# Patient Record
Sex: FEMALE | Race: WHITE | NOT HISPANIC OR LATINO | URBAN - METROPOLITAN AREA
[De-identification: names, ages, dates, MRNs, and addresses within clinical notes are randomized per-mention and may not be internally consistent; named-entity substitution may affect disease eponyms.]

---

## 2018-04-12 ENCOUNTER — OFFICE (OUTPATIENT)
Dept: URBAN - METROPOLITAN AREA CLINIC 75 | Facility: CLINIC | Age: 38
End: 2018-04-12

## 2018-04-12 VITALS
DIASTOLIC BLOOD PRESSURE: 80 MMHG | HEIGHT: 64 IN | SYSTOLIC BLOOD PRESSURE: 150 MMHG | WEIGHT: 158 LBS | HEART RATE: 76 BPM

## 2018-04-12 DIAGNOSIS — R19.4 CHANGE IN BOWEL HABIT: ICD-10-CM

## 2018-04-12 DIAGNOSIS — R14.0 ABDOMINAL DISTENSION (GASEOUS): ICD-10-CM

## 2018-04-12 DIAGNOSIS — K59.00 CONSTIPATION, UNSPECIFIED: ICD-10-CM

## 2018-04-12 PROCEDURE — 99203 OFFICE O/P NEW LOW 30 MIN: CPT | Performed by: INTERNAL MEDICINE

## 2018-04-24 PROBLEM — Z86.010 PERSONAL HISTORY OF COLONIC POLYPS: Status: ACTIVE | Noted: 2018-04-25

## 2020-04-08 ENCOUNTER — LAB REQUISITION (OUTPATIENT)
Dept: LAB | Facility: HOSPITAL | Age: 40
End: 2020-04-08

## 2020-04-08 DIAGNOSIS — Z00.00 ROUTINE GENERAL MEDICAL EXAMINATION AT A HEALTH CARE FACILITY: ICD-10-CM

## 2020-07-22 ENCOUNTER — TRANSCRIBE ORDERS (OUTPATIENT)
Dept: LAB | Facility: HOSPITAL | Age: 40
End: 2020-07-22

## 2020-07-22 ENCOUNTER — LAB (OUTPATIENT)
Dept: LAB | Facility: HOSPITAL | Age: 40
End: 2020-07-22

## 2020-07-22 DIAGNOSIS — E78.5 HYPERLIPIDEMIA, UNSPECIFIED HYPERLIPIDEMIA TYPE: ICD-10-CM

## 2020-07-22 DIAGNOSIS — I10 HYPERTENSION, UNSPECIFIED TYPE: Primary | ICD-10-CM

## 2020-07-22 DIAGNOSIS — E11.00 TYPE II DIABETES MELLITUS WITH HYPEROSMOLARITY, UNCONTROLLED (HCC): ICD-10-CM

## 2020-07-22 DIAGNOSIS — I25.10 DISEASE OF CARDIOVASCULAR SYSTEM: ICD-10-CM

## 2020-07-22 DIAGNOSIS — I10 HYPERTENSION, UNSPECIFIED TYPE: ICD-10-CM

## 2020-07-22 DIAGNOSIS — E11.65 TYPE II DIABETES MELLITUS WITH HYPEROSMOLARITY, UNCONTROLLED (HCC): ICD-10-CM

## 2020-07-22 PROCEDURE — 36415 COLL VENOUS BLD VENIPUNCTURE: CPT

## 2020-07-22 PROCEDURE — 86769 SARS-COV-2 COVID-19 ANTIBODY: CPT

## 2020-07-24 LAB — SARS-COV-2 AB SERPL QL IA: NEGATIVE

## 2020-09-14 ENCOUNTER — HOSPITAL ENCOUNTER (EMERGENCY)
Facility: HOSPITAL | Age: 40
Discharge: HOME | End: 2020-09-14
Attending: EMERGENCY MEDICINE | Admitting: EMERGENCY MEDICINE

## 2020-09-14 VITALS
RESPIRATION RATE: 14 BRPM | WEIGHT: 173.72 LBS | SYSTOLIC BLOOD PRESSURE: 207 MMHG | OXYGEN SATURATION: 99 % | TEMPERATURE: 98.3 F | DIASTOLIC BLOOD PRESSURE: 97 MMHG | HEART RATE: 72 BPM | HEIGHT: 64 IN | BODY MASS INDEX: 29.66 KG/M2

## 2020-09-14 DIAGNOSIS — L03.314 CELLULITIS OF GROIN: Primary | ICD-10-CM

## 2020-09-14 PROCEDURE — 99282 EMERGENCY DEPT VISIT SF MDM: CPT

## 2020-09-14 RX ORDER — TRAMADOL HYDROCHLORIDE 50 MG/1
50 TABLET ORAL EVERY 6 HOURS PRN
Qty: 12 TABLET | Refills: 0 | Status: SHIPPED | OUTPATIENT
Start: 2020-09-14 | End: 2021-07-24

## 2020-09-14 RX ORDER — DOXYCYCLINE HYCLATE 100 MG/1
100 TABLET, DELAYED RELEASE ORAL 2 TIMES DAILY
Qty: 14 TABLET | Refills: 0 | Status: SHIPPED | OUTPATIENT
Start: 2020-09-14 | End: 2020-10-27

## 2020-09-14 NOTE — ED PROVIDER NOTES
Subjective   Patient is a 40-year-old female complaining of redness and pain to her pubic region for the past few days.  She denies abdominal pain balm diarrhea fever or other complaint          Review of Systems  Negative for fever abdominal pain vomit diarrhea dysuria or other complaint  No past medical history on file.    Allergies   Allergen Reactions   • Insulin Lispro Other (See Comments)     SWELLING AT INJECTION SITE   • Latex Itching       No past surgical history on file.    No family history on file.    Social History     Socioeconomic History   • Marital status:      Spouse name: Not on file   • Number of children: Not on file   • Years of education: Not on file   • Highest education level: Not on file           Objective   Physical Exam  Abdomen is soft nontender.  Patient has normal bowel sounds without rebound or guarding.   exam shows patient have erythema and induration over her mons pubis.  There is no fluctuance noted.  Procedures           ED Course                                           MDM  Number of Diagnoses or Management Options  Diagnosis management comments: Patient has findings consistent with cellulitis.  There is no evidence of abscess at this time.  Patient will be discharged and will be placed on doxycycline and Ultram for pain control.  She will follow with MD in several days if not improving.    Risk of Complications, Morbidity, and/or Mortality  Presenting problems: moderate  Diagnostic procedures: moderate  Management options: moderate    Patient Progress  Patient progress: stable      Final diagnoses:   Cellulitis of groin            Chadwick Saavedra MD  09/14/20 1344

## 2020-10-27 ENCOUNTER — E-VISIT (OUTPATIENT)
Dept: FAMILY MEDICINE CLINIC | Facility: TELEHEALTH | Age: 40
End: 2020-10-27

## 2020-10-27 PROCEDURE — 99421 OL DIG E/M SVC 5-10 MIN: CPT | Performed by: NURSE PRACTITIONER

## 2020-10-27 RX ORDER — LANOLIN ALCOHOL/MO/W.PET/CERES
1000 CREAM (GRAM) TOPICAL DAILY
COMMUNITY

## 2020-10-27 RX ORDER — FLUTICASONE PROPIONATE 50 MCG
2 SPRAY, SUSPENSION (ML) NASAL DAILY
COMMUNITY
End: 2021-07-24

## 2020-10-27 RX ORDER — CHLORTHALIDONE 25 MG/1
25 TABLET ORAL DAILY
COMMUNITY
End: 2021-07-24

## 2020-10-27 RX ORDER — MULTIVITAMIN WITH IRON
100 TABLET ORAL DAILY
COMMUNITY
End: 2021-07-24

## 2020-10-27 RX ORDER — SULFAMETHOXAZOLE AND TRIMETHOPRIM 800; 160 MG/1; MG/1
1 TABLET ORAL 2 TIMES DAILY
Qty: 6 TABLET | Refills: 0 | Status: SHIPPED | OUTPATIENT
Start: 2020-10-27 | End: 2020-10-30

## 2020-10-27 NOTE — PROGRESS NOTES
I reviewed the patient's evisit and prescribed bactrim-ds three day course to the patient for suspected UTI. If symptoms worsen or do not improve in 48 hours she should go to urgent care for evaluation.    I spent 11-20 minutes in the patient's chart.

## 2020-10-27 NOTE — PATIENT INSTRUCTIONS
Increase fluid intake  Avoid caffeine and carbonation  If you develop fever or mid-back pain, go to ER  If symptoms worsen or do not improve in 48 hours, go to urgent care       Urinary Tract Infection, Adult  A urinary tract infection (UTI) is an infection of any part of the urinary tract. The urinary tract includes:  · The kidneys.  · The ureters.  · The bladder.  · The urethra.  These organs make, store, and get rid of pee (urine) in the body.  What are the causes?  This is caused by germs (bacteria) in your genital area. These germs grow and cause swelling (inflammation) of your urinary tract.  What increases the risk?  You are more likely to develop this condition if:  · You have a small, thin tube (catheter) to drain pee.  · You cannot control when you pee or poop (incontinence).  · You are female, and:  ? You use these methods to prevent pregnancy:  ? A medicine that kills sperm (spermicide).  ? A device that blocks sperm (diaphragm).  ? You have low levels of a female hormone (estrogen).  ? You are pregnant.  · You have genes that add to your risk.  · You are sexually active.  · You take antibiotic medicines.  · You have trouble peeing because of:  ? A prostate that is bigger than normal, if you are male.  ? A blockage in the part of your body that drains pee from the bladder (urethra).  ? A kidney stone.  ? A nerve condition that affects your bladder (neurogenic bladder).  ? Not getting enough to drink.  ? Not peeing often enough.  · You have other conditions, such as:  ? Diabetes.  ? A weak disease-fighting system (immune system).  ? Sickle cell disease.  ? Gout.  ? Injury of the spine.  What are the signs or symptoms?  Symptoms of this condition include:  · Needing to pee right away (urgently).  · Peeing often.  · Peeing small amounts often.  · Pain or burning when peeing.  · Blood in the pee.  · Pee that smells bad or not like normal.  · Trouble peeing.  · Pee that is cloudy.  · Fluid coming from the  vagina, if you are female.  · Pain in the belly or lower back.  Other symptoms include:  · Throwing up (vomiting).  · No urge to eat.  · Feeling mixed up (confused).  · Being tired and grouchy (irritable).  · A fever.  · Watery poop (diarrhea).  How is this treated?  This condition may be treated with:  · Antibiotic medicine.  · Other medicines.  · Drinking enough water.  Follow these instructions at home:    Medicines  · Take over-the-counter and prescription medicines only as told by your doctor.  · If you were prescribed an antibiotic medicine, take it as told by your doctor. Do not stop taking it even if you start to feel better.  General instructions  · Make sure you:  ? Pee until your bladder is empty.  ? Do not hold pee for a long time.  ? Empty your bladder after sex.  ? Wipe from front to back after pooping if you are a female. Use each tissue one time when you wipe.  · Drink enough fluid to keep your pee pale yellow.  · Keep all follow-up visits as told by your doctor. This is important.  Contact a doctor if:  · You do not get better after 1-2 days.  · Your symptoms go away and then come back.  Get help right away if:  · You have very bad back pain.  · You have very bad pain in your lower belly.  · You have a fever.  · You are sick to your stomach (nauseous).  · You are throwing up.  Summary  · A urinary tract infection (UTI) is an infection of any part of the urinary tract.  · This condition is caused by germs in your genital area.  · There are many risk factors for a UTI. These include having a small, thin tube to drain pee and not being able to control when you pee or poop.  · Treatment includes antibiotic medicines for germs.  · Drink enough fluid to keep your pee pale yellow.  This information is not intended to replace advice given to you by your health care provider. Make sure you discuss any questions you have with your health care provider.  Document Released: 06/05/2009 Document Revised:  12/05/2019 Document Reviewed: 06/27/2019  [employer] Patient Education © 2020 [employer]  Sulfamethoxazole; Trimethoprim, SMX-TMP tablets  What is this medicine?  SULFAMETHOXAZOLE; TRIMETHOPRIM or SMX-TMP (suhl fuh meth OK toni zohl; trye METH oh prim) is a combination of a sulfonamide antibiotic and a second antibiotic, trimethoprim. It is used to treat or prevent certain kinds of bacterial infections. It will not work for colds, flu, or other viral infections.  This medicine may be used for other purposes; ask your health care provider or pharmacist if you have questions.  COMMON BRAND NAME(S): Bacter-Aid DS, Bactrim, Bactrim DS, Septra, Septra DS  What should I tell my health care provider before I take this medicine?  They need to know if you have any of these conditions:  · G6PD deficiency  · HIV or AIDS  · kidney disease  · liver disease  · low platelets  · low red blood cell counts  · poor nutrition  · stomach or intestine problems like colitis  · thyroid disease  · an unusual or allergic reaction to sulfamethoxazole, trimethoprim, sulfa drugs, other drugs, foods, dyes, or preservatives  · pregnant or trying to get pregnant  · breast-feeding  How should I use this medicine?  Take this medicine by mouth with a glass of water. Follow the directions on the prescription label. Take your medicine at regular intervals. Do not take it more often than directed. Take all of your medicine as directed even if you think you are better. Do not skip doses or stop your medicine early.  Talk to your pediatrician regarding the use of this medicine in children. While this drug may be prescribed for children as young as 2 months for selected conditions, precautions do apply.  Overdosage: If you think you have taken too much of this medicine contact a poison control center or emergency room at once.  NOTE: This medicine is only for you. Do not share this medicine with others.  What if I miss a dose?  If you miss a dose, take it  as soon as you can. If it is almost time for your next dose, take only that dose. Do not take double or extra doses.  What may interact with this medicine?  Do not take this medicine with any of the following medications:  · dofetilide  This medicine may also interact with the following medications:  · amantadine  · birth control pills  · certain medicines for blood pressure, heart disease  · certain medicines for depression, like amitriptyline  · certain medicines for diabetes, like glipizide or glyburide  · certain medicines that treat or prevent blood clots like warfarin  · cyclosporine  · digoxin  · diuretics  · indomethacin  · methotrexate  · phenytoin  · procainamide  · pyrimethamine  · zidovudine  This list may not describe all possible interactions. Give your health care provider a list of all the medicines, herbs, non-prescription drugs, or dietary supplements you use. Also tell them if you smoke, drink alcohol, or use illegal drugs. Some items may interact with your medicine.  What should I watch for while using this medicine?  Tell your health care provider if your symptoms do not start to get better or if they get worse.  Do not treat diarrhea with over the counter products. Contact your health care provider if you have diarrhea that lasts more than 2 days or if it is severe and watery.  This drug may cause serious skin reactions. They can happen weeks to months after starting the drug. Contact your health care provider right away if you notice fevers or flu-like symptoms with a rash. The rash may be red or purple and then turn into blisters or peeling of the skin. Or, you might notice a red rash with swelling of the face, lips or lymph nodes in your neck or under your arms.  This drug can make you more sensitive to the sun. Keep out of the sun. If you cannot avoid being in the sun, wear protective clothing and sunscreen. Do not use sun lamps or tanning beds/booths.  Be careful brushing or flossing your  teeth or using a toothpick because you may get an infection or bleed more easily. If you have any dental work done, tell your dentist you are receiving this drug.  What side effects may I notice from receiving this medicine?  Side effects that you should report to your doctor or health care professional as soon as possible:  · allergic reactions (skin rash, itching or hives; swelling of the face, lips, or tongue)  · bloody or watery diarrhea  · heartbeat rhythm changes (trouble breathing; chest pain; dizziness; fast, irregular heartbeat; feeling faint or lightheaded, falls,)  · fever  · high potassium levels (chest pain; fast, irregular heartbeat; muscle weakness)  · kidney injury (trouble passing urine or change in the amount of urine)  · low blood sugar (feeling anxious; confusion; dizziness; increased hunger; unusually weak or tired; increased sweating; shakiness; cold, clammy skin; irritable; headache; blurred vision; fast heartbeat; loss of consciousness)  · low red blood cell counts (trouble breathing; feeling faint; lightheaded, falls; unusually weak or tired)  · rash, fever, and swollen lymph nodes  · redness, blistering, peeling, or loosening of the skin, including inside the mouth  · unusual bruising or bleeding  Side effects that usually do not require medical attention (report to your doctor or health care professional if they continue or are bothersome):  · loss of appetite  · nausea  · vomiting  This list may not describe all possible side effects. Call your doctor for medical advice about side effects. You may report side effects to FDA at 2-227-YER-1170.  Where should I keep my medicine?  Keep out of the reach of children.  Store between 15 and 25 degrees C (59 to 77 degrees F). Protect from light. Keep the container tightly closed. Throw away any unused drug after the expiration date.  NOTE: This sheet is a summary. It may not cover all possible information. If you have questions about this  medicine, talk to your doctor, pharmacist, or health care provider.  © 2020 [employer] (2020-09-04 12:53:51)

## 2021-01-08 ENCOUNTER — TRANSCRIBE ORDERS (OUTPATIENT)
Dept: ADMINISTRATIVE | Facility: HOSPITAL | Age: 41
End: 2021-01-08

## 2021-01-08 DIAGNOSIS — Z12.31 SCREENING MAMMOGRAM, ENCOUNTER FOR: Primary | ICD-10-CM

## 2021-01-12 ENCOUNTER — E-VISIT (OUTPATIENT)
Dept: FAMILY MEDICINE CLINIC | Facility: TELEHEALTH | Age: 41
End: 2021-01-12

## 2021-01-12 DIAGNOSIS — R39.89 SUSPECTED UTI: Primary | ICD-10-CM

## 2021-01-12 PROCEDURE — 99422 OL DIG E/M SVC 11-20 MIN: CPT | Performed by: NURSE PRACTITIONER

## 2021-01-12 RX ORDER — PHENAZOPYRIDINE HYDROCHLORIDE 200 MG/1
200 TABLET, FILM COATED ORAL 3 TIMES DAILY PRN
Qty: 6 TABLET | Refills: 0 | Status: SHIPPED | OUTPATIENT
Start: 2021-01-12 | End: 2021-01-14

## 2021-01-12 RX ORDER — NITROFURANTOIN 25; 75 MG/1; MG/1
100 CAPSULE ORAL 2 TIMES DAILY
Qty: 14 CAPSULE | Refills: 0 | Status: SHIPPED | OUTPATIENT
Start: 2021-01-12 | End: 2021-01-19

## 2021-01-12 NOTE — PATIENT INSTRUCTIONS
Urinary Tract Infection, Adult    A urinary tract infection (UTI) is an infection of any part of the urinary tract. The urinary tract includes the kidneys, ureters, bladder, and urethra. These organs make, store, and get rid of urine in the body.  Your health care provider may use other names to describe the infection. An upper UTI affects the ureters and kidneys (pyelonephritis). A lower UTI affects the bladder (cystitis) and urethra (urethritis).  What are the causes?  Most urinary tract infections are caused by bacteria in your genital area, around the entrance to your urinary tract (urethra). These bacteria grow and cause inflammation of your urinary tract.  What increases the risk?  You are more likely to develop this condition if:  · You have a urinary catheter that stays in place (indwelling).  · You are not able to control when you urinate or have a bowel movement (you have incontinence).  · You are female and you:  ? Use a spermicide or diaphragm for birth control.  ? Have low estrogen levels.  ? Are pregnant.  · You have certain genes that increase your risk (genetics).  · You are sexually active.  · You take antibiotic medicines.  · You have a condition that causes your flow of urine to slow down, such as:  ? An enlarged prostate, if you are male.  ? Blockage in your urethra (stricture).  ? A kidney stone.  ? A nerve condition that affects your bladder control (neurogenic bladder).  ? Not getting enough to drink, or not urinating often.  · You have certain medical conditions, such as:  ? Diabetes.  ? A weak disease-fighting system (immunesystem).  ? Sickle cell disease.  ? Gout.  ? Spinal cord injury.  What are the signs or symptoms?  Symptoms of this condition include:  · Needing to urinate right away (urgently).  · Frequent urination or passing small amounts of urine frequently.  · Pain or burning with urination.  · Blood in the urine.  · Urine that smells bad or unusual.  · Trouble urinating.  · Cloudy  urine.  · Vaginal discharge, if you are female.  · Pain in the abdomen or the lower back.  You may also have:  · Vomiting or a decreased appetite.  · Confusion.  · Irritability or tiredness.  · A fever.  · Diarrhea.  The first symptom in older adults may be confusion. In some cases, they may not have any symptoms until the infection has worsened.  How is this diagnosed?  This condition is diagnosed based on your medical history and a physical exam. You may also have other tests, including:  · Urine tests.  · Blood tests.  · Tests for sexually transmitted infections (STIs).  If you have had more than one UTI, a cystoscopy or imaging studies may be done to determine the cause of the infections.  How is this treated?  Treatment for this condition includes:  · Antibiotic medicine.  · Over-the-counter medicines to treat discomfort.  · Drinking enough water to stay hydrated.  If you have frequent infections or have other conditions such as a kidney stone, you may need to see a health care provider who specializes in the urinary tract (urologist).  In rare cases, urinary tract infections can cause sepsis. Sepsis is a life-threatening condition that occurs when the body responds to an infection. Sepsis is treated in the hospital with IV antibiotics, fluids, and other medicines.  Follow these instructions at home:    Medicines  · Take over-the-counter and prescription medicines only as told by your health care provider.  · If you were prescribed an antibiotic medicine, take it as told by your health care provider. Do not stop using the antibiotic even if you start to feel better.  General instructions  · Make sure you:  ? Empty your bladder often and completely. Do not hold urine for long periods of time.  ? Empty your bladder after sex.  ? Wipe from front to back after a bowel movement if you are female. Use each tissue one time when you wipe.  · Drink enough fluid to keep your urine pale yellow.  · Keep all follow-up  visits as told by your health care provider. This is important.  Contact a health care provider if:  · Your symptoms do not get better after 1-2 days.  · Your symptoms go away and then return.  Get help right away if you have:  · Severe pain in your back or your lower abdomen.  · A fever.  · Nausea or vomiting.  Summary  · A urinary tract infection (UTI) is an infection of any part of the urinary tract, which includes the kidneys, ureters, bladder, and urethra.  · Most urinary tract infections are caused by bacteria in your genital area, around the entrance to your urinary tract (urethra).  · Treatment for this condition often includes antibiotic medicines.  · If you were prescribed an antibiotic medicine, take it as told by your health care provider. Do not stop using the antibiotic even if you start to feel better.  · Keep all follow-up visits as told by your health care provider. This is important.  This information is not intended to replace advice given to you by your health care provider. Make sure you discuss any questions you have with your health care provider.  Document Revised: 12/05/2019 Document Reviewed: 06/27/2019  [employer] Patient Education © 2020 [employer].

## 2021-01-12 NOTE — PROGRESS NOTES
Bibiana Inman    1980  [id number]    I have reviewed the e-Visit questionnaire and patient's answers, my assessment and plan are as follows:      HPI  Bibiana Inman is a 40 y.o. with 1-4 day history of dysuria described as burning, low-grade temp <100.4.  She has had similar symptoms in the past which were treated as UTI.  She is diabetic and prone to UTIs.    Review of Systems - Negative except symptoms listed in HPI      Diagnoses and all orders for this visit:    1. Suspected UTI (Primary)  -     nitrofurantoin, macrocrystal-monohydrate, (MACROBID) 100 MG capsule; Take 1 capsule by mouth 2 (Two) Times a Day for 7 days.  Dispense: 14 capsule; Refill: 0  -     phenazopyridine (PYRIDIUM) 200 MG tablet; Take 1 tablet by mouth 3 (Three) Times a Day As Needed for Bladder Spasms for up to 2 days.  Dispense: 6 tablet; Refill: 0  -Macrobid as prescribed - complete entire course of medication even if you begin to feel better.   --Phenazopyridine is for painful urination and bladder spasms--this medication with cause urine to become bright orange and can stain undergarments.    -Continue to increase your fluid intake.   -Abstain from intercourse during antibiotic treatment.   -Practice good perineal hygiene: wipe front to back  -Do not hold your urine- go to the bathroom every 2-3 hours.     -Warning signs: severe abdominal/pelvic/back pain, fever >101, blood in urine - seek medical attention as soon as possible for a hands on/objective exam and possible labs.     -Follow up with your PCP in 2 days if no improvement in symptoms or if symptoms begin to worsen.       Any medications prescribed have been sent electronically to   [facility] - [address] [place] [address]  Phone: [phone] Fax: [phone]      Time Documentation  Counseled patient  Counseling topics: diagnosis, treatment options and return instructions  Total encounter time: counseling time more than 50% of visit: 20  jose antonio Martinez, APRN  01/12/21  13:03 EST

## 2021-01-29 ENCOUNTER — HOSPITAL ENCOUNTER (EMERGENCY)
Facility: HOSPITAL | Age: 41
Discharge: HOME | End: 2021-01-29
Admitting: EMERGENCY MEDICINE

## 2021-01-29 VITALS
SYSTOLIC BLOOD PRESSURE: 158 MMHG | BODY MASS INDEX: 30.39 KG/M2 | OXYGEN SATURATION: 100 % | TEMPERATURE: 98.4 F | DIASTOLIC BLOOD PRESSURE: 57 MMHG | RESPIRATION RATE: 16 BRPM | HEART RATE: 59 BPM | HEIGHT: 64 IN | WEIGHT: 178 LBS

## 2021-01-29 DIAGNOSIS — Z20.822 COVID-19 RULED OUT BY LABORATORY TESTING: ICD-10-CM

## 2021-01-29 DIAGNOSIS — R19.7 DIARRHEA, UNSPECIFIED TYPE: Primary | ICD-10-CM

## 2021-01-29 DIAGNOSIS — N17.9 ACUTE KIDNEY INJURY (HCC): ICD-10-CM

## 2021-01-29 LAB
ALBUMIN SERPL-MCNC: 4 G/DL (ref 3.5–5.2)
ALBUMIN/GLOB SERPL: 1.1 G/DL
ALP SERPL-CCNC: 130 U/L (ref 39–117)
ALT SERPL W P-5'-P-CCNC: 13 U/L (ref 1–33)
ANION GAP SERPL CALCULATED.3IONS-SCNC: 10 MMOL/L (ref 5–15)
AST SERPL-CCNC: 14 U/L (ref 1–32)
B-HCG UR QL: NEGATIVE
BASOPHILS # BLD AUTO: 0 10*3/MM3 (ref 0–0.2)
BASOPHILS NFR BLD AUTO: 0.3 % (ref 0–1.5)
BILIRUB SERPL-MCNC: 0.2 MG/DL (ref 0–1.2)
BILIRUB UR QL STRIP: NEGATIVE
BUN SERPL-MCNC: 24 MG/DL (ref 6–20)
BUN/CREAT SERPL: 11.5 (ref 7–25)
CALCIUM SPEC-SCNC: 9.1 MG/DL (ref 8.6–10.5)
CHLORIDE SERPL-SCNC: 98 MMOL/L (ref 98–107)
CLARITY UR: CLEAR
CO2 SERPL-SCNC: 23 MMOL/L (ref 22–29)
COLOR UR: YELLOW
CREAT SERPL-MCNC: 2.08 MG/DL (ref 0.57–1)
DEPRECATED RDW RBC AUTO: 42.9 FL (ref 37–54)
EOSINOPHIL # BLD AUTO: 0.2 10*3/MM3 (ref 0–0.4)
EOSINOPHIL NFR BLD AUTO: 1.6 % (ref 0.3–6.2)
ERYTHROCYTE [DISTWIDTH] IN BLOOD BY AUTOMATED COUNT: 14.8 % (ref 12.3–15.4)
GFR SERPL CREATININE-BSD FRML MDRD: 26 ML/MIN/1.73
GLOBULIN UR ELPH-MCNC: 3.6 GM/DL
GLUCOSE SERPL-MCNC: 272 MG/DL (ref 65–99)
GLUCOSE UR STRIP-MCNC: ABNORMAL MG/DL
HCT VFR BLD AUTO: 30 % (ref 34–46.6)
HGB BLD-MCNC: 10 G/DL (ref 12–15.9)
HGB UR QL STRIP.AUTO: NEGATIVE
HOLD SPECIMEN: NORMAL
KETONES UR QL STRIP: NEGATIVE
LEUKOCYTE ESTERASE UR QL STRIP.AUTO: NEGATIVE
LYMPHOCYTES # BLD AUTO: 1.4 10*3/MM3 (ref 0.7–3.1)
LYMPHOCYTES NFR BLD AUTO: 13.2 % (ref 19.6–45.3)
MCH RBC QN AUTO: 26.8 PG (ref 26.6–33)
MCHC RBC AUTO-ENTMCNC: 33.3 G/DL (ref 31.5–35.7)
MCV RBC AUTO: 80.4 FL (ref 79–97)
MONOCYTES # BLD AUTO: 0.6 10*3/MM3 (ref 0.1–0.9)
MONOCYTES NFR BLD AUTO: 5.5 % (ref 5–12)
NEUTROPHILS NFR BLD AUTO: 79.4 % (ref 42.7–76)
NEUTROPHILS NFR BLD AUTO: 8.6 10*3/MM3 (ref 1.7–7)
NITRITE UR QL STRIP: NEGATIVE
NRBC BLD AUTO-RTO: 0 /100 WBC (ref 0–0.2)
PH UR STRIP.AUTO: 6 [PH] (ref 5–8)
PLATELET # BLD AUTO: 213 10*3/MM3 (ref 140–450)
PMV BLD AUTO: 9.4 FL (ref 6–12)
POTASSIUM SERPL-SCNC: 4.7 MMOL/L (ref 3.5–5.2)
PROT SERPL-MCNC: 7.6 G/DL (ref 6–8.5)
PROT UR QL STRIP: NEGATIVE
RBC # BLD AUTO: 3.73 10*6/MM3 (ref 3.77–5.28)
SARS-COV-2 RNA PNL SPEC NAA+PROBE: NORMAL
SODIUM SERPL-SCNC: 131 MMOL/L (ref 136–145)
SP GR UR STRIP: 1.01 (ref 1–1.03)
UROBILINOGEN UR QL STRIP: ABNORMAL
WBC # BLD AUTO: 10.8 10*3/MM3 (ref 3.4–10.8)
WHOLE BLOOD HOLD SPECIMEN: NORMAL

## 2021-01-29 PROCEDURE — 99283 EMERGENCY DEPT VISIT LOW MDM: CPT

## 2021-01-29 PROCEDURE — 85025 COMPLETE CBC W/AUTO DIFF WBC: CPT | Performed by: NURSE PRACTITIONER

## 2021-01-29 PROCEDURE — 87635 SARS-COV-2 COVID-19 AMP PRB: CPT | Performed by: NURSE PRACTITIONER

## 2021-01-29 PROCEDURE — 80053 COMPREHEN METABOLIC PANEL: CPT | Performed by: NURSE PRACTITIONER

## 2021-01-29 PROCEDURE — 81003 URINALYSIS AUTO W/O SCOPE: CPT | Performed by: NURSE PRACTITIONER

## 2021-01-29 PROCEDURE — 81025 URINE PREGNANCY TEST: CPT | Performed by: NURSE PRACTITIONER

## 2021-01-29 PROCEDURE — C9803 HOPD COVID-19 SPEC COLLECT: HCPCS

## 2021-01-29 RX ORDER — SODIUM CHLORIDE 0.9 % (FLUSH) 0.9 %
10 SYRINGE (ML) INJECTION AS NEEDED
Status: DISCONTINUED | OUTPATIENT
Start: 2021-01-29 | End: 2021-01-29 | Stop reason: HOSPADM

## 2021-01-29 RX ORDER — ONDANSETRON 4 MG/1
4 TABLET, ORALLY DISINTEGRATING ORAL EVERY 8 HOURS PRN
Qty: 8 TABLET | Refills: 0 | Status: SHIPPED | OUTPATIENT
Start: 2021-01-29 | End: 2021-07-24

## 2021-01-29 RX ADMIN — SODIUM CHLORIDE 1000 ML: 9 INJECTION, SOLUTION INTRAVENOUS at 09:11

## 2021-02-01 ENCOUNTER — EPISODE CHANGES (OUTPATIENT)
Dept: CASE MANAGEMENT | Facility: OTHER | Age: 41
End: 2021-02-01

## 2021-04-02 ENCOUNTER — BULK ORDERING (OUTPATIENT)
Dept: CASE MANAGEMENT | Facility: OTHER | Age: 41
End: 2021-04-02

## 2021-04-02 DIAGNOSIS — Z23 IMMUNIZATION DUE: ICD-10-CM

## 2021-04-05 ENCOUNTER — HOSPITAL ENCOUNTER (OUTPATIENT)
Dept: MAMMOGRAPHY | Facility: HOSPITAL | Age: 41
Discharge: HOME | End: 2021-04-05
Admitting: FAMILY MEDICINE

## 2021-04-05 DIAGNOSIS — Z12.31 SCREENING MAMMOGRAM, ENCOUNTER FOR: ICD-10-CM

## 2021-04-05 PROCEDURE — 77063 BREAST TOMOSYNTHESIS BI: CPT

## 2021-04-05 PROCEDURE — 77067 SCR MAMMO BI INCL CAD: CPT

## 2021-04-16 ENCOUNTER — APPOINTMENT (OUTPATIENT)
Dept: MAMMOGRAPHY | Facility: HOSPITAL | Age: 41
End: 2021-04-16

## 2021-05-21 ENCOUNTER — OFFICE (OUTPATIENT)
Dept: URBAN - METROPOLITAN AREA CLINIC 64 | Facility: CLINIC | Age: 41
End: 2021-05-21

## 2021-05-21 ENCOUNTER — TRANSCRIBE ORDERS (OUTPATIENT)
Dept: ADMINISTRATIVE | Facility: HOSPITAL | Age: 41
End: 2021-05-21

## 2021-05-21 VITALS
WEIGHT: 167 LBS | SYSTOLIC BLOOD PRESSURE: 130 MMHG | HEIGHT: 64 IN | DIASTOLIC BLOOD PRESSURE: 78 MMHG | HEART RATE: 78 BPM

## 2021-05-21 DIAGNOSIS — D50.8 OTHER IRON DEFICIENCY ANEMIAS: ICD-10-CM

## 2021-05-21 DIAGNOSIS — R11.2 NAUSEA WITH VOMITING, UNSPECIFIED: ICD-10-CM

## 2021-05-21 DIAGNOSIS — R14.2 ERUCTATION: ICD-10-CM

## 2021-05-21 DIAGNOSIS — R19.06 EPIGASTRIC SWELLING, MASS OR LUMP: ICD-10-CM

## 2021-05-21 DIAGNOSIS — R68.81 EARLY SATIETY: Primary | ICD-10-CM

## 2021-05-21 DIAGNOSIS — R68.81 EARLY SATIETY: ICD-10-CM

## 2021-05-21 DIAGNOSIS — R19.7 DIARRHEA, UNSPECIFIED: ICD-10-CM

## 2021-05-21 PROCEDURE — 99204 OFFICE O/P NEW MOD 45 MIN: CPT | Performed by: NURSE PRACTITIONER

## 2021-05-21 RX ORDER — ONDANSETRON HYDROCHLORIDE 4 MG/1
16 TABLET, FILM COATED ORAL
Qty: 40 | Refills: 6 | Status: COMPLETED
Start: 2021-05-21 | End: 2022-12-05

## 2021-05-21 RX ORDER — PANTOPRAZOLE SODIUM 40 MG/1
40 TABLET, DELAYED RELEASE ORAL
Qty: 30 | Refills: 11 | Status: COMPLETED
Start: 2021-05-21 | End: 2022-12-05

## 2021-06-03 ENCOUNTER — HOSPITAL ENCOUNTER (OUTPATIENT)
Dept: NUCLEAR MEDICINE | Facility: HOSPITAL | Age: 41
Discharge: HOME | End: 2021-06-03

## 2021-06-03 DIAGNOSIS — R68.81 EARLY SATIETY: ICD-10-CM

## 2021-06-03 PROCEDURE — A9541 TC99M SULFUR COLLOID: HCPCS | Performed by: NURSE PRACTITIONER

## 2021-06-03 PROCEDURE — 78264 GASTRIC EMPTYING IMG STUDY: CPT

## 2021-06-03 PROCEDURE — 0 TECHNETIUM SULFUR COLLOID: Performed by: NURSE PRACTITIONER

## 2021-06-03 RX ADMIN — TECHNETIUM TC 99M SULFUR COLLOID 1 DOSE: KIT at 07:30

## 2021-07-21 ENCOUNTER — TRANSCRIBE ORDERS (OUTPATIENT)
Dept: LAB | Facility: HOSPITAL | Age: 41
End: 2021-07-21

## 2021-07-21 ENCOUNTER — LAB (OUTPATIENT)
Dept: LAB | Facility: HOSPITAL | Age: 41
End: 2021-07-21

## 2021-07-21 DIAGNOSIS — M25.50 PAIN IN JOINT, MULTIPLE SITES: Primary | ICD-10-CM

## 2021-07-21 DIAGNOSIS — M25.50 PAIN IN JOINT, MULTIPLE SITES: ICD-10-CM

## 2021-07-21 DIAGNOSIS — R70.0 ELEVATED SEDIMENTATION RATE: ICD-10-CM

## 2021-07-21 LAB
ANA SER QL: NEGATIVE
CHROMATIN AB SERPL-ACNC: <10 IU/ML (ref 0–14)

## 2021-07-21 PROCEDURE — 86038 ANTINUCLEAR ANTIBODIES: CPT

## 2021-07-21 PROCEDURE — 36415 COLL VENOUS BLD VENIPUNCTURE: CPT

## 2021-07-21 PROCEDURE — 86431 RHEUMATOID FACTOR QUANT: CPT

## 2021-07-24 ENCOUNTER — E-VISIT (OUTPATIENT)
Dept: FAMILY MEDICINE CLINIC | Facility: TELEHEALTH | Age: 41
End: 2021-07-24

## 2021-07-24 DIAGNOSIS — R39.89 SUSPECTED UTI: Primary | ICD-10-CM

## 2021-07-24 PROBLEM — D50.9 IRON DEFICIENCY ANEMIA: Status: ACTIVE | Noted: 2020-03-02

## 2021-07-24 PROBLEM — R70.0 ELEVATED ERYTHROCYTE SEDIMENTATION RATE: Status: ACTIVE | Noted: 2021-07-20

## 2021-07-24 PROBLEM — IMO0002 UNCONTROLLED TYPE 2 DIABETES MELLITUS: Status: ACTIVE | Noted: 2017-05-16

## 2021-07-24 PROBLEM — I73.9 INTERMITTENT CLAUDICATION: Status: ACTIVE | Noted: 2017-04-17

## 2021-07-24 PROBLEM — Z23 NEED FOR INFLUENZA VACCINATION: Status: ACTIVE | Noted: 2017-11-01

## 2021-07-24 PROBLEM — I50.9 CONGESTIVE HEART FAILURE (HCC): Status: ACTIVE | Noted: 2021-07-20

## 2021-07-24 PROBLEM — K21.9 GASTROESOPHAGEAL REFLUX DISEASE: Status: ACTIVE | Noted: 2021-03-10

## 2021-07-24 PROBLEM — E11.43 DIABETIC GASTROPARESIS (HCC): Status: ACTIVE | Noted: 2021-07-20

## 2021-07-24 PROBLEM — M25.50 MULTIPLE JOINT PAIN: Status: ACTIVE | Noted: 2021-07-20

## 2021-07-24 PROBLEM — I21.9 MYOCARDIAL INFARCTION: Status: ACTIVE | Noted: 2021-07-20

## 2021-07-24 PROBLEM — R53.83 FATIGUE: Status: ACTIVE | Noted: 2021-07-20

## 2021-07-24 PROBLEM — K31.84 DIABETIC GASTROPARESIS: Status: ACTIVE | Noted: 2021-07-20

## 2021-07-24 PROCEDURE — BHEMPVIDEOVISIT: Performed by: NURSE PRACTITIONER

## 2021-07-24 PROCEDURE — 99422 OL DIG E/M SVC 11-20 MIN: CPT | Performed by: NURSE PRACTITIONER

## 2021-07-24 RX ORDER — DULAGLUTIDE 0.75 MG/.5ML
0.75 INJECTION, SOLUTION SUBCUTANEOUS
COMMUNITY
Start: 2021-07-20 | End: 2021-12-31 | Stop reason: SDUPTHER

## 2021-07-24 RX ORDER — ASPIRIN 81 MG/1
TABLET, CHEWABLE ORAL
COMMUNITY

## 2021-07-24 RX ORDER — NITROFURANTOIN 25; 75 MG/1; MG/1
100 CAPSULE ORAL 2 TIMES DAILY
Qty: 10 CAPSULE | Refills: 0 | Status: SHIPPED | OUTPATIENT
Start: 2021-07-24 | End: 2021-07-31

## 2021-07-24 RX ORDER — GABAPENTIN 100 MG/1
CAPSULE ORAL
COMMUNITY
Start: 2021-05-05

## 2021-07-24 NOTE — PATIENT INSTRUCTIONS
Drink plenty of fluids  Stay away from caffeine  If symptoms do not improve in 3-5 days or worsen follow up with your primary care provider or urgent care for further evaluation and treatment.     Urinary Tract Infection, Adult  A urinary tract infection (UTI) is an infection of any part of the urinary tract. The urinary tract includes:  · The kidneys.  · The ureters.  · The bladder.  · The urethra.  These organs make, store, and get rid of pee (urine) in the body.  What are the causes?  This is caused by germs (bacteria) in your genital area. These germs grow and cause swelling (inflammation) of your urinary tract.  What increases the risk?  You are more likely to develop this condition if:  · You have a small, thin tube (catheter) to drain pee.  · You cannot control when you pee or poop (incontinence).  · You are female, and:  ? You use these methods to prevent pregnancy:  § A medicine that kills sperm (spermicide).  § A device that blocks sperm (diaphragm).  ? You have low levels of a female hormone (estrogen).  ? You are pregnant.  · You have genes that add to your risk.  · You are sexually active.  · You take antibiotic medicines.  · You have trouble peeing because of:  ? A prostate that is bigger than normal, if you are male.  ? A blockage in the part of your body that drains pee from the bladder (urethra).  ? A kidney stone.  ? A nerve condition that affects your bladder (neurogenic bladder).  ? Not getting enough to drink.  ? Not peeing often enough.  · You have other conditions, such as:  ? Diabetes.  ? A weak disease-fighting system (immune system).  ? Sickle cell disease.  ? Gout.  ? Injury of the spine.  What are the signs or symptoms?  Symptoms of this condition include:  · Needing to pee right away (urgently).  · Peeing often.  · Peeing small amounts often.  · Pain or burning when peeing.  · Blood in the pee.  · Pee that smells bad or not like normal.  · Trouble peeing.  · Pee that is cloudy.  · Fluid  coming from the vagina, if you are female.  · Pain in the belly or lower back.  Other symptoms include:  · Throwing up (vomiting).  · No urge to eat.  · Feeling mixed up (confused).  · Being tired and grouchy (irritable).  · A fever.  · Watery poop (diarrhea).  How is this treated?  This condition may be treated with:  · Antibiotic medicine.  · Other medicines.  · Drinking enough water.  Follow these instructions at home:    Medicines  · Take over-the-counter and prescription medicines only as told by your doctor.  · If you were prescribed an antibiotic medicine, take it as told by your doctor. Do not stop taking it even if you start to feel better.  General instructions  · Make sure you:  ? Pee until your bladder is empty.  ? Do not hold pee for a long time.  ? Empty your bladder after sex.  ? Wipe from front to back after pooping if you are a female. Use each tissue one time when you wipe.  · Drink enough fluid to keep your pee pale yellow.  · Keep all follow-up visits as told by your doctor. This is important.  Contact a doctor if:  · You do not get better after 1-2 days.  · Your symptoms go away and then come back.  Get help right away if:  · You have very bad back pain.  · You have very bad pain in your lower belly.  · You have a fever.  · You are sick to your stomach (nauseous).  · You are throwing up.  Summary  · A urinary tract infection (UTI) is an infection of any part of the urinary tract.  · This condition is caused by germs in your genital area.  · There are many risk factors for a UTI. These include having a small, thin tube to drain pee and not being able to control when you pee or poop.  · Treatment includes antibiotic medicines for germs.  · Drink enough fluid to keep your pee pale yellow.  This information is not intended to replace advice given to you by your health care provider. Make sure you discuss any questions you have with your health care provider.  Document Revised: 12/05/2019 Document  Reviewed: 06/27/2019  [employer] Patient Education © 2021 [employer].

## 2021-07-24 NOTE — PROGRESS NOTES
Bibiana Inman    1980  [id number]    I have reviewed the e-Visit questionnaire and patient's answers, my assessment and plan are as follows:      HPI  Bibiana Inman is a 41 y.o. with symptoms of uti. She has had these symptoms for 1-4 days.     Review of Systems - Genito-Urinary ROS: positive for - dysuria and mild back pain.   negative for - genital discharge, genital ulcers, hematuria, nocturia, pelvic pain, urinary frequency/urgency, vulvar/vaginal symptoms or fever       Diagnoses and all orders for this visit:    1. Suspected UTI (Primary)    Other orders  -     nitrofurantoin, macrocrystal-monohydrate, (Macrobid) 100 MG capsule; Take 1 capsule by mouth 2 (Two) Times a Day for 5 days.  Dispense: 10 capsule; Refill: 0        Any medications prescribed have been sent electronically to   [facility] - [address] [place] [address]  Phone: [phone] Fax: [phone]      Time Documentation  Counseled patient  Counseling topics: diagnosis, treatment options, follow up plan and return instructions  Total encounter time: counseling time more than 50% of visit: 12        KAUSHAL Toro  07/24/21  16:49 EDT

## 2021-08-05 ENCOUNTER — OFFICE (OUTPATIENT)
Dept: URBAN - METROPOLITAN AREA CLINIC 64 | Facility: CLINIC | Age: 41
End: 2021-08-05
Payer: COMMERCIAL

## 2021-08-05 VITALS
HEIGHT: 64 IN | HEART RATE: 79 BPM | SYSTOLIC BLOOD PRESSURE: 131 MMHG | WEIGHT: 168 LBS | DIASTOLIC BLOOD PRESSURE: 81 MMHG

## 2021-08-05 DIAGNOSIS — K21.9 GASTRO-ESOPHAGEAL REFLUX DISEASE WITHOUT ESOPHAGITIS: ICD-10-CM

## 2021-08-05 DIAGNOSIS — K31.84 GASTROPARESIS: ICD-10-CM

## 2021-08-05 PROCEDURE — 99213 OFFICE O/P EST LOW 20 MIN: CPT | Performed by: NURSE PRACTITIONER

## 2021-11-08 ENCOUNTER — OFFICE VISIT (OUTPATIENT)
Dept: SLEEP MEDICINE | Facility: CLINIC | Age: 41
End: 2021-11-08

## 2021-11-08 VITALS
HEART RATE: 67 BPM | DIASTOLIC BLOOD PRESSURE: 83 MMHG | WEIGHT: 178 LBS | SYSTOLIC BLOOD PRESSURE: 139 MMHG | BODY MASS INDEX: 30.39 KG/M2 | HEIGHT: 64 IN | OXYGEN SATURATION: 99 %

## 2021-11-08 DIAGNOSIS — G47.8 NON-RESTORATIVE SLEEP: ICD-10-CM

## 2021-11-08 DIAGNOSIS — G47.10 HYPERSOMNIA: Primary | ICD-10-CM

## 2021-11-08 DIAGNOSIS — G47.63 SLEEP-RELATED BRUXISM: ICD-10-CM

## 2021-11-08 DIAGNOSIS — E66.9 OBESITY WITH SERIOUS COMORBIDITY, UNSPECIFIED CLASSIFICATION, UNSPECIFIED OBESITY TYPE: ICD-10-CM

## 2021-11-08 PROCEDURE — G0463 HOSPITAL OUTPT CLINIC VISIT: HCPCS

## 2021-11-08 PROCEDURE — 99203 OFFICE O/P NEW LOW 30 MIN: CPT | Performed by: FAMILY MEDICINE

## 2021-11-08 NOTE — PROGRESS NOTES
[facility] New Patient/Consultation       Reason for Consultation: HYPERSOMNIA NON RESTORATIVE SLEEP      Patient Care Team:  Ibrahima Correa MD as PCP - General (Family Medicine)  Carla Lyon MD as Consulting Physician (Sleep Medicine)      History of present illness:  Thank you for asking me to see your patient.  The patient is a 41 y.o. female With history of allergic rhinitis hypertension CHF intermittent claudication type 2 diabetes mellitus diabetic gastroparesis iron deficiency anemia presents with concern for sleep disorder.  Patient reports snoring hypersomnia nonrestorative sleep waking up gasping for breath.  Nocturia up to 2 times a night restless sleep is also reported.  Has had sleep-related bruxism and episodes of sleep paralysis.  Has also woken up due to acid reflux.  Has gained 30 pounds in the past 5 years.  No family history of sleep apnea she is aware of.    Sleep Schedule:  Bed time: 9:30 PM to 10 PM  Sleep latency: 20 to 30 minutes  Wake time: Weekdays 5 AM to 6 AM weekends 9 AM  Average hours slept: Weekday 6.5 weekends 11-12  Non-restorative sleep: Yes  Number of naps per day: 1 on weekends  Rotating shifts?:  No  Nocturia: Yes 1-2 times a night  Electronics in bedroom: No    Excessive daytime sleepiness or drowsiness:Y  Any accidents at work due to sleepiness in the last 5 years:N  Any difficulty driving due to sleepiness or being drowsy: N  Weight changed in the last 5 years:Y - GAINED 30 LBS    Snoring:Y  Witnessed apneas:N  Have you ever awakened gasping for breath, coughing, choking or respiratory discomfort: Y  Morning headaches: Y  Awaken with a sore throat or dry mouth: Y    Any reports of leg jerking at night: N  Urge sensations: Y  Does pain disrupt sleep: Y  Sweating during sleep: N  Teeth grinding: Y    Any sudden episodes of sleep during the day: N  Sleep paralysis/hallucinations: Y- SLEEP PARALYSIS  Muscle weakness with laughing/anger: N  Nightmares:  N  Sleep walking: N    Are you sleepy when you increase your sleep time: N  Do you sleep better away from your own bed: N    ESS: 15    Social History: Phlebotomist; quarter pack of cigarettes a day since age 14 no alcohol or drug use 2 sodas a day    Review of Systems:    A complete review of systems was done and all were negative with the exception of joint pain always to cold    Allergies:  Latex    Family History: LB no       Current Outpatient Medications:   •  aspirin 81 MG chewable tablet, Chew., Disp: , Rfl:   •  atorvastatin (LIPITOR) 80 MG tablet, Take 0.5 tablets by mouth 2 (two) times a day., Disp: 90 tablet, Rfl: 1  •  clopidogrel (PLAVIX) 75 MG tablet, Take 1 tablet by mouth Daily., Disp: 90 tablet, Rfl: 0  •  clopidogrel (PLAVIX) 75 MG tablet, Take 1 tablet by mouth Daily., Disp: 90 tablet, Rfl: 0  •  Dulaglutide (Trulicity) 0.75 MG/0.5ML solution pen-injector, Inject 0.75 mg under the skin into the appropriate area as directed., Disp: , Rfl:   •  Dulaglutide (Trulicity) 1.5 MG/0.5ML solution pen-injector, Inject 1.5 mg under the skin 1 (one) time per week., Disp: 12 mL, Rfl: 2  •  gabapentin (NEURONTIN) 100 MG capsule, , Disp: , Rfl:   •  hydroCHLOROthiazide (HYDRODIURIL) 25 MG tablet, Take 1 tablet by mouth Daily., Disp: 90 tablet, Rfl: 3  •  icosapent ethyl (Vascepa) 1 g capsule capsule, Take 2 capsules by mouth 2 (two) times a day., Disp: 360 capsule, Rfl: 3  •  insulin detemir (Levemir FlexTouch) 100 UNIT/ML injection, Inject 30 Units under the skin into the appropriate area as directed., Disp: 45 mL, Rfl: 1  •  insulin detemir (Levemir FlexTouch) 100 UNIT/ML injection, Inject 30 Units under the skin every night., Disp: 15 mL, Rfl: 0  •  insulin detemir (Levemir FlexTouch) 100 UNIT/ML injection, Inject 30 Units under the skin every night., Disp: 15 mL, Rfl: 0  •  Insulin Pen Needle (Unifine Pentips) 32G X 4 MM misc, Use with Insulin Daily, Disp: 100 each, Rfl: 3  •  losartan (COZAAR) 100 MG  "tablet, Take 1 tablet by mouth Daily., Disp: 90 tablet, Rfl: 2  •  metFORMIN (GLUCOPHAGE) 1000 MG tablet, Take 1 tablet by mouth Daily., Disp: 90 tablet, Rfl: 0  •  metFORMIN (GLUCOPHAGE) 1000 MG tablet, Take 1 tablet by mouth Daily., Disp: 90 tablet, Rfl: 0  •  metoclopramide (REGLAN) 5 MG tablet, Take 1 tablet by mouth before meals and at bedtime, Disp: 120 tablet, Rfl: 10  •  metoprolol tartrate (LOPRESSOR) 100 MG tablet, Take 1 tablet by mouth 2 (two) times a day., Disp: 180 tablet, Rfl: 1  •  ondansetron (ZOFRAN) 4 MG tablet, Take 1 tablet (4 mg total) by mouth every 8 (eight) hours if needed for nausea or vomiting for up to 7 days., Disp: 20 tablet, Rfl: 0  •  ondansetron (ZOFRAN) 4 MG tablet, Take 1 tablet by mouth four times a day as needed, Disp: 40 tablet, Rfl: 6  •  pantoprazole (PROTONIX) 40 MG EC tablet, Take 1 tablet by mouth every morning for 30 days, Disp: 30 tablet, Rfl: 11  •  predniSONE (DELTASONE) 20 MG tablet, Take 3 tabs (60mg) daily for 5 days, then take 2 tabs (40mg) daily for 2 days, then take 1 tab (20mg) daily for 2 days., Disp: 21 tablet, Rfl: 0  •  vitamin B-12 (CYANOCOBALAMIN) 1000 MCG tablet, Take 1,000 mcg by mouth Daily., Disp: , Rfl:   •  vitamin D (ERGOCALCIFEROL) 1.25 MG (72499 UT) capsule capsule, Take 1 capsule (50,000 Units total) by mouth 1 (one) time per week., Disp: 4 capsule, Rfl: 5    Vital Signs:    Vitals:    11/08/21 1105   BP: 139/83   BP Location: Right arm   Patient Position: Sitting   Cuff Size: Adult   Pulse: 67   SpO2: 99%   Weight: 80.7 kg (178 lb)   Height: 162.6 cm (64\")      Body mass index is 30.55 kg/m².         Physical Exam:   General Alert and oriented. No acute distress noted   Pharynx/Throat Class IV Mallampati airway, large tongue, no evidence of redundant lateral pharyngeal tissue. No oral lesions. No thrush. Moist mucous membranes.   Head Normocephalic. Symmetrical. Atraumatic.    Nose No septal deviation. No drainage   Chest Wall Normal shape. " Symmetric expansion with respiration. No tenderness.   Neck Trachea midline, no thyromegaly or adenopathy    Lungs Clear to auscultation bilaterally. No wheezes. No rhonchi. No rales. Respirations regular, even and unlabored.   Heart Regular rhythm and normal rate. Normal S1 and S2. No murmur   Abdomen Soft, non-tender and non-distended. Normal bowel sounds. No masses.   Extremities Moves all extremities well. No edema   Psychiatric Normal mood and affect.       Impression:  1. Hypersomnia    2. Non-restorative sleep    3. Obesity with serious comorbidity, unspecified classification, unspecified obesity type    4. Sleep-related bruxism        Plan:    Good sleep hygiene measures should be maintained.  Weight loss would be beneficial in this patient who is obese with BMI 30.5.    I discussed the pathophysiology of obstructive sleep apnea with the patient.  We discussed the adverse outcomes associated with untreated sleep-disordered breathing.  We discussed treatment modalities of obstructive sleep apnea including CPAP device as well as oral mandibular advancement device. Sleep study will be scheduled to establish definitive diagnosis of sleep disorder breathing.  Weight loss will be strongly beneficial in order to reduce the severity of sleep-disordered breathing.  Patient has narrow oropharyngeal structure.  Caution during activities that require prolonged concentration is strongly advised.  Patient will be notified of sleep study results after sleep study is completed.  If sleep apnea is only mild,  oral mandibular advancement device may be one of the treatment options.  However if sleep apnea is moderately severe, CPAP treatment will be strongly encouraged.  The patient is not opposed to treatment with CPAP device if we confirm significant obstructive sleep apnea on polysomnography.     CANNOT DO OMAD DUE TO TMJ.     Thank you for allowing me to participate in your patient's care.    Carla Lyon MD  Sleep  Medicine  11/08/21  11:25 EST

## 2021-11-22 ENCOUNTER — HOSPITAL ENCOUNTER (OUTPATIENT)
Dept: SLEEP MEDICINE | Facility: HOSPITAL | Age: 41
Discharge: HOME | End: 2021-11-22
Admitting: FAMILY MEDICINE

## 2021-11-22 DIAGNOSIS — G47.63 SLEEP-RELATED BRUXISM: ICD-10-CM

## 2021-11-22 DIAGNOSIS — G47.8 NON-RESTORATIVE SLEEP: ICD-10-CM

## 2021-11-22 DIAGNOSIS — E66.9 OBESITY WITH SERIOUS COMORBIDITY, UNSPECIFIED CLASSIFICATION, UNSPECIFIED OBESITY TYPE: ICD-10-CM

## 2021-11-22 DIAGNOSIS — G47.10 HYPERSOMNIA: ICD-10-CM

## 2021-11-22 PROCEDURE — 95806 SLEEP STUDY UNATT&RESP EFFT: CPT | Performed by: FAMILY MEDICINE

## 2021-11-22 PROCEDURE — 95806 SLEEP STUDY UNATT&RESP EFFT: CPT

## 2021-12-14 DIAGNOSIS — G47.33 OBSTRUCTIVE SLEEP APNEA: Primary | ICD-10-CM

## 2021-12-14 DIAGNOSIS — G47.8 NON-RESTORATIVE SLEEP: ICD-10-CM

## 2021-12-14 DIAGNOSIS — G47.10 HYPERSOMNIA: ICD-10-CM

## 2021-12-14 DIAGNOSIS — E66.9 OBESITY WITH SERIOUS COMORBIDITY, UNSPECIFIED CLASSIFICATION, UNSPECIFIED OBESITY TYPE: ICD-10-CM

## 2021-12-14 DIAGNOSIS — G47.63 SLEEP-RELATED BRUXISM: ICD-10-CM

## 2021-12-28 ENCOUNTER — TRANSCRIBE ORDERS (OUTPATIENT)
Dept: ADMINISTRATIVE | Facility: HOSPITAL | Age: 41
End: 2021-12-28

## 2021-12-28 ENCOUNTER — LAB (OUTPATIENT)
Dept: LAB | Facility: HOSPITAL | Age: 41
End: 2021-12-28

## 2021-12-28 DIAGNOSIS — Z11.52 ENCOUNTER FOR SCREENING FOR SEVERE ACUTE RESPIRATORY SYNDROME CORONAVIRUS 2 (SARS-COV-2) INFECTION: ICD-10-CM

## 2021-12-28 DIAGNOSIS — Z11.52 ENCOUNTER FOR SCREENING FOR SEVERE ACUTE RESPIRATORY SYNDROME CORONAVIRUS 2 (SARS-COV-2) INFECTION: Primary | ICD-10-CM

## 2021-12-28 PROCEDURE — C9803 HOPD COVID-19 SPEC COLLECT: HCPCS

## 2021-12-28 PROCEDURE — U0004 COV-19 TEST NON-CDC HGH THRU: HCPCS

## 2021-12-29 LAB — SARS-COV-2 ORF1AB RESP QL NAA+PROBE: DETECTED

## 2021-12-31 ENCOUNTER — TELEMEDICINE (OUTPATIENT)
Dept: FAMILY MEDICINE CLINIC | Facility: TELEHEALTH | Age: 41
End: 2021-12-31

## 2021-12-31 DIAGNOSIS — R05.9 COUGH: Primary | ICD-10-CM

## 2021-12-31 DIAGNOSIS — U07.1 COVID-19 VIRUS INFECTION: ICD-10-CM

## 2021-12-31 PROCEDURE — 99213 OFFICE O/P EST LOW 20 MIN: CPT | Performed by: NURSE PRACTITIONER

## 2021-12-31 RX ORDER — BROMPHENIRAMINE MALEATE, PSEUDOEPHEDRINE HYDROCHLORIDE, AND DEXTROMETHORPHAN HYDROBROMIDE 2; 30; 10 MG/5ML; MG/5ML; MG/5ML
10 SYRUP ORAL 4 TIMES DAILY PRN
Qty: 200 ML | Refills: 0 | Status: SHIPPED | OUTPATIENT
Start: 2021-12-31

## 2021-12-31 RX ORDER — ALBUTEROL SULFATE 90 UG/1
2 AEROSOL, METERED RESPIRATORY (INHALATION) EVERY 4 HOURS PRN
Qty: 18 G | Refills: 0 | Status: SHIPPED | OUTPATIENT
Start: 2021-12-31

## 2021-12-31 NOTE — PROGRESS NOTES
You have chosen to receive care through a telehealth visit.  Do you consent to use a video/audio connection for your medical care today? Yes     CHIEF COMPLAINT  No chief complaint on file.        HPI  Bibiana Inman is a 41 y.o. female  presents with complaint of diagnosed with COVID-19 on Tuesday, symptoms began on 12/24--fever, chills, headache.  Cough began on Tuesday.  Mostly dry cough, shortness of breath with exertion, wheezing, nasal congestion, runny nose.     Review of Systems   Constitutional: Negative for chills and fever.   HENT: Positive for congestion and rhinorrhea.    Respiratory: Positive for cough, chest tightness and shortness of breath. Negative for wheezing.    All other systems reviewed and are negative.      No past medical history on file.    No family history on file.    Social History     Socioeconomic History   • Marital status:          There were no vitals taken for this visit.    PHYSICAL EXAM  Physical Exam   Constitutional: She is oriented to person, place, and time. She appears well-developed and well-nourished. She does not have a sickly appearance. She does not appear ill.   HENT:   Head: Normocephalic and atraumatic.   Pulmonary/Chest: Effort normal.  No respiratory distress (persistent cough).  Neurological: She is alert and oriented to person, place, and time.       Results for orders placed or performed in visit on 12/28/21   COVID-19,APTIMA PANTHER(BALTAZAR),[facility]/ [facility], NP/OP SWAB IN UTM/VTM/SALINE TRANSPORT MEDIA,24 HR TAT - Swab, Nasopharynx    Specimen: Nasopharynx; Swab   Result Value Ref Range    COVID19 Detected (C) Not Detected - Ref. Range       Diagnoses and all orders for this visit:    1. Cough (Primary)  -     brompheniramine-pseudoephedrine-DM 30-2-10 MG/5ML syrup; Take 10 mL by mouth 4 (Four) Times a Day As Needed for Congestion, Cough or Allergies.  Dispense: 200 mL; Refill: 0  -     albuterol sulfate  (90 Base) MCG/ACT inhaler; Inhale 2 puffs Every 4  (Four) Hours As Needed for Wheezing or Shortness of Air.  Dispense: 18 g; Refill: 0    2. COVID-19 virus infection    --take medications as prescribed  --f/u in 3-5 days if no improvement      FOLLOW-UP  As discussed during visit with PCP/[facility] if no improvement or Urgent Care/Emergency Department if worsening of symptoms    Patient verbalizes understanding of medication dosage, comfort measures, instructions for treatment and follow-up.    Mimi Martinez, APRN  12/31/2021  13:10 EST    This visit was performed via Telehealth.  This patient has been instructed to follow-up with their primary care provider if their symptoms worsen or the treatment provided does not resolve their illness.

## 2021-12-31 NOTE — PATIENT INSTRUCTIONS
Acute Bronchitis, Adult    Acute bronchitis is sudden or acute swelling of the air tubes (bronchi) in the lungs. Acute bronchitis causes these tubes to fill with mucus, which can make it hard to breathe. It can also cause coughing or wheezing.  In adults, acute bronchitis usually goes away within 2 weeks. A cough caused by bronchitis may last up to 3 weeks. Smoking, allergies, and asthma can make the condition worse.  What are the causes?  This condition can be caused by germs and by substances that irritate the lungs, including:  · Cold and flu viruses. The most common cause of this condition is the virus that causes the common cold.  · Bacteria.  · Substances that irritate the lungs, including:  ? Smoke from cigarettes and other forms of tobacco.  ? Dust and pollen.  ? Fumes from chemical products, gases, or burned fuel.  ? Other materials that pollute indoor or outdoor air.  · Close contact with someone who has acute bronchitis.  What increases the risk?  The following factors may make you more likely to develop this condition:  · A weak body's defense system, also called the immune system.  · A condition that affects your lungs and breathing, such as asthma.  What are the signs or symptoms?  Common symptoms of this condition include:  · Lung and breathing problems, such as:  ? Coughing. This may bring up clear, yellow, or green mucus from your lungs (sputum).  ? Wheezing.  ? Having too much mucus in your lungs (chest congestion).  ? Having shortness of breath.  · A fever.  · Chills.  · Aches and pains, including:  ? Tightness in your chest and other body aches.  ? A sore throat.  How is this diagnosed?  This condition is usually diagnosed based on:  · Your symptoms and medical history.  · A physical exam.  You may also have other tests, including tests to rule out other conditions, such pneumonia. These tests include:  · A test of lung function.  · Test of a mucus sample to look for the presence of  bacteria.  · Tests to check the oxygen level in your blood.  · Blood tests.  · Chest X-ray.  How is this treated?  Most cases of acute bronchitis clear up over time without treatment. Your health care provider may recommend:  · Drinking more fluids. This can thin your mucus, which may improve your breathing.  · Taking a medicine for a fever or cough.  · Using a device that gets medicine into your lungs (inhaler) to help improve breathing and control coughing.  · Using a vaporizer or a humidifier. These are machines that add water to the air to help you breathe better.  Follow these instructions at home:  Activity  · Get plenty of rest.  · Return to your normal activities as told by your health care provider. Ask your health care provider what activities are safe for you.  Lifestyle  · Drink enough fluid to keep your urine pale yellow.  · Do not drink alcohol.  · Do not use any products that contain nicotine or tobacco, such as cigarettes, e-cigarettes, and chewing tobacco. If you need help quitting, ask your health care provider. Be aware that:  ? Your bronchitis will get worse if you smoke or breathe in other people's smoke (secondhand smoke).  ? Your lungs will heal faster if you quit smoking.  General instructions    · Take over-the-counter and prescription medicines only as told by your health care provider.  · Use an inhaler, vaporizer, or humidifier as told by your health care provider.  · If you have a sore throat, gargle with a salt-water mixture 3-4 times a day or as needed. To make a salt-water mixture, completely dissolve ½-1 tsp (3-6 g) of salt in 1 cup (237 mL) of warm water.  · Keep all follow-up visits as told by your health care provider. This is important.    How is this prevented?  To lower your risk of getting this condition again:  · Wash your hands often with soap and water. If soap and water are not available, use hand .  · Avoid contact with people who have cold symptoms.  · Try not  to touch your mouth, nose, or eyes with your hands.  · Avoid places where there are fumes from chemicals. Breathing these fumes will make your condition worse.  · Get the flu shot every year.  Contact a health care provider if:  · Your symptoms do not improve after 2 weeks of treatment.  · You vomit more than once or twice.  · You have symptoms of dehydration such as:  ? Dark urine.  ? Dry skin or eyes.  ? Increased thirst.  ? Headaches.  ? Confusion.  ? Muscle cramps.  Get help right away if you:  · Cough up blood.  · Feel pain in your chest.  · Have severe shortness of breath.  · Faint or keep feeling like you are going to faint.  · Have a severe headache.  · Have fever or chills that get worse.  These symptoms may represent a serious problem that is an emergency. Do not wait to see if the symptoms will go away. Get medical help right away. Call your local emergency services (911 in the [place] Do not drive yourself to the hospital.  Summary  · Acute bronchitis is sudden (acute) inflammation of the air tubes (bronchi) between the windpipe and the lungs. In adults, acute bronchitis usually goes away within 2 weeks, although coughing may last 3 weeks or longer  · Take over-the-counter and prescription medicines only as told by your health care provider.  · Drink enough fluid to keep your urine pale yellow.  · Contact a health care provider if your symptoms do not improve after 2 weeks of treatment.  · Get help right away if you cough up blood, faint, or have chest pain or shortness of breath.  This information is not intended to replace advice given to you by your health care provider. Make sure you discuss any questions you have with your health care provider.  Document Revised: 08/31/2020 Document Reviewed: 07/10/2020  [employer] Patient Education © 2021 [employer]      Cough, Adult  Coughing is a reflex that clears your throat and your airways (respiratory system). Coughing helps to heal and protect your lungs.  It is normal to cough occasionally, but a cough that happens with other symptoms or lasts a long time may be a sign of a condition that needs treatment. An acute cough may only last 2-3 weeks, while a chronic cough may last 8 or more weeks.  Coughing is commonly caused by:  · Infection of the respiratory systemby viruses or bacteria.  · Breathing in substances that irritate your lungs.  · Allergies.  · Asthma.  · Mucus that runs down the back of your throat (postnasal drip).  · Smoking.  · Acid backing up from the stomach into the esophagus (gastroesophageal reflux).  · Certain medicines.  · Chronic lung problems.  · Other medical conditions such as heart failure or a blood clot in the lung (pulmonary embolism).  Follow these instructions at home:  Medicines  · Take over-the-counter and prescription medicines only as told by your health care provider.  · Talk with your health care provider before you take a cough suppressant medicine.  Lifestyle    · Avoid cigarette smoke. Do not use any products that contain nicotine or tobacco, such as cigarettes, e-cigarettes, and chewing tobacco. If you need help quitting, ask your health care provider.  · Drink enough fluid to keep your urine pale yellow.  · Avoid caffeine.  · Do not drink alcohol if your health care provider tells you not to drink.    General instructions    · Pay close attention to changes in your cough. Tell your health care provider about them.  · Always cover your mouth when you cough.  · Avoid things that make you cough, such as perfume, candles, cleaning products, or campfire or tobacco smoke.  · If the air is dry, use a cool mist vaporizer or humidifier in your bedroom or your home to help loosen secretions.  · If your cough is worse at night, try to sleep in a semi-upright position.  · Rest as needed.  · Keep all follow-up visits as told by your health care provider. This is important.    Contact a health care provider if you:  · Have new  symptoms.  · Cough up pus.  · Have a cough that does not get better after 2-3 weeks or gets worse.  · Cannot control your cough with cough suppressant medicines and you are losing sleep.  · Have pain that gets worse or pain that is not helped with medicine.  · Have a fever.  · Have unexplained weight loss.  · Have night sweats.  Get help right away if:  · You cough up blood.  · You have difficulty breathing.  · Your heartbeat is very fast.  These symptoms may represent a serious problem that is an emergency. Do not wait to see if the symptoms will go away. Get medical help right away. Call your local emergency services (911 in the [place] Do not drive yourself to the hospital.  Summary  · Coughing is a reflex that clears your throat and your airways. It is normal to cough occasionally, but a cough that happens with other symptoms or lasts a long time may be a sign of a condition that needs treatment.  · Take over-the-counter and prescription medicines only as told by your health care provider.  · Always cover your mouth when you cough.  · Contact a health care provider if you have new symptoms or a cough that does not get better after 2-3 weeks or gets worse.  This information is not intended to replace advice given to you by your health care provider. Make sure you discuss any questions you have with your health care provider.  Document Revised: 01/06/2020 Document Reviewed: 01/06/2020  [employer] Patient Education © 2021 [employer]

## 2022-06-29 RX ORDER — ONDANSETRON 4 MG/1
4 TABLET, FILM COATED ORAL 4 TIMES DAILY PRN
Qty: 40 TABLET | Refills: 6 | Status: CANCELLED | OUTPATIENT
Start: 2022-06-29

## 2022-06-29 RX ORDER — PANTOPRAZOLE SODIUM 40 MG/1
40 TABLET, DELAYED RELEASE ORAL EVERY MORNING
Qty: 30 TABLET | Refills: 11 | Status: CANCELLED | OUTPATIENT
Start: 2022-06-29

## 2022-06-30 RX ORDER — ONDANSETRON 4 MG/1
4 TABLET, FILM COATED ORAL 4 TIMES DAILY PRN
Qty: 40 TABLET | Refills: 6 | Status: CANCELLED | OUTPATIENT
Start: 2022-06-29

## 2022-06-30 RX ORDER — PANTOPRAZOLE SODIUM 40 MG/1
40 TABLET, DELAYED RELEASE ORAL EVERY MORNING
Qty: 30 TABLET | Refills: 11 | Status: CANCELLED | OUTPATIENT
Start: 2022-06-29

## 2022-07-05 RX ORDER — PANTOPRAZOLE SODIUM 40 MG/1
40 TABLET, DELAYED RELEASE ORAL EVERY MORNING
Qty: 30 TABLET | Refills: 11 | Status: CANCELLED | OUTPATIENT
Start: 2022-06-29

## 2022-07-05 RX ORDER — ONDANSETRON 4 MG/1
4 TABLET, FILM COATED ORAL 4 TIMES DAILY PRN
Qty: 40 TABLET | Refills: 6 | Status: CANCELLED | OUTPATIENT
Start: 2022-06-29

## 2022-07-07 RX ORDER — ONDANSETRON 4 MG/1
4 TABLET, FILM COATED ORAL 4 TIMES DAILY PRN
Qty: 40 TABLET | Refills: 6 | Status: CANCELLED | OUTPATIENT
Start: 2022-06-29

## 2022-07-07 RX ORDER — PANTOPRAZOLE SODIUM 40 MG/1
40 TABLET, DELAYED RELEASE ORAL EVERY MORNING
Qty: 30 TABLET | Refills: 11 | Status: CANCELLED | OUTPATIENT
Start: 2022-06-29

## 2022-08-11 ENCOUNTER — E-VISIT (OUTPATIENT)
Dept: FAMILY MEDICINE CLINIC | Facility: TELEHEALTH | Age: 42
End: 2022-08-11

## 2022-08-11 PROCEDURE — BRIGHTMDVISIT: Performed by: NURSE PRACTITIONER

## 2022-08-11 NOTE — EXTERNAL PATIENT INSTRUCTIONS
Diagnosis   Urinary tract infection (UTI)   My name is Giselle Xiao. I'm a healthcare provider at [facility]. After reviewing your interview, I see you have a urinary tract infection (UTI).   Medications   Your pharmacy   [facility] - [address] [place] [address] [phone]     Prescription   Phenazopyridine (200mg): Take 1 tablet by mouth three times a day as needed for 2 days for pain or discomfort associated with your condition.   Nitrofurantoin monohydrate/macrocrystalline (100mg): Take 1 tablet by mouth every 12 hours for 5 days for infection. This medication is an antibiotic. Take it exactly as directed. You must finish the entire course of medication, even if you feel better after taking the first few doses.    I've given you a prescription dose of phenazopyridine. If it's more affordable or convenient, you may use the equivalent amount of non-prescription phenazopyridine. For example, instead of taking one 200 mg phenazopyridine tablet, you may take two 95 mg phenazopyridine tablets.   About your diagnosis   A UTI is an infection of one or more parts of the urinary tract, most commonly the bladder.   Most UTIs are caused by bacteria (usually E. coli) that travel up the urethra and into the bladder. I see that you have some common signs and symptoms of a UTI:    Pain or burning while urinating    Symptoms that feel a lot like past UTIs    Cloudy urine   Fortunately, most UTIs aren't serious, and they're easily treated with antibiotics. Make sure you take all of the antibiotic pills given to you, even if you start to feel better after the first few doses. Otherwise, the UTI might come back.   What to expect   If you follow this treatment plan, you should start to feel better within 1 to 2 days.   When to seek care   Call us at [phone]   with any sudden or unexpected symptoms.    Symptoms that don't improve or get worse in the next 48 hours    Fever that goes  above 101F or lasts longer than 24 hours    Shaking or chills    Nausea or vomiting    Severe flank pain (pain in your back or side)   Other treatment   Diabetes can put you at a higher risk for UTIs. High blood sugars allow bacteria to grow more easily. So make sure that you:    Keep your blood sugars under control    Closely follow your diabetes management plan    Call your healthcare provider if you have any trouble controlling your blood sugar   Early diagnosis and treatment are important to prevent more serious infections.    Rest and drink plenty of water    Urinate frequently and when you first feel the urge    Place a heating pad on your back or stomach to help relieve some of the discomfort   Prevention    Drink a lot of liquids to help flush bacteria from your system. Water is best. Try for six to eight, 8-ounce glasses a day on a regular basis.    Urinate often and when you first feel the urge. Bacteria can grow when urine stays in the bladder too long. Urinate after sex to flush away bacteria.    After using the toilet, always wipe from front to back. This step is most important after a bowel movement. Wiping from front to back prevents bacteria normally found in stool from entering the urinary tract.   Your provider   Your diagnosis was provided by Giselle Xiao, a member of your trusted care team at [facility].   If you have any questions, call us at [phone]  .

## 2022-08-11 NOTE — E-VISIT TREATED
Chief Complaint: Bladder infection (UTI)   Patient introduction   Patient is 42-year-old female. Patient provided the following organ inventory: Presence of a vagina, ovaries, a uterus, and breasts.   Patient has had dysuria for less than 24 hours.   Urine is cloudy with no unusual odor.   Patient-submitted comments Patient writes: Just burning when I pee and cloudy urine No kidney pain or fever Symptoms started 8/10/2022.   Patient did not request an excuse note.   General presentation   Patient has not had a fever. No nausea or vomiting.   No stomach/pelvic pain.   No back pain.   No flank pain. Difficulty starting, stopping, or delaying urination.   Not taking OTC acetaminophen, ibuprofen, or phenazopyridine for current symptoms.   Previous history of UTI. Current symptoms feel exactly the same as previous UTIs. Received treatment for UTI 1 to 3 times in last year. Patient's last use of antibiotics for UTI was over 1 month ago.   Uncertain whether treated past UTIs with any of the following: amoxicillin-clavulanate, cefdinir, cefuroxime, cephalexin, ciprofloxacin, fosfomycin, nitrofurantoin monohydrate, TMP/SMX, or trimethoprim.   No known history of yeast infections as a result of taking antibiotics for past UTIs.   No sexual intercourse in the past week. Does not use diaphragm. No unprotected sexual intercourse with a new partner in the last 2 weeks. Has not been exposed to sexually transmitted infections in the last month.   No recent history of cycling, motorcycling, or riding horseback. Does not wear tight-fitting pants/undergarments. No new soap, lotion, or cosmetics.   Patient is being treated for diabetes mellitus. Had an HbA1C test in the last 6 months. Result of last HbA1C test was lower than 7%. Not taking SGLT2 inhibitors as part of diabetes treatment plan.   No history of the following complications from diabetes:    Retinopathy    Neuropathy    Nephropathy    Cardiovascular disease   Review of  red flags/alarm symptoms:    No recent hospitalizations or nursing home care (last 3 months)    No history of renal failure    Recent history of pyelonephritis (within the last year)    No history of nephrolithiasis    No recent history of urologic instrumentation    No anatomic abnormalities of the urinary tract    No abnormal vaginal discharge    No visible vaginal sores    No pain with sexual intercourse    No abnormal vaginal bleeding or spotting   Pregnancy/menstrual status/breastfeeding:   Patient is not pregnant. Patient is not breastfeeding. Regarding last menstrual period, patient writes: July 30 2022.   Current medications   Not taking other medications or supplements.   Medication allergies   None.   Medication contraindication review   Patient is currently taking an ARB. Therefore, medications containing trimethoprim will not be prescribed.   Patient is being treated for high blood pressure. Therefore, the following medication(s) will not be prescribed:    Ciprofloxacin   No known history of amoxicillin-clavulanate-associated cholestatic jaundice or nitrofurantoin-associated cholestatic jaundice.   Past medical history   Immune conditions: No immunocompromising conditions. No history of cancer.   Assessment   Uncomplicated acute UTI.   This is the likely diagnosis based on patient's symptoms and history, including:    Previous history of UTI    Current symptoms are exactly the same as previous UTIs    Urine described as cloudy   Plan   Medications:    phenazopyridine 200 mg tablet RX 200mg 1 tab PO tid PRN 2d for pain or discomfort associated with your condition. Amount is 6 tab.    nitrofurantoin monohydrate/macrocrystals 100 mg capsule RX 100mg 1 cap PO q12h 5d for infection. This medication is an antibiotic. Take it exactly as directed. You must finish the entire course of medication, even if you feel better after taking the first few doses. Amount is 10 cap.   The patient's prescriptions will be sent  to:   [facility]   [address] [place] [address]   Phone: [phone]     Fax: [phone]   Education:    Condition and causes    Prevention    Treatment and self-care    When to call provider   Follow-up:   Patient to follow up as needed for progression or lack of improvement in symptoms within 3d.   ----------   Electronically signed by KAUSHAL Birch on 2022-08-11 at 13:10PM   ----------   Patient Interview Transcript:   Knowing about your anatomy is important for diagnosing and treating UTIs. The gender we have on file for you is female, but we realize that this might not tell the whole story. Would you like to tell us more about your anatomy?    Yes   Not selected:    No   OK, which of these do you have? Select all that apply.    Vagina    Ovaries    Uterus    Breasts   Not selected:    Penis    Testes    Prostate   Which of these symptoms do you have? Select all that apply.    Pain or burning while urinating   Not selected:    Frequent urination    Sudden urge to urinate and it's hard to hold the urine in   How long have you had these symptoms? Select one.    Less than 24 hours   Not selected:    1 to 3 days    4 to 6 days    7 to 10 days    More than 10 days   Since your current symptoms started, has it been difficult to start, stop, or delay urination? Select one.    Yes   Not selected:    No   What color is your urine? Select one.    Cloudy   Not selected:    Clear    Yellow    Pink or red   Does your urine smell strange (like ammonia) or stronger than usual? Select one.    No   Not selected:    Yes   Do you also have any of these symptoms? Select all that apply.    No   Not selected:    Fever    Nausea    Vomiting    Pain, pressure, or discomfort in the lower abdomen    Back pain   Do you have any flank pain? The flank is the side of the body between the ribs and the hips.    No   Not selected:    Yes, in my left flank    Yes, in my right flank    Yes, in both my  left and right flanks   Do you have any of these vaginal symptoms? Select all that apply.    No   Not selected:    Abnormal vaginal itching    Unscheduled or abnormal vaginal bleeding or spotting    Pain during sex    Visible sores on the vagina    Abnormal vaginal discharge   In the past 2 weeks, have you had a medical device or instrument placed in your urinary tract? Examples include catheters, stents, and nephrostomy tubes. Select one.    No   Not selected:    Yes   Have you recently been hospitalized or been a resident of a nursing home or other long-term care facility? This doesn't include emergency room (ER) visits. Select one.    No   Not selected:    Yes, within the last 2 weeks    Yes, within the last 3 months   Have you ever had severe problems with your kidneys, such as kidney failure? Select one.    No   Not selected:    Yes   Have you ever had a kidney infection (pyelonephritis)? Select one.    Yes, over a year ago   Not selected:    Yes, within the last year    No   Have you ever had kidney stones? Select one.    No   Not selected:    Yes, within the last year    Yes, over a year ago   Have you ever been diagnosed with any of these? Select all that apply.    No   Not selected:    Urinary reflux    Bladder diverticula    Single (or horseshoe) kidney    Duplicated urethra   Have you recently spent a lot of time cycling, riding a motorcycle, or horseback riding? Select one.    No   Not selected:    Yes   Have you recently held your urine for a long time after you felt the urge to go? Select one.    No   Not selected:    Yes   Have you recently avoided eating or drinking so you wouldn't have the urge to urinate as often? Select one.    No   Not selected:    Yes   Have you recently worn any tight-fitting underwear, tights, leggings, pants, or jeans? Select one.    No   Not selected:    Yes   Have you recently used any new soaps, lotions, or cosmetics on your genital area? Select one.    No   Not  selected:    Yes   Do you use a diaphragm? Select one.    No   Not selected:    Yes   Are you pregnant? Select one.    No   Not selected:    Yes   When was your last menstrual period? If you don't currently have periods or no longer have periods, please briefly explain.    July 30 2022   Are you breastfeeding? Select one.    No   Not selected:    Yes   Have you had sexual intercourse in the past week? Recent sexual intercourse is a risk factor for urinary tract infections. Select one.    No   Not selected:    Yes   Have you been exposed to a sexually transmitted infection (STI or STD) in the last month? Examples include chlamydia, gonorrhea, trichomoniasis, and herpes. Select one.    No, not that I know of   Not selected:    Yes   Have you had unprotected sexual intercourse with a new partner in the last 2 weeks? Select one.    No   Not selected:    Yes   Have you traveled to any of these countries within the last 3 months? Recent travel to these countries may affect which medication we recommend for your symptoms. Select all that apply.    None of these   Not selected:    [place]    [place]    [place]    [place]   Have you taken any of these medications for your current symptoms? Select any that may apply.    No   Not selected:    Acetaminophen (Tylenol)    Ibuprofen (Advil, Motrin)    Phenazopyridine (Azo, Baridium, Pyridium, Uricalm, Uristat)   Have you ever had a urinary tract infection (UTI)? A UTI is often called a bladder infection or acute cystitis. Select one.    Yes   Not selected:    No, not that I know of   How much do your current symptoms feel like past UTIs? Select one.    Exactly the same   Not selected:    Mostly the same    Somewhat the same    Totally different   In the past year, how many times have you taken antibiotics for a UTI? Select one.    1 to 3   Not selected:    0    4 or more   When did you last take antibiotics for a UTI? Select one.    More than 1 month ago   Not selected:    Less than 1  month ago   Which of these antibiotics have you taken for UTIs in the past? Select all that apply.    I'm not sure   Not selected:    Amoxicillin-clavulanate (Augmentin)    Cefdinir    Cefuroxime    Cephalexin (Keflex)    Ciprofloxacin (Cipro)    Fosfomycin (Monurol)    Nitrofurantoin (Macrobid, Macrodantin)    Sulfamethoxazole/trimethoprim, also known as TMP/SMX (Bactrim, Bactrim DS)    Trimethoprim (Primsol)    Other (specify)   Have you ever developed a yeast infection as a result of taking antibiotics? Select one.    No, not that I know of   Not selected:    Yes   UTIs may be more serious when other factors are present. Let's address those now. Are you being treated for type 1 or type 2 diabetes? Select one.    Yes   Not selected:    No   Have you had a hemoglobin A1C (HbA1c) test in the last 6 months? Select one.    Yes   Not selected:    No   What was the result of your last HbA1c test? Select one.    7% or lower   Not selected:    Above 7%    I'm not sure   Has your diabetes caused any of these complications? Select all that apply.    None of these   Not selected:    Eye disease (retinopathy)    Nerve damage (neuropathy)    Kidney disease/damage (nephropathy)    Heart disease   Are you taking any of these medications as part of your diabetes treatment plan? - Canagliflozin (Invokana, Invokamet) - Dapagliflozin (Farxiga, Xigduo XR, Qtern) - Empagliflozin (Jardiance, Glyxambi, Synjardy, Synjardy XR, Trijardy XR) - Ertugliflozin (Steglatro, Segluromet, Steglujan) Select one.    No   Not selected:    Yes   Do you have any of these conditions that can affect the immune system? Scroll to see all options. Select all that apply.    None of these   Not selected:    History of bone marrow transplant    Chronic kidney disease    Chronic liver disease (including cirrhosis)    HIV/AIDS    Inflammatory bowel disease (Crohn's disease or ulcerative colitis)    Lupus    Moderate to severe plaque psoriasis    Multiple  sclerosis    Rheumatoid arthritis    Sickle cell anemia    Alpha or beta thalassemia    History of solid organ transplant (kidney, liver, or heart)    History of spleen removal    An autoimmune disorder not listed here    A condition requiring treatment with long-term use of oral steroids (such as prednisone, prednisolone, or dexamethasone)   Have you ever been diagnosed with cancer? Select one.    No   Not selected:    Yes, I have cancer now    Yes, but I'm in remission   These last few questions will help us create the right treatment plan for you. Are you being treated for any of these conditions? Select all that apply.    High blood pressure   Not selected:    Damion-Danlos syndrome    Folate deficiency    G6PD deficiency    History of aortic aneurysm or dissection    Marfan syndrome    Megaloblastic anemia    Mono (mononucleosis)    Myasthenia gravis    Oliguria or anuria    Peripheral vascular disease    No   Have you ever had jaundice as a result of taking amoxicillin-clavulanate (Augmentin) or nitrofurantoin (Macrobid)? Select all that apply.    No   Not selected:    Yes, from amoxicillin-clavulanate (Augmentin)    Yes, from nitrofurantoin (Macrobid, Macrodantin)   Are you taking any of these medications? Select all that apply.    An angiotensin II receptor blocker (ARB) such as candesartan, irbesartan, losartan, or valsartan   Not selected:    An ACE inhibitor such as lisinopril, enalapril, captopril, or benazepril    No   Are you taking any other medications or supplements? On the next screen, you need to list all vitamins, supplements, non-prescription medications (such as aspirin or Aleve), and prescription medications that you're taking. Select one.    No   Not selected:    Yes    Yes, but I'm not sure what they are   Have you ever had an allergic or bad reaction to any medication? Select one.    No   Not selected:    Yes   Do you need a doctor's note? A doctor's note confirms that you received care  today and states when you can return to school or work. It does not contain information about your diagnosis or treatment plan. Your provider will make the final decision on whether to give you a doctor's note. Doctor's notes CANNOT be backdated. Select one.    No   Not selected:    Today only (1 day)   Is there anything else you'd like to tell us about your symptoms?    Just burning when I pee and cloudy urine No kidney pain or fever Symptoms started 8/10/2022   ----------   Medical history   Medical history data does not currently exist for this patient.

## 2022-12-05 ENCOUNTER — OFFICE (OUTPATIENT)
Dept: URBAN - METROPOLITAN AREA CLINIC 64 | Facility: CLINIC | Age: 42
End: 2022-12-05

## 2022-12-05 VITALS
HEIGHT: 64 IN | HEART RATE: 70 BPM | SYSTOLIC BLOOD PRESSURE: 166 MMHG | DIASTOLIC BLOOD PRESSURE: 99 MMHG | WEIGHT: 167 LBS

## 2022-12-05 DIAGNOSIS — K31.84 GASTROPARESIS: ICD-10-CM

## 2022-12-05 DIAGNOSIS — R11.0 NAUSEA: ICD-10-CM

## 2022-12-05 DIAGNOSIS — R68.81 EARLY SATIETY: ICD-10-CM

## 2022-12-05 PROCEDURE — 99214 OFFICE O/P EST MOD 30 MIN: CPT | Performed by: INTERNAL MEDICINE

## 2022-12-05 RX ORDER — ESOMEPRAZOLE MAGNESIUM 40 MG/1
40 CAPSULE, DELAYED RELEASE ORAL
Qty: 30 | Refills: 11 | Status: COMPLETED
Start: 2022-12-05 | End: 2023-06-28

## 2022-12-05 RX ORDER — METOCLOPRAMIDE HYDROCHLORIDE 5 MG/1
20 TABLET ORAL
Qty: 360 | Refills: 3 | Status: ACTIVE

## 2022-12-05 RX ORDER — FAMOTIDINE 40 MG/1
40 TABLET, FILM COATED ORAL
Qty: 90 | Refills: 3 | Status: ACTIVE
Start: 2022-12-05

## 2022-12-05 RX ORDER — ONDANSETRON HYDROCHLORIDE 4 MG/1
TABLET, FILM COATED ORAL
Qty: 30 | Refills: 5 | Status: ACTIVE

## 2023-02-28 ENCOUNTER — TRANSCRIBE ORDERS (OUTPATIENT)
Dept: ADMINISTRATIVE | Facility: HOSPITAL | Age: 43
End: 2023-02-28
Payer: COMMERCIAL

## 2023-02-28 DIAGNOSIS — Z12.31 SCREENING MAMMOGRAM FOR BREAST CANCER: Primary | ICD-10-CM

## 2023-03-08 ENCOUNTER — HOSPITAL ENCOUNTER (OUTPATIENT)
Dept: MAMMOGRAPHY | Facility: HOSPITAL | Age: 43
Discharge: HOME | End: 2023-03-08
Admitting: FAMILY MEDICINE
Payer: COMMERCIAL

## 2023-03-08 DIAGNOSIS — Z12.31 SCREENING MAMMOGRAM FOR BREAST CANCER: ICD-10-CM

## 2023-03-08 PROCEDURE — 77063 BREAST TOMOSYNTHESIS BI: CPT

## 2023-03-08 PROCEDURE — 77067 SCR MAMMO BI INCL CAD: CPT

## 2023-06-28 ENCOUNTER — OFFICE (OUTPATIENT)
Dept: URBAN - METROPOLITAN AREA CLINIC 64 | Facility: CLINIC | Age: 43
End: 2023-06-28

## 2023-06-28 VITALS
DIASTOLIC BLOOD PRESSURE: 75 MMHG | HEART RATE: 54 BPM | SYSTOLIC BLOOD PRESSURE: 138 MMHG | HEIGHT: 64 IN | WEIGHT: 163 LBS

## 2023-06-28 DIAGNOSIS — K31.84 GASTROPARESIS: ICD-10-CM

## 2023-06-28 DIAGNOSIS — K21.9 GASTRO-ESOPHAGEAL REFLUX DISEASE WITHOUT ESOPHAGITIS: ICD-10-CM

## 2023-06-28 PROCEDURE — 99213 OFFICE O/P EST LOW 20 MIN: CPT | Performed by: INTERNAL MEDICINE

## 2023-06-28 RX ORDER — PANTOPRAZOLE 40 MG/1
40 TABLET, DELAYED RELEASE ORAL
Qty: 90 | Refills: 3 | Status: ACTIVE
Start: 2022-12-06

## 2023-06-28 RX ORDER — METOCLOPRAMIDE HYDROCHLORIDE 5 MG/1
20 TABLET ORAL
Qty: 360 | Refills: 3 | Status: ACTIVE

## 2023-06-28 RX ORDER — FAMOTIDINE 40 MG/1
40 TABLET, FILM COATED ORAL
Qty: 90 | Refills: 3 | Status: ACTIVE
Start: 2022-12-05

## 2023-07-20 ENCOUNTER — LAB (OUTPATIENT)
Dept: LAB | Facility: HOSPITAL | Age: 43
End: 2023-07-20
Payer: COMMERCIAL

## 2023-07-20 ENCOUNTER — TRANSCRIBE ORDERS (OUTPATIENT)
Dept: ADMINISTRATIVE | Facility: HOSPITAL | Age: 43
End: 2023-07-20
Payer: COMMERCIAL

## 2023-07-20 DIAGNOSIS — E11.8 TYPE II DIABETES MELLITUS WITH COMPLICATION: ICD-10-CM

## 2023-07-20 DIAGNOSIS — N18.30 STAGE 3 CHRONIC KIDNEY DISEASE, UNSPECIFIED WHETHER STAGE 3A OR 3B CKD: ICD-10-CM

## 2023-07-20 DIAGNOSIS — R80.9 PROTEINURIA, UNSPECIFIED TYPE: ICD-10-CM

## 2023-07-20 DIAGNOSIS — E55.9 VITAMIN D DEFICIENCY, UNSPECIFIED: ICD-10-CM

## 2023-07-20 DIAGNOSIS — E78.5 HYPERLIPIDEMIA, UNSPECIFIED HYPERLIPIDEMIA TYPE: ICD-10-CM

## 2023-07-20 DIAGNOSIS — N18.30 STAGE 3 CHRONIC KIDNEY DISEASE, UNSPECIFIED WHETHER STAGE 3A OR 3B CKD: Primary | ICD-10-CM

## 2023-07-20 LAB
25(OH)D3 SERPL-MCNC: 16.6 NG/ML (ref 30–100)
ALBUMIN SERPL-MCNC: 4.4 G/DL (ref 3.5–5.2)
ALBUMIN/GLOB SERPL: 1.3 G/DL
ALP SERPL-CCNC: 147 U/L (ref 39–117)
ALT SERPL W P-5'-P-CCNC: 18 U/L (ref 1–33)
ANION GAP SERPL CALCULATED.3IONS-SCNC: 10.1 MMOL/L (ref 5–15)
AST SERPL-CCNC: 15 U/L (ref 1–32)
BACTERIA UR QL AUTO: NORMAL /HPF
BASOPHILS # BLD AUTO: 0.06 10*3/MM3 (ref 0–0.2)
BASOPHILS NFR BLD AUTO: 0.8 % (ref 0–1.5)
BILIRUB SERPL-MCNC: 0.3 MG/DL (ref 0–1.2)
BILIRUB UR QL STRIP: NEGATIVE
BUN SERPL-MCNC: 31 MG/DL (ref 6–20)
BUN/CREAT SERPL: 20 (ref 7–25)
CALCIUM SPEC-SCNC: 10 MG/DL (ref 8.6–10.5)
CHLORIDE SERPL-SCNC: 99 MMOL/L (ref 98–107)
CK SERPL-CCNC: 43 U/L (ref 20–180)
CLARITY UR: CLEAR
CO2 SERPL-SCNC: 23.9 MMOL/L (ref 22–29)
COLOR UR: YELLOW
CREAT SERPL-MCNC: 1.55 MG/DL (ref 0.57–1)
CREAT UR-MCNC: 95.2 MG/DL
DEPRECATED RDW RBC AUTO: 43.3 FL (ref 37–54)
EGFRCR SERPLBLD CKD-EPI 2021: 41.9 ML/MIN/1.73
EOSINOPHIL # BLD AUTO: 0.18 10*3/MM3 (ref 0–0.4)
EOSINOPHIL NFR BLD AUTO: 2.4 % (ref 0.3–6.2)
ERYTHROCYTE [DISTWIDTH] IN BLOOD BY AUTOMATED COUNT: 13.2 % (ref 12.3–15.4)
GLOBULIN UR ELPH-MCNC: 3.3 GM/DL
GLUCOSE SERPL-MCNC: 343 MG/DL (ref 65–99)
GLUCOSE UR STRIP-MCNC: ABNORMAL MG/DL
HBA1C MFR BLD: 9.7 % (ref 4.8–5.6)
HCT VFR BLD AUTO: 35.6 % (ref 34–46.6)
HGB BLD-MCNC: 11.6 G/DL (ref 12–15.9)
HGB UR QL STRIP.AUTO: NEGATIVE
HYALINE CASTS UR QL AUTO: NORMAL /LPF
IMM GRANULOCYTES # BLD AUTO: 0.03 10*3/MM3 (ref 0–0.05)
IMM GRANULOCYTES NFR BLD AUTO: 0.4 % (ref 0–0.5)
KETONES UR QL STRIP: NEGATIVE
LEUKOCYTE ESTERASE UR QL STRIP.AUTO: NEGATIVE
LYMPHOCYTES # BLD AUTO: 1.48 10*3/MM3 (ref 0.7–3.1)
LYMPHOCYTES NFR BLD AUTO: 19.9 % (ref 19.6–45.3)
MCH RBC QN AUTO: 29.5 PG (ref 26.6–33)
MCHC RBC AUTO-ENTMCNC: 32.6 G/DL (ref 31.5–35.7)
MCV RBC AUTO: 90.6 FL (ref 79–97)
MONOCYTES # BLD AUTO: 0.52 10*3/MM3 (ref 0.1–0.9)
MONOCYTES NFR BLD AUTO: 7 % (ref 5–12)
NEUTROPHILS NFR BLD AUTO: 5.16 10*3/MM3 (ref 1.7–7)
NEUTROPHILS NFR BLD AUTO: 69.5 % (ref 42.7–76)
NITRITE UR QL STRIP: NEGATIVE
NRBC BLD AUTO-RTO: 0 /100 WBC (ref 0–0.2)
PH UR STRIP.AUTO: 6.5 [PH] (ref 5–8)
PHOSPHATE SERPL-MCNC: 3.4 MG/DL (ref 2.5–4.5)
PLATELET # BLD AUTO: 251 10*3/MM3 (ref 140–450)
PMV BLD AUTO: 11.5 FL (ref 6–12)
POTASSIUM SERPL-SCNC: 5.4 MMOL/L (ref 3.5–5.2)
PROT ?TM UR-MCNC: 107.6 MG/DL
PROT SERPL-MCNC: 7.7 G/DL (ref 6–8.5)
PROT UR QL STRIP: ABNORMAL
PROT/CREAT UR: 1130.3 MG/G CREA (ref 0–200)
PTH-INTACT SERPL-MCNC: 69.9 PG/ML (ref 15–65)
RBC # BLD AUTO: 3.93 10*6/MM3 (ref 3.77–5.28)
RBC # UR STRIP: NORMAL /HPF
REF LAB TEST METHOD: NORMAL
SODIUM SERPL-SCNC: 133 MMOL/L (ref 136–145)
SP GR UR STRIP: 1.02 (ref 1–1.03)
SQUAMOUS #/AREA URNS HPF: NORMAL /HPF
TSH SERPL DL<=0.05 MIU/L-ACNC: 2.9 UIU/ML (ref 0.27–4.2)
URATE SERPL-MCNC: 7 MG/DL (ref 2.4–5.7)
UROBILINOGEN UR QL STRIP: ABNORMAL
WBC # UR STRIP: NORMAL /HPF
WBC NRBC COR # BLD: 7.43 10*3/MM3 (ref 3.4–10.8)

## 2023-07-20 PROCEDURE — 82306 VITAMIN D 25 HYDROXY: CPT

## 2023-07-20 PROCEDURE — 84156 ASSAY OF PROTEIN URINE: CPT

## 2023-07-20 PROCEDURE — 86038 ANTINUCLEAR ANTIBODIES: CPT

## 2023-07-20 PROCEDURE — 83036 HEMOGLOBIN GLYCOSYLATED A1C: CPT

## 2023-07-20 PROCEDURE — 82570 ASSAY OF URINE CREATININE: CPT

## 2023-07-20 PROCEDURE — 83970 ASSAY OF PARATHORMONE: CPT

## 2023-07-20 PROCEDURE — 36415 COLL VENOUS BLD VENIPUNCTURE: CPT

## 2023-07-20 PROCEDURE — 82784 ASSAY IGA/IGD/IGG/IGM EACH: CPT

## 2023-07-20 PROCEDURE — 86334 IMMUNOFIX E-PHORESIS SERUM: CPT

## 2023-07-20 PROCEDURE — 84550 ASSAY OF BLOOD/URIC ACID: CPT

## 2023-07-20 PROCEDURE — 82550 ASSAY OF CK (CPK): CPT

## 2023-07-20 PROCEDURE — 81001 URINALYSIS AUTO W/SCOPE: CPT

## 2023-07-20 PROCEDURE — 80050 GENERAL HEALTH PANEL: CPT

## 2023-07-20 PROCEDURE — 84100 ASSAY OF PHOSPHORUS: CPT

## 2023-07-21 LAB
ANA SER QL: NEGATIVE
IGA SERPL-MCNC: 366 MG/DL (ref 87–352)
IGG SERPL-MCNC: 1275 MG/DL (ref 586–1602)
IGM SERPL-MCNC: 47 MG/DL (ref 26–217)
PROT PATTERN SERPL IFE-IMP: ABNORMAL

## 2023-08-02 ENCOUNTER — TRANSCRIBE ORDERS (OUTPATIENT)
Dept: LAB | Facility: HOSPITAL | Age: 43
End: 2023-08-02
Payer: COMMERCIAL

## 2023-08-02 ENCOUNTER — LAB (OUTPATIENT)
Dept: LAB | Facility: HOSPITAL | Age: 43
End: 2023-08-02
Payer: COMMERCIAL

## 2023-08-02 DIAGNOSIS — E87.5 HYPERKALEMIA: ICD-10-CM

## 2023-08-02 DIAGNOSIS — E87.5 HYPERKALEMIA: Primary | ICD-10-CM

## 2023-08-02 LAB
ANION GAP SERPL CALCULATED.3IONS-SCNC: 10 MMOL/L (ref 5–15)
BUN SERPL-MCNC: 25 MG/DL (ref 6–20)
BUN/CREAT SERPL: 15.2 (ref 7–25)
CALCIUM SPEC-SCNC: 9.6 MG/DL (ref 8.6–10.5)
CHLORIDE SERPL-SCNC: 102 MMOL/L (ref 98–107)
CO2 SERPL-SCNC: 24 MMOL/L (ref 22–29)
CREAT SERPL-MCNC: 1.64 MG/DL (ref 0.57–1)
EGFRCR SERPLBLD CKD-EPI 2021: 39.2 ML/MIN/1.73
GLUCOSE SERPL-MCNC: 237 MG/DL (ref 65–99)
POTASSIUM SERPL-SCNC: 4.3 MMOL/L (ref 3.5–5.2)
SODIUM SERPL-SCNC: 136 MMOL/L (ref 136–145)

## 2023-08-02 PROCEDURE — 36415 COLL VENOUS BLD VENIPUNCTURE: CPT

## 2023-08-02 PROCEDURE — 80048 BASIC METABOLIC PNL TOTAL CA: CPT

## 2024-02-05 ENCOUNTER — HOSPITAL ENCOUNTER (OUTPATIENT)
Dept: GENERAL RADIOLOGY | Facility: HOSPITAL | Age: 44
Discharge: HOME | End: 2024-02-05
Payer: COMMERCIAL

## 2024-02-05 ENCOUNTER — TRANSCRIBE ORDERS (OUTPATIENT)
Dept: ADMINISTRATIVE | Facility: HOSPITAL | Age: 44
End: 2024-02-05
Payer: COMMERCIAL

## 2024-02-05 ENCOUNTER — LAB (OUTPATIENT)
Dept: LAB | Facility: HOSPITAL | Age: 44
End: 2024-02-05
Payer: COMMERCIAL

## 2024-02-05 DIAGNOSIS — F17.200 TOBACCO USE DISORDER: ICD-10-CM

## 2024-02-05 DIAGNOSIS — R05.9 COUGH, UNSPECIFIED TYPE: ICD-10-CM

## 2024-02-05 DIAGNOSIS — N92.6 IRREGULAR MENSTRUAL CYCLE: ICD-10-CM

## 2024-02-05 DIAGNOSIS — E55.9 AVITAMINOSIS D: ICD-10-CM

## 2024-02-05 DIAGNOSIS — R53.83 TIREDNESS: ICD-10-CM

## 2024-02-05 DIAGNOSIS — N92.6 IRREGULAR MENSTRUAL CYCLE: Primary | ICD-10-CM

## 2024-02-05 LAB
25(OH)D3 SERPL-MCNC: <6 NG/ML (ref 30–100)
FSH SERPL-ACNC: 8.84 MIU/ML
LH SERPL-ACNC: 8.26 MIU/ML
VIT B12 BLD-MCNC: >2000 PG/ML (ref 211–946)

## 2024-02-05 PROCEDURE — 82607 VITAMIN B-12: CPT

## 2024-02-05 PROCEDURE — 71046 X-RAY EXAM CHEST 2 VIEWS: CPT

## 2024-02-05 PROCEDURE — 82306 VITAMIN D 25 HYDROXY: CPT

## 2024-02-05 PROCEDURE — 36415 COLL VENOUS BLD VENIPUNCTURE: CPT

## 2024-02-05 PROCEDURE — 83002 ASSAY OF GONADOTROPIN (LH): CPT

## 2024-02-05 PROCEDURE — 83001 ASSAY OF GONADOTROPIN (FSH): CPT

## 2024-02-19 ENCOUNTER — APPOINTMENT (OUTPATIENT)
Dept: GENERAL RADIOLOGY | Facility: HOSPITAL | Age: 44
End: 2024-02-19
Payer: COMMERCIAL

## 2024-02-19 ENCOUNTER — HOSPITAL ENCOUNTER (INPATIENT)
Facility: HOSPITAL | Age: 44
LOS: 1 days | Discharge: HOME | End: 2024-02-22
Attending: EMERGENCY MEDICINE | Admitting: STUDENT IN AN ORGANIZED HEALTH CARE EDUCATION/TRAINING PROGRAM
Payer: COMMERCIAL

## 2024-02-19 DIAGNOSIS — R07.9 CHEST PAIN, UNSPECIFIED TYPE: ICD-10-CM

## 2024-02-19 DIAGNOSIS — N18.31 STAGE 3A CHRONIC KIDNEY DISEASE: ICD-10-CM

## 2024-02-19 DIAGNOSIS — I25.110 CORONARY ARTERY DISEASE INVOLVING NATIVE CORONARY ARTERY OF NATIVE HEART WITH UNSTABLE ANGINA PECTORIS: Primary | ICD-10-CM

## 2024-02-19 DIAGNOSIS — E11.65 HYPERGLYCEMIA DUE TO DIABETES MELLITUS: ICD-10-CM

## 2024-02-19 DIAGNOSIS — I16.1 HYPERTENSIVE EMERGENCY: ICD-10-CM

## 2024-02-19 DIAGNOSIS — R94.39 ABNORMAL NUCLEAR STRESS TEST: ICD-10-CM

## 2024-02-19 LAB
ALBUMIN SERPL-MCNC: 4.2 G/DL (ref 3.5–5.2)
ALBUMIN/GLOB SERPL: 1.2 G/DL
ALP SERPL-CCNC: 164 U/L (ref 39–117)
ALT SERPL W P-5'-P-CCNC: 14 U/L (ref 1–33)
ANION GAP SERPL CALCULATED.3IONS-SCNC: 13 MMOL/L (ref 5–15)
APTT PPP: 27.1 SECONDS (ref 61–76.5)
AST SERPL-CCNC: 11 U/L (ref 1–32)
BASOPHILS # BLD AUTO: 0.1 10*3/MM3 (ref 0–0.2)
BASOPHILS NFR BLD AUTO: 1.2 % (ref 0–1.5)
BILIRUB SERPL-MCNC: <0.2 MG/DL (ref 0–1.2)
BUN SERPL-MCNC: 21 MG/DL (ref 6–20)
BUN/CREAT SERPL: 13 (ref 7–25)
CALCIUM SPEC-SCNC: 9.5 MG/DL (ref 8.6–10.5)
CHLORIDE SERPL-SCNC: 92 MMOL/L (ref 98–107)
CO2 SERPL-SCNC: 25 MMOL/L (ref 22–29)
CREAT SERPL-MCNC: 1.61 MG/DL (ref 0.57–1)
DEPRECATED RDW RBC AUTO: 60.4 FL (ref 37–54)
EGFRCR SERPLBLD CKD-EPI 2021: 40.1 ML/MIN/1.73
EOSINOPHIL # BLD AUTO: 0.2 10*3/MM3 (ref 0–0.4)
EOSINOPHIL NFR BLD AUTO: 1.7 % (ref 0.3–6.2)
ERYTHROCYTE [DISTWIDTH] IN BLOOD BY AUTOMATED COUNT: 20.9 % (ref 12.3–15.4)
GLOBULIN UR ELPH-MCNC: 3.6 GM/DL
GLUCOSE SERPL-MCNC: 517 MG/DL (ref 65–99)
HBA1C MFR BLD: 13.4 % (ref 4.8–5.6)
HCT VFR BLD AUTO: 37.9 % (ref 34–46.6)
HGB BLD-MCNC: 11.9 G/DL (ref 12–15.9)
HOLD SPECIMEN: NORMAL
HOLD SPECIMEN: NORMAL
INR PPP: 0.95 (ref 0.93–1.1)
LIPASE SERPL-CCNC: 87 U/L (ref 13–60)
LYMPHOCYTES # BLD AUTO: 2.1 10*3/MM3 (ref 0.7–3.1)
LYMPHOCYTES NFR BLD AUTO: 23.9 % (ref 19.6–45.3)
MAGNESIUM SERPL-MCNC: 2.2 MG/DL (ref 1.6–2.6)
MCH RBC QN AUTO: 26.1 PG (ref 26.6–33)
MCHC RBC AUTO-ENTMCNC: 31.5 G/DL (ref 31.5–35.7)
MCV RBC AUTO: 82.9 FL (ref 79–97)
MONOCYTES # BLD AUTO: 0.6 10*3/MM3 (ref 0.1–0.9)
MONOCYTES NFR BLD AUTO: 7 % (ref 5–12)
NEUTROPHILS NFR BLD AUTO: 5.8 10*3/MM3 (ref 1.7–7)
NEUTROPHILS NFR BLD AUTO: 66.2 % (ref 42.7–76)
NRBC BLD AUTO-RTO: 0 /100 WBC (ref 0–0.2)
PLATELET # BLD AUTO: 233 10*3/MM3 (ref 140–450)
PMV BLD AUTO: 9.7 FL (ref 6–12)
POTASSIUM SERPL-SCNC: 3.7 MMOL/L (ref 3.5–5.2)
PROT SERPL-MCNC: 7.8 G/DL (ref 6–8.5)
PROTHROMBIN TIME: 10.4 SECONDS (ref 9.6–11.7)
RBC # BLD AUTO: 4.57 10*6/MM3 (ref 3.77–5.28)
SODIUM SERPL-SCNC: 130 MMOL/L (ref 136–145)
TROPONIN T SERPL HS-MCNC: 33 NG/L
TSH SERPL DL<=0.05 MIU/L-ACNC: 3.5 UIU/ML (ref 0.27–4.2)
WBC NRBC COR # BLD AUTO: 8.8 10*3/MM3 (ref 3.4–10.8)
WHOLE BLOOD HOLD COAG: NORMAL
WHOLE BLOOD HOLD SPECIMEN: NORMAL

## 2024-02-19 PROCEDURE — 83690 ASSAY OF LIPASE: CPT | Performed by: EMERGENCY MEDICINE

## 2024-02-19 PROCEDURE — 83036 HEMOGLOBIN GLYCOSYLATED A1C: CPT | Performed by: NURSE PRACTITIONER

## 2024-02-19 PROCEDURE — 80061 LIPID PANEL: CPT | Performed by: NURSE PRACTITIONER

## 2024-02-19 PROCEDURE — G0378 HOSPITAL OBSERVATION PER HR: HCPCS

## 2024-02-19 PROCEDURE — 80050 GENERAL HEALTH PANEL: CPT | Performed by: EMERGENCY MEDICINE

## 2024-02-19 PROCEDURE — 83735 ASSAY OF MAGNESIUM: CPT | Performed by: EMERGENCY MEDICINE

## 2024-02-19 PROCEDURE — 85610 PROTHROMBIN TIME: CPT | Performed by: EMERGENCY MEDICINE

## 2024-02-19 PROCEDURE — 84484 ASSAY OF TROPONIN QUANT: CPT | Performed by: EMERGENCY MEDICINE

## 2024-02-19 PROCEDURE — 93005 ELECTROCARDIOGRAM TRACING: CPT | Performed by: EMERGENCY MEDICINE

## 2024-02-19 PROCEDURE — 83721 ASSAY OF BLOOD LIPOPROTEIN: CPT | Performed by: NURSE PRACTITIONER

## 2024-02-19 PROCEDURE — 99285 EMERGENCY DEPT VISIT HI MDM: CPT

## 2024-02-19 PROCEDURE — 93005 ELECTROCARDIOGRAM TRACING: CPT

## 2024-02-19 PROCEDURE — 25010000002 NITROGLYCERIN 200 MCG/ML SOLUTION: Performed by: EMERGENCY MEDICINE

## 2024-02-19 PROCEDURE — 85730 THROMBOPLASTIN TIME PARTIAL: CPT | Performed by: EMERGENCY MEDICINE

## 2024-02-19 PROCEDURE — 71045 X-RAY EXAM CHEST 1 VIEW: CPT

## 2024-02-19 RX ORDER — SODIUM CHLORIDE 9 MG/ML
40 INJECTION, SOLUTION INTRAVENOUS AS NEEDED
Status: DISCONTINUED | OUTPATIENT
Start: 2024-02-19 | End: 2024-02-22 | Stop reason: HOSPADM

## 2024-02-19 RX ORDER — POLYETHYLENE GLYCOL 3350 17 G/17G
17 POWDER, FOR SOLUTION ORAL DAILY PRN
Status: DISCONTINUED | OUTPATIENT
Start: 2024-02-19 | End: 2024-02-22 | Stop reason: HOSPADM

## 2024-02-19 RX ORDER — AMOXICILLIN 250 MG
2 CAPSULE ORAL 2 TIMES DAILY
Status: DISCONTINUED | OUTPATIENT
Start: 2024-02-19 | End: 2024-02-22 | Stop reason: HOSPADM

## 2024-02-19 RX ORDER — NITROGLYCERIN 0.4 MG/1
0.4 TABLET SUBLINGUAL
Status: DISCONTINUED | OUTPATIENT
Start: 2024-02-19 | End: 2024-02-22 | Stop reason: HOSPADM

## 2024-02-19 RX ORDER — NICOTINE POLACRILEX 4 MG
15 LOZENGE BUCCAL
Status: DISCONTINUED | OUTPATIENT
Start: 2024-02-19 | End: 2024-02-20

## 2024-02-19 RX ORDER — ACETAMINOPHEN 325 MG/1
650 TABLET ORAL EVERY 6 HOURS PRN
Status: DISCONTINUED | OUTPATIENT
Start: 2024-02-19 | End: 2024-02-22 | Stop reason: HOSPADM

## 2024-02-19 RX ORDER — ASPIRIN 325 MG
325 TABLET ORAL ONCE
Status: COMPLETED | OUTPATIENT
Start: 2024-02-19 | End: 2024-02-20

## 2024-02-19 RX ORDER — NITROGLYCERIN 20 MG/100ML
10-50 INJECTION INTRAVENOUS
Status: DISCONTINUED | OUTPATIENT
Start: 2024-02-19 | End: 2024-02-20

## 2024-02-19 RX ORDER — DEXTROSE MONOHYDRATE 25 G/50ML
10-50 INJECTION, SOLUTION INTRAVENOUS
Status: DISCONTINUED | OUTPATIENT
Start: 2024-02-19 | End: 2024-02-20

## 2024-02-19 RX ORDER — ACETAMINOPHEN 500 MG
1000 TABLET ORAL ONCE
Status: COMPLETED | OUTPATIENT
Start: 2024-02-19 | End: 2024-02-20

## 2024-02-19 RX ORDER — INSULIN LISPRO 100 [IU]/ML
1-200 INJECTION, SOLUTION INTRAVENOUS; SUBCUTANEOUS AS NEEDED
Status: DISCONTINUED | OUTPATIENT
Start: 2024-02-19 | End: 2024-02-20

## 2024-02-19 RX ORDER — ONDANSETRON 4 MG/1
4 TABLET, ORALLY DISINTEGRATING ORAL EVERY 6 HOURS PRN
Status: DISCONTINUED | OUTPATIENT
Start: 2024-02-19 | End: 2024-02-22 | Stop reason: HOSPADM

## 2024-02-19 RX ORDER — ONDANSETRON 2 MG/ML
4 INJECTION INTRAMUSCULAR; INTRAVENOUS EVERY 6 HOURS PRN
Status: DISCONTINUED | OUTPATIENT
Start: 2024-02-19 | End: 2024-02-22 | Stop reason: HOSPADM

## 2024-02-19 RX ORDER — NICOTINE 21 MG/24HR
1 PATCH, TRANSDERMAL 24 HOURS TRANSDERMAL EVERY 24 HOURS
Status: DISCONTINUED | OUTPATIENT
Start: 2024-02-20 | End: 2024-02-22 | Stop reason: HOSPADM

## 2024-02-19 RX ORDER — IBUPROFEN 600 MG/1
1 TABLET ORAL
Status: DISCONTINUED | OUTPATIENT
Start: 2024-02-19 | End: 2024-02-20

## 2024-02-19 RX ORDER — SODIUM CHLORIDE 0.9 % (FLUSH) 0.9 %
10 SYRINGE (ML) INJECTION AS NEEDED
Status: DISCONTINUED | OUTPATIENT
Start: 2024-02-19 | End: 2024-02-22 | Stop reason: HOSPADM

## 2024-02-19 RX ORDER — INSULIN LISPRO 100 [IU]/ML
1-200 INJECTION, SOLUTION INTRAVENOUS; SUBCUTANEOUS
Status: DISCONTINUED | OUTPATIENT
Start: 2024-02-20 | End: 2024-02-20

## 2024-02-19 RX ORDER — BISACODYL 5 MG/1
5 TABLET, DELAYED RELEASE ORAL DAILY PRN
Status: DISCONTINUED | OUTPATIENT
Start: 2024-02-19 | End: 2024-02-22 | Stop reason: HOSPADM

## 2024-02-19 RX ORDER — SODIUM CHLORIDE 0.9 % (FLUSH) 0.9 %
10 SYRINGE (ML) INJECTION EVERY 12 HOURS SCHEDULED
Status: DISCONTINUED | OUTPATIENT
Start: 2024-02-19 | End: 2024-02-22 | Stop reason: HOSPADM

## 2024-02-19 RX ORDER — BISACODYL 10 MG
10 SUPPOSITORY, RECTAL RECTAL DAILY PRN
Status: DISCONTINUED | OUTPATIENT
Start: 2024-02-19 | End: 2024-02-22 | Stop reason: HOSPADM

## 2024-02-19 RX ADMIN — NITROGLYCERIN 20 MCG/MIN: 20 INJECTION INTRAVENOUS at 22:28

## 2024-02-19 RX ADMIN — NITROGLYCERIN 10 MCG/MIN: 20 INJECTION INTRAVENOUS at 22:11

## 2024-02-19 NOTE — Clinical Note
Level of Care: Progressive Care [20]   Admitting Physician: TIANNA ERIC [[id number]]   Attending Physician: TIANNA ERIC [[id number]]

## 2024-02-20 ENCOUNTER — APPOINTMENT (OUTPATIENT)
Dept: CARDIOLOGY | Facility: HOSPITAL | Age: 44
End: 2024-02-20
Payer: COMMERCIAL

## 2024-02-20 ENCOUNTER — APPOINTMENT (OUTPATIENT)
Dept: NUCLEAR MEDICINE | Facility: HOSPITAL | Age: 44
End: 2024-02-20
Payer: COMMERCIAL

## 2024-02-20 PROBLEM — E11.65 TYPE 2 DIABETES MELLITUS WITH HYPERGLYCEMIA, WITH LONG-TERM CURRENT USE OF INSULIN: Chronic | Status: ACTIVE | Noted: 2017-05-16

## 2024-02-20 PROBLEM — K31.84 DIABETIC GASTROPARESIS: Chronic | Status: ACTIVE | Noted: 2021-07-20

## 2024-02-20 PROBLEM — E55.9 VITAMIN D DEFICIENCY: Status: ACTIVE | Noted: 2024-02-02

## 2024-02-20 PROBLEM — Z79.4 TYPE 2 DIABETES MELLITUS WITH HYPERGLYCEMIA, WITH LONG-TERM CURRENT USE OF INSULIN: Chronic | Status: ACTIVE | Noted: 2017-05-16

## 2024-02-20 PROBLEM — E11.65 TYPE 2 DIABETES MELLITUS WITH HYPERGLYCEMIA: Status: ACTIVE | Noted: 2017-05-16

## 2024-02-20 PROBLEM — I21.9 MYOCARDIAL INFARCTION: Status: RESOLVED | Noted: 2021-07-20 | Resolved: 2024-02-20

## 2024-02-20 PROBLEM — M84.371D: Status: RESOLVED | Noted: 2024-02-20 | Resolved: 2024-02-20

## 2024-02-20 PROBLEM — G47.33 OBSTRUCTIVE SLEEP APNEA: Chronic | Status: ACTIVE | Noted: 2021-12-14

## 2024-02-20 PROBLEM — E11.43 DIABETIC GASTROPARESIS: Chronic | Status: ACTIVE | Noted: 2021-07-20

## 2024-02-20 PROBLEM — L60.0 INGROWING NAIL, LEFT GREAT TOE: Status: ACTIVE | Noted: 2024-02-20

## 2024-02-20 PROBLEM — F32.A DEPRESSIVE DISORDER: Status: ACTIVE | Noted: 2023-08-01

## 2024-02-20 PROBLEM — Z86.16 PERSONAL HISTORY OF COVID-19: Status: ACTIVE | Noted: 2022-05-31

## 2024-02-20 PROBLEM — E11.42 DIABETIC PERIPHERAL NEUROPATHY ASSOCIATED WITH TYPE 2 DIABETES MELLITUS: Status: ACTIVE | Noted: 2024-02-20

## 2024-02-20 PROBLEM — M54.9 BACK PAIN: Status: ACTIVE | Noted: 2023-06-06

## 2024-02-20 PROBLEM — I25.110 CORONARY ARTERY DISEASE INVOLVING NATIVE CORONARY ARTERY OF NATIVE HEART WITH UNSTABLE ANGINA PECTORIS: Status: ACTIVE | Noted: 2024-02-19

## 2024-02-20 PROBLEM — K63.5 POLYP OF COLON: Status: ACTIVE | Noted: 2023-06-06

## 2024-02-20 PROBLEM — Z72.0 TOBACCO ABUSE: Chronic | Status: ACTIVE | Noted: 2024-02-20

## 2024-02-20 PROBLEM — IMO0002 UNCONTROLLED TYPE 2 DIABETES MELLITUS: Chronic | Status: ACTIVE | Noted: 2017-05-16

## 2024-02-20 PROBLEM — M84.371D: Status: ACTIVE | Noted: 2024-02-20

## 2024-02-20 PROBLEM — R94.39 ABNORMAL NUCLEAR STRESS TEST: Status: ACTIVE | Noted: 2024-02-19

## 2024-02-20 LAB
ANION GAP SERPL CALCULATED.3IONS-SCNC: 11 MMOL/L (ref 5–15)
AORTIC DIMENSIONLESS INDEX: 0.63 (DI)
ARTICHOKE IGE QN: 62 MG/DL (ref 0–100)
BACTERIA UR QL AUTO: ABNORMAL /HPF
BH CV ECHO LEFT VENTRICLE GLOBAL LONGITUDINAL STRAIN: -16 %
BH CV ECHO MEAS - ACS: 1.8 CM
BH CV ECHO MEAS - AO MAX PG: 17.1 MMHG
BH CV ECHO MEAS - AO MEAN PG: 8 MMHG
BH CV ECHO MEAS - AO V2 MAX: 207 CM/SEC
BH CV ECHO MEAS - AO V2 VTI: 43 CM
BH CV ECHO MEAS - AVA(I,D): 2.07 CM2
BH CV ECHO MEAS - EDV(CUBED): 85.2 ML
BH CV ECHO MEAS - EDV(MOD-SP4): 84.3 ML
BH CV ECHO MEAS - EF(MOD-BP): 69 %
BH CV ECHO MEAS - EF(MOD-SP4): 69.5 %
BH CV ECHO MEAS - ESV(CUBED): 24.4 ML
BH CV ECHO MEAS - ESV(MOD-SP4): 25.7 ML
BH CV ECHO MEAS - FS: 34.1 %
BH CV ECHO MEAS - IVS/LVPW: 1.08 CM
BH CV ECHO MEAS - IVSD: 1.3 CM
BH CV ECHO MEAS - LA DIMENSION: 4.4 CM
BH CV ECHO MEAS - LAT PEAK E' VEL: 7.8 CM/SEC
BH CV ECHO MEAS - LV DIASTOLIC VOL/BSA (35-75): 47.9 CM2
BH CV ECHO MEAS - LV MASS(C)D: 203 GRAMS
BH CV ECHO MEAS - LV MAX PG: 7.8 MMHG
BH CV ECHO MEAS - LV MEAN PG: 4 MMHG
BH CV ECHO MEAS - LV SYSTOLIC VOL/BSA (12-30): 14.6 CM2
BH CV ECHO MEAS - LV V1 MAX: 140 CM/SEC
BH CV ECHO MEAS - LV V1 VTI: 35 CM
BH CV ECHO MEAS - LVIDD: 4.4 CM
BH CV ECHO MEAS - LVIDS: 2.9 CM
BH CV ECHO MEAS - LVOT AREA: 2.5 CM2
BH CV ECHO MEAS - LVOT DIAM: 1.8 CM
BH CV ECHO MEAS - LVPWD: 1.2 CM
BH CV ECHO MEAS - MED PEAK E' VEL: 7.2 CM/SEC
BH CV ECHO MEAS - MV A MAX VEL: 99.1 CM/SEC
BH CV ECHO MEAS - MV DEC SLOPE: 306 CM/SEC2
BH CV ECHO MEAS - MV DEC TIME: 0.21 SEC
BH CV ECHO MEAS - MV E MAX VEL: 104 CM/SEC
BH CV ECHO MEAS - MV E/A: 1.05
BH CV ECHO MEAS - MV MAX PG: 6.3 MMHG
BH CV ECHO MEAS - MV MEAN PG: 3 MMHG
BH CV ECHO MEAS - MV P1/2T: 121.6 MSEC
BH CV ECHO MEAS - MV V2 VTI: 47.3 CM
BH CV ECHO MEAS - MVA(P1/2T): 1.81 CM2
BH CV ECHO MEAS - MVA(VTI): 1.88 CM2
BH CV ECHO MEAS - PA ACC TIME: 0.12 SEC
BH CV ECHO MEAS - PA V2 MAX: 120 CM/SEC
BH CV ECHO MEAS - PI END-D VEL: 99.5 CM/SEC
BH CV ECHO MEAS - PULM A REVS DUR: 0.1 SEC
BH CV ECHO MEAS - PULM A REVS VEL: 30.8 CM/SEC
BH CV ECHO MEAS - PULM DIAS VEL: 55.4 CM/SEC
BH CV ECHO MEAS - PULM S/D: 1.25
BH CV ECHO MEAS - PULM SYS VEL: 69.5 CM/SEC
BH CV ECHO MEAS - RAP SYSTOLE: 3 MMHG
BH CV ECHO MEAS - RV MAX PG: 4.8 MMHG
BH CV ECHO MEAS - RV V1 MAX: 110 CM/SEC
BH CV ECHO MEAS - RV V1 VTI: 27.4 CM
BH CV ECHO MEAS - RVDD: 3.3 CM
BH CV ECHO MEAS - RVSP: 27.4 MMHG
BH CV ECHO MEAS - SI(MOD-SP4): 33.3 ML/M2
BH CV ECHO MEAS - SV(LVOT): 89.1 ML
BH CV ECHO MEAS - SV(MOD-SP4): 58.6 ML
BH CV ECHO MEAS - TAPSE (>1.6): 1.7 CM
BH CV ECHO MEAS - TR MAX PG: 24.4 MMHG
BH CV ECHO MEAS - TR MAX VEL: 247 CM/SEC
BH CV ECHO MEASUREMENTS AVERAGE E/E' RATIO: 13.87
BH CV REST NUCLEAR ISOTOPE DOSE: 11 MCI
BH CV STRESS BP STAGE 1: NORMAL
BH CV STRESS BP STAGE 2: NORMAL
BH CV STRESS BP STAGE 3: NORMAL
BH CV STRESS COMMENTS STAGE 1: NORMAL
BH CV STRESS COMMENTS STAGE 2: NORMAL
BH CV STRESS DOSE REGADENOSON STAGE 1: 0.4
BH CV STRESS DURATION MIN STAGE 1: 0
BH CV STRESS DURATION MIN STAGE 2: 4
BH CV STRESS DURATION SEC STAGE 1: 10
BH CV STRESS DURATION SEC STAGE 2: 0
BH CV STRESS HR STAGE 1: 63
BH CV STRESS HR STAGE 2: 70
BH CV STRESS HR STAGE 3: 70
BH CV STRESS NUCLEAR ISOTOPE DOSE: 33 MCI
BH CV STRESS PROTOCOL 1: NORMAL
BH CV STRESS RECOVERY BP: NORMAL MMHG
BH CV STRESS RECOVERY HR: 69 BPM
BH CV STRESS STAGE 1: 1
BH CV STRESS STAGE 2: 2
BH CV STRESS STAGE 3: 3
BH CV XLRA - TDI S': 11 CM/SEC
BILIRUB UR QL STRIP: NEGATIVE
BUN SERPL-MCNC: 18 MG/DL (ref 6–20)
BUN/CREAT SERPL: 15.7 (ref 7–25)
CALCIUM SPEC-SCNC: 8.8 MG/DL (ref 8.6–10.5)
CHLORIDE SERPL-SCNC: 101 MMOL/L (ref 98–107)
CHOLEST SERPL-MCNC: 259 MG/DL (ref 0–200)
CK SERPL-CCNC: 40 U/L (ref 20–180)
CLARITY UR: CLEAR
CO2 SERPL-SCNC: 23 MMOL/L (ref 22–29)
COLOR UR: YELLOW
CREAT SERPL-MCNC: 1.15 MG/DL (ref 0.57–1)
EGFRCR SERPLBLD CKD-EPI 2021: 60 ML/MIN/1.73
GLUCOSE BLDC GLUCOMTR-MCNC: 209 MG/DL (ref 70–105)
GLUCOSE BLDC GLUCOMTR-MCNC: 222 MG/DL (ref 70–105)
GLUCOSE BLDC GLUCOMTR-MCNC: 281 MG/DL (ref 70–105)
GLUCOSE BLDC GLUCOMTR-MCNC: 323 MG/DL (ref 70–105)
GLUCOSE BLDC GLUCOMTR-MCNC: 327 MG/DL (ref 70–105)
GLUCOSE SERPL-MCNC: 218 MG/DL (ref 65–99)
GLUCOSE UR STRIP-MCNC: ABNORMAL MG/DL
HDLC SERPL-MCNC: ABNORMAL MG/DL
HGB UR QL STRIP.AUTO: NEGATIVE
HYALINE CASTS UR QL AUTO: ABNORMAL /LPF
KETONES UR QL STRIP: NEGATIVE
LDLC SERPL CALC-MCNC: ABNORMAL MG/DL
LDLC/HDLC SERPL: ABNORMAL {RATIO}
LEFT ATRIUM VOLUME INDEX: 37.4 ML/M2
LEUKOCYTE ESTERASE UR QL STRIP.AUTO: NEGATIVE
LV EF NUC BP: 63 %
MAXIMAL PREDICTED HEART RATE: 175 BPM
NITRITE UR QL STRIP: NEGATIVE
PERCENT MAX PREDICTED HR: 55.43 %
PH UR STRIP.AUTO: 6 [PH] (ref 5–8)
POTASSIUM SERPL-SCNC: 3.6 MMOL/L (ref 3.5–5.2)
PROT UR QL STRIP: ABNORMAL
QT INTERVAL: 444 MS
QT INTERVAL: 446 MS
QT INTERVAL: 458 MS
QTC INTERVAL: 430 MS
QTC INTERVAL: 436 MS
QTC INTERVAL: 437 MS
RBC # UR STRIP: ABNORMAL /HPF
REF LAB TEST METHOD: ABNORMAL
SINUS: 2.9 CM
SODIUM SERPL-SCNC: 135 MMOL/L (ref 136–145)
SP GR UR STRIP: 1.02 (ref 1–1.03)
SQUAMOUS #/AREA URNS HPF: ABNORMAL /HPF
STRESS BASELINE BP: NORMAL MMHG
STRESS BASELINE HR: 54 BPM
STRESS PERCENT HR: 65 %
STRESS POST PEAK BP: NORMAL MMHG
STRESS POST PEAK HR: 97 BPM
STRESS TARGET HR: 149 BPM
TRIGL SERPL-MCNC: 1588 MG/DL (ref 0–150)
TROPONIN T SERPL HS-MCNC: 30 NG/L
URATE SERPL-MCNC: 6 MG/DL (ref 2.4–5.7)
UROBILINOGEN UR QL STRIP: ABNORMAL
VLDLC SERPL-MCNC: ABNORMAL MG/DL
WBC # UR STRIP: ABNORMAL /HPF
WHOLE BLOOD HOLD COAG: NORMAL
WHOLE BLOOD HOLD SPECIMEN: NORMAL

## 2024-02-20 PROCEDURE — G0378 HOSPITAL OBSERVATION PER HR: HCPCS

## 2024-02-20 PROCEDURE — 82948 REAGENT STRIP/BLOOD GLUCOSE: CPT

## 2024-02-20 PROCEDURE — 82550 ASSAY OF CK (CPK): CPT | Performed by: INTERNAL MEDICINE

## 2024-02-20 PROCEDURE — A9502 TC99M TETROFOSMIN: HCPCS | Performed by: INTERNAL MEDICINE

## 2024-02-20 PROCEDURE — 93306 TTE W/DOPPLER COMPLETE: CPT

## 2024-02-20 PROCEDURE — 84484 ASSAY OF TROPONIN QUANT: CPT | Performed by: NURSE PRACTITIONER

## 2024-02-20 PROCEDURE — 93005 ELECTROCARDIOGRAM TRACING: CPT | Performed by: NURSE PRACTITIONER

## 2024-02-20 PROCEDURE — 63710000001 INSULIN GLARGINE PER 5 UNITS: Performed by: INTERNAL MEDICINE

## 2024-02-20 PROCEDURE — 99205 OFFICE O/P NEW HI 60 MIN: CPT | Performed by: INTERNAL MEDICINE

## 2024-02-20 PROCEDURE — 80048 BASIC METABOLIC PNL TOTAL CA: CPT | Performed by: INTERNAL MEDICINE

## 2024-02-20 PROCEDURE — 84550 ASSAY OF BLOOD/URIC ACID: CPT | Performed by: INTERNAL MEDICINE

## 2024-02-20 PROCEDURE — 82948 REAGENT STRIP/BLOOD GLUCOSE: CPT | Performed by: NURSE PRACTITIONER

## 2024-02-20 PROCEDURE — 25010000002 REGADENOSON 0.4 MG/5ML SOLUTION: Performed by: INTERNAL MEDICINE

## 2024-02-20 PROCEDURE — 78452 HT MUSCLE IMAGE SPECT MULT: CPT

## 2024-02-20 PROCEDURE — 93018 CV STRESS TEST I&R ONLY: CPT | Performed by: INTERNAL MEDICINE

## 2024-02-20 PROCEDURE — 93356 MYOCRD STRAIN IMG SPCKL TRCK: CPT

## 2024-02-20 PROCEDURE — 81001 URINALYSIS AUTO W/SCOPE: CPT | Performed by: INTERNAL MEDICINE

## 2024-02-20 PROCEDURE — 25010000002 NITROGLYCERIN 200 MCG/ML SOLUTION: Performed by: EMERGENCY MEDICINE

## 2024-02-20 PROCEDURE — 99222 1ST HOSP IP/OBS MODERATE 55: CPT | Performed by: INTERNAL MEDICINE

## 2024-02-20 PROCEDURE — 93017 CV STRESS TEST TRACING ONLY: CPT

## 2024-02-20 PROCEDURE — 25810000003 SODIUM CHLORIDE 0.9 % SOLUTION: Performed by: INTERNAL MEDICINE

## 2024-02-20 PROCEDURE — 36415 COLL VENOUS BLD VENIPUNCTURE: CPT

## 2024-02-20 PROCEDURE — 93356 MYOCRD STRAIN IMG SPCKL TRCK: CPT | Performed by: INTERNAL MEDICINE

## 2024-02-20 PROCEDURE — 63710000001 INSULIN LISPRO (HUMAN) PER 5 UNITS: Performed by: INTERNAL MEDICINE

## 2024-02-20 PROCEDURE — 93306 TTE W/DOPPLER COMPLETE: CPT | Performed by: INTERNAL MEDICINE

## 2024-02-20 PROCEDURE — 63710000001 INSULIN LISPRO (HUMAN) PER 5 UNITS: Performed by: NURSE PRACTITIONER

## 2024-02-20 PROCEDURE — 0 TECHNETIUM TETROFOSMIN KIT: Performed by: INTERNAL MEDICINE

## 2024-02-20 PROCEDURE — 78452 HT MUSCLE IMAGE SPECT MULT: CPT | Performed by: INTERNAL MEDICINE

## 2024-02-20 RX ORDER — ASPIRIN 81 MG/1
81 TABLET ORAL DAILY
Status: DISCONTINUED | OUTPATIENT
Start: 2024-02-20 | End: 2024-02-22 | Stop reason: HOSPADM

## 2024-02-20 RX ORDER — ALBUTEROL SULFATE 2.5 MG/3ML
2.5 SOLUTION RESPIRATORY (INHALATION) EVERY 6 HOURS PRN
Status: DISCONTINUED | OUTPATIENT
Start: 2024-02-20 | End: 2024-02-22 | Stop reason: HOSPADM

## 2024-02-20 RX ORDER — HYDRALAZINE HYDROCHLORIDE 25 MG/1
75 TABLET, FILM COATED ORAL EVERY 8 HOURS
Status: DISCONTINUED | OUTPATIENT
Start: 2024-02-20 | End: 2024-02-20

## 2024-02-20 RX ORDER — ATORVASTATIN CALCIUM 80 MG/1
80 TABLET, FILM COATED ORAL DAILY
COMMUNITY

## 2024-02-20 RX ORDER — DEXTROSE MONOHYDRATE 25 G/50ML
10-50 INJECTION, SOLUTION INTRAVENOUS
Status: DISCONTINUED | OUTPATIENT
Start: 2024-02-20 | End: 2024-02-20

## 2024-02-20 RX ORDER — GABAPENTIN 100 MG/1
100 CAPSULE ORAL 3 TIMES DAILY
Status: DISCONTINUED | OUTPATIENT
Start: 2024-02-20 | End: 2024-02-22 | Stop reason: HOSPADM

## 2024-02-20 RX ORDER — INSULIN LISPRO 100 [IU]/ML
1-200 INJECTION, SOLUTION INTRAVENOUS; SUBCUTANEOUS
Status: DISCONTINUED | OUTPATIENT
Start: 2024-02-20 | End: 2024-02-20

## 2024-02-20 RX ORDER — FERROUS SULFATE 324(65)MG
324 TABLET, DELAYED RELEASE (ENTERIC COATED) ORAL
Status: DISCONTINUED | OUTPATIENT
Start: 2024-02-20 | End: 2024-02-22 | Stop reason: HOSPADM

## 2024-02-20 RX ORDER — ESCITALOPRAM OXALATE 10 MG/1
10 TABLET ORAL DAILY
Status: DISCONTINUED | OUTPATIENT
Start: 2024-02-20 | End: 2024-02-22 | Stop reason: HOSPADM

## 2024-02-20 RX ORDER — IBUPROFEN 600 MG/1
1 TABLET ORAL
Status: DISCONTINUED | OUTPATIENT
Start: 2024-02-20 | End: 2024-02-22 | Stop reason: HOSPADM

## 2024-02-20 RX ORDER — SODIUM CHLORIDE 9 MG/ML
60 INJECTION, SOLUTION INTRAVENOUS CONTINUOUS
Status: DISPENSED | OUTPATIENT
Start: 2024-02-20 | End: 2024-02-21

## 2024-02-20 RX ORDER — INSULIN LISPRO 100 [IU]/ML
1-200 INJECTION, SOLUTION INTRAVENOUS; SUBCUTANEOUS AS NEEDED
Status: DISCONTINUED | OUTPATIENT
Start: 2024-02-20 | End: 2024-02-20

## 2024-02-20 RX ORDER — METOCLOPRAMIDE 5 MG/1
5 TABLET ORAL
Status: DISCONTINUED | OUTPATIENT
Start: 2024-02-20 | End: 2024-02-22 | Stop reason: HOSPADM

## 2024-02-20 RX ORDER — NICOTINE POLACRILEX 4 MG
15 LOZENGE BUCCAL
Status: DISCONTINUED | OUTPATIENT
Start: 2024-02-20 | End: 2024-02-22 | Stop reason: HOSPADM

## 2024-02-20 RX ORDER — INSULIN LISPRO 100 [IU]/ML
2-9 INJECTION, SOLUTION INTRAVENOUS; SUBCUTANEOUS EVERY 6 HOURS SCHEDULED
Status: DISCONTINUED | OUTPATIENT
Start: 2024-02-20 | End: 2024-02-21

## 2024-02-20 RX ORDER — FERROUS SULFATE 325(65) MG
325 TABLET ORAL
COMMUNITY

## 2024-02-20 RX ORDER — HYDRALAZINE HYDROCHLORIDE 25 MG/1
75 TABLET, FILM COATED ORAL EVERY 8 HOURS SCHEDULED
Status: DISCONTINUED | OUTPATIENT
Start: 2024-02-20 | End: 2024-02-20

## 2024-02-20 RX ORDER — REGADENOSON 0.08 MG/ML
0.4 INJECTION, SOLUTION INTRAVENOUS
Status: COMPLETED | OUTPATIENT
Start: 2024-02-20 | End: 2024-02-20

## 2024-02-20 RX ORDER — HYDRALAZINE HYDROCHLORIDE 25 MG/1
100 TABLET, FILM COATED ORAL EVERY 8 HOURS
Status: DISCONTINUED | OUTPATIENT
Start: 2024-02-20 | End: 2024-02-22 | Stop reason: HOSPADM

## 2024-02-20 RX ORDER — DEXTROSE MONOHYDRATE 25 G/50ML
25 INJECTION, SOLUTION INTRAVENOUS
Status: DISCONTINUED | OUTPATIENT
Start: 2024-02-20 | End: 2024-02-22 | Stop reason: HOSPADM

## 2024-02-20 RX ORDER — NICOTINE POLACRILEX 4 MG
15 LOZENGE BUCCAL
Status: DISCONTINUED | OUTPATIENT
Start: 2024-02-20 | End: 2024-02-20

## 2024-02-20 RX ORDER — ATORVASTATIN CALCIUM 40 MG/1
80 TABLET, FILM COATED ORAL DAILY
Status: DISCONTINUED | OUTPATIENT
Start: 2024-02-20 | End: 2024-02-22 | Stop reason: HOSPADM

## 2024-02-20 RX ORDER — FAMOTIDINE 20 MG/1
20 TABLET, FILM COATED ORAL NIGHTLY
Status: DISCONTINUED | OUTPATIENT
Start: 2024-02-20 | End: 2024-02-22 | Stop reason: HOSPADM

## 2024-02-20 RX ORDER — PANTOPRAZOLE SODIUM 40 MG/1
40 TABLET, DELAYED RELEASE ORAL DAILY
Status: DISCONTINUED | OUTPATIENT
Start: 2024-02-20 | End: 2024-02-20

## 2024-02-20 RX ORDER — IBUPROFEN 600 MG/1
1 TABLET ORAL
Status: DISCONTINUED | OUTPATIENT
Start: 2024-02-20 | End: 2024-02-20

## 2024-02-20 RX ADMIN — REGADENOSON 0.4 MG: 0.08 INJECTION, SOLUTION INTRAVENOUS at 10:55

## 2024-02-20 RX ADMIN — GABAPENTIN 100 MG: 100 CAPSULE ORAL at 08:20

## 2024-02-20 RX ADMIN — NITROGLYCERIN 35 MCG/MIN: 20 INJECTION INTRAVENOUS at 01:09

## 2024-02-20 RX ADMIN — SODIUM CHLORIDE 60 ML/HR: 9 INJECTION, SOLUTION INTRAVENOUS at 12:27

## 2024-02-20 RX ADMIN — HYDRALAZINE HYDROCHLORIDE 75 MG: 25 TABLET ORAL at 08:39

## 2024-02-20 RX ADMIN — NICOTINE 1 PATCH: 14 PATCH, EXTENDED RELEASE TRANSDERMAL at 00:36

## 2024-02-20 RX ADMIN — ASPIRIN 81 MG: 81 TABLET, COATED ORAL at 14:17

## 2024-02-20 RX ADMIN — TETROFOSMIN 1 DOSE: 1.38 INJECTION, POWDER, LYOPHILIZED, FOR SOLUTION INTRAVENOUS at 10:55

## 2024-02-20 RX ADMIN — PANTOPRAZOLE SODIUM 40 MG: 40 TABLET, DELAYED RELEASE ORAL at 08:20

## 2024-02-20 RX ADMIN — HYDRALAZINE HYDROCHLORIDE 100 MG: 25 TABLET ORAL at 16:34

## 2024-02-20 RX ADMIN — ACETAMINOPHEN 1000 MG: 500 TABLET ORAL at 00:35

## 2024-02-20 RX ADMIN — INSULIN GLARGINE 25 UNITS: 100 INJECTION, SOLUTION SUBCUTANEOUS at 12:23

## 2024-02-20 RX ADMIN — ATORVASTATIN CALCIUM 80 MG: 40 TABLET, FILM COATED ORAL at 08:20

## 2024-02-20 RX ADMIN — Medication 1200 MG: at 21:45

## 2024-02-20 RX ADMIN — INSULIN LISPRO 8 UNITS: 100 INJECTION, SOLUTION INTRAVENOUS; SUBCUTANEOUS at 01:07

## 2024-02-20 RX ADMIN — GABAPENTIN 100 MG: 100 CAPSULE ORAL at 16:34

## 2024-02-20 RX ADMIN — METOCLOPRAMIDE 5 MG: 10 TABLET ORAL at 21:46

## 2024-02-20 RX ADMIN — METOCLOPRAMIDE 5 MG: 10 TABLET ORAL at 01:07

## 2024-02-20 RX ADMIN — ESCITALOPRAM OXALATE 10 MG: 10 TABLET ORAL at 08:20

## 2024-02-20 RX ADMIN — FERROUS SULFATE TAB EC 324 MG (65 MG FE EQUIVALENT) 324 MG: 324 (65 FE) TABLET DELAYED RESPONSE at 08:20

## 2024-02-20 RX ADMIN — TETROFOSMIN 1 DOSE: 1.38 INJECTION, POWDER, LYOPHILIZED, FOR SOLUTION INTRAVENOUS at 09:40

## 2024-02-20 RX ADMIN — INSULIN LISPRO 4 UNITS: 100 INJECTION, SOLUTION INTRAVENOUS; SUBCUTANEOUS at 18:09

## 2024-02-20 RX ADMIN — METOCLOPRAMIDE 5 MG: 10 TABLET ORAL at 12:22

## 2024-02-20 RX ADMIN — METOCLOPRAMIDE 5 MG: 10 TABLET ORAL at 16:34

## 2024-02-20 RX ADMIN — ASPIRIN 325 MG ORAL TABLET 325 MG: 325 PILL ORAL at 00:36

## 2024-02-20 RX ADMIN — Medication 10 ML: at 08:20

## 2024-02-20 RX ADMIN — FAMOTIDINE 20 MG: 20 TABLET, FILM COATED ORAL at 21:45

## 2024-02-20 RX ADMIN — GABAPENTIN 100 MG: 100 CAPSULE ORAL at 21:45

## 2024-02-20 RX ADMIN — METOCLOPRAMIDE 5 MG: 10 TABLET ORAL at 08:37

## 2024-02-20 RX ADMIN — Medication 10 ML: at 21:46

## 2024-02-20 RX ADMIN — INSULIN LISPRO 3 UNITS: 100 INJECTION, SOLUTION INTRAVENOUS; SUBCUTANEOUS at 07:30

## 2024-02-20 RX ADMIN — INSULIN GLARGINE 25 UNITS: 100 INJECTION, SOLUTION SUBCUTANEOUS at 21:45

## 2024-02-20 RX ADMIN — INSULIN LISPRO 7 UNITS: 100 INJECTION, SOLUTION INTRAVENOUS; SUBCUTANEOUS at 12:22

## 2024-02-20 NOTE — PLAN OF CARE
Goal Outcome Evaluation:  Plan of Care Reviewed With: patient           Outcome Evaluation: Patient's blood sugars remained in the 200's and 300's. Patient had echo and stress test today. Patient scheduled for cardiac cath tomorrow at 1105 and is to remain NPO past midnight tonight. Patient remains sinus bradycardia in the 50's. Endocrine has been consulted. Nitro drip has been discontinued. Fluids are running per nephrology.

## 2024-02-20 NOTE — CONSULTS
NEPHROLOGY CONSULTATION-----[facility]    [facility]  [phone]  Tamiko Jenkins MD    Patient Care Team:  Ibrahima Correa MD as PCP - General (Family Medicine)  Rachele Zafar, RN as Ambulatory  ([facility])  Xochilt Jenkins MD as Consulting Physician (Nephrology)    CC/REASON FOR CONSULTATION: RENAL FAILURE/HYPONATREMIA    PROVIDER REQUESTING CONSULTATION: KAUSHAL LOGAN    History of Present Illness    HPI    Patient is a 43 y.o. WF, very well known to me as I actively follow her as an outpatient, whom I was asked to see in consultation for evaluation and management of renal failure/elevated serum creatinine and hyponatremia. Patient was admitted after presenting to ER with c/o CP.    Patient denies prior knowledge of functional kidney disease, proteinuria, or hematuria. No NSAIDs or recent IV dye exposure. No known h/o hepatitis, TB, rheumatic fever, jaundice, SLE, bleeding/bruising disorders.  No urinary sx. No edema or fluid retention.  +Compliance with home meds. Was not on diuretics prior to admission. Was on ACE-I/ARB in the form of Losartan prior to admission. No herbal med use. Was on SGLT2 inhibitor in the form of Farxiga prior to admission.    Review of Systems   Constitutional:  Positive for activity change, appetite change and fatigue. Negative for chills, diaphoresis, fever and unexpected weight change.   HENT:  Negative for congestion, dental problem, drooling, ear discharge, ear pain, facial swelling, hearing loss, mouth sores, nosebleeds, postnasal drip, rhinorrhea, sinus pressure, sinus pain, sneezing, sore throat, tinnitus, trouble swallowing and voice change.    Eyes:  Negative for photophobia, pain, discharge, redness, itching and visual disturbance.   Respiratory:  Negative for apnea, cough, choking, chest tightness, shortness of breath, wheezing and stridor.    Cardiovascular:   Positive for chest pain. Negative for palpitations and leg swelling.   Gastrointestinal:  Negative for abdominal distention, abdominal pain, anal bleeding, blood in stool, constipation, diarrhea, nausea, rectal pain and vomiting.   Endocrine: Negative for cold intolerance, heat intolerance, polydipsia, polyphagia and polyuria.   Genitourinary:  Positive for frequency. Negative for decreased urine volume, difficulty urinating, dysuria, enuresis, flank pain, genital sores, hematuria and urgency.   Musculoskeletal:  Negative for arthralgias, back pain, gait problem, joint swelling, myalgias, neck pain and neck stiffness.   Skin:  Negative for color change, pallor, rash and wound.   Allergic/Immunologic: Negative for environmental allergies, food allergies and immunocompromised state.   Neurological:  Positive for weakness. Negative for dizziness, tremors, seizures, syncope, facial asymmetry, speech difficulty, light-headedness, numbness and headaches.   Hematological:  Negative for adenopathy. Does not bruise/bleed easily.   Psychiatric/Behavioral:  Negative for agitation, behavioral problems, confusion, decreased concentration, dysphoric mood, hallucinations, self-injury, sleep disturbance and suicidal ideas. The patient is not nervous/anxious and is not hyperactive.           Past Medical History:   Diagnosis Date    CAD (coronary artery disease)     CHF (congestive heart failure)     Diabetes mellitus     Gastroparesis     GERD (gastroesophageal reflux disease)     Hypertension     MI (myocardial infarction)     Sleep apnea        Past Surgical History:   Procedure Laterality Date     SECTION      CORONARY ARTERY BYPASS GRAFT      DILATATION AND CURETTAGE      TONSILLECTOMY         History reviewed. No pertinent family history.    Social History     Tobacco Use    Smoking status: Every Day     Packs/day: 0.25     Years: 15.00     Additional pack years: 0.00     Total pack years: 3.75     Types: Cigarettes     Smokeless tobacco: Never   Vaping Use    Vaping Use: Never used   Substance Use Topics    Alcohol use: Defer    Drug use: Never       Home Meds:   Medications Prior to Admission   Medication Sig Dispense Refill Last Dose    albuterol sulfate  (90 Base) MCG/ACT inhaler Inhale 2 puffs every 6 (six) hours if needed for wheezing. 18 g 0     aspirin 81 MG chewable tablet Chew 1 tablet Daily.       atorvastatin (LIPITOR) 80 MG tablet Take 1 tablet by mouth Daily.       clopidogrel (PLAVIX) 75 MG tablet Take 1 tablet by mouth Daily. 90 tablet 0     clotrimazole-betamethasone (LOTRISONE) 1-0.05 % cream apply 1 application on the skin daily 90 g 0     dapagliflozin (Farxiga) 5 MG tablet tablet take 1 tablet by oral route  every day in the morning 30 tablet 11     escitalopram (LEXAPRO) 10 MG tablet Take 1 tablet (10 mg total) by mouth 1 (one) time each day. 30 tablet 5     famotidine (PEPCID) 40 MG tablet Take 1 tablet by mouth every night at bedtime. 90 tablet 3     ferrous sulfate 325 (65 FE) MG tablet Take 1 tablet by mouth Daily With Breakfast.       gabapentin (NEURONTIN) 100 MG capsule Take 1 capsule by mouth 3 (Three) Times a Day.       hydrALAZINE (APRESOLINE) 25 MG tablet Take 1 tablet (25 mg total) by mouth 3 (three) times a day. 90 tablet 0     icosapent ethyl (Vascepa) 1 g capsule capsule Take 2 capsules by mouth 2 (two) times a day. 360 capsule 0     insulin aspart (novoLOG FLEXPEN) 100 UNIT/ML solution pen-injector sc pen Inject 15 Units under the skin into the appropriate area as directed 3 (Three) Times a Day With Meals.       insulin detemir (Levemir FlexPen) 100 UNIT/ML injection Inject 30 Units under the skin into the appropriate area as directed 2 (Two) Times a Day. 15 mL 0     losartan (COZAAR) 100 MG tablet Take 1 tablet by mouth Daily. 90 tablet 0     metoclopramide (REGLAN) 5 MG tablet Take 1 tablet by mouth before meals and at bedtime 120 tablet 9     metoprolol tartrate (LOPRESSOR) 100 MG  tablet Take 1 tablet by mouth 2 (two) times a day. 180 tablet 1     ondansetron (ZOFRAN) 4 MG tablet Take 1 tablet by mouth three times a day as needed 30 tablet 4     pantoprazole (PROTONIX) 40 MG EC tablet Take 1 tablet by mouth Every Morning. 90 tablet 3     terbinafine (lamiSIL) 250 MG tablet Take 1 tablet by mouth Daily. 90 tablet 0     vitamin B-12 (CYANOCOBALAMIN) 1000 MCG tablet Take 1 tablet by mouth Daily.       vitamin D (ERGOCALCIFEROL) 1.25 MG (42673 UT) capsule capsule Take 1 capsule (50,000 Units total) by mouth 1 (one) time per week. 4 capsule 5     Continuous Blood Gluc  (Dexcom G6 ) device Use as instructed 1 each 0     Continuous Blood Gluc Sensor (Dexcom G6 Sensor) Change sensor every 10 days 3 each 2     Continuous Blood Gluc Transmit (Dexcom G6 Transmitter) misc Use as instructed 1 each 0     Insulin Pen Needle (Unifine Pentips) 32G X 4 MM misc Use with Insulin Daily 100 each 3        Scheduled Meds:  atorvastatin, 80 mg, Oral, Daily  escitalopram, 10 mg, Oral, Daily  famotidine, 20 mg, Oral, Nightly  ferrous sulfate, 324 mg, Oral, Daily With Breakfast  gabapentin, 100 mg, Oral, TID  hydrALAZINE, 75 mg, Oral, Q8H  insulin glargine, 1-200 Units, Subcutaneous, Nightly - Glucommander  insulin lispro, 1-200 Units, Subcutaneous, 4x Daily With Meals & Nightly  metoclopramide, 5 mg, Oral, 4x Daily AC & at Bedtime  nicotine, 1 patch, Transdermal, Q24H  pantoprazole, 40 mg, Oral, Daily  senna-docusate sodium, 2 tablet, Oral, BID  sodium chloride, 10 mL, Intravenous, Q12H        Continuous Infusions:       PRN Meds:    acetaminophen    albuterol    senna-docusate sodium **AND** polyethylene glycol **AND** bisacodyl **AND** bisacodyl    dextrose    dextrose    glucagon (human recombinant)    insulin lispro    nitroglycerin    ondansetron ODT **OR** ondansetron    [COMPLETED] Insert Peripheral IV **AND** sodium chloride    sodium chloride    sodium chloride    Allergies:   "Latex    OBJECTIVE    Vital Signs  Temp:  [97.9 °F (36.6 °C)-98.9 °F (37.2 °C)] 98.1 °F (36.7 °C)  Heart Rate:  [48-65] 49  Resp:  [16-20] 16  BP: (127-255)/(55-96) 142/65    No intake/output data recorded.  No intake/output data recorded.    Physical Exam:  General Appearance: alert, appears stated age and cooperative  Head: normocephalic, without obvious abnormality and atraumatic +DRY OP  Eyes: conjunctivae and sclerae normal and no icterus  Neck: supple and no JVD  Lungs: clear to auscultation and respirations regular  Heart: regular rhythm & normal rate and normal S1, S2 +TITI  Chest Wall: no abnormalities observed  Abdomen: normal bowel sounds and soft, nontender  Extremities: moves extremities well, no edema, no cyanosis  Skin: no bleeding, bruising or rash  Neurologic: Alert, and oriented. No focal deficits    Results Review:    I reviewed the patient's new clinical results.    WBC WBC   Date Value Ref Range Status   02/19/2024 8.80 3.40 - 10.80 10*3/mm3 Final      HGB Hemoglobin   Date Value Ref Range Status   02/19/2024 11.9 (L) 12.0 - 15.9 g/dL Final      HCT Hematocrit   Date Value Ref Range Status   02/19/2024 37.9 34.0 - 46.6 % Final      Platelets No results found for: \"LABPLAT\"   MCV MCV   Date Value Ref Range Status   02/19/2024 82.9 79.0 - 97.0 fL Final          Sodium Sodium   Date Value Ref Range Status   02/19/2024 130 (L) 136 - 145 mmol/L Final      Potassium Potassium   Date Value Ref Range Status   02/19/2024 3.7 3.5 - 5.2 mmol/L Final      Chloride Chloride   Date Value Ref Range Status   02/19/2024 92 (L) 98 - 107 mmol/L Final      CO2 CO2   Date Value Ref Range Status   02/19/2024 25.0 22.0 - 29.0 mmol/L Final      BUN BUN   Date Value Ref Range Status   02/19/2024 21 (H) 6 - 20 mg/dL Final      Creatinine Creatinine   Date Value Ref Range Status   02/19/2024 1.61 (H) 0.57 - 1.00 mg/dL Final      Calcium Calcium   Date Value Ref Range Status   02/19/2024 9.5 8.6 - 10.5 mg/dL Final      PO4 " "No results found for: \"CAPO4\"   Albumin Albumin   Date Value Ref Range Status   02/19/2024 4.2 3.5 - 5.2 g/dL Final      Magnesium Magnesium   Date Value Ref Range Status   02/19/2024 2.2 1.6 - 2.6 mg/dL Final      Uric Acid No results found for: \"URICACID\"       Imaging Results (Last 72 Hours)       Procedure Component Value Units Date/Time    XR Chest 1 View [[id number]] Collected: 02/19/24 2222     Updated: 02/19/24 2226    Narrative:      XR CHEST 1 VW    Date of Exam: 2/19/2024 10:11 PM EST    Indication: pain    Comparison: 2/5/2024    Findings:  Previous median sternotomy is again noted with a combination of sternal plates and wires. Heart is enlarged. Vasculature appears normal. Lungs are well expanded and appear clear except for a couple granulomatous calcifications. No effusion or   pneumothorax is seen.      Impression:      Impression:  Cardiomegaly without evidence of congestive failure. No other evidence of active disease.      Electronically Signed: Jorge Cox MD    2/19/2024 10:24 PM EST    Workstation ID: [id number]              Results for orders placed during the hospital encounter of 02/19/24    XR Chest 1 View    Narrative  XR CHEST 1 VW    Date of Exam: 2/19/2024 10:11 PM EST    Indication: pain    Comparison: 2/5/2024    Findings:  Previous median sternotomy is again noted with a combination of sternal plates and wires. Heart is enlarged. Vasculature appears normal. Lungs are well expanded and appear clear except for a couple granulomatous calcifications. No effusion or  pneumothorax is seen.    Impression  Impression:  Cardiomegaly without evidence of congestive failure. No other evidence of active disease.      Electronically Signed: Jorge Cox MD  2/19/2024 10:24 PM EST  Workstation ID: [id number]      Results for orders placed during the hospital encounter of 02/05/24    XR Chest 2 View    Narrative  XR CHEST 2 VW    Date of Exam: 2/5/2024 5:14 PM EST    Indication: cough    Comparison: None " available.    Findings:  There is evidence of prior median sternotomy. There is no pneumothorax, pleural effusion or focal airspace consolidation. Heart size and pulmonary vasculature appear within normal limits. Regional bones appear intact.    Impression  Impression:  No acute cardiopulmonary abnormality.        Electronically Signed: Fritz Arroyo MD  2/5/2024 7:32 PM EST  Workstation ID: [id number]            ASSESSMENT / PLAN      Chest pain    CRF/CKD------Nonoliguric. Known CRF/CKD STG 3A secondary to DGS/HTN NS. Current serum   Creatinine is at baseline.  Will premedicate for cardiac cath tomorrow with IVFs, Mucomyst and holding Farxiga. Patient has been explained and understands the risks of IV dye exposure. Will need to continue IVFs for 8 hours post cath tomorrow. No NSAIDs. Dose meds for CrCl 45 cc/min    2. DMII WITH RENAL MANIFESTATIONS-----Hold Farxiga in prep for cath. Lantus, Glucometers, SSI    3. HTN WITH CKD-----BP up. Increase Hydralazine. No ACE-I/ARB/DRI/diuretic for now    4. HYPERLIPIDEMIA-----On Statin. Check CK    5. OA/DJD------No NSAIDs. Check uric acid    6. DM PERIPHERAL NEUROPATHY-----On Neurontin    7. GERD/DM GASTROPARESIS/PUD PROPHYLAXIS-------Reglan and Pepcid    8. DVT PROPHYLAXIS-----SCDs    9. PROTEINURIA-----Secondary to DGS    10. VITAMIN D DEFICIENCY-----On Supplementation      I discussed the patient's findings and my recommendations with patient and nursing staff    Will follow along closely. Thank you for allowing us to see this patient in renal consultation.    [facility]  [phone]  MD Tamiko Otoole MD  02/20/24  08:13 EST

## 2024-02-20 NOTE — CONSULTS
Referring Provider: Jesse Lance MD    Reason for Consultation: Chest pain, elevated troponin, abnormal ECG      Patient Care Team:  Ibrahima Correa MD as PCP - General (Family Medicine)      SUBJECTIVE     Chief Complaint: Chest pain    History of present illness:  Bibiana Inman is a 43 y.o. female with known coronary disease status post CABG, chronic kidney disease, hyperlipidemia, diabetes, anxiety/depression, GERD, neuropathy who presented to the hospital with complaints of chest pain.  She has had a chronic cough for over 4 weeks and has been taking inhalers at home.  She began having right-sided chest pain on Wednesday approximately 5 days ago.  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  She also reports pain in her upper back.  In the ER she was noted to have uncontrolled hypertension with systolic blood pressure of more than 200 and high-sensitivity troponin of 33 and 30.  Her glucose was found to be in 517 with A1c of 13.4.  She was started on nitroglycerin drip for uncontrolled hypertension cardiology has been consulted for further evaluation and management.  She is currently chest pain-free as her blood pressure is much better.  She remains bradycardic in the 50s.  She does not have a cardiologist but used to have one at [facility]. Dr Garcia.   CABG was performed in 2014 at [facility].      Review of systems:    Constitutional: No weakness, fatigue, fever, rigors, chills   Eyes: No vision changes, eye pain   ENT/oropharynx: No difficulty swallowing, sore throat, epistaxis, changes in hearing   Cardiovascular: + chest pain, chest tightness, palpitations, paroxysmal nocturnal dyspnea, orthopnea, diaphoresis, dizziness / syncopal episode   Respiratory: No shortness of breath, dyspnea on exertion, cough, wheezing, hemoptysis   Gastrointestinal: No abdominal pain, nausea, vomiting, diarrhea, bloody stools   Genitourinary: No hematuria, dysuria    Neurological: No headache, tremors, numbness, one-sided weakness    Musculoskeletal: No cramps, myalgias, joint pain, joint swelling   Integument: No rash, edema        Personal History:      Past Medical History:   Diagnosis Date    CAD (coronary artery disease)     CHF (congestive heart failure)     Diabetes mellitus     Gastroparesis     GERD (gastroesophageal reflux disease)     Hypertension     MI (myocardial infarction)     Sleep apnea        Past Surgical History:   Procedure Laterality Date     SECTION      CORONARY ARTERY BYPASS GRAFT      DILATATION AND CURETTAGE      TONSILLECTOMY         History reviewed. No pertinent family history.    Social History     Tobacco Use    Smoking status: Every Day     Packs/day: 0.25     Years: 15.00     Additional pack years: 0.00     Total pack years: 3.75     Types: Cigarettes    Smokeless tobacco: Never   Vaping Use    Vaping Use: Never used   Substance Use Topics    Alcohol use: Defer    Drug use: Never        Home meds:  Prior to Admission medications    Medication Sig Start Date End Date Taking? Authorizing Provider   albuterol sulfate  (90 Base) MCG/ACT inhaler Inhale 2 puffs every 6 (six) hours if needed for wheezing. 23  Yes    aspirin 81 MG chewable tablet Chew 1 tablet Daily.   Yes Provider, MD Joseline   atorvastatin (LIPITOR) 80 MG tablet Take 1 tablet by mouth Daily.   Yes Provider, MD Joseline   clopidogrel (PLAVIX) 75 MG tablet Take 1 tablet by mouth Daily. 23  Yes    clotrimazole-betamethasone (LOTRISONE) 1-0.05 % cream apply 1 application on the skin daily 23  Yes    dapagliflozin (Farxiga) 5 MG tablet tablet take 1 tablet by oral route  every day in the morning 23  Yes    escitalopram (LEXAPRO) 10 MG tablet Take 1 tablet (10 mg total) by mouth 1 (one) time each day. 23  Yes    famotidine (PEPCID) 40 MG tablet Take 1 tablet by mouth every night at bedtime. 23  Yes    ferrous sulfate 325 (65 FE) MG tablet  Take 1 tablet by mouth Daily With Breakfast.   Yes Joseline Quiroz MD   gabapentin (NEURONTIN) 100 MG capsule Take 1 capsule by mouth 3 (Three) Times a Day. 5/5/21  Yes Joseline Quiroz MD   hydrALAZINE (APRESOLINE) 25 MG tablet Take 1 tablet (25 mg total) by mouth 3 (three) times a day. 12/22/23  Yes    icosapent ethyl (Vascepa) 1 g capsule capsule Take 2 capsules by mouth 2 (two) times a day. 11/2/23  Yes    insulin aspart (novoLOG FLEXPEN) 100 UNIT/ML solution pen-injector sc pen Inject 15 Units under the skin into the appropriate area as directed 3 (Three) Times a Day With Meals.   Yes Joseline Quiroz MD   insulin detemir (Levemir FlexPen) 100 UNIT/ML injection Inject 30 Units under the skin into the appropriate area as directed 2 (Two) Times a Day. 2/9/24  Yes    losartan (COZAAR) 100 MG tablet Take 1 tablet by mouth Daily. 5/11/23  Yes    metoclopramide (REGLAN) 5 MG tablet Take 1 tablet by mouth before meals and at bedtime 6/30/22  Yes    metoprolol tartrate (LOPRESSOR) 100 MG tablet Take 1 tablet by mouth 2 (two) times a day. 8/25/23  Yes    ondansetron (ZOFRAN) 4 MG tablet Take 1 tablet by mouth three times a day as needed 1/9/24  Yes    pantoprazole (PROTONIX) 40 MG EC tablet Take 1 tablet by mouth Every Morning. 6/28/23  Yes    terbinafine (lamiSIL) 250 MG tablet Take 1 tablet by mouth Daily. 11/10/23  Yes    vitamin B-12 (CYANOCOBALAMIN) 1000 MCG tablet Take 1 tablet by mouth Daily.   Yes Joseline Quiroz MD   vitamin D (ERGOCALCIFEROL) 1.25 MG (12122 UT) capsule capsule Take 1 capsule (50,000 Units total) by mouth 1 (one) time per week. 7/21/21  Yes    Continuous Blood Gluc  (Dexcom G6 ) device Use as instructed 6/1/23      Continuous Blood Gluc Sensor (Dexcom G6 Sensor) Change sensor every 10 days 6/1/23      Continuous Blood Gluc Transmit (Dexcom G6 Transmitter) misc Use as instructed 6/1/23      Insulin Pen Needle (Unifine Pentips) 32G X 4 MM misc Use with  Insulin Daily 3/10/21      albuterol sulfate  (90 Base) MCG/ACT inhaler Inhale 2 puffs Every 4 (Four) Hours As Needed for Wheezing or Shortness of Air. 12/31/21 2/20/24  Mimi Martinez APRN   atorvastatin (LIPITOR) 80 MG tablet Take 0.5 tablets by mouth 2 (two) times a day. 11/14/22 2/20/24     atorvastatin (LIPITOR) 80 MG tablet Take 0.5 tablets by mouth 2 (two) times a day. 2/7/24 2/20/24     brompheniramine-pseudoephedrine-DM 30-2-10 MG/5ML syrup Take 10 mL by mouth 4 (Four) Times a Day As Needed for Congestion, Cough or Allergies. 12/31/21 2/20/24  Mimi Martinez APRN   Dulaglutide (Trulicity) 1.5 MG/0.5ML solution pen-injector Inject 1.5 mg under the skin 1 (one) time per week. 1/4/23 2/20/24     famotidine (PEPCID) 40 MG tablet Take 1 tablet by mouth at bedtime for 30 days 12/5/22 2/20/24     gabapentin (NEURONTIN) 100 MG capsule take 1 capsule by mouth three times a day 5/9/23 2/20/24     gabapentin (NEURONTIN) 100 MG capsule take 1 capsule by mouth three times a day 8/26/23 2/20/24     glyburide (DIAbeta) 5 MG tablet Take 1 tablet by mouth 2 (Two) Times a Day. 2/3/21 3/10/21     hydroCHLOROthiazide (HYDRODIURIL) 25 MG tablet Take 1 tablet by mouth Daily. 1/20/23 8/1/23     insulin aspart (NovoLOG FlexPen) 100 UNIT/ML solution pen-injector sc pen Inject 5 Units under the skin 3 (three) times a day before meals. 5/23/22 2/20/24     insulin aspart (NovoLOG FlexPen) 100 UNIT/ML solution pen-injector sc pen Inject 5 Units under the skin 3 (three) times a day before meals. 9/13/22 2/20/24     insulin aspart (NovoLOG FlexPen) 100 UNIT/ML solution pen-injector sc pen Inject 5 Units under the skin 3 (three) times a day before meals. 1/26/24 2/20/24     insulin detemir (Levemir FlexTouch) 100 UNIT/ML injection Inject 30 Units under the skin into the appropriate area as directed. 3/2/20 2/20/24  Ibrahima Correa MD   insulin detemir (Levemir FlexTouch) 100 UNIT/ML injection Inject 30 Units under the skin  every night. 10/27/21 2/20/24     insulin detemir (Levemir FlexTouch) 100 UNIT/ML injection Inject 30 Units under the skin every night. 10/27/21 2/20/24     Insulin Lispro, 1 Unit Dial, (HumaLOG KwikPen) 100 UNIT/ML solution pen-injector Inject as directed based on sliding scale.  2 units -249, 4 units 250-299, 6 units 300-349, 8 units 350-399, 10 units > bs 400 2/11/22 2/20/24  Ibrahima Correa MD   metFORMIN (GLUCOPHAGE) 1000 MG tablet Take 1 tablet by mouth 2 (Two) Times a Day With Meals. 5/9/23 7/7/23     metoclopramide (Reglan) 5 MG tablet Take 1 tablet by mouth four times a day for 30 days 12/5/22 2/20/24     metoclopramide (Reglan) 5 MG tablet Take 1 tablet by mouth 4 (Four) Times a Day. 6/28/23 2/20/24     omeprazole (priLOSEC) 20 MG capsule Take 1 capsule (20 mg total) by mouth 1 (one) time each day. Do not crush or chew. 3/10/21 7/20/21     ondansetron (ZOFRAN) 4 MG tablet Take 1 tablet (4 mg total) by mouth every 8 (eight) hours if needed for nausea or vomiting for up to 7 days. 5/5/21 2/20/24     ondansetron (ZOFRAN) 4 MG tablet Take 1 tablet by mouth four times a day as needed 5/21/21 2/20/24     pantoprazole (PROTONIX) 40 MG EC tablet Take 1 tablet by mouth every morning for 30 days 5/21/21 2/20/24     pantoprazole (PROTONIX) 40 MG EC tablet Take 1 tablet by mouth every morning 12/6/22 2/20/24     predniSONE (DELTASONE) 20 MG tablet Take 3 tabs (60mg) daily for 5 days, then take 2 tabs (40mg) daily for 2 days, then take 1 tab (20mg) daily for 2 days. 7/20/21 2/20/24     Tdap (ADACEL) 5-2-15.5 LF-MCG/0.5 injection Inject  into the appropriate muscle as directed by prescriber. 11/4/22 2/20/24  Avelino Marion MD       Allergies:     Latex    Scheduled Meds:atorvastatin, 80 mg, Oral, Daily  escitalopram, 10 mg, Oral, Daily  famotidine, 20 mg, Oral, Nightly  ferrous sulfate, 324 mg, Oral, Daily With Breakfast  gabapentin, 100 mg, Oral, TID  insulin glargine, 1-200 Units, Subcutaneous, Nightly  "- Glucommander  insulin lispro, 1-200 Units, Subcutaneous, 4x Daily With Meals & Nightly  metoclopramide, 5 mg, Oral, 4x Daily AC & at Bedtime  nicotine, 1 patch, Transdermal, Q24H  pantoprazole, 40 mg, Oral, Daily  senna-docusate sodium, 2 tablet, Oral, BID  sodium chloride, 10 mL, Intravenous, Q12H      Continuous Infusions:nitroglycerin, 10-50 mcg/min, Last Rate: 35 mcg/min (02/20/24 0334)      PRN Meds:  acetaminophen    albuterol    senna-docusate sodium **AND** polyethylene glycol **AND** bisacodyl **AND** bisacodyl    dextrose    dextrose    glucagon (human recombinant)    insulin lispro    nitroglycerin    ondansetron ODT **OR** ondansetron    [COMPLETED] Insert Peripheral IV **AND** sodium chloride    sodium chloride    sodium chloride      OBJECTIVE    Vital Signs  Vitals:    02/20/24 0454 02/20/24 0525 02/20/24 0555 02/20/24 0640   BP: 138/60 127/61 128/55 142/65   BP Location: Left arm   Right arm   Patient Position: Lying   Lying   Pulse: 60 60 58 (!) 49   Resp: 16      Temp: 98.1 °F (36.7 °C)      TempSrc: Oral      SpO2: 95%      Weight: 70.9 kg (156 lb 4.9 oz)      Height:           Flowsheet Rows      Flowsheet Row First Filed Value   Admission Height 162.6 cm (64\") Documented at 02/19/2024 2131   Admission Weight 71 kg (156 lb 8.4 oz) Documented at 02/19/2024 2131              Intake/Output Summary (Last 24 hours) at 2/20/2024 0705  Last data filed at 2/20/2024 0454  Gross per 24 hour   Intake 0 ml   Output --   Net 0 ml        Telemetry: Sinus rhythm with ST changes suggestive of left ventricular hypertrophy    Physical Exam:  The patient is alert, oriented and in no distress.  Vital signs as noted above.  Head and neck revealed no carotid bruits or jugular venous distention.  No thyromegaly or lymphadenopathy is present  Lungs clear.  No wheezing.  Breath sounds are normal bilaterally.  Heart: Normal first and second heart sounds. No murmur.  No precordial rub is present.  No gallop is " present.  Abdomen: Soft and nontender.  No organomegaly is present.  Extremities with good peripheral pulses without any pedal edema.  Skin: Warm and dry.  Musculoskeletal system is grossly normal.  CNS grossly normal.       Results Review:  I have personally reviewed the results from the time of this admission to 2/20/2024 07:05 EST and agree with these findings:  []  Laboratory  []  Microbiology  []  Radiology  []  EKG/Telemetry   []  Cardiology/Vascular   []  Pathology  []  Old records  []  Other:    Most notable findings include:     Lab Results (last 24 hours)       Procedure Component Value Units Date/Time    POC Glucose 4x Daily Before Meals & at Bedtime [[id number]]  (Abnormal) Collected: 02/20/24 0700    Specimen: Blood Updated: 02/20/24 0703     Glucose 281 mg/dL      Comment: Serial Number: [id number]:  [id number]       POC Glucose Once [[id number]]  (Abnormal) Collected: 02/20/24 0154    Specimen: Blood Updated: 02/20/24 0156     Glucose 323 mg/dL      Comment: Serial Number: [id number]:  [id number]       Extra Tubes [[id number]] Collected: 02/20/24 0001    Specimen: Blood, Venous Line Updated: 02/20/24 0115    Narrative:      The following orders were created for panel order Extra Tubes.  Procedure                               Abnormality         Status                     ---------                               -----------         ------                     Lavender Top[[id number]]                                     Final result               Light Blue Top[[id number]]                                   Final result                 Please view results for these tests on the individual orders.    Lavender Top [[id number]] Collected: 02/20/24 0001    Specimen: Blood Updated: 02/20/24 0115     Extra Tube hold for add-on     Comment: Auto resulted       Light Blue Top [[id number]] Collected: 02/20/24 0001    Specimen: Blood Updated: 02/20/24 0115     Extra Tube Hold for add-ons.     Comment: Auto  resulted       High Sensitivity Troponin T [[id number]]  (Abnormal) Collected: 02/20/24 0001    Specimen: Blood Updated: 02/20/24 0028     HS Troponin T 30 ng/L     Narrative:      High Sensitive Troponin T Reference Range:  <14.0 ng/L- Negative Female for AMI  <22.0 ng/L- Negative Male for AMI  >=14 - Abnormal Female indicating possible myocardial injury.  >=22 - Abnormal Male indicating possible myocardial injury.   Clinicians would have to utilize clinical acumen, EKG, Troponin, and serial changes to determine if it is an Acute Myocardial Infarction or myocardial injury due to an underlying chronic condition.         Lipid Panel [[id number]]  (Abnormal) Collected: 02/19/24 2148    Specimen: Blood Updated: 02/20/24 0005     Total Cholesterol 259 mg/dL      Triglycerides 1,588 mg/dL      HDL Cholesterol --     Comment: Triglyceride result >1200 mg/dL. HDL result may be affected.  Calculated results will not be reported.         LDL Cholesterol  --     Comment: Unable to calculate        VLDL Cholesterol --     Comment: Unable to calculate        LDL/HDL Ratio --     Comment: Unable to calculate       Narrative:      Cholesterol Reference Ranges  (U.S. Department of Health and Human Services ATP III Classifications)    Desirable          <200 mg/dL  Borderline High    200-239 mg/dL  High Risk          >240 mg/dL      Triglyceride Reference Ranges  (U.S. Department of Health and Human Services ATP III Classifications)    Normal           <150 mg/dL  Borderline High  150-199 mg/dL  High             200-499 mg/dL  Very High        >500 mg/dL    HDL Reference Ranges  (U.S. Department of Health and Human Services ATP III Classifications)    Low     <40 mg/dl (major risk factor for CHD)  High    >60 mg/dl ('negative' risk factor for CHD)        LDL Reference Ranges  (U.S. Department of Health and Human Services ATP III Classifications)    Optimal          <100 mg/dL  Near Optimal     100-129 mg/dL  Borderline High  130-159  mg/dL  High             160-189 mg/dL  Very High        >189 mg/dL    LDL Cholesterol, Direct [[id number]] Collected: 02/19/24 2148    Specimen: Blood Updated: 02/20/24 0005    Hemoglobin A1c [[id number]]  (Abnormal) Collected: 02/19/24 2148    Specimen: Blood Updated: 02/19/24 2354     Hemoglobin A1C 13.40 %     [place] Draw [[id number]] Collected: 02/19/24 2148    Specimen: Blood Updated: 02/19/24 2300    Narrative:      The following orders were created for panel order [place] Draw.  Procedure                               Abnormality         Status                     ---------                               -----------         ------                     Green Top (Gel)[[id number]]                                  Final result               Lavender Top[[id number]]                                     Final result               Gold Top - SST[[id number]]                                   Final result               Light Blue Top[[id number]]                                   Final result                 Please view results for these tests on the individual orders.    Green Top (Gel) [[id number]] Collected: 02/19/24 2148    Specimen: Blood Updated: 02/19/24 2300     Extra Tube Hold for add-ons.     Comment: Auto resulted.       Lavender Top [[id number]] Collected: 02/19/24 2148    Specimen: Blood Updated: 02/19/24 2300     Extra Tube hold for add-on     Comment: Auto resulted       Gold Top - SST [[id number]] Collected: 02/19/24 2148    Specimen: Blood Updated: 02/19/24 2300     Extra Tube Hold for add-ons.     Comment: Auto resulted.       Light Blue Top [[id number]] Collected: 02/19/24 2148    Specimen: Blood Updated: 02/19/24 2300     Extra Tube Hold for add-ons.     Comment: Auto resulted       Comprehensive Metabolic Panel [[id number]]  (Abnormal) Collected: 02/19/24 2148    Specimen: Blood Updated: 02/19/24 2224     Glucose 517 mg/dL      BUN 21 mg/dL      Creatinine 1.61 mg/dL      Sodium 130 mmol/L      Potassium 3.7  mmol/L      Chloride 92 mmol/L      CO2 25.0 mmol/L      Calcium 9.5 mg/dL      Total Protein 7.8 g/dL      Albumin 4.2 g/dL      ALT (SGPT) 14 U/L      AST (SGOT) 11 U/L      Alkaline Phosphatase 164 U/L      Total Bilirubin <0.2 mg/dL      Globulin 3.6 gm/dL      A/G Ratio 1.2 g/dL      BUN/Creatinine Ratio 13.0     Anion Gap 13.0 mmol/L      eGFR 40.1 mL/min/1.73     Narrative:      GFR Normal >60  Chronic Kidney Disease <60  Kidney Failure <15      Magnesium [[id number]]  (Normal) Collected: 02/19/24 2148    Specimen: Blood Updated: 02/19/24 2224     Magnesium 2.2 mg/dL     High Sensitivity Troponin T [[id number]]  (Abnormal) Collected: 02/19/24 2148    Specimen: Blood Updated: 02/19/24 2223     HS Troponin T 33 ng/L     Narrative:      High Sensitive Troponin T Reference Range:  <14.0 ng/L- Negative Female for AMI  <22.0 ng/L- Negative Male for AMI  >=14 - Abnormal Female indicating possible myocardial injury.  >=22 - Abnormal Male indicating possible myocardial injury.   Clinicians would have to utilize clinical acumen, EKG, Troponin, and serial changes to determine if it is an Acute Myocardial Infarction or myocardial injury due to an underlying chronic condition.         TSH [[id number]]  (Normal) Collected: 02/19/24 2148    Specimen: Blood Updated: 02/19/24 2223     TSH 3.500 uIU/mL     Lipase [[id number]]  (Abnormal) Collected: 02/19/24 2148    Specimen: Blood Updated: 02/19/24 2218     Lipase 87 U/L     Protime-INR [[id number]]  (Normal) Collected: 02/19/24 2148    Specimen: Blood Updated: 02/19/24 2208     Protime 10.4 Seconds      INR 0.95    aPTT [[id number]]  (Abnormal) Collected: 02/19/24 2148    Specimen: Blood Updated: 02/19/24 2208     PTT 27.1 seconds     CBC & Differential [[id number]]  (Abnormal) Collected: 02/19/24 2148    Specimen: Blood Updated: 02/19/24 2154    Narrative:      The following orders were created for panel order CBC & Differential.  Procedure                               Abnormality          Status                     ---------                               -----------         ------                     CBC Auto Differential[[id number]]        Abnormal            Final result                 Please view results for these tests on the individual orders.    CBC Auto Differential [[id number]]  (Abnormal) Collected: 02/19/24 2148    Specimen: Blood Updated: 02/19/24 2154     WBC 8.80 10*3/mm3      RBC 4.57 10*6/mm3      Hemoglobin 11.9 g/dL      Hematocrit 37.9 %      MCV 82.9 fL      MCH 26.1 pg      MCHC 31.5 g/dL      RDW 20.9 %      RDW-SD 60.4 fl      MPV 9.7 fL      Platelets 233 10*3/mm3      Neutrophil % 66.2 %      Lymphocyte % 23.9 %      Monocyte % 7.0 %      Eosinophil % 1.7 %      Basophil % 1.2 %      Neutrophils, Absolute 5.80 10*3/mm3      Lymphocytes, Absolute 2.10 10*3/mm3      Monocytes, Absolute 0.60 10*3/mm3      Eosinophils, Absolute 0.20 10*3/mm3      Basophils, Absolute 0.10 10*3/mm3      nRBC 0.0 /100 WBC             Imaging Results (Last 24 Hours)       Procedure Component Value Units Date/Time    XR Chest 1 View [[id number]] Collected: 02/19/24 2222     Updated: 02/19/24 2226    Narrative:      XR CHEST 1 VW    Date of Exam: 2/19/2024 10:11 PM EST    Indication: pain    Comparison: 2/5/2024    Findings:  Previous median sternotomy is again noted with a combination of sternal plates and wires. Heart is enlarged. Vasculature appears normal. Lungs are well expanded and appear clear except for a couple granulomatous calcifications. No effusion or   pneumothorax is seen.      Impression:      Impression:  Cardiomegaly without evidence of congestive failure. No other evidence of active disease.      Electronically Signed: Jorge Cox MD    2/19/2024 10:24 PM EST    Workstation ID: [id number]            LAB RESULTS (LAST 7 DAYS)    CBC  Results from last 7 days   Lab Units 02/19/24 2148   WBC 10*3/mm3 8.80   RBC 10*6/mm3 4.57   HEMOGLOBIN g/dL 11.9*   HEMATOCRIT % 37.9   MCV fL 82.9    PLATELETS 10*3/mm3 233       BMP  Results from last 7 days   Lab Units 02/19/24  2148   SODIUM mmol/L 130*   POTASSIUM mmol/L 3.7   CHLORIDE mmol/L 92*   CO2 mmol/L 25.0   BUN mg/dL 21*   CREATININE mg/dL 1.61*   GLUCOSE mg/dL 517*   MAGNESIUM mg/dL 2.2       CMP   Results from last 7 days   Lab Units 02/19/24  2148   SODIUM mmol/L 130*   POTASSIUM mmol/L 3.7   CHLORIDE mmol/L 92*   CO2 mmol/L 25.0   BUN mg/dL 21*   CREATININE mg/dL 1.61*   GLUCOSE mg/dL 517*   ALBUMIN g/dL 4.2   BILIRUBIN mg/dL <0.2   ALK PHOS U/L 164*   AST (SGOT) U/L 11   ALT (SGPT) U/L 14   LIPASE U/L 87*       BNP        TROPONIN  Results from last 7 days   Lab Units 02/20/24  0001   HSTROP T ng/L 30*       CoAg  Results from last 7 days   Lab Units 02/19/24  2148   INR  0.95   APTT seconds 27.1*       Creatinine Clearance  Estimated Creatinine Clearance: 42.6 mL/min (A) (by C-G formula based on SCr of 1.61 mg/dL (H)).    ABG          Radiology  XR Chest 1 View    Result Date: 2/19/2024  Impression: Cardiomegaly without evidence of congestive failure. No other evidence of active disease. Electronically Signed: Jorge Cox MD  2/19/2024 10:24 PM EST  Workstation ID: [id number]       EKG  I personally viewed and interpreted the patient's EKG/Telemetry data:  ECG 12 Lead Chest Pain   Preliminary Result   HEART RATE= 57  bpm   RR Interval= 1044  ms   MS Interval= 160  ms   P Horizontal Axis= -10  deg   P Front Axis= 56  deg   QRSD Interval= 100  ms   QT Interval= 446  ms   QTcB= 437  ms   QRS Axis= 57  deg   T Wave Axis= 91  deg   - ABNORMAL ECG -   Sinus bradycardia   Probable left atrial enlargement   LVH with secondary repolarization abnormality   Anterior infarct, acute (LAD)   Electronically Signed By:    Date and Time of Study: 2024-02-20 06:33:12      ECG 12 Lead Chest Pain   Preliminary Result   HEART RATE= 54  bpm   RR Interval= 1104  ms   MS Interval= 142  ms   P Horizontal Axis= 4  deg   P Front Axis= 32  deg   QRSD Interval= 98  ms   QT  Interval= 458  ms   QTcB= 436  ms   QRS Axis= 21  deg   T Wave Axis= 119  deg   - ABNORMAL ECG -   Sinus bradycardia   Probable left atrial enlargement   LVH with secondary repolarization abnormality   Anterior ST elevation, probably due to LVH   Electronically Signed By:    Date and Time of Study: 2024-02-19 22:56:04      ECG 12 Lead Chest Pain   Preliminary Result   HEART RATE= 56  bpm   RR Interval= 1064  ms   MT Interval= 148  ms   P Horizontal Axis= -15  deg   P Front Axis= 43  deg   QRSD Interval= 106  ms   QT Interval= 444  ms   QTcB= 430  ms   QRS Axis= 28  deg   T Wave Axis= 107  deg   - ABNORMAL ECG -   Sinus bradycardia   LVH with secondary repolarization abnormality   Anterior infarct, acute (LAD)   Electronically Signed By:    Date and Time of Study: 2024-02-19 21:38:37            Echocardiogram:          Stress Test:        Cardiac Catheterization:  No results found for this or any previous visit.        Other:      ASSESSMENT & PLAN:    Principal Problem:    Chest pain    Chest pain, elevated troponin  Known coronary disease with history of CABG  Minimally elevated high-sensitivity troponin which is trending down  Could be secondary to underlying stable coronary disease, chronic kidney disease, uncontrolled hypertension.  Obtain an echocardiogram.  She will need ischemic evaluation once blood pressure is better controlled.  Will most likely need a nuclear stress test tomorrow depending on findings of echocardiogram.  Start aspirin  I do not think this is acute coronary syndrome.  No need for heparin  Unable to tolerate beta-blocker due to bradycardia   Start high intensity statin      Hypertensive emergency  She has been started on high-dose nitroglycerin drip  Start hydralazine for better blood pressure control  Unable to give ACE or ARB because of chronic kidney disease  Unable to start beta-blocker due to bradycardia.  Will consider adding nifedipine if blood pressure is not adequately controlled  with hydralazine alone.    Diabetes  Uncontrolled diabetes with A1c of more than 13  Treatment with insulin per Glucomander    Chronic kidney disease  Creatinine 1.6, GFR is 40.1  Appears to be at baseline renal function    Hyperlipidemia  She has uncontrolled hypertriglyceridemia with triglycerides of 1588.  Start high intensity statin  High risk for pancreatitis: Lipase is 87  Resume Fani Su MD  02/20/24  07:05 EST

## 2024-02-20 NOTE — CASE MANAGEMENT/SOCIAL WORK
Discharge Planning Assessment   [facility]     Patient Name: Bibiana Inman  MRN: [id number]  Today's Date: 2/20/2024    Admit Date: 2/19/2024    Plan: Home with family   Discharge Needs Assessment       Row Name 02/20/24 1714       Living Environment    Current Living Arrangements home    Potentially Unsafe Housing Conditions none    In the past 12 months has the electric, gas, oil, or water company threatened to shut off services in your home? No    Primary Care Provided by self    Provides Primary Care For no one    Quality of Family Relationships helpful;involved;supportive    Able to Return to Prior Arrangements yes       Resource/Environmental Concerns    Resource/Environmental Concerns none    Transportation Concerns none       Transportation Needs    In the past 12 months, has lack of transportation kept you from medical appointments or from getting medications? no    In the past 12 months, has lack of transportation kept you from meetings, work, or from getting things needed for daily living? No       Food Insecurity    Within the past 12 months, you worried that your food would run out before you got the money to buy more. Never true    Within the past 12 months, the food you bought just didn't last and you didn't have money to get more. Never true       Transition Planning    Patient/Family Anticipates Transition to home with family    Patient/Family Anticipated Services at Transition none    Transportation Anticipated family or friend will provide       Discharge Needs Assessment    Readmission Within the Last 30 Days no previous admission in last 30 days    Equipment Currently Used at Home glucometer;bp cuff    Anticipated Changes Related to Illness none    Equipment Needed After Discharge none                   Discharge Plan       Row Name 02/20/24 5021       Plan    Plan Home with family    Patient/Family in Agreement with Plan yes    Plan Comments Met with patient at bedside.  Lives at home with family.   IADL.  Denies discharge needs.  Verified PCP and Pharmacy.  Barriers to discharge: Echo/ stress test today.  Heart cath 2/21/24  Bradycardia, watching blood glucose.                  Continued Care and Services - Admitted Since 2/19/2024    Coordination has not been started for this encounter.       Selected Continued Care - Episodes Includes continued care and service providers with selected services from the active episodes listed below      Rising Risk Care Management Episode start date: 2/20/2024   There are no active outsourced providers for this episode.                 Expected Discharge Date and Time       Expected Discharge Date Expected Discharge Time    Feb 21, 2024            Demographic Summary       Row Name 02/20/24 1713       General Information    Admission Type observation    Arrived From emergency department    Referral Source admission list    Reason for Consult discharge planning    Preferred Language English                   Functional Status       Row Name 02/20/24 1713       Functional Status    Usual Activity Tolerance excellent    Current Activity Tolerance excellent       Functional Status, IADL    Medications independent    Meal Preparation independent    Housekeeping independent    Laundry independent    Shopping independent       Mental Status    General Appearance WDL WDL       Mental Status Summary    Recent Changes in Mental Status/Cognitive Functioning no changes             Pamela Bronson RN     Office Phone [phone]  Office Cell [phone]

## 2024-02-20 NOTE — PROGRESS NOTES
[facility] Medicine Services     Hospitalist History and Physical      Bibiana Inman : 1980 MRN:[id number] LOS:0 ROOM: 10/10      Reason for admission: Chest pain       Patient complaining of headache  No chest pain    Seen per cardiology  Patient with no known history of coronary artery disease status post CABG and history of hypertension hyperlipidemia  Patient had chronic cough and is on inhalers at home  Patient had right-sided chest pain Wednesday week  Her troponin was 44 and 39 and 38  In the ER she was noted to have uncontrolled hypertension  Her A1c was 13  Patient was started on nitro drip for blood pressure control  She is currently pain-free with heart rate in the 50s  Patient had her CABG in  at [facility]  Endocrine will be evaluating the patient for uncontrolled diabetes  Echocardiogram is ordered  She will need ischemic cardiac workup as per cardiology once her blood pressure is stable and heart rate is controlled  She is going to need stent stress test nuclear tomorrow depending on the finding of the echo  Patient was started on aspirin  Unable to tolerate beta-blocker due to bradycardia  Start high intensity statin  Patient was started on hydralazine for blood pressure control  Unable to give ACE and ARB's due to chronic kidney disease  Creatinine was 1.6 yesterday we will get repeat creatinine today  Assessment / Plan      Chest pain, rule out ACS  Hx CAD s/p triple bypass   - hold plavix  - ASA given   - troponin 33  - pt reports troponin Thursday 44 and 39 and 38 on Friday  - EKG reviewed by cardiology and ED provider. Initially concern for ST elevation but later received previous E[facility] from [facility] and appears similar. Possible LVH.  - plan for stress test in AM   - chest X-ray:Cardiomegaly without evidence of congestive failure. No other evidence of active disease   - check echo   - check lipid panel   - on nitro drip for uncontrolled HTN and chest pain resolved  -  tylenol for headache  - cardiology consult      DM II with neuropathy and gastroparesis  - continue reglan  - glucomander  -check A1C        Hypertensive emergency   - currently on Nitro drip  -on metoprolol, losartan at home     CKD  - appears around baseline from previous  - will consult Dr. Mcduffie as pt may require contrast for cardiac testing     Anxiety and depression  - lexapro     NIGHAT  - ferrous sulfate     HLD  - on statin and vacepa     Onychomycosis  - taking laisil      Tobacco use  -<1/2ppd  - nicotine patch  - recommend cessation     Nutrition:   NPO Diet NPO Type: Sips with Meds, Ice Chips      DVT prophylaxis:  Mechanical DVT prophylaxis orders are present.           History of Present illness      Bibiana Inman is a 43 y.o. female with PMH of coronary disease status post triple bypass, chronic kidney disease, hyperlipidemia, diabetes, anxiety depression, GERD, neuropathy and gastroparesis, hypertension presented to the hospital on 2/19/24, and was admitted with a principal diagnosis of Chest pain.      Patient reports has had a chronic cough for over a month and has been given inhaler of Trelegy and albuterol with no resolve of cough.  She began having right-sided chest pain on Wednesday approximately 5 days ago.  She reports pain is under the right breast and radiates up to the chest.  Today had pain in her upper back which she attributed to possibly work-related as she is a  and had stooped over frequently for lab draws.  Reports pain is present at rest but worse with activity.  Denies any diaphoresis chills or increased shortness of breath.  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  She came to the ED for evaluation     In the ED troponin 33 sodium 130 chloride 92 BUN 21 creatinine 1.6.  Patient with chronic kidney disease followed by Dr. Jenkins.   Glucose 517.  A1c 13.4.  Negative for leukocytosis.  Hemoglobin 11.9 chest x-ray for cardiomegaly but no other active disease.  EKG reported by ED provider was reviewed with cardiology and appears similar to previous EKG from 2018.  Patient has been placed on nitroglycerin drip for uncontrolled hypertension.  Chest pain is now resolved.  She has been admitted for further observation     Patient was seen and examined on 02/19/24 at 23:58 EST .     Subjective / Review of systems      Review of Systems   Respiratory:  Positive for cough.    Cardiovascular:  Positive for chest pain.   Musculoskeletal:  Positive for back pain.   All other systems reviewed and are negative.           Past Medical/Surgical/Social/Family History & Allergies      Medical History   No past medical history on file.      Surgical History   No past surgical history on file.      Social History   Social History           Socioeconomic History    Marital status:          No family history on file.         Allergies   Allergen Reactions    Insulin Lispro Other (See Comments), Itching, Swelling and Unknown (See Comments)       SWELLING AT INJECTION SITE  SWELLING AT INJECTION SITE  SWELLING AT INJECTION SITE     Other reaction(s): knot/swelling where injected       Latex Itching      Social Determinants of Health           Tobacco Use: Not on file   Alcohol Use: Not on file   Financial Resource Strain: Not on file   Food Insecurity: Not on file   Transportation Needs: Not on file   Physical Activity: Not on file   Stress: Not on file   Social Connections: Unknown (10/12/2023)     Family and Community Support      Help with Day-to-Day Activities: Not on file      Lonely or Isolated: Not on file   Interpersonal Safety: Not At Risk (2/19/2024)     Abuse Screen      Unsafe at Home or Work/School: no      Feels Threatened by Someone?: no      Does Anyone Keep You from Contacting Others or Doint Things Outside the Home?: no      Physical Sign of  Abuse Present: no   Depression: Not on file   Housing Stability: Unknown (10/12/2023)     Housing Stability      Current Living Arrangements: Not on file      Potentially Unsafe Housing Conditions: Not on file   Utilities: Not on file   Health Literacy: Unknown (10/12/2023)     Education      Help with school or training?: Not on file      Preferred Language: Not on file   Employment: Unknown (10/12/2023)     Employment      Do you want help finding or keeping work or a job?: Not on file   Disabilities: Unknown (10/12/2023)     Disabilities      Concentrating, Remembering, or Making Decisions Difficulty: Not on file      Doing Errands Independently Difficulty: Not on file         Home Medications              Prior to Admission medications    Medication Sig Start Date End Date Taking? Authorizing Provider   albuterol sulfate  (90 Base) MCG/ACT inhaler Inhale 2 puffs Every 4 (Four) Hours As Needed for Wheezing or Shortness of Air. 12/31/21     Mimi Martinez APRN   albuterol sulfate  (90 Base) MCG/ACT inhaler Inhale 2 puffs every 6 (six) hours if needed for wheezing. 5/9/23         aspirin 81 MG chewable tablet Chew.       Provider, MD Joseline   atorvastatin (LIPITOR) 80 MG tablet Take 0.5 tablets by mouth 2 (two) times a day. 11/14/22         atorvastatin (LIPITOR) 80 MG tablet Take 0.5 tablets by mouth 2 (two) times a day. 2/7/24         brompheniramine-pseudoephedrine-DM 30-2-10 MG/5ML syrup Take 10 mL by mouth 4 (Four) Times a Day As Needed for Congestion, Cough or Allergies. 12/31/21     Mimi Martinez APRN   clopidogrel (PLAVIX) 75 MG tablet Take 1 tablet by mouth Daily. 11/22/23         clotrimazole-betamethasone (LOTRISONE) 1-0.05 % cream apply 1 application on the skin daily 5/9/23         Continuous Blood Gluc  (Dexcom G6 ) device Use as instructed 6/1/23         Continuous Blood Gluc Sensor (Dexcom G6 Sensor) Change sensor every 10 days 6/1/23         Continuous Blood Gluc  Transmit (Dexcom G6 Transmitter) misc Use as instructed 6/1/23         dapagliflozin (Farxiga) 5 MG tablet tablet take 1 tablet by oral route  every day in the morning 7/25/23         Dulaglutide (Trulicity) 1.5 MG/0.5ML solution pen-injector Inject 1.5 mg under the skin 1 (one) time per week. 1/4/23         escitalopram (LEXAPRO) 10 MG tablet Take 1 tablet (10 mg total) by mouth 1 (one) time each day. 8/1/23         famotidine (PEPCID) 40 MG tablet Take 1 tablet by mouth at bedtime for 30 days 12/5/22         famotidine (PEPCID) 40 MG tablet Take 1 tablet by mouth every night at bedtime. 6/28/23         gabapentin (NEURONTIN) 100 MG capsule   5/5/21     Joseline Quiroz MD   gabapentin (NEURONTIN) 100 MG capsule take 1 capsule by mouth three times a day 5/9/23         gabapentin (NEURONTIN) 100 MG capsule take 1 capsule by mouth three times a day 8/26/23         hydrALAZINE (APRESOLINE) 25 MG tablet Take 1 tablet (25 mg total) by mouth 3 (three) times a day. 12/22/23         icosapent ethyl (Vascepa) 1 g capsule capsule Take 2 capsules by mouth 2 (two) times a day. 11/2/23         insulin aspart (NovoLOG FlexPen) 100 UNIT/ML solution pen-injector sc pen Inject 5 Units under the skin 3 (three) times a day before meals. 5/23/22         insulin aspart (NovoLOG FlexPen) 100 UNIT/ML solution pen-injector sc pen Inject 5 Units under the skin 3 (three) times a day before meals. 9/13/22         insulin aspart (NovoLOG FlexPen) 100 UNIT/ML solution pen-injector sc pen Inject 5 Units under the skin 3 (three) times a day before meals. 1/26/24         insulin detemir (Levemir FlexPen) 100 UNIT/ML injection Inject 30 Units under the skin into the appropriate area as directed 2 (Two) Times a Day. 2/9/24         insulin detemir (Levemir FlexTouch) 100 UNIT/ML injection Inject 30 Units under the skin into the appropriate area as directed. 3/2/20     Ibrahima Correa MD   insulin detemir (Levemir FlexTouch) 100 UNIT/ML  injection Inject 30 Units under the skin every night. 10/27/21         insulin detemir (Levemir FlexTouch) 100 UNIT/ML injection Inject 30 Units under the skin every night. 10/27/21         Insulin Lispro, 1 Unit Dial, (HumaLOG KwikPen) 100 UNIT/ML solution pen-injector Inject as directed based on sliding scale.  2 units -249, 4 units 250-299, 6 units 300-349, 8 units 350-399, 10 units > bs 400 2/11/22     Ibrahima Correa MD   Insulin Pen Needle (Unifine Pentips) 32G X 4 MM misc Use with Insulin Daily 3/10/21         losartan (COZAAR) 100 MG tablet Take 1 tablet by mouth Daily. 5/11/23         metoclopramide (REGLAN) 5 MG tablet Take 1 tablet by mouth before meals and at bedtime 6/30/22         metoclopramide (Reglan) 5 MG tablet Take 1 tablet by mouth four times a day for 30 days 12/5/22         metoclopramide (Reglan) 5 MG tablet Take 1 tablet by mouth 4 (Four) Times a Day. 6/28/23         metoprolol tartrate (LOPRESSOR) 100 MG tablet Take 1 tablet by mouth 2 (two) times a day. 8/25/23         ondansetron (ZOFRAN) 4 MG tablet Take 1 tablet (4 mg total) by mouth every 8 (eight) hours if needed for nausea or vomiting for up to 7 days. 5/5/21         ondansetron (ZOFRAN) 4 MG tablet Take 1 tablet by mouth four times a day as needed 5/21/21         ondansetron (ZOFRAN) 4 MG tablet Take 1 tablet by mouth three times a day as needed 1/9/24         pantoprazole (PROTONIX) 40 MG EC tablet Take 1 tablet by mouth every morning for 30 days 5/21/21         pantoprazole (PROTONIX) 40 MG EC tablet Take 1 tablet by mouth every morning 12/6/22         pantoprazole (PROTONIX) 40 MG EC tablet Take 1 tablet by mouth Every Morning. 6/28/23         predniSONE (DELTASONE) 20 MG tablet Take 3 tabs (60mg) daily for 5 days, then take 2 tabs (40mg) daily for 2 days, then take 1 tab (20mg) daily for 2 days. 7/20/21         Tdap (ADACEL) 5-2-15.5 LF-MCG/0.5 injection Inject  into the appropriate muscle as directed by prescriber.  11/4/22     Avelino Marion MD   terbinafine (lamiSIL) 250 MG tablet Take 1 tablet by mouth Daily. 11/10/23         vitamin B-12 (CYANOCOBALAMIN) 1000 MCG tablet Take 1,000 mcg by mouth Daily.       Provider, MD Joseline   vitamin D (ERGOCALCIFEROL) 1.25 MG (83119 UT) capsule capsule Take 1 capsule (50,000 Units total) by mouth 1 (one) time per week. 7/21/21         glyburide (DIAbeta) 5 MG tablet Take 1 tablet by mouth 2 (Two) Times a Day. 2/3/21 3/10/21       hydroCHLOROthiazide (HYDRODIURIL) 25 MG tablet Take 1 tablet by mouth Daily. 1/20/23 8/1/23       metFORMIN (GLUCOPHAGE) 1000 MG tablet Take 1 tablet by mouth 2 (Two) Times a Day With Meals. 5/9/23 7/7/23       omeprazole (priLOSEC) 20 MG capsule Take 1 capsule (20 mg total) by mouth 1 (one) time each day. Do not crush or chew. 3/10/21 7/20/21             Objective / Physical Exam      Vital signs:  Temp: 98.9 °F (37.2 °C)  BP: 163/75  Heart Rate: 51  Resp: 16  SpO2: 96 %  Weight: 71 kg (156 lb 8.4 oz)     Admission Weight: Weight: 71 kg (156 lb 8.4 oz)     Physical Exam  Constitutional:       Appearance: Normal appearance.   Eyes:      Pupils: Pupils are equal, round, and reactive to light.   Cardiovascular:      Rate and Rhythm: Normal rate and regular rhythm.   Pulmonary:      Effort: Pulmonary effort is normal.      Breath sounds: Normal breath sounds.   Abdominal:      Palpations: Abdomen is soft.   Musculoskeletal:         General: Normal range of motion.   Skin:     General: Skin is dry.   Neurological:      Mental Status: She is alert and oriented to person, place, and time.   Psychiatric:         Mood and Affect: Mood normal.         Behavior: Behavior normal.               Labs           Results from last 7 days   Lab Units 02/19/24  2148   WBC 10*3/mm3 8.80   HEMOGLOBIN g/dL 11.9*   HEMATOCRIT % 37.9   PLATELETS 10*3/mm3 233           Results from last 7 days   Lab Units 02/19/24  2148   ALK PHOS U/L 164*   AST (SGOT) U/L 11   ALT (SGPT) U/L  14           Results from last 7 days   Lab Units 02/19/24  2148   PROTIME Seconds 10.4   INR   0.95   APTT seconds 27.1*           Results from last 7 days   Lab Units 02/19/24  2148   SODIUM mmol/L 130*   POTASSIUM mmol/L 3.7   CHLORIDE mmol/L 92*   CO2 mmol/L 25.0   BUN mg/dL 21*   CREATININE mg/dL 1.61*   GLUCOSE mg/dL 517*         Imaging      XR Chest 1 View     Result Date: 2/19/2024  XR CHEST 1 VW Date of Exam: 2/19/2024 10:11 PM EST Indication: pain Comparison: 2/5/2024 Findings: Previous median sternotomy is again noted with a combination of sternal plates and wires. Heart is enlarged. Vasculature appears normal. Lungs are well expanded and appear clear except for a couple granulomatous calcifications. No effusion or pneumothorax is seen.      Impression: Cardiomegaly without evidence of congestive failure. No other evidence of active disease. Electronically Signed: Jorge Cox MD  2/19/2024 10:24 PM EST  Workstation ID: [id number]             Current Medications      Scheduled Meds:  acetaminophen, 1,000 mg, Oral, Once  aspirin, 325 mg, Oral, Once  [START ON 2/20/2024] insulin lispro, 1-200 Units, Subcutaneous, 4x Daily With Meals & Nightly  insulin regular, 4 Units, Subcutaneous, Once  [START ON 2/20/2024] nicotine, 1 patch, Transdermal, Q24H  senna-docusate sodium, 2 tablet, Oral, BID  sodium chloride, 10 mL, Intravenous, Q12H           Continuous Infusions:  nitroglycerin, 10-50 mcg/min, Last Rate: 30 mcg/min (02/19/24 5860)

## 2024-02-20 NOTE — ED NOTES
Estuardo Laura at [facility] ([phone] option 1) she's faxing over an EKG from 2018 on pt.

## 2024-02-20 NOTE — PROGRESS NOTES
42 yo with multiple cardiac risk factors including uncontrolled HTN presented with chest pain and elevated troponin. ECG shows ischemic changes. Plan for cardiac catheterization and BP control.   Full note to follow.

## 2024-02-20 NOTE — ED PROVIDER NOTES
Subjective   History of Present Illness  43-year-old female describes some intermittent chest pain described as sharp in character and of variable duration and intensity over the last 5 days.  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  She reports no fevers or chills or radiating pain or diaphoresis or palpitation.  She reports no relieving or exacerbating factors.  Review of Systems    No past medical history on file.  Coronary artery disease, coronary bypass surgery  Allergies   Allergen Reactions    Insulin Lispro Other (See Comments), Itching, Swelling and Unknown (See Comments)     SWELLING AT INJECTION SITE  SWELLING AT INJECTION SITE  SWELLING AT INJECTION SITE    Other reaction(s): knot/swelling where injected      Latex Itching       No past surgical history on file.    No family history on file.  Family history significant for heart disease  Social History     Socioeconomic History    Marital status:    Chronic smoker          Objective   Physical Exam  General: Well-developed well-appearing, no acute distress, alert and appropriate  Eyes:  sclera nonicteric  HEENT: Mucous membranes moist, no mucosal swelling  Neck: Supple, no nuchal rigidity, no JVD  Respirations: Respirations nonlabored, equal breath sounds bilaterally, clear lungs  Heart regular rate and rhythm, no murmurs rubs or gallops,   Abdomen soft nontender nondistended, no hepatosplenomegaly, no hernia, no mass, normal bowel sounds, no CVA tenderness  Extremities no clubbing cyanosis or edema, calves are symmetric and nontender  Neuro cranial nerves grossly intact, no focal limb deficits  Psych oriented, pleasant affect  Skin no rash, brisk cap refill  Procedures           ED Course  ED Course as of 02/19/24 2316   Mon Feb 19, 2024 2151 Patient EKG was transmitted to interventional cardiology on-call   Georges for review.  Initial thought by cardiology is that this is exhibiting more changes of LVH as opposed to acute ischemia.  We have no previous available for comparison at this time but are seeking records from [facility] where she has been seen in the past. [SH]      ED Course User Index  [SH] Marcellus Reynolds MD      Results for orders placed or performed during the hospital encounter of 02/19/24   Comprehensive Metabolic Panel    Specimen: Blood   Result Value Ref Range    Glucose 517 (C) 65 - 99 mg/dL    BUN 21 (H) 6 - 20 mg/dL    Creatinine 1.61 (H) 0.57 - 1.00 mg/dL    Sodium 130 (L) 136 - 145 mmol/L    Potassium 3.7 3.5 - 5.2 mmol/L    Chloride 92 (L) 98 - 107 mmol/L    CO2 25.0 22.0 - 29.0 mmol/L    Calcium 9.5 8.6 - 10.5 mg/dL    Total Protein 7.8 6.0 - 8.5 g/dL    Albumin 4.2 3.5 - 5.2 g/dL    ALT (SGPT) 14 1 - 33 U/L    AST (SGOT) 11 1 - 32 U/L    Alkaline Phosphatase 164 (H) 39 - 117 U/L    Total Bilirubin <0.2 0.0 - 1.2 mg/dL    Globulin 3.6 gm/dL    A/G Ratio 1.2 g/dL    BUN/Creatinine Ratio 13.0 7.0 - 25.0    Anion Gap 13.0 5.0 - 15.0 mmol/L    eGFR 40.1 (L) >60.0 mL/min/1.73   Protime-INR    Specimen: Blood   Result Value Ref Range    Protime 10.4 9.6 - 11.7 Seconds    INR 0.95 0.93 - 1.10   aPTT    Specimen: Blood   Result Value Ref Range    PTT 27.1 (L) 61.0 - 76.5 seconds   Lipase    Specimen: Blood   Result Value Ref Range    Lipase 87 (H) 13 - 60 U/L   High Sensitivity Troponin T    Specimen: Blood   Result Value Ref Range    HS Troponin T 33 (H) <14 ng/L   TSH    Specimen: Blood   Result Value Ref Range    TSH 3.500 0.270 - 4.200 uIU/mL   Magnesium    Specimen: Blood   Result Value Ref Range    Magnesium 2.2 1.6 - 2.6 mg/dL   CBC Auto Differential    Specimen: Blood   Result Value Ref Range    WBC 8.80 3.40 - 10.80 10*3/mm3    RBC 4.57 3.77 - 5.28 10*6/mm3    Hemoglobin 11.9 (L) 12.0 - 15.9 g/dL    Hematocrit 37.9 34.0 - 46.6 %    MCV 82.9 79.0 - 97.0 fL    MCH 26.1 (L) 26.6 - 33.0 pg     MCHC 31.5 31.5 - 35.7 g/dL    RDW 20.9 (H) 12.3 - 15.4 %    RDW-SD 60.4 (H) 37.0 - 54.0 fl    MPV 9.7 6.0 - 12.0 fL    Platelets 233 140 - 450 10*3/mm3    Neutrophil % 66.2 42.7 - 76.0 %    Lymphocyte % 23.9 19.6 - 45.3 %    Monocyte % 7.0 5.0 - 12.0 %    Eosinophil % 1.7 0.3 - 6.2 %    Basophil % 1.2 0.0 - 1.5 %    Neutrophils, Absolute 5.80 1.70 - 7.00 10*3/mm3    Lymphocytes, Absolute 2.10 0.70 - 3.10 10*3/mm3    Monocytes, Absolute 0.60 0.10 - 0.90 10*3/mm3    Eosinophils, Absolute 0.20 0.00 - 0.40 10*3/mm3    Basophils, Absolute 0.10 0.00 - 0.20 10*3/mm3    nRBC 0.0 0.0 - 0.2 /100 WBC   ECG 12 Lead Chest Pain   Result Value Ref Range    QT Interval 444 ms    QTC Interval 430 ms   ECG 12 Lead Chest Pain   Result Value Ref Range    QT Interval 458 ms    QTC Interval 436 ms   Green Top (Gel)   Result Value Ref Range    Extra Tube Hold for add-ons.    Lavender Top   Result Value Ref Range    Extra Tube hold for add-on    Gold Top - SST   Result Value Ref Range    Extra Tube Hold for add-ons.    Light Blue Top   Result Value Ref Range    Extra Tube Hold for add-ons.      XR Chest 1 View    Result Date: 2/19/2024  Impression: Cardiomegaly without evidence of congestive failure. No other evidence of active disease. Electronically Signed: Jorge Cox MD  2/19/2024 10:24 PM EST  Workstation ID: [id number]             HEART Score: 6                              Medical Decision Making  Presents with chest pain differential diagnosis including acute coronary syndrome, pulmonary embolus, pneumonia, pneumothorax, dissection    Has no signs of DVT, pulm embolus felt to be unlikely.  She having no back pain or dissection.  She was ordered IV nitroglycerin for severe hypertension upon arrival.  Blood pressure was improved on reexamination and chest pain was resolving.  Repeat EKG is not significantly changed.  She was ordered subcu insulin hyperglycemia.  She was ordered aspirin.  Patient was advised of findings and agreeable  plan of admission with cardiology consultation.    Critical care time 40 minutes    Problems Addressed:  Chest pain, unspecified type: complicated acute illness or injury  Hyperglycemia due to diabetes mellitus: complicated acute illness or injury  Hypertensive emergency: complicated acute illness or injury    Amount and/or Complexity of Data Reviewed  Labs: ordered. Decision-making details documented in ED Course.     Details: High-sensitivity troponin in the indeterminate range, CBC shows no leukocytosis, magnesium normal, comprehensive metabolic panel hyperglycemia without significant acidosis, renal insufficiency.  Lipase marginally elevated of uncertain significance, no abdominal pain or tenderness  Radiology: ordered and independent interpretation performed.     Details: My independent interpretation of chest x-ray image cardiomegaly, no pneumonia or congestive failure  ECG/medicine tests: ordered and independent interpretation performed.     Details: My independent interpretation of EKGs performed in the emergency room there is left ventricular hypertrophy there is ST elevation in the anterior and inferior leads rate around 55; old EKGs were obtained from [facility] particularly from June 27, 2018 there was also seen ST elevation in the same leads with similar appearance of her overall EKG at that time with only some slight increased ST elevation on today's EKG from the previous  Discussion of management or test interpretation with external provider(s): Case and findings discussed with Dr. Su.  We agree that patient EKG findings and presentation at this time or not consistent with active infarction.  We did pursue blood pressure control in the emergency room setting with IV nitroglycerin and blood pressure was much improved.  Patient will be admitted with cardiology consultation.  The hospitalist service was paged for admission.    Patient findings discussed with Mojgan with the hospitalist service  for admission.    Risk  OTC drugs.  Prescription drug management.  Decision regarding hospitalization.        Final diagnoses:   Chest pain, unspecified type   Hypertensive emergency   Hyperglycemia due to diabetes mellitus       ED Disposition  ED Disposition       ED Disposition   Decision to Admit    Condition   --    Comment   Level of Care: Progressive Care [20]   Admitting Physician: TIANNA ERIC [[id number]]   Attending Physician: TIANNA ERIC [[id number]]                 No follow-up provider specified.       Medication List      No changes were made to your prescriptions during this visit.            Marcellus Reynolds MD  02/19/24 4161       Marcellus Reynolds MD  02/19/24 3817       Marcellus Reynolds MD  02/19/24 0947

## 2024-02-20 NOTE — H&P
[facility] Medicine Services    Hospitalist History and Physical     Bibiana Inman : 1980 MRN:[id number] LOS:0 ROOM: 10/10     Reason for admission: Chest pain     Assessment / Plan     Chest pain, rule out ACS  Hx CAD s/p triple bypass   - hold plavix  - ASA given   - troponin 33  - pt reports troponin Thursday 44 and 39 and 38 on Friday  - EKG reviewed by cardiology and ED provider. Initially concern for ST elevation but later received previous EKG from [facility] and appears similar. Possible LVH.  - plan for stress test in AM   - chest X-ray:Cardiomegaly without evidence of congestive failure. No other evidence of active disease   - check echo   - check lipid panel   - on nitro drip for uncontrolled HTN and chest pain resolved  - tylenol for headache  - cardiology consult     DM II with neuropathy and gastroparesis  - continue reglan  - glucomander  -check A1C      Hypertensive emergency   - currently on Nitro drip  -on metoprolol, losartan at home    CKD  - appears around baseline from previous  - will consult Dr. Mcduffie as pt may require contrast for cardiac testing    Anxiety and depression  - lexapro    NIGHAT  - ferrous sulfate    HLD  - on statin and vacepa    Onychomycosis  - taking laisil     Tobacco use  -<1/2ppd  - nicotine patch  - recommend cessation    Nutrition:   NPO Diet NPO Type: Sips with Meds, Ice Chips     DVT prophylaxis:  Mechanical DVT prophylaxis orders are present.         History of Present illness     Bibiana Inman is a 43 y.o. female with PMH of coronary disease status post triple bypass, chronic kidney disease, hyperlipidemia, diabetes, anxiety depression, GERD, neuropathy and gastroparesis, hypertension presented to the hospital on 24, and was admitted with a principal diagnosis of Chest pain.     Patient reports has had a chronic cough for over a month and has been given inhaler of Trelegy and albuterol with no resolve of cough.  She began having right-sided chest  pain on Wednesday approximately 5 days ago.  She reports pain is under the right breast and radiates up to the chest.  Today had pain in her upper back which she attributed to possibly work-related as she is a  and had stooped over frequently for lab draws.  Reports pain is present at rest but worse with activity.  Denies any diaphoresis chills or increased shortness of breath.  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  She came to the ED for evaluation    In the ED troponin 33 sodium 130 chloride 92 BUN 21 creatinine 1.6.  Patient with chronic kidney disease followed by Dr. Jenkins.  Glucose 517.  A1c 13.4.  Negative for leukocytosis.  Hemoglobin 11.9 chest x-ray for cardiomegaly but no other active disease.  EKG reported by ED provider was reviewed with cardiology and appears similar to previous EKG from 2018.  Patient has been placed on nitroglycerin drip for uncontrolled hypertension.  Chest pain is now resolved.  She has been admitted for further observation    Patient was seen and examined on 02/19/24 at 23:58 EST .    Subjective / Review of systems     Review of Systems   Respiratory:  Positive for cough.    Cardiovascular:  Positive for chest pain.   Musculoskeletal:  Positive for back pain.   All other systems reviewed and are negative.         Past Medical/Surgical/Social/Family History & Allergies     No past medical history on file.   No past surgical history on file.   Social History     Socioeconomic History    Marital status:       No family history on file.   Allergies   Allergen Reactions    Insulin Lispro Other (See Comments), Itching, Swelling and Unknown (See Comments)     SWELLING AT INJECTION SITE  SWELLING AT INJECTION SITE  SWELLING AT INJECTION SITE    Other reaction(s): knot/swelling where injected      Latex Itching      Social Determinants  of Health     Tobacco Use: Not on file   Alcohol Use: Not on file   Financial Resource Strain: Not on file   Food Insecurity: Not on file   Transportation Needs: Not on file   Physical Activity: Not on file   Stress: Not on file   Social Connections: Unknown (10/12/2023)    Family and Community Support     Help with Day-to-Day Activities: Not on file     Lonely or Isolated: Not on file   Interpersonal Safety: Not At Risk (2/19/2024)    Abuse Screen     Unsafe at Home or Work/School: no     Feels Threatened by Someone?: no     Does Anyone Keep You from Contacting Others or Doint Things Outside the Home?: no     Physical Sign of Abuse Present: no   Depression: Not on file   Housing Stability: Unknown (10/12/2023)    Housing Stability     Current Living Arrangements: Not on file     Potentially Unsafe Housing Conditions: Not on file   Utilities: Not on file   Health Literacy: Unknown (10/12/2023)    Education     Help with school or training?: Not on file     Preferred Language: Not on file   Employment: Unknown (10/12/2023)    Employment     Do you want help finding or keeping work or a job?: Not on file   Disabilities: Unknown (10/12/2023)    Disabilities     Concentrating, Remembering, or Making Decisions Difficulty: Not on file     Doing Errands Independently Difficulty: Not on file        Home Medications     Prior to Admission medications    Medication Sig Start Date End Date Taking? Authorizing Provider   albuterol sulfate  (90 Base) MCG/ACT inhaler Inhale 2 puffs Every 4 (Four) Hours As Needed for Wheezing or Shortness of Air. 12/31/21   Mimi Martinez APRN   albuterol sulfate  (90 Base) MCG/ACT inhaler Inhale 2 puffs every 6 (six) hours if needed for wheezing. 5/9/23      aspirin 81 MG chewable tablet Chew.    Provider, MD Joseline   atorvastatin (LIPITOR) 80 MG tablet Take 0.5 tablets by mouth 2 (two) times a day. 11/14/22      atorvastatin (LIPITOR) 80 MG tablet Take 0.5 tablets by mouth 2  (two) times a day. 2/7/24      brompheniramine-pseudoephedrine-DM 30-2-10 MG/5ML syrup Take 10 mL by mouth 4 (Four) Times a Day As Needed for Congestion, Cough or Allergies. 12/31/21   Mimi Martinez APRN   clopidogrel (PLAVIX) 75 MG tablet Take 1 tablet by mouth Daily. 11/22/23      clotrimazole-betamethasone (LOTRISONE) 1-0.05 % cream apply 1 application on the skin daily 5/9/23      Continuous Blood Gluc  (Dexcom G6 ) device Use as instructed 6/1/23      Continuous Blood Gluc Sensor (Dexcom G6 Sensor) Change sensor every 10 days 6/1/23      Continuous Blood Gluc Transmit (Dexcom G6 Transmitter) misc Use as instructed 6/1/23      dapagliflozin (Farxiga) 5 MG tablet tablet take 1 tablet by oral route  every day in the morning 7/25/23      Dulaglutide (Trulicity) 1.5 MG/0.5ML solution pen-injector Inject 1.5 mg under the skin 1 (one) time per week. 1/4/23      escitalopram (LEXAPRO) 10 MG tablet Take 1 tablet (10 mg total) by mouth 1 (one) time each day. 8/1/23      famotidine (PEPCID) 40 MG tablet Take 1 tablet by mouth at bedtime for 30 days 12/5/22      famotidine (PEPCID) 40 MG tablet Take 1 tablet by mouth every night at bedtime. 6/28/23      gabapentin (NEURONTIN) 100 MG capsule  5/5/21   Provider, MD Joseline   gabapentin (NEURONTIN) 100 MG capsule take 1 capsule by mouth three times a day 5/9/23      gabapentin (NEURONTIN) 100 MG capsule take 1 capsule by mouth three times a day 8/26/23      hydrALAZINE (APRESOLINE) 25 MG tablet Take 1 tablet (25 mg total) by mouth 3 (three) times a day. 12/22/23      icosapent ethyl (Vascepa) 1 g capsule capsule Take 2 capsules by mouth 2 (two) times a day. 11/2/23      insulin aspart (NovoLOG FlexPen) 100 UNIT/ML solution pen-injector sc pen Inject 5 Units under the skin 3 (three) times a day before meals. 5/23/22      insulin aspart (NovoLOG FlexPen) 100 UNIT/ML solution pen-injector sc pen Inject 5 Units under the skin 3 (three) times a day before  meals. 9/13/22      insulin aspart (NovoLOG FlexPen) 100 UNIT/ML solution pen-injector sc pen Inject 5 Units under the skin 3 (three) times a day before meals. 1/26/24      insulin detemir (Levemir FlexPen) 100 UNIT/ML injection Inject 30 Units under the skin into the appropriate area as directed 2 (Two) Times a Day. 2/9/24      insulin detemir (Levemir FlexTouch) 100 UNIT/ML injection Inject 30 Units under the skin into the appropriate area as directed. 3/2/20   Ibrahima Correa MD   insulin detemir (Levemir FlexTouch) 100 UNIT/ML injection Inject 30 Units under the skin every night. 10/27/21      insulin detemir (Levemir FlexTouch) 100 UNIT/ML injection Inject 30 Units under the skin every night. 10/27/21      Insulin Lispro, 1 Unit Dial, (HumaLOG KwikPen) 100 UNIT/ML solution pen-injector Inject as directed based on sliding scale.  2 units -249, 4 units 250-299, 6 units 300-349, 8 units 350-399, 10 units > bs 400 2/11/22   Ibrahima Correa MD   Insulin Pen Needle (Unifine Pentips) 32G X 4 MM misc Use with Insulin Daily 3/10/21      losartan (COZAAR) 100 MG tablet Take 1 tablet by mouth Daily. 5/11/23      metoclopramide (REGLAN) 5 MG tablet Take 1 tablet by mouth before meals and at bedtime 6/30/22      metoclopramide (Reglan) 5 MG tablet Take 1 tablet by mouth four times a day for 30 days 12/5/22      metoclopramide (Reglan) 5 MG tablet Take 1 tablet by mouth 4 (Four) Times a Day. 6/28/23      metoprolol tartrate (LOPRESSOR) 100 MG tablet Take 1 tablet by mouth 2 (two) times a day. 8/25/23      ondansetron (ZOFRAN) 4 MG tablet Take 1 tablet (4 mg total) by mouth every 8 (eight) hours if needed for nausea or vomiting for up to 7 days. 5/5/21      ondansetron (ZOFRAN) 4 MG tablet Take 1 tablet by mouth four times a day as needed 5/21/21      ondansetron (ZOFRAN) 4 MG tablet Take 1 tablet by mouth three times a day as needed 1/9/24      pantoprazole (PROTONIX) 40 MG EC tablet Take 1 tablet by  mouth every morning for 30 days 5/21/21      pantoprazole (PROTONIX) 40 MG EC tablet Take 1 tablet by mouth every morning 12/6/22      pantoprazole (PROTONIX) 40 MG EC tablet Take 1 tablet by mouth Every Morning. 6/28/23      predniSONE (DELTASONE) 20 MG tablet Take 3 tabs (60mg) daily for 5 days, then take 2 tabs (40mg) daily for 2 days, then take 1 tab (20mg) daily for 2 days. 7/20/21      Tdap (ADACEL) 5-2-15.5 LF-MCG/0.5 injection Inject  into the appropriate muscle as directed by prescriber. 11/4/22   Avelino Marion MD   terbinafine (lamiSIL) 250 MG tablet Take 1 tablet by mouth Daily. 11/10/23      vitamin B-12 (CYANOCOBALAMIN) 1000 MCG tablet Take 1,000 mcg by mouth Daily.    Provider, MD Joseline   vitamin D (ERGOCALCIFEROL) 1.25 MG (74414 UT) capsule capsule Take 1 capsule (50,000 Units total) by mouth 1 (one) time per week. 7/21/21      glyburide (DIAbeta) 5 MG tablet Take 1 tablet by mouth 2 (Two) Times a Day. 2/3/21 3/10/21     hydroCHLOROthiazide (HYDRODIURIL) 25 MG tablet Take 1 tablet by mouth Daily. 1/20/23 8/1/23     metFORMIN (GLUCOPHAGE) 1000 MG tablet Take 1 tablet by mouth 2 (Two) Times a Day With Meals. 5/9/23 7/7/23     omeprazole (priLOSEC) 20 MG capsule Take 1 capsule (20 mg total) by mouth 1 (one) time each day. Do not crush or chew. 3/10/21 7/20/21          Objective / Physical Exam     Vital signs:  Temp: 98.9 °F (37.2 °C)  BP: 163/75  Heart Rate: 51  Resp: 16  SpO2: 96 %  Weight: 71 kg (156 lb 8.4 oz)    Admission Weight: Weight: 71 kg (156 lb 8.4 oz)    Physical Exam  Constitutional:       Appearance: Normal appearance.   Eyes:      Pupils: Pupils are equal, round, and reactive to light.   Cardiovascular:      Rate and Rhythm: Normal rate and regular rhythm.   Pulmonary:      Effort: Pulmonary effort is normal.      Breath sounds: Normal breath sounds.   Abdominal:      Palpations: Abdomen is soft.   Musculoskeletal:         General: Normal range of motion.   Skin:     General:  Skin is dry.   Neurological:      Mental Status: She is alert and oriented to person, place, and time.   Psychiatric:         Mood and Affect: Mood normal.         Behavior: Behavior normal.            Labs     Results from last 7 days   Lab Units 02/19/24 2148   WBC 10*3/mm3 8.80   HEMOGLOBIN g/dL 11.9*   HEMATOCRIT % 37.9   PLATELETS 10*3/mm3 233      Results from last 7 days   Lab Units 02/19/24  2148   ALK PHOS U/L 164*   AST (SGOT) U/L 11   ALT (SGPT) U/L 14      Results from last 7 days   Lab Units 02/19/24  2148   PROTIME Seconds 10.4   INR  0.95   APTT seconds 27.1*      Results from last 7 days   Lab Units 02/19/24 2148   SODIUM mmol/L 130*   POTASSIUM mmol/L 3.7   CHLORIDE mmol/L 92*   CO2 mmol/L 25.0   BUN mg/dL 21*   CREATININE mg/dL 1.61*   GLUCOSE mg/dL 517*        Imaging     XR Chest 1 View    Result Date: 2/19/2024  XR CHEST 1 VW Date of Exam: 2/19/2024 10:11 PM EST Indication: pain Comparison: 2/5/2024 Findings: Previous median sternotomy is again noted with a combination of sternal plates and wires. Heart is enlarged. Vasculature appears normal. Lungs are well expanded and appear clear except for a couple granulomatous calcifications. No effusion or pneumothorax is seen.     Impression: Cardiomegaly without evidence of congestive failure. No other evidence of active disease. Electronically Signed: Jorge Cox MD  2/19/2024 10:24 PM EST  Workstation ID: [id number]          Current Medications     Scheduled Meds:  acetaminophen, 1,000 mg, Oral, Once  aspirin, 325 mg, Oral, Once  [START ON 2/20/2024] insulin lispro, 1-200 Units, Subcutaneous, 4x Daily With Meals & Nightly  insulin regular, 4 Units, Subcutaneous, Once  [START ON 2/20/2024] nicotine, 1 patch, Transdermal, Q24H  senna-docusate sodium, 2 tablet, Oral, BID  sodium chloride, 10 mL, Intravenous, Q12H         Continuous Infusions:  nitroglycerin, 10-50 mcg/min, Last Rate: 30 mcg/min (02/19/24 3802)           Mojgan [facility]  Medicine  02/19/24   23:58 EST

## 2024-02-21 PROBLEM — I20.0 UNSTABLE ANGINA: Status: ACTIVE | Noted: 2024-02-21

## 2024-02-21 LAB
ALBUMIN SERPL-MCNC: 3.3 G/DL (ref 3.5–5.2)
ALBUMIN/GLOB SERPL: 1.2 G/DL
ALP SERPL-CCNC: 107 U/L (ref 39–117)
ALT SERPL W P-5'-P-CCNC: 10 U/L (ref 1–33)
ANION GAP SERPL CALCULATED.3IONS-SCNC: 8 MMOL/L (ref 5–15)
AST SERPL-CCNC: 13 U/L (ref 1–32)
BILIRUB SERPL-MCNC: <0.2 MG/DL (ref 0–1.2)
BUN SERPL-MCNC: 16 MG/DL (ref 6–20)
BUN/CREAT SERPL: 12.6 (ref 7–25)
CA-I SERPL ISE-MCNC: 1.27 MMOL/L (ref 1.2–1.3)
CALCIUM SPEC-SCNC: 8.9 MG/DL (ref 8.6–10.5)
CHLORIDE SERPL-SCNC: 103 MMOL/L (ref 98–107)
CO2 SERPL-SCNC: 26 MMOL/L (ref 22–29)
CREAT SERPL-MCNC: 1.27 MG/DL (ref 0.57–1)
DEPRECATED RDW RBC AUTO: 56.4 FL (ref 37–54)
EGFRCR SERPLBLD CKD-EPI 2021: 53.3 ML/MIN/1.73
ERYTHROCYTE [DISTWIDTH] IN BLOOD BY AUTOMATED COUNT: 20.1 % (ref 12.3–15.4)
GLOBULIN UR ELPH-MCNC: 2.8 GM/DL
GLUCOSE BLDC GLUCOMTR-MCNC: 151 MG/DL (ref 70–105)
GLUCOSE BLDC GLUCOMTR-MCNC: 181 MG/DL (ref 70–105)
GLUCOSE BLDC GLUCOMTR-MCNC: 223 MG/DL (ref 70–105)
GLUCOSE BLDC GLUCOMTR-MCNC: 231 MG/DL (ref 70–105)
GLUCOSE BLDC GLUCOMTR-MCNC: 250 MG/DL (ref 70–105)
GLUCOSE BLDC GLUCOMTR-MCNC: 268 MG/DL (ref 70–105)
GLUCOSE SERPL-MCNC: 255 MG/DL (ref 65–99)
HCT VFR BLD AUTO: 32.5 % (ref 34–46.6)
HGB BLD-MCNC: 10.1 G/DL (ref 12–15.9)
MAGNESIUM SERPL-MCNC: 2 MG/DL (ref 1.6–2.6)
MCH RBC QN AUTO: 25.6 PG (ref 26.6–33)
MCHC RBC AUTO-ENTMCNC: 31.1 G/DL (ref 31.5–35.7)
MCV RBC AUTO: 82.2 FL (ref 79–97)
PHOSPHATE SERPL-MCNC: 3.2 MG/DL (ref 2.5–4.5)
PLATELET # BLD AUTO: 191 10*3/MM3 (ref 140–450)
PMV BLD AUTO: 9.5 FL (ref 6–12)
POTASSIUM SERPL-SCNC: 4.3 MMOL/L (ref 3.5–5.2)
PROT SERPL-MCNC: 6.1 G/DL (ref 6–8.5)
RBC # BLD AUTO: 3.95 10*6/MM3 (ref 3.77–5.28)
SODIUM SERPL-SCNC: 137 MMOL/L (ref 136–145)
WBC NRBC COR # BLD AUTO: 9.6 10*3/MM3 (ref 3.4–10.8)

## 2024-02-21 PROCEDURE — 80053 COMPREHEN METABOLIC PANEL: CPT | Performed by: INTERNAL MEDICINE

## 2024-02-21 PROCEDURE — 25010000002 MIDAZOLAM PER 1 MG: Performed by: INTERNAL MEDICINE

## 2024-02-21 PROCEDURE — 99153 MOD SED SAME PHYS/QHP EA: CPT | Performed by: INTERNAL MEDICINE

## 2024-02-21 PROCEDURE — C1760 CLOSURE DEV, VASC: HCPCS | Performed by: INTERNAL MEDICINE

## 2024-02-21 PROCEDURE — C1894 INTRO/SHEATH, NON-LASER: HCPCS | Performed by: INTERNAL MEDICINE

## 2024-02-21 PROCEDURE — C1769 GUIDE WIRE: HCPCS | Performed by: INTERNAL MEDICINE

## 2024-02-21 PROCEDURE — 63710000001 INSULIN LISPRO (HUMAN) PER 5 UNITS: Performed by: INTERNAL MEDICINE

## 2024-02-21 PROCEDURE — 83735 ASSAY OF MAGNESIUM: CPT | Performed by: INTERNAL MEDICINE

## 2024-02-21 PROCEDURE — 82948 REAGENT STRIP/BLOOD GLUCOSE: CPT

## 2024-02-21 PROCEDURE — 25810000003 SODIUM CHLORIDE 0.9 % SOLUTION: Performed by: INTERNAL MEDICINE

## 2024-02-21 PROCEDURE — 99152 MOD SED SAME PHYS/QHP 5/>YRS: CPT | Performed by: INTERNAL MEDICINE

## 2024-02-21 PROCEDURE — 93459 L HRT ART/GRFT ANGIO: CPT | Performed by: INTERNAL MEDICINE

## 2024-02-21 PROCEDURE — 84100 ASSAY OF PHOSPHORUS: CPT | Performed by: INTERNAL MEDICINE

## 2024-02-21 PROCEDURE — B2111ZZ FLUOROSCOPY OF MULTIPLE CORONARY ARTERIES USING LOW OSMOLAR CONTRAST: ICD-10-PCS | Performed by: INTERNAL MEDICINE

## 2024-02-21 PROCEDURE — 25010000002 FENTANYL CITRATE (PF) 100 MCG/2ML SOLUTION: Performed by: INTERNAL MEDICINE

## 2024-02-21 PROCEDURE — B2131ZZ FLUOROSCOPY OF MULTIPLE CORONARY ARTERY BYPASS GRAFTS USING LOW OSMOLAR CONTRAST: ICD-10-PCS | Performed by: INTERNAL MEDICINE

## 2024-02-21 PROCEDURE — 25510000001 IOPAMIDOL PER 1 ML: Performed by: INTERNAL MEDICINE

## 2024-02-21 PROCEDURE — 99222 1ST HOSP IP/OBS MODERATE 55: CPT | Performed by: INTERNAL MEDICINE

## 2024-02-21 PROCEDURE — 4A023N7 MEASUREMENT OF CARDIAC SAMPLING AND PRESSURE, LEFT HEART, PERCUTANEOUS APPROACH: ICD-10-PCS | Performed by: INTERNAL MEDICINE

## 2024-02-21 PROCEDURE — 99231 SBSQ HOSP IP/OBS SF/LOW 25: CPT | Performed by: INTERNAL MEDICINE

## 2024-02-21 PROCEDURE — 85027 COMPLETE CBC AUTOMATED: CPT | Performed by: INTERNAL MEDICINE

## 2024-02-21 PROCEDURE — 63710000001 INSULIN GLARGINE PER 5 UNITS: Performed by: INTERNAL MEDICINE

## 2024-02-21 PROCEDURE — 82330 ASSAY OF CALCIUM: CPT | Performed by: INTERNAL MEDICINE

## 2024-02-21 PROCEDURE — 25010000002 LIDOCAINE 1 % SOLUTION: Performed by: INTERNAL MEDICINE

## 2024-02-21 RX ORDER — ONDANSETRON 4 MG/1
4 TABLET, ORALLY DISINTEGRATING ORAL EVERY 6 HOURS PRN
Status: DISCONTINUED | OUTPATIENT
Start: 2024-02-21 | End: 2024-02-21 | Stop reason: SDUPTHER

## 2024-02-21 RX ORDER — INSULIN LISPRO 100 [IU]/ML
2-9 INJECTION, SOLUTION INTRAVENOUS; SUBCUTANEOUS
Status: DISCONTINUED | OUTPATIENT
Start: 2024-02-21 | End: 2024-02-22 | Stop reason: HOSPADM

## 2024-02-21 RX ORDER — CLOPIDOGREL BISULFATE 75 MG/1
75 TABLET ORAL DAILY
Status: DISCONTINUED | OUTPATIENT
Start: 2024-02-21 | End: 2024-02-22 | Stop reason: HOSPADM

## 2024-02-21 RX ORDER — LIDOCAINE HYDROCHLORIDE 10 MG/ML
INJECTION, SOLUTION INFILTRATION; PERINEURAL
Status: DISCONTINUED | OUTPATIENT
Start: 2024-02-21 | End: 2024-02-21 | Stop reason: HOSPADM

## 2024-02-21 RX ORDER — BUTALBITAL, ACETAMINOPHEN AND CAFFEINE 50; 325; 40 MG/1; MG/1; MG/1
1 TABLET ORAL EVERY 4 HOURS PRN
Status: DISCONTINUED | OUTPATIENT
Start: 2024-02-21 | End: 2024-02-22 | Stop reason: HOSPADM

## 2024-02-21 RX ORDER — INSULIN LISPRO 100 [IU]/ML
10 INJECTION, SOLUTION INTRAVENOUS; SUBCUTANEOUS
Status: DISCONTINUED | OUTPATIENT
Start: 2024-02-22 | End: 2024-02-22 | Stop reason: HOSPADM

## 2024-02-21 RX ORDER — SODIUM CHLORIDE 9 MG/ML
INJECTION, SOLUTION INTRAVENOUS
Status: COMPLETED | OUTPATIENT
Start: 2024-02-21 | End: 2024-02-21

## 2024-02-21 RX ORDER — MIDAZOLAM HYDROCHLORIDE 1 MG/ML
INJECTION INTRAMUSCULAR; INTRAVENOUS
Status: DISCONTINUED | OUTPATIENT
Start: 2024-02-21 | End: 2024-02-21 | Stop reason: HOSPADM

## 2024-02-21 RX ORDER — FENTANYL CITRATE 50 UG/ML
INJECTION, SOLUTION INTRAMUSCULAR; INTRAVENOUS
Status: DISCONTINUED | OUTPATIENT
Start: 2024-02-21 | End: 2024-02-21 | Stop reason: HOSPADM

## 2024-02-21 RX ORDER — NIFEDIPINE 30 MG/1
30 TABLET, EXTENDED RELEASE ORAL
Status: DISCONTINUED | OUTPATIENT
Start: 2024-02-21 | End: 2024-02-22

## 2024-02-21 RX ORDER — DIPHENHYDRAMINE HCL 25 MG
25 CAPSULE ORAL EVERY 6 HOURS PRN
Status: DISCONTINUED | OUTPATIENT
Start: 2024-02-21 | End: 2024-02-22 | Stop reason: HOSPADM

## 2024-02-21 RX ORDER — ONDANSETRON 2 MG/ML
4 INJECTION INTRAMUSCULAR; INTRAVENOUS EVERY 6 HOURS PRN
Status: DISCONTINUED | OUTPATIENT
Start: 2024-02-21 | End: 2024-02-21 | Stop reason: SDUPTHER

## 2024-02-21 RX ADMIN — METOCLOPRAMIDE 5 MG: 10 TABLET ORAL at 17:27

## 2024-02-21 RX ADMIN — GABAPENTIN 100 MG: 100 CAPSULE ORAL at 14:58

## 2024-02-21 RX ADMIN — HYDRALAZINE HYDROCHLORIDE 100 MG: 25 TABLET ORAL at 15:07

## 2024-02-21 RX ADMIN — SODIUM CHLORIDE 60 ML/HR: 9 INJECTION, SOLUTION INTRAVENOUS at 04:36

## 2024-02-21 RX ADMIN — METOCLOPRAMIDE 5 MG: 10 TABLET ORAL at 21:20

## 2024-02-21 RX ADMIN — SODIUM CHLORIDE 500 ML: 0.9 INJECTION, SOLUTION INTRAVENOUS at 13:10

## 2024-02-21 RX ADMIN — HYDRALAZINE HYDROCHLORIDE 100 MG: 25 TABLET ORAL at 00:07

## 2024-02-21 RX ADMIN — INSULIN LISPRO 2 UNITS: 100 INJECTION, SOLUTION INTRAVENOUS; SUBCUTANEOUS at 21:21

## 2024-02-21 RX ADMIN — ACETAMINOPHEN 650 MG: 325 TABLET, FILM COATED ORAL at 08:14

## 2024-02-21 RX ADMIN — Medication 10 ML: at 21:21

## 2024-02-21 RX ADMIN — INSULIN GLARGINE 30 UNITS: 100 INJECTION, SOLUTION SUBCUTANEOUS at 21:19

## 2024-02-21 RX ADMIN — FAMOTIDINE 20 MG: 20 TABLET, FILM COATED ORAL at 21:19

## 2024-02-21 RX ADMIN — BUTALBITAL, ACETAMINOPHEN, AND CAFFEINE 1 TABLET: 50; 325; 40 TABLET ORAL at 14:57

## 2024-02-21 RX ADMIN — Medication 1200 MG: at 08:14

## 2024-02-21 RX ADMIN — METOCLOPRAMIDE 5 MG: 10 TABLET ORAL at 08:15

## 2024-02-21 RX ADMIN — ASPIRIN 81 MG: 81 TABLET, COATED ORAL at 08:15

## 2024-02-21 RX ADMIN — HYDRALAZINE HYDROCHLORIDE 100 MG: 25 TABLET ORAL at 08:15

## 2024-02-21 RX ADMIN — FERROUS SULFATE TAB EC 324 MG (65 MG FE EQUIVALENT) 324 MG: 324 (65 FE) TABLET DELAYED RESPONSE at 08:15

## 2024-02-21 RX ADMIN — ATORVASTATIN CALCIUM 80 MG: 40 TABLET, FILM COATED ORAL at 08:15

## 2024-02-21 RX ADMIN — ACETAMINOPHEN 650 MG: 325 TABLET, FILM COATED ORAL at 00:07

## 2024-02-21 RX ADMIN — INSULIN GLARGINE 25 UNITS: 100 INJECTION, SOLUTION SUBCUTANEOUS at 08:14

## 2024-02-21 RX ADMIN — INSULIN LISPRO 4 UNITS: 100 INJECTION, SOLUTION INTRAVENOUS; SUBCUTANEOUS at 17:27

## 2024-02-21 RX ADMIN — CLOPIDOGREL BISULFATE 75 MG: 75 TABLET ORAL at 14:57

## 2024-02-21 RX ADMIN — ESCITALOPRAM OXALATE 10 MG: 10 TABLET ORAL at 08:15

## 2024-02-21 RX ADMIN — INSULIN LISPRO 2 UNITS: 100 INJECTION, SOLUTION INTRAVENOUS; SUBCUTANEOUS at 15:01

## 2024-02-21 RX ADMIN — METOCLOPRAMIDE 5 MG: 10 TABLET ORAL at 14:57

## 2024-02-21 RX ADMIN — BUTALBITAL, ACETAMINOPHEN, AND CAFFEINE 1 TABLET: 50; 325; 40 TABLET ORAL at 21:19

## 2024-02-21 RX ADMIN — Medication 1200 MG: at 21:20

## 2024-02-21 RX ADMIN — NIFEDIPINE 30 MG: 30 TABLET, EXTENDED RELEASE ORAL at 09:55

## 2024-02-21 RX ADMIN — GABAPENTIN 100 MG: 100 CAPSULE ORAL at 21:18

## 2024-02-21 RX ADMIN — GABAPENTIN 100 MG: 100 CAPSULE ORAL at 08:15

## 2024-02-21 NOTE — PROGRESS NOTES
"NEPHROLOGY PROGRESS NOTE------[facility]    [facility]  [phone]  Tamiko Jenkins MD      Patient Care Team:  Ibrahima Correa MD as PCP - General (Family Medicine)  Rachele Zafar RN as Ambulatory  ([facility])  Xochilt Jenkins MD as Consulting Physician (Nephrology)      Provider:  Tamiko Jenkins MD  Patient Name: Bibiana Inman  :  1980    SUBJECTIVE:    F/U CRF/CKD          Medication:  Acetylcysteine, 1,200 mg, Oral, BID  aspirin, 81 mg, Oral, Daily  atorvastatin, 80 mg, Oral, Daily  escitalopram, 10 mg, Oral, Daily  famotidine, 20 mg, Oral, Nightly  ferrous sulfate, 324 mg, Oral, Daily With Breakfast  gabapentin, 100 mg, Oral, TID  hydrALAZINE, 100 mg, Oral, Q8H  insulin glargine, 25 Units, Subcutaneous, Q12H  insulin lispro, 2-9 Units, Subcutaneous, Q6H  metoclopramide, 5 mg, Oral, 4x Daily AC & at Bedtime  nicotine, 1 patch, Transdermal, Q24H  senna-docusate sodium, 2 tablet, Oral, BID  sodium chloride, 10 mL, Intravenous, Q12H      sodium chloride, 60 mL/hr, Last Rate: 60 mL/hr (24 0436)        OBJECTIVE    Vital Sign Min/Max for last 24 hours  Temp  Min: 98 °F (36.7 °C)  Max: 98.6 °F (37 °C)   BP  Min: 123/54  Max: 178/83   Pulse  Min: 54  Max: 78   Resp  Min: 14  Max: 18   SpO2  Min: 95 %  Max: 98 %   No data recorded   Weight  Min: 70.2 kg (154 lb 12.2 oz)  Max: 70.8 kg (156 lb)     Flowsheet Rows      Flowsheet Row First Filed Value   Admission Height 162.6 cm (64\") Documented at 2024   Admission Weight 71 kg (156 lb 8.4 oz) Documented at 2024            No intake/output data recorded.  I/O last 3 completed shifts:  In: 480 [P.O.:480]  Out: -     Physical Exam:  General Appearance: alert, appears stated age and cooperative  Head: normocephalic, without obvious abnormality and atraumatic  Eyes: conjunctivae and sclerae normal and no icterus  Neck: supple and no " "JVD  Lungs: clear to auscultation and respirations regular  Heart: regular rhythm & normal rate and normal S1, S2 +TITI  Chest: Wall no abnormalities observed  Abdomen: normal bowel sounds and soft, nontender  Extremities: moves extremities well, no edema, no cyanosis and no redness  Skin: no bleeding, bruising or rash, turgor normal, color normal and no lesions noted  Neurologic: Alert, and oriented. No focal deficits    Labs:    WBC WBC   Date Value Ref Range Status   02/21/2024 9.60 3.40 - 10.80 10*3/mm3 Final   02/19/2024 8.80 3.40 - 10.80 10*3/mm3 Final      HGB Hemoglobin   Date Value Ref Range Status   02/21/2024 10.1 (L) 12.0 - 15.9 g/dL Final   02/19/2024 11.9 (L) 12.0 - 15.9 g/dL Final      HCT Hematocrit   Date Value Ref Range Status   02/21/2024 32.5 (L) 34.0 - 46.6 % Final   02/19/2024 37.9 34.0 - 46.6 % Final      Platelets No results found for: \"LABPLAT\"   MCV MCV   Date Value Ref Range Status   02/21/2024 82.2 79.0 - 97.0 fL Final   02/19/2024 82.9 79.0 - 97.0 fL Final          Sodium Sodium   Date Value Ref Range Status   02/21/2024 137 136 - 145 mmol/L Final   02/20/2024 135 (L) 136 - 145 mmol/L Final   02/19/2024 130 (L) 136 - 145 mmol/L Final      Potassium Potassium   Date Value Ref Range Status   02/21/2024 4.3 3.5 - 5.2 mmol/L Final     Comment:     Slight hemolysis detected by analyzer. Result may be falsely elevated.   02/20/2024 3.6 3.5 - 5.2 mmol/L Final   02/19/2024 3.7 3.5 - 5.2 mmol/L Final      Chloride Chloride   Date Value Ref Range Status   02/21/2024 103 98 - 107 mmol/L Final   02/20/2024 101 98 - 107 mmol/L Final   02/19/2024 92 (L) 98 - 107 mmol/L Final      CO2 CO2   Date Value Ref Range Status   02/21/2024 26.0 22.0 - 29.0 mmol/L Final   02/20/2024 23.0 22.0 - 29.0 mmol/L Final   02/19/2024 25.0 22.0 - 29.0 mmol/L Final      BUN BUN   Date Value Ref Range Status   02/21/2024 16 6 - 20 mg/dL Final   02/20/2024 18 6 - 20 mg/dL Final   02/19/2024 21 (H) 6 - 20 mg/dL Final    " "  Creatinine Creatinine   Date Value Ref Range Status   02/21/2024 1.27 (H) 0.57 - 1.00 mg/dL Final   02/20/2024 1.15 (H) 0.57 - 1.00 mg/dL Final   02/19/2024 1.61 (H) 0.57 - 1.00 mg/dL Final      Calcium Calcium   Date Value Ref Range Status   02/21/2024 8.9 8.6 - 10.5 mg/dL Final   02/20/2024 8.8 8.6 - 10.5 mg/dL Final   02/19/2024 9.5 8.6 - 10.5 mg/dL Final      PO4 No components found for: \"PO4\"   Albumin Albumin   Date Value Ref Range Status   02/21/2024 3.3 (L) 3.5 - 5.2 g/dL Final   02/19/2024 4.2 3.5 - 5.2 g/dL Final      Magnesium Magnesium   Date Value Ref Range Status   02/21/2024 2.0 1.6 - 2.6 mg/dL Final   02/19/2024 2.2 1.6 - 2.6 mg/dL Final      Uric Acid No components found for: \"URIC ACID\"     Imaging Results (Last 72 Hours)       Procedure Component Value Units Date/Time    XR Chest 1 View [[id number]] Collected: 02/19/24 2222     Updated: 02/19/24 2226    Narrative:      XR CHEST 1 VW    Date of Exam: 2/19/2024 10:11 PM EST    Indication: pain    Comparison: 2/5/2024    Findings:  Previous median sternotomy is again noted with a combination of sternal plates and wires. Heart is enlarged. Vasculature appears normal. Lungs are well expanded and appear clear except for a couple granulomatous calcifications. No effusion or   pneumothorax is seen.      Impression:      Impression:  Cardiomegaly without evidence of congestive failure. No other evidence of active disease.      Electronically Signed: Jorge Cox MD    2/19/2024 10:24 PM EST    Workstation ID: [id number]            Results for orders placed during the hospital encounter of 02/19/24    XR Chest 1 View    Narrative  XR CHEST 1 VW    Date of Exam: 2/19/2024 10:11 PM EST    Indication: pain    Comparison: 2/5/2024    Findings:  Previous median sternotomy is again noted with a combination of sternal plates and wires. Heart is enlarged. Vasculature appears normal. Lungs are well expanded and appear clear except for a couple granulomatous " calcifications. No effusion or  pneumothorax is seen.    Impression  Impression:  Cardiomegaly without evidence of congestive failure. No other evidence of active disease.      Electronically Signed: Jorge Cox MD  2/19/2024 10:24 PM EST  Workstation ID: [id number]      Results for orders placed during the hospital encounter of 02/05/24    XR Chest 2 View    Narrative  XR CHEST 2 VW    Date of Exam: 2/5/2024 5:14 PM EST    Indication: cough    Comparison: None available.    Findings:  There is evidence of prior median sternotomy. There is no pneumothorax, pleural effusion or focal airspace consolidation. Heart size and pulmonary vasculature appear within normal limits. Regional bones appear intact.    Impression  Impression:  No acute cardiopulmonary abnormality.        Electronically Signed: Fritz Arroyo MD  2/5/2024 7:32 PM EST  Workstation ID: [id number]            ASSESSMENT / PLAN      Coronary artery disease involving native coronary artery of native heart with unstable angina pectoris    Benign essential hypertension    Diabetic gastroparesis    Mixed hypercholesterolemia and hypertriglyceridemia    Type 2 diabetes mellitus with hyperglycemia, with long-term current use of insulin    Obstructive sleep apnea    Abnormal nuclear stress test    Tobacco abuse    CRF/CKD------Nonoliguric. Known CRF/CKD STG 3A secondary to DGS/HTN NS. Current serum   Creatinine is at baseline.  Premedicated for cardiac cath today with IVFs, Mucomyst and holding Farxiga. Patient has been explained and understands the risks of IV dye exposure. Will need to continue IVFs for 8 hours post cath tomorrow. No NSAIDs. Dose meds for CrCl 45 cc/min     2. DMII WITH RENAL MANIFESTATIONS-----Hold Farxiga in prep for cath. Lantus, Glucometers, SSI     3. HTN WITH CKD-----BP better with increased Hydralazine. No ACE-I/ARB/DRI/diuretic for now     4. HYPERLIPIDEMIA-----On Statin.       5. OA/DJD------No NSAIDs. Check uric acid     6. DM PERIPHERAL  NEUROPATHY-----On Neurontin     7. GERD/DM GASTROPARESIS/PUD PROPHYLAXIS-------Reglan and Pepcid     8. DVT PROPHYLAXIS-----SCDs     9. PROTEINURIA-----Secondary to DGS     10. VITAMIN D DEFICIENCY-----On Supplementation    11. ANGINA/CAD WITH H/O CABG-------Cardiac cath today        Tamiko Jenkins MD  [facility]  [phone]  02/21/24  07:56 EST

## 2024-02-21 NOTE — PLAN OF CARE
Goal Outcome Evaluation:         Pt had heart cath today with no intervention. Plan to medically manage. Procardia started today for elevated BP. Metoprolol was stopped due to bradycardia on admission.  Cardiology, nephrology, endocrinology following.

## 2024-02-21 NOTE — CASE MANAGEMENT/SOCIAL WORK
Continued Stay Note  COLT Reinoso     Patient Name: Bibiana Inman  MRN: [id number]  Today's Date: 2/21/2024    Admit Date: 2/19/2024    Plan: Home with family   Discharge Plan       Row Name 02/21/24 0907       Plan    Plan Comments DC Barriers: Scheduled to have cardiac catheterization performed today, 2/21. Nephrology monitoring kidney function with IV fluids ordered for 8 hours s/p cath.                  Alma Delia Randolph RN     Office Phone: [phone]  Office Cell: [phone]

## 2024-02-21 NOTE — PAYOR COMM NOTE
"  Observation order 2/19  Inpatient order 2/21/24    ER admit w/chest pain - abnormal myoview requiring cardiac cath   MD notes and clinical attached.   -----------------  AUTHORIZATION PENDING:   PLEASE FAX OR CALL DETERMINATION TO CONTACT BELOW:       THANK YOU,    PIERO SantosN, RN  Utilization Review  [facility]  Phone: [phone]  Fax: [phone]      NPI: [id number]  Tax ID: [id number]    Bibiana Belcher (43 y.o. Female)       Date of Birth   1980    Social Security Number       [place]   [facility] [place] [address]    Home Phone   [phone]    MRN   [id number]       Judaism   None    Marital Status                               Admission Date   2/19/24    Admission Type   Emergency    Admitting Provider   Samara Fine DO    Attending Provider   Jesse Lance MD    Department, Room/Bed   [facility] PROGRESS CARE, 2117/1       Discharge Date       Discharge Disposition       Discharge Destination                                 Attending Provider: Jesse Lance MD    Allergies: Latex    Isolation: None   Infection: None   Code Status: CPR    Ht: 162.6 cm (64\")   Wt: 70.2 kg (154 lb 12.2 oz)    Admission Cmt: None   Principal Problem: Coronary artery disease involving native coronary artery of native heart with unstable angina pectoris [I25.110]                   Active Insurance as of 2/19/2024       Primary Coverage       Payor Plan Insurance Group Employer/Plan Group    [employer] EMPLOYEE [id number]       Payor Plan Address Payor Plan Phone Number Payor Plan Fax Number Effective Dates    [address] [phone]  2/1/2020 - None Entered    [facility] [id number]         Subscriber Name Subscriber Birth Date Member ID       BIBINAA BELCHER 1980 [id number]                     Emergency Contacts        (Rel.) Home Phone Work Phone Mobile Phone    HOLLAND BELCHER (Spouse) [phone] -- [phone] "                 History & Physical        Mojgan Apple, APRN at 24 4366       Attestation signed by Samara Fine DO at 24 2040    I have reviewed this documentation and agree.      Electronically signed by Samara Fine DO, 24, 4:31 AM [facility]    Hospitalist History and Physical     Bibiana Inman : 1980 MRN:[id number] LOS:0 ROOM: 10/10     Reason for admission: Chest pain     Assessment / Plan     Chest pain, rule out ACS  Hx CAD s/p triple bypass   - hold plavix  - ASA given   - troponin 33  - pt reports troponin Thursday 44 and 39 and 38 on Friday  - EKG reviewed by cardiology and ED provider. Initially concern for ST elevation but later received previous EKG from [facility] and appears similar. Possible LVH.  - plan for stress test in AM   - chest X-ray:Cardiomegaly without evidence of congestive failure. No other evidence of active disease   - check echo   - check lipid panel   - on nitro drip for uncontrolled HTN and chest pain resolved  - tylenol for headache  - cardiology consult     DM II with neuropathy and gastroparesis  - continue reglan  - glucomander  -check A1C      Hypertensive emergency   - currently on Nitro drip  -on metoprolol, losartan at home    CKD  - appears around baseline from previous  - will consult Dr. Mcduffie as pt may require contrast for cardiac testing    Anxiety and depression  - lexapro    NIGHAT  - ferrous sulfate    HLD  - on statin and vacepa    Onychomycosis  - taking laisil     Tobacco use  -<1/2ppd  - nicotine patch  - recommend cessation    Nutrition:   NPO Diet NPO Type: Sips with Meds, Ice Chips     DVT prophylaxis:  Mechanical DVT prophylaxis orders are present.         History of Present illness     Bibiana Inman is a 43 y.o. female with PMH of coronary disease status post triple bypass, chronic kidney disease, hyperlipidemia, diabetes, anxiety depression, GERD, neuropathy and gastroparesis,  hypertension presented to the hospital on 2/19/24, and was admitted with a principal diagnosis of Chest pain.     Patient reports has had a chronic cough for over a month and has been given inhaler of Trelegy and albuterol with no resolve of cough.  She began having right-sided chest pain on Wednesday approximately 5 days ago.  She reports pain is under the right breast and radiates up to the chest.  Today had pain in her upper back which she attributed to possibly work-related as she is a  and had stooped over frequently for lab draws.  Reports pain is present at rest but worse with activity.  Denies any diaphoresis chills or increased shortness of breath.  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  She came to the ED for evaluation    In the ED troponin 33 sodium 130 chloride 92 BUN 21 creatinine 1.6.  Patient with chronic kidney disease followed by Dr. Jenkins.  Glucose 517.  A1c 13.4.  Negative for leukocytosis.  Hemoglobin 11.9 chest x-ray for cardiomegaly but no other active disease.  EKG reported by ED provider was reviewed with cardiology and appears similar to previous EKG from 2018.  Patient has been placed on nitroglycerin drip for uncontrolled hypertension.  Chest pain is now resolved.  She has been admitted for further observation    Patient was seen and examined on 02/19/24 at 23:58 EST .    Subjective / Review of systems     Review of Systems   Respiratory:  Positive for cough.    Cardiovascular:  Positive for chest pain.   Musculoskeletal:  Positive for back pain.   All other systems reviewed and are negative.         Past Medical/Surgical/Social/Family History & Allergies     No past medical history on file.   No past surgical history on file.   Social History     Socioeconomic History    Marital status:       No family history on file.   Allergies    Allergen Reactions    Insulin Lispro Other (See Comments), Itching, Swelling and Unknown (See Comments)     SWELLING AT INJECTION SITE  SWELLING AT INJECTION SITE  SWELLING AT INJECTION SITE    Other reaction(s): knot/swelling where injected      Latex Itching      Social Determinants of Health     Tobacco Use: Not on file   Alcohol Use: Not on file   Financial Resource Strain: Not on file   Food Insecurity: Not on file   Transportation Needs: Not on file   Physical Activity: Not on file   Stress: Not on file   Social Connections: Unknown (10/12/2023)    Family and Community Support     Help with Day-to-Day Activities: Not on file     Lonely or Isolated: Not on file   Interpersonal Safety: Not At Risk (2/19/2024)    Abuse Screen     Unsafe at Home or Work/School: no     Feels Threatened by Someone?: no     Does Anyone Keep You from Contacting Others or Doint Things Outside the Home?: no     Physical Sign of Abuse Present: no   Depression: Not on file   Housing Stability: Unknown (10/12/2023)    Housing Stability     Current Living Arrangements: Not on file     Potentially Unsafe Housing Conditions: Not on file   Utilities: Not on file   Health Literacy: Unknown (10/12/2023)    Education     Help with school or training?: Not on file     Preferred Language: Not on file   Employment: Unknown (10/12/2023)    Employment     Do you want help finding or keeping work or a job?: Not on file   Disabilities: Unknown (10/12/2023)    Disabilities     Concentrating, Remembering, or Making Decisions Difficulty: Not on file     Doing Errands Independently Difficulty: Not on file        Home Medications     Prior to Admission medications    Medication Sig Start Date End Date Taking? Authorizing Provider   albuterol sulfate  (90 Base) MCG/ACT inhaler Inhale 2 puffs Every 4 (Four) Hours As Needed for Wheezing or Shortness of Air. 12/31/21   Mimi Martinez APRN   albuterol sulfate  (90 Base) MCG/ACT inhaler Inhale 2  puffs every 6 (six) hours if needed for wheezing. 5/9/23      aspirin 81 MG chewable tablet Chew.    ProviderJoseline MD   atorvastatin (LIPITOR) 80 MG tablet Take 0.5 tablets by mouth 2 (two) times a day. 11/14/22      atorvastatin (LIPITOR) 80 MG tablet Take 0.5 tablets by mouth 2 (two) times a day. 2/7/24      brompheniramine-pseudoephedrine-DM 30-2-10 MG/5ML syrup Take 10 mL by mouth 4 (Four) Times a Day As Needed for Congestion, Cough or Allergies. 12/31/21   Mimi Martinez APRN   clopidogrel (PLAVIX) 75 MG tablet Take 1 tablet by mouth Daily. 11/22/23      clotrimazole-betamethasone (LOTRISONE) 1-0.05 % cream apply 1 application on the skin daily 5/9/23      Continuous Blood Gluc  (Dexcom G6 ) device Use as instructed 6/1/23      Continuous Blood Gluc Sensor (Dexcom G6 Sensor) Change sensor every 10 days 6/1/23      Continuous Blood Gluc Transmit (Dexcom G6 Transmitter) misc Use as instructed 6/1/23      dapagliflozin (Farxiga) 5 MG tablet tablet take 1 tablet by oral route  every day in the morning 7/25/23      Dulaglutide (Trulicity) 1.5 MG/0.5ML solution pen-injector Inject 1.5 mg under the skin 1 (one) time per week. 1/4/23      escitalopram (LEXAPRO) 10 MG tablet Take 1 tablet (10 mg total) by mouth 1 (one) time each day. 8/1/23      famotidine (PEPCID) 40 MG tablet Take 1 tablet by mouth at bedtime for 30 days 12/5/22      famotidine (PEPCID) 40 MG tablet Take 1 tablet by mouth every night at bedtime. 6/28/23      gabapentin (NEURONTIN) 100 MG capsule  5/5/21   ProviderJoseline MD   gabapentin (NEURONTIN) 100 MG capsule take 1 capsule by mouth three times a day 5/9/23      gabapentin (NEURONTIN) 100 MG capsule take 1 capsule by mouth three times a day 8/26/23      hydrALAZINE (APRESOLINE) 25 MG tablet Take 1 tablet (25 mg total) by mouth 3 (three) times a day. 12/22/23      icosapent ethyl (Vascepa) 1 g capsule capsule Take 2 capsules by mouth 2 (two) times a day. 11/2/23       insulin aspart (NovoLOG FlexPen) 100 UNIT/ML solution pen-injector sc pen Inject 5 Units under the skin 3 (three) times a day before meals. 5/23/22      insulin aspart (NovoLOG FlexPen) 100 UNIT/ML solution pen-injector sc pen Inject 5 Units under the skin 3 (three) times a day before meals. 9/13/22      insulin aspart (NovoLOG FlexPen) 100 UNIT/ML solution pen-injector sc pen Inject 5 Units under the skin 3 (three) times a day before meals. 1/26/24      insulin detemir (Levemir FlexPen) 100 UNIT/ML injection Inject 30 Units under the skin into the appropriate area as directed 2 (Two) Times a Day. 2/9/24      insulin detemir (Levemir FlexTouch) 100 UNIT/ML injection Inject 30 Units under the skin into the appropriate area as directed. 3/2/20   Ibrahima Correa MD   insulin detemir (Levemir FlexTouch) 100 UNIT/ML injection Inject 30 Units under the skin every night. 10/27/21      insulin detemir (Levemir FlexTouch) 100 UNIT/ML injection Inject 30 Units under the skin every night. 10/27/21      Insulin Lispro, 1 Unit Dial, (HumaLOG KwikPen) 100 UNIT/ML solution pen-injector Inject as directed based on sliding scale.  2 units -249, 4 units 250-299, 6 units 300-349, 8 units 350-399, 10 units > bs 400 2/11/22   Ibrahima Correa MD   Insulin Pen Needle (Unifine Pentips) 32G X 4 MM misc Use with Insulin Daily 3/10/21      losartan (COZAAR) 100 MG tablet Take 1 tablet by mouth Daily. 5/11/23      metoclopramide (REGLAN) 5 MG tablet Take 1 tablet by mouth before meals and at bedtime 6/30/22      metoclopramide (Reglan) 5 MG tablet Take 1 tablet by mouth four times a day for 30 days 12/5/22      metoclopramide (Reglan) 5 MG tablet Take 1 tablet by mouth 4 (Four) Times a Day. 6/28/23      metoprolol tartrate (LOPRESSOR) 100 MG tablet Take 1 tablet by mouth 2 (two) times a day. 8/25/23      ondansetron (ZOFRAN) 4 MG tablet Take 1 tablet (4 mg total) by mouth every 8 (eight) hours if needed for nausea or  vomiting for up to 7 days. 5/5/21      ondansetron (ZOFRAN) 4 MG tablet Take 1 tablet by mouth four times a day as needed 5/21/21      ondansetron (ZOFRAN) 4 MG tablet Take 1 tablet by mouth three times a day as needed 1/9/24      pantoprazole (PROTONIX) 40 MG EC tablet Take 1 tablet by mouth every morning for 30 days 5/21/21      pantoprazole (PROTONIX) 40 MG EC tablet Take 1 tablet by mouth every morning 12/6/22      pantoprazole (PROTONIX) 40 MG EC tablet Take 1 tablet by mouth Every Morning. 6/28/23      predniSONE (DELTASONE) 20 MG tablet Take 3 tabs (60mg) daily for 5 days, then take 2 tabs (40mg) daily for 2 days, then take 1 tab (20mg) daily for 2 days. 7/20/21      Tdap (ADACEL) 5-2-15.5 LF-MCG/0.5 injection Inject  into the appropriate muscle as directed by prescriber. 11/4/22   Avelino Marion MD   terbinafine (lamiSIL) 250 MG tablet Take 1 tablet by mouth Daily. 11/10/23      vitamin B-12 (CYANOCOBALAMIN) 1000 MCG tablet Take 1,000 mcg by mouth Daily.    Provider, MD Joseline   vitamin D (ERGOCALCIFEROL) 1.25 MG (77467 UT) capsule capsule Take 1 capsule (50,000 Units total) by mouth 1 (one) time per week. 7/21/21      glyburide (DIAbeta) 5 MG tablet Take 1 tablet by mouth 2 (Two) Times a Day. 2/3/21 3/10/21     hydroCHLOROthiazide (HYDRODIURIL) 25 MG tablet Take 1 tablet by mouth Daily. 1/20/23 8/1/23     metFORMIN (GLUCOPHAGE) 1000 MG tablet Take 1 tablet by mouth 2 (Two) Times a Day With Meals. 5/9/23 7/7/23     omeprazole (priLOSEC) 20 MG capsule Take 1 capsule (20 mg total) by mouth 1 (one) time each day. Do not crush or chew. 3/10/21 7/20/21          Objective / Physical Exam     Vital signs:  Temp: 98.9 °F (37.2 °C)  BP: 163/75  Heart Rate: 51  Resp: 16  SpO2: 96 %  Weight: 71 kg (156 lb 8.4 oz)    Admission Weight: Weight: 71 kg (156 lb 8.4 oz)    Physical Exam  Constitutional:       Appearance: Normal appearance.   Eyes:      Pupils: Pupils are equal, round, and reactive to light.    Cardiovascular:      Rate and Rhythm: Normal rate and regular rhythm.   Pulmonary:      Effort: Pulmonary effort is normal.      Breath sounds: Normal breath sounds.   Abdominal:      Palpations: Abdomen is soft.   Musculoskeletal:         General: Normal range of motion.   Skin:     General: Skin is dry.   Neurological:      Mental Status: She is alert and oriented to person, place, and time.   Psychiatric:         Mood and Affect: Mood normal.         Behavior: Behavior normal.            Labs     Results from last 7 days   Lab Units 02/19/24  2148   WBC 10*3/mm3 8.80   HEMOGLOBIN g/dL 11.9*   HEMATOCRIT % 37.9   PLATELETS 10*3/mm3 233      Results from last 7 days   Lab Units 02/19/24  2148   ALK PHOS U/L 164*   AST (SGOT) U/L 11   ALT (SGPT) U/L 14      Results from last 7 days   Lab Units 02/19/24  2148   PROTIME Seconds 10.4   INR  0.95   APTT seconds 27.1*      Results from last 7 days   Lab Units 02/19/24  2148   SODIUM mmol/L 130*   POTASSIUM mmol/L 3.7   CHLORIDE mmol/L 92*   CO2 mmol/L 25.0   BUN mg/dL 21*   CREATININE mg/dL 1.61*   GLUCOSE mg/dL 517*        Imaging     XR Chest 1 View    Result Date: 2/19/2024  XR CHEST 1 VW Date of Exam: 2/19/2024 10:11 PM EST Indication: pain Comparison: 2/5/2024 Findings: Previous median sternotomy is again noted with a combination of sternal plates and wires. Heart is enlarged. Vasculature appears normal. Lungs are well expanded and appear clear except for a couple granulomatous calcifications. No effusion or pneumothorax is seen.     Impression: Cardiomegaly without evidence of congestive failure. No other evidence of active disease. Electronically Signed: Jorge Cox MD  2/19/2024 10:24 PM EST  Workstation ID: [id number]          Current Medications     Scheduled Meds:  acetaminophen, 1,000 mg, Oral, Once  aspirin, 325 mg, Oral, Once  [START ON 2/20/2024] insulin lispro, 1-200 Units, Subcutaneous, 4x Daily With Meals & Nightly  insulin regular, 4 Units,  Subcutaneous, Once  [START ON 2/20/2024] nicotine, 1 patch, Transdermal, Q24H  senna-docusate sodium, 2 tablet, Oral, BID  sodium chloride, 10 mL, Intravenous, Q12H         Continuous Infusions:  nitroglycerin, 10-50 mcg/min, Last Rate: 30 mcg/min (02/19/24 7522)           [facility]  02/19/24   23:58 EST      Electronically signed by Samara Fine DO at 02/20/24 0431          Emergency Department Notes        Marcellus Reynolds MD at 02/19/24 1600          Subjective   History of Present Illness  43-year-old female describes some intermittent chest pain described as sharp in character and of variable duration and intensity over the last 5 days.  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  She reports no fevers or chills or radiating pain or diaphoresis or palpitation.  She reports no relieving or exacerbating factors.  Review of Systems    No past medical history on file.  Coronary artery disease, coronary bypass surgery  Allergies   Allergen Reactions    Insulin Lispro Other (See Comments), Itching, Swelling and Unknown (See Comments)     SWELLING AT INJECTION SITE  SWELLING AT INJECTION SITE  SWELLING AT INJECTION SITE    Other reaction(s): knot/swelling where injected      Latex Itching       No past surgical history on file.    No family history on file.  Family history significant for heart disease  Social History     Socioeconomic History    Marital status:    Chronic smoker          Objective   Physical Exam  General: Well-developed well-appearing, no acute distress, alert and appropriate  Eyes:  sclera nonicteric  HEENT: Mucous membranes moist, no mucosal swelling  Neck: Supple, no nuchal rigidity, no JVD  Respirations: Respirations nonlabored, equal breath sounds bilaterally, clear lungs  Heart regular rate and rhythm, no murmurs rubs  or gallops,   Abdomen soft nontender nondistended, no hepatosplenomegaly, no hernia, no mass, normal bowel sounds, no CVA tenderness  Extremities no clubbing cyanosis or edema, calves are symmetric and nontender  Neuro cranial nerves grossly intact, no focal limb deficits  Psych oriented, pleasant affect  Skin no rash, brisk cap refill  Procedures          ED Course  ED Course as of 02/19/24 2316 Mon Feb 19, 2024 2151 Patient EKG was transmitted to interventional cardiology on-call Dr. Su for review.  Initial thought by cardiology is that this is exhibiting more changes of LVH as opposed to acute ischemia.  We have no previous available for comparison at this time but are seeking records from [facility] where she has been seen in the past. [SH]      ED Course User Index  [SH] Marcellus Reynolds MD      Results for orders placed or performed during the hospital encounter of 02/19/24   Comprehensive Metabolic Panel    Specimen: Blood   Result Value Ref Range    Glucose 517 (C) 65 - 99 mg/dL    BUN 21 (H) 6 - 20 mg/dL    Creatinine 1.61 (H) 0.57 - 1.00 mg/dL    Sodium 130 (L) 136 - 145 mmol/L    Potassium 3.7 3.5 - 5.2 mmol/L    Chloride 92 (L) 98 - 107 mmol/L    CO2 25.0 22.0 - 29.0 mmol/L    Calcium 9.5 8.6 - 10.5 mg/dL    Total Protein 7.8 6.0 - 8.5 g/dL    Albumin 4.2 3.5 - 5.2 g/dL    ALT (SGPT) 14 1 - 33 U/L    AST (SGOT) 11 1 - 32 U/L    Alkaline Phosphatase 164 (H) 39 - 117 U/L    Total Bilirubin <0.2 0.0 - 1.2 mg/dL    Globulin 3.6 gm/dL    A/G Ratio 1.2 g/dL    BUN/Creatinine Ratio 13.0 7.0 - 25.0    Anion Gap 13.0 5.0 - 15.0 mmol/L    eGFR 40.1 (L) >60.0 mL/min/1.73   Protime-INR    Specimen: Blood   Result Value Ref Range    Protime 10.4 9.6 - 11.7 Seconds    INR 0.95 0.93 - 1.10   aPTT    Specimen: Blood   Result Value Ref Range    PTT 27.1 (L) 61.0 - 76.5 seconds   Lipase    Specimen: Blood   Result Value Ref Range    Lipase 87 (H) 13 - 60 U/L   High Sensitivity Troponin T    Specimen: Blood    Result Value Ref Range    HS Troponin T 33 (H) <14 ng/L   TSH    Specimen: Blood   Result Value Ref Range    TSH 3.500 0.270 - 4.200 uIU/mL   Magnesium    Specimen: Blood   Result Value Ref Range    Magnesium 2.2 1.6 - 2.6 mg/dL   CBC Auto Differential    Specimen: Blood   Result Value Ref Range    WBC 8.80 3.40 - 10.80 10*3/mm3    RBC 4.57 3.77 - 5.28 10*6/mm3    Hemoglobin 11.9 (L) 12.0 - 15.9 g/dL    Hematocrit 37.9 34.0 - 46.6 %    MCV 82.9 79.0 - 97.0 fL    MCH 26.1 (L) 26.6 - 33.0 pg    MCHC 31.5 31.5 - 35.7 g/dL    RDW 20.9 (H) 12.3 - 15.4 %    RDW-SD 60.4 (H) 37.0 - 54.0 fl    MPV 9.7 6.0 - 12.0 fL    Platelets 233 140 - 450 10*3/mm3    Neutrophil % 66.2 42.7 - 76.0 %    Lymphocyte % 23.9 19.6 - 45.3 %    Monocyte % 7.0 5.0 - 12.0 %    Eosinophil % 1.7 0.3 - 6.2 %    Basophil % 1.2 0.0 - 1.5 %    Neutrophils, Absolute 5.80 1.70 - 7.00 10*3/mm3    Lymphocytes, Absolute 2.10 0.70 - 3.10 10*3/mm3    Monocytes, Absolute 0.60 0.10 - 0.90 10*3/mm3    Eosinophils, Absolute 0.20 0.00 - 0.40 10*3/mm3    Basophils, Absolute 0.10 0.00 - 0.20 10*3/mm3    nRBC 0.0 0.0 - 0.2 /100 WBC   ECG 12 Lead Chest Pain   Result Value Ref Range    QT Interval 444 ms    QTC Interval 430 ms   ECG 12 Lead Chest Pain   Result Value Ref Range    QT Interval 458 ms    QTC Interval 436 ms   Green Top (Gel)   Result Value Ref Range    Extra Tube Hold for add-ons.    Lavender Top   Result Value Ref Range    Extra Tube hold for add-on    Gold Top - SST   Result Value Ref Range    Extra Tube Hold for add-ons.    Light Blue Top   Result Value Ref Range    Extra Tube Hold for add-ons.      XR Chest 1 View    Result Date: 2/19/2024  Impression: Cardiomegaly without evidence of congestive failure. No other evidence of active disease. Electronically Signed: Jorge Cox MD  2/19/2024 10:24 PM EST  Workstation ID: [id number]             HEART Score: 6                              Medical Decision Making  Presents with chest pain differential diagnosis  including acute coronary syndrome, pulmonary embolus, pneumonia, pneumothorax, dissection    Has no signs of DVT, pulm embolus felt to be unlikely.  She having no back pain or dissection.  She was ordered IV nitroglycerin for severe hypertension upon arrival.  Blood pressure was improved on reexamination and chest pain was resolving.  Repeat EKG is not significantly changed.  She was ordered subcu insulin hyperglycemia.  She was ordered aspirin.  Patient was advised of findings and agreeable plan of admission with cardiology consultation.    Critical care time 40 minutes    Problems Addressed:  Chest pain, unspecified type: complicated acute illness or injury  Hyperglycemia due to diabetes mellitus: complicated acute illness or injury  Hypertensive emergency: complicated acute illness or injury    Amount and/or Complexity of Data Reviewed  Labs: ordered. Decision-making details documented in ED Course.     Details: High-sensitivity troponin in the indeterminate range, CBC shows no leukocytosis, magnesium normal, comprehensive metabolic panel hyperglycemia without significant acidosis, renal insufficiency.  Lipase marginally elevated of uncertain significance, no abdominal pain or tenderness  Radiology: ordered and independent interpretation performed.     Details: My independent interpretation of chest x-ray image cardiomegaly, no pneumonia or congestive failure  ECG/medicine tests: ordered and independent interpretation performed.     Details: My independent interpretation of EKGs performed in the emergency room there is left ventricular hypertrophy there is ST elevation in the anterior and inferior leads rate around 55; old EKGs were obtained from [facility] particularly from June 27, 2018 there was also seen ST elevation in the same leads with similar appearance of her overall EKG at that time with only some slight increased ST elevation on today's EKG from the previous  Discussion of management or test  interpretation with external provider(s): Case and findings discussed with Dr. Su.  We agree that patient EKG findings and presentation at this time or not consistent with active infarction.  We did pursue blood pressure control in the emergency room setting with IV nitroglycerin and blood pressure was much improved.  Patient will be admitted with cardiology consultation.  The hospitalist service was paged for admission.    Patient findings discussed with Mojgan with the hospitalist service for admission.    Risk  OTC drugs.  Prescription drug management.  Decision regarding hospitalization.        Final diagnoses:   Chest pain, unspecified type   Hypertensive emergency   Hyperglycemia due to diabetes mellitus       ED Disposition  ED Disposition       ED Disposition   Decision to Admit    Condition   --    Comment   Level of Care: Progressive Care [20]   Admitting Physician: TIANNA ERIC [[id number]]   Attending Physician: TIANNA ERIC [[id number]]                 No follow-up provider specified.       Medication List      No changes were made to your prescriptions during this visit.            Marcellus Reynolds MD  02/19/24 2316       Marcellus Reynolds MD  02/19/24 2317       Marcellus Reynolds MD  02/19/24 2326      Electronically signed by Marcellus Reynolds MD at 02/19/24 2326       Hannah Herrera PCT at 02/19/24 2149          Per [facility] at [facility] ([phone] option 1) she's faxing over an EKG from 2018 on pt.    Electronically signed by Hannah Herrera PCT at 02/19/24 2150       Vital Signs (last day)       Date/Time Temp Temp src Pulse Resp BP Patient Position SpO2    02/21/24 1502 -- -- 70 -- 173/85 -- --    02/21/24 1432 -- -- 74 -- 157/78 -- --    02/21/24 1401 -- -- 82 -- 146/69 -- --    02/21/24 1331 -- -- 79 -- 133/66 -- --    02/21/24 1303 -- -- 78 -- 146/65 -- --    02/21/24 12:37:10 -- -- 80 16 135/48 -- 99    02/21/24 12:27:29 -- -- 85 14 158/75 -- 100    02/21/24 12:14:51  -- -- 79 12 148/74 -- 98    02/21/24 12:05:06 -- -- 76 16 183/85 -- 100    02/21/24 12:04:53 -- -- -- -- -- -- 100    02/21/24 12:00:01 -- -- -- -- -- -- 97    02/21/24 1056 -- -- 72 -- 133/56 -- --    02/21/24 0956 -- -- 82 -- 174/78 -- --    02/21/24 0926 -- -- 79 -- 176/80 -- --    02/21/24 0900 98.6 (37) Oral 82 15 176/80 -- --    02/21/24 0443 98.6 (37) Oral 78 14 163/81 Lying 96    02/21/24 0114 98.6 (37) Oral 71 14 178/83 Lying 96    02/21/24 0100 -- -- 70 -- -- -- 96    02/21/24 0007 -- -- 59 -- 161/66 -- 98    02/20/24 2300 -- -- 59 -- -- -- 95    02/20/24 2136 98.6 (37) Oral 77 18 145/67 Lying 95    02/20/24 2100 -- -- 73 -- -- -- 96    02/20/24 1725 98.1 (36.7) Oral 59 16 136/69 Lying --    02/20/24 1612 -- -- 54 -- 145/58 Lying --    02/20/24 1353 98 (36.7) Oral -- 16 123/54 Lying 98    02/20/24 1221 -- -- 54 -- 147/65 Lying 98    02/20/24 1118 -- -- -- -- 142/65 -- --    02/20/24 0640 -- -- 49 -- 142/65 Lying --    02/20/24 0555 -- -- 58 -- 128/55 -- --    02/20/24 0525 -- -- 60 -- 127/61 -- --    02/20/24 0454 98.1 (36.7) Oral 60 16 138/60 Lying 95    02/20/24 0440 -- -- 60 -- 139/62 -- --    02/20/24 0340 -- -- 48 -- 160/69 -- --    02/20/24 0253 97.9 (36.6) Oral 49 20 174/79 Lying 99    02/20/24 0245 -- -- 51 -- 158/68 -- 97    02/20/24 0230 -- -- 49 -- 170/79 -- 97    02/20/24 0219 -- -- 49 -- 160/74 -- 98    02/20/24 0204 -- -- 50 -- 176/79 -- 99    02/20/24 0149 -- -- 49 -- 173/75 -- 96    02/20/24 0132 -- -- 51 -- 164/65 -- 97    02/20/24 0116 -- -- 50 -- 180/77 -- 96    02/20/24 0047 -- -- 51 -- 172/74 -- 99    02/20/24 0017 -- -- 52 -- 172/74 -- 98    02/20/24 0002 -- -- 50 -- 164/69 -- 99          Facility-Administered Medications as of 2/21/2024   Medication Dose Route Frequency Provider Last Rate Last Admin    [COMPLETED] acetaminophen (TYLENOL) tablet 1,000 mg  1,000 mg Oral Once Marcellus Reynolds MD   1,000 mg at 02/20/24 0035    acetaminophen (TYLENOL) tablet 650 mg  650 mg Oral Q6H PRN  Jena Barron MD   650 mg at 02/21/24 0814    Acetylcysteine capsule 1,200 mg  1,200 mg Oral BID Jena Barron MD   1,200 mg at 02/21/24 0814    albuterol (PROVENTIL) nebulizer solution 0.083% 2.5 mg/3mL  2.5 mg Nebulization Q6H PRN Jena Barron MD        aspirin EC tablet 81 mg  81 mg Oral Daily Jena Barron MD   81 mg at 02/21/24 0815    [COMPLETED] aspirin tablet 325 mg  325 mg Oral Once Marcellus Reynolds MD   325 mg at 02/20/24 0036    atorvastatin (LIPITOR) tablet 80 mg  80 mg Oral Daily Jena Barron MD   80 mg at 02/21/24 0815    sennosides-docusate (PERICOLACE) 8.6-50 MG per tablet 2 tablet  2 tablet Oral BID Jena Barron MD        And    polyethylene glycol (MIRALAX) packet 17 g  17 g Oral Daily PRN Jena Barron MD        And    bisacodyl (DULCOLAX) EC tablet 5 mg  5 mg Oral Daily PRN Jena Barron MD        And    bisacodyl (DULCOLAX) suppository 10 mg  10 mg Rectal Daily PRN Jena Barron MD        butalbital-acetaminophen-caffeine (FIORICET, ESGIC) -40 MG per tablet 1 tablet  1 tablet Oral Q4H PRN Jena Barron MD   1 tablet at 02/21/24 1457    clopidogrel (PLAVIX) tablet 75 mg  75 mg Oral Daily Jena Barron MD   75 mg at 02/21/24 1457    dextrose (D50W) (25 g/50 mL) IV injection 25 g  25 g Intravenous Q15 Min PRN Jena Barron MD        dextrose (GLUTOSE) oral gel 15 g  15 g Oral Q15 Min PRN Jena Barron MD        diphenhydrAMINE (BENADRYL) capsule 25 mg  25 mg Oral Q6H PRN Jena Barron MD        escitalopram (LEXAPRO) tablet 10 mg  10 mg Oral Daily Jena Barron MD   10 mg at 02/21/24 0815    famotidine (PEPCID) tablet 20 mg  20 mg Oral Nightly Jena Barron MD   20 mg at 02/20/24 1885    ferrous sulfate EC tablet 324 mg  324 mg Oral Daily With Breakfast Jena Barron MD   324 mg at 02/21/24 0815    gabapentin (NEURONTIN) capsule 100 mg  100 mg Oral TID Jena Barron MD   100 mg at 02/21/24 1458    glucagon (GLUCAGEN) injection 1 mg  1  mg Intramuscular Q15 Min PRN Jena Barron MD        hydrALAZINE (APRESOLINE) tablet 100 mg  100 mg Oral Q8H Jena Barron MD   100 mg at 02/21/24 1507    insulin glargine (LANTUS, SEMGLEE) injection 25 Units  25 Units Subcutaneous Q12H Jena Barron MD   25 Units at 02/21/24 0814    insulin lispro (HUMALOG/ADMELOG) injection 2-9 Units  2-9 Units Subcutaneous 4x Daily With Meals & Nightly Jesse Lance MD        metoclopramide (REGLAN) tablet 5 mg  5 mg Oral 4x Daily AC & at Bedtime Jena Barron MD   5 mg at 02/21/24 1457    nicotine (NICODERM CQ) 14 MG/24HR patch 1 patch  1 patch Transdermal Q24H Jena Barron MD   1 patch at 02/20/24 0036    NIFEdipine XL (PROCARDIA XL) 24 hr tablet 30 mg  30 mg Oral Q24H Jena Barron MD   30 mg at 02/21/24 0955    nitroglycerin (NITROSTAT) SL tablet 0.4 mg  0.4 mg Sublingual Q5 Min PRN Jena Barron MD        ondansetron ODT (ZOFRAN-ODT) disintegrating tablet 4 mg  4 mg Oral Q6H PRN Jena Barron MD        Or    ondansetron (ZOFRAN) injection 4 mg  4 mg Intravenous Q6H PRN Jena Barron MD        [COMPLETED] regadenoson (LEXISCAN) injection 0.4 mg  0.4 mg Intravenous Once in imaging Jesse Lance MD   0.4 mg at 02/20/24 1055    [COMPLETED] sodium chloride 0.9 % bolus 500 mL  500 mL Intravenous Once Jena Barron MD   Stopped at 02/21/24 1612    sodium chloride 0.9 % flush 10 mL  10 mL Intravenous PRN Jena Barron MD        sodium chloride 0.9 % flush 10 mL  10 mL Intravenous Q12H Jena Barron MD   10 mL at 02/20/24 2146    sodium chloride 0.9 % flush 10 mL  10 mL Intravenous PRN Jena Barron, MD        sodium chloride 0.9 % infusion 40 mL  40 mL Intravenous PRJena Ballard MD        sodium chloride 0.9 % infusion  60 mL/hr Intravenous Continuous Xochilt Jenkins MD 60 mL/hr at 02/21/24 1612 60 mL/hr at 02/21/24 1612    [COMPLETED] sodium chloride 0.9 % infusion    Code / Trauma / Sedation Continuous Med Anderson,  MD Jena   Stopped at 02/21/24 1612    [COMPLETED] technetium tetrofosmin (Tc-MYOVIEW) injection 1 dose  1 dose Intravenous Once in imaging Jesse Lance MD   1 dose at 02/20/24 0940    [COMPLETED] technetium tetrofosmin (Tc-MYOVIEW) injection 1 dose  1 dose Intravenous Once in imaging Jesse Lance MD   1 dose at 02/20/24 1055     Lab Results (last 24 hours)       Procedure Component Value Units Date/Time    POC Glucose Once [[id number]]  (Abnormal) Collected: 02/21/24 1455    Specimen: Blood Updated: 02/21/24 1458     Glucose 181 mg/dL      Comment: Serial Number: [id number]:  [id number]       POC Glucose Once [[id number]]  (Abnormal) Collected: 02/21/24 1056    Specimen: Blood Updated: 02/21/24 1058     Glucose 250 mg/dL      Comment: Serial Number: [id number]:  [id number]       POC Glucose Once [[id number]]  (Abnormal) Collected: 02/21/24 0628    Specimen: Blood Updated: 02/21/24 0629     Glucose 231 mg/dL      Comment: Serial Number: [id number]:  [id number]       Comprehensive Metabolic Panel [[id number]]  (Abnormal) Collected: 02/21/24 0037    Specimen: Blood Updated: 02/21/24 0111     Glucose 255 mg/dL      BUN 16 mg/dL      Creatinine 1.27 mg/dL      Sodium 137 mmol/L      Potassium 4.3 mmol/L      Comment: Slight hemolysis detected by analyzer. Result may be falsely elevated.        Chloride 103 mmol/L      CO2 26.0 mmol/L      Calcium 8.9 mg/dL      Total Protein 6.1 g/dL      Albumin 3.3 g/dL      ALT (SGPT) 10 U/L      AST (SGOT) 13 U/L      Alkaline Phosphatase 107 U/L      Total Bilirubin <0.2 mg/dL      Globulin 2.8 gm/dL      A/G Ratio 1.2 g/dL      BUN/Creatinine Ratio 12.6     Anion Gap 8.0 mmol/L      eGFR 53.3 mL/min/1.73     Narrative:      GFR Normal >60  Chronic Kidney Disease <60  Kidney Failure <15      Magnesium [[id number]]  (Normal) Collected: 02/21/24 0037    Specimen: Blood Updated: 02/21/24 0111     Magnesium 2.0 mg/dL     Phosphorus [[id number]]  (Normal)  Collected: 02/21/24 0037    Specimen: Blood Updated: 02/21/24 0111     Phosphorus 3.2 mg/dL     Calcium, Ionized [[id number]]  (Normal) Collected: 02/21/24 0037    Specimen: Blood Updated: 02/21/24 0058     Ionized Calcium 1.27 mmol/L     CBC (No Diff) [[id number]]  (Abnormal) Collected: 02/21/24 0037    Specimen: Blood Updated: 02/21/24 0050     WBC 9.60 10*3/mm3      RBC 3.95 10*6/mm3      Hemoglobin 10.1 g/dL      Hematocrit 32.5 %      MCV 82.2 fL      MCH 25.6 pg      MCHC 31.1 g/dL      RDW 20.1 %      RDW-SD 56.4 fl      MPV 9.5 fL      Platelets 191 10*3/mm3     POC Glucose Once [[id number]]  (Abnormal) Collected: 02/21/24 0001    Specimen: Blood Updated: 02/21/24 0002     Glucose 268 mg/dL      Comment: Serial Number: [id number]:  [id number]       Urinalysis With Culture If Indicated - Urine, Clean Catch [[id number]]  (Abnormal) Collected: 02/20/24 1812    Specimen: Urine, Clean Catch Updated: 02/20/24 1825     Color, UA Yellow     Appearance, UA Clear     pH, UA 6.0     Specific Gravity, UA 1.021     Glucose, UA >=1000 mg/dL (3+)     Ketones, UA Negative     Bilirubin, UA Negative     Blood, UA Negative     Protein, UA >=300 mg/dL (3+)     Leuk Esterase, UA Negative     Nitrite, UA Negative     Urobilinogen, UA 0.2 E.U./dL    Narrative:      In absence of clinical symptoms, the presence of pyuria, bacteria, and/or nitrites on the urinalysis result does not correlate with infection.    Urinalysis, Microscopic Only - Urine, Clean Catch [[id number]]  (Abnormal) Collected: 02/20/24 1812    Specimen: Urine, Clean Catch Updated: 02/20/24 1825     RBC, UA 0-2 /HPF      WBC, UA 0-2 /HPF      Comment: Urine culture not indicated.        Bacteria, UA Trace /HPF      Squamous Epithelial Cells, UA 0-2 /HPF      Hyaline Casts, UA None Seen /LPF      Methodology Automated Microscopy    POC Glucose Once [[id number]]  (Abnormal) Collected: 02/20/24 1748    Specimen: Blood Updated: 02/20/24 1750     Glucose 222 mg/dL       Comment: Serial Number: [id number]:  [id number]                Physician Progress Notes (last 24 hours)        Jesse Lance MD at 24 0943             [facility] Medicine Services     Hospitalist History and Physical      Bibiana Inman : 1980 MRN:[id number] LOS:0 ROOM: 10/10      Reason for admission: Chest pain       Patient complaining of headache  No chest pain    Seen per cardiology  Patient with no known history of coronary artery disease status post CABG and history of hypertension hyperlipidemia  Patient had chronic cough and is on inhalers at home  Patient had right-sided chest pain Wednesday week  Her troponin was 44 and 39 and 38  In the ER she was noted to have uncontrolled hypertension  Her A1c was 13  Patient was started on nitro drip for blood pressure control  She is currently pain-free with heart rate in the 50s  Patient had her CABG in  at [facility]  Endocrine will be evaluating the patient for uncontrolled diabetes  Echocardiogram is ordered  She will need ischemic cardiac workup as per cardiology once her blood pressure is stable and heart rate is controlled  She is going to need stent stress test nuclear tomorrow depending on the finding of the echo  Patient was started on aspirin  Unable to tolerate beta-blocker due to bradycardia  Start high intensity statin  Patient was started on hydralazine for blood pressure control  Unable to give ACE and ARB's due to chronic kidney disease  Creatinine was 1.6 yesterday we will get repeat creatinine today      Cardiac stress test done yesterday with inferior and lateral wall ischemia  Awaiting cardiac catheter today  Feeling better no new complaint hemodynamically stable  Creatinine is 1.27  Blood sugar is better controlled in the 200 range.  Per endocrine  Assessment / Plan      Chest pain, rule out ACS  Hx CAD s/p triple bypass   - hold plavix  - ASA given   - troponin 33  - pt reports troponin Thursday 44 and  39 and 38 on Friday  - EKG reviewed by cardiology and ED provider. Initially concern for ST elevation but later received previous EKG from [facility] and appears similar. Possible LVH.  - plan for stress test in AM   - chest X-ray:Cardiomegaly without evidence of congestive failure. No other evidence of active disease   - check echo   - check lipid panel   - on nitro drip for uncontrolled HTN and chest pain resolved  - tylenol for headache  - cardiology consult      DM II with neuropathy and gastroparesis  - continue reglan  - glucomander  -check A1C        Hypertensive emergency   - currently on Nitro drip  -on metoprolol, losartan at home     CKD  - appears around baseline from previous  - will consult Dr. Mcduffie as pt may require contrast for cardiac testing     Anxiety and depression  - lexapro     NIGHAT  - ferrous sulfate     HLD  - on statin and vacepa     Onychomycosis  - taking laisil      Tobacco use  -<1/2ppd  - nicotine patch  - recommend cessation     Nutrition:   NPO Diet NPO Type: Sips with Meds, Ice Chips      DVT prophylaxis:  Mechanical DVT prophylaxis orders are present.           History of Present illness      Bibiana Inman is a 43 y.o. female with PMH of coronary disease status post triple bypass, chronic kidney disease, hyperlipidemia, diabetes, anxiety depression, GERD, neuropathy and gastroparesis, hypertension presented to the hospital on 2/19/24, and was admitted with a principal diagnosis of Chest pain.      Patient reports has had a chronic cough for over a month and has been given inhaler of Trelegy and albuterol with no resolve of cough.  She began having right-sided chest pain on Wednesday approximately 5 days ago.  She reports pain is under the right breast and radiates up to the chest.  Today had pain in her upper back which she attributed to possibly work-related as she is a  and had stooped over frequently for lab draws.  Reports pain is present at rest but worse with activity.   Denies any diaphoresis chills or increased shortness of breath.  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  She came to the ED for evaluation     In the ED troponin 33 sodium 130 chloride 92 BUN 21 creatinine 1.6.  Patient with chronic kidney disease followed by Dr. Jenkins.  Glucose 517.  A1c 13.4.  Negative for leukocytosis.  Hemoglobin 11.9 chest x-ray for cardiomegaly but no other active disease.  EKG reported by ED provider was reviewed with cardiology and appears similar to previous EKG from 2018.  Patient has been placed on nitroglycerin drip for uncontrolled hypertension.  Chest pain is now resolved.  She has been admitted for further observation     Patient was seen and examined on 02/19/24 at 23:58 EST .     Subjective / Review of systems      Review of Systems   Respiratory:  Positive for cough.    Cardiovascular:  Positive for chest pain.   Musculoskeletal:  Positive for back pain.   All other systems reviewed and are negative.           Past Medical/Surgical/Social/Family History & Allergies      Medical History   No past medical history on file.      Surgical History   No past surgical history on file.      Social History   Social History           Socioeconomic History    Marital status:          No family history on file.         Allergies   Allergen Reactions    Insulin Lispro Other (See Comments), Itching, Swelling and Unknown (See Comments)       SWELLING AT INJECTION SITE  SWELLING AT INJECTION SITE  SWELLING AT INJECTION SITE     Other reaction(s): knot/swelling where injected       Latex Itching      Social Determinants of Health           Tobacco Use: Not on file   Alcohol Use: Not on file   Financial Resource Strain: Not on file   Food Insecurity: Not on file   Transportation Needs: Not on file   Physical Activity: Not on file   Stress: Not on file    Social Connections: Unknown (10/12/2023)     Family and Community Support      Help with Day-to-Day Activities: Not on file      Lonely or Isolated: Not on file   Interpersonal Safety: Not At Risk (2/19/2024)     Abuse Screen      Unsafe at Home or Work/School: no      Feels Threatened by Someone?: no      Does Anyone Keep You from Contacting Others or Doint Things Outside the Home?: no      Physical Sign of Abuse Present: no   Depression: Not on file   Housing Stability: Unknown (10/12/2023)     Housing Stability      Current Living Arrangements: Not on file      Potentially Unsafe Housing Conditions: Not on file   Utilities: Not on file   Health Literacy: Unknown (10/12/2023)     Education      Help with school or training?: Not on file      Preferred Language: Not on file   Employment: Unknown (10/12/2023)     Employment      Do you want help finding or keeping work or a job?: Not on file   Disabilities: Unknown (10/12/2023)     Disabilities      Concentrating, Remembering, or Making Decisions Difficulty: Not on file      Doing Errands Independently Difficulty: Not on file         Home Medications              Prior to Admission medications    Medication Sig Start Date End Date Taking? Authorizing Provider   albuterol sulfate  (90 Base) MCG/ACT inhaler Inhale 2 puffs Every 4 (Four) Hours As Needed for Wheezing or Shortness of Air. 12/31/21     Mimi Martinez APRN   albuterol sulfate  (90 Base) MCG/ACT inhaler Inhale 2 puffs every 6 (six) hours if needed for wheezing. 5/9/23         aspirin 81 MG chewable tablet Chew.       Provider, MD Joseline   atorvastatin (LIPITOR) 80 MG tablet Take 0.5 tablets by mouth 2 (two) times a day. 11/14/22         atorvastatin (LIPITOR) 80 MG tablet Take 0.5 tablets by mouth 2 (two) times a day. 2/7/24         brompheniramine-pseudoephedrine-DM 30-2-10 MG/5ML syrup Take 10 mL by mouth 4 (Four) Times a Day As Needed for Congestion, Cough or Allergies. 12/31/21      Mimi Martinez APRN   clopidogrel (PLAVIX) 75 MG tablet Take 1 tablet by mouth Daily. 11/22/23         clotrimazole-betamethasone (LOTRISONE) 1-0.05 % cream apply 1 application on the skin daily 5/9/23         Continuous Blood Gluc  (Dexcom G6 ) device Use as instructed 6/1/23         Continuous Blood Gluc Sensor (Dexcom G6 Sensor) Change sensor every 10 days 6/1/23         Continuous Blood Gluc Transmit (Dexcom G6 Transmitter) misc Use as instructed 6/1/23         dapagliflozin (Farxiga) 5 MG tablet tablet take 1 tablet by oral route  every day in the morning 7/25/23         Dulaglutide (Trulicity) 1.5 MG/0.5ML solution pen-injector Inject 1.5 mg under the skin 1 (one) time per week. 1/4/23         escitalopram (LEXAPRO) 10 MG tablet Take 1 tablet (10 mg total) by mouth 1 (one) time each day. 8/1/23         famotidine (PEPCID) 40 MG tablet Take 1 tablet by mouth at bedtime for 30 days 12/5/22         famotidine (PEPCID) 40 MG tablet Take 1 tablet by mouth every night at bedtime. 6/28/23         gabapentin (NEURONTIN) 100 MG capsule   5/5/21     Joseline Quiroz MD   gabapentin (NEURONTIN) 100 MG capsule take 1 capsule by mouth three times a day 5/9/23         gabapentin (NEURONTIN) 100 MG capsule take 1 capsule by mouth three times a day 8/26/23         hydrALAZINE (APRESOLINE) 25 MG tablet Take 1 tablet (25 mg total) by mouth 3 (three) times a day. 12/22/23         icosapent ethyl (Vascepa) 1 g capsule capsule Take 2 capsules by mouth 2 (two) times a day. 11/2/23         insulin aspart (NovoLOG FlexPen) 100 UNIT/ML solution pen-injector sc pen Inject 5 Units under the skin 3 (three) times a day before meals. 5/23/22         insulin aspart (NovoLOG FlexPen) 100 UNIT/ML solution pen-injector sc pen Inject 5 Units under the skin 3 (three) times a day before meals. 9/13/22         insulin aspart (NovoLOG FlexPen) 100 UNIT/ML solution pen-injector sc pen Inject 5 Units under the skin 3 (three)  times a day before meals. 1/26/24         insulin detemir (Levemir FlexPen) 100 UNIT/ML injection Inject 30 Units under the skin into the appropriate area as directed 2 (Two) Times a Day. 2/9/24         insulin detemir (Levemir FlexTouch) 100 UNIT/ML injection Inject 30 Units under the skin into the appropriate area as directed. 3/2/20     Ibrahima Correa MD   insulin detemir (Levemir FlexTouch) 100 UNIT/ML injection Inject 30 Units under the skin every night. 10/27/21         insulin detemir (Levemir FlexTouch) 100 UNIT/ML injection Inject 30 Units under the skin every night. 10/27/21         Insulin Lispro, 1 Unit Dial, (HumaLOG KwikPen) 100 UNIT/ML solution pen-injector Inject as directed based on sliding scale.  2 units -249, 4 units 250-299, 6 units 300-349, 8 units 350-399, 10 units > bs 400 2/11/22     Ibrahima Correa MD   Insulin Pen Needle (Unifine Pentips) 32G X 4 MM misc Use with Insulin Daily 3/10/21         losartan (COZAAR) 100 MG tablet Take 1 tablet by mouth Daily. 5/11/23         metoclopramide (REGLAN) 5 MG tablet Take 1 tablet by mouth before meals and at bedtime 6/30/22         metoclopramide (Reglan) 5 MG tablet Take 1 tablet by mouth four times a day for 30 days 12/5/22         metoclopramide (Reglan) 5 MG tablet Take 1 tablet by mouth 4 (Four) Times a Day. 6/28/23         metoprolol tartrate (LOPRESSOR) 100 MG tablet Take 1 tablet by mouth 2 (two) times a day. 8/25/23         ondansetron (ZOFRAN) 4 MG tablet Take 1 tablet (4 mg total) by mouth every 8 (eight) hours if needed for nausea or vomiting for up to 7 days. 5/5/21         ondansetron (ZOFRAN) 4 MG tablet Take 1 tablet by mouth four times a day as needed 5/21/21         ondansetron (ZOFRAN) 4 MG tablet Take 1 tablet by mouth three times a day as needed 1/9/24         pantoprazole (PROTONIX) 40 MG EC tablet Take 1 tablet by mouth every morning for 30 days 5/21/21         pantoprazole (PROTONIX) 40 MG EC tablet Take 1  tablet by mouth every morning 12/6/22         pantoprazole (PROTONIX) 40 MG EC tablet Take 1 tablet by mouth Every Morning. 6/28/23         predniSONE (DELTASONE) 20 MG tablet Take 3 tabs (60mg) daily for 5 days, then take 2 tabs (40mg) daily for 2 days, then take 1 tab (20mg) daily for 2 days. 7/20/21         Tdap (ADACEL) 5-2-15.5 LF-MCG/0.5 injection Inject  into the appropriate muscle as directed by prescriber. 11/4/22     Avelino Marion MD   terbinafine (lamiSIL) 250 MG tablet Take 1 tablet by mouth Daily. 11/10/23         vitamin B-12 (CYANOCOBALAMIN) 1000 MCG tablet Take 1,000 mcg by mouth Daily.       Provider, MD Joseline   vitamin D (ERGOCALCIFEROL) 1.25 MG (73703 UT) capsule capsule Take 1 capsule (50,000 Units total) by mouth 1 (one) time per week. 7/21/21         glyburide (DIAbeta) 5 MG tablet Take 1 tablet by mouth 2 (Two) Times a Day. 2/3/21 3/10/21       hydroCHLOROthiazide (HYDRODIURIL) 25 MG tablet Take 1 tablet by mouth Daily. 1/20/23 8/1/23       metFORMIN (GLUCOPHAGE) 1000 MG tablet Take 1 tablet by mouth 2 (Two) Times a Day With Meals. 5/9/23 7/7/23       omeprazole (priLOSEC) 20 MG capsule Take 1 capsule (20 mg total) by mouth 1 (one) time each day. Do not crush or chew. 3/10/21 7/20/21             Objective / Physical Exam      Vital signs:  Temp: 98.9 °F (37.2 °C)  BP: 163/75  Heart Rate: 51  Resp: 16  SpO2: 96 %  Weight: 71 kg (156 lb 8.4 oz)     Admission Weight: Weight: 71 kg (156 lb 8.4 oz)     Physical Exam  Constitutional:       Appearance: Normal appearance.   Eyes:      Pupils: Pupils are equal, round, and reactive to light.   Cardiovascular:      Rate and Rhythm: Normal rate and regular rhythm.   Pulmonary:      Effort: Pulmonary effort is normal.      Breath sounds: Normal breath sounds.   Abdominal:      Palpations: Abdomen is soft.   Musculoskeletal:         General: Normal range of motion.   Skin:     General: Skin is dry.   Neurological:      Mental Status: She is alert  and oriented to person, place, and time.   Psychiatric:         Mood and Affect: Mood normal.         Behavior: Behavior normal.               Labs           Results from last 7 days   Lab Units 02/19/24  2148   WBC 10*3/mm3 8.80   HEMOGLOBIN g/dL 11.9*   HEMATOCRIT % 37.9   PLATELETS 10*3/mm3 233           Results from last 7 days   Lab Units 02/19/24  2148   ALK PHOS U/L 164*   AST (SGOT) U/L 11   ALT (SGPT) U/L 14           Results from last 7 days   Lab Units 02/19/24  2148   PROTIME Seconds 10.4   INR   0.95   APTT seconds 27.1*           Results from last 7 days   Lab Units 02/19/24  2148   SODIUM mmol/L 130*   POTASSIUM mmol/L 3.7   CHLORIDE mmol/L 92*   CO2 mmol/L 25.0   BUN mg/dL 21*   CREATININE mg/dL 1.61*   GLUCOSE mg/dL 517*         Imaging      XR Chest 1 View     Result Date: 2/19/2024  XR CHEST 1 VW Date of Exam: 2/19/2024 10:11 PM EST Indication: pain Comparison: 2/5/2024 Findings: Previous median sternotomy is again noted with a combination of sternal plates and wires. Heart is enlarged. Vasculature appears normal. Lungs are well expanded and appear clear except for a couple granulomatous calcifications. No effusion or pneumothorax is seen.      Impression: Cardiomegaly without evidence of congestive failure. No other evidence of active disease. Electronically Signed: Jorge Cox MD  2/19/2024 10:24 PM EST  Workstation ID: [id number]             Current Medications      Scheduled Meds:  acetaminophen, 1,000 mg, Oral, Once  aspirin, 325 mg, Oral, Once  [START ON 2/20/2024] insulin lispro, 1-200 Units, Subcutaneous, 4x Daily With Meals & Nightly  insulin regular, 4 Units, Subcutaneous, Once  [START ON 2/20/2024] nicotine, 1 patch, Transdermal, Q24H  senna-docusate sodium, 2 tablet, Oral, BID  sodium chloride, 10 mL, Intravenous, Q12H           Continuous Infusions:  nitroglycerin, 10-50 mcg/min, Last Rate: 30 mcg/min (02/19/24 6042)          Electronically signed by Jesse Lance MD at 02/21/24  0944       Cristian Su MD at 24 0811          Referring Provider: Jesse Lance MD    Reason for follow-up: Non-ST elevation MI, uncontrolled hypertension     Patient Care Team:  Ibrahima Correa MD as PCP - General (Family Medicine)  Rachele Zafar RN as Ambulatory  (Population Health)  Xochilt Jenkins MD as Consulting Physician (Nephrology)      SUBJECTIVE  Blood pressure has remained elevated.  Had cardiac catheterization today.     ROS  Review of all systems negative except as indicated.    Since I have last seen, the patient has been without any chest discomfort, shortness of breath, palpitations, dizziness or syncope.  Denies having any headache, abdominal pain, nausea, vomiting, diarrhea, constipation, loss of weight or loss of appetite.  Denies having any excessive bruising, hematuria or blood in the stool.  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 Debbie Fry.    Depressive disorder 2023    Diabetic gastroparesis 2021    Diabetic peripheral neuropathy associated with type 2 diabetes mellitus 2024    Gastroesophageal reflux disease 03/10/2021    GERD (gastroesophageal reflux disease)     Intermittent claudication 2017    Iron deficiency anemia 2020    Mixed hypercholesterolemia and hypertriglyceridemia 2014    Myocardial infarction     Obesity     Obstructive sleep apnea 2021    Personal history of COVID-19 2022    Polyp of colon 2023    Spontaneous      Stress fracture of right ankle with routine healing     Tobacco abuse 2024    Type 2 diabetes mellitus with hyperglycemia, with long-term current use of insulin  2017    Vitamin D deficiency 2024       Past Surgical History:   Procedure Laterality Date     SECTION      CORONARY ARTERY BYPASS GRAFT  2014    LIMA-LAD, SVG-OM2, SVG-PDA, Dr. Debbie Fry.    DILATATION AND CURETTAGE      TONSILLECTOMY         Family History   Family history unknown: Yes       Social History     Tobacco Use    Smoking status: Every Day     Packs/day: 0.25     Years: 15.00     Additional pack years: 0.00     Total pack years: 3.75     Types: Cigarettes    Smokeless tobacco: Never   Vaping Use    Vaping Use: Never used   Substance Use Topics    Alcohol use: Defer    Drug use: Never        Home meds:  Prior to Admission medications    Medication Sig Start Date End Date Taking? Authorizing Provider   albuterol sulfate  (90 Base) MCG/ACT inhaler Inhale 2 puffs every 6 (six) hours if needed for wheezing. 23  Yes    aspirin 81 MG chewable tablet Chew 1 tablet Daily.   Yes ProviderJoseline MD   atorvastatin (LIPITOR) 80 MG tablet Take 1 tablet by mouth Daily.   Yes ProviderJoseline MD   clopidogrel (PLAVIX) 75 MG tablet Take 1 tablet by mouth Daily. 23  Yes    clotrimazole-betamethasone (LOTRISONE) 1-0.05 % cream apply 1 application on the skin daily 23  Yes    dapagliflozin (Farxiga) 5 MG tablet tablet take 1 tablet by oral route  every day in the morning 23  Yes    escitalopram (LEXAPRO) 10 MG tablet Take 1 tablet (10 mg total) by mouth 1 (one) time each day. 23  Yes    famotidine (PEPCID) 40 MG tablet Take 1 tablet by mouth every night at bedtime. 23  Yes    ferrous sulfate 325 (65 FE) MG tablet Take 1 tablet by mouth Daily With Breakfast.   Yes ProviderJoseline MD   gabapentin (NEURONTIN) 100 MG capsule Take 1 capsule by mouth 3 (Three) Times a Day. 21  Yes ProviderJoseline MD   hydrALAZINE (APRESOLINE) 25 MG tablet Take 1 tablet (25 mg total) by mouth 3 (three) times a day. 23  Yes    icosapent ethyl (Vascepa)  1 g capsule capsule Take 2 capsules by mouth 2 (two) times a day. 11/2/23  Yes    insulin aspart (novoLOG FLEXPEN) 100 UNIT/ML solution pen-injector sc pen Inject 15 Units under the skin into the appropriate area as directed 3 (Three) Times a Day With Meals.   Yes ProviderJoseline MD   insulin detemir (Levemir FlexPen) 100 UNIT/ML injection Inject 30 Units under the skin into the appropriate area as directed 2 (Two) Times a Day. 2/9/24  Yes    losartan (COZAAR) 100 MG tablet Take 1 tablet by mouth Daily. 5/11/23  Yes    metoclopramide (REGLAN) 5 MG tablet Take 1 tablet by mouth before meals and at bedtime 6/30/22  Yes    metoprolol tartrate (LOPRESSOR) 100 MG tablet Take 1 tablet by mouth 2 (two) times a day. 8/25/23  Yes    ondansetron (ZOFRAN) 4 MG tablet Take 1 tablet by mouth three times a day as needed 1/9/24  Yes    pantoprazole (PROTONIX) 40 MG EC tablet Take 1 tablet by mouth Every Morning. 6/28/23  Yes    terbinafine (lamiSIL) 250 MG tablet Take 1 tablet by mouth Daily. 11/10/23  Yes    vitamin B-12 (CYANOCOBALAMIN) 1000 MCG tablet Take 1 tablet by mouth Daily.   Yes ProviderJoseline MD   vitamin D (ERGOCALCIFEROL) 1.25 MG (88039 UT) capsule capsule Take 1 capsule (50,000 Units total) by mouth 1 (one) time per week. 7/21/21  Yes    Continuous Blood Gluc  (Dexcom G6 ) device Use as instructed 6/1/23      Continuous Blood Gluc Sensor (Dexcom G6 Sensor) Change sensor every 10 days 6/1/23      Continuous Blood Gluc Transmit (Dexcom G6 Transmitter) misc Use as instructed 6/1/23      Insulin Pen Needle (Unifine Pentips) 32G X 4 MM misc Use with Insulin Daily 3/10/21      glyburide (DIAbeta) 5 MG tablet Take 1 tablet by mouth 2 (Two) Times a Day. 2/3/21 3/10/21     hydroCHLOROthiazide (HYDRODIURIL) 25 MG tablet Take 1 tablet by mouth Daily. 1/20/23 8/1/23     metFORMIN (GLUCOPHAGE) 1000 MG tablet Take 1 tablet by mouth 2 (Two) Times a Day With Meals. 5/9/23 7/7/23     omeprazole  (priLOSEC) 20 MG capsule Take 1 capsule (20 mg total) by mouth 1 (one) time each day. Do not crush or chew. 3/10/21 7/20/21         Allergies:  Latex    Scheduled Meds:[MAR Hold] Acetylcysteine, 1,200 mg, Oral, BID  [MAR Hold] aspirin, 81 mg, Oral, Daily  [MAR Hold] atorvastatin, 80 mg, Oral, Daily  [MAR Hold] escitalopram, 10 mg, Oral, Daily  famotidine, 20 mg, Oral, Nightly  [MAR Hold] ferrous sulfate, 324 mg, Oral, Daily With Breakfast  gabapentin, 100 mg, Oral, TID  hydrALAZINE, 100 mg, Oral, Q8H  [MAR Hold] insulin glargine, 25 Units, Subcutaneous, Q12H  [MAR Hold] insulin lispro, 2-9 Units, Subcutaneous, Q6H  [MAR Hold] metoclopramide, 5 mg, Oral, 4x Daily AC & at Bedtime  [MAR Hold] nicotine, 1 patch, Transdermal, Q24H  NIFEdipine XL, 30 mg, Oral, Q24H  [MAR Hold] senna-docusate sodium, 2 tablet, Oral, BID  [MAR Hold] sodium chloride, 10 mL, Intravenous, Q12H      Continuous Infusions:sodium chloride, 60 mL/hr, Last Rate: 60 mL/hr (02/21/24 1003)  sodium chloride, , Last Rate: 75 mL/hr (02/21/24 1207)      PRN Meds:.  [MAR Hold] acetaminophen    [MAR Hold] albuterol    [MAR Hold] senna-docusate sodium **AND** [MAR Hold] polyethylene glycol **AND** [MAR Hold] bisacodyl **AND** [MAR Hold] bisacodyl    butalbital-acetaminophen-caffeine    [MAR Hold] dextrose    [MAR Hold] dextrose    fentaNYL citrate (PF)    [MAR Hold] glucagon (human recombinant)    iopamidol    lidocaine    midazolam    [MAR Hold] nitroglycerin    [MAR Hold] ondansetron ODT **OR** [MAR Hold] ondansetron    [COMPLETED] Insert Peripheral IV **AND** [MAR Hold] sodium chloride    [MAR Hold] sodium chloride    [MAR Hold] sodium chloride    sodium chloride      OBJECTIVE    Vital Signs  Vitals:    02/21/24 1205 02/21/24 1214 02/21/24 1227 02/21/24 1237   BP: (!) 183/85 148/74 158/75 135/48   BP Location:       Patient Position:       Pulse: 76 79 85 80   Resp: 16 12 14 16   Temp:       TempSrc:       SpO2: 100% 98% 100% 99%   Weight:       Height:    "        Flowsheet Rows      Flowsheet Row First Filed Value   Admission Height 162.6 cm (64\") Documented at 02/19/2024 2131   Admission Weight 71 kg (156 lb 8.4 oz) Documented at 02/19/2024 2131              Intake/Output Summary (Last 24 hours) at 2/21/2024 1241  Last data filed at 2/21/2024 0443  Gross per 24 hour   Intake 480 ml   Output --   Net 480 ml          Telemetry: Normal sinus rhythm    Physical Exam:  The patient is alert, oriented and in no distress.  Vital signs as noted above.  Head and neck revealed no carotid bruits or jugular venous distention.  No thyromegaly or lymphadenopathy is present  Lungs clear.  No wheezing.  Breath sounds are normal bilaterally.  Heart normal first and second heart sounds.  No murmur. No precordial rub is present.  No gallop is present.  Abdomen soft and nontender.  No organomegaly is present.  Extremities with good peripheral pulses without any pedal edema.  Skin warm and dry.  Musculoskeletal system is grossly normal.  CNS grossly normal.       Results Review:  I have personally reviewed the results from the time of this admission to 2/21/2024 12:41 EST and agree with these findings:  []  Laboratory  []  Microbiology  []  Radiology  []  EKG/Telemetry   []  Cardiology/Vascular   []  Pathology  []  Old records  []  Other:    Most notable findings include:    Lab Results (last 24 hours)       Procedure Component Value Units Date/Time    POC Glucose Once [[id number]]  (Abnormal) Collected: 02/21/24 1056    Specimen: Blood Updated: 02/21/24 1058     Glucose 250 mg/dL      Comment: Serial Number: [id number]:  [id number]       POC Glucose Once [[id number]]  (Abnormal) Collected: 02/21/24 0628    Specimen: Blood Updated: 02/21/24 0629     Glucose 231 mg/dL      Comment: Serial Number: [id number]:  [id number]       Comprehensive Metabolic Panel [[id number]]  (Abnormal) Collected: 02/21/24 0037    Specimen: Blood Updated: 02/21/24 0111     Glucose 255 mg/dL      BUN 16 " mg/dL      Creatinine 1.27 mg/dL      Sodium 137 mmol/L      Potassium 4.3 mmol/L      Comment: Slight hemolysis detected by analyzer. Result may be falsely elevated.        Chloride 103 mmol/L      CO2 26.0 mmol/L      Calcium 8.9 mg/dL      Total Protein 6.1 g/dL      Albumin 3.3 g/dL      ALT (SGPT) 10 U/L      AST (SGOT) 13 U/L      Alkaline Phosphatase 107 U/L      Total Bilirubin <0.2 mg/dL      Globulin 2.8 gm/dL      A/G Ratio 1.2 g/dL      BUN/Creatinine Ratio 12.6     Anion Gap 8.0 mmol/L      eGFR 53.3 mL/min/1.73     Narrative:      GFR Normal >60  Chronic Kidney Disease <60  Kidney Failure <15      Magnesium [[id number]]  (Normal) Collected: 02/21/24 0037    Specimen: Blood Updated: 02/21/24 0111     Magnesium 2.0 mg/dL     Phosphorus [[id number]]  (Normal) Collected: 02/21/24 0037    Specimen: Blood Updated: 02/21/24 0111     Phosphorus 3.2 mg/dL     Calcium, Ionized [[id number]]  (Normal) Collected: 02/21/24 0037    Specimen: Blood Updated: 02/21/24 0058     Ionized Calcium 1.27 mmol/L     CBC (No Diff) [[id number]]  (Abnormal) Collected: 02/21/24 0037    Specimen: Blood Updated: 02/21/24 0050     WBC 9.60 10*3/mm3      RBC 3.95 10*6/mm3      Hemoglobin 10.1 g/dL      Hematocrit 32.5 %      MCV 82.2 fL      MCH 25.6 pg      MCHC 31.1 g/dL      RDW 20.1 %      RDW-SD 56.4 fl      MPV 9.5 fL      Platelets 191 10*3/mm3     POC Glucose Once [[id number]]  (Abnormal) Collected: 02/21/24 0001    Specimen: Blood Updated: 02/21/24 0002     Glucose 268 mg/dL      Comment: Serial Number: [id number]:  [id number]       Urinalysis With Culture If Indicated - Urine, Clean Catch [[id number]]  (Abnormal) Collected: 02/20/24 1812    Specimen: Urine, Clean Catch Updated: 02/20/24 1825     Color, UA Yellow     Appearance, UA Clear     pH, UA 6.0     Specific Gravity, UA 1.021     Glucose, UA >=1000 mg/dL (3+)     Ketones, UA Negative     Bilirubin, UA Negative     Blood, UA Negative     Protein, UA >=300 mg/dL (3+)      Leuk Esterase, UA Negative     Nitrite, UA Negative     Urobilinogen, UA 0.2 E.U./dL    Narrative:      In absence of clinical symptoms, the presence of pyuria, bacteria, and/or nitrites on the urinalysis result does not correlate with infection.    Urinalysis, Microscopic Only - Urine, Clean Catch [[id number]]  (Abnormal) Collected: 02/20/24 1812    Specimen: Urine, Clean Catch Updated: 02/20/24 1825     RBC, UA 0-2 /HPF      WBC, UA 0-2 /HPF      Comment: Urine culture not indicated.        Bacteria, UA Trace /HPF      Squamous Epithelial Cells, UA 0-2 /HPF      Hyaline Casts, UA None Seen /LPF      Methodology Automated Microscopy    POC Glucose Once [[id number]]  (Abnormal) Collected: 02/20/24 1748    Specimen: Blood Updated: 02/20/24 1750     Glucose 222 mg/dL      Comment: Serial Number: [id number]:  [id number]       POC Glucose Once [[id number]]  (Abnormal) Collected: 02/20/24 1611    Specimen: Blood Updated: 02/20/24 1612     Glucose 209 mg/dL      Comment: Serial Number: [id number]:  [id number]               Imaging Results (Last 24 Hours)       ** No results found for the last 24 hours. **            LAB RESULTS (LAST 7 DAYS)    CBC  Results from last 7 days   Lab Units 02/21/24  0037 02/19/24  2148   WBC 10*3/mm3 9.60 8.80   RBC 10*6/mm3 3.95 4.57   HEMOGLOBIN g/dL 10.1* 11.9*   HEMATOCRIT % 32.5* 37.9   MCV fL 82.2 82.9   PLATELETS 10*3/mm3 191 233       BMP  Results from last 7 days   Lab Units 02/21/24  0037 02/20/24  0949 02/19/24  2148   SODIUM mmol/L 137 135* 130*   POTASSIUM mmol/L 4.3 3.6 3.7   CHLORIDE mmol/L 103 101 92*   CO2 mmol/L 26.0 23.0 25.0   BUN mg/dL 16 18 21*   CREATININE mg/dL 1.27* 1.15* 1.61*   GLUCOSE mg/dL 255* 218* 517*   MAGNESIUM mg/dL 2.0  --  2.2   PHOSPHORUS mg/dL 3.2  --   --        CMP   Results from last 7 days   Lab Units 02/21/24  0037 02/20/24  0949 02/19/24  2148   SODIUM mmol/L 137 135* 130*   POTASSIUM mmol/L 4.3 3.6 3.7   CHLORIDE mmol/L 103 101 92*   CO2  mmol/L 26.0 23.0 25.0   BUN mg/dL 16 18 21*   CREATININE mg/dL 1.27* 1.15* 1.61*   GLUCOSE mg/dL 255* 218* 517*   ALBUMIN g/dL 3.3*  --  4.2   BILIRUBIN mg/dL <0.2  --  <0.2   ALK PHOS U/L 107  --  164*   AST (SGOT) U/L 13  --  11   ALT (SGPT) U/L 10  --  14   LIPASE U/L  --   --  87*       BNP        TROPONIN  Results from last 7 days   Lab Units 02/20/24  0949 02/20/24  0001   CK TOTAL U/L 40  --    HSTROP T ng/L  --  30*       CoAg  Results from last 7 days   Lab Units 02/19/24  2148   INR  0.95   APTT seconds 27.1*       Creatinine Clearance  Estimated Creatinine Clearance: 53.8 mL/min (A) (by C-G formula based on SCr of 1.27 mg/dL (H)).    ABG        Radiology  XR Chest 1 View    Result Date: 2/19/2024  Impression: Cardiomegaly without evidence of congestive failure. No other evidence of active disease. Electronically Signed: Jorge Cox MD  2/19/2024 10:24 PM EST  Workstation ID: [id number]       EKG  I personally viewed and interpreted the patient's EKG/Telemetry data:  ECG 12 Lead Chest Pain   Preliminary Result   HEART RATE= 57  bpm   RR Interval= 1044  ms   SD Interval= 160  ms   P Horizontal Axis= -10  deg   P Front Axis= 56  deg   QRSD Interval= 100  ms   QT Interval= 446  ms   QTcB= 437  ms   QRS Axis= 57  deg   T Wave Axis= 91  deg   - ABNORMAL ECG -   Sinus bradycardia   Probable left atrial enlargement   LVH with secondary repolarization abnormality   Anterior infarct, acute (LAD)   Electronically Signed By:    Date and Time of Study: 2024-02-20 06:33:12      ECG 12 Lead Chest Pain   Final Result   HEART RATE= 54  bpm   RR Interval= 1104  ms   SD Interval= 142  ms   P Horizontal Axis= 4  deg   P Front Axis= 32  deg   QRSD Interval= 98  ms   QT Interval= 458  ms   QTcB= 436  ms   QRS Axis= 21  deg   T Wave Axis= 119  deg   - ABNORMAL ECG -   Sinus bradycardia   Probable left atrial enlargement   LVH with secondary repolarization abnormality   Anterior ST elevation, probably due to LVH   When compared  with ECG of 19-Feb-2024 21:38:37,   No significant change   Electronically Signed By: Marcellus Reynolds (Tony) 20-Feb-2024 11:06:00   Date and Time of Study: 2024-02-19 22:56:04      ECG 12 Lead Chest Pain   Final Result   HEART RATE= 56  bpm   RR Interval= 1064  ms   OR Interval= 148  ms   P Horizontal Axis= -15  deg   P Front Axis= 43  deg   QRSD Interval= 106  ms   QT Interval= 444  ms   QTcB= 430  ms   QRS Axis= 28  deg   T Wave Axis= 107  deg   - ABNORMAL ECG -   Sinus bradycardia   LVH with secondary repolarization abnormality   Anterior infarct   No previous ECG available for comparison   Electronically Signed By: Marcellus Reynolds (Tony) 20-Feb-2024 11:06:38   Date and Time of Study: 2024-02-19 21:38:37      SCANNED - TELEMETRY     Final Result      SCANNED - TELEMETRY     Final Result      SCANNED - TELEMETRY     Final Result      SCANNED - TELEMETRY     Final Result      SCANNED - TELEMETRY     Final Result      SCANNED - TELEMETRY     Final Result      SCANNED - TELEMETRY     Final Result      SCANNED - TELEMETRY     Final Result      SCANNED - TELEMETRY     Final Result      SCANNED - TELEMETRY     Final Result      SCANNED - TELEMETRY     Final Result            Echocardiogram:    Results for orders placed during the hospital encounter of 02/19/24    Adult Transthoracic Echo Complete W/ Cont if Necessary Per Protocol    Interpretation Summary    Left ventricular systolic function is normal. Calculated left ventricular EF = 69% Left ventricular ejection fraction appears to be 61 - 65%.    Left ventricular diastolic function was normal.  GLS -16%.    Left atrial volume is moderately increased.    Estimated right ventricular systolic pressure from tricuspid regurgitation is normal (<35 mmHg).    No significant valvular abnormalities noted.        Stress Test:  Results for orders placed during the hospital encounter of 02/19/24    Stress Test With Myocardial Perfusion One Day    Interpretation Summary    Findings  consistent with a normal ECG stress test.    (Calculated EF = 63%).    Myocardial perfusion imaging indicates a moderate-sized, severe area of ischemia located in the inferior wall and lateral wall.    Impressions are consistent with an intermediate risk study.         Cardiac Catheterization:  No results found for this or any previous visit.         Other:         ASSESSMENT & PLAN:    Principal Problem:    Coronary artery disease involving native coronary artery of native heart with unstable angina pectoris  Active Problems:    Benign essential hypertension    Diabetic gastroparesis    Mixed hypercholesterolemia and hypertriglyceridemia    Type 2 diabetes mellitus with hyperglycemia, with long-term current use of insulin    Obstructive sleep apnea    Abnormal nuclear stress test    Tobacco abuse    Chest pain, elevated troponin  Known coronary disease with history of CABG  Minimally elevated high-sensitivity troponin which is trending down  Could be secondary to underlying stable coronary disease, chronic kidney disease, uncontrolled hypertension.  Echocardiogram shows preserved LV function.  Normal diastolic function and no significant valvular abnormalities.  Stress test abnormal in the inferior and lateral territory  Cardiac catheterization showed significant coronary artery disease.  RCA , left circumflex with 80 to 90% stenosis in the proximal segment and mid segment.  Ramus with proximal 70 to 80% stenosis..  LAD is totally occluded  LIMA to LAD is patent however distal/apical LAD has 80% stenosis  Vein graft to left circumflex is occluded, vein graft to RCA is patent  She has significant burden of coronary disease which is not acute  Start dual antiplatelet therapy.  Continue statin.  We will focus on risk factors modification  Elevation of troponin and chest pain was in the setting of uncontrolled hypertension  May have to be considered for redo CABG given young age.     Hypertensive  emergency  Nitroglycerin drip has been weaned off  Increased hydralazine to 100 mg p.o. 3 times daily  Adding nifedipine today.  Will uptitrate as tolerated  Unable to give ACE or ARB because of chronic kidney disease  Unable to start beta-blocker due to bradycardia.    Diabetes  Uncontrolled diabetes with A1c of more than 13  Treatment with insulin per Glucomander  Emphasized compliance with medications     Chronic kidney disease  Creatinine improved 1.27, GFR is 53.3  Appears to be at baseline renal function  Followed by nephrology     Hyperlipidemia  She has uncontrolled hypertriglyceridemia with triglycerides of 1588.  Start high intensity statin  High risk for pancreatitis: Lipase is 87  Resume Matthiascepa      Cristian Su MD  02/21/24  12:41 EST                  Electronically signed by Cristian Su MD at 02/21/24 1245          Consult Notes (last 24 hours)        Sparkle Auguste MD at 02/20/24 2347        Consult Orders    1. Inpatient Endocrinology Consult [[id number]] ordered by Jesse Lance MD at 02/20/24 0921                 [facility]    Referring Provider: Dr.Chaker Lance  Reason for Consultation: Diabetes evaluation & management.    Patient Care Team:  Ibrahima Correa MD as PCP - General (Family Medicine)  Rachele Zafar RN as Ambulatory  (Population Health)  Xochilt Jenkins MD as Consulting Physician (Nephrology)    Chief complaint Chest Pain      Subjective .     History of present illness:    This is a  43 y.o. female with type 2 Diabetes x 15y, on Levemir 30 units bid & Novolog 15 units ac tid @ home. Also on Farxiga.  Admitted with chest pain. Cardiac work in progress.    Review of Systems  Review of Systems   Eyes:  Negative for blurred vision.   Cardiovascular:  Positive for chest pain and palpitations.   Gastrointestinal:  Negative for nausea.   Endocrine: Negative for polyuria.   Neurological:  Positive for dizziness. Negative for  headache.       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 Debbie Fry.    Depressive disorder 2023    Diabetic gastroparesis 2021    Diabetic peripheral neuropathy associated with type 2 diabetes mellitus 2024    Gastroesophageal reflux disease 03/10/2021    GERD (gastroesophageal reflux disease)     Intermittent claudication 2017    Iron deficiency anemia 2020    Mixed hypercholesterolemia and hypertriglyceridemia 2014    Myocardial infarction     Obesity     Obstructive sleep apnea 2021    Personal history of COVID-19 2022    Polyp of colon 2023    Spontaneous      Stress fracture of right ankle with routine healing     Tobacco abuse 2024    Type 2 diabetes mellitus with hyperglycemia, with long-term current use of insulin 2017    Vitamin D deficiency 2024     Past Surgical History:   Procedure Laterality Date     SECTION      CORONARY ARTERY BYPASS GRAFT  2014    LIMA-LAD, SVG-OM2, SVG-PDA, Dr. Debbie Fry.    DILATATION AND CURETTAGE      TONSILLECTOMY       Family History   Family history unknown: Yes     Social History     Tobacco Use    Smoking status: Every Day     Packs/day: 0.25     Years: 15.00     Additional pack years: 0.00     Total pack years: 3.75     Types: Cigarettes    Smokeless tobacco: Never   Vaping Use    Vaping Use: Never used   Substance Use Topics    Alcohol use: Defer    Drug use: Never     Medications Prior to Admission   Medication Sig Dispense Refill Last Dose    albuterol sulfate  (90 Base) MCG/ACT inhaler Inhale 2 puffs every 6 (six) hours if needed for wheezing. 18 g 0     aspirin 81 MG chewable tablet Chew 1 tablet  Daily.       atorvastatin (LIPITOR) 80 MG tablet Take 1 tablet by mouth Daily.       clopidogrel (PLAVIX) 75 MG tablet Take 1 tablet by mouth Daily. 90 tablet 0     clotrimazole-betamethasone (LOTRISONE) 1-0.05 % cream apply 1 application on the skin daily 90 g 0     dapagliflozin (Farxiga) 5 MG tablet tablet take 1 tablet by oral route  every day in the morning 30 tablet 11     escitalopram (LEXAPRO) 10 MG tablet Take 1 tablet (10 mg total) by mouth 1 (one) time each day. 30 tablet 5     famotidine (PEPCID) 40 MG tablet Take 1 tablet by mouth every night at bedtime. 90 tablet 3     ferrous sulfate 325 (65 FE) MG tablet Take 1 tablet by mouth Daily With Breakfast.       gabapentin (NEURONTIN) 100 MG capsule Take 1 capsule by mouth 3 (Three) Times a Day.       hydrALAZINE (APRESOLINE) 25 MG tablet Take 1 tablet (25 mg total) by mouth 3 (three) times a day. 90 tablet 0     icosapent ethyl (Vascepa) 1 g capsule capsule Take 2 capsules by mouth 2 (two) times a day. 360 capsule 0     insulin aspart (novoLOG FLEXPEN) 100 UNIT/ML solution pen-injector sc pen Inject 15 Units under the skin into the appropriate area as directed 3 (Three) Times a Day With Meals.       insulin detemir (Levemir FlexPen) 100 UNIT/ML injection Inject 30 Units under the skin into the appropriate area as directed 2 (Two) Times a Day. 15 mL 0     losartan (COZAAR) 100 MG tablet Take 1 tablet by mouth Daily. 90 tablet 0     metoclopramide (REGLAN) 5 MG tablet Take 1 tablet by mouth before meals and at bedtime 120 tablet 9     metoprolol tartrate (LOPRESSOR) 100 MG tablet Take 1 tablet by mouth 2 (two) times a day. 180 tablet 1     ondansetron (ZOFRAN) 4 MG tablet Take 1 tablet by mouth three times a day as needed 30 tablet 4     pantoprazole (PROTONIX) 40 MG EC tablet Take 1 tablet by mouth Every Morning. 90 tablet 3     terbinafine (lamiSIL) 250 MG tablet Take 1 tablet by mouth Daily. 90 tablet 0     vitamin B-12 (CYANOCOBALAMIN) 1000 MCG tablet  Take 1 tablet by mouth Daily.       vitamin D (ERGOCALCIFEROL) 1.25 MG (19900 UT) capsule capsule Take 1 capsule (50,000 Units total) by mouth 1 (one) time per week. 4 capsule 5     Continuous Blood Gluc  (Dexcom G6 ) device Use as instructed 1 each 0     Continuous Blood Gluc Sensor (Dexcom G6 Sensor) Change sensor every 10 days 3 each 2     Continuous Blood Gluc Transmit (Dexcom G6 Transmitter) misc Use as instructed 1 each 0     Insulin Pen Needle (Unifine Pentips) 32G X 4 MM misc Use with Insulin Daily 100 each 3      Scheduled Meds:  Acetylcysteine, 1,200 mg, Oral, BID  aspirin, 81 mg, Oral, Daily  atorvastatin, 80 mg, Oral, Daily  escitalopram, 10 mg, Oral, Daily  famotidine, 20 mg, Oral, Nightly  ferrous sulfate, 324 mg, Oral, Daily With Breakfast  gabapentin, 100 mg, Oral, TID  hydrALAZINE, 100 mg, Oral, Q8H  insulin glargine, 25 Units, Subcutaneous, Q12H  insulin lispro, 2-9 Units, Subcutaneous, Q6H  metoclopramide, 5 mg, Oral, 4x Daily AC & at Bedtime  nicotine, 1 patch, Transdermal, Q24H  senna-docusate sodium, 2 tablet, Oral, BID  sodium chloride, 10 mL, Intravenous, Q12H      Continuous Infusions:  sodium chloride, 60 mL/hr, Last Rate: 60 mL/hr (02/20/24 1227)      PRN Meds:    acetaminophen    albuterol    senna-docusate sodium **AND** polyethylene glycol **AND** bisacodyl **AND** bisacodyl    dextrose    dextrose    glucagon (human recombinant)    nitroglycerin    ondansetron ODT **OR** ondansetron    [COMPLETED] Insert Peripheral IV **AND** sodium chloride    sodium chloride    sodium chloride  Allergies:  Latex    Objective     Vital Signs   Temp:  [97.9 °F (36.6 °C)-98.6 °F (37 °C)] 98.6 °F (37 °C)  Heart Rate:  [48-77] 77  Resp:  [16-20] 18  BP: (123-180)/(54-79) 145/67    Physical Exam:     General Appearance:    Alert, cooperative, in no acute distress   Head:    Normocephalic, without obvious abnormality, atraumatic   Eyes:            Lids and lashes normal, conjunctivae and  sclerae normal, no   icterus, no pallor, corneas clear, PERRLA   Throat:   No oral lesions,  oral mucosa moist   Neck:   No adenopathy, supple,  no thyromegaly, no   carotid bruit   Lungs:     Clear    Heart:    Regular rhythm and normal rate   Chest Wall:    Scar from CABG   Abdomen:     Normal bowel sounds, soft                 Extremities:   Moves all extremities well, no edema               Pulses:   Pulses palpable and equal bilaterally   Skin:   Dry   Neurologic:  DTR absent, able to feel the 10g monofilament       Results Review  I have reviewed the patient's new clinical results, labs & imaging.    Lab Results (last 24 hours)       Procedure Component Value Units Date/Time    Urinalysis With Culture If Indicated - Urine, Clean Catch [[id number]]  (Abnormal) Collected: 02/20/24 1812    Specimen: Urine, Clean Catch Updated: 02/20/24 1825     Color, UA Yellow     Appearance, UA Clear     pH, UA 6.0     Specific Gravity, UA 1.021     Glucose, UA >=1000 mg/dL (3+)     Ketones, UA Negative     Bilirubin, UA Negative     Blood, UA Negative     Protein, UA >=300 mg/dL (3+)     Leuk Esterase, UA Negative     Nitrite, UA Negative     Urobilinogen, UA 0.2 E.U./dL    Narrative:      In absence of clinical symptoms, the presence of pyuria, bacteria, and/or nitrites on the urinalysis result does not correlate with infection.    Urinalysis, Microscopic Only - Urine, Clean Catch [[id number]]  (Abnormal) Collected: 02/20/24 1812    Specimen: Urine, Clean Catch Updated: 02/20/24 1825     RBC, UA 0-2 /HPF      WBC, UA 0-2 /HPF      Comment: Urine culture not indicated.        Bacteria, UA Trace /HPF      Squamous Epithelial Cells, UA 0-2 /HPF      Hyaline Casts, UA None Seen /LPF      Methodology Automated Microscopy    POC Glucose Once [[id number]]  (Abnormal) Collected: 02/20/24 1748    Specimen: Blood Updated: 02/20/24 1750     Glucose 222 mg/dL      Comment: Serial Number: [id number]:  [id number]       POC Glucose Once  [[id number]]  (Abnormal) Collected: 02/20/24 1611    Specimen: Blood Updated: 02/20/24 1612     Glucose 209 mg/dL      Comment: Serial Number: [id number]:  [id number]       Uric Acid [[id number]]  (Abnormal) Collected: 02/20/24 0949    Specimen: Blood from Arm, Left Updated: 02/20/24 1236     Uric Acid 6.0 mg/dL     POC Glucose Once [[id number]]  (Abnormal) Collected: 02/20/24 1210    Specimen: Blood Updated: 02/20/24 1212     Glucose 327 mg/dL      Comment: Serial Number: [id number]:  [id number]       CK [[id number]]  (Normal) Collected: 02/20/24 0949    Specimen: Blood from Arm, Left Updated: 02/20/24 1154     Creatine Kinase 40 U/L     LDL Cholesterol, Direct [[id number]]  (Normal) Collected: 02/19/24 2148    Specimen: Blood Updated: 02/20/24 1056     LDL Cholesterol  62 mg/dL     Narrative:      LDL Reference Ranges    (U.S. Department of Health and Human Services ATP III Classifications)    Optimal          <100 mg/dl  Near Optimal     100-129 mg/dl  Borderline High  130-159 mg/dl  High             160-189 mg/dl  Very High        >189 mg/dl      Basic Metabolic Panel [[id number]]  (Abnormal) Collected: 02/20/24 0949    Specimen: Blood from Arm, Left Updated: 02/20/24 1036     Glucose 218 mg/dL      BUN 18 mg/dL      Creatinine 1.15 mg/dL      Sodium 135 mmol/L      Potassium 3.6 mmol/L      Chloride 101 mmol/L      CO2 23.0 mmol/L      Calcium 8.8 mg/dL      BUN/Creatinine Ratio 15.7     Anion Gap 11.0 mmol/L      eGFR 60.0 mL/min/1.73     Narrative:      GFR Normal >60  Chronic Kidney Disease <60  Kidney Failure <15      POC Glucose 4x Daily Before Meals & at Bedtime [[id number]]  (Abnormal) Collected: 02/20/24 0700    Specimen: Blood Updated: 02/20/24 0703     Glucose 281 mg/dL      Comment: Serial Number: [id number]:  [id number]       POC Glucose Once [[id number]]  (Abnormal) Collected: 02/20/24 0154    Specimen: Blood Updated: 02/20/24 0156     Glucose 323 mg/dL      Comment: Serial Number:  [id number]:  [id number]       Extra Tubes [[id number]] Collected: 02/20/24 0001    Specimen: Blood, Venous Line Updated: 02/20/24 0115    Narrative:      The following orders were created for panel order Extra Tubes.  Procedure                               Abnormality         Status                     ---------                               -----------         ------                     Lavender Top[[id number]]                                     Final result               Light Blue Top[[id number]]                                   Final result                 Please view results for these tests on the individual orders.    Lavender Top [[id number]] Collected: 02/20/24 0001    Specimen: Blood Updated: 02/20/24 0115     Extra Tube hold for add-on     Comment: Auto resulted       Light Blue Top [[id number]] Collected: 02/20/24 0001    Specimen: Blood Updated: 02/20/24 0115     Extra Tube Hold for add-ons.     Comment: Auto resulted       High Sensitivity Troponin T [[id number]]  (Abnormal) Collected: 02/20/24 0001    Specimen: Blood Updated: 02/20/24 0028     HS Troponin T 30 ng/L     Narrative:      High Sensitive Troponin T Reference Range:  <14.0 ng/L- Negative Female for AMI  <22.0 ng/L- Negative Male for AMI  >=14 - Abnormal Female indicating possible myocardial injury.  >=22 - Abnormal Male indicating possible myocardial injury.   Clinicians would have to utilize clinical acumen, EKG, Troponin, and serial changes to determine if it is an Acute Myocardial Infarction or myocardial injury due to an underlying chronic condition.         Lipid Panel [[id number]]  (Abnormal) Collected: 02/19/24 2148    Specimen: Blood Updated: 02/20/24 0005     Total Cholesterol 259 mg/dL      Triglycerides 1,588 mg/dL      HDL Cholesterol --     Comment: Triglyceride result >1200 mg/dL. HDL result may be affected.  Calculated results will not be reported.         LDL Cholesterol  --     Comment: Unable to calculate        VLDL  Cholesterol --     Comment: Unable to calculate        LDL/HDL Ratio --     Comment: Unable to calculate       Narrative:      Cholesterol Reference Ranges  (U.S. Department of Health and Human Services ATP III Classifications)    Desirable          <200 mg/dL  Borderline High    200-239 mg/dL  High Risk          >240 mg/dL      Triglyceride Reference Ranges  (U.S. Department of Health and Human Services ATP III Classifications)    Normal           <150 mg/dL  Borderline High  150-199 mg/dL  High             200-499 mg/dL  Very High        >500 mg/dL    HDL Reference Ranges  (U.S. Department of Health and Human Services ATP III Classifications)    Low     <40 mg/dl (major risk factor for CHD)  High    >60 mg/dl ('negative' risk factor for CHD)        LDL Reference Ranges  (U.S. Department of Health and Human Services ATP III Classifications)    Optimal          <100 mg/dL  Near Optimal     100-129 mg/dL  Borderline High  130-159 mg/dL  High             160-189 mg/dL  Very High        >189 mg/dL    Hemoglobin A1c [[id number]]  (Abnormal) Collected: 02/19/24 2148    Specimen: Blood Updated: 02/19/24 2354     Hemoglobin A1C 13.40 %           Lab Results   Component Value Date    HGBA1C 13.40 (H) 02/19/2024     Lab Results   Component Value Date    TSH 3.500 02/19/2024       Assessment & Plan     Diabetes type 2, with hyperglycemia  CAD with chest pain    Started on basal insulin & sliding scale lispro since NPO for procedures.  Will add scheduled meal time lispro once eating.  Will follow with you.  Thank you for the consult.    I discussed the patients findings and my recommendations with patient    Sparkle Auguste MD  02/20/24  23:47 EST                Electronically signed by Sparkle Auguste MD at 02/21/24 0014

## 2024-02-21 NOTE — PROGRESS NOTES
Referring Provider: Jesse Lance MD    Reason for follow-up: Non-ST elevation MI, uncontrolled hypertension     Patient Care Team:  Ibrahima Correa MD as PCP - General (Family Medicine)  Rachele Zafar RN as Ambulatory  (Population Health)  Xochilt Jenkins MD as Consulting Physician (Nephrology)      SUBJECTIVE  Blood pressure has remained elevated.  Had cardiac catheterization today.     ROS  Review of all systems negative except as indicated.    Since I have last seen, the patient has been without any chest discomfort, shortness of breath, palpitations, dizziness or syncope.  Denies having any headache, abdominal pain, nausea, vomiting, diarrhea, constipation, loss of weight or loss of appetite.  Denies having any excessive bruising, hematuria or blood in the stool.  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 Debbie Fry.    Depressive disorder 2023    Diabetic gastroparesis 2021    Diabetic peripheral neuropathy associated with type 2 diabetes mellitus 2024    Gastroesophageal reflux disease 03/10/2021    GERD (gastroesophageal reflux disease)     Intermittent claudication 2017    Iron deficiency anemia 2020    Mixed hypercholesterolemia and hypertriglyceridemia 2014    Myocardial infarction     Obesity     Obstructive sleep apnea 2021    Personal history of COVID-19 2022    Polyp of colon 2023    Spontaneous      Stress fracture of right ankle with routine healing     Tobacco abuse 2024    Type 2 diabetes mellitus with hyperglycemia, with long-term current use of insulin 2017    Vitamin D deficiency 2024       Past  Surgical History:   Procedure Laterality Date     SECTION      CORONARY ARTERY BYPASS GRAFT  2014    LIMA-LAD, SVG-OM2, SVG-PDA, Dr. Debbie Fry.    DILATATION AND CURETTAGE      TONSILLECTOMY         Family History   Family history unknown: Yes       Social History     Tobacco Use    Smoking status: Every Day     Packs/day: 0.25     Years: 15.00     Additional pack years: 0.00     Total pack years: 3.75     Types: Cigarettes    Smokeless tobacco: Never   Vaping Use    Vaping Use: Never used   Substance Use Topics    Alcohol use: Defer    Drug use: Never        Home meds:  Prior to Admission medications    Medication Sig Start Date End Date Taking? Authorizing Provider   albuterol sulfate  (90 Base) MCG/ACT inhaler Inhale 2 puffs every 6 (six) hours if needed for wheezing. 23  Yes    aspirin 81 MG chewable tablet Chew 1 tablet Daily.   Yes ProviderJoseline MD   atorvastatin (LIPITOR) 80 MG tablet Take 1 tablet by mouth Daily.   Yes ProviderJoseline MD   clopidogrel (PLAVIX) 75 MG tablet Take 1 tablet by mouth Daily. 23  Yes    clotrimazole-betamethasone (LOTRISONE) 1-0.05 % cream apply 1 application on the skin daily 23  Yes    dapagliflozin (Farxiga) 5 MG tablet tablet take 1 tablet by oral route  every day in the morning 23  Yes    escitalopram (LEXAPRO) 10 MG tablet Take 1 tablet (10 mg total) by mouth 1 (one) time each day. 23  Yes    famotidine (PEPCID) 40 MG tablet Take 1 tablet by mouth every night at bedtime. 23  Yes    ferrous sulfate 325 (65 FE) MG tablet Take 1 tablet by mouth Daily With Breakfast.   Yes ProviderJoseline MD   gabapentin (NEURONTIN) 100 MG capsule Take 1 capsule by mouth 3 (Three) Times a Day. 21  Yes Joseline Quiroz MD   hydrALAZINE (APRESOLINE) 25 MG tablet Take 1 tablet (25 mg total) by mouth 3 (three) times a day. 23  Yes    icosapent ethyl (Vascepa) 1 g capsule capsule Take 2 capsules by mouth 2 (two)  times a day. 11/2/23  Yes    insulin aspart (novoLOG FLEXPEN) 100 UNIT/ML solution pen-injector sc pen Inject 15 Units under the skin into the appropriate area as directed 3 (Three) Times a Day With Meals.   Yes ProviderJoseline MD   insulin detemir (Levemir FlexPen) 100 UNIT/ML injection Inject 30 Units under the skin into the appropriate area as directed 2 (Two) Times a Day. 2/9/24  Yes    losartan (COZAAR) 100 MG tablet Take 1 tablet by mouth Daily. 5/11/23  Yes    metoclopramide (REGLAN) 5 MG tablet Take 1 tablet by mouth before meals and at bedtime 6/30/22  Yes    metoprolol tartrate (LOPRESSOR) 100 MG tablet Take 1 tablet by mouth 2 (two) times a day. 8/25/23  Yes    ondansetron (ZOFRAN) 4 MG tablet Take 1 tablet by mouth three times a day as needed 1/9/24  Yes    pantoprazole (PROTONIX) 40 MG EC tablet Take 1 tablet by mouth Every Morning. 6/28/23  Yes    terbinafine (lamiSIL) 250 MG tablet Take 1 tablet by mouth Daily. 11/10/23  Yes    vitamin B-12 (CYANOCOBALAMIN) 1000 MCG tablet Take 1 tablet by mouth Daily.   Yes ProviderJoseline MD   vitamin D (ERGOCALCIFEROL) 1.25 MG (04900 UT) capsule capsule Take 1 capsule (50,000 Units total) by mouth 1 (one) time per week. 7/21/21  Yes    Continuous Blood Gluc  (Dexcom G6 ) device Use as instructed 6/1/23      Continuous Blood Gluc Sensor (Dexcom G6 Sensor) Change sensor every 10 days 6/1/23      Continuous Blood Gluc Transmit (Dexcom G6 Transmitter) misc Use as instructed 6/1/23      Insulin Pen Needle (Unifine Pentips) 32G X 4 MM misc Use with Insulin Daily 3/10/21      glyburide (DIAbeta) 5 MG tablet Take 1 tablet by mouth 2 (Two) Times a Day. 2/3/21 3/10/21     hydroCHLOROthiazide (HYDRODIURIL) 25 MG tablet Take 1 tablet by mouth Daily. 1/20/23 8/1/23     metFORMIN (GLUCOPHAGE) 1000 MG tablet Take 1 tablet by mouth 2 (Two) Times a Day With Meals. 5/9/23 7/7/23     omeprazole (priLOSEC) 20 MG capsule Take 1 capsule (20 mg total) by mouth  1 (one) time each day. Do not crush or chew. 3/10/21 7/20/21         Allergies:  Latex    Scheduled Meds:[MAR Hold] Acetylcysteine, 1,200 mg, Oral, BID  [MAR Hold] aspirin, 81 mg, Oral, Daily  [MAR Hold] atorvastatin, 80 mg, Oral, Daily  [MAR Hold] escitalopram, 10 mg, Oral, Daily  famotidine, 20 mg, Oral, Nightly  [MAR Hold] ferrous sulfate, 324 mg, Oral, Daily With Breakfast  gabapentin, 100 mg, Oral, TID  hydrALAZINE, 100 mg, Oral, Q8H  [MAR Hold] insulin glargine, 25 Units, Subcutaneous, Q12H  [MAR Hold] insulin lispro, 2-9 Units, Subcutaneous, Q6H  [MAR Hold] metoclopramide, 5 mg, Oral, 4x Daily AC & at Bedtime  [MAR Hold] nicotine, 1 patch, Transdermal, Q24H  NIFEdipine XL, 30 mg, Oral, Q24H  [MAR Hold] senna-docusate sodium, 2 tablet, Oral, BID  [MAR Hold] sodium chloride, 10 mL, Intravenous, Q12H      Continuous Infusions:sodium chloride, 60 mL/hr, Last Rate: 60 mL/hr (02/21/24 1003)  sodium chloride, , Last Rate: 75 mL/hr (02/21/24 1207)      PRN Meds:.  [MAR Hold] acetaminophen    [MAR Hold] albuterol    [MAR Hold] senna-docusate sodium **AND** [MAR Hold] polyethylene glycol **AND** [MAR Hold] bisacodyl **AND** [MAR Hold] bisacodyl    butalbital-acetaminophen-caffeine    [MAR Hold] dextrose    [MAR Hold] dextrose    fentaNYL citrate (PF)    [MAR Hold] glucagon (human recombinant)    iopamidol    lidocaine    midazolam    [MAR Hold] nitroglycerin    [MAR Hold] ondansetron ODT **OR** [MAR Hold] ondansetron    [COMPLETED] Insert Peripheral IV **AND** [MAR Hold] sodium chloride    [MAR Hold] sodium chloride    [MAR Hold] sodium chloride    sodium chloride      OBJECTIVE    Vital Signs  Vitals:    02/21/24 1205 02/21/24 1214 02/21/24 1227 02/21/24 1237   BP: (!) 183/85 148/74 158/75 135/48   BP Location:       Patient Position:       Pulse: 76 79 85 80   Resp: 16 12 14 16   Temp:       TempSrc:       SpO2: 100% 98% 100% 99%   Weight:       Height:           Flowsheet Rows      Flowsheet Row First Filed Value  "  Admission Height 162.6 cm (64\") Documented at 02/19/2024 2131   Admission Weight 71 kg (156 lb 8.4 oz) Documented at 02/19/2024 2131              Intake/Output Summary (Last 24 hours) at 2/21/2024 1241  Last data filed at 2/21/2024 0443  Gross per 24 hour   Intake 480 ml   Output --   Net 480 ml          Telemetry: Normal sinus rhythm    Physical Exam:  The patient is alert, oriented and in no distress.  Vital signs as noted above.  Head and neck revealed no carotid bruits or jugular venous distention.  No thyromegaly or lymphadenopathy is present  Lungs clear.  No wheezing.  Breath sounds are normal bilaterally.  Heart normal first and second heart sounds.  No murmur. No precordial rub is present.  No gallop is present.  Abdomen soft and nontender.  No organomegaly is present.  Extremities with good peripheral pulses without any pedal edema.  Skin warm and dry.  Musculoskeletal system is grossly normal.  CNS grossly normal.       Results Review:  I have personally reviewed the results from the time of this admission to 2/21/2024 12:41 EST and agree with these findings:  []  Laboratory  []  Microbiology  []  Radiology  []  EKG/Telemetry   []  Cardiology/Vascular   []  Pathology  []  Old records  []  Other:    Most notable findings include:    Lab Results (last 24 hours)       Procedure Component Value Units Date/Time    POC Glucose Once [[id number]]  (Abnormal) Collected: 02/21/24 1056    Specimen: Blood Updated: 02/21/24 1058     Glucose 250 mg/dL      Comment: Serial Number: [id number]:  [id number]       POC Glucose Once [[id number]]  (Abnormal) Collected: 02/21/24 0628    Specimen: Blood Updated: 02/21/24 0629     Glucose 231 mg/dL      Comment: Serial Number: [id number]:  [id number]       Comprehensive Metabolic Panel [[id number]]  (Abnormal) Collected: 02/21/24 0037    Specimen: Blood Updated: 02/21/24 0111     Glucose 255 mg/dL      BUN 16 mg/dL      Creatinine 1.27 mg/dL      Sodium 137 mmol/L      " Potassium 4.3 mmol/L      Comment: Slight hemolysis detected by analyzer. Result may be falsely elevated.        Chloride 103 mmol/L      CO2 26.0 mmol/L      Calcium 8.9 mg/dL      Total Protein 6.1 g/dL      Albumin 3.3 g/dL      ALT (SGPT) 10 U/L      AST (SGOT) 13 U/L      Alkaline Phosphatase 107 U/L      Total Bilirubin <0.2 mg/dL      Globulin 2.8 gm/dL      A/G Ratio 1.2 g/dL      BUN/Creatinine Ratio 12.6     Anion Gap 8.0 mmol/L      eGFR 53.3 mL/min/1.73     Narrative:      GFR Normal >60  Chronic Kidney Disease <60  Kidney Failure <15      Magnesium [[id number]]  (Normal) Collected: 02/21/24 0037    Specimen: Blood Updated: 02/21/24 0111     Magnesium 2.0 mg/dL     Phosphorus [[id number]]  (Normal) Collected: 02/21/24 0037    Specimen: Blood Updated: 02/21/24 0111     Phosphorus 3.2 mg/dL     Calcium, Ionized [[id number]]  (Normal) Collected: 02/21/24 0037    Specimen: Blood Updated: 02/21/24 0058     Ionized Calcium 1.27 mmol/L     CBC (No Diff) [[id number]]  (Abnormal) Collected: 02/21/24 0037    Specimen: Blood Updated: 02/21/24 0050     WBC 9.60 10*3/mm3      RBC 3.95 10*6/mm3      Hemoglobin 10.1 g/dL      Hematocrit 32.5 %      MCV 82.2 fL      MCH 25.6 pg      MCHC 31.1 g/dL      RDW 20.1 %      RDW-SD 56.4 fl      MPV 9.5 fL      Platelets 191 10*3/mm3     POC Glucose Once [[id number]]  (Abnormal) Collected: 02/21/24 0001    Specimen: Blood Updated: 02/21/24 0002     Glucose 268 mg/dL      Comment: Serial Number: [id number]:  [id number]       Urinalysis With Culture If Indicated - Urine, Clean Catch [[id number]]  (Abnormal) Collected: 02/20/24 1812    Specimen: Urine, Clean Catch Updated: 02/20/24 1825     Color, UA Yellow     Appearance, UA Clear     pH, UA 6.0     Specific Gravity, UA 1.021     Glucose, UA >=1000 mg/dL (3+)     Ketones, UA Negative     Bilirubin, UA Negative     Blood, UA Negative     Protein, UA >=300 mg/dL (3+)     Leuk Esterase, UA Negative     Nitrite, UA Negative      Urobilinogen, UA 0.2 E.U./dL    Narrative:      In absence of clinical symptoms, the presence of pyuria, bacteria, and/or nitrites on the urinalysis result does not correlate with infection.    Urinalysis, Microscopic Only - Urine, Clean Catch [[id number]]  (Abnormal) Collected: 02/20/24 1812    Specimen: Urine, Clean Catch Updated: 02/20/24 1825     RBC, UA 0-2 /HPF      WBC, UA 0-2 /HPF      Comment: Urine culture not indicated.        Bacteria, UA Trace /HPF      Squamous Epithelial Cells, UA 0-2 /HPF      Hyaline Casts, UA None Seen /LPF      Methodology Automated Microscopy    POC Glucose Once [[id number]]  (Abnormal) Collected: 02/20/24 1748    Specimen: Blood Updated: 02/20/24 1750     Glucose 222 mg/dL      Comment: Serial Number: [id number]:  [id number]       POC Glucose Once [[id number]]  (Abnormal) Collected: 02/20/24 1611    Specimen: Blood Updated: 02/20/24 1612     Glucose 209 mg/dL      Comment: Serial Number: [id number]:  [id number]               Imaging Results (Last 24 Hours)       ** No results found for the last 24 hours. **            LAB RESULTS (LAST 7 DAYS)    CBC  Results from last 7 days   Lab Units 02/21/24  0037 02/19/24  2148   WBC 10*3/mm3 9.60 8.80   RBC 10*6/mm3 3.95 4.57   HEMOGLOBIN g/dL 10.1* 11.9*   HEMATOCRIT % 32.5* 37.9   MCV fL 82.2 82.9   PLATELETS 10*3/mm3 191 233       BMP  Results from last 7 days   Lab Units 02/21/24  0037 02/20/24  0949 02/19/24  2148   SODIUM mmol/L 137 135* 130*   POTASSIUM mmol/L 4.3 3.6 3.7   CHLORIDE mmol/L 103 101 92*   CO2 mmol/L 26.0 23.0 25.0   BUN mg/dL 16 18 21*   CREATININE mg/dL 1.27* 1.15* 1.61*   GLUCOSE mg/dL 255* 218* 517*   MAGNESIUM mg/dL 2.0  --  2.2   PHOSPHORUS mg/dL 3.2  --   --        CMP   Results from last 7 days   Lab Units 02/21/24  0037 02/20/24  0949 02/19/24  2148   SODIUM mmol/L 137 135* 130*   POTASSIUM mmol/L 4.3 3.6 3.7   CHLORIDE mmol/L 103 101 92*   CO2 mmol/L 26.0 23.0 25.0   BUN mg/dL 16 18 21*   CREATININE  mg/dL 1.27* 1.15* 1.61*   GLUCOSE mg/dL 255* 218* 517*   ALBUMIN g/dL 3.3*  --  4.2   BILIRUBIN mg/dL <0.2  --  <0.2   ALK PHOS U/L 107  --  164*   AST (SGOT) U/L 13  --  11   ALT (SGPT) U/L 10  --  14   LIPASE U/L  --   --  87*       BNP        TROPONIN  Results from last 7 days   Lab Units 02/20/24  0949 02/20/24  0001   CK TOTAL U/L 40  --    HSTROP T ng/L  --  30*       CoAg  Results from last 7 days   Lab Units 02/19/24  2148   INR  0.95   APTT seconds 27.1*       Creatinine Clearance  Estimated Creatinine Clearance: 53.8 mL/min (A) (by C-G formula based on SCr of 1.27 mg/dL (H)).    ABG        Radiology  XR Chest 1 View    Result Date: 2/19/2024  Impression: Cardiomegaly without evidence of congestive failure. No other evidence of active disease. Electronically Signed: Jorge Cox MD  2/19/2024 10:24 PM EST  Workstation ID: [id number]       EKG  I personally viewed and interpreted the patient's EKG/Telemetry data:  ECG 12 Lead Chest Pain   Preliminary Result   HEART RATE= 57  bpm   RR Interval= 1044  ms   SC Interval= 160  ms   P Horizontal Axis= -10  deg   P Front Axis= 56  deg   QRSD Interval= 100  ms   QT Interval= 446  ms   QTcB= 437  ms   QRS Axis= 57  deg   T Wave Axis= 91  deg   - ABNORMAL ECG -   Sinus bradycardia   Probable left atrial enlargement   LVH with secondary repolarization abnormality   Anterior infarct, acute (LAD)   Electronically Signed By:    Date and Time of Study: 2024-02-20 06:33:12      ECG 12 Lead Chest Pain   Final Result   HEART RATE= 54  bpm   RR Interval= 1104  ms   SC Interval= 142  ms   P Horizontal Axis= 4  deg   P Front Axis= 32  deg   QRSD Interval= 98  ms   QT Interval= 458  ms   QTcB= 436  ms   QRS Axis= 21  deg   T Wave Axis= 119  deg   - ABNORMAL ECG -   Sinus bradycardia   Probable left atrial enlargement   LVH with secondary repolarization abnormality   Anterior ST elevation, probably due to LVH   When compared with ECG of 19-Feb-2024 21:38:37,   No significant change    Electronically Signed By: Marcellus Reynolds (Tony) 20-Feb-2024 11:06:00   Date and Time of Study: 2024-02-19 22:56:04      ECG 12 Lead Chest Pain   Final Result   HEART RATE= 56  bpm   RR Interval= 1064  ms   WI Interval= 148  ms   P Horizontal Axis= -15  deg   P Front Axis= 43  deg   QRSD Interval= 106  ms   QT Interval= 444  ms   QTcB= 430  ms   QRS Axis= 28  deg   T Wave Axis= 107  deg   - ABNORMAL ECG -   Sinus bradycardia   LVH with secondary repolarization abnormality   Anterior infarct   No previous ECG available for comparison   Electronically Signed By: Marcellus Reynolds (Tony) 20-Feb-2024 11:06:38   Date and Time of Study: 2024-02-19 21:38:37      SCANNED - TELEMETRY     Final Result      SCANNED - TELEMETRY     Final Result      SCANNED - TELEMETRY     Final Result      SCANNED - TELEMETRY     Final Result      SCANNED - TELEMETRY     Final Result      SCANNED - TELEMETRY     Final Result      SCANNED - TELEMETRY     Final Result      SCANNED - TELEMETRY     Final Result      SCANNED - TELEMETRY     Final Result      SCANNED - TELEMETRY     Final Result      SCANNED - TELEMETRY     Final Result            Echocardiogram:    Results for orders placed during the hospital encounter of 02/19/24    Adult Transthoracic Echo Complete W/ Cont if Necessary Per Protocol    Interpretation Summary    Left ventricular systolic function is normal. Calculated left ventricular EF = 69% Left ventricular ejection fraction appears to be 61 - 65%.    Left ventricular diastolic function was normal.  GLS -16%.    Left atrial volume is moderately increased.    Estimated right ventricular systolic pressure from tricuspid regurgitation is normal (<35 mmHg).    No significant valvular abnormalities noted.        Stress Test:  Results for orders placed during the hospital encounter of 02/19/24    Stress Test With Myocardial Perfusion One Day    Interpretation Summary    Findings consistent with a normal ECG stress test.    (Calculated  EF = 63%).    Myocardial perfusion imaging indicates a moderate-sized, severe area of ischemia located in the inferior wall and lateral wall.    Impressions are consistent with an intermediate risk study.         Cardiac Catheterization:  No results found for this or any previous visit.         Other:         ASSESSMENT & PLAN:    Principal Problem:    Coronary artery disease involving native coronary artery of native heart with unstable angina pectoris  Active Problems:    Benign essential hypertension    Diabetic gastroparesis    Mixed hypercholesterolemia and hypertriglyceridemia    Type 2 diabetes mellitus with hyperglycemia, with long-term current use of insulin    Obstructive sleep apnea    Abnormal nuclear stress test    Tobacco abuse    Chest pain, elevated troponin  Known coronary disease with history of CABG  Minimally elevated high-sensitivity troponin which is trending down  Could be secondary to underlying stable coronary disease, chronic kidney disease, uncontrolled hypertension.  Echocardiogram shows preserved LV function.  Normal diastolic function and no significant valvular abnormalities.  Stress test abnormal in the inferior and lateral territory  Cardiac catheterization showed significant coronary artery disease.  RCA , left circumflex with 80 to 90% stenosis in the proximal segment and mid segment.  Ramus with proximal 70 to 80% stenosis..  LAD is totally occluded  LIMA to LAD is patent however distal/apical LAD has 80% stenosis  Vein graft to left circumflex is occluded, vein graft to RCA is patent  She has significant burden of coronary disease which is not acute  Start dual antiplatelet therapy.  Continue statin.  We will focus on risk factors modification  Elevation of troponin and chest pain was in the setting of uncontrolled hypertension  May have to be considered for redo CABG given young age.     Hypertensive emergency  Nitroglycerin drip has been weaned off  Increased hydralazine to  100 mg p.o. 3 times daily  Adding nifedipine today.  Will uptitrate as tolerated  Unable to give ACE or ARB because of chronic kidney disease  Unable to start beta-blocker due to bradycardia.    Diabetes  Uncontrolled diabetes with A1c of more than 13  Treatment with insulin per Glucomander  Emphasized compliance with medications     Chronic kidney disease  Creatinine improved 1.27, GFR is 53.3  Appears to be at baseline renal function  Followed by nephrology     Hyperlipidemia  She has uncontrolled hypertriglyceridemia with triglycerides of 1588.  Start high intensity statin  High risk for pancreatitis: Lipase is 87  Resume Fani Su MD  02/21/24  12:41 EST

## 2024-02-21 NOTE — CONSULTS
[facility]    Referring Provider: Dr.Chaker Lance  Reason for Consultation: Diabetes evaluation & management.    Patient Care Team:  Ibrahima Correa MD as PCP - General (Family Medicine)  Rachele Zafar RN as Ambulatory  (Population Health)  Xochilt Jenkins MD as Consulting Physician (Nephrology)    Chief complaint Chest Pain      Subjective .     History of present illness:    This is a  43 y.o. female with type 2 Diabetes x 15y, on Levemir 30 units bid & Novolog 15 units ac tid @ home. Also on Farxiga.  Admitted with chest pain. Cardiac work in progress.    Review of Systems  Review of Systems   Eyes:  Negative for blurred vision.   Cardiovascular:  Positive for chest pain and palpitations.   Gastrointestinal:  Negative for nausea.   Endocrine: Negative for polyuria.   Neurological:  Positive for dizziness. Negative for headache.       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 Debbie Fry.    Depressive disorder 2023    Diabetic gastroparesis 2021    Diabetic peripheral neuropathy associated with type 2 diabetes mellitus 2024    Gastroesophageal reflux disease 03/10/2021    GERD (gastroesophageal reflux disease)     Intermittent claudication 2017    Iron deficiency anemia 2020    Mixed hypercholesterolemia and hypertriglyceridemia 2014    Myocardial infarction     Obesity     Obstructive sleep apnea 2021    Personal history of COVID-19 2022    Polyp of colon 2023    Spontaneous      Stress fracture of right ankle with routine healing     Tobacco abuse 2024    Type 2 diabetes mellitus with hyperglycemia, with long-term current use  of insulin 2017    Vitamin D deficiency 2024     Past Surgical History:   Procedure Laterality Date     SECTION      CORONARY ARTERY BYPASS GRAFT  2014    LIMA-LAD, SVG-OM2, SVG-PDA, Dr. Debbie Fry.    DILATATION AND CURETTAGE      TONSILLECTOMY       Family History   Family history unknown: Yes     Social History     Tobacco Use    Smoking status: Every Day     Packs/day: 0.25     Years: 15.00     Additional pack years: 0.00     Total pack years: 3.75     Types: Cigarettes    Smokeless tobacco: Never   Vaping Use    Vaping Use: Never used   Substance Use Topics    Alcohol use: Defer    Drug use: Never     Medications Prior to Admission   Medication Sig Dispense Refill Last Dose    albuterol sulfate  (90 Base) MCG/ACT inhaler Inhale 2 puffs every 6 (six) hours if needed for wheezing. 18 g 0     aspirin 81 MG chewable tablet Chew 1 tablet Daily.       atorvastatin (LIPITOR) 80 MG tablet Take 1 tablet by mouth Daily.       clopidogrel (PLAVIX) 75 MG tablet Take 1 tablet by mouth Daily. 90 tablet 0     clotrimazole-betamethasone (LOTRISONE) 1-0.05 % cream apply 1 application on the skin daily 90 g 0     dapagliflozin (Farxiga) 5 MG tablet tablet take 1 tablet by oral route  every day in the morning 30 tablet 11     escitalopram (LEXAPRO) 10 MG tablet Take 1 tablet (10 mg total) by mouth 1 (one) time each day. 30 tablet 5     famotidine (PEPCID) 40 MG tablet Take 1 tablet by mouth every night at bedtime. 90 tablet 3     ferrous sulfate 325 (65 FE) MG tablet Take 1 tablet by mouth Daily With Breakfast.       gabapentin (NEURONTIN) 100 MG capsule Take 1 capsule by mouth 3 (Three) Times a Day.       hydrALAZINE (APRESOLINE) 25 MG tablet Take 1 tablet (25 mg total) by mouth 3 (three) times a day. 90 tablet 0     icosapent ethyl (Vascepa) 1 g capsule capsule Take 2 capsules by mouth 2 (two) times a day. 360 capsule 0     insulin aspart (novoLOG FLEXPEN) 100 UNIT/ML solution pen-injector sc  pen Inject 15 Units under the skin into the appropriate area as directed 3 (Three) Times a Day With Meals.       insulin detemir (Levemir FlexPen) 100 UNIT/ML injection Inject 30 Units under the skin into the appropriate area as directed 2 (Two) Times a Day. 15 mL 0     losartan (COZAAR) 100 MG tablet Take 1 tablet by mouth Daily. 90 tablet 0     metoclopramide (REGLAN) 5 MG tablet Take 1 tablet by mouth before meals and at bedtime 120 tablet 9     metoprolol tartrate (LOPRESSOR) 100 MG tablet Take 1 tablet by mouth 2 (two) times a day. 180 tablet 1     ondansetron (ZOFRAN) 4 MG tablet Take 1 tablet by mouth three times a day as needed 30 tablet 4     pantoprazole (PROTONIX) 40 MG EC tablet Take 1 tablet by mouth Every Morning. 90 tablet 3     terbinafine (lamiSIL) 250 MG tablet Take 1 tablet by mouth Daily. 90 tablet 0     vitamin B-12 (CYANOCOBALAMIN) 1000 MCG tablet Take 1 tablet by mouth Daily.       vitamin D (ERGOCALCIFEROL) 1.25 MG (21961 UT) capsule capsule Take 1 capsule (50,000 Units total) by mouth 1 (one) time per week. 4 capsule 5     Continuous Blood Gluc  (Dexcom G6 ) device Use as instructed 1 each 0     Continuous Blood Gluc Sensor (Dexcom G6 Sensor) Change sensor every 10 days 3 each 2     Continuous Blood Gluc Transmit (Dexcom G6 Transmitter) misc Use as instructed 1 each 0     Insulin Pen Needle (Unifine Pentips) 32G X 4 MM misc Use with Insulin Daily 100 each 3      Scheduled Meds:  Acetylcysteine, 1,200 mg, Oral, BID  aspirin, 81 mg, Oral, Daily  atorvastatin, 80 mg, Oral, Daily  escitalopram, 10 mg, Oral, Daily  famotidine, 20 mg, Oral, Nightly  ferrous sulfate, 324 mg, Oral, Daily With Breakfast  gabapentin, 100 mg, Oral, TID  hydrALAZINE, 100 mg, Oral, Q8H  insulin glargine, 25 Units, Subcutaneous, Q12H  insulin lispro, 2-9 Units, Subcutaneous, Q6H  metoclopramide, 5 mg, Oral, 4x Daily AC & at Bedtime  nicotine, 1 patch, Transdermal, Q24H  senna-docusate sodium, 2 tablet,  Oral, BID  sodium chloride, 10 mL, Intravenous, Q12H      Continuous Infusions:  sodium chloride, 60 mL/hr, Last Rate: 60 mL/hr (02/20/24 1227)      PRN Meds:    acetaminophen    albuterol    senna-docusate sodium **AND** polyethylene glycol **AND** bisacodyl **AND** bisacodyl    dextrose    dextrose    glucagon (human recombinant)    nitroglycerin    ondansetron ODT **OR** ondansetron    [COMPLETED] Insert Peripheral IV **AND** sodium chloride    sodium chloride    sodium chloride  Allergies:  Latex    Objective     Vital Signs   Temp:  [97.9 °F (36.6 °C)-98.6 °F (37 °C)] 98.6 °F (37 °C)  Heart Rate:  [48-77] 77  Resp:  [16-20] 18  BP: (123-180)/(54-79) 145/67    Physical Exam:     General Appearance:    Alert, cooperative, in no acute distress   Head:    Normocephalic, without obvious abnormality, atraumatic   Eyes:            Lids and lashes normal, conjunctivae and sclerae normal, no   icterus, no pallor, corneas clear, PERRLA   Throat:   No oral lesions,  oral mucosa moist   Neck:   No adenopathy, supple,  no thyromegaly, no   carotid bruit   Lungs:     Clear    Heart:    Regular rhythm and normal rate   Chest Wall:    Scar from CABG   Abdomen:     Normal bowel sounds, soft                 Extremities:   Moves all extremities well, no edema               Pulses:   Pulses palpable and equal bilaterally   Skin:   Dry   Neurologic:  DTR absent, able to feel the 10g monofilament       Results Review  I have reviewed the patient's new clinical results, labs & imaging.    Lab Results (last 24 hours)       Procedure Component Value Units Date/Time    Urinalysis With Culture If Indicated - Urine, Clean Catch [[id number]]  (Abnormal) Collected: 02/20/24 1812    Specimen: Urine, Clean Catch Updated: 02/20/24 1825     Color, UA Yellow     Appearance, UA Clear     pH, UA 6.0     Specific Gravity, UA 1.021     Glucose, UA >=1000 mg/dL (3+)     Ketones, UA Negative     Bilirubin, UA Negative     Blood, UA Negative      Protein, UA >=300 mg/dL (3+)     Leuk Esterase, UA Negative     Nitrite, UA Negative     Urobilinogen, UA 0.2 E.U./dL    Narrative:      In absence of clinical symptoms, the presence of pyuria, bacteria, and/or nitrites on the urinalysis result does not correlate with infection.    Urinalysis, Microscopic Only - Urine, Clean Catch [[id number]]  (Abnormal) Collected: 02/20/24 1812    Specimen: Urine, Clean Catch Updated: 02/20/24 1825     RBC, UA 0-2 /HPF      WBC, UA 0-2 /HPF      Comment: Urine culture not indicated.        Bacteria, UA Trace /HPF      Squamous Epithelial Cells, UA 0-2 /HPF      Hyaline Casts, UA None Seen /LPF      Methodology Automated Microscopy    POC Glucose Once [[id number]]  (Abnormal) Collected: 02/20/24 1748    Specimen: Blood Updated: 02/20/24 1750     Glucose 222 mg/dL      Comment: Serial Number: [id number]:  [id number]       POC Glucose Once [[id number]]  (Abnormal) Collected: 02/20/24 1611    Specimen: Blood Updated: 02/20/24 1612     Glucose 209 mg/dL      Comment: Serial Number: [id number]:  [id number]       Uric Acid [[id number]]  (Abnormal) Collected: 02/20/24 0949    Specimen: Blood from Arm, Left Updated: 02/20/24 1236     Uric Acid 6.0 mg/dL     POC Glucose Once [[id number]]  (Abnormal) Collected: 02/20/24 1210    Specimen: Blood Updated: 02/20/24 1212     Glucose 327 mg/dL      Comment: Serial Number: [id number]:  [id number]       CK [[id number]]  (Normal) Collected: 02/20/24 0949    Specimen: Blood from Arm, Left Updated: 02/20/24 1154     Creatine Kinase 40 U/L     LDL Cholesterol, Direct [[id number]]  (Normal) Collected: 02/19/24 2148    Specimen: Blood Updated: 02/20/24 1056     LDL Cholesterol  62 mg/dL     Narrative:      LDL Reference Ranges    (U.S. Department of Health and Human Services ATP III Classifications)    Optimal          <100 mg/dl  Near Optimal     100-129 mg/dl  Borderline High  130-159 mg/dl  High             160-189 mg/dl  Very High         >189 mg/dl      Basic Metabolic Panel [[id number]]  (Abnormal) Collected: 02/20/24 0949    Specimen: Blood from Arm, Left Updated: 02/20/24 1036     Glucose 218 mg/dL      BUN 18 mg/dL      Creatinine 1.15 mg/dL      Sodium 135 mmol/L      Potassium 3.6 mmol/L      Chloride 101 mmol/L      CO2 23.0 mmol/L      Calcium 8.8 mg/dL      BUN/Creatinine Ratio 15.7     Anion Gap 11.0 mmol/L      eGFR 60.0 mL/min/1.73     Narrative:      GFR Normal >60  Chronic Kidney Disease <60  Kidney Failure <15      POC Glucose 4x Daily Before Meals & at Bedtime [[id number]]  (Abnormal) Collected: 02/20/24 0700    Specimen: Blood Updated: 02/20/24 0703     Glucose 281 mg/dL      Comment: Serial Number: [id number]:  [id number]       POC Glucose Once [[id number]]  (Abnormal) Collected: 02/20/24 0154    Specimen: Blood Updated: 02/20/24 0156     Glucose 323 mg/dL      Comment: Serial Number: [id number]:  [id number]       Extra Tubes [[id number]] Collected: 02/20/24 0001    Specimen: Blood, Venous Line Updated: 02/20/24 0115    Narrative:      The following orders were created for panel order Extra Tubes.  Procedure                               Abnormality         Status                     ---------                               -----------         ------                     Lavender Top[[id number]]                                     Final result               Light Blue Top[[id number]]                                   Final result                 Please view results for these tests on the individual orders.    Lavender Top [[id number]] Collected: 02/20/24 0001    Specimen: Blood Updated: 02/20/24 0115     Extra Tube hold for add-on     Comment: Auto resulted       Light Blue Top [[id number]] Collected: 02/20/24 0001    Specimen: Blood Updated: 02/20/24 0115     Extra Tube Hold for add-ons.     Comment: Auto resulted       High Sensitivity Troponin T [[id number]]  (Abnormal) Collected: 02/20/24 0001    Specimen: Blood Updated:  02/20/24 0028     HS Troponin T 30 ng/L     Narrative:      High Sensitive Troponin T Reference Range:  <14.0 ng/L- Negative Female for AMI  <22.0 ng/L- Negative Male for AMI  >=14 - Abnormal Female indicating possible myocardial injury.  >=22 - Abnormal Male indicating possible myocardial injury.   Clinicians would have to utilize clinical acumen, EKG, Troponin, and serial changes to determine if it is an Acute Myocardial Infarction or myocardial injury due to an underlying chronic condition.         Lipid Panel [[id number]]  (Abnormal) Collected: 02/19/24 2148    Specimen: Blood Updated: 02/20/24 0005     Total Cholesterol 259 mg/dL      Triglycerides 1,588 mg/dL      HDL Cholesterol --     Comment: Triglyceride result >1200 mg/dL. HDL result may be affected.  Calculated results will not be reported.         LDL Cholesterol  --     Comment: Unable to calculate        VLDL Cholesterol --     Comment: Unable to calculate        LDL/HDL Ratio --     Comment: Unable to calculate       Narrative:      Cholesterol Reference Ranges  (U.S. Department of Health and Human Services ATP III Classifications)    Desirable          <200 mg/dL  Borderline High    200-239 mg/dL  High Risk          >240 mg/dL      Triglyceride Reference Ranges  (U.S. Department of Health and Human Services ATP III Classifications)    Normal           <150 mg/dL  Borderline High  150-199 mg/dL  High             200-499 mg/dL  Very High        >500 mg/dL    HDL Reference Ranges  (U.S. Department of Health and Human Services ATP III Classifications)    Low     <40 mg/dl (major risk factor for CHD)  High    >60 mg/dl ('negative' risk factor for CHD)        LDL Reference Ranges  (U.S. Department of Health and Human Services ATP III Classifications)    Optimal          <100 mg/dL  Near Optimal     100-129 mg/dL  Borderline High  130-159 mg/dL  High             160-189 mg/dL  Very High        >189 mg/dL    Hemoglobin A1c [[id number]]  (Abnormal) Collected:  02/19/24 2148    Specimen: Blood Updated: 02/19/24 2354     Hemoglobin A1C 13.40 %           Lab Results   Component Value Date    HGBA1C 13.40 (H) 02/19/2024     Lab Results   Component Value Date    TSH 3.500 02/19/2024       Assessment & Plan     Diabetes type 2, with hyperglycemia  CAD with chest pain    Started on basal insulin & sliding scale lispro since NPO for procedures.  Will add scheduled meal time lispro once eating.  Will follow with you.  Thank you for the consult.    I discussed the patients findings and my recommendations with patient    Sparkle Auguste MD  02/20/24  23:47 EST

## 2024-02-21 NOTE — PROGRESS NOTES
[facility] Medicine Services     Hospitalist History and Physical      Bibiana Inman : 1980 MRN:[id number] LOS:0 ROOM: 10/10      Reason for admission: Chest pain       Patient complaining of headache  No chest pain    Seen per cardiology  Patient with no known history of coronary artery disease status post CABG and history of hypertension hyperlipidemia  Patient had chronic cough and is on inhalers at home  Patient had right-sided chest pain Wednesday week  Her troponin was 44 and 39 and 38  In the ER she was noted to have uncontrolled hypertension  Her A1c was 13  Patient was started on nitro drip for blood pressure control  She is currently pain-free with heart rate in the 50s  Patient had her CABG in  at [facility]  Endocrine will be evaluating the patient for uncontrolled diabetes  Echocardiogram is ordered  She will need ischemic cardiac workup as per cardiology once her blood pressure is stable and heart rate is controlled  She is going to need stent stress test nuclear tomorrow depending on the finding of the echo  Patient was started on aspirin  Unable to tolerate beta-blocker due to bradycardia  Start high intensity statin  Patient was started on hydralazine for blood pressure control  Unable to give ACE and ARB's due to chronic kidney disease  Creatinine was 1.6 yesterday we will get repeat creatinine today      Cardiac stress test done yesterday with inferior and lateral wall ischemia  Awaiting cardiac catheter today  Feeling better no new complaint hemodynamically stable  Creatinine is 1.27  Blood sugar is better controlled in the 200 range.  Per endocrine    Cath Multivessel obstructive CAD of the native coronary arteries  Occluded vein graft to what appears to be a diagonal  There is diffuse CAD involving the left circumflex and OM which is not bypassed and there is also obstructive CAD distal to the LIMA touchdown  This is stable coronary artery disease  Low left heart  filling pressures     RECOMMENDATIONS  Recommend medical management for her diffuse CAD in the setting of severely uncontrolled diabetes and smoking  Uptitration of OMT for stable CAD  Patient needs to be aggressively treated for her uncontrolled diabetes  Counseled extensively on smoking cessation  Start Plavix  Blood pressure control  Aggressive cholesterol control with statin with a goal LDL of less than 70  Assessment / Plan      Chest pain, rule out ACS  Hx CAD s/p triple bypass   - hold plavix  - ASA given   - troponin 33  - pt reports troponin Thursday 44 and 39 and 38 on Friday  - EKG reviewed by cardiology and ED provider. Initially concern for ST elevation but later received previous EKG from [facility] and appears similar. Possible LVH.  - plan for stress test in AM   - chest X-ray:Cardiomegaly without evidence of congestive failure. No other evidence of active disease   - check echo   - check lipid panel   - on nitro drip for uncontrolled HTN and chest pain resolved  - tylenol for headache  - cardiology consult      DM II with neuropathy and gastroparesis  - continue reglan  - glucomander  -check A1C        Hypertensive emergency   - currently on Nitro drip  -on metoprolol, losartan at home     CKD  - appears around baseline from previous  - will consult Dr. Mcduffie as pt may require contrast for cardiac testing     Anxiety and depression  - lexapro     NIGHAT  - ferrous sulfate     HLD  - on statin and vacepa     Onychomycosis  - taking laisil      Tobacco use  -<1/2ppd  - nicotine patch  - recommend cessation     Nutrition:   NPO Diet NPO Type: Sips with Meds, Ice Chips      DVT prophylaxis:  Mechanical DVT prophylaxis orders are present.           History of Present illness      Bibiana Inman is a 43 y.o. female with PMH of coronary disease status post triple bypass, chronic kidney disease, hyperlipidemia, diabetes, anxiety depression, GERD, neuropathy and gastroparesis, hypertension presented to the  [facility] on 2/19/24, and was admitted with a principal diagnosis of Chest pain.      Patient reports has had a chronic cough for over a month and has been given inhaler of Trelegy and albuterol with no resolve of cough.  She began having right-sided chest pain on Wednesday approximately 5 days ago.  She reports pain is under the right breast and radiates up to the chest.  Today had pain in her upper back which she attributed to possibly work-related as she is a  and had stooped over frequently for lab draws.  Reports pain is present at rest but worse with activity.  Denies any diaphoresis chills or increased shortness of breath.  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  She came to the ED for evaluation     In the ED troponin 33 sodium 130 chloride 92 BUN 21 creatinine 1.6.  Patient with chronic kidney disease followed by Dr. Jenkins.  Glucose 517.  A1c 13.4.  Negative for leukocytosis.  Hemoglobin 11.9 chest x-ray for cardiomegaly but no other active disease.  EKG reported by ED provider was reviewed with cardiology and appears similar to previous EKG from 2018.  Patient has been placed on nitroglycerin drip for uncontrolled hypertension.  Chest pain is now resolved.  She has been admitted for further observation     Patient was seen and examined on 02/19/24 at 23:58 EST .     Subjective / Review of systems      Review of Systems   Respiratory:  Positive for cough.    Cardiovascular:  Positive for chest pain.   Musculoskeletal:  Positive for back pain.   All other systems reviewed and are negative.           Past Medical/Surgical/Social/Family History & Allergies      Medical History   No past medical history on file.      Surgical History   No past surgical history on file.      Social History   Social History           Socioeconomic History    Marital status:           No family history on file.         Allergies   Allergen Reactions    Insulin Lispro Other (See Comments), Itching, Swelling and Unknown (See Comments)       SWELLING AT INJECTION SITE  SWELLING AT INJECTION SITE  SWELLING AT INJECTION SITE     Other reaction(s): knot/swelling where injected       Latex Itching      Social Determinants of Health           Tobacco Use: Not on file   Alcohol Use: Not on file   Financial Resource Strain: Not on file   Food Insecurity: Not on file   Transportation Needs: Not on file   Physical Activity: Not on file   Stress: Not on file   Social Connections: Unknown (10/12/2023)     Family and Community Support      Help with Day-to-Day Activities: Not on file      Lonely or Isolated: Not on file   Interpersonal Safety: Not At Risk (2/19/2024)     Abuse Screen      Unsafe at Home or Work/School: no      Feels Threatened by Someone?: no      Does Anyone Keep You from Contacting Others or Doint Things Outside the Home?: no      Physical Sign of Abuse Present: no   Depression: Not on file   Housing Stability: Unknown (10/12/2023)     Housing Stability      Current Living Arrangements: Not on file      Potentially Unsafe Housing Conditions: Not on file   Utilities: Not on file   Health Literacy: Unknown (10/12/2023)     Education      Help with school or training?: Not on file      Preferred Language: Not on file   Employment: Unknown (10/12/2023)     Employment      Do you want help finding or keeping work or a job?: Not on file   Disabilities: Unknown (10/12/2023)     Disabilities      Concentrating, Remembering, or Making Decisions Difficulty: Not on file      Doing Errands Independently Difficulty: Not on file         Home Medications              Prior to Admission medications    Medication Sig Start Date End Date Taking? Authorizing Provider   albuterol sulfate  (90 Base) MCG/ACT inhaler Inhale 2 puffs Every 4 (Four) Hours As Needed for Wheezing or Shortness of Air.  12/31/21     Mimi Martinez APRN   albuterol sulfate  (90 Base) MCG/ACT inhaler Inhale 2 puffs every 6 (six) hours if needed for wheezing. 5/9/23         aspirin 81 MG chewable tablet Chew.       ProviderJoseline MD   atorvastatin (LIPITOR) 80 MG tablet Take 0.5 tablets by mouth 2 (two) times a day. 11/14/22         atorvastatin (LIPITOR) 80 MG tablet Take 0.5 tablets by mouth 2 (two) times a day. 2/7/24         brompheniramine-pseudoephedrine-DM 30-2-10 MG/5ML syrup Take 10 mL by mouth 4 (Four) Times a Day As Needed for Congestion, Cough or Allergies. 12/31/21     Mimi Martinez APRN   clopidogrel (PLAVIX) 75 MG tablet Take 1 tablet by mouth Daily. 11/22/23         clotrimazole-betamethasone (LOTRISONE) 1-0.05 % cream apply 1 application on the skin daily 5/9/23         Continuous Blood Gluc  (Dexcom G6 ) device Use as instructed 6/1/23         Continuous Blood Gluc Sensor (Dexcom G6 Sensor) Change sensor every 10 days 6/1/23         Continuous Blood Gluc Transmit (Dexcom G6 Transmitter) misc Use as instructed 6/1/23         dapagliflozin (Farxiga) 5 MG tablet tablet take 1 tablet by oral route  every day in the morning 7/25/23         Dulaglutide (Trulicity) 1.5 MG/0.5ML solution pen-injector Inject 1.5 mg under the skin 1 (one) time per week. 1/4/23         escitalopram (LEXAPRO) 10 MG tablet Take 1 tablet (10 mg total) by mouth 1 (one) time each day. 8/1/23         famotidine (PEPCID) 40 MG tablet Take 1 tablet by mouth at bedtime for 30 days 12/5/22         famotidine (PEPCID) 40 MG tablet Take 1 tablet by mouth every night at bedtime. 6/28/23         gabapentin (NEURONTIN) 100 MG capsule   5/5/21     ProviderJoseline MD   gabapentin (NEURONTIN) 100 MG capsule take 1 capsule by mouth three times a day 5/9/23         gabapentin (NEURONTIN) 100 MG capsule take 1 capsule by mouth three times a day 8/26/23         hydrALAZINE (APRESOLINE) 25 MG tablet Take 1 tablet (25 mg total) by  mouth 3 (three) times a day. 12/22/23         icosapent ethyl (Vascepa) 1 g capsule capsule Take 2 capsules by mouth 2 (two) times a day. 11/2/23         insulin aspart (NovoLOG FlexPen) 100 UNIT/ML solution pen-injector sc pen Inject 5 Units under the skin 3 (three) times a day before meals. 5/23/22         insulin aspart (NovoLOG FlexPen) 100 UNIT/ML solution pen-injector sc pen Inject 5 Units under the skin 3 (three) times a day before meals. 9/13/22         insulin aspart (NovoLOG FlexPen) 100 UNIT/ML solution pen-injector sc pen Inject 5 Units under the skin 3 (three) times a day before meals. 1/26/24         insulin detemir (Levemir FlexPen) 100 UNIT/ML injection Inject 30 Units under the skin into the appropriate area as directed 2 (Two) Times a Day. 2/9/24         insulin detemir (Levemir FlexTouch) 100 UNIT/ML injection Inject 30 Units under the skin into the appropriate area as directed. 3/2/20     Ibrahima Correa MD   insulin detemir (Levemir FlexTouch) 100 UNIT/ML injection Inject 30 Units under the skin every night. 10/27/21         insulin detemir (Levemir FlexTouch) 100 UNIT/ML injection Inject 30 Units under the skin every night. 10/27/21         Insulin Lispro, 1 Unit Dial, (HumaLOG KwikPen) 100 UNIT/ML solution pen-injector Inject as directed based on sliding scale.  2 units -249, 4 units 250-299, 6 units 300-349, 8 units 350-399, 10 units > bs 400 2/11/22     Ibrahima Correa MD   Insulin Pen Needle (Unifine Pentips) 32G X 4 MM misc Use with Insulin Daily 3/10/21         losartan (COZAAR) 100 MG tablet Take 1 tablet by mouth Daily. 5/11/23         metoclopramide (REGLAN) 5 MG tablet Take 1 tablet by mouth before meals and at bedtime 6/30/22         metoclopramide (Reglan) 5 MG tablet Take 1 tablet by mouth four times a day for 30 days 12/5/22         metoclopramide (Reglan) 5 MG tablet Take 1 tablet by mouth 4 (Four) Times a Day. 6/28/23         metoprolol tartrate (LOPRESSOR)  100 MG tablet Take 1 tablet by mouth 2 (two) times a day. 8/25/23         ondansetron (ZOFRAN) 4 MG tablet Take 1 tablet (4 mg total) by mouth every 8 (eight) hours if needed for nausea or vomiting for up to 7 days. 5/5/21         ondansetron (ZOFRAN) 4 MG tablet Take 1 tablet by mouth four times a day as needed 5/21/21         ondansetron (ZOFRAN) 4 MG tablet Take 1 tablet by mouth three times a day as needed 1/9/24         pantoprazole (PROTONIX) 40 MG EC tablet Take 1 tablet by mouth every morning for 30 days 5/21/21         pantoprazole (PROTONIX) 40 MG EC tablet Take 1 tablet by mouth every morning 12/6/22         pantoprazole (PROTONIX) 40 MG EC tablet Take 1 tablet by mouth Every Morning. 6/28/23         predniSONE (DELTASONE) 20 MG tablet Take 3 tabs (60mg) daily for 5 days, then take 2 tabs (40mg) daily for 2 days, then take 1 tab (20mg) daily for 2 days. 7/20/21         Tdap (ADACEL) 5-2-15.5 LF-MCG/0.5 injection Inject  into the appropriate muscle as directed by prescriber. 11/4/22     Avelino Marion MD   terbinafine (lamiSIL) 250 MG tablet Take 1 tablet by mouth Daily. 11/10/23         vitamin B-12 (CYANOCOBALAMIN) 1000 MCG tablet Take 1,000 mcg by mouth Daily.       Provider, MD Joseline   vitamin D (ERGOCALCIFEROL) 1.25 MG (02323 UT) capsule capsule Take 1 capsule (50,000 Units total) by mouth 1 (one) time per week. 7/21/21         glyburide (DIAbeta) 5 MG tablet Take 1 tablet by mouth 2 (Two) Times a Day. 2/3/21 3/10/21       hydroCHLOROthiazide (HYDRODIURIL) 25 MG tablet Take 1 tablet by mouth Daily. 1/20/23 8/1/23       metFORMIN (GLUCOPHAGE) 1000 MG tablet Take 1 tablet by mouth 2 (Two) Times a Day With Meals. 5/9/23 7/7/23       omeprazole (priLOSEC) 20 MG capsule Take 1 capsule (20 mg total) by mouth 1 (one) time each day. Do not crush or chew. 3/10/21 7/20/21             Objective / Physical Exam      Vital signs:  Temp: 98.9 °F (37.2 °C)  BP: 163/75  Heart Rate: 51  Resp: 16  SpO2: 96  %  Weight: 71 kg (156 lb 8.4 oz)     Admission Weight: Weight: 71 kg (156 lb 8.4 oz)     Physical Exam  Constitutional:       Appearance: Normal appearance.   Eyes:      Pupils: Pupils are equal, round, and reactive to light.   Cardiovascular:      Rate and Rhythm: Normal rate and regular rhythm.   Pulmonary:      Effort: Pulmonary effort is normal.      Breath sounds: Normal breath sounds.   Abdominal:      Palpations: Abdomen is soft.   Musculoskeletal:         General: Normal range of motion.   Skin:     General: Skin is dry.   Neurological:      Mental Status: She is alert and oriented to person, place, and time.   Psychiatric:         Mood and Affect: Mood normal.         Behavior: Behavior normal.               Labs           Results from last 7 days   Lab Units 02/19/24  2148   WBC 10*3/mm3 8.80   HEMOGLOBIN g/dL 11.9*   HEMATOCRIT % 37.9   PLATELETS 10*3/mm3 233           Results from last 7 days   Lab Units 02/19/24  2148   ALK PHOS U/L 164*   AST (SGOT) U/L 11   ALT (SGPT) U/L 14           Results from last 7 days   Lab Units 02/19/24  2148   PROTIME Seconds 10.4   INR   0.95   APTT seconds 27.1*           Results from last 7 days   Lab Units 02/19/24  2148   SODIUM mmol/L 130*   POTASSIUM mmol/L 3.7   CHLORIDE mmol/L 92*   CO2 mmol/L 25.0   BUN mg/dL 21*   CREATININE mg/dL 1.61*   GLUCOSE mg/dL 517*         Imaging      XR Chest 1 View     Result Date: 2/19/2024  XR CHEST 1 VW Date of Exam: 2/19/2024 10:11 PM EST Indication: pain Comparison: 2/5/2024 Findings: Previous median sternotomy is again noted with a combination of sternal plates and wires. Heart is enlarged. Vasculature appears normal. Lungs are well expanded and appear clear except for a couple granulomatous calcifications. No effusion or pneumothorax is seen.      Impression: Cardiomegaly without evidence of congestive failure. No other evidence of active disease. Electronically Signed: Jorge Cox MD  2/19/2024 10:24 PM EST  Workstation ID:  [id number]             Current Medications      Scheduled Meds:  acetaminophen, 1,000 mg, Oral, Once  aspirin, 325 mg, Oral, Once  [START ON 2/20/2024] insulin lispro, 1-200 Units, Subcutaneous, 4x Daily With Meals & Nightly  insulin regular, 4 Units, Subcutaneous, Once  [START ON 2/20/2024] nicotine, 1 patch, Transdermal, Q24H  senna-docusate sodium, 2 tablet, Oral, BID  sodium chloride, 10 mL, Intravenous, Q12H           Continuous Infusions:  nitroglycerin, 10-50 mcg/min, Last Rate: 30 mcg/min (02/19/24 3317)

## 2024-02-22 ENCOUNTER — READMISSION MANAGEMENT (OUTPATIENT)
Dept: CALL CENTER | Facility: HOSPITAL | Age: 44
End: 2024-02-22
Payer: COMMERCIAL

## 2024-02-22 VITALS
RESPIRATION RATE: 18 BRPM | SYSTOLIC BLOOD PRESSURE: 145 MMHG | WEIGHT: 156.97 LBS | BODY MASS INDEX: 26.8 KG/M2 | DIASTOLIC BLOOD PRESSURE: 64 MMHG | HEART RATE: 74 BPM | OXYGEN SATURATION: 95 % | TEMPERATURE: 98.1 F | HEIGHT: 64 IN

## 2024-02-22 LAB
ANION GAP SERPL CALCULATED.3IONS-SCNC: 10 MMOL/L (ref 5–15)
BUN SERPL-MCNC: 12 MG/DL (ref 6–20)
BUN/CREAT SERPL: 11.2 (ref 7–25)
CALCIUM SPEC-SCNC: 8.8 MG/DL (ref 8.6–10.5)
CHLORIDE SERPL-SCNC: 103 MMOL/L (ref 98–107)
CO2 SERPL-SCNC: 24 MMOL/L (ref 22–29)
CREAT SERPL-MCNC: 1.07 MG/DL (ref 0.57–1)
DEPRECATED RDW RBC AUTO: 65.2 FL (ref 37–54)
EGFRCR SERPLBLD CKD-EPI 2021: 65.4 ML/MIN/1.73
ERYTHROCYTE [DISTWIDTH] IN BLOOD BY AUTOMATED COUNT: 22 % (ref 12.3–15.4)
GLUCOSE BLDC GLUCOMTR-MCNC: 106 MG/DL (ref 70–105)
GLUCOSE BLDC GLUCOMTR-MCNC: 207 MG/DL (ref 70–105)
GLUCOSE SERPL-MCNC: 111 MG/DL (ref 65–99)
HCT VFR BLD AUTO: 31.5 % (ref 34–46.6)
HGB BLD-MCNC: 9.9 G/DL (ref 12–15.9)
MAGNESIUM SERPL-MCNC: 1.8 MG/DL (ref 1.6–2.6)
MCH RBC QN AUTO: 25.5 PG (ref 26.6–33)
MCHC RBC AUTO-ENTMCNC: 31.3 G/DL (ref 31.5–35.7)
MCV RBC AUTO: 81.6 FL (ref 79–97)
PHOSPHATE SERPL-MCNC: 3.1 MG/DL (ref 2.5–4.5)
PLATELET # BLD AUTO: 166 10*3/MM3 (ref 140–450)
PMV BLD AUTO: 9.4 FL (ref 6–12)
POTASSIUM SERPL-SCNC: 3.7 MMOL/L (ref 3.5–5.2)
RBC # BLD AUTO: 3.87 10*6/MM3 (ref 3.77–5.28)
SODIUM SERPL-SCNC: 137 MMOL/L (ref 136–145)
WBC NRBC COR # BLD AUTO: 7.8 10*3/MM3 (ref 3.4–10.8)

## 2024-02-22 PROCEDURE — 84100 ASSAY OF PHOSPHORUS: CPT | Performed by: INTERNAL MEDICINE

## 2024-02-22 PROCEDURE — 85027 COMPLETE CBC AUTOMATED: CPT | Performed by: INTERNAL MEDICINE

## 2024-02-22 PROCEDURE — 63710000001 INSULIN GLARGINE PER 5 UNITS: Performed by: INTERNAL MEDICINE

## 2024-02-22 PROCEDURE — 99232 SBSQ HOSP IP/OBS MODERATE 35: CPT | Performed by: INTERNAL MEDICINE

## 2024-02-22 PROCEDURE — 83735 ASSAY OF MAGNESIUM: CPT | Performed by: INTERNAL MEDICINE

## 2024-02-22 PROCEDURE — 63710000001 INSULIN LISPRO (HUMAN) PER 5 UNITS: Performed by: INTERNAL MEDICINE

## 2024-02-22 PROCEDURE — 82948 REAGENT STRIP/BLOOD GLUCOSE: CPT

## 2024-02-22 PROCEDURE — 80048 BASIC METABOLIC PNL TOTAL CA: CPT | Performed by: INTERNAL MEDICINE

## 2024-02-22 PROCEDURE — 82948 REAGENT STRIP/BLOOD GLUCOSE: CPT | Performed by: INTERNAL MEDICINE

## 2024-02-22 PROCEDURE — 25010000002 MAGNESIUM SULFATE IN D5W 1G/100ML (PREMIX) 1-5 GM/100ML-% SOLUTION: Performed by: INTERNAL MEDICINE

## 2024-02-22 RX ORDER — NIFEDIPINE 30 MG/1
30 TABLET, EXTENDED RELEASE ORAL ONCE
Status: COMPLETED | OUTPATIENT
Start: 2024-02-22 | End: 2024-02-22

## 2024-02-22 RX ORDER — MAGNESIUM SULFATE 1 G/100ML
1 INJECTION INTRAVENOUS ONCE
Status: COMPLETED | OUTPATIENT
Start: 2024-02-22 | End: 2024-02-22

## 2024-02-22 RX ORDER — ESCITALOPRAM OXALATE 10 MG/1
TABLET ORAL
Qty: 30 TABLET | Refills: 5 | Status: SHIPPED | OUTPATIENT
Start: 2024-02-22

## 2024-02-22 RX ORDER — NIFEDIPINE 30 MG
30 TABLET, EXTENDED RELEASE ORAL
Qty: 30 TABLET | Refills: 0 | Status: SHIPPED | OUTPATIENT
Start: 2024-02-23 | End: 2024-02-22 | Stop reason: SDUPTHER

## 2024-02-22 RX ORDER — NIFEDIPINE 30 MG
30 TABLET, EXTENDED RELEASE ORAL
Qty: 30 TABLET | Refills: 0 | Status: SHIPPED | OUTPATIENT
Start: 2024-02-23 | End: 2024-02-22 | Stop reason: HOSPADM

## 2024-02-22 RX ORDER — ESCITALOPRAM OXALATE 10 MG/1
10 TABLET ORAL DAILY
COMMUNITY

## 2024-02-22 RX ORDER — NIFEDIPINE 60 MG/1
60 TABLET, EXTENDED RELEASE ORAL
Status: DISCONTINUED | OUTPATIENT
Start: 2024-02-23 | End: 2024-02-22 | Stop reason: HOSPADM

## 2024-02-22 RX ADMIN — METOCLOPRAMIDE 5 MG: 10 TABLET ORAL at 11:26

## 2024-02-22 RX ADMIN — METOCLOPRAMIDE 5 MG: 10 TABLET ORAL at 07:57

## 2024-02-22 RX ADMIN — NICOTINE 1 PATCH: 14 PATCH, EXTENDED RELEASE TRANSDERMAL at 01:14

## 2024-02-22 RX ADMIN — CLOPIDOGREL BISULFATE 75 MG: 75 TABLET ORAL at 08:04

## 2024-02-22 RX ADMIN — HYDRALAZINE HYDROCHLORIDE 100 MG: 25 TABLET ORAL at 08:04

## 2024-02-22 RX ADMIN — GABAPENTIN 100 MG: 100 CAPSULE ORAL at 08:04

## 2024-02-22 RX ADMIN — INSULIN GLARGINE 30 UNITS: 100 INJECTION, SOLUTION SUBCUTANEOUS at 08:03

## 2024-02-22 RX ADMIN — ESCITALOPRAM OXALATE 10 MG: 10 TABLET ORAL at 08:04

## 2024-02-22 RX ADMIN — HYDRALAZINE HYDROCHLORIDE 100 MG: 25 TABLET ORAL at 01:14

## 2024-02-22 RX ADMIN — NIFEDIPINE 30 MG: 30 TABLET, EXTENDED RELEASE ORAL at 11:26

## 2024-02-22 RX ADMIN — NIFEDIPINE 30 MG: 30 TABLET, EXTENDED RELEASE ORAL at 08:04

## 2024-02-22 RX ADMIN — INSULIN LISPRO 4 UNITS: 100 INJECTION, SOLUTION INTRAVENOUS; SUBCUTANEOUS at 12:04

## 2024-02-22 RX ADMIN — FERROUS SULFATE TAB EC 324 MG (65 MG FE EQUIVALENT) 324 MG: 324 (65 FE) TABLET DELAYED RESPONSE at 08:04

## 2024-02-22 RX ADMIN — Medication 1200 MG: at 08:04

## 2024-02-22 RX ADMIN — ACETAMINOPHEN 650 MG: 325 TABLET, FILM COATED ORAL at 08:18

## 2024-02-22 RX ADMIN — Medication 10 ML: at 08:05

## 2024-02-22 RX ADMIN — INSULIN LISPRO 10 UNITS: 100 INJECTION, SOLUTION INTRAVENOUS; SUBCUTANEOUS at 09:28

## 2024-02-22 RX ADMIN — MAGNESIUM SULFATE IN DEXTROSE 1 G: 10 INJECTION, SOLUTION INTRAVENOUS at 08:04

## 2024-02-22 RX ADMIN — ASPIRIN 81 MG: 81 TABLET, COATED ORAL at 08:04

## 2024-02-22 RX ADMIN — ATORVASTATIN CALCIUM 80 MG: 40 TABLET, FILM COATED ORAL at 08:04

## 2024-02-22 NOTE — PROGRESS NOTES
"Subjective   Bibiana Inman is a 43 y.o. female.   Seen for diabetes f/u.  Had cardiac cath this morning. Will proceed w medical mng.      Objective     /58   Pulse 68   Temp 98.4 °F (36.9 °C) (Oral)   Resp 22   Ht 162.6 cm (64\")   Wt 70.2 kg (154 lb 12.2 oz)   LMP 01/10/2023 Comment: patient states irregular periods  SpO2 99%   BMI 26.57 kg/m²   Blood sugar  231 this am,  250 @ lunch, 223 @ supper    ASSESSMENT  Diabetes type 2, with hyperglycemia  Patient is Improving    PLAN    Increased basal insulin to home doses & added scheduled pre meal lispro.         Sparkle Auguste MD  2/21/2024  21:12 EST    "

## 2024-02-22 NOTE — NURSING NOTE
Discharge instructions given to patient who verbalized understanding. All belongings with patient. Ivs removed with pressure (coban) applied. Waiting on transport

## 2024-02-22 NOTE — PROGRESS NOTES
"NEPHROLOGY PROGRESS NOTE------[facility]/OPT    [facility]  [phone]  Tamiko Jenkins MD      Patient Care Team:  Ibrahima Correa MD as PCP - General (Family Medicine)  Rachele Zafar RN as Ambulatory  ([facility])  Xochilt Jenkins MD as Consulting Physician (Nephrology)      Provider:  Tamiko Jenkins MD  Patient Name: Bibiana Inman  :  1980    SUBJECTIVE:    F/U CRF/CKD    S/P Cath. No SOB, CP, palpitations, cramping, dysuria.       Medication:  Acetylcysteine, 1,200 mg, Oral, BID  aspirin, 81 mg, Oral, Daily  atorvastatin, 80 mg, Oral, Daily  clopidogrel, 75 mg, Oral, Daily  escitalopram, 10 mg, Oral, Daily  famotidine, 20 mg, Oral, Nightly  ferrous sulfate, 324 mg, Oral, Daily With Breakfast  gabapentin, 100 mg, Oral, TID  hydrALAZINE, 100 mg, Oral, Q8H  insulin glargine, 30 Units, Subcutaneous, Q12H  insulin lispro, 10 Units, Subcutaneous, TID With Meals  insulin lispro, 2-9 Units, Subcutaneous, 4x Daily With Meals & Nightly  metoclopramide, 5 mg, Oral, 4x Daily AC & at Bedtime  nicotine, 1 patch, Transdermal, Q24H  NIFEdipine XL, 30 mg, Oral, Q24H  senna-docusate sodium, 2 tablet, Oral, BID  sodium chloride, 10 mL, Intravenous, Q12H             OBJECTIVE    Vital Sign Min/Max for last 24 hours  Temp  Min: 98.1 °F (36.7 °C)  Max: 98.6 °F (37 °C)   BP  Min: 117/52  Max: 183/85   Pulse  Min: 65  Max: 85   Resp  Min: 12  Max: 22   SpO2  Min: 95 %  Max: 100 %   No data recorded   Weight  Min: 71.2 kg (156 lb 15.5 oz)  Max: 71.2 kg (156 lb 15.5 oz)     Flowsheet Rows      Flowsheet Row First Filed Value   Admission Height 162.6 cm (64\") Documented at 2024   Admission Weight 71 kg (156 lb 8.4 oz) Documented at 20241            No intake/output data recorded.  I/O last 3 completed shifts:  In: 1020 [P.O.:1020]  Out: -     Physical Exam:  General Appearance: alert, appears stated age and " "cooperative  Head: normocephalic, without obvious abnormality and atraumatic  Eyes: conjunctivae and sclerae normal and no icterus  Neck: supple and no JVD  Lungs: clear to auscultation and respirations regular  Heart: regular rhythm & normal rate and normal S1, S2 +TITI  Chest: Wall no abnormalities observed  Abdomen: normal bowel sounds and soft, nontender  Extremities: moves extremities well, no edema, no cyanosis and no redness  Skin: no bleeding, bruising or rash, turgor normal, color normal and no lesions noted  Neurologic: Alert, and oriented. No focal deficits    Labs:    WBC WBC   Date Value Ref Range Status   02/22/2024 7.80 3.40 - 10.80 10*3/mm3 Final   02/21/2024 9.60 3.40 - 10.80 10*3/mm3 Final   02/19/2024 8.80 3.40 - 10.80 10*3/mm3 Final      HGB Hemoglobin   Date Value Ref Range Status   02/22/2024 9.9 (L) 12.0 - 15.9 g/dL Final   02/21/2024 10.1 (L) 12.0 - 15.9 g/dL Final   02/19/2024 11.9 (L) 12.0 - 15.9 g/dL Final      HCT Hematocrit   Date Value Ref Range Status   02/22/2024 31.5 (L) 34.0 - 46.6 % Final   02/21/2024 32.5 (L) 34.0 - 46.6 % Final   02/19/2024 37.9 34.0 - 46.6 % Final      Platelets No results found for: \"LABPLAT\"   MCV MCV   Date Value Ref Range Status   02/22/2024 81.6 79.0 - 97.0 fL Final   02/21/2024 82.2 79.0 - 97.0 fL Final   02/19/2024 82.9 79.0 - 97.0 fL Final          Sodium Sodium   Date Value Ref Range Status   02/22/2024 137 136 - 145 mmol/L Final   02/21/2024 137 136 - 145 mmol/L Final   02/20/2024 135 (L) 136 - 145 mmol/L Final   02/19/2024 130 (L) 136 - 145 mmol/L Final      Potassium Potassium   Date Value Ref Range Status   02/22/2024 3.7 3.5 - 5.2 mmol/L Final   02/21/2024 4.3 3.5 - 5.2 mmol/L Final     Comment:     Slight hemolysis detected by analyzer. Result may be falsely elevated.   02/20/2024 3.6 3.5 - 5.2 mmol/L Final   02/19/2024 3.7 3.5 - 5.2 mmol/L Final      Chloride Chloride   Date Value Ref Range Status   02/22/2024 103 98 - 107 mmol/L Final " "  02/21/2024 103 98 - 107 mmol/L Final   02/20/2024 101 98 - 107 mmol/L Final   02/19/2024 92 (L) 98 - 107 mmol/L Final      CO2 CO2   Date Value Ref Range Status   02/22/2024 24.0 22.0 - 29.0 mmol/L Final   02/21/2024 26.0 22.0 - 29.0 mmol/L Final   02/20/2024 23.0 22.0 - 29.0 mmol/L Final   02/19/2024 25.0 22.0 - 29.0 mmol/L Final      BUN BUN   Date Value Ref Range Status   02/22/2024 12 6 - 20 mg/dL Final   02/21/2024 16 6 - 20 mg/dL Final   02/20/2024 18 6 - 20 mg/dL Final   02/19/2024 21 (H) 6 - 20 mg/dL Final      Creatinine Creatinine   Date Value Ref Range Status   02/22/2024 1.07 (H) 0.57 - 1.00 mg/dL Final   02/21/2024 1.27 (H) 0.57 - 1.00 mg/dL Final   02/20/2024 1.15 (H) 0.57 - 1.00 mg/dL Final   02/19/2024 1.61 (H) 0.57 - 1.00 mg/dL Final      Calcium Calcium   Date Value Ref Range Status   02/22/2024 8.8 8.6 - 10.5 mg/dL Final   02/21/2024 8.9 8.6 - 10.5 mg/dL Final   02/20/2024 8.8 8.6 - 10.5 mg/dL Final   02/19/2024 9.5 8.6 - 10.5 mg/dL Final      PO4 No components found for: \"PO4\"   Albumin Albumin   Date Value Ref Range Status   02/21/2024 3.3 (L) 3.5 - 5.2 g/dL Final   02/19/2024 4.2 3.5 - 5.2 g/dL Final      Magnesium Magnesium   Date Value Ref Range Status   02/22/2024 1.8 1.6 - 2.6 mg/dL Final   02/21/2024 2.0 1.6 - 2.6 mg/dL Final   02/19/2024 2.2 1.6 - 2.6 mg/dL Final      Uric Acid No components found for: \"URIC ACID\"     Imaging Results (Last 72 Hours)       Procedure Component Value Units Date/Time    XR Chest 1 View [[id number]] Collected: 02/19/24 2222     Updated: 02/19/24 2226    Narrative:      XR CHEST 1 VW    Date of Exam: 2/19/2024 10:11 PM EST    Indication: pain    Comparison: 2/5/2024    Findings:  Previous median sternotomy is again noted with a combination of sternal plates and wires. Heart is enlarged. Vasculature appears normal. Lungs are well expanded and appear clear except for a couple granulomatous calcifications. No effusion or   pneumothorax is seen.      Impression:   "    Impression:  Cardiomegaly without evidence of congestive failure. No other evidence of active disease.      Electronically Signed: Jorge Cox MD    2/19/2024 10:24 PM EST    Workstation ID: [id number]            Results for orders placed during the hospital encounter of 02/19/24    XR Chest 1 View    Narrative  XR CHEST 1 VW    Date of Exam: 2/19/2024 10:11 PM EST    Indication: pain    Comparison: 2/5/2024    Findings:  Previous median sternotomy is again noted with a combination of sternal plates and wires. Heart is enlarged. Vasculature appears normal. Lungs are well expanded and appear clear except for a couple granulomatous calcifications. No effusion or  pneumothorax is seen.    Impression  Impression:  Cardiomegaly without evidence of congestive failure. No other evidence of active disease.      Electronically Signed: Jorge Cox MD  2/19/2024 10:24 PM EST  Workstation ID: [id number]      Results for orders placed during the hospital encounter of 02/05/24    XR Chest 2 View    Narrative  XR CHEST 2 VW    Date of Exam: 2/5/2024 5:14 PM EST    Indication: cough    Comparison: None available.    Findings:  There is evidence of prior median sternotomy. There is no pneumothorax, pleural effusion or focal airspace consolidation. Heart size and pulmonary vasculature appear within normal limits. Regional bones appear intact.    Impression  Impression:  No acute cardiopulmonary abnormality.        Electronically Signed: Fritz Arroyo MD  2/5/2024 7:32 PM EST  Workstation ID: [id number]            ASSESSMENT / PLAN      Coronary artery disease involving native coronary artery of native heart with unstable angina pectoris    Benign essential hypertension    Diabetic gastroparesis    Mixed hypercholesterolemia and hypertriglyceridemia    Type 2 diabetes mellitus with hyperglycemia, with long-term current use of insulin    Obstructive sleep apnea    Abnormal nuclear stress test    Tobacco abuse    Unstable  angina    CRF/CKD------Nonoliguric. Known CRF/CKD STG 3A secondary to DGS/HTN NS. Current serum   Creatinine is at baseline. Stable post cath. No NSAIDs. Dose meds for CrCl 45 cc/min     2. DMII WITH RENAL MANIFESTATIONS---- Lantus, Glucometers, SSI     3. HTN WITH CKD-----BP better with increased Hydralazine. No ACE-I/ARB/DRI/diuretic for now     4. HYPERLIPIDEMIA-----On Statin.       5. OA/DJD------No NSAIDs. Check uric acid     6. DM PERIPHERAL NEUROPATHY-----On Neurontin     7. GERD/DM GASTROPARESIS/PUD PROPHYLAXIS-------Reglan and Pepcid     8. DVT PROPHYLAXIS-----SCDs     9. PROTEINURIA-----Secondary to DGS     10. VITAMIN D DEFICIENCY-----On Supplementation    11. ANGINA/CAD WITH H/O CABG-------Cardiac cath done. Med Rx    Okay from RENAL standpoint to d/c today and f/u as ordered        Tamiko Jenkins MD  [facility]  [phone]  02/22/24  07:35 EST

## 2024-02-22 NOTE — DISCHARGE SUMMARY
Discharge Summary    Date of Service:   Patient Name: Bibiana Inman  : 1980  MRN: [id number]    Date of Admission: 2024  Discharge Diagnosis: Patient with a history of coronary artery disease status post CABG  History of hypertension hyperlipidemia  Presented with shortness of breath  Patient had elevated troponin  Seen per cardiology had abnormal stress test underwent cardiac cath on  with finding of multivessel obstructive coronary disease of the native coronaries and occluded vein graft to the diagonal and diffuse coronary artery disease of the left circumflex and obtuse marginal 1 which is not bypassable there is also obstructive coronary artery disease in the distal to the LIMA touchdown  This is a stable coronary artery disease with low-level heart filling pressures with recommendation of cardiology due to the diffuse coronary disease nonoperable to continue medical management with optimization of oral MT for stable coronary artery disease  And treatment for diabetes  Smoking cessation  Patient will be started on Plavix  Aggressive cholesterol management keep LDL less than 70  Blood pressure control  Sugar control  Patient seen per endocrine and nephrology  Patient primary care physician is Ibrahima fernandez as family practice  Patient with chronic kidney disease known history with creatinine at baseline with last creatinine of 1.07  With anemia of chronic disease with stable hemoglobin of 10  Date of Discharge:    Primary Care Physician: Ibrahima Correa MD      Presenting Problem:   Chest pain [R07.9]  Hypertensive emergency [I16.1]  Chest pain, unspecified type [R07.9]  Hyperglycemia due to diabetes mellitus [E11.65]  Unstable angina [I20.0]    Active and Resolved Hospital Problems:  Active Hospital Problems    Diagnosis POA    **Coronary artery disease involving native coronary artery of native heart with unstable angina pectoris [I25.110] Yes    Unstable angina  [I20.0] Yes    Tobacco abuse [Z72.0] Yes    Abnormal nuclear stress test [R94.39] Yes    Obstructive sleep apnea [G47.33] Yes    Diabetic gastroparesis [E11.43, K31.84] Yes    Type 2 diabetes mellitus with hyperglycemia, with long-term current use of insulin [E11.65, Z79.4] Not Applicable    Benign essential hypertension [I10] Yes    Mixed hypercholesterolemia and hypertriglyceridemia [E78.2] Yes      Resolved Hospital Problems   No resolved problems to display.         Hospital Course     Hospital Course:  Bibiana Inman is a 43 y.o. female   Admitted for chest pain  Patient underwent cardiac stress test was abnormal results  Cardiac cath showed multivessel disease medical management recommended  Patient with diabetes and hypertension hyperlipidemia    Was cleared for discharge per cardiology to be follow-up as an outpatient with her primary care physician in 3 to 5 days and with cardiology  Plavix added  Patient be continued on statin high-dose  Started on basal insulin and sliding scale during hospital stay    DISCHARGE Follow Up Recommendations for labs and diagnostics: pcp in 3 d      Reasons For Change In Medications and Indications for New Medications:      Day of Discharge     Vital Signs:  Temp:  [98.1 °F (36.7 °C)-98.5 °F (36.9 °C)] 98.1 °F (36.7 °C)  Heart Rate:  [65-85] 70  Resp:  [12-22] 18  BP: (117-183)/(48-85) 172/72  Flow (L/min):  [2] 2    Physical Exam:  Physical Exam   Alert oriented x 3  HEENT exam is normal  Neck supple  Chest is adulteration  Heart S1 is 2 no murmur  Abdomen soft benign nontender bowel sounds.  Positive  Neuroexam is nonfocal      Pertinent  and/or Most Recent Results     LAB RESULTS:      Lab 02/22/24  0408 02/21/24  0037 02/19/24  2148   WBC 7.80 9.60 8.80   HEMOGLOBIN 9.9* 10.1* 11.9*   HEMATOCRIT 31.5* 32.5* 37.9   PLATELETS 166 191 233   NEUTROS ABS  --   --  5.80   LYMPHS ABS  --   --  2.10   MONOS ABS  --   --  0.60   EOS ABS  --   --  0.20   MCV 81.6 82.2 82.9   PROTIME   --   --  10.4   APTT  --   --  27.1*         Lab 02/22/24  0408 02/21/24  0037 02/20/24  0949 02/19/24 2148   SODIUM 137 137 135* 130*   POTASSIUM 3.7 4.3 3.6 3.7   CHLORIDE 103 103 101 92*   CO2 24.0 26.0 23.0 25.0   ANION GAP 10.0 8.0 11.0 13.0   BUN 12 16 18 21*   CREATININE 1.07* 1.27* 1.15* 1.61*   EGFR 65.4 53.3* 60.0* 40.1*   GLUCOSE 111* 255* 218* 517*   CALCIUM 8.8 8.9 8.8 9.5   IONIZED CALCIUM  --  1.27  --   --    MAGNESIUM 1.8 2.0  --  2.2   PHOSPHORUS 3.1 3.2  --   --    HEMOGLOBIN A1C  --   --   --  13.40*   TSH  --   --   --  3.500         Lab 02/21/24  0037 02/19/24 2148   TOTAL PROTEIN 6.1 7.8   ALBUMIN 3.3* 4.2   GLOBULIN 2.8 3.6   ALT (SGPT) 10 14   AST (SGOT) 13 11   BILIRUBIN <0.2 <0.2   ALK PHOS 107 164*   LIPASE  --  87*         Lab 02/20/24  0001 02/19/24 2148   HSTROP T 30* 33*   PROTIME  --  10.4   INR  --  0.95         Lab 02/19/24 2148   CHOLESTEROL 259*   LDL CHOL 62   TRIGLYCERIDES 1,588*             Brief Urine Lab Results  (Last result in the past 365 days)        Color   Clarity   Blood   Leuk Est   Nitrite   Protein   CREAT   Urine HCG        02/20/24 1812 Yellow   Clear   Negative   Negative   Negative   >=300 mg/dL (3+)                 Microbiology Results (last 10 days)       ** No results found for the last 240 hours. **            XR Chest 1 View    Result Date: 2/19/2024  Impression: Impression: Cardiomegaly without evidence of congestive failure. No other evidence of active disease. Electronically Signed: Jorge Cox MD  2/19/2024 10:24 PM EST  Workstation ID: [id number]    XR Chest 2 View    Result Date: 2/5/2024  Impression: Impression: No acute cardiopulmonary abnormality. Electronically Signed: Fritz Arroyo MD  2/5/2024 7:32 PM EST  Workstation ID: [id number]             Results for orders placed during the hospital encounter of 02/19/24    Adult Transthoracic Echo Complete W/ Cont if Necessary Per Protocol    Interpretation Summary    Left ventricular systolic function  is normal. Calculated left ventricular EF = 69% Left ventricular ejection fraction appears to be 61 - 65%.    Left ventricular diastolic function was normal.  GLS -16%.    Left atrial volume is moderately increased.    Estimated right ventricular systolic pressure from tricuspid regurgitation is normal (<35 mmHg).    No significant valvular abnormalities noted.      Labs Pending at Discharge:      Procedures Performed  Procedure(s):  Left Heart Cath and coronary angiogram         Consults:   Consults       Date and Time Order Name Status Description    2/20/2024  9:22 AM Inpatient Endocrinology Consult Completed     2/19/2024 11:51 PM Inpatient Nephrology Consult Completed     2/19/2024 11:38 PM Inpatient Cardiology Consult Completed     2/19/2024 11:08 PM Hospitalist (on-call MD unless specified)                Discharge Details        Discharge Medications        Continue These Medications        Instructions Start Date   Dexcom G6  device   Use as instructed      Unifine Pentips 32G X 4 MM misc  Generic drug: Insulin Pen Needle   Use with Insulin Daily             ASK your doctor about these medications        Instructions Start Date   aspirin 81 MG chewable tablet   81 mg, Oral, Daily      atorvastatin 80 MG tablet  Commonly known as: LIPITOR  Ask about: Which instructions should I use?   80 mg, Oral, Daily      clopidogrel 75 MG tablet  Commonly known as: PLAVIX   75 mg, Oral, Daily      clotrimazole-betamethasone 1-0.05 % cream  Commonly known as: LOTRISONE   apply 1 application on the skin daily      dapagliflozin 5 MG tablet tablet  Commonly known as: FARXIGA  Ask about: Which instructions should I use?   5 mg, Oral, Daily      escitalopram 10 MG tablet  Commonly known as: LEXAPRO  Ask about: Which instructions should I use?   10 mg, Oral, Daily      famotidine 40 MG tablet  Commonly known as: PEPCID  Ask about: Which instructions should I use?   40 mg, Oral, Every Night at Bedtime      ferrous  sulfate 325 (65 FE) MG tablet   325 mg, Oral, Daily With Breakfast      gabapentin 100 MG capsule  Commonly known as: NEURONTIN  Ask about: Which instructions should I use?   100 mg, Oral, 3 Times Daily      hydrALAZINE 25 MG tablet  Commonly known as: APRESOLINE   Take 1 tablet (25 mg total) by mouth 3 (three) times a day.      icosapent ethyl 1 g capsule capsule  Commonly known as: Vascepa   Take 2 capsules by mouth 2 (two) times a day.      insulin aspart 100 UNIT/ML solution pen-injector sc pen  Commonly known as: novoLOG FLEXPEN  Ask about: Which instructions should I use?   15 Units, Subcutaneous, 3 Times Daily With Meals      Levemir FlexPen 100 UNIT/ML injection  Generic drug: insulin detemir  Ask about: Which instructions should I use?   Inject 30 Units under the skin into the appropriate area as directed 2 (Two) Times a Day.      losartan 100 MG tablet  Commonly known as: COZAAR   100 mg, Oral, Daily      metoclopramide 5 MG tablet  Commonly known as: REGLAN  Ask about: Which instructions should I use?   Take 1 tablet by mouth before meals and at bedtime      metoprolol tartrate 100 MG tablet  Commonly known as: LOPRESSOR   Take 1 tablet by mouth 2 (two) times a day.      ondansetron 4 MG tablet  Commonly known as: ZOFRAN  Ask about: Which instructions should I use?   Take 1 tablet by mouth three times a day as needed      pantoprazole 40 MG EC tablet  Commonly known as: PROTONIX  Ask about: Which instructions should I use?   40 mg, Oral, Every Morning      terbinafine 250 MG tablet  Commonly known as: lamiSIL   250 mg, Oral, Daily      Ventolin  (90 Base) MCG/ACT inhaler  Generic drug: albuterol sulfate HFA  Ask about: Which instructions should I use?   Inhale 2 puffs every 6 (six) hours if needed for wheezing.      vitamin B-12 1000 MCG tablet  Commonly known as: CYANOCOBALAMIN   1,000 mcg, Oral, Daily      vitamin D 1.25 MG (17470 UT) capsule capsule  Commonly known as: ERGOCALCIFEROL   Take 1  capsule (50,000 Units total) by mouth 1 (one) time per week.               Allergies   Allergen Reactions    Latex Itching         Discharge Disposition: home      Diet:  Hospital:  Diet Order   Procedures    Diet: Cardiac Diets, Diabetic Diets; Healthy Heart (2-3 Na+); Consistent Carbohydrate; Texture: Regular Texture (IDDSI 7); Fluid Consistency: Thin (IDDSI 0)         Discharge Activity:         CODE STATUS:  Code Status and Medical Interventions:   Ordered at: 02/19/24 6933     Code Status (Patient has no pulse and is not breathing):    CPR (Attempt to Resuscitate)     Medical Interventions (Patient has pulse or is breathing):    Full Support         No future appointments.        Time spent on Discharge including face to face service:  35 minutes          Signature: Electronically signed by Jesse Lance MD, 02/22/24, 09:26 [facility].  [facility] Hospitalist Team

## 2024-02-22 NOTE — PROGRESS NOTES
Referring Provider: Jesse Lance MD    Reason for follow-up: Non-ST elevation MI, uncontrolled hypertension     Patient Care Team:  Ibrahima Correa MD as PCP - General (Family Medicine)  Rachele Zafar RN as Ambulatory  (Population Health)  Xohcilt Jenkins MD as Consulting Physician (Nephrology)      SUBJECTIVE  The blood pressure is better.  She is off nitroglycerin drip.  Eager to go home.     ROS  Review of all systems negative except as indicated.    Since I have last seen, the patient has been without any chest discomfort, shortness of breath, palpitations, dizziness or syncope.  Denies having any headache, abdominal pain, nausea, vomiting, diarrhea, constipation, loss of weight or loss of appetite.  Denies having any excessive bruising, hematuria or blood in the stool.  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 Debbie Fry.    Depressive disorder 2023    Diabetic gastroparesis 2021    Diabetic peripheral neuropathy associated with type 2 diabetes mellitus 2024    Gastroesophageal reflux disease 03/10/2021    GERD (gastroesophageal reflux disease)     Intermittent claudication 2017    Iron deficiency anemia 2020    Mixed hypercholesterolemia and hypertriglyceridemia 2014    Myocardial infarction     Obesity     Obstructive sleep apnea 2021    Personal history of COVID-19 2022    Polyp of colon 2023    Spontaneous      Stress fracture of right ankle with routine healing     Tobacco abuse 2024    Type 2 diabetes mellitus with hyperglycemia, with long-term current use of insulin 2017    Vitamin D deficiency 2024        Past Surgical History:   Procedure Laterality Date     SECTION      CORONARY ARTERY BYPASS GRAFT  2014    LIMA-LAD, SVG-OM2, SVG-PDA, Dr. Debbie Fry.    DILATATION AND CURETTAGE      TONSILLECTOMY         Family History   Family history unknown: Yes       Social History     Tobacco Use    Smoking status: Every Day     Packs/day: 0.25     Years: 15.00     Additional pack years: 0.00     Total pack years: 3.75     Types: Cigarettes    Smokeless tobacco: Never   Vaping Use    Vaping Use: Never used   Substance Use Topics    Alcohol use: Defer    Drug use: Never        Home meds:  Prior to Admission medications    Medication Sig Start Date End Date Taking? Authorizing Provider   albuterol sulfate  (90 Base) MCG/ACT inhaler Inhale 2 puffs every 6 (six) hours if needed for wheezing. 23  Yes    aspirin 81 MG chewable tablet Chew 1 tablet Daily.   Yes ProviderJoseline MD   atorvastatin (LIPITOR) 80 MG tablet Take 1 tablet by mouth Daily.   Yes ProviderJoseline MD   clopidogrel (PLAVIX) 75 MG tablet Take 1 tablet by mouth Daily. 23  Yes    clotrimazole-betamethasone (LOTRISONE) 1-0.05 % cream apply 1 application on the skin daily 23  Yes    dapagliflozin (Farxiga) 5 MG tablet tablet take 1 tablet by oral route  every day in the morning 23  Yes    escitalopram (LEXAPRO) 10 MG tablet Take 1 tablet (10 mg total) by mouth 1 (one) time each day. 23  Yes    famotidine (PEPCID) 40 MG tablet Take 1 tablet by mouth every night at bedtime. 23  Yes    ferrous sulfate 325 (65 FE) MG tablet Take 1 tablet by mouth Daily With Breakfast.   Yes ProviderJoseline MD   gabapentin (NEURONTIN) 100 MG capsule Take 1 capsule by mouth 3 (Three) Times a Day. 21  Yes Joseline Quiroz MD   hydrALAZINE (APRESOLINE) 25 MG tablet Take 1 tablet (25 mg total) by mouth 3 (three) times a day. 23  Yes    icosapent ethyl (Vascepa) 1 g capsule capsule Take 2 capsules by mouth  2 (two) times a day. 11/2/23  Yes    insulin aspart (novoLOG FLEXPEN) 100 UNIT/ML solution pen-injector sc pen Inject 15 Units under the skin into the appropriate area as directed 3 (Three) Times a Day With Meals.   Yes ProviderJoseline MD   insulin detemir (Levemir FlexPen) 100 UNIT/ML injection Inject 30 Units under the skin into the appropriate area as directed 2 (Two) Times a Day. 2/9/24  Yes    losartan (COZAAR) 100 MG tablet Take 1 tablet by mouth Daily. 5/11/23  Yes    metoclopramide (REGLAN) 5 MG tablet Take 1 tablet by mouth before meals and at bedtime 6/30/22  Yes    metoprolol tartrate (LOPRESSOR) 100 MG tablet Take 1 tablet by mouth 2 (two) times a day. 8/25/23  Yes    ondansetron (ZOFRAN) 4 MG tablet Take 1 tablet by mouth three times a day as needed 1/9/24  Yes    pantoprazole (PROTONIX) 40 MG EC tablet Take 1 tablet by mouth Every Morning. 6/28/23  Yes    terbinafine (lamiSIL) 250 MG tablet Take 1 tablet by mouth Daily. 11/10/23  Yes    vitamin B-12 (CYANOCOBALAMIN) 1000 MCG tablet Take 1 tablet by mouth Daily.   Yes ProviderJoseline MD   vitamin D (ERGOCALCIFEROL) 1.25 MG (42239 UT) capsule capsule Take 1 capsule (50,000 Units total) by mouth 1 (one) time per week. 7/21/21  Yes    Continuous Blood Gluc  (Dexcom G6 ) device Use as instructed 6/1/23      Continuous Blood Gluc Sensor (Dexcom G6 Sensor) Change sensor every 10 days 6/1/23      Continuous Blood Gluc Transmit (Dexcom G6 Transmitter) misc Use as instructed 6/1/23      Insulin Pen Needle (Unifine Pentips) 32G X 4 MM misc Use with Insulin Daily 3/10/21      glyburide (DIAbeta) 5 MG tablet Take 1 tablet by mouth 2 (Two) Times a Day. 2/3/21 3/10/21     hydroCHLOROthiazide (HYDRODIURIL) 25 MG tablet Take 1 tablet by mouth Daily. 1/20/23 8/1/23     metFORMIN (GLUCOPHAGE) 1000 MG tablet Take 1 tablet by mouth 2 (Two) Times a Day With Meals. 5/9/23 7/7/23     omeprazole (priLOSEC) 20 MG capsule Take 1 capsule (20 mg total)  "by mouth 1 (one) time each day. Do not crush or chew. 3/10/21 7/20/21         Allergies:  Latex    Scheduled Meds:Acetylcysteine, 1,200 mg, Oral, BID  aspirin, 81 mg, Oral, Daily  atorvastatin, 80 mg, Oral, Daily  clopidogrel, 75 mg, Oral, Daily  escitalopram, 10 mg, Oral, Daily  famotidine, 20 mg, Oral, Nightly  ferrous sulfate, 324 mg, Oral, Daily With Breakfast  gabapentin, 100 mg, Oral, TID  hydrALAZINE, 100 mg, Oral, Q8H  insulin glargine, 30 Units, Subcutaneous, Q12H  insulin lispro, 10 Units, Subcutaneous, TID With Meals  insulin lispro, 2-9 Units, Subcutaneous, 4x Daily With Meals & Nightly  metoclopramide, 5 mg, Oral, 4x Daily AC & at Bedtime  nicotine, 1 patch, Transdermal, Q24H  NIFEdipine XL, 30 mg, Oral, Q24H  senna-docusate sodium, 2 tablet, Oral, BID  sodium chloride, 10 mL, Intravenous, Q12H      Continuous Infusions:     PRN Meds:.  acetaminophen    albuterol    senna-docusate sodium **AND** polyethylene glycol **AND** bisacodyl **AND** bisacodyl    butalbital-acetaminophen-caffeine    dextrose    dextrose    diphenhydrAMINE    glucagon (human recombinant)    nitroglycerin    ondansetron ODT **OR** ondansetron    [COMPLETED] Insert Peripheral IV **AND** sodium chloride    sodium chloride    sodium chloride      OBJECTIVE    Vital Signs  Vitals:    02/22/24 0000 02/22/24 0100 02/22/24 0119 02/22/24 0505   BP:   130/52 146/63   BP Location:   Right arm Right arm   Patient Position:   Lying Lying   Pulse: 81 75 72 65   Resp:   14 14   Temp:   98.5 °F (36.9 °C) 98.1 °F (36.7 °C)   TempSrc:   Oral Oral   SpO2:   97% 95%   Weight:    71.2 kg (156 lb 15.5 oz)   Height:           Flowsheet Rows      Flowsheet Row First Filed Value   Admission Height 162.6 cm (64\") Documented at 02/19/2024 2131   Admission Weight 71 kg (156 lb 8.4 oz) Documented at 02/19/2024 2131              Intake/Output Summary (Last 24 hours) at 2/22/2024 0721  Last data filed at 2/22/2024 0505  Gross per 24 hour   Intake 780 ml "   Output --   Net 780 ml          Telemetry: Normal sinus rhythm    Physical Exam:  The patient is alert, oriented and in no distress.  Vital signs as noted above.  Head and neck revealed no carotid bruits or jugular venous distention.  No thyromegaly or lymphadenopathy is present  Lungs clear.  No wheezing.  Breath sounds are normal bilaterally.  Heart normal first and second heart sounds.  No murmur. No precordial rub is present.  No gallop is present.  Abdomen soft and nontender.  No organomegaly is present.  Extremities with good peripheral pulses without any pedal edema.  Skin warm and dry.  Musculoskeletal system is grossly normal.  CNS grossly normal.       Results Review:  I have personally reviewed the results from the time of this admission to 2/22/2024 07:21 EST and agree with these findings:  []  Laboratory  []  Microbiology  []  Radiology  []  EKG/Telemetry   []  Cardiology/Vascular   []  Pathology  []  Old records  []  Other:    Most notable findings include:    Lab Results (last 24 hours)       Procedure Component Value Units Date/Time    POC Glucose Once [[id number]]  (Abnormal) Collected: 02/22/24 0715    Specimen: Blood Updated: 02/22/24 0718     Glucose 106 mg/dL      Comment: Serial Number: [id number]:  [id number]       Basic Metabolic Panel [[id number]]  (Abnormal) Collected: 02/22/24 0408    Specimen: Blood from Arm, Left Updated: 02/22/24 0442     Glucose 111 mg/dL      BUN 12 mg/dL      Creatinine 1.07 mg/dL      Sodium 137 mmol/L      Potassium 3.7 mmol/L      Chloride 103 mmol/L      CO2 24.0 mmol/L      Calcium 8.8 mg/dL      BUN/Creatinine Ratio 11.2     Anion Gap 10.0 mmol/L      eGFR 65.4 mL/min/1.73     Narrative:      GFR Normal >60  Chronic Kidney Disease <60  Kidney Failure <15      Magnesium [[id number]]  (Normal) Collected: 02/22/24 0408    Specimen: Blood from Arm, Left Updated: 02/22/24 0442     Magnesium 1.8 mg/dL     Phosphorus [[id number]]  (Normal) Collected: 02/22/24 0408     Specimen: Blood from Arm, Left Updated: 02/22/24 0442     Phosphorus 3.1 mg/dL     CBC (No Diff) [[id number]]  (Abnormal) Collected: 02/22/24 0408    Specimen: Blood from Arm, Left Updated: 02/22/24 0420     WBC 7.80 10*3/mm3      RBC 3.87 10*6/mm3      Hemoglobin 9.9 g/dL      Hematocrit 31.5 %      MCV 81.6 fL      MCH 25.5 pg      MCHC 31.3 g/dL      RDW 22.0 %      RDW-SD 65.2 fl      MPV 9.4 fL      Platelets 166 10*3/mm3     POC Glucose Once [[id number]]  (Abnormal) Collected: 02/21/24 2042    Specimen: Blood Updated: 02/21/24 2043     Glucose 151 mg/dL      Comment: Serial Number: [id number]:  [id number]       POC Glucose Once [[id number]]  (Abnormal) Collected: 02/21/24 1724    Specimen: Blood Updated: 02/21/24 1726     Glucose 223 mg/dL      Comment: Serial Number: [id number]:  [id number]       POC Glucose Once [[id number]]  (Abnormal) Collected: 02/21/24 1455    Specimen: Blood Updated: 02/21/24 1458     Glucose 181 mg/dL      Comment: Serial Number: [id number]:  [id number]       POC Glucose Once [[id number]]  (Abnormal) Collected: 02/21/24 1056    Specimen: Blood Updated: 02/21/24 1058     Glucose 250 mg/dL      Comment: Serial Number: [id number]:  [id number]               Imaging Results (Last 24 Hours)       ** No results found for the last 24 hours. **            LAB RESULTS (LAST 7 DAYS)    CBC  Results from last 7 days   Lab Units 02/22/24  0408 02/21/24  0037 02/19/24  2148   WBC 10*3/mm3 7.80 9.60 8.80   RBC 10*6/mm3 3.87 3.95 4.57   HEMOGLOBIN g/dL 9.9* 10.1* 11.9*   HEMATOCRIT % 31.5* 32.5* 37.9   MCV fL 81.6 82.2 82.9   PLATELETS 10*3/mm3 166 191 233       BMP  Results from last 7 days   Lab Units 02/22/24  0408 02/21/24  0037 02/20/24  0949 02/19/24  2148   SODIUM mmol/L 137 137 135* 130*   POTASSIUM mmol/L 3.7 4.3 3.6 3.7   CHLORIDE mmol/L 103 103 101 92*   CO2 mmol/L 24.0 26.0 23.0 25.0   BUN mg/dL 12 16 18 21*   CREATININE mg/dL 1.07* 1.27* 1.15* 1.61*   GLUCOSE mg/dL  111* 255* 218* 517*   MAGNESIUM mg/dL 1.8 2.0  --  2.2   PHOSPHORUS mg/dL 3.1 3.2  --   --        CMP   Results from last 7 days   Lab Units 02/22/24  0408 02/21/24  0037 02/20/24  0949 02/19/24  2148   SODIUM mmol/L 137 137 135* 130*   POTASSIUM mmol/L 3.7 4.3 3.6 3.7   CHLORIDE mmol/L 103 103 101 92*   CO2 mmol/L 24.0 26.0 23.0 25.0   BUN mg/dL 12 16 18 21*   CREATININE mg/dL 1.07* 1.27* 1.15* 1.61*   GLUCOSE mg/dL 111* 255* 218* 517*   ALBUMIN g/dL  --  3.3*  --  4.2   BILIRUBIN mg/dL  --  <0.2  --  <0.2   ALK PHOS U/L  --  107  --  164*   AST (SGOT) U/L  --  13  --  11   ALT (SGPT) U/L  --  10  --  14   LIPASE U/L  --   --   --  87*       BNP        TROPONIN  Results from last 7 days   Lab Units 02/20/24  0949 02/20/24  0001   CK TOTAL U/L 40  --    HSTROP T ng/L  --  30*       CoAg  Results from last 7 days   Lab Units 02/19/24  2148   INR  0.95   APTT seconds 27.1*       Creatinine Clearance  Estimated Creatinine Clearance: 64.3 mL/min (A) (by C-G formula based on SCr of 1.07 mg/dL (H)).    ABG        Radiology  No radiology results for the last day      EKG  I personally viewed and interpreted the patient's EKG/Telemetry data:  ECG 12 Lead Chest Pain   Preliminary Result   HEART RATE= 57  bpm   RR Interval= 1044  ms   AZ Interval= 160  ms   P Horizontal Axis= -10  deg   P Front Axis= 56  deg   QRSD Interval= 100  ms   QT Interval= 446  ms   QTcB= 437  ms   QRS Axis= 57  deg   T Wave Axis= 91  deg   - ABNORMAL ECG -   Sinus bradycardia   Probable left atrial enlargement   LVH with secondary repolarization abnormality   Anterior infarct, acute (LAD)   Electronically Signed By:    Date and Time of Study: 2024-02-20 06:33:12      ECG 12 Lead Chest Pain   Final Result   HEART RATE= 54  bpm   RR Interval= 1104  ms   AZ Interval= 142  ms   P Horizontal Axis= 4  deg   P Front Axis= 32  deg   QRSD Interval= 98  ms   QT Interval= 458  ms   QTcB= 436  ms   QRS Axis= 21  deg   T Wave Axis= 119  deg   - ABNORMAL ECG -    Sinus bradycardia   Probable left atrial enlargement   LVH with secondary repolarization abnormality   Anterior ST elevation, probably due to LVH   When compared with ECG of 19-Feb-2024 21:38:37,   No significant change   Electronically Signed By: Marcellus Reynolds (Tony) 20-Feb-2024 11:06:00   Date and Time of Study: 2024-02-19 22:56:04      ECG 12 Lead Chest Pain   Final Result   HEART RATE= 56  bpm   RR Interval= 1064  ms   ND Interval= 148  ms   P Horizontal Axis= -15  deg   P Front Axis= 43  deg   QRSD Interval= 106  ms   QT Interval= 444  ms   QTcB= 430  ms   QRS Axis= 28  deg   T Wave Axis= 107  deg   - ABNORMAL ECG -   Sinus bradycardia   LVH with secondary repolarization abnormality   Anterior infarct   No previous ECG available for comparison   Electronically Signed By: Marcellus Reynolds (Tony) 20-Feb-2024 11:06:38   Date and Time of Study: 2024-02-19 21:38:37      SCANNED - TELEMETRY     Final Result      SCANNED - TELEMETRY     Final Result      SCANNED - TELEMETRY     Final Result      SCANNED - TELEMETRY     Final Result      SCANNED - TELEMETRY     Final Result      SCANNED - TELEMETRY     Final Result      SCANNED - TELEMETRY     Final Result      SCANNED - TELEMETRY     Final Result      SCANNED - TELEMETRY     Final Result      SCANNED - TELEMETRY     Final Result      SCANNED - TELEMETRY     Final Result      SCANNED EKG   Final Result      SCANNED - TELEMETRY     Final Result      SCANNED - TELEMETRY     Final Result            Echocardiogram:    Results for orders placed during the hospital encounter of 02/19/24    Adult Transthoracic Echo Complete W/ Cont if Necessary Per Protocol    Interpretation Summary    Left ventricular systolic function is normal. Calculated left ventricular EF = 69% Left ventricular ejection fraction appears to be 61 - 65%.    Left ventricular diastolic function was normal.  GLS -16%.    Left atrial volume is moderately increased.    Estimated right ventricular systolic  pressure from tricuspid regurgitation is normal (<35 mmHg).    No significant valvular abnormalities noted.        Stress Test:  Results for orders placed during the hospital encounter of 02/19/24    Stress Test With Myocardial Perfusion One Day    Interpretation Summary    Findings consistent with a normal ECG stress test.    (Calculated EF = 63%).    Myocardial perfusion imaging indicates a moderate-sized, severe area of ischemia located in the inferior wall and lateral wall.    Impressions are consistent with an intermediate risk study.         Cardiac Catheterization:  No results found for this or any previous visit.         Other:         ASSESSMENT & PLAN:    Principal Problem:    Coronary artery disease involving native coronary artery of native heart with unstable angina pectoris  Active Problems:    Benign essential hypertension    Diabetic gastroparesis    Mixed hypercholesterolemia and hypertriglyceridemia    Type 2 diabetes mellitus with hyperglycemia, with long-term current use of insulin    Obstructive sleep apnea    Abnormal nuclear stress test    Tobacco abuse    Unstable angina    Chest pain, elevated troponin  Known coronary disease with history of CABG  Minimally elevated high-sensitivity troponin which is trending down  Troponin elevation secondary to underlying stable coronary disease, chronic kidney disease, uncontrolled hypertension.  Echocardiogram shows preserved LV function.  Normal diastolic function and no significant valvular abnormalities.  Stress test abnormal in the inferior and lateral territory.  Cardiac catheterization showed significant coronary artery disease.  RCA , left circumflex with 80 to 90% stenosis in the proximal segment and mid segment.  Ramus with proximal 70 to 80% stenosis..  LAD is totally occluded  LIMA to LAD is patent however distal/apical LAD has 80% stenosis  Vein graft to left circumflex is occluded, vein graft to RCA is patent  She has significant burden  of coronary disease which is not acute  Continue dual antiplatelet therapy, high intensity statin.  We will focus on risk factors modification.  Elevation of troponin and chest pain was in the setting of uncontrolled hypertension  May have to be considered for redo CABG given young age.  Will discuss with the surgeons     Hypertensive emergency  Blood pressure has improved.  She is now off nitroglycerin drip.  Uptitrating nifedipine today  Continue hydralazine  Unable to give ACE or ARB because of chronic kidney disease  Unable to start beta-blocker due to bradycardia.    Diabetes  Uncontrolled diabetes with A1c of more than 13  Treatment with insulin per Glucomander  Emphasized compliance with medications     Chronic kidney disease  Creatinine improved 1.07, GFR is 65.4  Appears to be at baseline renal function  Followed by nephrology     Hyperlipidemia  She has uncontrolled hypertriglyceridemia with triglycerides of 1588.  Start high intensity statin  High risk for pancreatitis  Start Fani Su MD  02/22/24  07:21 EST

## 2024-02-22 NOTE — CASE MANAGEMENT/SOCIAL WORK
Case Management Discharge Note      Final Note: Home with family         Selected Continued Care - Admitted Since 2/19/2024          Transportation Services  Private: Car    Final Discharge Disposition Code: 01 - home or self-care

## 2024-02-22 NOTE — PROGRESS NOTES
"Enter Query Response Below      Query Response: Type II MI due to HFrEF exacerbation.  Electronically signed by Cristian Su MD, 24, 1:56 PM EST.               If applicable, please update the problem list.     Patient: Bibiana Inman        : 1980  Account: [id number]           Admit Date: 2024        How to Respond to this query:       a. Click New Note     b. Answer query within the yellow box.                c. Update the Problem List, if applicable.      If you have any questions about this query contact me at: [email or web address]     Dr. Su:    43-year-old with history of CAD, CABG, hypertension, DM, CKD presented  with chest pain and blood pressure 255/96.  HS troponin T in ED 33, 30.  Admitted with chest pain, r/o ACS. H&P notes \"Initially concern for ST elevation but later received previous EKG from [facility] and appears similar. Possible LVH.\"  Cardiology consulted and heart cath performed  with findings of -\"Multivessel obstructive CAD of the native coronary arteries, This is stable coronary artery disease.\"  Cardiology noted indicate follow up for \"Non-ST elevation MI, uncontrolled hypertension.\"     Please clarify the following:    NSTEMI ruled in- Please specify type:    * Type 1 MI due to ______ (please specify- plaque erosion, rupture, fissure or thrombus)    * Type 2 MI due to demand ischemia from chronic CAD     * Type 2 MI due to ___________  NSTEMI ruled out  Other- specify______  Unable to determine      By submitting this query, we are merely seeking further clarification of documentation to accurately reflect all conditions that you are monitoring, evaluating, treating or that extend the hospitalization or utilize additional resources of care. Please utilize your independent clinical judgment when addressing the question(s) above.     This query and your response, once completed, will be entered into the legal medical record.    Sincerely,  Angie Valadez RN, " CCDS  Clinical Documentation Integrity Program

## 2024-02-22 NOTE — PLAN OF CARE
Goal Outcome Evaluation:                      Patient is alert and able to make needs known. Patient continues to complain of headache relieved by prn fioricet. Patient remains hypertensive throughout the evening, once 0100 hydralazine was administered blood pressure improved. Patient drowsy through out the night, would wake up and respond appropriately to staff, ate 50% of a sandwich at dinner, however would quickly fall back asleep. Patient is eager to go home this am. Call light remains in reach.

## 2024-02-23 NOTE — OUTREACH NOTE
Prep Survey      Flowsheet Row Responses   [facility] facility patient discharged from? [facility]   Is LACE score < 7 ? No   Eligibility Readm Mgmt   Discharge diagnosis Coronary artery disease involving native   Does the patient have one of the following disease processes/diagnoses(primary or secondary)? Other   Does the patient have Home health ordered? No   Is there a DME ordered? No   Prep survey completed? Yes            AMAYA KNOX - Registered Nurse

## 2024-02-26 ENCOUNTER — READMISSION MANAGEMENT (OUTPATIENT)
Dept: CALL CENTER | Facility: HOSPITAL | Age: 44
End: 2024-02-26
Payer: COMMERCIAL

## 2024-02-26 NOTE — OUTREACH NOTE
Medical Week 1 Survey      Flowsheet Row Responses   [facility] patient discharged from? [facility]   Does the patient have one of the following disease processes/diagnoses(primary or secondary)? Other   Week 1 attempt successful? Yes   Call start time 0926   Call end time 0930   List who call center can speak with pt   Medication alerts for this patient bp medication.   Meds reviewed with patient/caregiver? Yes   Does the patient have all medications ordered at discharge? Yes   Is the patient taking all medications as directed (includes completed medication regime)? Yes   Comments regarding appointments Pt to see cardiology in March. Pt to call pcp for f/u.   Does the patient have a primary care provider?  Yes   Has the patient kept scheduled appointments due by today? N/A   Has home health visited the patient within 72 hours of discharge? N/A   Psychosocial issues? No   Did the patient receive a copy of their discharge instructions? Yes   Nursing interventions Reviewed instructions with patient   What is the patient's perception of their health status since discharge? Improving   Is the patient/caregiver able to teach back signs and symptoms related to disease process for when to call PCP? Yes   Is the patient/caregiver able to teach back signs and symptoms related to disease process for when to call 911? Yes   Is the patient/caregiver able to teach back the hierarchy of who to call/visit for symptoms/problems? PCP, Specialist, Home health nurse, Urgent Care, ED, 911 Yes   Week 1 call completed? Yes   Graduated Yes   Wrap up additional comments Pt reports feeling well. Pt has all f/u appointments scheduled. Pt has retruned to work.   Call end time 0930            Lauren BERNSTEIN - Registered Nurse

## 2024-03-02 LAB
QT INTERVAL: 446 MS
QTC INTERVAL: 437 MS

## 2024-04-08 ENCOUNTER — PATIENT OUTREACH (OUTPATIENT)
Dept: CASE MANAGEMENT | Facility: OTHER | Age: 44
End: 2024-04-08
Payer: COMMERCIAL

## 2024-04-08 NOTE — OUTREACH NOTE
Adult Wellness Assessment  Questions/Answers      Flowsheet Row Most Recent Value   How often do you have someone help you read facts about your health? never   How often do you have trouble learning about your health because you don't know what the written words mean? never   How confident are you filling out forms by yourself? always        Adult Patient Profile  Questions/Answers      Flowsheet Row Most Recent Value   Symptoms/Conditions Managed at Home diabetes, type 2   Diabetes Management Strategies blood glucose testing, medication therapy   Last A1C Result 13.4 on 2/9/24   Diabetes Comment Patient needs a new prescripton for Dexcom. She plans to discuss with provider.   She reports she is enrolled with [employer] and checks blood glucose levels periodically. Discussed with patient the importance of checking blood glucose to help make better choices with diet. Discusssed that dietary choices and exercise are important with diabetes management.   How to be Addressed Bibinaa   How Well Do You Speak English? very well   Source of Information patient            AMBULATORY CASE MANAGEMENT NOTE    Name and Relationship of Patient/Support Person: Bibiana Inman - Self  Self    Patient Outreach    Telephone call with patient.  Discussed diabetes management.  Engaged patient in Employee Case Management ECM. Emailed information on diabetes , ECM and 24 hour nurse call line.   No issue with social determinants,denies food, medication, transportation, or financial insecurities. No questions or concerns per pt at this time. Advised pt to call or email RN-ECM with any new needs. Encouraged use of 24hour nurseline if needed.  Agrees to follow up outreach.      Education Documentation  No documentation found.        RMOAN DUTTA  Ambulatory Case Management    4/8/2024, 11:39 EDT

## 2024-06-17 ENCOUNTER — APPOINTMENT (OUTPATIENT)
Dept: CT IMAGING | Facility: HOSPITAL | Age: 44
DRG: 064 | End: 2024-06-17
Payer: COMMERCIAL

## 2024-06-17 ENCOUNTER — APPOINTMENT (OUTPATIENT)
Dept: GENERAL RADIOLOGY | Facility: HOSPITAL | Age: 44
DRG: 064 | End: 2024-06-17
Payer: COMMERCIAL

## 2024-06-17 ENCOUNTER — APPOINTMENT (OUTPATIENT)
Dept: NEUROLOGY | Facility: HOSPITAL | Age: 44
DRG: 064 | End: 2024-06-17
Payer: COMMERCIAL

## 2024-06-17 ENCOUNTER — HOSPITAL ENCOUNTER (INPATIENT)
Facility: HOSPITAL | Age: 44
LOS: 3 days | Discharge: HOME | DRG: 064 | End: 2024-06-20
Attending: EMERGENCY MEDICINE
Payer: COMMERCIAL

## 2024-06-17 ENCOUNTER — APPOINTMENT (OUTPATIENT)
Dept: MRI IMAGING | Facility: HOSPITAL | Age: 44
DRG: 064 | End: 2024-06-17
Payer: COMMERCIAL

## 2024-06-17 DIAGNOSIS — R73.9 HYPERGLYCEMIA: ICD-10-CM

## 2024-06-17 DIAGNOSIS — E87.6 HYPOKALEMIA: ICD-10-CM

## 2024-06-17 DIAGNOSIS — R79.89 ELEVATED TROPONIN: ICD-10-CM

## 2024-06-17 DIAGNOSIS — R41.82 ALTERED MENTAL STATUS, UNSPECIFIED ALTERED MENTAL STATUS TYPE: Primary | ICD-10-CM

## 2024-06-17 LAB
ALBUMIN SERPL-MCNC: 3.4 G/DL (ref 3.5–5.2)
ALBUMIN/GLOB SERPL: 0.9 G/DL
ALP SERPL-CCNC: 134 U/L (ref 39–117)
ALT SERPL W P-5'-P-CCNC: 5 U/L (ref 1–33)
ANION GAP SERPL CALCULATED.3IONS-SCNC: 12.4 MMOL/L (ref 5–15)
APTT PPP: 26.2 SECONDS (ref 61–76.5)
ARTICHOKE IGE QN: 116 MG/DL (ref 0–100)
AST SERPL-CCNC: 11 U/L (ref 1–32)
BACTERIA UR QL AUTO: ABNORMAL /HPF
BASOPHILS # BLD AUTO: 0.05 10*3/MM3 (ref 0–0.2)
BASOPHILS NFR BLD AUTO: 0.3 % (ref 0–1.5)
BILIRUB SERPL-MCNC: 0.4 MG/DL (ref 0–1.2)
BILIRUB UR QL STRIP: NEGATIVE
BUN SERPL-MCNC: 9 MG/DL (ref 6–20)
BUN/CREAT SERPL: 7 (ref 7–25)
CALCIUM SPEC-SCNC: 9.1 MG/DL (ref 8.6–10.5)
CHLORIDE SERPL-SCNC: 92 MMOL/L (ref 98–107)
CHOLEST SERPL-MCNC: 381 MG/DL (ref 0–200)
CLARITY UR: ABNORMAL
CO2 SERPL-SCNC: 23.6 MMOL/L (ref 22–29)
COLOR UR: YELLOW
CREAT SERPL-MCNC: 1.29 MG/DL (ref 0.57–1)
DEPRECATED RDW RBC AUTO: 43.6 FL (ref 37–54)
EGFRCR SERPLBLD CKD-EPI 2021: 51.9 ML/MIN/1.73
EOSINOPHIL # BLD AUTO: 0.09 10*3/MM3 (ref 0–0.4)
EOSINOPHIL NFR BLD AUTO: 0.6 % (ref 0.3–6.2)
ERYTHROCYTE [DISTWIDTH] IN BLOOD BY AUTOMATED COUNT: 13.4 % (ref 12.3–15.4)
GEN 5 2HR TROPONIN T REFLEX: 48 NG/L
GLOBULIN UR ELPH-MCNC: 3.6 GM/DL
GLUCOSE BLDC GLUCOMTR-MCNC: 165 MG/DL (ref 70–105)
GLUCOSE BLDC GLUCOMTR-MCNC: 173 MG/DL (ref 70–105)
GLUCOSE BLDC GLUCOMTR-MCNC: 286 MG/DL (ref 70–105)
GLUCOSE BLDC GLUCOMTR-MCNC: 310 MG/DL (ref 70–105)
GLUCOSE BLDC GLUCOMTR-MCNC: 482 MG/DL (ref 70–105)
GLUCOSE BLDC GLUCOMTR-MCNC: 525 MG/DL (ref 70–105)
GLUCOSE SERPL-MCNC: 589 MG/DL (ref 65–99)
GLUCOSE UR STRIP-MCNC: ABNORMAL MG/DL
HBA1C MFR BLD: 16.9 % (ref 4.8–5.6)
HCT VFR BLD AUTO: 41.1 % (ref 34–46.6)
HDLC SERPL-MCNC: 30 MG/DL (ref 40–60)
HGB BLD-MCNC: 13.7 G/DL (ref 12–15.9)
HGB UR QL STRIP.AUTO: NEGATIVE
HOLD SPECIMEN: NORMAL
HOLD SPECIMEN: NORMAL
HYALINE CASTS UR QL AUTO: ABNORMAL /LPF
IMM GRANULOCYTES # BLD AUTO: 0.07 10*3/MM3 (ref 0–0.05)
IMM GRANULOCYTES NFR BLD AUTO: 0.5 % (ref 0–0.5)
INR PPP: 0.93 (ref 0.93–1.1)
KETONES UR QL STRIP: NEGATIVE
LDLC SERPL CALC-MCNC: ABNORMAL MG/DL
LDLC/HDLC SERPL: ABNORMAL {RATIO}
LEUKOCYTE ESTERASE UR QL STRIP.AUTO: NEGATIVE
LYMPHOCYTES # BLD AUTO: 1.29 10*3/MM3 (ref 0.7–3.1)
LYMPHOCYTES NFR BLD AUTO: 8.8 % (ref 19.6–45.3)
MCH RBC QN AUTO: 29.8 PG (ref 26.6–33)
MCHC RBC AUTO-ENTMCNC: 33.3 G/DL (ref 31.5–35.7)
MCV RBC AUTO: 89.3 FL (ref 79–97)
MONOCYTES # BLD AUTO: 0.59 10*3/MM3 (ref 0.1–0.9)
MONOCYTES NFR BLD AUTO: 4 % (ref 5–12)
NEUTROPHILS NFR BLD AUTO: 12.53 10*3/MM3 (ref 1.7–7)
NEUTROPHILS NFR BLD AUTO: 85.8 % (ref 42.7–76)
NITRITE UR QL STRIP: NEGATIVE
NRBC BLD AUTO-RTO: 0 /100 WBC (ref 0–0.2)
PA ADP PRP-ACNC: 168 PRU (ref 194–418)
PH UR STRIP.AUTO: 6 [PH] (ref 5–8)
PLATELET # BLD AUTO: 281 10*3/MM3 (ref 140–450)
PMV BLD AUTO: 11.6 FL (ref 6–12)
POTASSIUM SERPL-SCNC: 3.1 MMOL/L (ref 3.5–5.2)
POTASSIUM SERPL-SCNC: 3.2 MMOL/L (ref 3.5–5.2)
PROT SERPL-MCNC: 7 G/DL (ref 6–8.5)
PROT UR QL STRIP: ABNORMAL
PROTHROMBIN TIME: 10.2 SECONDS (ref 9.6–11.7)
RBC # BLD AUTO: 4.6 10*6/MM3 (ref 3.77–5.28)
RBC # UR STRIP: ABNORMAL /HPF
REF LAB TEST METHOD: ABNORMAL
SODIUM SERPL-SCNC: 128 MMOL/L (ref 136–145)
SP GR UR STRIP: 1.04 (ref 1–1.03)
SQUAMOUS #/AREA URNS HPF: ABNORMAL /HPF
T4 FREE SERPL-MCNC: 1.37 NG/DL (ref 0.93–1.7)
TRIGL SERPL-MCNC: >4425 MG/DL (ref 0–150)
TROPONIN T DELTA: -15 NG/L
TROPONIN T SERPL HS-MCNC: 61 NG/L
TROPONIN T SERPL HS-MCNC: 63 NG/L
TSH SERPL DL<=0.05 MIU/L-ACNC: 2.23 UIU/ML (ref 0.27–4.2)
UROBILINOGEN UR QL STRIP: ABNORMAL
VLDLC SERPL-MCNC: ABNORMAL MG/DL
WBC # UR STRIP: ABNORMAL /HPF
WBC NRBC COR # BLD AUTO: 14.62 10*3/MM3 (ref 3.4–10.8)

## 2024-06-17 PROCEDURE — 81001 URINALYSIS AUTO W/SCOPE: CPT | Performed by: EMERGENCY MEDICINE

## 2024-06-17 PROCEDURE — 63710000001 INSULIN GLARGINE PER 5 UNITS: Performed by: INTERNAL MEDICINE

## 2024-06-17 PROCEDURE — 84484 ASSAY OF TROPONIN QUANT: CPT | Performed by: EMERGENCY MEDICINE

## 2024-06-17 PROCEDURE — 25010000002 ENOXAPARIN PER 10 MG: Performed by: INTERNAL MEDICINE

## 2024-06-17 PROCEDURE — 71045 X-RAY EXAM CHEST 1 VIEW: CPT

## 2024-06-17 PROCEDURE — 99221 1ST HOSP IP/OBS SF/LOW 40: CPT | Performed by: PSYCHIATRY & NEUROLOGY

## 2024-06-17 PROCEDURE — 82948 REAGENT STRIP/BLOOD GLUCOSE: CPT | Performed by: INTERNAL MEDICINE

## 2024-06-17 PROCEDURE — 25510000001 IOPAMIDOL PER 1 ML: Performed by: INTERNAL MEDICINE

## 2024-06-17 PROCEDURE — 25810000003 SODIUM CHLORIDE 0.9 % SOLUTION: Performed by: INTERNAL MEDICINE

## 2024-06-17 PROCEDURE — 25010000002 CEFTRIAXONE PER 250 MG: Performed by: INTERNAL MEDICINE

## 2024-06-17 PROCEDURE — 83721 ASSAY OF BLOOD LIPOPROTEIN: CPT | Performed by: INTERNAL MEDICINE

## 2024-06-17 PROCEDURE — 99223 1ST HOSP IP/OBS HIGH 75: CPT | Performed by: INTERNAL MEDICINE

## 2024-06-17 PROCEDURE — 70450 CT HEAD/BRAIN W/O DYE: CPT

## 2024-06-17 PROCEDURE — 82746 ASSAY OF FOLIC ACID SERUM: CPT | Performed by: PSYCHIATRY & NEUROLOGY

## 2024-06-17 PROCEDURE — 84484 ASSAY OF TROPONIN QUANT: CPT | Performed by: INTERNAL MEDICINE

## 2024-06-17 PROCEDURE — 99285 EMERGENCY DEPT VISIT HI MDM: CPT

## 2024-06-17 PROCEDURE — 25010000002 HYDRALAZINE PER 20 MG

## 2024-06-17 PROCEDURE — 85730 THROMBOPLASTIN TIME PARTIAL: CPT | Performed by: EMERGENCY MEDICINE

## 2024-06-17 PROCEDURE — 82607 VITAMIN B-12: CPT | Performed by: PSYCHIATRY & NEUROLOGY

## 2024-06-17 PROCEDURE — 25010000002 HYDRALAZINE PER 20 MG: Performed by: INTERNAL MEDICINE

## 2024-06-17 PROCEDURE — 80061 LIPID PANEL: CPT | Performed by: INTERNAL MEDICINE

## 2024-06-17 PROCEDURE — 83036 HEMOGLOBIN GLYCOSYLATED A1C: CPT | Performed by: INTERNAL MEDICINE

## 2024-06-17 PROCEDURE — 25010000002 PROCHLORPERAZINE 10 MG/2ML SOLUTION

## 2024-06-17 PROCEDURE — 84132 ASSAY OF SERUM POTASSIUM: CPT | Performed by: EMERGENCY MEDICINE

## 2024-06-17 PROCEDURE — 25010000002 KETOROLAC TROMETHAMINE PER 15 MG

## 2024-06-17 PROCEDURE — 82948 REAGENT STRIP/BLOOD GLUCOSE: CPT

## 2024-06-17 PROCEDURE — 85610 PROTHROMBIN TIME: CPT | Performed by: EMERGENCY MEDICINE

## 2024-06-17 PROCEDURE — 84439 ASSAY OF FREE THYROXINE: CPT | Performed by: PSYCHIATRY & NEUROLOGY

## 2024-06-17 PROCEDURE — 25010000002 ONDANSETRON PER 1 MG: Performed by: EMERGENCY MEDICINE

## 2024-06-17 PROCEDURE — 95816 EEG AWAKE AND DROWSY: CPT

## 2024-06-17 PROCEDURE — 0042T HC CT CEREBRAL PERFUSION W/WO CONTRAST: CPT

## 2024-06-17 PROCEDURE — 93005 ELECTROCARDIOGRAM TRACING: CPT | Performed by: EMERGENCY MEDICINE

## 2024-06-17 PROCEDURE — 63710000001 INSULIN LISPRO (HUMAN) PER 5 UNITS: Performed by: INTERNAL MEDICINE

## 2024-06-17 PROCEDURE — 70496 CT ANGIOGRAPHY HEAD: CPT

## 2024-06-17 PROCEDURE — 36415 COLL VENOUS BLD VENIPUNCTURE: CPT

## 2024-06-17 PROCEDURE — 70498 CT ANGIOGRAPHY NECK: CPT

## 2024-06-17 PROCEDURE — 80050 GENERAL HEALTH PANEL: CPT | Performed by: EMERGENCY MEDICINE

## 2024-06-17 PROCEDURE — 70551 MRI BRAIN STEM W/O DYE: CPT

## 2024-06-17 PROCEDURE — 85576 BLOOD PLATELET AGGREGATION: CPT | Performed by: PSYCHIATRY & NEUROLOGY

## 2024-06-17 PROCEDURE — 95816 EEG AWAKE AND DROWSY: CPT | Performed by: PSYCHIATRY & NEUROLOGY

## 2024-06-17 RX ORDER — NIFEDIPINE 60 MG/1
60 TABLET, EXTENDED RELEASE ORAL
Status: DISCONTINUED | OUTPATIENT
Start: 2024-06-17 | End: 2024-06-17

## 2024-06-17 RX ORDER — NICOTINE POLACRILEX 4 MG
15 LOZENGE BUCCAL
Status: DISCONTINUED | OUTPATIENT
Start: 2024-06-17 | End: 2024-06-20 | Stop reason: HOSPADM

## 2024-06-17 RX ORDER — ACETAMINOPHEN 325 MG/1
650 TABLET ORAL EVERY 4 HOURS PRN
Status: DISCONTINUED | OUTPATIENT
Start: 2024-06-17 | End: 2024-06-20 | Stop reason: HOSPADM

## 2024-06-17 RX ORDER — HYDRALAZINE HYDROCHLORIDE 20 MG/ML
10 INJECTION INTRAMUSCULAR; INTRAVENOUS EVERY 4 HOURS PRN
Status: DISCONTINUED | OUTPATIENT
Start: 2024-06-17 | End: 2024-06-17

## 2024-06-17 RX ORDER — HYDRALAZINE HYDROCHLORIDE 25 MG/1
25 TABLET, FILM COATED ORAL EVERY 8 HOURS SCHEDULED
Status: DISCONTINUED | OUTPATIENT
Start: 2024-06-17 | End: 2024-06-18

## 2024-06-17 RX ORDER — ENOXAPARIN SODIUM 100 MG/ML
40 INJECTION SUBCUTANEOUS
Status: DISCONTINUED | OUTPATIENT
Start: 2024-06-17 | End: 2024-06-20 | Stop reason: HOSPADM

## 2024-06-17 RX ORDER — PANTOPRAZOLE SODIUM 40 MG/1
40 TABLET, DELAYED RELEASE ORAL EVERY MORNING
Status: DISCONTINUED | OUTPATIENT
Start: 2024-06-18 | End: 2024-06-20 | Stop reason: HOSPADM

## 2024-06-17 RX ORDER — PROCHLORPERAZINE EDISYLATE 5 MG/ML
10 INJECTION INTRAMUSCULAR; INTRAVENOUS ONCE
Status: COMPLETED | OUTPATIENT
Start: 2024-06-17 | End: 2024-06-17

## 2024-06-17 RX ORDER — PANTOPRAZOLE SODIUM 40 MG/10ML
40 INJECTION, POWDER, LYOPHILIZED, FOR SOLUTION INTRAVENOUS
Status: DISCONTINUED | OUTPATIENT
Start: 2024-06-18 | End: 2024-06-17

## 2024-06-17 RX ORDER — INSULIN LISPRO 100 [IU]/ML
15 INJECTION, SOLUTION INTRAVENOUS; SUBCUTANEOUS
Status: DISCONTINUED | OUTPATIENT
Start: 2024-06-17 | End: 2024-06-20 | Stop reason: HOSPADM

## 2024-06-17 RX ORDER — SODIUM CHLORIDE 9 MG/ML
40 INJECTION, SOLUTION INTRAVENOUS AS NEEDED
Status: DISCONTINUED | OUTPATIENT
Start: 2024-06-17 | End: 2024-06-20 | Stop reason: HOSPADM

## 2024-06-17 RX ORDER — DEXTROSE MONOHYDRATE 25 G/50ML
25 INJECTION, SOLUTION INTRAVENOUS
Status: DISCONTINUED | OUTPATIENT
Start: 2024-06-17 | End: 2024-06-20 | Stop reason: HOSPADM

## 2024-06-17 RX ORDER — ONDANSETRON 4 MG/1
4 TABLET, ORALLY DISINTEGRATING ORAL EVERY 8 HOURS PRN
Status: DISCONTINUED | OUTPATIENT
Start: 2024-06-17 | End: 2024-06-20 | Stop reason: HOSPADM

## 2024-06-17 RX ORDER — SODIUM CHLORIDE 9 MG/ML
125 INJECTION, SOLUTION INTRAVENOUS CONTINUOUS
Status: DISCONTINUED | OUTPATIENT
Start: 2024-06-18 | End: 2024-06-20 | Stop reason: HOSPADM

## 2024-06-17 RX ORDER — SODIUM CHLORIDE 9 MG/ML
75 INJECTION, SOLUTION INTRAVENOUS CONTINUOUS
Status: DISCONTINUED | OUTPATIENT
Start: 2024-06-17 | End: 2024-06-17

## 2024-06-17 RX ORDER — METOCLOPRAMIDE 5 MG/1
5 TABLET ORAL 3 TIMES DAILY
COMMUNITY
End: 2024-07-07 | Stop reason: HOSPADM

## 2024-06-17 RX ORDER — FAMOTIDINE 20 MG/1
20 TABLET, FILM COATED ORAL
Status: DISCONTINUED | OUTPATIENT
Start: 2024-06-17 | End: 2024-06-20 | Stop reason: HOSPADM

## 2024-06-17 RX ORDER — ALBUTEROL SULFATE 90 UG/1
2 AEROSOL, METERED RESPIRATORY (INHALATION) EVERY 4 HOURS PRN
Status: DISCONTINUED | OUTPATIENT
Start: 2024-06-17 | End: 2024-06-20 | Stop reason: HOSPADM

## 2024-06-17 RX ORDER — BISACODYL 10 MG
10 SUPPOSITORY, RECTAL RECTAL DAILY PRN
Status: DISCONTINUED | OUTPATIENT
Start: 2024-06-17 | End: 2024-06-20 | Stop reason: HOSPADM

## 2024-06-17 RX ORDER — SODIUM CHLORIDE 0.9 % (FLUSH) 0.9 %
10 SYRINGE (ML) INJECTION EVERY 12 HOURS SCHEDULED
Status: DISCONTINUED | OUTPATIENT
Start: 2024-06-17 | End: 2024-06-20 | Stop reason: HOSPADM

## 2024-06-17 RX ORDER — HYDRALAZINE HYDROCHLORIDE 20 MG/ML
10 INJECTION INTRAMUSCULAR; INTRAVENOUS EVERY 6 HOURS PRN
Status: DISCONTINUED | OUTPATIENT
Start: 2024-06-17 | End: 2024-06-17

## 2024-06-17 RX ORDER — METOPROLOL TARTRATE 1 MG/ML
5 INJECTION, SOLUTION INTRAVENOUS EVERY 4 HOURS PRN
Status: DISCONTINUED | OUTPATIENT
Start: 2024-06-17 | End: 2024-06-17

## 2024-06-17 RX ORDER — POLYETHYLENE GLYCOL 3350 17 G/17G
17 POWDER, FOR SOLUTION ORAL DAILY PRN
Status: DISCONTINUED | OUTPATIENT
Start: 2024-06-17 | End: 2024-06-20 | Stop reason: HOSPADM

## 2024-06-17 RX ORDER — BISACODYL 5 MG/1
5 TABLET, DELAYED RELEASE ORAL DAILY PRN
Status: DISCONTINUED | OUTPATIENT
Start: 2024-06-17 | End: 2024-06-20 | Stop reason: HOSPADM

## 2024-06-17 RX ORDER — INSULIN LISPRO 100 [IU]/ML
2-9 INJECTION, SOLUTION INTRAVENOUS; SUBCUTANEOUS
Status: DISCONTINUED | OUTPATIENT
Start: 2024-06-17 | End: 2024-06-20 | Stop reason: HOSPADM

## 2024-06-17 RX ORDER — FOLIC ACID 1 MG/1
1 TABLET ORAL DAILY
Status: DISCONTINUED | OUTPATIENT
Start: 2024-06-17 | End: 2024-06-20 | Stop reason: HOSPADM

## 2024-06-17 RX ORDER — POTASSIUM CHLORIDE 20 MEQ/1
40 TABLET, EXTENDED RELEASE ORAL EVERY 4 HOURS
Qty: 4 TABLET | Refills: 0 | Status: DISPENSED | OUTPATIENT
Start: 2024-06-17 | End: 2024-06-17

## 2024-06-17 RX ORDER — ATORVASTATIN CALCIUM 40 MG/1
80 TABLET, FILM COATED ORAL NIGHTLY
Status: DISCONTINUED | OUTPATIENT
Start: 2024-06-17 | End: 2024-06-20 | Stop reason: HOSPADM

## 2024-06-17 RX ORDER — ASPIRIN 81 MG/1
81 TABLET, CHEWABLE ORAL DAILY
Status: DISCONTINUED | OUTPATIENT
Start: 2024-06-18 | End: 2024-06-20 | Stop reason: HOSPADM

## 2024-06-17 RX ORDER — SODIUM CHLORIDE 0.9 % (FLUSH) 0.9 %
10 SYRINGE (ML) INJECTION AS NEEDED
Status: DISCONTINUED | OUTPATIENT
Start: 2024-06-17 | End: 2024-06-20 | Stop reason: HOSPADM

## 2024-06-17 RX ORDER — IBUPROFEN 600 MG/1
1 TABLET ORAL
Status: DISCONTINUED | OUTPATIENT
Start: 2024-06-17 | End: 2024-06-20 | Stop reason: HOSPADM

## 2024-06-17 RX ORDER — KETOROLAC TROMETHAMINE 30 MG/ML
15 INJECTION, SOLUTION INTRAMUSCULAR; INTRAVENOUS ONCE
Status: COMPLETED | OUTPATIENT
Start: 2024-06-17 | End: 2024-06-17

## 2024-06-17 RX ORDER — CLOPIDOGREL BISULFATE 75 MG/1
75 TABLET ORAL DAILY
Status: DISCONTINUED | OUTPATIENT
Start: 2024-06-18 | End: 2024-06-20 | Stop reason: HOSPADM

## 2024-06-17 RX ORDER — ESCITALOPRAM OXALATE 10 MG/1
10 TABLET ORAL DAILY
Status: DISCONTINUED | OUTPATIENT
Start: 2024-06-18 | End: 2024-06-20 | Stop reason: HOSPADM

## 2024-06-17 RX ORDER — METOPROLOL TARTRATE 50 MG/1
100 TABLET, FILM COATED ORAL 2 TIMES DAILY
Status: DISCONTINUED | OUTPATIENT
Start: 2024-06-17 | End: 2024-06-18

## 2024-06-17 RX ORDER — ONDANSETRON 2 MG/ML
4 INJECTION INTRAMUSCULAR; INTRAVENOUS ONCE
Status: COMPLETED | OUTPATIENT
Start: 2024-06-17 | End: 2024-06-17

## 2024-06-17 RX ORDER — AMOXICILLIN 250 MG
2 CAPSULE ORAL 2 TIMES DAILY PRN
Status: DISCONTINUED | OUTPATIENT
Start: 2024-06-17 | End: 2024-06-20 | Stop reason: HOSPADM

## 2024-06-17 RX ADMIN — SODIUM CHLORIDE 75 ML/HR: 900 INJECTION, SOLUTION INTRAVENOUS at 11:25

## 2024-06-17 RX ADMIN — Medication 10 ML: at 09:40

## 2024-06-17 RX ADMIN — HYDRALAZINE HYDROCHLORIDE 10 MG: 20 INJECTION, SOLUTION INTRAMUSCULAR; INTRAVENOUS at 11:38

## 2024-06-17 RX ADMIN — HYDRALAZINE HYDROCHLORIDE 10 MG: 20 INJECTION, SOLUTION INTRAMUSCULAR; INTRAVENOUS at 20:19

## 2024-06-17 RX ADMIN — PROCHLORPERAZINE EDISYLATE 10 MG: 5 INJECTION INTRAMUSCULAR; INTRAVENOUS at 20:18

## 2024-06-17 RX ADMIN — ONDANSETRON 4 MG: 2 INJECTION INTRAMUSCULAR; INTRAVENOUS at 10:30

## 2024-06-17 RX ADMIN — INSULIN GLARGINE 30 UNITS: 100 INJECTION, SOLUTION SUBCUTANEOUS at 20:21

## 2024-06-17 RX ADMIN — INSULIN LISPRO 7 UNITS: 100 INJECTION, SOLUTION INTRAVENOUS; SUBCUTANEOUS at 18:10

## 2024-06-17 RX ADMIN — KETOROLAC TROMETHAMINE 15 MG: 30 INJECTION, SOLUTION INTRAMUSCULAR at 20:16

## 2024-06-17 RX ADMIN — POTASSIUM CHLORIDE 40 MEQ: 1500 TABLET, EXTENDED RELEASE ORAL at 21:52

## 2024-06-17 RX ADMIN — CEFTRIAXONE 1000 MG: 1 INJECTION, POWDER, FOR SOLUTION INTRAMUSCULAR; INTRAVENOUS at 20:21

## 2024-06-17 RX ADMIN — Medication 10 ML: at 20:22

## 2024-06-17 RX ADMIN — ENOXAPARIN SODIUM 40 MG: 100 INJECTION SUBCUTANEOUS at 20:20

## 2024-06-17 RX ADMIN — IOPAMIDOL 100 ML: 755 INJECTION, SOLUTION INTRAVENOUS at 22:49

## 2024-06-17 RX ADMIN — IOPAMIDOL 50 ML: 755 INJECTION, SOLUTION INTRAVENOUS at 23:17

## 2024-06-17 RX ADMIN — INSULIN LISPRO 9 UNITS: 100 INJECTION, SOLUTION INTRAVENOUS; SUBCUTANEOUS at 11:31

## 2024-06-17 RX ADMIN — INSULIN LISPRO 15 UNITS: 100 INJECTION, SOLUTION INTRAVENOUS; SUBCUTANEOUS at 20:20

## 2024-06-17 NOTE — CONSULTS
Referring Provider: Yunior Reynolds MD    Reason for Consultation: Elevated troponin      Patient Care Team:  Ibrahima Correa MD as PCP - General (Family Medicine)  Rachele Zafar RN as Ambulatory  (Population Health)  Xochilt Jenkins MD as Consulting Physician (Nephrology)      SUBJECTIVE     Chief Complaint: Altered mental state    History of present illness:  Bibiana Inman is a 44 y.o. female with a history hypertension, hyperlipidemia, uncontrolled diabetes, coronary artery disease status post CABG with multivessel coronary artery disease who presented to the hospital with altered mental state.  Patient was noted to be staring and talking in nonsensical ways.  Rapid response was called and she was brought to the ER.  In the ER she was awake and alert and had no recollection of these events.  Vital signs were stable and troponin was found to be 63.  Of note her blood sugar was 600 and urinalysis showed pyuria.  Cardiology has been consulted due to elevated troponin.      Review of systems:    Constitutional: No weakness, fatigue, fever, rigors, chills   Eyes: No vision changes, eye pain   ENT/oropharynx: No difficulty swallowing, sore throat, epistaxis, changes in hearing   Cardiovascular: No chest pain, chest tightness, palpitations, paroxysmal nocturnal dyspnea, orthopnea, diaphoresis, dizziness / syncopal episode   Respiratory: No shortness of breath, dyspnea on exertion, cough, wheezing, hemoptysis   Gastrointestinal: No abdominal pain, nausea, vomiting, diarrhea, bloody stools   Genitourinary: No hematuria, dysuria   Neurological: + AMS.    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 Debbie Fry.    Depressive disorder 2023    Diabetic gastroparesis 2021    Diabetic peripheral neuropathy associated with type 2 diabetes mellitus 2024    Gastroesophageal reflux disease 03/10/2021    GERD (gastroesophageal reflux disease)     Intermittent claudication 2017    Iron deficiency anemia 2020    Mixed hypercholesterolemia and hypertriglyceridemia 2014    Myocardial infarction     Obesity     Obstructive sleep apnea 2021    Personal history of COVID-19 2022    Polyp of colon 2023    Spontaneous      Stress fracture of right ankle with routine healing     Tobacco abuse 2024    Type 2 diabetes mellitus with hyperglycemia, with long-term current use of insulin 2017    Vitamin D deficiency 2024       Past Surgical History:   Procedure Laterality Date    CARDIAC CATHETERIZATION N/A 2024    Procedure: Left Heart Cath and coronary angiogram;  Surgeon: Jena Barron MD;  Location: [facility] CATH INVASIVE LOCATION;  Service: Cardiology;  Laterality: N/A;     SECTION      CORONARY ARTERY BYPASS GRAFT  2014    LIMA-LAD, SVG-OM2, SVG-PDA, Dr. Debbie Fry.    DILATATION AND CURETTAGE      TONSILLECTOMY         Family History   Family history unknown: Yes       Social History     Tobacco Use    Smoking status: Every Day     Current packs/day: 0.25     Average packs/day: 0.3 packs/day for 15.0 years (3.8 ttl pk-yrs)     Types: Cigarettes    Smokeless tobacco: Never   Vaping Use    Vaping status: Never Used   Substance Use Topics    Alcohol use: Defer    Drug use: Never      Home meds:  Prior to Admission medications    Medication Sig Start Date End Date Taking? Authorizing Provider   albuterol sulfate  (90 Base) MCG/ACT inhaler Inhale 2 puffs every 6 (six) hours if needed for wheezing. 23      aspirin 81 MG chewable tablet Chew 1  tablet Daily.    Joseline Quiroz MD   atorvastatin (LIPITOR) 80 MG tablet Take 1 tablet by mouth Daily.    Joseline Quiroz MD   clopidogrel (PLAVIX) 75 MG tablet Take 1 tablet by mouth Daily. 5/29/24      clotrimazole-betamethasone (LOTRISONE) 1-0.05 % cream apply 1 application on the skin daily 5/9/23      Continuous Blood Gluc  (Dexcom G6 ) device Use as instructed 6/1/23      dapagliflozin (FARXIGA) 5 MG tablet tablet Take 1 tablet by mouth Daily.    Joseline Quiroz MD   escitalopram (LEXAPRO) 10 MG tablet Take 1 tablet by mouth Daily.    Joseline Quiroz MD   escitalopram (LEXAPRO) 10 MG tablet Take 1 tablet (10 mg total) by mouth 1 (one) time each day. 2/22/24   Jesse Lance MD   famotidine (PEPCID) 40 MG tablet Take 1 tablet by mouth every night at bedtime. 6/28/23      ferrous sulfate 325 (65 FE) MG tablet Take 1 tablet by mouth Daily With Breakfast.    Joseline Quiroz MD   gabapentin (NEURONTIN) 100 MG capsule Take 1 capsule by mouth 3 (Three) Times a Day. 5/5/21   Joseline Quiroz MD   hydrALAZINE (APRESOLINE) 25 MG tablet Take 1 tablet (25 mg total) by mouth 3 (three) times a day. 12/22/23      hydrALAZINE (APRESOLINE) 25 MG tablet Take 1 tablet (25 mg total) by mouth 3 (three) times a day. 4/24/24      icosapent ethyl (Vascepa) 1 g capsule capsule Take 2 capsules by mouth 2 (two) times a day. 11/2/23      insulin aspart (novoLOG FLEXPEN) 100 UNIT/ML solution pen-injector sc pen Inject 15 Units under the skin into the appropriate area as directed 3 (Three) Times a Day With Meals.    Joseline Quiroz MD   insulin detemir (Levemir FlexPen) 100 UNIT/ML injection Inject 30 Units under the skin into the appropriate area as directed 2 (Two) Times a Day. 4/19/24      Insulin Pen Needle (Unifine Pentips) 32G X 4 MM misc Use with Insulin Daily 3/10/21      losartan (COZAAR) 100 MG tablet Take 1 tablet by mouth Daily. 5/11/23      metoclopramide (REGLAN) 5  "MG tablet Take 1 tablet by mouth before meals and at bedtime 6/30/22      metoprolol tartrate (LOPRESSOR) 100 MG tablet Take 1 tablet by mouth 2 (two) times a day. 6/5/24      NIFEdipine XL (ADALAT CC) 60 MG 24 hr tablet Take 1 tablet by mouth Daily. 2/23/24   Jesse Lance MD   ondansetron (ZOFRAN) 4 MG tablet Take 1 tablet by mouth three times a day as needed 1/9/24      pantoprazole (PROTONIX) 40 MG EC tablet Take 1 tablet by mouth Every Morning. 6/28/23      terbinafine (lamiSIL) 250 MG tablet Take 1 tablet by mouth Daily. 11/10/23      vitamin B-12 (CYANOCOBALAMIN) 1000 MCG tablet Take 1 tablet by mouth Daily.    Provider, MD Joseline   vitamin D (ERGOCALCIFEROL) 1.25 MG (85901 UT) capsule capsule Take 1 capsule (50,000 Units total) by mouth 1 (one) time per week. 7/21/21      glyburide (DIAbeta) 5 MG tablet Take 1 tablet by mouth 2 (Two) Times a Day. 2/3/21 3/10/21     hydroCHLOROthiazide (HYDRODIURIL) 25 MG tablet Take 1 tablet by mouth Daily. 1/20/23 8/1/23     metFORMIN (GLUCOPHAGE) 1000 MG tablet Take 1 tablet by mouth 2 (Two) Times a Day With Meals. 5/9/23 7/7/23     omeprazole (priLOSEC) 20 MG capsule Take 1 capsule (20 mg total) by mouth 1 (one) time each day. Do not crush or chew. 3/10/21 7/20/21         Allergies:     Latex    Scheduled Meds:insulin lispro, 2-9 Units, Subcutaneous, 4x Daily AC & at Bedtime      Continuous Infusions:sodium chloride, 75 mL/hr      PRN Meds:  dextrose    dextrose    glucagon (human recombinant)    hydrALAZINE    [COMPLETED] Insert Peripheral IV **AND** sodium chloride      OBJECTIVE    Vital Signs  Vitals:    06/17/24 0922 06/17/24 0925 06/17/24 0931 06/17/24 1033   BP: 115/64 115/64 120/61 158/60   BP Location:  Right arm Right arm Right arm   Patient Position:  Lying Lying Sitting   Pulse: 55 53 53 55   Resp:  17 11 12   Temp:   97.4 °F (36.3 °C)    TempSrc:   Oral    SpO2: 98% 96% 97% 100%   Weight:   63.6 kg (140 lb 4.8 oz)    Height:   162.6 cm (64\")  " "      Flowsheet Rows      Flowsheet Row First Filed Value   Admission Height 162.6 cm (64\") Documented at 06/17/2024 0931   Admission Weight 63.6 kg (140 lb 4.8 oz) Documented at 06/17/2024 0931            No intake or output data in the 24 hours ending 06/17/24 1119     Telemetry: Sinus bradycardia.    Physical Exam:  The patient is alert, oriented and in no distress.  Vital signs as noted above.  Head and neck revealed no carotid bruits or jugular venous distention.  No thyromegaly or lymphadenopathy is present  Lungs clear.  No wheezing.  Breath sounds are normal bilaterally.  Heart: Normal first and second heart sounds. No murmur.  No precordial rub is present.  No gallop is present.  Abdomen: Soft and nontender.  No organomegaly is present.  Extremities with good peripheral pulses without any pedal edema.  Skin: Warm and dry.  Musculoskeletal system is grossly normal.  CNS grossly normal.       Results Review:  I have personally reviewed the results from the time of this admission to 6/17/2024 11:19 EDT and agree with these findings:  []  Laboratory  []  Microbiology  []  Radiology  []  EKG/Telemetry   []  Cardiology/Vascular   []  Pathology  []  Old records  []  Other:    Most notable findings include:     Lab Results (last 24 hours)       Procedure Component Value Units Date/Time    Hemoglobin A1c [[id number]] Collected: 06/17/24 0928    Specimen: Blood Updated: 06/17/24 1102    Urinalysis, Microscopic Only - Urine, Clean Catch [[id number]]  (Abnormal) Collected: 06/17/24 0940    Specimen: Urine, Clean Catch Updated: 06/17/24 1023     RBC, UA 11-20 /HPF      WBC, UA 6-10 /HPF      Bacteria, UA 2+ /HPF      Squamous Epithelial Cells, UA 13-20 /HPF      Hyaline Casts, UA None Seen /LPF      Methodology Manual Light Microscopy    Comprehensive Metabolic Panel [[id number]]  (Abnormal) Collected: 06/17/24 0928    Specimen: Blood Updated: 06/17/24 1010     Glucose 589 mg/dL      BUN 9 mg/dL      Creatinine 1.29 mg/dL  "     Sodium 128 mmol/L      Potassium 3.2 mmol/L      Chloride 92 mmol/L      CO2 23.6 mmol/L      Calcium 9.1 mg/dL      Total Protein 7.0 g/dL      Albumin 3.4 g/dL      ALT (SGPT) 5 U/L      AST (SGOT) 11 U/L      Alkaline Phosphatase 134 U/L      Total Bilirubin 0.4 mg/dL      Globulin 3.6 gm/dL      A/G Ratio 0.9 g/dL      BUN/Creatinine Ratio 7.0     Anion Gap 12.4 mmol/L      eGFR 51.9 mL/min/1.73     Narrative:      GFR Normal >60  Chronic Kidney Disease <60  Kidney Failure <15      Single High Sensitivity Troponin T [[id number]]  (Abnormal) Collected: 06/17/24 0928    Specimen: Blood Updated: 06/17/24 1010     HS Troponin T 63 ng/L     Narrative:      High Sensitive Troponin T Reference Range:  <14.0 ng/L- Negative Female for AMI  <22.0 ng/L- Negative Male for AMI  >=14 - Abnormal Female indicating possible myocardial injury.  >=22 - Abnormal Male indicating possible myocardial injury.   Clinicians would have to utilize clinical acumen, EKG, Troponin, and serial changes to determine if it is an Acute Myocardial Infarction or myocardial injury due to an underlying chronic condition.         Urinalysis With Microscopic If Indicated (No Culture) - Urine, Clean Catch [[id number]]  (Abnormal) Collected: 06/17/24 0940    Specimen: Urine, Clean Catch Updated: 06/17/24 0954     Color, UA Yellow     Appearance, UA Cloudy     pH, UA 6.0     Specific Gravity, UA 1.038     Glucose, UA >=1000 mg/dL (3+)     Ketones, UA Negative     Bilirubin, UA Negative     Blood, UA Negative     Protein, UA >=300 mg/dL (3+)     Leuk Esterase, UA Negative     Nitrite, UA Negative     Urobilinogen, UA 0.2 E.U./dL    aPTT [[id number]]  (Abnormal) Collected: 06/17/24 0928    Specimen: Blood Updated: 06/17/24 0946     PTT 26.2 seconds     Protime-INR [[id number]]  (Normal) Collected: 06/17/24 0928    Specimen: Blood Updated: 06/17/24 0946     Protime 10.2 Seconds      INR 0.93    Extra Tubes [[id number]] Collected: 06/17/24 0928    Specimen:  Blood, Venous Line Updated: 06/17/24 0945    Narrative:      The following orders were created for panel order Extra Tubes.  Procedure                               Abnormality         Status                     ---------                               -----------         ------                     Gold Top - SST[[id number]]                                   Final result               Green Top (Gel)[[id number]]                                  Final result                 Please view results for these tests on the individual orders.    Green Top (Gel) [[id number]] Collected: 06/17/24 0928    Specimen: Blood Updated: 06/17/24 0945     Extra Tube Hold for add-ons.     Comment: Auto resulted.       Gold Top - SST [[id number]] Collected: 06/17/24 0928    Specimen: Blood Updated: 06/17/24 0945     Extra Tube Hold for add-ons.     Comment: Auto resulted.       CBC & Differential [[id number]]  (Abnormal) Collected: 06/17/24 0928    Specimen: Blood Updated: 06/17/24 0935    Narrative:      The following orders were created for panel order CBC & Differential.  Procedure                               Abnormality         Status                     ---------                               -----------         ------                     CBC Auto Differential[[id number]]        Abnormal            Final result                 Please view results for these tests on the individual orders.    CBC Auto Differential [[id number]]  (Abnormal) Collected: 06/17/24 0928    Specimen: Blood Updated: 06/17/24 0935     WBC 14.62 10*3/mm3      RBC 4.60 10*6/mm3      Hemoglobin 13.7 g/dL      Hematocrit 41.1 %      MCV 89.3 fL      MCH 29.8 pg      MCHC 33.3 g/dL      RDW 13.4 %      RDW-SD 43.6 fl      MPV 11.6 fL      Platelets 281 10*3/mm3      Neutrophil % 85.8 %      Lymphocyte % 8.8 %      Monocyte % 4.0 %      Eosinophil % 0.6 %      Basophil % 0.3 %      Immature Grans % 0.5 %      Neutrophils, Absolute 12.53 10*3/mm3      Lymphocytes, Absolute  1.29 10*3/mm3      Monocytes, Absolute 0.59 10*3/mm3      Eosinophils, Absolute 0.09 10*3/mm3      Basophils, Absolute 0.05 10*3/mm3      Immature Grans, Absolute 0.07 10*3/mm3      nRBC 0.0 /100 WBC     POC Glucose Once [[id number]]  (Abnormal) Collected: 06/17/24 0926    Specimen: Blood Updated: 06/17/24 0928     Glucose 525 mg/dL      Comment: Serial Number: [id number]:  [id number]               Imaging Results (Last 24 Hours)       Procedure Component Value Units Date/Time    CT Head Without Contrast [[id number]] Collected: 06/17/24 1027     Updated: 06/17/24 1031    Narrative:      CT HEAD WO CONTRAST    Date of Exam: 6/17/2024 10:25 AM EDT    Indication: altered mental status.    Comparison: None available.    Technique: Axial CT images were obtained of the head without contrast administration.  Coronal reconstructions were performed.  Automated exposure control and iterative reconstruction methods were used.      Findings: No evidence of intracranial hemorrhage, mass, or midline shift.  Sulci and ventricles are symmetric.  Brain volume is appropriate for patient's age.  The gray white matter differentiation is intact. No focal hypodensities to suggest acute or   subacute infarct. The mastoid air cells and paranasal sinuses are well aerated.  Globes and extraocular muscles are normal.  No displaced or depressed skull fractures.  No suspicious lytic or sclerotic osseous lesions. Distal carotid and vertebral artery   atherosclerotic disease.      Impression:      1. No acute intracranial abnormality by head CT.  2. Distal carotid and vertebral artery atherosclerotic disease, much more than expected for patient age.    Electronically Signed: Eric Fenton MD    6/17/2024 10:29 AM EDT    Workstation ID: [id number]    XR Chest 1 View [[id number]] Collected: 06/17/24 1000     Updated: 06/17/24 1003    Narrative:      XR CHEST 1 VW    Date of Exam: 6/17/2024 9:48 AM EDT    Indication: altered mental  status    Comparison: Chest radiograph 2/19/2024    Findings:  Cardiomegaly stable from prior. Negative for lobar consolidation, edema, effusion or pneumothorax. Prior sternotomy. Sternal fixation hardware noted. Degenerative related osseous changes. Radiographic appearance of the chest stable from prior.      Impression:      Impression:  No active pulmonary process. Stable appearance of the chest since prior comparison.      Electronically Signed: Eric Fenton MD    6/17/2024 10:01 AM EDT    Workstation ID: [id number]            LAB RESULTS (LAST 7 DAYS)    CBC  Results from last 7 days   Lab Units 06/17/24  0928   WBC 10*3/mm3 14.62*   RBC 10*6/mm3 4.60   HEMOGLOBIN g/dL 13.7   HEMATOCRIT % 41.1   MCV fL 89.3   PLATELETS 10*3/mm3 281       BMP  Results from last 7 days   Lab Units 06/17/24  0928   SODIUM mmol/L 128*   POTASSIUM mmol/L 3.2*   CHLORIDE mmol/L 92*   CO2 mmol/L 23.6   BUN mg/dL 9   CREATININE mg/dL 1.29*   GLUCOSE mg/dL 589*       CMP   Results from last 7 days   Lab Units 06/17/24  0928   SODIUM mmol/L 128*   POTASSIUM mmol/L 3.2*   CHLORIDE mmol/L 92*   CO2 mmol/L 23.6   BUN mg/dL 9   CREATININE mg/dL 1.29*   GLUCOSE mg/dL 589*   ALBUMIN g/dL 3.4*   BILIRUBIN mg/dL 0.4   ALK PHOS U/L 134*   AST (SGOT) U/L 11   ALT (SGPT) U/L 5       BNP        TROPONIN  Results from last 7 days   Lab Units 06/17/24  0928   HSTROP T ng/L 63*       CoAg  Results from last 7 days   Lab Units 06/17/24  0928   INR  0.93   APTT seconds 26.2*       Creatinine Clearance  Estimated Creatinine Clearance: 54.7 mL/min (A) (by C-G formula based on SCr of 1.29 mg/dL (H)).    ABG          Radiology  CT Head Without Contrast    Result Date: 6/17/2024  1. No acute intracranial abnormality by head CT. 2. Distal carotid and vertebral artery atherosclerotic disease, much more than expected for patient age. Electronically Signed: Eric Fenton MD  6/17/2024 10:29 AM EDT  Workstation ID: [id number]    XR Chest 1 View    Result Date:  6/17/2024  Impression: No active pulmonary process. Stable appearance of the chest since prior comparison. Electronically Signed: Eric Fenton MD  6/17/2024 10:01 AM EDT  Workstation ID: [id number]       EKG  I personally viewed and interpreted the patient's EKG/Telemetry data:  ECG 12 Lead Altered Mental Status   Preliminary Result   HEART RATE= 54  bpm   RR Interval= 1100  ms   NJ Interval= 139  ms   P Horizontal Axis= -7  deg   P Front Axis= 50  deg   QRSD Interval= 98  ms   QT Interval= 489  ms   QTcB= 466  ms   QRS Axis= 46  deg   T Wave Axis= 169  deg   - ABNORMAL ECG -   Sinus bradycardia   Probable left atrial enlargement   LVH with secondary repolarization abnormality   Probable anterior infarct, age indeterminate   Electronically Signed By:    Date and Time of Study: 2024-06-17 09:23:02      Telemetry Scan   Final Result            Echocardiogram:    Results for orders placed during the hospital encounter of 02/19/24    Adult Transthoracic Echo Complete W/ Cont if Necessary Per Protocol    Interpretation Summary    Left ventricular systolic function is normal. Calculated left ventricular EF = 69% Left ventricular ejection fraction appears to be 61 - 65%.    Left ventricular diastolic function was normal.  GLS -16%.    Left atrial volume is moderately increased.    Estimated right ventricular systolic pressure from tricuspid regurgitation is normal (<35 mmHg).    No significant valvular abnormalities noted.        Stress Test:  Results for orders placed during the hospital encounter of 02/19/24    Stress Test With Myocardial Perfusion One Day    Interpretation Summary    Findings consistent with a normal ECG stress test.    (Calculated EF = 63%).    Myocardial perfusion imaging indicates a moderate-sized, severe area of ischemia located in the inferior wall and lateral wall.    Impressions are consistent with an intermediate risk study.        Cardiac Catheterization:  Results for orders placed during the  hospital encounter of 02/19/24    Cardiac Catheterization/Vascular Study    Conclusion  OPERATORS  Jena Barron M.D. (Attending Cardiologist)      PROCEDURES PERFORMED  Ultrasound guided Vascular access  Left Heart Catheterization  Coronary Angiogram  Bypass angiography  Moderate sedation    INDICATIONS FOR PROCEDURE  Positive stress test    PROCEDURE IN DETAIL  Informed consent was obtained from the patient after explaining the risks, benefits, and alternative options of the procedure. After obtaining informed consent, the patient was brought to the cath lab and was prepped in a sterile fashion. Lidocaine 2% was used for local anesthesia into the right femoral access site. The right femoral artery was accessed with a micropuncture needle via modified Seldinger technique under ultrasound guidance. A 6F sheath was inserted successfully. Afterwards, 6F JR4 and JL4 diagnostic catheters were advanced over a wire into the ascending aorta and were used to engage the ostia of the left main and RCA respectively. JR4 used to cross the AV and obtain LV pressures and gradient across the AV measured via pullback technique. Images of the right and left coronary systems were obtained. Images of the vein grafts and selective LIMA angiography was performed. All the catheters were exchanged over a wire and subsequently removed. Angiogram of the femoral access site was obtained and did not show complications. The patient tolerated the procedure well without any complications. The pictures were reviewed at the end of the procedure. An angioseal closure device was applied.    HEMODYNAMICS    LV: 157/5/11  AO: 157/64/99  Gradient: None    FINDINGS  Coronary Angiogram    Right dominant circulation    Left main: Left main is a large caliber vessel which gives rise to the Left Anterior Descending and the Left circumflex. No angiographically significant stenosis    Left Anterior Descending Artery: The LAD is  at the ostium    Left  Circumflex: Lcx is a small caliber vessel which courses in the AV groove and gives rise to OM branches.  There is a high OM1 which further bifurcates.  There is a long 60 to 70% lesion in the proximal segment of this OM.  The circumflex is diffusely diseased with 60 to 70% stenosis in the proximal and mid segment all the way into OM 3.  There is an 80% lesion in the midsegment.    Right Coronary Artery: The RCA is  at the ostium    Bypass angiography  SVG to PDA is patent at the origin, body, and insertion  SVG to diagonal is occluded at the origin  LIMA to LAD is widely patent at the origin, body, and insertion.  The LAD distal to the touchdown of the LIMA has 70 to 80% stenosis and is very small caliber.  LIMA does retrogradely fill a diagonal branch also.      Femoral angiography  The right SFA has a stent in its body with severe 80 to 90% ISR.    ESTIMATED BLOOD LOSS:  10 ml    COMPLICATIONS:  None    PROCEDURE DATA:  Contrast Used: 100 cc  Sedation Time: 30 minutes    IMPRESSIONS  Multivessel obstructive CAD of the native coronary arteries  Occluded vein graft to what appears to be a diagonal  There is diffuse CAD involving the left circumflex and OM which is not bypassed and there is also obstructive CAD distal to the LIMA touchdown  This is stable coronary artery disease  Low left heart filling pressures    RECOMMENDATIONS  Recommend medical management for her diffuse CAD in the setting of severely uncontrolled diabetes and smoking  Uptitration of OMT for stable CAD  Patient needs to be aggressively treated for her uncontrolled diabetes  Counseled extensively on smoking cessation  Start Plavix  Blood pressure control  Aggressive cholesterol control with statin with a goal LDL of less than 70        Other:      ASSESSMENT & PLAN:    Principal Problem:    Altered mental status    Altered mental state  CT head with no acute intracranial abnormality.  Atherosclerosis in distal carotid and vertebral artery  noted.  MRI is pending.  May be secondary to uncontrolled hyperglycemia and UTI.  Primary is managing.    Elevated troponin  Known coronary disease with history of CABG  Minimally elevated high-sensitivity troponin which is trending down  Troponin elevation secondary to underlying stable coronary disease, chronic kidney disease, uncontrolled hypertension and UTI  Echocardiogram shows preserved LV function.  Normal diastolic function and no significant valvular abnormalities.  Recent Cardiac catheterization showed significant coronary artery disease.  RCA , left circumflex with 80 to 90% stenosis in the proximal segment and mid segment.  Ramus with proximal 70 to 80% stenosis.  LAD is totally occluded  LIMA to LAD is patent however distal/apical LAD has 80% stenosis  Vein graft to left circumflex is occluded, vein graft to RCA is patent  She has significant burden of coronary disease which is not acute  Continue dual antiplatelet therapy, high intensity statin.  Recommended to focus on risk factors modification.  May have to be considered for redo CABG in future.     Hypertensive emergency  Resume hydralazine and nifedipine  Unable to give ACE or ARB because of chronic kidney disease  Unable to start beta-blocker due to bradycardia.     Diabetes  Uncontrolled diabetes with A1c of more than 16.9  Treatment with insulin per Glucomander  Emphasized compliance with medications.  Endocrine consultation.  Start insulin drip     Chronic kidney disease  Creatinine is 1.3, GFR is 52  Appears to be at baseline renal function  Followed by nephrology     Hyperlipidemia  She has uncontrolled hypertriglyceridemia with triglycerides of >4000.  Start high intensity statin  High risk for pancreatitis  She will also need Vascepa.    Cristian Su MD  06/17/24  11:19 EDT

## 2024-06-17 NOTE — PROCEDURES
This is an inpatient,   Digitally recorded multi-montage EEG with leads placed according to the international 10/20 system  Photic stimulation was done  Hyperventilation was done    With the patient fully aroused the background was 8.5 to 9 Hz posterior dominant alpha activity    The patient did become drowsy but I do not see any deeper stages of sleep  There was some phase reversing but not mostly in-sync  No asymmetry    Nothing suggesting clear-cut epileptiform activity or asymmetry  No clinical events  Hyperventilation was attempted but I did not see any significant changes    Photic stimulation was attempted in intermittent stepwise fashion up to the flash frequency of 30 Hz but I did not see any driving, asymmetry or paroxysmal activity    Impression:    This is likely normal adult awake and drowsy EEG  Some beta artifact was seen  Nonspecific  No seizures but EEG like this does not rule out epilepsy

## 2024-06-17 NOTE — ED PROVIDER NOTES
Subjective   History of Present Illness  Chief complaint: Altered mental status    44-year-old female presents with altered mental status.  Patient was at work here at the hospital.  She was apparently talking and all of a sudden started staring off and started did not make sense when she tried to talk.  A fast team was called.  By the time she was brought to the emergency room she is feeling better.  She states she does not really remember the event.  She is now alert and oriented.  She denies any focal numbness, weakness, tingling.  She denies headache.  She has had no chest pain or shortness of breath.  Blood sugar was found to be over 500.  She never had any loss of consciousness.    History provided by:  Patient      Review of Systems   Constitutional:  Negative for fever.   HENT:  Negative for congestion.    Respiratory:  Negative for cough and shortness of breath.    Cardiovascular:  Negative for chest pain.   Gastrointestinal:  Negative for abdominal pain and vomiting.   Musculoskeletal:  Negative for back pain.   Neurological:  Negative for syncope, weakness, numbness and headaches.   Psychiatric/Behavioral:  Positive for confusion.        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 Debbie Fry.    Depressive disorder 08/01/2023    Diabetic gastroparesis 07/20/2021    Diabetic peripheral neuropathy associated with type 2 diabetes mellitus 02/20/2024    Gastroesophageal reflux disease 03/10/2021    GERD (gastroesophageal reflux disease)     Intermittent claudication 04/17/2017    Iron deficiency anemia 03/02/2020    Mixed hypercholesterolemia and hypertriglyceridemia 03/07/2014    Myocardial infarction     Obesity     Obstructive sleep apnea 12/14/2021  "   Personal history of COVID-19 2022    Polyp of colon 2023    Spontaneous      Stress fracture of right ankle with routine healing     Tobacco abuse 2024    Type 2 diabetes mellitus with hyperglycemia, with long-term current use of insulin 2017    Vitamin D deficiency 2024       Allergies   Allergen Reactions    Latex Itching       Past Surgical History:   Procedure Laterality Date    CARDIAC CATHETERIZATION N/A 2024    Procedure: Left Heart Cath and coronary angiogram;  Surgeon: Jena Barron MD;  Location: [facility] CATH INVASIVE LOC[facility];  Service: Cardiology;  Laterality: N/A;     SECTION      CORONARY ARTERY BYPASS GRAFT  2014    LIMA-LAD, SVG-OM2, SVG-PDA, Dr. Debbie Fry.    DILATATION AND CURETTAGE      TONSILLECTOMY         Family History   Family history unknown: Yes       Social History     Socioeconomic History    Marital status:    Tobacco Use    Smoking status: Every Day     Current packs/day: 0.25     Average packs/day: 0.3 packs/day for 15.0 years (3.8 ttl pk-yrs)     Types: Cigarettes    Smokeless tobacco: Never   Vaping Use    Vaping status: Never Used   Substance and Sexual Activity    Alcohol use: Defer    Drug use: Never    Sexual activity: Defer       /60 (BP Location: Right arm, Patient Position: Sitting)   Pulse 55   Temp 97.4 °F (36.3 °C) (Oral)   Resp 12   Ht 162.6 cm (64\")   Wt 63.6 kg (140 lb 4.8 oz)   LMP 01/10/2023 Comment: patient states irregular periods  SpO2 100%   BMI 24.08 kg/m²       Objective   Physical Exam  Vitals and nursing note reviewed.   Constitutional:       Appearance: She is well-developed.   HENT:      Head: Normocephalic and atraumatic.      Mouth/Throat:      Mouth: Mucous membranes are moist.   Cardiovascular:      Rate and Rhythm: Normal rate and regular rhythm.      Heart sounds: Normal heart sounds.   Pulmonary:      Effort: Pulmonary effort is normal. No respiratory distress.      " Breath sounds: Normal breath sounds.   Abdominal:      General: Bowel sounds are normal.      Palpations: Abdomen is soft.      Tenderness: There is no abdominal tenderness.   Skin:     General: Skin is warm and dry.   Neurological:      Mental Status: She is alert and oriented to person, place, and time.      Comments: No focal motor or sensory deficit appreciated.  There is no facial droop.  There is no dysarthria or aphasia.         Procedures           ED Course      My interpretation of EKG shows sinus bradycardia, rate of 54, LVH with secondary repolarization abnormality, no STEMI                           Results for orders placed or performed during the hospital encounter of 06/17/24   Comprehensive Metabolic Panel    Specimen: Blood   Result Value Ref Range    Glucose 589 (C) 65 - 99 mg/dL    BUN 9 6 - 20 mg/dL    Creatinine 1.29 (H) 0.57 - 1.00 mg/dL    Sodium 128 (L) 136 - 145 mmol/L    Potassium 3.2 (L) 3.5 - 5.2 mmol/L    Chloride 92 (L) 98 - 107 mmol/L    CO2 23.6 22.0 - 29.0 mmol/L    Calcium 9.1 8.6 - 10.5 mg/dL    Total Protein 7.0 6.0 - 8.5 g/dL    Albumin 3.4 (L) 3.5 - 5.2 g/dL    ALT (SGPT) 5 1 - 33 U/L    AST (SGOT) 11 1 - 32 U/L    Alkaline Phosphatase 134 (H) 39 - 117 U/L    Total Bilirubin 0.4 0.0 - 1.2 mg/dL    Globulin 3.6 gm/dL    A/G Ratio 0.9 g/dL    BUN/Creatinine Ratio 7.0 7.0 - 25.0    Anion Gap 12.4 5.0 - 15.0 mmol/L    eGFR 51.9 (L) >60.0 mL/min/1.73   Protime-INR    Specimen: Blood   Result Value Ref Range    Protime 10.2 9.6 - 11.7 Seconds    INR 0.93 0.93 - 1.10   aPTT    Specimen: Blood   Result Value Ref Range    PTT 26.2 (L) 61.0 - 76.5 seconds   Single High Sensitivity Troponin T    Specimen: Blood   Result Value Ref Range    HS Troponin T 63 (C) <14 ng/L   Urinalysis With Microscopic If Indicated (No Culture) - Urine, Clean Catch    Specimen: Urine, Clean Catch   Result Value Ref Range    Color, UA Yellow Yellow, Straw    Appearance, UA Cloudy (A) Clear    pH, UA 6.0 5.0 -  8.0    Specific Gravity, UA 1.038 (H) 1.005 - 1.030    Glucose, UA >=1000 mg/dL (3+) (A) Negative    Ketones, UA Negative Negative    Bilirubin, UA Negative Negative    Blood, UA Negative Negative    Protein, UA >=300 mg/dL (3+) (A) Negative    Leuk Esterase, UA Negative Negative    Nitrite, UA Negative Negative    Urobilinogen, UA 0.2 E.U./dL 0.2 - 1.0 E.U./dL   CBC Auto Differential    Specimen: Blood   Result Value Ref Range    WBC 14.62 (H) 3.40 - 10.80 10*3/mm3    RBC 4.60 3.77 - 5.28 10*6/mm3    Hemoglobin 13.7 12.0 - 15.9 g/dL    Hematocrit 41.1 34.0 - 46.6 %    MCV 89.3 79.0 - 97.0 fL    MCH 29.8 26.6 - 33.0 pg    MCHC 33.3 31.5 - 35.7 g/dL    RDW 13.4 12.3 - 15.4 %    RDW-SD 43.6 37.0 - 54.0 fl    MPV 11.6 6.0 - 12.0 fL    Platelets 281 140 - 450 10*3/mm3    Neutrophil % 85.8 (H) 42.7 - 76.0 %    Lymphocyte % 8.8 (L) 19.6 - 45.3 %    Monocyte % 4.0 (L) 5.0 - 12.0 %    Eosinophil % 0.6 0.3 - 6.2 %    Basophil % 0.3 0.0 - 1.5 %    Immature Grans % 0.5 0.0 - 0.5 %    Neutrophils, Absolute 12.53 (H) 1.70 - 7.00 10*3/mm3    Lymphocytes, Absolute 1.29 0.70 - 3.10 10*3/mm3    Monocytes, Absolute 0.59 0.10 - 0.90 10*3/mm3    Eosinophils, Absolute 0.09 0.00 - 0.40 10*3/mm3    Basophils, Absolute 0.05 0.00 - 0.20 10*3/mm3    Immature Grans, Absolute 0.07 (H) 0.00 - 0.05 10*3/mm3    nRBC 0.0 0.0 - 0.2 /100 WBC   Urinalysis, Microscopic Only - Urine, Clean Catch    Specimen: Urine, Clean Catch   Result Value Ref Range    RBC, UA 11-20 (A) None Seen, 0-2 /HPF    WBC, UA 6-10 (A) None Seen, 0-2 /HPF    Bacteria, UA 2+ (A) None Seen /HPF    Squamous Epithelial Cells, UA 13-20 (A) None Seen, 0-2 /HPF    Hyaline Casts, UA None Seen None Seen /LPF    Methodology Manual Light Microscopy    POC Glucose Once    Specimen: Blood   Result Value Ref Range    Glucose 525 (C) 70 - 105 mg/dL   ECG 12 Lead Altered Mental Status   Result Value Ref Range    QT Interval 489 ms    QTC Interval 466 ms   Gold Top - [facility]   Result Value Ref  Range    Extra Tube Hold for add-ons.    Green Top (Gel)   Result Value Ref Range    Extra Tube Hold for add-ons.      CT Head Without Contrast    Result Date: 6/17/2024  1. No acute intracranial abnormality by head CT. 2. Distal carotid and vertebral artery atherosclerotic disease, much more than expected for patient age. Electronically Signed: Eric Fenton MD  6/17/2024 10:29 AM EDT  Workstation ID: [id number]    XR Chest 1 View    Result Date: 6/17/2024  Impression: No active pulmonary process. Stable appearance of the chest since prior comparison. Electronically Signed: Eric Fenton MD  6/17/2024 10:01 AM EDT  Workstation ID: [id number]               Medical Decision Making  Amount and/or Complexity of Data Reviewed  Labs: ordered.  Radiology: ordered.  ECG/medicine tests: ordered.    Risk  Prescription drug management.      Patient had the above evaluation.  Results were discussed with the patient.  My interpretation of chest x-ray shows no infiltrate or effusion.  CT head shows no acute intracranial abnormality.  White blood cell count is 14.62.  CMP significant for blood sugar of 589.  No evidence of DKA.  Potassium is also mildly low at 3.2.  Troponin is elevated at 63.  EKG shows no STEMI.  Patient is not having any chest pain at this time.  I discussed with the hospitalist and the patient will be admitted for further evaluation and management.      Final diagnoses:   Altered mental status, unspecified altered mental status type   Hyperglycemia   Hypokalemia   Elevated troponin       ED Disposition  ED Disposition       ED Disposition   Decision to Admit    Condition   --    Comment   Level of Care: Telemetry [5]   Diagnosis: Altered mental status [780.97.ICD-9-CM]   Certification: I Certify That Inpatient Hospital Services Are Medically Necessary For Greater Than 2 Midnights                 No follow-up provider specified.       Medication List      No changes were made to your prescriptions during this  visit.            Yunior Reynolds MD  06/17/24 1100

## 2024-06-17 NOTE — CODE DOCUMENTATION
Rapid response called for stroke like symptoms. Right sided weakness, right facial droop, confusion, and generalized weakness. Patient was at work in the [employer] when symptoms occurred. Coworkers stated that her blood glucose was elevated around 550 this AM. Patient was assisted to a wheelchair and immediately transported to ER room 2 for evaluation.

## 2024-06-17 NOTE — H&P
[facility] Medicine Services  History & Physical    Patient Name: Bibiana Inman  : 1980  MRN: [id number]  Primary Care Physician:  Ibrahima Correa MD  Date of admission: 2024  Date and Time of Service: 2024    Subjective      Chief Complaint:  altered mental status    History of Present Illness:     This is a 44-year-old female who is employed [employer].  She was at work while talking she suddenly started staring off and started talking which did not make sense.  Rapid response was called in and patient was brought into the ER.  While in ER patient was awake and alert and did not remember what exactly happened.  Vitals were stable and lab work showed troponin of 63 finger sugar around 600 with creatinine of 1.2 and potassium of 3.2.  WBC count is 14.6 and UA showed pyuria and CT scan of head did not show any acute abnormality and there was concern about carotid artery disease      Review of Systems   Constitutional:  Negative for chills, diaphoresis and fever.   HENT:  Negative for dental problem, ear discharge, hearing loss, nosebleeds, rhinorrhea and sneezing.    Eyes:  Negative for photophobia, redness and itching.   Respiratory:  Negative for cough, chest tightness and stridor.    Cardiovascular:  Negative for leg swelling.   Gastrointestinal:  Negative for abdominal pain, blood in stool and diarrhea.   Endocrine: Negative for heat intolerance.   Genitourinary: Negative for flank pain, frequency and hematuria.   Musculoskeletal:  Negative for back pain, joint swelling, and gait problem.   Skin:  Negative for color change.   Neurological:  Negative for dizziness, speech difficulty, numbness and headaches.   Psychiatric/Behavioral:  Negative for behavioral problems.      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 Debbie Fry.    Depressive disorder 2023    Diabetic gastroparesis 2021    Diabetic peripheral neuropathy associated with type 2 diabetes mellitus 2024    Gastroesophageal reflux disease 03/10/2021    GERD (gastroesophageal reflux disease)     Intermittent claudication 2017    Iron deficiency anemia 2020    Mixed hypercholesterolemia and hypertriglyceridemia 2014    Myocardial infarction     Obesity     Obstructive sleep apnea 2021    Personal history of COVID-19 2022    Polyp of colon 2023    Spontaneous      Stress fracture of right ankle with routine healing     Tobacco abuse 2024    Type 2 diabetes mellitus with hyperglycemia, with long-term current use of insulin 2017    Vitamin D deficiency 2024       Past Surgical History:   Procedure Laterality Date    CARDIAC CATHETERIZATION N/A 2024    Procedure: Left Heart Cath and coronary angiogram;  Surgeon: Jena Barron MD;  Location: [facility] CATH INVASIVE LOCATION;  Service: Cardiology;  Laterality: N/A;     SECTION      CORONARY ARTERY BYPASS GRAFT  2014    LIMA-LAD, SVG-OM2, SVG-PDA, Dr. Debbie Fry.    DILATATION AND CURETTAGE      TONSILLECTOMY         Family History: Family history is unknown by patient. Otherwise pertinent FHx was reviewed and not pertinent to current issue.    Social History:  reports that she has been smoking cigarettes. She has a 3.8 pack-year smoking history. She has never used smokeless tobacco. Alcohol use questions deferred to the physician. She reports that she does not use drugs.    Home Medications:  Prior to Admission Medications       Prescriptions Last Dose Informant Patient Reported? Taking?    albuterol sulfate  (90 Base) MCG/ACT inhaler   No No    Inhale 2 puffs every 6 (six) hours if needed for  wheezing.    aspirin 81 MG chewable tablet   Yes No    Chew 1 tablet Daily.    atorvastatin (LIPITOR) 80 MG tablet   Yes No    Take 1 tablet by mouth Daily.    clopidogrel (PLAVIX) 75 MG tablet   No No    Take 1 tablet by mouth Daily.    clotrimazole-betamethasone (LOTRISONE) 1-0.05 % cream   No No    apply 1 application on the skin daily    Continuous Blood Gluc  (Dexcom G6 ) device   No No    Use as instructed    dapagliflozin (FARXIGA) 5 MG tablet tablet   Yes No    Take 1 tablet by mouth Daily.    escitalopram (LEXAPRO) 10 MG tablet   Yes No    Take 1 tablet by mouth Daily.    escitalopram (LEXAPRO) 10 MG tablet   No No    Take 1 tablet (10 mg total) by mouth 1 (one) time each day.    famotidine (PEPCID) 40 MG tablet   No No    Take 1 tablet by mouth every night at bedtime.    ferrous sulfate 325 (65 FE) MG tablet   Yes No    Take 1 tablet by mouth Daily With Breakfast.    gabapentin (NEURONTIN) 100 MG capsule   Yes No    Take 1 capsule by mouth 3 (Three) Times a Day.    hydrALAZINE (APRESOLINE) 25 MG tablet   No No    Take 1 tablet (25 mg total) by mouth 3 (three) times a day.    hydrALAZINE (APRESOLINE) 25 MG tablet   No No    Take 1 tablet (25 mg total) by mouth 3 (three) times a day.    icosapent ethyl (Vascepa) 1 g capsule capsule   No No    Take 2 capsules by mouth 2 (two) times a day.    insulin aspart (novoLOG FLEXPEN) 100 UNIT/ML solution pen-injector sc pen   Yes No    Inject 15 Units under the skin into the appropriate area as directed 3 (Three) Times a Day With Meals.    insulin detemir (Levemir FlexPen) 100 UNIT/ML injection   No No    Inject 30 Units under the skin into the appropriate area as directed 2 (Two) Times a Day.    Insulin Pen Needle (Unifine Pentips) 32G X 4 MM misc   No No    Use with Insulin Daily    losartan (COZAAR) 100 MG tablet   No No    Take 1 tablet by mouth Daily.    metoclopramide (REGLAN) 5 MG tablet   No No    Take 1 tablet by mouth before meals and at  bedtime    metoprolol tartrate (LOPRESSOR) 100 MG tablet   No No    Take 1 tablet by mouth 2 (two) times a day.    NIFEdipine XL (ADALAT CC) 60 MG 24 hr tablet   No No    Take 1 tablet by mouth Daily.    ondansetron (ZOFRAN) 4 MG tablet   No No    Take 1 tablet by mouth three times a day as needed    pantoprazole (PROTONIX) 40 MG EC tablet   No No    Take 1 tablet by mouth Every Morning.    terbinafine (lamiSIL) 250 MG tablet   No No    Take 1 tablet by mouth Daily.    vitamin B-12 (CYANOCOBALAMIN) 1000 MCG tablet   Yes No    Take 1 tablet by mouth Daily.    vitamin D (ERGOCALCIFEROL) 1.25 MG (27799 UT) capsule capsule   No No    Take 1 capsule (50,000 Units total) by mouth 1 (one) time per week.              Allergies:  Allergies   Allergen Reactions    Latex Itching       Objective      Vitals:   Temp:  [97.4 °F (36.3 °C)] 97.4 °F (36.3 °C)  Heart Rate:  [53-55] 55  Resp:  [11-17] 12  BP: (115-158)/(60-64) 158/60  Body mass index is 24.08 kg/m².  Physical Exam:    Constitutional: Patient appears well-developed and well-nourished and in no acute distress   HEENT:   Head: Normocephalic and atraumatic.   Eyes:  Pupils are equal, round, and reactive to light. EOM are intact. Sclera are anicteric and non-injected.  Mouth and Throat: Patient has moist mucous membranes.      Neck: Neck supple.  No thyromegaly present. No lymphadenopathy present. No  masses.     Cardiovascular: Inspection: No JVD present. Palpation:bilaterally. No leg edema. Auscultation: Regular rate, regular rhythm, S1 normal and S2 normal. reveals no gallop and no friction rub. No Carotid bruit bilaterally.    Pulmonary/Chest: Inspection: No distress, no use of accessory muscles. Lungs are clear to auscultation bilaterally. No respiratory distress. No wheezes. No rhonchi. No rales.     Abdomen /Gastrointestinal: Inspection: no distension. Palpation: no masses, no organomegaly. Soft. There is no tenderness. Bowel sounds are normal.   Extremities no  cyanosis clubbing or edema    Neurological: Patient is alert and oriented to person, place, and time. Cranial nerves II-XII are grossly intact with no focal deficits. Sensori-motor exam is normal. No cerebellar signs.    Skin: Skin is warm. No rash noted. Nails show no clubbing.  No cyanosis or erythema. No bruising.      Diagnostic Data:  Results from last 7 days   Lab Units 06/17/24  0928   WBC 10*3/mm3 14.62*   HEMOGLOBIN g/dL 13.7   HEMATOCRIT % 41.1   PLATELETS 10*3/mm3 281   GLUCOSE mg/dL 589*   CREATININE mg/dL 1.29*   BUN mg/dL 9   SODIUM mmol/L 128*   POTASSIUM mmol/L 3.2*   AST (SGOT) U/L 11   ALT (SGPT) U/L 5   ALK PHOS U/L 134*   BILIRUBIN mg/dL 0.4   ANION GAP mmol/L 12.4       CT Head Without Contrast    Result Date: 6/17/2024  1. No acute intracranial abnormality by head CT. 2. Distal carotid and vertebral artery atherosclerotic disease, much more than expected for patient age. Electronically Signed: Eric Fenton MD  6/17/2024 10:29 AM EDT  Workstation ID: [id number]    XR Chest 1 View    Result Date: 6/17/2024  Impression: No active pulmonary process. Stable appearance of the chest since prior comparison. Electronically Signed: Eric Fenton MD  6/17/2024 10:01 AM EDT  Workstation ID: [id number]     No results found for this or any previous visit.    I have personally reviewed the patient's new results.       Assessment & Plan        This is a 44 y.o. 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  Uncontrolled diabetes mellitus with  finger sugars around 600  CAD s/p CABG  Cardiac catheterization in February showed multivessel disease and medical management was recommended  Hypertension  Diabetic gastroparesis/polyneuropathy  Acid reflux  Peripheral vascular disease  Mixed hyperlipidemia  Polypharmacy  History of anxiety  depression    Suggestion    At this time will admit this patient in hospital  Gentle hydration  Will do serial troponin  Cardiology and Neurology consult   I will check an MRI of brain and check an EEG  May need further MRA of head and neck  Endocrinology consult for uncontrolled hypertriglyceridemia and diabetes  Insulin sliding scale and continue home doses of insulin      VTE Prophylaxis:  Pharmacologic VTE prophylaxis orders are signed & held.          The patient desires to be as follows:    CODE STATUS:   full code         Admission Status:      Expected Length of Stay:  2-3 days    PDMP and Medication Dispenses via Sidebar reviewed and consistent with patient reported medications.        Signature:     This document has been electronically signed by Bhargav Esteves MD on June 17, 2024 11:08 EDT   [facility], [place], operated by [facility] Hospitalist Team

## 2024-06-17 NOTE — ED NOTES
Nursing report ED to floor  Bibiana Inman  44 y.o.  female    HPI:   Chief Complaint   Patient presents with    Altered Mental Status       Admitting doctor:   No admitting provider for patient encounter.    Admitting diagnosis:   The primary encounter diagnosis was Altered mental status, unspecified altered mental status type. Diagnoses of Hyperglycemia, Hypokalemia, and Elevated troponin were also pertinent to this visit.    Code status:   Current Code Status       Date Active Code Status Order ID Comments User Context       6/17/2024 1117 CPR (Attempt to Resuscitate) [id number]  Bhargav Esteves MD ED        Question Answer    Code Status (Patient has no pulse and is not breathing) CPR (Attempt to Resuscitate)    Medical Interventions (Patient has pulse or is breathing) Full Support    Level Of Support Discussed With Patient                    Allergies:   Latex    Isolation:  No active isolations     Fall Risk:  Fall Risk Assessment was completed, and patient is at moderate risk for falls.   Predictive Model Details         15 (Low) Factor Value    Calculated 6/17/2024 18:28 Age 44    Risk of Fall Model Ezio Coma Scale 14     Active Peripheral IV Present     Imaging order in this encounter Present     Magnesium not on file     Number of Distinct Medication Classes administered 4     Albumin 3.4 g/dL     Chloride 92 mmol/L     Tobacco Use Current     Gaston Scale not on file     Creatinine 1.29 mg/dL     Diastolic BP 81     Calcium 9.1 mg/dL     Days after Admission 0.38     Respiratory Rate 14     Number of administrations of Anti-Hypertensives 1     ALT 5 U/L     Total Bilirubin 0.4 mg/dL     Potassium 3.2 mmol/L         Weight:       06/17/24  0931   Weight: 63.6 kg (140 lb 4.8 oz)       Intake and Output    Intake/Output Summary (Last 24 hours) at 6/17/2024 1828  Last data filed at 6/17/2024 1208  Gross per 24 hour   Intake 53.75 ml   Output --   Net 53.75 ml       Diet:   Dietary Orders (From admission,  onward)       Start     Ordered    06/17/24 1820  NPO Diet NPO Type: Strict NPO  Diet Effective Now        Question:  NPO Type  Answer:  Strict NPO    06/17/24 1819                     Most recent vitals:   Vitals:    06/17/24 1516 06/17/24 1601 06/17/24 1617 06/17/24 1701   BP: (!) 181/75 (!) 191/93 (!) 185/84 (!) 203/81   BP Location:       Patient Position:       Pulse: 60 66 62 61   Resp:       Temp:       TempSrc:       SpO2: 100% 98% 100% 99%   Weight:       Height:           Active LDAs/IV Access:   Lines, Drains & Airways       Active LDAs       Name Placement date Placement time Site Days    Peripheral IV 06/17/24 0924 Left Antecubital 06/17/24 0924  Antecubital  less than 1                    Skin Condition:   Skin Assessments (last day)       Date/Time Skin WDL Interval    06/17/24 0924 -- baseline    06/17/24 09:41:37 WDL --             Labs (abnormal labs have a star):   Labs Reviewed   COMPREHENSIVE METABOLIC PANEL - Abnormal; Notable for the following components:       Result Value    Glucose 589 (*)     Creatinine 1.29 (*)     Sodium 128 (*)     Potassium 3.2 (*)     Chloride 92 (*)     Albumin 3.4 (*)     Alkaline Phosphatase 134 (*)     eGFR 51.9 (*)     All other components within normal limits    Narrative:     GFR Normal >60  Chronic Kidney Disease <60  Kidney Failure <15     APTT - Abnormal; Notable for the following components:    PTT 26.2 (*)     All other components within normal limits   SINGLE HS TROPONIN T - Abnormal; Notable for the following components:    HS Troponin T 63 (*)     All other components within normal limits    Narrative:     High Sensitive Troponin T Reference Range:  <14.0 ng/L- Negative Female for AMI  <22.0 ng/L- Negative Male for AMI  >=14 - Abnormal Female indicating possible myocardial injury.  >=22 - Abnormal Male indicating possible myocardial injury.   Clinicians would have to utilize clinical acumen, EKG, Troponin, and serial changes to determine if it is an  Acute Myocardial Infarction or myocardial injury due to an underlying chronic condition.        URINALYSIS W/ MICROSCOPIC IF INDICATED (NO CULTURE) - Abnormal; Notable for the following components:    Appearance, UA Cloudy (*)     Specific Gravity, UA 1.038 (*)     Glucose, UA >=1000 mg/dL (3+) (*)     Protein, UA >=300 mg/dL (3+) (*)     All other components within normal limits   CBC WITH AUTO DIFFERENTIAL - Abnormal; Notable for the following components:    WBC 14.62 (*)     Neutrophil % 85.8 (*)     Lymphocyte % 8.8 (*)     Monocyte % 4.0 (*)     Neutrophils, Absolute 12.53 (*)     Immature Grans, Absolute 0.07 (*)     All other components within normal limits   URINALYSIS, MICROSCOPIC ONLY - Abnormal; Notable for the following components:    RBC, UA 11-20 (*)     WBC, UA 6-10 (*)     Bacteria, UA 2+ (*)     Squamous Epithelial Cells, UA 13-20 (*)     All other components within normal limits   HIGH SENSITIVITIY TROPONIN T 2HR - Abnormal; Notable for the following components:    HS Troponin T 48 (*)     Troponin T Delta -15 (*)     All other components within normal limits    Narrative:     High Sensitive Troponin T Reference Range:  <14.0 ng/L- Negative Female for AMI  <22.0 ng/L- Negative Male for AMI  >=14 - Abnormal Female indicating possible myocardial injury.  >=22 - Abnormal Male indicating possible myocardial injury.   Clinicians would have to utilize clinical acumen, EKG, Troponin, and serial changes to determine if it is an Acute Myocardial Infarction or myocardial injury due to an underlying chronic condition.        HEMOGLOBIN A1C - Abnormal; Notable for the following components:    Hemoglobin A1C 16.90 (*)     All other components within normal limits   LIPID PANEL - Abnormal; Notable for the following components:    Total Cholesterol 381 (*)     Triglycerides >4,425 (*)     HDL Cholesterol 30 (*)     All other components within normal limits    Narrative:     Cholesterol Reference Ranges  ([place]  Department of Health and Human Services ATP III Classifications)    Desirable          <200 mg/dL  Borderline High    200-239 mg/dL  High Risk          >240 mg/dL      Triglyceride Reference Ranges  (U.S. Department of Health and Human Services ATP III Classifications)    Normal           <150 mg/dL  Borderline High  150-199 mg/dL  High             200-499 mg/dL  Very High        >500 mg/dL    HDL Reference Ranges  ([place].S. Department of Health and Human Services ATP III Classifications)    Low     <40 mg/dl (major risk factor for CHD)  High    >60 mg/dl ('negative' risk factor for CHD)        LDL Reference Ranges  (U.S. Department of Health and Human Services ATP III Classifications)    Optimal          <100 mg/dL  Near Optimal     100-129 mg/dL  Borderline High  130-159 mg/dL  High             160-189 mg/dL  Very High        >189 mg/dL   LDL CHOLESTEROL, DIRECT - Abnormal; Notable for the following components:    LDL Cholesterol  116 (*)     All other components within normal limits    Narrative:     LDL Reference Ranges    (U.S. Department of Health and Human Services ATP III Classifications)    Optimal          <100 mg/dl  Near Optimal     100-129 mg/dl  Borderline High  130-159 mg/dl  High             160-189 mg/dl  Very High        >189 mg/dl     POCT GLUCOSE FINGERSTICK - Abnormal; Notable for the following components:    Glucose 525 (*)     All other components within normal limits   POCT GLUCOSE FINGERSTICK - Abnormal; Notable for the following components:    Glucose 482 (*)     All other components within normal limits   POCT GLUCOSE FINGERSTICK - Abnormal; Notable for the following components:    Glucose 310 (*)     All other components within normal limits   PROTIME-INR - Normal   POTASSIUM   VITAMIN B12   FOLATE   TSH   T4, FREE   P2Y12 PLATELET INHIBITION   TROPONIN   POCT GLUCOSE FINGERSTICK   POCT GLUCOSE FINGERSTICK   POCT GLUCOSE FINGERSTICK   POCT GLUCOSE FINGERSTICK   POCT GLUCOSE FINGERSTICK   CBC  AND DIFFERENTIAL    Narrative:     The following orders were created for panel order CBC & Differential.  Procedure                               Abnormality         Status                     ---------                               -----------         ------                     CBC Auto Differential[[id number]]        Abnormal            Final result                 Please view results for these tests on the individual orders.   EXTRA TUBES    Narrative:     The following orders were created for panel order Extra Tubes.  Procedure                               Abnormality         Status                     ---------                               -----------         ------                     Gold Top - SST[[id number]]                                   Final result               Green Top (Gel)[[id number]]                                  Final result                 Please view results for these tests on the individual orders.   GOLD TOP - SST   GREEN TOP       LOC: Person, Place, Time, and Situation    Telemetry:  Telemetry    Cardiac Monitoring Ordered: yes    EKG:   ECG 12 Lead Altered Mental Status   Preliminary Result   HEART RATE= 54  bpm   RR Interval= 1100  ms   KS Interval= 139  ms   P Horizontal Axis= -7  deg   P Front Axis= 50  deg   QRSD Interval= 98  ms   QT Interval= 489  ms   QTcB= 466  ms   QRS Axis= 46  deg   T Wave Axis= 169  deg   - ABNORMAL ECG -   Sinus bradycardia   Probable left atrial enlargement   LVH with secondary repolarization abnormality   Probable anterior infarct, age indeterminate   Electronically Signed By:    Date and Time of Study: 2024-06-17 09:23:02      Telemetry Scan   Final Result      Telemetry Scan   Final Result          Medications Given in the ED:   Medications   sodium chloride 0.9 % flush 10 mL (10 mL Intravenous Given 6/17/24 0940)   albuterol sulfate HFA (PROVENTIL HFA;VENTOLIN HFA;PROAIR HFA) inhaler 2 puff (has no administration in time range)   aspirin chewable  tablet 81 mg (has no administration in time range)   atorvastatin (LIPITOR) tablet 80 mg (has no administration in time range)   clopidogrel (PLAVIX) tablet 75 mg (has no administration in time range)   escitalopram (LEXAPRO) tablet 10 mg (has no administration in time range)   famotidine (PEPCID) tablet 20 mg (has no administration in time range)   hydrALAZINE (APRESOLINE) tablet 25 mg (has no administration in time range)   insulin glargine (LANTUS, SEMGLEE) injection 30 Units (has no administration in time range)   insulin lispro (HUMALOG/ADMELOG) injection 15 Units (has no administration in time range)   metoprolol tartrate (LOPRESSOR) tablet 100 mg (has no administration in time range)   NIFEdipine XL (PROCARDIA XL) 24 hr tablet 60 mg (has no administration in time range)   ondansetron ODT (ZOFRAN-ODT) disintegrating tablet 4 mg (has no administration in time range)   pantoprazole (PROTONIX) EC tablet 40 mg (has no administration in time range)   sodium chloride 0.9 % flush 10 mL (has no administration in time range)   sodium chloride 0.9 % flush 10 mL (has no administration in time range)   sodium chloride 0.9 % infusion 40 mL (has no administration in time range)   Pharmacy to Dose enoxaparin (LOVENOX) (has no administration in time range)   Potassium Replacement - Follow Nurse / BPA Driven Protocol (has no administration in time range)   Magnesium Standard Dose Replacement - Follow Nurse / BPA Driven Protocol (has no administration in time range)   Phosphorus Replacement - Follow Nurse / BPA Driven Protocol (has no administration in time range)   Calcium Replacement - Follow Nurse / BPA Driven Protocol (has no administration in time range)   sennosides-docusate (PERICOLACE) 8.6-50 MG per tablet 2 tablet (has no administration in time range)     And   polyethylene glycol (MIRALAX) packet 17 g (has no administration in time range)     And   bisacodyl (DULCOLAX) EC tablet 5 mg (has no administration in time  range)     And   bisacodyl (DULCOLAX) suppository 10 mg (has no administration in time range)   acetaminophen (TYLENOL) tablet 650 mg (has no administration in time range)   cefTRIAXone (ROCEPHIN) 1,000 mg in sodium chloride 0.9 % 100 mL MBP (has no administration in time range)   dextrose (GLUTOSE) oral gel 15 g (has no administration in time range)   dextrose (D50W) (25 g/50 mL) IV injection 25 g (has no administration in time range)   glucagon (GLUCAGEN) injection 1 mg (has no administration in time range)   insulin lispro (HUMALOG/ADMELOG) injection 2-9 Units (7 Units Subcutaneous Given 6/17/24 1810)   hydrALAZINE (APRESOLINE) injection 10 mg (10 mg Intravenous Given 6/17/24 1138)   potassium chloride (KLOR-CON M20) CR tablet 40 mEq (40 mEq Oral Not Given 6/17/24 1230)   PATIENT SUPPLIED MEDICATION (has no administration in time range)   folic acid (FOLVITE) tablet 1 mg (1 mg Oral Not Given 6/17/24 1433)   ondansetron (ZOFRAN) injection 4 mg (4 mg Intravenous Given 6/17/24 1030)       Imaging results:  MRI Brain Without Contrast    Result Date: 6/17/2024  1.1 cm linear acute infarct within the left parieto-occipital region (series 5, image 54). 2.Additional findings compatible with chronic microvascular ischemic change. Electronically Signed: Clarence Talley MD  6/17/2024 3:12 PM EDT  Workstation ID: [id number]    CT Head Without Contrast    Result Date: 6/17/2024  1. No acute intracranial abnormality by head CT. 2. Distal carotid and vertebral artery atherosclerotic disease, much more than expected for patient age. Electronically Signed: Eric Fenton MD  6/17/2024 10:29 AM EDT  Workstation ID: [id number]    XR Chest 1 View    Result Date: 6/17/2024  Impression: No active pulmonary process. Stable appearance of the chest since prior comparison. Electronically Signed: Eric Fenton MD  6/17/2024 10:01 AM EDT  Workstation ID: [id number]     Social issues:   Social History     Socioeconomic History    Marital  status:    Tobacco Use    Smoking status: Every Day     Current packs/day: 0.25     Average packs/day: 0.3 packs/day for 15.0 years (3.8 ttl pk-yrs)     Types: Cigarettes    Smokeless tobacco: Never   Vaping Use    Vaping status: Never Used   Substance and Sexual Activity    Alcohol use: Defer    Drug use: Never    Sexual activity: Defer       NIH Stroke Scale:  Interval: baseline (06/17/24 0924)  1a. Level of Consciousness: 0-->Alert, keenly responsive (06/17/24 0924)  1b. LOC Questions: 1-->Answers one question correctly (06/17/24 0924)  1c. LOC Commands: 0-->Performs both tasks correctly (06/17/24 0924)  2. Best Gaze: 0-->Normal (06/17/24 0924)  3. Visual: 0-->No visual loss (06/17/24 0924)  4. Facial Palsy: 0-->Normal symmetrical movements (06/17/24 0924)  5a. Motor Arm, Left: 0-->No drift, limb holds 90 (or 45) degrees for full 10 secs (06/17/24 0924)  5b. Motor Arm, Right: 0-->No drift, limb holds 90 (or 45) degrees for full 10 secs (06/17/24 0924)  6a. Motor Leg, Left: 0-->No drift, leg holds 30 degree position for full 5 secs (06/17/24 0924)  6b. Motor Leg, Right: 0-->No drift, leg holds 30 degree position for full 5 secs (06/17/24 0924)  7. Limb Ataxia: 0-->Absent (06/17/24 0924)  8. Sensory: 0-->Normal, no sensory loss (06/17/24 0924)  9. Best Language: 0-->No aphasia, normal (06/17/24 0924)  10. Dysarthria: 1-->Mild-to-moderate dysarthria, patient slurs at least some words and, at worst, can be understood with some difficulty (06/17/24 0924)  11. Extinction and Inattention (formerly Neglect): 0-->No abnormality (06/17/24 0924)    Total (NIH Stroke Scale): 2 (06/17/24 0945)     Additional notable assessment information:     Nursing report ED to floor:  REPORT GIVEN TO ALEENA DUBOIS TAKEN ROOM [id number]     Rylan Ramírez LPN   06/17/24 18:28 EDT

## 2024-06-17 NOTE — CONSULTS
Primary Care Provider: Provider, No Known     Consult requested by: Admitting team    Reason for Consultation: Neurological evaluation /mental status changes    History taken from: patient chart family RN    Chief complaint: Mental status changes       SUBJECTIVE:    History of present illness: Background per H&P: Savita Belcher is a 44 y.o. year old female who was evaluated in room 2 in the ER at [facility]    Source of information is the patient and the medical records and my discussion with the patient's nurse who has been with her since this morning    She is a staff here and she was observed as being confused    There was concern about some focal changes but the staff did not report anything focal otherwise    Code stroke was not called but rapid response was called and when she came in she was showing signs of confusion but no aphasia and nonfocal otherwise    She ended up with full evaluation and was found to have significant hypertension later on    Her blood pressure was as high as 198 systolic    He also has UTI    Her blood glucose was 589 and hemoglobin A1c is 16.90  Sodium 128  Thousand 3.2  Triglycerides are greater than 4,425      She did have CT head which was unremarkable    She ended up with MRI brain which demonstrated,  IMPRESSION:1.1 cm linear acute infarct within the left parieto-occipital region (series 5, image 54).2.Additional findings compatible with chronic microvascular ischemic change.Electronically Signed: Clarence Talley MD 6/17/2024 3:12 PM EDT Workstation ID: [id number] by: Clarence Talley MD on 6/17/2024  3:12 PMPATIENT: SAVITA BELCHER, [id number], Female, 44 yReport version 1,[id number]    She has nothing else suggesting TIA    This is incidental finding on MRI, which is nothing to do with her symptoms    She states that she takes her medication regularly but this whole thing does not make sense with her blood pressure and hemoglobin A1c      No prior history of seizures  or syncope or staring episodes or myoclonic jerks    She has had a EEG done which was unremarkable    Apparently she is on Plavix and also 80 mg Lipitor at home    Update response team note,    Rapid response called for stroke like symptoms. Right sided weakness, right facial droop, confusion, and generalized weakness. Patient was at work in the [employer] when symptoms occurred. Coworkers stated that her blood glucose was elevated around 550 this AM. Patient was assisted to a wheelchair and immediately transported to ER room 2 for evaluation.           As per ER team,  44-year-old female presents with altered mental status. Patient was at work here at the hospital. She was apparently talking and all of a sudden started staring off and started did not make sense when she tried to talk. A fast team was called. By the time she was brought to the emergency room she is feeling better. She states she does not really remember the event. She is now alert and oriented. She denies any focal numbness, weakness, tingling. She denies headache. She has had no chest pain or shortness of breath. Blood sugar was found to be over 500. She never had any loss of consciousness.   Medical Decision Making  Amount and/or Complexity of Data Reviewed  Labs: ordered.  Radiology: ordered.  ECG/medicine tests: ordered.     Risk  Prescription drug management.        Patient had the above evaluation.  Results were discussed with the patient.  My interpretation of chest x-ray shows no infiltrate or effusion.  CT head shows no acute intracranial abnormality.  White blood cell count is 14.62.  CMP significant for blood sugar of 589.  No evidence of DKA.  Potassium is also mildly low at 3.2.  Troponin is elevated at 63.  EKG shows no STEMI.  Patient is not having any chest pain at this time.  I discussed with the hospitalist and the patient will be admitted for further evaluation and management.        Final diagnoses:   Altered mental status,  unspecified altered mental status type   Hyperglycemia   Hypokalemia   Elevated troponin     - Portions of the above HPI were copied from previous encounters and edited as appropriate. PMH as detailed below.     Review of Systems   No fever chills rigors or sweats  No weight issues  No sleep problems  HEENT:  No speech problem, vision changes, facial asymmetry or pain, or neck problem  Chest: No chest pain, clubbing, cyanosis, orthopnea palpitations  Pulmonary:  No shortness of air, cough or expectoration  Abdomen:  No swelling/tension, constipation,diarrhea or pain  No genitourinary symptoms  Extremity problems as discussed  No back problem  No psychotic issues  Neurologic issues as discussed  No hematologic, dermatologic or endocrine problems, she is diabetic        PATIENT HISTORY:  Past Medical History:   Diagnosis Date    5,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 Debbie Fry.    Depressive disorder 2023    Diabetic gastroparesis 2021    Diabetic peripheral neuropathy associated with type 2 diabetes mellitus 2024    Gastroesophageal reflux disease 03/10/2021    GERD (gastroesophageal reflux disease)     Intermittent claudication 2017    Iron deficiency anemia 2020    Mixed hypercholesterolemia and hypertriglyceridemia 2014    Myocardial infarction     Obesity     Obstructive sleep apnea 2021    Personal history of COVID-19 2022    Polyp of colon 2023    Spontaneous      Stress fracture of right ankle with routine healing     Tobacco abuse 2024    Type 2 diabetes mellitus with hyperglycemia, with long-term current use of insulin 2017    Vitamin D deficiency 2024   ,   Past Surgical History:   Procedure Laterality Date     CARDIAC CATHETERIZATION N/A 2024    Procedure: Left Heart Cath and coronary angiogram;  Surgeon: Jena Barron MD;  Location: [facility] CATH INVASIVE LOCATION;  Service: Cardiology;  Laterality: N/A;     SECTION      CORONARY ARTERY BYPASS GRAFT  2014    LIMA-LAD, SVG-OM2, SVG-PDA, Dr. Debbie Fry.    DILATATION AND CURETTAGE      TONSILLECTOMY     ,   Family History   Family history unknown: Yes   ,   Social History     Tobacco Use    Smoking status: Every Day     Current packs/day: 0.25     Average packs/day: 0.3 packs/day for 15.0 years (3.8 ttl pk-yrs)     Types: Cigarettes    Smokeless tobacco: Never   Vaping Use    Vaping status: Never Used   Substance Use Topics    Alcohol use: Defer    Drug use: Never   ,   Prior to Admission medications    Medication Sig Start Date End Date Taking? Authorizing Provider   aspirin 81 MG chewable tablet Chew 1 tablet Daily.   Yes Joseline Quiroz MD   atorvastatin (LIPITOR) 80 MG tablet Take 1 tablet by mouth Daily.   Yes Joseline Quiroz MD   clopidogrel (PLAVIX) 75 MG tablet Take 1 tablet by mouth Daily. 24  Yes    escitalopram (LEXAPRO) 10 MG tablet Take 1 tablet by mouth Daily.   Yes Joseline Quiroz MD   famotidine (PEPCID) 40 MG tablet Take 1 tablet by mouth every night at bedtime. 23  Yes    hydrALAZINE (APRESOLINE) 25 MG tablet Take 1 tablet (25 mg total) by mouth 3 (three) times a day. 24  Yes    icosapent ethyl (Vascepa) 1 g capsule capsule Take 2 capsules by mouth 2 (two) times a day. 23  Yes    insulin aspart (novoLOG FLEXPEN) 100 UNIT/ML solution pen-injector sc pen Inject  under the skin into the appropriate area as directed 3 times a day. Sliding scale, between 5-20 units TID   Yes Joseline Quiroz MD   insulin detemir (LEVEMIR) 100 UNIT/ML injection Inject 60 Units under the skin into the appropriate area as directed Every Night.   Yes Joseline Quiroz MD   metoclopramide (REGLAN) 5 MG tablet  Take 1 tablet by mouth 3 times a day.   Yes Joseline Quiroz MD   metoprolol tartrate (LOPRESSOR) 100 MG tablet Take 1 tablet by mouth 2 (two) times a day. 6/5/24  Yes    ondansetron (ZOFRAN) 4 MG tablet Take 1 tablet by mouth three times a day as needed 1/9/24  Yes    pantoprazole (PROTONIX) 40 MG EC tablet Take 1 tablet by mouth Every Morning. 6/28/23  Yes    albuterol sulfate  (90 Base) MCG/ACT inhaler Inhale 2 puffs every 6 (six) hours if needed for wheezing. 5/9/23      clotrimazole-betamethasone (LOTRISONE) 1-0.05 % cream apply 1 application on the skin daily 5/9/23 6/17/24     Continuous Blood Gluc  (Dexcom G6 ) device Use as instructed 6/1/23 6/17/24     dapagliflozin (FARXIGA) 5 MG tablet tablet Take 1 tablet by mouth Daily.  6/17/24  Joseline Quiroz MD   escitalopram (LEXAPRO) 10 MG tablet Take 1 tablet (10 mg total) by mouth 1 (one) time each day. 2/22/24 6/17/24  Jesse Lance MD   ferrous sulfate 325 (65 FE) MG tablet Take 1 tablet by mouth Daily With Breakfast.  6/17/24  Joseline Quiroz MD   gabapentin (NEURONTIN) 100 MG capsule Take 1 capsule by mouth 3 (Three) Times a Day. 5/5/21 6/17/24  Joseline Quiroz MD   glyburide (DIAbeta) 5 MG tablet Take 1 tablet by mouth 2 (Two) Times a Day. 2/3/21 3/10/21     hydrALAZINE (APRESOLINE) 25 MG tablet Take 1 tablet (25 mg total) by mouth 3 (three) times a day. 12/22/23 6/17/24     hydroCHLOROthiazide (HYDRODIURIL) 25 MG tablet Take 1 tablet by mouth Daily. 1/20/23 8/1/23     insulin detemir (Levemir FlexPen) 100 UNIT/ML injection Inject 30 Units under the skin into the appropriate area as directed 2 (Two) Times a Day. 4/19/24 6/17/24     Insulin Pen Needle (Unifine Pentips) 32G X 4 MM misc Use with Insulin Daily 3/10/21 6/17/24     losartan (COZAAR) 100 MG tablet Take 1 tablet by mouth Daily. 5/11/23 6/17/24     metFORMIN (GLUCOPHAGE) 1000 MG tablet Take 1 tablet by mouth 2 (Two) Times a Day With Meals. 5/9/23  7/7/23     metoclopramide (REGLAN) 5 MG tablet Take 1 tablet by mouth before meals and at bedtime 6/30/22 6/17/24     NIFEdipine XL (ADALAT CC) 60 MG 24 hr tablet Take 1 tablet by mouth Daily. 2/23/24 6/17/24  Jesse Lance MD   omeprazole (priLOSEC) 20 MG capsule Take 1 capsule (20 mg total) by mouth 1 (one) time each day. Do not crush or chew. 3/10/21 7/20/21     terbinafine (lamiSIL) 250 MG tablet Take 1 tablet by mouth Daily. 11/10/23 6/17/24     vitamin B-12 (CYANOCOBALAMIN) 1000 MCG tablet Take 1 tablet by mouth Daily.  6/17/24  Provider, MD Joseline   vitamin D (ERGOCALCIFEROL) 1.25 MG (95779 UT) capsule capsule Take 1 capsule (50,000 Units total) by mouth 1 (one) time per week. 7/21/21 6/17/24      Allergies:  Latex    Current Facility-Administered Medications   Medication Dose Route Frequency Provider Last Rate Last Admin    dextrose (D50W) (25 g/50 mL) IV injection 25 g  25 g Intravenous Q15 Min PRN Bhargav Esteves MD        dextrose (GLUTOSE) oral gel 15 g  15 g Oral Q15 Min PRN Bhargav Esteves MD        folic acid (FOLVITE) tablet 1 mg  1 mg Oral Daily Bhargav Esteves MD        glucagon (GLUCAGEN) injection 1 mg  1 mg Intramuscular Q15 Min PRN Bhargav Esteves MD        hydrALAZINE (APRESOLINE) injection 10 mg  10 mg Intravenous Q6H PRN Bhargav Esteves MD   10 mg at 06/17/24 1138    insulin lispro (HUMALOG/ADMELOG) injection 2-9 Units  2-9 Units Subcutaneous 4x Daily AC & at Bedtime Bhargav Esteves MD   9 Units at 06/17/24 1131    potassium chloride (KLOR-CON M20) CR tablet 40 mEq  40 mEq Oral Q4H Yunior Reynolds MD        sodium chloride 0.9 % flush 10 mL  10 mL Intravenous PRN Yunior Reynolds MD   10 mL at 06/17/24 0940     Current Outpatient Medications   Medication Sig Dispense Refill    aspirin 81 MG chewable tablet Chew 1 tablet Daily.      atorvastatin (LIPITOR) 80 MG tablet Take 1 tablet by mouth Daily.      clopidogrel (PLAVIX) 75 MG tablet Take 1 tablet by mouth  Daily. 90 tablet 0    escitalopram (LEXAPRO) 10 MG tablet Take 1 tablet by mouth Daily.      famotidine (PEPCID) 40 MG tablet Take 1 tablet by mouth every night at bedtime. 90 tablet 3    hydrALAZINE (APRESOLINE) 25 MG tablet Take 1 tablet (25 mg total) by mouth 3 (three) times a day. 90 tablet 0    icosapent ethyl (Vascepa) 1 g capsule capsule Take 2 capsules by mouth 2 (two) times a day. 360 capsule 0    insulin aspart (novoLOG FLEXPEN) 100 UNIT/ML solution pen-injector sc pen Inject  under the skin into the appropriate area as directed 3 times a day. Sliding scale, between 5-20 units TID      insulin detemir (LEVEMIR) 100 UNIT/ML injection Inject 60 Units under the skin into the appropriate area as directed Every Night.      metoclopramide (REGLAN) 5 MG tablet Take 1 tablet by mouth 3 times a day.      metoprolol tartrate (LOPRESSOR) 100 MG tablet Take 1 tablet by mouth 2 (two) times a day. 180 tablet 0    ondansetron (ZOFRAN) 4 MG tablet Take 1 tablet by mouth three times a day as needed 30 tablet 4    pantoprazole (PROTONIX) 40 MG EC tablet Take 1 tablet by mouth Every Morning. 90 tablet 3    albuterol sulfate  (90 Base) MCG/ACT inhaler Inhale 2 puffs every 6 (six) hours if needed for wheezing. 18 g 0        ________________________________________________________        OBJECTIVE:  Upon today's exam, patient is resting comfortably in bed in no acute distress     Neurologic Exam    The patient is awake and alert and oriented x3  Normal speech  Cranial nerve examination demonstrate:  Full fields of vision to confrontation  Pupils are round, reactive to light and accommodation and size of about 3 mm  No ptosis or nystagmus  Funduscopic examination was not successful  Eye movements are conjugate     Sensation on the face and scalp are normal  Muscles of mastication are normal and symmetric  Muscles of  facial expression are normal and symmetric  Hearing is intact bilaterally  Head turning and shoulder  shrugs were unremarkable  Tongue was midline  I could not visualize  oropharynx or uvula     Motor examination:  Normal bulk, tone and strength was 5/5  No fasciculations     Sensory examination:  Intact for soft touch, pain   Romberg was not evaluated     Reflexes:  0/4     Coordination:  Normal finger-to-nose to finger, rapid alternating movements and toe to finger     Gait:  Deferred     Toe signs:  Mute      NIH stroke scale  NIHSS:    Level Of Consciousness 0  0=Alert; keenly responsive 1=Not alert, but arousable by minor stimulation 2=Not alert, requires repeated stimulation 3=Responds only with reflex movements    LOC Questions to Month and age 0  0=Answers both questions correctly 1=Answers one question correctly 2=Answers neither question correctly    LOC Commands      -Open/Close eyes 0    -Open/close  0   0=Performs both tasks correctly 1=Performs one task correctly 2=Performs neighter task correctly     Best Gaze 0  0=Normal 1=Partial gaze palsy 2=Forced deviation, or total gaze paresis    Visual 0  0=No visual loss 1=Partial hemianopia 2=Complete hemianopia 3=Bilateral hemianopia (blind including cortical blindness)    Facial Palsy 0  0=Normal symmetrical movement 1=Minor paralysis (asymmetry) 2=Partial paralysis (lower facde) 3=Complete paralysis (upper and lower face)    Motor: Left Arm-0  Left leg-0; Right Arm-0 Right Leg-0  0=No drift, limb holds posture for full 10 seconds 1=Drift, limb holds posture, no drift to bed 2=Some antigravity effort, cannot maintain posture, drifts to bed 3=No effort against gravity, limb falls 4=No movement    Limb Ataxia 0  0=Absent 1=Present in one limb 2=Present in two limbs    Sensory 0   0=Normal 1=Mild to moderate sensory loss 2=Severe to total sensory loss    Best Language 0   0=No aphasia, normal 1=Mild to moderate aphasia 2=Severe Aphasia (very few words correct or understood)3=Multe, global aphasia    Dysarthria 0  0=Normal 1=Mild to moderate 2=Severe,  unintelligible or mute/anarthric    Extinction/Neglect 0   0=No abnormality 1=Extinction to bilateral simultaneous stimulation 2=Profound neglect    Total  __0        ________________________________________________________   RESULTS REVIEW:    VITAL SIGNS:   Temp:  [97.4 °F (36.3 °C)] 97.4 °F (36.3 °C)  Heart Rate:  [53-61] 61  Resp:  [11-17] 14  BP: (115-198)/(60-88) 168/78     LABS:      Lab 06/17/24  0928   WBC 14.62*   HEMOGLOBIN 13.7   HEMATOCRIT 41.1   PLATELETS 281   NEUTROS ABS 12.53*   IMMATURE GRANS (ABS) 0.07*   LYMPHS ABS 1.29   MONOS ABS 0.59   EOS ABS 0.09   MCV 89.3   PROTIME 10.2   APTT 26.2*         Lab 06/17/24  0928   SODIUM 128*   POTASSIUM 3.2*   CHLORIDE 92*   CO2 23.6   ANION GAP 12.4   BUN 9   CREATININE 1.29*   EGFR 51.9*   GLUCOSE 589*   CALCIUM 9.1   HEMOGLOBIN A1C 16.90*         Lab 06/17/24  0928   TOTAL PROTEIN 7.0   ALBUMIN 3.4*   GLOBULIN 3.6   ALT (SGPT) 5   AST (SGOT) 11   BILIRUBIN 0.4   ALK PHOS 134*         Lab 06/17/24  1143 06/17/24  0928   HSTROP T 48* 63*   PROTIME  --  10.2   INR  --  0.93         Lab 06/17/24  0928   CHOLESTEROL 381*   HDL CHOL 30*   TRIGLYCERIDES >4,425*             UA          7/20/2023    11:15 2/20/2024    18:12 6/17/2024    09:40   Urinalysis   Squamous Epithelial Cells, UA 0-2  0-2  13-20    Specific Gravity, UA 1.023  1.021  1.038    Ketones, UA Negative  Negative  Negative    Blood, UA Negative  Negative  Negative    Leukocytes, UA Negative  Negative  Negative    Nitrite, UA Negative  Negative  Negative    RBC, UA 0-2  0-2  11-20    WBC, UA 0-2  0-2  6-10    Bacteria, UA None Seen  Trace  2+        Lab Results   Component Value Date    TSH 3.500 02/19/2024    LDL  06/17/2024      Comment:      Unable to calculate    HGBA1C 16.90 (H) 06/17/2024    [id number] >2,000 (H) 02/05/2024       IMAGING STUDIES:  MRI Brain Without Contrast    Result Date: 6/17/2024  1.1 cm linear acute infarct within the left parieto-occipital region (series 5, image 54).  2.Additional findings compatible with chronic microvascular ischemic change. Electronically Signed: Clarence Talley MD  6/17/2024 3:12 PM EDT  Workstation ID: [id number]    CT Head Without Contrast    Result Date: 6/17/2024  1. No acute intracranial abnormality by head CT. 2. Distal carotid and vertebral artery atherosclerotic disease, much more than expected for patient age. Electronically Signed: Eric Fenton MD  6/17/2024 10:29 AM EDT  Workstation ID: [id number]    XR Chest 1 View    Result Date: 6/17/2024  Impression: No active pulmonary process. Stable appearance of the chest since prior comparison. Electronically Signed: Eric Fenton MD  6/17/2024 10:01 AM EDT  Workstation ID: [id number]     I reviewed the patient's new clinical results.    ________________________________________________________     PROBLEM LIST:    Altered mental status            ASSESSMENT/PLAN:  Mental status changes likely hypertensive encephalopathy and UTI and hyperglycemia so classic multifactorial toxic and metabolic encephalopathy    Incidental infarct, high parietal occipital region on the left side which is nothing to do with her symptoms and even the timings are questionable    She apparently is on DAPT and 80 mg Lipitor if she is taking it which I am not sure considering her hemoglobin A1c and her blood pressure    I will recommend CTA head and neck and echo is to be done anyway and we will see if anything needs to be changed    Modification of stroke risk factors:   - Blood pressure should be less than 130/80 outpatient, HbA1c less than 6.5, LDL less than 70; b12>500 and smoking cessation if applicable. We would be grateful if the primary team / primary care physician would keep a close watch on the above targets.  - Stroke education  - Follow up with neurologist of choice      I discussed the patient's findings and my recommendations with patient, family, and nursing staff    Krish Reddy MD  06/17/24  16:04 EDT

## 2024-06-17 NOTE — CASE MANAGEMENT/SOCIAL WORK
Discharge Planning Assessment   [facility]     Patient Name: Bibiana Inman  MRN: [id number]  Today's Date: 6/17/2024    Admit Date: 6/17/2024    Plan: Routine home   Discharge Needs Assessment       Row Name 06/17/24 1200       Living Environment    People in Home spouse    Name(s) of People in Home  Neal and 11 year old son    Current Living Arrangements home    Potentially Unsafe Housing Conditions none    In the past 12 months has the electric, gas, oil, or water company threatened to shut off services in your home? No    Primary Care Provided by self    Provides Primary Care For no one    Family Caregiver if Needed spouse    Family Caregiver Names Neal    Quality of Family Relationships helpful;involved;supportive    Able to Return to Prior Arrangements yes       Resource/Environmental Concerns    Resource/Environmental Concerns none    Transportation Concerns none       Transportation Needs    In the past 12 months, has lack of transportation kept you from medical appointments or from getting medications? no    In the past 12 months, has lack of transportation kept you from meetings, work, or from getting things needed for daily living? No       Food Insecurity    Within the past 12 months, you worried that your food would run out before you got the money to buy more. Never true    Within the past 12 months, the food you bought just didn't last and you didn't have money to get more. Never true       Transition Planning    Patient/Family Anticipates Transition to home with family    Patient/Family Anticipated Services at Transition none    Transportation Anticipated car, drives self;family or friend will provide       Discharge Needs Assessment    Readmission Within the Last 30 Days no previous admission in last 30 days    Equipment Currently Used at Home bp cuff;glucometer    Concerns to be Addressed denies needs/concerns at this time    Anticipated Changes Related to Illness none    Equipment Needed  After Discharge none                   Discharge Plan       Row Name 06/17/24 1202       Plan    Plan Routine home    Plan Comments CM met with patient at the bedside. Confirmed PCP, insurance, and pharmacy. Patient denies any difficulty affording medications. Patient is not current with any HHC/OPPT/OT services. Patient lives at home with spouse and 11 year old son, is IADLS, and drives.  Neal will provide DC transport. DC Barriers: Endocrinology, neurology, and cardiology following, MRI of the brain ordered, blood glucose 400-500's.              Demographic Summary       Row Name 06/17/24 1200       General Information    Admission Type inpatient    Arrived From emergency department    Referral Source admission list    Reason for Consult discharge planning    Preferred Language English       Contact Information    Permission Granted to Share Info With     Contact Information Obtained for                    Functional Status       Row Name 06/17/24 1200       Functional Status    Usual Activity Tolerance good    Current Activity Tolerance good       Functional Status, IADL    Medications independent    Meal Preparation independent    Housekeeping independent    Laundry independent    Shopping independent       Mental Status    General Appearance WDL WDL       Mental Status Summary    Recent Changes in Mental Status/Cognitive Functioning no changes           Fabián Price RN     Cell number [phone]  Office number [phone]

## 2024-06-18 ENCOUNTER — APPOINTMENT (OUTPATIENT)
Dept: MRI IMAGING | Facility: HOSPITAL | Age: 44
DRG: 064 | End: 2024-06-18
Payer: COMMERCIAL

## 2024-06-18 DIAGNOSIS — I65.21 CAROTID STENOSIS, ASYMPTOMATIC, RIGHT: Primary | ICD-10-CM

## 2024-06-18 LAB
ALBUMIN SERPL-MCNC: 2.9 G/DL (ref 3.5–5.2)
ALBUMIN/GLOB SERPL: 1 G/DL
ALP SERPL-CCNC: 96 U/L (ref 39–117)
ALT SERPL W P-5'-P-CCNC: <5 U/L (ref 1–33)
ANION GAP SERPL CALCULATED.3IONS-SCNC: 7.9 MMOL/L (ref 5–15)
ARTICHOKE IGE QN: 119 MG/DL (ref 0–100)
AST SERPL-CCNC: 10 U/L (ref 1–32)
BASOPHILS # BLD AUTO: 0.05 10*3/MM3 (ref 0–0.2)
BASOPHILS NFR BLD AUTO: 0.5 % (ref 0–1.5)
BILIRUB SERPL-MCNC: 0.2 MG/DL (ref 0–1.2)
BUN SERPL-MCNC: 10 MG/DL (ref 6–20)
BUN/CREAT SERPL: 8 (ref 7–25)
CALCIUM SPEC-SCNC: 8.8 MG/DL (ref 8.6–10.5)
CHLORIDE SERPL-SCNC: 103 MMOL/L (ref 98–107)
CHOLEST SERPL-MCNC: 320 MG/DL (ref 0–200)
CO2 SERPL-SCNC: 23.1 MMOL/L (ref 22–29)
CREAT SERPL-MCNC: 1.25 MG/DL (ref 0.57–1)
DEPRECATED RDW RBC AUTO: 45.1 FL (ref 37–54)
EGFRCR SERPLBLD CKD-EPI 2021: 53.9 ML/MIN/1.73
EOSINOPHIL # BLD AUTO: 0.15 10*3/MM3 (ref 0–0.4)
EOSINOPHIL NFR BLD AUTO: 1.5 % (ref 0.3–6.2)
ERYTHROCYTE [DISTWIDTH] IN BLOOD BY AUTOMATED COUNT: 13.8 % (ref 12.3–15.4)
FOLATE SERPL-MCNC: 14.7 NG/ML (ref 4.78–24.2)
GLOBULIN UR ELPH-MCNC: 2.9 GM/DL
GLUCOSE BLDC GLUCOMTR-MCNC: 144 MG/DL (ref 70–105)
GLUCOSE BLDC GLUCOMTR-MCNC: 165 MG/DL (ref 70–105)
GLUCOSE BLDC GLUCOMTR-MCNC: 166 MG/DL (ref 70–105)
GLUCOSE BLDC GLUCOMTR-MCNC: 203 MG/DL (ref 70–105)
GLUCOSE BLDC GLUCOMTR-MCNC: 306 MG/DL (ref 70–105)
GLUCOSE SERPL-MCNC: 143 MG/DL (ref 65–99)
HCT VFR BLD AUTO: 36.9 % (ref 34–46.6)
HDLC SERPL-MCNC: 28 MG/DL (ref 40–60)
HGB BLD-MCNC: 12.2 G/DL (ref 12–15.9)
IMM GRANULOCYTES # BLD AUTO: 0.03 10*3/MM3 (ref 0–0.05)
IMM GRANULOCYTES NFR BLD AUTO: 0.3 % (ref 0–0.5)
LDLC SERPL CALC-MCNC: ABNORMAL MG/DL
LDLC/HDLC SERPL: ABNORMAL {RATIO}
LYMPHOCYTES # BLD AUTO: 2.17 10*3/MM3 (ref 0.7–3.1)
LYMPHOCYTES NFR BLD AUTO: 22.4 % (ref 19.6–45.3)
MCH RBC QN AUTO: 29.8 PG (ref 26.6–33)
MCHC RBC AUTO-ENTMCNC: 33.1 G/DL (ref 31.5–35.7)
MCV RBC AUTO: 90 FL (ref 79–97)
MONOCYTES # BLD AUTO: 0.65 10*3/MM3 (ref 0.1–0.9)
MONOCYTES NFR BLD AUTO: 6.7 % (ref 5–12)
NEUTROPHILS NFR BLD AUTO: 6.64 10*3/MM3 (ref 1.7–7)
NEUTROPHILS NFR BLD AUTO: 68.6 % (ref 42.7–76)
NRBC BLD AUTO-RTO: 0 /100 WBC (ref 0–0.2)
PLATELET # BLD AUTO: 220 10*3/MM3 (ref 140–450)
PMV BLD AUTO: 11.2 FL (ref 6–12)
POTASSIUM SERPL-SCNC: 3.4 MMOL/L (ref 3.5–5.2)
PROT SERPL-MCNC: 5.8 G/DL (ref 6–8.5)
QT INTERVAL: 489 MS
QTC INTERVAL: 466 MS
RBC # BLD AUTO: 4.1 10*6/MM3 (ref 3.77–5.28)
SODIUM SERPL-SCNC: 134 MMOL/L (ref 136–145)
TRIGL SERPL-MCNC: 1117 MG/DL (ref 0–150)
TSH SERPL DL<=0.05 MIU/L-ACNC: 1.47 UIU/ML (ref 0.27–4.2)
VIT B12 BLD-MCNC: 1489 PG/ML (ref 211–946)
VLDLC SERPL-MCNC: ABNORMAL MG/DL
WBC NRBC COR # BLD AUTO: 9.69 10*3/MM3 (ref 3.4–10.8)

## 2024-06-18 PROCEDURE — 99222 1ST HOSP IP/OBS MODERATE 55: CPT | Performed by: INTERNAL MEDICINE

## 2024-06-18 PROCEDURE — 82948 REAGENT STRIP/BLOOD GLUCOSE: CPT | Performed by: INTERNAL MEDICINE

## 2024-06-18 PROCEDURE — 25010000002 ENOXAPARIN PER 10 MG: Performed by: INTERNAL MEDICINE

## 2024-06-18 PROCEDURE — 82948 REAGENT STRIP/BLOOD GLUCOSE: CPT

## 2024-06-18 PROCEDURE — 83721 ASSAY OF BLOOD LIPOPROTEIN: CPT | Performed by: INTERNAL MEDICINE

## 2024-06-18 PROCEDURE — 99233 SBSQ HOSP IP/OBS HIGH 50: CPT | Performed by: PSYCHIATRY & NEUROLOGY

## 2024-06-18 PROCEDURE — 63710000001 INSULIN LISPRO (HUMAN) PER 5 UNITS: Performed by: INTERNAL MEDICINE

## 2024-06-18 PROCEDURE — 25010000002 POTASSIUM CHLORIDE 10 MEQ/100ML SOLUTION: Performed by: HOSPITALIST

## 2024-06-18 PROCEDURE — 25010000002 CEFTRIAXONE PER 250 MG: Performed by: INTERNAL MEDICINE

## 2024-06-18 PROCEDURE — 99233 SBSQ HOSP IP/OBS HIGH 50: CPT | Performed by: INTERNAL MEDICINE

## 2024-06-18 PROCEDURE — 92610 EVALUATE SWALLOWING FUNCTION: CPT

## 2024-06-18 PROCEDURE — 25810000003 SODIUM CHLORIDE 0.9 % SOLUTION: Performed by: STUDENT IN AN ORGANIZED HEALTH CARE EDUCATION/TRAINING PROGRAM

## 2024-06-18 PROCEDURE — A9579 GAD-BASE MR CONTRAST NOS,1ML: HCPCS | Performed by: HOSPITALIST

## 2024-06-18 PROCEDURE — 70553 MRI BRAIN STEM W/O & W/DYE: CPT

## 2024-06-18 PROCEDURE — 92523 SPEECH SOUND LANG COMPREHEN: CPT

## 2024-06-18 PROCEDURE — 80050 GENERAL HEALTH PANEL: CPT | Performed by: INTERNAL MEDICINE

## 2024-06-18 PROCEDURE — 25010000002 KETOROLAC TROMETHAMINE PER 15 MG: Performed by: HOSPITALIST

## 2024-06-18 PROCEDURE — 80061 LIPID PANEL: CPT | Performed by: INTERNAL MEDICINE

## 2024-06-18 PROCEDURE — 99222 1ST HOSP IP/OBS MODERATE 55: CPT | Performed by: NURSE PRACTITIONER

## 2024-06-18 PROCEDURE — 25010000002 GADOTERIDOL PER 1 ML: Performed by: HOSPITALIST

## 2024-06-18 PROCEDURE — 63710000001 INSULIN GLARGINE PER 5 UNITS: Performed by: INTERNAL MEDICINE

## 2024-06-18 RX ORDER — HYDRALAZINE HYDROCHLORIDE 25 MG/1
50 TABLET, FILM COATED ORAL EVERY 8 HOURS SCHEDULED
Status: DISCONTINUED | OUTPATIENT
Start: 2024-06-18 | End: 2024-06-20 | Stop reason: HOSPADM

## 2024-06-18 RX ORDER — POTASSIUM CHLORIDE 7.45 MG/ML
10 INJECTION INTRAVENOUS
Status: DISCONTINUED | OUTPATIENT
Start: 2024-06-18 | End: 2024-06-18

## 2024-06-18 RX ORDER — KETOROLAC TROMETHAMINE 30 MG/ML
15 INJECTION, SOLUTION INTRAMUSCULAR; INTRAVENOUS ONCE AS NEEDED
Status: DISPENSED | OUTPATIENT
Start: 2024-06-18 | End: 2024-06-18

## 2024-06-18 RX ORDER — POTASSIUM CHLORIDE 20 MEQ/1
40 TABLET, EXTENDED RELEASE ORAL EVERY 4 HOURS
Status: COMPLETED | OUTPATIENT
Start: 2024-06-18 | End: 2024-06-18

## 2024-06-18 RX ORDER — ICOSAPENT ETHYL 1 G/1
2 CAPSULE ORAL 2 TIMES DAILY WITH MEALS
Qty: 120 CAPSULE | Refills: 11 | Status: SHIPPED | OUTPATIENT
Start: 2024-06-18

## 2024-06-18 RX ORDER — NIFEDIPINE 30 MG/1
60 TABLET, EXTENDED RELEASE ORAL
Status: DISCONTINUED | OUTPATIENT
Start: 2024-06-18 | End: 2024-06-20 | Stop reason: HOSPADM

## 2024-06-18 RX ADMIN — INSULIN LISPRO 15 UNITS: 100 INJECTION, SOLUTION INTRAVENOUS; SUBCUTANEOUS at 12:26

## 2024-06-18 RX ADMIN — INSULIN LISPRO 2 UNITS: 100 INJECTION, SOLUTION INTRAVENOUS; SUBCUTANEOUS at 20:46

## 2024-06-18 RX ADMIN — INSULIN LISPRO 4 UNITS: 100 INJECTION, SOLUTION INTRAVENOUS; SUBCUTANEOUS at 18:09

## 2024-06-18 RX ADMIN — GADOTERIDOL 15 ML: 279.3 INJECTION, SOLUTION INTRAVENOUS at 23:42

## 2024-06-18 RX ADMIN — ATORVASTATIN CALCIUM 80 MG: 40 TABLET, FILM COATED ORAL at 20:46

## 2024-06-18 RX ADMIN — ENOXAPARIN SODIUM 40 MG: 100 INJECTION SUBCUTANEOUS at 17:24

## 2024-06-18 RX ADMIN — KETOROLAC TROMETHAMINE 15 MG: 30 INJECTION, SOLUTION INTRAMUSCULAR at 08:36

## 2024-06-18 RX ADMIN — POTASSIUM CHLORIDE 10 MEQ: 7.46 INJECTION, SOLUTION INTRAVENOUS at 08:11

## 2024-06-18 RX ADMIN — FOLIC ACID 1 MG: 1 TABLET ORAL at 12:27

## 2024-06-18 RX ADMIN — Medication 10 ML: at 20:52

## 2024-06-18 RX ADMIN — INSULIN GLARGINE 30 UNITS: 100 INJECTION, SOLUTION SUBCUTANEOUS at 08:11

## 2024-06-18 RX ADMIN — CEFTRIAXONE 1000 MG: 1 INJECTION, POWDER, FOR SOLUTION INTRAMUSCULAR; INTRAVENOUS at 20:46

## 2024-06-18 RX ADMIN — SODIUM CHLORIDE 500 ML: 0.9 INJECTION, SOLUTION INTRAVENOUS at 00:00

## 2024-06-18 RX ADMIN — SODIUM CHLORIDE 125 ML/HR: 9 INJECTION, SOLUTION INTRAVENOUS at 04:56

## 2024-06-18 RX ADMIN — SODIUM CHLORIDE 125 ML/HR: 9 INJECTION, SOLUTION INTRAVENOUS at 00:00

## 2024-06-18 RX ADMIN — INSULIN LISPRO 7 UNITS: 100 INJECTION, SOLUTION INTRAVENOUS; SUBCUTANEOUS at 12:26

## 2024-06-18 RX ADMIN — FAMOTIDINE 20 MG: 20 TABLET, FILM COATED ORAL at 17:24

## 2024-06-18 RX ADMIN — POTASSIUM CHLORIDE 40 MEQ: 1500 TABLET, EXTENDED RELEASE ORAL at 17:24

## 2024-06-18 RX ADMIN — Medication 10 ML: at 08:12

## 2024-06-18 RX ADMIN — POTASSIUM CHLORIDE 40 MEQ: 1500 TABLET, EXTENDED RELEASE ORAL at 12:26

## 2024-06-18 RX ADMIN — HYDRALAZINE HYDROCHLORIDE 50 MG: 25 TABLET ORAL at 21:54

## 2024-06-18 RX ADMIN — CLOPIDOGREL BISULFATE 75 MG: 75 TABLET ORAL at 12:28

## 2024-06-18 RX ADMIN — ASPIRIN 81 MG CHEWABLE TABLET 81 MG: 81 TABLET CHEWABLE at 12:28

## 2024-06-18 RX ADMIN — ESCITALOPRAM OXALATE 10 MG: 10 TABLET ORAL at 12:28

## 2024-06-18 RX ADMIN — INSULIN LISPRO 15 UNITS: 100 INJECTION, SOLUTION INTRAVENOUS; SUBCUTANEOUS at 18:09

## 2024-06-18 NOTE — SIGNIFICANT NOTE
#Aphasia  #Right sided weakness    - Patient went aphasic around 2130 with right sided weakness and decreased mental status    - stroke team was called     - as patient previously seen by neuro today, case personally discussed with neurology    - CT head without acute mass, shift, hemorrhage    - CT perfusion without acute abnormality    - CTA head/neck shows 70% stenosis of right cervical ICA and mod-severe stenosis at the origin of the superior segment of the right M2 segment MCA    - Uncontrolled HTN was previously treated which most likely lead to poor perfusion in the setting of ICA stenosis per discussion with neuro    - IVF given and permissive HTN recommended    - Symptoms improved     - Vascular consulted    Electronically signed by Samara Fine DO, 06/18/24, 5:32 AM EDT.

## 2024-06-18 NOTE — CONSULTS
"Diabetes Education  Assessment/Teaching    Patient Name:  Bibiana Inman  YOB: 1980  MRN: [id number]  Admit Date:  6/17/2024      Assessment Date:  6/18/2024  Flowsheet Row Most Recent Value   General Information     Referral From: MD order  [Consult received due to bs >200. Adm bs 525. A1c this adm 16.9%.]   Height 162.6 cm (64\")   Height Method Stated   Weight 63.6 kg (140 lb 4.8 oz)   Weight Method Stated   Pregnancy Assessment    Diabetes History    What type of diabetes do you have? Type 2   Length of Diabetes Diagnosis --  [Dx 15 years ago.]   Have you had diabetes education/teaching in the past? yes   Do you test your blood sugar at home? yes   Frequency of checks does not routinely check bs   Meter type [place] (meter 2 years old), gets supplies for free through insurance. Pt has used continuous glucose sensor in the past.   Who performs the test? self   Have you had low blood sugar? (<70mg/dl) no   Education Preferences    Nutrition Information    Assessment Topics    DM Goals             Flowsheet Row Most Recent Value   DM Education Needs    Meter Has own  [Pt states she would like to start back on the Dexcom CGMS. She states hasn't been back to MD to get refills.]   Frequency of Testing AC/HS  [Discussed importance of checking bs ac and hs daily if not wearing continuous glucose sensor.]   Blood Glucose Target Range Discussed A1c result of 16.9%. Reviewed previous A1c result from 2/19/2024 of 13.4%. Discussed healthy bs range and healthy A1c target. Discussed importance of bs control.   Medication Insulin, Oral, Pen  [Per home med list pt takes Levemir 30 units bid, Novolog 15 units ac and Farxiga 5 mg daily. Pt states she takes Levemir 60 units at hs most days of the week. Pt forgets to take on the weekends. Pt states Novolog is by sliding scale and taking 5-20 units]   Problem Solving Hypoglycemia, Hyperglycemia, Signs, Symptoms, Treatment  [Discussed importance of keeping low bs tx " available.]   Reducing Risks A1C testing  [Gave A1c info sheet.]   Healthy Eating --  [Pt states may only eat 1-2 meals/day. Pt drinking regular soda  16 oz about 2 times/day. Pt states she is not able to drink diet soda. Discussed importance of reducing consumption of regular soft drinks.]   Motivation Engaged   Teaching Method Explanation, Discussion, Handouts   Patient Response Verbalized understanding              Other Comments:  Met with pt at bedside. Pt has family present during education session. Pt gave ok to discuss health care when family present. Discussed Levemir will be discontinued at the end of this year and PCP will need to order different long-acting insulin. Pt does take the Levemir 5 out of 7 days/week. Discussed setting an alarm on phone as a reminder to take daily. Also discussed keeping insulin pen close to other items used for bedtime routine as a reminder to take. Pt states may take Novolog 1 time/day. Pt eats lunch out and does not take insulin pen with her. Discussed always carrying insulin pen and pen needles and importance of taking insulin premeals. Discussed possibility of insulin being exposed to extremes in temperature. Pt states insulin has not been in heat. Reviewed storage guidelines. Pt states she does keep unopened pens in refrigerator. Encouraged pt to check expiration date of insulin pens and to discard if past expiration date.     Reviewed hypertrophy and how this interferes with insulin absorption. Discussed importance of rotating injection sites. Reviewed sites for injections. Discussed importance of avoiding areas if she feels knots or lumps under the skin.     Pt does have PCP and states she usually sees every 6 months. Encouraged pt to discuss diabetes control with PCP. Discussed pt may need to see endocrinologist for diabetes management. Pt states saw endocrinologist in [place], [place], one time.     Pt states she does not need reading material. Pt states has had  diabetes quite some time and knows what she needs to do. Pt with no additional questions. No further follow up needed.     Rxs started for Dexcom G7 sensors and reader.     Electronically signed by:  Marylu Jackson RN  06/18/24 17:01 EDT

## 2024-06-18 NOTE — CONSULTS
Name: Bibiana Inman ADMIT: 2024   : 1980  PCP: Ibrahima Correa MD    MRN: [id number] LOS: 1 days   AGE/SEX: 44 y.o. female  ROOM: [address] [place] [address]      Patient Care Team:  Ibrahima Correa MD as PCP - General (Family Medicine)  Rachele Zafar, RN as Ambulatory  ([facility])  Xochilt Jenkins MD as Consulting Physician (Nephrology)  Chief Complaint   Patient presents with    Altered Mental Status       CC: Carotid stenosis    Subjective     Inpatient Vascular Surgery Consult  Consult performed by: Basia Dejesus APRN  Consult ordered by: Samara Fine DO          Altered Mental Status  Pertinent negatives include no chest pain, numbness or weakness.     Bibiana Inman is a 44 y.o. female with a past medical history of GERD, CAD status post CABG (), hypertension, hypercholesterolemia, sleep apnea, type 2 diabetes, diabetic gastroparesis, peripheral neuropathy, depression, COVID-19, and tobacco abuse who presented to [facility] on 2024 for altered mental status.  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  She was quickly brought to the emergency room for further evaluation.  CT of the head was negative for acute infarct.  MRI of the brain shows a 1 cm linear acute infarct within the left parietal occipital region.  She was admitted for further workup and care.  CTA of the head and neck shows extensive atherosclerotic disease with approximately 70% stenosis in the right ICA.  Vascular surgery was consulted to evaluate.  The patient denies a history of carotid artery disease.  She has never been evaluated by vascular surgeon.  She denies a history of previous TIA or CVA.  She does have a history of MI and CABG x 3 in  at the age of 34.  She is a poorly controlled diabetic, type II with most  recent A1c of 16.9.  She is a current every day smoker.  She smokes about 6 to 7 cigarettes daily.  She currently denies any symptoms of TIA/CVA.    Review of Systems   Cardiovascular:  Negative for chest pain.   Neurological:  Negative for facial asymmetry, speech difficulty, weakness and numbness.       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 Debbie Fry.    Depressive disorder 2023    Diabetic gastroparesis 2021    Diabetic peripheral neuropathy associated with type 2 diabetes mellitus 2024    Gastroesophageal reflux disease 03/10/2021    GERD (gastroesophageal reflux disease)     Intermittent claudication 2017    Iron deficiency anemia 2020    Mixed hypercholesterolemia and hypertriglyceridemia 2014    Myocardial infarction     Obesity     Obstructive sleep apnea 2021    Personal history of COVID-19 2022    Polyp of colon 2023    Spontaneous      Stress fracture of right ankle with routine healing     Tobacco abuse 2024    Type 2 diabetes mellitus with hyperglycemia, with long-term current use of insulin 2017    Vitamin D deficiency 2024     Past Surgical History:   Procedure Laterality Date    CARDIAC CATHETERIZATION N/A 2024    Procedure: Left Heart Cath and coronary angiogram;  Surgeon: Jena Barron MD;  Location: [facility] CATH INVASIVE LOCATION;  Service: Cardiology;  Laterality: N/A;     SECTION      CORONARY ARTERY BYPASS GRAFT  2014    LIMA-LAD, SVG-OM2, SVG-PDA, Dr. Debbie Fry.    DILATATION AND CURETTAGE      TONSILLECTOMY       Family History   Family history unknown: Yes       Social History     Tobacco Use    Smoking status: Every Day      Current packs/day: 0.25     Average packs/day: 0.3 packs/day for 15.0 years (3.8 ttl pk-yrs)     Types: Cigarettes    Smokeless tobacco: Never   Vaping Use    Vaping status: Never Used   Substance Use Topics    Alcohol use: Defer    Drug use: Never     Medications Prior to Admission   Medication Sig Dispense Refill Last Dose    aspirin 81 MG chewable tablet Chew 1 tablet Daily.   6/17/2024    atorvastatin (LIPITOR) 80 MG tablet Take 1 tablet by mouth Daily.   6/16/2024    clopidogrel (PLAVIX) 75 MG tablet Take 1 tablet by mouth Daily. 90 tablet 0 6/17/2024    escitalopram (LEXAPRO) 10 MG tablet Take 1 tablet by mouth Daily.   6/17/2024    famotidine (PEPCID) 40 MG tablet Take 1 tablet by mouth every night at bedtime. 90 tablet 3 6/16/2024    hydrALAZINE (APRESOLINE) 25 MG tablet Take 1 tablet (25 mg total) by mouth 3 (three) times a day. 90 tablet 0 6/17/2024    icosapent ethyl (Vascepa) 1 g capsule capsule Take 2 capsules by mouth 2 (two) times a day. 360 capsule 0 6/17/2024    insulin aspart (novoLOG FLEXPEN) 100 UNIT/ML solution pen-injector sc pen Inject  under the skin into the appropriate area as directed 3 times a day. Sliding scale, between 5-20 units TID   6/17/2024    insulin detemir (LEVEMIR) 100 UNIT/ML injection Inject 60 Units under the skin into the appropriate area as directed Every Night.   6/16/2024    metoclopramide (REGLAN) 5 MG tablet Take 1 tablet by mouth 3 times a day.   6/17/2024    metoprolol tartrate (LOPRESSOR) 100 MG tablet Take 1 tablet by mouth 2 (two) times a day. 180 tablet 0 6/17/2024    ondansetron (ZOFRAN) 4 MG tablet Take 1 tablet by mouth three times a day as needed 30 tablet 4 6/17/2024    pantoprazole (PROTONIX) 40 MG EC tablet Take 1 tablet by mouth Every Morning. 90 tablet 3 6/17/2024    albuterol sulfate  (90 Base) MCG/ACT inhaler Inhale 2 puffs every 6 (six) hours if needed for wheezing. 18 g 0      aspirin, 81 mg, Oral, Daily  atorvastatin, 80 mg, Oral,  Nightly  cefTRIAXone, 1,000 mg, Intravenous, Q24H  clopidogrel, 75 mg, Oral, Daily  enoxaparin, 40 mg, Subcutaneous, Q24H  escitalopram, 10 mg, Oral, Daily  famotidine, 20 mg, Oral, BID AC  folic acid, 1 mg, Oral, Daily  hydrALAZINE, 50 mg, Oral, Q8H  insulin glargine, 30 Units, Subcutaneous, Daily  insulin lispro, 15 Units, Subcutaneous, TID With Meals  insulin lispro, 2-9 Units, Subcutaneous, 4x Daily AC & at Bedtime  NIFEdipine XL, 60 mg, Oral, Q24H  pantoprazole, 40 mg, Oral, QAM  sodium chloride, 10 mL, Intravenous, Q12H      Pharmacy to Dose enoxaparin (LOVENOX),   sodium chloride, 125 mL/hr, Last Rate: 125 mL/hr (06/18/24 0456)        acetaminophen    albuterol sulfate HFA    senna-docusate sodium **AND** polyethylene glycol **AND** bisacodyl **AND** bisacodyl    Calcium Replacement - Follow Nurse / BPA Driven Protocol    dextrose    dextrose    glucagon (human recombinant)    ketorolac    Magnesium Standard Dose Replacement - Follow Nurse / BPA Driven Protocol    ondansetron ODT    Pharmacy to Dose enoxaparin (LOVENOX)    Phosphorus Replacement - Follow Nurse / BPA Driven Protocol    Potassium Replacement - Follow Nurse / BPA Driven Protocol    [COMPLETED] Insert Peripheral IV **AND** sodium chloride    sodium chloride    sodium chloride  Latex    Objective     Physical Exam:   NAD, alert and oriented, tongue midline, MARCELINO equally  RRR  Lungs clear  Abd soft, benign  Vascular: Palpable bilateral radial pulses  Skin: Warm to touch, dry    Vital Signs and Labs:  Vital Signs Patient Vitals for the past 24 hrs:   BP Temp Temp src Pulse Resp SpO2 Height Weight   06/18/24 0600 (!) 185/93 -- -- 80 -- 99 % -- --   06/18/24 0400 147/64 98.4 °F (36.9 °C) Oral 59 12 97 % -- --   06/18/24 0016 110/59 97.4 °F (36.3 °C) Axillary 76 27 94 % -- --   06/18/24 0000 110/59 -- -- 83 -- 96 % -- --   06/17/24 2330 124/57 -- -- 81 -- 95 % -- --   06/17/24 2300 123/62 -- -- 73 -- 97 % -- --   06/17/24 2230 115/57 -- -- 66 -- 96 % --  "--   06/17/24 2216 104/53 -- -- -- -- -- -- --   06/17/24 2215 -- -- -- 67 -- 97 % -- --   06/17/24 2210 -- -- -- 66 -- 97 % -- --   06/17/24 2205 -- -- -- 67 -- 97 % -- --   06/17/24 2201 103/52 -- -- -- -- -- -- --   06/17/24 2200 -- -- -- 64 -- 97 % -- --   06/17/24 2123 109/57 -- -- 66 -- 98 % -- --   06/17/24 2002 168/73 -- -- 60 -- 98 % -- --   06/17/24 1945 (!) 188/89 -- -- 74 -- 98 % -- --   06/17/24 1930 (!) 206/99 -- -- 75 -- 98 % -- --   06/17/24 1856 180/85 98.6 °F (37 °C) Oral 64 15 99 % -- --   06/17/24 1701 (!) 203/81 -- -- 61 -- 99 % -- --   06/17/24 1617 (!) 185/84 -- -- 62 -- 100 % -- --   06/17/24 1601 (!) 191/93 -- -- 66 -- 98 % -- --   06/17/24 1516 (!) 181/75 -- -- 60 -- 100 % -- --   06/17/24 1507 164/73 -- -- 59 -- 100 % -- --   06/17/24 1354 168/76 -- -- 60 -- 100 % -- --   06/17/24 1352 168/78 -- -- 61 14 98 % -- --   06/17/24 1146 (!) 198/78 -- -- 57 -- 99 % -- --   06/17/24 1132 (!) 195/81 -- -- 56 14 100 % -- --   06/17/24 1131 (!) 195/81 -- -- 57 -- 100 % -- --   06/17/24 1116 179/88 -- -- 57 -- 100 % -- --   06/17/24 1035 (!) 185/76 -- -- 55 -- 100 % -- --   06/17/24 1033 158/60 -- -- 55 12 100 % -- --   06/17/24 1032 (!) 183/83 -- -- 56 -- 100 % -- --   06/17/24 1016 166/75 -- -- 55 -- 100 % -- --   06/17/24 1001 166/70 -- -- 53 -- 100 % -- --   06/17/24 0931 120/61 97.4 °F (36.3 °C) Oral 53 11 97 % 162.6 cm (64\") 63.6 kg (140 lb 4.8 oz)   06/17/24 0925 115/64 -- -- 53 17 96 % -- --   06/17/24 0922 115/64 -- -- 55 -- 98 % -- --     BMI:  Body mass index is 24.08 kg/m².    CBC    Results from last 7 days   Lab Units 06/18/24  0605 06/17/24  0928   WBC 10*3/mm3 9.69 14.62*   HEMOGLOBIN g/dL 12.2 13.7   PLATELETS 10*3/mm3 220 281     BMP   Results from last 7 days   Lab Units 06/18/24  0605 06/17/24  1820 06/17/24  0928   SODIUM mmol/L 134*  --  128*   POTASSIUM mmol/L 3.4* 3.1* 3.2*   CHLORIDE mmol/L 103  --  92*   CO2 mmol/L 23.1  --  23.6   BUN mg/dL 10  --  9   CREATININE mg/dL 1.25* "  --  1.29*   GLUCOSE mg/dL 143*  --  589*     HbA1C   Lab Results   Component Value Date    HGBA1C 16.90 (H) 06/17/2024    HGBA1C 13.40 (H) 02/19/2024    HGBA1C 9.70 (H) 07/20/2023     Infection     Radiology(recent) CT CEREBRAL PERFUSION WITH & WITHOUT CONTRAST    Result Date: 6/17/2024  Impression: No significant perfusion abnormality in the brain. Electronically Signed: Fritz Arroyo MD  6/17/2024 11:22 PM EDT  Workstation ID: [id number]    CT Angiogram Carotids    Result Date: 6/17/2024  Impression: No evidence of acute vessel occlusion, aneurysm or dissection. Extensive atherosclerotic disease with multifocal areas of stenoses as characterized above, most significantly including moderate, approximately 70%, stenosis at the origin of the right cervical internal carotid artery and moderate to severe stenosis at the origin of the superior segment of the right M2 segment MCA. Findings suggestive of pulmonary arterial hypertension. Scattered small pulmonary nodules most likely represents calcified granulomas. The largest of these measures 0.6 cm Electronically Signed: Zacarias Canales DO  6/17/2024 11:13 PM EDT  Workstation ID: [id number]    CT Angiogram Head w AI Analysis of LVO    Result Date: 6/17/2024  Impression: No evidence of acute vessel occlusion, aneurysm or dissection. Extensive atherosclerotic disease with multifocal areas of stenoses as characterized above, most significantly including moderate, approximately 70%, stenosis at the origin of the right cervical internal carotid artery and moderate to severe stenosis at the origin of the superior segment of the right M2 segment MCA. Findings suggestive of pulmonary arterial hypertension. Scattered small pulmonary nodules most likely represents calcified granulomas. The largest of these measures 0.6 cm Electronically Signed: Zacarias Canales DO  6/17/2024 11:13 PM EDT  Workstation ID: [id number]    CT Head Without Contrast    Result Date:  6/17/2024  Impression: No acute intracranial abnormality. Previously noted thin linear infarct in the left parieto-occipital region is not clearly evident on CT. Electronically Signed: Zacarias Canales DO  6/17/2024 10:48 PM EDT  Workstation ID: [id number]    MRI Brain Without Contrast    Result Date: 6/17/2024  1.1 cm linear acute infarct within the left parieto-occipital region (series 5, image 54). 2.Additional findings compatible with chronic microvascular ischemic change. Electronically Signed: Clarence Talley MD  6/17/2024 3:12 PM EDT  Workstation ID: [id number]    CT Head Without Contrast    Result Date: 6/17/2024  1. No acute intracranial abnormality by head CT. 2. Distal carotid and vertebral artery atherosclerotic disease, much more than expected for patient age. Electronically Signed: Eric Fenton MD  6/17/2024 10:29 AM EDT  Workstation ID: [id number]    XR Chest 1 View    Result Date: 6/17/2024  Impression: No active pulmonary process. Stable appearance of the chest since prior comparison. Electronically Signed: Eric Fenton MD  6/17/2024 10:01 AM EDT  Workstation ID: [id number]     VTE Prophylaxis:  Pharmacologic VTE prophylaxis orders are present.        Active Hospital Problems    Diagnosis  POA    **Altered mental status [R41.82]  Yes    Carotid stenosis, asymptomatic, right [I65.21]  Yes    Tobacco abuse [Z72.0]  Yes    Type 2 diabetes mellitus with hyperglycemia, with long-term current use of insulin [E11.65, Z79.4]  Not Applicable    Benign essential hypertension [I10]  Yes      Resolved Hospital Problems   No resolved problems to display.       Assessment & Plan   Assessment / Plan     Altered mental status    Benign essential hypertension    Type 2 diabetes mellitus with hyperglycemia, with long-term current use of insulin    Tobacco abuse    Carotid stenosis, asymptomatic, right      1.  CVA: Patient presents with dysarthria, expressive aphasia, and right arm weakness.  CT head negative, MRI  shows an acute left parietal occipital infarct.  Neurology is following.  She is on aspirin, Plavix, and a statin.    2.  Carotid stenosis: CTA of the head and neck shows approximately 70% stenosis in the right ICA, she remains asymptomatic to this lesion or stroke is on the contralateral side.  Her exam shows no focal deficits at this time.  She has no indication for carotid revascularization at this time.  Recommend following up in the office in 1 month with a carotid duplex.  She should continue antiplatelet and statin medication.  Discussed the importance of complete smoking cessation.  Discharge instructions have been placed.    3.  Uncontrolled diabetes mellitus: Hemoglobin A1c is 16.9.  Management per primary team.    4.  Tobacco abuse: Patient with significant smoking history.  Discussed the importance of complete smoking cessation, she verbalized understanding of this.    5.  CAD: Patient with history of MI, 10 years status post CABG.  Cardiology is following, management per their service.    Thank you for this consultation.         KAUSHAL Navarrete  [facility] [place] [facility]  06/18/24   O: [phone]  F: [phone]

## 2024-06-18 NOTE — PLAN OF CARE
Problem: Adult Inpatient Plan of Care  Goal: Plan of Care Review  Outcome: Ongoing, Progressing  Goal: Patient-Specific Goal (Individualized)  Outcome: Ongoing, Progressing  Goal: Absence of Hospital-Acquired Illness or Injury  Outcome: Ongoing, Progressing  Intervention: Identify and Manage Fall Risk  Recent Flowsheet Documentation  Taken 6/18/2024 1800 by Allison Castle RN  Safety Promotion/Fall Prevention: safety round/check completed  Taken 6/18/2024 1600 by Allison Castle RN  Safety Promotion/Fall Prevention:   assistive device/personal items within reach   clutter free environment maintained   nonskid shoes/slippers when out of bed   room organization consistent   safety round/check completed  Taken 6/18/2024 1400 by Allison Castle RN  Safety Promotion/Fall Prevention: safety round/check completed  Taken 6/18/2024 1200 by Allison Castle RN  Safety Promotion/Fall Prevention:   assistive device/personal items within reach   clutter free environment maintained   nonskid shoes/slippers when out of bed   room organization consistent   safety round/check completed  Taken 6/18/2024 1000 by Allison Castle RN  Safety Promotion/Fall Prevention: safety round/check completed  Taken 6/18/2024 0800 by Allison Castle RN  Safety Promotion/Fall Prevention: safety round/check completed  Intervention: Prevent Skin Injury  Recent Flowsheet Documentation  Taken 6/18/2024 1600 by Allison Castle RN  Body Position: position changed independently  Skin Protection: adhesive use limited  Taken 6/18/2024 1200 by Allison Castle RN  Body Position: position changed independently  Skin Protection: adhesive use limited  Intervention: Prevent and Manage VTE (Venous Thromboembolism) Risk  Recent Flowsheet Documentation  Taken 6/18/2024 1600 by Allison Castle RN  Activity Management: up ad makenzie  Taken 6/18/2024 1200 by Allison Castle RN  Activity Management: up ad makenzie  Intervention: Prevent  Infection  Recent Flowsheet Documentation  Taken 6/18/2024 1200 by Allison Castle RN  Infection Prevention: hand hygiene promoted  Goal: Optimal Comfort and Wellbeing  Outcome: Ongoing, Progressing  Intervention: Monitor Pain and Promote Comfort  Recent Flowsheet Documentation  Taken 6/18/2024 1200 by Allison Castle RN  Pain Management Interventions:   care clustered   quiet environment facilitated  Goal: Readiness for Transition of Care  Outcome: Ongoing, Progressing   Goal Outcome Evaluation:         Patient is waiting to have a MRI of the head with and without contrast. The patient remains on room air. The patient complained of a headache and was given PRN pain medications. See MAR. Will continue to monitor.

## 2024-06-18 NOTE — PROGRESS NOTES
Referring Provider: Brammell, Timothy Duane,*    Reason for follow-up: Elevated troponin     Patient Care Team:  Ibrahima Correa MD as PCP - General (Family Medicine)  Rachele Zafar RN as Ambulatory  (Population Health)  Xochilt Jenkins MD as Consulting Physician (Nephrology)      SUBJECTIVE  Laying comfortably in bed.  Denies any chest pain or shortness of breath.     ROS  Review of all systems negative except as indicated.    Since I have last seen, the patient has been without any chest discomfort, shortness of breath, palpitations, dizziness or syncope.  Denies having any headache, abdominal pain, nausea, vomiting, diarrhea, constipation, loss of weight or loss of appetite.  Denies having any excessive bruising, hematuria or blood in the stool.        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 Debbie Fry.    Depressive disorder 2023    Diabetic gastroparesis 2021    Diabetic peripheral neuropathy associated with type 2 diabetes mellitus 2024    Gastroesophageal reflux disease 03/10/2021    GERD (gastroesophageal reflux disease)     Intermittent claudication 2017    Iron deficiency anemia 2020    Mixed hypercholesterolemia and hypertriglyceridemia 2014    Myocardial infarction     Obesity     Obstructive sleep apnea 2021    Personal history of COVID-19 2022    Polyp of colon 2023    Spontaneous      Stress fracture of right ankle with routine healing     Tobacco abuse 2024    Type 2 diabetes mellitus with hyperglycemia, with long-term current use of insulin 2017    Vitamin D deficiency 2024       Past Surgical History:    Procedure Laterality Date    CARDIAC CATHETERIZATION N/A 2024    Procedure: Left Heart Cath and coronary angiogram;  Surgeon: Jena Barron MD;  Location: [facility] CATH INVASIVE LOCATION;  Service: Cardiology;  Laterality: N/A;     SECTION      CORONARY ARTERY BYPASS GRAFT  2014    LIMA-LAD, SVG-OM2, SVG-PDA, Dr. Debbie Fry.    DILATATION AND CURETTAGE      TONSILLECTOMY         Family History   Family history unknown: Yes       Social History     Tobacco Use    Smoking status: Every Day     Current packs/day: 0.25     Average packs/day: 0.3 packs/day for 15.0 years (3.8 ttl pk-yrs)     Types: Cigarettes    Smokeless tobacco: Never   Vaping Use    Vaping status: Never Used   Substance Use Topics    Alcohol use: Defer    Drug use: Never        Home meds:  Prior to Admission medications    Medication Sig Start Date End Date Taking? Authorizing Provider   aspirin 81 MG chewable tablet Chew 1 tablet Daily.   Yes Joseline Quiroz MD   atorvastatin (LIPITOR) 80 MG tablet Take 1 tablet by mouth Daily.   Yes Joseline Quiroz MD   clopidogrel (PLAVIX) 75 MG tablet Take 1 tablet by mouth Daily. 24  Yes    escitalopram (LEXAPRO) 10 MG tablet Take 1 tablet by mouth Daily.   Yes Joseline Quiroz MD   famotidine (PEPCID) 40 MG tablet Take 1 tablet by mouth every night at bedtime. 23  Yes    hydrALAZINE (APRESOLINE) 25 MG tablet Take 1 tablet (25 mg total) by mouth 3 (three) times a day. 24  Yes    icosapent ethyl (Vascepa) 1 g capsule capsule Take 2 capsules by mouth 2 (two) times a day. 23  Yes    insulin aspart (novoLOG FLEXPEN) 100 UNIT/ML solution pen-injector sc pen Inject  under the skin into the appropriate area as directed 3 times a day. Sliding scale, between 5-20 units TID   Yes Joseline Quiroz MD   insulin detemir (LEVEMIR) 100 UNIT/ML injection Inject 60 Units under the skin into the appropriate area as directed Every Night.   Yes Joseline Quiroz  MD   metoclopramide (REGLAN) 5 MG tablet Take 1 tablet by mouth 3 times a day.   Yes Provider, MD Joseline   metoprolol tartrate (LOPRESSOR) 100 MG tablet Take 1 tablet by mouth 2 (two) times a day. 6/5/24  Yes    ondansetron (ZOFRAN) 4 MG tablet Take 1 tablet by mouth three times a day as needed 1/9/24  Yes    pantoprazole (PROTONIX) 40 MG EC tablet Take 1 tablet by mouth Every Morning. 6/28/23  Yes    albuterol sulfate  (90 Base) MCG/ACT inhaler Inhale 2 puffs every 6 (six) hours if needed for wheezing. 5/9/23      glyburide (DIAbeta) 5 MG tablet Take 1 tablet by mouth 2 (Two) Times a Day. 2/3/21 3/10/21     hydroCHLOROthiazide (HYDRODIURIL) 25 MG tablet Take 1 tablet by mouth Daily. 1/20/23 8/1/23     metFORMIN (GLUCOPHAGE) 1000 MG tablet Take 1 tablet by mouth 2 (Two) Times a Day With Meals. 5/9/23 7/7/23     omeprazole (priLOSEC) 20 MG capsule Take 1 capsule (20 mg total) by mouth 1 (one) time each day. Do not crush or chew. 3/10/21 7/20/21         Allergies:  Latex    Scheduled Meds:aspirin, 81 mg, Oral, Daily  atorvastatin, 80 mg, Oral, Nightly  cefTRIAXone, 1,000 mg, Intravenous, Q24H  clopidogrel, 75 mg, Oral, Daily  enoxaparin, 40 mg, Subcutaneous, Q24H  escitalopram, 10 mg, Oral, Daily  famotidine, 20 mg, Oral, BID AC  folic acid, 1 mg, Oral, Daily  hydrALAZINE, 25 mg, Oral, Q8H  insulin glargine, 30 Units, Subcutaneous, Daily  insulin lispro, 15 Units, Subcutaneous, TID With Meals  insulin lispro, 2-9 Units, Subcutaneous, 4x Daily AC & at Bedtime  metoprolol tartrate, 100 mg, Oral, BID  pantoprazole, 40 mg, Oral, QAM  sodium chloride, 10 mL, Intravenous, Q12H      Continuous Infusions:Pharmacy to Dose enoxaparin (LOVENOX),   sodium chloride, 125 mL/hr, Last Rate: 125 mL/hr (06/18/24 3475)      PRN Meds:.  acetaminophen    albuterol sulfate HFA    senna-docusate sodium **AND** polyethylene glycol **AND** bisacodyl **AND** bisacodyl    Calcium Replacement - Follow Nurse / BPA Driven Protocol     "dextrose    dextrose    glucagon (human recombinant)    Magnesium Standard Dose Replacement - Follow Nurse / BPA Driven Protocol    ondansetron ODT    Pharmacy to Dose enoxaparin (LOVENOX)    Phosphorus Replacement - Follow Nurse / BPA Driven Protocol    Potassium Replacement - Follow Nurse / BPA Driven Protocol    [COMPLETED] Insert Peripheral IV **AND** sodium chloride    sodium chloride    sodium chloride      OBJECTIVE    Vital Signs  Vitals:    06/18/24 0000 06/18/24 0016 06/18/24 0400 06/18/24 0600   BP: 110/59 110/59 147/64 (!) 185/93   BP Location:  Right arm Right arm    Patient Position:  Lying Lying    Pulse: 83 76 59 80   Resp:  27 12    Temp:  97.4 °F (36.3 °C) 98.4 °F (36.9 °C)    TempSrc:  Axillary Oral    SpO2: 96% 94% 97% 99%   Weight:       Height:           Flowsheet Rows      Flowsheet Row First Filed Value   Admission Height 162.6 cm (64\") Documented at 06/17/2024 0931   Admission Weight 63.6 kg (140 lb 4.8 oz) Documented at 06/17/2024 0931              Intake/Output Summary (Last 24 hours) at 6/18/2024 0707  Last data filed at 6/17/2024 2038  Gross per 24 hour   Intake 173.75 ml   Output --   Net 173.75 ml          Telemetry: Sinus rhythm/sinus bradycardia    Physical Exam:  The patient is alert, oriented and in no distress.  Vital signs as noted above.  Head and neck revealed no carotid bruits or jugular venous distention.  No thyromegaly or lymphadenopathy is present  Lungs clear.  No wheezing.  Breath sounds are normal bilaterally.  Heart normal first and second heart sounds.  No murmur. No precordial rub is present.  No gallop is present.  Abdomen soft and nontender.  No organomegaly is present.  Extremities with good peripheral pulses without any pedal edema.  Skin warm and dry.  Musculoskeletal system is grossly normal.  CNS grossly normal.       Results Review:  I have personally reviewed the results from the time of this admission to 6/18/2024 07:07 EDT and agree with these " findings:  []  Laboratory  []  Microbiology  []  Radiology  []  EKG/Telemetry   []  Cardiology/Vascular   []  Pathology  []  Old records  []  Other:    Most notable findings include:    Lab Results (last 24 hours)       Procedure Component Value Units Date/Time    Lipid Panel [[id number]]  (Abnormal) Collected: 06/18/24 0605    Specimen: Blood from Arm, Left Updated: 06/18/24 0653     Total Cholesterol 320 mg/dL      Triglycerides 1,117 mg/dL      HDL Cholesterol 28 mg/dL      LDL Cholesterol  --     Comment: Unable to calculate        VLDL Cholesterol --     Comment: Unable to calculate        LDL/HDL Ratio --     Comment: Unable to calculate       Narrative:      Cholesterol Reference Ranges  (U.S. Department of Health and Human Services ATP III Classifications)    Desirable          <200 mg/dL  Borderline High    200-239 mg/dL  High Risk          >240 mg/dL      Triglyceride Reference Ranges  (U.S. Department of Health and Human Services ATP III Classifications)    Normal           <150 mg/dL  Borderline High  150-199 mg/dL  High             200-499 mg/dL  Very High        >500 mg/dL    HDL Reference Ranges  (U.S. Department of Health and Human Services ATP III Classifications)    Low     <40 mg/dl (major risk factor for CHD)  High    >60 mg/dl ('negative' risk factor for CHD)        LDL Reference Ranges  (U.S. Department of Health and Human Services ATP III Classifications)    Optimal          <100 mg/dL  Near Optimal     100-129 mg/dL  Borderline High  130-159 mg/dL  High             160-189 mg/dL  Very High        >189 mg/dL    LDL Cholesterol, Direct [[id number]] Collected: 06/18/24 0605    Specimen: Blood from Arm, Left Updated: 06/18/24 0653    Comprehensive Metabolic Panel [[id number]]  (Abnormal) Collected: 06/18/24 0605    Specimen: Blood from Arm, Left Updated: 06/18/24 0644     Glucose 143 mg/dL      BUN 10 mg/dL      Creatinine 1.25 mg/dL      Sodium 134 mmol/L      Potassium 3.4 mmol/L      Chloride 103  mmol/L      Comment: Result checked          CO2 23.1 mmol/L      Calcium 8.8 mg/dL      Total Protein 5.8 g/dL      Albumin 2.9 g/dL      ALT (SGPT) <5 U/L      AST (SGOT) 10 U/L      Alkaline Phosphatase 96 U/L      Total Bilirubin 0.2 mg/dL      Globulin 2.9 gm/dL      A/G Ratio 1.0 g/dL      BUN/Creatinine Ratio 8.0     Anion Gap 7.9 mmol/L      eGFR 53.9 mL/min/1.73     Narrative:      GFR Normal >60  Chronic Kidney Disease <60  Kidney Failure <15      TSH [[id number]]  (Normal) Collected: 06/18/24 0605    Specimen: Blood from Arm, Left Updated: 06/18/24 0641     TSH 1.470 uIU/mL     CBC Auto Differential [[id number]]  (Normal) Collected: 06/18/24 0605    Specimen: Blood from Arm, Left Updated: 06/18/24 0612     WBC 9.69 10*3/mm3      RBC 4.10 10*6/mm3      Hemoglobin 12.2 g/dL      Hematocrit 36.9 %      MCV 90.0 fL      MCH 29.8 pg      MCHC 33.1 g/dL      RDW 13.8 %      RDW-SD 45.1 fl      MPV 11.2 fL      Platelets 220 10*3/mm3      Neutrophil % 68.6 %      Lymphocyte % 22.4 %      Monocyte % 6.7 %      Eosinophil % 1.5 %      Basophil % 0.5 %      Immature Grans % 0.3 %      Neutrophils, Absolute 6.64 10*3/mm3      Lymphocytes, Absolute 2.17 10*3/mm3      Monocytes, Absolute 0.65 10*3/mm3      Eosinophils, Absolute 0.15 10*3/mm3      Basophils, Absolute 0.05 10*3/mm3      Immature Grans, Absolute 0.03 10*3/mm3      nRBC 0.0 /100 WBC     POC Glucose Once [[id number]]  (Abnormal) Collected: 06/18/24 0600    Specimen: Blood Updated: 06/18/24 0601     Glucose 144 mg/dL      Comment: Serial Number: [id number]:  [id number]       Vitamin B12 [[id number]]  (Abnormal) Collected: 06/17/24 1820    Specimen: Blood Updated: 06/18/24 0032     Vitamin B-12 1,489 pg/mL     Narrative:      Results may be falsely increased if patient taking Biotin.      Folate [[id number]]  (Normal) Collected: 06/17/24 1820    Specimen: Blood Updated: 06/18/24 0032     Folate 14.70 ng/mL     Narrative:      Results may be falsely  increased if patient taking Biotin.      POC Glucose 4x Daily Before Meals & at Bedtime [[id number]]  (Abnormal) Collected: 06/17/24 2329    Specimen: Blood Updated: 06/17/24 2331     Glucose 173 mg/dL      Comment: Serial Number: [id number]:  [id number]       POC Glucose Once [[id number]]  (Abnormal) Collected: 06/17/24 2136    Specimen: Blood Updated: 06/17/24 2138     Glucose 165 mg/dL      Comment: Serial Number: [id number]:  [id number]       P2Y12 Platelet Inhibition [[id number]]  (Abnormal) Collected: 06/17/24 1820    Specimen: Blood Updated: 06/17/24 2039     P2Y12 Platelet Inhibition 168 PRU     Narrative:      Test results are in P2Y12 reaction units (PRU). This measures the extent of platelet aggregation in the presence of P2Y12 inhibitor drugs such as clopidrogrel (Plavix), prasugrel (Effient), ticagrelor (Brilinta), and ticlopidine (Ticlid).   Pre-Drug normal reference range: 194 - 418 PRU   P2Y12 values <194 (low end of reference range) are specific evidence of a P2Y12 inhibitor effect   Patients who have been treated with Glycoprotein IIb/IIIa inhibitors should not be tested until platelet function has recovered. This time period is approximately 14 days after discontinuation of abciximab (ReoPro) and up to 48 hours after discontinuation of eptifibatide (Integrillin) and tirofiban (Aggratstat).   Results should be interpreted in conjuction with other laboratory and clinical data available to the clinician.    Potassium [[id number]]  (Abnormal) Collected: 06/17/24 1820    Specimen: Blood Updated: 06/17/24 1903     Potassium 3.1 mmol/L     TSH [[id number]]  (Normal) Collected: 06/17/24 1820    Specimen: Blood Updated: 06/17/24 1903     TSH 2.230 uIU/mL     T4, Free [[id number]]  (Normal) Collected: 06/17/24 1820    Specimen: Blood Updated: 06/17/24 1903     Free T4 1.37 ng/dL     POC Glucose Once [[id number]]  (Abnormal) Collected: 06/17/24 1856    Specimen: Blood Updated: 06/17/24 1858     Glucose  286 mg/dL      Comment: Serial Number: [id number]:  [id number]       High Sensitivity Troponin T [[id number]]  (Abnormal) Collected: 06/17/24 0928    Specimen: Blood Updated: 06/17/24 1841     HS Troponin T 61 ng/L     Narrative:      High Sensitive Troponin T Reference Range:  <14.0 ng/L- Negative Female for AMI  <22.0 ng/L- Negative Male for AMI  >=14 - Abnormal Female indicating possible myocardial injury.  >=22 - Abnormal Male indicating possible myocardial injury.   Clinicians would have to utilize clinical acumen, EKG, Troponin, and serial changes to determine if it is an Acute Myocardial Infarction or myocardial injury due to an underlying chronic condition.         LDL Cholesterol, Direct [[id number]]  (Abnormal) Collected: 06/17/24 0928    Specimen: Blood Updated: 06/17/24 1658     LDL Cholesterol  116 mg/dL     Narrative:      LDL Reference Ranges    (U.S. Department of Health and Human Services ATP III Classifications)    Optimal          <100 mg/dl  Near Optimal     100-129 mg/dl  Borderline High  130-159 mg/dl  High             160-189 mg/dl  Very High        >189 mg/dl      POC Glucose Once [[id number]]  (Abnormal) Collected: 06/17/24 1644    Specimen: Blood Updated: 06/17/24 1646     Glucose 310 mg/dL      Comment: Serial Number: [id number]:  [id number]       Lipid Panel [[id number]]  (Abnormal) Collected: 06/17/24 0928    Specimen: Blood Updated: 06/17/24 1207     Total Cholesterol 381 mg/dL      Triglycerides >4,425 mg/dL      HDL Cholesterol 30 mg/dL      Comment: Triglyceride result >1200 mg/dL. HDL result may be affected.  Calculated results will not be reported.         LDL Cholesterol  --     Comment: Unable to calculate        VLDL Cholesterol --     Comment: Unable to calculate        LDL/HDL Ratio --     Comment: Unable to calculate       Narrative:      Cholesterol Reference Ranges  (U.S. Department of Health and Human Services ATP III Classifications)    Desirable          <200  mg/dL  Borderline High    200-239 mg/dL  High Risk          >240 mg/dL      Triglyceride Reference Ranges  (U.S. Department of Health and Human Services ATP III Classifications)    Normal           <150 mg/dL  Borderline High  150-199 mg/dL  High             200-499 mg/dL  Very High        >500 mg/dL    HDL Reference Ranges  (U.S. Department of Health and Human Services ATP III Classifications)    Low     <40 mg/dl (major risk factor for CHD)  High    >60 mg/dl ('negative' risk factor for CHD)        LDL Reference Ranges  (U.S. Department of Health and Human Services ATP III Classifications)    Optimal          <100 mg/dL  Near Optimal     100-129 mg/dL  Borderline High  130-159 mg/dL  High             160-189 mg/dL  Very High        >189 mg/dL    High Sensitivity Troponin T 2Hr [[id number]]  (Abnormal) Collected: 06/17/24 1143    Specimen: Blood Updated: 06/17/24 1206     HS Troponin T 48 ng/L      Troponin T Delta -15 ng/L     Narrative:      High Sensitive Troponin T Reference Range:  <14.0 ng/L- Negative Female for AMI  <22.0 ng/L- Negative Male for AMI  >=14 - Abnormal Female indicating possible myocardial injury.  >=22 - Abnormal Male indicating possible myocardial injury.   Clinicians would have to utilize clinical acumen, EKG, Troponin, and serial changes to determine if it is an Acute Myocardial Infarction or myocardial injury due to an underlying chronic condition.         Hemoglobin A1c [[id number]]  (Abnormal) Collected: 06/17/24 0928    Specimen: Blood Updated: 06/17/24 1136     Hemoglobin A1C 16.90 %     POC Glucose 4x Daily Before Meals & at Bedtime [[id number]]  (Abnormal) Collected: 06/17/24 1124    Specimen: Blood Updated: 06/17/24 1130     Glucose 482 mg/dL      Comment: Serial Number: [id number]:  [id number]       Urinalysis, Microscopic Only - Urine, Clean Catch [[id number]]  (Abnormal) Collected: 06/17/24 0940    Specimen: Urine, Clean Catch Updated: 06/17/24 1023     RBC, UA 11-20 /HPF       WBC, UA 6-10 /HPF      Bacteria, UA 2+ /HPF      Squamous Epithelial Cells, UA 13-20 /HPF      Hyaline Casts, UA None Seen /LPF      Methodology Manual Light Microscopy    Comprehensive Metabolic Panel [[id number]]  (Abnormal) Collected: 06/17/24 0928    Specimen: Blood Updated: 06/17/24 1010     Glucose 589 mg/dL      BUN 9 mg/dL      Creatinine 1.29 mg/dL      Sodium 128 mmol/L      Potassium 3.2 mmol/L      Chloride 92 mmol/L      CO2 23.6 mmol/L      Calcium 9.1 mg/dL      Total Protein 7.0 g/dL      Albumin 3.4 g/dL      ALT (SGPT) 5 U/L      AST (SGOT) 11 U/L      Alkaline Phosphatase 134 U/L      Total Bilirubin 0.4 mg/dL      Globulin 3.6 gm/dL      A/G Ratio 0.9 g/dL      BUN/Creatinine Ratio 7.0     Anion Gap 12.4 mmol/L      eGFR 51.9 mL/min/1.73     Narrative:      GFR Normal >60  Chronic Kidney Disease <60  Kidney Failure <15      Single High Sensitivity Troponin T [[id number]]  (Abnormal) Collected: 06/17/24 0928    Specimen: Blood Updated: 06/17/24 1010     HS Troponin T 63 ng/L     Narrative:      High Sensitive Troponin T Reference Range:  <14.0 ng/L- Negative Female for AMI  <22.0 ng/L- Negative Male for AMI  >=14 - Abnormal Female indicating possible myocardial injury.  >=22 - Abnormal Male indicating possible myocardial injury.   Clinicians would have to utilize clinical acumen, EKG, Troponin, and serial changes to determine if it is an Acute Myocardial Infarction or myocardial injury due to an underlying chronic condition.         Urinalysis With Microscopic If Indicated (No Culture) - Urine, Clean Catch [[id number]]  (Abnormal) Collected: 06/17/24 0940    Specimen: Urine, Clean Catch Updated: 06/17/24 0954     Color, UA Yellow     Appearance, UA Cloudy     pH, UA 6.0     Specific Gravity, UA 1.038     Glucose, UA >=1000 mg/dL (3+)     Ketones, UA Negative     Bilirubin, UA Negative     Blood, UA Negative     Protein, UA >=300 mg/dL (3+)     Leuk Esterase, UA Negative     Nitrite, UA Negative      Urobilinogen, UA 0.2 E.U./dL    aPTT [[id number]]  (Abnormal) Collected: 06/17/24 0928    Specimen: Blood Updated: 06/17/24 0946     PTT 26.2 seconds     Protime-INR [[id number]]  (Normal) Collected: 06/17/24 0928    Specimen: Blood Updated: 06/17/24 0946     Protime 10.2 Seconds      INR 0.93    Extra Tubes [[id number]] Collected: 06/17/24 0928    Specimen: Blood Updated: 06/17/24 0945    Narrative:      The following orders were created for panel order Extra Tubes.  Procedure                               Abnormality         Status                     ---------                               -----------         ------                     Gold Top - SST[[id number]]                                   Final result               Green Top (Gel)[[id number]]                                  Final result                 Please view results for these tests on the individual orders.    Green Top (Gel) [[id number]] Collected: 06/17/24 0928    Specimen: Blood Updated: 06/17/24 0945     Extra Tube Hold for add-ons.     Comment: Auto resulted.       Gold Top - SST [[id number]] Collected: 06/17/24 0928    Specimen: Blood Updated: 06/17/24 0945     Extra Tube Hold for add-ons.     Comment: Auto resulted.       CBC & Differential [[id number]]  (Abnormal) Collected: 06/17/24 0928    Specimen: Blood Updated: 06/17/24 0935    Narrative:      The following orders were created for panel order CBC & Differential.  Procedure                               Abnormality         Status                     ---------                               -----------         ------                     CBC Auto Differential[[id number]]        Abnormal            Final result                 Please view results for these tests on the individual orders.    CBC Auto Differential [[id number]]  (Abnormal) Collected: 06/17/24 0928    Specimen: Blood Updated: 06/17/24 0935     WBC 14.62 10*3/mm3      RBC 4.60 10*6/mm3      Hemoglobin 13.7 g/dL      Hematocrit 41.1 %       MCV 89.3 fL      MCH 29.8 pg      MCHC 33.3 g/dL      RDW 13.4 %      RDW-SD 43.6 fl      MPV 11.6 fL      Platelets 281 10*3/mm3      Neutrophil % 85.8 %      Lymphocyte % 8.8 %      Monocyte % 4.0 %      Eosinophil % 0.6 %      Basophil % 0.3 %      Immature Grans % 0.5 %      Neutrophils, Absolute 12.53 10*3/mm3      Lymphocytes, Absolute 1.29 10*3/mm3      Monocytes, Absolute 0.59 10*3/mm3      Eosinophils, Absolute 0.09 10*3/mm3      Basophils, Absolute 0.05 10*3/mm3      Immature Grans, Absolute 0.07 10*3/mm3      nRBC 0.0 /100 WBC     POC Glucose Once [[id number]]  (Abnormal) Collected: 06/17/24 0926    Specimen: Blood Updated: 06/17/24 0928     Glucose 525 mg/dL      Comment: Serial Number: [id number]:  [id number]               Imaging Results (Last 24 Hours)       Procedure Component Value Units Date/Time    CT CEREBRAL PERFUSION WITH & WITHOUT CONTRAST [[id number]] Collected: 06/17/24 2320     Updated: 06/17/24 2324    Narrative:      CT CEREBRAL PERFUSION W WO CONTRAST    Date of Exam: 6/17/2024 11:00 PM EDT    Indication: stroke work-up. Altered mental status and weakness with aphasia.     Comparison: 6/17/2024 head CT and angiography.    Technique: Axial CT images of the brain were obtained prior to and after the administration of iodinated contrast. CT Perfusion protocol was utilized. Automated post processing was performed by RAPID software and submitted to PACS for interpretation.   Automated exposure control and iterative reconstruction was utilized.      Findings:  Cerebral blood flow and blood volume maps are symmetric without large perfusion defect. There are minor areas of prolonged Tmax in the bilateral parieto-occipital region. These are of uncertain clinical significance.    CBF<30% volume: 0 mL  Tmax>6sec volume: 0 mL  Mismatch volume: 0 mL  Mismatch ratio: None.      Impression:      Impression:  No significant perfusion abnormality in the brain.        Electronically Signed: Fritz  MD Dina    6/17/2024 11:22 PM EDT    Workstation ID: [id number]    CT Angiogram Carotids [[id number]] Collected: 06/17/24 2255     Updated: 06/17/24 2316    Narrative:      CT ANGIOGRAM HEAD W AI ANALYSIS OF LVO, CT ANGIOGRAM CAROTIDS    Date of Exam: 6/17/2024 10:35 PM EDT    Indication: CVA.    Comparison: Same day CT head and MRI of the brain    Technique: CTA of the head was performed after the uneventful intravenous administration of iodinated contrast. Reconstructed coronal and sagittal images were also obtained. In addition, a 3-D volume rendered image was created for interpretation.   Automated exposure control and iterative reconstruction methods were used.      Findings:  Contrast opacification: Excellent    Aortic Arch: Unremarkable    Right:    Innominate: Patent  Subclavian: Patent  Common Carotid: Patent  External Carotid: Mild stenosis at the origin secondary to predominately noncalcified plaques.  Internal Carotid: Moderate, approximately 70%, stenosis at the origin of the cervical internal carotid artery secondary to calcified and noncalcified plaques.    Intracranial ICA: Mild to moderate stenosis within the supraclinoid and cavernous segment ICAs secondary to calcified plaques.  MCA: Moderate to severe stenosis at the origin of the superior segment of the right M2 (image 650 of series 5). Otherwise the vessels are unremarkable.  JED: Patent  PCA: Patent  Vertebral: Moderate to severe stenosis at the origin secondary to predominately calcified plaques. Additionally there is multifocal mild stenosis noted within the V4 segment secondary to calcified plaques.    Left:    Subclavian: Mild stenosis at the origin secondary to calcified and noncalcified plaques. Otherwise unremarkable  Common Carotid: Mild stenosis distally secondary to predominately noncalcified plaques.  External Carotid: Mild stenosis at the origin secondary to noncalcified plaques.  Internal Carotid: Mild, less than 50%, stenosis  within the proximal cervical internal carotid artery secondary to calcified and noncalcified plaques.    Intracranial ICA: Mild to moderate stenosis within the cavernous and supraclinoid segment secondary to calcified and noncalcified plaques.  MCA:Patent  JED: At least mild stenosis at the origin of the left A1 segment. Otherwise unremarkable  PCA: Patent  Vertebral: Moderate to severe stenosis at the origin secondary to predominately calcified plaques. Additionally multifocal mild stenoses noted within the V4 segment secondary to calcified plaques      Basilar: Patent      Soft Tissue: No abnormal arterial intracranial enhancement.  Please see dedicated head CT for any additional findings regarding intracranial structures.  Enlargement of the central pulmonary artery.  Multiple prominent cervical lymph nodes but no pathologically enlarged lymph nodes.  Small soft tissue nodules within the bilateral parotid glands most likely represents small lymph nodes.  Otherwise the visualized soft tissues are unremarkable  Lungs: Scattered small nodular opacities with a few showing central calcifications, measuring up to 0.6 cm. These most likely represents granulomas.  Otherwise the lungs are clear.  Bones: No acute osseous abnormality. Multiple periapical lucencies are noted within the left maxillary teeth.      Impression:      Impression:  No evidence of acute vessel occlusion, aneurysm or dissection.    Extensive atherosclerotic disease with multifocal areas of stenoses as characterized above, most significantly including moderate, approximately 70%, stenosis at the origin of the right cervical internal carotid artery and moderate to severe stenosis at   the origin of the superior segment of the right M2 segment MCA.    Findings suggestive of pulmonary arterial hypertension.    Scattered small pulmonary nodules most likely represents calcified granulomas. The largest of these measures 0.6 cm        Electronically Signed:  Zacarias Canales DO    6/17/2024 11:13 PM EDT    Workstation ID: [id number]    CT Angiogram Head w AI Analysis of LVO [[id number]] Collected: 06/17/24 2255     Updated: 06/17/24 2316    Narrative:      CT ANGIOGRAM HEAD W AI ANALYSIS OF LVO, CT ANGIOGRAM CAROTIDS    Date of Exam: 6/17/2024 10:35 PM EDT    Indication: CVA.    Comparison: Same day CT head and MRI of the brain    Technique: CTA of the head was performed after the uneventful intravenous administration of iodinated contrast. Reconstructed coronal and sagittal images were also obtained. In addition, a 3-D volume rendered image was created for interpretation.   Automated exposure control and iterative reconstruction methods were used.      Findings:  Contrast opacification: Excellent    Aortic Arch: Unremarkable    Right:    Innominate: Patent  Subclavian: Patent  Common Carotid: Patent  External Carotid: Mild stenosis at the origin secondary to predominately noncalcified plaques.  Internal Carotid: Moderate, approximately 70%, stenosis at the origin of the cervical internal carotid artery secondary to calcified and noncalcified plaques.    Intracranial ICA: Mild to moderate stenosis within the supraclinoid and cavernous segment ICAs secondary to calcified plaques.  MCA: Moderate to severe stenosis at the origin of the superior segment of the right M2 (image 650 of series 5). Otherwise the vessels are unremarkable.  JED: Patent  PCA: Patent  Vertebral: Moderate to severe stenosis at the origin secondary to predominately calcified plaques. Additionally there is multifocal mild stenosis noted within the V4 segment secondary to calcified plaques.    Left:    Subclavian: Mild stenosis at the origin secondary to calcified and noncalcified plaques. Otherwise unremarkable  Common Carotid: Mild stenosis distally secondary to predominately noncalcified plaques.  External Carotid: Mild stenosis at the origin secondary to noncalcified plaques.  Internal Carotid: Mild,  less than 50%, stenosis within the proximal cervical internal carotid artery secondary to calcified and noncalcified plaques.    Intracranial ICA: Mild to moderate stenosis within the cavernous and supraclinoid segment secondary to calcified and noncalcified plaques.  MCA:Patent  JED: At least mild stenosis at the origin of the left A1 segment. Otherwise unremarkable  PCA: Patent  Vertebral: Moderate to severe stenosis at the origin secondary to predominately calcified plaques. Additionally multifocal mild stenoses noted within the V4 segment secondary to calcified plaques      Basilar: Patent      Soft Tissue: No abnormal arterial intracranial enhancement.  Please see dedicated head CT for any additional findings regarding intracranial structures.  Enlargement of the central pulmonary artery.  Multiple prominent cervical lymph nodes but no pathologically enlarged lymph nodes.  Small soft tissue nodules within the bilateral parotid glands most likely represents small lymph nodes.  Otherwise the visualized soft tissues are unremarkable  Lungs: Scattered small nodular opacities with a few showing central calcifications, measuring up to 0.6 cm. These most likely represents granulomas.  Otherwise the lungs are clear.  Bones: No acute osseous abnormality. Multiple periapical lucencies are noted within the left maxillary teeth.      Impression:      Impression:  No evidence of acute vessel occlusion, aneurysm or dissection.    Extensive atherosclerotic disease with multifocal areas of stenoses as characterized above, most significantly including moderate, approximately 70%, stenosis at the origin of the right cervical internal carotid artery and moderate to severe stenosis at   the origin of the superior segment of the right M2 segment MCA.    Findings suggestive of pulmonary arterial hypertension.    Scattered small pulmonary nodules most likely represents calcified granulomas. The largest of these measures 0.6  cm        Electronically Signed: Zacarias Canales DO    6/17/2024 11:13 PM EDT    Workstation ID: [id number]    CT Head Without Contrast [[id number]] Collected: 06/17/24 2246     Updated: 06/17/24 2250    Narrative:      CT HEAD WO CONTRAST    Date of Exam: 6/17/2024 10:36 PM EDT    Indication: code stroke.    Comparison: 6/17/2024    Technique: Axial CT images were obtained of the head without contrast administration.  Coronal reconstructions were performed.  Automated exposure control and iterative reconstruction methods were used.      Findings:  No large territory infarct.    There is no evidence of hemorrhage.  No mass effect, edema or midline shift    Unremarkable white matter    No extra-axial fluid collection.      The ventricles are normal in size and configuration.      The visualized orbits are unremarkable.  The visualized paranasal sinuses and mastoid air cells are clear.    The visualized soft tissues are unremarkable.  No acute osseous abnormality.      Impression:      Impression:  No acute intracranial abnormality.    Previously noted thin linear infarct in the left parieto-occipital region is not clearly evident on CT.      Electronically Signed: Zacarias Canales DO    6/17/2024 10:48 PM EDT    Workstation ID: [id number]    MRI Brain Without Contrast [[id number]] Collected: 06/17/24 1506     Updated: 06/17/24 1514    Narrative:      MRI BRAIN WO CONTRAST    Date of Exam: 6/17/2024 2:17 PM EDT    Indication: altered mental status.     Comparison: Noncontrast head CT from the same date    Technique:  Routine multiplanar/multisequence sequence images of the brain were obtained without contrast administration.    FINDINGS:  There is a 1 cm linear band of diffusion restriction within the left parieto-occipital region (series 5, image 54), compatible with tiny acute infarct. Foci of T2/FLAIR signal hyperintensity are seen within the bilateral hemispheric white matter. No   significant mass effect,  intracranial hemorrhage, or hydrocephalus is identified. The visualized intracranial flow-voids appear unremarkable. The paranasal sinuses and mastoid air cells show no fluid signal. The orbits, globes, retrobulbar soft tissues   appear unremarkable. The visualized superficial soft tissues and cervical spine demonstrate no significant abnormality.      Impression:      1.1 cm linear acute infarct within the left parieto-occipital region (series 5, image 54).  2.Additional findings compatible with chronic microvascular ischemic change.      Electronically Signed: Clarence Talley MD    6/17/2024 3:12 PM EDT    Workstation ID: [id number]    CT Head Without Contrast [[id number]] Collected: 06/17/24 1027     Updated: 06/17/24 1031    Narrative:      CT HEAD WO CONTRAST    Date of Exam: 6/17/2024 10:25 AM EDT    Indication: altered mental status.    Comparison: None available.    Technique: Axial CT images were obtained of the head without contrast administration.  Coronal reconstructions were performed.  Automated exposure control and iterative reconstruction methods were used.      Findings: No evidence of intracranial hemorrhage, mass, or midline shift.  Sulci and ventricles are symmetric.  Brain volume is appropriate for patient's age.  The gray white matter differentiation is intact. No focal hypodensities to suggest acute or   subacute infarct. The mastoid air cells and paranasal sinuses are well aerated.  Globes and extraocular muscles are normal.  No displaced or depressed skull fractures.  No suspicious lytic or sclerotic osseous lesions. Distal carotid and vertebral artery   atherosclerotic disease.      Impression:      1. No acute intracranial abnormality by head CT.  2. Distal carotid and vertebral artery atherosclerotic disease, much more than expected for patient age.    Electronically Signed: Eric Fenton MD    6/17/2024 10:29 AM EDT    Workstation ID: [id number]    XR Chest 1 View [[id number]] Collected:  06/17/24 1000     Updated: 06/17/24 1003    Narrative:      XR CHEST 1 VW    Date of Exam: 6/17/2024 9:48 AM EDT    Indication: altered mental status    Comparison: Chest radiograph 2/19/2024    Findings:  Cardiomegaly stable from prior. Negative for lobar consolidation, edema, effusion or pneumothorax. Prior sternotomy. Sternal fixation hardware noted. Degenerative related osseous changes. Radiographic appearance of the chest stable from prior.      Impression:      Impression:  No active pulmonary process. Stable appearance of the chest since prior comparison.      Electronically Signed: Eric Fenton MD    6/17/2024 10:01 AM EDT    Workstation ID: [id number]            LAB RESULTS (LAST 7 DAYS)    CBC  Results from last 7 days   Lab Units 06/18/24  0605 06/17/24  0928   WBC 10*3/mm3 9.69 14.62*   RBC 10*6/mm3 4.10 4.60   HEMOGLOBIN g/dL 12.2 13.7   HEMATOCRIT % 36.9 41.1   MCV fL 90.0 89.3   PLATELETS 10*3/mm3 220 281       BMP  Results from last 7 days   Lab Units 06/18/24  0605 06/17/24  1820 06/17/24  0928   SODIUM mmol/L 134*  --  128*   POTASSIUM mmol/L 3.4* 3.1* 3.2*   CHLORIDE mmol/L 103  --  92*   CO2 mmol/L 23.1  --  23.6   BUN mg/dL 10  --  9   CREATININE mg/dL 1.25*  --  1.29*   GLUCOSE mg/dL 143*  --  589*       CMP   Results from last 7 days   Lab Units 06/18/24  0605 06/17/24  1820 06/17/24  0928   SODIUM mmol/L 134*  --  128*   POTASSIUM mmol/L 3.4* 3.1* 3.2*   CHLORIDE mmol/L 103  --  92*   CO2 mmol/L 23.1  --  23.6   BUN mg/dL 10  --  9   CREATININE mg/dL 1.25*  --  1.29*   GLUCOSE mg/dL 143*  --  589*   ALBUMIN g/dL 2.9*  --  3.4*   BILIRUBIN mg/dL 0.2  --  0.4   ALK PHOS U/L 96  --  134*   AST (SGOT) U/L 10  --  11   ALT (SGPT) U/L <5  --  5       BNP        TROPONIN  Results from last 7 days   Lab Units 06/17/24  1143   HSTROP T ng/L 48*       CoAg  Results from last 7 days   Lab Units 06/17/24  0928   INR  0.93   APTT seconds 26.2*       Creatinine Clearance  Estimated Creatinine Clearance:  56.5 mL/min (A) (by C-G formula based on SCr of 1.25 mg/dL (H)).    ABG        Radiology  CT CEREBRAL PERFUSION WITH & WITHOUT CONTRAST    Result Date: 6/17/2024  Impression: No significant perfusion abnormality in the brain. Electronically Signed: Fritz Arroyo MD  6/17/2024 11:22 PM EDT  Workstation ID: [id number]    CT Angiogram Carotids    Result Date: 6/17/2024  Impression: No evidence of acute vessel occlusion, aneurysm or dissection. Extensive atherosclerotic disease with multifocal areas of stenoses as characterized above, most significantly including moderate, approximately 70%, stenosis at the origin of the right cervical internal carotid artery and moderate to severe stenosis at the origin of the superior segment of the right M2 segment MCA. Findings suggestive of pulmonary arterial hypertension. Scattered small pulmonary nodules most likely represents calcified granulomas. The largest of these measures 0.6 cm Electronically Signed: Zacarias Canales DO  6/17/2024 11:13 PM EDT  Workstation ID: [id number]    CT Angiogram Head w AI Analysis of LVO    Result Date: 6/17/2024  Impression: No evidence of acute vessel occlusion, aneurysm or dissection. Extensive atherosclerotic disease with multifocal areas of stenoses as characterized above, most significantly including moderate, approximately 70%, stenosis at the origin of the right cervical internal carotid artery and moderate to severe stenosis at the origin of the superior segment of the right M2 segment MCA. Findings suggestive of pulmonary arterial hypertension. Scattered small pulmonary nodules most likely represents calcified granulomas. The largest of these measures 0.6 cm Electronically Signed: Zacarias Canales DO  6/17/2024 11:13 PM EDT  Workstation ID: [id number]    CT Head Without Contrast    Result Date: 6/17/2024  Impression: No acute intracranial abnormality. Previously noted thin linear infarct in the left parieto-occipital region is not clearly  evident on CT. Electronically Signed: Zacarias Canales, DO  6/17/2024 10:48 PM EDT  Workstation ID: [id number]    MRI Brain Without Contrast    Result Date: 6/17/2024  1.1 cm linear acute infarct within the left parieto-occipital region (series 5, image 54). 2.Additional findings compatible with chronic microvascular ischemic change. Electronically Signed: Clarence Talley MD  6/17/2024 3:12 PM EDT  Workstation ID: [id number]    CT Head Without Contrast    Result Date: 6/17/2024  1. No acute intracranial abnormality by head CT. 2. Distal carotid and vertebral artery atherosclerotic disease, much more than expected for patient age. Electronically Signed: Eric Fenton MD  6/17/2024 10:29 AM EDT  Workstation ID: [id number]    XR Chest 1 View    Result Date: 6/17/2024  Impression: No active pulmonary process. Stable appearance of the chest since prior comparison. Electronically Signed: Eric Fenton MD  6/17/2024 10:01 AM EDT  Workstation ID: [id number]       EKG  I personally viewed and interpreted the patient's EKG/Telemetry data:  ECG 12 Lead Altered Mental Status   Final Result   HEART RATE= 54  bpm   RR Interval= 1100  ms   NH Interval= 139  ms   P Horizontal Axis= -7  deg   P Front Axis= 50  deg   QRSD Interval= 98  ms   QT Interval= 489  ms   QTcB= 466  ms   QRS Axis= 46  deg   T Wave Axis= 169  deg   - ABNORMAL ECG -   Sinus bradycardia   Probable left atrial enlargement   LVH with secondary repolarization abnormality   Probable anterior infarct, age indeterminate   When compared with ECG of 20-Feb-2024 6:33:12,   No significant change   Electronically Signed By: Yunior Reynolds ([facility]) 18-Jun-2024 06:17:07   Date and Time of Study: 2024-06-17 09:23:02      Telemetry Scan   Final Result      Telemetry Scan   Final Result      Telemetry Scan   Final Result      Telemetry Scan   Final Result      Telemetry Scan   Final Result      Telemetry Scan   Final Result            Echocardiogram:    Results for orders placed during  the hospital encounter of 02/19/24    Adult Transthoracic Echo Complete W/ Cont if Necessary Per Protocol    Interpretation Summary    Left ventricular systolic function is normal. Calculated left ventricular EF = 69% Left ventricular ejection fraction appears to be 61 - 65%.    Left ventricular diastolic function was normal.  GLS -16%.    Left atrial volume is moderately increased.    Estimated right ventricular systolic pressure from tricuspid regurgitation is normal (<35 mmHg).    No significant valvular abnormalities noted.        Stress Test:  Results for orders placed during the hospital encounter of 02/19/24    Stress Test With Myocardial Perfusion One Day    Interpretation Summary    Findings consistent with a normal ECG stress test.    (Calculated EF = 63%).    Myocardial perfusion imaging indicates a moderate-sized, severe area of ischemia located in the inferior wall and lateral wall.    Impressions are consistent with an intermediate risk study.         Cardiac Catheterization:  Results for orders placed during the hospital encounter of 02/19/24    Cardiac Catheterization/Vascular Study    Conclusion  OPERATORS  Jena Barron M.D. (Attending Cardiologist)      PROCEDURES PERFORMED  Ultrasound guided Vascular access  Left Heart Catheterization  Coronary Angiogram  Bypass angiography  Moderate sedation    INDICATIONS FOR PROCEDURE  Positive stress test    PROCEDURE IN DETAIL  Informed consent was obtained from the patient after explaining the risks, benefits, and alternative options of the procedure. After obtaining informed consent, the patient was brought to the cath lab and was prepped in a sterile fashion. Lidocaine 2% was used for local anesthesia into the right femoral access site. The right femoral artery was accessed with a micropuncture needle via modified Seldinger technique under ultrasound guidance. A 6F sheath was inserted successfully. Afterwards, 6F JR4 and JL4 diagnostic catheters were  advanced over a wire into the ascending aorta and were used to engage the ostia of the left main and RCA respectively. JR4 used to cross the AV and obtain LV pressures and gradient across the AV measured via pullback technique. Images of the right and left coronary systems were obtained. Images of the vein grafts and selective LIMA angiography was performed. All the catheters were exchanged over a wire and subsequently removed. Angiogram of the femoral access site was obtained and did not show complications. The patient tolerated the procedure well without any complications. The pictures were reviewed at the end of the procedure. An angioseal closure device was applied.    HEMODYNAMICS    LV: 157/5/11  AO: 157/64/99  Gradient: None    FINDINGS  Coronary Angiogram    Right dominant circulation    Left main: Left main is a large caliber vessel which gives rise to the Left Anterior Descending and the Left circumflex. No angiographically significant stenosis    Left Anterior Descending Artery: The LAD is  at the ostium    Left Circumflex: Lcx is a small caliber vessel which courses in the AV groove and gives rise to OM branches.  There is a high OM1 which further bifurcates.  There is a long 60 to 70% lesion in the proximal segment of this OM.  The circumflex is diffusely diseased with 60 to 70% stenosis in the proximal and mid segment all the way into OM 3.  There is an 80% lesion in the midsegment.    Right Coronary Artery: The RCA is  at the ostium    Bypass angiography  SVG to PDA is patent at the origin, body, and insertion  SVG to diagonal is occluded at the origin  LIMA to LAD is widely patent at the origin, body, and insertion.  The LAD distal to the touchdown of the LIMA has 70 to 80% stenosis and is very small caliber.  LIMA does retrogradely fill a diagonal branch also.      Femoral angiography  The right SFA has a stent in its body with severe 80 to 90% ISR.    ESTIMATED BLOOD LOSS:  10  ml    COMPLICATIONS:  None    PROCEDURE DATA:  Contrast Used: 100 cc  Sedation Time: 30 minutes    IMPRESSIONS  Multivessel obstructive CAD of the native coronary arteries  Occluded vein graft to what appears to be a diagonal  There is diffuse CAD involving the left circumflex and OM which is not bypassed and there is also obstructive CAD distal to the LIMA touchdown  This is stable coronary artery disease  Low left heart filling pressures    RECOMMENDATIONS  Recommend medical management for her diffuse CAD in the setting of severely uncontrolled diabetes and smoking  Uptitration of OMT for stable CAD  Patient needs to be aggressively treated for her uncontrolled diabetes  Counseled extensively on smoking cessation  Start Plavix  Blood pressure control  Aggressive cholesterol control with statin with a goal LDL of less than 70         Other:         ASSESSMENT & PLAN:    Principal Problem:    Altered mental status    Altered mental state  CT head with no acute intracranial abnormality.  Atherosclerosis in distal carotid and vertebral artery noted.  MRI shows 1.1 cm linear acute infarct within the left parieto-occipital region.  CTA shows extensive atherosclerotic disease with multifocal areas of stenoses as characterized above, most significantly including moderate, approximately 70%, stenosis at the origin of the right cervical internal carotid artery and moderate to severe stenosis at   the origin of the superior segment of the right M2 segment MCA.   Neurology consultation  Started on dual antiplatelet therapy, high intensity statin     Elevated troponin  Known coronary disease with history of CABG  Minimally elevated high-sensitivity troponin which is trending down  Troponin elevation secondary to underlying stable coronary disease, uncontrolled hypertension, chronic kidney disease and UTI  Echocardiogram shows preserved LV function.  Normal diastolic function and no significant valvular abnormalities.  Recent  Cardiac catheterization showed significant coronary artery disease.  RCA , left circumflex with 80 to 90% stenosis in the proximal segment and mid segment.  Ramus with proximal 70 to 80% stenosis.  LAD is totally occluded  LIMA to LAD is patent however distal/apical LAD has 80% stenosis  Vein graft to left circumflex is occluded, vein graft to RCA is patent  She has significant burden of coronary disease which is not acute  Continue dual antiplatelet therapy, high intensity statin.  Recommended to focus on risk factors modification.  May have to be considered for redo CABG in future.     Hypertensive emergency  Increase hydralazine  And nifedipine  Discontinue metoprolol due to bradycardia  Unable to give ACE or ARB because of chronic kidney disease     Diabetes  Uncontrolled diabetes with A1c of more than 16.9  Treatment with insulin per Glucomander  Emphasized compliance with medications.  She will need to see endocrine during inpatient stay.     Chronic kidney disease  Creatinine is 1.25, GFR is 53.9  Appears to be at baseline renal function  Followed by nephrology     Hyperlipidemia  She has uncontrolled hypertriglyceridemia with triglycerides of >4000.  Repeat triglycerides 1117.  Start high intensity statin  High risk for pancreatitis  She will also need Vascepa.      Cristian Su MD  06/18/24  07:07 EDT

## 2024-06-18 NOTE — PROGRESS NOTES
LOS: 1 day     Chief Complaint: Transient confusion episode       SUBJECTIVE:    History taken from: patient chart RN my detailed discussions with Dr. Fine yesterday    Interval History: Bibiana Inman was admitted on 6/17/2024   I saw her yesterday and she had full workup done for different conditions    We did find an incidental, asymptomatic stroke  Her EEG was unremarkable    My working diagnosis of her was hypertensive encephalopathy, UTI and significant hyperglycemia    She states that she takes her medication but we confirmed that she may not be taking them as prescribed, especially they were only a few doses of insulin that she acquired since March    Her P2Y12 is okay    Events noted from last night    She had significant change and then give her NIH of 15+  She had aphasia and weakness on one side    I suspect she had hypotension causing hypoperfusion    CT was okay  CTP was okay    We did CTA and it showed right-sided disease which would not correspond to her symptoms and we already have vascular seeing her and I have requested an MRI because of if the MRI is positive, this may be symptomatic disease that may need to be addressed    TIA is less likely but always a possibility considering all her risk factors    When I saw her she was doing great, her nurse gave her an eyedrop 1 earlier but when I saw her it was essentially 0    She is on DAPT    - Portions of the above HPI were copied from previous encounters and edited as appropriate.    Patient Complaints: Essentially none      Review of Systems   No fever chills rigors or sweats  No weight issues  No sleep problems  HEENT:  No speech problem, vision changes, facial asymmetry or pain, or neck problem  Chest: No chest pain, clubbing, cyanosis, orthopnea palpitations  Pulmonary:  No shortness of air, cough or expectoration  Abdomen:  No swelling/tension, constipation,diarrhea or pain  No genitourinary symptoms  Extremity problems as discussed  No  back problem  No psychotic issues  Neurologic issues as discussed  No hematologic, dermatologic or endocrine problems, she is diabetic        Pertinent PMH:  has a past medical history of 5,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), Allergic rhinitis (2012), Benign essential hypertension (2016), Coronary arteriosclerosis (2014), Depressive disorder (2023), Diabetic gastroparesis (2021), Diabetic peripheral neuropathy associated with type 2 diabetes mellitus (2024), Gastroesophageal reflux disease (03/10/2021), GERD (gastroesophageal reflux disease), Intermittent claudication (2017), Iron deficiency anemia (2020), Mixed hypercholesterolemia and hypertriglyceridemia (2014), Myocardial infarction, Obesity, Obstructive sleep apnea (2021), Personal history of COVID-19 (2022), Polyp of colon (2023), Spontaneous , Stress fracture of right ankle with routine healing, Tobacco abuse (2024), Type 2 diabetes mellitus with hyperglycemia, with long-term current use of insulin (2017), and Vitamin D deficiency (2024).   ________________________________________________     OBJECTIVE:  Patient is resting comfortably in bed in no acute distress    Neurologic Exam      The patient is awake and alert and oriented x3  Normal speech  Cranial nerve examination demonstrate:  Full fields of vision to confrontation  Pupils are round, reactive to light and accommodation and size of about 3 mm  No ptosis or nystagmus  Funduscopic examination was not successful  Eye movements are conjugate     Sensation on the face and scalp are normal  Muscles of mastication are normal and symmetric  Muscles of  facial expression are normal and symmetric  Hearing is intact bilaterally  Head turning and shoulder shrugs were unremarkable  Tongue was midline  I could not visualize  oropharynx or uvula     Motor examination:  Normal bulk, tone and  strength was 5/5  No fasciculations     Sensory examination:  Intact for soft touch, pain   Romberg was not evaluated     Reflexes:  0/4     Coordination:  Normal finger-to-nose to finger, rapid alternating movements and toe to finger     Gait:  Deferred     Toe signs:  Mute      NIH stroke scale  NIHSS:    Level Of Consciousness 0  0=Alert; keenly responsive 1=Not alert, but arousable by minor stimulation 2=Not alert, requires repeated stimulation 3=Responds only with reflex movements    LOC Questions to Month and age 0  0=Answers both questions correctly 1=Answers one question correctly 2=Answers neither question correctly    LOC Commands      -Open/Close eyes 0    -Open/close  0   0=Performs both tasks correctly 1=Performs one task correctly 2=Performs neighter task correctly     Best Gaze 0  0=Normal 1=Partial gaze palsy 2=Forced deviation, or total gaze paresis    Visual 0  0=No visual loss 1=Partial hemianopia 2=Complete hemianopia 3=Bilateral hemianopia (blind including cortical blindness)    Facial Palsy 0  0=Normal symmetrical movement 1=Minor paralysis (asymmetry) 2=Partial paralysis (lower facde) 3=Complete paralysis (upper and lower face)    Motor: Left Arm-0  Left leg-0; Right Arm-0 Right Leg-0  0=No drift, limb holds posture for full 10 seconds 1=Drift, limb holds posture, no drift to bed 2=Some antigravity effort, cannot maintain posture, drifts to bed 3=No effort against gravity, limb falls 4=No movement    Limb Ataxia 0  0=Absent 1=Present in one limb 2=Present in two limbs    Sensory 0   0=Normal 1=Mild to moderate sensory loss 2=Severe to total sensory loss    Best Language 0   0=No aphasia, normal 1=Mild to moderate aphasia 2=Severe Aphasia (very few words correct or understood)3=Multe, global aphasia    Dysarthria 0  0=Normal 1=Mild to moderate 2=Severe, unintelligible or mute/anarthric    Extinction/Neglect 0   0=No abnormality 1=Extinction to bilateral simultaneous stimulation 2=Profound  neglect    Total  __0      ________________________________________________   RESULTS REVIEW    VITAL SIGNS:  Temp:  [97.4 °F (36.3 °C)-98.6 °F (37 °C)] 98.6 °F (37 °C)  Heart Rate:  [59-87] 87  Resp:  [12-27] 12  BP: (103-206)/(52-99) 155/79    LABS:       Lab 06/18/24  0605 06/17/24 0928   WBC 9.69 14.62*   HEMOGLOBIN 12.2 13.7   HEMATOCRIT 36.9 41.1   PLATELETS 220 281   NEUTROS ABS 6.64 12.53*   IMMATURE GRANS (ABS) 0.03 0.07*   LYMPHS ABS 2.17 1.29   MONOS ABS 0.65 0.59   EOS ABS 0.15 0.09   MCV 90.0 89.3   PROTIME  --  10.2   APTT  --  26.2*         Lab 06/18/24  0605 06/17/24  1820 06/17/24  0928   SODIUM 134*  --  128*   POTASSIUM 3.4* 3.1* 3.2*   CHLORIDE 103  --  92*   CO2 23.1  --  23.6   ANION GAP 7.9  --  12.4   BUN 10  --  9   CREATININE 1.25*  --  1.29*   EGFR 53.9*  --  51.9*   GLUCOSE 143*  --  589*   CALCIUM 8.8  --  9.1   HEMOGLOBIN A1C  --   --  16.90*   TSH 1.470 2.230  --          Lab 06/18/24  0605 06/17/24  0928   TOTAL PROTEIN 5.8* 7.0   ALBUMIN 2.9* 3.4*   GLOBULIN 2.9 3.6   ALT (SGPT) <5 5   AST (SGOT) 10 11   BILIRUBIN 0.2 0.4   ALK PHOS 96 134*         Lab 06/17/24  1143 06/17/24  0928   HSTROP T 48* 61*  63*   PROTIME  --  10.2   INR  --  0.93         Lab 06/18/24  0605 06/17/24  0928   CHOLESTEROL 320* 381*   LDL CHOL  --  116*   HDL CHOL 28* 30*   TRIGLYCERIDES 1,117* >4,425*         Lab 06/17/24  1820   FOLATE 14.70   VITAMIN B 12 1,489*         UA          7/20/2023    11:15 2/20/2024    18:12 6/17/2024    09:40   Urinalysis   Squamous Epithelial Cells, UA 0-2  0-2  13-20    Specific Gravity, UA 1.023  1.021  1.038    Ketones, UA Negative  Negative  Negative    Blood, UA Negative  Negative  Negative    Leukocytes, UA Negative  Negative  Negative    Nitrite, UA Negative  Negative  Negative    RBC, UA 0-2  0-2  11-20    WBC, UA 0-2  0-2  6-10    Bacteria, UA None Seen  Trace  2+        Lab Results   Component Value Date    TSH 1.470 06/18/2024    LDL  06/18/2024      Comment:       Unable to calculate    HGBA1C 16.90 (H) 06/17/2024    [id number] 1,489 (H) 06/17/2024         IMAGING STUDIES:  CT CEREBRAL PERFUSION WITH & WITHOUT CONTRAST    Result Date: 6/17/2024  Impression: No significant perfusion abnormality in the brain. Electronically Signed: Fritz Arroyo MD  6/17/2024 11:22 PM EDT  Workstation ID: [id number]    CT Angiogram Carotids    Result Date: 6/17/2024  Impression: No evidence of acute vessel occlusion, aneurysm or dissection. Extensive atherosclerotic disease with multifocal areas of stenoses as characterized above, most significantly including moderate, approximately 70%, stenosis at the origin of the right cervical internal carotid artery and moderate to severe stenosis at the origin of the superior segment of the right M2 segment MCA. Findings suggestive of pulmonary arterial hypertension. Scattered small pulmonary nodules most likely represents calcified granulomas. The largest of these measures 0.6 cm Electronically Signed: Zacarias Canales DO  6/17/2024 11:13 PM EDT  Workstation ID: [id number]    CT Angiogram Head w AI Analysis of LVO    Result Date: 6/17/2024  Impression: No evidence of acute vessel occlusion, aneurysm or dissection. Extensive atherosclerotic disease with multifocal areas of stenoses as characterized above, most significantly including moderate, approximately 70%, stenosis at the origin of the right cervical internal carotid artery and moderate to severe stenosis at the origin of the superior segment of the right M2 segment MCA. Findings suggestive of pulmonary arterial hypertension. Scattered small pulmonary nodules most likely represents calcified granulomas. The largest of these measures 0.6 cm Electronically Signed: Zacarias Canales DO  6/17/2024 11:13 PM EDT  Workstation ID: [id number]    CT Head Without Contrast    Result Date: 6/17/2024  Impression: No acute intracranial abnormality. Previously noted thin linear infarct in the left parieto-occipital  region is not clearly evident on CT. Electronically Signed: Zacarias Canales, DO  6/17/2024 10:48 PM EDT  Workstation ID: [id number]    MRI Brain Without Contrast    Result Date: 6/17/2024  1.1 cm linear acute infarct within the left parieto-occipital region (series 5, image 54). 2.Additional findings compatible with chronic microvascular ischemic change. Electronically Signed: Clarence Talley MD  6/17/2024 3:12 PM EDT  Workstation ID: [id number]    CT Head Without Contrast    Result Date: 6/17/2024  1. No acute intracranial abnormality by head CT. 2. Distal carotid and vertebral artery atherosclerotic disease, much more than expected for patient age. Electronically Signed: Eric Fenton MD  6/17/2024 10:29 AM EDT  Workstation ID: [id number]    XR Chest 1 View    Result Date: 6/17/2024  Impression: No active pulmonary process. Stable appearance of the chest since prior comparison. Electronically Signed: Eric Fenton MD  6/17/2024 10:01 AM EDT  Workstation ID: [id number]     I reviewed the patient's new clinical results.    ________________________________________________      PROBLEM LIST:    Altered mental status    Benign essential hypertension    Type 2 diabetes mellitus with hyperglycemia, with long-term current use of insulin    Tobacco abuse    Carotid stenosis, asymptomatic, right        ASSESSMENT/PLAN:  Transient confusion  Could be hypoperfusion related    Multifactorial toxic and metabolic encephalopathy    Carotid disease we will check MRI brain and vascular is already seeing the patient and we will wait for the final recommendations        I am still concerned about noncompliance and that is being looked into    Obviously she was not a TNK candidate because she already had a stroke established and she did not look like LVO and CTP was okay and once she was given fluids she started improving so I suspect that this was hypoperfusion and thus we have to make sure that we have permissive hypertension for  her    Modification of stroke risk factors:   - Blood pressure should be less than 130/80 outpatient, HbA1c less than 6.5, LDL less than 70; b12>500 and smoking cessation if applicable. We would be grateful if the primary team / primary care physician keep a close watch on this above targets.  - Stroke education  - Follow up with neurologist of choice      **Please refer to previous notes for further details and recommendations.     I discussed the patient's findings and my recommendations with patient, family, nursing staff, and primary care team    Krish Reddy MD  06/18/24  13:13 EDT

## 2024-06-18 NOTE — PROGRESS NOTES
[facility] MEDICINE SERVICE  DAILY PROGRESS NOTE    NAME: Bibiana Inman  : 1980  MRN: [id number]      LOS: 1 day     PROVIDER OF SERVICE: Timothy Duane Brammell, MD    Chief Complaint: Altered mental status    Subjective:     Interval History:  History taken from: patient  Patient Complaints: Patient without further episodes or symptomatology.  She denies any headache or any chest pain or shortness of breath.  Denies any nausea or bowel issues.  Denies any paresthesias.  Denies any other additional acute issues.      Review of Systems:   Review of Systems   All other systems reviewed and are negative.      Objective:     Vital Signs  Temp:  [97.4 °F (36.3 °C)-99.3 °F (37.4 °C)] 99.3 °F (37.4 °C)  Heart Rate:  [59-87] 87  Resp:  [12-27] 12  BP: (103-206)/(52-99) 191/89   Body mass index is 24.08 kg/m².    Physical Exam  Physical Exam  Vitals reviewed.   Constitutional:       Appearance: Normal appearance. She is obese.   HENT:      Head: Normocephalic.   Cardiovascular:      Rate and Rhythm: Normal rate and regular rhythm.   Pulmonary:      Effort: Pulmonary effort is normal.      Breath sounds: Normal breath sounds.   Abdominal:      Palpations: Abdomen is soft.      Tenderness: There is no abdominal tenderness.   Musculoskeletal:         General: No swelling.   Neurological:      Mental Status: She is alert. Mental status is at baseline.         Scheduled Meds   aspirin, 81 mg, Oral, Daily  atorvastatin, 80 mg, Oral, Nightly  cefTRIAXone, 1,000 mg, Intravenous, Q24H  clopidogrel, 75 mg, Oral, Daily  enoxaparin, 40 mg, Subcutaneous, Q24H  escitalopram, 10 mg, Oral, Daily  famotidine, 20 mg, Oral, BID AC  folic acid, 1 mg, Oral, Daily  hydrALAZINE, 50 mg, Oral, Q8H  insulin glargine, 30 Units, Subcutaneous, Daily  insulin lispro, 15 Units, Subcutaneous, TID With Meals  insulin lispro, 2-9 Units, Subcutaneous, 4x Daily AC & at Bedtime  NIFEdipine XL, 60 mg, Oral, Q24H  pantoprazole, 40 mg, Oral,  QAM  potassium chloride ER, 40 mEq, Oral, Q4H  sodium chloride, 10 mL, Intravenous, Q12H       PRN Meds     acetaminophen    albuterol sulfate HFA    senna-docusate sodium **AND** polyethylene glycol **AND** bisacodyl **AND** bisacodyl    Calcium Replacement - Follow Nurse / BPA Driven Protocol    dextrose    dextrose    glucagon (human recombinant)    Magnesium Standard Dose Replacement - Follow Nurse / BPA Driven Protocol    ondansetron ODT    Pharmacy to Dose enoxaparin (LOVENOX)    Phosphorus Replacement - Follow Nurse / BPA Driven Protocol    Potassium Replacement - Follow Nurse / BPA Driven Protocol    [COMPLETED] Insert Peripheral IV **AND** sodium chloride    sodium chloride    sodium chloride   Infusions  Pharmacy to Dose enoxaparin (LOVENOX),   sodium chloride, 125 mL/hr, Last Rate: 125 mL/hr (06/18/24 0456)          Diagnostic Data    Results from last 7 days   Lab Units 06/18/24  0605   WBC 10*3/mm3 9.69   HEMOGLOBIN g/dL 12.2   HEMATOCRIT % 36.9   PLATELETS 10*3/mm3 220   GLUCOSE mg/dL 143*   CREATININE mg/dL 1.25*   BUN mg/dL 10   SODIUM mmol/L 134*   POTASSIUM mmol/L 3.4*   AST (SGOT) U/L 10   ALT (SGPT) U/L <5   ALK PHOS U/L 96   BILIRUBIN mg/dL 0.2   ANION GAP mmol/L 7.9       CT CEREBRAL PERFUSION WITH & WITHOUT CONTRAST    Result Date: 6/17/2024  Impression: No significant perfusion abnormality in the brain. Electronically Signed: Fritz Arroyo MD  6/17/2024 11:22 PM EDT  Workstation ID: [id number]    CT Angiogram Carotids    Result Date: 6/17/2024  Impression: No evidence of acute vessel occlusion, aneurysm or dissection. Extensive atherosclerotic disease with multifocal areas of stenoses as characterized above, most significantly including moderate, approximately 70%, stenosis at the origin of the right cervical internal carotid artery and moderate to severe stenosis at the origin of the superior segment of the right M2 segment MCA. Findings suggestive of pulmonary arterial hypertension.  Scattered small pulmonary nodules most likely represents calcified granulomas. The largest of these measures 0.6 cm Electronically Signed: Zacarias DO Parker  6/17/2024 11:13 PM EDT  Workstation ID: [id number]    CT Angiogram Head w AI Analysis of LVO    Result Date: 6/17/2024  Impression: No evidence of acute vessel occlusion, aneurysm or dissection. Extensive atherosclerotic disease with multifocal areas of stenoses as characterized above, most significantly including moderate, approximately 70%, stenosis at the origin of the right cervical internal carotid artery and moderate to severe stenosis at the origin of the superior segment of the right M2 segment MCA. Findings suggestive of pulmonary arterial hypertension. Scattered small pulmonary nodules most likely represents calcified granulomas. The largest of these measures 0.6 cm Electronically Signed: Zacarias Canales DO  6/17/2024 11:13 PM EDT  Workstation ID: [id number]    CT Head Without Contrast    Result Date: 6/17/2024  Impression: No acute intracranial abnormality. Previously noted thin linear infarct in the left parieto-occipital region is not clearly evident on CT. Electronically Signed: Zacarias Canales DO  6/17/2024 10:48 PM EDT  Workstation ID: [id number]    MRI Brain Without Contrast    Result Date: 6/17/2024  1.1 cm linear acute infarct within the left parieto-occipital region (series 5, image 54). 2.Additional findings compatible with chronic microvascular ischemic change. Electronically Signed: Clarence Talley MD  6/17/2024 3:12 PM EDT  Workstation ID: [id number]    CT Head Without Contrast    Result Date: 6/17/2024  1. No acute intracranial abnormality by head CT. 2. Distal carotid and vertebral artery atherosclerotic disease, much more than expected for patient age. Electronically Signed: Eric Fenton MD  6/17/2024 10:29 AM EDT  Workstation ID: [id number]    XR Chest 1 View    Result Date: 6/17/2024  Impression: No active pulmonary process.  Stable appearance of the chest since prior comparison. Electronically Signed: Eric Fenton MD  6/17/2024 10:01 AM EDT  Workstation ID: [id number]           Assessment:    Transient confusion/aphasia/right-sided weakness  Suspected medical noncompliance  Hypertension  Type 2 diabetes insulin requiring  Tobacco abuse  Carotid atherosclerosis and stenosis  Coronary artery disease status post coronary artery bypass grafting in the past  CKD 3       Plan: Noted difficulty with her abnormal imaging and her physical exam that does not match in a neurologic way.  Noted concern over her compliance in the past based on her prescribing history by pharmacy evaluation.  Neurology wishes for patient to remain hospitalized overnight with repeat imaging and consideration as to her vascular stenosis.      Active and Resolved Problems  Active Hospital Problems    Diagnosis  POA    **Altered mental status [R41.82]  Yes    Carotid stenosis, asymptomatic, right [I65.21]  Yes    Tobacco abuse [Z72.0]  Yes    Type 2 diabetes mellitus with hyperglycemia, with long-term current use of insulin [E11.65, Z79.4]  Not Applicable    Benign essential hypertension [I10]  Yes      Resolved Hospital Problems   No resolved problems to display.           VTE Prophylaxis:  Pharmacologic VTE prophylaxis orders are present.         Code status is   Code Status and Medical Interventions:   Ordered at: 06/17/24 1117     Level Of Support Discussed With:    Patient     Code Status (Patient has no pulse and is not breathing):    CPR (Attempt to Resuscitate)     Medical Interventions (Patient has pulse or is breathing):    Full Support       Plan for disposition:home in 2 days    Time: 30 minutes    Signature: Electronically signed by Timothy Duane Brammell, MD, 06/18/24, 16:59 EDT.  [facility] Hospitalist Team

## 2024-06-18 NOTE — PLAN OF CARE
Fair shift spent, medicated as ordered, CODE stroke was called on patient due to onset of stroke symptoms from NIH 0-15, confusion, AMS. Patient was assessed by team, Neuro Doc consulted who stated to continue present management as patient was seen before by neuro and if CTA shows something new to re contact. Same test done and normal. Dr. Fine stated to keep SBP <= to 220 to ensure profusion as patients baseline BP is always in that range. Patient was observed throughout the rest of the night, and symptoms gradually improved, fluid regime maintained, BS monitored, blood taken for investigation, left stable, care and observation continues    Goal Outcome Evaluation:    Problem: Adult Inpatient Plan of Care  Goal: Readiness for Transition of Care  Outcome: Ongoing, Progressing  Intervention: Mutually Develop Transition Plan  Recent Flowsheet Documentation  Taken 6/17/2024 1948 by Nishant Lund RN  Equipment Currently Used at Home:   bp cuff   glucometer  Taken 6/17/2024 1947 by Nishant Lund, RN  Transportation Concerns: none     Problem: Cognitive Impairment (Stroke, Ischemic/Transient Ischemic Attack)  Goal: Optimal Cognitive Function  Outcome: Ongoing, Progressing     Problem: Communication Impairment (Stroke, Ischemic/Transient Ischemic Attack)  Goal: Improved Communication Skills  Outcome: Ongoing, Progressing  Intervention: Optimize Communication Skills  Recent Flowsheet Documentation  Taken 6/18/2024 0200 by Nishant Lund, RN  Communication Enhancement Strategies: repetition utilized  Taken 6/18/2024 0000 by Nishant Lund, RN  Communication Enhancement Strategies: verbal communication attempts encouraged  Taken 6/17/2024 1946 by Nishant Lund, RN  Communication Enhancement Strategies:   call light answered in person   communication board used     Problem: Functional Ability Impaired (Stroke, Ischemic/Transient Ischemic Attack)  Goal: Optimal Functional Ability  Outcome: Ongoing,  Progressing  Intervention: Optimize Functional Ability  Recent Flowsheet Documentation  Taken 6/17/2024 1946 by Nishant Lund, RN  Activity Management: up ad makenzie     Problem: Hyperglycemia  Goal: Blood Glucose Level Within Targeted Range  Outcome: Ongoing, Progressing  Intervention: Optimize Glycemic Control  Recent Flowsheet Documentation  Taken 6/17/2024 2038 by Nishant Lund, RN  Glycemic Management: blood glucose monitored  Taken 6/17/2024 1946 by Nishant Lund, RN  Glycemic Management: blood glucose monitored     Problem: Hypertension Comorbidity  Goal: Blood Pressure in Desired Range  Outcome: Ongoing, Progressing  Intervention: Maintain Blood Pressure Management  Recent Flowsheet Documentation  Taken 6/18/2024 0400 by Nishant Lund, RN  Medication Review/Management: medications reviewed  Taken 6/18/2024 0200 by Nishant Lund, RN  Medication Review/Management: medications reviewed  Taken 6/18/2024 0000 by Nishant Lund, RN  Medication Review/Management: medications reviewed  Taken 6/17/2024 1946 by Nishant Lund, RN  Medication Review/Management: medications reviewed

## 2024-06-18 NOTE — THERAPY EVALUATION
Acute Care - Speech Language Pathology   Swallow Initial Evaluation  [facility]     Patient Name: Bibiana Inman  : 1980  MRN: [id number]  Today's Date: 2024               Admit Date: 2024    Visit Dx:     ICD-10-CM ICD-9-CM   1. Altered mental status, unspecified altered mental status type  R41.82 780.97   2. Hyperglycemia  R73.9 790.29   3. Hypokalemia  E87.6 276.8   4. 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    Abnormal bone density screening    Benign essential hypertension    Chronic cough    Diabetic gastroparesis    Elevated erythrocyte sedimentation rate    Fatigue    Gastroesophageal reflux disease    Mixed hypercholesterolemia and hypertriglyceridemia    Intermittent claudication    Iron deficiency anemia    Multiple joint pain    Need for influenza vaccination    Type 2 diabetes mellitus with hyperglycemia, with long-term current use of insulin    Obstructive sleep apnea    Coronary artery disease involving native coronary artery of native heart with unstable angina pectoris    Abnormal nuclear stress test    Depressive disorder    Back pain    Diabetic peripheral neuropathy associated with type 2 diabetes mellitus    Ingrowing nail, left great toe    Personal history of COVID-19    Polyp of colon    Vitamin D deficiency    Tobacco abuse    Unstable angina    Altered mental status    Carotid stenosis, asymptomatic, right     Past Medical History:   Diagnosis Date    5,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 Debbie Fry.    Depressive disorder 2023    Diabetic gastroparesis 2021    Diabetic peripheral neuropathy associated with type 2  diabetes mellitus 2024    Gastroesophageal reflux disease 03/10/2021    GERD (gastroesophageal reflux disease)     Intermittent claudication 2017    Iron deficiency anemia 2020    Mixed hypercholesterolemia and hypertriglyceridemia 2014    Myocardial infarction     Obesity     Obstructive sleep apnea 2021    Personal history of COVID-19 2022    Polyp of colon 2023    Spontaneous      Stress fracture of right ankle with routine healing     Tobacco abuse 2024    Type 2 diabetes mellitus with hyperglycemia, with long-term current use of insulin 2017    Vitamin D deficiency 2024     Past Surgical History:   Procedure Laterality Date    CARDIAC CATHETERIZATION N/A 2024    Procedure: Left Heart Cath and coronary angiogram;  Surgeon: Jena Barron MD;  Location: [facility] CATH INVASIVE LOCATION;  Service: Cardiology;  Laterality: N/A;     SECTION      CORONARY ARTERY BYPASS GRAFT  2014    LIMA-LAD, SVG-OM2, SVG-PDA, Dr. Debbie Fry.    DILATATION AND CURETTAGE      TONSILLECTOMY         SLP Recommendation and Plan  SLP Swallowing Diagnosis: functional oral phase, functional pharyngeal phase (24 1100)  SLP Diet Recommendation: regular textures, thin liquids (24 1100)  Recommended Precautions and Strategies: upright posture during/after eating, small bites of food and sips of liquid, alternate between small bites of food and sips of liquid, general aspiration precautions, reflux precautions (24 1100)  SLP Rec. for Method of Medication Administration: meds whole, meds crushed, as tolerated (24 1100)     Monitor for Signs of Aspiration: yes, notify SLP if any concerns, cough, gurgly voice, throat clearing (24 1100)     Swallow Criteria for Skilled Therapeutic Interventions Met: no problems identified which require skilled intervention (24 1100)        Therapy Frequency (Swallow): evaluation only (24  1100)  Predicted Duration Therapy Intervention (Days): until discharge (06/18/24 1100)  Oral Care Recommendations: Oral Care BID/PRN (06/18/24 1100)                     Communication Strategy Suggestions: eliminate distractions when speaking with patient (06/18/24 1100)                         SWALLOW EVALUATION (Last 72 Hours)       SLP Adult Swallow Evaluation       Row Name 06/18/24 1100       Rehab Evaluation    Document Type evaluation  -CP    Subjective Information complains of  hunger  -CP    Patient Observations alert;cooperative  -CP    Patient/Family/Caregiver Comments/Observations Pt's mom in the room  -CP    Patient Effort good  -CP       General Information    Patient Profile Reviewed yes  -CP    Pertinent History Of Current Problem Bibiana Inman is a 44 y.o. female with a past medical history of GERD, CAD status post CABG (2014), hypertension, hypercholesterolemia, sleep apnea, type 2 diabetes, diabetic gastroparesis, peripheral neuropathy, depression, COVID-19, and tobacco abuse who presented to [facility] on 6/17/2024 for altered mental status.  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  She was quickly brought to the emergency room for further evaluation.  CT of the head was negative for acute infarct.  MRI of the brain shows a 1 cm linear acute infarct within the left parietal occipital region.  She was admitted for further workup and care.  CTA of the head and neck shows extensive atherosclerotic disease with approximately 70% stenosis in the right ICA.  Vascular surgery was consulted to evaluate.  The patient denies a history of carotid artery disease.  She has never been evaluated by vascular surgeon.  She denies a history of previous TIA or CVA.  She does have a history of MI and CABG x 3 in 2014 at the age of 34.  She is a poorly controlled  diabetic, type II with most recent A1c of 16.9.  She is a current every day smoker.  She smokes about 6 to 7 cigarettes daily.  She currently denies any symptoms of TIA/CVA.  Pt failed the swallow screen.  -CP    Current Method of Nutrition NPO  -CP    Prior Level of Function-Swallowing no diet consistency restrictions;regular textures;thin liquids  -CP    Plans/Goals Discussed with patient;family  -CP    Barriers to Rehab none identified  -CP       Pain    Additional Documentation Pain Scale: FACES Pre/Post-Treatment (Group)  -CP       Pain Scale: FACES Pre/Post-Treatment    Pain: FACES Scale, Pretreatment 0-->no hurt  -CP    Posttreatment Pain Rating 0-->no hurt  -CP       Oral Motor Structure and Function    Dentition Assessment natural, present and adequate  -CP    Secretion Management WNL/WFL  -CP    Mucosal Quality dry  -CP       Oral Musculature and Cranial Nerve Assessment    Oral Motor General Assessment WFL  -CP    Oral Motor, Comment Mild R facial asymmetry  -CP       General Eating/Swallowing Observations    Respiratory Support Currently in Use room air  -CP    Eating/Swallowing Skills self-fed;appropriate self-feeding skills observed  -CP    Positioning During Eating upright in bed  -CP    Utensils Used spoon;straw  -CP    Consistencies Trialed thin liquids;pureed;regular textures  -CP       Clinical Swallow Eval    Clinical Swallow Evaluation Summary Pt seen for clinical swallow eval. Pt failed the stroke swallow screen in the ER due to mild R facial asymmetry. Pt is awake and alert and able to answer all questions. Pt was seen consuming trials of water by spoon and straw, applesauce, and a pb cracker. Pt has natural dentition in good condition, but reports a few missing teeth. Mastication was  functional and efficient. Oral transit timely on puree and liquids. There was no pocketing or oral residual of any consistency. Digital palpation of swallow suggests timely initiation/functional pharyngeal phase  of swallow. Pt had no cough or overt s/s of aspiration after the swallow to indicate a pharyngeal deficit. It is rec that pt initiate a regular diet with thin liquids. Pt should take small bites and sips and eat at a slow rate. Pt should be up at 90 degrees for all PO. Education provided to pt on safe swallow strategies. Pt  verbalized understanding and agreement.Pt will be DCed from  caseload for swallow due to functional oral and pharyngeal swallow.  -CP       SLP Evaluation Clinical Impression    SLP Swallowing Diagnosis functional oral phase;functional pharyngeal phase  -CP    Functional Impact no impact on function  -CP    Swallow Criteria for Skilled Therapeutic Interventions Met no problems identified which require skilled intervention  -CP       SLP Treatment Clinical Impressions    Care Plan Review evaluation/treatment results reviewed;care plan/treatment goals reviewed;risks/benefits reviewed;patient/other agree to care plan  -CP    Care Plan Review, Other Participant(s) mother  -CP       Recommendations    Therapy Frequency (Swallow) evaluation only  -CP    SLP Diet Recommendation regular textures;thin liquids  -CP    Recommended Precautions and Strategies upright posture during/after eating;small bites of food and sips of liquid;alternate between small bites of food and sips of liquid;general aspiration precautions;reflux precautions  -CP    Oral Care Recommendations Oral Care BID/PRN  -CP    SLP Rec. for Method of Medication Administration meds whole;meds crushed;as tolerated  -CP    Monitor for Signs of Aspiration yes;notify SLP if any concerns;cough;gurgly voice;throat clearing  -CP              User Key  (r) = Recorded By, (t) = Taken By, (c) = Cosigned By      Initials Name Effective Dates    CP Katty Arevalo SLP 06/16/21 -                     EDUCATION  The patient has been educated in the following areas:   Dysphagia (Swallowing Impairment).        SLP GOALS       Row Name 06/18/24 1100              Memory Skills Goal 1 (SLP)    Improve Memory Skills Through Goal 1 (SLP) recalling unrelated word lists with an imposed delay;repeat list in sequential order;listen to a paragraph and answer questions;read a paragraph and answer questions;listen to multiple paragraphs and answer questions;read multiple paragraphs and answer questions;90%;with minimal cues (75-90%)  -CP      Time Frame (Memory Skills Goal 1, SLP) 2 weeks  -CP         Executive Functional Skills Goal 1 (SLP)    Improve Executive Function Skills Goal 1 (SLP) demonstrate awareness of deficit;identify strategies, strengths, limitations;identify anticipated needs;complex organization/planning activity;90%;with minimal cues (75-90%)  -CP      Time Frame (Executive Function Skills Goal 1, SLP) 2 weeks  -CP                User Key  (r) = Recorded By, (t) = Taken By, (c) = Cosigned By      Initials Name Provider Type    CP Katty Arevalo, SLP Speech and Language Pathologist                         Time Calculation:                ELISHA Stock  2024   and [facility] Care - Speech Language Pathology Initial Evaluation  [facility]     Patient Name: Bibiana Inman  : 1980  MRN: [id number]  Today's Date: 2024               Admit Date: 2024     Visit Dx:    ICD-10-CM ICD-9-CM   1. Altered mental status, unspecified altered mental status type  R41.82 780.97   2. Hyperglycemia  R73.9 790.29   3. Hypokalemia  E87.6 276.8   4. 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    Abnormal bone density screening    Benign essential hypertension    Chronic cough    Diabetic gastroparesis    Elevated erythrocyte sedimentation rate    Fatigue    Gastroesophageal reflux disease    Mixed hypercholesterolemia and hypertriglyceridemia    Intermittent claudication    Iron deficiency anemia    Multiple joint pain    Need for influenza  vaccination    Type 2 diabetes mellitus with hyperglycemia, with long-term current use of insulin    Obstructive sleep apnea    Coronary artery disease involving native coronary artery of native heart with unstable angina pectoris    Abnormal nuclear stress test    Depressive disorder    Back pain    Diabetic peripheral neuropathy associated with type 2 diabetes mellitus    Ingrowing nail, left great toe    Personal history of COVID-19    Polyp of colon    Vitamin D deficiency    Tobacco abuse    Unstable angina    Altered mental status    Carotid stenosis, asymptomatic, right     Past Medical History:   Diagnosis Date    5,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 Debbie Fry.    Depressive disorder 2023    Diabetic gastroparesis 2021    Diabetic peripheral neuropathy associated with type 2 diabetes mellitus 2024    Gastroesophageal reflux disease 03/10/2021    GERD (gastroesophageal reflux disease)     Intermittent claudication 2017    Iron deficiency anemia 2020    Mixed hypercholesterolemia and hypertriglyceridemia 2014    Myocardial infarction     Obesity     Obstructive sleep apnea 2021    Personal history of COVID-19 2022    Polyp of colon 2023    Spontaneous      Stress fracture of right ankle with routine healing     Tobacco abuse 2024    Type 2 diabetes mellitus with hyperglycemia, with long-term current use of insulin 2017    Vitamin D deficiency 2024     Past Surgical History:   Procedure Laterality Date    CARDIAC CATHETERIZATION N/A 2024    Procedure: Left Heart Cath and coronary angiogram;  Surgeon: Jena Barron MD;  Location: [facility] CATH INVASIVE LOCATION;  Service: Cardiology;  Laterality: N/A;      SECTION      CORONARY ARTERY BYPASS GRAFT  2014    LIMA-LAD, SVG-OM2, SVG-PDA, Dr. Debbie Fry.    DILATATION AND CURETTAGE      TONSILLECTOMY         SLP Recommendation and Plan  SLP Diagnosis: mild, cognitive-linguistic disorder (24)  SLP Diagnosis Comments: Pt presents with mild neurocognitive disorder characterized by modtly ST and immediate memory deficits. Pt stated that she felt these errors may be due to feeling distracted and hungry. Pt was advided to seek outpatient ST if deficits persist. ST will follow while hospitalized to address defiicts. (24)     Monitor for Signs of Aspiration: yes, notify SLP if any concerns, cough, gurgly voice, throat clearing (24)  Swallow Criteria for Skilled Therapeutic Interventions Met: no problems identified which require skilled intervention (24)  SLC Criteria for Skilled Therapy Interventions Met: yes (24)        Therapy Frequency (Swallow): evaluation only (24)  Therapy Frequency (SLP SLC): PRN (24)  Predicted Duration Therapy Intervention (Days): until discharge (24)  Oral Care Recommendations: Oral Care BID/PRN (24)              Communication Strategy Suggestions: eliminate distractions when speaking with patient (24)                  SLP EVALUATION (Last 72 Hours)       SLP SLC Evaluation       Row Name 24       General Information    Prior Level of Function-Communication WFL  -CP    Patient's Goals for Discharge return to work;return to home  -CP    Family Goals for Discharge patient able to return to work;patient able to return to previous activities/roles  -CP    Standardized Assessment Used SLUMS  -CP       Comprehension Assessment/Intervention    Comprehension Assessment/Intervention Auditory Comprehension;Reading Comprehension  -CP       Auditory Comprehension Assessment/Intervention    Auditory Comprehension (Communication)  WNL  -CP       Reading Comprehension Assessment/Intervention    Reading Comprehension (Communication) WNL  -CP       Expression Assessment/Intervention    Expression Assessment/Intervention verbal expression  -CP       Verbal Expression Assessment/Intervention    Verbal Expression WNL  -CP       Motor Speech Assessment/Intervention    Motor Speech Function WNL  -CP       Cognitive Assessment Intervention- SLP    Cognitive Function (Cognition) mild impairment  -CP    Orientation Status (Cognition) awareness of basic personal information;person;place;time;situation;WFL  -CP    Memory (Cognitive) simple;short-term;mild impairment  -CP    Attention (Cognitive) selective;attention to detail;mild impairment  -CP    Thought Organization (Cognitive) mild impairment  -CP    Reasoning (Cognitive) WFL  -CP    Functional Math (Cognitive) WFL  -CP    Executive Function (Cognition) deficit awareness;mild impairment  -CP    Pragmatics (Communication) affect;initiation;eye contact;mild impairment  -CP       Standardized Tests    Cognitive/Memory Tests SLUMS: [facility] Mental Status Examination  -CP       SLUMS: [facility] Mental Status Examination    SLUMS Score 20  -CP    SLUMS Range 20-24: Mild Neurocognitive Disorder (Less than High school education)  -CP    SLUMS Comments Pt had mostly ST memory errors  -CP       SLP Evaluation Clinical Impressions    SLP Diagnosis mild;cognitive-linguistic disorder  -CP    SLP Diagnosis Comments Pt presents with mild neurocognitive disorder characterized by modtly ST and immediate memory deficits. Pt stated that she felt these errors may be due to feeling distracted and hungry. Pt was advided to seek outpatient ST if deficits persist. ST will follow while hospitalized to address defiicts.  -CP    Rehab Potential/Prognosis good  -CP    SLC Criteria for Skilled Therapy Interventions Met yes  -CP    Functional Impact difficulty completing vocational tasks  -CP        Recommendations    Therapy Frequency (SLP SLC) PRN  -CP    Predicted Duration Therapy Intervention (Days) until discharge  -CP    Communication Strategy Suggestions eliminate distractions when speaking with patient  -CP       Communication Treatment Objective and Progress Goals (SLP)    SLC LTGs Patient will demonstrate functional cognitive-linguistic skills for return to discharge environment  -CP    Cognitive Linguistic Treatment Objectives Cognitive Linguistic Treatment Objectives (Group)  -CP       Cognitive Linguistic Treatment Objectives    Memory Skills Selection memory skills, SLP goal 1  -CP    Executive Function Skills Selection executive function skills, SLP goal 1  -CP       Memory Skills Goal 1 (SLP)    Improve Memory Skills Through Goal 1 (SLP) recalling unrelated word lists with an imposed delay;repeat list in sequential order;listen to a paragraph and answer questions;read a paragraph and answer questions;listen to multiple paragraphs and answer questions;read multiple paragraphs and answer questions;90%;with minimal cues (75-90%)  -CP    Time Frame (Memory Skills Goal 1, SLP) 2 weeks  -CP       Executive Functional Skills Goal 1 (SLP)    Improve Executive Function Skills Goal 1 (SLP) demonstrate awareness of deficit;identify strategies, strengths, limitations;identify anticipated needs;complex organization/planning activity;90%;with minimal cues (75-90%)  -CP    Time Frame (Executive Function Skills Goal 1, SLP) 2 weeks  -CP              User Key  (r) = Recorded By, (t) = Taken By, (c) = Cosigned By      Initials Name Effective Dates    CP Katty Arevalo SLP 06/16/21 -                        EDUCATION  The patient has been educated in the following areas:     Cognitive Impairment.           SLP GOALS       Row Name 06/18/24 1100             Memory Skills Goal 1 (SLP)    Improve Memory Skills Through Goal 1 (SLP) recalling unrelated word lists with an imposed delay;repeat list in sequential  order;listen to a paragraph and answer questions;read a paragraph and answer questions;listen to multiple paragraphs and answer questions;read multiple paragraphs and answer questions;90%;with minimal cues (75-90%)  -CP      Time Frame (Memory Skills Goal 1, SLP) 2 weeks  -CP         Executive Functional Skills Goal 1 (SLP)    Improve Executive Function Skills Goal 1 (SLP) demonstrate awareness of deficit;identify strategies, strengths, limitations;identify anticipated needs;complex organization/planning activity;90%;with minimal cues (75-90%)  -CP      Time Frame (Executive Function Skills Goal 1, SLP) 2 weeks  -CP                User Key  (r) = Recorded By, (t) = Taken By, (c) = Cosigned By      Initials Name Provider Type    CP Katty Arevalo, SLP Speech and Language Pathologist                              Time Calculation:                        ELISHA Stock  6/18/2024

## 2024-06-19 LAB
ANION GAP SERPL CALCULATED.3IONS-SCNC: 8.2 MMOL/L (ref 5–15)
BUN SERPL-MCNC: 8 MG/DL (ref 6–20)
BUN/CREAT SERPL: 7.1 (ref 7–25)
CALCIUM SPEC-SCNC: 9 MG/DL (ref 8.6–10.5)
CHLORIDE SERPL-SCNC: 105 MMOL/L (ref 98–107)
CO2 SERPL-SCNC: 21.8 MMOL/L (ref 22–29)
CREAT SERPL-MCNC: 1.12 MG/DL (ref 0.57–1)
EGFRCR SERPLBLD CKD-EPI 2021: 61.5 ML/MIN/1.73
GLUCOSE BLDC GLUCOMTR-MCNC: 162 MG/DL (ref 70–105)
GLUCOSE BLDC GLUCOMTR-MCNC: 165 MG/DL (ref 70–105)
GLUCOSE BLDC GLUCOMTR-MCNC: 213 MG/DL (ref 70–105)
GLUCOSE BLDC GLUCOMTR-MCNC: 240 MG/DL (ref 70–105)
GLUCOSE SERPL-MCNC: 248 MG/DL (ref 65–99)
POTASSIUM SERPL-SCNC: 3.6 MMOL/L (ref 3.5–5.2)
SODIUM SERPL-SCNC: 135 MMOL/L (ref 136–145)

## 2024-06-19 PROCEDURE — 82948 REAGENT STRIP/BLOOD GLUCOSE: CPT | Performed by: INTERNAL MEDICINE

## 2024-06-19 PROCEDURE — 80048 BASIC METABOLIC PNL TOTAL CA: CPT | Performed by: HOSPITALIST

## 2024-06-19 PROCEDURE — 99232 SBSQ HOSP IP/OBS MODERATE 35: CPT | Performed by: INTERNAL MEDICINE

## 2024-06-19 PROCEDURE — 25010000002 ENOXAPARIN PER 10 MG: Performed by: INTERNAL MEDICINE

## 2024-06-19 PROCEDURE — 82948 REAGENT STRIP/BLOOD GLUCOSE: CPT

## 2024-06-19 PROCEDURE — 99231 SBSQ HOSP IP/OBS SF/LOW 25: CPT | Performed by: INTERNAL MEDICINE

## 2024-06-19 PROCEDURE — 25010000002 CEFTRIAXONE PER 250 MG: Performed by: INTERNAL MEDICINE

## 2024-06-19 PROCEDURE — 63710000001 INSULIN GLARGINE PER 5 UNITS: Performed by: HOSPITALIST

## 2024-06-19 PROCEDURE — 25010000002 LABETALOL 5 MG/ML SOLUTION: Performed by: STUDENT IN AN ORGANIZED HEALTH CARE EDUCATION/TRAINING PROGRAM

## 2024-06-19 PROCEDURE — 92507 TX SP LANG VOICE COMM INDIV: CPT

## 2024-06-19 PROCEDURE — 63710000001 INSULIN LISPRO (HUMAN) PER 5 UNITS: Performed by: INTERNAL MEDICINE

## 2024-06-19 RX ORDER — POTASSIUM CHLORIDE 20 MEQ/1
40 TABLET, EXTENDED RELEASE ORAL EVERY 4 HOURS
Status: COMPLETED | OUTPATIENT
Start: 2024-06-19 | End: 2024-06-19

## 2024-06-19 RX ORDER — HYDRALAZINE HYDROCHLORIDE 50 MG/1
50 TABLET, FILM COATED ORAL EVERY 8 HOURS SCHEDULED
Qty: 90 TABLET | Refills: 0 | Status: SHIPPED | OUTPATIENT
Start: 2024-06-19 | End: 2024-07-07 | Stop reason: HOSPADM

## 2024-06-19 RX ORDER — LABETALOL HYDROCHLORIDE 5 MG/ML
20 INJECTION, SOLUTION INTRAVENOUS
Status: DISCONTINUED | OUTPATIENT
Start: 2024-06-19 | End: 2024-06-20 | Stop reason: HOSPADM

## 2024-06-19 RX ORDER — ACYCLOVIR 400 MG/1
1 TABLET ORAL
Qty: 3 EACH | Refills: 2 | Status: SHIPPED | OUTPATIENT
Start: 2024-06-19

## 2024-06-19 RX ORDER — CLONIDINE 0.2 MG/24H
1 PATCH, EXTENDED RELEASE TRANSDERMAL WEEKLY
Status: DISCONTINUED | OUTPATIENT
Start: 2024-06-19 | End: 2024-06-20 | Stop reason: HOSPADM

## 2024-06-19 RX ORDER — CLONIDINE 0.2 MG/24H
1 PATCH, EXTENDED RELEASE TRANSDERMAL WEEKLY
Qty: 4 PATCH | Refills: 0 | Status: SHIPPED | OUTPATIENT
Start: 2024-06-26 | End: 2024-07-07 | Stop reason: HOSPADM

## 2024-06-19 RX ORDER — ACYCLOVIR 400 MG/1
1 TABLET ORAL ONCE
Qty: 1 EACH | Refills: 0 | Status: SHIPPED | OUTPATIENT
Start: 2024-06-19 | End: 2024-06-19

## 2024-06-19 RX ORDER — LABETALOL HYDROCHLORIDE 5 MG/ML
10 INJECTION, SOLUTION INTRAVENOUS
Status: DISCONTINUED | OUTPATIENT
Start: 2024-06-19 | End: 2024-06-19

## 2024-06-19 RX ADMIN — INSULIN LISPRO 15 UNITS: 100 INJECTION, SOLUTION INTRAVENOUS; SUBCUTANEOUS at 08:44

## 2024-06-19 RX ADMIN — INSULIN LISPRO 4 UNITS: 100 INJECTION, SOLUTION INTRAVENOUS; SUBCUTANEOUS at 08:45

## 2024-06-19 RX ADMIN — HYDRALAZINE HYDROCHLORIDE 50 MG: 25 TABLET ORAL at 08:44

## 2024-06-19 RX ADMIN — NIFEDIPINE 60 MG: 30 TABLET, FILM COATED, EXTENDED RELEASE ORAL at 08:45

## 2024-06-19 RX ADMIN — CLONIDINE 1 PATCH: 0.2 PATCH, EXTENDED RELEASE TRANSDERMAL at 10:54

## 2024-06-19 RX ADMIN — FAMOTIDINE 20 MG: 20 TABLET, FILM COATED ORAL at 08:45

## 2024-06-19 RX ADMIN — Medication 10 ML: at 20:30

## 2024-06-19 RX ADMIN — ATORVASTATIN CALCIUM 80 MG: 40 TABLET, FILM COATED ORAL at 20:29

## 2024-06-19 RX ADMIN — EMPAGLIFLOZIN 10 MG: 10 TABLET, FILM COATED ORAL at 08:45

## 2024-06-19 RX ADMIN — HYDRALAZINE HYDROCHLORIDE 50 MG: 25 TABLET ORAL at 20:30

## 2024-06-19 RX ADMIN — POTASSIUM CHLORIDE 40 MEQ: 1500 TABLET, EXTENDED RELEASE ORAL at 13:04

## 2024-06-19 RX ADMIN — INSULIN LISPRO 2 UNITS: 100 INJECTION, SOLUTION INTRAVENOUS; SUBCUTANEOUS at 20:29

## 2024-06-19 RX ADMIN — ASPIRIN 81 MG CHEWABLE TABLET 81 MG: 81 TABLET CHEWABLE at 08:45

## 2024-06-19 RX ADMIN — HYDRALAZINE HYDROCHLORIDE 50 MG: 25 TABLET ORAL at 13:04

## 2024-06-19 RX ADMIN — INSULIN LISPRO 15 UNITS: 100 INJECTION, SOLUTION INTRAVENOUS; SUBCUTANEOUS at 17:06

## 2024-06-19 RX ADMIN — ACETAMINOPHEN 650 MG: 325 TABLET, FILM COATED ORAL at 17:06

## 2024-06-19 RX ADMIN — FOLIC ACID 1 MG: 1 TABLET ORAL at 08:45

## 2024-06-19 RX ADMIN — INSULIN GLARGINE 35 UNITS: 100 INJECTION, SOLUTION SUBCUTANEOUS at 08:43

## 2024-06-19 RX ADMIN — FAMOTIDINE 20 MG: 20 TABLET, FILM COATED ORAL at 17:06

## 2024-06-19 RX ADMIN — LABETALOL HYDROCHLORIDE 10 MG: 5 INJECTION, SOLUTION INTRAVENOUS at 01:47

## 2024-06-19 RX ADMIN — INSULIN LISPRO 15 UNITS: 100 INJECTION, SOLUTION INTRAVENOUS; SUBCUTANEOUS at 12:26

## 2024-06-19 RX ADMIN — CEFTRIAXONE 1000 MG: 1 INJECTION, POWDER, FOR SOLUTION INTRAMUSCULAR; INTRAVENOUS at 20:28

## 2024-06-19 RX ADMIN — ESCITALOPRAM OXALATE 10 MG: 10 TABLET ORAL at 08:45

## 2024-06-19 RX ADMIN — POTASSIUM CHLORIDE 40 MEQ: 1500 TABLET, EXTENDED RELEASE ORAL at 08:45

## 2024-06-19 RX ADMIN — INSULIN LISPRO 2 UNITS: 100 INJECTION, SOLUTION INTRAVENOUS; SUBCUTANEOUS at 17:06

## 2024-06-19 RX ADMIN — Medication 10 ML: at 08:45

## 2024-06-19 RX ADMIN — CLOPIDOGREL BISULFATE 75 MG: 75 TABLET ORAL at 08:45

## 2024-06-19 RX ADMIN — ENOXAPARIN SODIUM 40 MG: 100 INJECTION SUBCUTANEOUS at 17:05

## 2024-06-19 RX ADMIN — INSULIN LISPRO 4 UNITS: 100 INJECTION, SOLUTION INTRAVENOUS; SUBCUTANEOUS at 12:26

## 2024-06-19 RX ADMIN — PANTOPRAZOLE SODIUM 40 MG: 40 TABLET, DELAYED RELEASE ORAL at 08:45

## 2024-06-19 NOTE — PROGRESS NOTES
Referring Provider: Brammell, Timothy Duane,*    Reason for follow-up: Elevated troponin     Patient Care Team:  Ibrahima Correa MD as PCP - General (Family Medicine)  Rachele Zafar RN as Ambulatory  (Population Health)  Xochilt Jenkins MD as Consulting Physician (Nephrology)      SUBJECTIVE  Laying comfortably in bed.  She wants to go home today.     ROS  Review of all systems negative except as indicated.    Since I have last seen, the patient has been without any chest discomfort, shortness of breath, palpitations, dizziness or syncope.  Denies having any headache, abdominal pain, nausea, vomiting, diarrhea, constipation, loss of weight or loss of appetite.  Denies having any excessive bruising, hematuria or blood in the stool.        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 Debbie Fry.    Depressive disorder 2023    Diabetic gastroparesis 2021    Diabetic peripheral neuropathy associated with type 2 diabetes mellitus 2024    Gastroesophageal reflux disease 03/10/2021    GERD (gastroesophageal reflux disease)     Intermittent claudication 2017    Iron deficiency anemia 2020    Mixed hypercholesterolemia and hypertriglyceridemia 2014    Myocardial infarction     Obesity     Obstructive sleep apnea 2021    Personal history of COVID-19 2022    Polyp of colon 2023    Spontaneous      Stress fracture of right ankle with routine healing     Tobacco abuse 2024    Type 2 diabetes mellitus with hyperglycemia, with long-term current use of insulin 2017    Vitamin D deficiency 2024       Past Surgical History:   Procedure Laterality  Date    CARDIAC CATHETERIZATION N/A 2024    Procedure: Left Heart Cath and coronary angiogram;  Surgeon: Jena Barron MD;  Location: [facility] CATH INVASIVE LOCATION;  Service: Cardiology;  Laterality: N/A;     SECTION      CORONARY ARTERY BYPASS GRAFT  2014    LIMA-LAD, SVG-OM2, SVG-PDA, Dr. Debbie Fry.    DILATATION AND CURETTAGE      TONSILLECTOMY         Family History   Family history unknown: Yes       Social History     Tobacco Use    Smoking status: Every Day     Current packs/day: 0.25     Average packs/day: 0.3 packs/day for 15.0 years (3.8 ttl pk-yrs)     Types: Cigarettes    Smokeless tobacco: Never   Vaping Use    Vaping status: Never Used   Substance Use Topics    Alcohol use: Defer    Drug use: Never        Home meds:  Prior to Admission medications    Medication Sig Start Date End Date Taking? Authorizing Provider   aspirin 81 MG chewable tablet Chew 1 tablet Daily.   Yes Joseline Quiroz MD   atorvastatin (LIPITOR) 80 MG tablet Take 1 tablet by mouth Daily.   Yes Joseline Quiroz MD   clopidogrel (PLAVIX) 75 MG tablet Take 1 tablet by mouth Daily. 24  Yes    escitalopram (LEXAPRO) 10 MG tablet Take 1 tablet by mouth Daily.   Yes Joseline Quiroz MD   famotidine (PEPCID) 40 MG tablet Take 1 tablet by mouth every night at bedtime. 23  Yes    hydrALAZINE (APRESOLINE) 25 MG tablet Take 1 tablet (25 mg total) by mouth 3 (three) times a day. 24  Yes    icosapent ethyl (Vascepa) 1 g capsule capsule Take 2 capsules by mouth 2 (two) times a day. 23  Yes    insulin aspart (novoLOG FLEXPEN) 100 UNIT/ML solution pen-injector sc pen Inject  under the skin into the appropriate area as directed 3 times a day. Sliding scale, between 5-20 units TID   Yes Joseline Quiroz MD   insulin detemir (LEVEMIR) 100 UNIT/ML injection Inject 60 Units under the skin into the appropriate area as directed Every Night.   Yes Joseline Quiroz MD   metoclopramide  (REGLAN) 5 MG tablet Take 1 tablet by mouth 3 times a day.   Yes Provider, MD Joseline   metoprolol tartrate (LOPRESSOR) 100 MG tablet Take 1 tablet by mouth 2 (two) times a day. 6/5/24  Yes    ondansetron (ZOFRAN) 4 MG tablet Take 1 tablet by mouth three times a day as needed 1/9/24  Yes    pantoprazole (PROTONIX) 40 MG EC tablet Take 1 tablet by mouth Every Morning. 6/28/23  Yes    albuterol sulfate  (90 Base) MCG/ACT inhaler Inhale 2 puffs every 6 (six) hours if needed for wheezing. 5/9/23      glyburide (DIAbeta) 5 MG tablet Take 1 tablet by mouth 2 (Two) Times a Day. 2/3/21 3/10/21     hydroCHLOROthiazide (HYDRODIURIL) 25 MG tablet Take 1 tablet by mouth Daily. 1/20/23 8/1/23     metFORMIN (GLUCOPHAGE) 1000 MG tablet Take 1 tablet by mouth 2 (Two) Times a Day With Meals. 5/9/23 7/7/23     omeprazole (priLOSEC) 20 MG capsule Take 1 capsule (20 mg total) by mouth 1 (one) time each day. Do not crush or chew. 3/10/21 7/20/21         Allergies:  Latex    Scheduled Meds:aspirin, 81 mg, Oral, Daily  atorvastatin, 80 mg, Oral, Nightly  cefTRIAXone, 1,000 mg, Intravenous, Q24H  clopidogrel, 75 mg, Oral, Daily  enoxaparin, 40 mg, Subcutaneous, Q24H  escitalopram, 10 mg, Oral, Daily  famotidine, 20 mg, Oral, BID AC  folic acid, 1 mg, Oral, Daily  hydrALAZINE, 50 mg, Oral, Q8H  insulin glargine, 30 Units, Subcutaneous, Daily  insulin lispro, 15 Units, Subcutaneous, TID With Meals  insulin lispro, 2-9 Units, Subcutaneous, 4x Daily AC & at Bedtime  NIFEdipine XL, 60 mg, Oral, Q24H  pantoprazole, 40 mg, Oral, QAM  sodium chloride, 10 mL, Intravenous, Q12H      Continuous Infusions:Pharmacy to Dose enoxaparin (LOVENOX),   sodium chloride, 125 mL/hr, Last Rate: 125 mL/hr (06/18/24 0456)      PRN Meds:.  acetaminophen    albuterol sulfate HFA    senna-docusate sodium **AND** polyethylene glycol **AND** bisacodyl **AND** bisacodyl    Calcium Replacement - Follow Nurse / BPA Driven Protocol    dextrose    dextrose     "glucagon (human recombinant)    labetalol    Magnesium Standard Dose Replacement - Follow Nurse / BPA Driven Protocol    ondansetron ODT    Pharmacy to Dose enoxaparin (LOVENOX)    Phosphorus Replacement - Follow Nurse / BPA Driven Protocol    Potassium Replacement - Follow Nurse / BPA Driven Protocol    [COMPLETED] Insert Peripheral IV **AND** sodium chloride    sodium chloride    sodium chloride      OBJECTIVE    Vital Signs  Vitals:    06/19/24 0146 06/19/24 0200 06/19/24 0456 06/19/24 0500   BP: (!) 233/104 (!) 207/103 (!) 186/78 178/72   BP Location:   Right arm    Patient Position:   Lying    Pulse: 81 90 76 74   Resp:   22    Temp:   98.9 °F (37.2 °C)    TempSrc:   Oral    SpO2: 98% 96% 98% 96%   Weight:       Height:           Flowsheet Rows      Flowsheet Row First Filed Value   Admission Height 162.6 cm (64\") Documented at 06/17/2024 0931   Admission Weight 63.6 kg (140 lb 4.8 oz) Documented at 06/17/2024 0931              Intake/Output Summary (Last 24 hours) at 6/19/2024 0705  Last data filed at 6/19/2024 0002  Gross per 24 hour   Intake 720 ml   Output --   Net 720 ml          Telemetry: Sinus rhythm/sinus bradycardia    Physical Exam:  The patient is alert, oriented and in no distress.  Vital signs as noted above.  Head and neck revealed no carotid bruits or jugular venous distention.  No thyromegaly or lymphadenopathy is present  Lungs clear.  No wheezing.  Breath sounds are normal bilaterally.  Heart normal first and second heart sounds.  No murmur. No precordial rub is present.  No gallop is present.  Abdomen soft and nontender.  No organomegaly is present.  Extremities with good peripheral pulses without any pedal edema.  Skin warm and dry.  Musculoskeletal system is grossly normal.  CNS grossly normal.       Results Review:  I have personally reviewed the results from the time of this admission to 6/19/2024 07:05 EDT and agree with these findings:  []  Laboratory  []  Microbiology  []  " Radiology  []  EKG/Telemetry   []  Cardiology/Vascular   []  Pathology  []  Old records  []  Other:    Most notable findings include:    Lab Results (last 24 hours)       Procedure Component Value Units Date/Time    Basic Metabolic Panel [[id number]]  (Abnormal) Collected: 06/19/24 0506    Specimen: Blood Updated: 06/19/24 0612     Glucose 248 mg/dL      BUN 8 mg/dL      Creatinine 1.12 mg/dL      Sodium 135 mmol/L      Potassium 3.6 mmol/L      Comment: Specimen hemolyzed.  Result may be falsely elevated.        Chloride 105 mmol/L      CO2 21.8 mmol/L      Calcium 9.0 mg/dL      BUN/Creatinine Ratio 7.1     Anion Gap 8.2 mmol/L      eGFR 61.5 mL/min/1.73     Narrative:      GFR Normal >60  Chronic Kidney Disease <60  Kidney Failure <15      POC Glucose Once [[id number]]  (Abnormal) Collected: 06/18/24 2040    Specimen: Blood Updated: 06/18/24 2042     Glucose 165 mg/dL      Comment: Serial Number: [id number]:  [id number]       POC Glucose 4x Daily Before Meals & at Bedtime [[id number]]  (Abnormal) Collected: 06/18/24 1723    Specimen: Blood Updated: 06/18/24 1724     Glucose 203 mg/dL      Comment: Serial Number: [id number]:  [id number]       LDL Cholesterol, Direct [[id number]]  (Abnormal) Collected: 06/18/24 0605    Specimen: Blood from Arm, Left Updated: 06/18/24 1435     LDL Cholesterol  119 mg/dL     Narrative:      LDL Reference Ranges    (U.S. Department of Health and Human Services ATP III Classifications)    Optimal          <100 mg/dl  Near Optimal     100-129 mg/dl  Borderline High  130-159 mg/dl  High             160-189 mg/dl  Very High        >189 mg/dl      POC Glucose Once [[id number]]  (Abnormal) Collected: 06/18/24 1130    Specimen: Blood Updated: 06/18/24 1136     Glucose 306 mg/dL      Comment: Serial Number: [id number]:  [id number]       POC Glucose Once [[id number]]  (Abnormal) Collected: 06/18/24 0723    Specimen: Blood Updated: 06/18/24 0726     Glucose 166 mg/dL      Comment:  Serial Number: [id number]:  [id number]               Imaging Results (Last 24 Hours)       Procedure Component Value Units Date/Time    MRI Brain With & Without Contrast [[id number]] Collected: 06/18/24 2355     Updated: 06/18/24 2359    Narrative:      MRI BRAIN W WO CONTRAST    Date of Exam: 6/18/2024 11:20 PM EDT    Indication: Stroke workup.     Comparison: 6/17/2024.    Technique:  Routine multiplanar/multisequence sequence images of the brain were obtained before and after the uneventful administration of Prohance.      Findings:  There is a stable small linear focus of diffusion restriction within the left parietal subcortical white matter suspicious for an acute lacunar type infarct. There is associated FLAIR hyperintense signal. There is otherwise mild patchy white matter FLAIR   hyperintense signal abnormality in the brain. No acute or chronic intracranial hemorrhage. No mass effect, midline shift or abnormal extra-axial collection. Orbits are unremarkable. The paranasal sinuses and the mastoid air cells appear well aerated.   Major arterial flow voids appear intact. Calvarial and superficial soft tissue signal is within normal limits. Temporomandibular joints and parotid glands appear symmetric. Midline structures appear intact. There is no abnormal enhancement. There is   normal enhancement within the intracranial vasculature including the dural venous sinuses.      Impression:      Impression:  1.Stable small acute lacunar type infarct in the left parietal subcortical white matter.  2.Mild chronic small vessel ischemic change.        Electronically Signed: Fritz Arroyo MD    6/18/2024 11:57 PM EDT    Workstation ID: [id number]            LAB RESULTS (LAST 7 DAYS)    CBC  Results from last 7 days   Lab Units 06/18/24  0605 06/17/24  0928   WBC 10*3/mm3 9.69 14.62*   RBC 10*6/mm3 4.10 4.60   HEMOGLOBIN g/dL 12.2 13.7   HEMATOCRIT % 36.9 41.1   MCV fL 90.0 89.3   PLATELETS 10*3/mm3 220 281        BMP  Results from last 7 days   Lab Units 06/19/24  0506 06/18/24  0605 06/17/24 1820 06/17/24  0928   SODIUM mmol/L 135* 134*  --  128*   POTASSIUM mmol/L 3.6 3.4* 3.1* 3.2*   CHLORIDE mmol/L 105 103  --  92*   CO2 mmol/L 21.8* 23.1  --  23.6   BUN mg/dL 8 10  --  9   CREATININE mg/dL 1.12* 1.25*  --  1.29*   GLUCOSE mg/dL 248* 143*  --  589*       CMP   Results from last 7 days   Lab Units 06/19/24  0506 06/18/24  0605 06/17/24 1820 06/17/24  0928   SODIUM mmol/L 135* 134*  --  128*   POTASSIUM mmol/L 3.6 3.4* 3.1* 3.2*   CHLORIDE mmol/L 105 103  --  92*   CO2 mmol/L 21.8* 23.1  --  23.6   BUN mg/dL 8 10  --  9   CREATININE mg/dL 1.12* 1.25*  --  1.29*   GLUCOSE mg/dL 248* 143*  --  589*   ALBUMIN g/dL  --  2.9*  --  3.4*   BILIRUBIN mg/dL  --  0.2  --  0.4   ALK PHOS U/L  --  96  --  134*   AST (SGOT) U/L  --  10  --  11   ALT (SGPT) U/L  --  <5  --  5       BNP        TROPONIN  Results from last 7 days   Lab Units 06/17/24  1143   HSTROP T ng/L 48*       CoAg  Results from last 7 days   Lab Units 06/17/24  0928   INR  0.93   APTT seconds 26.2*       Creatinine Clearance  Estimated Creatinine Clearance: 63 mL/min (A) (by C-G formula based on SCr of 1.12 mg/dL (H)).    ABG        Radiology  MRI Brain With & Without Contrast    Result Date: 6/18/2024  Impression: 1.Stable small acute lacunar type infarct in the left parietal subcortical white matter. 2.Mild chronic small vessel ischemic change. Electronically Signed: Fritz Arroyo MD  6/18/2024 11:57 PM EDT  Workstation ID: [id number]    CT CEREBRAL PERFUSION WITH & WITHOUT CONTRAST    Result Date: 6/17/2024  Impression: No significant perfusion abnormality in the brain. Electronically Signed: Fritz Arroyo MD  6/17/2024 11:22 PM EDT  Workstation ID: [id number]    CT Angiogram Carotids    Result Date: 6/17/2024  Impression: No evidence of acute vessel occlusion, aneurysm or dissection. Extensive atherosclerotic disease with multifocal areas of stenoses as  characterized above, most significantly including moderate, approximately 70%, stenosis at the origin of the right cervical internal carotid artery and moderate to severe stenosis at the origin of the superior segment of the right M2 segment MCA. Findings suggestive of pulmonary arterial hypertension. Scattered small pulmonary nodules most likely represents calcified granulomas. The largest of these measures 0.6 cm Electronically Signed: Zacarias Canales DO  6/17/2024 11:13 PM EDT  Workstation ID: [id number]    CT Angiogram Head w AI Analysis of LVO    Result Date: 6/17/2024  Impression: No evidence of acute vessel occlusion, aneurysm or dissection. Extensive atherosclerotic disease with multifocal areas of stenoses as characterized above, most significantly including moderate, approximately 70%, stenosis at the origin of the right cervical internal carotid artery and moderate to severe stenosis at the origin of the superior segment of the right M2 segment MCA. Findings suggestive of pulmonary arterial hypertension. Scattered small pulmonary nodules most likely represents calcified granulomas. The largest of these measures 0.6 cm Electronically Signed: Zacarias Canlaes DO  6/17/2024 11:13 PM EDT  Workstation ID: [id number]    CT Head Without Contrast    Result Date: 6/17/2024  Impression: No acute intracranial abnormality. Previously noted thin linear infarct in the left parieto-occipital region is not clearly evident on CT. Electronically Signed: Zacarias Canales DO  6/17/2024 10:48 PM EDT  Workstation ID: [id number]    MRI Brain Without Contrast    Result Date: 6/17/2024  1.1 cm linear acute infarct within the left parieto-occipital region (series 5, image 54). 2.Additional findings compatible with chronic microvascular ischemic change. Electronically Signed: Clarence Talley MD  6/17/2024 3:12 PM EDT  Workstation ID: [id number]    CT Head Without Contrast    Result Date: 6/17/2024  1. No acute intracranial  abnormality by head CT. 2. Distal carotid and vertebral artery atherosclerotic disease, much more than expected for patient age. Electronically Signed: Eric Fenton MD  6/17/2024 10:29 AM EDT  Workstation ID: [id number]    XR Chest 1 View    Result Date: 6/17/2024  Impression: No active pulmonary process. Stable appearance of the chest since prior comparison. Electronically Signed: Eric Fenton MD  6/17/2024 10:01 AM EDT  Workstation ID: [id number]       EKG  I personally viewed and interpreted the patient's EKG/Telemetry data:  ECG 12 Lead Altered Mental Status   Final Result   HEART RATE= 54  bpm   RR Interval= 1100  ms   NM Interval= 139  ms   P Horizontal Axis= -7  deg   P Front Axis= 50  deg   QRSD Interval= 98  ms   QT Interval= 489  ms   QTcB= 466  ms   QRS Axis= 46  deg   T Wave Axis= 169  deg   - ABNORMAL ECG -   Sinus bradycardia   Probable left atrial enlargement   LVH with secondary repolarization abnormality   Probable anterior infarct, age indeterminate   When compared with ECG of 20-Feb-2024 6:33:12,   No significant change   Electronically Signed By: Yunior Reynolds (Tony) 18-Jun-2024 06:17:07   Date and Time of Study: 2024-06-17 09:23:02      Telemetry Scan   Final Result      Telemetry Scan   Final Result      Telemetry Scan   Final Result      Telemetry Scan   Final Result      Telemetry Scan   Final Result      Telemetry Scan   Final Result      Telemetry Scan   Final Result      Telemetry Scan   Final Result      Telemetry Scan   Final Result      Telemetry Scan   Final Result            Echocardiogram:    Results for orders placed during the hospital encounter of 02/19/24    Adult Transthoracic Echo Complete W/ Cont if Necessary Per Protocol    Interpretation Summary    Left ventricular systolic function is normal. Calculated left ventricular EF = 69% Left ventricular ejection fraction appears to be 61 - 65%.    Left ventricular diastolic function was normal.  GLS -16%.    Left atrial volume is  moderately increased.    Estimated right ventricular systolic pressure from tricuspid regurgitation is normal (<35 mmHg).    No significant valvular abnormalities noted.        Stress Test:  Results for orders placed during the hospital encounter of 02/19/24    Stress Test With Myocardial Perfusion One Day    Interpretation Summary    Findings consistent with a normal ECG stress test.    (Calculated EF = 63%).    Myocardial perfusion imaging indicates a moderate-sized, severe area of ischemia located in the inferior wall and lateral wall.    Impressions are consistent with an intermediate risk study.         Cardiac Catheterization:  Results for orders placed during the hospital encounter of 02/19/24    Cardiac Catheterization/Vascular Study    Conclusion  OPERATORS  Jena aBrron M.D. (Attending Cardiologist)      PROCEDURES PERFORMED  Ultrasound guided Vascular access  Left Heart Catheterization  Coronary Angiogram  Bypass angiography  Moderate sedation    INDICATIONS FOR PROCEDURE  Positive stress test    PROCEDURE IN DETAIL  Informed consent was obtained from the patient after explaining the risks, benefits, and alternative options of the procedure. After obtaining informed consent, the patient was brought to the cath lab and was prepped in a sterile fashion. Lidocaine 2% was used for local anesthesia into the right femoral access site. The right femoral artery was accessed with a micropuncture needle via modified Seldinger technique under ultrasound guidance. A 6F sheath was inserted successfully. Afterwards, 6F JR4 and JL4 diagnostic catheters were advanced over a wire into the ascending aorta and were used to engage the ostia of the left main and RCA respectively. JR4 used to cross the AV and obtain LV pressures and gradient across the AV measured via pullback technique. Images of the right and left coronary systems were obtained. Images of the vein grafts and selective LIMA angiography was performed. All  the catheters were exchanged over a wire and subsequently removed. Angiogram of the femoral access site was obtained and did not show complications. The patient tolerated the procedure well without any complications. The pictures were reviewed at the end of the procedure. An angioseal closure device was applied.    HEMODYNAMICS    LV: 157/5/11  AO: 157/64/99  Gradient: None    FINDINGS  Coronary Angiogram    Right dominant circulation    Left main: Left main is a large caliber vessel which gives rise to the Left Anterior Descending and the Left circumflex. No angiographically significant stenosis    Left Anterior Descending Artery: The LAD is  at the ostium    Left Circumflex: Lcx is a small caliber vessel which courses in the AV groove and gives rise to OM branches.  There is a high OM1 which further bifurcates.  There is a long 60 to 70% lesion in the proximal segment of this OM.  The circumflex is diffusely diseased with 60 to 70% stenosis in the proximal and mid segment all the way into OM 3.  There is an 80% lesion in the midsegment.    Right Coronary Artery: The RCA is  at the ostium    Bypass angiography  SVG to PDA is patent at the origin, body, and insertion  SVG to diagonal is occluded at the origin  LIMA to LAD is widely patent at the origin, body, and insertion.  The LAD distal to the touchdown of the LIMA has 70 to 80% stenosis and is very small caliber.  LIMA does retrogradely fill a diagonal branch also.      Femoral angiography  The right SFA has a stent in its body with severe 80 to 90% ISR.    ESTIMATED BLOOD LOSS:  10 ml    COMPLICATIONS:  None    PROCEDURE DATA:  Contrast Used: 100 cc  Sedation Time: 30 minutes    IMPRESSIONS  Multivessel obstructive CAD of the native coronary arteries  Occluded vein graft to what appears to be a diagonal  There is diffuse CAD involving the left circumflex and OM which is not bypassed and there is also obstructive CAD distal to the LIMA touchdown  This is  stable coronary artery disease  Low left heart filling pressures    RECOMMENDATIONS  Recommend medical management for her diffuse CAD in the setting of severely uncontrolled diabetes and smoking  Uptitration of OMT for stable CAD  Patient needs to be aggressively treated for her uncontrolled diabetes  Counseled extensively on smoking cessation  Start Plavix  Blood pressure control  Aggressive cholesterol control with statin with a goal LDL of less than 70         Other:         ASSESSMENT & PLAN:    Principal Problem:    Altered mental status  Active Problems:    Benign essential hypertension    Type 2 diabetes mellitus with hyperglycemia, with long-term current use of insulin    Tobacco abuse    Carotid stenosis, asymptomatic, right    Altered mental state  CT head with no acute intracranial abnormality.  Atherosclerosis in distal carotid and vertebral artery noted.  MRI shows 1.1 cm linear acute infarct within the left parieto-occipital region.  CTA shows extensive atherosclerotic disease with multifocal areas of stenoses as characterized above, most significantly including moderate, approximately 70%, stenosis at the origin of the right cervical internal carotid artery and moderate to severe stenosis at   the origin of the superior segment of the right M2 segment MCA.   Neurology consultation  Started on dual antiplatelet therapy, high intensity statin.  Mental status is back to baseline now.     Elevated troponin  Known coronary disease with history of CABG  Minimally elevated high-sensitivity troponin which is trending down  Troponin elevation secondary to underlying stable coronary disease, uncontrolled hypertension, chronic kidney disease and UTI  Echocardiogram shows preserved LV function.  Normal diastolic function and no significant valvular abnormalities.  Recent Cardiac catheterization showed significant coronary artery disease.  RCA , left circumflex with 80 to 90% stenosis in the proximal segment  and mid segment.  Ramus with proximal 70 to 80% stenosis.  LAD is totally occluded  LIMA to LAD is patent however distal/apical LAD has 80% stenosis  Vein graft to left circumflex is occluded, vein graft to RCA is patent  She has significant burden of coronary disease which is not acute  Continue dual antiplatelet therapy, high intensity statin.  Recommended to focus on risk factors modification.  May have to be considered for redo CABG in future.     Hypertensive emergency  Increase hydralazine  And nifedipine  Clonidine patch has been put on by primary  Metoprolol discontinued due to bradycardia.  Unable to give ACE or ARB because of chronic kidney disease     Diabetes  Uncontrolled diabetes with A1c of more than 16.9  Treatment with insulin per Glucomander  Emphasized compliance with medications.  Endocrinology consultation has been completed.  Jardiance has been added.  On IV antibiotics for UTI     Chronic kidney disease  Creatinine is 1.12, GFR is 61.5  Appears to be at baseline renal function  Followed by nephrology     Hyperlipidemia  She has uncontrolled hypertriglyceridemia with triglycerides of >4000.  Repeat triglycerides 1117.  Started high intensity statin  High risk for pancreatitis  She will also need Vascepa.      Cristian Su MD  06/19/24  07:05 EDT

## 2024-06-19 NOTE — CASE MANAGEMENT/SOCIAL WORK
Social Work Assessment  [facility]     Patient Name: Bibiana Inman  MRN: [id number]  Today's Date: 6/19/2024    Admit Date: 6/17/2024     Discharge Plan       Row Name 06/19/24 1118       Plan    Plan Comments SW met with patient at bedside to discuss d/c planning needs.  She plans to return home with spouse and son, is IADLS and drives self.  She is employed F/T at [employer].  Patient reported she smokes 6 cigarettes daily.  No alcohol or illicit substance abuse.  Patient reported she does have anxiety and is prescribed medication.  She will consider future counseling.  Encouraged pt to call [employer] to determine counseling services in network.  Patient does not have issues obtaining medications and uses a pill planner.  She did miss her last appointment with with Podiatrist d/t car issues.  The only medication she has missed or ran out of was gabapentin which is prescribed by Podiatrist.  PCP and Pharmacy verified.  Agreeable to M2B.  DME at home includes glucometer and bp cuff.  Her spouse or mother in law will provide transportation at time of discharge.             WELLINGTON Vargas MSW    Phone: [phone]  Cell: [phone]  Fax: [phone]  [email or web address]

## 2024-06-19 NOTE — PROGRESS NOTES
[facility] Medicine Services  Discharge Summary    Date of Service: 2024.  Patient Name: Bibiana Inman  : 1980  MRN: [id number]    Date of Admission: 2024  Discharge Diagnosis:       Acute cerebrovascular accident  Insulin-dependent diabetes  Hypertension emergency with poorly controlled values  Proteinuria  Carotid atherosclerosis with narrowing on the right.  Internal carotid artery  History of coronary artery disease and prior coronary artery bypass grafting  Hyperlipidemia  Asymptomatic bacteriuria  Abuse      Date of Discharge: 2024.  Primary Care Physician: Ibrahima Correa MD      Presenting Problem:   Hypokalemia [E87.6]  Altered mental status [R41.82]  Hyperglycemia [R73.9]  Elevated troponin [R79.89]  Altered mental status, unspecified altered mental status type [R41.82]    Active and Resolved Hospital Problems:  Active Hospital Problems    Diagnosis POA   • **Altered mental status [R41.82] Yes   • Carotid stenosis, asymptomatic, right [I65.21] Yes   • Tobacco abuse [Z72.0] Yes   • Type 2 diabetes mellitus with hyperglycemia, with long-term current use of insulin [E11.65, Z79.4] Not Applicable   • Benign essential hypertension [I10] Yes      Resolved Hospital Problems   No resolved problems to display.         Hospital Course     HPI:  Per the H&P dated : 2024.    Hospital Course:   This is a 44-year-old insulin-dependent white female who presented with issues as discussed in history and physical.  She was afebrile during her hospitalization.  Oxygenation was maintained on room air.  She offered as needed nasal cannula.  Blood pressure showed some significant elevation on presentation as well as some varied persistent elevation.  It was noted that during her hospitalization 2 doses of hydralazine were held due to concern over permissive hypertension and that she subsequently had significant blood pressure response requiring reinstitution of  medication.  She states that she is followed as an outpatient by nephrology for hypertension.  She had no microbiology studies.  She was evaluated with initial studies that included chest x-ray showing no acute process.  Cerebral perfusion scan showed no perfusion abnormalities.  Initial CT scan head showing atherosclerotic changes otherwise with an unremarkable brain parenchyma.  Subsequent MRI showed a linear acute infarct within the left parietal occipital region she was seen in evaluation by neurology.  She subsequently had CT angiogram of neck that demonstrated her atherosclerotic processes.  She has noted stenosis of the right internal carotid and was seen in evaluation by vascular surgery.  Repeat MRI showed stable appearing acute lunar infarct of the left parietal area.  She was shown to have significant hypertriglyceridemia hyperlipidemia.  Her glycohemoglobin returned at 16.9.  He was seen in evaluation by endocrinology.  CBC showed some initial leukocytosis that resolved.  Troponin levels were mildly elevated and felt to be due to supply demand mismatch and did not represent acute myocardial injury.  Thyroid functions appear normal.  B12 and folic acid levels were normal.  Liver function showed no abnormalities.  With review with her prescribing history there was some question as to compliance with her prescribed regimens.        Reasons For Change In Medications and Indications for New Medications:      Day of Discharge     Vital Signs:  Temp:  [98.4 °F (36.9 °C)-99.8 °F (37.7 °C)] 98.7 °F (37.1 °C)  Heart Rate:  [68-90] 69  Resp:  [15-22] 19  BP: (178-233)/() 196/94    Physical Exam:  Physical Exam  Vitals reviewed.   Constitutional:       Appearance: Normal appearance.   HENT:      Head: Normocephalic.   Cardiovascular:      Rate and Rhythm: Normal rate and regular rhythm.   Pulmonary:      Effort: Pulmonary effort is normal.      Breath sounds: Normal breath sounds.   Abdominal:      General:  Bowel sounds are normal.      Palpations: Abdomen is soft.   Musculoskeletal:         General: No swelling.   Neurological:      Mental Status: She is alert. Mental status is at baseline.   Psychiatric:         Behavior: Behavior normal.         Thought Content: Thought content normal.            Pertinent  and/or Most Recent Results     LAB RESULTS:      Lab 06/18/24  0605 06/17/24  0928   WBC 9.69 14.62*   HEMOGLOBIN 12.2 13.7   HEMATOCRIT 36.9 41.1   PLATELETS 220 281   NEUTROS ABS 6.64 12.53*   IMMATURE GRANS (ABS) 0.03 0.07*   LYMPHS ABS 2.17 1.29   MONOS ABS 0.65 0.59   EOS ABS 0.15 0.09   MCV 90.0 89.3   PROTIME  --  10.2   APTT  --  26.2*         Lab 06/19/24  0506 06/18/24  0605 06/17/24  1820 06/17/24  0928   SODIUM 135* 134*  --  128*   POTASSIUM 3.6 3.4* 3.1* 3.2*   CHLORIDE 105 103  --  92*   CO2 21.8* 23.1  --  23.6   ANION GAP 8.2 7.9  --  12.4   BUN 8 10  --  9   CREATININE 1.12* 1.25*  --  1.29*   EGFR 61.5 53.9*  --  51.9*   GLUCOSE 248* 143*  --  589*   CALCIUM 9.0 8.8  --  9.1   HEMOGLOBIN A1C  --   --   --  16.90*   TSH  --  1.470 2.230  --          Lab 06/18/24  0605 06/17/24  0928   TOTAL PROTEIN 5.8* 7.0   ALBUMIN 2.9* 3.4*   GLOBULIN 2.9 3.6   ALT (SGPT) <5 5   AST (SGOT) 10 11   BILIRUBIN 0.2 0.4   ALK PHOS 96 134*         Lab 06/17/24  1143 06/17/24  0928   HSTROP T 48* 61*  63*   PROTIME  --  10.2   INR  --  0.93         Lab 06/18/24  0605 06/17/24  0928   CHOLESTEROL 320* 381*   LDL CHOL 119* 116*   HDL CHOL 28* 30*   TRIGLYCERIDES 1,117* >4,425*         Lab 06/17/24  1820   FOLATE 14.70   VITAMIN B 12 1,489*         Brief Urine Lab Results  (Last result in the past 365 days)        Color   Clarity   Blood   Leuk Est   Nitrite   Protein   CREAT   Urine HCG        06/17/24 0940 Yellow   Cloudy   Negative   Negative   Negative   >=300 mg/dL (3+)                 Microbiology Results (last 10 days)       ** No results found for the last 240 hours. **            MRI Brain With & Without  Contrast    Result Date: 6/18/2024  Impression: Impression: 1.Stable small acute lacunar type infarct in the left parietal subcortical white matter. 2.Mild chronic small vessel ischemic change. Electronically Signed: Fritz Arroyo MD  6/18/2024 11:57 PM EDT  Workstation ID: [id number]    CT CEREBRAL PERFUSION WITH & WITHOUT CONTRAST    Result Date: 6/17/2024  Impression: Impression: No significant perfusion abnormality in the brain. Electronically Signed: Fritz Arroyo MD  6/17/2024 11:22 PM EDT  Workstation ID: [id number]    CT Angiogram Carotids    Result Date: 6/17/2024  Impression: Impression: No evidence of acute vessel occlusion, aneurysm or dissection. Extensive atherosclerotic disease with multifocal areas of stenoses as characterized above, most significantly including moderate, approximately 70%, stenosis at the origin of the right cervical internal carotid artery and moderate to severe stenosis at the origin of the superior segment of the right M2 segment MCA. Findings suggestive of pulmonary arterial hypertension. Scattered small pulmonary nodules most likely represents calcified granulomas. The largest of these measures 0.6 cm Electronically Signed: Zacarias Canales DO  6/17/2024 11:13 PM EDT  Workstation ID: [id number]    CT Angiogram Head w AI Analysis of LVO    Result Date: 6/17/2024  Impression: Impression: No evidence of acute vessel occlusion, aneurysm or dissection. Extensive atherosclerotic disease with multifocal areas of stenoses as characterized above, most significantly including moderate, approximately 70%, stenosis at the origin of the right cervical internal carotid artery and moderate to severe stenosis at the origin of the superior segment of the right M2 segment MCA. Findings suggestive of pulmonary arterial hypertension. Scattered small pulmonary nodules most likely represents calcified granulomas. The largest of these measures 0.6 cm Electronically Signed: Zacarias Canales DO   6/17/2024 11:13 PM EDT  Workstation ID: [id number]    CT Head Without Contrast    Result Date: 6/17/2024  Impression: Impression: No acute intracranial abnormality. Previously noted thin linear infarct in the left parieto-occipital region is not clearly evident on CT. Electronically Signed: Zacarias DO Parker  6/17/2024 10:48 PM EDT  Workstation ID: [id number]    MRI Brain Without Contrast    Result Date: 6/17/2024  Impression: 1.1 cm linear acute infarct within the left parieto-occipital region (series 5, image 54). 2.Additional findings compatible with chronic microvascular ischemic change. Electronically Signed: Clarence Talley MD  6/17/2024 3:12 PM EDT  Workstation ID: [id number]    CT Head Without Contrast    Result Date: 6/17/2024  Impression: 1. No acute intracranial abnormality by head CT. 2. Distal carotid and vertebral artery atherosclerotic disease, much more than expected for patient age. Electronically Signed: Eric Fenton MD  6/17/2024 10:29 AM EDT  Workstation ID: [id number]    XR Chest 1 View    Result Date: 6/17/2024  Impression: Impression: No active pulmonary process. Stable appearance of the chest since prior comparison. Electronically Signed: Eric Fenton MD  6/17/2024 10:01 AM EDT  Workstation ID: [id number]    XR Knee 3 View Left    Result Date: 6/13/2024  Impression: Impression: 1. No acute fracture or dislocation. 2. Minor patellofemoral joint space degenerative arthritis. Electronically Signed: Ron Chen MD  6/13/2024 4:16 PM EDT  Workstation ID: [id number]             Results for orders placed during the hospital encounter of 02/19/24    Adult Transthoracic Echo Complete W/ Cont if Necessary Per Protocol    Interpretation Summary  •  Left ventricular systolic function is normal. Calculated left ventricular EF = 69% Left ventricular ejection fraction appears to be 61 - 65%.  •  Left ventricular diastolic function was normal.  GLS -16%.  •  Left atrial volume is moderately increased.  •   Estimated right ventricular systolic pressure from tricuspid regurgitation is normal (<35 mmHg).  •  No significant valvular abnormalities noted.      Labs Pending at Discharge:      Procedures Performed    06/17 1544 EEG      Consults:   Consults       Date and Time Order Name Status Description    6/18/2024  2:54 AM Inpatient Vascular Surgery Consult Completed     6/17/2024 11:13 AM Inpatient Endocrinology Consult Completed     6/17/2024 10:52 AM Inpatient Neurology Consult General Completed     6/17/2024 10:52 AM Cardiology (on-call MD unless specified) Completed     6/17/2024 10:40 AM Hospitalist (on-call MD unless specified)                Discharge Details        Discharge Medications        New Medications        Instructions Start Date   cloNIDine 0.2 MG/24HR patch  Commonly known as: CATAPRES-TTS   1 patch, Transdermal, Weekly   Start Date: June 26, 2024     Dexcom G7  device   1 each, Does not apply, Once, Dx: E11.65      Dexcom G7 Sensor misc   1 each, Does not apply, Every 10 Days, Dx: E11.65      empagliflozin 10 MG tablet tablet  Commonly known as: JARDIANCE   10 mg, Oral, Daily   Start Date: June 20, 2024     NIFEdipine CC 60 MG 24 hr tablet  Commonly known as: ADALAT CC   60 mg, Oral, Every 24 Hours Scheduled   Start Date: June 20, 2024            Changes to Medications        Instructions Start Date   hydrALAZINE 50 MG tablet  Commonly known as: APRESOLINE  What changed:   medication strength  how much to take  how to take this  when to take this  Another medication with the same name was removed. Continue taking this medication, and follow the directions you see here.   50 mg, Oral, Every 8 Hours Scheduled      icosapent ethyl 1 g capsule capsule  Commonly known as: Vascepa  What changed: Another medication with the same name was added. Make sure you understand how and when to take each.   Take 2 capsules by mouth 2 (two) times a day.      icosapent ethyl 1 g capsule capsule  Commonly known  as: Vascepa  What changed: You were already taking a medication with the same name, and this prescription was added. Make sure you understand how and when to take each.   Take 2 g (2 capsules) by mouth 2 (Two) Times a Day With Meals. Indications: Cerebrovascular Accident or Stroke, High Amount of Triglycerides in the Blood, Procedure to Reestablish Blood Supply to the Heart             Continue These Medications        Instructions Start Date   aspirin 81 MG chewable tablet   81 mg, Oral, Daily      atorvastatin 80 MG tablet  Commonly known as: LIPITOR   80 mg, Oral, Daily      clopidogrel 75 MG tablet  Commonly known as: PLAVIX   75 mg, Oral, Daily      escitalopram 10 MG tablet  Commonly known as: LEXAPRO   10 mg, Oral, Daily      famotidine 40 MG tablet  Commonly known as: PEPCID   40 mg, Oral, Every Night at Bedtime      insulin aspart 100 UNIT/ML solution pen-injector sc pen  Commonly known as: novoLOG FLEXPEN   Subcutaneous, 3 times daily, Sliding scale, between 5-20 units TID      insulin detemir 100 UNIT/ML injection  Commonly known as: LEVEMIR   60 Units, Subcutaneous, Nightly      metoclopramide 5 MG tablet  Commonly known as: REGLAN   5 mg, Oral, 3 times daily      metoprolol tartrate 100 MG tablet  Commonly known as: LOPRESSOR   Take 1 tablet by mouth 2 (two) times a day.      pantoprazole 40 MG EC tablet  Commonly known as: PROTONIX   40 mg, Oral, Every Morning      Ventolin  (90 Base) MCG/ACT inhaler  Generic drug: albuterol sulfate HFA   Inhale 2 puffs every 6 (six) hours if needed for wheezing.             Stop These Medications      ondansetron 4 MG tablet  Commonly known as: ZOFRAN              Allergies   Allergen Reactions   • Latex Itching         Discharge Disposition: home  Home or Self Care    Diet:  Hospital:  Diet Order   Procedures   • Diet: Diabetic; Consistent Carbohydrate; Fluid Consistency: Thin (IDDSI 0)         Discharge Activity:         CODE STATUS:  Code Status and Medical  Interventions:   Ordered at: 06/17/24 1117     Level Of Support Discussed With:    Patient     Code Status (Patient has no pulse and is not breathing):    CPR (Attempt to Resuscitate)     Medical Interventions (Patient has pulse or is breathing):    Full Support         Future Appointments   Date Time Provider Department Center   7/1/2024  4:30 PM Cristian Su MD [facility]       [unfilled]    Time spent on Discharge including face to face service:  >30 minutes    Signature: Electronically signed by Timothy Duane Brammell, MD, 06/19/24, 12:51 EDT.  [facility] Hospitalist Team

## 2024-06-19 NOTE — CONSULTS
[facility]    Referring Provider: Dr. Bhargav Cole  Reason for Consultation: Diabetes evaluation & management.    Patient Care Team:  Ibrahima Correa MD as PCP - General (Family Medicine)  Rachele Zafar, ALEENA as Ambulatory  (Population Health)  Xochilt Jenkins MD as Consulting Physician (Nephrology)    Chief complaint Altered Mental Status      Subjective .     History of present illness:    This is a  44 y.o. female with type 2 Diabetes x 15y on Levemir 60 units QHS & Novolog 5-20 units ac tid.  Also on metformin & glyburide.  Brought to ER with mental status changes. Found to have severe hypertension & hyperglycemia.  Seen by neurology & cardiology. Improving.   Seen by diabetes educator.    Review of Systems  Review of Systems   Eyes:  Negative for blurred vision.   Gastrointestinal:  Negative for nausea.   Endocrine: Negative for polyuria.   Neurological:  Negative for headache.       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 Debbie Fry.    Depressive disorder 2023    Diabetic gastroparesis 2021    Diabetic peripheral neuropathy associated with type 2 diabetes mellitus 2024    Gastroesophageal reflux disease 03/10/2021    GERD (gastroesophageal reflux disease)     Intermittent claudication 2017    Iron deficiency anemia 2020    Mixed hypercholesterolemia and hypertriglyceridemia 2014    Myocardial infarction     Obesity     Obstructive sleep apnea 2021    Personal history of COVID-19 2022    Polyp of colon 2023    Spontaneous      Stress fracture of right ankle with routine healing     Tobacco abuse 2024    Type 2 diabetes  mellitus with hyperglycemia, with long-term current use of insulin 2017    Vitamin D deficiency 2024     Past Surgical History:   Procedure Laterality Date    CARDIAC CATHETERIZATION N/A 2024    Procedure: Left Heart Cath and coronary angiogram;  Surgeon: Jnea Barron MD;  Location: [facility] CATH INVASIVE LOCATION;  Service: Cardiology;  Laterality: N/A;     SECTION      CORONARY ARTERY BYPASS GRAFT  2014    LIMA-LAD, SVG-OM2, SVG-PDA, Dr. Debbie Fry.    DILATATION AND CURETTAGE      TONSILLECTOMY       Family History   Family history unknown: Yes     Social History     Tobacco Use    Smoking status: Every Day     Current packs/day: 0.25     Average packs/day: 0.3 packs/day for 15.0 years (3.8 ttl pk-yrs)     Types: Cigarettes    Smokeless tobacco: Never   Vaping Use    Vaping status: Never Used   Substance Use Topics    Alcohol use: Defer    Drug use: Never     Medications Prior to Admission   Medication Sig Dispense Refill Last Dose    aspirin 81 MG chewable tablet Chew 1 tablet Daily.   2024    atorvastatin (LIPITOR) 80 MG tablet Take 1 tablet by mouth Daily.   2024    clopidogrel (PLAVIX) 75 MG tablet Take 1 tablet by mouth Daily. 90 tablet 0 2024    escitalopram (LEXAPRO) 10 MG tablet Take 1 tablet by mouth Daily.   2024    famotidine (PEPCID) 40 MG tablet Take 1 tablet by mouth every night at bedtime. 90 tablet 3 2024    hydrALAZINE (APRESOLINE) 25 MG tablet Take 1 tablet (25 mg total) by mouth 3 (three) times a day. 90 tablet 0 2024    icosapent ethyl (Vascepa) 1 g capsule capsule Take 2 capsules by mouth 2 (two) times a day. 360 capsule 0 2024    insulin aspart (novoLOG FLEXPEN) 100 UNIT/ML solution pen-injector sc pen Inject  under the skin into the appropriate area as directed 3 times a day. Sliding scale, between 5-20 units TID   2024    insulin detemir (LEVEMIR) 100 UNIT/ML injection Inject 60 Units under the skin into the  appropriate area as directed Every Night.   6/16/2024    metoclopramide (REGLAN) 5 MG tablet Take 1 tablet by mouth 3 times a day.   6/17/2024    metoprolol tartrate (LOPRESSOR) 100 MG tablet Take 1 tablet by mouth 2 (two) times a day. 180 tablet 0 6/17/2024    ondansetron (ZOFRAN) 4 MG tablet Take 1 tablet by mouth three times a day as needed 30 tablet 4 6/17/2024    pantoprazole (PROTONIX) 40 MG EC tablet Take 1 tablet by mouth Every Morning. 90 tablet 3 6/17/2024    albuterol sulfate  (90 Base) MCG/ACT inhaler Inhale 2 puffs every 6 (six) hours if needed for wheezing. 18 g 0      Scheduled Meds:  aspirin, 81 mg, Oral, Daily  atorvastatin, 80 mg, Oral, Nightly  cefTRIAXone, 1,000 mg, Intravenous, Q24H  clopidogrel, 75 mg, Oral, Daily  enoxaparin, 40 mg, Subcutaneous, Q24H  escitalopram, 10 mg, Oral, Daily  famotidine, 20 mg, Oral, BID AC  folic acid, 1 mg, Oral, Daily  hydrALAZINE, 50 mg, Oral, Q8H  insulin glargine, 30 Units, Subcutaneous, Daily  insulin lispro, 15 Units, Subcutaneous, TID With Meals  insulin lispro, 2-9 Units, Subcutaneous, 4x Daily AC & at Bedtime  NIFEdipine XL, 60 mg, Oral, Q24H  pantoprazole, 40 mg, Oral, QAM  sodium chloride, 10 mL, Intravenous, Q12H      Continuous Infusions:  Pharmacy to Dose enoxaparin (LOVENOX),   sodium chloride, 125 mL/hr, Last Rate: 125 mL/hr (06/18/24 8796)      PRN Meds:    acetaminophen    albuterol sulfate HFA    senna-docusate sodium **AND** polyethylene glycol **AND** bisacodyl **AND** bisacodyl    Calcium Replacement - Follow Nurse / BPA Driven Protocol    dextrose    dextrose    glucagon (human recombinant)    Magnesium Standard Dose Replacement - Follow Nurse / BPA Driven Protocol    ondansetron ODT    Pharmacy to Dose enoxaparin (LOVENOX)    Phosphorus Replacement - Follow Nurse / BPA Driven Protocol    Potassium Replacement - Follow Nurse / BPA Driven Protocol    [COMPLETED] Insert Peripheral IV **AND** sodium chloride    sodium chloride    sodium  chloride  Allergies:  Latex    Objective     Vital Signs   Temp:  [97.4 °F (36.3 °C)-99.8 °F (37.7 °C)] 99.8 °F (37.7 °C)  Heart Rate:  [59-87] 74  Resp:  [12-27] 15  BP: (110-224)/(59-97) 224/97    Physical Exam:     General Appearance:    Alert, cooperative, in no acute distress   Head:    Normocephalic, without obvious abnormality, atraumatic   Eyes:            Lids and lashes normal, conjunctivae and sclerae normal, no   icterus, no pallor, corneas clear, PERRLA   Throat:   No oral lesions,  oral mucosa moist   Neck:   No adenopathy, supple,  no thyromegaly, no carotid bruit   Lungs:     Clear     Heart:    Regular rhythm and normal rate   Chest Wall:    No abnormalities observed   Abdomen:     Normal bowel sounds, soft                 Extremities:   Moves all extremities well, no edema               Pulses:   Pulses palpable and equal bilaterally   Skin:   Dry   Neurologic:  DTR absent, able to feel the 10g monofilament       Results Review  I have reviewed the patient's new clinical results, labs & imaging.    Lab Results (last 24 hours)       Procedure Component Value Units Date/Time    POC Glucose Once [[id number]]  (Abnormal) Collected: 06/18/24 2040    Specimen: Blood Updated: 06/18/24 2042     Glucose 165 mg/dL      Comment: Serial Number: [id number]:  [id number]       POC Glucose 4x Daily Before Meals & at Bedtime [[id number]]  (Abnormal) Collected: 06/18/24 1723    Specimen: Blood Updated: 06/18/24 1724     Glucose 203 mg/dL      Comment: Serial Number: [id number]:  [id number]       LDL Cholesterol, Direct [[id number]]  (Abnormal) Collected: 06/18/24 0605    Specimen: Blood from Arm, Left Updated: 06/18/24 1435     LDL Cholesterol  119 mg/dL     Narrative:      LDL Reference Ranges    (U.S. Department of Health and Human Services ATP III Classifications)    Optimal          <100 mg/dl  Near Optimal     100-129 mg/dl  Borderline High  130-159 mg/dl  High             160-189 mg/dl  Very High         >189 mg/dl      POC Glucose Once [[id number]]  (Abnormal) Collected: 06/18/24 1130    Specimen: Blood Updated: 06/18/24 1136     Glucose 306 mg/dL      Comment: Serial Number: [id number]:  [id number]       POC Glucose Once [[id number]]  (Abnormal) Collected: 06/18/24 0723    Specimen: Blood Updated: 06/18/24 0726     Glucose 166 mg/dL      Comment: Serial Number: [id number]:  [id number]       Lipid Panel [[id number]]  (Abnormal) Collected: 06/18/24 0605    Specimen: Blood from Arm, Left Updated: 06/18/24 0653     Total Cholesterol 320 mg/dL      Triglycerides 1,117 mg/dL      HDL Cholesterol 28 mg/dL      LDL Cholesterol  --     Comment: Unable to calculate        VLDL Cholesterol --     Comment: Unable to calculate        LDL/HDL Ratio --     Comment: Unable to calculate       Narrative:      Cholesterol Reference Ranges  (U.S. Department of Health and Human Services ATP III Classifications)    Desirable          <200 mg/dL  Borderline High    200-239 mg/dL  High Risk          >240 mg/dL      Triglyceride Reference Ranges  (U.S. Department of Health and Human Services ATP III Classifications)    Normal           <150 mg/dL  Borderline High  150-199 mg/dL  High             200-499 mg/dL  Very High        >500 mg/dL    HDL Reference Ranges  (U.S. Department of Health and Human Services ATP III Classifications)    Low     <40 mg/dl (major risk factor for CHD)  High    >60 mg/dl ('negative' risk factor for CHD)        LDL Reference Ranges  (U.S. Department of Health and Human Services ATP III Classifications)    Optimal          <100 mg/dL  Near Optimal     100-129 mg/dL  Borderline High  130-159 mg/dL  High             160-189 mg/dL  Very High        >189 mg/dL    Comprehensive Metabolic Panel [[id number]]  (Abnormal) Collected: 06/18/24 0605    Specimen: Blood from Arm, Left Updated: 06/18/24 0644     Glucose 143 mg/dL      BUN 10 mg/dL      Creatinine 1.25 mg/dL      Sodium 134 mmol/L      Potassium 3.4  mmol/L      Chloride 103 mmol/L      Comment: Result checked          CO2 23.1 mmol/L      Calcium 8.8 mg/dL      Total Protein 5.8 g/dL      Albumin 2.9 g/dL      ALT (SGPT) <5 U/L      AST (SGOT) 10 U/L      Alkaline Phosphatase 96 U/L      Total Bilirubin 0.2 mg/dL      Globulin 2.9 gm/dL      A/G Ratio 1.0 g/dL      BUN/Creatinine Ratio 8.0     Anion Gap 7.9 mmol/L      eGFR 53.9 mL/min/1.73     Narrative:      GFR Normal >60  Chronic Kidney Disease <60  Kidney Failure <15      TSH [[id number]]  (Normal) Collected: 06/18/24 0605    Specimen: Blood from Arm, Left Updated: 06/18/24 0641     TSH 1.470 uIU/mL     CBC Auto Differential [[id number]]  (Normal) Collected: 06/18/24 0605    Specimen: Blood from Arm, Left Updated: 06/18/24 0612     WBC 9.69 10*3/mm3      RBC 4.10 10*6/mm3      Hemoglobin 12.2 g/dL      Hematocrit 36.9 %      MCV 90.0 fL      MCH 29.8 pg      MCHC 33.1 g/dL      RDW 13.8 %      RDW-SD 45.1 fl      MPV 11.2 fL      Platelets 220 10*3/mm3      Neutrophil % 68.6 %      Lymphocyte % 22.4 %      Monocyte % 6.7 %      Eosinophil % 1.5 %      Basophil % 0.5 %      Immature Grans % 0.3 %      Neutrophils, Absolute 6.64 10*3/mm3      Lymphocytes, Absolute 2.17 10*3/mm3      Monocytes, Absolute 0.65 10*3/mm3      Eosinophils, Absolute 0.15 10*3/mm3      Basophils, Absolute 0.05 10*3/mm3      Immature Grans, Absolute 0.03 10*3/mm3      nRBC 0.0 /100 WBC     POC Glucose Once [[id number]]  (Abnormal) Collected: 06/18/24 0600    Specimen: Blood Updated: 06/18/24 0601     Glucose 144 mg/dL      Comment: Serial Number: [id number]:  [id number]       Vitamin B12 [[id number]]  (Abnormal) Collected: 06/17/24 1820    Specimen: Blood Updated: 06/18/24 0032     Vitamin B-12 1,489 pg/mL     Narrative:      Results may be falsely increased if patient taking Biotin.      Folate [[id number]]  (Normal) Collected: 06/17/24 1820    Specimen: Blood Updated: 06/18/24 0032     Folate 14.70 ng/mL     Narrative:       Results may be falsely increased if patient taking Biotin.            Lab Results   Component Value Date    HGBA1C 16.90 (H) 06/17/2024     Lab Results   Component Value Date    TSH 1.470 06/18/2024         Assessment & Plan     Diabetes type 2, with hyperglycemia  Hypertensive encephalopathy    Continue basal / bolus insulin.  Add Jardiance. Should benefit glucose & BP.  Will follow with you.  Thank you for the consult.    I discussed the patients findings and my recommendations with patient    Sparkle Auguste MD  06/18/24  23:37 EDT

## 2024-06-19 NOTE — THERAPY TREATMENT NOTE
Acute Care - Speech Language Pathology Treatment Note   [facility]     Patient Name: Bibiana Inman  : 1980  MRN: [id number]  Today's Date: 2024               Admit Date: 2024     Visit Dx:    ICD-10-CM ICD-9-CM   1. Altered mental status, unspecified altered mental status type  R41.82 780.97   2. Hyperglycemia  R73.9 790.29   3. Hypokalemia  E87.6 276.8   4. 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    Abnormal bone density screening    Benign essential hypertension    Chronic cough    Diabetic gastroparesis    Elevated erythrocyte sedimentation rate    Fatigue    Gastroesophageal reflux disease    Mixed hypercholesterolemia and hypertriglyceridemia    Intermittent claudication    Iron deficiency anemia    Multiple joint pain    Need for influenza vaccination    Type 2 diabetes mellitus with hyperglycemia, with long-term current use of insulin    Obstructive sleep apnea    Coronary artery disease involving native coronary artery of native heart with unstable angina pectoris    Abnormal nuclear stress test    Depressive disorder    Back pain    Diabetic peripheral neuropathy associated with type 2 diabetes mellitus    Ingrowing nail, left great toe    Personal history of COVID-19    Polyp of colon    Vitamin D deficiency    Tobacco abuse    Unstable angina    Altered mental status    Carotid stenosis, asymptomatic, right     Past Medical History:   Diagnosis Date    5,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 Debbie Fry.    Depressive disorder 2023    Diabetic gastroparesis 2021    Diabetic peripheral neuropathy associated with type 2 diabetes  mellitus 2024    Gastroesophageal reflux disease 03/10/2021    GERD (gastroesophageal reflux disease)     Intermittent claudication 2017    Iron deficiency anemia 2020    Mixed hypercholesterolemia and hypertriglyceridemia 2014    Myocardial infarction     Obesity     Obstructive sleep apnea 2021    Personal history of COVID-19 2022    Polyp of colon 2023    Spontaneous      Stress fracture of right ankle with routine healing     Tobacco abuse 2024    Type 2 diabetes mellitus with hyperglycemia, with long-term current use of insulin 2017    Vitamin D deficiency 2024     Past Surgical History:   Procedure Laterality Date    CARDIAC CATHETERIZATION N/A 2024    Procedure: Left Heart Cath and coronary angiogram;  Surgeon: Jena Barron MD;  Location: [facility] CATH INVASIVE LOCATION;  Service: Cardiology;  Laterality: N/A;     SECTION      CORONARY ARTERY BYPASS GRAFT  2014    LIMA-LAD, SVG-OM2, SVG-PDA, Dr. Debbie Fry.    DILATATION AND CURETTAGE      TONSILLECTOMY         SLP Recommendation and Plan        SLP EVALUATION (Last 72 Hours)               EDUCATION  The patient has been educated in the following areas:     Cognitive Impairment.           SLP GOALS       Row Name 24 1400       Memory Skills Goal 1 (SLP)    Improve Memory Skills Through Goal 1 (SLP) recalling unrelated word lists with an imposed delay;repeat list in sequential order;listen to a paragraph and answer questions;read a paragraph and answer questions;listen to multiple paragraphs and answer questions;read multiple paragraphs and answer questions;90%;with minimal cues (75-90%)  -CB    Time Frame (Memory Skills Goal 1, SLP) 2 weeks  -CB    Progress/Outcomes (Memory Skills Goal 1, SLP) goal ongoing  -CB    Comment (Memory Skills Goal 1, SLP) Patient completed functional memroy task involving appointments with 40% accuracy. Recalling a series of words for  immediate recall with 5/5 x, 7/10 x, 15/20 x correct, respectively. Patient recalled word list retention in RF5: word placement with 60% accuracy after reviewing word list 2-3 x each. Patient recalled what she had for breakfast and lunch appropriately. Patient recalled 3 objects within the room given an imposed delay with 3/3 x  correctly. Patient completed divided attention activities with 45% and 50% respectively. Reviewed some simple memory strategies to implement to assist with functional recall of ADL which include rehearsal strategies, establishing routine for ADL to minimize failing to recall ADL task such as medication management and using calendar to place important appointments on to minimize forgetting important dates. Encouraged utilizing visualizing information for better recall as well. ST will continue to follow to address deficits noted on cognitive assessment with primary emphasis on immediate and recent memory as well as attention/con concentration.  -CB       Executive Functional Skills Goal 1 (SLP)    Improve Executive Function Skills Goal 1 (SLP) demonstrate awareness of deficit;identify strategies, strengths, limitations;identify anticipated needs;complex organization/planning activity;90%;with minimal cues (75-90%)  -CB    Time Frame (Executive Function Skills Goal 1, SLP) 2 weeks  -CB              User Key  (r) = Recorded By, (t) = Taken By, (c) = Cosigned By      Initials Name Provider Type          Ledy Jones, SLP Speech and Language Pathologist                              Time Calculation:                        ELISHA Hughes  6/19/2024

## 2024-06-19 NOTE — PLAN OF CARE
Fair shift spent, medicated as ordered, ambulated to bathroom, BS and BP monitored, nil complaints voiced, blood taken for investigation, left stable, care and observation continues    Goal Outcome Evaluation:    Problem: Adult Inpatient Plan of Care  Goal: Absence of Hospital-Acquired Illness or Injury  Outcome: Ongoing, Progressing  Intervention: Identify and Manage Fall Risk  Recent Flowsheet Documentation  Taken 6/19/2024 0400 by Nishant Lund RN  Safety Promotion/Fall Prevention:   assistive device/personal items within reach   clutter free environment maintained   fall prevention program maintained   nonskid shoes/slippers when out of bed   room organization consistent   safety round/check completed  Taken 6/19/2024 0200 by Nishant Lund RN  Safety Promotion/Fall Prevention:   assistive device/personal items within reach   clutter free environment maintained   fall prevention program maintained   nonskid shoes/slippers when out of bed   room organization consistent   safety round/check completed  Taken 6/18/2024 2200 by Nishant Lund RN  Safety Promotion/Fall Prevention:   assistive device/personal items within reach   clutter free environment maintained   fall prevention program maintained   nonskid shoes/slippers when out of bed   room organization consistent   safety round/check completed  Taken 6/18/2024 1921 by Nishant Lund RN  Safety Promotion/Fall Prevention:   assistive device/personal items within reach   clutter free environment maintained   fall prevention program maintained   nonskid shoes/slippers when out of bed   room organization consistent   safety round/check completed  Intervention: Prevent Skin Injury  Recent Flowsheet Documentation  Taken 6/19/2024 0400 by Nishant Lund RN  Body Position:   position changed independently   weight shifting   side-lying  Taken 6/18/2024 1921 by Nishant Lund RN  Body Position:   position changed independently   weight shifting    supine  Skin Protection: adhesive use limited  Intervention: Prevent and Manage VTE (Venous Thromboembolism) Risk  Recent Flowsheet Documentation  Taken 6/18/2024 1921 by Nishant Lund RN  VTE Prevention/Management:   bilateral   sequential compression devices on  Intervention: Prevent Infection  Recent Flowsheet Documentation  Taken 6/19/2024 0400 by Nishant Lund RN  Infection Prevention:   hand hygiene promoted   rest/sleep promoted   single patient room provided  Taken 6/19/2024 0200 by Nishant Lund RN  Infection Prevention:   hand hygiene promoted   rest/sleep promoted   single patient room provided  Taken 6/18/2024 2200 by Nishant Lund RN  Infection Prevention:   hand hygiene promoted   rest/sleep promoted   single patient room provided  Taken 6/18/2024 1921 by Nishant Lund RN  Infection Prevention:   hand hygiene promoted   rest/sleep promoted   single patient room provided  Goal: Optimal Comfort and Wellbeing  Outcome: Ongoing, Progressing  Intervention: Provide Person-Centered Care  Recent Flowsheet Documentation  Taken 6/18/2024 1921 by Nishant Lund RN  Trust Relationship/Rapport:   care explained   choices provided   thoughts/feelings acknowledged  Goal: Readiness for Transition of Care  Outcome: Ongoing, Progressing     Problem: Cerebral Tissue Perfusion (Stroke, Ischemic/Transient Ischemic Attack)  Goal: Optimal Cerebral Tissue Perfusion  Outcome: Ongoing, Progressing  Intervention: Protect and Optimize Cerebral Perfusion  Recent Flowsheet Documentation  Taken 6/18/2024 2200 by Nishant Lund RN  Sensory Stimulation Regulation:   care clustered   television on  Taken 6/18/2024 1921 by Nishant Lund RN  Sensory Stimulation Regulation:   care clustered   television on  Cerebral Perfusion Promotion: blood pressure monitored  Goal: Optimal Cerebral Tissue Perfusion  Outcome: Ongoing, Progressing  Intervention: Protect and Optimize Cerebral Perfusion  Recent Flowsheet  Documentation  Taken 6/18/2024 2200 by Nishant Lund, RN  Sensory Stimulation Regulation:   care clustered   television on  Taken 6/18/2024 1921 by Nishant Lund, RN  Sensory Stimulation Regulation:   care clustered   television on  Cerebral Perfusion Promotion: blood pressure monitored     Problem: Hyperglycemia  Goal: Blood Glucose Level Within Targeted Range  Outcome: Ongoing, Progressing     Problem: Hypertension Comorbidity  Goal: Blood Pressure in Desired Range  Outcome: Ongoing, Progressing  Intervention: Maintain Blood Pressure Management  Recent Flowsheet Documentation  Taken 6/19/2024 0400 by Nishant Lund, RN  Medication Review/Management: medications reviewed  Taken 6/19/2024 0200 by Nishant Lund, RN  Medication Review/Management: medications reviewed  Taken 6/18/2024 2200 by Nishant Lund, RN  Medication Review/Management: medications reviewed  Taken 6/18/2024 1921 by Nishant Lund, RN  Medication Review/Management: medications reviewed

## 2024-06-19 NOTE — PLAN OF CARE
Problem: Adult Inpatient Plan of Care  Goal: Plan of Care Review  Outcome: Ongoing, Progressing  Goal: Patient-Specific Goal (Individualized)  Outcome: Ongoing, Progressing  Goal: Absence of Hospital-Acquired Illness or Injury  Outcome: Ongoing, Progressing  Intervention: Identify and Manage Fall Risk  Recent Flowsheet Documentation  Taken 6/19/2024 1800 by Allison Castle RN  Safety Promotion/Fall Prevention: safety round/check completed  Taken 6/19/2024 1600 by Allison Castle RN  Safety Promotion/Fall Prevention:   assistive device/personal items within reach   clutter free environment maintained   nonskid shoes/slippers when out of bed   room organization consistent   safety round/check completed  Taken 6/19/2024 1400 by Allison Castle RN  Safety Promotion/Fall Prevention: safety round/check completed  Taken 6/19/2024 1200 by Allison Castle RN  Safety Promotion/Fall Prevention:   assistive device/personal items within reach   clutter free environment maintained   nonskid shoes/slippers when out of bed   room organization consistent   safety round/check completed  Taken 6/19/2024 1000 by Allison Castle RN  Safety Promotion/Fall Prevention: safety round/check completed  Taken 6/19/2024 0800 by Allison Castle RN  Safety Promotion/Fall Prevention:   assistive device/personal items within reach   clutter free environment maintained   nonskid shoes/slippers when out of bed   room organization consistent   safety round/check completed  Intervention: Prevent Skin Injury  Recent Flowsheet Documentation  Taken 6/19/2024 1600 by Allison Castle RN  Body Position: position changed independently  Skin Protection:   adhesive use limited   tubing/devices free from skin contact  Taken 6/19/2024 1200 by Allison Castle RN  Body Position: position changed independently  Skin Protection: adhesive use limited  Taken 6/19/2024 0800 by Allison Castle RN  Body Position: position changed  independently  Skin Protection:   adhesive use limited   tubing/devices free from skin contact  Intervention: Prevent and Manage VTE (Venous Thromboembolism) Risk  Recent Flowsheet Documentation  Taken 6/19/2024 1600 by Allison Castle RN  Activity Management: up ad makenzie  Taken 6/19/2024 1200 by Allison Castle RN  Activity Management: up ad makenzie  Taken 6/19/2024 0800 by Allison Castle RN  Activity Management: up ad makenzie  VTE Prevention/Management:   bilateral   sequential compression devices off  Intervention: Prevent Infection  Recent Flowsheet Documentation  Taken 6/19/2024 1600 by Allison Castle RN  Infection Prevention: hand hygiene promoted  Taken 6/19/2024 1200 by Allison Castle RN  Infection Prevention: hand hygiene promoted  Taken 6/19/2024 0800 by Allison Castle RN  Infection Prevention: hand hygiene promoted  Goal: Optimal Comfort and Wellbeing  Outcome: Ongoing, Progressing  Intervention: Monitor Pain and Promote Comfort  Recent Flowsheet Documentation  Taken 6/19/2024 1706 by Allison Castle RN  Pain Management Interventions: see MAR  Goal: Readiness for Transition of Care  Outcome: Ongoing, Progressing   Goal Outcome Evaluation:      Patient did not have any new tests or procedures. The patient remains on room air. The patient complained of a headache and was given PRN pain medications. See MAR. The patient is going to have a renal duplex in the morning. Will continue to monitor.

## 2024-06-20 ENCOUNTER — APPOINTMENT (OUTPATIENT)
Dept: CARDIOLOGY | Facility: HOSPITAL | Age: 44
DRG: 064 | End: 2024-06-20
Payer: COMMERCIAL

## 2024-06-20 ENCOUNTER — TELEPHONE (OUTPATIENT)
Dept: DIABETES SERVICES | Facility: HOSPITAL | Age: 44
End: 2024-06-20
Payer: COMMERCIAL

## 2024-06-20 ENCOUNTER — READMISSION MANAGEMENT (OUTPATIENT)
Dept: CALL CENTER | Facility: HOSPITAL | Age: 44
End: 2024-06-20
Payer: COMMERCIAL

## 2024-06-20 VITALS
DIASTOLIC BLOOD PRESSURE: 72 MMHG | HEIGHT: 64 IN | TEMPERATURE: 98.5 F | BODY MASS INDEX: 23.95 KG/M2 | WEIGHT: 140.3 LBS | SYSTOLIC BLOOD PRESSURE: 147 MMHG | RESPIRATION RATE: 17 BRPM | HEART RATE: 84 BPM | OXYGEN SATURATION: 98 %

## 2024-06-20 LAB
BH CV ECHO MEAS - DIST REN A EDV LEFT: 21.4 CM/S
BH CV ECHO MEAS - DIST REN A PSV LEFT: 102 CM/S
BH CV ECHO MEAS - MID REN A EDV LEFT: 28.6 CM/S
BH CV ECHO MEAS - MID REN A PSV LEFT: 193 CM/S
BH CV ECHO MEAS - PROX REN A EDV LEFT: 30.2 CM/S
BH CV ECHO MEAS - PROX REN A PSV LEFT: 165 CM/S
BH CV VAS RENAL AORTIC MID PSV: 82 CM/S
BH CV VAS RENAL HILUM LEFT EDV: 13 CM/S
BH CV VAS RENAL HILUM LEFT PSV: 76 CM/S
BH CV VAS RENAL HILUM RIGHT EDV: 14 CM/S
BH CV VAS RENAL HILUM RIGHT PSV: 65 CM/S
BH CV XLRA MEAS - KID L LEFT: 11.8 CM
BH CV XLRA MEAS DIST REN A EDV RIGHT: 18.7 CM/S
BH CV XLRA MEAS DIST REN A PSV RIGHT: 96.4 CM/S
BH CV XLRA MEAS KID L RIGHT: 11.7 CM
BH CV XLRA MEAS KID W RIGHT: 5.4 CM
BH CV XLRA MEAS MID REN A EDV RIGHT: 23.1 CM/S
BH CV XLRA MEAS MID REN A PSV RIGHT: 127 CM/S
BH CV XLRA MEAS PROX REN A EDV RIGHT: 19.2 CM/S
BH CV XLRA MEAS PROX REN A PSV RIGHT: 123 CM/S
BH CV XLRA MEAS RAR LEFT: 2.89
BH CV XLRA MEAS RAR RIGHT: 1.55
BH CV XLRA MEAS RENAL A ORG EDV LEFT: 38 CM/S
BH CV XLRA MEAS RENAL A ORG EDV RIGHT: 17 CM/S
BH CV XLRA MEAS RENAL A ORG PSV LEFT: 237 CM/S
BH CV XLRA MEAS RENAL A ORG PSV RIGHT: 102 CM/S
GLUCOSE BLDC GLUCOMTR-MCNC: 150 MG/DL (ref 70–105)
LEFT KIDNEY WIDTH: 5.6 CM
LEFT RENAL UPPER PARENCHYMA MAX: 30 CM/S
LEFT RENAL UPPER PARENCHYMA MIN: 8 CM/S
LEFT RENAL UPPER PARENCHYMA RI: 0.73
RIGHT RENAL UPPER PARENCHYMA MAX: 25 CM/S
RIGHT RENAL UPPER PARENCHYMA MIN: 5 CM/S
RIGHT RENAL UPPER PARENCHYMA RI: 0.8

## 2024-06-20 PROCEDURE — 93975 VASCULAR STUDY: CPT | Performed by: SURGERY

## 2024-06-20 PROCEDURE — 82948 REAGENT STRIP/BLOOD GLUCOSE: CPT

## 2024-06-20 PROCEDURE — 99232 SBSQ HOSP IP/OBS MODERATE 35: CPT | Performed by: INTERNAL MEDICINE

## 2024-06-20 PROCEDURE — 93975 VASCULAR STUDY: CPT

## 2024-06-20 PROCEDURE — 63710000001 INSULIN GLARGINE PER 5 UNITS: Performed by: HOSPITALIST

## 2024-06-20 RX ADMIN — FOLIC ACID 1 MG: 1 TABLET ORAL at 08:57

## 2024-06-20 RX ADMIN — ESCITALOPRAM OXALATE 10 MG: 10 TABLET ORAL at 08:56

## 2024-06-20 RX ADMIN — EMPAGLIFLOZIN 10 MG: 10 TABLET, FILM COATED ORAL at 08:58

## 2024-06-20 RX ADMIN — HYDRALAZINE HYDROCHLORIDE 50 MG: 25 TABLET ORAL at 06:25

## 2024-06-20 RX ADMIN — ASPIRIN 81 MG CHEWABLE TABLET 81 MG: 81 TABLET CHEWABLE at 08:57

## 2024-06-20 RX ADMIN — NIFEDIPINE 60 MG: 30 TABLET, FILM COATED, EXTENDED RELEASE ORAL at 08:57

## 2024-06-20 RX ADMIN — PANTOPRAZOLE SODIUM 40 MG: 40 TABLET, DELAYED RELEASE ORAL at 08:56

## 2024-06-20 RX ADMIN — CLOPIDOGREL BISULFATE 75 MG: 75 TABLET ORAL at 08:56

## 2024-06-20 RX ADMIN — FAMOTIDINE 20 MG: 20 TABLET, FILM COATED ORAL at 08:56

## 2024-06-20 RX ADMIN — INSULIN GLARGINE 35 UNITS: 100 INJECTION, SOLUTION SUBCUTANEOUS at 08:58

## 2024-06-20 RX ADMIN — Medication 10 ML: at 08:58

## 2024-06-20 NOTE — PLAN OF CARE
Goal Outcome Evaluation:        Problem: Adult Inpatient Plan of Care  Goal: Plan of Care Review  Outcome: Met  Goal: Patient-Specific Goal (Individualized)  Outcome: Met  Goal: Absence of Hospital-Acquired Illness or Injury  Outcome: Met  Goal: Optimal Comfort and Wellbeing  Outcome: Met  Goal: Readiness for Transition of Care  Outcome: Met     Problem: Cerebral Tissue Perfusion (Stroke, Ischemic/Transient Ischemic Attack)  Goal: Optimal Cerebral Tissue Perfusion  Outcome: Met  Goal: Optimal Cerebral Tissue Perfusion  Outcome: Met     Problem: Cognitive Impairment (Stroke, Ischemic/Transient Ischemic Attack)  Goal: Optimal Cognitive Function  Outcome: Met  Goal: Optimal Cognitive Function  Outcome: Met     Problem: Communication Impairment (Stroke, Ischemic/Transient Ischemic Attack)  Goal: Improved Communication Skills  Outcome: Met  Goal: Improved Communication Skills  Outcome: Met     Problem: Functional Ability Impaired (Stroke, Ischemic/Transient Ischemic Attack)  Goal: Optimal Functional Ability  Outcome: Met  Goal: Optimal Functional Ability  Outcome: Met     Problem: Swallowing Impairment (Stroke, Ischemic/Transient Ischemic Attack)  Goal: Optimal Eating and Swallowing without Aspiration  Outcome: Met  Goal: Optimal Eating and Swallowing without Aspiration  Outcome: Met     Problem: Adjustment to Illness (Stroke, Ischemic/Transient Ischemic Attack)  Goal: Optimal Coping  Outcome: Met     Problem: Bowel Elimination Impaired (Stroke, Ischemic/Transient Ischemic Attack)  Goal: Effective Bowel Elimination  Outcome: Met     Problem: Respiratory Compromise (Stroke, Ischemic/Transient Ischemic Attack)  Goal: Effective Oxygenation and Ventilation  Outcome: Met     Problem: Sensorimotor Impairment (Stroke, Ischemic/Transient Ischemic Attack)  Goal: Improved Sensorimotor Function  Outcome: Met     Problem: Urinary Elimination Impaired (Stroke, Ischemic/Transient Ischemic Attack)  Goal: Effective Urinary  Elimination  Outcome: Met     Problem: Hyperglycemia  Goal: Blood Glucose Level Within Targeted Range  Outcome: Met     Problem: Hypertension Comorbidity  Goal: Blood Pressure in Desired Range  Outcome: Met

## 2024-06-20 NOTE — PROGRESS NOTES
Referring Provider: Brammell, Timothy Duane,*    Reason for follow-up: Elevated troponin     Patient Care Team:  Ibrahima Correa MD as PCP - General (Family Medicine)  Rachele Zafar RN as Ambulatory  (Population Health)  Xochilt Jenkins MD as Consulting Physician (Nephrology)      SUBJECTIVE  Patient wants to go home today.     ROS  Review of all systems negative except as indicated.    Since I have last seen, the patient has been without any chest discomfort, shortness of breath, palpitations, dizziness or syncope.  Denies having any headache, abdominal pain, nausea, vomiting, diarrhea, constipation, loss of weight or loss of appetite.  Denies having any excessive bruising, hematuria or blood in the stool.        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 Debbie Fry.    Depressive disorder 2023    Diabetic gastroparesis 2021    Diabetic peripheral neuropathy associated with type 2 diabetes mellitus 2024    Gastroesophageal reflux disease 03/10/2021    GERD (gastroesophageal reflux disease)     Intermittent claudication 2017    Iron deficiency anemia 2020    Mixed hypercholesterolemia and hypertriglyceridemia 2014    Myocardial infarction     Obesity     Obstructive sleep apnea 2021    Personal history of COVID-19 2022    Polyp of colon 2023    Spontaneous      Stress fracture of right ankle with routine healing     Tobacco abuse 2024    Type 2 diabetes mellitus with hyperglycemia, with long-term current use of insulin 2017    Vitamin D deficiency 2024       Past Surgical History:   Procedure Laterality Date    CARDIAC  CATHETERIZATION N/A 2024    Procedure: Left Heart Cath and coronary angiogram;  Surgeon: Jena Barron MD;  Location: [facility] CATH INVASIVE LOCATION;  Service: Cardiology;  Laterality: N/A;     SECTION      CORONARY ARTERY BYPASS GRAFT  2014    LIMA-LAD, SVG-OM2, SVG-PDA, Dr. Debbie Fry.    DILATATION AND CURETTAGE      TONSILLECTOMY         Family History   Family history unknown: Yes       Social History     Tobacco Use    Smoking status: Every Day     Current packs/day: 0.25     Average packs/day: 0.3 packs/day for 15.0 years (3.8 ttl pk-yrs)     Types: Cigarettes    Smokeless tobacco: Never   Vaping Use    Vaping status: Never Used   Substance Use Topics    Alcohol use: Defer    Drug use: Never        Home meds:  Prior to Admission medications    Medication Sig Start Date End Date Taking? Authorizing Provider   aspirin 81 MG chewable tablet Chew 1 tablet Daily.   Yes Joseline Quiroz MD   atorvastatin (LIPITOR) 80 MG tablet Take 1 tablet by mouth Daily.   Yes Joseline Quiroz MD   clopidogrel (PLAVIX) 75 MG tablet Take 1 tablet by mouth Daily. 24  Yes    escitalopram (LEXAPRO) 10 MG tablet Take 1 tablet by mouth Daily.   Yes Joseline Quiroz MD   famotidine (PEPCID) 40 MG tablet Take 1 tablet by mouth every night at bedtime. 23  Yes    hydrALAZINE (APRESOLINE) 25 MG tablet Take 1 tablet (25 mg total) by mouth 3 (three) times a day. 24  Yes    icosapent ethyl (Vascepa) 1 g capsule capsule Take 2 capsules by mouth 2 (two) times a day. 23  Yes    insulin aspart (novoLOG FLEXPEN) 100 UNIT/ML solution pen-injector sc pen Inject  under the skin into the appropriate area as directed 3 times a day. Sliding scale, between 5-20 units TID   Yes Joseline Quiroz MD   insulin detemir (LEVEMIR) 100 UNIT/ML injection Inject 60 Units under the skin into the appropriate area as directed Every Night.   Yes Joseline Quiroz MD   metoclopramide (REGLAN) 5 MG tablet  Take 1 tablet by mouth 3 times a day.   Yes Provider, MD Joseline   metoprolol tartrate (LOPRESSOR) 100 MG tablet Take 1 tablet by mouth 2 (two) times a day. 6/5/24  Yes    ondansetron (ZOFRAN) 4 MG tablet Take 1 tablet by mouth three times a day as needed 1/9/24  Yes    pantoprazole (PROTONIX) 40 MG EC tablet Take 1 tablet by mouth Every Morning. 6/28/23  Yes    albuterol sulfate  (90 Base) MCG/ACT inhaler Inhale 2 puffs every 6 (six) hours if needed for wheezing. 5/9/23      glyburide (DIAbeta) 5 MG tablet Take 1 tablet by mouth 2 (Two) Times a Day. 2/3/21 3/10/21     hydroCHLOROthiazide (HYDRODIURIL) 25 MG tablet Take 1 tablet by mouth Daily. 1/20/23 8/1/23     metFORMIN (GLUCOPHAGE) 1000 MG tablet Take 1 tablet by mouth 2 (Two) Times a Day With Meals. 5/9/23 7/7/23     omeprazole (priLOSEC) 20 MG capsule Take 1 capsule (20 mg total) by mouth 1 (one) time each day. Do not crush or chew. 3/10/21 7/20/21         Allergies:  Latex    Scheduled Meds:aspirin, 81 mg, Oral, Daily  atorvastatin, 80 mg, Oral, Nightly  cefTRIAXone, 1,000 mg, Intravenous, Q24H  cloNIDine, 1 patch, Transdermal, Weekly  clopidogrel, 75 mg, Oral, Daily  empagliflozin, 10 mg, Oral, Daily  enoxaparin, 40 mg, Subcutaneous, Q24H  escitalopram, 10 mg, Oral, Daily  famotidine, 20 mg, Oral, BID AC  folic acid, 1 mg, Oral, Daily  hydrALAZINE, 50 mg, Oral, Q8H  insulin glargine, 35 Units, Subcutaneous, Daily  insulin lispro, 15 Units, Subcutaneous, TID With Meals  insulin lispro, 2-9 Units, Subcutaneous, 4x Daily AC & at Bedtime  NIFEdipine XL, 60 mg, Oral, Q24H  pantoprazole, 40 mg, Oral, QAM  sodium chloride, 10 mL, Intravenous, Q12H      Continuous Infusions:Pharmacy to Dose enoxaparin (LOVENOX),   sodium chloride, 125 mL/hr, Last Rate: 125 mL/hr (06/18/24 3693)      PRN Meds:.  acetaminophen    albuterol sulfate HFA    senna-docusate sodium **AND** polyethylene glycol **AND** bisacodyl **AND** bisacodyl    Calcium Replacement - Follow  "Nurse / BPA Driven Protocol    dextrose    dextrose    glucagon (human recombinant)    labetalol    Magnesium Standard Dose Replacement - Follow Nurse / BPA Driven Protocol    ondansetron ODT    Pharmacy to Dose enoxaparin (LOVENOX)    Phosphorus Replacement - Follow Nurse / BPA Driven Protocol    Potassium Replacement - Follow Nurse / BPA Driven Protocol    [COMPLETED] Insert Peripheral IV **AND** sodium chloride    sodium chloride    sodium chloride      OBJECTIVE    Vital Signs  Vitals:    06/20/24 0435 06/20/24 0500 06/20/24 0600 06/20/24 0720   BP: 171/80 165/80 (!) 187/99 147/72   BP Location: Right arm   Right arm   Patient Position: Lying   Lying   Pulse: 76 70 88 84   Resp: 16   17   Temp: 98.3 °F (36.8 °C)   98.5 °F (36.9 °C)   TempSrc: Oral   Oral   SpO2: 98% 96% 96% 98%   Weight:       Height:           Flowsheet Rows      Flowsheet Row First Filed Value   Admission Height 162.6 cm (64\") Documented at 06/17/2024 0931   Admission Weight 63.6 kg (140 lb 4.8 oz) Documented at 06/17/2024 0931              Intake/Output Summary (Last 24 hours) at 6/20/2024 0750  Last data filed at 6/19/2024 1904  Gross per 24 hour   Intake 720 ml   Output --   Net 720 ml          Telemetry: Sinus rhythm/sinus bradycardia    Physical Exam:  The patient is alert, oriented and in no distress.  Vital signs as noted above.  Head and neck revealed no carotid bruits or jugular venous distention.  No thyromegaly or lymphadenopathy is present  Lungs clear.  No wheezing.  Breath sounds are normal bilaterally.  Heart normal first and second heart sounds.  No murmur. No precordial rub is present.  No gallop is present.  Abdomen soft and nontender.  No organomegaly is present.  Extremities with good peripheral pulses without any pedal edema.  Skin warm and dry.  Musculoskeletal system is grossly normal.  CNS grossly normal.       Results Review:  I have personally reviewed the results from the time of this admission to 6/20/2024 07:50 EDT " and agree with these findings:  []  Laboratory  []  Microbiology  []  Radiology  []  EKG/Telemetry   []  Cardiology/Vascular   []  Pathology  []  Old records  []  Other:    Most notable findings include:    Lab Results (last 24 hours)       Procedure Component Value Units Date/Time    POC Glucose Once [[id number]]  (Abnormal) Collected: 06/20/24 0718    Specimen: Blood Updated: 06/20/24 0723     Glucose 150 mg/dL      Comment: Serial Number: [id number]:  [id number]       POC Glucose Once [[id number]]  (Abnormal) Collected: 06/19/24 2017    Specimen: Blood Updated: 06/19/24 2018     Glucose 165 mg/dL      Comment: Serial Number: [id number]:  [id number]       POC Glucose 4x Daily Before Meals & at Bedtime [[id number]]  (Abnormal) Collected: 06/19/24 1700    Specimen: Blood Updated: 06/19/24 1701     Glucose 162 mg/dL      Comment: Serial Number: [id number]:  [id number]       POC Glucose Once [[id number]]  (Abnormal) Collected: 06/19/24 1046    Specimen: Blood Updated: 06/19/24 1048     Glucose 213 mg/dL      Comment: Serial Number: [id number]:  [id number]               Imaging Results (Last 24 Hours)       ** No results found for the last 24 hours. **            LAB RESULTS (LAST 7 DAYS)    CBC  Results from last 7 days   Lab Units 06/18/24  0605 06/17/24  0928   WBC 10*3/mm3 9.69 14.62*   RBC 10*6/mm3 4.10 4.60   HEMOGLOBIN g/dL 12.2 13.7   HEMATOCRIT % 36.9 41.1   MCV fL 90.0 89.3   PLATELETS 10*3/mm3 220 281       BMP  Results from last 7 days   Lab Units 06/19/24  0506 06/18/24  0605 06/17/24  1820 06/17/24  0928   SODIUM mmol/L 135* 134*  --  128*   POTASSIUM mmol/L 3.6 3.4* 3.1* 3.2*   CHLORIDE mmol/L 105 103  --  92*   CO2 mmol/L 21.8* 23.1  --  23.6   BUN mg/dL 8 10  --  9   CREATININE mg/dL 1.12* 1.25*  --  1.29*   GLUCOSE mg/dL 248* 143*  --  589*       CMP   Results from last 7 days   Lab Units 06/19/24  0506 06/18/24  0605 06/17/24  1820 06/17/24  0928   SODIUM mmol/L 135* 134*  --   128*   POTASSIUM mmol/L 3.6 3.4* 3.1* 3.2*   CHLORIDE mmol/L 105 103  --  92*   CO2 mmol/L 21.8* 23.1  --  23.6   BUN mg/dL 8 10  --  9   CREATININE mg/dL 1.12* 1.25*  --  1.29*   GLUCOSE mg/dL 248* 143*  --  589*   ALBUMIN g/dL  --  2.9*  --  3.4*   BILIRUBIN mg/dL  --  0.2  --  0.4   ALK PHOS U/L  --  96  --  134*   AST (SGOT) U/L  --  10  --  11   ALT (SGPT) U/L  --  <5  --  5       BNP        TROPONIN  Results from last 7 days   Lab Units 06/17/24  1143   HSTROP T ng/L 48*       CoAg  Results from last 7 days   Lab Units 06/17/24  0928   INR  0.93   APTT seconds 26.2*       Creatinine Clearance  Estimated Creatinine Clearance: 63 mL/min (A) (by C-G formula based on SCr of 1.12 mg/dL (H)).    ABG        Radiology  MRI Brain With & Without Contrast    Result Date: 6/18/2024  Impression: 1.Stable small acute lacunar type infarct in the left parietal subcortical white matter. 2.Mild chronic small vessel ischemic change. Electronically Signed: Fritz Arroyo MD  6/18/2024 11:57 PM EDT  Workstation ID: [id number]       EKG  I personally viewed and interpreted the patient's EKG/Telemetry data:  ECG 12 Lead Altered Mental Status   Final Result   HEART RATE= 54  bpm   RR Interval= 1100  ms   ID Interval= 139  ms   P Horizontal Axis= -7  deg   P Front Axis= 50  deg   QRSD Interval= 98  ms   QT Interval= 489  ms   QTcB= 466  ms   QRS Axis= 46  deg   T Wave Axis= 169  deg   - ABNORMAL ECG -   Sinus bradycardia   Probable left atrial enlargement   LVH with secondary repolarization abnormality   Probable anterior infarct, age indeterminate   When compared with ECG of 20-Feb-2024 6:33:12,   No significant change   Electronically Signed By: Yunior Reynolds (Tony) 18-Jun-2024 06:17:07   Date and Time of Study: 2024-06-17 09:23:02      Telemetry Scan   Final Result      Telemetry Scan   Final Result      Telemetry Scan   Final Result      Telemetry Scan   Final Result      Telemetry Scan   Final Result      Telemetry Scan   Final  Result      Telemetry Scan   Final Result      Telemetry Scan   Final Result      Telemetry Scan   Final Result      Telemetry Scan   Final Result      Telemetry Scan   Final Result      Telemetry Scan   Final Result      Telemetry Scan   Final Result      Telemetry Scan   Final Result      Telemetry Scan   Final Result      Telemetry Scan   Final Result            Echocardiogram:    Results for orders placed during the hospital encounter of 02/19/24    Adult Transthoracic Echo Complete W/ Cont if Necessary Per Protocol    Interpretation Summary    Left ventricular systolic function is normal. Calculated left ventricular EF = 69% Left ventricular ejection fraction appears to be 61 - 65%.    Left ventricular diastolic function was normal.  GLS -16%.    Left atrial volume is moderately increased.    Estimated right ventricular systolic pressure from tricuspid regurgitation is normal (<35 mmHg).    No significant valvular abnormalities noted.        Stress Test:  Results for orders placed during the hospital encounter of 02/19/24    Stress Test With Myocardial Perfusion One Day    Interpretation Summary    Findings consistent with a normal ECG stress test.    (Calculated EF = 63%).    Myocardial perfusion imaging indicates a moderate-sized, severe area of ischemia located in the inferior wall and lateral wall.    Impressions are consistent with an intermediate risk study.         Cardiac Catheterization:  Results for orders placed during the hospital encounter of 02/19/24    Cardiac Catheterization/Vascular Study    Conclusion  OPERATORS  Jena Barron M.D. (Attending Cardiologist)      PROCEDURES PERFORMED  Ultrasound guided Vascular access  Left Heart Catheterization  Coronary Angiogram  Bypass angiography  Moderate sedation    INDICATIONS FOR PROCEDURE  Positive stress test    PROCEDURE IN DETAIL  Informed consent was obtained from the patient after explaining the risks, benefits, and alternative options of the  procedure. After obtaining informed consent, the patient was brought to the cath lab and was prepped in a sterile fashion. Lidocaine 2% was used for local anesthesia into the right femoral access site. The right femoral artery was accessed with a micropuncture needle via modified Seldinger technique under ultrasound guidance. A 6F sheath was inserted successfully. Afterwards, 6F JR4 and JL4 diagnostic catheters were advanced over a wire into the ascending aorta and were used to engage the ostia of the left main and RCA respectively. JR4 used to cross the AV and obtain LV pressures and gradient across the AV measured via pullback technique. Images of the right and left coronary systems were obtained. Images of the vein grafts and selective LIMA angiography was performed. All the catheters were exchanged over a wire and subsequently removed. Angiogram of the femoral access site was obtained and did not show complications. The patient tolerated the procedure well without any complications. The pictures were reviewed at the end of the procedure. An angioseal closure device was applied.    HEMODYNAMICS    LV: 157/5/11  AO: 157/64/99  Gradient: None    FINDINGS  Coronary Angiogram    Right dominant circulation    Left main: Left main is a large caliber vessel which gives rise to the Left Anterior Descending and the Left circumflex. No angiographically significant stenosis    Left Anterior Descending Artery: The LAD is  at the ostium    Left Circumflex: Lcx is a small caliber vessel which courses in the AV groove and gives rise to OM branches.  There is a high OM1 which further bifurcates.  There is a long 60 to 70% lesion in the proximal segment of this OM.  The circumflex is diffusely diseased with 60 to 70% stenosis in the proximal and mid segment all the way into OM 3.  There is an 80% lesion in the midsegment.    Right Coronary Artery: The RCA is  at the ostium    Bypass angiography  SVG to PDA is patent at the  origin, body, and insertion  SVG to diagonal is occluded at the origin  LIMA to LAD is widely patent at the origin, body, and insertion.  The LAD distal to the touchdown of the LIMA has 70 to 80% stenosis and is very small caliber.  LIMA does retrogradely fill a diagonal branch also.      Femoral angiography  The right SFA has a stent in its body with severe 80 to 90% ISR.    ESTIMATED BLOOD LOSS:  10 ml    COMPLICATIONS:  None    PROCEDURE DATA:  Contrast Used: 100 cc  Sedation Time: 30 minutes    IMPRESSIONS  Multivessel obstructive CAD of the native coronary arteries  Occluded vein graft to what appears to be a diagonal  There is diffuse CAD involving the left circumflex and OM which is not bypassed and there is also obstructive CAD distal to the LIMA touchdown  This is stable coronary artery disease  Low left heart filling pressures    RECOMMENDATIONS  Recommend medical management for her diffuse CAD in the setting of severely uncontrolled diabetes and smoking  Uptitration of OMT for stable CAD  Patient needs to be aggressively treated for her uncontrolled diabetes  Counseled extensively on smoking cessation  Start Plavix  Blood pressure control  Aggressive cholesterol control with statin with a goal LDL of less than 70         Other:         ASSESSMENT & PLAN:    Principal Problem:    Altered mental status  Active Problems:    Benign essential hypertension    Type 2 diabetes mellitus with hyperglycemia, with long-term current use of insulin    Tobacco abuse    Carotid stenosis, asymptomatic, right    Altered mental state  CT head with no acute intracranial abnormality.  Atherosclerosis in distal carotid and vertebral artery noted.  MRI shows 1.1 cm linear acute infarct within the left parieto-occipital region.  CTA shows extensive atherosclerotic disease with multifocal areas of stenoses as characterized above, most significantly including moderate, approximately 70%, stenosis at the origin of the right  cervical internal carotid artery and moderate to severe stenosis at   the origin of the superior segment of the right M2 segment MCA.   Neurology consultation  Continue dual antiplatelet therapy, high intensity statin  Mental status is back to baseline now.     Elevated troponin  Known coronary disease with history of CABG  Minimally elevated high-sensitivity troponin which is trending down  Troponin elevation secondary to underlying stable coronary disease, uncontrolled hypertension, chronic kidney disease and UTI  Echocardiogram shows preserved LV function.  Normal diastolic function and no significant valvular abnormalities.  Recent Cardiac catheterization showed significant coronary artery disease.  RCA , left circumflex with 80 to 90% stenosis in the proximal segment and mid segment.  Ramus with proximal 70 to 80% stenosis.  LAD is totally occluded  LIMA to LAD is patent however distal/apical LAD has 80% stenosis  Vein graft to left circumflex is occluded, vein graft to RCA is patent  She has significant burden of coronary disease which is not acute  Continue dual antiplatelet therapy, high intensity statin.  Recommended to focus on risk factors modification.  May have to be considered for redo CABG in future.     Hypertensive emergency  Continue hydralazine and nifedipine  Clonidine patch has been put on by primary  Metoprolol discontinued due to bradycardia.  Unable to give ACE or ARB because of chronic kidney disease     Diabetes  Uncontrolled diabetes with A1c of more than 16.9  Treatment with insulin per Glucomander  Emphasized compliance with medications.  Endocrinology consultation has been completed.  Jardiance has been added.  Completed IV antibiotics.  Switching to oral antibiotics.     Chronic kidney disease  Creatinine is 1.12, GFR is 61.5  Appears to be at baseline renal function  Followed by nephrology     Hyperlipidemia  She has uncontrolled hypertriglyceridemia with triglycerides of  >4000.  Repeat triglycerides 1117.  Started high intensity statin  High risk for pancreatitis  She will also need Vascepa which will be started if she follows up as outpatient.    Okay to discharge from cardiac standpoint.  Emphasized the importance of close follow-up with cardiology, endocrinology, primary care and nephrology.      Cristian Su MD  06/20/24  07:50 EDT
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
## 2024-06-20 NOTE — PROGRESS NOTES
"Subjective   Bibiana Inman is a 44 y.o. female.   Seen for diabetes f/u.  Feeling better        Objective     /81   Pulse 74   Temp 98.4 °F (36.9 °C) (Oral)   Resp 18   Ht 162.6 cm (64\")   Wt 63.6 kg (140 lb 4.8 oz)   LMP 01/10/2023 Comment: patient states irregular periods  SpO2 97%   BMI 24.08 kg/m²   Blood sugar  240 this am,  213 @ lunch, 162 @ supper    ASSESSMENT  Diabetes type 2, with hyperglycemia  Patient is Improving    PLAN    Continue same regimen.  Ok to discharge home from endocrinology standpoint.           Sparkle Auguste MD  6/19/2024  23:03 EDT    "
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
## 2024-06-20 NOTE — PLAN OF CARE
Fair shift spent, medicated as ordered, BS and BP monitored and treated accordingly, nil complaints voiced, NPO status maintained for procedure, left stable, care and observation continues    Goal Outcome Evaluation:    Problem: Adult Inpatient Plan of Care  Goal: Optimal Comfort and Wellbeing  Outcome: Ongoing, Progressing  Intervention: Provide Person-Centered Care  Recent Flowsheet Documentation  Taken 6/19/2024 1904 by Nishant Lund RN  Trust Relationship/Rapport:   care explained   choices provided   thoughts/feelings acknowledged     Problem: Cerebral Tissue Perfusion (Stroke, Ischemic/Transient Ischemic Attack)  Goal: Optimal Cerebral Tissue Perfusion  Outcome: Ongoing, Progressing  Intervention: Protect and Optimize Cerebral Perfusion  Recent Flowsheet Documentation  Taken 6/19/2024 1904 by Nishant Lund RN  Sensory Stimulation Regulation:   care clustered   television on  Cerebral Perfusion Promotion: blood pressure monitored     Problem: Functional Ability Impaired (Stroke, Ischemic/Transient Ischemic Attack)  Goal: Optimal Functional Ability  Outcome: Ongoing, Progressing  Intervention: Optimize Functional Ability  Recent Flowsheet Documentation  Taken 6/19/2024 1904 by Nishant Lund RN  Activity Management: up ad makenzie  Self-Care Promotion: BADL personal objects within reach     Problem: Hyperglycemia  Goal: Blood Glucose Level Within Targeted Range  Outcome: Ongoing, Progressing  Intervention: Optimize Glycemic Control  Recent Flowsheet Documentation  Taken 6/19/2024 1904 by Nishant Lund RN  Glycemic Management: blood glucose monitored     Problem: Hypertension Comorbidity  Goal: Blood Pressure in Desired Range  Outcome: Ongoing, Progressing  Intervention: Maintain Blood Pressure Management  Recent Flowsheet Documentation  Taken 6/20/2024 0600 by Nishant Lund RN  Medication Review/Management: medications reviewed  Taken 6/20/2024 0400 by Nishant Lund, ALEENA  Medication  Review/Management: medications reviewed  Taken 6/20/2024 0200 by Nishant Lund, RN  Medication Review/Management: medications reviewed  Taken 6/20/2024 0000 by Nishant Lund RN  Medication Review/Management: medications reviewed  Taken 6/19/2024 2200 by Nishant Lund, RN  Medication Review/Management: medications reviewed  Taken 6/19/2024 1904 by Nishant Lund, RN  Medication Review/Management: medications reviewed

## 2024-06-21 ENCOUNTER — APPOINTMENT (OUTPATIENT)
Dept: CT IMAGING | Facility: HOSPITAL | Age: 44
DRG: 981 | End: 2024-06-21
Payer: COMMERCIAL

## 2024-06-21 ENCOUNTER — APPOINTMENT (OUTPATIENT)
Dept: GENERAL RADIOLOGY | Facility: HOSPITAL | Age: 44
DRG: 981 | End: 2024-06-21
Payer: COMMERCIAL

## 2024-06-21 ENCOUNTER — HOSPITAL ENCOUNTER (INPATIENT)
Facility: HOSPITAL | Age: 44
LOS: 15 days | Discharge: HOME HEALTH | DRG: 981 | End: 2024-07-07
Attending: EMERGENCY MEDICINE | Admitting: INTERNAL MEDICINE
Payer: COMMERCIAL

## 2024-06-21 ENCOUNTER — READMISSION MANAGEMENT (OUTPATIENT)
Dept: CALL CENTER | Facility: HOSPITAL | Age: 44
End: 2024-06-21
Payer: COMMERCIAL

## 2024-06-21 ENCOUNTER — TELEPHONE (OUTPATIENT)
Dept: DIABETES SERVICES | Facility: HOSPITAL | Age: 44
End: 2024-06-21
Payer: COMMERCIAL

## 2024-06-21 DIAGNOSIS — N18.31 CKD STAGE 3A, GFR 45-59 ML/MIN: ICD-10-CM

## 2024-06-21 DIAGNOSIS — I25.110 CORONARY ARTERY DISEASE INVOLVING NATIVE CORONARY ARTERY OF NATIVE HEART WITH UNSTABLE ANGINA PECTORIS: ICD-10-CM

## 2024-06-21 DIAGNOSIS — I35.9 AORTIC VALVE DISEASE: ICD-10-CM

## 2024-06-21 DIAGNOSIS — R53.1 ACUTE RIGHT-SIDED WEAKNESS: Primary | ICD-10-CM

## 2024-06-21 DIAGNOSIS — E11.65 TYPE 2 DIABETES MELLITUS WITH HYPERGLYCEMIA, WITH LONG-TERM CURRENT USE OF INSULIN: Chronic | ICD-10-CM

## 2024-06-21 DIAGNOSIS — Z79.4 TYPE 2 DIABETES MELLITUS WITH HYPERGLYCEMIA, WITH LONG-TERM CURRENT USE OF INSULIN: Chronic | ICD-10-CM

## 2024-06-21 DIAGNOSIS — Z95.2 S/P AVR: ICD-10-CM

## 2024-06-21 DIAGNOSIS — I65.21 CAROTID STENOSIS, ASYMPTOMATIC, RIGHT: ICD-10-CM

## 2024-06-21 DIAGNOSIS — Z72.0 TOBACCO ABUSE: Chronic | ICD-10-CM

## 2024-06-21 LAB
ABO GROUP BLD: NORMAL
ALBUMIN SERPL-MCNC: 2.9 G/DL (ref 3.5–5.2)
ALBUMIN/GLOB SERPL: 1.2 G/DL
ALP SERPL-CCNC: 90 U/L (ref 39–117)
ALT SERPL W P-5'-P-CCNC: 9 U/L (ref 1–33)
ANION GAP SERPL CALCULATED.3IONS-SCNC: 11 MMOL/L (ref 5–15)
APTT PPP: 27.5 SECONDS (ref 22.7–35.4)
AST SERPL-CCNC: 12 U/L (ref 1–32)
BASOPHILS # BLD AUTO: 0.06 10*3/MM3 (ref 0–0.2)
BASOPHILS NFR BLD AUTO: 0.6 % (ref 0–1.5)
BILIRUB SERPL-MCNC: <0.2 MG/DL (ref 0–1.2)
BLD GP AB SCN SERPL QL: NEGATIVE
BUN SERPL-MCNC: 11 MG/DL (ref 6–20)
BUN/CREAT SERPL: 7.9 (ref 7–25)
CALCIUM SPEC-SCNC: 8.5 MG/DL (ref 8.6–10.5)
CHLORIDE SERPL-SCNC: 104 MMOL/L (ref 98–107)
CO2 SERPL-SCNC: 22 MMOL/L (ref 22–29)
CREAT SERPL-MCNC: 1.4 MG/DL (ref 0.57–1)
DEPRECATED RDW RBC AUTO: 48.5 FL (ref 37–54)
EGFRCR SERPLBLD CKD-EPI 2021: 47.1 ML/MIN/1.73
EOSINOPHIL # BLD AUTO: 0.14 10*3/MM3 (ref 0–0.4)
EOSINOPHIL NFR BLD AUTO: 1.3 % (ref 0.3–6.2)
ERYTHROCYTE [DISTWIDTH] IN BLOOD BY AUTOMATED COUNT: 14.2 % (ref 12.3–15.4)
GEN 5 2HR TROPONIN T REFLEX: 53 NG/L
GLOBULIN UR ELPH-MCNC: 2.5 GM/DL
GLUCOSE SERPL-MCNC: 197 MG/DL (ref 65–99)
HCT VFR BLD AUTO: 37.1 % (ref 34–46.6)
HGB BLD-MCNC: 12 G/DL (ref 12–15.9)
HOLD SPECIMEN: NORMAL
HOLD SPECIMEN: NORMAL
IMM GRANULOCYTES # BLD AUTO: 0.04 10*3/MM3 (ref 0–0.05)
IMM GRANULOCYTES NFR BLD AUTO: 0.4 % (ref 0–0.5)
INR PPP: 0.98 (ref 0.9–1.1)
LYMPHOCYTES # BLD AUTO: 2.29 10*3/MM3 (ref 0.7–3.1)
LYMPHOCYTES NFR BLD AUTO: 21.4 % (ref 19.6–45.3)
MCH RBC QN AUTO: 30.1 PG (ref 26.6–33)
MCHC RBC AUTO-ENTMCNC: 32.3 G/DL (ref 31.5–35.7)
MCV RBC AUTO: 93 FL (ref 79–97)
MONOCYTES # BLD AUTO: 0.59 10*3/MM3 (ref 0.1–0.9)
MONOCYTES NFR BLD AUTO: 5.5 % (ref 5–12)
NEUTROPHILS NFR BLD AUTO: 7.6 10*3/MM3 (ref 1.7–7)
NEUTROPHILS NFR BLD AUTO: 70.8 % (ref 42.7–76)
NRBC BLD AUTO-RTO: 0 /100 WBC (ref 0–0.2)
PLATELET # BLD AUTO: 245 10*3/MM3 (ref 140–450)
PMV BLD AUTO: 11 FL (ref 6–12)
POTASSIUM SERPL-SCNC: 3.8 MMOL/L (ref 3.5–5.2)
PROT SERPL-MCNC: 5.4 G/DL (ref 6–8.5)
PROTHROMBIN TIME: 13.2 SECONDS (ref 11.7–14.2)
RBC # BLD AUTO: 3.99 10*6/MM3 (ref 3.77–5.28)
RH BLD: POSITIVE
SODIUM SERPL-SCNC: 137 MMOL/L (ref 136–145)
T&S EXPIRATION DATE: NORMAL
TROPONIN T DELTA: -3 NG/L
TROPONIN T SERPL HS-MCNC: 56 NG/L
WBC NRBC COR # BLD AUTO: 10.72 10*3/MM3 (ref 3.4–10.8)
WHOLE BLOOD HOLD COAG: NORMAL
WHOLE BLOOD HOLD SPECIMEN: NORMAL

## 2024-06-21 PROCEDURE — 82565 ASSAY OF CREATININE: CPT

## 2024-06-21 PROCEDURE — 86901 BLOOD TYPING SEROLOGIC RH(D): CPT | Performed by: EMERGENCY MEDICINE

## 2024-06-21 PROCEDURE — 25810000003 SODIUM CHLORIDE 0.9 % SOLUTION: Performed by: EMERGENCY MEDICINE

## 2024-06-21 PROCEDURE — 25510000001 IOPAMIDOL PER 1 ML: Performed by: EMERGENCY MEDICINE

## 2024-06-21 PROCEDURE — 85025 COMPLETE CBC W/AUTO DIFF WBC: CPT | Performed by: EMERGENCY MEDICINE

## 2024-06-21 PROCEDURE — 85610 PROTHROMBIN TIME: CPT | Performed by: EMERGENCY MEDICINE

## 2024-06-21 PROCEDURE — 70498 CT ANGIOGRAPHY NECK: CPT

## 2024-06-21 PROCEDURE — 85730 THROMBOPLASTIN TIME PARTIAL: CPT | Performed by: EMERGENCY MEDICINE

## 2024-06-21 PROCEDURE — 93005 ELECTROCARDIOGRAM TRACING: CPT | Performed by: EMERGENCY MEDICINE

## 2024-06-21 PROCEDURE — 71045 X-RAY EXAM CHEST 1 VIEW: CPT

## 2024-06-21 PROCEDURE — 93010 ELECTROCARDIOGRAM REPORT: CPT | Performed by: INTERNAL MEDICINE

## 2024-06-21 PROCEDURE — 86850 RBC ANTIBODY SCREEN: CPT | Performed by: EMERGENCY MEDICINE

## 2024-06-21 PROCEDURE — 86900 BLOOD TYPING SEROLOGIC ABO: CPT | Performed by: EMERGENCY MEDICINE

## 2024-06-21 PROCEDURE — 36415 COLL VENOUS BLD VENIPUNCTURE: CPT

## 2024-06-21 PROCEDURE — 84484 ASSAY OF TROPONIN QUANT: CPT | Performed by: EMERGENCY MEDICINE

## 2024-06-21 PROCEDURE — 80053 COMPREHEN METABOLIC PANEL: CPT | Performed by: EMERGENCY MEDICINE

## 2024-06-21 PROCEDURE — 0042T HC CT CEREBRAL PERFUSION W/WO CONTRAST: CPT

## 2024-06-21 PROCEDURE — 99291 CRITICAL CARE FIRST HOUR: CPT

## 2024-06-21 PROCEDURE — 70496 CT ANGIOGRAPHY HEAD: CPT

## 2024-06-21 RX ORDER — SODIUM CHLORIDE 0.9 % (FLUSH) 0.9 %
10 SYRINGE (ML) INJECTION AS NEEDED
Status: DISCONTINUED | OUTPATIENT
Start: 2024-06-21 | End: 2024-07-01

## 2024-06-21 RX ADMIN — SODIUM CHLORIDE 1000 ML: 9 INJECTION, SOLUTION INTRAVENOUS at 21:44

## 2024-06-21 RX ADMIN — IOPAMIDOL 150 ML: 755 INJECTION, SOLUTION INTRAVENOUS at 21:36

## 2024-06-21 NOTE — OUTREACH NOTE
Prep Survey      Flowsheet Row Responses   Religion facility patient discharged from? [facility]   Is LACE score < 7 ? No   Eligibility Readm Mgmt   Discharge diagnosis Acute cerebrovascular accident   Does the patient have one of the following disease processes/diagnoses(primary or secondary)? Stroke   Does the patient have Home health ordered? No   Is there a DME ordered? No   Prep survey completed? Yes            AMAYA KNOX - Registered Nurse

## 2024-06-22 ENCOUNTER — APPOINTMENT (OUTPATIENT)
Dept: MRI IMAGING | Facility: HOSPITAL | Age: 44
DRG: 981 | End: 2024-06-22
Payer: COMMERCIAL

## 2024-06-22 PROBLEM — I65.29 CAROTID STENOSIS: Status: ACTIVE | Noted: 2024-06-22

## 2024-06-22 PROBLEM — R79.89 ELEVATED TROPONIN: Status: ACTIVE | Noted: 2024-06-22

## 2024-06-22 PROBLEM — I63.81: Status: ACTIVE | Noted: 2024-06-22

## 2024-06-22 PROBLEM — N18.31 CKD STAGE 3A, GFR 45-59 ML/MIN: Status: ACTIVE | Noted: 2024-06-22

## 2024-06-22 LAB
ANION GAP SERPL CALCULATED.3IONS-SCNC: 8.7 MMOL/L (ref 5–15)
BASOPHILS # BLD AUTO: 0.08 10*3/MM3 (ref 0–0.2)
BASOPHILS NFR BLD AUTO: 0.7 % (ref 0–1.5)
BUN SERPL-MCNC: 10 MG/DL (ref 6–20)
BUN/CREAT SERPL: 8.1 (ref 7–25)
CALCIUM SPEC-SCNC: 8.4 MG/DL (ref 8.6–10.5)
CHLORIDE SERPL-SCNC: 106 MMOL/L (ref 98–107)
CHOLEST SERPL-MCNC: 245 MG/DL (ref 0–200)
CO2 SERPL-SCNC: 22.3 MMOL/L (ref 22–29)
CREAT SERPL-MCNC: 1.23 MG/DL (ref 0.57–1)
DEPRECATED RDW RBC AUTO: 47.1 FL (ref 37–54)
EGFRCR SERPLBLD CKD-EPI 2021: 55 ML/MIN/1.73
EOSINOPHIL # BLD AUTO: 0.18 10*3/MM3 (ref 0–0.4)
EOSINOPHIL NFR BLD AUTO: 1.6 % (ref 0.3–6.2)
ERYTHROCYTE [DISTWIDTH] IN BLOOD BY AUTOMATED COUNT: 14.1 % (ref 12.3–15.4)
GLUCOSE BLDC GLUCOMTR-MCNC: 112 MG/DL (ref 70–130)
GLUCOSE BLDC GLUCOMTR-MCNC: 135 MG/DL (ref 70–130)
GLUCOSE BLDC GLUCOMTR-MCNC: 176 MG/DL (ref 70–130)
GLUCOSE SERPL-MCNC: 56 MG/DL (ref 65–99)
HCT VFR BLD AUTO: 35.7 % (ref 34–46.6)
HDLC SERPL-MCNC: 31 MG/DL (ref 40–60)
HGB BLD-MCNC: 11.5 G/DL (ref 12–15.9)
IMM GRANULOCYTES # BLD AUTO: 0.05 10*3/MM3 (ref 0–0.05)
IMM GRANULOCYTES NFR BLD AUTO: 0.4 % (ref 0–0.5)
LDLC SERPL CALC-MCNC: 108 MG/DL (ref 0–100)
LDLC/HDLC SERPL: 2.97 {RATIO}
LYMPHOCYTES # BLD AUTO: 3.45 10*3/MM3 (ref 0.7–3.1)
LYMPHOCYTES NFR BLD AUTO: 29.8 % (ref 19.6–45.3)
MCH RBC QN AUTO: 29.6 PG (ref 26.6–33)
MCHC RBC AUTO-ENTMCNC: 32.2 G/DL (ref 31.5–35.7)
MCV RBC AUTO: 92 FL (ref 79–97)
MONOCYTES # BLD AUTO: 0.88 10*3/MM3 (ref 0.1–0.9)
MONOCYTES NFR BLD AUTO: 7.6 % (ref 5–12)
NEUTROPHILS NFR BLD AUTO: 59.9 % (ref 42.7–76)
NEUTROPHILS NFR BLD AUTO: 6.94 10*3/MM3 (ref 1.7–7)
NRBC BLD AUTO-RTO: 0 /100 WBC (ref 0–0.2)
PLATELET # BLD AUTO: 265 10*3/MM3 (ref 140–450)
PMV BLD AUTO: 11.2 FL (ref 6–12)
POTASSIUM SERPL-SCNC: 3.5 MMOL/L (ref 3.5–5.2)
RBC # BLD AUTO: 3.88 10*6/MM3 (ref 3.77–5.28)
SODIUM SERPL-SCNC: 137 MMOL/L (ref 136–145)
TRIGL SERPL-MCNC: 610 MG/DL (ref 0–150)
VIT B12 BLD-MCNC: 1216 PG/ML (ref 211–946)
VLDLC SERPL-MCNC: 106 MG/DL (ref 5–40)
WBC NRBC COR # BLD AUTO: 11.58 10*3/MM3 (ref 3.4–10.8)

## 2024-06-22 PROCEDURE — 82948 REAGENT STRIP/BLOOD GLUCOSE: CPT

## 2024-06-22 PROCEDURE — 25810000003 SODIUM CHLORIDE 0.9 % SOLUTION: Performed by: NURSE PRACTITIONER

## 2024-06-22 PROCEDURE — 80048 BASIC METABOLIC PNL TOTAL CA: CPT | Performed by: STUDENT IN AN ORGANIZED HEALTH CARE EDUCATION/TRAINING PROGRAM

## 2024-06-22 PROCEDURE — 82607 VITAMIN B-12: CPT | Performed by: STUDENT IN AN ORGANIZED HEALTH CARE EDUCATION/TRAINING PROGRAM

## 2024-06-22 PROCEDURE — 63710000001 INSULIN GLARGINE PER 5 UNITS: Performed by: NURSE PRACTITIONER

## 2024-06-22 PROCEDURE — 99222 1ST HOSP IP/OBS MODERATE 55: CPT | Performed by: STUDENT IN AN ORGANIZED HEALTH CARE EDUCATION/TRAINING PROGRAM

## 2024-06-22 PROCEDURE — 63710000001 INSULIN LISPRO (HUMAN) PER 5 UNITS: Performed by: NURSE PRACTITIONER

## 2024-06-22 PROCEDURE — 70551 MRI BRAIN STEM W/O DYE: CPT

## 2024-06-22 PROCEDURE — 80061 LIPID PANEL: CPT | Performed by: NURSE PRACTITIONER

## 2024-06-22 PROCEDURE — 85025 COMPLETE CBC W/AUTO DIFF WBC: CPT | Performed by: STUDENT IN AN ORGANIZED HEALTH CARE EDUCATION/TRAINING PROGRAM

## 2024-06-22 PROCEDURE — 36415 COLL VENOUS BLD VENIPUNCTURE: CPT | Performed by: STUDENT IN AN ORGANIZED HEALTH CARE EDUCATION/TRAINING PROGRAM

## 2024-06-22 RX ORDER — ICOSAPENT ETHYL 1 G/1
2 CAPSULE ORAL 2 TIMES DAILY WITH MEALS
Status: DISCONTINUED | OUTPATIENT
Start: 2024-06-22 | End: 2024-06-22

## 2024-06-22 RX ORDER — CLOPIDOGREL BISULFATE 75 MG/1
75 TABLET ORAL DAILY
Status: DISCONTINUED | OUTPATIENT
Start: 2024-06-22 | End: 2024-06-25

## 2024-06-22 RX ORDER — ASPIRIN 325 MG
325 TABLET ORAL DAILY
Status: DISCONTINUED | OUTPATIENT
Start: 2024-06-22 | End: 2024-07-01

## 2024-06-22 RX ORDER — ONDANSETRON 2 MG/ML
4 INJECTION INTRAMUSCULAR; INTRAVENOUS EVERY 6 HOURS PRN
Status: DISCONTINUED | OUTPATIENT
Start: 2024-06-22 | End: 2024-07-01

## 2024-06-22 RX ORDER — POLYETHYLENE GLYCOL 3350 17 G/17G
17 POWDER, FOR SOLUTION ORAL DAILY PRN
Status: DISCONTINUED | OUTPATIENT
Start: 2024-06-22 | End: 2024-07-01

## 2024-06-22 RX ORDER — ALBUTEROL SULFATE 2.5 MG/3ML
2.5 SOLUTION RESPIRATORY (INHALATION) EVERY 6 HOURS PRN
Status: DISCONTINUED | OUTPATIENT
Start: 2024-06-22 | End: 2024-07-01

## 2024-06-22 RX ORDER — IBUPROFEN 600 MG/1
1 TABLET ORAL
Status: DISCONTINUED | OUTPATIENT
Start: 2024-06-22 | End: 2024-07-01 | Stop reason: SDUPTHER

## 2024-06-22 RX ORDER — ATORVASTATIN CALCIUM 80 MG/1
80 TABLET, FILM COATED ORAL NIGHTLY
Status: DISCONTINUED | OUTPATIENT
Start: 2024-06-22 | End: 2024-07-01

## 2024-06-22 RX ORDER — ICOSAPENT ETHYL 1 G/1
2 CAPSULE ORAL 2 TIMES DAILY WITH MEALS
Status: DISCONTINUED | OUTPATIENT
Start: 2024-06-22 | End: 2024-06-25

## 2024-06-22 RX ORDER — SODIUM CHLORIDE 9 MG/ML
100 INJECTION, SOLUTION INTRAVENOUS CONTINUOUS
Status: DISCONTINUED | OUTPATIENT
Start: 2024-06-22 | End: 2024-06-25

## 2024-06-22 RX ORDER — DEXTROSE MONOHYDRATE 25 G/50ML
25 INJECTION, SOLUTION INTRAVENOUS
Status: DISCONTINUED | OUTPATIENT
Start: 2024-06-22 | End: 2024-07-01

## 2024-06-22 RX ORDER — AMOXICILLIN 250 MG
2 CAPSULE ORAL 2 TIMES DAILY PRN
Status: DISCONTINUED | OUTPATIENT
Start: 2024-06-22 | End: 2024-07-01

## 2024-06-22 RX ORDER — BISACODYL 10 MG
10 SUPPOSITORY, RECTAL RECTAL DAILY PRN
Status: DISCONTINUED | OUTPATIENT
Start: 2024-06-22 | End: 2024-07-01

## 2024-06-22 RX ORDER — PANTOPRAZOLE SODIUM 40 MG/1
40 TABLET, DELAYED RELEASE ORAL EVERY MORNING
Status: DISCONTINUED | OUTPATIENT
Start: 2024-06-22 | End: 2024-07-01

## 2024-06-22 RX ORDER — ONDANSETRON 4 MG/1
4 TABLET, ORALLY DISINTEGRATING ORAL EVERY 6 HOURS PRN
Status: DISCONTINUED | OUTPATIENT
Start: 2024-06-22 | End: 2024-07-01

## 2024-06-22 RX ORDER — ASPIRIN 300 MG/1
300 SUPPOSITORY RECTAL DAILY
Status: DISCONTINUED | OUTPATIENT
Start: 2024-06-22 | End: 2024-07-01

## 2024-06-22 RX ORDER — INSULIN LISPRO 100 [IU]/ML
2-7 INJECTION, SOLUTION INTRAVENOUS; SUBCUTANEOUS
Status: DISCONTINUED | OUTPATIENT
Start: 2024-06-22 | End: 2024-06-27

## 2024-06-22 RX ORDER — ESCITALOPRAM OXALATE 10 MG/1
10 TABLET ORAL DAILY
Status: DISCONTINUED | OUTPATIENT
Start: 2024-06-22 | End: 2024-07-01

## 2024-06-22 RX ORDER — SODIUM CHLORIDE 0.9 % (FLUSH) 0.9 %
10 SYRINGE (ML) INJECTION AS NEEDED
Status: DISCONTINUED | OUTPATIENT
Start: 2024-06-22 | End: 2024-07-01

## 2024-06-22 RX ORDER — BISACODYL 5 MG/1
5 TABLET, DELAYED RELEASE ORAL DAILY PRN
Status: DISCONTINUED | OUTPATIENT
Start: 2024-06-22 | End: 2024-07-01

## 2024-06-22 RX ORDER — SODIUM CHLORIDE 0.9 % (FLUSH) 0.9 %
10 SYRINGE (ML) INJECTION EVERY 12 HOURS SCHEDULED
Status: DISCONTINUED | OUTPATIENT
Start: 2024-06-22 | End: 2024-07-01

## 2024-06-22 RX ORDER — NITROGLYCERIN 0.4 MG/1
0.4 TABLET SUBLINGUAL
Status: DISCONTINUED | OUTPATIENT
Start: 2024-06-22 | End: 2024-07-01

## 2024-06-22 RX ORDER — NICOTINE 21 MG/24HR
1 PATCH, TRANSDERMAL 24 HOURS TRANSDERMAL EVERY 24 HOURS
Status: DISCONTINUED | OUTPATIENT
Start: 2024-06-22 | End: 2024-07-01

## 2024-06-22 RX ORDER — NICOTINE POLACRILEX 4 MG
15 LOZENGE BUCCAL
Status: DISCONTINUED | OUTPATIENT
Start: 2024-06-22 | End: 2024-07-01 | Stop reason: SDUPTHER

## 2024-06-22 RX ORDER — SODIUM CHLORIDE 9 MG/ML
40 INJECTION, SOLUTION INTRAVENOUS AS NEEDED
Status: DISCONTINUED | OUTPATIENT
Start: 2024-06-22 | End: 2024-07-01

## 2024-06-22 RX ADMIN — INSULIN GLARGINE 20 UNITS: 100 INJECTION, SOLUTION SUBCUTANEOUS at 10:06

## 2024-06-22 RX ADMIN — Medication 10 ML: at 04:36

## 2024-06-22 RX ADMIN — ESCITALOPRAM OXALATE 10 MG: 10 TABLET, FILM COATED ORAL at 10:06

## 2024-06-22 RX ADMIN — ASPIRIN 325 MG: 325 TABLET ORAL at 10:05

## 2024-06-22 RX ADMIN — ATORVASTATIN CALCIUM 80 MG: 80 TABLET, FILM COATED ORAL at 04:26

## 2024-06-22 RX ADMIN — Medication 10 ML: at 20:38

## 2024-06-22 RX ADMIN — SODIUM CHLORIDE 100 ML/HR: 9 INJECTION, SOLUTION INTRAVENOUS at 14:46

## 2024-06-22 RX ADMIN — PANTOPRAZOLE SODIUM 40 MG: 40 TABLET, DELAYED RELEASE ORAL at 10:06

## 2024-06-22 RX ADMIN — ATORVASTATIN CALCIUM 80 MG: 80 TABLET, FILM COATED ORAL at 20:38

## 2024-06-22 RX ADMIN — SODIUM CHLORIDE 100 ML/HR: 9 INJECTION, SOLUTION INTRAVENOUS at 04:25

## 2024-06-22 RX ADMIN — CLOPIDOGREL BISULFATE 75 MG: 75 TABLET, FILM COATED ORAL at 10:06

## 2024-06-22 RX ADMIN — INSULIN LISPRO 2 UNITS: 100 INJECTION, SOLUTION INTRAVENOUS; SUBCUTANEOUS at 18:30

## 2024-06-22 RX ADMIN — Medication 10 ML: at 10:07

## 2024-06-22 NOTE — ED PROVIDER NOTES
EMERGENCY DEPARTMENT ENCOUNTER    Room Number:  32/32  Date seen:  6/21/2024  PCP: Ibrahima Correa MD  Historian: Patient and EMS      HPI:  Chief Complaint: Strokelike symptoms  Context: Bibiana Inman is a 44 y.o. female who presents to the ED via EMS from home for strokelike symptoms.  The patient was admitted to [facility] earlier this week for a stroke.  The patient is on Plavix.  Per EMS and the patient she was doing well today when she developed acute onset of right-sided weakness approxi-7:30 PM tonight.  EMS states the patient was flaccid on the right side on arrival and she now has some movement back.  Patient denies headache, chest pain or shortness of breath.      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    Abnormal bone density screening 11/11/2012    Benign essential hypertension 08/24/2016    Chronic cough 03/17/2015    Diabetic gastroparesis 07/20/2021    Elevated erythrocyte sedimentation rate 07/20/2021    Fatigue 07/20/2021    Gastroesophageal reflux disease 03/10/2021    Mixed hypercholesterolemia and hypertriglyceridemia 03/07/2014    Intermittent claudication 04/17/2017    Iron deficiency anemia 03/02/2020    Multiple joint pain 07/20/2021    Need for influenza vaccination 11/01/2017    Type 2 diabetes mellitus with hyperglycemia, with long-term current use of insulin 05/16/2017    Obstructive sleep apnea 12/14/2021    Coronary artery disease involving native coronary artery of native heart with unstable angina pectoris 02/19/2024    Abnormal nuclear stress test 02/19/2024    Depressive disorder 08/01/2023    Back pain 06/06/2023    Diabetic peripheral neuropathy associated with type 2 diabetes mellitus 02/20/2024    Ingrowing nail, left great toe 02/20/2024    Personal history of COVID-19 05/31/2022    Polyp of colon  2023    Vitamin D deficiency 2024    Tobacco abuse 2024    Unstable angina 2024    Altered mental status 2024    Carotid stenosis, asymptomatic, right 2024     Resolved Ambulatory Problems     Diagnosis Date Noted    Hypothyroidism 2014    Spontaneous  2012    Myocardial infarction 2021    Obesity 2014    Stress fracture of right ankle with routine healing 2024     Past Medical History:   Diagnosis Date    Coronary arteriosclerosis 2014    GERD (gastroesophageal reflux disease)          REVIEW OF SYSTEMS  All systems reviewed and negative except for those discussed in HPI.       PAST SURGICAL HISTORY  Past Surgical History:   Procedure Laterality Date    CARDIAC CATHETERIZATION N/A 2024    Procedure: Left Heart Cath and coronary angiogram;  Surgeon: Jena Barron MD;  Location: [facility] CATH INVASIVE LOCATION;  Service: Cardiology;  Laterality: N/A;     SECTION      CORONARY ARTERY BYPASS GRAFT  2014    LIMA-LAD, SVG-OM2, SVG-PDA, Dr. Debbie Fry.    DILATATION AND CURETTAGE      TONSILLECTOMY           FAMILY HISTORY  Family History   Family history unknown: Yes         SOCIAL HISTORY  Social History     Socioeconomic History    Marital status:    Tobacco Use    Smoking status: Every Day     Current packs/day: 0.25     Average packs/day: 0.3 packs/day for 15.0 years (3.8 ttl pk-yrs)     Types: Cigarettes    Smokeless tobacco: Never   Vaping Use    Vaping status: Never Used   Substance and Sexual Activity    Alcohol use: Defer    Drug use: Never    Sexual activity: Defer         ALLERGIES  Latex      PHYSICAL EXAM  ED Triage Vitals   Temp Pulse Resp BP SpO2   -- -- -- -- --      Temp src Heart Rate Source Patient Position BP Location FiO2 (%)   -- -- -- -- --       Physical Exam      GENERAL: 44-year-old female in mild distress  HENT: NCAT: nares patent: Neck supple  EYES: no scleral icterus  CV: regular  rhythm, normal rate  RESPIRATORY: normal effort  ABDOMEN: soft, NTND: Bowel sounds positive  MUSCULOSKELETAL: no deformity  NEURO: alert: See NIHSS  PSYCH:  calm, cooperative  SKIN: warm, dry    Vital signs and nursing notes reviewed.      LAB RESULTS  Recent Results (from the past 24 hour(s))   Comprehensive Metabolic Panel    Collection Time: 06/21/24  9:06 PM    Specimen: Blood   Result Value Ref Range    Glucose 197 (H) 65 - 99 mg/dL    BUN 11 6 - 20 mg/dL    Creatinine 1.40 (H) 0.57 - 1.00 mg/dL    Sodium 137 136 - 145 mmol/L    Potassium 3.8 3.5 - 5.2 mmol/L    Chloride 104 98 - 107 mmol/L    CO2 22.0 22.0 - 29.0 mmol/L    Calcium 8.5 (L) 8.6 - 10.5 mg/dL    Total Protein 5.4 (L) 6.0 - 8.5 g/dL    Albumin 2.9 (L) 3.5 - 5.2 g/dL    ALT (SGPT) 9 1 - 33 U/L    AST (SGOT) 12 1 - 32 U/L    Alkaline Phosphatase 90 39 - 117 U/L    Total Bilirubin <0.2 0.0 - 1.2 mg/dL    Globulin 2.5 gm/dL    A/G Ratio 1.2 g/dL    BUN/Creatinine Ratio 7.9 7.0 - 25.0    Anion Gap 11.0 5.0 - 15.0 mmol/L    eGFR 47.1 (L) >60.0 mL/min/1.73   Protime-INR    Collection Time: 06/21/24  9:06 PM    Specimen: Blood   Result Value Ref Range    Protime 13.2 11.7 - 14.2 Seconds    INR 0.98 0.90 - 1.10   aPTT    Collection Time: 06/21/24  9:06 PM    Specimen: Blood   Result Value Ref Range    PTT 27.5 22.7 - 35.4 seconds   Single High Sensitivity Troponin T    Collection Time: 06/21/24  9:06 PM    Specimen: Blood   Result Value Ref Range    HS Troponin T 56 (C) <14 ng/L   Type & Screen    Collection Time: 06/21/24  9:06 PM    Specimen: Blood   Result Value Ref Range    ABO Type O     RH type Positive     Antibody Screen Negative     T&S Expiration Date 6/24/2024 11:59:59 PM    Green Top (Gel)    Collection Time: 06/21/24  9:06 PM   Result Value Ref Range    Extra Tube Hold for add-ons.    Lavender Top    Collection Time: 06/21/24  9:06 PM   Result Value Ref Range    Extra Tube hold for add-on    Gold Top - SST    Collection Time: 06/21/24  9:06 PM    Result Value Ref Range    Extra Tube Hold for add-ons.    Light Blue Top    Collection Time: 06/21/24  9:06 PM   Result Value Ref Range    Extra Tube Hold for add-ons.    CBC Auto Differential    Collection Time: 06/21/24  9:06 PM    Specimen: Blood   Result Value Ref Range    WBC 10.72 3.40 - 10.80 10*3/mm3    RBC 3.99 3.77 - 5.28 10*6/mm3    Hemoglobin 12.0 12.0 - 15.9 g/dL    Hematocrit 37.1 34.0 - 46.6 %    MCV 93.0 79.0 - 97.0 fL    MCH 30.1 26.6 - 33.0 pg    MCHC 32.3 31.5 - 35.7 g/dL    RDW 14.2 12.3 - 15.4 %    RDW-SD 48.5 37.0 - 54.0 fl    MPV 11.0 6.0 - 12.0 fL    Platelets 245 140 - 450 10*3/mm3    Neutrophil % 70.8 42.7 - 76.0 %    Lymphocyte % 21.4 19.6 - 45.3 %    Monocyte % 5.5 5.0 - 12.0 %    Eosinophil % 1.3 0.3 - 6.2 %    Basophil % 0.6 0.0 - 1.5 %    Immature Grans % 0.4 0.0 - 0.5 %    Neutrophils, Absolute 7.60 (H) 1.70 - 7.00 10*3/mm3    Lymphocytes, Absolute 2.29 0.70 - 3.10 10*3/mm3    Monocytes, Absolute 0.59 0.10 - 0.90 10*3/mm3    Eosinophils, Absolute 0.14 0.00 - 0.40 10*3/mm3    Basophils, Absolute 0.06 0.00 - 0.20 10*3/mm3    Immature Grans, Absolute 0.04 0.00 - 0.05 10*3/mm3    nRBC 0.0 0.0 - 0.2 /100 WBC   ECG 12 Lead Stroke Evaluation    Collection Time: 06/21/24  9:39 PM   Result Value Ref Range    QT Interval 466 ms    QTC Interval 470 ms       Ordered the above labs and reviewed the results.        RADIOLOGY  XR Chest 1 View    Result Date: 6/21/2024  SINGLE VIEW OF THE CHEST  HISTORY: Acute stroke protocol  COMPARISON: June 17, 2024  FINDINGS: There is cardiomegaly. There is no vascular congestion. No pneumothorax, pleural effusion, or acute infiltrate is seen. Patient is status post median sternotomy with coronary artery bypass grafting.      No acute findings.  This report was finalized on 6/21/2024 10:25 PM by Dr. Marian Thompson M.D on Workstation: [id number]       Ordered the above noted radiological studies. Reviewed by me in PACS.            PROCEDURES  Critical  Care    Performed by: Huan Lamb MD  Authorized by: Huan Lamb MD    Critical care provider statement:     Critical care time (minutes):  49    Critical care time was exclusive of:  Separately billable procedures and treating other patients    Critical care was necessary to treat or prevent imminent or life-threatening deterioration of the following conditions:  CNS failure or compromise    Critical care was time spent personally by me on the following activities:  Discussions with consultants, discussions with primary provider, evaluation of patient's response to treatment, examination of patient, obtaining history from patient or surrogate, ordering and performing treatments and interventions, ordering and review of laboratory studies, ordering and review of radiographic studies, pulse oximetry, re-evaluation of patient's condition and review of old charts    I assumed direction of critical care for this patient from another provider in my specialty: no      Care discussed with: admitting provider              MEDICATIONS GIVEN IN ER  Medications   sodium chloride 0.9 % flush 10 mL (has no administration in time range)   sodium chloride 0.9 % bolus 1,000 mL (0 mL Intravenous Stopped 6/21/24 2235)   iopamidol (ISOVUE-370) 76 % injection 150 mL (150 mL Intravenous Given 6/21/24 2136)             MEDICAL DECISION MAKING, PROGRESS, and CONSULTS    All labs have been independently reviewed by me.  All radiology studies have been reviewed by me and I have also reviewed the radiology report.   EKG's independently viewed and interpreted by me.  Discussion below represents my analysis of pertinent findings related to patient's condition, differential diagnosis, treatment plan and final disposition.      Additional sources:  - Discussed/ obtained information from independent historians: EMS states that bystanders state the patient had acute onset of symptoms at 7:30 PM tonight    - External (non-ED) record  review: I reviewed the patient admission to [facility] from 6/17 through 6/20 where she was admitted for acute CVA.  She has a history of hypertensive emergency, insulin-dependent diabetes, carotid atherosclerosis, coronary artery disease, CABG and smoking.    - Chronic or social conditions impacting care: Patient was at home with family    - Shared decision making: After shared decision-making discussion she myself, the patient and the stroke team we will admit her to the hospital for further evaluation and care      Orders placed during this visit:  Orders Placed This Encounter   Procedures    Critical Care    CT Angiogram Head w AI Analysis of LVO    CT Angiogram Neck    CT CEREBRAL PERFUSION WITH & WITHOUT CONTRAST    XR Chest 1 View    [place] Draw    Comprehensive Metabolic Panel    Protime-INR    aPTT    Single High Sensitivity Troponin T    CBC Auto Differential    High Sensitivity Troponin T 2Hr    NPO Diet NPO Type: Strict NPO    Initiate Department's Acute Stroke Process (Team D, Code 19, etc.)    Perform NIH Stroke Scale    Measure Actual Weight    Notify Provider    Notify Provider for SBP Greater Than 140 for Hemorrhagic Stroke Patient    Head of Bed 30 Degrees or Less    Undress and Gown    Continuous Pulse Oximetry    Vital Signs    Neuro Checks    No Hypotonic Fluids    Nursing Dysphagia Screening (Complete Prior to Giving anything PO)    RN to Place Order SLP Consult (IF swallow screen failed) - Eval & Treat Choosing Reason of RN Dysphagia Screen Failed    Inpatient Neurology Consult Stroke    Inpatient Neurology Consult Stroke    LHA (on-call MD unless specified) Details    Oxygen Therapy- Nasal Cannula; Titrate 1-6 LPM Per SpO2; 90 - 95%    SLP Consult: Eval & Treat RN Dysphagia Screen Failed    POC Glucose Once    ECG 12 Lead Stroke Evaluation    Type & Screen    Insert Large-Bore Peripheral IV - RIGHT AC Preferred    Inpatient Admission    CBC & Differential    Green Top (Gel)     Lavender Top    Gold Top - SST    Light Blue Top         Differential diagnosis:  My differential diagnosis includes but is not limited to stroke, encephalopathy, toxic / metabolic, hypoglycemia, toxin-induced, psychogenic, medication-induced, or infectious.      Independent interpretation of labs, radiology studies, and discussions with consultants:  ED Course as of 06/21/24 2258 Fri Jun 21, 2024 2107 NIHSS:  0-->Alert: keenly responsive  0-->Answers both questions correctly  0-->Performs both tasks correctly  0=normal  0=No visual loss  1=Minor paralysis (flattened nasolabial fold, asymmetric on smiling)  0-->No drift: limb holds 90 (or 45) degrees for full 10 secs  1-->Drift: limb holds 90 (or 45) degrees, but drifts down before full 10 seconds: does not hit bed or other support  0-->No drift: limb holds 90 (or 45) degrees for full 10 secs  1-->Drift: limb holds 90 (or 45) degrees, but drifts down before full 10 seconds: does not hit bed or other support  1=Present in one limb  1=Mild to moderate sensory loss; patient feels pinprick is less sharp or is dull on the affected side; there is a loss of superficial pain with pinprick but patient is aware She is being touched  1-->Mild-to-moderate aphasia: some obvious loss of fluency or facility of comprehension, without significant limitation on ideas expressed or form of expression. Reduction of speech and/or comprehension, however, makes conversation.  1=Mild to moderate, patient slurs at least some words and at worst, can be understood with some difficulty  0=No abnormality  Total score: 7 [GP]   2110 I discussed the case with Dr. Rojas from the stroke team.  He is aware the patient was just discharged from [facility] after a stroke this week.  Thus the patient is not a thrombolytic candidate with her recent stroke.  He states she could be a surgical candidate depending on the results of her CTA and CTP.  With the patient systolic blood pressure of  105 he is recommending a 1 L normal saline bolus.  He is aware that we are performing a team MD on this patient. [GP]   2139 Troponin is 56 and it was in the 60s 4 days ago [GP]   2139 I have just returned from a floor code and not heard anything from radiology or the stroke team at this time [GP]   2149 Currently the patient is back in the room with a systolic blood pressure of 124 and her normal saline has been started.  Her exam is unchanged.  Denies chest pain. [GP]   2149 EKG    EKG time: 2139  Rhythm/Rate: Normal sinus rate 61  LVH with repolarization normality  No Acute Ischemia  Non-Specific ST-T changes    Similar compared to prior on 6/21/2024    Interpreted Contemporaneously by me.  Independently viewed by me     [GP]   2211 I still have not heard from the stroke team or the radiologist about the patient's stroke imaging. [GP]   2243 Currently on repeat exam the patient's blood pressure is 135/70.  Her arm and leg weakness have resolved.  She states she feels much better.  I still have not heard from the radiologist or the stroke team and will call them back. [GP]   2244 I spoke with Dr. Rojas who states the imaging all looked okay and recommended admission to the hospitalist for further evaluation and care. [GP]   2256 I discussed the case with Rocio Ro from [facility].  She is aware of the patient's presentation and stroke workup.  She is aware of the patient's imaging and my consultation with neurology.  She will meet the patient to a neurotelemetry bed under Dr. Hobson's service for further evaluation and care. [GP]      ED Course User Index  [GP] Huan Lamb MD               DIAGNOSIS  Final diagnoses:   Acute right-sided weakness   Type 2 diabetes mellitus with hyperglycemia, with long-term current use of insulin   Tobacco abuse   Carotid stenosis, asymptomatic, right   Coronary artery disease involving native coronary artery of native heart with unstable angina pectoris          DISPOSITION  ADMISSION    Discussed treatment plan and reason for admission with pt/family and admitting physician.  Pt/family voiced understanding of the plan for admission for further testing/treatment as needed.            Latest Documented Vital Signs:  As of 22:58 EDT  BP- 126/65 HR- 55 Temp- 98.1 °F (36.7 °C) O2 sat- 98%--      --------------------  Please note that portions of this were completed with a voice recognition program.       Note Disclaimer: At [facility], we believe that sharing information builds trust and better relationships. You are receiving this note because you are receiving care at [facility] or recently visited. It is possible you will see health information before a provider has talked with you about it. This kind of information can be easy to misunderstand. To help you fully understand what it means for your health, we urge you to discuss this note with your provider.             Huan Lamb MD  06/21/24 7029

## 2024-06-22 NOTE — PLAN OF CARE
Problem: Adult Inpatient Plan of Care  Goal: Plan of Care Review  Outcome: Ongoing, Progressing  Flowsheets (Taken 6/22/2024 0517)  Plan of Care Reviewed With: patient  Outcome Evaluation: Pt arrived from home afer being d/c from [facility] c/o of rt sided weakness and not feeling well. A CT was completed and a MRI will be done today. She is A/Ox4. cont. of b/b and a clean catch urine will be collected. She denied pain and refused the nicotine patch. WCTM.     Problem: Adult Inpatient Plan of Care  Goal: Absence of Hospital-Acquired Illness or Injury  Intervention: Identify and Manage Fall Risk  Recent Flowsheet Documentation  Taken 6/22/2024 0200 by Gilda Puckett, RN  Safety Promotion/Fall Prevention:   room organization consistent   safety round/check completed  Taken 6/22/2024 0028 by Gilda Puckett RN  Safety Promotion/Fall Prevention:   activity supervised   assistive device/personal items within reach   clutter free environment maintained   fall prevention program maintained   lighting adjusted   nonskid shoes/slippers when out of bed   safety round/check completed     Problem: Adult Inpatient Plan of Care  Goal: Absence of Hospital-Acquired Illness or Injury  Intervention: Prevent Skin Injury  Recent Flowsheet Documentation  Taken 6/22/2024 0200 by Gilda Puckett, RN  Body Position:   position changed independently   weight shifting  Taken 6/22/2024 0028 by Gilda Puckett, RN  Body Position:   supine   position changed independently     Problem: Adult Inpatient Plan of Care  Goal: Optimal Comfort and Wellbeing  Intervention: Provide Person-Centered Care  Recent Flowsheet Documentation  Taken 6/22/2024 0028 by Gilda Puckett RN  Trust Relationship/Rapport:   care explained   choices provided   reassurance provided   Goal Outcome Evaluation:  Plan of Care Reviewed With: patient           Outcome Evaluation: Pt arrived from home afer being d/c from [facility] c/o of rt sided weakness and not feeling well. A CT was  completed and a MRI will be done today. She is A/Ox4. cont. of b/b and a clean catch urine will be collected. She denied pain and refused the nicotine patch. BENNIE.

## 2024-06-22 NOTE — CONSULTS
Neurology Consult Note    Consult Date: 6/22/2024    Referring MD: Huan Lamb MD    Reason for Consult I have been asked to see the patient in neurological consultation to render advice and opinion regarding worsening right-sided weakness.    Bibiana Inman is a 44 y.o. white female with known diagnosis of poorly controlled insulin-dependent type 2 diabetes, diabetic gastroparesis, obstructive sleep apnea, obesity, essential hypertension, tobacco abuse, CKD, coronary artery disease, mixed hyperlipidemia she is on aspirin and Plavix at baseline for several years secondary to her cardiac history, recent punctate left MCA strokes on 6/17 with no residual deficit, carotid atherosclerosis with no significant stenosis she presented to the hospital for evaluation of worsening right-sided weakness and right facial droop blood pressure on presentation was 105/54, symptoms improved after normal saline bolus, this morning she is back to baseline with NIH score of 0.  Patient already on dual antiplatelet therapy, her P2 Y12 was checked last admission and was therapeutic, she is on Lipitor 80 mg daily.  She cut down on her smoking but still smokes 6 cigarettes a day, she denied drinking or drug illicit.    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 Debbie Fry.    Depressive disorder 08/01/2023    Diabetic gastroparesis 07/20/2021    Diabetic peripheral neuropathy associated with type 2 diabetes mellitus 02/20/2024    Gastroesophageal reflux disease 03/10/2021    GERD (gastroesophageal reflux disease)     Intermittent claudication 04/17/2017    Iron deficiency anemia 03/02/2020    Mixed hypercholesterolemia and hypertriglyceridemia 03/07/2014    Myocardial infarction   "   Obesity     Obstructive sleep apnea 2021    Personal history of COVID-19 2022    Polyp of colon 2023    Spontaneous      Stress fracture of right ankle with routine healing     Tobacco abuse 2024    Type 2 diabetes mellitus with hyperglycemia, with long-term current use of insulin 2017    Vitamin D deficiency 2024       Exam  /60 (BP Location: Right arm, Patient Position: Lying)   Pulse 56   Temp 97.7 °F (36.5 °C) (Oral)   Resp 16   Ht 162.6 cm (64\")   Wt 65.5 kg (144 lb 6.4 oz)   LMP 01/10/2023 Comment: patient states irregular periods  SpO2 95%   BMI 24.79 kg/m²   Gen: NAD, vitals reviewed  MS: oriented x3, recent/remote memory intact, normal attention/concentration, language intact, no neglect.  CN: visual acuity grossly normal, PERRL, EOMI, no facial droop, no dysarthria  Motor: 5/5 throughout upper and lower extremities, normal tone  Sensory exam: Normal to light touch throughout  Coordination: Normal finger-nose-finger bilaterally  Gait: Not assessed    NIH Stroke Scale :    (0_) 1a. Level of consciousness (LOC): 0=alert;1=arousable by minor stimulation;2=obtunded or needs strong stimulation to attend;3=unresponsive or reflex responses only  (0_) 1b. LOC Questions: 0=answers both;1=answers one;2=answers neither  (0_) 1c. LOC Commands: 0=performs both tasks;1=performs one task;2=performs neither task  (0_) 2. Best Gaze: 0=normal;1=partial gaze palsy;2=forced deviation or total gaze paresis  (0_) 3. Visual: 0=normal;1=partial hemianopia;2=complete hemianopia;3=blind  (0_) 4. Facial palsy: 0=normal;1=minor paresis;2=partial paralysis;3=complete paralysis  FOR 5 AND 6 BELOW: 0=normal;1=drifts but maintains in air;2=unable to maintain in air;3=moves but unable to lift against gravity;4=no movement  (0_)0 (_)1 (_)2 (_)3 (_)4 (_)NA 5a. Motor arm-left  (0_)0 (_)1 (_)2 (_)3 (_)4 (_)NA 5b. Motor arm-right  (0_)0 (_)1 (_)2 (_)3 (_)4 (_)NA 6a. Motor " leg-left  (0_)0 (_)1 (_)2 (_)3 (_)4 (_)NA 6ba. Motor leg-right  (0_) 7. Limb ataxia:0=absent;1=unilateral;2=bilateral; NA=unable to test  (0_) 8. Sensory: 0=normal;1=mild-moderate loss;2-severe or total loss  (0_) 9. Best language: 0=normal;1=mild-moderate aphasia, some deficits apparent but able to communicate;2=severe aphasia, fragmentary expression only, unable to communicate well;3=global aphasia, mute and no comprehension  (0_)10. Dysarthria: 0=normal;1=mild-moderate, slurs some words;2=severe, speech mostly unintelligible; NA=unable to test (e.g.,intubation)  (0_)11. Extinction/Inattention: 0=normal;1=visual,tactile,auditory or other extinction to bilateral simultaneous stimulation, but no severe neglect;2=answers neither      NIH Score: Complete  0    DATA:    Lab Results   Component Value Date    GLUCOSE 56 (L) 06/22/2024    CALCIUM 8.4 (L) 06/22/2024     06/22/2024    K 3.5 06/22/2024    CO2 22.3 06/22/2024     06/22/2024    BUN 10 06/22/2024    CREATININE 1.23 (H) 06/22/2024    EGFRIFNONA 26 (L) 01/29/2021    BCR 8.1 06/22/2024    ANIONGAP 8.7 06/22/2024     Lab Results   Component Value Date    WBC 11.58 (H) 06/22/2024    HGB 11.5 (L) 06/22/2024    HCT 35.7 06/22/2024    MCV 92.0 06/22/2024     06/22/2024       Lab review: Above labs reviewed    Imaging review: I personally reviewed her CT angio head and neck which showed atherosclerosis with mixed plaque at the carotid arteries bilaterally more on the left than the right, distal left ICA calcification with no significant stenosis or occlusion.    Stroke labs: Labs from prior admission 6/17/2024 A1c 16.9, , TSH normal, P2 Y 12/1/1970    Diagnoses:  -Worsening right-sided weakness etiology old stroke exacerbation versus new acute ischemic stroke  -Poorly controlled type 2 diabetes  -Mixed hyperlipidemia  -Essential hypertension  -Stage III CKD  -Obstructive sleep apnea  -Tobacco abuse    Pre-stroke MRS: 0  NIHSS: 0    PLAN:    -Repeat MRI brain without contrast  -2D echo with bubble study, she had 1 done in February but was done without bubble study  -Continue dual antiplatelet therapy  -Continue Lipitor 80 mg daily  -Counseled the patient regarding controlling risk factors goal A1c less than 6.5, goal LDL less than 70, blood pressure goal less than 130/80 due to diabetes with hypertension and strokes  -Inpatient diabetic educator consult due to poorly controlled diabetes with markedly elevated A1c  -Commend compliance with CPAP machine  -Counseled the patient regarding tobacco use, she stated understanding  -Counseled the patient to monitor blood pressure at home for the next 10 to 14 days twice daily and bring the numbers to PCP to adjust her blood pressure medications if needed  -Will follow-up on the repeated MRI brain, 2D echo for further recommendations, if unremarkable then ok to DC from stroke standpoint.  -Discussed with ER physician, patient, family, nursing staff.    Medical Decision Making for this neurology consultation consists of the following:  Review of previous chart, including H/P, provider and nursing notes as applicable.  Review of medications and vital signs.  Review of previous labs, neuroimaging, and additional relevant diagnostics.   Interpretation of laboratory, imaging, and other diagnostic results  Discussion with other providers and family   Total face-to-face/floor time: 30-75 minutes.     Addendum 6/23  I personally reviewed the repeated MRI brain and discussed with neuroradiologist  Which showed enlargement/worsening acute/subacute ischemic stroke on the left jenu of IC/thalamus, new punctate ischemic stroke on the left insular cortex, etiology of stroke in my opinion mixed from small vessel disease and larve vessel atheroembolic.  No change to the above plan.  If repeated 2D echo unremarkable for PFO, okay to discharge from stroke standpoint.  I discussed with Dr. Perez.

## 2024-06-22 NOTE — PROGRESS NOTES
[facility] Acute Rehab Note:     Referral received via stroke order set.  Please note this is a screening only, rehab admissions will not actively be evaluating this patient.  If felt patient is appropriate for our services once therapies begin, please call our office at [phone], to initiate a full referral.     Thank you,  Laurie Ricardo, RN  Rehab Admission Nurse

## 2024-06-22 NOTE — SIGNIFICANT NOTE
06/22/24 1440   OTHER   Discipline occupational therapist   Therapy Assessment/Plan (PT)   Criteria for Skilled Interventions Met (PT) no problems identified which require skilled intervention  (RN reports pt is up ad makenzie; Pt reports no difficulty w/ mobility or BADLs and all symptoms resolved. OT will sign off.)

## 2024-06-22 NOTE — PROGRESS NOTES
Name: Bibiana Inman ADMIT: 2024   : 1980  PCP: Ibrahima Correa MD    MRN: [id number] LOS: 1 days   AGE/SEX: 44 y.o. female  ROOM: [facility]     Subjective     Right arm weakness has completely resolved  Objective   Objective   Vital Signs  Temp:  [97.7 °F (36.5 °C)-98.2 °F (36.8 °C)] 98.2 °F (36.8 °C)  Heart Rate:  [54-70] 63  Resp:  [14-16] 16  BP: (105-169)/(54-73) 169/73  SpO2:  [95 %-100 %] 97 %  on   ;   Device (Oxygen Therapy): room air  Body mass index is 24.79 kg/m².  Physical Exam  Constitutional:       General: She is not in acute distress.  HENT:      Head: Normocephalic and atraumatic.   Cardiovascular:      Rate and Rhythm: Normal rate and regular rhythm.   Abdominal:      General: Abdomen is flat.      Palpations: Abdomen is soft.   Skin:     General: Skin is warm.   Neurological:      General: No focal deficit present.      Mental Status: She is alert and oriented to person, place, and time.         Results Review     I reviewed the patient's new clinical results.  Results from last 7 days   Lab Units 24  0605 24  0928   WBC 10*3/mm3 11.58* 10.72 9.69 14.62*   HEMOGLOBIN g/dL 11.5* 12.0 12.2 13.7   PLATELETS 10*3/mm3 265 245 220 281     Results from last 7 days   Lab Units 24  0323 06/21/24  2106 06/19/24  0506 24  0605   SODIUM mmol/L 137 137 135* 134*   POTASSIUM mmol/L 3.5 3.8 3.6 3.4*   CHLORIDE mmol/L 106 104 105 103   CO2 mmol/L 22.3 22.0 21.8* 23.1   BUN mg/dL 10 11 8 10   CREATININE mg/dL 1.23* 1.40* 1.12* 1.25*   GLUCOSE mg/dL 56* 197* 248* 143*   Estimated Creatinine Clearance: 59.1 mL/min (A) (by C-G formula based on SCr of 1.23 mg/dL (H)).  Results from last 7 days   Lab Units 24  2106 24  0605 24  0928   ALBUMIN g/dL 2.9* 2.9* 3.4*   BILIRUBIN mg/dL <0.2 0.2 0.4   ALK PHOS U/L 90 96 134*   AST (SGOT) U/L 12 10 11   ALT (SGPT) U/L 9 <5 5     Results from last 7 days   Lab Units 24  0323  06/21/24  2106 06/19/24  0506 06/18/24  0605 06/17/24  0928   CALCIUM mg/dL 8.4* 8.5* 9.0 8.8 9.1   ALBUMIN g/dL  --  2.9*  --  2.9* 3.4*       COVID19   Date Value Ref Range Status   12/28/2021 Detected (C) Not Detected - Ref. Range Final   01/29/2021 Presumptive Negative Presumptive Negative - Ref. Range Final     Glucose   Date/Time Value Ref Range Status   06/22/2024 1636 176 (H) 70 - 130 mg/dL Final   06/22/2024 1134 112 70 - 130 mg/dL Final   06/22/2024 1011 135 (H) 70 - 130 mg/dL Final   06/20/2024 0718 150 (H) 70 - 105 mg/dL Final     Comment:     Serial Number: [id number]:  [id number]   06/19/2024 2017 165 (H) 70 - 105 mg/dL Final     Comment:     Serial Number: [id number]:  [id number]     No results found for this or any previous visit.      MRI Brain Without Contrast  Narrative: MRI OF THE BRAIN WITHOUT CONTRAST     CLINICAL HISTORY: right side weakness; R53.1-Weakness; E11.65-Type 2  diabetes mellitus with hyperglycemia; Z79.4-Long term (current) use of  insulin; Z72.0-Tobacco use; I65.21-Occlusion and stenosis of right  carotid artery; I25.110-Atherosclerotic heart disease of native coronary  artery with unstable angina pectoris     TECHNIQUE: MRI of the brain was obtained with sagittal T1, axial T1,  axial FLAIR, axial T2, axial diffusion, and susceptibility weighted  images.     COMPARISON: Brain MRI dated 06/18/2024 and 06/17/2024.  FINDINGS:     On the previous MRI of the brain dated 06/17/2024, there is an unusual  linear appearing focus of restricted diffusion within the left parietal  lobe consistent with an acute infarct which measured up to approximately  8 x 3 mm in greatest axial dimensions. On the subsequent MRI of the  brain dated 06/18/2024, there was a new focus of restricted diffusion  compatible with an area of acute infarction within the genu of the left  internal capsule. This abnormality measured up to approximately 8 mm in  diameter. On the current exam, this abnormality  is larger in size  measuring up to approximately 15 mm in greatest dimensions.  Additionally, there is a new focus of restricted diffusion within the  left insular cortex compatible with an acute infarct which measures up  to approximately 3 mm in maximal diameter.     Otherwise, the ventricles, sulci, and cisterns are age-appropriate.  There are moderates change of chronic small vessel ischemic phenomenon.  There are no abnormal foci of susceptibility artifact. The major  intracranial flow related signal voids are within normal limits.     Mild changes of inflammatory paranasal sinus disease are incidentally  noted with scattered areas of mucosal thickening appreciated most  prominently within the sphenoid sinus and the ethmoid air cells.     Impression:    When compared to the most recent examination dated 06/18/2024, there is  interval enlargement of the previously identified acute infarct centered  within the genu of the left internal capsule. Also, there is a new  punctate focus of acute infarction within the left insular cortex. The  previously identified acute infarction within the white matter of the  left parietal lobe is again appreciated. The location of the infarct  within the genu of the left internal capsule is most suggestive of an in  situ occlusive process (rather than an embolic event) which could be due  to lacunar disease or possibly a vasculitis. Please correlate with  clinical data.     Moderate changes of chronic small vessel ischemic phenomenon are  incidentally appreciated.     These findings and recommendations were discussed with Dr. Castillo on  06/22/2024 at approximately 3:25 p.m.              This report was finalized on 6/22/2024 4:12 PM by Dr. Terrence Thorne M.D  on Workstation: [id number]     CT Angiogram Head w AI Analysis of LVO, CT Angiogram Neck, CT CEREBRAL PERFUSION WITH & WITHOUT CONTRAST  Narrative: NONCONTRAST CRANIAL CT SCAN, CT ANGIOGRAM NECK, CT ANGIOGRAM  HEAD,  CRANIAL CT PERFUSION.     HISTORY: Acute CVA,      COMPARISON: None..     TECHNIQUE:  Radiation dose reduction techniques were utilized, including  automated exposure control and exposure modulation based on body size.   Initially, a routine noncontrast cranial CT performed from the skull  base through the vertex region. After review, thin-section contrast  enhanced CT angiogram images obtained from the aortic arch through the  calvarial vertex utilizing angiographic technique. Multi projection 3-D  MIP reformatted images were supplemented and reviewed. Subsequently, CT  perfusion imaging performed with ischemia view software.     FINDINGS CRANIAL CT: No acute hemorrhage or midline shift is  demonstrated..  Ventricular size and configuration is within normal  limits for age.. There is periventricular and deep white matter  microangiopathic change. Recent infarcts noted within the left parietal  subcortical white matter is not well seen on this study. The patient  does have some decreased attenuation involving the posterior limb of the  left internal capsule which is more apparent than on prior imaging.  Postcontrast imaging does not any evidence of venous occlusion or  abnormal enhancement. Bone window images demonstrate some mucosal  thickening within the ethmoid and right maxillary sinuses.     Study was placed on line at 9:16 p.m. Preliminary interpretation  telephoned to extension [phone] during interpretation at 9:25 p.m.        CERVICAL CAROTID CT ANGIOGRAM:     FINDINGS: There is enlargement of the main pulmonary artery, which can  be seen in setting of pulmonary arterial hypertension. There are  coronary artery calcifications. There is normal arch anatomy. There is  mild narrowing of the right common carotid artery, measuring up to 20 to  30%. Plaque continues into the origin of the proximal right internal  carotid artery, with narrowing in the range of 40-50 %. There is some  further plaque noted within  the proximal right internal carotid artery,  resulting in narrowing, in the range of about 30%. Distal cervical right  internal carotid artery is widely patent. There is atherosclerotic  plaque involving the distal left common carotid artery, resulting in  narrowing in the range of 20%. Ulcerated plaque is noted at the left  carotid bifurcation, resulting in mild narrowing, in the range of 20 to  30%. Distal cervical left internal carotid artery is widely patent.  Vertebral arteries appear to be patent throughout their courses. There  is some calcified plaque noted involving the proximal vertebral arteries  bilaterally. It may result in some minimal narrowing bilaterally. Images  through the lung apices do not demonstrate any acute abnormalities.  Thyroid gland and trachea are normal.        NASCET criteria utilized in stenosis measurements. stenosis mild, 0-49%.        CRANIAL CTA ANGIOGRAM:     FINDINGS: The intracranial vertebral arteries are patent. Basilar artery  is patent. The posterior cerebral arteries are patent bilaterally. There  is calcification of the intracranial internal carotid arteries. They  remain patent. There is a small anterior communicating artery. Anterior  cerebral and middle cerebral arteries are patent bilaterally..             CT PERFUSION:     FINDINGS: No areas of cerebral blood flow less than 30% or Tmax greater  than 6 seconds are identified.     Study was placed on line at 9:34 p.m. Preliminary interpretation  telephoned to extension [phone] during interpretation at 9:50 p.m.     AI analysis of LVO was utilized.     Radiation dose reduction techniques were utilized, including automated  exposure control and exposure modulation based on body size.        Impression: 1. No acute intracranial hemorrhage.  2. Indeterminate area of decreased attenuation involving the posterior  limb of the left internal capsule. Consider further assessment with MRI.  3. No areas of cerebral blood flow less  than 30% or Tmax greater than 6  seconds.  4. Approximately 40 to 50% stenosis of the origin of the right internal  carotid artery.  5. Intracranial vessels are patent.        This report was finalized on 6/22/2024 12:03 AM by Dr. Marian Thompson M.D on Workstation: [id number]       Scheduled Medications  aspirin, 325 mg, Oral, Daily   Or  aspirin, 300 mg, Rectal, Daily  atorvastatin, 80 mg, Oral, Nightly  clopidogrel, 75 mg, Oral, Daily  escitalopram, 10 mg, Oral, Daily  icosapent ethyl, 2 g, Oral, BID With Meals  insulin glargine, 20 Units, Subcutaneous, Daily  insulin lispro, 2-7 Units, Subcutaneous, 4x Daily AC & at Bedtime  nicotine, 1 patch, Transdermal, Q24H  pantoprazole, 40 mg, Oral, QAM  sodium chloride, 10 mL, Intravenous, Q12H    Infusions  sodium chloride, 100 mL/hr, Last Rate: 100 mL/hr (06/22/24 1446)    Diet  Diet: Diabetic; Consistent Carbohydrate; Fluid Consistency: Thin (IDDSI 0)       Assessment/Plan     Active Hospital Problems    Diagnosis  POA    **Acute right-sided weakness [R53.1]  Yes    CKD stage 3a, GFR 45-59 ml/min [N18.31]  Yes    Carotid stenosis [I65.29]  Yes    Lacunar cerebrovascular accident (CVA) [I63.81]  Yes    Elevated troponin [R79.89]  Yes    Tobacco abuse [Z72.0]  Yes    Coronary artery disease involving native coronary artery of native heart with unstable angina pectoris [I25.110]  Yes    Diabetic gastroparesis [E11.43, 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       44 y.o. female admitted with Acute right-sided weakness.    Acute right-sided weakness  Acute CVA  Carotid stenosis  MRI findings noted, concerning for  interval enlargement of the previously identified acute infarct centered within the genu of the left internal capsule.  A new focus of acute infarction was noted within the left insular cortex previously identified acute infarction within the white matter of the left parietal lobe was also noted.  TTE pending  Neurology following  ASA, plavix, statin    T2DM c/b gastroparesis   On jardiance and glargine as outpatient. Resume glargine at attenduated dose    CAD s/p CABG  Troponin elevation- chronic  Hypertension    CKD 3  SCR imrpoving with IVF    GERD  -PPI      SCDs for DVT prophylaxis.  Full code.  Discussed with patient and family.  Anticipate discharge home in 1-2 days.      Qamar Perez MD  [facility]  06/22/24  19:19 EDT

## 2024-06-22 NOTE — PLAN OF CARE
Goal Outcome Evaluation:         Orders received via Stroke Protocol set; patient passed Dysphagia Screen and is on regular diet/thin liquids.  Pt was recently at [facility] and had communication evaluation with recommendation for memory tx (RAYMON 20/30).  Pt felt that it was not necessary and she is having no issues this date; requested to not have Speech/Language Evaluation.  No issues noted during conversation with communication or cognition.  ST to sign off, please reconsult, if needed.

## 2024-06-22 NOTE — OUTREACH NOTE
Stroke Week 1 Survey      Flowsheet Row Responses   [facility]? [facility]   Does the patient have one of the following disease processes/diagnoses(primary or secondary)? Stroke   Week 1 attempt successful? No   Unsuccessful attempts Attempt 1   Revoke Readmitted            LUIS HARRIS - Registered Nurse

## 2024-06-22 NOTE — PLAN OF CARE
Problem: Adult Inpatient Plan of Care  Goal: Plan of Care Review  Outcome: Ongoing, Progressing  Goal: Patient-Specific Goal (Individualized)  Outcome: Ongoing, Progressing  Goal: Absence of Hospital-Acquired Illness or Injury  Outcome: Ongoing, Progressing  Intervention: Identify and Manage Fall Risk  Description: Perform standard risk assessment on admission using a validated tool or comprehensive approach appropriate to the patient; reassess fall risk frequently, with change in status or transfer to another level of care.  Communicate fall injury risk to interprofessional healthcare team.  Determine need for increased observation, equipment and environmental modification, such as low bed, signage and supportive, nonskid footwear.  Adjust safety measures to individual developmental age, stage and identified risk factors.  Reinforce the importance of safety and physical activity with patient and family.  Perform regular intentional rounding to assess need for position change, pain assessment and personal needs, including assistance with toileting.  Intervention: Prevent Skin Injury  Description: Perform a screening for skin injury risk, such as pressure or moisture associated skin damage on admission and at regular intervals throughout hospital stay.  Keep all areas of skin (especially folds) clean and dry.  Maintain adequate skin hydration.  Relieve and redistribute pressure and protect bony prominences; implement measures based on patient-specific risk factors.  Match turning and repositioning schedule to clinical condition.  Encourage weight shift frequently; assist with reposition if unable to complete independently.  Float heels off bed; avoid pressure on the Achilles tendon.  Keep skin free from extended contact with medical devices.  Encourage functional activity and mobility, as early as tolerated.  Use aids (e.g., slide boards, mechanical lift) during transfer.  Intervention: Prevent and Manage VTE  (Venous Thromboembolism) Risk  Description: Assess for VTE (venous thromboembolism) risk.  Encourage and assist with early ambulation.  Initiate and maintain compression or other therapy, as indicated, based on identified risk in accordance with organizational protocol and provider order.  Encourage both active and passive leg exercises while in bed, if unable to ambulate.  Goal: Optimal Comfort and Wellbeing  Outcome: Ongoing, Progressing  Intervention: Provide Person-Centered Care  Description: Use a family-focused approach to care.  Develop trust and rapport by proactively providing information, encouraging questions, addressing concerns and offering reassurance.  Acknowledge emotional response to hospitalization.  Recognize and utilize personal coping strategies.  Honor spiritual and cultural preferences.  Goal: Readiness for Transition of Care  Outcome: Ongoing, Progressing   Goal Outcome Evaluation:  Plan of Care Reviewed With: patient        Progress: no change

## 2024-06-22 NOTE — SIGNIFICANT NOTE
06/22/24 1425   OTHER   Discipline occupational therapist   Rehab Time/Intention   Session Not Performed patient unavailable for evaluation  ([facility] for MRI)   Recommendation   OT - Next Appointment 06/23/24

## 2024-06-22 NOTE — H&P
Patient Name:  Bibiana Inman  YOB: 1980  MRN:  [id number]  Admit Date:  6/21/2024  Patient Care Team:  Ibrahima Correa MD as PCP - General (Family Medicine)  Rachele Zafar RN as Ambulatory  ([facility])  Xochilt Jenkins MD as Consulting Physician (Nephrology)      Subjective   History Present Illness     Chief Complaint   Patient presents with    Extremity Weakness     History of Present Illness  Ms. Inman is a 44-year-old female with history of CAD, CABG, type 2 diabetes, GERD, iron deficiency anemia, recent CVA who presents to the emergency room with complaints of right-sided weakness.  Patient was actually hospitalized earlier this week at [facility] diagnosed with acute CVA, at that time she had some aphasia and right-sided weakness, she states that it had improved but prior to arrival here to [facility] she had sudden onset of right-sided weakness, EMS reported her right side is flaccid, but upon arrival here she did have some right-sided weakness, it seems like her weakness has resolved at this point.  Her speech is clear but she seems to have little trouble responding/word finding.  She is on aspirin and Plavix, she states she has been on that for many years since she had her CABG 10 years ago.  Patient was admitted at [facility] from 6/17/2024 to 6/19/2024, during her stay there she did have some blood pressure elevation that had improved, they did allow for some permissive hypertension initially, but she was given 2 doses of hydralazine and started on p.o. hydralazine.  Patient did not take her blood pressure today.  Cerebral perfusion scan showed no perfusion abnormalities, subsequent MRI did show a linear acute infarct within the left parietal occipital region, she was seen by neurology.  Was noted to have stenosis of the right internal carotid and was seen in evaluation with vascular surgery, MRI was repeated and  showed stable appearance of the acute lacunar infarct of the left parietal area.  She was also found to have significant hypertriglyceridemia anemia and hyperlipidemia.  There was some questionable compliance issues with her prescribed regimen medications at home, a renal artery ultrasound did show unilateral 60% stenosis that could be followed as outpatient.  Her hemoglobin A1c was 16.9.  Patient has had echo and February 2024 that showed normal left ventricular systolic function and EF of 69%, no significant valvular abnormalities were seen.  In the emergency room patient's blood pressure was a little on the low side, stroke neuro recommended a fluid bolus, monitor blood pressure, hold blood pressure medications for now.  In the emergency room troponin was 56 with repeat 53, sodium 137, potassium 3.8, creatinine 1.4, BUN 11, albumin 2.9, white blood cell count 10.7, hemoglobin 12.0, hematocrit 37.1, platelets 245.  CTA of the head and neck shows no acute intracranial hemorrhage, indeterminate area of decreased attenuation valving the posterior limb of the left internal capsule consider further assessment with MRI, no areas of cerebral blood flow less than 30%, approximately 40 to 50% stenosis of the right internal carotid artery, intracranial vessels are patent.  Did speak with stroke neurology on-call who recommend patient be admitted, repeat MRI they will see in a.m.  Patient is currently on aspirin and Plavix.    Review of Systems   Constitutional:  Negative for appetite change and fever.   HENT:  Negative for nosebleeds and trouble swallowing.    Eyes:  Negative for photophobia, redness and visual disturbance.   Respiratory:  Negative for cough, chest tightness, shortness of breath and wheezing.    Cardiovascular:  Negative for chest pain, palpitations and leg swelling.   Gastrointestinal:  Negative for abdominal distention, abdominal pain, nausea and vomiting.   Endocrine: Negative.    Genitourinary:  Negative.    Musculoskeletal:  Negative for gait problem and joint swelling.        Right side weakness     Skin: Negative.    Neurological:  Negative for dizziness, seizures, speech difficulty, light-headedness and headaches.   Hematological: Negative.    Psychiatric/Behavioral:  Negative for behavioral problems and confusion.         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 Debbie Fry.    Depressive disorder 2023    Diabetic gastroparesis 2021    Diabetic peripheral neuropathy associated with type 2 diabetes mellitus 2024    Gastroesophageal reflux disease 03/10/2021    GERD (gastroesophageal reflux disease)     Intermittent claudication 2017    Iron deficiency anemia 2020    Mixed hypercholesterolemia and hypertriglyceridemia 2014    Myocardial infarction     Obesity     Obstructive sleep apnea 2021    Personal history of COVID-19 2022    Polyp of colon 2023    Spontaneous      Stress fracture of right ankle with routine healing     Tobacco abuse 2024    Type 2 diabetes mellitus with hyperglycemia, with long-term current use of insulin 2017    Vitamin D deficiency 2024     Past Surgical History:   Procedure Laterality Date    CARDIAC CATHETERIZATION N/A 2024    Procedure: Left Heart Cath and coronary angiogram;  Surgeon: Jena Barron MD;  Location: [facility] CATH [facility]ASIVE [facility];  Service: Cardiology;  Laterality: N/A;     SECTION      CORONARY ARTERY BYPASS GRAFT  2014    LIMA-LAD, SVG-OM2, SVG-PDA, Dr. Debbie Fry.    DILATATION AND CURETTAGE      TONSILLECTOMY       Family History   Family history unknown: Yes     Social History      Tobacco Use    Smoking status: Every Day     Current packs/day: 0.25     Average packs/day: 0.3 packs/day for 15.0 years (3.8 ttl pk-yrs)     Types: Cigarettes    Smokeless tobacco: Never   Vaping Use    Vaping status: Never Used   Substance Use Topics    Alcohol use: Defer    Drug use: Never     No current facility-administered medications on file prior to encounter.     Current Outpatient Medications on File Prior to Encounter   Medication Sig Dispense Refill    albuterol sulfate  (90 Base) MCG/ACT inhaler Inhale 2 puffs every 6 (six) hours if needed for wheezing. 18 g 0    aspirin 81 MG chewable tablet Chew 1 tablet Daily.      atorvastatin (LIPITOR) 80 MG tablet Take 1 tablet by mouth Daily.      clopidogrel (PLAVIX) 75 MG tablet Take 1 tablet by mouth Daily. 90 tablet 0    empagliflozin (JARDIANCE) 10 MG tablet tablet Take 1 tablet by mouth Daily. 30 tablet 0    escitalopram (LEXAPRO) 10 MG tablet Take 1 tablet by mouth Daily.      famotidine (PEPCID) 40 MG tablet Take 1 tablet by mouth every night at bedtime. 90 tablet 3    hydrALAZINE (APRESOLINE) 50 MG tablet Take 1 tablet by mouth Every 8 (Eight) Hours. 90 tablet 0    icosapent ethyl (Vascepa) 1 g capsule capsule Take 2 capsules by mouth 2 (two) times a day. 360 capsule 0    icosapent ethyl (Vascepa) 1 g capsule capsule Take 2 g (2 capsules) by mouth 2 (Two) Times a Day With Meals. Indications: Cerebrovascular Accident or Stroke, High Amount of Triglycerides in the Blood, Procedure to Reestablish Blood Supply to the Heart 120 capsule 11    insulin aspart (novoLOG FLEXPEN) 100 UNIT/ML solution pen-injector sc pen Inject  under the skin into the appropriate area as directed 3 times a day. Sliding scale, between 5-20 units TID      insulin detemir (LEVEMIR) 100 UNIT/ML injection Inject 60 Units under the skin into the appropriate area as directed Every Night.      metoclopramide (REGLAN) 5 MG tablet Take 1 tablet by mouth 3 times a day.       metoprolol tartrate (LOPRESSOR) 100 MG tablet Take 1 tablet by mouth 2 (two) times a day. 180 tablet 0    NIFEdipine CC (ADALAT CC) 60 MG 24 hr tablet Take 1 tablet by mouth Daily. 30 tablet 0    pantoprazole (PROTONIX) 40 MG EC tablet Take 1 tablet by mouth Every Morning. 90 tablet 3    [START ON 2024] cloNIDine (CATAPRES-TTS) 0.2 MG/24HR patch Place 1 patch on the skin as directed by provider 1 (One) Time Per Week. 4 patch 0    [] Continuous Glucose  (Dexcom G7 ) device Use 1 each 1 (One) Time for 1 dose. Dx: E11.65 1 each 0    Continuous Glucose Sensor (Dexcom G7 Sensor) misc Use 1 each Every 10 (Ten) Days. Dx: E11.65 3 each 2    [DISCONTINUED] glyburide (DIAbeta) 5 MG tablet Take 1 tablet by mouth 2 (Two) Times a Day. 180 tablet 0    [DISCONTINUED] hydroCHLOROthiazide (HYDRODIURIL) 25 MG tablet Take 1 tablet by mouth Daily. 90 tablet 3    [DISCONTINUED] metFORMIN (GLUCOPHAGE) 1000 MG tablet Take 1 tablet by mouth 2 (Two) Times a Day With Meals. 180 tablet 0    [DISCONTINUED] omeprazole (priLOSEC) 20 MG capsule Take 1 capsule (20 mg total) by mouth 1 (one) time each day. Do not crush or chew. 30 capsule 5     Allergies   Allergen Reactions    Latex Itching       Objective    Objective     Vital Signs  Temp:  [98.1 °F (36.7 °C)] 98.1 °F (36.7 °C)  Heart Rate:  [54-70] 54  Resp:  [14-16] 16  BP: (105-135)/(54-71) 129/71  SpO2:  [97 %-100 %] 100 %  on   ;   Device (Oxygen Therapy): room air  Body mass index is 24.79 kg/m².    Physical Exam  Vitals and nursing note reviewed.   Constitutional:       General: She is not in acute distress.     Appearance: She is well-developed.   HENT:      Head: Normocephalic.   Neck:      Vascular: No JVD.   Cardiovascular:      Rate and Rhythm: Normal rate and regular rhythm.      Heart sounds: Normal heart sounds.      Comments: Normal sinus rhythm on the monitor with heart rate 80 during my exam, no chest pain or shortness of breath  Pulmonary:       Effort: Pulmonary effort is normal.      Breath sounds: Normal breath sounds.      Comments: Diminished lung sounds, otherwise clear, sats 95% on room air  Abdominal:      General: There is no distension.      Palpations: Abdomen is soft.      Tenderness: There is no abdominal tenderness.   Musculoskeletal:         General: Normal range of motion.      Cervical back: Normal range of motion.      Right lower leg: No edema.      Left lower leg: No edema.   Skin:     General: Skin is warm and dry.      Capillary Refill: Capillary refill takes less than 2 seconds.   Neurological:      Mental Status: She is alert and oriented to person, place, and time.      Comments: No focal neurodeficits, although patient did seem to have some word finding difficulties, there is no slurred speech, no facial droop, no right-sided weakness appreciated at this time.   Psychiatric:         Mood and Affect: Mood normal.         Behavior: Behavior normal.         Results Review:  I reviewed the patient's new clinical results.  I reviewed the patient's new imaging results and agree with the interpretation.  I reviewed the patient's other test results and agree with the interpretation  I personally viewed and interpreted the patient's EKG/Telemetry data  Discussed with ED provider.    Lab Results (last 24 hours)       Procedure Component Value Units Date/Time    CBC & Differential [[id number]]  (Abnormal) Collected: 06/21/24 2106    Specimen: Blood Updated: 06/21/24 2116    Narrative:      The following orders were created for panel order CBC & Differential.  Procedure                               Abnormality         Status                     ---------                               -----------         ------                     CBC Auto Differential[[id number]]        Abnormal            Final result                 Please view results for these tests on the individual orders.    Comprehensive Metabolic Panel [[id number]]  (Abnormal)  Collected: 06/21/24 2106    Specimen: Blood Updated: 06/21/24 2136     Glucose 197 mg/dL      BUN 11 mg/dL      Creatinine 1.40 mg/dL      Sodium 137 mmol/L      Potassium 3.8 mmol/L      Chloride 104 mmol/L      CO2 22.0 mmol/L      Calcium 8.5 mg/dL      Total Protein 5.4 g/dL      Albumin 2.9 g/dL      ALT (SGPT) 9 U/L      AST (SGOT) 12 U/L      Alkaline Phosphatase 90 U/L      Total Bilirubin <0.2 mg/dL      Globulin 2.5 gm/dL      A/G Ratio 1.2 g/dL      BUN/Creatinine Ratio 7.9     Anion Gap 11.0 mmol/L      eGFR 47.1 mL/min/1.73     Narrative:      GFR Normal >60  Chronic Kidney Disease <60  Kidney Failure <15      Protime-INR [[id number]]  (Normal) Collected: 06/21/24 2106    Specimen: Blood Updated: 06/21/24 2136     Protime 13.2 Seconds      INR 0.98    aPTT [[id number]]  (Normal) Collected: 06/21/24 2106    Specimen: Blood Updated: 06/21/24 2136     PTT 27.5 seconds     Single High Sensitivity Troponin T [[id number]]  (Abnormal) Collected: 06/21/24 2106    Specimen: Blood Updated: 06/21/24 2137     HS Troponin T 56 ng/L     Narrative:      High Sensitive Troponin T Reference Range:  <14.0 ng/L- Negative Female for AMI  <22.0 ng/L- Negative Male for AMI  >=14 - Abnormal Female indicating possible myocardial injury.  >=22 - Abnormal Male indicating possible myocardial injury.   Clinicians would have to utilize clinical acumen, EKG, Troponin, and serial changes to determine if it is an Acute Myocardial Infarction or myocardial injury due to an underlying chronic condition.         CBC Auto Differential [[id number]]  (Abnormal) Collected: 06/21/24 2106    Specimen: Blood Updated: 06/21/24 2116     WBC 10.72 10*3/mm3      RBC 3.99 10*6/mm3      Hemoglobin 12.0 g/dL      Hematocrit 37.1 %      MCV 93.0 fL      MCH 30.1 pg      MCHC 32.3 g/dL      RDW 14.2 %      RDW-SD 48.5 fl      MPV 11.0 fL      Platelets 245 10*3/mm3      Neutrophil % 70.8 %      Lymphocyte % 21.4 %      Monocyte % 5.5 %      Eosinophil % 1.3  %      Basophil % 0.6 %      Immature Grans % 0.4 %      Neutrophils, Absolute 7.60 10*3/mm3      Lymphocytes, Absolute 2.29 10*3/mm3      Monocytes, Absolute 0.59 10*3/mm3      Eosinophils, Absolute 0.14 10*3/mm3      Basophils, Absolute 0.06 10*3/mm3      Immature Grans, Absolute 0.04 10*3/mm3      nRBC 0.0 /100 WBC     High Sensitivity Troponin T 2Hr [[id number]]  (Abnormal) Collected: 06/21/24 2304    Specimen: Blood from Arm, Right Updated: 06/21/24 2329     HS Troponin T 53 ng/L      Troponin T Delta -3 ng/L     Narrative:      High Sensitive Troponin T Reference Range:  <14.0 ng/L- Negative Female for AMI  <22.0 ng/L- Negative Male for AMI  >=14 - Abnormal Female indicating possible myocardial injury.  >=22 - Abnormal Male indicating possible myocardial injury.   Clinicians would have to utilize clinical acumen, EKG, Troponin, and serial changes to determine if it is an Acute Myocardial Infarction or myocardial injury due to an underlying chronic condition.                 Imaging Results (Last 24 Hours)       Procedure Component Value Units Date/Time    CT Angiogram Head w AI Analysis of LVO [[id number]] Collected: 06/21/24 2151     Updated: 06/22/24 0006    Narrative:      NONCONTRAST CRANIAL CT SCAN, CT ANGIOGRAM NECK, CT ANGIOGRAM HEAD,  CRANIAL CT PERFUSION.     HISTORY: Acute CVA,      COMPARISON: None..     TECHNIQUE:  Radiation dose reduction techniques were utilized, including  automated exposure control and exposure modulation based on body size.   Initially, a routine noncontrast cranial CT performed from the skull  base through the vertex region. After review, thin-section contrast  enhanced CT angiogram images obtained from the aortic arch through the  calvarial vertex utilizing angiographic technique. Multi projection 3-D  MIP reformatted images were supplemented and reviewed. Subsequently, CT  perfusion imaging performed with ischemia view software.     FINDINGS CRANIAL CT: No acute hemorrhage or  midline shift is  demonstrated..  Ventricular size and configuration is within normal  limits for age.. There is periventricular and deep white matter  microangiopathic change. Recent infarcts noted within the left parietal  subcortical white matter is not well seen on this study. The patient  does have some decreased attenuation involving the posterior limb of the  left internal capsule which is more apparent than on prior imaging.  Postcontrast imaging does not any evidence of venous occlusion or  abnormal enhancement. Bone window images demonstrate some mucosal  thickening within the ethmoid and right maxillary sinuses.     Study was placed on line at 9:16 p.m. Preliminary interpretation  telephoned to extension [phone] during interpretation at 9:25 p.m.        CERVICAL CAROTID CT ANGIOGRAM:     FINDINGS: There is enlargement of the main pulmonary artery, which can  be seen in setting of pulmonary arterial hypertension. There are  coronary artery calcifications. There is normal arch anatomy. There is  mild narrowing of the right common carotid artery, measuring up to 20 to  30%. Plaque continues into the origin of the proximal right internal  carotid artery, with narrowing in the range of 40-50 %. There is some  further plaque noted within the proximal right internal carotid artery,  resulting in narrowing, in the range of about 30%. Distal cervical right  internal carotid artery is widely patent. There is atherosclerotic  plaque involving the distal left common carotid artery, resulting in  narrowing in the range of 20%. Ulcerated plaque is noted at the left  carotid bifurcation, resulting in mild narrowing, in the range of 20 to  30%. Distal cervical left internal carotid artery is widely patent.  Vertebral arteries appear to be patent throughout their courses. There  is some calcified plaque noted involving the proximal vertebral arteries  bilaterally. It may result in some minimal narrowing bilaterally.  Images  through the lung apices do not demonstrate any acute abnormalities.  Thyroid gland and trachea are normal.        NASCET criteria utilized in stenosis measurements. stenosis mild, 0-49%.        CRANIAL CTA ANGIOGRAM:     FINDINGS: The intracranial vertebral arteries are patent. Basilar artery  is patent. The posterior cerebral arteries are patent bilaterally. There  is calcification of the intracranial internal carotid arteries. They  remain patent. There is a small anterior communicating artery. Anterior  cerebral and middle cerebral arteries are patent bilaterally..             CT PERFUSION:     FINDINGS: No areas of cerebral blood flow less than 30% or Tmax greater  than 6 seconds are identified.     Study was placed on line at 9:34 p.m. Preliminary interpretation  telephoned to extension [phone] during interpretation at 9:50 p.m.     AI analysis of LVO was utilized.     Radiation dose reduction techniques were utilized, including automated  exposure control and exposure modulation based on body size.          Impression:      1. No acute intracranial hemorrhage.  2. Indeterminate area of decreased attenuation involving the posterior  limb of the left internal capsule. Consider further assessment with MRI.  3. No areas of cerebral blood flow less than 30% or Tmax greater than 6  seconds.  4. Approximately 40 to 50% stenosis of the origin of the right internal  carotid artery.  5. Intracranial vessels are patent.        This report was finalized on 6/22/2024 12:03 AM by Dr. Marian Thompson M.D on Workstation: [id number]       CT Angiogram Neck [[id number]] Collected: 06/21/24 2151     Updated: 06/22/24 0006    Narrative:      NONCONTRAST CRANIAL CT SCAN, CT ANGIOGRAM NECK, CT ANGIOGRAM HEAD,  CRANIAL CT PERFUSION.     HISTORY: Acute CVA,      COMPARISON: None..     TECHNIQUE:  Radiation dose reduction techniques were utilized, including  automated exposure control and exposure modulation based on body  size.   Initially, a routine noncontrast cranial CT performed from the skull  base through the vertex region. After review, thin-section contrast  enhanced CT angiogram images obtained from the aortic arch through the  calvarial vertex utilizing angiographic technique. Multi projection 3-D  MIP reformatted images were supplemented and reviewed. Subsequently, CT  perfusion imaging performed with ischemia view software.     FINDINGS CRANIAL CT: No acute hemorrhage or midline shift is  demonstrated..  Ventricular size and configuration is within normal  limits for age.. There is periventricular and deep white matter  microangiopathic change. Recent infarcts noted within the left parietal  subcortical white matter is not well seen on this study. The patient  does have some decreased attenuation involving the posterior limb of the  left internal capsule which is more apparent than on prior imaging.  Postcontrast imaging does not any evidence of venous occlusion or  abnormal enhancement. Bone window images demonstrate some mucosal  thickening within the ethmoid and right maxillary sinuses.     Study was placed on line at 9:16 p.m. Preliminary interpretation  telephoned to extension [phone] during interpretation at 9:25 p.m.        CERVICAL CAROTID CT ANGIOGRAM:     FINDINGS: There is enlargement of the main pulmonary artery, which can  be seen in setting of pulmonary arterial hypertension. There are  coronary artery calcifications. There is normal arch anatomy. There is  mild narrowing of the right common carotid artery, measuring up to 20 to  30%. Plaque continues into the origin of the proximal right internal  carotid artery, with narrowing in the range of 40-50 %. There is some  further plaque noted within the proximal right internal carotid artery,  resulting in narrowing, in the range of about 30%. Distal cervical right  internal carotid artery is widely patent. There is atherosclerotic  plaque involving the distal left  common carotid artery, resulting in  narrowing in the range of 20%. Ulcerated plaque is noted at the left  carotid bifurcation, resulting in mild narrowing, in the range of 20 to  30%. Distal cervical left internal carotid artery is widely patent.  Vertebral arteries appear to be patent throughout their courses. There  is some calcified plaque noted involving the proximal vertebral arteries  bilaterally. It may result in some minimal narrowing bilaterally. Images  through the lung apices do not demonstrate any acute abnormalities.  Thyroid gland and trachea are normal.        NASCET criteria utilized in stenosis measurements. stenosis mild, 0-49%.        CRANIAL CTA ANGIOGRAM:     FINDINGS: The intracranial vertebral arteries are patent. Basilar artery  is patent. The posterior cerebral arteries are patent bilaterally. There  is calcification of the intracranial internal carotid arteries. They  remain patent. There is a small anterior communicating artery. Anterior  cerebral and middle cerebral arteries are patent bilaterally..             CT PERFUSION:     FINDINGS: No areas of cerebral blood flow less than 30% or Tmax greater  than 6 seconds are identified.     Study was placed on line at 9:34 p.m. Preliminary interpretation  telephoned to extension [phone] during interpretation at 9:50 p.m.     AI analysis of LVO was utilized.     Radiation dose reduction techniques were utilized, including automated  exposure control and exposure modulation based on body size.          Impression:      1. No acute intracranial hemorrhage.  2. Indeterminate area of decreased attenuation involving the posterior  limb of the left internal capsule. Consider further assessment with MRI.  3. No areas of cerebral blood flow less than 30% or Tmax greater than 6  seconds.  4. Approximately 40 to 50% stenosis of the origin of the right internal  carotid artery.  5. Intracranial vessels are patent.        This report was finalized on  6/22/2024 12:03 AM by Dr. Marian Thompson M.D on Workstation: [id number]       CT CEREBRAL PERFUSION WITH & WITHOUT CONTRAST [[id number]] Collected: 06/21/24 2151     Updated: 06/22/24 0006    Narrative:      NONCONTRAST CRANIAL CT SCAN, CT ANGIOGRAM NECK, CT ANGIOGRAM HEAD,  CRANIAL CT PERFUSION.     HISTORY: Acute CVA,      COMPARISON: None..     TECHNIQUE:  Radiation dose reduction techniques were utilized, including  automated exposure control and exposure modulation based on body size.   Initially, a routine noncontrast cranial CT performed from the skull  base through the vertex region. After review, thin-section contrast  enhanced CT angiogram images obtained from the aortic arch through the  calvarial vertex utilizing angiographic technique. Multi projection 3-D  MIP reformatted images were supplemented and reviewed. Subsequently, CT  perfusion imaging performed with ischemia view software.     FINDINGS CRANIAL CT: No acute hemorrhage or midline shift is  demonstrated..  Ventricular size and configuration is within normal  limits for age.. There is periventricular and deep white matter  microangiopathic change. Recent infarcts noted within the left parietal  subcortical white matter is not well seen on this study. The patient  does have some decreased attenuation involving the posterior limb of the  left internal capsule which is more apparent than on prior imaging.  Postcontrast imaging does not any evidence of venous occlusion or  abnormal enhancement. Bone window images demonstrate some mucosal  thickening within the ethmoid and right maxillary sinuses.     Study was placed on line at 9:16 p.m. Preliminary interpretation  telephoned to extension [id number] during interpretation at 9:25 p.m.        CERVICAL CAROTID CT ANGIOGRAM:     FINDINGS: There is enlargement of the main pulmonary artery, which can  be seen in setting of pulmonary arterial hypertension. There are  coronary artery calcifications. There is  normal arch anatomy. There is  mild narrowing of the right common carotid artery, measuring up to 20 to  30%. Plaque continues into the origin of the proximal right internal  carotid artery, with narrowing in the range of 40-50 %. There is some  further plaque noted within the proximal right internal carotid artery,  resulting in narrowing, in the range of about 30%. Distal cervical right  internal carotid artery is widely patent. There is atherosclerotic  plaque involving the distal left common carotid artery, resulting in  narrowing in the range of 20%. Ulcerated plaque is noted at the left  carotid bifurcation, resulting in mild narrowing, in the range of 20 to  30%. Distal cervical left internal carotid artery is widely patent.  Vertebral arteries appear to be patent throughout their courses. There  is some calcified plaque noted involving the proximal vertebral arteries  bilaterally. It may result in some minimal narrowing bilaterally. Images  through the lung apices do not demonstrate any acute abnormalities.  Thyroid gland and trachea are normal.        NASCET criteria utilized in stenosis measurements. stenosis mild, 0-49%.        CRANIAL CTA ANGIOGRAM:     FINDINGS: The intracranial vertebral arteries are patent. Basilar artery  is patent. The posterior cerebral arteries are patent bilaterally. There  is calcification of the intracranial internal carotid arteries. They  remain patent. There is a small anterior communicating artery. Anterior  cerebral and middle cerebral arteries are patent bilaterally..             CT PERFUSION:     FINDINGS: No areas of cerebral blood flow less than 30% or Tmax greater  than 6 seconds are identified.     Study was placed on line at 9:34 p.m. Preliminary interpretation  telephoned to extension [id number] during interpretation at 9:50 p.m.     AI analysis of LVO was utilized.     Radiation dose reduction techniques were utilized, including automated  exposure control and exposure  modulation based on body size.          Impression:      1. No acute intracranial hemorrhage.  2. Indeterminate area of decreased attenuation involving the posterior  limb of the left internal capsule. Consider further assessment with MRI.  3. No areas of cerebral blood flow less than 30% or Tmax greater than 6  seconds.  4. Approximately 40 to 50% stenosis of the origin of the right internal  carotid artery.  5. Intracranial vessels are patent.        This report was finalized on 6/22/2024 12:03 AM by Dr. Marian Thompson M.D on Workstation: [id number]       XR Chest 1 View [[id number]] Collected: 06/21/24 2225     Updated: 06/21/24 2228    Narrative:      SINGLE VIEW OF THE CHEST     HISTORY: Acute stroke protocol     COMPARISON: June 17, 2024     FINDINGS:  There is cardiomegaly. There is no vascular congestion. No pneumothorax,  pleural effusion, or acute infiltrate is seen. Patient is status post  median sternotomy with coronary artery bypass grafting.       Impression:      No acute findings.     This report was finalized on 6/21/2024 10:25 PM by Dr. Marian Thompson M.D on Workstation: [id number]               Results for orders placed during the hospital encounter of 02/19/24    Adult Transthoracic Echo Complete W/ Cont if Necessary Per Protocol    Interpretation Summary    Left ventricular systolic function is normal. Calculated left ventricular EF = 69% Left ventricular ejection fraction appears to be 61 - 65%.    Left ventricular diastolic function was normal.  GLS -16%.    Left atrial volume is moderately increased.    Estimated right ventricular systolic pressure from tricuspid regurgitation is normal (<35 mmHg).    No significant valvular abnormalities noted.      ECG 12 Lead Stroke Evaluation   Preliminary Result   HEART RATE= 61  bpm   RR Interval= 984  ms   WY Interval= 145  ms   P Horizontal Axis= -21  deg   P Front Axis= 54  deg   QRSD Interval= 100  ms   QT Interval= 466  ms   QTcB= 470   ms   QRS Axis= 34  deg   T Wave Axis= 185  deg   - ABNORMAL ECG -   Sinus rhythm   LVH with secondary repolarization abnormality   Abnormal inferior Q waves   Anterior ST elevation, probably due to LVH   Electronically Signed By:    Date and Time of Study: 2024-06-21 21:39:50      Telemetry Scan   Final Result           Assessment/Plan     Active Hospital Problems    Diagnosis  POA    **Acute right-sided weakness [R53.1]  Yes    Tobacco abuse [Z72.0]  Yes    Coronary artery disease involving native coronary artery of native heart with unstable angina pectoris [I25.110]  Yes    Diabetic gastroparesis [E11.43, K31.84]  Yes    Gastroesophageal reflux disease [K21.9]  Yes    Iron deficiency anemia [D50.9]  Yes    Type 2 diabetes mellitus with hyperglycemia, with long-term current use of insulin [E11.65, Z79.4]  Not Applicable    Benign essential hypertension [I10]  Yes    Mixed hypercholesterolemia and hypertriglyceridemia [E78.2]  Yes   Ms. Inman is a 44-year-old female with history of CAD, CABG, type 2 diabetes, GERD, iron deficiency anemia, recent CVA who presents to the emergency room with complaints of right-sided weakness.     Acute right-sided weakness/recent CVA  -MRI brain in a.m.  -Consult neurology  -Stroke order set initiated  -Hold antihypertensive, patient was having blood pressures on the lower side, will allow for some permissive hypertension  -Continue aspirin and Plavix  -Recent 2D echo in February  -PT/OT/ST to eval and treat   -neurochecks every 4 hours  -Check urine drug screen    Type 2 diabetes  -Accu-Cheks before meals and at bedtime with correctional dose insulin  -Hemoglobin A1c 16.9  -Consult diabetes educator  -Hold oral diabetic medications at this time    CAD/hypertension/hyperlipidemia  -Continue home medications  -Hold antihypertensives for now telemetry unit for monitoring  -      I discussed the patient's findings and my recommendations with patient.    VTE Prophylaxis - SCDs.  Code  Status - Full code.       KAUSHAL Vinson  [facility]  06/21/24  11:54 EDT

## 2024-06-22 NOTE — ED NOTES
"Nursing report ED to floor  Bibiana Inman  44 y.o.  female    HPI :  HPI (Adult)  Stated Reason for Visit: Pt to ED via EMS. Pt reports her LKN was at 1930 tonight. Family noticed right sided weakness in right leg and right arm. Pt reports she had a \"mini stroke last week\". Ems reports upon arrival pt was slow to respond. Ems reports some improvement noted en route to hospital.    Chief Complaint  Chief Complaint   Patient presents with    Extremity Weakness       Admitting doctor:   Wyatt Hobson DO    Admitting diagnosis:   The primary encounter diagnosis was Acute right-sided weakness. Diagnoses of Type 2 diabetes mellitus with hyperglycemia, with long-term current use of insulin, Tobacco abuse, Carotid stenosis, asymptomatic, right, and Coronary artery disease involving native coronary artery of native heart with unstable angina pectoris were also pertinent to this visit.    Code status:   Current Code Status       Date Active Code Status Order ID Comments User Context       Prior            Allergies:   Latex    Isolation:   No active isolations    Intake and Output  No intake or output data in the 24 hours ending 06/21/24 2305    Weight:       06/21/24 2105   Weight: 65.5 kg (144 lb 6.4 oz)       Most recent vitals:   Vitals:    06/21/24 2217 06/21/24 2226 06/21/24 2231 06/21/24 2246   BP: 135/70  135/66 126/65   BP Location:   Right arm Right arm   Patient Position:   Lying Lying   Pulse:  55 56 55   Resp: 16  14 15   Temp:       SpO2:  100% 100% 98%   Weight:       Height:           Active LDAs/IV Access:   Lines, Drains & Airways       Active LDAs       Name Placement date Placement time Site Days    Peripheral IV 06/21/24 2102 Right Antecubital 06/21/24 2102  Antecubital  less than 1    Peripheral IV 06/21/24 2102 Left Antecubital 06/21/24 2102  Antecubital  less than 1                    Labs (abnormal labs have a star):   Labs Reviewed   COMPREHENSIVE METABOLIC PANEL - Abnormal; Notable for the " following components:       Result Value    Glucose 197 (*)     Creatinine 1.40 (*)     Calcium 8.5 (*)     Total Protein 5.4 (*)     Albumin 2.9 (*)     eGFR 47.1 (*)     All other components within normal limits    Narrative:     GFR Normal >60  Chronic Kidney Disease <60  Kidney Failure <15     SINGLE HS TROPONIN T - Abnormal; Notable for the following components:    HS Troponin T 56 (*)     All other components within normal limits    Narrative:     High Sensitive Troponin T Reference Range:  <14.0 ng/L- Negative Female for AMI  <22.0 ng/L- Negative Male for AMI  >=14 - Abnormal Female indicating possible myocardial injury.  >=22 - Abnormal Male indicating possible myocardial injury.   Clinicians would have to utilize clinical acumen, EKG, Troponin, and serial changes to determine if it is an Acute Myocardial Infarction or myocardial injury due to an underlying chronic condition.        CBC WITH AUTO DIFFERENTIAL - Abnormal; Notable for the following components:    Neutrophils, Absolute 7.60 (*)     All other components within normal limits   PROTIME-INR - Normal   APTT - Normal   RAINBOW DRAW    Narrative:     The following orders were created for panel order Concrete Draw.  Procedure                               Abnormality         Status                     ---------                               -----------         ------                     Green Top (Gel)[[id number]]                                  Final result               Lavender Top[[id number]]                                     Final result               Gold Top - SST[[id number]]                                   Final result               Light Blue Top[[id number]]                                   Final result                 Please view results for these tests on the individual orders.   HIGH SENSITIVITIY TROPONIN T 2HR   POCT GLUCOSE FINGERSTICK   TYPE AND SCREEN   CBC AND DIFFERENTIAL    Narrative:     The following orders were created for panel  order CBC & Differential.  Procedure                               Abnormality         Status                     ---------                               -----------         ------                     CBC Auto Differential[[id number]]        Abnormal            Final result                 Please view results for these tests on the individual orders.   GREEN TOP   LAVENDER TOP   GOLD TOP - SST   LIGHT BLUE TOP       EKG:   ECG 12 Lead Stroke Evaluation   Preliminary Result   HEART RATE= 61  bpm   RR Interval= 984  ms   OR Interval= 145  ms   P Horizontal Axis= -21  deg   P Front Axis= 54  deg   QRSD Interval= 100  ms   QT Interval= 466  ms   QTcB= 470  ms   QRS Axis= 34  deg   T Wave Axis= 185  deg   - ABNORMAL ECG -   Sinus rhythm   LVH with secondary repolarization abnormality   Abnormal inferior Q waves   Anterior ST elevation, probably due to LVH   Electronically Signed By:    Date and Time of Study: 2024-06-21 21:39:50          Meds given in ED:   Medications   sodium chloride 0.9 % flush 10 mL (has no administration in time range)   sodium chloride 0.9 % bolus 1,000 mL (0 mL Intravenous Stopped 6/21/24 2235)   iopamidol (ISOVUE-370) 76 % injection 150 mL (150 mL Intravenous Given 6/21/24 2136)       Imaging results:  XR Chest 1 View    Result Date: 6/21/2024  No acute findings.  This report was finalized on 6/21/2024 10:25 PM by Dr. Marian Thompson M.D on Workstation: [id number]       Ambulatory status:   - assist x 1    Social issues:   Social History     Socioeconomic History    Marital status:    Tobacco Use    Smoking status: Every Day     Current packs/day: 0.25     Average packs/day: 0.3 packs/day for 15.0 years (3.8 ttl pk-yrs)     Types: Cigarettes    Smokeless tobacco: Never   Vaping Use    Vaping status: Never Used   Substance and Sexual Activity    Alcohol use: Defer    Drug use: Never    Sexual activity: Defer       Peripheral Neurovascular  Peripheral Neurovascular (Adult)  Peripheral  Neurovascular WDL: WDL    Neuro Cognitive  Neuro Cognitive (Adult)  Cognitive/Neuro/Behavioral WDL: WDL  Last Known Well Date: 06/21/24  Last Known Well Time: 1940  Additional Documentation: Hand /Ankle Strength (Group), Pupils (Group), Sensory Impairment (Row), NIH Stroke Scale (group)  Pupils  Pupil PERRLA: yes  Hand /Ankle Strength  Hand , Left: strong  Hand , Right: moderate  Dorsiflexion, Left: strong  Dorsiflexion, Right: strong  Plantarflexion, Left: strong  Plantarflexion, Right: strong  Ezio Coma Scale  Best Eye Response: 4-->(E4) spontaneous  Best Motor Response: 6-->(M6) obeys commands  Best Verbal Response: 5-->(V5) oriented  [place] Coma Scale Score: 15  NIH Stroke Scale  Interval: baseline  1a. Level of Consciousness: 0-->Alert, keenly responsive  1b. LOC Questions: 0-->Answers both questions correctly  1c. LOC Commands: 0-->Performs both tasks correctly  2. Best Gaze: 0-->Normal  3. Visual: 0-->No visual loss  4. Facial Palsy: 1-->Minor paralysis (flattened nasolabial fold, asymmetry on smiling)  5a. Motor Arm, Left: 0-->No drift, limb holds 90 (or 45) degrees for full 10 secs  5b. Motor Arm, Right: 1-->Drift, limb holds 90 (or 45) degrees, but drifts down before full 10 secs, does not hit bed or other support  6a. Motor Leg, Left: 0-->No drift, leg holds 30 degree position for full 5 secs  6b. Motor Leg, Right: 0-->No drift, leg holds 30 degree position for full 5 secs  7. Limb Ataxia: 1-->Present in one limb  8. Sensory: 0-->Normal, no sensory loss  9. Best Language: 0-->No aphasia, normal  10. Dysarthria: 0-->Normal  11. Extinction and Inattention (formerly Neglect): 0-->No abnormality  Total (NIH Stroke Scale): 3    Learning  Learning Assessment (Adult)  Learning Readiness and Ability: no barriers identified  Education Provided  Person Taught: patient  Teaching Focus: symptom/problem overview  Education Outcome Evaluation: eager to learn    Respiratory  Respiratory  WDL  Respiratory WDL: WDL    Abdominal Pain       Pain Assessments  Pain (Adult)  (0-10) Pain Rating: Rest: 0  (0-10) Pain Rating: Activity: 0    NIH Stroke Scale  NIH Stroke Scale  Interval: baseline  1a. Level of Consciousness: 0-->Alert, keenly responsive  1b. LOC Questions: 0-->Answers both questions correctly  1c. LOC Commands: 0-->Performs both tasks correctly  2. Best Gaze: 0-->Normal  3. Visual: 0-->No visual loss  4. Facial Palsy: 1-->Minor paralysis (flattened nasolabial fold, asymmetry on smiling)  5a. Motor Arm, Left: 0-->No drift, limb holds 90 (or 45) degrees for full 10 secs  5b. Motor Arm, Right: 1-->Drift, limb holds 90 (or 45) degrees, but drifts down before full 10 secs, does not hit bed or other support  6a. Motor Leg, Left: 0-->No drift, leg holds 30 degree position for full 5 secs  6b. Motor Leg, Right: 0-->No drift, leg holds 30 degree position for full 5 secs  7. Limb Ataxia: 1-->Present in one limb  8. Sensory: 0-->Normal, no sensory loss  9. Best Language: 0-->No aphasia, normal  10. Dysarthria: 0-->Normal  11. Extinction and Inattention (formerly Neglect): 0-->No abnormality  Total (NIH Stroke Scale): 3    Sarah Cyr RN  06/21/24 23:05 EDT

## 2024-06-22 NOTE — SIGNIFICANT NOTE
PT note: Pt reported that she is indep all aspects. She declined PT eval . She denied difficulty with amb or balance. PT will sign off due to no apparent skilled need at this time.

## 2024-06-23 ENCOUNTER — APPOINTMENT (OUTPATIENT)
Dept: CARDIOLOGY | Facility: HOSPITAL | Age: 44
DRG: 981 | End: 2024-06-23
Payer: COMMERCIAL

## 2024-06-23 LAB
AMPHET+METHAMPHET UR QL: NEGATIVE
ANION GAP SERPL CALCULATED.3IONS-SCNC: 7 MMOL/L (ref 5–15)
AORTIC ARCH: 2.5 CM
AORTIC DIMENSIONLESS INDEX: 0.6 (DI)
ASCENDING AORTA: 3.3 CM
BARBITURATES UR QL SCN: NEGATIVE
BASOPHILS # BLD AUTO: 0.04 10*3/MM3 (ref 0–0.2)
BASOPHILS NFR BLD AUTO: 0.6 % (ref 0–1.5)
BENZODIAZ UR QL SCN: NEGATIVE
BH CV ECHO MEAS - ACS: 1.75 CM
BH CV ECHO MEAS - AO MAX PG: 10.1 MMHG
BH CV ECHO MEAS - AO MEAN PG: 5 MMHG
BH CV ECHO MEAS - AO ROOT DIAM: 3.5 CM
BH CV ECHO MEAS - AO V2 MAX: 159 CM/SEC
BH CV ECHO MEAS - AO V2 VTI: 35.5 CM
BH CV ECHO MEAS - AVA(I,D): 1.9 CM2
BH CV ECHO MEAS - EDV(CUBED): 80.8 ML
BH CV ECHO MEAS - EDV(MOD-SP2): 82 ML
BH CV ECHO MEAS - EDV(MOD-SP4): 90 ML
BH CV ECHO MEAS - EF(MOD-BP): 62.5 %
BH CV ECHO MEAS - EF(MOD-SP2): 62.2 %
BH CV ECHO MEAS - EF(MOD-SP4): 62.2 %
BH CV ECHO MEAS - ESV(CUBED): 16.7 ML
BH CV ECHO MEAS - ESV(MOD-SP2): 31 ML
BH CV ECHO MEAS - ESV(MOD-SP4): 34 ML
BH CV ECHO MEAS - FS: 40.8 %
BH CV ECHO MEAS - IVS/LVPW: 1.02 CM
BH CV ECHO MEAS - IVSD: 1.42 CM
BH CV ECHO MEAS - LAT PEAK E' VEL: 5.1 CM/SEC
BH CV ECHO MEAS - LV DIASTOLIC VOL/BSA (35-75): 52.9 CM2
BH CV ECHO MEAS - LV MASS(C)D: 236.9 GRAMS
BH CV ECHO MEAS - LV MAX PG: 3 MMHG
BH CV ECHO MEAS - LV MEAN PG: 1.82 MMHG
BH CV ECHO MEAS - LV SYSTOLIC VOL/BSA (12-30): 20 CM2
BH CV ECHO MEAS - LV V1 MAX: 86.6 CM/SEC
BH CV ECHO MEAS - LV V1 VTI: 21.5 CM
BH CV ECHO MEAS - LVIDD: 4.3 CM
BH CV ECHO MEAS - LVIDS: 2.6 CM
BH CV ECHO MEAS - LVOT AREA: 3.1 CM2
BH CV ECHO MEAS - LVOT DIAM: 2 CM
BH CV ECHO MEAS - LVPWD: 1.4 CM
BH CV ECHO MEAS - MED PEAK E' VEL: 4.6 CM/SEC
BH CV ECHO MEAS - MV A DUR: 0.12 SEC
BH CV ECHO MEAS - MV A MAX VEL: 83.9 CM/SEC
BH CV ECHO MEAS - MV DEC SLOPE: 505.1 CM/SEC2
BH CV ECHO MEAS - MV DEC TIME: 0.23 SEC
BH CV ECHO MEAS - MV E MAX VEL: 99.4 CM/SEC
BH CV ECHO MEAS - MV E/A: 1.18
BH CV ECHO MEAS - MV MAX PG: 4.6 MMHG
BH CV ECHO MEAS - MV MEAN PG: 2.07 MMHG
BH CV ECHO MEAS - MV P1/2T: 63.4 MSEC
BH CV ECHO MEAS - MV V2 VTI: 37.6 CM
BH CV ECHO MEAS - MVA(P1/2T): 3.5 CM2
BH CV ECHO MEAS - MVA(VTI): 1.79 CM2
BH CV ECHO MEAS - PA ACC TIME: 0.14 SEC
BH CV ECHO MEAS - PA V2 MAX: 107 CM/SEC
BH CV ECHO MEAS - PULM A REVS DUR: 0.12 SEC
BH CV ECHO MEAS - PULM A REVS VEL: 33.6 CM/SEC
BH CV ECHO MEAS - PULM DIAS VEL: 54.4 CM/SEC
BH CV ECHO MEAS - PULM S/D: 0.98
BH CV ECHO MEAS - PULM SYS VEL: 53.4 CM/SEC
BH CV ECHO MEAS - RAP SYSTOLE: 3 MMHG
BH CV ECHO MEAS - RV MAX PG: 3 MMHG
BH CV ECHO MEAS - RV V1 MAX: 85.9 CM/SEC
BH CV ECHO MEAS - RV V1 VTI: 22.2 CM
BH CV ECHO MEAS - RVSP: 20 MMHG
BH CV ECHO MEAS - SV(LVOT): 67.5 ML
BH CV ECHO MEAS - SV(MOD-SP2): 51 ML
BH CV ECHO MEAS - SV(MOD-SP4): 56 ML
BH CV ECHO MEAS - SVI(LVOT): 39.7 ML/M2
BH CV ECHO MEAS - SVI(MOD-SP2): 30 ML/M2
BH CV ECHO MEAS - SVI(MOD-SP4): 32.9 ML/M2
BH CV ECHO MEAS - TAPSE (>1.6): 1.34 CM
BH CV ECHO MEAS - TR MAX PG: 17.2 MMHG
BH CV ECHO MEAS - TR MAX VEL: 207.5 CM/SEC
BH CV ECHO MEASUREMENTS AVERAGE E/E' RATIO: 20.49
BH CV ECHO SHUNT ASSESSMENT PERFORMED (HIDDEN SCRIPTING): 1
BH CV XLRA - RV BASE: 3.4 CM
BH CV XLRA - RV LENGTH: 7 CM
BH CV XLRA - RV MID: 2.39 CM
BH CV XLRA - TDI S': 9.7 CM/SEC
BUN SERPL-MCNC: 10 MG/DL (ref 6–20)
BUN/CREAT SERPL: 10.2 (ref 7–25)
CALCIUM SPEC-SCNC: 8.2 MG/DL (ref 8.6–10.5)
CANNABINOIDS SERPL QL: NEGATIVE
CHLORIDE SERPL-SCNC: 108 MMOL/L (ref 98–107)
CO2 SERPL-SCNC: 23 MMOL/L (ref 22–29)
COCAINE UR QL: NEGATIVE
CREAT SERPL-MCNC: 0.98 MG/DL (ref 0.57–1)
DEPRECATED RDW RBC AUTO: 45.5 FL (ref 37–54)
EGFRCR SERPLBLD CKD-EPI 2021: 72.2 ML/MIN/1.73
EOSINOPHIL # BLD AUTO: 0.16 10*3/MM3 (ref 0–0.4)
EOSINOPHIL NFR BLD AUTO: 2.3 % (ref 0.3–6.2)
ERYTHROCYTE [DISTWIDTH] IN BLOOD BY AUTOMATED COUNT: 13.6 % (ref 12.3–15.4)
FENTANYL UR-MCNC: NEGATIVE NG/ML
GLUCOSE BLDC GLUCOMTR-MCNC: 159 MG/DL (ref 70–130)
GLUCOSE BLDC GLUCOMTR-MCNC: 176 MG/DL (ref 70–130)
GLUCOSE BLDC GLUCOMTR-MCNC: 178 MG/DL (ref 70–130)
GLUCOSE BLDC GLUCOMTR-MCNC: 195 MG/DL (ref 70–130)
GLUCOSE BLDC GLUCOMTR-MCNC: 271 MG/DL (ref 70–130)
GLUCOSE SERPL-MCNC: 168 MG/DL (ref 65–99)
HCT VFR BLD AUTO: 35.3 % (ref 34–46.6)
HGB BLD-MCNC: 11.4 G/DL (ref 12–15.9)
IMM GRANULOCYTES # BLD AUTO: 0.01 10*3/MM3 (ref 0–0.05)
IMM GRANULOCYTES NFR BLD AUTO: 0.1 % (ref 0–0.5)
LEFT ATRIUM VOLUME INDEX: 34.9 ML/M2
LYMPHOCYTES # BLD AUTO: 1.95 10*3/MM3 (ref 0.7–3.1)
LYMPHOCYTES NFR BLD AUTO: 27.8 % (ref 19.6–45.3)
MCH RBC QN AUTO: 29.7 PG (ref 26.6–33)
MCHC RBC AUTO-ENTMCNC: 32.3 G/DL (ref 31.5–35.7)
MCV RBC AUTO: 91.9 FL (ref 79–97)
METHADONE UR QL SCN: NEGATIVE
MONOCYTES # BLD AUTO: 0.46 10*3/MM3 (ref 0.1–0.9)
MONOCYTES NFR BLD AUTO: 6.6 % (ref 5–12)
NEUTROPHILS NFR BLD AUTO: 4.4 10*3/MM3 (ref 1.7–7)
NEUTROPHILS NFR BLD AUTO: 62.6 % (ref 42.7–76)
NRBC BLD AUTO-RTO: 0 /100 WBC (ref 0–0.2)
OPIATES UR QL: NEGATIVE
OXYCODONE UR QL SCN: NEGATIVE
PLATELET # BLD AUTO: 202 10*3/MM3 (ref 140–450)
PMV BLD AUTO: 11.4 FL (ref 6–12)
POTASSIUM SERPL-SCNC: 3.8 MMOL/L (ref 3.5–5.2)
RBC # BLD AUTO: 3.84 10*6/MM3 (ref 3.77–5.28)
SINUS: 3 CM
SODIUM SERPL-SCNC: 138 MMOL/L (ref 136–145)
STJ: 2.8 CM
WBC NRBC COR # BLD AUTO: 7.02 10*3/MM3 (ref 3.4–10.8)

## 2024-06-23 PROCEDURE — 82948 REAGENT STRIP/BLOOD GLUCOSE: CPT

## 2024-06-23 PROCEDURE — 80307 DRUG TEST PRSMV CHEM ANLYZR: CPT | Performed by: NURSE PRACTITIONER

## 2024-06-23 PROCEDURE — 63710000001 INSULIN LISPRO (HUMAN) PER 5 UNITS: Performed by: NURSE PRACTITIONER

## 2024-06-23 PROCEDURE — 25810000003 SODIUM CHLORIDE 0.9 % SOLUTION: Performed by: NURSE PRACTITIONER

## 2024-06-23 PROCEDURE — 80048 BASIC METABOLIC PNL TOTAL CA: CPT | Performed by: STUDENT IN AN ORGANIZED HEALTH CARE EDUCATION/TRAINING PROGRAM

## 2024-06-23 PROCEDURE — 63710000001 INSULIN GLARGINE PER 5 UNITS: Performed by: NURSE PRACTITIONER

## 2024-06-23 PROCEDURE — 85025 COMPLETE CBC W/AUTO DIFF WBC: CPT | Performed by: STUDENT IN AN ORGANIZED HEALTH CARE EDUCATION/TRAINING PROGRAM

## 2024-06-23 PROCEDURE — 93306 TTE W/DOPPLER COMPLETE: CPT

## 2024-06-23 PROCEDURE — 93306 TTE W/DOPPLER COMPLETE: CPT | Performed by: INTERNAL MEDICINE

## 2024-06-23 RX ORDER — ENALAPRILAT 1.25 MG/ML
0.62 INJECTION INTRAVENOUS EVERY 6 HOURS PRN
Status: DISCONTINUED | OUTPATIENT
Start: 2024-06-23 | End: 2024-06-24

## 2024-06-23 RX ADMIN — SODIUM CHLORIDE 100 ML/HR: 9 INJECTION, SOLUTION INTRAVENOUS at 08:55

## 2024-06-23 RX ADMIN — ESCITALOPRAM OXALATE 10 MG: 10 TABLET, FILM COATED ORAL at 08:48

## 2024-06-23 RX ADMIN — SODIUM CHLORIDE 100 ML/HR: 9 INJECTION, SOLUTION INTRAVENOUS at 01:02

## 2024-06-23 RX ADMIN — CLOPIDOGREL BISULFATE 75 MG: 75 TABLET, FILM COATED ORAL at 08:48

## 2024-06-23 RX ADMIN — ATORVASTATIN CALCIUM 80 MG: 80 TABLET, FILM COATED ORAL at 20:55

## 2024-06-23 RX ADMIN — INSULIN LISPRO 4 UNITS: 100 INJECTION, SOLUTION INTRAVENOUS; SUBCUTANEOUS at 20:56

## 2024-06-23 RX ADMIN — Medication 10 ML: at 20:56

## 2024-06-23 RX ADMIN — INSULIN LISPRO 2 UNITS: 100 INJECTION, SOLUTION INTRAVENOUS; SUBCUTANEOUS at 07:03

## 2024-06-23 RX ADMIN — INSULIN GLARGINE 20 UNITS: 100 INJECTION, SOLUTION SUBCUTANEOUS at 08:48

## 2024-06-23 RX ADMIN — Medication 10 ML: at 08:49

## 2024-06-23 RX ADMIN — ASPIRIN 325 MG: 325 TABLET ORAL at 08:48

## 2024-06-23 RX ADMIN — ENALAPRILAT 0.62 MG: 1.25 INJECTION INTRAVENOUS at 21:55

## 2024-06-23 RX ADMIN — PANTOPRAZOLE SODIUM 40 MG: 40 TABLET, DELAYED RELEASE ORAL at 07:03

## 2024-06-23 RX ADMIN — INSULIN LISPRO 2 UNITS: 100 INJECTION, SOLUTION INTRAVENOUS; SUBCUTANEOUS at 01:00

## 2024-06-23 RX ADMIN — NICOTINE 1 PATCH: 21 PATCH, EXTENDED RELEASE TRANSDERMAL at 01:03

## 2024-06-23 RX ADMIN — INSULIN LISPRO 2 UNITS: 100 INJECTION, SOLUTION INTRAVENOUS; SUBCUTANEOUS at 12:29

## 2024-06-23 NOTE — PROGRESS NOTES
Name: Bibiana Inman ADMIT: 2024   : 1980  PCP: Ibrahima Correa MD    MRN: [id number] LOS: 2 days   AGE/SEX: 44 y.o. female  ROOM: [facility]     Subjective     Examined at bedside.  No new complaints today.  MRI findings noted.  Objective   Objective   Vital Signs  Temp:  [98 °F (36.7 °C)-98.6 °F (37 °C)] 98 °F (36.7 °C)  Heart Rate:  [62-69] 62  Resp:  [16-18] 18  BP: (177-185)/(63-79) 177/79  SpO2:  [96 %-98 %] 96 %  on   ;   Device (Oxygen Therapy): room air  Body mass index is 24.79 kg/m².  Physical Exam  Constitutional:       General: She is not in acute distress.  HENT:      Head: Normocephalic and atraumatic.   Cardiovascular:      Rate and Rhythm: Normal rate and regular rhythm.   Abdominal:      General: Abdomen is flat.      Palpations: Abdomen is soft.   Musculoskeletal:      Right lower leg: No edema.      Left lower leg: No edema.   Skin:     General: Skin is warm.   Neurological:      General: No focal deficit present.      Mental Status: She is alert and oriented to person, place, and time.         Results Review     I reviewed the patient's new clinical results.  Results from last 7 days   Lab Units 24  0500 24  0323 24  21024  0605   WBC 10*3/mm3 7.02 11.58* 10.72 9.69   HEMOGLOBIN g/dL 11.4* 11.5* 12.0 12.2   PLATELETS 10*3/mm3 202 265 245 220     Results from last 7 days   Lab Units 24  0500 24  0323 24  21024  0506   SODIUM mmol/L 138 137 137 135*   POTASSIUM mmol/L 3.8 3.5 3.8 3.6   CHLORIDE mmol/L 108* 106 104 105   CO2 mmol/L 23.0 22.3 22.0 21.8*   BUN mg/dL 10 10 11 8   CREATININE mg/dL 0.98 1.23* 1.40* 1.12*   GLUCOSE mg/dL 168* 56* 197* 248*   Estimated Creatinine Clearance: 74.2 mL/min (by C-G formula based on SCr of 0.98 mg/dL).  Results from last 7 days   Lab Units 24  0928   ALBUMIN g/dL 2.9* 2.9* 3.4*   BILIRUBIN mg/dL <0.2 0.2 0.4   ALK PHOS U/L 90 96 134*   AST (SGOT) U/L 12 10  11   ALT (SGPT) U/L 9 <5 5     Results from last 7 days   Lab Units 06/23/24  0500 06/22/24  0323 06/21/24  2106 06/19/24  0506 06/18/24  0605 06/17/24  0928   CALCIUM mg/dL 8.2* 8.4* 8.5* 9.0 8.8 9.1   ALBUMIN g/dL  --   --  2.9*  --  2.9* 3.4*       COVID19   Date Value Ref Range Status   12/28/2021 Detected (C) Not Detected - Ref. Range Final   01/29/2021 Presumptive Negative Presumptive Negative - Ref. Range Final     Glucose   Date/Time Value Ref Range Status   06/23/2024 1123 159 (H) 70 - 130 mg/dL Final   06/23/2024 0638 176 (H) 70 - 130 mg/dL Final   06/23/2024 0031 195 (H) 70 - 130 mg/dL Final   06/22/2024 1636 176 (H) 70 - 130 mg/dL Final   06/22/2024 1134 112 70 - 130 mg/dL Final   06/22/2024 1011 135 (H) 70 - 130 mg/dL Final     No results found for this or any previous visit.      MRI Brain Without Contrast  Narrative: MRI OF THE BRAIN WITHOUT CONTRAST     CLINICAL HISTORY: right side weakness; R53.1-Weakness; E11.65-Type 2  diabetes mellitus with hyperglycemia; Z79.4-Long term (current) use of  insulin; Z72.0-Tobacco use; I65.21-Occlusion and stenosis of right  carotid artery; I25.110-Atherosclerotic heart disease of native coronary  artery with unstable angina pectoris     TECHNIQUE: MRI of the brain was obtained with sagittal T1, axial T1,  axial FLAIR, axial T2, axial diffusion, and susceptibility weighted  images.     COMPARISON: Brain MRI dated 06/18/2024 and 06/17/2024.  FINDINGS:     On the previous MRI of the brain dated 06/17/2024, there is an unusual  linear appearing focus of restricted diffusion within the left parietal  lobe consistent with an acute infarct which measured up to approximately  8 x 3 mm in greatest axial dimensions. On the subsequent MRI of the  brain dated 06/18/2024, there was a new focus of restricted diffusion  compatible with an area of acute infarction within the genu of the left  internal capsule. This abnormality measured up to approximately 8 mm in  diameter. On the  current exam, this abnormality is larger in size  measuring up to approximately 15 mm in greatest dimensions.  Additionally, there is a new focus of restricted diffusion within the  left insular cortex compatible with an acute infarct which measures up  to approximately 3 mm in maximal diameter.     Otherwise, the ventricles, sulci, and cisterns are age-appropriate.  There are moderates change of chronic small vessel ischemic phenomenon.  There are no abnormal foci of susceptibility artifact. The major  intracranial flow related signal voids are within normal limits.     Mild changes of inflammatory paranasal sinus disease are incidentally  noted with scattered areas of mucosal thickening appreciated most  prominently within the sphenoid sinus and the ethmoid air cells.     Impression:    When compared to the most recent examination dated 06/18/2024, there is  interval enlargement of the previously identified acute infarct centered  within the genu of the left internal capsule. Also, there is a new  punctate focus of acute infarction within the left insular cortex. The  previously identified acute infarction within the white matter of the  left parietal lobe is again appreciated. The location of the infarct  within the genu of the left internal capsule is most suggestive of an in  situ occlusive process (rather than an embolic event) which could be due  to lacunar disease or possibly a vasculitis. Please correlate with  clinical data.     Moderate changes of chronic small vessel ischemic phenomenon are  incidentally appreciated.     These findings and recommendations were discussed with Dr. Castillo on  06/22/2024 at approximately 3:25 p.m.              This report was finalized on 6/22/2024 4:12 PM by Dr. Terrence Thorne M.D  on Workstation: [id number]     CT Angiogram Head w AI Analysis of LVO, CT Angiogram Neck, CT CEREBRAL PERFUSION WITH & WITHOUT CONTRAST  Narrative: NONCONTRAST CRANIAL CT SCAN, CT ANGIOGRAM  NECK, CT ANGIOGRAM HEAD,  CRANIAL CT PERFUSION.     HISTORY: Acute CVA,      COMPARISON: None..     TECHNIQUE:  Radiation dose reduction techniques were utilized, including  automated exposure control and exposure modulation based on body size.   Initially, a routine noncontrast cranial CT performed from the skull  base through the vertex region. After review, thin-section contrast  enhanced CT angiogram images obtained from the aortic arch through the  calvarial vertex utilizing angiographic technique. Multi projection 3-D  MIP reformatted images were supplemented and reviewed. Subsequently, CT  perfusion imaging performed with ischemia view software.     FINDINGS CRANIAL CT: No acute hemorrhage or midline shift is  demonstrated..  Ventricular size and configuration is within normal  limits for age.. There is periventricular and deep white matter  microangiopathic change. Recent infarcts noted within the left parietal  subcortical white matter is not well seen on this study. The patient  does have some decreased attenuation involving the posterior limb of the  left internal capsule which is more apparent than on prior imaging.  Postcontrast imaging does not any evidence of venous occlusion or  abnormal enhancement. Bone window images demonstrate some mucosal  thickening within the ethmoid and right maxillary sinuses.     Study was placed on line at 9:16 p.m. Preliminary interpretation  telephoned to extension [id number] during interpretation at 9:25 p.m.        CERVICAL CAROTID CT ANGIOGRAM:     FINDINGS: There is enlargement of the main pulmonary artery, which can  be seen in setting of pulmonary arterial hypertension. There are  coronary artery calcifications. There is normal arch anatomy. There is  mild narrowing of the right common carotid artery, measuring up to 20 to  30%. Plaque continues into the origin of the proximal right internal  carotid artery, with narrowing in the range of 40-50 %. There is some  further  plaque noted within the proximal right internal carotid artery,  resulting in narrowing, in the range of about 30%. Distal cervical right  internal carotid artery is widely patent. There is atherosclerotic  plaque involving the distal left common carotid artery, resulting in  narrowing in the range of 20%. Ulcerated plaque is noted at the left  carotid bifurcation, resulting in mild narrowing, in the range of 20 to  30%. Distal cervical left internal carotid artery is widely patent.  Vertebral arteries appear to be patent throughout their courses. There  is some calcified plaque noted involving the proximal vertebral arteries  bilaterally. It may result in some minimal narrowing bilaterally. Images  through the lung apices do not demonstrate any acute abnormalities.  Thyroid gland and trachea are normal.        NASCET criteria utilized in stenosis measurements. stenosis mild, 0-49%.        CRANIAL CTA ANGIOGRAM:     FINDINGS: The intracranial vertebral arteries are patent. Basilar artery  is patent. The posterior cerebral arteries are patent bilaterally. There  is calcification of the intracranial internal carotid arteries. They  remain patent. There is a small anterior communicating artery. Anterior  cerebral and middle cerebral arteries are patent bilaterally..             CT PERFUSION:     FINDINGS: No areas of cerebral blood flow less than 30% or Tmax greater  than 6 seconds are identified.     Study was placed on line at 9:34 p.m. Preliminary interpretation  telephoned to extension [phone] during interpretation at 9:50 p.m.     AI analysis of LVO was utilized.     Radiation dose reduction techniques were utilized, including automated  exposure control and exposure modulation based on body size.        Impression: 1. No acute intracranial hemorrhage.  2. Indeterminate area of decreased attenuation involving the posterior  limb of the left internal capsule. Consider further assessment with MRI.  3. No areas of  cerebral blood flow less than 30% or Tmax greater than 6  seconds.  4. Approximately 40 to 50% stenosis of the origin of the right internal  carotid artery.  5. Intracranial vessels are patent.        This report was finalized on 6/22/2024 12:03 AM by Dr. Marian Thompson M.D on Workstation: [id number]       Scheduled Medications  aspirin, 325 mg, Oral, Daily   Or  aspirin, 300 mg, Rectal, Daily  atorvastatin, 80 mg, Oral, Nightly  clopidogrel, 75 mg, Oral, Daily  escitalopram, 10 mg, Oral, Daily  icosapent ethyl, 2 g, Oral, BID With Meals  insulin glargine, 20 Units, Subcutaneous, Daily  insulin lispro, 2-7 Units, Subcutaneous, 4x Daily AC & at Bedtime  nicotine, 1 patch, Transdermal, Q24H  pantoprazole, 40 mg, Oral, QAM  sodium chloride, 10 mL, Intravenous, Q12H    Infusions  sodium chloride, 100 mL/hr, Last Rate: 100 mL/hr (06/23/24 0855)    Diet  Diet: Diabetic; Consistent Carbohydrate; Fluid Consistency: Thin (IDDSI 0)       Assessment/Plan     Active Hospital Problems    Diagnosis  POA    **Acute right-sided weakness [R53.1]  Yes    CKD stage 3a, GFR 45-59 ml/min [N18.31]  Yes    Carotid stenosis [I65.29]  Yes    Lacunar cerebrovascular accident (CVA) [I63.81]  Yes    Elevated troponin [R79.89]  Yes    Tobacco abuse [Z72.0]  Yes    Coronary artery disease involving native coronary artery of native heart with unstable angina pectoris [I25.110]  Yes    Diabetic gastroparesis [E11.43, 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       44 y.o. female admitted with Acute right-sided weakness.    Acute right-sided weakness  Acute CVA  Carotid stenosis  MRI findings  noted, concerning for interval enlargement of the previously identified acute infarct centered within the genu of the left internal capsule.  A new focus of acute infarction was noted within the left insular cortex previously identified acute infarction within the white matter of the left parietal lobe was also noted.  TTE pending  Neurology following  ASA, plavix, statin    T2DM c/b gastroparesis   On jardiance and glargine as outpatient. Resume glargine at attenduated dose    CAD s/p CABG  Troponin elevation- chronic  Hypertension    CKD 3  SCR imrpoving with IVF    GERD  -PPI      SCDs for DVT prophylaxis.  Full code.  Discussed with patient and family.  Anticipate discharge after echocardiogram is completed.       Qamar Perez MD  [facility]  06/23/24  13:17 EDT

## 2024-06-23 NOTE — PLAN OF CARE
Problem: Adult Inpatient Plan of Care  Goal: Plan of Care Review  Outcome: Ongoing, Progressing  Flowsheets (Taken 6/23/2024 0633)  Plan of Care Reviewed With: patient  Outcome Evaluation: A/Ox4, vss, NIH2, denies any pain has on nicotine patch, urine clean catch sent to lab. Slept well, no falls. WCTM.     Problem: Adult Inpatient Plan of Care  Goal: Plan of Care Review  Outcome: Ongoing, Progressing  Flowsheets (Taken 6/23/2024 0633)  Plan of Care Reviewed With: patient  Outcome Evaluation: A/Ox4, vss, NIH2, denies any pain has on nicotine patch, urine clean catch sent to lab. Slept well, no falls. WCTM.     Problem: Adult Inpatient Plan of Care  Goal: Absence of Hospital-Acquired Illness or Injury  Outcome: Ongoing, Progressing     Problem: Fall Injury Risk  Goal: Absence of Fall and Fall-Related Injury  Intervention: Promote Injury-Free Environment  Recent Flowsheet Documentation  Taken 6/23/2024 0600 by Gilda Puckett RN  Safety Promotion/Fall Prevention: safety round/check completed  Taken 6/23/2024 0415 by Gilda Puckett RN  Safety Promotion/Fall Prevention: safety round/check completed  Taken 6/23/2024 0210 by Gilda Puckett RN  Safety Promotion/Fall Prevention: safety round/check completed  Taken 6/23/2024 0010 by Gilda Puckett RN  Safety Promotion/Fall Prevention: activity supervised  Taken 6/22/2024 2003 by Gilda Puckett, ALEENA  Safety Promotion/Fall Prevention: activity supervised   Goal Outcome Evaluation:  Plan of Care Reviewed With: patient           Outcome Evaluation: A/Ox4, vss, NIH2, denies any pain has on nicotine patch, urine clean catch sent to lab. Slept well, no falls. WCTM.

## 2024-06-24 LAB
ANION GAP SERPL CALCULATED.3IONS-SCNC: 9.1 MMOL/L (ref 5–15)
BASOPHILS # BLD AUTO: 0.04 10*3/MM3 (ref 0–0.2)
BASOPHILS NFR BLD AUTO: 0.5 % (ref 0–1.5)
BUN SERPL-MCNC: 8 MG/DL (ref 6–20)
BUN/CREAT SERPL: 8.6 (ref 7–25)
CALCIUM SPEC-SCNC: 8.6 MG/DL (ref 8.6–10.5)
CHLORIDE SERPL-SCNC: 108 MMOL/L (ref 98–107)
CO2 SERPL-SCNC: 21.9 MMOL/L (ref 22–29)
CREAT SERPL-MCNC: 0.93 MG/DL (ref 0.57–1)
DEPRECATED RDW RBC AUTO: 47.7 FL (ref 37–54)
EGFRCR SERPLBLD CKD-EPI 2021: 76.9 ML/MIN/1.73
EOSINOPHIL # BLD AUTO: 0.17 10*3/MM3 (ref 0–0.4)
EOSINOPHIL NFR BLD AUTO: 2.2 % (ref 0.3–6.2)
ERYTHROCYTE [DISTWIDTH] IN BLOOD BY AUTOMATED COUNT: 14.1 % (ref 12.3–15.4)
GLUCOSE BLDC GLUCOMTR-MCNC: 105 MG/DL (ref 70–130)
GLUCOSE BLDC GLUCOMTR-MCNC: 168 MG/DL (ref 70–130)
GLUCOSE BLDC GLUCOMTR-MCNC: 182 MG/DL (ref 70–130)
GLUCOSE BLDC GLUCOMTR-MCNC: 227 MG/DL (ref 70–130)
GLUCOSE SERPL-MCNC: 134 MG/DL (ref 65–99)
HCT VFR BLD AUTO: 35.8 % (ref 34–46.6)
HGB BLD-MCNC: 11.2 G/DL (ref 12–15.9)
IMM GRANULOCYTES # BLD AUTO: 0.03 10*3/MM3 (ref 0–0.05)
IMM GRANULOCYTES NFR BLD AUTO: 0.4 % (ref 0–0.5)
LYMPHOCYTES # BLD AUTO: 2.15 10*3/MM3 (ref 0.7–3.1)
LYMPHOCYTES NFR BLD AUTO: 27.7 % (ref 19.6–45.3)
MCH RBC QN AUTO: 28.9 PG (ref 26.6–33)
MCHC RBC AUTO-ENTMCNC: 31.3 G/DL (ref 31.5–35.7)
MCV RBC AUTO: 92.5 FL (ref 79–97)
MONOCYTES # BLD AUTO: 0.52 10*3/MM3 (ref 0.1–0.9)
MONOCYTES NFR BLD AUTO: 6.7 % (ref 5–12)
NEUTROPHILS NFR BLD AUTO: 4.85 10*3/MM3 (ref 1.7–7)
NEUTROPHILS NFR BLD AUTO: 62.5 % (ref 42.7–76)
NRBC BLD AUTO-RTO: 0 /100 WBC (ref 0–0.2)
PLATELET # BLD AUTO: 203 10*3/MM3 (ref 140–450)
PMV BLD AUTO: 11 FL (ref 6–12)
POTASSIUM SERPL-SCNC: 3.7 MMOL/L (ref 3.5–5.2)
QT INTERVAL: 466 MS
QTC INTERVAL: 470 MS
RBC # BLD AUTO: 3.87 10*6/MM3 (ref 3.77–5.28)
SODIUM SERPL-SCNC: 139 MMOL/L (ref 136–145)
WBC NRBC COR # BLD AUTO: 7.76 10*3/MM3 (ref 3.4–10.8)

## 2024-06-24 PROCEDURE — 99232 SBSQ HOSP IP/OBS MODERATE 35: CPT | Performed by: STUDENT IN AN ORGANIZED HEALTH CARE EDUCATION/TRAINING PROGRAM

## 2024-06-24 PROCEDURE — 85025 COMPLETE CBC W/AUTO DIFF WBC: CPT | Performed by: STUDENT IN AN ORGANIZED HEALTH CARE EDUCATION/TRAINING PROGRAM

## 2024-06-24 PROCEDURE — 80048 BASIC METABOLIC PNL TOTAL CA: CPT | Performed by: STUDENT IN AN ORGANIZED HEALTH CARE EDUCATION/TRAINING PROGRAM

## 2024-06-24 PROCEDURE — 82948 REAGENT STRIP/BLOOD GLUCOSE: CPT

## 2024-06-24 PROCEDURE — 63710000001 INSULIN GLARGINE PER 5 UNITS: Performed by: NURSE PRACTITIONER

## 2024-06-24 PROCEDURE — 99223 1ST HOSP IP/OBS HIGH 75: CPT | Performed by: INTERNAL MEDICINE

## 2024-06-24 PROCEDURE — 63710000001 INSULIN LISPRO (HUMAN) PER 5 UNITS: Performed by: NURSE PRACTITIONER

## 2024-06-24 PROCEDURE — 25810000003 SODIUM CHLORIDE 0.9 % SOLUTION: Performed by: NURSE PRACTITIONER

## 2024-06-24 RX ORDER — VALSARTAN 80 MG/1
80 TABLET ORAL
Status: DISCONTINUED | OUTPATIENT
Start: 2024-06-24 | End: 2024-06-24

## 2024-06-24 RX ORDER — NIFEDIPINE 60 MG/1
60 TABLET, EXTENDED RELEASE ORAL
Status: DISCONTINUED | OUTPATIENT
Start: 2024-06-24 | End: 2024-06-25

## 2024-06-24 RX ORDER — VALSARTAN 80 MG/1
80 TABLET ORAL ONCE
Status: COMPLETED | OUTPATIENT
Start: 2024-06-24 | End: 2024-06-24

## 2024-06-24 RX ORDER — VALSARTAN 160 MG/1
160 TABLET ORAL
Status: DISCONTINUED | OUTPATIENT
Start: 2024-06-25 | End: 2024-06-25

## 2024-06-24 RX ORDER — HYDRALAZINE HYDROCHLORIDE 25 MG/1
25 TABLET, FILM COATED ORAL EVERY 6 HOURS PRN
Status: DISCONTINUED | OUTPATIENT
Start: 2024-06-24 | End: 2024-07-01

## 2024-06-24 RX ADMIN — INSULIN GLARGINE 20 UNITS: 100 INJECTION, SOLUTION SUBCUTANEOUS at 10:08

## 2024-06-24 RX ADMIN — ASPIRIN 325 MG: 325 TABLET ORAL at 10:06

## 2024-06-24 RX ADMIN — Medication 10 ML: at 10:08

## 2024-06-24 RX ADMIN — INSULIN LISPRO 3 UNITS: 100 INJECTION, SOLUTION INTRAVENOUS; SUBCUTANEOUS at 21:52

## 2024-06-24 RX ADMIN — NICOTINE 1 PATCH: 21 PATCH, EXTENDED RELEASE TRANSDERMAL at 00:08

## 2024-06-24 RX ADMIN — SODIUM CHLORIDE 100 ML/HR: 9 INJECTION, SOLUTION INTRAVENOUS at 10:13

## 2024-06-24 RX ADMIN — VALSARTAN 80 MG: 80 TABLET, FILM COATED ORAL at 17:56

## 2024-06-24 RX ADMIN — NIFEDIPINE 60 MG: 60 TABLET, FILM COATED, EXTENDED RELEASE ORAL at 10:13

## 2024-06-24 RX ADMIN — Medication 10 ML: at 21:52

## 2024-06-24 RX ADMIN — VALSARTAN 80 MG: 80 TABLET, FILM COATED ORAL at 10:14

## 2024-06-24 RX ADMIN — ATORVASTATIN CALCIUM 80 MG: 80 TABLET, FILM COATED ORAL at 21:51

## 2024-06-24 RX ADMIN — CLOPIDOGREL BISULFATE 75 MG: 75 TABLET, FILM COATED ORAL at 10:06

## 2024-06-24 RX ADMIN — PANTOPRAZOLE SODIUM 40 MG: 40 TABLET, DELAYED RELEASE ORAL at 06:12

## 2024-06-24 RX ADMIN — SODIUM CHLORIDE 100 ML/HR: 9 INJECTION, SOLUTION INTRAVENOUS at 20:10

## 2024-06-24 RX ADMIN — INSULIN LISPRO 2 UNITS: 100 INJECTION, SOLUTION INTRAVENOUS; SUBCUTANEOUS at 17:56

## 2024-06-24 RX ADMIN — HYDRALAZINE HYDROCHLORIDE 25 MG: 25 TABLET ORAL at 21:51

## 2024-06-24 RX ADMIN — ESCITALOPRAM OXALATE 10 MG: 10 TABLET, FILM COATED ORAL at 10:06

## 2024-06-24 NOTE — PROGRESS NOTES
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    Objective:  Vital signs: /84 (BP Location: Left arm, Patient Position: Lying)   Pulse 54   Temp 97.9 °F (36.6 °C) (Oral)   Resp 18   Ht 162.6 cm (64\")   Wt 65.3 kg (144 lb)   LMP 01/10/2023 Comment: patient states irregular periods  SpO2 99%   BMI 24.72 kg/m²    Gen: NAD, vitals reviewed  MS: oriented x3, recent/remote memory intact, normal attention/concentration, language intact, no neglect.  CN: visual acuity grossly normal, PERRL, EOMI, no facial droop, no dysarthria  Motor: 5/5 throughout upper and lower extremities, normal tone  Sensory: intact to light touch all 4 ext.    NIH Stroke Scale :    (0_) 1a. Level of consciousness (LOC): 0=alert;1=arousable by minor stimulation;2=obtunded or needs strong stimulation to attend;3=unresponsive or reflex responses only  (0_) 1b. LOC Questions: 0=answers both;1=answers one;2=answers neither  (0_) 1c. LOC Commands: 0=performs both tasks;1=performs one task;2=performs neither task  (0_) 2. Best Gaze: 0=normal;1=partial gaze palsy;2=forced deviation or total gaze paresis  (0_) 3. Visual: 0=normal;1=partial hemianopia;2=complete hemianopia;3=blind  (0_) 4. Facial palsy: 0=normal;1=minor paresis;2=partial paralysis;3=complete paralysis  FOR 5 AND 6 BELOW: 0=normal;1=drifts but maintains in air;2=unable to maintain in air;3=moves but unable to lift against gravity;4=no movement  (0_)0 (_)1 (_)2 (_)3 (_)4 (_)NA 5a. Motor arm-left  (0_)0 (_)1 (_)2 (_)3 (_)4 (_)NA 5b. Motor arm-right  (0_)0 (_)1 (_)2 (_)3 (_)4 (_)NA 6a. Motor leg-left  (0_)0 " (_)1 (_)2 (_)3 (_)4 (_)NA 6ba. Motor leg-right  (0_) 7. Limb ataxia:0=absent;1=unilateral;2=bilateral; NA=unable to test  (0_) 8. Sensory: 0=normal;1=mild-moderate loss;2-severe or total loss  (0_) 9. Best language: 0=normal;1=mild-moderate aphasia, some deficits apparent but able to communicate;2=severe aphasia, fragmentary expression only, unable to communicate well;3=global aphasia, mute and no comprehension  (0_)10. Dysarthria: 0=normal;1=mild-moderate, slurs some words;2=severe, speech mostly unintelligible; NA=unable to test (e.g.,intubation)  (0_)11. Extinction/Inattention: 0=normal;1=visual,tactile,auditory or other extinction to bilateral simultaneous stimulation, but no severe neglect;2=answers neither      NIH Score: Complete  0    Laboratory results:  Lab Results   Component Value Date    GLUCOSE 134 (H) 06/24/2024    CALCIUM 8.6 06/24/2024     06/24/2024    K 3.7 06/24/2024    CO2 21.9 (L) 06/24/2024     (H) 06/24/2024    BUN 8 06/24/2024    CREATININE 0.93 06/24/2024    EGFRIFNONA 26 (L) 01/29/2021    BCR 8.6 06/24/2024    ANIONGAP 9.1 06/24/2024     Lab Results   Component Value Date    WBC 7.76 06/24/2024    HGB 11.2 (L) 06/24/2024    HCT 35.8 06/24/2024    MCV 92.5 06/24/2024     06/24/2024     Lab Results   Component Value Date     (H) 06/22/2024    LDL  06/18/2024      Comment:      Unable to calculate     (H) 06/18/2024            Review of labs: Above labs reviewed    Review and interpretation of imaging: I personally reviewed the repeated MRI brain and discussed with neuroradiologist  showed enlargement/worsening acute/subacute ischemic stroke on the left jenu of IC/thalamus, new punctate ischemic stroke on the left insular cortex    ECHO 6/23/2024    Left ventricular systolic function is normal. Calculated left ventricular EF = 62.5%    Left ventricular diastolic function is consistent with (grade II w/high LAP) pseudonormalization.    There is a small (6 mm)  mobile mass likely with a stalk and appears to be attached  the non-coronary cusp(s) of the aortic valve.  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  Looks like it was also present but not well-visualized on prior echo done on  2/20/2024.    Saline test results are negative for right to left atrial level shunt.    Recommend YUE considering patient's history of CVA.    Diagnoses:  -Recurrent acute to subacute left MCA ischemic strokes etiology multifactorial from poorly controlled risk factors and possible fibroblastoma.  -Abnormal 2D echo concerning for mobile aortic mass/ papillary fibroblastoma  -Essential hypertension  -Mixed hyperlipidemia  -Obstructive sleep apnea not compliant with CPAP machine  -Poorly controlled type 2 diabetes A1c 16.8  -Tobacco abuse        Plan:  PLAN:   -YUE prior to discharge  -Continue dual antiplatelet therapy  -Continue Lipitor 80 mg daily  -Counseled the patient regarding controlling risk factors goal A1c less than 6.5, goal LDL less than 70, blood pressure goal less than 130/80 due to diabetes with hypertension and strokes  -Inpatient diabetic educator consult due to poorly controlled diabetes with markedly elevated A1c  -Recommend compliance with CPAP machine  -Counseled the patient regarding tobacco use, she stated understanding  -Counseled the patient to monitor blood pressure at home for the next 10 to 14 days twice daily and bring the numbers to PCP to adjust her blood pressure medications if needed  -Will follow-up on YUE results for further recs  -Follow up with stroke clinic in 6-8 weeks, MA Mrs Casiano notified to arrange for that.

## 2024-06-24 NOTE — PLAN OF CARE
Problem: Adult Inpatient Plan of Care  Goal: Plan of Care Review  Outcome: Ongoing, Progressing  Goal: Patient-Specific Goal (Individualized)  Outcome: Ongoing, Progressing  Goal: Absence of Hospital-Acquired Illness or Injury  Outcome: Ongoing, Progressing  Intervention: Identify and Manage Fall Risk  Description: Perform standard risk assessment on admission using a validated tool or comprehensive approach appropriate to the patient; reassess fall risk frequently, with change in status or transfer to another level of care.  Communicate fall injury risk to interprofessional healthcare team.  Determine need for increased observation, equipment and environmental modification, such as low bed, signage and supportive, nonskid footwear.  Adjust safety measures to individual developmental age, stage and identified risk factors.  Reinforce the importance of safety and physical activity with patient and family.  Perform regular intentional rounding to assess need for position change, pain assessment and personal needs, including assistance with toileting.  Intervention: Prevent Skin Injury  Description: Perform a screening for skin injury risk, such as pressure or moisture associated skin damage on admission and at regular intervals throughout hospital stay.  Keep all areas of skin (especially folds) clean and dry.  Maintain adequate skin hydration.  Relieve and redistribute pressure and protect bony prominences; implement measures based on patient-specific risk factors.  Match turning and repositioning schedule to clinical condition.  Encourage weight shift frequently; assist with reposition if unable to complete independently.  Float heels off bed; avoid pressure on the Achilles tendon.  Keep skin free from extended contact with medical devices.  Encourage functional activity and mobility, as early as tolerated.  Use aids (e.g., slide boards, mechanical lift) during transfer.  Intervention: Prevent and Manage VTE  (Venous Thromboembolism) Risk  Description: Assess for VTE (venous thromboembolism) risk.  Encourage and assist with early ambulation.  Initiate and maintain compression or other therapy, as indicated, based on identified risk in accordance with organizational protocol and provider order.  Encourage both active and passive leg exercises while in bed, if unable to ambulate.  Goal: Optimal Comfort and Wellbeing  Outcome: Ongoing, Progressing  Intervention: Provide Person-Centered Care  Description: Use a family-focused approach to care.  Develop trust and rapport by proactively providing information, encouraging questions, addressing concerns and offering reassurance.  Acknowledge emotional response to hospitalization.  Recognize and utilize personal coping strategies.  Honor spiritual and cultural preferences.  Goal: Readiness for Transition of Care  Outcome: Ongoing, Progressing     Problem: Fall Injury Risk  Goal: Absence of Fall and Fall-Related Injury  Outcome: Ongoing, Progressing  Intervention: Identify and Manage Contributors  Description: Develop a fall prevention plan with the patient and caregiver/family.  Provide reorientation, appropriate sensory stimulation and routines with changes in mental status to decrease risk of fall.  Promote use of personal vision and auditory aids.  Assess assistance level required for safe and effective self-care; provide support as needed, such as toileting, mobilization. For age 65 and older, implement timed toileting with assistance.  Encourage physical activity, such as performance of mobility and self-care at highest level of patient ability, multicomponent exercise program and provision of appropriate assistive devices.  If fall occurs, assess the severity of injury; implement fall injury protocol. Determine the cause and revise fall injury prevention plan.  Regularly review medication contribution to fall risk; adjust medication administration times to minimize risk of  falling.  Consider risk related to polypharmacy and age.  Balance adequate pain management with potential for oversedation.  Intervention: Promote Injury-Free Environment  Description: Provide a safe, barrier-free environment that encourages independent activity.  Keep care area uncluttered and well-lighted.  Determine need for increased observation or monitoring.  Avoid use of devices that minimize mobility, such as restraints or indwelling urinary catheter.   Goal Outcome Evaluation:  Plan of Care Reviewed With: patient        Progress: no change

## 2024-06-24 NOTE — PROGRESS NOTES
Name: Bibiana Inman ADMIT: 2024   : 1980  PCP: Ibrahima Correa MD    MRN: [id number] LOS: 3 days   AGE/SEX: 44 y.o. female  ROOM: [facility]     Subjective   Subjective   No acute events. Patient denies new complaints. Her mother is at bedside.    Objective   Objective   Vital Signs  Temp:  [97.9 °F (36.6 °C)-98.4 °F (36.9 °C)] 97.9 °F (36.6 °C)  Heart Rate:  [54-63] 54  Resp:  [18] 18  BP: (169-199)/(63-86) 169/84  SpO2:  [98 %-99 %] 99 %  on   ;   Device (Oxygen Therapy): (P) room air  Body mass index is 24.72 kg/m².  Physical Exam  Vitals and nursing note reviewed.   Constitutional:       General: She is not in acute distress.     Appearance: She is not toxic-appearing or diaphoretic.   HENT:      Head: Normocephalic and atraumatic.      Nose: Nose normal.      Mouth/Throat:      Mouth: Mucous membranes are moist.      Pharynx: Oropharynx is clear.   Eyes:      Conjunctiva/sclera: Conjunctivae normal.      Pupils: Pupils are equal, round, and reactive to light.   Cardiovascular:      Rate and Rhythm: Normal rate and regular rhythm.      Pulses: Normal pulses.   Pulmonary:      Effort: Pulmonary effort is normal.   Abdominal:      General: Bowel sounds are normal.      Palpations: Abdomen is soft.      Tenderness: There is no abdominal tenderness.   Musculoskeletal:         General: No swelling or tenderness.      Cervical back: Neck supple.   Skin:     General: Skin is warm and dry.      Capillary Refill: Capillary refill takes less than 2 seconds.   Neurological:      Mental Status: She is alert and oriented to person, place, and time.      Comments: +mild right lower facial weakness   Psychiatric:         Mood and Affect: Mood normal.         Behavior: Behavior normal.       Results Review     I reviewed the patient's new clinical results.  Results from last 7 days   Lab Units 24  0525 24  0500 24  0323 24  2106   WBC 10*3/mm3 7.76 7.02 11.58* 10.72   HEMOGLOBIN g/dL  11.2* 11.4* 11.5* 12.0   PLATELETS 10*3/mm3 203 202 265 245     Results from last 7 days   Lab Units 06/24/24  0525 06/23/24  0500 06/22/24 0323 06/21/24 2106   SODIUM mmol/L 139 138 137 137   POTASSIUM mmol/L 3.7 3.8 3.5 3.8   CHLORIDE mmol/L 108* 108* 106 104   CO2 mmol/L 21.9* 23.0 22.3 22.0   BUN mg/dL 8 10 10 11   CREATININE mg/dL 0.93 0.98 1.23* 1.40*   GLUCOSE mg/dL 134* 168* 56* 197*   EGFR mL/min/1.73 76.9 72.2 55.0* 47.1*     Results from last 7 days   Lab Units 06/21/24 2106 06/18/24  0605   ALBUMIN g/dL 2.9* 2.9*   BILIRUBIN mg/dL <0.2 0.2   ALK PHOS U/L 90 96   AST (SGOT) U/L 12 10   ALT (SGPT) U/L 9 <5     Results from last 7 days   Lab Units 06/24/24  0525 06/23/24  0500 06/22/24 0323 06/21/24 2106 06/19/24  0506 06/18/24  0605   CALCIUM mg/dL 8.6 8.2* 8.4* 8.5*   < > 8.8   ALBUMIN g/dL  --   --   --  2.9*  --  2.9*    < > = values in this interval not displayed.       Glucose   Date/Time Value Ref Range Status   06/24/2024 1116 105 70 - 130 mg/dL Final   06/24/2024 0637 168 (H) 70 - 130 mg/dL Final   06/23/2024 2040 271 (H) 70 - 130 mg/dL Final   06/23/2024 1651 178 (H) 70 - 130 mg/dL Final   06/23/2024 1123 159 (H) 70 - 130 mg/dL Final   06/23/2024 0638 176 (H) 70 - 130 mg/dL Final   06/23/2024 0031 195 (H) 70 - 130 mg/dL Final       MRI Brain Without Contrast    Result Date: 6/22/2024   When compared to the most recent examination dated 06/18/2024, there is interval enlargement of the previously identified acute infarct centered within the genu of the left internal capsule. Also, there is a new punctate focus of acute infarction within the left insular cortex. The previously identified acute infarction within the white matter of the left parietal lobe is again appreciated. The location of the infarct within the genu of the left internal capsule is most suggestive of an in situ occlusive process (rather than an embolic event) which could be due to lacunar disease or possibly a vasculitis.  Please correlate with clinical data.  Moderate changes of chronic small vessel ischemic phenomenon are incidentally appreciated.  These findings and recommendations were discussed with Dr. Castillo on 06/22/2024 at approximately 3:25 p.m.     This report was finalized on 6/22/2024 4:12 PM by Dr. Terrence Thorne M.D on Workstation: [id number]       I have personally reviewed all medications:  Scheduled Medications  aspirin, 325 mg, Oral, Daily   Or  aspirin, 300 mg, Rectal, Daily  atorvastatin, 80 mg, Oral, Nightly  clopidogrel, 75 mg, Oral, Daily  escitalopram, 10 mg, Oral, Daily  icosapent ethyl, 2 g, Oral, BID With Meals  insulin glargine, 20 Units, Subcutaneous, Daily  insulin lispro, 2-7 Units, Subcutaneous, 4x Daily AC & at Bedtime  nicotine, 1 patch, Transdermal, Q24H  NIFEdipine XL, 60 mg, Oral, Q24H  pantoprazole, 40 mg, Oral, QAM  sodium chloride, 10 mL, Intravenous, Q12H  valsartan, 80 mg, Oral, Q24H    Infusions  sodium chloride, 100 mL/hr, Last Rate: 100 mL/hr (06/24/24 1013)    Diet  NPO Diet NPO Type: Strict NPO  Diet: Diabetic; Consistent Carbohydrate; Fluid Consistency: Thin (IDDSI 0)    I have personally reviewed:  [x]  Laboratory   []  Microbiology   [x]  Radiology   [x]  EKG/Telemetry  [x]  Cardiology/Vascular   []  Pathology    [x]  Records    Assessment/Plan     Active Hospital Problems    Diagnosis  POA    **Acute right-sided weakness [R53.1]  Yes    CKD stage 3a, GFR 45-59 ml/min [N18.31]  Yes    Carotid stenosis [I65.29]  Yes    Lacunar cerebrovascular accident (CVA) [I63.81]  Yes    Elevated troponin [R79.89]  Yes    Tobacco abuse [Z72.0]  Yes    Coronary artery disease involving native coronary artery of native heart with unstable angina pectoris [I25.110]  Yes    Gastroparesis diabeticorum [E11.43, K31.84]  Yes    Gastroesophageal reflux disease [K21.9]  Yes    Iron deficiency anemia [D50.9]  Yes    Type 2 diabetes mellitus with hyperglycemia, with long-term current use of insulin  [E11.65, Z79.4]  Not Applicable    Benign essential hypertension [I10]  Yes    Mixed hypercholesterolemia and hypertriglyceridemia [E78.2]  Yes      Resolved Hospital Problems   No resolved problems to display.   Acute CVA  - presented with right-sided weakness which has improved, has mild right facial droop still  - MRI showed enlargement of acute infarct in the genu of the left internal capsule as well as a new infarct in the left insular cortex, and previously identified acute infarction within the white matter of the left parietal lobe  - continue ASA, statin, plavix  - needs aggressive risk factor control, particularly with HTN and DM  - YUE planned for tomorrow  - appreciate cardiology and neurology recs, d/w Dr. Severino     Type 2 DM  - complications include gastroparesis  - on lantus and jardiance as outpatient  - BG currently acceptable but needs better outpatient control, A1C 16.90%  - continue lantus 20 units daily   - cover with ssi/hypoglycemia protocol     HTN/CAD s/p CABG  - BP elevated but improving  - continue nifedipine and valsartan      GERD  - symptoms controlled, continue ppi    SCDs for DVT prophylaxis.  Full code.  Discussed with patient, nursing staff, consulting provider, and care team on multidisciplinary rounds.  Anticipate discharge home later this week.  Expected Discharge Date: 6/27/2024; Expected Discharge Time:       Lenin Huang MD  [facility]  06/24/24  13:02 EDT

## 2024-06-24 NOTE — CONSULTS
[facility]    Patient Name: Bibiana Inman  :1980  44 y.o.  LOS: 3  Encounter Provider: Ronaldo Severino MD      Patient Care Team:  Ibrahima Correa MD as PCP - General (Family Medicine)  Rachele Zafar RN as Ambulatory  (Population Health)  Xochilt Jenkins MD as Consulting Physician (Nephrology)    Chief Complaint/Consult question:  Mobile Mass on echocardiogram, possible YUE    PMH/HPI:    Bibiana Inman is a 44 year-old female with a PMH for multivessel CAD s/p CABG in  by Dr. Garcia at [facility], hypertension, hyperlipidemia, uncontrolled diabetes, tobacco abuse who presented with recurrent stroke and found to have aortic valve mass on echo.    In 2024, she presented to the emergency department with chest pain. She had an echocardiogram done that showed an LVEF of 69% and no significant valvular abnormalities. She also had a perfusion stress test done that indicated a moderate-sized, severe area of ischemia located in the inferior and lateral wall. She subsequently had a heart catheterization done that showed significant coronary artery disease. It showed a Chronic total occlusion of the RCA, left circumflex with 80-90% stenosis in the proximal segment and mid segment, ramus with proximal 70-80% stenosis, and a totally occluded LAD. LIMA to LAD is patent, however distal/apical LAD has 80% stenosis. SVG to left circumflex is occluded. SVG to RCA is patent. This is stable coronary artery disease. It was recommended to focus on risk factor modification and continue on DAPT and high intensity statin. She may require a redo CABG in the future.     She was admitted at [facility] from -2024 for acute stroke. An MRI showed a linear acute infarct within the left parietal occipital region.  A subsequent CT angiogram of the neck demonstrated 70% stenosis in the right ICA. She was evaluated by vascular surgery and there was no indication for  carotid revascularization at that time. She was seen by cardiology at [facility] on 6/17/2024 for elevated troponin. Her initial HS troponin was noted to be 63, however, her blood sugar was 600 and UA showed pyuria. She was started on an insulin drip. She was found to have an A1c of 16.9. She was also found to have uncontrolled hypertriglyceridemia with triglycerides of >4000. She was started on Vascepa 2g twice daily. She was ultimately discharged on 6/20/2024 with clonidine 0.2mg/24 hour patch, Jardiance 10 mg daily, and Nifedipine 60 mg daily.     Interval History:    She presetnted to the ER on 6/21/2024 with acute right-sided weakness. Initial CT angiogram of the head and neck showed no acute intracranial hemorrhage, and an indeterminate area of decreased attenuation valving the posterior limb of the left internal capsule. A subsequent MRI was done and showed enlargement of the previously identified acute infarct centered within the genu of the left internal capsule. There is also a new punctate focus of acute infarction within the left insular cortex. 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     She has been hypertensive with blood pressures as high as 199/86. Cardiology has been asked to see this patient for the mobile mass found on the echocardiogram.     At the time of interview, patient is in no acute distress and sleeping comfortably in bed.  She works as a phlebotomist at [employer] and walks at home but does not exercise otherwise regularly.  She states that her BP has been quite elevated at home over the past month or 2 in the range of 160-170s.  Denies chest pain, dyspnea on exertion, palpitation, leg edema, orthopnea, PND, dizziness, syncope, bleeding, or unexpected falls. Smokes 6 cigarettes/day for at least 20 years, denies alcohol or  substance abuse.          ECHO 6/23/2024    Left ventricular systolic function is normal. Calculated left ventricular EF = 62.5%    Left ventricular diastolic function is consistent with (grade II w/high LAP) pseudonormalization.    There is a small (6 mm) mobile mass likely with a stalk and appears to be attached  the non-coronary cusp(s) of the aortic valve.  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  Looks like it was also present but not well-visualized on prior echo done on  2/20/2024.    Saline test results are negative for right to left atrial level shunt.    Recommend YUE considering patient's history of CVA.    Cath 2/21/2024  IMPRESSIONS  Multivessel obstructive CAD of the native coronary arteries  Occluded vein graft to what appears to be a diagonal  There is diffuse CAD involving the left circumflex and OM which is not bypassed and there is also obstructive CAD distal to the LIMA touchdown  This is stable coronary artery disease  Low left heart filling pressures     RECOMMENDATIONS  Recommend medical management for her diffuse CAD in the setting of severely uncontrolled diabetes and smoking  Uptitration of OMT for stable CAD  Patient needs to be aggressively treated for her uncontrolled diabetes  Counseled extensively on smoking cessation  Start Plavix  Blood pressure control  Aggressive cholesterol control with statin with a goal LDL of less than 70    Stress Test 2/20/2024    Findings consistent with a normal ECG stress test.    (Calculated EF = 63%).    Myocardial perfusion imaging indicates a moderate-sized, severe area of ischemia located in the inferior wall and lateral wall.    Impressions are consistent with an intermediate risk study.    Objective   Vital Signs  Temp:  [97.9 °F (36.6 °C)-98.4 °F (36.9 °C)] 97.9 °F (36.6 °C)  Heart Rate:  [54-66] 54  Resp:  [18] 18  BP: (168-199)/(63-86) 169/84    Intake/Output Summary (Last 24 hours) at 6/24/2024 0820  Last data filed at 6/23/2024 1945  Gross per  "24 hour   Intake 240 ml   Output --   Net 240 ml     Flowsheet Rows      Flowsheet Row First Filed Value   Admission Height 162.6 cm (64\") Documented at 06/21/2024 2105   Admission Weight 65.5 kg (144 lb 6.4 oz) Documented at 06/21/2024 2105              Vitals and nursing note reviewed.   Constitutional:       Appearance: Healthy appearance. Not in distress.   Neck:      Vascular: No JVR.   Pulmonary:      Effort: Pulmonary effort is normal.      Breath sounds: Normal breath sounds. No wheezing. No rhonchi. No rales.   Cardiovascular:      Bradycardia present. Regular rhythm.      Murmurs: There is no murmur.   Edema:     Peripheral edema absent.   Abdominal:      General: There is no distension.      Palpations: Abdomen is soft.      Tenderness: There is no abdominal tenderness.   Musculoskeletal:      Cervical back: Neck supple. Skin:     General: Skin is cool.   Neurological:      Mental Status: Alert and oriented to person, place and time.           Pertinent Test Results:  Results from last 7 days   Lab Units 06/24/24  0525 06/23/24  0500 06/22/24  0323 06/21/24  2106 06/19/24  0506 06/18/24  0605 06/17/24  1820 06/17/24  0928   SODIUM mmol/L 139 138 137 137 135* 134*  --  128*   POTASSIUM mmol/L 3.7 3.8 3.5 3.8 3.6 3.4* 3.1* 3.2*   CHLORIDE mmol/L 108* 108* 106 104 105 103  --  92*   CO2 mmol/L 21.9* 23.0 22.3 22.0 21.8* 23.1  --  23.6   BUN mg/dL 8 10 10 11 8 10  --  9   CREATININE mg/dL 0.93 0.98 1.23* 1.40* 1.12* 1.25*  --  1.29*   GLUCOSE mg/dL 134* 168* 56* 197* 248* 143*  --  589*   CALCIUM mg/dL 8.6 8.2* 8.4* 8.5* 9.0 8.8  --  9.1   AST (SGOT) U/L  --   --   --  12  --  10  --  11   ALT (SGPT) U/L  --   --   --  9  --  <5  --  5     Results from last 7 days   Lab Units 06/21/24  2304 06/21/24  2106 06/17/24  1143 06/17/24  0928   HSTROP T ng/L 53* 56* 48* 61*  63*     Results from last 7 days   Lab Units 06/24/24  0525 06/23/24  0500 06/22/24  0323 06/21/24  2106 06/18/24  0605 06/17/24  0928   WBC " 10*3/mm3 7.76 7.02 11.58* 10.72 9.69 14.62*   HEMOGLOBIN g/dL 11.2* 11.4* 11.5* 12.0 12.2 13.7   HEMATOCRIT % 35.8 35.3 35.7 37.1 36.9 41.1   PLATELETS 10*3/mm3 203 202 265 245 220 281     Results from last 7 days   Lab Units 06/21/24  2106 06/17/24  0928   INR  0.98 0.93   APTT seconds 27.5 26.2*         Results from last 7 days   Lab Units 06/22/24  0323 06/18/24  0605 06/17/24  0928   CHOLESTEROL mg/dL 245* 320* 381*   TRIGLYCERIDES mg/dL 610* 1,117* >4,425*   HDL CHOL mg/dL 31* 28* 30*         Results from last 7 days   Lab Units 06/18/24  0605 06/17/24  1820   TSH uIU/mL 1.470 2.230           Medication Review:   aspirin, 325 mg, Oral, Daily   Or  aspirin, 300 mg, Rectal, Daily  atorvastatin, 80 mg, Oral, Nightly  clopidogrel, 75 mg, Oral, Daily  escitalopram, 10 mg, Oral, Daily  icosapent ethyl, 2 g, Oral, BID With Meals  insulin glargine, 20 Units, Subcutaneous, Daily  insulin lispro, 2-7 Units, Subcutaneous, 4x Daily AC & at Bedtime  nicotine, 1 patch, Transdermal, Q24H  NIFEdipine XL, 60 mg, Oral, Q24H  pantoprazole, 40 mg, Oral, QAM  sodium chloride, 10 mL, Intravenous, Q12H  valsartan, 80 mg, Oral, Q24H         sodium chloride, 100 mL/hr, Last Rate: 100 mL/hr (06/23/24 0855)                        Assessment & Plan     Active Hospital Problems    Diagnosis  POA    **Acute right-sided weakness [R53.1]  Yes    CKD stage 3a, GFR 45-59 ml/min [N18.31]  Yes    Carotid stenosis [I65.29]  Yes    Lacunar cerebrovascular accident (CVA) [I63.81]  Yes    Elevated troponin [R79.89]  Yes    Tobacco abuse [Z72.0]  Yes    Coronary artery disease involving native coronary artery of native heart with unstable angina pectoris [I25.110]  Yes    Diabetic gastroparesis [E11.43, K31.84]  Yes    Gastroesophageal reflux disease [K21.9]  Yes    Iron deficiency anemia [D50.9]  Yes    Type 2 diabetes mellitus with hyperglycemia, with long-term current use of insulin [E11.65, Z79.4]  Not Applicable    Benign essential hypertension  [I10]  Yes    Mixed hypercholesterolemia and hypertriglyceridemia [E78.2]  Yes      Resolved Hospital Problems   No resolved problems to display.        Recurrent stroke/aortic valve mass: Stroke x 2 over the past month, this time presented with dysarthria and MRI showed a new punctate focus of acute infarction within the left insular cortex. 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  This mass may have been present back on echo from 2/2024 but not nearly as well-visualized due to acoustic windows.  This certainly could account for his recurrent stroke although she also has uncontrolled hypertension, diabetes, and hyperlipidemia as well as his active smoker all of which may very well account for her neurological events.  No fever, chills or other infectious signs to suggest endocarditis.  Patient will need YUE for further evaluation of the aortic valve mass, this is not urgent but we will try to perform prior to hospital discharge.  Patient is extremely hypertensive today and we will focus on optimization first.  I will reach out to hospitalist primary service to discuss optimal timing of YUE.  I will also officially request prior imaging studies from [facility] for comparison.  Hypertension: BP readings very elevated overnight ~200/80s, home meds include Lopressor, clonidine, hydralazine, and nifedipine (recently started last week).  Hold off on beta-blocker given resting bradycardia, patient reports prior cough with losartan but is willing to be rechallenged, will initiate valsartan 80 daily and monitor for tolerance.  Will reinitiate nifedipine 60 daily and uptitrate depending on BP trends.  May need to reintroduce hydralazine but would ideally maxed out the other agents first given ease of use, avoid clonidine if possible to minimize the risk  of rebound hypertension.  Hyperlipidemia: Lipids this month showed HDL 31, , uncontrolled.  LDL goal < 70 given history of CABG. continue atorvastatin 80 at home, patient will need addition of Zetia +/- PCSK9 inhibitor upon discharge.  Diabetes mellitus: Hemoglobin A1c 17 his admission, uncontrolled.  Insulin-dependent, management per primary team and other specialists.  Already on Jardiance 10 daily at home for added cardiovascular benefit which I recommend restarting.  CAD s/p CABG: Multivessel CAD s/p CABG in 2014 by Dr. Garcia at [facility]. Most recent cath 2/2024 showed LAD and RCA , proximal to mid left circumflex with 80-90% stenosis, ramus with proximal 70-80% stenosis. LIMA to LAD is patent, however distal/apical LAD has 80% stenosis. SVG to left circumflex is occluded. SVG to RCA is patent. Denies angina, no immediate need for revascularization and focus on medical therapy and optimization.  Continue home aspirin and Plavix, BP and lipid control as above, smoking cessation is key, see below.  Tobacco abuse: Smokes 6 cigarettes/day for at least 20 years, strongly encourage complete cessation.    High risk due to age, frailty, recurrent stroke in the setting of prior CABG and uncontrolled hypertension, diabetes, hyperlipidemia as well as multiple comorbodities predisposing to major adverse events including myocardial infarction, stroke, hospitalization, and multi-agent drug therapy requiring intensive monitoring for toxicity.    Ronaldo Severino MD  [facility]  06/24/24  06:04 EDT

## 2024-06-24 NOTE — PAYOR COMM NOTE
"Bibiana Belcher (44 y.o. Female)      REQUEST FOR INPATIENT AUTH    REF# [id number]    PLEASE CALL/FAX TO:  JESIKA CLAY RN/   P: [phone]  F: [phone]    THANK YOU   JESIKA CLAY RN  [facility]    Date of Birth   1980    Social Security Number       Address   [address] [place] [id number]    Home Phone   [phone]    MRN   [id number]       Church   None    Marital Status                               Admission Date   6/21/24    Admission Type   Emergency    Admitting Provider   Wyatt Hobson DO    Attending Provider   Lenin Huang MD    Department, Room/Bed   [address], [address]/1       Discharge Date       Discharge Disposition       Discharge Destination                                 Attending Provider: Lenin Huang MD    Allergies: Latex    Isolation: None   Infection: None   Code Status: CPR    Ht: 162.6 cm (64\")   Wt: 65.3 kg (144 lb)    Admission Cmt: None   Principal Problem: Acute right-sided weakness [R53.1]                   Active Insurance as of 6/21/2024       Primary Coverage       Payor Plan Insurance Group Employer/Plan Group    [facility] [employer] CR[employer] EM[employer] [id number]       Payor Plan Address Payor Plan Phone Number Payor Plan Fax Number Effective Dates    [address] [phone]  2/1/2020 - None Entered    [place] [address]         Subscriber Name Subscriber Birth Date Member ID       BIBIANA BELCHER 1980 [id number]                     Emergency Contacts        (Rel.) Home Phone Work Phone Mobile Phone    HOLLAND BELCHER (Spouse) [phone] -- [phone]              [place]: NPI [id number]  Tax ID [id number]     History & Physical        Rocio Ro APRN at 06/21/24 4489       Attestation signed by Wyatt Hobson DO at 06/22/24 5209 (Updated)      Brief Attending Admission Attestation   I have seen and examined the patient, performing an independent " face-to-face diagnostic evaluation with plan of care reviewed and developed with KAUSHAL Osborne.  The majority of medical decision making (>50%) for this encounter was completed by myself and discussed with this APRN. Patient seen and examined before midnight, note delayed due to patient care.    Brief Summary Statement/HPI  44 y.o. female with history of recently diagnosed left sided lacunar CVA, T2DM, HTN, CAD, HLD, anemia who presents to [facility] with reported worsening right sided weakness. She was recently admitted to [facility] earlier this week, at which time lacunar CVA was noted. She stated that right sided weakness had improved, but came back abruptly. She endorses compliance with medications. Denies additional acute complaints at present. Neurology consulted in ED, she had fluid bolus ordered due to soft BP values. CTA H/N obtained. She is being admitted for further evaluation and management.  Remainder of detailed HPI is as noted above and has been reviewed by me for completeness.    Attending Physical Exam  Temp:  [98.1 °F (36.7 °C)] 98.1 °F (36.7 °C)  Heart Rate:  [54-70] 54  Resp:  [14-16] 16  BP: (105-135)/(54-71) 129/71  Constitutional: chronically ill appearing, no acute distress  HEENT: Normal conjunctiva, no scleral icterus  CV: Regular rate, regular rhythm  RESP: FIO2 21, clear to auscultation B/L, normal effort  GI: soft, nontender, nondistended  MSK: No gross deformities appreciated, no tenderness, no edema  Skin: Warm, dry. No rashes  Neuro: Awake, alert, oriented, no facial droop, no tremor, spontaneously moves all extremities  Psych: Appropriate mood and affect.    Results Review:  I reviewed the patient's new clinical results.  I reviewed the patient's new imaging results and agree with the interpretation.  I reviewed the patient's other test results and agree with the interpretation  I personally viewed and interpreted the patient's EKG/Telemetry  data    Assessment/Plan  44 y.o. female with history of recently diagnosed left sided lacunar CVA, T2DM, HTN, CAD, HLD, anemia who presents to [facility] with reported worsening right sided weakness.     - Most recent MRI brain (06/18) showing stable small acute lacunar type infarct in the left parietal subcortical white matter. CTA H/N obtained here showing Indeterminate area of decreased attenuation involving the posterior   limb of the left internal capsule, 40-50% stenosis of right ICA. Last A1c 16.90% (06/19), TSH 1.47 (06/18),  (06/18). Neurology consulted in ED and following. Continue ASA, statin, Plavix. PT/OT, SLP, telemetry. Order AM B12, MRI. Follow up Neurology.  - A1c 16.90% (06/19), on Jardiance and Glargine as outpatient. Restart Glargine but at lower dose when compared to home, given decreased oral intake, hold Jardiance, order SSI. Monitor POC glucose checks. Consult diabetes educator.  - BP soft on arrival, s/p fluid bolus, remains on IVF at present. Hold home BP medications for now given that BP on lower end of normal range, will await Neurology impression in AM. Trend BP to guide management.  - CAD with history of CABG, troponin elevated but chart reviewed, elevations are chronic. No evidence of ACS at present, monitor on telemetry.  - Creatinine elevated to 1.40, baseline ~1.1 to 1.2, history of CKD 3A, continue IVF for now and repeat labs in AM for reassessment.  - Continue PPI for GERD.  - Nicotine replacement therapy for tobacco use.    Active Hospital Problems    Diagnosis  POA    **Acute right-sided weakness [R53.1]  Yes    CKD stage 3a, GFR 45-59 ml/min [N18.31]  Yes    Carotid stenosis [I65.29]  Yes    Lacunar cerebrovascular accident (CVA) [I63.81]  Yes    Elevated troponin [R79.89]  Yes    Tobacco abuse [Z72.0]  Yes    Coronary artery disease involving native coronary artery of native heart with unstable angina pectoris [I25.110]  Yes    Diabetic gastroparesis  [E11.43, 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     See above section for further detailed assessment and plan developed with APRN which I have reviewed.      Electronically signed by Wyatt Hobson DO                        Patient Name:  Bibiana Inman  YOB: 1980  MRN:  [id number]  Admit Date:  6/21/2024  Patient Care Team:  Ibrahima Correa MD as PCP - General (Family Medicine)  Rachele Zafar RN as Ambulatory  ([facility])  Xochilt Jenkins MD as Consulting Physician (Nephrology)      Subjective  History Present Illness     Chief Complaint   Patient presents with    Extremity Weakness     History of Present Illness  Ms. Inman is a 44-year-old female with history of CAD, CABG, type 2 diabetes, GERD, iron deficiency anemia, recent CVA who presents to the emergency room with complaints of right-sided weakness.  Patient was actually hospitalized earlier this week at [facility] diagnosed with acute CVA, at that time she had some aphasia and right-sided weakness, she states that it had improved but prior to arrival here to [facility] she had sudden onset of right-sided weakness, EMS reported her right side is flaccid, but upon arrival here she did have some right-sided weakness, it seems like her weakness has resolved at this point.  Her speech is clear but she seems to have little trouble responding/word finding.  She is on aspirin and Plavix, she states she has been on that for many years since she had her CABG 10 years ago.  Patient was admitted at [facility] from 6/17/2024 to  6/19/2024, during her stay there she did have some blood pressure elevation that had improved, they did allow for some permissive hypertension initially, but she was given 2 doses of hydralazine and started on p.o. hydralazine.  Patient did not take her blood pressure today.  Cerebral perfusion scan showed no perfusion abnormalities, subsequent MRI did show a linear acute infarct within the left parietal occipital region, she was seen by neurology.  Was noted to have stenosis of the right internal carotid and was seen in evaluation with vascular surgery, MRI was repeated and showed stable appearance of the acute lacunar infarct of the left parietal area.  She was also found to have significant hypertriglyceridemia anemia and hyperlipidemia.  There was some questionable compliance issues with her prescribed regimen medications at home, a renal artery ultrasound did show unilateral 60% stenosis that could be followed as outpatient.  Her hemoglobin A1c was 16.9.  Patient has had echo and February 2024 that showed normal left ventricular systolic function and EF of 69%, no significant valvular abnormalities were seen.  In the emergency room patient's blood pressure was a little on the low side, stroke neuro recommended a fluid bolus, monitor blood pressure, hold blood pressure medications for now.  In the emergency room troponin was 56 with repeat 53, sodium 137, potassium 3.8, creatinine 1.4, BUN 11, albumin 2.9, white blood cell count 10.7, hemoglobin 12.0, hematocrit 37.1, platelets 245.  CTA of the head and neck shows no acute intracranial hemorrhage, indeterminate area of decreased attenuation valving the posterior limb of the left internal capsule consider further assessment with MRI, no areas of cerebral blood flow less than 30%, approximately 40 to 50% stenosis of the right internal carotid artery, intracranial vessels are patent.  Did speak with stroke neurology on-call who recommend patient be admitted, repeat  MRI they will see in a.m.  Patient is currently on aspirin and Plavix.    Review of Systems   Constitutional:  Negative for appetite change and fever.   HENT:  Negative for nosebleeds and trouble swallowing.    Eyes:  Negative for photophobia, redness and visual disturbance.   Respiratory:  Negative for cough, chest tightness, shortness of breath and wheezing.    Cardiovascular:  Negative for chest pain, palpitations and leg swelling.   Gastrointestinal:  Negative for abdominal distention, abdominal pain, nausea and vomiting.   Endocrine: Negative.    Genitourinary: Negative.    Musculoskeletal:  Negative for gait problem and joint swelling.        Right side weakness     Skin: Negative.    Neurological:  Negative for dizziness, seizures, speech difficulty, light-headedness and headaches.   Hematological: Negative.    Psychiatric/Behavioral:  Negative for behavioral problems and confusion.         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 Debbie Fry.    Depressive disorder 2023    Diabetic gastroparesis 2021    Diabetic peripheral neuropathy associated with type 2 diabetes mellitus 2024    Gastroesophageal reflux disease 03/10/2021    GERD (gastroesophageal reflux disease)     Intermittent claudication 2017    Iron deficiency anemia 2020    Mixed hypercholesterolemia and hypertriglyceridemia 2014    Myocardial infarction     Obesity     Obstructive sleep apnea 2021    Personal history of COVID-19 2022    Polyp of colon 2023    Spontaneous      Stress fracture of right ankle with routine healing     Tobacco abuse 2024    Type 2 diabetes mellitus with hyperglycemia, with  long-term current use of insulin 2017    Vitamin D deficiency 2024     Past Surgical History:   Procedure Laterality Date    CARDIAC CATHETERIZATION N/A 2024    Procedure: Left Heart Cath and coronary angiogram;  Surgeon: Jena Barron MD;  Location: [facility] CATH INVASIVE LOCATION;  Service: Cardiology;  Laterality: N/A;     SECTION      CORONARY ARTERY BYPASS GRAFT  2014    LIMA-LAD, SVG-OM2, SVG-PDA, Dr. Debbie Fry.    DILATATION AND CURETTAGE      TONSILLECTOMY       Family History   Family history unknown: Yes     Social History     Tobacco Use    Smoking status: Every Day     Current packs/day: 0.25     Average packs/day: 0.3 packs/day for 15.0 years (3.8 ttl pk-yrs)     Types: Cigarettes    Smokeless tobacco: Never   Vaping Use    Vaping status: Never Used   Substance Use Topics    Alcohol use: Defer    Drug use: Never     No current facility-administered medications on file prior to encounter.     Current Outpatient Medications on File Prior to Encounter   Medication Sig Dispense Refill    albuterol sulfate  (90 Base) MCG/ACT inhaler Inhale 2 puffs every 6 (six) hours if needed for wheezing. 18 g 0    aspirin 81 MG chewable tablet Chew 1 tablet Daily.      atorvastatin (LIPITOR) 80 MG tablet Take 1 tablet by mouth Daily.      clopidogrel (PLAVIX) 75 MG tablet Take 1 tablet by mouth Daily. 90 tablet 0    empagliflozin (JARDIANCE) 10 MG tablet tablet Take 1 tablet by mouth Daily. 30 tablet 0    escitalopram (LEXAPRO) 10 MG tablet Take 1 tablet by mouth Daily.      famotidine (PEPCID) 40 MG tablet Take 1 tablet by mouth every night at bedtime. 90 tablet 3    hydrALAZINE (APRESOLINE) 50 MG tablet Take 1 tablet by mouth Every 8 (Eight) Hours. 90 tablet 0    icosapent ethyl (Vascepa) 1 g capsule capsule Take 2 capsules by mouth 2 (two) times a day. 360 capsule 0    icosapent ethyl (Vascepa) 1 g capsule capsule Take 2 g (2 capsules) by mouth 2 (Two) Times a Day With Meals.  Indications: Cerebrovascular Accident or Stroke, High Amount of Triglycerides in the Blood, Procedure to Reestablish Blood Supply to the Heart 120 capsule 11    insulin aspart (novoLOG FLEXPEN) 100 UNIT/ML solution pen-injector sc pen Inject  under the skin into the appropriate area as directed 3 times a day. Sliding scale, between 5-20 units TID      insulin detemir (LEVEMIR) 100 UNIT/ML injection Inject 60 Units under the skin into the appropriate area as directed Every Night.      metoclopramide (REGLAN) 5 MG tablet Take 1 tablet by mouth 3 times a day.      metoprolol tartrate (LOPRESSOR) 100 MG tablet Take 1 tablet by mouth 2 (two) times a day. 180 tablet 0    NIFEdipine CC (ADALAT CC) 60 MG 24 hr tablet Take 1 tablet by mouth Daily. 30 tablet 0    pantoprazole (PROTONIX) 40 MG EC tablet Take 1 tablet by mouth Every Morning. 90 tablet 3    [START ON 2024] cloNIDine (CATAPRES-TTS) 0.2 MG/24HR patch Place 1 patch on the skin as directed by provider 1 (One) Time Per Week. 4 patch 0    [] Continuous Glucose  (Dexcom G7 ) device Use 1 each 1 (One) Time for 1 dose. Dx: E11.65 1 each 0    Continuous Glucose Sensor (Dexcom G7 Sensor) misc Use 1 each Every 10 (Ten) Days. Dx: E11.65 3 each 2    [DISCONTINUED] glyburide (DIAbeta) 5 MG tablet Take 1 tablet by mouth 2 (Two) Times a Day. 180 tablet 0    [DISCONTINUED] hydroCHLOROthiazide (HYDRODIURIL) 25 MG tablet Take 1 tablet by mouth Daily. 90 tablet 3    [DISCONTINUED] metFORMIN (GLUCOPHAGE) 1000 MG tablet Take 1 tablet by mouth 2 (Two) Times a Day With Meals. 180 tablet 0    [DISCONTINUED] omeprazole (priLOSEC) 20 MG capsule Take 1 capsule (20 mg total) by mouth 1 (one) time each day. Do not crush or chew. 30 capsule 5     Allergies   Allergen Reactions    Latex Itching       Objective   Objective     Vital Signs  Temp:  [98.1 °F (36.7 °C)] 98.1 °F (36.7 °C)  Heart Rate:  [54-70] 54  Resp:  [14-16] 16  BP: (105-135)/(54-71) 129/71  SpO2:  [97  %-100 %] 100 %  on   ;   Device (Oxygen Therapy): room air  Body mass index is 24.79 kg/m².    Physical Exam  Vitals and nursing note reviewed.   Constitutional:       General: She is not in acute distress.     Appearance: She is well-developed.   HENT:      Head: Normocephalic.   Neck:      Vascular: No JVD.   Cardiovascular:      Rate and Rhythm: Normal rate and regular rhythm.      Heart sounds: Normal heart sounds.      Comments: Normal sinus rhythm on the monitor with heart rate 80 during my exam, no chest pain or shortness of breath  Pulmonary:      Effort: Pulmonary effort is normal.      Breath sounds: Normal breath sounds.      Comments: Diminished lung sounds, otherwise clear, sats 95% on room air  Abdominal:      General: There is no distension.      Palpations: Abdomen is soft.      Tenderness: There is no abdominal tenderness.   Musculoskeletal:         General: Normal range of motion.      Cervical back: Normal range of motion.      Right lower leg: No edema.      Left lower leg: No edema.   Skin:     General: Skin is warm and dry.      Capillary Refill: Capillary refill takes less than 2 seconds.   Neurological:      Mental Status: She is alert and oriented to person, place, and time.      Comments: No focal neurodeficits, although patient did seem to have some word finding difficulties, there is no slurred speech, no facial droop, no right-sided weakness appreciated at this time.   Psychiatric:         Mood and Affect: Mood normal.         Behavior: Behavior normal.         Results Review:  I reviewed the patient's new clinical results.  I reviewed the patient's new imaging results and agree with the interpretation.  I reviewed the patient's other test results and agree with the interpretation  I personally viewed and interpreted the patient's EKG/Telemetry data  Discussed with ED provider.    Lab Results (last 24 hours)       Procedure Component Value Units Date/Time    CBC & Differential  [[id number]]  (Abnormal) Collected: 06/21/24 2106    Specimen: Blood Updated: 06/21/24 2116    Narrative:      The following orders were created for panel order CBC & Differential.  Procedure                               Abnormality         Status                     ---------                               -----------         ------                     CBC Auto Differential[[id number]]        Abnormal            Final result                 Please view results for these tests on the individual orders.    Comprehensive Metabolic Panel [[id number]]  (Abnormal) Collected: 06/21/24 2106    Specimen: Blood Updated: 06/21/24 2136     Glucose 197 mg/dL      BUN 11 mg/dL      Creatinine 1.40 mg/dL      Sodium 137 mmol/L      Potassium 3.8 mmol/L      Chloride 104 mmol/L      CO2 22.0 mmol/L      Calcium 8.5 mg/dL      Total Protein 5.4 g/dL      Albumin 2.9 g/dL      ALT (SGPT) 9 U/L      AST (SGOT) 12 U/L      Alkaline Phosphatase 90 U/L      Total Bilirubin <0.2 mg/dL      Globulin 2.5 gm/dL      A/G Ratio 1.2 g/dL      BUN/Creatinine Ratio 7.9     Anion Gap 11.0 mmol/L      eGFR 47.1 mL/min/1.73     Narrative:      GFR Normal >60  Chronic Kidney Disease <60  Kidney Failure <15      Protime-INR [[id number]]  (Normal) Collected: 06/21/24 2106    Specimen: Blood Updated: 06/21/24 2136     Protime 13.2 Seconds      INR 0.98    aPTT [[id number]]  (Normal) Collected: 06/21/24 2106    Specimen: Blood Updated: 06/21/24 2136     PTT 27.5 seconds     Single High Sensitivity Troponin T [[id number]]  (Abnormal) Collected: 06/21/24 2106    Specimen: Blood Updated: 06/21/24 2137     HS Troponin T 56 ng/L     Narrative:      High Sensitive Troponin T Reference Range:  <14.0 ng/L- Negative Female for AMI  <22.0 ng/L- Negative Male for AMI  >=14 - Abnormal Female indicating possible myocardial injury.  >=22 - Abnormal Male indicating possible myocardial injury.   Clinicians would have to utilize clinical acumen, EKG, Troponin, and serial  changes to determine if it is an Acute Myocardial Infarction or myocardial injury due to an underlying chronic condition.         CBC Auto Differential [[id number]]  (Abnormal) Collected: 06/21/24 2106    Specimen: Blood Updated: 06/21/24 2116     WBC 10.72 10*3/mm3      RBC 3.99 10*6/mm3      Hemoglobin 12.0 g/dL      Hematocrit 37.1 %      MCV 93.0 fL      MCH 30.1 pg      MCHC 32.3 g/dL      RDW 14.2 %      RDW-SD 48.5 fl      MPV 11.0 fL      Platelets 245 10*3/mm3      Neutrophil % 70.8 %      Lymphocyte % 21.4 %      Monocyte % 5.5 %      Eosinophil % 1.3 %      Basophil % 0.6 %      Immature Grans % 0.4 %      Neutrophils, Absolute 7.60 10*3/mm3      Lymphocytes, Absolute 2.29 10*3/mm3      Monocytes, Absolute 0.59 10*3/mm3      Eosinophils, Absolute 0.14 10*3/mm3      Basophils, Absolute 0.06 10*3/mm3      Immature Grans, Absolute 0.04 10*3/mm3      nRBC 0.0 /100 WBC     High Sensitivity Troponin T 2Hr [[id number]]  (Abnormal) Collected: 06/21/24 2304    Specimen: Blood from Arm, Right Updated: 06/21/24 2329     HS Troponin T 53 ng/L      Troponin T Delta -3 ng/L     Narrative:      High Sensitive Troponin T Reference Range:  <14.0 ng/L- Negative Female for AMI  <22.0 ng/L- Negative Male for AMI  >=14 - Abnormal Female indicating possible myocardial injury.  >=22 - Abnormal Male indicating possible myocardial injury.   Clinicians would have to utilize clinical acumen, EKG, Troponin, and serial changes to determine if it is an Acute Myocardial Infarction or myocardial injury due to an underlying chronic condition.                 Imaging Results (Last 24 Hours)       Procedure Component Value Units Date/Time    CT Angiogram Head w AI Analysis of LVO [[id number]] Collected: 06/21/24 2151     Updated: 06/22/24 0006    Narrative:      NONCONTRAST CRANIAL CT SCAN, CT ANGIOGRAM NECK, CT ANGIOGRAM HEAD,  CRANIAL CT PERFUSION.     HISTORY: Acute CVA,      COMPARISON: None..     TECHNIQUE:  Radiation dose reduction  techniques were utilized, including  automated exposure control and exposure modulation based on body size.   Initially, a routine noncontrast cranial CT performed from the skull  base through the vertex region. After review, thin-section contrast  enhanced CT angiogram images obtained from the aortic arch through the  calvarial vertex utilizing angiographic technique. Multi projection 3-D  MIP reformatted images were supplemented and reviewed. Subsequently, CT  perfusion imaging performed with ischemia view software.     FINDINGS CRANIAL CT: No acute hemorrhage or midline shift is  demonstrated..  Ventricular size and configuration is within normal  limits for age.. There is periventricular and deep white matter  microangiopathic change. Recent infarcts noted within the left parietal  subcortical white matter is not well seen on this study. The patient  does have some decreased attenuation involving the posterior limb of the  left internal capsule which is more apparent than on prior imaging.  Postcontrast imaging does not any evidence of venous occlusion or  abnormal enhancement. Bone window images demonstrate some mucosal  thickening within the ethmoid and right maxillary sinuses.     Study was placed on line at 9:16 p.m. Preliminary interpretation  telephoned to extension 0030 during interpretation at 9:25 p.m.        CERVICAL CAROTID CT ANGIOGRAM:     FINDINGS: There is enlargement of the main pulmonary artery, which can  be seen in setting of pulmonary arterial hypertension. There are  coronary artery calcifications. There is normal arch anatomy. There is  mild narrowing of the right common carotid artery, measuring up to 20 to  30%. Plaque continues into the origin of the proximal right internal  carotid artery, with narrowing in the range of 40-50 %. There is some  further plaque noted within the proximal right internal carotid artery,  resulting in narrowing, in the range of about 30%. Distal cervical  right  internal carotid artery is widely patent. There is atherosclerotic  plaque involving the distal left common carotid artery, resulting in  narrowing in the range of 20%. Ulcerated plaque is noted at the left  carotid bifurcation, resulting in mild narrowing, in the range of 20 to  30%. Distal cervical left internal carotid artery is widely patent.  Vertebral arteries appear to be patent throughout their courses. There  is some calcified plaque noted involving the proximal vertebral arteries  bilaterally. It may result in some minimal narrowing bilaterally. Images  through the lung apices do not demonstrate any acute abnormalities.  Thyroid gland and trachea are normal.        NASCET criteria utilized in stenosis measurements. stenosis mild, 0-49%.        CRANIAL CTA ANGIOGRAM:     FINDINGS: The intracranial vertebral arteries are patent. Basilar artery  is patent. The posterior cerebral arteries are patent bilaterally. There  is calcification of the intracranial internal carotid arteries. They  remain patent. There is a small anterior communicating artery. Anterior  cerebral and middle cerebral arteries are patent bilaterally..             CT PERFUSION:     FINDINGS: No areas of cerebral blood flow less than 30% or Tmax greater  than 6 seconds are identified.     Study was placed on line at 9:34 p.m. Preliminary interpretation  telephoned to extension [phone] during interpretation at 9:50 p.m.     AI analysis of LVO was utilized.     Radiation dose reduction techniques were utilized, including automated  exposure control and exposure modulation based on body size.          Impression:      1. No acute intracranial hemorrhage.  2. Indeterminate area of decreased attenuation involving the posterior  limb of the left internal capsule. Consider further assessment with MRI.  3. No areas of cerebral blood flow less than 30% or Tmax greater than 6  seconds.  4. Approximately 40 to 50% stenosis of the origin of the right  internal  carotid artery.  5. Intracranial vessels are patent.        This report was finalized on 6/22/2024 12:03 AM by Dr. Marian Thompson M.D on Workstation: [id number]       CT Angiogram Neck [[id number]] Collected: 06/21/24 2151     Updated: 06/22/24 0006    Narrative:      NONCONTRAST CRANIAL CT SCAN, CT ANGIOGRAM NECK, CT ANGIOGRAM HEAD,  CRANIAL CT PERFUSION.     HISTORY: Acute CVA,      COMPARISON: None..     TECHNIQUE:  Radiation dose reduction techniques were utilized, including  automated exposure control and exposure modulation based on body size.   Initially, a routine noncontrast cranial CT performed from the skull  base through the vertex region. After review, thin-section contrast  enhanced CT angiogram images obtained from the aortic arch through the  calvarial vertex utilizing angiographic technique. Multi projection 3-D  MIP reformatted images were supplemented and reviewed. Subsequently, CT  perfusion imaging performed with ischemia view software.     FINDINGS CRANIAL CT: No acute hemorrhage or midline shift is  demonstrated..  Ventricular size and configuration is within normal  limits for age.. There is periventricular and deep white matter  microangiopathic change. Recent infarcts noted within the left parietal  subcortical white matter is not well seen on this study. The patient  does have some decreased attenuation involving the posterior limb of the  left internal capsule which is more apparent than on prior imaging.  Postcontrast imaging does not any evidence of venous occlusion or  abnormal enhancement. Bone window images demonstrate some mucosal  thickening within the ethmoid and right maxillary sinuses.     Study was placed on line at 9:16 p.m. Preliminary interpretation  telephoned to extension [phone] during interpretation at 9:25 p.m.        CERVICAL CAROTID CT ANGIOGRAM:     FINDINGS: There is enlargement of the main pulmonary artery, which can  be seen in setting of pulmonary  arterial hypertension. There are  coronary artery calcifications. There is normal arch anatomy. There is  mild narrowing of the right common carotid artery, measuring up to 20 to  30%. Plaque continues into the origin of the proximal right internal  carotid artery, with narrowing in the range of 40-50 %. There is some  further plaque noted within the proximal right internal carotid artery,  resulting in narrowing, in the range of about 30%. Distal cervical right  internal carotid artery is widely patent. There is atherosclerotic  plaque involving the distal left common carotid artery, resulting in  narrowing in the range of 20%. Ulcerated plaque is noted at the left  carotid bifurcation, resulting in mild narrowing, in the range of 20 to  30%. Distal cervical left internal carotid artery is widely patent.  Vertebral arteries appear to be patent throughout their courses. There  is some calcified plaque noted involving the proximal vertebral arteries  bilaterally. It may result in some minimal narrowing bilaterally. Images  through the lung apices do not demonstrate any acute abnormalities.  Thyroid gland and trachea are normal.        NASCET criteria utilized in stenosis measurements. stenosis mild, 0-49%.        CRANIAL CTA ANGIOGRAM:     FINDINGS: The intracranial vertebral arteries are patent. Basilar artery  is patent. The posterior cerebral arteries are patent bilaterally. There  is calcification of the intracranial internal carotid arteries. They  remain patent. There is a small anterior communicating artery. Anterior  cerebral and middle cerebral arteries are patent bilaterally..             CT PERFUSION:     FINDINGS: No areas of cerebral blood flow less than 30% or Tmax greater  than 6 seconds are identified.     Study was placed on line at 9:34 p.m. Preliminary interpretation  telephoned to extension [phone] during interpretation at 9:50 p.m.     AI analysis of LVO was utilized.     Radiation dose reduction  techniques were utilized, including automated  exposure control and exposure modulation based on body size.          Impression:      1. No acute intracranial hemorrhage.  2. Indeterminate area of decreased attenuation involving the posterior  limb of the left internal capsule. Consider further assessment with MRI.  3. No areas of cerebral blood flow less than 30% or Tmax greater than 6  seconds.  4. Approximately 40 to 50% stenosis of the origin of the right internal  carotid artery.  5. Intracranial vessels are patent.        This report was finalized on 6/22/2024 12:03 AM by Dr. Marian Thompson M.D on Workstation: [id number]       CT CEREBRAL PERFUSION WITH & WITHOUT CONTRAST [[id number]] Collected: 06/21/24 2151     Updated: 06/22/24 0006    Narrative:      NONCONTRAST CRANIAL CT SCAN, CT ANGIOGRAM NECK, CT ANGIOGRAM HEAD,  CRANIAL CT PERFUSION.     HISTORY: Acute CVA,      COMPARISON: None..     TECHNIQUE:  Radiation dose reduction techniques were utilized, including  automated exposure control and exposure modulation based on body size.   Initially, a routine noncontrast cranial CT performed from the skull  base through the vertex region. After review, thin-section contrast  enhanced CT angiogram images obtained from the aortic arch through the  calvarial vertex utilizing angiographic technique. Multi projection 3-D  MIP reformatted images were supplemented and reviewed. Subsequently, CT  perfusion imaging performed with ischemia view software.     FINDINGS CRANIAL CT: No acute hemorrhage or midline shift is  demonstrated..  Ventricular size and configuration is within normal  limits for age.. There is periventricular and deep white matter  microangiopathic change. Recent infarcts noted within the left parietal  subcortical white matter is not well seen on this study. The patient  does have some decreased attenuation involving the posterior limb of the  left internal capsule which is more apparent than on  prior imaging.  Postcontrast imaging does not any evidence of venous occlusion or  abnormal enhancement. Bone window images demonstrate some mucosal  thickening within the ethmoid and right maxillary sinuses.     Study was placed on line at 9:16 p.m. Preliminary interpretation  telephoned to extension [phone] during interpretation at 9:25 p.m.        CERVICAL CAROTID CT ANGIOGRAM:     FINDINGS: There is enlargement of the main pulmonary artery, which can  be seen in setting of pulmonary arterial hypertension. There are  coronary artery calcifications. There is normal arch anatomy. There is  mild narrowing of the right common carotid artery, measuring up to 20 to  30%. Plaque continues into the origin of the proximal right internal  carotid artery, with narrowing in the range of 40-50 %. There is some  further plaque noted within the proximal right internal carotid artery,  resulting in narrowing, in the range of about 30%. Distal cervical right  internal carotid artery is widely patent. There is atherosclerotic  plaque involving the distal left common carotid artery, resulting in  narrowing in the range of 20%. Ulcerated plaque is noted at the left  carotid bifurcation, resulting in mild narrowing, in the range of 20 to  30%. Distal cervical left internal carotid artery is widely patent.  Vertebral arteries appear to be patent throughout their courses. There  is some calcified plaque noted involving the proximal vertebral arteries  bilaterally. It may result in some minimal narrowing bilaterally. Images  through the lung apices do not demonstrate any acute abnormalities.  Thyroid gland and trachea are normal.        NASCET criteria utilized in stenosis measurements. stenosis mild, 0-49%.        CRANIAL CTA ANGIOGRAM:     FINDINGS: The intracranial vertebral arteries are patent. Basilar artery  is patent. The posterior cerebral arteries are patent bilaterally. There  is calcification of the intracranial internal carotid  arteries. They  remain patent. There is a small anterior communicating artery. Anterior  cerebral and middle cerebral arteries are patent bilaterally..             CT PERFUSION:     FINDINGS: No areas of cerebral blood flow less than 30% or Tmax greater  than 6 seconds are identified.     Study was placed on line at 9:34 p.m. Preliminary interpretation  telephoned to extension [phone] during interpretation at 9:50 p.m.     AI analysis of LVO was utilized.     Radiation dose reduction techniques were utilized, including automated  exposure control and exposure modulation based on body size.          Impression:      1. No acute intracranial hemorrhage.  2. Indeterminate area of decreased attenuation involving the posterior  limb of the left internal capsule. Consider further assessment with MRI.  3. No areas of cerebral blood flow less than 30% or Tmax greater than 6  seconds.  4. Approximately 40 to 50% stenosis of the origin of the right internal  carotid artery.  5. Intracranial vessels are patent.        This report was finalized on 6/22/2024 12:03 AM by Dr. Marian Thompson M.D on Workstation: [id number]       XR Chest 1 View [[id number]] Collected: 06/21/24 2225     Updated: 06/21/24 2228    Narrative:      SINGLE VIEW OF THE CHEST     HISTORY: Acute stroke protocol     COMPARISON: June 17, 2024     FINDINGS:  There is cardiomegaly. There is no vascular congestion. No pneumothorax,  pleural effusion, or acute infiltrate is seen. Patient is status post  median sternotomy with coronary artery bypass grafting.       Impression:      No acute findings.     This report was finalized on 6/21/2024 10:25 PM by Dr. Marian Thompson M.D on Workstation: [id number]               Results for orders placed during the hospital encounter of 02/19/24    Adult Transthoracic Echo Complete W/ Cont if Necessary Per Protocol    Interpretation Summary    Left ventricular systolic function is normal. Calculated left ventricular EF  = 69% Left ventricular ejection fraction appears to be 61 - 65%.    Left ventricular diastolic function was normal.  GLS -16%.    Left atrial volume is moderately increased.    Estimated right ventricular systolic pressure from tricuspid regurgitation is normal (<35 mmHg).    No significant valvular abnormalities noted.      ECG 12 Lead Stroke Evaluation   Preliminary Result   HEART RATE= 61  bpm   RR Interval= 984  ms   OH Interval= 145  ms   P Horizontal Axis= -21  deg   P Front Axis= 54  deg   QRSD Interval= 100  ms   QT Interval= 466  ms   QTcB= 470  ms   QRS Axis= 34  deg   T Wave Axis= 185  deg   - ABNORMAL ECG -   Sinus rhythm   LVH with secondary repolarization abnormality   Abnormal inferior Q waves   Anterior ST elevation, probably due to LVH   Electronically Signed By:    Date and Time of Study: 2024-06-21 21:39:50      Telemetry Scan   Final Result           Assessment/Plan     Active Hospital Problems    Diagnosis  POA    **Acute right-sided weakness [R53.1]  Yes    Tobacco abuse [Z72.0]  Yes    Coronary artery disease involving native coronary artery of native heart with unstable angina pectoris [I25.110]  Yes    Diabetic gastroparesis [E11.43, K31.84]  Yes    Gastroesophageal reflux disease [K21.9]  Yes    Iron deficiency anemia [D50.9]  Yes    Type 2 diabetes mellitus with hyperglycemia, with long-term current use of insulin [E11.65, Z79.4]  Not Applicable    Benign essential hypertension [I10]  Yes    Mixed hypercholesterolemia and hypertriglyceridemia [E78.2]  Yes   Ms. Inman is a 44-year-old female with history of CAD, CABG, type 2 diabetes, GERD, iron deficiency anemia, recent CVA who presents to the emergency room with complaints of right-sided weakness.     Acute right-sided weakness/recent CVA  -MRI brain in a.m.  -Consult neurology  -Stroke order set initiated  -Hold antihypertensive, patient was having blood pressures on the lower side, will allow for some permissive hypertension  -Continue  "aspirin and Plavix  -Recent 2D echo in February  -PT/OT/ST to eval and treat   -neurochecks every 4 hours  -Check urine drug screen    Type 2 diabetes  -Accu-Cheks before meals and at bedtime with correctional dose insulin  -Hemoglobin A1c 16.9  -Consult diabetes educator  -Hold oral diabetic medications at this time    CAD/hypertension/hyperlipidemia  -Continue home medications  -Hold antihypertensives for now telemetry unit for monitoring  -      I discussed the patient's findings and my recommendations with patient.    VTE Prophylaxis - SCDs.  Code Status - Full code.       KAUSHAL Vinson  [facility]  06/21/24  11:54 EDT      Electronically signed by Wyatt Hobson DO at 06/22/24 0322          Emergency Department Notes        Sarah Cyr, RN at 06/21/24 2309          Nursing report ED to floor  Bibiana Inman  44 y.o.  female    HPI :  HPI (Adult)  Stated Reason for Visit: Pt to ED via EMS. Pt reports her LKN was at 1930 tonight. Family noticed right sided weakness in right leg and right arm. Pt reports she had a \"mini stroke last week\". Ems reports upon arrival pt was slow to respond. Ems reports some improvement noted en route to hospital.    Chief Complaint  Chief Complaint   Patient presents with    Extremity Weakness       Admitting doctor:   Wyatt Hobson DO    Admitting diagnosis:   The primary encounter diagnosis was Acute right-sided weakness. Diagnoses of Type 2 diabetes mellitus with hyperglycemia, with long-term current use of insulin, Tobacco abuse, Carotid stenosis, asymptomatic, right, and Coronary artery disease involving native coronary artery of native heart with unstable angina pectoris were also pertinent to this visit.    Code status:   Current Code Status       Date Active Code Status Order ID Comments User Context       Prior            Allergies:   Latex    Isolation:   No active isolations    Intake and Output  No intake or output data in the 24 hours " ending 06/21/24 2305    Weight:       06/21/24 2105   Weight: 65.5 kg (144 lb 6.4 oz)       Most recent vitals:   Vitals:    06/21/24 2217 06/21/24 2226 06/21/24 2231 06/21/24 2246   BP: 135/70  135/66 126/65   BP Location:   Right arm Right arm   Patient Position:   Lying Lying   Pulse:  55 56 55   Resp: 16  14 15   Temp:       SpO2:  100% 100% 98%   Weight:       Height:           Active LDAs/IV Access:   Lines, Drains & Airways       Active LDAs       Name Placement date Placement time Site Days    Peripheral IV 06/21/24 2102 Right Antecubital 06/21/24 2102  Antecubital  less than 1    Peripheral IV 06/21/24 2102 Left Antecubital 06/21/24 2102  Antecubital  less than 1                    Labs (abnormal labs have a star):   Labs Reviewed   COMPREHENSIVE METABOLIC PANEL - Abnormal; Notable for the following components:       Result Value    Glucose 197 (*)     Creatinine 1.40 (*)     Calcium 8.5 (*)     Total Protein 5.4 (*)     Albumin 2.9 (*)     eGFR 47.1 (*)     All other components within normal limits    Narrative:     GFR Normal >60  Chronic Kidney Disease <60  Kidney Failure <15     SINGLE HS TROPONIN T - Abnormal; Notable for the following components:    HS Troponin T 56 (*)     All other components within normal limits    Narrative:     High Sensitive Troponin T Reference Range:  <14.0 ng/L- Negative Female for AMI  <22.0 ng/L- Negative Male for AMI  >=14 - Abnormal Female indicating possible myocardial injury.  >=22 - Abnormal Male indicating possible myocardial injury.   Clinicians would have to utilize clinical acumen, EKG, Troponin, and serial changes to determine if it is an Acute Myocardial Infarction or myocardial injury due to an underlying chronic condition.        CBC WITH AUTO DIFFERENTIAL - Abnormal; Notable for the following components:    Neutrophils, Absolute 7.60 (*)     All other components within normal limits   PROTIME-INR - Normal   APTT - Normal   RAINBOW DRAW    Narrative:     The  following orders were created for panel order [place] Draw.  Procedure                               Abnormality         Status                     ---------                               -----------         ------                     Green Top (Gel)[[id number]]                                  Final result               Lavender Top[[id number]]                                     Final result               Gold Top - SST[[id number]]                                   Final result               Light Blue Top[[id number]]                                   Final result                 Please view results for these tests on the individual orders.   HIGH SENSITIVITIY TROPONIN T 2HR   POCT GLUCOSE FINGERSTICK   TYPE AND SCREEN   CBC AND DIFFERENTIAL    Narrative:     The following orders were created for panel order CBC & Differential.  Procedure                               Abnormality         Status                     ---------                               -----------         ------                     CBC Auto Differential[[id number]]        Abnormal            Final result                 Please view results for these tests on the individual orders.   GREEN TOP   LAVENDER TOP   GOLD TOP - SST   LIGHT BLUE TOP       EKG:   ECG 12 Lead Stroke Evaluation   Preliminary Result   HEART RATE= 61  bpm   RR Interval= 984  ms   AZ Interval= 145  ms   P Horizontal Axis= -21  deg   P Front Axis= 54  deg   QRSD Interval= 100  ms   QT Interval= 466  ms   QTcB= 470  ms   QRS Axis= 34  deg   T Wave Axis= 185  deg   - ABNORMAL ECG -   Sinus rhythm   LVH with secondary repolarization abnormality   Abnormal inferior Q waves   Anterior ST elevation, probably due to LVH   Electronically Signed By:    Date and Time of Study: 2024-06-21 21:39:50          Meds given in ED:   Medications   sodium chloride 0.9 % flush 10 mL (has no administration in time range)   sodium chloride 0.9 % bolus 1,000 mL (0 mL Intravenous Stopped 6/21/24 0342)    iopamidol (ISOVUE-370) 76 % injection 150 mL (150 mL Intravenous Given 6/21/24 2136)       Imaging results:  XR Chest 1 View    Result Date: 6/21/2024  No acute findings.  This report was finalized on 6/21/2024 10:25 PM by Dr. Marian Thompson M.D on Workstation: [id number]       Ambulatory status:   - assist x 1    Social issues:   Social History     Socioeconomic History    Marital status:    Tobacco Use    Smoking status: Every Day     Current packs/day: 0.25     Average packs/day: 0.3 packs/day for 15.0 years (3.8 ttl pk-yrs)     Types: Cigarettes    Smokeless tobacco: Never   Vaping Use    Vaping status: Never Used   Substance and Sexual Activity    Alcohol use: Defer    Drug use: Never    Sexual activity: Defer       Peripheral Neurovascular  Peripheral Neurovascular (Adult)  Peripheral Neurovascular WDL: WDL    Neuro Cognitive  Neuro Cognitive (Adult)  Cognitive/Neuro/Behavioral WDL: WDL  Last Known Well Date: 06/21/24  Last Known Well Time: 1940  Additional Documentation: Hand /Ankle Strength (Group), Pupils (Group), Sensory Impairment (Row), NIH Stroke Scale (group)  Pupils  Pupil PERRLA: yes  Hand /Ankle Strength  Hand , Left: strong  Hand , Right: moderate  Dorsiflexion, Left: strong  Dorsiflexion, Right: strong  Plantarflexion, Left: strong  Plantarflexion, Right: strong  [place] Coma Scale  Best Eye Response: 4-->(E4) spontaneous  Best Motor Response: 6-->(M6) obeys commands  Best Verbal Response: 5-->(V5) oriented  [place] Coma Scale Score: 15  NIH Stroke Scale  Interval: baseline  1a. Level of Consciousness: 0-->Alert, keenly responsive  1b. LOC Questions: 0-->Answers both questions correctly  1c. LOC Commands: 0-->Performs both tasks correctly  2. Best Gaze: 0-->Normal  3. Visual: 0-->No visual loss  4. Facial Palsy: 1-->Minor paralysis (flattened nasolabial fold, asymmetry on smiling)  5a. Motor Arm, Left: 0-->No drift, limb holds 90 (or 45) degrees for full 10 secs  5b.  Motor Arm, Right: 1-->Drift, limb holds 90 (or 45) degrees, but drifts down before full 10 secs, does not hit bed or other support  6a. Motor Leg, Left: 0-->No drift, leg holds 30 degree position for full 5 secs  6b. Motor Leg, Right: 0-->No drift, leg holds 30 degree position for full 5 secs  7. Limb Ataxia: 1-->Present in one limb  8. Sensory: 0-->Normal, no sensory loss  9. Best Language: 0-->No aphasia, normal  10. Dysarthria: 0-->Normal  11. Extinction and Inattention (formerly Neglect): 0-->No abnormality  Total (NIH Stroke Scale): 3    Learning  Learning Assessment (Adult)  Learning Readiness and Ability: no barriers identified  Education Provided  Person Taught: patient  Teaching Focus: symptom/problem overview  Education Outcome Evaluation: eager to learn    Respiratory  Respiratory WDL  Respiratory WDL: WDL    Abdominal Pain       Pain Assessments  Pain (Adult)  (0-10) Pain Rating: Rest: 0  (0-10) Pain Rating: Activity: 0    NIH Stroke Scale  NIH Stroke Scale  Interval: baseline  1a. Level of Consciousness: 0-->Alert, keenly responsive  1b. LOC Questions: 0-->Answers both questions correctly  1c. LOC Commands: 0-->Performs both tasks correctly  2. Best Gaze: 0-->Normal  3. Visual: 0-->No visual loss  4. Facial Palsy: 1-->Minor paralysis (flattened nasolabial fold, asymmetry on smiling)  5a. Motor Arm, Left: 0-->No drift, limb holds 90 (or 45) degrees for full 10 secs  5b. Motor Arm, Right: 1-->Drift, limb holds 90 (or 45) degrees, but drifts down before full 10 secs, does not hit bed or other support  6a. Motor Leg, Left: 0-->No drift, leg holds 30 degree position for full 5 secs  6b. Motor Leg, Right: 0-->No drift, leg holds 30 degree position for full 5 secs  7. Limb Ataxia: 1-->Present in one limb  8. Sensory: 0-->Normal, no sensory loss  9. Best Language: 0-->No aphasia, normal  10. Dysarthria: 0-->Normal  11. Extinction and Inattention (formerly Neglect): 0-->No abnormality  Total (NIH Stroke Scale):  3    Sarah Cyr RN  06/21/24 23:05 EDT     Electronically signed by Sarah Cyr RN at 06/21/24 0338       Huan Lamb MD at 06/21/24 9163        Procedure Orders    1. Critical Care [[id number]] ordered by Huan Lamb MD                  EMERGENCY DEPARTMENT ENCOUNTER    Room Number:  32/32  Date seen:  6/21/2024  PCP: Ibrahima Correa MD  Historian: Patient and EMS      HPI:  Chief Complaint: Strokelike symptoms  Context: Bibiana Inman is a 44 y.o. female who presents to the ED via EMS from home for strokelike symptoms.  The patient was [facility] to [facility] earlier this week for a stroke.  The patient is on Plavix.  Per EMS and the patient she was doing well today when she developed acute onset of right-sided weakness approxi-7:30 PM tonight.  EMS states the patient was flaccid on the right side on arrival and she now has some movement back.  Patient denies headache, chest pain or shortness of breath.      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    Abnormal bone density screening 11/11/2012    Benign essential hypertension 08/24/2016    Chronic cough 03/17/2015    Diabetic gastroparesis 07/20/2021    Elevated erythrocyte sedimentation rate 07/20/2021    Fatigue 07/20/2021    Gastroesophageal reflux disease 03/10/2021    Mixed hypercholesterolemia and hypertriglyceridemia 03/07/2014    Intermittent claudication 04/17/2017    Iron deficiency anemia 03/02/2020    Multiple joint pain 07/20/2021    Need for influenza vaccination 11/01/2017    Type 2 diabetes mellitus with hyperglycemia, with long-term current use of insulin 05/16/2017    Obstructive sleep apnea 12/14/2021    Coronary artery disease involving native coronary artery of native heart with unstable angina pectoris 02/19/2024    Abnormal nuclear stress test 02/19/2024     Depressive disorder 2023    Back pain 2023    Diabetic peripheral neuropathy associated with type 2 diabetes mellitus 2024    Ingrowing nail, left great toe 2024    Personal history of COVID-19 2022    Polyp of colon 2023    Vitamin D deficiency 2024    Tobacco abuse 2024    Unstable angina 2024    Altered mental status 2024    Carotid stenosis, asymptomatic, right 2024     Resolved Ambulatory Problems     Diagnosis Date Noted    Hypothyroidism 2014    Spontaneous  2012    Myocardial infarction 2021    Obesity 2014    Stress fracture of right ankle with routine healing 2024     Past Medical History:   Diagnosis Date    Coronary arteriosclerosis 2014    GERD (gastroesophageal reflux disease)          REVIEW OF SYSTEMS  All systems reviewed and negative except for those discussed in HPI.       PAST SURGICAL HISTORY  Past Surgical History:   Procedure Laterality Date    CARDIAC CATHETERIZATION N/A 2024    Procedure: Left Heart Cath and coronary angiogram;  Surgeon: Jena Barron MD;  Location: [facility] CATH INVASIVE LOCATION;  Service: Cardiology;  Laterality: N/A;     SECTION      CORONARY ARTERY BYPASS GRAFT  2014    LIMA-LAD, SVG-OM2, SVG-PDA, Dr. Debbie Fry.    DILATATION AND CURETTAGE      TONSILLECTOMY           FAMILY HISTORY  Family History   Family history unknown: Yes         SOCIAL HISTORY  Social History     Socioeconomic History    Marital status:    Tobacco Use    Smoking status: Every Day     Current packs/day: 0.25     Average packs/day: 0.3 packs/day for 15.0 years (3.8 ttl pk-yrs)     Types: Cigarettes    Smokeless tobacco: Never   Vaping Use    Vaping status: Never Used   Substance and Sexual Activity    Alcohol use: Defer    Drug use: Never    Sexual activity: Defer         ALLERGIES  Latex      PHYSICAL EXAM  ED Triage Vitals   Temp Pulse Resp BP SpO2   -- --  -- -- --      Temp src Heart Rate Source Patient Position BP Location FiO2 (%)   -- -- -- -- --       Physical Exam      GENERAL: 44-year-old female in mild distress  HENT: NCAT: nares patent: Neck supple  EYES: no scleral icterus  CV: regular rhythm, normal rate  RESPIRATORY: normal effort  ABDOMEN: soft, NTND: Bowel sounds positive  MUSCULOSKELETAL: no deformity  NEURO: alert: See NIHSS  PSYCH:  calm, cooperative  SKIN: warm, dry    Vital signs and nursing notes reviewed.      LAB RESULTS  Recent Results (from the past 24 hour(s))   Comprehensive Metabolic Panel    Collection Time: 06/21/24  9:06 PM    Specimen: Blood   Result Value Ref Range    Glucose 197 (H) 65 - 99 mg/dL    BUN 11 6 - 20 mg/dL    Creatinine 1.40 (H) 0.57 - 1.00 mg/dL    Sodium 137 136 - 145 mmol/L    Potassium 3.8 3.5 - 5.2 mmol/L    Chloride 104 98 - 107 mmol/L    CO2 22.0 22.0 - 29.0 mmol/L    Calcium 8.5 (L) 8.6 - 10.5 mg/dL    Total Protein 5.4 (L) 6.0 - 8.5 g/dL    Albumin 2.9 (L) 3.5 - 5.2 g/dL    ALT (SGPT) 9 1 - 33 U/L    AST (SGOT) 12 1 - 32 U/L    Alkaline Phosphatase 90 39 - 117 U/L    Total Bilirubin <0.2 0.0 - 1.2 mg/dL    Globulin 2.5 gm/dL    A/G Ratio 1.2 g/dL    BUN/Creatinine Ratio 7.9 7.0 - 25.0    Anion Gap 11.0 5.0 - 15.0 mmol/L    eGFR 47.1 (L) >60.0 mL/min/1.73   Protime-INR    Collection Time: 06/21/24  9:06 PM    Specimen: Blood   Result Value Ref Range    Protime 13.2 11.7 - 14.2 Seconds    INR 0.98 0.90 - 1.10   aPTT    Collection Time: 06/21/24  9:06 PM    Specimen: Blood   Result Value Ref Range    PTT 27.5 22.7 - 35.4 seconds   Single High Sensitivity Troponin T    Collection Time: 06/21/24  9:06 PM    Specimen: Blood   Result Value Ref Range    HS Troponin T 56 (C) <14 ng/L   Type & Screen    Collection Time: 06/21/24  9:06 PM    Specimen: Blood   Result Value Ref Range    ABO Type O     RH type Positive     Antibody Screen Negative     T&S Expiration Date 6/24/2024 11:59:59 PM    Green Top (Gel)    Collection  Time: 06/21/24  9:06 PM   Result Value Ref Range    Extra Tube Hold for add-ons.    Lavender Top    Collection Time: 06/21/24  9:06 PM   Result Value Ref Range    Extra Tube hold for add-on    Gold Top - SST    Collection Time: 06/21/24  9:06 PM   Result Value Ref Range    Extra Tube Hold for add-ons.    Light Blue Top    Collection Time: 06/21/24  9:06 PM   Result Value Ref Range    Extra Tube Hold for add-ons.    CBC Auto Differential    Collection Time: 06/21/24  9:06 PM    Specimen: Blood   Result Value Ref Range    WBC 10.72 3.40 - 10.80 10*3/mm3    RBC 3.99 3.77 - 5.28 10*6/mm3    Hemoglobin 12.0 12.0 - 15.9 g/dL    Hematocrit 37.1 34.0 - 46.6 %    MCV 93.0 79.0 - 97.0 fL    MCH 30.1 26.6 - 33.0 pg    MCHC 32.3 31.5 - 35.7 g/dL    RDW 14.2 12.3 - 15.4 %    RDW-SD 48.5 37.0 - 54.0 fl    MPV 11.0 6.0 - 12.0 fL    Platelets 245 140 - 450 10*3/mm3    Neutrophil % 70.8 42.7 - 76.0 %    Lymphocyte % 21.4 19.6 - 45.3 %    Monocyte % 5.5 5.0 - 12.0 %    Eosinophil % 1.3 0.3 - 6.2 %    Basophil % 0.6 0.0 - 1.5 %    Immature Grans % 0.4 0.0 - 0.5 %    Neutrophils, Absolute 7.60 (H) 1.70 - 7.00 10*3/mm3    Lymphocytes, Absolute 2.29 0.70 - 3.10 10*3/mm3    Monocytes, Absolute 0.59 0.10 - 0.90 10*3/mm3    Eosinophils, Absolute 0.14 0.00 - 0.40 10*3/mm3    Basophils, Absolute 0.06 0.00 - 0.20 10*3/mm3    Immature Grans, Absolute 0.04 0.00 - 0.05 10*3/mm3    nRBC 0.0 0.0 - 0.2 /100 WBC   ECG 12 Lead Stroke Evaluation    Collection Time: 06/21/24  9:39 PM   Result Value Ref Range    QT Interval 466 ms    QTC Interval 470 ms       Ordered the above labs and reviewed the results.        RADIOLOGY  XR Chest 1 View    Result Date: 6/21/2024  SINGLE VIEW OF THE CHEST  HISTORY: Acute stroke protocol  COMPARISON: June 17, 2024  FINDINGS: There is cardiomegaly. There is no vascular congestion. No pneumothorax, pleural effusion, or acute infiltrate is seen. Patient is status post median sternotomy with coronary artery bypass  grafting.      No acute findings.  This report was finalized on 6/21/2024 10:25 PM by Dr. Marian Thompson M.D on Workstation: [id number]       Ordered the above noted radiological studies. Reviewed by me in PACS.            PROCEDURES  Critical Care    Performed by: Huan Lamb MD  Authorized by: Huan Lamb MD    Critical care provider statement:     Critical care time (minutes):  49    Critical care time was exclusive of:  Separately billable procedures and treating other patients    Critical care was necessary to treat or prevent imminent or life-threatening deterioration of the following conditions:  CNS failure or compromise    Critical care was time spent personally by me on the following activities:  Discussions with consultants, discussions with primary provider, evaluation of patient's response to treatment, examination of patient, obtaining history from patient or surrogate, ordering and performing treatments and interventions, ordering and review of laboratory studies, ordering and review of radiographic studies, pulse oximetry, re-evaluation of patient's condition and review of old charts    I assumed direction of critical care for this patient from another provider in my specialty: no      Care discussed with: admitting provider              MEDICATIONS GIVEN IN ER  Medications   sodium chloride 0.9 % flush 10 mL (has no administration in time range)   sodium chloride 0.9 % bolus 1,000 mL (0 mL Intravenous Stopped 6/21/24 2235)   iopamidol (ISOVUE-370) 76 % injection 150 mL (150 mL Intravenous Given 6/21/24 2136)             MEDICAL DECISION MAKING, PROGRESS, and CONSULTS    All labs have been independently reviewed by me.  All radiology studies have been reviewed by me and I have also reviewed the radiology report.   EKG's independently viewed and interpreted by me.  Discussion below represents my analysis of pertinent findings related to patient's condition, differential diagnosis,  treatment plan and final disposition.      Additional sources:  - Discussed/ obtained information from independent historians: EMS states that bystanders state the patient had acute onset of symptoms at 7:30 PM tonight    - External (non-ED) record review: I reviewed the patient admission to [facility] from 6/17 through 6/20 where she was admitted for acute CVA.  She has a history of hypertensive emergency, insulin-dependent diabetes, carotid atherosclerosis, coronary artery disease, CABG and smoking.    - Chronic or social conditions impacting care: Patient was at home with family    - Shared decision making: After shared decision-making discussion she myself, the patient and the stroke team we will admit her to the hospital for further evaluation and care      Orders placed during this visit:  Orders Placed This Encounter   Procedures    Critical Care    CT Angiogram Head w AI Analysis of LVO    CT Angiogram Neck    CT CEREBRAL PERFUSION WITH & WITHOUT CONTRAST    XR Chest 1 View    [place] Draw    Comprehensive Metabolic Panel    Protime-INR    aPTT    Single High Sensitivity Troponin T    CBC Auto Differential    High Sensitivity Troponin T 2Hr    NPO Diet NPO Type: Strict NPO    Initiate Department's Acute Stroke Process (Team D, Code 19, etc.)    Perform NIH Stroke Scale    Measure Actual Weight    Notify Provider    Notify Provider for SBP Greater Than 140 for Hemorrhagic Stroke Patient    Head of Bed 30 Degrees or Less    Undress and Gown    Continuous Pulse Oximetry    Vital Signs    Neuro Checks    No Hypotonic Fluids    Nursing Dysphagia Screening (Complete Prior to Giving anything PO)    RN to Place Order SLP Consult (IF swallow screen failed) - Eval & Treat Choosing Reason of RN Dysphagia Screen Failed    Inpatient Neurology Consult Stroke    Inpatient Neurology Consult Stroke    LHA (on-call MD unless specified) Details    Oxygen Therapy- Nasal Cannula; Titrate 1-6 LPM Per SpO2; 90 - 95%     SLP Consult: Eval & Treat RN Dysphagia Screen Failed    POC Glucose Once    ECG 12 Lead Stroke Evaluation    Type & Screen    Insert Large-Bore Peripheral IV - RIGHT AC Preferred    Inpatient Admission    CBC & Differential    Green Top (Gel)    Lavender Top    Gold Top - SST    Light Blue Top         Differential diagnosis:  My differential diagnosis includes but is not limited to stroke, encephalopathy, toxic / metabolic, hypoglycemia, toxin-induced, psychogenic, medication-induced, or infectious.      Independent interpretation of labs, radiology studies, and discussions with consultants:  ED Course as of 06/21/24 2258 Fri Jun 21, 2024 2107 NIHSS:  0-->Alert: keenly responsive  0-->Answers both questions correctly  0-->Performs both tasks correctly  0=normal  0=No visual loss  1=Minor paralysis (flattened nasolabial fold, asymmetric on smiling)  0-->No drift: limb holds 90 (or 45) degrees for full 10 secs  1-->Drift: limb holds 90 (or 45) degrees, but drifts down before full 10 seconds: does not hit bed or other support  0-->No drift: limb holds 90 (or 45) degrees for full 10 secs  1-->Drift: limb holds 90 (or 45) degrees, but drifts down before full 10 seconds: does not hit bed or other support  1=Present in one limb  1=Mild to moderate sensory loss; patient feels pinprick is less sharp or is dull on the affected side; there is a loss of superficial pain with pinprick but patient is aware She is being touched  1-->Mild-to-moderate aphasia: some obvious loss of fluency or facility of comprehension, without significant limitation on ideas expressed or form of expression. Reduction of speech and/or comprehension, however, makes conversation.  1=Mild to moderate, patient slurs at least some words and at worst, can be understood with some difficulty  0=No abnormality  Total score: 7 [GP]   2110 I discussed the case with Dr. Rojas from the stroke team.  He is aware the patient was just discharged from [facility] after a stroke this week.  Thus the patient is not a thrombolytic candidate with her recent stroke.  He states she could be a surgical candidate depending on the results of her CTA and CTP.  With the patient systolic blood pressure of 105 he is recommending a 1 L normal saline bolus.  He is aware that we are performing a team MD on this patient. [GP]   2139 Troponin is 56 and it was in the 60s 4 days ago [GP]   2139 I have just returned from a floor code and not heard anything from radiology or the stroke team at this time [GP]   2149 Currently the patient is back in the room with a systolic blood pressure of 124 and her normal saline has been started.  Her exam is unchanged.  Denies chest pain. [GP]   2149 EKG    EKG time: 2139  Rhythm/Rate: Normal sinus rate 61  LVH with repolarization normality  No Acute Ischemia  Non-Specific ST-T changes    Similar compared to prior on 6/21/2024    Interpreted Contemporaneously by me.  Independently viewed by me     [GP]   2211 I still have not heard from the stroke team or the radiologist about the patient's stroke imaging. [GP]   2243 Currently on repeat exam the patient's blood pressure is 135/70.  Her arm and leg weakness have resolved.  She states she feels much better.  I still have not heard from the radiologist or the stroke team and will call them back. [GP]   2244 I spoke with Dr. Rojas who states the imaging all looked okay and recommended admission to the hospitalist for further evaluation and care. [GP]   2250 I discussed the case with Rocio Ro from [facility].  She is aware of the patient's presentation and stroke workup.  She is aware of the patient's imaging and my consultation with neurology.  She will meet the patient to a neurotelemetry bed under Dr. Hobson's service for further evaluation and care. [GP]      ED Course User Index  [GP] Huan Lamb MD               DIAGNOSIS  Final diagnoses:   Acute right-sided weakness   Type 2 diabetes  mellitus with hyperglycemia, with long-term current use of insulin   Tobacco abuse   Carotid stenosis, asymptomatic, right   Coronary artery disease involving native coronary artery of native heart with unstable angina pectoris         DISPOSITION  ADMISSION    Discussed treatment plan and reason for admission with pt/family and admitting physician.  Pt/family voiced understanding of the plan for admission for further testing/treatment as needed.            Latest Documented Vital Signs:  As of 22:58 EDT  BP- 126/65 HR- 55 Temp- 98.1 °F (36.7 °C) O2 sat- 98%--      --------------------  Please note that portions of this were completed with a voice recognition program.       Note Disclaimer: At [facility], we believe that sharing information builds trust and better relationships. You are receiving this note because you are receiving care at [facility] or recently visited. It is possible you will see health information before a provider has talked with you about it. This kind of information can be easy to misunderstand. To help you fully understand what it means for your health, we urge you to discuss this note with your provider.             Huan Lamb MD  06/21/24 2259      Electronically signed by Huan Lamb MD at 06/21/24 2764       Medication Administration Report for Bibiana Inman as of 6/22/24 through 6/24/24     Legend:    Given Hold Not Given Due Canceled Entry Other Actions    Time Time (Time) Time Time-Action         Discontinued     Completed     Future     MAR Hold     Linked             Medications 06/22/24 06/23/24 06/24/24     albuterol (PROVENTIL) nebulizer solution 0.083% 2.5 mg/3mL  Dose: 2.5 mg  Freq: Every 6 Hours PRN Route: NEBULIZATION  PRN Reasons: Shortness of Air,Wheezing  Start: 06/22/24 0257            aspirin tablet 325 mg  Dose: 325 mg  Freq: Daily Route: PO  Start: 06/22/24 0900     Admin Instructions:   If patient fails dysphagia, ID option MUST be given.  Do not  exceed 4 grams of aspirin in a 24 hr period.    If given for pain, use the following pain scale:   Mild Pain = Pain Score of 1-3, CPOT 1-2  Moderate Pain = Pain Score of 4-6, CPOT 3-4  Severe Pain = Pain Score of 7-10, CPOT 5-8      1005-Given          0848-Given          1006-Given            Or   aspirin suppository 300 mg  Dose: 300 mg  Freq: Daily Route: RE  Start: 06/22/24 0900     Admin Instructions:   If patient fails dysphagia, MS option MUST be given.  Do not exceed 4 grams of aspirin in a 24 hr period.    If given for pain, use the following pain scale:   Mild Pain = Pain Score of 1-3, CPOT 1-2  Moderate Pain = Pain Score of 4-6, CPOT 3-4  Severe Pain = Pain Score of 7-10, CPOT 5-8      1005-Not Given:  See Alt          0848-Not Given:  See Alt          1006-Not Given:  See Alt            atorvastatin (LIPITOR) tablet 80 mg  Dose: 80 mg  Freq: Nightly Route: PO  Start: 06/22/24 0019     Admin Instructions:   Avoid grapefruit juice.      0426-Given     2038-Given         2055-Given          2100             sennosides-docusate (PERICOLACE) 8.6-50 MG per tablet 2 tablet  Dose: 2 tablet  Freq: 2 Times Daily PRN Route: PO  PRN Reason: Constipation  Start: 06/22/24 0003     Admin Instructions:   Start bowel management regimen if patient has not had a bowel movement after 12 hours.           And   polyethylene glycol (MIRALAX) packet 17 g  Dose: 17 g  Freq: Daily PRN Route: PO  PRN Reason: Constipation  PRN Comment: Use if senna-docusate is ineffective  Start: 06/22/24 0003     Admin Instructions:   Use if no bowel movement after 12 hours. Mix in 6-8 ounces of water.  Use 4-8 ounces of water, tea, or juice for each 17 gram dose.           And   bisacodyl (DULCOLAX) EC tablet 5 mg  Dose: 5 mg  Freq: Daily PRN Route: PO  PRN Reason: Constipation  PRN Comment: Use if polyethylene glycol is ineffective  Start: 06/22/24 0003     Admin Instructions:   Use if no bowel movement after 12 hours.  Swallow whole. Do not  crush, split, or chew tablet.           And   bisacodyl (DULCOLAX) suppository 10 mg  Dose: 10 mg  Freq: Daily PRN Route: RE  PRN Reason: Constipation  PRN Comment: Use if bisacodyl oral is ineffective  Start: 06/22/24 0003     Admin Instructions:   Use if no bowel movement after 12 hours.  Hold for diarrhea           clopidogrel (PLAVIX) tablet 75 mg  Dose: 75 mg  Freq: Daily Route: PO  Start: 06/22/24 0900      1006-Given          0848-Given          1006-Given            dextrose (D50W) (25 g/50 mL) IV injection 25 g  Dose: 25 g  Freq: Every 15 Minutes PRN Route: IV  PRN Reason: Low Blood Sugar  PRN Comment: Blood Sugar Less Than 70  Start: 06/22/24 0001     Admin Instructions:   Blood sugar less than 70; patient has IV access - Unresponsive, NPO or Unable To Safely Swallow           dextrose (GLUTOSE) oral gel 15 g  Dose: 15 g  Freq: Every 15 Minutes PRN Route: PO  PRN Reason: Low Blood Sugar  PRN Comment: Blood sugar less than 70  Start: 06/22/24 0001     Admin Instructions:   BS<70, Patient Alert, Is not NPO, Can safely swallow.           escitalopram (LEXAPRO) tablet 10 mg  Dose: 10 mg  Freq: Daily Route: PO  Start: 06/22/24 0900     Admin Instructions:   Caution: Look alike/sound alike drug alert.      1006-Given          0848-Given          1006-Given            glucagon (GLUCAGEN) injection 1 mg  Dose: 1 mg  Freq: Every 15 Minutes PRN Route: IM  PRN Reason: Low Blood Sugar  PRN Comment: Blood Glucose Less Than 70  Start: 06/22/24 0001     Admin Instructions:   Blood Glucose Less Than 70 - Patient Without IV Access - Unresponsive, NPO or Unable To Safely Swallow  Reconstitute powder for injection by adding 1 mL of -supplied sterile diluent or sterile water for injection to a vial containing 1 mg of the drug, to provide solutions containing 1 mg/mL. Shake vial gently to dissolve.           icosapent ethyl (VASCEPA) capsule 2 g  Dose: 2 g  Freq: 2 Times Daily With Meals Route: PO  Indications of  Use: CEREBROVASCULAR ACCIDENT,HYPERTRIGLYCERIDEMIA,MYOCARDIAL REVASCULARIZATION PROCEDURE  Start: 06/22/24 1800     Admin Instructions:   Do not crush or chew the capsules or tablets. The drug may not work as designed if the capsule or tablet is crushed or chewed. Swallow whole.  Swallow capsule whole.  Do not break open, crush, dissolve, or chew capsule.      (2012)-Not Given          (0855)-Not Given [C]     (2033)-Not Given [C]         (1007)-Not Given     1800           insulin glargine (LANTUS, SEMGLEE) injection 20 Units  Dose: 20 Units  Freq: Daily Route: SC  Start: 06/22/24 0900     Admin Instructions:         1006-Given          0848-Given          1008-Given            insulin lispro (HUMALOG/ADMELOG) injection 2-7 Units  Dose: 2-7 Units  Freq: 4 Times Daily Before Meals & Nightly Route: SC  Start: 06/22/24 0730     Admin Instructions:   Correction Insulin - Low Dose - Total Insulin Dose Less Than 40 units/day (Lean, Elderly or Renal Patients)    Blood Glucose 150-199 mg/dL - 2 units  Blood Glucose 200-249 mg/dL - 3 units  Blood Glucose 250-299 mg/dL - 4 units  Blood Glucose 300-349 mg/dL - 5 units  Blood Glucose 350-400 mg/dL - 6 units  Blood Glucose Greater Than 400 mg/dL - 7 units & Call Provider   Caution: Look alike/sound alike drug alert      (1006)-Not Given     (1447)-Not Given     1830-Given        0100-Given     0703-Given     1229-Given       (1730)-Not Given     2056-Given         (0730)-Not Given     1130     1730       2100            nicotine (NICODERM CQ) 21 MG/24HR patch 1 patch  Dose: 1 patch  Freq: Every 24 Hours Route: TD  Start: 06/22/24 0019     Admin Instructions:   Apply Patch to Clean, Dry, Hairless Area Daily - Rotating Sites.  REMOVE Old Patch Prior to Applying New Patch.  May Remove Patch at Bedtime If Needed to Prevent Insomnia.  Do Not Use Other Nicotine Products.  At Discharge, Follow Package Instructions.  Dispose of nicotine replacement therapies and their wrappers in  "non-hazardous pharmaceutical waste or in regular trash.      (0436)-Not Given          0103-Medication Applied          0006-Medication Removed     0008-Medication Applied           nicotine polacrilex (NICORETTE) gum 2 mg  Dose: 2 mg  Freq: Every 1 Hour PRN Route: MT  PRN Reason: Smoking Cessation  Start: 06/22/24 0003     Admin Instructions:   Maximum 24 pieces in 24 hours.    Gum should be chewed until it tingles, then \"park\" between the gum and cheek.   When the tingling is gone, chew again until the tingle returns and once again \"park\" between gum and cheek.   Repeat until tingling is gone and discard.  At discharge, follow instructions on package  Dispose of nicotine replacement therapies and their wrappers in non-hazardous pharmaceutical waste or in regular trash.           NIFEdipine XL (PROCARDIA XL) 24 hr tablet 60 mg  Dose: 60 mg  Freq: Every 24 Hours Scheduled Route: PO  Start: 06/24/24 0900     Admin Instructions:   Hold for SBP less than 100, DBP less than 60.  Caution: Look alike/sound alike drug alert. Avoid grapefruit juice. Swallow whole. Do not crush, split or chew.        1013-Given            nitroglycerin (NITROSTAT) SL tablet 0.4 mg  Dose: 0.4 mg  Freq: Every 5 Minutes PRN Route: SL  PRN Reason: Chest Pain  PRN Comment: Only if SBP Greater Than 100  Start: 06/22/24 0002     Admin Instructions:   If Pain Unrelieved After 3 Doses Notify MD  May administer up to 3 doses per episode.            ondansetron ODT (ZOFRAN-ODT) disintegrating tablet 4 mg  Dose: 4 mg  Freq: Every 6 Hours PRN Route: PO  PRN Reasons: Nausea,Vomiting  Start: 06/22/24 0003     Admin Instructions:   If BOTH ondansetron (ZOFRAN) and promethazine (PHENERGAN) are ordered use ondansetron first and THEN promethazine IF ondansetron is ineffective.  Place on tongue and allow to dissolve.           Or   ondansetron (ZOFRAN) injection 4 mg  Dose: 4 mg  Freq: Every 6 Hours PRN Route: IV  PRN Reasons: Nausea,Vomiting  Start: 06/22/24 " "0003     Admin Instructions:   If BOTH ondansetron (ZOFRAN) and promethazine (PHENERGAN) are ordered use ondansetron first and THEN promethazine IF ondansetron is ineffective.           pantoprazole (PROTONIX) EC tablet 40 mg  Dose: 40 mg  Freq: Every Morning Route: PO  Start: 06/22/24 0700     Admin Instructions:   Do not crush or chew the capsules or tablets. The drug may not work as designed if the capsule or tablet is crushed or chewed. Swallow whole.  Swallow whole; do not crush, split, or chew.      1006-Given          0703-Given          0612-Given            Potassium Replacement - Follow Nurse / BPA Driven Protocol  Freq: As Needed Route: XX  PRN Reason: Other  Start: 06/22/24 1732     Admin Instructions:   Open Order & Select \"[facility] Electrolyte Replacement Protocol Algorithm\" to View Details           sodium chloride 0.9 % flush 10 mL  Dose: 10 mL  Freq: As Needed Route: IV  PRN Reason: Line Care  Start: 06/22/24 0001           sodium chloride 0.9 % flush 10 mL  Dose: 10 mL  Freq: Every 12 Hours Scheduled Route: IV  Start: 06/22/24 0019      0436-Self Administered Via Pump [C]     1007-Given     2038-Given        0849-Given     2056-Given         1008-Given     2100           sodium chloride 0.9 % flush 10 mL  Dose: 10 mL  Freq: As Needed Route: IV  PRN Reason: Line Care  Start: 06/21/24 2104           sodium chloride 0.9 % infusion  Rate: 100 mL/hr Dose: 100 mL/hr  Freq: Continuous Route: IV  Start: 06/22/24 0019      0425-New Bag     1446-New Bag         0102-New Bag     0855-New Bag         1013-New Bag            sodium chloride 0.9 % infusion 40 mL  Dose: 40 mL  Freq: As Needed Route: IV  PRN Reason: Line Care  Start: 06/22/24 0001     Admin Instructions:   Following administration of an IV intermittent medication, flush line with 40mL NS at 100mL/hr.           valsartan (DIOVAN) tablet 80 mg  Dose: 80 mg  Freq: Every 24 Hours Scheduled Route: PO  Start: 06/24/24 0900     Admin Instructions:   Hold for " SBP less than 100, DBP less than 60, or heart rate less than 50. If a dose is held, please contact the provider.        1014-Given            Completed Medications  Medications 24      iopamidol (ISOVUE-370) 76 % injection 150 mL  Dose: 150 mL  Freq: Once in Imaging Route: IV  Start: 24 End: 24           sodium chloride 0.9 % bolus 1,000 mL  Dose: 1,000 mL  Freq: Once Route: IV  Start: 24 End: 24           Discontinued Medications  Medications 24      empagliflozin (JARDIANCE) tablet 10 mg  Dose: 10 mg  Freq: Daily Route: PO  Start: 24 0900 End: 24 0303           enalaprilat (VASOTEC) injection 0.625 mg  Dose: 0.625 mg  Freq: Every 6 Hours PRN Route: IV  PRN Reason: High Blood Pressure  PRN Comment: For systolic blood pressure greater than 220 or diastolic blood pressure greater than 120  Start: 24 End: 24 0811     Admin Instructions:   Administer over 5 minutes.   Give slow IV push over 5 minutes.       2155-Given             icosapent ethyl (VASCEPA) capsule 2 g  Dose: 2 g  Freq: 2 Times Daily With Meals Route: PO  Indications of Use: CEREBROVASCULAR ACCIDENT,HYPERTRIGLYCERIDEMIA,MYOCARDIAL REVASCULARIZATION PROCEDURE  Start: 24 0800 End: 24 1100     Admin Instructions:   Do not crush or chew the capsules or tablets. The drug may not work as designed if the capsule or tablet is crushed or chewed. Swallow whole.  Swallow capsule whole.  Do not break open, crush, dissolve, or chew capsule.      (2935)-Not Given                             Physician Progress Notes (last 72 hours)        Qamar Perez MD at 24 1316              Name: Bibiana Inman ADMIT: 2024   : 1980  PCP: Ibrahima Correa MD    MRN: [id number] LOS: 2 days   AGE/SEX: 44 y.o. female  ROOM: [facility]     Subjective     Examined at bedside.  No new complaints today.  MRI findings noted.  Objective    Objective   Vital Signs  Temp:  [98 °F (36.7 °C)-98.6 °F (37 °C)] 98 °F (36.7 °C)  Heart Rate:  [62-69] 62  Resp:  [16-18] 18  BP: (177-185)/(63-79) 177/79  SpO2:  [96 %-98 %] 96 %  on   ;   Device (Oxygen Therapy): room air  Body mass index is 24.79 kg/m².  Physical Exam  Constitutional:       General: She is not in acute distress.  HENT:      Head: Normocephalic and atraumatic.   Cardiovascular:      Rate and Rhythm: Normal rate and regular rhythm.   Abdominal:      General: Abdomen is flat.      Palpations: Abdomen is soft.   Musculoskeletal:      Right lower leg: No edema.      Left lower leg: No edema.   Skin:     General: Skin is warm.   Neurological:      General: No focal deficit present.      Mental Status: She is alert and oriented to person, place, and time.         Results Review     I reviewed the patient's new clinical results.  Results from last 7 days   Lab Units 06/23/24  0500 06/22/24  0323 06/21/24 2106 06/18/24  0605   WBC 10*3/mm3 7.02 11.58* 10.72 9.69   HEMOGLOBIN g/dL 11.4* 11.5* 12.0 12.2   PLATELETS 10*3/mm3 202 265 245 220     Results from last 7 days   Lab Units 06/23/24  0500 06/22/24  0323 06/21/24 2106 06/19/24  0506   SODIUM mmol/L 138 137 137 135*   POTASSIUM mmol/L 3.8 3.5 3.8 3.6   CHLORIDE mmol/L 108* 106 104 105   CO2 mmol/L 23.0 22.3 22.0 21.8*   BUN mg/dL 10 10 11 8   CREATININE mg/dL 0.98 1.23* 1.40* 1.12*   GLUCOSE mg/dL 168* 56* 197* 248*   Estimated Creatinine Clearance: 74.2 mL/min (by C-G formula based on SCr of 0.98 mg/dL).  Results from last 7 days   Lab Units 06/21/24  2106 06/18/24  0605 06/17/24  0928   ALBUMIN g/dL 2.9* 2.9* 3.4*   BILIRUBIN mg/dL <0.2 0.2 0.4   ALK PHOS U/L 90 96 134*   AST (SGOT) U/L 12 10 11   ALT (SGPT) U/L 9 <5 5     Results from last 7 days   Lab Units 06/23/24  0500 06/22/24  0323 06/21/24  2106 06/19/24  0506 06/18/24  0605 06/17/24  0928   CALCIUM mg/dL 8.2* 8.4* 8.5* 9.0 8.8 9.1   ALBUMIN g/dL  --   --  2.9*  --  2.9* 3.4*        COVID19   Date Value Ref Range Status   12/28/2021 Detected (C) Not Detected - Ref. Range Final   01/29/2021 Presumptive Negative Presumptive Negative - Ref. Range Final     Glucose   Date/Time Value Ref Range Status   06/23/2024 1123 159 (H) 70 - 130 mg/dL Final   06/23/2024 0638 176 (H) 70 - 130 mg/dL Final   06/23/2024 0031 195 (H) 70 - 130 mg/dL Final   06/22/2024 1636 176 (H) 70 - 130 mg/dL Final   06/22/2024 1134 112 70 - 130 mg/dL Final   06/22/2024 1011 135 (H) 70 - 130 mg/dL Final     No results found for this or any previous visit.      MRI Brain Without Contrast  Narrative: MRI OF THE BRAIN WITHOUT CONTRAST     CLINICAL HISTORY: right side weakness; R53.1-Weakness; E11.65-Type 2  diabetes mellitus with hyperglycemia; Z79.4-Long term (current) use of  insulin; Z72.0-Tobacco use; I65.21-Occlusion and stenosis of right  carotid artery; I25.110-Atherosclerotic heart disease of native coronary  artery with unstable angina pectoris     TECHNIQUE: MRI of the brain was obtained with sagittal T1, axial T1,  axial FLAIR, axial T2, axial diffusion, and susceptibility weighted  images.     COMPARISON: Brain MRI dated 06/18/2024 and 06/17/2024.  FINDINGS:     On the previous MRI of the brain dated 06/17/2024, there is an unusual  linear appearing focus of restricted diffusion within the left parietal  lobe consistent with an acute infarct which measured up to approximately  8 x 3 mm in greatest axial dimensions. On the subsequent MRI of the  brain dated 06/18/2024, there was a new focus of restricted diffusion  compatible with an area of acute infarction within the genu of the left  internal capsule. This abnormality measured up to approximately 8 mm in  diameter. On the current exam, this abnormality is larger in size  measuring up to approximately 15 mm in greatest dimensions.  Additionally, there is a new focus of restricted diffusion within the  left insular cortex compatible with an acute infarct which measures  up  to approximately 3 mm in maximal diameter.     Otherwise, the ventricles, sulci, and cisterns are age-appropriate.  There are moderates change of chronic small vessel ischemic phenomenon.  There are no abnormal foci of susceptibility artifact. The major  intracranial flow related signal voids are within normal limits.     Mild changes of inflammatory paranasal sinus disease are incidentally  noted with scattered areas of mucosal thickening appreciated most  prominently within the sphenoid sinus and the ethmoid air cells.     Impression:    When compared to the most recent examination dated 06/18/2024, there is  interval enlargement of the previously identified acute infarct centered  within the genu of the left internal capsule. Also, there is a new  punctate focus of acute infarction within the left insular cortex. The  previously identified acute infarction within the white matter of the  left parietal lobe is again appreciated. The location of the infarct  within the genu of the left internal capsule is most suggestive of an in  situ occlusive process (rather than an embolic event) which could be due  to lacunar disease or possibly a vasculitis. Please correlate with  clinical data.     Moderate changes of chronic small vessel ischemic phenomenon are  incidentally appreciated.     These findings and recommendations were discussed with Dr. Castillo on  06/22/2024 at approximately 3:25 p.m.              This report was finalized on 6/22/2024 4:12 PM by Dr. Terrence Thorne M.D  on Workstation: [id number]     CT Angiogram Head w AI Analysis of LVO, CT Angiogram Neck, CT CEREBRAL PERFUSION WITH & WITHOUT CONTRAST  Narrative: NONCONTRAST CRANIAL CT SCAN, CT ANGIOGRAM NECK, CT ANGIOGRAM HEAD,  CRANIAL CT PERFUSION.     HISTORY: Acute CVA,      COMPARISON: None..     TECHNIQUE:  Radiation dose reduction techniques were utilized, including  automated exposure control and exposure modulation based on body size.    Initially, a routine noncontrast cranial CT performed from the skull  base through the vertex region. After review, thin-section contrast  enhanced CT angiogram images obtained from the aortic arch through the  calvarial vertex utilizing angiographic technique. Multi projection 3-D  MIP reformatted images were supplemented and reviewed. Subsequently, CT  perfusion imaging performed with ischemia view software.     FINDINGS CRANIAL CT: No acute hemorrhage or midline shift is  demonstrated..  Ventricular size and configuration is within normal  limits for age.. There is periventricular and deep white matter  microangiopathic change. Recent infarcts noted within the left parietal  subcortical white matter is not well seen on this study. The patient  does have some decreased attenuation involving the posterior limb of the  left internal capsule which is more apparent than on prior imaging.  Postcontrast imaging does not any evidence of venous occlusion or  abnormal enhancement. Bone window images demonstrate some mucosal  thickening within the ethmoid and right maxillary sinuses.     Study was placed on line at 9:16 p.m. Preliminary interpretation  telephoned to extension [phone] during interpretation at 9:25 p.m.        CERVICAL CAROTID CT ANGIOGRAM:     FINDINGS: There is enlargement of the main pulmonary artery, which can  be seen in setting of pulmonary arterial hypertension. There are  coronary artery calcifications. There is normal arch anatomy. There is  mild narrowing of the right common carotid artery, measuring up to 20 to  30%. Plaque continues into the origin of the proximal right internal  carotid artery, with narrowing in the range of 40-50 %. There is some  further plaque noted within the proximal right internal carotid artery,  resulting in narrowing, in the range of about 30%. Distal cervical right  internal carotid artery is widely patent. There is atherosclerotic  plaque involving the distal left  common carotid artery, resulting in  narrowing in the range of 20%. Ulcerated plaque is noted at the left  carotid bifurcation, resulting in mild narrowing, in the range of 20 to  30%. Distal cervical left internal carotid artery is widely patent.  Vertebral arteries appear to be patent throughout their courses. There  is some calcified plaque noted involving the proximal vertebral arteries  bilaterally. It may result in some minimal narrowing bilaterally. Images  through the lung apices do not demonstrate any acute abnormalities.  Thyroid gland and trachea are normal.        NASCET criteria utilized in stenosis measurements. stenosis mild, 0-49%.        CRANIAL CTA ANGIOGRAM:     FINDINGS: The intracranial vertebral arteries are patent. Basilar artery  is patent. The posterior cerebral arteries are patent bilaterally. There  is calcification of the intracranial internal carotid arteries. They  remain patent. There is a small anterior communicating artery. Anterior  cerebral and middle cerebral arteries are patent bilaterally..             CT PERFUSION:     FINDINGS: No areas of cerebral blood flow less than 30% or Tmax greater  than 6 seconds are identified.     Study was placed on line at 9:34 p.m. Preliminary interpretation  telephoned to extension [phone] during interpretation at 9:50 p.m.     AI analysis of LVO was utilized.     Radiation dose reduction techniques were utilized, including automated  exposure control and exposure modulation based on body size.        Impression: 1. No acute intracranial hemorrhage.  2. Indeterminate area of decreased attenuation involving the posterior  limb of the left internal capsule. Consider further assessment with MRI.  3. No areas of cerebral blood flow less than 30% or Tmax greater than 6  seconds.  4. Approximately 40 to 50% stenosis of the origin of the right internal  carotid artery.  5. Intracranial vessels are patent.        This report was finalized on 6/22/2024 12:03  AM by Dr. Marian Thompson M.D on Workstation: [id number]       Scheduled Medications  aspirin, 325 mg, Oral, Daily   Or  aspirin, 300 mg, Rectal, Daily  atorvastatin, 80 mg, Oral, Nightly  clopidogrel, 75 mg, Oral, Daily  escitalopram, 10 mg, Oral, Daily  icosapent ethyl, 2 g, Oral, BID With Meals  insulin glargine, 20 Units, Subcutaneous, Daily  insulin lispro, 2-7 Units, Subcutaneous, 4x Daily AC & at Bedtime  nicotine, 1 patch, Transdermal, Q24H  pantoprazole, 40 mg, Oral, QAM  sodium chloride, 10 mL, Intravenous, Q12H    Infusions  sodium chloride, 100 mL/hr, Last Rate: 100 mL/hr (06/23/24 0855)    Diet  Diet: Diabetic; Consistent Carbohydrate; Fluid Consistency: Thin (IDDSI 0)      Assessment/Plan     Active Hospital Problems    Diagnosis  POA    **Acute right-sided weakness [R53.1]  Yes    CKD stage 3a, GFR 45-59 ml/min [N18.31]  Yes    Carotid stenosis [I65.29]  Yes    Lacunar cerebrovascular accident (CVA) [I63.81]  Yes    Elevated troponin [R79.89]  Yes    Tobacco abuse [Z72.0]  Yes    Coronary artery disease involving native coronary artery of native heart with unstable angina pectoris [I25.110]  Yes    Diabetic gastroparesis [E11.43, 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       44 y.o. female admitted with Acute right-sided weakness.    Acute right-sided weakness  Acute CVA  Carotid stenosis  MRI findings noted, concerning for interval enlargement of the previously identified acute infarct centered within the genu of the left internal capsule.  A new focus of acute infarction was noted within the left insular cortex previously identified acute  infarction within the white matter of the left parietal lobe was also noted.  TTE pending  Neurology following  ASA, plavix, statin    T2DM c/b gastroparesis   On jardiance and glargine as outpatient. Resume glargine at attenduated dose    CAD s/p CABG  Troponin elevation- chronic  Hypertension    CKD 3  SCR imrpoving with IVF    GERD  -PPI      SCDs for DVT prophylaxis.  Full code.  Discussed with patient and family.  Anticipate discharge after echocardiogram is completed.       Qamar Perez MD  [facility]  24  13:17 EDT          Electronically signed by Qamar Perez MD at 24 1317       Qamar Perez MD at 24 1919              Name: Bibiana Inman ADMIT: 2024   : 1980  PCP: Ibrahima Correa MD    MRN: [id number] LOS: 1 days   AGE/SEX: 44 y.o. female  ROOM: [facility]     Subjective     Right arm weakness has completely resolved  Objective   Objective   Vital Signs  Temp:  [97.7 °F (36.5 °C)-98.2 °F (36.8 °C)] 98.2 °F (36.8 °C)  Heart Rate:  [54-70] 63  Resp:  [14-16] 16  BP: (105-169)/(54-73) 169/73  SpO2:  [95 %-100 %] 97 %  on   ;   Device (Oxygen Therapy): room air  Body mass index is 24.79 kg/m².  Physical Exam  Constitutional:       General: She is not in acute distress.  HENT:      Head: Normocephalic and atraumatic.   Cardiovascular:      Rate and Rhythm: Normal rate and regular rhythm.   Abdominal:      General: Abdomen is flat.      Palpations: Abdomen is soft.   Skin:     General: Skin is warm.   Neurological:      General: No focal deficit present.      Mental Status: She is alert and oriented to person, place, and time.         Results Review     I reviewed the patient's new clinical results.  Results from last 7 days   Lab Units 24  0323 24  2106 24  0605 24  0928   WBC 10*3/mm3 11.58* 10.72 9.69 14.62*   HEMOGLOBIN g/dL 11.5* 12.0 12.2 13.7   PLATELETS 10*3/mm3 265 245 220 281     Results from last 7 days   Lab Units  06/22/24  0323 06/21/24 2106 06/19/24  0506 06/18/24  0605   SODIUM mmol/L 137 137 135* 134*   POTASSIUM mmol/L 3.5 3.8 3.6 3.4*   CHLORIDE mmol/L 106 104 105 103   CO2 mmol/L 22.3 22.0 21.8* 23.1   BUN mg/dL 10 11 8 10   CREATININE mg/dL 1.23* 1.40* 1.12* 1.25*   GLUCOSE mg/dL 56* 197* 248* 143*   Estimated Creatinine Clearance: 59.1 mL/min (A) (by C-G formula based on SCr of 1.23 mg/dL (H)).  Results from last 7 days   Lab Units 06/21/24 2106 06/18/24  0605 06/17/24  0928   ALBUMIN g/dL 2.9* 2.9* 3.4*   BILIRUBIN mg/dL <0.2 0.2 0.4   ALK PHOS U/L 90 96 134*   AST (SGOT) U/L 12 10 11   ALT (SGPT) U/L 9 <5 5     Results from last 7 days   Lab Units 06/22/24  0323 06/21/24  2106 06/19/24  0506 06/18/24  0605 06/17/24  0928   CALCIUM mg/dL 8.4* 8.5* 9.0 8.8 9.1   ALBUMIN g/dL  --  2.9*  --  2.9* 3.4*       COVID19   Date Value Ref Range Status   12/28/2021 Detected (C) Not Detected - Ref. Range Final   01/29/2021 Presumptive Negative Presumptive Negative - Ref. Range Final     Glucose   Date/Time Value Ref Range Status   06/22/2024 1636 176 (H) 70 - 130 mg/dL Final   06/22/2024 1134 112 70 - 130 mg/dL Final   06/22/2024 1011 135 (H) 70 - 130 mg/dL Final   06/20/2024 0718 150 (H) 70 - 105 mg/dL Final     Comment:     Serial Number: [id number]:  [id number]   06/19/2024 2017 165 (H) 70 - 105 mg/dL Final     Comment:     Serial Number: [id number]:  [id number]     No results found for this or any previous visit.      MRI Brain Without Contrast  Narrative: MRI OF THE BRAIN WITHOUT CONTRAST     CLINICAL HISTORY: right side weakness; R53.1-Weakness; E11.65-Type 2  diabetes mellitus with hyperglycemia; Z79.4-Long term (current) use of  insulin; Z72.0-Tobacco use; I65.21-Occlusion and stenosis of right  carotid artery; I25.110-Atherosclerotic heart disease of native coronary  artery with unstable angina pectoris     TECHNIQUE: MRI of the brain was obtained with sagittal T1, axial T1,  axial FLAIR, axial T2, axial  diffusion, and susceptibility weighted  images.     COMPARISON: Brain MRI dated 06/18/2024 and 06/17/2024.  FINDINGS:     On the previous MRI of the brain dated 06/17/2024, there is an unusual  linear appearing focus of restricted diffusion within the left parietal  lobe consistent with an acute infarct which measured up to approximately  8 x 3 mm in greatest axial dimensions. On the subsequent MRI of the  brain dated 06/18/2024, there was a new focus of restricted diffusion  compatible with an area of acute infarction within the genu of the left  internal capsule. This abnormality measured up to approximately 8 mm in  diameter. On the current exam, this abnormality is larger in size  measuring up to approximately 15 mm in greatest dimensions.  Additionally, there is a new focus of restricted diffusion within the  left insular cortex compatible with an acute infarct which measures up  to approximately 3 mm in maximal diameter.     Otherwise, the ventricles, sulci, and cisterns are age-appropriate.  There are moderates change of chronic small vessel ischemic phenomenon.  There are no abnormal foci of susceptibility artifact. The major  intracranial flow related signal voids are within normal limits.     Mild changes of inflammatory paranasal sinus disease are incidentally  noted with scattered areas of mucosal thickening appreciated most  prominently within the sphenoid sinus and the ethmoid air cells.     Impression:    When compared to the most recent examination dated 06/18/2024, there is  interval enlargement of the previously identified acute infarct centered  within the genu of the left internal capsule. Also, there is a new  punctate focus of acute infarction within the left insular cortex. The  previously identified acute infarction within the white matter of the  left parietal lobe is again appreciated. The location of the infarct  within the genu of the left internal capsule is most suggestive of an  in  situ occlusive process (rather than an embolic event) which could be due  to lacunar disease or possibly a vasculitis. Please correlate with  clinical data.     Moderate changes of chronic small vessel ischemic phenomenon are  incidentally appreciated.     These findings and recommendations were discussed with Dr. Castillo on  06/22/2024 at approximately 3:25 p.m.              This report was finalized on 6/22/2024 4:12 PM by Dr. Terrence Thorne M.D  on Workstation: [id number]     CT Angiogram Head w AI Analysis of LVO, CT Angiogram Neck, CT CEREBRAL PERFUSION WITH & WITHOUT CONTRAST  Narrative: NONCONTRAST CRANIAL CT SCAN, CT ANGIOGRAM NECK, CT ANGIOGRAM HEAD,  CRANIAL CT PERFUSION.     HISTORY: Acute CVA,      COMPARISON: None..     TECHNIQUE:  Radiation dose reduction techniques were utilized, including  automated exposure control and exposure modulation based on body size.   Initially, a routine noncontrast cranial CT performed from the skull  base through the vertex region. After review, thin-section contrast  enhanced CT angiogram images obtained from the aortic arch through the  calvarial vertex utilizing angiographic technique. Multi projection 3-D  MIP reformatted images were supplemented and reviewed. Subsequently, CT  perfusion imaging performed with ischemia view software.     FINDINGS CRANIAL CT: No acute hemorrhage or midline shift is  demonstrated..  Ventricular size and configuration is within normal  limits for age.. There is periventricular and deep white matter  microangiopathic change. Recent infarcts noted within the left parietal  subcortical white matter is not well seen on this study. The patient  does have some decreased attenuation involving the posterior limb of the  left internal capsule which is more apparent than on prior imaging.  Postcontrast imaging does not any evidence of venous occlusion or  abnormal enhancement. Bone window images demonstrate some mucosal  thickening within  the ethmoid and right maxillary sinuses.     Study was placed on line at 9:16 p.m. Preliminary interpretation  telephoned to extension [phone] during interpretation at 9:25 p.m.        CERVICAL CAROTID CT ANGIOGRAM:     FINDINGS: There is enlargement of the main pulmonary artery, which can  be seen in setting of pulmonary arterial hypertension. There are  coronary artery calcifications. There is normal arch anatomy. There is  mild narrowing of the right common carotid artery, measuring up to 20 to  30%. Plaque continues into the origin of the proximal right internal  carotid artery, with narrowing in the range of 40-50 %. There is some  further plaque noted within the proximal right internal carotid artery,  resulting in narrowing, in the range of about 30%. Distal cervical right  internal carotid artery is widely patent. There is atherosclerotic  plaque involving the distal left common carotid artery, resulting in  narrowing in the range of 20%. Ulcerated plaque is noted at the left  carotid bifurcation, resulting in mild narrowing, in the range of 20 to  30%. Distal cervical left internal carotid artery is widely patent.  Vertebral arteries appear to be patent throughout their courses. There  is some calcified plaque noted involving the proximal vertebral arteries  bilaterally. It may result in some minimal narrowing bilaterally. Images  through the lung apices do not demonstrate any acute abnormalities.  Thyroid gland and trachea are normal.        NASCET criteria utilized in stenosis measurements. stenosis mild, 0-49%.        CRANIAL CTA ANGIOGRAM:     FINDINGS: The intracranial vertebral arteries are patent. Basilar artery  is patent. The posterior cerebral arteries are patent bilaterally. There  is calcification of the intracranial internal carotid arteries. They  remain patent. There is a small anterior communicating artery. Anterior  cerebral and middle cerebral arteries are patent bilaterally..             CT  PERFUSION:     FINDINGS: No areas of cerebral blood flow less than 30% or Tmax greater  than 6 seconds are identified.     Study was placed on line at 9:34 p.m. Preliminary interpretation  telephoned to extension [id number] during interpretation at 9:50 p.m.     AI analysis of LVO was utilized.     Radiation dose reduction techniques were utilized, including automated  exposure control and exposure modulation based on body size.        Impression: 1. No acute intracranial hemorrhage.  2. Indeterminate area of decreased attenuation involving the posterior  limb of the left internal capsule. Consider further assessment with MRI.  3. No areas of cerebral blood flow less than 30% or Tmax greater than 6  seconds.  4. Approximately 40 to 50% stenosis of the origin of the right internal  carotid artery.  5. Intracranial vessels are patent.        This report was finalized on 6/22/2024 12:03 AM by Dr. Marian Thompson M.D on Workstation: [id number]       Scheduled Medications  aspirin, 325 mg, Oral, Daily   Or  aspirin, 300 mg, Rectal, Daily  atorvastatin, 80 mg, Oral, Nightly  clopidogrel, 75 mg, Oral, Daily  escitalopram, 10 mg, Oral, Daily  icosapent ethyl, 2 g, Oral, BID With Meals  insulin glargine, 20 Units, Subcutaneous, Daily  insulin lispro, 2-7 Units, Subcutaneous, 4x Daily AC & at Bedtime  nicotine, 1 patch, Transdermal, Q24H  pantoprazole, 40 mg, Oral, QAM  sodium chloride, 10 mL, Intravenous, Q12H    Infusions  sodium chloride, 100 mL/hr, Last Rate: 100 mL/hr (06/22/24 1446)    Diet  Diet: Diabetic; Consistent Carbohydrate; Fluid Consistency: Thin (IDDSI 0)      Assessment/Plan     Active Hospital Problems    Diagnosis  POA    **Acute right-sided weakness [R53.1]  Yes    CKD stage 3a, GFR 45-59 ml/min [N18.31]  Yes    Carotid stenosis [I65.29]  Yes    Lacunar cerebrovascular accident (CVA) [I63.81]  Yes    Elevated troponin [R79.89]  Yes    Tobacco abuse [Z72.0]  Yes    Coronary artery disease involving native  coronary artery of native heart with unstable angina pectoris [I25.110]  Yes    Diabetic gastroparesis [E11.43, 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       44 y.o. female admitted with Acute right-sided weakness.    Acute right-sided weakness  Acute CVA  Carotid stenosis  MRI findings noted, concerning for interval enlargement of the previously identified acute infarct centered within the genu of the left internal capsule.  A new focus of acute infarction was noted within the left insular cortex previously identified acute infarction within the white matter of the left parietal lobe was also noted.  TTE pending  Neurology following  ASA, plavix, statin    T2DM c/b gastroparesis   On jardiance and glargine as outpatient. Resume glargine at attenduated dose    CAD s/p CABG  Troponin elevation- chronic  Hypertension    CKD 3  SCR imrpoving with IVF    GERD  -PPI      SCDs for DVT prophylaxis.  Full code.  Discussed with patient and family.  Anticipate discharge home in 1-2 days.      Qamar Perez MD  [facility]  06/22/24  19:19 EDT          Electronically signed by Qamar Perez MD at 06/22/24 7478

## 2024-06-24 NOTE — CONSULTS
Diabetes Education    Patient Name:  Bibiana Inman  YOB: 1980  MRN: [id number]  Admit Date:  6/21/2024    Consult received.      Patient seen by Diabetes educator at [facility] prior to transfer.  Please see notes 6-18-24.      Please re-consult for any new educational needs.      Electronically signed by:  Anyi Cook RN, [facility]  06/24/24 07:56 EDT

## 2024-06-24 NOTE — PLAN OF CARE
Problem: Adult Inpatient Plan of Care  Goal: Plan of Care Review  Outcome: Ongoing, Progressing  Flowsheets (Taken 6/24/2024 0428)  Progress: no change  Plan of Care Reviewed With: patient  Goal: Absence of Hospital-Acquired Illness or Injury  Outcome: Ongoing, Progressing  Intervention: Identify and Manage Fall Risk  Recent Flowsheet Documentation  Taken 6/24/2024 0427 by Magalie Denny, RN  Safety Promotion/Fall Prevention:   assistive device/personal items within reach   clutter free environment maintained   fall prevention program maintained   lighting adjusted   nonskid shoes/slippers when out of bed   room organization consistent   safety round/check completed  Taken 6/24/2024 0212 by Magalie Denny, RN  Safety Promotion/Fall Prevention:   assistive device/personal items within reach   clutter free environment maintained   fall prevention program maintained   lighting adjusted   nonskid shoes/slippers when out of bed   room organization consistent   safety round/check completed  Taken 6/24/2024 0002 by Magalie Denny, RN  Safety Promotion/Fall Prevention:   assistive device/personal items within reach   clutter free environment maintained   fall prevention program maintained   lighting adjusted   nonskid shoes/slippers when out of bed   safety round/check completed   room organization consistent  Taken 6/23/2024 2204 by Magalie Denny, RN  Safety Promotion/Fall Prevention:   assistive device/personal items within reach   clutter free environment maintained   fall prevention program maintained   lighting adjusted   nonskid shoes/slippers when out of bed   room organization consistent   safety round/check completed  Taken 6/23/2024 2000 by Magalie Denny, RN  Safety Promotion/Fall Prevention:   activity supervised   assistive device/personal items within reach   clutter free environment maintained   fall prevention program maintained   lighting adjusted   nonskid shoes/slippers when out of bed   room organization  consistent   safety round/check completed  Intervention: Prevent Skin Injury  Recent Flowsheet Documentation  Taken 6/24/2024 0427 by Magalie Denny RN  Body Position: position changed independently  Taken 6/24/2024 0212 by Magalie Denny RN  Body Position: position changed independently  Taken 6/24/2024 0002 by Magalie Denny RN  Body Position: position changed independently  Taken 6/23/2024 2204 by Magalie Denny RN  Body Position: position changed independently  Taken 6/23/2024 2000 by Magalie Denny RN  Body Position: position changed independently  Intervention: Prevent and Manage VTE (Venous Thromboembolism) Risk  Recent Flowsheet Documentation  Taken 6/24/2024 0427 by Magalie Denny RN  Activity Management: (asleep) other (see comments)  Taken 6/24/2024 0212 by Magalie Denny RN  Activity Management: (asleep) other (see comments)  Taken 6/24/2024 0002 by Magalie Denny RN  Activity Management: (asleep) other (see comments)  Taken 6/23/2024 2204 by Magalie Denny RN  Activity Management: activity minimized  Taken 6/23/2024 2000 by Magalie Denny RN  Activity Management: activity encouraged  Taken 6/23/2024 1945 by Magalie Denny RN  VTE Prevention/Management: patient refused intervention  Intervention: Prevent Infection  Recent Flowsheet Documentation  Taken 6/23/2024 2000 by Magalie Denny RN  Infection Prevention:   environmental surveillance performed   hand hygiene promoted   personal protective equipment utilized   rest/sleep promoted   single patient room provided  Goal: Optimal Comfort and Wellbeing  Outcome: Ongoing, Progressing  Intervention: Provide Person-Centered Care  Recent Flowsheet Documentation  Taken 6/23/2024 1945 by Magalie Denny RN  Trust Relationship/Rapport:   care explained   questions answered   questions encouraged  Goal: Readiness for Transition of Care  Outcome: Ongoing, Progressing     Problem: Fall Injury Risk  Goal: Absence of Fall and Fall-Related Injury  Outcome: Ongoing,  Progressing  Intervention: Identify and Manage Contributors  Recent Flowsheet Documentation  Taken 6/23/2024 2000 by Magalie Denny, RN  Medication Review/Management: medications reviewed  Intervention: Promote Injury-Free Environment  Recent Flowsheet Documentation  Taken 6/24/2024 0427 by Magalie Denny, RN  Safety Promotion/Fall Prevention:   assistive device/personal items within reach   clutter free environment maintained   fall prevention program maintained   lighting adjusted   nonskid shoes/slippers when out of bed   room organization consistent   safety round/check completed  Taken 6/24/2024 0212 by Magalie Denny, RN  Safety Promotion/Fall Prevention:   assistive device/personal items within reach   clutter free environment maintained   fall prevention program maintained   lighting adjusted   nonskid shoes/slippers when out of bed   room organization consistent   safety round/check completed  Taken 6/24/2024 0002 by Magalie Denny, ALEENA  Safety Promotion/Fall Prevention:   assistive device/personal items within reach   clutter free environment maintained   fall prevention program maintained   lighting adjusted   nonskid shoes/slippers when out of bed   safety round/check completed   room organization consistent  Taken 6/23/2024 2204 by Magalie Denny, RN  Safety Promotion/Fall Prevention:   assistive device/personal items within reach   clutter free environment maintained   fall prevention program maintained   lighting adjusted   nonskid shoes/slippers when out of bed   room organization consistent   safety round/check completed  Taken 6/23/2024 2000 by Magalie Denny, RN  Safety Promotion/Fall Prevention:   activity supervised   assistive device/personal items within reach   clutter free environment maintained   fall prevention program maintained   lighting adjusted   nonskid shoes/slippers when out of bed   room organization consistent   safety round/check completed   Goal Outcome Evaluation:  Plan of Care  Reviewed With: patient        Progress: no change

## 2024-06-25 ENCOUNTER — ANESTHESIA (OUTPATIENT)
Dept: POSTOP/PACU | Facility: HOSPITAL | Age: 44
End: 2024-06-25
Payer: COMMERCIAL

## 2024-06-25 ENCOUNTER — APPOINTMENT (OUTPATIENT)
Dept: CARDIOLOGY | Facility: HOSPITAL | Age: 44
DRG: 981 | End: 2024-06-25
Payer: COMMERCIAL

## 2024-06-25 ENCOUNTER — ANESTHESIA EVENT (OUTPATIENT)
Dept: POSTOP/PACU | Facility: HOSPITAL | Age: 44
End: 2024-06-25
Payer: COMMERCIAL

## 2024-06-25 ENCOUNTER — APPOINTMENT (OUTPATIENT)
Dept: POSTOP/PACU | Facility: HOSPITAL | Age: 44
DRG: 981 | End: 2024-06-25
Payer: COMMERCIAL

## 2024-06-25 VITALS — DIASTOLIC BLOOD PRESSURE: 82 MMHG | OXYGEN SATURATION: 100 % | HEART RATE: 54 BPM | SYSTOLIC BLOOD PRESSURE: 116 MMHG

## 2024-06-25 PROBLEM — I35.9 AORTIC VALVE DISEASE: Status: ACTIVE | Noted: 2024-06-21

## 2024-06-25 LAB
ANION GAP SERPL CALCULATED.3IONS-SCNC: 7.3 MMOL/L (ref 5–15)
ARTERIAL PATENCY WRIST A: POSITIVE
ATMOSPHERIC PRESS: 747.9 MMHG
BASE EXCESS BLDA CALC-SCNC: 1.7 MMOL/L (ref 0–2)
BASOPHILS # BLD AUTO: 0.05 10*3/MM3 (ref 0–0.2)
BASOPHILS NFR BLD AUTO: 0.6 % (ref 0–1.5)
BDY SITE: ABNORMAL
BH CV ECHO MEAS - AO MAX PG: 11.3 MMHG
BH CV ECHO MEAS - AO MEAN PG: 5.7 MMHG
BH CV ECHO MEAS - AO V2 MAX: 167.8 CM/SEC
BH CV ECHO MEAS - AO V2 VTI: 37.6 CM
BH CV ECHO MEAS - AVA(I,D): 2.13 CM2
BH CV ECHO MEAS - LV MAX PG: 4.8 MMHG
BH CV ECHO MEAS - LV MEAN PG: 2.6 MMHG
BH CV ECHO MEAS - LV V1 MAX: 110.1 CM/SEC
BH CV ECHO MEAS - LV V1 VTI: 24.8 CM
BH CV ECHO MEAS - LVOT AREA: 3.2 CM2
BH CV ECHO MEAS - LVOT DIAM: 2.03 CM
BH CV ECHO MEAS - SV(LVOT): 80.3 ML
BH CV ECHO MEAS - SVI(LVOT): 47.3 ML/M2
BH CV ECHO SHUNT ASSESSMENT PERFORMED (HIDDEN SCRIPTING): 1
BH CV XLRA MEAS - DIST GSV CALF DIST LEFT: 0.2 CM
BH CV XLRA MEAS - DIST GSV CALF DIST RIGHT: 0.25 CM
BH CV XLRA MEAS - DIST GSV THIGH DIST RIGHT: 0.25 CM
BH CV XLRA MEAS - DIST LSV CALF DIST LEFT: 0.27 CM
BH CV XLRA MEAS - DIST LSV CALF DIST RIGHT: 0.29 CM
BH CV XLRA MEAS - GSV ANKLE DIST LEFT: 0.23 CM
BH CV XLRA MEAS - GSV ANKLE DIST RIGHT: 0.22 CM
BH CV XLRA MEAS - GSV KNEE DIST RIGHT: 0.2 CM
BH CV XLRA MEAS - GSV ORIGIN DIST LEFT: 0.62 CM
BH CV XLRA MEAS - GSV ORIGIN DIST RIGHT: 0.81 CM
BH CV XLRA MEAS - MID GSV CALF LEFT: 0.19 CM
BH CV XLRA MEAS - MID GSV CALF RIGHT: 0.17 CM
BH CV XLRA MEAS - MID GSV THIGH  RIGHT: 0.19 CM
BH CV XLRA MEAS - MID LSV CALF DIST LEFT: 0.39 CM
BH CV XLRA MEAS - MID LSV CALF DIST RIGHT: 0.18 CM
BH CV XLRA MEAS - PROX GSV CALF DIST LEFT: 0.14 CM
BH CV XLRA MEAS - PROX GSV CALF DIST RIGHT: 0.17 CM
BH CV XLRA MEAS - PROX GSV THIGH  RIGHT: 0.14 CM
BH CV XLRA MEAS - PROX LSV CALF DIST LEFT: 0.44 CM
BH CV XLRA MEAS - PROX LSV CALF DIST RIGHT: 0.2 CM
BUN SERPL-MCNC: 9 MG/DL (ref 6–20)
BUN/CREAT SERPL: 11 (ref 7–25)
CALCIUM SPEC-SCNC: 8.5 MG/DL (ref 8.6–10.5)
CHLORIDE SERPL-SCNC: 109 MMOL/L (ref 98–107)
CO2 BLDA-SCNC: 27.7 MMOL/L (ref 23–27)
CO2 SERPL-SCNC: 21.7 MMOL/L (ref 22–29)
CREAT BLDA-MCNC: 1.6 MG/DL (ref 0.6–1.3)
CREAT SERPL-MCNC: 0.82 MG/DL (ref 0.57–1)
DEPRECATED RDW RBC AUTO: 44.4 FL (ref 37–54)
DEVICE COMMENT: ABNORMAL
EGFRCR SERPLBLD CKD-EPI 2021: 89.5 ML/MIN/1.73
EOSINOPHIL # BLD AUTO: 0.18 10*3/MM3 (ref 0–0.4)
EOSINOPHIL NFR BLD AUTO: 2.3 % (ref 0.3–6.2)
ERYTHROCYTE [DISTWIDTH] IN BLOOD BY AUTOMATED COUNT: 13.6 % (ref 12.3–15.4)
GLUCOSE BLDC GLUCOMTR-MCNC: 113 MG/DL (ref 70–130)
GLUCOSE BLDC GLUCOMTR-MCNC: 125 MG/DL (ref 70–130)
GLUCOSE BLDC GLUCOMTR-MCNC: 129 MG/DL (ref 70–130)
GLUCOSE BLDC GLUCOMTR-MCNC: 169 MG/DL (ref 70–130)
GLUCOSE BLDC GLUCOMTR-MCNC: 244 MG/DL (ref 70–130)
GLUCOSE SERPL-MCNC: 126 MG/DL (ref 65–99)
HCO3 BLDA-SCNC: 26.4 MMOL/L (ref 22–28)
HCT VFR BLD AUTO: 33.9 % (ref 34–46.6)
HEMODILUTION: NO
HGB BLD-MCNC: 11.1 G/DL (ref 12–15.9)
IMM GRANULOCYTES # BLD AUTO: 0.02 10*3/MM3 (ref 0–0.05)
IMM GRANULOCYTES NFR BLD AUTO: 0.3 % (ref 0–0.5)
LYMPHOCYTES # BLD AUTO: 2.46 10*3/MM3 (ref 0.7–3.1)
LYMPHOCYTES NFR BLD AUTO: 31.2 % (ref 19.6–45.3)
MCH RBC QN AUTO: 29.7 PG (ref 26.6–33)
MCHC RBC AUTO-ENTMCNC: 32.7 G/DL (ref 31.5–35.7)
MCV RBC AUTO: 90.6 FL (ref 79–97)
MODALITY: ABNORMAL
MONOCYTES # BLD AUTO: 0.47 10*3/MM3 (ref 0.1–0.9)
MONOCYTES NFR BLD AUTO: 6 % (ref 5–12)
NEUTROPHILS NFR BLD AUTO: 4.7 10*3/MM3 (ref 1.7–7)
NEUTROPHILS NFR BLD AUTO: 59.6 % (ref 42.7–76)
NRBC BLD AUTO-RTO: 0 /100 WBC (ref 0–0.2)
PCO2 BLDA: 40.8 MM HG (ref 35–45)
PH BLDA: 7.42 PH UNITS (ref 7.35–7.45)
PLATELET # BLD AUTO: 197 10*3/MM3 (ref 140–450)
PMV BLD AUTO: 11.1 FL (ref 6–12)
PO2 BLDA: 82.8 MM HG (ref 80–100)
POTASSIUM SERPL-SCNC: 3.6 MMOL/L (ref 3.5–5.2)
POTASSIUM SERPL-SCNC: 4.9 MMOL/L (ref 3.5–5.2)
RBC # BLD AUTO: 3.74 10*6/MM3 (ref 3.77–5.28)
SAO2 % BLDCOA: 96.3 % (ref 92–98.5)
SODIUM SERPL-SCNC: 138 MMOL/L (ref 136–145)
TOTAL RATE: 18 BREATHS/MINUTE
WBC NRBC COR # BLD AUTO: 7.88 10*3/MM3 (ref 3.4–10.8)

## 2024-06-25 PROCEDURE — 82803 BLOOD GASES ANY COMBINATION: CPT

## 2024-06-25 PROCEDURE — 84132 ASSAY OF SERUM POTASSIUM: CPT | Performed by: INTERNAL MEDICINE

## 2024-06-25 PROCEDURE — 85025 COMPLETE CBC W/AUTO DIFF WBC: CPT | Performed by: STUDENT IN AN ORGANIZED HEALTH CARE EDUCATION/TRAINING PROGRAM

## 2024-06-25 PROCEDURE — 63710000001 INSULIN GLARGINE PER 5 UNITS: Performed by: NURSE PRACTITIONER

## 2024-06-25 PROCEDURE — 25010000002 PROPOFOL 10 MG/ML EMULSION: Performed by: NURSE ANESTHETIST, CERTIFIED REGISTERED

## 2024-06-25 PROCEDURE — 93312 ECHO TRANSESOPHAGEAL: CPT | Performed by: INTERNAL MEDICINE

## 2024-06-25 PROCEDURE — 93970 EXTREMITY STUDY: CPT | Performed by: SURGERY

## 2024-06-25 PROCEDURE — 63710000001 INSULIN LISPRO (HUMAN) PER 5 UNITS: Performed by: NURSE PRACTITIONER

## 2024-06-25 PROCEDURE — 93325 DOPPLER ECHO COLOR FLOW MAPG: CPT

## 2024-06-25 PROCEDURE — 80048 BASIC METABOLIC PNL TOTAL CA: CPT | Performed by: STUDENT IN AN ORGANIZED HEALTH CARE EDUCATION/TRAINING PROGRAM

## 2024-06-25 PROCEDURE — 93970 EXTREMITY STUDY: CPT

## 2024-06-25 PROCEDURE — 93321 DOPPLER ECHO F-UP/LMTD STD: CPT | Performed by: INTERNAL MEDICINE

## 2024-06-25 PROCEDURE — 82948 REAGENT STRIP/BLOOD GLUCOSE: CPT

## 2024-06-25 PROCEDURE — 93321 DOPPLER ECHO F-UP/LMTD STD: CPT

## 2024-06-25 PROCEDURE — 93312 ECHO TRANSESOPHAGEAL: CPT

## 2024-06-25 PROCEDURE — 36600 WITHDRAWAL OF ARTERIAL BLOOD: CPT

## 2024-06-25 PROCEDURE — 93325 DOPPLER ECHO COLOR FLOW MAPG: CPT | Performed by: INTERNAL MEDICINE

## 2024-06-25 PROCEDURE — 99232 SBSQ HOSP IP/OBS MODERATE 35: CPT | Performed by: INTERNAL MEDICINE

## 2024-06-25 RX ORDER — LIDOCAINE HYDROCHLORIDE 20 MG/ML
INJECTION, SOLUTION INFILTRATION; PERINEURAL AS NEEDED
Status: DISCONTINUED | OUTPATIENT
Start: 2024-06-25 | End: 2024-06-25 | Stop reason: SURG

## 2024-06-25 RX ORDER — NIFEDIPINE 60 MG/1
120 TABLET, EXTENDED RELEASE ORAL
Status: DISCONTINUED | OUTPATIENT
Start: 2024-06-26 | End: 2024-06-26

## 2024-06-25 RX ORDER — ALPRAZOLAM 0.25 MG/1
0.25 TABLET ORAL EVERY 8 HOURS PRN
Status: DISCONTINUED | OUTPATIENT
Start: 2024-06-25 | End: 2024-07-01

## 2024-06-25 RX ORDER — FAMOTIDINE 10 MG/ML
20 INJECTION, SOLUTION INTRAVENOUS ONCE
Status: DISCONTINUED | OUTPATIENT
Start: 2024-06-25 | End: 2024-06-25 | Stop reason: HOSPADM

## 2024-06-25 RX ORDER — VALSARTAN 160 MG/1
160 TABLET ORAL ONCE
Qty: 1 TABLET | Refills: 0 | Status: COMPLETED | OUTPATIENT
Start: 2024-06-25 | End: 2024-06-25

## 2024-06-25 RX ORDER — SODIUM CHLORIDE 0.9 % (FLUSH) 0.9 %
3-10 SYRINGE (ML) INJECTION AS NEEDED
Status: DISCONTINUED | OUTPATIENT
Start: 2024-06-25 | End: 2024-06-25 | Stop reason: HOSPADM

## 2024-06-25 RX ORDER — CHLORHEXIDINE GLUCONATE ORAL RINSE 1.2 MG/ML
15 SOLUTION DENTAL EVERY 12 HOURS SCHEDULED
Status: COMPLETED | OUTPATIENT
Start: 2024-06-30 | End: 2024-07-01

## 2024-06-25 RX ORDER — NALOXONE HCL 0.4 MG/ML
0.2 VIAL (ML) INJECTION AS NEEDED
Status: DISCONTINUED | OUTPATIENT
Start: 2024-06-25 | End: 2024-06-25 | Stop reason: HOSPADM

## 2024-06-25 RX ORDER — DROPERIDOL 2.5 MG/ML
0.62 INJECTION, SOLUTION INTRAMUSCULAR; INTRAVENOUS
Status: DISCONTINUED | OUTPATIENT
Start: 2024-06-25 | End: 2024-06-25 | Stop reason: HOSPADM

## 2024-06-25 RX ORDER — SODIUM CHLORIDE 0.9 % (FLUSH) 0.9 %
3 SYRINGE (ML) INJECTION EVERY 12 HOURS SCHEDULED
Status: DISCONTINUED | OUTPATIENT
Start: 2024-06-25 | End: 2024-06-25 | Stop reason: HOSPADM

## 2024-06-25 RX ORDER — LIDOCAINE HYDROCHLORIDE 10 MG/ML
0.5 INJECTION, SOLUTION INFILTRATION; PERINEURAL ONCE AS NEEDED
Status: DISCONTINUED | OUTPATIENT
Start: 2024-06-25 | End: 2024-06-25 | Stop reason: HOSPADM

## 2024-06-25 RX ORDER — MIDAZOLAM HYDROCHLORIDE 1 MG/ML
1 INJECTION INTRAMUSCULAR; INTRAVENOUS
Status: DISCONTINUED | OUTPATIENT
Start: 2024-06-25 | End: 2024-06-25 | Stop reason: HOSPADM

## 2024-06-25 RX ORDER — VALSARTAN 320 MG/1
320 TABLET ORAL
Status: DISCONTINUED | OUTPATIENT
Start: 2024-06-26 | End: 2024-06-30

## 2024-06-25 RX ORDER — CHLORHEXIDINE GLUCONATE 500 MG/1
1 CLOTH TOPICAL EVERY 12 HOURS
Status: COMPLETED | OUTPATIENT
Start: 2024-06-30 | End: 2024-07-01

## 2024-06-25 RX ORDER — NIFEDIPINE 30 MG/1
30 TABLET, EXTENDED RELEASE ORAL ONCE
Qty: 1 TABLET | Refills: 0 | Status: COMPLETED | OUTPATIENT
Start: 2024-06-25 | End: 2024-06-25

## 2024-06-25 RX ORDER — FLUMAZENIL 0.1 MG/ML
0.2 INJECTION INTRAVENOUS AS NEEDED
Status: DISCONTINUED | OUTPATIENT
Start: 2024-06-25 | End: 2024-06-25 | Stop reason: HOSPADM

## 2024-06-25 RX ORDER — PROPOFOL 10 MG/ML
VIAL (ML) INTRAVENOUS AS NEEDED
Status: DISCONTINUED | OUTPATIENT
Start: 2024-06-25 | End: 2024-06-25 | Stop reason: SURG

## 2024-06-25 RX ORDER — POTASSIUM CHLORIDE 750 MG/1
40 TABLET, FILM COATED, EXTENDED RELEASE ORAL EVERY 4 HOURS
Status: COMPLETED | OUTPATIENT
Start: 2024-06-25 | End: 2024-06-25

## 2024-06-25 RX ORDER — FENTANYL CITRATE 50 UG/ML
50 INJECTION, SOLUTION INTRAMUSCULAR; INTRAVENOUS ONCE AS NEEDED
Status: DISCONTINUED | OUTPATIENT
Start: 2024-06-25 | End: 2024-06-25 | Stop reason: HOSPADM

## 2024-06-25 RX ORDER — PROMETHAZINE HYDROCHLORIDE 25 MG/1
25 SUPPOSITORY RECTAL ONCE AS NEEDED
Status: DISCONTINUED | OUTPATIENT
Start: 2024-06-25 | End: 2024-06-25 | Stop reason: HOSPADM

## 2024-06-25 RX ORDER — ONDANSETRON 2 MG/ML
4 INJECTION INTRAMUSCULAR; INTRAVENOUS ONCE AS NEEDED
Status: DISCONTINUED | OUTPATIENT
Start: 2024-06-25 | End: 2024-06-25 | Stop reason: HOSPADM

## 2024-06-25 RX ORDER — TEMAZEPAM 15 MG/1
15 CAPSULE ORAL NIGHTLY PRN
Status: DISPENSED | OUTPATIENT
Start: 2024-06-25 | End: 2024-06-30

## 2024-06-25 RX ORDER — SODIUM CHLORIDE, SODIUM LACTATE, POTASSIUM CHLORIDE, CALCIUM CHLORIDE 600; 310; 30; 20 MG/100ML; MG/100ML; MG/100ML; MG/100ML
9 INJECTION, SOLUTION INTRAVENOUS CONTINUOUS
Status: DISCONTINUED | OUTPATIENT
Start: 2024-06-25 | End: 2024-06-28

## 2024-06-25 RX ORDER — PROMETHAZINE HYDROCHLORIDE 25 MG/1
25 TABLET ORAL ONCE AS NEEDED
Status: DISCONTINUED | OUTPATIENT
Start: 2024-06-25 | End: 2024-06-25 | Stop reason: HOSPADM

## 2024-06-25 RX ORDER — DIPHENHYDRAMINE HYDROCHLORIDE 50 MG/ML
12.5 INJECTION INTRAMUSCULAR; INTRAVENOUS
Status: DISCONTINUED | OUTPATIENT
Start: 2024-06-25 | End: 2024-06-25 | Stop reason: HOSPADM

## 2024-06-25 RX ADMIN — POTASSIUM CHLORIDE 40 MEQ: 750 TABLET, EXTENDED RELEASE ORAL at 13:41

## 2024-06-25 RX ADMIN — ASPIRIN 325 MG: 325 TABLET ORAL at 10:04

## 2024-06-25 RX ADMIN — VALSARTAN 160 MG: 160 TABLET, FILM COATED ORAL at 13:41

## 2024-06-25 RX ADMIN — NIFEDIPINE 60 MG: 60 TABLET, FILM COATED, EXTENDED RELEASE ORAL at 10:04

## 2024-06-25 RX ADMIN — NICOTINE 1 PATCH: 21 PATCH, EXTENDED RELEASE TRANSDERMAL at 00:37

## 2024-06-25 RX ADMIN — ATORVASTATIN CALCIUM 80 MG: 80 TABLET, FILM COATED ORAL at 21:04

## 2024-06-25 RX ADMIN — VALSARTAN 160 MG: 160 TABLET, FILM COATED ORAL at 10:04

## 2024-06-25 RX ADMIN — Medication 10 ML: at 21:04

## 2024-06-25 RX ADMIN — HYDRALAZINE HYDROCHLORIDE 25 MG: 25 TABLET ORAL at 17:22

## 2024-06-25 RX ADMIN — ESCITALOPRAM OXALATE 10 MG: 10 TABLET, FILM COATED ORAL at 10:04

## 2024-06-25 RX ADMIN — NIFEDIPINE 30 MG: 30 TABLET, FILM COATED, EXTENDED RELEASE ORAL at 13:41

## 2024-06-25 RX ADMIN — PROPOFOL 200 MCG/KG/MIN: 10 INJECTION, EMULSION INTRAVENOUS at 07:33

## 2024-06-25 RX ADMIN — POTASSIUM CHLORIDE 40 MEQ: 750 TABLET, EXTENDED RELEASE ORAL at 10:04

## 2024-06-25 RX ADMIN — LIDOCAINE HYDROCHLORIDE 100 MG: 20 INJECTION, SOLUTION INFILTRATION; PERINEURAL at 07:32

## 2024-06-25 RX ADMIN — INSULIN GLARGINE 20 UNITS: 100 INJECTION, SOLUTION SUBCUTANEOUS at 10:03

## 2024-06-25 RX ADMIN — INSULIN LISPRO 2 UNITS: 100 INJECTION, SOLUTION INTRAVENOUS; SUBCUTANEOUS at 17:28

## 2024-06-25 RX ADMIN — PROPOFOL 80 MG: 10 INJECTION, EMULSION INTRAVENOUS at 07:32

## 2024-06-25 RX ADMIN — INSULIN LISPRO 3 UNITS: 100 INJECTION, SOLUTION INTRAVENOUS; SUBCUTANEOUS at 21:04

## 2024-06-25 NOTE — CONSULTS
Patient Care Team:  Ibrahima Correa MD as PCP - General (Family Medicine)  Rachele Zafar, ALEENA as Ambulatory  (Population Health)  Xochilt Jenkins MD as Consulting Physician (Nephrology)    Chief complaint  Aortic valve fibroelastoma  CVA    Subjective     History of Present Illness  Patient is a 44 y.o. female with a past medical history including CAD status post CABG in 2014 by Dr. Fry([facility]), hypertension, hyperlipidemia, uncontrolled diabetes, peripheral neuropathy, obstructive sleep apnea, tobacco abuse, CKD, and recurrent CVA.  She was admitted to [facility] early last week with a CVA.  Further evaluation included CTA of head and neck that showed 70% stenosis of the right ICA.  She was evaluated by vascular surgery and no mention needed at this time.  Her troponin was slightly elevated and her blood sugar was 600.  She had a cardiac cath in February 2024 that showed chronic total occlusion of the RCA, left circumflex 80 to 90% stenosis, ramus 70 to 80% stenosis, totally occluded LAD, patent LIMA to LAD, SVG to circumflex is occluded, and SVG to RCA is patent.  She was continued on aspirin and Plavix.  Once her blood sugar was controlled she was discharged home.  Following day EMS was called for strokelike symptoms.  Per EMS she developed acute right-sided weakness and was flaccid on the right side at presentation but symptoms improved by the time she arrived to the ED.  MRI showed left MCA strokes.  She also underwent a transesophageal echocardiogram that showed aortic valve mass/fibroelastoma.  She is referred for surgical evaluation.    Review of Systems   Constitutional:  Positive for activity change.   Neurological:  Positive for speech difficulty and weakness.        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 Debbie Fry.    Depressive disorder 2023    Diabetic gastroparesis 2021    Diabetic peripheral neuropathy associated with type 2 diabetes mellitus 2024    Gastroesophageal reflux disease 03/10/2021    GERD (gastroesophageal reflux disease)     Intermittent claudication 2017    Iron deficiency anemia 2020    Mixed hypercholesterolemia and hypertriglyceridemia 2014    Myocardial infarction     Obesity     Obstructive sleep apnea 2021    Personal history of COVID-19 2022    Polyp of colon 2023    Spontaneous      Stress fracture of right ankle with routine healing     Tobacco abuse 2024    Type 2 diabetes mellitus with hyperglycemia, with long-term current use of insulin 2017    Vitamin D deficiency 2024     Past Surgical History:   Procedure Laterality Date    CARDIAC CATHETERIZATION N/A 2024    Procedure: Left Heart Cath and coronary angiogram;  Surgeon: Jena Barron MD;  Location: [facility] CATH INVASIVE LOCATION;  Service: Cardiology;  Laterality: N/A;     SECTION      CORONARY ARTERY BYPASS GRAFT  2014    LIMA-LAD, SVG-OM2, SVG-PDA, Dr. Debbie Fry.    DILATATION AND CURETTAGE      TONSILLECTOMY       Family History   Family history unknown: Yes     Social History     Tobacco Use    Smoking status: Every Day     Current packs/day: 0.25     Average packs/day: 0.3 packs/day for 15.0 years (3.8 ttl pk-yrs)     Types: Cigarettes    Smokeless tobacco: Never   Vaping Use    Vaping status: Never Used   Substance Use Topics    Alcohol use: Defer    Drug use: Never     Medications Prior to Admission   Medication Sig Dispense Refill Last Dose    albuterol sulfate  (90 Base) MCG/ACT inhaler Inhale 2 puffs every 6 (six) hours if needed for wheezing. 18 g 0 Past Week    aspirin 81 MG chewable tablet Chew 1  tablet Daily.   2024    atorvastatin (LIPITOR) 80 MG tablet Take 1 tablet by mouth Daily.   2024    clopidogrel (PLAVIX) 75 MG tablet Take 1 tablet by mouth Daily. 90 tablet 0 2024    empagliflozin (JARDIANCE) 10 MG tablet tablet Take 1 tablet by mouth Daily. 30 tablet 0 2024    escitalopram (LEXAPRO) 10 MG tablet Take 1 tablet by mouth Daily.   2024    famotidine (PEPCID) 40 MG tablet Take 1 tablet by mouth every night at bedtime. 90 tablet 3 2024    hydrALAZINE (APRESOLINE) 50 MG tablet Take 1 tablet by mouth Every 8 (Eight) Hours. 90 tablet 0 2024    icosapent ethyl (Vascepa) 1 g capsule capsule Take 2 capsules by mouth 2 (two) times a day. 360 capsule 0 2024    icosapent ethyl (Vascepa) 1 g capsule capsule Take 2 g (2 capsules) by mouth 2 (Two) Times a Day With Meals. Indications: Cerebrovascular Accident or Stroke, High Amount of Triglycerides in the Blood, Procedure to Reestablish Blood Supply to the Heart 120 capsule 11 2024    insulin aspart (novoLOG FLEXPEN) 100 UNIT/ML solution pen-injector sc pen Inject  under the skin into the appropriate area as directed 3 times a day. Sliding scale, between 5-20 units TID   2024    insulin detemir (LEVEMIR) 100 UNIT/ML injection Inject 60 Units under the skin into the appropriate area as directed Every Night.   2024    metoclopramide (REGLAN) 5 MG tablet Take 1 tablet by mouth 3 times a day.   2024    metoprolol tartrate (LOPRESSOR) 100 MG tablet Take 1 tablet by mouth 2 (two) times a day. 180 tablet 0 2024    NIFEdipine CC (ADALAT CC) 60 MG 24 hr tablet Take 1 tablet by mouth Daily. 30 tablet 0 2024    pantoprazole (PROTONIX) 40 MG EC tablet Take 1 tablet by mouth Every Morning. 90 tablet 3 2024    [START ON 2024] cloNIDine (CATAPRES-TTS) 0.2 MG/24HR patch Place 1 patch on the skin as directed by provider 1 (One) Time Per Week. 4 patch 0     [] Continuous Glucose  (Dexcom G7  ") device Use 1 each 1 (One) Time for 1 dose. Dx: E11.65 1 each 0     Continuous Glucose Sensor (Dexcom G7 Sensor) misc Use 1 each Every 10 (Ten) Days. Dx: E11.65 3 each 2      aspirin, 325 mg, Oral, Daily   Or  aspirin, 300 mg, Rectal, Daily  atorvastatin, 80 mg, Oral, Nightly  [Held by provider] clopidogrel, 75 mg, Oral, Daily  escitalopram, 10 mg, Oral, Daily  icosapent ethyl, 2 g, Oral, BID With Meals  insulin glargine, 20 Units, Subcutaneous, Daily  insulin lispro, 2-7 Units, Subcutaneous, 4x Daily AC & at Bedtime  nicotine, 1 patch, Transdermal, Q24H  NIFEdipine XL, 60 mg, Oral, Q24H  pantoprazole, 40 mg, Oral, QAM  potassium chloride ER, 40 mEq, Oral, Q4H  sodium chloride, 10 mL, Intravenous, Q12H  valsartan, 160 mg, Oral, Q24H      Allergies:  Latex    Objective      Vital Signs  Temp:  [98 °F (36.7 °C)-98.8 °F (37.1 °C)] 98.4 °F (36.9 °C)  Heart Rate:  [47-62] 56  Resp:  [16-20] 16  BP: (113-193)/(56-82) 174/71    Flowsheet Rows      Flowsheet Row First Filed Value   Admission Height 162.6 cm (64\") Documented at 06/21/2024 2105   Admission Weight 65.5 kg (144 lb 6.4 oz) Documented at 06/21/2024 2105          162.6 cm (64\")    Physical Exam  Constitutional:       General: She is not in acute distress.  HENT:      Head: Normocephalic and atraumatic.      Mouth/Throat:      Mouth: Mucous membranes are moist.      Pharynx: Oropharynx is clear.   Cardiovascular:      Rate and Rhythm: Normal rate and regular rhythm.      Heart sounds: No murmur heard.  Pulmonary:      Effort: Pulmonary effort is normal. No respiratory distress.   Abdominal:      General: Bowel sounds are normal. There is no distension.   Skin:     General: Skin is warm and dry.      Comments: Previous sternal incision well healed   Neurological:      General: No focal deficit present.      Mental Status: She is alert and oriented to person, place, and time.   Psychiatric:         Mood and Affect: Mood normal.         Behavior: Behavior " normal.         Thought Content: Thought content normal.         Judgment: Judgment normal.         Results Review:   Lab Results (last 24 hours)       Procedure Component Value Units Date/Time    POC Glucose Once [[id number]]  (Normal) Collected: 06/25/24 0837    Specimen: Blood Updated: 06/25/24 0838     Glucose 129 mg/dL     POC Creatinine [[id number]]  (Abnormal) Collected: 06/21/24 2114    Specimen: Blood Updated: 06/25/24 0728     Creatinine 1.60 mg/dL      Comment: Serial Number: [id number]:  [id number]       POC Glucose Once [[id number]]  (Normal) Collected: 06/25/24 0603    Specimen: Blood Updated: 06/25/24 0606     Glucose 125 mg/dL     Basic Metabolic Panel [[id number]]  (Abnormal) Collected: 06/25/24 0434    Specimen: Blood Updated: 06/25/24 0524     Glucose 126 mg/dL      BUN 9 mg/dL      Creatinine 0.82 mg/dL      Sodium 138 mmol/L      Potassium 3.6 mmol/L      Chloride 109 mmol/L      CO2 21.7 mmol/L      Calcium 8.5 mg/dL      BUN/Creatinine Ratio 11.0     Anion Gap 7.3 mmol/L      eGFR 89.5 mL/min/1.73     Narrative:      GFR Normal >60  Chronic Kidney Disease <60  Kidney Failure <15      CBC Auto Differential [[id number]]  (Abnormal) Collected: 06/25/24 0434    Specimen: Blood Updated: 06/25/24 0503     WBC 7.88 10*3/mm3      RBC 3.74 10*6/mm3      Hemoglobin 11.1 g/dL      Hematocrit 33.9 %      MCV 90.6 fL      MCH 29.7 pg      MCHC 32.7 g/dL      RDW 13.6 %      RDW-SD 44.4 fl      MPV 11.1 fL      Platelets 197 10*3/mm3      Neutrophil % 59.6 %      Lymphocyte % 31.2 %      Monocyte % 6.0 %      Eosinophil % 2.3 %      Basophil % 0.6 %      Immature Grans % 0.3 %      Neutrophils, Absolute 4.70 10*3/mm3      Lymphocytes, Absolute 2.46 10*3/mm3      Monocytes, Absolute 0.47 10*3/mm3      Eosinophils, Absolute 0.18 10*3/mm3      Basophils, Absolute 0.05 10*3/mm3      Immature Grans, Absolute 0.02 10*3/mm3      nRBC 0.0 /100 WBC     POC Glucose Once [[id number]]  (Abnormal) Collected: 06/24/24 2800     Specimen: Blood Updated: 06/24/24 2141     Glucose 227 mg/dL     POC Glucose Once [[id number]]  (Abnormal) Collected: 06/24/24 1614    Specimen: Blood Updated: 06/24/24 1615     Glucose 182 mg/dL     POC Glucose Once [[id number]]  (Normal) Collected: 06/24/24 1116    Specimen: Blood Updated: 06/24/24 1118     Glucose 105 mg/dL             YUE 6/25/24    Left ventricular systolic function is normal.  Visually estimated LVEF 60-65%.    Left ventricular wall thickness is consistent with mild to moderate concentric hypertrophy.    There are up to 3 small pedunculated masses attached to the aortic valve leaflets (2 with the noncoronary cusp and a smaller one with left coronary cusp) protruding into the aortic root with the largest one measuring 0.8 x 0.5cm.  The morphology appears consistent with fibroelastomas.    Estimated right ventricular systolic pressure from tricuspid regurgitation is normal (<35 mmHg).    No evidence of intracardiac thrombus including with close evaluation of both atria, BRADEN, and RAA.    Saline test results are negative.    Mild aortic arch plaques.         Coronary Angiogram 2/21/24     Right dominant circulation     Left main: Left main is a large caliber vessel which gives rise to the Left Anterior Descending and the Left circumflex. No angiographically significant stenosis     Left Anterior Descending Artery: The LAD is  at the ostium     Left Circumflex: Lcx is a small caliber vessel which courses in the AV groove and gives rise to OM branches.  There is a high OM1 which further bifurcates.  There is a long 60 to 70% lesion in the proximal segment of this OM.  The circumflex is diffusely diseased with 60 to 70% stenosis in the proximal and mid segment all the way into OM 3.  There is an 80% lesion in the midsegment.     Right Coronary Artery: The RCA is  at the ostium     Bypass angiography  SVG to PDA is patent at the origin, body, and insertion  SVG to diagonal is occluded at the  origin  LIMA to LAD is widely patent at the origin, body, and insertion.  The LAD distal to the touchdown of the LIMA has 70 to 80% stenosis and is very small caliber.  LIMA does retrogradely fill a diagonal branch also.           Assessment & Plan       Acute right-sided weakness    Benign essential hypertension    Gastroparesis diabeticorum    Gastroesophageal reflux disease    Mixed hypercholesterolemia and hypertriglyceridemia    Iron deficiency anemia    Type 2 diabetes mellitus with hyperglycemia, with long-term current use of insulin    Coronary artery disease involving native coronary artery of native heart with unstable angina pectoris    Tobacco abuse    CKD stage 3a, GFR 45-59 ml/min    Carotid stenosis    Lacunar cerebrovascular accident (CVA)    Elevated troponin      Assessment & Plan    -Aortic valve mass/fibroelastoma  -Recurrent MCA stroke  -Coronary artery disease status post CABG in 2014  -Uncontrolled diabetes mellitus, HgbA1c 16.9, with peripheral neuropathy  -CKD, stage 3  -Hypertension  -Hyperlipidemia  -Obesity  -Tobacco abuse-- encourage cessation  -PAD, Carotid stenosis, 70% right ICA  -GERD  -LB  -chronic anemia    Patient see an discussed with Dr. De Luna. Admitted with recurrent CVA. YUE showed mobile aortic valve mass/fibroelastoma. Stop plavix. Order vein mapping for possible redo CABG. Appears vein was used from both thighs CT Chest to evaluate previous grafts/proximity to sternum. Will also try to get previous op-note from [facility].        Thank you for allowing us to participate in the care of this patient.      Harvey Duarte PA-C  06/25/24  10:11 EDT

## 2024-06-25 NOTE — PROGRESS NOTES
[facility]    Patient Name: Bibiana Inman  :1980  44 y.o.  LOS: 4  Encounter Provider: Ronaldo Severino MD      Patient Care Team:  Ibrahima Correa MD as PCP - General (Family Medicine)  Rachele Zafar RN as Ambulatory  (Population Health)  Xochilt Jenkins MD as Consulting Physician (Nephrology)    Chief Complaint/Consult question:  recurrent stroke, hx of CABG, aortic valve fibroelastomas    PMH/HPI: Bibiana Inman is a 44 year-old female with a PMH for multivessel CAD s/p CABG in  by Dr. 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     Summary of cardiovascular history:  - 2024, ED for chest pain. NM perfusion stress showed a moderate-sized, severe area of ischemia located in the inferior and lateral wall. Cath showed significant CAD including  of LAD and RCA, left circumflex with 80-90% stenosis in the proximal segment and mid segment, ramus with proximal 70-80% stenosis. LIMA to LAD is patent, however distal/apical LAD has 80% stenosis. SVG to left circumflex is occluded. SVG to RCA is patent.  Medical therapy was pursued at that time.  - -2024: Admitted to [facility] for acute stroke. MRI showed a linear acute infarct within the left parietal occipital region.  CTA neck showed 70% stenosis in the right ICA. She was evaluated by vascular surgery and there was no indication for carotid revascularization at that time. - 2024: Presented with acute right-sided weakness.  CTA head/neck showed no acute intracranial hemorrhage. MRI showed enlargement of the previously identified acute infarct centered within the genu of the left internal capsule. There is also a new punctate focus of acute infarction within the left insular cortex. 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 She has been hypertensive with blood pressures as high as 199/86.     Cardiology has been asked to see this patient for the mobile mass found on the echocardiogram. She states that her BP has been quite elevated at home over the past month or 2 in the range of 160-170s.  Smokes 6 cigarettes/day for at least 20 years, denies alcohol or substance abuse.    Interval History: No acute events overnight.  YUE done this morning showed 3 likely fibroelastomas on the aortic valve.  Cardiovascular surgery consulted.  Valsartan and nifedipine uptitrated given persistently elevated BP readings.     Results for orders placed during the hospital encounter of 06/21/24    Adult Transesophageal Echo (YUE) W/ Cont if Necessary Per Protocol    Interpretation Summary    Left ventricular systolic function is normal.  Visually estimated LVEF 60-65%.    Left ventricular wall thickness is consistent with mild to moderate concentric hypertrophy.    There are up to 3 small pedunculated masses attached to the aortic valve leaflets (2 with the noncoronary cusp and a smaller one with left coronary cusp) protruding into the aortic root with the largest one measuring 0.8 x 0.5cm.  The morphology appears consistent with fibroelastomas.    Estimated right ventricular systolic pressure from tricuspid regurgitation is normal (<35 mmHg).    No evidence of intracardiac thrombus including with close evaluation of both atria, RBADEN, and RAA.    Saline test results are negative.    Mild aortic arch plaques.        Objective   Vital Signs  Temp:  [98 °F (36.7 °C)-98.8 °F (37.1 °C)] 98.4 °F (36.9 °C)  Heart Rate:  [47-66] 66  Resp:  [16-20] 16  BP: (113-193)/(56-82) 185/74    Intake/Output Summary (Last 24 hours) at 6/25/2024 1217  Last data filed at 6/25/2024 0805  Gross per 24 hour   Intake 5393.33 ml   Output --   Net 5393.33 ml     Flowsheet Rows      Flowsheet Row  "First Filed Value   Admission Height 162.6 cm (64\") Documented at 06/21/2024 2105   Admission Weight 65.5 kg (144 lb 6.4 oz) Documented at 06/21/2024 2105              Vitals and nursing note reviewed.   Constitutional:       Appearance: Healthy appearance. Not in distress.   Neck:      Vascular: No JVR.   Pulmonary:      Effort: Pulmonary effort is normal.      Breath sounds: Diffuse and bilateral Rhonchi present. No rales.   Cardiovascular:      Bradycardia present. Regular rhythm.      Murmurs: There is no murmur.   Edema:     Peripheral edema absent.   Abdominal:      General: There is no distension.      Palpations: Abdomen is soft.      Tenderness: There is no abdominal tenderness.   Musculoskeletal:      Cervical back: Neck supple. Skin:     General: Skin is cool.   Neurological:      Mental Status: Alert and oriented to person, place and time.           Pertinent Test Results:  Results from last 7 days   Lab Units 06/25/24  0434 06/24/24  0525 06/23/24  0500 06/22/24  0323 06/21/24 2114 06/21/24 2106 06/19/24  0506   SODIUM mmol/L 138 139 138 137  --  137 135*   POTASSIUM mmol/L 3.6 3.7 3.8 3.5  --  3.8 3.6   CHLORIDE mmol/L 109* 108* 108* 106  --  104 105   CO2 mmol/L 21.7* 21.9* 23.0 22.3  --  22.0 21.8*   BUN mg/dL 9 8 10 10  --  11 8   CREATININE mg/dL 0.82 0.93 0.98 1.23* 1.60* 1.40* 1.12*   GLUCOSE mg/dL 126* 134* 168* 56*  --  197* 248*   CALCIUM mg/dL 8.5* 8.6 8.2* 8.4*  --  8.5* 9.0   AST (SGOT) U/L  --   --   --   --   --  12  --    ALT (SGPT) U/L  --   --   --   --   --  9  --      Results from last 7 days   Lab Units 06/21/24  2304 06/21/24 2106   HSTROP T ng/L 53* 56*     Results from last 7 days   Lab Units 06/25/24  0434 06/24/24  0525 06/23/24  0500 06/22/24  0323 06/21/24  2106   WBC 10*3/mm3 7.88 7.76 7.02 11.58* 10.72   HEMOGLOBIN g/dL 11.1* 11.2* 11.4* 11.5* 12.0   HEMATOCRIT % 33.9* 35.8 35.3 35.7 37.1   PLATELETS 10*3/mm3 197 203 202 265 666     Results from last 7 days   Lab Units " 06/21/24  2106   INR  0.98   APTT seconds 27.5         Results from last 7 days   Lab Units 06/22/24  0323   CHOLESTEROL mg/dL 245*   TRIGLYCERIDES mg/dL 610*   HDL CHOL mg/dL 31*                   Medication Review:   aspirin, 325 mg, Oral, Daily   Or  aspirin, 300 mg, Rectal, Daily  atorvastatin, 80 mg, Oral, Nightly  [START ON 7/1/2024] ceFAZolin, 2,000 mg, Intravenous, On Call to OR  [START ON 6/30/2024] chlorhexidine, 15 mL, Mouth/Throat, Q12H  [START ON 6/30/2024] Chlorhexidine Gluconate Cloth, 1 Application, Topical, Q12H  escitalopram, 10 mg, Oral, Daily  icosapent ethyl, 2 g, Oral, BID With Meals  insulin glargine, 20 Units, Subcutaneous, Daily  insulin lispro, 2-7 Units, Subcutaneous, 4x Daily AC & at Bedtime  [START ON 7/1/2024] metoprolol tartrate, 12.5 mg, Oral, On Call to OR  [START ON 6/30/2024] mupirocin, 1 Application, Each Nare, Q12H  nicotine, 1 patch, Transdermal, Q24H  [START ON 6/26/2024] NIFEdipine XL, 120 mg, Oral, Q24H  NIFEdipine XL, 30 mg, Oral, Once  pantoprazole, 40 mg, Oral, QAM  potassium chloride ER, 40 mEq, Oral, Q4H  sodium chloride, 10 mL, Intravenous, Q12H  valsartan, 160 mg, Oral, Once  [START ON 6/26/2024] valsartan, 320 mg, Oral, Q24H         lactated ringers, 9 mL/hr  sodium chloride, 100 mL/hr, Last Rate: 100 mL/hr (06/24/24 2010)        Assessment & Plan     Active Hospital Problems    Diagnosis  POA    **Acute right-sided weakness [R53.1]  Yes    CKD stage 3a, GFR 45-59 ml/min [N18.31]  Yes    Carotid stenosis [I65.29]  Yes    Lacunar cerebrovascular accident (CVA) [I63.81]  Yes    Elevated troponin [R79.89]  Yes    Aortic valve disease [I35.9]  Unknown    Tobacco abuse [Z72.0]  Yes    Coronary artery disease involving native coronary artery of native heart with unstable angina pectoris [I25.110]  Yes    Gastroparesis diabeticorum [E11.43, K31.84]  Yes    Gastroesophageal reflux disease [K21.9]  Yes    Iron deficiency anemia [D50.9]  Yes    Type 2 diabetes mellitus with  hyperglycemia, with long-term current use of insulin [E11.65, Z79.4]  Not Applicable    Benign essential hypertension [I10]  Yes    Mixed hypercholesterolemia and hypertriglyceridemia [E78.2]  Yes      Resolved Hospital Problems   No resolved problems to display.        Recurrent stroke/aortic valve mass: Stroke x 2 over the past month, this time presented with dysarthria and MRI showed a new punctate focus of acute infarction within the left insular cortex. 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  YUE today confirmed the presence of up to 3 likely fibroelastoma on the aortic valve.  Cardiothoracic surgery consulted for possible surgical resection.  BP control, see below.  Hypertension: BP readings very elevated this admission up to ~200/80s, home meds included Lopressor, clonidine, hydralazine, and nifedipine (recently started last week).  Prior cough with losartan, started on valsartan 80 daily yesterday and increased to 160 daily, tolerating well, will further increase to 320 daily. Also restarted home nifedipine 60 daily yesterday, will increase to 120 daily.  Prn oral hydralazine for intermittent BP spikes.  Hyperlipidemia: Lipids this month showed HDL 31, , uncontrolled.  LDL goal < 70 given history of CABG. continue home atorvastatin 80, patient will need addition of Zetia +/- PCSK9 inhibitor upon discharge.  Diabetes mellitus: Hemoglobin A1c 17 his admission, uncontrolled.  Insulin-dependent, management per primary team and other specialists.  Already on Jardiance 10 daily at home for added cardiovascular benefit which I recommend restarting.  CAD s/p CABG: Multivessel CAD s/p CABG in 2014 by Dr. Garcia at [facility]. Most recent cath 2/2024 showed LAD and RCA , proximal to mid left circumflex with 80-90% stenosis, ramus with proximal 70-80% stenosis.  JOYCE to LAD is patent, however distal/apical LAD has 80% stenosis. SVG to left circumflex is occluded. SVG to RCA is patent. Denies angina, defer to CV surgery regarding need for further testing.  Continue home aspirin and Plavix (holding for now per CV surgery request), BP and lipid control as above, smoking cessation is key, see below.  Tobacco abuse: Smokes 6 cigarettes/day for at least 20 years, strongly encourage complete cessation.    Plan of care discussed with patient as well as multiple family members including  and mother at bedside.    Ronaldo Severino MD  [facility]  06/25/24  12:17 EDT

## 2024-06-25 NOTE — ANESTHESIA PREPROCEDURE EVALUATION
Anesthesia Evaluation     NPO Solid Status: > 8 hours             Airway   Mallampati: II  TM distance: >3 FB  Neck ROM: full  no difficulty expected  Dental - normal exam     Pulmonary - normal exam   (+) ,sleep apnea  Cardiovascular - normal exam    (+) hypertension, past MI , CAD, CABG, angina,  carotid artery disease      Neuro/Psych  (+) CVA, psychiatric history  GI/Hepatic/Renal/Endo    (+) renal disease-, diabetes mellitus    Musculoskeletal     Abdominal    Substance History      OB/GYN          Other                      Anesthesia Plan    ASA 4     MAC     (---------------------------               06/25/24                      0015         ---------------------------   BP:          168/79         Pulse:                      Resp:          18           Temp:   37.1 °C (98.8 °F)   SpO2:                      ---------------------------    Acute CVA  On jardiance with gastroparesis)  intravenous induction     Anesthetic plan, risks, benefits, and alternatives have been provided, discussed and informed consent has been obtained with: patient.    CODE STATUS:    Level Of Support Discussed With: Patient  Code Status (Patient has no pulse and is not breathing): CPR (Attempt to Resuscitate)  Medical Interventions (Patient has pulse or is breathing): Full Support

## 2024-06-25 NOTE — PROGRESS NOTES
Name: Bibiana Inman ADMIT: 2024   : 1980  PCP: Ibrahima Correa MD    MRN: [id number] LOS: 4 days   AGE/SEX: 44 y.o. female  ROOM: [facility]     Subjective   Subjective   No acute events. Patient denies new complaints. Had YUE this morning and tolerated well. Multiple family members at bedside.    Objective   Objective   Vital Signs  Temp:  [98 °F (36.7 °C)-98.8 °F (37.1 °C)] 98.4 °F (36.9 °C)  Heart Rate:  [47-66] 66  Resp:  [16-20] 16  BP: (113-193)/(56-82) 185/74  SpO2:  [94 %-100 %] 99 %  on  Flow (L/min):  [15] 15;   Device (Oxygen Therapy): (P) room air  Body mass index is 24.72 kg/m².  Physical Exam  Vitals and nursing note reviewed.   Constitutional:       General: She is not in acute distress.     Appearance: She is not toxic-appearing or diaphoretic.   HENT:      Head: Normocephalic and atraumatic.      Nose: Nose normal.      Mouth/Throat:      Mouth: Mucous membranes are moist.      Pharynx: Oropharynx is clear.   Eyes:      Conjunctiva/sclera: Conjunctivae normal.      Pupils: Pupils are equal, round, and reactive to light.   Cardiovascular:      Rate and Rhythm: Normal rate and regular rhythm.      Pulses: Normal pulses.   Pulmonary:      Effort: Pulmonary effort is normal.   Abdominal:      General: Bowel sounds are normal.      Palpations: Abdomen is soft.      Tenderness: There is no abdominal tenderness.   Musculoskeletal:         General: No swelling or tenderness.      Cervical back: Neck supple.   Skin:     General: Skin is warm and dry.      Capillary Refill: Capillary refill takes less than 2 seconds.   Neurological:      Mental Status: She is alert and oriented to person, place, and time.      Comments: +mild right lower facial weakness   Psychiatric:         Mood and Affect: Mood normal.         Behavior: Behavior normal.       Results Review     I reviewed the patient's new clinical results.  Results from last 7 days   Lab Units 24  0435 24  7042  06/23/24  0500 06/22/24  0323   WBC 10*3/mm3 7.88 7.76 7.02 11.58*   HEMOGLOBIN g/dL 11.1* 11.2* 11.4* 11.5*   PLATELETS 10*3/mm3 197 203 202 265     Results from last 7 days   Lab Units 06/25/24  0434 06/24/24  0525 06/23/24  0500 06/22/24  0323   SODIUM mmol/L 138 139 138 137   POTASSIUM mmol/L 3.6 3.7 3.8 3.5   CHLORIDE mmol/L 109* 108* 108* 106   CO2 mmol/L 21.7* 21.9* 23.0 22.3   BUN mg/dL 9 8 10 10   CREATININE mg/dL 0.82 0.93 0.98 1.23*   GLUCOSE mg/dL 126* 134* 168* 56*   EGFR mL/min/1.73 89.5 76.9 72.2 55.0*     Results from last 7 days   Lab Units 06/21/24  2106   ALBUMIN g/dL 2.9*   BILIRUBIN mg/dL <0.2   ALK PHOS U/L 90   AST (SGOT) U/L 12   ALT (SGPT) U/L 9     Results from last 7 days   Lab Units 06/25/24  0434 06/24/24  0525 06/23/24  0500 06/22/24  0323 06/21/24  2106   CALCIUM mg/dL 8.5* 8.6 8.2* 8.4* 8.5*   ALBUMIN g/dL  --   --   --   --  2.9*       Glucose   Date/Time Value Ref Range Status   06/25/2024 1126 113 70 - 130 mg/dL Final   06/25/2024 0837 129 70 - 130 mg/dL Final   06/25/2024 0603 125 70 - 130 mg/dL Final   06/24/2024 2140 227 (H) 70 - 130 mg/dL Final   06/24/2024 1614 182 (H) 70 - 130 mg/dL Final   06/24/2024 1116 105 70 - 130 mg/dL Final   06/24/2024 0637 168 (H) 70 - 130 mg/dL Final       No radiology results for the last day    I have personally reviewed all medications:  Scheduled Medications  aspirin, 325 mg, Oral, Daily   Or  aspirin, 300 mg, Rectal, Daily  atorvastatin, 80 mg, Oral, Nightly  [START ON 7/1/2024] ceFAZolin, 2,000 mg, Intravenous, On Call to OR  [START ON 6/30/2024] chlorhexidine, 15 mL, Mouth/Throat, Q12H  [START ON 6/30/2024] Chlorhexidine Gluconate Cloth, 1 Application, Topical, Q12H  escitalopram, 10 mg, Oral, Daily  icosapent ethyl, 2 g, Oral, BID With Meals  insulin glargine, 20 Units, Subcutaneous, Daily  insulin lispro, 2-7 Units, Subcutaneous, 4x Daily AC & at Bedtime  [START ON 7/1/2024] metoprolol tartrate, 12.5 mg, Oral, On Call to OR  [START ON  6/30/2024] mupirocin, 1 Application, Each Nare, Q12H  nicotine, 1 patch, Transdermal, Q24H  [START ON 6/26/2024] NIFEdipine XL, 120 mg, Oral, Q24H  NIFEdipine XL, 30 mg, Oral, Once  pantoprazole, 40 mg, Oral, QAM  potassium chloride ER, 40 mEq, Oral, Q4H  sodium chloride, 10 mL, Intravenous, Q12H  valsartan, 160 mg, Oral, Once  [START ON 6/26/2024] valsartan, 320 mg, Oral, Q24H    Infusions  lactated ringers, 9 mL/hr  sodium chloride, 100 mL/hr, Last Rate: 100 mL/hr (06/24/24 2010)    Diet  Diet: Diabetic; Consistent Carbohydrate; Fluid Consistency: Thin (IDDSI 0)  NPO Diet NPO Type: Sips with Meds    I have personally reviewed:  [x]  Laboratory   []  Microbiology   []  Radiology   [x]  EKG/Telemetry  [x]  Cardiology/Vascular   []  Pathology    []  Records    Assessment/Plan     Active Hospital Problems    Diagnosis  POA    **Acute right-sided weakness [R53.1]  Yes    CKD stage 3a, GFR 45-59 ml/min [N18.31]  Yes    Carotid stenosis [I65.29]  Yes    Lacunar cerebrovascular accident (CVA) [I63.81]  Yes    Elevated troponin [R79.89]  Yes    Aortic valve disease [I35.9]  Unknown    Tobacco abuse [Z72.0]  Yes    Coronary artery disease involving native coronary artery of native heart with unstable angina pectoris [I25.110]  Yes    Gastroparesis diabeticorum [E11.43, 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   Acute CVA  - presented with right-sided weakness which has improved, has mild right facial droop still  - MRI showed enlargement of acute infarct in the genu of the left internal capsule as well as a new infarct in the left insular cortex, and previously identified  acute infarction within the white matter of the left parietal lobe  - continue ASA, statin-hold plavix for planned procedure  - needs aggressive risk factor control, particularly with HTN and DM  - YUE planned for tomorrow  - appreciate cardiology and neurology recs     Type 2 DM  - complications include gastroparesis  - on lantus and jardiance as outpatient  - BG currently acceptable but needs better outpatient control, A1C 16.90%  - continue lantus 20 units daily   - cover with ssi/hypoglycemia protocol     HTN/CAD s/p CABG  - BP elevated but improving  - continue nifedipine and valsartan      GERD  - symptoms controlled, continue ppi    SCDs for DVT prophylaxis.  Full code.  Discussed with patient, spouse, family, nursing staff, and care team on multidisciplinary rounds.  Anticipate discharge home later this week.  Expected Discharge Date: 6/27/2024; Expected Discharge Time:       Lenin Huang MD  [facility]  06/25/24  12:41 EDT    Portions of this text have been copied and I have reviewed them. They are accurate as of 6/25/2024

## 2024-06-25 NOTE — ANESTHESIA POSTPROCEDURE EVALUATION
Patient: Bibiana Inman    Procedure Summary       Date: 06/25/24 Room / Location: [facility] PACU    Anesthesia Start: 0728 Anesthesia Stop: 0805    Procedure: ADULT TRANSESOPHAGEAL ECHO (YUE) W/ CONT IF NECESSARY PER PROTOCOL Diagnosis: (Valvular Disease)    Scheduled Providers: Ronaldo Severino MD Provider: Lawrence Baker MD    Anesthesia Type: MAC ASA Status: 4            Anesthesia Type: MAC    Vitals  Vitals Value Taken Time   /56 06/25/24 0809   Temp     Pulse 56 06/25/24 0808   Resp 20 06/25/24 0805   SpO2 100 % 06/25/24 0808   Vitals shown include unfiled device data.        Post Anesthesia Care and Evaluation    Patient location during evaluation: bedside  Patient participation: complete - patient participated  Level of consciousness: awake  Pain management: adequate    Airway patency: patent  Anesthetic complications: No anesthetic complications  PONV Status: controlled  Cardiovascular status: acceptable  Respiratory status: acceptable  Hydration status: acceptable    Comments: --------------------            06/25/24               0805     --------------------   BP:       119/79     Pulse:      56       Resp:       20       Temp:                SpO2:      100%     --------------------

## 2024-06-26 ENCOUNTER — APPOINTMENT (OUTPATIENT)
Dept: CT IMAGING | Facility: HOSPITAL | Age: 44
DRG: 981 | End: 2024-06-26
Payer: COMMERCIAL

## 2024-06-26 LAB
ALBUMIN SERPL-MCNC: 2.9 G/DL (ref 3.5–5.2)
ALBUMIN/GLOB SERPL: 1.1 G/DL
ALP SERPL-CCNC: 88 U/L (ref 39–117)
ALT SERPL W P-5'-P-CCNC: 16 U/L (ref 1–33)
ANION GAP SERPL CALCULATED.3IONS-SCNC: 5.8 MMOL/L (ref 5–15)
APTT PPP: 30.4 SECONDS (ref 22.7–35.4)
AST SERPL-CCNC: 17 U/L (ref 1–32)
BACTERIA UR QL AUTO: NORMAL /HPF
BASOPHILS # BLD AUTO: 0.05 10*3/MM3 (ref 0–0.2)
BASOPHILS NFR BLD AUTO: 0.7 % (ref 0–1.5)
BILIRUB SERPL-MCNC: <0.2 MG/DL (ref 0–1.2)
BILIRUB UR QL STRIP: NEGATIVE
BUN SERPL-MCNC: 13 MG/DL (ref 6–20)
BUN/CREAT SERPL: 12.7 (ref 7–25)
CALCIUM SPEC-SCNC: 9.1 MG/DL (ref 8.6–10.5)
CHLORIDE SERPL-SCNC: 108 MMOL/L (ref 98–107)
CLARITY UR: CLEAR
CLOSE TME COLL+ADP + EPINEP PNL BLD: 82 % (ref 86–100)
CO2 SERPL-SCNC: 23.2 MMOL/L (ref 22–29)
COLOR UR: YELLOW
CREAT SERPL-MCNC: 1.02 MG/DL (ref 0.57–1)
DEPRECATED RDW RBC AUTO: 46.2 FL (ref 37–54)
EGFRCR SERPLBLD CKD-EPI 2021: 68.9 ML/MIN/1.73
EOSINOPHIL # BLD AUTO: 0.19 10*3/MM3 (ref 0–0.4)
EOSINOPHIL NFR BLD AUTO: 2.5 % (ref 0.3–6.2)
ERYTHROCYTE [DISTWIDTH] IN BLOOD BY AUTOMATED COUNT: 13.8 % (ref 12.3–15.4)
GLOBULIN UR ELPH-MCNC: 2.7 GM/DL
GLUCOSE BLDC GLUCOMTR-MCNC: 170 MG/DL (ref 70–130)
GLUCOSE BLDC GLUCOMTR-MCNC: 212 MG/DL (ref 70–130)
GLUCOSE BLDC GLUCOMTR-MCNC: 214 MG/DL (ref 70–130)
GLUCOSE BLDC GLUCOMTR-MCNC: 218 MG/DL (ref 70–130)
GLUCOSE SERPL-MCNC: 169 MG/DL (ref 65–99)
GLUCOSE UR STRIP-MCNC: ABNORMAL MG/DL
HBA1C MFR BLD: 13.1 % (ref 4.8–5.6)
HCT VFR BLD AUTO: 34.5 % (ref 34–46.6)
HGB BLD-MCNC: 11.2 G/DL (ref 12–15.9)
HGB UR QL STRIP.AUTO: NEGATIVE
HYALINE CASTS UR QL AUTO: NORMAL /LPF
IMM GRANULOCYTES # BLD AUTO: 0.03 10*3/MM3 (ref 0–0.05)
IMM GRANULOCYTES NFR BLD AUTO: 0.4 % (ref 0–0.5)
INR PPP: 1 (ref 0.9–1.1)
KETONES UR QL STRIP: NEGATIVE
LEUKOCYTE ESTERASE UR QL STRIP.AUTO: NEGATIVE
LYMPHOCYTES # BLD AUTO: 2.36 10*3/MM3 (ref 0.7–3.1)
LYMPHOCYTES NFR BLD AUTO: 30.9 % (ref 19.6–45.3)
MAGNESIUM SERPL-MCNC: 1.8 MG/DL (ref 1.6–2.6)
MCH RBC QN AUTO: 29.7 PG (ref 26.6–33)
MCHC RBC AUTO-ENTMCNC: 32.5 G/DL (ref 31.5–35.7)
MCV RBC AUTO: 91.5 FL (ref 79–97)
MONOCYTES # BLD AUTO: 0.45 10*3/MM3 (ref 0.1–0.9)
MONOCYTES NFR BLD AUTO: 5.9 % (ref 5–12)
NEUTROPHILS NFR BLD AUTO: 4.56 10*3/MM3 (ref 1.7–7)
NEUTROPHILS NFR BLD AUTO: 59.6 % (ref 42.7–76)
NITRITE UR QL STRIP: NEGATIVE
NRBC BLD AUTO-RTO: 0 /100 WBC (ref 0–0.2)
NT-PROBNP SERPL-MCNC: 721 PG/ML (ref 0–450)
PH UR STRIP.AUTO: 7 [PH] (ref 5–8)
PLATELET # BLD AUTO: 218 10*3/MM3 (ref 140–450)
PMV BLD AUTO: 10.9 FL (ref 6–12)
POTASSIUM SERPL-SCNC: 4.3 MMOL/L (ref 3.5–5.2)
PROT SERPL-MCNC: 5.6 G/DL (ref 6–8.5)
PROT UR QL STRIP: ABNORMAL
PROTHROMBIN TIME: 13.4 SECONDS (ref 11.7–14.2)
RBC # BLD AUTO: 3.77 10*6/MM3 (ref 3.77–5.28)
RBC # UR STRIP: NORMAL /HPF
REF LAB TEST METHOD: NORMAL
SODIUM SERPL-SCNC: 137 MMOL/L (ref 136–145)
SP GR UR STRIP: 1.02 (ref 1–1.03)
SQUAMOUS #/AREA URNS HPF: NORMAL /HPF
UROBILINOGEN UR QL STRIP: ABNORMAL
WBC # UR STRIP: NORMAL /HPF
WBC NRBC COR # BLD AUTO: 7.64 10*3/MM3 (ref 3.4–10.8)

## 2024-06-26 PROCEDURE — 80053 COMPREHEN METABOLIC PANEL: CPT | Performed by: NURSE PRACTITIONER

## 2024-06-26 PROCEDURE — 82948 REAGENT STRIP/BLOOD GLUCOSE: CPT

## 2024-06-26 PROCEDURE — 83735 ASSAY OF MAGNESIUM: CPT | Performed by: NURSE PRACTITIONER

## 2024-06-26 PROCEDURE — 85610 PROTHROMBIN TIME: CPT | Performed by: NURSE PRACTITIONER

## 2024-06-26 PROCEDURE — 85730 THROMBOPLASTIN TIME PARTIAL: CPT | Performed by: NURSE PRACTITIONER

## 2024-06-26 PROCEDURE — 85025 COMPLETE CBC W/AUTO DIFF WBC: CPT | Performed by: NURSE PRACTITIONER

## 2024-06-26 PROCEDURE — 63710000001 INSULIN LISPRO (HUMAN) PER 5 UNITS: Performed by: NURSE PRACTITIONER

## 2024-06-26 PROCEDURE — 25510000001 IOPAMIDOL 61 % SOLUTION: Performed by: INTERNAL MEDICINE

## 2024-06-26 PROCEDURE — 83036 HEMOGLOBIN GLYCOSYLATED A1C: CPT | Performed by: NURSE PRACTITIONER

## 2024-06-26 PROCEDURE — 63710000001 INSULIN GLARGINE PER 5 UNITS: Performed by: NURSE PRACTITIONER

## 2024-06-26 PROCEDURE — 81001 URINALYSIS AUTO W/SCOPE: CPT | Performed by: NURSE PRACTITIONER

## 2024-06-26 PROCEDURE — 83880 ASSAY OF NATRIURETIC PEPTIDE: CPT | Performed by: NURSE PRACTITIONER

## 2024-06-26 PROCEDURE — 71260 CT THORAX DX C+: CPT

## 2024-06-26 PROCEDURE — 85576 BLOOD PLATELET AGGREGATION: CPT | Performed by: NURSE PRACTITIONER

## 2024-06-26 PROCEDURE — 99232 SBSQ HOSP IP/OBS MODERATE 35: CPT | Performed by: INTERNAL MEDICINE

## 2024-06-26 PROCEDURE — 99232 SBSQ HOSP IP/OBS MODERATE 35: CPT | Performed by: STUDENT IN AN ORGANIZED HEALTH CARE EDUCATION/TRAINING PROGRAM

## 2024-06-26 RX ADMIN — INSULIN GLARGINE 20 UNITS: 100 INJECTION, SOLUTION SUBCUTANEOUS at 08:57

## 2024-06-26 RX ADMIN — INSULIN LISPRO 2 UNITS: 100 INJECTION, SOLUTION INTRAVENOUS; SUBCUTANEOUS at 08:57

## 2024-06-26 RX ADMIN — Medication 10 ML: at 21:28

## 2024-06-26 RX ADMIN — INSULIN LISPRO 3 UNITS: 100 INJECTION, SOLUTION INTRAVENOUS; SUBCUTANEOUS at 21:28

## 2024-06-26 RX ADMIN — EMPAGLIFLOZIN 10 MG: 10 TABLET, FILM COATED ORAL at 17:58

## 2024-06-26 RX ADMIN — IOPAMIDOL 75 ML: 612 INJECTION, SOLUTION INTRAVENOUS at 07:32

## 2024-06-26 RX ADMIN — VALSARTAN 320 MG: 320 TABLET, FILM COATED ORAL at 08:58

## 2024-06-26 RX ADMIN — ESCITALOPRAM OXALATE 10 MG: 10 TABLET, FILM COATED ORAL at 14:06

## 2024-06-26 RX ADMIN — INSULIN LISPRO 3 UNITS: 100 INJECTION, SOLUTION INTRAVENOUS; SUBCUTANEOUS at 17:53

## 2024-06-26 RX ADMIN — ASPIRIN 325 MG: 325 TABLET ORAL at 08:57

## 2024-06-26 RX ADMIN — ALPRAZOLAM 0.25 MG: 0.25 TABLET ORAL at 21:28

## 2024-06-26 RX ADMIN — ATORVASTATIN CALCIUM 80 MG: 80 TABLET, FILM COATED ORAL at 21:28

## 2024-06-26 RX ADMIN — NIFEDIPINE 90 MG: 60 TABLET, FILM COATED, EXTENDED RELEASE ORAL at 08:56

## 2024-06-26 RX ADMIN — INSULIN LISPRO 3 UNITS: 100 INJECTION, SOLUTION INTRAVENOUS; SUBCUTANEOUS at 14:06

## 2024-06-26 RX ADMIN — NICOTINE 1 PATCH: 21 PATCH, EXTENDED RELEASE TRANSDERMAL at 00:53

## 2024-06-26 RX ADMIN — Medication 10 ML: at 08:58

## 2024-06-26 RX ADMIN — PANTOPRAZOLE SODIUM 40 MG: 40 TABLET, DELAYED RELEASE ORAL at 06:38

## 2024-06-26 NOTE — PLAN OF CARE
Goal Outcome Evaluation:  Plan of Care Reviewed With: patient        Progress: no change      NIH (0)

## 2024-06-26 NOTE — PROGRESS NOTES
[facility]    Patient Name: Bibiana Inman  :1980  44 y.o.  LOS: 5  Encounter Provider: oRnaldo Severino MD      Patient Care Team:  Ibrahima Correa MD as PCP - General (Family Medicine)  Rachele Zafar RN as Ambulatory  (Population Health)  Xochilt Jenkins MD as Consulting Physician (Nephrology)    Chief Complaint/Consult question:  recurrent stroke, hx of CABG, aortic valve fibroelastomas    PMH/HPI: Bibiana Inman is a 44 year-old female with a PMH for multivessel CAD s/p CABG in  by Dr. 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     Summary of cardiovascular history:  - 2024, ED for chest pain. NM perfusion stress showed a moderate-sized, severe area of ischemia located in the inferior and lateral wall. Cath showed significant CAD including  of LAD and RCA, left circumflex with 80-90% stenosis in the proximal segment and mid segment, ramus with proximal 70-80% stenosis. LIMA to LAD is patent, however distal/apical LAD has 80% stenosis. SVG to left circumflex is occluded. SVG to RCA is patent.  Medical therapy was pursued at that time.  - -2024: Admitted to [facility] for acute stroke. MRI showed a linear acute infarct within the left parietal occipital region.  CTA neck showed 70% stenosis in the right ICA. She was evaluated by vascular surgery and there was no indication for carotid revascularization at that time. - 2024: Presented with acute right-sided weakness.  CTA head/neck showed no acute intracranial hemorrhage. MRI showed enlargement of the previously identified acute infarct centered within the genu of the left internal capsule. There is also a new punctate focus of acute infarction within the left insular cortex. 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 She has been hypertensive with blood pressures as high as 199/86.     Cardiology has been asked to see this patient for the mobile mass found on the echocardiogram. She states that her BP has been quite elevated at home over the past month or 2 in the range of 160-170s.  Smokes 6 cigarettes/day for at least 20 years, denies alcohol or substance abuse.    Interval History: No acute events overnight.  Valsartan and nifedipine further uptitrated, BP improved. Planned for surgery next Monday (AV fibroelastoma resection and possible re-do CABG)     Results for orders placed during the hospital encounter of 06/21/24    Adult Transesophageal Echo (YUE) W/ Cont if Necessary Per Protocol    Interpretation Summary    Left ventricular systolic function is normal.  Visually estimated LVEF 60-65%.    Left ventricular wall thickness is consistent with mild to moderate concentric hypertrophy.    There are up to 3 small pedunculated masses attached to the aortic valve leaflets (2 with the noncoronary cusp and a smaller one with left coronary cusp) protruding into the aortic root with the largest one measuring 0.8 x 0.5cm.  The morphology appears consistent with fibroelastomas.    Estimated right ventricular systolic pressure from tricuspid regurgitation is normal (<35 mmHg).    No evidence of intracardiac thrombus including with close evaluation of both atria, BRADEN, and RAA.    Saline test results are negative.    Mild aortic arch plaques.        Objective   Vital Signs  Temp:  [97.7 °F (36.5 °C)-98.6 °F (37 °C)] 97.7 °F (36.5 °C)  Heart Rate:  [57-76] 67  Resp:  [18] 18  BP: (116-185)/(60-74) 152/69    Intake/Output Summary (Last 24 hours) at 6/26/2024 1005  Last data filed at 6/26/2024 0949  Gross per 24 hour   Intake 360 ml   Output --   Net 360 ml     Flowsheet Rows      Flowsheet Row First Filed Value   Admission Height 162.6 cm (64\") " Documented at 06/21/2024 2105   Admission Weight 65.5 kg (144 lb 6.4 oz) Documented at 06/21/2024 2105              Vitals and nursing note reviewed.   Constitutional:       Appearance: Healthy appearance. Not in distress.   Neck:      Vascular: No JVR.   Pulmonary:      Effort: Pulmonary effort is normal.      Breath sounds: Diffuse and bilateral Rhonchi present. No rales.   Cardiovascular:      Bradycardia present. Regular rhythm.      Murmurs: There is no murmur.   Edema:     Peripheral edema absent.   Abdominal:      General: There is no distension.      Palpations: Abdomen is soft.      Tenderness: There is no abdominal tenderness.   Musculoskeletal:      Cervical back: Neck supple. Skin:     General: Skin is cool.   Neurological:      Mental Status: Alert and oriented to person, place and time.           Pertinent Test Results:  Results from last 7 days   Lab Units 06/26/24  0526 06/25/24  1804 06/25/24  0434 06/24/24  0525 06/23/24  0500 06/22/24  0323 06/21/24 2114 06/21/24 2106   SODIUM mmol/L 137  --  138 139 138 137  --  137   POTASSIUM mmol/L 4.3 4.9 3.6 3.7 3.8 3.5  --  3.8   CHLORIDE mmol/L 108*  --  109* 108* 108* 106  --  104   CO2 mmol/L 23.2  --  21.7* 21.9* 23.0 22.3  --  22.0   BUN mg/dL 13  --  9 8 10 10  --  11   CREATININE mg/dL 1.02*  --  0.82 0.93 0.98 1.23* 1.60* 1.40*   GLUCOSE mg/dL 169*  --  126* 134* 168* 56*  --  197*   CALCIUM mg/dL 9.1  --  8.5* 8.6 8.2* 8.4*  --  8.5*   AST (SGOT) U/L 17  --   --   --   --   --   --  12   ALT (SGPT) U/L 16  --   --   --   --   --   --  9     Results from last 7 days   Lab Units 06/21/24  2304 06/21/24 2106   HSTROP T ng/L 53* 56*     Results from last 7 days   Lab Units 06/26/24  0526 06/25/24  0434 06/24/24  0525 06/23/24  0500 06/22/24  0323 06/21/24  2106   WBC 10*3/mm3 7.64 7.88 7.76 7.02 11.58* 10.72   HEMOGLOBIN g/dL 11.2* 11.1* 11.2* 11.4* 11.5* 12.0   HEMATOCRIT % 34.5 33.9* 35.8 35.3 35.7 37.1   PLATELETS 10*3/mm3 218 197 203 202 265 245      Results from last 7 days   Lab Units 06/26/24  0526 06/21/24  2106   INR  1.00 0.98   APTT seconds 30.4 27.5     Results from last 7 days   Lab Units 06/26/24  0526   MAGNESIUM mg/dL 1.8     Results from last 7 days   Lab Units 06/22/24  0323   CHOLESTEROL mg/dL 245*   TRIGLYCERIDES mg/dL 610*   HDL CHOL mg/dL 31*     Results from last 7 days   Lab Units 06/26/24  0526   PROBNP pg/mL 721.0*               Medication Review:   aspirin, 325 mg, Oral, Daily   Or  aspirin, 300 mg, Rectal, Daily  atorvastatin, 80 mg, Oral, Nightly  [START ON 7/1/2024] ceFAZolin, 2,000 mg, Intravenous, On Call to OR  [START ON 6/30/2024] chlorhexidine, 15 mL, Mouth/Throat, Q12H  [START ON 6/30/2024] Chlorhexidine Gluconate Cloth, 1 Application, Topical, Q12H  escitalopram, 10 mg, Oral, Daily  insulin glargine, 20 Units, Subcutaneous, Daily  insulin lispro, 2-7 Units, Subcutaneous, 4x Daily AC & at Bedtime  [START ON 7/1/2024] metoprolol tartrate, 12.5 mg, Oral, On Call to OR  [START ON 6/30/2024] mupirocin, 1 Application, Each Nare, Q12H  nicotine, 1 patch, Transdermal, Q24H  NIFEdipine XL, 90 mg, Oral, Q24H  pantoprazole, 40 mg, Oral, QAM  sodium chloride, 10 mL, Intravenous, Q12H  valsartan, 320 mg, Oral, Q24H         lactated ringers, 9 mL/hr        Assessment & Plan     Active Hospital Problems    Diagnosis  POA    **Acute right-sided weakness [R53.1]  Yes    CKD stage 3a, GFR 45-59 ml/min [N18.31]  Yes    Carotid stenosis [I65.29]  Yes    Lacunar cerebrovascular accident (CVA) [I63.81]  Yes    Elevated troponin [R79.89]  Yes    Aortic valve disease [I35.9]  Unknown    Tobacco abuse [Z72.0]  Yes    Coronary artery disease involving native coronary artery of native heart with unstable angina pectoris [I25.110]  Yes    Gastroparesis diabeticorum [E11.43, K31.84]  Yes    Gastroesophageal reflux disease [K21.9]  Yes    Iron deficiency anemia [D50.9]  Yes    Type 2 diabetes mellitus with hyperglycemia, with long-term current use of  insulin [E11.65, Z79.4]  Not Applicable    Benign essential hypertension [I10]  Yes    Mixed hypercholesterolemia and hypertriglyceridemia [E78.2]  Yes      Resolved Hospital Problems   No resolved problems to display.        Recurrent stroke/aortic valve mass: Stroke x 2 over the past month, this time presented with dysarthria and MRI showed a new punctate focus of acute infarction within the left insular cortex. 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  YUE confirmed the presence of up to 3 likely fibroelastoma on the aortic valve.  Cardiothoracic surgery consulted for possible surgical resection, planned for Monday +/- re-do CABG.  BP control, see below.  Hypertension: BP readings very elevated this admission up to ~200/80s, home meds included Lopressor, clonidine, hydralazine, and nifedipine (recently started last week).  Prior cough with losartan, started on valsartan and now maximized at 320 daily. Also restarted home nifedipine 60 daily yesterday and increased to 90 daily.  PRN oral hydralazine for intermittent BP spikes.  Hyperlipidemia: Lipids this month showed HDL 31, , uncontrolled.  LDL goal < 70 given history of CABG. continue home atorvastatin 80, patient will need addition of Zetia +/- PCSK9 inhibitor upon discharge.  Diabetes mellitus: Hemoglobin A1c 17 his admission, uncontrolled.  Insulin-dependent, management per primary team and other specialists.  Already on Jardiance 10 daily at home for added cardiovascular benefit which I recommend restarting.  CAD s/p CABG: Multivessel CAD s/p CABG in 2014 by Dr. Garcia at [facility]. Most recent cath 2/2024 showed LAD and RCA , proximal to mid left circumflex with 80-90% stenosis, ramus with proximal 70-80% stenosis. LIMA to LAD is patent, however distal/apical LAD has 80% stenosis. SVG to left  circumflex is occluded. SVG to RCA is patent. Denies angina, possible re-do CABG as above.  Continue home aspirin and Plavix (holding for now per CV surgery request), BP and lipid control as above, smoking cessation is key, see below.  Tobacco abuse: Smokes 6 cigarettes/day for at least 20 years, strongly encourage complete cessation.    Ronaldo Severino MD  [facility]  06/26/24  12:17 EDT

## 2024-06-26 NOTE — PROGRESS NOTES
Name: Bibiana Inman ADMIT: 2024   : 1980  PCP: Ibrahima Correa MD    MRN: [id number] LOS: 5 days   AGE/SEX: 44 y.o. female  ROOM: [facility]     Subjective   Subjective   No acute events. Patient denies new complaints. No family at bedside.    Objective   Objective   Vital Signs  Temp:  [97.7 °F (36.5 °C)-98.6 °F (37 °C)] 97.7 °F (36.5 °C)  Heart Rate:  [57-76] 67  Resp:  [18] 18  BP: (116-155)/(60-73) 152/69  SpO2:  [88 %-100 %] 98 %  on   ;   Device (Oxygen Therapy): room air  Body mass index is 24.56 kg/m².  Physical Exam  Vitals and nursing note reviewed.   Constitutional:       General: She is not in acute distress.     Appearance: She is not toxic-appearing or diaphoretic.   HENT:      Head: Normocephalic and atraumatic.      Nose: Nose normal.      Mouth/Throat:      Mouth: Mucous membranes are moist.      Pharynx: Oropharynx is clear.   Eyes:      Conjunctiva/sclera: Conjunctivae normal.      Pupils: Pupils are equal, round, and reactive to light.   Cardiovascular:      Rate and Rhythm: Normal rate and regular rhythm.      Pulses: Normal pulses.   Pulmonary:      Effort: Pulmonary effort is normal.   Abdominal:      General: Bowel sounds are normal.      Palpations: Abdomen is soft.      Tenderness: There is no abdominal tenderness.   Musculoskeletal:         General: No swelling or tenderness.      Cervical back: Neck supple.   Skin:     General: Skin is warm and dry.      Capillary Refill: Capillary refill takes less than 2 seconds.   Neurological:      Mental Status: She is alert and oriented to person, place, and time.      Comments: +mild right lower facial weakness   Psychiatric:         Mood and Affect: Mood normal.         Behavior: Behavior normal.       Results Review     I reviewed the patient's new clinical results.  Results from last 7 days   Lab Units 24  0526 24  0434 24  0525 24  0500   WBC 10*3/mm3 7.64 7.88 7.76 7.02   HEMOGLOBIN g/dL 11.2* 11.1*  11.2* 11.4*   PLATELETS 10*3/mm3 218 197 203 202     Results from last 7 days   Lab Units 06/26/24  0526 06/25/24  1804 06/25/24  0434 06/24/24  0525 06/23/24  0500   SODIUM mmol/L 137  --  138 139 138   POTASSIUM mmol/L 4.3 4.9 3.6 3.7 3.8   CHLORIDE mmol/L 108*  --  109* 108* 108*   CO2 mmol/L 23.2  --  21.7* 21.9* 23.0   BUN mg/dL 13  --  9 8 10   CREATININE mg/dL 1.02*  --  0.82 0.93 0.98   GLUCOSE mg/dL 169*  --  126* 134* 168*   EGFR mL/min/1.73 68.9  --  89.5 76.9 72.2     Results from last 7 days   Lab Units 06/26/24  0526 06/21/24  2106   ALBUMIN g/dL 2.9* 2.9*   BILIRUBIN mg/dL <0.2 <0.2   ALK PHOS U/L 88 90   AST (SGOT) U/L 17 12   ALT (SGPT) U/L 16 9     Results from last 7 days   Lab Units 06/26/24  0526 06/25/24  0434 06/24/24  0525 06/23/24  0500 06/22/24  0323 06/21/24  2106   CALCIUM mg/dL 9.1 8.5* 8.6 8.2*   < > 8.5*   ALBUMIN g/dL 2.9*  --   --   --   --  2.9*   MAGNESIUM mg/dL 1.8  --   --   --   --   --     < > = values in this interval not displayed.       Hemoglobin A1C   Date/Time Value Ref Range Status   06/26/2024 0527 13.10 (H) 4.80 - 5.60 % Final     Glucose   Date/Time Value Ref Range Status   06/26/2024 1151 214 (H) 70 - 130 mg/dL Final   06/26/2024 0605 170 (H) 70 - 130 mg/dL Final   06/25/2024 2031 244 (H) 70 - 130 mg/dL Final   06/25/2024 1722 169 (H) 70 - 130 mg/dL Final   06/25/2024 1126 113 70 - 130 mg/dL Final   06/25/2024 0837 129 70 - 130 mg/dL Final   06/25/2024 0603 125 70 - 130 mg/dL Final       No radiology results for the last day    I have personally reviewed all medications:  Scheduled Medications  aspirin, 325 mg, Oral, Daily   Or  aspirin, 300 mg, Rectal, Daily  atorvastatin, 80 mg, Oral, Nightly  [START ON 7/1/2024] ceFAZolin, 2,000 mg, Intravenous, On Call to OR  [START ON 6/30/2024] chlorhexidine, 15 mL, Mouth/Throat, Q12H  [START ON 6/30/2024] Chlorhexidine Gluconate Cloth, 1 Application, Topical, Q12H  empagliflozin, 10 mg, Oral, Daily  escitalopram, 10 mg, Oral,  Daily  insulin glargine, 20 Units, Subcutaneous, Daily  insulin lispro, 2-7 Units, Subcutaneous, 4x Daily AC & at Bedtime  [START ON 7/1/2024] metoprolol tartrate, 12.5 mg, Oral, On Call to OR  [START ON 6/30/2024] mupirocin, 1 Application, Each Nare, Q12H  nicotine, 1 patch, Transdermal, Q24H  NIFEdipine XL, 90 mg, Oral, Q24H  pantoprazole, 40 mg, Oral, QAM  sodium chloride, 10 mL, Intravenous, Q12H  valsartan, 320 mg, Oral, Q24H    Infusions  lactated ringers, 9 mL/hr    Diet  Diet: Diabetic; Consistent Carbohydrate; Fluid Consistency: Thin (IDDSI 0)  NPO Diet NPO Type: Sips with Meds    I have personally reviewed:  [x]  Laboratory   []  Microbiology   []  Radiology   [x]  EKG/Telemetry  []  Cardiology/Vascular   []  Pathology    []  Records    Assessment/Plan     Active Hospital Problems    Diagnosis  POA    **Acute right-sided weakness [R53.1]  Yes    CKD stage 3a, GFR 45-59 ml/min [N18.31]  Yes    Carotid stenosis [I65.29]  Yes    Lacunar cerebrovascular accident (CVA) [I63.81]  Yes    Elevated troponin [R79.89]  Yes    Aortic valve disease [I35.9]  Unknown    Tobacco abuse [Z72.0]  Yes    Coronary artery disease involving native coronary artery of native heart with unstable angina pectoris [I25.110]  Yes    Gastroparesis diabeticorum [E11.43, 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   Acute CVA  - presented with right-sided weakness which has improved, has mild right facial droop still  - MRI showed enlargement of acute infarct in the genu of the left internal capsule as well as a new infarct in the left insular cortex, and previously identified acute  infarction within the white matter of the left parietal lobe  - continue ASA, statin-hold plavix for planned procedure  - needs aggressive risk factor control, particularly with HTN and DM  - YUE showed 3 fibroelastomas-surgery planned for Monday 7/1 following plavix washout  - appreciate cardiology, CT surgery, and neurology recs     Type 2 DM  - complications include gastroparesis  - on lantus and jardiance as outpatient  - BG is elevated, also needs better outpatient control, A1C 16.90%  - continue lantus 20 units daily   - cover with ssi/hypoglycemia protocol  - restart jardiance     HTN/CAD s/p CABG  - BP elevated but improving  - continue nifedipine and valsartan      GERD  - symptoms controlled, continue ppi    SCDs for DVT prophylaxis.  Full code.  Discussed with patient, nursing staff, and care team on multidisciplinary rounds.  Anticipate discharge home with HH vs SNU facility timing yet to be determined.  Expected Discharge Date: 7/9/2024; Expected Discharge Time:       Lenin Huang MD  [facility]  06/26/24  13:41 EDT    Portions of this text have been copied and I have reviewed them. They are accurate as of 6/26/2024

## 2024-06-26 NOTE — PROGRESS NOTES
"DOS: 2024  NAME: Bibiana Inman   : 1980  PCP: Ibrahima Correa MD  Chief Complaint   Patient presents with    Extremity Weakness       Chief complaint: Recurrent strokes  Subjective: Patient seen and examined at the bedside this morning, no acute overnight event, stable neurological exam with NIH score of 0.    Objective:  Vital signs: /69 (Patient Position: Lying)   Pulse 67   Temp 97.7 °F (36.5 °C) (Oral)   Resp 18   Ht 162.6 cm (64\")   Wt 64.9 kg (143 lb 1.3 oz)   LMP 01/10/2023 Comment: patient states irregular periods  SpO2 98%   BMI 24.56 kg/m²    Gen: NAD, vitals reviewed  MS: oriented x3, recent/remote memory intact, normal attention/concentration, language intact, no neglect.  CN: visual acuity grossly normal, PERRL, EOMI, no facial droop, no dysarthria  Motor: 5/5 throughout upper and lower extremities, normal tone  Sensory: intact to light touch all 4 ext.    Laboratory results:  Lab Results   Component Value Date    GLUCOSE 169 (H) 2024    CALCIUM 9.1 2024     2024    K 4.3 2024    CO2 23.2 2024     (H) 2024    BUN 13 2024    CREATININE 1.02 (H) 2024    EGFRIFNONA 26 (L) 2021    BCR 12.7 2024    ANIONGAP 5.8 2024     Lab Results   Component Value Date    WBC 7.64 2024    HGB 11.2 (L) 2024    HCT 34.5 2024    MCV 91.5 2024     2024     Lab Results   Component Value Date     (H) 2024    LDL  2024      Comment:      Unable to calculate     (H) 2024         Lab 24  0527   HEMOGLOBIN A1C 13.10*        Review of labs: Above labs reviewed     Review and interpretation of imaging: I personally reviewed the repeated MRI brain and discussed with neuroradiologist  showed enlargement/worsening acute/subacute ischemic stroke on the left jenu of IC/thalamus, new punctate ischemic stroke on the left insular cortex     ECHO 2024    " Left ventricular systolic function is normal. Calculated left ventricular EF = 62.5%    Left ventricular diastolic function is consistent with (grade II w/high LAP) pseudonormalization.    There is a small (6 mm) mobile mass likely with a stalk and appears to be attached  the non-coronary cusp(s) of the aortic valve.  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  Looks like it was also present but not well-visualized on prior echo done on  2/20/2024.    Saline test results are negative for right to left atrial level shunt.    Recommend YUE considering patient's history of CVA.    YUE;    Left ventricular systolic function is normal.  Visually estimated LVEF 60-65%.    Left ventricular wall thickness is consistent with mild to moderate concentric hypertrophy.    There are up to 3 small pedunculated masses attached to the aortic valve leaflets (2 with the noncoronary cusp and a smaller one with left coronary cusp) protruding into the aortic root with the largest one measuring 0.8 x 0.5cm.  The morphology appears consistent with fibroelastomas.    Estimated right ventricular systolic pressure from tricuspid regurgitation is normal (<35 mmHg).    No evidence of intracardiac thrombus including with close evaluation of both atria, BRADEN, and RAA.    Saline test results are negative.    Mild aortic arch plaques.     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  -Abnormal YUE  concerning for mobile aortic masses concerning for fibroelastomas  -Essential hypertension  -Mixed hyperlipidemia  -Obstructive sleep apnea not compliant with CPAP machine  -Poorly controlled type 2 diabetes A1c 16.8  -Tobacco abuse    Plan:  -Cardiothoracic surgery consulted for the fibroelastoma's and following, surgery next Monday 7/1  -Continue dual antiplatelet therapy  -Continue Lipitor 80 mg daily  -Counseled the patient regarding controlling risk  factors goal A1c less than 6.5, goal LDL less than 70, blood pressure goal less than 130/80 due to diabetes with hypertension and strokes  -Inpatient diabetic educator consult due to poorly controlled diabetes with markedly elevated A1c  -Recommend compliance with CPAP machine  -Counseled the patient regarding tobacco use, she stated understanding  -Counseled the patient to monitor blood pressure at home for the next 10 to 14 days twice daily and bring the numbers to PCP to adjust her blood pressure medications if needed  -Follow up with stroke clinic in 6-8 weeks, MA Mrs Casiano notified to arrange for that.    Will continue to follow peripherally and see again after the surgery next week.

## 2024-06-27 ENCOUNTER — APPOINTMENT (OUTPATIENT)
Dept: CARDIOLOGY | Facility: HOSPITAL | Age: 44
DRG: 981 | End: 2024-06-27
Payer: COMMERCIAL

## 2024-06-27 LAB
ANION GAP SERPL CALCULATED.3IONS-SCNC: 6.7 MMOL/L (ref 5–15)
BACTERIA UR QL AUTO: NORMAL /HPF
BASOPHILS # BLD AUTO: 0.05 10*3/MM3 (ref 0–0.2)
BASOPHILS NFR BLD AUTO: 0.6 % (ref 0–1.5)
BH CV VAS MEAS RADIAL UPPER ARM LEFT - DIST: 0.24 CM
BH CV VAS MEAS RADIAL UPPER ARM LEFT - MID: 0.22 CM
BH CV VAS MEAS RADIAL UPPER ARM LEFT - PROX: 0.2 CM
BILIRUB UR QL STRIP: NEGATIVE
BUN SERPL-MCNC: 16 MG/DL (ref 6–20)
BUN/CREAT SERPL: 14.4 (ref 7–25)
CALCIUM SPEC-SCNC: 8.8 MG/DL (ref 8.6–10.5)
CHLORIDE SERPL-SCNC: 106 MMOL/L (ref 98–107)
CLARITY UR: CLEAR
CO2 SERPL-SCNC: 24.3 MMOL/L (ref 22–29)
COLOR UR: YELLOW
CREAT SERPL-MCNC: 1.11 MG/DL (ref 0.57–1)
DEPRECATED RDW RBC AUTO: 46.6 FL (ref 37–54)
EGFRCR SERPLBLD CKD-EPI 2021: 62.2 ML/MIN/1.73
EOSINOPHIL # BLD AUTO: 0.23 10*3/MM3 (ref 0–0.4)
EOSINOPHIL NFR BLD AUTO: 2.8 % (ref 0.3–6.2)
ERYTHROCYTE [DISTWIDTH] IN BLOOD BY AUTOMATED COUNT: 13.7 % (ref 12.3–15.4)
GLUCOSE BLDC GLUCOMTR-MCNC: 155 MG/DL (ref 70–130)
GLUCOSE BLDC GLUCOMTR-MCNC: 159 MG/DL (ref 70–130)
GLUCOSE BLDC GLUCOMTR-MCNC: 164 MG/DL (ref 70–130)
GLUCOSE BLDC GLUCOMTR-MCNC: 265 MG/DL (ref 70–130)
GLUCOSE SERPL-MCNC: 155 MG/DL (ref 65–99)
GLUCOSE UR STRIP-MCNC: ABNORMAL MG/DL
HCT VFR BLD AUTO: 34.1 % (ref 34–46.6)
HGB BLD-MCNC: 10.9 G/DL (ref 12–15.9)
HGB UR QL STRIP.AUTO: NEGATIVE
HYALINE CASTS UR QL AUTO: NORMAL /LPF
IMM GRANULOCYTES # BLD AUTO: 0.04 10*3/MM3 (ref 0–0.05)
IMM GRANULOCYTES NFR BLD AUTO: 0.5 % (ref 0–0.5)
KETONES UR QL STRIP: NEGATIVE
LEUKOCYTE ESTERASE UR QL STRIP.AUTO: NEGATIVE
LYMPHOCYTES # BLD AUTO: 2.97 10*3/MM3 (ref 0.7–3.1)
LYMPHOCYTES NFR BLD AUTO: 36 % (ref 19.6–45.3)
MCH RBC QN AUTO: 29.5 PG (ref 26.6–33)
MCHC RBC AUTO-ENTMCNC: 32 G/DL (ref 31.5–35.7)
MCV RBC AUTO: 92.4 FL (ref 79–97)
MONOCYTES # BLD AUTO: 0.55 10*3/MM3 (ref 0.1–0.9)
MONOCYTES NFR BLD AUTO: 6.7 % (ref 5–12)
NEUTROPHILS NFR BLD AUTO: 4.41 10*3/MM3 (ref 1.7–7)
NEUTROPHILS NFR BLD AUTO: 53.4 % (ref 42.7–76)
NITRITE UR QL STRIP: NEGATIVE
NRBC BLD AUTO-RTO: 0 /100 WBC (ref 0–0.2)
PH UR STRIP.AUTO: 7 [PH] (ref 5–8)
PLATELET # BLD AUTO: 235 10*3/MM3 (ref 140–450)
PMV BLD AUTO: 10.8 FL (ref 6–12)
POTASSIUM SERPL-SCNC: 3.9 MMOL/L (ref 3.5–5.2)
PROT UR QL STRIP: ABNORMAL
RBC # BLD AUTO: 3.69 10*6/MM3 (ref 3.77–5.28)
RBC # UR STRIP: NORMAL /HPF
REF LAB TEST METHOD: NORMAL
SARS-COV-2 RNA RESP QL NAA+PROBE: NOT DETECTED
SODIUM SERPL-SCNC: 137 MMOL/L (ref 136–145)
SP GR UR STRIP: 1.02 (ref 1–1.03)
SQUAMOUS #/AREA URNS HPF: NORMAL /HPF
UROBILINOGEN UR QL STRIP: ABNORMAL
WBC # UR STRIP: NORMAL /HPF
WBC NRBC COR # BLD AUTO: 8.25 10*3/MM3 (ref 3.4–10.8)

## 2024-06-27 PROCEDURE — 63710000001 INSULIN GLARGINE PER 5 UNITS: Performed by: NURSE PRACTITIONER

## 2024-06-27 PROCEDURE — 93971 EXTREMITY STUDY: CPT

## 2024-06-27 PROCEDURE — 85025 COMPLETE CBC W/AUTO DIFF WBC: CPT | Performed by: STUDENT IN AN ORGANIZED HEALTH CARE EDUCATION/TRAINING PROGRAM

## 2024-06-27 PROCEDURE — 87635 SARS-COV-2 COVID-19 AMP PRB: CPT | Performed by: NURSE PRACTITIONER

## 2024-06-27 PROCEDURE — 82948 REAGENT STRIP/BLOOD GLUCOSE: CPT

## 2024-06-27 PROCEDURE — 80048 BASIC METABOLIC PNL TOTAL CA: CPT | Performed by: STUDENT IN AN ORGANIZED HEALTH CARE EDUCATION/TRAINING PROGRAM

## 2024-06-27 PROCEDURE — 63710000001 INSULIN LISPRO (HUMAN) PER 5 UNITS: Performed by: NURSE PRACTITIONER

## 2024-06-27 PROCEDURE — 81001 URINALYSIS AUTO W/SCOPE: CPT

## 2024-06-27 PROCEDURE — 63710000001 INSULIN GLARGINE PER 5 UNITS

## 2024-06-27 PROCEDURE — 63710000001 INSULIN LISPRO (HUMAN) PER 5 UNITS: Performed by: INTERNAL MEDICINE

## 2024-06-27 PROCEDURE — 99232 SBSQ HOSP IP/OBS MODERATE 35: CPT | Performed by: INTERNAL MEDICINE

## 2024-06-27 PROCEDURE — 93971 EXTREMITY STUDY: CPT | Performed by: STUDENT IN AN ORGANIZED HEALTH CARE EDUCATION/TRAINING PROGRAM

## 2024-06-27 RX ORDER — INSULIN LISPRO 100 [IU]/ML
2-7 INJECTION, SOLUTION INTRAVENOUS; SUBCUTANEOUS
Status: DISCONTINUED | OUTPATIENT
Start: 2024-06-27 | End: 2024-06-27

## 2024-06-27 RX ORDER — INSULIN LISPRO 100 [IU]/ML
2-7 INJECTION, SOLUTION INTRAVENOUS; SUBCUTANEOUS
Status: DISCONTINUED | OUTPATIENT
Start: 2024-06-27 | End: 2024-07-01

## 2024-06-27 RX ADMIN — INSULIN LISPRO 2 UNITS: 100 INJECTION, SOLUTION INTRAVENOUS; SUBCUTANEOUS at 17:40

## 2024-06-27 RX ADMIN — INSULIN LISPRO 2 UNITS: 100 INJECTION, SOLUTION INTRAVENOUS; SUBCUTANEOUS at 06:51

## 2024-06-27 RX ADMIN — Medication 10 ML: at 21:14

## 2024-06-27 RX ADMIN — Medication 10 ML: at 08:27

## 2024-06-27 RX ADMIN — ASPIRIN 325 MG: 325 TABLET ORAL at 08:27

## 2024-06-27 RX ADMIN — EMPAGLIFLOZIN 10 MG: 10 TABLET, FILM COATED ORAL at 08:27

## 2024-06-27 RX ADMIN — ATORVASTATIN CALCIUM 80 MG: 80 TABLET, FILM COATED ORAL at 21:14

## 2024-06-27 RX ADMIN — INSULIN GLARGINE 20 UNITS: 100 INJECTION, SOLUTION SUBCUTANEOUS at 08:26

## 2024-06-27 RX ADMIN — INSULIN GLARGINE 10 UNITS: 100 INJECTION, SOLUTION SUBCUTANEOUS at 21:14

## 2024-06-27 RX ADMIN — NIFEDIPINE 90 MG: 60 TABLET, FILM COATED, EXTENDED RELEASE ORAL at 08:27

## 2024-06-27 RX ADMIN — ESCITALOPRAM OXALATE 10 MG: 10 TABLET, FILM COATED ORAL at 08:27

## 2024-06-27 RX ADMIN — VALSARTAN 320 MG: 320 TABLET, FILM COATED ORAL at 08:27

## 2024-06-27 RX ADMIN — INSULIN LISPRO 7 UNITS: 100 INJECTION, SOLUTION INTRAVENOUS; SUBCUTANEOUS at 13:21

## 2024-06-27 RX ADMIN — PANTOPRAZOLE SODIUM 40 MG: 40 TABLET, DELAYED RELEASE ORAL at 06:11

## 2024-06-27 NOTE — PROGRESS NOTES
" LOS: 6 days   Patient Care Team:  Ibrahima Correa MD as PCP - General (Family Medicine)  Rachele Zafar, ALEENA as Ambulatory  (Population Health)  Xochilt Jenkins MD as Consulting Physician (Nephrology)    Chief Complaint: aortic fibroelastoma     Subjective  No new complaints     Vital Signs  Temp:  [98 °F (36.7 °C)-98.9 °F (37.2 °C)] 98 °F (36.7 °C)  Heart Rate:  [59-74] 62  Resp:  [18] 18  BP: (110-150)/(59-73) 110/69  Body mass index is 24.56 kg/m².    Intake/Output Summary (Last 24 hours) at 6/27/2024 0952  Last data filed at 6/27/2024 0850  Gross per 24 hour   Intake 360 ml   Output --   Net 360 ml     I/O this shift:  In: 360 [P.O.:360]  Out: -              06/23/24  1750 06/25/24  0805 06/26/24  0532   Weight: 65.3 kg (144 lb) 65.3 kg (144 lb) 64.9 kg (143 lb 1.3 oz)         Objective:  Vital signs: (most recent): Blood pressure 110/69, pulse 62, temperature 98 °F (36.7 °C), temperature source Oral, resp. rate 18, height 162.6 cm (64\"), weight 64.9 kg (143 lb 1.3 oz), last menstrual period 01/10/2023, SpO2 97%, not currently breastfeeding.                Results Review:        WBC WBC   Date Value Ref Range Status   06/27/2024 8.25 3.40 - 10.80 10*3/mm3 Final   06/26/2024 7.64 3.40 - 10.80 10*3/mm3 Final   06/25/2024 7.88 3.40 - 10.80 10*3/mm3 Final      HGB Hemoglobin   Date Value Ref Range Status   06/27/2024 10.9 (L) 12.0 - 15.9 g/dL Final   06/26/2024 11.2 (L) 12.0 - 15.9 g/dL Final   06/25/2024 11.1 (L) 12.0 - 15.9 g/dL Final      HCT Hematocrit   Date Value Ref Range Status   06/27/2024 34.1 34.0 - 46.6 % Final   06/26/2024 34.5 34.0 - 46.6 % Final   06/25/2024 33.9 (L) 34.0 - 46.6 % Final      Platelets Platelets   Date Value Ref Range Status   06/27/2024 235 140 - 450 10*3/mm3 Final   06/26/2024 218 140 - 450 10*3/mm3 Final   06/25/2024 197 140 - 450 10*3/mm3 Final        PT/INR:    Protime   Date Value Ref Range Status   06/26/2024 13.4 11.7 - 14.2 Seconds Final   /  INR "   Date Value Ref Range Status   06/26/2024 1.00 0.90 - 1.10 Final       Sodium Sodium   Date Value Ref Range Status   06/27/2024 137 136 - 145 mmol/L Final   06/26/2024 137 136 - 145 mmol/L Final   06/25/2024 138 136 - 145 mmol/L Final      Potassium Potassium   Date Value Ref Range Status   06/27/2024 3.9 3.5 - 5.2 mmol/L Final   06/26/2024 4.3 3.5 - 5.2 mmol/L Final   06/25/2024 4.9 3.5 - 5.2 mmol/L Final   06/25/2024 3.6 3.5 - 5.2 mmol/L Final      Chloride Chloride   Date Value Ref Range Status   06/27/2024 106 98 - 107 mmol/L Final   06/26/2024 108 (H) 98 - 107 mmol/L Final   06/25/2024 109 (H) 98 - 107 mmol/L Final      Bicarbonate CO2   Date Value Ref Range Status   06/27/2024 24.3 22.0 - 29.0 mmol/L Final   06/26/2024 23.2 22.0 - 29.0 mmol/L Final   06/25/2024 21.7 (L) 22.0 - 29.0 mmol/L Final      BUN BUN   Date Value Ref Range Status   06/27/2024 16 6 - 20 mg/dL Final   06/26/2024 13 6 - 20 mg/dL Final   06/25/2024 9 6 - 20 mg/dL Final      Creatinine Creatinine   Date Value Ref Range Status   06/27/2024 1.11 (H) 0.57 - 1.00 mg/dL Final   06/26/2024 1.02 (H) 0.57 - 1.00 mg/dL Final   06/25/2024 0.82 0.57 - 1.00 mg/dL Final      Calcium Calcium   Date Value Ref Range Status   06/27/2024 8.8 8.6 - 10.5 mg/dL Final   06/26/2024 9.1 8.6 - 10.5 mg/dL Final   06/25/2024 8.5 (L) 8.6 - 10.5 mg/dL Final      Magnesium Magnesium   Date Value Ref Range Status   06/26/2024 1.8 1.6 - 2.6 mg/dL Final          aspirin, 325 mg, Oral, Daily   Or  aspirin, 300 mg, Rectal, Daily  atorvastatin, 80 mg, Oral, Nightly  [START ON 7/1/2024] ceFAZolin, 2,000 mg, Intravenous, On Call to OR  [START ON 6/30/2024] chlorhexidine, 15 mL, Mouth/Throat, Q12H  [START ON 6/30/2024] Chlorhexidine Gluconate Cloth, 1 Application, Topical, Q12H  empagliflozin, 10 mg, Oral, Daily  escitalopram, 10 mg, Oral, Daily  insulin glargine, 20 Units, Subcutaneous, Daily  insulin lispro, 2-7 Units, Subcutaneous, 4x Daily AC & at Bedtime  [START ON  7/1/2024] metoprolol tartrate, 12.5 mg, Oral, On Call to OR  [START ON 6/30/2024] mupirocin, 1 Application, Each Nare, Q12H  nicotine, 1 patch, Transdermal, Q24H  NIFEdipine XL, 90 mg, Oral, Q24H  pantoprazole, 40 mg, Oral, QAM  sodium chloride, 10 mL, Intravenous, Q12H  valsartan, 320 mg, Oral, Q24H      lactated ringers, 9 mL/hr              Acute right-sided weakness    Benign essential hypertension    Gastroparesis diabeticorum    Gastroesophageal reflux disease    Mixed hypercholesterolemia and hypertriglyceridemia    Iron deficiency anemia    Type 2 diabetes mellitus with hyperglycemia, with long-term current use of insulin    Coronary artery disease involving native coronary artery of native heart with unstable angina pectoris    Tobacco abuse    CKD stage 3a, GFR 45-59 ml/min    Carotid stenosis    Lacunar cerebrovascular accident (CVA)    Elevated troponin    Aortic valve disease      Assessment & Plan    -Aortic valve mass/fibroelastoma  -Recurrent MCA stroke  -Coronary artery disease status post CABG in 2014  -Uncontrolled diabetes mellitus, HgbA1c 16.9, with peripheral neuropathy  -CKD, stage 3  -Hypertension  -Hyperlipidemia  -Obesity  -Tobacco abuse-- encourage cessation  -PAD, Carotid stenosis, 70% right ICA  -GERD  -LB  -chronic anemia      Continue to hold plavix.  Plt agg 82 yesterday  Plan for surgery on Monday.      Lora Avila, KAUSHAL  06/27/24  09:52 EDT

## 2024-06-27 NOTE — PROGRESS NOTES
[facility]    Patient Name: Bibiana Inman  :1980  44 y.o.  LOS: 6  Encounter Provider: Ronaldo Severino MD      Patient Care Team:  Ibrahima Correa MD as PCP - General (Family Medicine)  Rachele Zafar RN as Ambulatory  (Population Health)  Xochilt Jenkins MD as Consulting Physician (Nephrology)    Chief Complaint/Consult question:  recurrent stroke, hx of CABG, aortic valve fibroelastomas    PMH/HPI: Bibiana Inman is a 44 year-old female with a PMH for multivessel CAD s/p CABG in  by Dr. 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     Summary of cardiovascular history:  - 2024, ED for chest pain. NM perfusion stress showed a moderate-sized, severe area of ischemia located in the inferior and lateral wall. Cath showed significant CAD including  of LAD and RCA, left circumflex with 80-90% stenosis in the proximal segment and mid segment, ramus with proximal 70-80% stenosis. LIMA to LAD is patent, however distal/apical LAD has 80% stenosis. SVG to left circumflex is occluded. SVG to RCA is patent.  Medical therapy was pursued at that time.  - -2024: Admitted to [facility] for acute stroke. MRI showed a linear acute infarct within the left parietal occipital region.  CTA neck showed 70% stenosis in the right ICA. She was evaluated by vascular surgery and there was no indication for carotid revascularization at that time.   - 2024: Presented with acute right-sided weakness.  CTA head/neck showed no acute intracranial hemorrhage. MRI showed enlargement of the previously identified acute infarct centered within the genu of the left internal capsule. There is also a new punctate focus of acute infarction within the left insular cortex. 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 She has been hypertensive with blood pressures as high as 199/86 requiring multiple BP agent initiation and up titration.  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  Cardiovascular surgery consulted, plan for surgery next Monday.    Interval History: Again no acute events overnight, BP is now controlled on higher doses of valsartan and nifedipine. Planned for surgery next Monday (AV fibroelastoma resection and possible re-do CABG)     Results for orders placed during the hospital encounter of 06/21/24    Adult Transesophageal Echo (YUE) W/ Cont if Necessary Per Protocol    Interpretation Summary    Left ventricular systolic function is normal.  Visually estimated LVEF 60-65%.    Left ventricular wall thickness is consistent with mild to moderate concentric hypertrophy.    There are up to 3 small pedunculated masses attached to the aortic valve leaflets (2 with the noncoronary cusp and a smaller one with left coronary cusp) protruding into the aortic root with the largest one measuring 0.8 x 0.5cm.  The morphology appears consistent with fibroelastomas.    Estimated right ventricular systolic pressure from tricuspid regurgitation is normal (<35 mmHg).    No evidence of intracardiac thrombus including with close evaluation of both atria, BRADEN, and RAA.    Saline test results are negative.    Mild aortic arch plaques.        Objective   Vital Signs  Temp:  [98 °F (36.7 °C)-98.9 °F (37.2 °C)] 98 °F (36.7 °C)  Heart Rate:  [59-74] 62  Resp:  [18] 18  BP: (110-150)/(59-72) 110/69    Intake/Output Summary (Last 24 hours) at 6/27/2024 1353  Last data filed at 6/27/2024 0850  Gross per 24 hour   Intake 360 ml   Output --   Net 360 ml     Flowsheet Rows      Flowsheet Row First Filed Value   Admission Height 162.6 cm (64\") Documented at 06/21/2024 2105   Admission Weight 65.5 kg (144 lb 6.4 oz) Documented at " 06/21/2024 2105              Vitals and nursing note reviewed.   Constitutional:       Appearance: Healthy appearance. Not in distress.   Neck:      Vascular: No JVR.   Pulmonary:      Effort: Pulmonary effort is normal.      Breath sounds: Diffuse and bilateral Rhonchi present. No rales.   Cardiovascular:      Bradycardia present. Regular rhythm.      Murmurs: There is no murmur.   Edema:     Peripheral edema absent.   Abdominal:      General: There is no distension.      Palpations: Abdomen is soft.      Tenderness: There is no abdominal tenderness.   Musculoskeletal:      Cervical back: Neck supple. Skin:     General: Skin is cool.   Neurological:      Mental Status: Alert and oriented to person, place and time.           Pertinent Test Results:  Results from last 7 days   Lab Units 06/27/24  0321 06/26/24  0526 06/25/24  1804 06/25/24  0434 06/24/24  0525 06/23/24  0500 06/22/24  0323 06/21/24 2114 06/21/24 2106   SODIUM mmol/L 137 137  --  138 139 138 137  --  137   POTASSIUM mmol/L 3.9 4.3 4.9 3.6 3.7 3.8 3.5  --  3.8   CHLORIDE mmol/L 106 108*  --  109* 108* 108* 106  --  104   CO2 mmol/L 24.3 23.2  --  21.7* 21.9* 23.0 22.3  --  22.0   BUN mg/dL 16 13  --  9 8 10 10  --  11   CREATININE mg/dL 1.11* 1.02*  --  0.82 0.93 0.98 1.23* 1.60* 1.40*   GLUCOSE mg/dL 155* 169*  --  126* 134* 168* 56*  --  197*   CALCIUM mg/dL 8.8 9.1  --  8.5* 8.6 8.2* 8.4*  --  8.5*   AST (SGOT) U/L  --  17  --   --   --   --   --   --  12   ALT (SGPT) U/L  --  16  --   --   --   --   --   --  9     Results from last 7 days   Lab Units 06/21/24  2304 06/21/24 2106   HSTROP T ng/L 53* 56*     Results from last 7 days   Lab Units 06/27/24  0321 06/26/24  0526 06/25/24  0434 06/24/24  0525 06/23/24  0500 06/22/24  0323 06/21/24  2106   WBC 10*3/mm3 8.25 7.64 7.88 7.76 7.02 11.58* 10.72   HEMOGLOBIN g/dL 10.9* 11.2* 11.1* 11.2* 11.4* 11.5* 12.0   HEMATOCRIT % 34.1 34.5 33.9* 35.8 35.3 35.7 37.1   PLATELETS 10*3/mm3 235 218 197 203 202  265 245     Results from last 7 days   Lab Units 06/26/24  0526 06/21/24  2106   INR  1.00 0.98   APTT seconds 30.4 27.5     Results from last 7 days   Lab Units 06/26/24  0526   MAGNESIUM mg/dL 1.8     Results from last 7 days   Lab Units 06/22/24  0323   CHOLESTEROL mg/dL 245*   TRIGLYCERIDES mg/dL 610*   HDL CHOL mg/dL 31*     Results from last 7 days   Lab Units 06/26/24  0526   PROBNP pg/mL 721.0*               Medication Review:   aspirin, 325 mg, Oral, Daily   Or  aspirin, 300 mg, Rectal, Daily  atorvastatin, 80 mg, Oral, Nightly  [START ON 7/1/2024] ceFAZolin, 2,000 mg, Intravenous, On Call to OR  [START ON 6/30/2024] chlorhexidine, 15 mL, Mouth/Throat, Q12H  [START ON 6/30/2024] Chlorhexidine Gluconate Cloth, 1 Application, Topical, Q12H  escitalopram, 10 mg, Oral, Daily  insulin glargine, 10 Units, Subcutaneous, Once  [START ON 6/28/2024] insulin glargine, 30 Units, Subcutaneous, Daily  insulin lispro, 2-7 Units, Subcutaneous, TID With Meals  [START ON 7/1/2024] metoprolol tartrate, 12.5 mg, Oral, On Call to OR  [START ON 6/30/2024] mupirocin, 1 Application, Each Nare, Q12H  nicotine, 1 patch, Transdermal, Q24H  NIFEdipine XL, 90 mg, Oral, Q24H  pantoprazole, 40 mg, Oral, QAM  sodium chloride, 10 mL, Intravenous, Q12H  valsartan, 320 mg, Oral, Q24H         lactated ringers, 9 mL/hr        Assessment & Plan     Active Hospital Problems    Diagnosis  POA    **Acute right-sided weakness [R53.1]  Yes    CKD stage 3a, GFR 45-59 ml/min [N18.31]  Yes    Carotid stenosis [I65.29]  Yes    Lacunar cerebrovascular accident (CVA) [I63.81]  Yes    Elevated troponin [R79.89]  Yes    Aortic valve disease [I35.9]  Unknown    Tobacco abuse [Z72.0]  Yes    Coronary artery disease involving native coronary artery of native heart with unstable angina pectoris [I25.110]  Yes    Gastroparesis diabeticorum [E11.43, K31.84]  Yes    Gastroesophageal reflux disease [K21.9]  Yes    Iron deficiency anemia [D50.9]  Yes    Type 2  diabetes mellitus with hyperglycemia, with long-term current use of insulin [E11.65, Z79.4]  Not Applicable    Benign essential hypertension [I10]  Yes    Mixed hypercholesterolemia and hypertriglyceridemia [E78.2]  Yes      Resolved Hospital Problems   No resolved problems to display.        Recurrent stroke/aortic valve mass: Stroke x 2 over the past month, this time presented with dysarthria and MRI showed a new punctate focus of acute infarction within the left insular cortex. 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  Cardiothoracic surgery consulted for possible surgical resection, planned for Monday +/- re-do CABG.  BP control, see below.  Hypertension: BP readings very elevated this admission up to ~200/80s, home meds included Lopressor, clonidine, hydralazine, and nifedipine (recently started last week).  Resumed on nifedipine and started on valsartan with aggressive up titration and now BP is controlled.  Hyperlipidemia: Lipids this month showed HDL 31, , uncontrolled.  LDL goal < 70 given history of CABG. continue home atorvastatin 80, patient will need addition of Zetia +/- PCSK9 inhibitor upon discharge.  Diabetes mellitus: Hemoglobin A1c 17 his admission, uncontrolled.  Insulin-dependent, management per primary team and other specialists.  On Jardiance 10 daily at home.  CAD s/p CABG: Multivessel CAD s/p CABG in 2014 by Dr. Garcia at [facility]. Most recent cath 2/2024 showed LAD and RCA , proximal to mid left circumflex with 80-90% stenosis, ramus with proximal 70-80% stenosis. LIMA to LAD is patent, however distal/apical LAD has 80% stenosis. SVG to left circumflex is occluded. SVG to RCA is patent. Denies angina, possible re-do CABG as above.  Continue home  aspirin and Plavix (holding for now per CV surgery request), BP and lipid control as above, smoking cessation is key, see below.  Tobacco abuse: Smokes 6 cigarettes/day for at least 20 years, strongly encourage complete cessation.    Thank you for involving us in the care of Ms. Inman.  Cardiology will sign off at this time but please do not hesitate to reach back out to us if additional questions arise.  Please also reach back to us after surgery as we will be happy to re-participate her care.    Ronaldo Severino MD  [facility]  06/27/24  12:17 EDT

## 2024-06-27 NOTE — PLAN OF CARE
Goal Outcome Evaluation:  Plan of Care Reviewed With: patient        Progress: no change     Pt being worked up for AVR. Pt is aaoX4. All VVS. Will contine with plan of care.

## 2024-06-27 NOTE — PROGRESS NOTES
Name: Bibiana Inman ADMIT: 2024   : 1980  PCP: Ibrahima Correa MD    MRN: [id number] LOS: 6 days   AGE/SEX: 44 y.o. female  ROOM:      Subjective   Subjective   No acute events. Patient denies new complaints. No family at bedside.    Objective   Objective   Vital Signs  Temp:  [97.9 °F (36.6 °C)-98.9 °F (37.2 °C)] 97.9 °F (36.6 °C)  Heart Rate:  [59-74] 70  Resp:  [18] 18  BP: (110-150)/(59-78) 127/78  SpO2:  [96 %-100 %] 96 %  on   ;   Device (Oxygen Therapy): room air  Body mass index is 24.56 kg/m².  Physical Exam  Vitals and nursing note reviewed.   Constitutional:       General: She is not in acute distress.     Appearance: She is not toxic-appearing or diaphoretic.   HENT:      Head: Normocephalic and atraumatic.      Nose: Nose normal.      Mouth/Throat:      Mouth: Mucous membranes are moist.      Pharynx: Oropharynx is clear.   Eyes:      Conjunctiva/sclera: Conjunctivae normal.      Pupils: Pupils are equal, round, and reactive to light.   Cardiovascular:      Rate and Rhythm: Normal rate and regular rhythm.      Pulses: Normal pulses.   Pulmonary:      Effort: Pulmonary effort is normal.   Abdominal:      General: Bowel sounds are normal.      Palpations: Abdomen is soft.      Tenderness: There is no abdominal tenderness.   Musculoskeletal:         General: No swelling or tenderness.      Cervical back: Neck supple.   Skin:     General: Skin is warm and dry.      Capillary Refill: Capillary refill takes less than 2 seconds.   Neurological:      Mental Status: She is alert and oriented to person, place, and time.      Comments: +mild right lower facial weakness   Psychiatric:         Mood and Affect: Mood normal.         Behavior: Behavior normal.       Results Review     I reviewed the patient's new clinical results.  Results from last 7 days   Lab Units 24  0321 24  0526 24  0434 24  0525   WBC 10*3/mm3 8.25 7.64 7.88 7.76   HEMOGLOBIN g/dL 10.9* 11.2*  11.1* 11.2*   PLATELETS 10*3/mm3 235 218 197 203     Results from last 7 days   Lab Units 06/27/24  0321 06/26/24  0526 06/25/24  1804 06/25/24  0434 06/24/24  0525   SODIUM mmol/L 137 137  --  138 139   POTASSIUM mmol/L 3.9 4.3 4.9 3.6 3.7   CHLORIDE mmol/L 106 108*  --  109* 108*   CO2 mmol/L 24.3 23.2  --  21.7* 21.9*   BUN mg/dL 16 13  --  9 8   CREATININE mg/dL 1.11* 1.02*  --  0.82 0.93   GLUCOSE mg/dL 155* 169*  --  126* 134*   EGFR mL/min/1.73 62.2 68.9  --  89.5 76.9     Results from last 7 days   Lab Units 06/26/24  0526 06/21/24  2106   ALBUMIN g/dL 2.9* 2.9*   BILIRUBIN mg/dL <0.2 <0.2   ALK PHOS U/L 88 90   AST (SGOT) U/L 17 12   ALT (SGPT) U/L 16 9     Results from last 7 days   Lab Units 06/27/24  0321 06/26/24  0526 06/25/24  0434 06/24/24  0525 06/22/24  0323 06/21/24  2106   CALCIUM mg/dL 8.8 9.1 8.5* 8.6   < > 8.5*   ALBUMIN g/dL  --  2.9*  --   --   --  2.9*   MAGNESIUM mg/dL  --  1.8  --   --   --   --     < > = values in this interval not displayed.       Hemoglobin A1C   Date/Time Value Ref Range Status   06/26/2024 0527 13.10 (H) 4.80 - 5.60 % Final     Glucose   Date/Time Value Ref Range Status   06/27/2024 1049 265 (H) 70 - 130 mg/dL Final   06/27/2024 0618 164 (H) 70 - 130 mg/dL Final   06/26/2024 2034 212 (H) 70 - 130 mg/dL Final   06/26/2024 1758 218 (H) 70 - 130 mg/dL Final   06/26/2024 1151 214 (H) 70 - 130 mg/dL Final   06/26/2024 0605 170 (H) 70 - 130 mg/dL Final   06/25/2024 2031 244 (H) 70 - 130 mg/dL Final       No radiology results for the last day    I have personally reviewed all medications:  Scheduled Medications  aspirin, 325 mg, Oral, Daily   Or  aspirin, 300 mg, Rectal, Daily  atorvastatin, 80 mg, Oral, Nightly  [START ON 7/1/2024] ceFAZolin, 2,000 mg, Intravenous, On Call to OR  [START ON 6/30/2024] chlorhexidine, 15 mL, Mouth/Throat, Q12H  [START ON 6/30/2024] Chlorhexidine Gluconate Cloth, 1 Application, Topical, Q12H  escitalopram, 10 mg, Oral, Daily  insulin glargine,  10 Units, Subcutaneous, Once  [START ON 6/28/2024] insulin glargine, 30 Units, Subcutaneous, Daily  insulin lispro, 2-7 Units, Subcutaneous, TID With Meals  [START ON 7/1/2024] metoprolol tartrate, 12.5 mg, Oral, On Call to OR  [START ON 6/30/2024] mupirocin, 1 Application, Each Nare, Q12H  nicotine, 1 patch, Transdermal, Q24H  NIFEdipine XL, 90 mg, Oral, Q24H  pantoprazole, 40 mg, Oral, QAM  sodium chloride, 10 mL, Intravenous, Q12H  valsartan, 320 mg, Oral, Q24H    Infusions  lactated ringers, 9 mL/hr    Diet  Diet: Diabetic; Consistent Carbohydrate; Fluid Consistency: Thin (IDDSI 0)  NPO Diet NPO Type: Sips with Meds    I have personally reviewed:  [x]  Laboratory   []  Microbiology   []  Radiology   [x]  EKG/Telemetry  []  Cardiology/Vascular   []  Pathology    []  Records    Assessment/Plan     Active Hospital Problems    Diagnosis  POA    **Acute right-sided weakness [R53.1]  Yes    CKD stage 3a, GFR 45-59 ml/min [N18.31]  Yes    Carotid stenosis [I65.29]  Yes    Lacunar cerebrovascular accident (CVA) [I63.81]  Yes    Elevated troponin [R79.89]  Yes    Aortic valve disease [I35.9]  Unknown    Tobacco abuse [Z72.0]  Yes    Coronary artery disease involving native coronary artery of native heart with unstable angina pectoris [I25.110]  Yes    Gastroparesis diabeticorum [E11.43, 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   Acute CVA  - presented with right-sided weakness which has improved, has mild right facial droop still  - MRI showed enlargement of acute infarct in the genu of the left internal capsule as well as a new infarct in the left insular cortex, and  previously identified acute infarction within the white matter of the left parietal lobe  - continue ASA, statin-hold plavix for planned procedure  - needs aggressive risk factor control, particularly with HTN and DM  - YUE showed 3 fibroelastomas-surgery planned for Monday 7/1 following plavix washout  - appreciate cardiology, CT surgery, and neurology recs     Type 2 DM  - complications include gastroparesis  - on lantus and jardiance as outpatient  - BG is elevated, also needs better outpatient control, A1C 16.90%  - increase lantus to 30 units daily   - cover with ssi/hypoglycemia protocol  - continue jardiance     HTN/CAD s/p CABG  - BP elevated but improving  - continue nifedipine and valsartan      GERD  - symptoms controlled, continue ppi    SCDs for DVT prophylaxis.  Full code.  Discussed with patient and nursing staff.  Anticipate discharge home with HH vs SNU facility timing yet to be determined.  Expected Discharge Date: 7/9/2024; Expected Discharge Time:       Lenin Huang MD  [facility]  06/27/24  15:30 EDT    Portions of this text have been copied and I have reviewed them. They are accurate as of 6/27/2024

## 2024-06-27 NOTE — DISCHARGE PLACEMENT REQUEST
"Bibiana Belcher (44 y.o. Female)       Date of Birth   1980    Social Security Number       Address   [address] [address]    Home Phone   [phone]    MRN   [id number]       Yarsanism   None    Marital Status                               Admission Date   6/21/24    Admission Type   Emergency    Admitting Provider       Attending Provider   Lenin Huang MD    Department, Room/Bed   [facility] CARDIOVASC UNIT, 2202/1       Discharge Date       Discharge Disposition       Discharge Destination                                 Attending Provider: Lenin Huang MD    Allergies: Latex    Isolation: None   Infection: COVID Screen (preop/placement) (06/27/24)   Code Status: CPR    Ht: 162.6 cm (64\")   Wt: 64.9 kg (143 lb 1.3 oz)    Admission Cmt: None   Principal Problem: Acute right-sided weakness [R53.1]                   Active Insurance as of 6/21/2024       Primary Coverage       Payor Plan Insurance Group Employer/Plan Group    [employer] [id number]       Payor Plan Address Payor Plan Phone Number Payor Plan Fax Number Effective Dates    [address] [phone]  2/1/2020 - None Entered    [place] [address]         Subscriber Name Subscriber Birth Date Member ID       BIBIANA BELCHER 1980 [id number]                     Emergency Contacts        (Rel.) Home Phone Work Phone Mobile Phone    HOLLAND BELCHER (Spouse) [phone] -- [phone]                "

## 2024-06-27 NOTE — PAYOR COMM NOTE
"Bibiana Belcher (44 y.o. Female)                                ATTENTION; CONTINUED CLINICALS CASE REF [id number]                              REPLY TO UR DEPT  [phone] OR CALL   KAREN CANSECO LPN           Date of Birth   1980    Social Security Number       Address   [address] [place] [id number]    Home Phone   [phone]    MRN   [id number]       Latter day   None    Marital Status                               Admission Date   6/21/24    Admission Type   Emergency    Admitting Provider   Wyatt Hobson DO    Attending Provider   Lenin Huang MD    Department, Room/Bed   [facility] CARDIOVASC UNIT, 2202/1       Discharge Date       Discharge Disposition       Discharge Destination                                 Attending Provider: Lenin Huang MD    Allergies: Latex    Isolation: None   Infection: None   Code Status: CPR    Ht: 162.6 cm (64\")   Wt: 64.9 kg (143 lb 1.3 oz)    Admission Cmt: None   Principal Problem: Acute right-sided weakness [R53.1]                   Active Insurance as of 6/21/2024       Primary Coverage       Payor Plan Insurance Group Employer/Plan Group    [facility] BLUE [employer] EMPLOYEE [id number]       Payor Plan Address Payor Plan Phone Number Payor Plan Fax Number Effective Dates    [address] [phone]  2/1/2020 - None Entered    [place] [address]         Subscriber Name Subscriber Birth Date Member ID       BIBIANA BELCHER 1980 [id number]                     Emergency Contacts        (Rel.) Home Phone Work Phone Mobile Phone    HOLLAND BELCHER (Spouse) [phone] -- [phone]                    Orders (last 72 hrs)        Start     Ordered    07/01/24 0600  metoprolol tartrate (LOPRESSOR) tablet 12.5 mg  On Call to O.R.         06/25/24 1114    07/01/24 0600  ceFAZolin 2000 mg IVPB in 100 mL NS (MBP)  On Call to O.R.         06/25/24 1114    07/01/24 0001  NPO Diet NPO " Type: Sips with Meds  Diet Effective Midnight         06/25/24 1114    06/30/24 1800  mupirocin (BACTROBAN) 2 % nasal ointment 1 Application  Every 12 Hours Scheduled         06/25/24 1114    06/30/24 1800  Chlorhexidine Gluconate Cloth 2 % pads 1 Application  Every 12 Hours         06/25/24 1114    06/30/24 1800  chlorhexidine (PERIDEX) 0.12 % solution 15 mL  Every 12 Hours Scheduled         06/25/24 1114    06/30/24 0000  Type & Screen  Once         06/25/24 1114    06/27/24 1106  POC Glucose Once  PROCEDURE ONCE        Comments: Complete no more than 45 minutes prior to patient eating      06/27/24 1049    06/27/24 0620  POC Glucose Once  PROCEDURE ONCE        Comments: Complete no more than 45 minutes prior to patient eating      06/27/24 0618    06/26/24 2037  POC Glucose Once  PROCEDURE ONCE        Comments: Complete no more than 45 minutes prior to patient eating      06/26/24 2034    06/26/24 1800  POC Glucose Once  PROCEDURE ONCE        Comments: Complete no more than 45 minutes prior to patient eating      06/26/24 1758    06/26/24 1430  empagliflozin (JARDIANCE) tablet 10 mg  Daily         06/26/24 1335    06/26/24 1154  POC Glucose Once  PROCEDURE ONCE        Comments: Complete no more than 45 minutes prior to patient eating      06/26/24 1151    06/26/24 0901  Transfer Patient  Once         06/26/24 0900    06/26/24 0900  valsartan (DIOVAN) tablet 320 mg  Every 24 Hours Scheduled         06/25/24 1214    06/26/24 0900  NIFEdipine XL (PROCARDIA XL) 24 hr tablet 120 mg  Every 24 Hours Scheduled,   Status:  Discontinued         06/25/24 1214    06/26/24 0900  NIFEdipine XL (PROCARDIA XL) 24 hr tablet 90 mg  Every 24 Hours Scheduled         06/26/24 0641    06/26/24 0815  iopamidol (ISOVUE-300) 61 % injection 100 mL  Once in Imaging         06/26/24 0729    06/26/24 0808  Urinalysis, Microscopic Only - Urine, Clean Catch  Once         06/26/24 0807    06/26/24 0607  POC Glucose Once  PROCEDURE ONCE         Comments: Complete no more than 45 minutes prior to patient eating      06/26/24 0605    06/26/24 0000  Clip Hair Chin to Ankles  Once         06/25/24 1114    06/26/24 0000  CBC & Differential  Once         06/25/24 1114    06/26/24 0000  Comprehensive Metabolic Panel  Once         06/25/24 1114    06/26/24 0000  Magnesium  Once         06/25/24 1114    06/26/24 0000  Hemoglobin A1c  Once         06/25/24 1114    06/26/24 0000  BNP  Once         06/25/24 1114    06/26/24 0000  aPTT  Once         06/25/24 1114    06/26/24 0000  Protime-INR  Once         06/25/24 1114    06/26/24 0000  Platelet Function ADP  Once         06/25/24 1114    06/26/24 0000  CBC Auto Differential  PROCEDURE ONCE         06/25/24 1601    06/25/24 2100  Weigh Patient Night Before Surgery  Once        Comments: Need Actual Weight, Not Stated Weight    06/25/24 1114    06/25/24 2034  POC Glucose Once  PROCEDURE ONCE        Comments: Complete no more than 45 minutes prior to patient eating      06/25/24 2031    06/25/24 1851  Blood Gas, Arterial -  PROCEDURE ONCE         06/25/24 1847    06/25/24 1805  Potassium  Timed         06/25/24 0904    06/25/24 1724  POC Glucose Once  PROCEDURE ONCE        Comments: Complete no more than 45 minutes prior to patient eating      06/25/24 1722    06/25/24 1600  Strict Intake & Output  Every 8 Hours         06/25/24 1114    06/25/24 1400  Instruct Patient on Coughing, Deep Breathing and Incentive Spirometry  Every 4 Hours While Awake       06/25/24 1114    06/25/24 1330  valsartan (DIOVAN) tablet 160 mg  Once         06/25/24 1214    06/25/24 1330  NIFEdipine XL (PROCARDIA XL) 24 hr tablet 30 mg  Once         06/25/24 1214    06/25/24 1200  Neurovascular Checks  Every 4 Hours       06/25/24 1114    06/25/24 1128  POC Glucose Once  PROCEDURE ONCE        Comments: Complete no more than 45 minutes prior to patient eating      06/25/24 1126    06/25/24 1126  CT Chest With Contrast Diagnostic  1 Time Imaging          06/25/24 1127    06/25/24 1114  temazepam (RESTORIL) capsule 15 mg  Nightly PRN         06/25/24 1114    06/25/24 1114  ALPRAZolam (XANAX) tablet 0.25 mg  Every 8 Hours PRN         06/25/24 1114    06/25/24 1113  Notify Surgeon if Patient Currently Taking Metformin, Warfarin, Plavix, Effient, Ticlid, Brillinta, Pradaxa, Xarelto, Eliquis, Savaysa, Coumadin, Pletal, or  Any Other Antiplatelet / Anticoagulant  Per Order Details         06/25/24 1114    06/25/24 1112  Follow Anesthesia Guidelines / Protocol  Continuous         06/25/24 1114    06/25/24 1112  Measure Height  Once         06/25/24 1114    06/25/24 1112  Skin Assessment  Every Shift       06/25/24 1114    06/25/24 1112  POC Glucose Once  Once        Comments: Obtain glucose at 0400 the day of surgery, if greater than 200, initiate insulin IV protocol      06/25/24 1114    06/25/24 1112  Give Patient Pre-Op Education Booklet / Materials  Once        Comments: Give Patient Pre-Op Education Booklet / Materials    06/25/24 1114    06/25/24 1112  Evaluation For Pre-Op PFT Need  Once         06/25/24 1114    06/25/24 1112  RN To Place Hospitalist & Diabetes Educator Consult Orders if Patient Diabetic, POC Glucose is Greater Than 180 or HgbA1c Greater Than 7  Continuous        Comments: Hospitalist Reason for Consult - Blood Glucose Management  Diabetes Educator Reason for Consult - Diabetes Education    06/25/24 1114    06/25/24 1112  Inpatient Anesthesiology Consult  Once        Specialty:  Anesthesiology  Provider:  Erik Miles MD    06/25/24 1114    06/25/24 1112  Urinalysis With Culture If Indicated - Urine, Clean Catch  Once         06/25/24 1114    06/25/24 1112  Blood Gas, Arterial -  Once         06/25/24 1114    06/25/24 1112  Prepare RBC, 2 Units  Blood - Once         06/25/24 1114    06/25/24 1112  Prepare Platelet Pheresis, 2 Units  Blood - Once         06/25/24 1114    06/25/24 1112  Carotid Duplex - Bilateral  Once,   Status:  Canceled          06/25/24 1114    06/25/24 1112  Duplex Vein Mapping Lower Extremity - Bilateral CAR  Once         06/25/24 1114    06/25/24 1110  Case request  Once         06/25/24 1110    06/25/24 0931  Diet: Diabetic; Consistent Carbohydrate; Fluid Consistency: Thin (IDDSI 0)  Diet Effective Now         06/25/24 0931    06/25/24 0906  potassium chloride (K-DUR,KLOR-CON) ER tablet 40 mEq  Every 4 Hours         06/25/24 0904    06/25/24 0900  valsartan (DIOVAN) tablet 160 mg  Every 24 Hours Scheduled,   Status:  Discontinued         06/24/24 1637    06/25/24 0900  sodium chloride 0.9 % flush 3 mL  Every 12 Hours Scheduled,   Status:  Discontinued         06/25/24 0716    06/25/24 0844  Inpatient Cardiothoracic Surgery Consult  Once        Specialty:  Cardiothoracic Surgery  Provider:  Jr Ga Mon MD    06/25/24 0844    06/25/24 0839  POC Glucose Once  PROCEDURE ONCE        Comments: Complete no more than 45 minutes prior to patient eating      06/25/24 0837    06/25/24 0745  Continuous Pulse Oximetry  Continuous         06/25/24 0744    06/25/24 0745  POC Glucose Once  Once,   Status:  Canceled        Comments: Post op glucose check on all diabetic patients...Notify Anesthesia if blood sugar is less than 80 mg/dL or greater than 220 mg/dL.      06/25/24 0744    06/25/24 0745  Vital signs every 5 minutes for 15 minutes, every 15 minutes thereafter.  Once,   Status:  Canceled         06/25/24 0744    06/25/24 0745  Call Anesthesiologist for additional IV Fluid bolus for Hypotension/Tachycardia  Until Discontinued,   Status:  Canceled         06/25/24 0744    06/25/24 0745  Notify Anesthesia of Any Acute Changes in Patient Condition  Until Discontinued,   Status:  Canceled         06/25/24 0744    06/25/24 0745  Notify Anesthesia for Unrelieved Pain  Until Discontinued,   Status:  Canceled         06/25/24 0744    06/25/24 0745  Once DC criteria to floor met, follow surgeon's orders.  Until Discontinued,   Status:   "Canceled         06/25/24 0744    06/25/24 0745  Discharge patient from PACU when discharge criteria is met.  Until Discontinued,   Status:  Canceled         06/25/24 0744    06/25/24 0744  naloxone (NARCAN) injection 0.2 mg  As Needed,   Status:  Discontinued         06/25/24 0744    06/25/24 0744  flumazenil (ROMAZICON) injection 0.2 mg  As Needed,   Status:  Discontinued         06/25/24 0744    06/25/24 0744  ondansetron (ZOFRAN) injection 4 mg  Once As Needed,   Status:  Discontinued         06/25/24 0744    06/25/24 0744  droperidol (INAPSINE) injection 0.625 mg  Every 20 Minutes PRN,   Status:  Discontinued        Placed in \"Or\" Linked Group    06/25/24 0744    06/25/24 0744  droperidol (INAPSINE) injection 0.625 mg  Every 20 Minutes PRN,   Status:  Discontinued        Placed in \"Or\" Linked Group    06/25/24 0744    06/25/24 0744  promethazine (PHENERGAN) suppository 25 mg  Once As Needed,   Status:  Discontinued        Placed in \"Or\" Linked Group    06/25/24 0744    06/25/24 0744  promethazine (PHENERGAN) tablet 25 mg  Once As Needed,   Status:  Discontinued        Placed in \"Or\" Linked Group    06/25/24 0744    06/25/24 0744  diphenhydrAMINE (BENADRYL) injection 12.5 mg  Every 15 Minutes PRN,   Status:  Discontinued         06/25/24 0744    06/25/24 0729  POC Creatinine  PROCEDURE ONCE         06/21/24 2114 06/25/24 0718  lactated ringers infusion  Continuous         06/25/24 0716    06/25/24 0718  famotidine (PEPCID) injection 20 mg  Once,   Status:  Discontinued         06/25/24 0716    06/25/24 0717  Continuous Pulse Oximetry  Continuous,   Status:  Canceled         06/25/24 0716    06/25/24 0717  Insert Peripheral IV  Once,   Status:  Canceled         06/25/24 0716    06/25/24 0717  Saline Lock & Maintain IV Access  Continuous,   Status:  Canceled         06/25/24 0716    06/25/24 0717  May take Beta Blocker from home with sip of water.  Once,   Status:  Canceled        Comments: Only applicable to " patients on home Beta Blocker    06/25/24 0716    06/25/24 0716  lidocaine (XYLOCAINE) 1 % injection 0.5 mL  Once As Needed,   Status:  Discontinued         06/25/24 0716    06/25/24 0716  fentaNYL citrate (PF) (SUBLIMAZE) injection 50 mcg  Once As Needed,   Status:  Discontinued         06/25/24 0716    06/25/24 0716  midazolam (VERSED) injection 1 mg  Every 5 Minutes PRN,   Status:  Discontinued         06/25/24 0716    06/25/24 0716  Vital Signs - Per Anesthesia Protocol  As Needed,   Status:  Canceled       06/25/24 0716    06/25/24 0716  Oxygen Therapy- Nasal Cannula; Titrate 1-6 LPM Per SpO2; 90 - 95%  Continuous PRN,   Status:  Canceled       06/25/24 0716    06/25/24 0716  POC Glucose PRN  As Needed,   Status:  Canceled      Comments: For all diabetic patients. Notify Anesthesiologist for blood sugar > 180.      06/25/24 0716    06/25/24 0716  sodium chloride 0.9 % flush 3-10 mL  As Needed,   Status:  Discontinued         06/25/24 0716    06/25/24 0607  POC Glucose Once  PROCEDURE ONCE        Comments: Complete no more than 45 minutes prior to patient eating      06/25/24 0603    06/25/24 0001  NPO Diet NPO Type: Strict NPO  Diet Effective Midnight,   Status:  Canceled         06/24/24 0850    06/24/24 2142  POC Glucose Once  PROCEDURE ONCE        Comments: Complete no more than 45 minutes prior to patient eating      06/24/24 2140    06/24/24 1730  valsartan (DIOVAN) tablet 80 mg  Once         06/24/24 1637    06/24/24 1639  hydrALAZINE (APRESOLINE) tablet 25 mg  Every 6 Hours PRN         06/24/24 1640    06/24/24 1616  POC Glucose Once  PROCEDURE ONCE        Comments: Complete no more than 45 minutes prior to patient eating      06/24/24 1614    06/24/24 0900  NIFEdipine XL (PROCARDIA XL) 24 hr tablet 60 mg  Every 24 Hours Scheduled,   Status:  Discontinued         06/24/24 0811    06/24/24 0900  valsartan (DIOVAN) tablet 80 mg  Every 24 Hours Scheduled,   Status:  Discontinued         06/24/24 0811     "06/22/24 1800  icosapent ethyl (VASCEPA) capsule 2 g  2 Times Daily With Meals,   Status:  Discontinued         06/22/24 1100    06/22/24 1732  Potassium Replacement - Follow Nurse / BPA Driven Protocol  As Needed         06/22/24 1733    06/22/24 0900  aspirin tablet 325 mg  Daily        Placed in \"Or\" Linked Group    06/22/24 0003    06/22/24 0900  aspirin suppository 300 mg  Daily        Placed in \"Or\" Linked Group    06/22/24 0003    06/22/24 0900  escitalopram (LEXAPRO) tablet 10 mg  Daily         06/22/24 0259    06/22/24 0900  clopidogrel (PLAVIX) tablet 75 mg  Daily,   Status:  Discontinued         06/22/24 0308    06/22/24 0900  insulin glargine (LANTUS, SEMGLEE) injection 20 Units  Daily         06/22/24 0317    06/22/24 0730  insulin lispro (HUMALOG/ADMELOG) injection 2-7 Units  4 Times Daily Before Meals & Nightly         06/22/24 0003    06/22/24 0700  POC Glucose 4x Daily Before Meals & at Bedtime  4 Times Daily Before Meals & at Bedtime      Comments: Complete no more than 45 minutes prior to patient eating      06/22/24 0003    06/22/24 0700  pantoprazole (PROTONIX) EC tablet 40 mg  Every Morning         06/22/24 0259    06/22/24 0600  POC Glucose Q6H  Every 6 Hours      Comments: May Discontinue After 2 Consecutive Readings Less Than 140Notify Provider if 2 Readings Greater Than 140      06/22/24 0003    06/22/24 0400  Vital Signs  Every 4 Hours       06/22/24 0003    06/22/24 0400  Intake and Output  Every 4 Hours       06/22/24 0003    06/22/24 0257  albuterol (PROVENTIL) nebulizer solution 0.083% 2.5 mg/3mL  Every 6 Hours PRN         06/22/24 0259    06/22/24 0254  Basic Metabolic Panel  Daily       06/22/24 0253    06/22/24 0254  CBC Auto Differential  Daily       06/22/24 0253    06/22/24 0019  sodium chloride 0.9 % flush 10 mL  Every 12 Hours Scheduled         06/22/24 0003    06/22/24 0019  atorvastatin (LIPITOR) tablet 80 mg  Nightly         06/22/24 0003    06/22/24 0019  sodium chloride " "0.9 % infusion  Continuous,   Status:  Discontinued         06/22/24 0003    06/22/24 0019  nicotine (NICODERM CQ) 21 MG/24HR patch 1 patch  Every 24 Hours         06/22/24 0003    06/22/24 0003  nicotine polacrilex (NICORETTE) gum 2 mg  Every 1 Hour PRN         06/22/24 0003    06/22/24 0003  ondansetron ODT (ZOFRAN-ODT) disintegrating tablet 4 mg  Every 6 Hours PRN        Placed in \"Or\" Linked Group    06/22/24 0003    06/22/24 0003  ondansetron (ZOFRAN) injection 4 mg  Every 6 Hours PRN        Placed in \"Or\" Linked Group    06/22/24 0003    06/22/24 0003  sennosides-docusate (PERICOLACE) 8.6-50 MG per tablet 2 tablet  2 Times Daily PRN        Placed in \"And\" Linked Group    06/22/24 0003    06/22/24 0003  polyethylene glycol (MIRALAX) packet 17 g  Daily PRN        Placed in \"And\" Linked Group    06/22/24 0003    06/22/24 0003  bisacodyl (DULCOLAX) EC tablet 5 mg  Daily PRN        Placed in \"And\" Linked Group    06/22/24 0003    06/22/24 0003  bisacodyl (DULCOLAX) suppository 10 mg  Daily PRN        Placed in \"And\" Linked Group    06/22/24 0003    06/22/24 0002  nitroglycerin (NITROSTAT) SL tablet 0.4 mg  Every 5 Minutes PRN         06/22/24 0003    06/22/24 0002  Intake & Output  Every Shift       06/22/24 0003    06/22/24 0001  dextrose (GLUTOSE) oral gel 15 g  Every 15 Minutes PRN         06/22/24 0003    06/22/24 0001  dextrose (D50W) (25 g/50 mL) IV injection 25 g  Every 15 Minutes PRN         06/22/24 0003    06/22/24 0001  glucagon (GLUCAGEN) injection 1 mg  Every 15 Minutes PRN         06/22/24 0003    06/22/24 0001  sodium chloride 0.9 % flush 10 mL  As Needed         06/22/24 0003    06/22/24 0001  sodium chloride 0.9 % infusion 40 mL  As Needed         06/22/24 0003    06/21/24 2200  Neuro Checks  Every 2 Hours      Comments: On arrival and handoff, increase frequency as clinically indicated or for thrombolytic administration, otherwise Q2 hours. Documentation of arrival assessment and any neuro change " required.    06/21/24 2104 06/21/24 2104  Oxygen Therapy- Nasal Cannula; Titrate 1-6 LPM Per SpO2; 90 - 95%  Continuous PRN,   Status:  Canceled       06/21/24 2104 06/21/24 2104  sodium chloride 0.9 % flush 10 mL  As Needed         06/21/24 2104    Unscheduled  Follow Hypoglycemia Standing Orders For Blood Glucose <70 & Notify Provider of Treatment  As Needed      Comments: Follow Hypoglycemia Orders As Outlined in Process Instructions (Open Order Report to View Full Instructions)  Notify Provider Any Time Hypoglycemia Treatment is Administered    06/22/24 0003    Unscheduled  Oxygen Therapy- Nasal Cannula; Titrate 1-6 LPM Per SpO2; 90 - 95%  Continuous PRN       06/25/24 0744    Unscheduled  Chlorhexidine Skin Prep - Chin to Toes 12 Hours Prior to Surgery & Morning of Surgery  As Needed      Comments: Chlorhexidine Skin wipes and instructions for all patients having a procedure requiring an outward incision if not allergic.  If allergic, give antibacterial skin wipes and instructions.  Do not use for facial cases or on any mucus membranes.    12 Hours Prior to Surgery & The Morning of Surgery    06/25/24 1114    Signed and Held  Follow Anesthesia Guidelines / Protocol  Once         Signed and Held    Signed and Held  Instruct Patient on Coughing, Deep Breathing and Incentive Spirometry  Every 4 Hours While Awake       Signed and Held    Signed and Held  Insert Peripheral IV  Once         Signed and Held    Signed and Held  Saline Lock & Maintain IV Access  Continuous         Signed and Held    Signed and Held  sodium chloride 0.9 % flush 10 mL  Every 12 Hours Scheduled         Signed and Held    Signed and Held  sodium chloride 0.9 % flush 10 mL  As Needed         Signed and Held    Signed and Held  sodium chloride 0.9 % infusion 40 mL  As Needed         Signed and Held                     Physician Progress Notes (last 4 days)        Kamar Castillo MD at 06/26/24 1359          DOS: 6/26/2024  NAME:  "Bibiana Inman   : 1980  PCP: Ibrahima Correa MD  Chief Complaint   Patient presents with    Extremity Weakness       Chief complaint: Recurrent strokes  Subjective: Patient seen and examined at the bedside this morning, no acute overnight event, stable neurological exam with NIH score of 0.    Objective:  Vital signs: /69 (Patient Position: Lying)   Pulse 67   Temp 97.7 °F (36.5 °C) (Oral)   Resp 18   Ht 162.6 cm (64\")   Wt 64.9 kg (143 lb 1.3 oz)   LMP 01/10/2023 Comment: patient states irregular periods  SpO2 98%   BMI 24.56 kg/m²    Gen: NAD, vitals reviewed  MS: oriented x3, recent/remote memory intact, normal attention/concentration, language intact, no neglect.  CN: visual acuity grossly normal, PERRL, EOMI, no facial droop, no dysarthria  Motor: 5/5 throughout upper and lower extremities, normal tone  Sensory: intact to light touch all 4 ext.    Laboratory results:  Lab Results   Component Value Date    GLUCOSE 169 (H) 2024    CALCIUM 9.1 2024     2024    K 4.3 2024    CO2 23.2 2024     (H) 2024    BUN 13 2024    CREATININE 1.02 (H) 2024    EGFRIFNONA 26 (L) 2021    BCR 12.7 2024    ANIONGAP 5.8 2024     Lab Results   Component Value Date    WBC 7.64 2024    HGB 11.2 (L) 2024    HCT 34.5 2024    MCV 91.5 2024     2024     Lab Results   Component Value Date     (H) 2024    LDL  2024      Comment:      Unable to calculate     (H) 2024         Lab 24  0527   HEMOGLOBIN A1C 13.10*        Review of labs: Above labs reviewed     Review and interpretation of imaging: I personally reviewed the repeated MRI brain and discussed with neuroradiologist  showed enlargement/worsening acute/subacute ischemic stroke on the left jenu of IC/thalamus, new punctate ischemic stroke on the left insular cortex     ECHO 2024    Left ventricular " systolic function is normal. Calculated left ventricular EF = 62.5%    Left ventricular diastolic function is consistent with (grade II w/high LAP) pseudonormalization.    There is a small (6 mm) mobile mass likely with a stalk and appears to be attached  the non-coronary cusp(s) of the aortic valve.  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  Looks like it was also present but not well-visualized on prior echo done on  2/20/2024.    Saline test results are negative for right to left atrial level shunt.    Recommend YUE considering patient's history of CVA.    YUE;    Left ventricular systolic function is normal.  Visually estimated LVEF 60-65%.    Left ventricular wall thickness is consistent with mild to moderate concentric hypertrophy.    There are up to 3 small pedunculated masses attached to the aortic valve leaflets (2 with the noncoronary cusp and a smaller one with left coronary cusp) protruding into the aortic root with the largest one measuring 0.8 x 0.5cm.  The morphology appears consistent with fibroelastomas.    Estimated right ventricular systolic pressure from tricuspid regurgitation is normal (<35 mmHg).    No evidence of intracardiac thrombus including with close evaluation of both atria, BRADEN, and RAA.    Saline test results are negative.    Mild aortic arch plaques.     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  -Abnormal YUE  concerning for mobile aortic masses concerning for fibroelastomas  -Essential hypertension  -Mixed hyperlipidemia  -Obstructive sleep apnea not compliant with CPAP machine  -Poorly controlled type 2 diabetes A1c 16.8  -Tobacco abuse    Plan:  -Cardiothoracic surgery consulted for the fibroelastoma's and following, surgery next Monday 7/1  -Continue dual antiplatelet therapy  -Continue Lipitor 80 mg daily  -Counseled the patient regarding controlling risk factors goal A1c less  than 6.5, goal LDL less than 70, blood pressure goal less than 130/80 due to diabetes with hypertension and strokes  -Inpatient diabetic educator consult due to poorly controlled diabetes with markedly elevated A1c  -Recommend compliance with CPAP machine  -Counseled the patient regarding tobacco use, she stated understanding  -Counseled the patient to monitor blood pressure at home for the next 10 to 14 days twice daily and bring the numbers to PCP to adjust her blood pressure medications if needed  -Follow up with stroke clinic in 6-8 weeks, MA Mrs Casiano notified to arrange for that.    Will continue to follow peripherally and see again after the surgery next week.    Electronically signed by Kamar Castillo MD at [id number] [phone]       Lenin Huang MD at [phone]              Name: Bibiana Inman ADMIT: 2024   : 1980  PCP: Ibrahima Correa MD    MRN: [id number] LOS: 5 days   AGE/SEX: 44 y.o. female  ROOM: [facility]     Subjective   Subjective   No acute events. Patient denies new complaints. No family at bedside.    Objective   Objective   Vital Signs  Temp:  [97.7 °F (36.5 °C)-98.6 °F (37 °C)] 97.7 °F (36.5 °C)  Heart Rate:  [57-76] 67  Resp:  [18] 18  BP: (116-155)/(60-73) 152/69  SpO2:  [88 %-100 %] 98 %  on   ;   Device (Oxygen Therapy): room air  Body mass index is 24.56 kg/m².  Physical Exam  Vitals and nursing note reviewed.   Constitutional:       General: She is not in acute distress.     Appearance: She is not toxic-appearing or diaphoretic.   HENT:      Head: Normocephalic and atraumatic.      Nose: Nose normal.      Mouth/Throat:      Mouth: Mucous membranes are moist.      Pharynx: Oropharynx is clear.   Eyes:      Conjunctiva/sclera: Conjunctivae normal.      Pupils: Pupils are equal, round, and reactive to light.   Cardiovascular:      Rate and Rhythm: Normal rate and regular rhythm.      Pulses: Normal pulses.   Pulmonary:      Effort: Pulmonary effort is normal.    Abdominal:      General: Bowel sounds are normal.      Palpations: Abdomen is soft.      Tenderness: There is no abdominal tenderness.   Musculoskeletal:         General: No swelling or tenderness.      Cervical back: Neck supple.   Skin:     General: Skin is warm and dry.      Capillary Refill: Capillary refill takes less than 2 seconds.   Neurological:      Mental Status: She is alert and oriented to person, place, and time.      Comments: +mild right lower facial weakness   Psychiatric:         Mood and Affect: Mood normal.         Behavior: Behavior normal.       Results Review     I reviewed the patient's new clinical results.  Results from last 7 days   Lab Units 06/26/24  0526 06/25/24  0434 06/24/24  0525 06/23/24  0500   WBC 10*3/mm3 7.64 7.88 7.76 7.02   HEMOGLOBIN g/dL 11.2* 11.1* 11.2* 11.4*   PLATELETS 10*3/mm3 218 197 203 202     Results from last 7 days   Lab Units 06/26/24  0526 06/25/24  1804 06/25/24  0434 06/24/24  0525 06/23/24  0500   SODIUM mmol/L 137  --  138 139 138   POTASSIUM mmol/L 4.3 4.9 3.6 3.7 3.8   CHLORIDE mmol/L 108*  --  109* 108* 108*   CO2 mmol/L 23.2  --  21.7* 21.9* 23.0   BUN mg/dL 13  --  9 8 10   CREATININE mg/dL 1.02*  --  0.82 0.93 0.98   GLUCOSE mg/dL 169*  --  126* 134* 168*   EGFR mL/min/1.73 68.9  --  89.5 76.9 72.2     Results from last 7 days   Lab Units 06/26/24  0526 06/21/24  2106   ALBUMIN g/dL 2.9* 2.9*   BILIRUBIN mg/dL <0.2 <0.2   ALK PHOS U/L 88 90   AST (SGOT) U/L 17 12   ALT (SGPT) U/L 16 9     Results from last 7 days   Lab Units 06/26/24  0526 06/25/24  0434 06/24/24  0525 06/23/24  0500 06/22/24  0323 06/21/24  2106   CALCIUM mg/dL 9.1 8.5* 8.6 8.2*   < > 8.5*   ALBUMIN g/dL 2.9*  --   --   --   --  2.9*   MAGNESIUM mg/dL 1.8  --   --   --   --   --     < > = values in this interval not displayed.       Hemoglobin A1C   Date/Time Value Ref Range Status   06/26/2024 0527 13.10 (H) 4.80 - 5.60 % Final     Glucose   Date/Time Value Ref Range Status    06/26/2024 1151 214 (H) 70 - 130 mg/dL Final   06/26/2024 0605 170 (H) 70 - 130 mg/dL Final   06/25/2024 2031 244 (H) 70 - 130 mg/dL Final   06/25/2024 1722 169 (H) 70 - 130 mg/dL Final   06/25/2024 1126 113 70 - 130 mg/dL Final   06/25/2024 0837 129 70 - 130 mg/dL Final   06/25/2024 0603 125 70 - 130 mg/dL Final       No radiology results for the last day    I have personally reviewed all medications:  Scheduled Medications  aspirin, 325 mg, Oral, Daily   Or  aspirin, 300 mg, Rectal, Daily  atorvastatin, 80 mg, Oral, Nightly  [START ON 7/1/2024] ceFAZolin, 2,000 mg, Intravenous, On Call to OR  [START ON 6/30/2024] chlorhexidine, 15 mL, Mouth/Throat, Q12H  [START ON 6/30/2024] Chlorhexidine Gluconate Cloth, 1 Application, Topical, Q12H  empagliflozin, 10 mg, Oral, Daily  escitalopram, 10 mg, Oral, Daily  insulin glargine, 20 Units, Subcutaneous, Daily  insulin lispro, 2-7 Units, Subcutaneous, 4x Daily AC & at Bedtime  [START ON 7/1/2024] metoprolol tartrate, 12.5 mg, Oral, On Call to OR  [START ON 6/30/2024] mupirocin, 1 Application, Each Nare, Q12H  nicotine, 1 patch, Transdermal, Q24H  NIFEdipine XL, 90 mg, Oral, Q24H  pantoprazole, 40 mg, Oral, QAM  sodium chloride, 10 mL, Intravenous, Q12H  valsartan, 320 mg, Oral, Q24H    Infusions  lactated ringers, 9 mL/hr    Diet  Diet: Diabetic; Consistent Carbohydrate; Fluid Consistency: Thin (IDDSI 0)  NPO Diet NPO Type: Sips with Meds    I have personally reviewed:  [x]  Laboratory   []  Microbiology   []  Radiology   [x]  EKG/Telemetry  []  Cardiology/Vascular   []  Pathology    []  Records    Assessment/Plan     Active Hospital Problems    Diagnosis  POA    **Acute right-sided weakness [R53.1]  Yes    CKD stage 3a, GFR 45-59 ml/min [N18.31]  Yes    Carotid stenosis [I65.29]  Yes    Lacunar cerebrovascular accident (CVA) [I63.81]  Yes    Elevated troponin [R79.89]  Yes    Aortic valve disease [I35.9]  Unknown    Tobacco abuse [Z72.0]  Yes    Coronary artery disease  involving native coronary artery of native heart with unstable angina pectoris [I25.110]  Yes    Gastroparesis diabeticorum [E11.43, 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   Acute CVA  - presented with right-sided weakness which has improved, has mild right facial droop still  - MRI showed enlargement of acute infarct in the genu of the left internal capsule as well as a new infarct in the left insular cortex, and previously identified acute infarction within the white matter of the left parietal lobe  - continue ASA, statin-hold plavix for planned procedure  - needs aggressive risk factor control, particularly with HTN and DM  - YUE showed 3 fibroelastomas-surgery planned for Monday 7/1 following plavix washout  - appreciate cardiology, CT surgery, and neurology recs     Type 2 DM  - complications include gastroparesis  - on lantus and jardiance as outpatient  - BG is elevated, also needs better outpatient control, A1C 16.90%  - continue lantus 20 units daily   - cover with ssi/hypoglycemia protocol  - restart jardiance     HTN/CAD s/p CABG  - BP elevated but improving  - continue nifedipine and valsartan      GERD  - symptoms controlled, continue ppi    SCDs for DVT prophylaxis.  Full code.  Discussed with patient, nursing staff, and care team on multidisciplinary rounds.  Anticipate discharge home with HH vs SNU facility timing yet to be determined.  Expected Discharge Date: 7/9/2024; Expected Discharge Time:       Lenin Huang MD  [facility]  06/26/24  13:41 EDT    Portions of this text have been copied and I have reviewed them. They  are accurate as of 2024      Electronically signed by Lenin Huang MD at 24 1341       Ronaldo Severino MD at 24 1005                [facility]    Patient Name: Bibiana Inman  :1980  44 y.o.  LOS: 5  Encounter Provider: Ronaldo Severino MD      Patient Care Team:  Ibrahima Correa MD as PCP - General (Family Medicine)  Rachele Zafar RN as Ambulatory  ([facility])  Xochilt Jenkins MD as Consulting Physician (Nephrology)    Chief Complaint/Consult question:  recurrent stroke, hx of CABG, aortic valve fibroelastomas    PMH/HPI: Bibiana Inman is a 44 year-old female with a PMH for multivessel CAD s/p CABG in  by Dr. 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     Summary of cardiovascular history:  - 2024, ED for chest pain. NM perfusion stress showed a moderate-sized, severe area of ischemia located in the inferior and lateral wall. Cath showed significant CAD including  of LAD and RCA, left circumflex with 80-90% stenosis in the proximal segment and mid segment, ramus with proximal 70-80% stenosis. LIMA to LAD is patent, however distal/apical LAD has 80% stenosis. SVG to left circumflex is occluded. SVG to RCA is patent.  Medical therapy was pursued at that time.  - -2024: Admitted to [facility] for acute stroke. MRI showed a linear acute infarct within the left parietal occipital region.  CTA neck showed 70% stenosis in the right ICA. She was evaluated by vascular surgery and there was no indication for carotid revascularization at that time. - 2024: Presented with acute right-sided weakness.  CTA head/neck showed no acute intracranial hemorrhage. MRI showed enlargement of the previously identified acute infarct centered within the genu of the left internal capsule. There is also a new punctate focus of  acute infarction within the left insular cortex. 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 She has been hypertensive with blood pressures as high as 199/86.     Cardiology has been asked to see this patient for the mobile mass found on the echocardiogram. She states that her BP has been quite elevated at home over the past month or 2 in the range of 160-170s.  Smokes 6 cigarettes/day for at least 20 years, denies alcohol or substance abuse.    Interval History: No acute events overnight.  Valsartan and nifedipine further uptitrated, BP improved. Planned for surgery next Monday (AV fibroelastoma resection and possible re-do CABG)     Results for orders placed during the hospital encounter of 06/21/24    Adult Transesophageal Echo (YUE) W/ Cont if Necessary Per Protocol    Interpretation Summary    Left ventricular systolic function is normal.  Visually estimated LVEF 60-65%.    Left ventricular wall thickness is consistent with mild to moderate concentric hypertrophy.    There are up to 3 small pedunculated masses attached to the aortic valve leaflets (2 with the noncoronary cusp and a smaller one with left coronary cusp) protruding into the aortic root with the largest one measuring 0.8 x 0.5cm.  The morphology appears consistent with fibroelastomas.    Estimated right ventricular systolic pressure from tricuspid regurgitation is normal (<35 mmHg).    No evidence of intracardiac thrombus including with close evaluation of both atria, BRADEN, and RAA.    Saline test results are negative.    Mild aortic arch plaques.        Objective   Vital Signs  Temp:  [97.7 °F (36.5 °C)-98.6 °F (37 °C)] 97.7 °F (36.5 °C)  Heart Rate:  [57-76] 67  Resp:  [18] 18  BP: (116-185)/(60-74) 152/69    Intake/Output Summary (Last 24 hours) at 6/26/2024 1005  Last data filed at 6/26/2024 0946  Gross  "per 24 hour   Intake 360 ml   Output --   Net 360 ml     Flowsheet Rows      Flowsheet Row First Filed Value   Admission Height 162.6 cm (64\") Documented at 06/21/2024 2105   Admission Weight 65.5 kg (144 lb 6.4 oz) Documented at 06/21/2024 2105              Vitals and nursing note reviewed.   Constitutional:       Appearance: Healthy appearance. Not in distress.   Neck:      Vascular: No JVR.   Pulmonary:      Effort: Pulmonary effort is normal.      Breath sounds: Diffuse and bilateral Rhonchi present. No rales.   Cardiovascular:      Bradycardia present. Regular rhythm.      Murmurs: There is no murmur.   Edema:     Peripheral edema absent.   Abdominal:      General: There is no distension.      Palpations: Abdomen is soft.      Tenderness: There is no abdominal tenderness.   Musculoskeletal:      Cervical back: Neck supple. Skin:     General: Skin is cool.   Neurological:      Mental Status: Alert and oriented to person, place and time.           Pertinent Test Results:  Results from last 7 days   Lab Units 06/26/24  0526 06/25/24  1804 06/25/24  0434 06/24/24  0525 06/23/24  0500 06/22/24  0323 06/21/24 2114 06/21/24 2106   SODIUM mmol/L 137  --  138 139 138 137  --  137   POTASSIUM mmol/L 4.3 4.9 3.6 3.7 3.8 3.5  --  3.8   CHLORIDE mmol/L 108*  --  109* 108* 108* 106  --  104   CO2 mmol/L 23.2  --  21.7* 21.9* 23.0 22.3  --  22.0   BUN mg/dL 13  --  9 8 10 10  --  11   CREATININE mg/dL 1.02*  --  0.82 0.93 0.98 1.23* 1.60* 1.40*   GLUCOSE mg/dL 169*  --  126* 134* 168* 56*  --  197*   CALCIUM mg/dL 9.1  --  8.5* 8.6 8.2* 8.4*  --  8.5*   AST (SGOT) U/L 17  --   --   --   --   --   --  12   ALT (SGPT) U/L 16  --   --   --   --   --   --  9     Results from last 7 days   Lab Units 06/21/24  2304 06/21/24 2106   HSTROP T ng/L 53* 56*     Results from last 7 days   Lab Units 06/26/24  0526 06/25/24  0434 06/24/24  0525 06/23/24  0500 06/22/24  0323 06/21/24 2106   WBC 10*3/mm3 7.64 7.88 7.76 7.02 11.58* 10.72 "   HEMOGLOBIN g/dL 11.2* 11.1* 11.2* 11.4* 11.5* 12.0   HEMATOCRIT % 34.5 33.9* 35.8 35.3 35.7 37.1   PLATELETS 10*3/mm3 218 197 203 202 265 245     Results from last 7 days   Lab Units 06/26/24  0526 06/21/24  2106   INR  1.00 0.98   APTT seconds 30.4 27.5     Results from last 7 days   Lab Units 06/26/24  0526   MAGNESIUM mg/dL 1.8     Results from last 7 days   Lab Units 06/22/24  0323   CHOLESTEROL mg/dL 245*   TRIGLYCERIDES mg/dL 610*   HDL CHOL mg/dL 31*     Results from last 7 days   Lab Units 06/26/24  0526   PROBNP pg/mL 721.0*               Medication Review:   aspirin, 325 mg, Oral, Daily   Or  aspirin, 300 mg, Rectal, Daily  atorvastatin, 80 mg, Oral, Nightly  [START ON 7/1/2024] ceFAZolin, 2,000 mg, Intravenous, On Call to OR  [START ON 6/30/2024] chlorhexidine, 15 mL, Mouth/Throat, Q12H  [START ON 6/30/2024] Chlorhexidine Gluconate Cloth, 1 Application, Topical, Q12H  escitalopram, 10 mg, Oral, Daily  insulin glargine, 20 Units, Subcutaneous, Daily  insulin lispro, 2-7 Units, Subcutaneous, 4x Daily AC & at Bedtime  [START ON 7/1/2024] metoprolol tartrate, 12.5 mg, Oral, On Call to OR  [START ON 6/30/2024] mupirocin, 1 Application, Each Nare, Q12H  nicotine, 1 patch, Transdermal, Q24H  NIFEdipine XL, 90 mg, Oral, Q24H  pantoprazole, 40 mg, Oral, QAM  sodium chloride, 10 mL, Intravenous, Q12H  valsartan, 320 mg, Oral, Q24H         lactated ringers, 9 mL/hr        Assessment & Plan     Active Hospital Problems    Diagnosis  POA    **Acute right-sided weakness [R53.1]  Yes    CKD stage 3a, GFR 45-59 ml/min [N18.31]  Yes    Carotid stenosis [I65.29]  Yes    Lacunar cerebrovascular accident (CVA) [I63.81]  Yes    Elevated troponin [R79.89]  Yes    Aortic valve disease [I35.9]  Unknown    Tobacco abuse [Z72.0]  Yes    Coronary artery disease involving native coronary artery of native heart with unstable angina pectoris [I25.110]  Yes    Gastroparesis diabeticorum [E11.43, K31.84]  Yes    Gastroesophageal  reflux disease [K21.9]  Yes    Iron deficiency anemia [D50.9]  Yes    Type 2 diabetes mellitus with hyperglycemia, with long-term current use of insulin [E11.65, Z79.4]  Not Applicable    Benign essential hypertension [I10]  Yes    Mixed hypercholesterolemia and hypertriglyceridemia [E78.2]  Yes      Resolved Hospital Problems   No resolved problems to display.        Recurrent stroke/aortic valve mass: Stroke x 2 over the past month, this time presented with dysarthria and MRI showed a new punctate focus of acute infarction within the left insular cortex. 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  YUE confirmed the presence of up to 3 likely fibroelastoma on the aortic valve.  Cardiothoracic surgery consulted for possible surgical resection, planned for Monday +/- re-do CABG.  BP control, see below.  Hypertension: BP readings very elevated this admission up to ~200/80s, home meds included Lopressor, clonidine, hydralazine, and nifedipine (recently started last week).  Prior cough with losartan, started on valsartan and now maximized at 320 daily. Also restarted home nifedipine 60 daily yesterday and increased to 90 daily.  PRN oral hydralazine for intermittent BP spikes.  Hyperlipidemia: Lipids this month showed HDL 31, , uncontrolled.  LDL goal < 70 given history of CABG. continue home atorvastatin 80, patient will need addition of Zetia +/- PCSK9 inhibitor upon discharge.  Diabetes mellitus: Hemoglobin A1c 17 his admission, uncontrolled.  Insulin-dependent, management per primary team and other specialists.  Already on Jardiance 10 daily at home for added cardiovascular benefit which I recommend restarting.  CAD s/p CABG: Multivessel CAD s/p CABG in 2014 by Dr. Garcia at [facility]. Most recent cath 2/2024 showed LAD and RCA , proximal to mid left circumflex  with 80-90% stenosis, ramus with proximal 70-80% stenosis. LIMA to LAD is patent, however distal/apical LAD has 80% stenosis. SVG to left circumflex is occluded. SVG to RCA is patent. Denies angina, possible re-do CABG as above.  Continue home aspirin and Plavix (holding for now per CV surgery request), BP and lipid control as above, smoking cessation is key, see below.  Tobacco abuse: Smokes 6 cigarettes/day for at least 20 years, strongly encourage complete cessation.    Ronaldo Severino MD  [facility]  24  12:17 EDT     Electronically signed by Ronaldo Seveirno MD at 24 1007       Lenin Huang MD at 24 1241              Name: Bibiana Inman ADMIT: 2024   : 1980  PCP: Ibrahima Correa MD    MRN: [id number] LOS: 4 days   AGE/SEX: 44 y.o. female  ROOM: [facility]     Subjective   Subjective   No acute events. Patient denies new complaints. Had YUE this morning and tolerated well. Multiple family members at bedside.    Objective   Objective   Vital Signs  Temp:  [98 °F (36.7 °C)-98.8 °F (37.1 °C)] 98.4 °F (36.9 °C)  Heart Rate:  [47-66] 66  Resp:  [16-20] 16  BP: (113-193)/(56-82) 185/74  SpO2:  [94 %-100 %] 99 %  on  Flow (L/min):  [15] 15;   Device (Oxygen Therapy): (P) room air  Body mass index is 24.72 kg/m².  Physical Exam  Vitals and nursing note reviewed.   Constitutional:       General: She is not in acute distress.     Appearance: She is not toxic-appearing or diaphoretic.   HENT:      Head: Normocephalic and atraumatic.      Nose: Nose normal.      Mouth/Throat:      Mouth: Mucous membranes are moist.      Pharynx: Oropharynx is clear.   Eyes:      Conjunctiva/sclera: Conjunctivae normal.      Pupils: Pupils are equal, round, and reactive to light.   Cardiovascular:      Rate and Rhythm: Normal rate and regular rhythm.      Pulses: Normal pulses.   Pulmonary:      Effort: Pulmonary effort is normal.   Abdominal:      General: Bowel sounds are normal.       Palpations: Abdomen is soft.      Tenderness: There is no abdominal tenderness.   Musculoskeletal:         General: No swelling or tenderness.      Cervical back: Neck supple.   Skin:     General: Skin is warm and dry.      Capillary Refill: Capillary refill takes less than 2 seconds.   Neurological:      Mental Status: She is alert and oriented to person, place, and time.      Comments: +mild right lower facial weakness   Psychiatric:         Mood and Affect: Mood normal.         Behavior: Behavior normal.       Results Review     I reviewed the patient's new clinical results.  Results from last 7 days   Lab Units 06/25/24 0434 06/24/24  0525 06/23/24  0500 06/22/24  0323   WBC 10*3/mm3 7.88 7.76 7.02 11.58*   HEMOGLOBIN g/dL 11.1* 11.2* 11.4* 11.5*   PLATELETS 10*3/mm3 197 203 202 265     Results from last 7 days   Lab Units 06/25/24  0434 06/24/24  0525 06/23/24  0500 06/22/24  0323   SODIUM mmol/L 138 139 138 137   POTASSIUM mmol/L 3.6 3.7 3.8 3.5   CHLORIDE mmol/L 109* 108* 108* 106   CO2 mmol/L 21.7* 21.9* 23.0 22.3   BUN mg/dL 9 8 10 10   CREATININE mg/dL 0.82 0.93 0.98 1.23*   GLUCOSE mg/dL 126* 134* 168* 56*   EGFR mL/min/1.73 89.5 76.9 72.2 55.0*     Results from last 7 days   Lab Units 06/21/24  2106   ALBUMIN g/dL 2.9*   BILIRUBIN mg/dL <0.2   ALK PHOS U/L 90   AST (SGOT) U/L 12   ALT (SGPT) U/L 9     Results from last 7 days   Lab Units 06/25/24  0434 06/24/24  0525 06/23/24  0500 06/22/24  0323 06/21/24  2106   CALCIUM mg/dL 8.5* 8.6 8.2* 8.4* 8.5*   ALBUMIN g/dL  --   --   --   --  2.9*       Glucose   Date/Time Value Ref Range Status   06/25/2024 1126 113 70 - 130 mg/dL Final   06/25/2024 0837 129 70 - 130 mg/dL Final   06/25/2024 0603 125 70 - 130 mg/dL Final   06/24/2024 2140 227 (H) 70 - 130 mg/dL Final   06/24/2024 1614 182 (H) 70 - 130 mg/dL Final   06/24/2024 1116 105 70 - 130 mg/dL Final   06/24/2024 0637 168 (H) 70 - 130 mg/dL Final       No radiology results for the last day    I have  personally reviewed all medications:  Scheduled Medications  aspirin, 325 mg, Oral, Daily   Or  aspirin, 300 mg, Rectal, Daily  atorvastatin, 80 mg, Oral, Nightly  [START ON 7/1/2024] ceFAZolin, 2,000 mg, Intravenous, On Call to OR  [START ON 6/30/2024] chlorhexidine, 15 mL, Mouth/Throat, Q12H  [START ON 6/30/2024] Chlorhexidine Gluconate Cloth, 1 Application, Topical, Q12H  escitalopram, 10 mg, Oral, Daily  icosapent ethyl, 2 g, Oral, BID With Meals  insulin glargine, 20 Units, Subcutaneous, Daily  insulin lispro, 2-7 Units, Subcutaneous, 4x Daily AC & at Bedtime  [START ON 7/1/2024] metoprolol tartrate, 12.5 mg, Oral, On Call to OR  [START ON 6/30/2024] mupirocin, 1 Application, Each Nare, Q12H  nicotine, 1 patch, Transdermal, Q24H  [START ON 6/26/2024] NIFEdipine XL, 120 mg, Oral, Q24H  NIFEdipine XL, 30 mg, Oral, Once  pantoprazole, 40 mg, Oral, QAM  potassium chloride ER, 40 mEq, Oral, Q4H  sodium chloride, 10 mL, Intravenous, Q12H  valsartan, 160 mg, Oral, Once  [START ON 6/26/2024] valsartan, 320 mg, Oral, Q24H    Infusions  lactated ringers, 9 mL/hr  sodium chloride, 100 mL/hr, Last Rate: 100 mL/hr (06/24/24 2010)    Diet  Diet: Diabetic; Consistent Carbohydrate; Fluid Consistency: Thin (IDDSI 0)  NPO Diet NPO Type: Sips with Meds    I have personally reviewed:  [x]  Laboratory   []  Microbiology   []  Radiology   [x]  EKG/Telemetry  [x]  Cardiology/Vascular   []  Pathology    []  Records    Assessment/Plan     Active Hospital Problems    Diagnosis  POA    **Acute right-sided weakness [R53.1]  Yes    CKD stage 3a, GFR 45-59 ml/min [N18.31]  Yes    Carotid stenosis [I65.29]  Yes    Lacunar cerebrovascular accident (CVA) [I63.81]  Yes    Elevated troponin [R79.89]  Yes    Aortic valve disease [I35.9]  Unknown    Tobacco abuse [Z72.0]  Yes    Coronary artery disease involving native coronary artery of native heart with unstable angina pectoris [I25.110]  Yes    Gastroparesis diabeticorum [E11.43, K31.84]  Yes     Gastroesophageal reflux disease [K21.9]  Yes    Iron deficiency anemia [D50.9]  Yes    Type 2 diabetes mellitus with hyperglycemia, with long-term current use of insulin [E11.65, Z79.4]  Not Applicable    Benign essential hypertension [I10]  Yes    Mixed hypercholesterolemia and hypertriglyceridemia [E78.2]  Yes      Resolved Hospital Problems   No resolved problems to display.   Acute CVA  - presented with right-sided weakness which has improved, has mild right facial droop still  - MRI showed enlargement of acute infarct in the genu of the left internal capsule as well as a new infarct in the left insular cortex, and previously identified acute infarction within the white matter of the left parietal lobe  - continue ASA, statin-hold plavix for planned procedure  - needs aggressive risk factor control, particularly with HTN and DM  - YUE planned for tomorrow  - appreciate cardiology and neurology recs     Type 2 DM  - complications include gastroparesis  - on lantus and jardiance as outpatient  - BG currently acceptable but needs better outpatient control, A1C 16.90%  - continue lantus 20 units daily   - cover with ssi/hypoglycemia protocol     HTN/CAD s/p CABG  - BP elevated but improving  - continue nifedipine and valsartan      GERD  - symptoms controlled, continue ppi    SCDs for DVT prophylaxis.  Full code.  Discussed with patient, spouse, family, nursing staff, and care team on multidisciplinary rounds.  Anticipate discharge home later this week.  Expected Discharge Date: 2024; Expected Discharge Time:       Lenin Huang MD  [facility]  24  12:41 EDT    Portions of this text have been copied and I have reviewed them. They are accurate as of 2024      Electronically signed by Lenin Huang MD at 24 1243       Ronaldo Severino MD at 24 1217                [facility]    Patient Name: Bibiana Inman  :1980  44 y.o.  LOS:  4  Encounter Provider: Ronaldo Severino MD      Patient Care Team:  Ibrahima Correa MD as PCP - General (Family Medicine)  Rachele Zafar, RN as Ambulatory  (Population Health)  Xochilt Jenkins MD as Consulting Physician (Nephrology)    Chief Complaint/Consult question:  recurrent stroke, hx of CABG, aortic valve fibroelastomas    PMH/HPI: Bibiana Inman is a 44 year-old female with a PMH for multivessel CAD s/p CABG in 2014 by Dr. 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     Summary of cardiovascular history:  - 2/2024, ED for chest pain. NM perfusion stress showed a moderate-sized, severe area of ischemia located in the inferior and lateral wall. Cath showed significant CAD including  of LAD and RCA, left circumflex with 80-90% stenosis in the proximal segment and mid segment, ramus with proximal 70-80% stenosis. LIMA to LAD is patent, however distal/apical LAD has 80% stenosis. SVG to left circumflex is occluded. SVG to RCA is patent.  Medical therapy was pursued at that time.  - 6/17-6/20/2024: Admitted to [facility] for acute stroke. MRI showed a linear acute infarct within the left parietal occipital region.  CTA neck showed 70% stenosis in the right ICA. She was evaluated by vascular surgery and there was no indication for carotid revascularization at that time. - 6/21/2024: Presented with acute right-sided weakness.  CTA head/neck showed no acute intracranial hemorrhage. MRI showed enlargement of the previously identified acute infarct centered within the genu of the left internal capsule. There is also a new punctate focus of acute infarction within the left insular cortex. 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 She has  "been hypertensive with blood pressures as high as 199/86.     Cardiology has been asked to see this patient for the mobile mass found on the echocardiogram. She states that her BP has been quite elevated at home over the past month or 2 in the range of 160-170s.  Smokes 6 cigarettes/day for at least 20 years, denies alcohol or substance abuse.    Interval History: No acute events overnight.  YUE done this morning showed 3 likely fibroelastomas on the aortic valve.  Cardiovascular surgery consulted.  Valsartan and nifedipine uptitrated given persistently elevated BP readings.     Results for orders placed during the hospital encounter of 06/21/24    Adult Transesophageal Echo (YUE) W/ Cont if Necessary Per Protocol    Interpretation Summary    Left ventricular systolic function is normal.  Visually estimated LVEF 60-65%.    Left ventricular wall thickness is consistent with mild to moderate concentric hypertrophy.    There are up to 3 small pedunculated masses attached to the aortic valve leaflets (2 with the noncoronary cusp and a smaller one with left coronary cusp) protruding into the aortic root with the largest one measuring 0.8 x 0.5cm.  The morphology appears consistent with fibroelastomas.    Estimated right ventricular systolic pressure from tricuspid regurgitation is normal (<35 mmHg).    No evidence of intracardiac thrombus including with close evaluation of both atria, BRADEN, and RAA.    Saline test results are negative.    Mild aortic arch plaques.        Objective   Vital Signs  Temp:  [98 °F (36.7 °C)-98.8 °F (37.1 °C)] 98.4 °F (36.9 °C)  Heart Rate:  [47-66] 66  Resp:  [16-20] 16  BP: (113-193)/(56-82) 185/74    Intake/Output Summary (Last 24 hours) at 6/25/2024 1217  Last data filed at 6/25/2024 0805  Gross per 24 hour   Intake 5393.33 ml   Output --   Net 5393.33 ml     Flowsheet Rows      Flowsheet Row First Filed Value   Admission Height 162.6 cm (64\") Documented at 06/21/2024 2105   Admission " Weight 65.5 kg (144 lb 6.4 oz) Documented at 06/21/2024 2105              Vitals and nursing note reviewed.   Constitutional:       Appearance: Healthy appearance. Not in distress.   Neck:      Vascular: No JVR.   Pulmonary:      Effort: Pulmonary effort is normal.      Breath sounds: Diffuse and bilateral Rhonchi present. No rales.   Cardiovascular:      Bradycardia present. Regular rhythm.      Murmurs: There is no murmur.   Edema:     Peripheral edema absent.   Abdominal:      General: There is no distension.      Palpations: Abdomen is soft.      Tenderness: There is no abdominal tenderness.   Musculoskeletal:      Cervical back: Neck supple. Skin:     General: Skin is cool.   Neurological:      Mental Status: Alert and oriented to person, place and time.           Pertinent Test Results:  Results from last 7 days   Lab Units 06/25/24  0434 06/24/24  0525 06/23/24  0500 06/22/24 0323 06/21/24 2114 06/21/24 2106 06/19/24 0506   SODIUM mmol/L 138 139 138 137  --  137 135*   POTASSIUM mmol/L 3.6 3.7 3.8 3.5  --  3.8 3.6   CHLORIDE mmol/L 109* 108* 108* 106  --  104 105   CO2 mmol/L 21.7* 21.9* 23.0 22.3  --  22.0 21.8*   BUN mg/dL 9 8 10 10  --  11 8   CREATININE mg/dL 0.82 0.93 0.98 1.23* 1.60* 1.40* 1.12*   GLUCOSE mg/dL 126* 134* 168* 56*  --  197* 248*   CALCIUM mg/dL 8.5* 8.6 8.2* 8.4*  --  8.5* 9.0   AST (SGOT) U/L  --   --   --   --   --  12  --    ALT (SGPT) U/L  --   --   --   --   --  9  --      Results from last 7 days   Lab Units 06/21/24  2304 06/21/24 2106   HSTROP T ng/L 53* 56*     Results from last 7 days   Lab Units 06/25/24  0434 06/24/24  0525 06/23/24  0500 06/22/24  0323 06/21/24  2106   WBC 10*3/mm3 7.88 7.76 7.02 11.58* 10.72   HEMOGLOBIN g/dL 11.1* 11.2* 11.4* 11.5* 12.0   HEMATOCRIT % 33.9* 35.8 35.3 35.7 37.1   PLATELETS 10*3/mm3 197 203 202 265 245     Results from last 7 days   Lab Units 06/21/24  2106   INR  0.98   APTT seconds 27.5         Results from last 7 days   Lab Units  06/22/24  0323   CHOLESTEROL mg/dL 245*   TRIGLYCERIDES mg/dL 610*   HDL CHOL mg/dL 31*                   Medication Review:   aspirin, 325 mg, Oral, Daily   Or  aspirin, 300 mg, Rectal, Daily  atorvastatin, 80 mg, Oral, Nightly  [START ON 7/1/2024] ceFAZolin, 2,000 mg, Intravenous, On Call to OR  [START ON 6/30/2024] chlorhexidine, 15 mL, Mouth/Throat, Q12H  [START ON 6/30/2024] Chlorhexidine Gluconate Cloth, 1 Application, Topical, Q12H  escitalopram, 10 mg, Oral, Daily  icosapent ethyl, 2 g, Oral, BID With Meals  insulin glargine, 20 Units, Subcutaneous, Daily  insulin lispro, 2-7 Units, Subcutaneous, 4x Daily AC & at Bedtime  [START ON 7/1/2024] metoprolol tartrate, 12.5 mg, Oral, On Call to OR  [START ON 6/30/2024] mupirocin, 1 Application, Each Nare, Q12H  nicotine, 1 patch, Transdermal, Q24H  [START ON 6/26/2024] NIFEdipine XL, 120 mg, Oral, Q24H  NIFEdipine XL, 30 mg, Oral, Once  pantoprazole, 40 mg, Oral, QAM  potassium chloride ER, 40 mEq, Oral, Q4H  sodium chloride, 10 mL, Intravenous, Q12H  valsartan, 160 mg, Oral, Once  [START ON 6/26/2024] valsartan, 320 mg, Oral, Q24H         lactated ringers, 9 mL/hr  sodium chloride, 100 mL/hr, Last Rate: 100 mL/hr (06/24/24 2010)        Assessment & Plan     Active Hospital Problems    Diagnosis  POA    **Acute right-sided weakness [R53.1]  Yes    CKD stage 3a, GFR 45-59 ml/min [N18.31]  Yes    Carotid stenosis [I65.29]  Yes    Lacunar cerebrovascular accident (CVA) [I63.81]  Yes    Elevated troponin [R79.89]  Yes    Aortic valve disease [I35.9]  Unknown    Tobacco abuse [Z72.0]  Yes    Coronary artery disease involving native coronary artery of native heart with unstable angina pectoris [I25.110]  Yes    Gastroparesis diabeticorum [E11.43, K31.84]  Yes    Gastroesophageal reflux disease [K21.9]  Yes    Iron deficiency anemia [D50.9]  Yes    Type 2 diabetes mellitus with hyperglycemia, with long-term current use of insulin [E11.65, Z79.4]  Not Applicable    Benign  essential hypertension [I10]  Yes    Mixed hypercholesterolemia and hypertriglyceridemia [E78.2]  Yes      Resolved Hospital Problems   No resolved problems to display.        Recurrent stroke/aortic valve mass: Stroke x 2 over the past month, this time presented with dysarthria and MRI showed a new punctate focus of acute infarction within the left insular cortex. 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  YUE today confirmed the presence of up to 3 likely fibroelastoma on the aortic valve.  Cardiothoracic surgery consulted for possible surgical resection.  BP control, see below.  Hypertension: BP readings very elevated this admission up to ~200/80s, home meds included Lopressor, clonidine, hydralazine, and nifedipine (recently started last week).  Prior cough with losartan, started on valsartan 80 daily yesterday and increased to 160 daily, tolerating well, will further increase to 320 daily. Also restarted home nifedipine 60 daily yesterday, will increase to 120 daily.  Prn oral hydralazine for intermittent BP spikes.  Hyperlipidemia: Lipids this month showed HDL 31, , uncontrolled.  LDL goal < 70 given history of CABG. continue home atorvastatin 80, patient will need addition of Zetia +/- PCSK9 inhibitor upon discharge.  Diabetes mellitus: Hemoglobin A1c 17 his admission, uncontrolled.  Insulin-dependent, management per primary team and other specialists.  Already on Jardiance 10 daily at home for added cardiovascular benefit which I recommend restarting.  CAD s/p CABG: Multivessel CAD s/p CABG in 2014 by Dr. Garcia at [facility]. Most recent cath 2/2024 showed LAD and RCA , proximal to mid left circumflex with 80-90% stenosis, ramus with proximal 70-80% stenosis. LIMA to LAD is patent, however distal/apical LAD has 80% stenosis. SVG to left circumflex is  "occluded. SVG to RCA is patent. Denies angina, defer to CV surgery regarding need for further testing.  Continue home aspirin and Plavix (holding for now per CV surgery request), BP and lipid control as above, smoking cessation is key, see below.  Tobacco abuse: Smokes 6 cigarettes/day for at least 20 years, strongly encourage complete cessation.    Plan of care discussed with patient as well as multiple family members including  and mother at bedside.    Ronaldo Severino MD  [facility]  24  12:17 EDT     Electronically signed by Ronaldo Severino MD at 24 1635       Kamar Castillo MD at 24 1313          DOS: 2024  NAME: Bibiana Inman   : 1980  PCP: Ibrahima Correa MD  Chief Complaint   Patient presents with    Extremity Weakness       Chief complaint: Recurrent strokes  Subjective: Patient became hypertensive overnight over 190s systolic currently at 160s, patient denied worsening of her symptoms or new neurological symptoms, 2D echo showed mobile mass on the aortic valve concerning for fibroblastoma.    Objective:  Vital signs: /84 (BP Location: Left arm, Patient Position: Lying)   Pulse 54   Temp 97.9 °F (36.6 °C) (Oral)   Resp 18   Ht 162.6 cm (64\")   Wt 65.3 kg (144 lb)   LMP 01/10/2023 Comment: patient states irregular periods  SpO2 99%   BMI 24.72 kg/m²    Gen: NAD, vitals reviewed  MS: oriented x3, recent/remote memory intact, normal attention/concentration, language intact, no neglect.  CN: visual acuity grossly normal, PERRL, EOMI, no facial droop, no dysarthria  Motor: 5/5 throughout upper and lower extremities, normal tone  Sensory: intact to light touch all 4 ext.    NIH Stroke Scale :    (0_) 1a. Level of consciousness (LOC): 0=alert;1=arousable by minor stimulation;2=obtunded or needs strong stimulation to attend;3=unresponsive or reflex responses only  (0_) 1b. LOC Questions: 0=answers both;1=answers one;2=answers neither  (0_) " 1c. LOC Commands: 0=performs both tasks;1=performs one task;2=performs neither task  (0_) 2. Best Gaze: 0=normal;1=partial gaze palsy;2=forced deviation or total gaze paresis  (0_) 3. Visual: 0=normal;1=partial hemianopia;2=complete hemianopia;3=blind  (0_) 4. Facial palsy: 0=normal;1=minor paresis;2=partial paralysis;3=complete paralysis  FOR 5 AND 6 BELOW: 0=normal;1=drifts but maintains in air;2=unable to maintain in air;3=moves but unable to lift against gravity;4=no movement  (0_)0 (_)1 (_)2 (_)3 (_)4 (_)NA 5a. Motor arm-left  (0_)0 (_)1 (_)2 (_)3 (_)4 (_)NA 5b. Motor arm-right  (0_)0 (_)1 (_)2 (_)3 (_)4 (_)NA 6a. Motor leg-left  (0_)0 (_)1 (_)2 (_)3 (_)4 (_)NA 6ba. Motor leg-right  (0_) 7. Limb ataxia:0=absent;1=unilateral;2=bilateral; NA=unable to test  (0_) 8. Sensory: 0=normal;1=mild-moderate loss;2-severe or total loss  (0_) 9. Best language: 0=normal;1=mild-moderate aphasia, some deficits apparent but able to communicate;2=severe aphasia, fragmentary expression only, unable to communicate well;3=global aphasia, mute and no comprehension  (0_)10. Dysarthria: 0=normal;1=mild-moderate, slurs some words;2=severe, speech mostly unintelligible; NA=unable to test (e.g.,intubation)  (0_)11. Extinction/Inattention: 0=normal;1=visual,tactile,auditory or other extinction to bilateral simultaneous stimulation, but no severe neglect;2=answers neither      NIH Score: Complete  0    Laboratory results:  Lab Results   Component Value Date    GLUCOSE 134 (H) 06/24/2024    CALCIUM 8.6 06/24/2024     06/24/2024    K 3.7 06/24/2024    CO2 21.9 (L) 06/24/2024     (H) 06/24/2024    BUN 8 06/24/2024    CREATININE 0.93 06/24/2024    EGFRIFNONA 26 (L) 01/29/2021    BCR 8.6 06/24/2024    ANIONGAP 9.1 06/24/2024     Lab Results   Component Value Date    WBC 7.76 06/24/2024    HGB 11.2 (L) 06/24/2024    HCT 35.8 06/24/2024    MCV 92.5 06/24/2024     06/24/2024     Lab Results   Component Value Date     (H)  06/22/2024    LDL  06/18/2024      Comment:      Unable to calculate     (H) 06/18/2024            Review of labs: Above labs reviewed    Review and interpretation of imaging: I personally reviewed the repeated MRI brain and discussed with neuroradiologist  showed enlargement/worsening acute/subacute ischemic stroke on the left jenu of IC/thalamus, new punctate ischemic stroke on the left insular cortex    ECHO 6/23/2024    Left ventricular systolic function is normal. Calculated left ventricular EF = 62.5%    Left ventricular diastolic function is consistent with (grade II w/high LAP) pseudonormalization.    There is a small (6 mm) mobile mass likely with a stalk and appears to be attached  the non-coronary cusp(s) of the aortic valve.  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  Looks like it was also present but not well-visualized on prior echo done on  2/20/2024.    Saline test results are negative for right to left atrial level shunt.    Recommend YUE considering patient's history of CVA.    Diagnoses:  -Recurrent acute to subacute left MCA ischemic strokes etiology multifactorial from poorly controlled risk factors and possible fibroblastoma.  -Abnormal 2D echo concerning for mobile aortic mass/ papillary fibroblastoma  -Essential hypertension  -Mixed hyperlipidemia  -Obstructive sleep apnea not compliant with CPAP machine  -Poorly controlled type 2 diabetes A1c 16.8  -Tobacco abuse        Plan:  PLAN:   -YUE prior to discharge  -Continue dual antiplatelet therapy  -Continue Lipitor 80 mg daily  -Counseled the patient regarding controlling risk factors goal A1c less than 6.5, goal LDL less than 70, blood pressure goal less than 130/80 due to diabetes with hypertension and strokes  -Inpatient diabetic educator consult due to poorly controlled diabetes with markedly elevated A1c  -Recommend compliance with CPAP machine  -Counseled the patient regarding tobacco use, she stated understanding  -Counseled the  patient to monitor blood pressure at home for the next 10 to 14 days twice daily and bring the numbers to PCP to adjust her blood pressure medications if needed  -Will follow-up on YUE results for further recs  -Follow up with stroke clinic in 6-8 weeks, MA Mrs Casiano notified to arrange for that.      Electronically signed by Kamar Castillo MD at 24 1328       Lenin Huang MD at 24 1249              Name: Bibiana Inman ADMIT: 2024   : 1980  PCP: Ibrahima Correa MD    MRN: [id number] LOS: 3 days   AGE/SEX: 44 y.o. female  [facility]     Subjective   Subjective   No acute events. Patient denies new complaints. Her mother is at bedside.    Objective   Objective   Vital Signs  Temp:  [97.9 °F (36.6 °C)-98.4 °F (36.9 °C)] 97.9 °F (36.6 °C)  Heart Rate:  [54-63] 54  Resp:  [18] 18  BP: (169-199)/(63-86) 169/84  SpO2:  [98 %-99 %] 99 %  on   ;   Device (Oxygen Therapy): (P) room air  Body mass index is 24.72 kg/m².  Physical Exam  Vitals and nursing note reviewed.   Constitutional:       General: She is not in acute distress.     Appearance: She is not toxic-appearing or diaphoretic.   HENT:      Head: Normocephalic and atraumatic.      Nose: Nose normal.      Mouth/Throat:      Mouth: Mucous membranes are moist.      Pharynx: Oropharynx is clear.   Eyes:      Conjunctiva/sclera: Conjunctivae normal.      Pupils: Pupils are equal, round, and reactive to light.   Cardiovascular:      Rate and Rhythm: Normal rate and regular rhythm.      Pulses: Normal pulses.   Pulmonary:      Effort: Pulmonary effort is normal.   Abdominal:      General: Bowel sounds are normal.      Palpations: Abdomen is soft.      Tenderness: There is no abdominal tenderness.   Musculoskeletal:         General: No swelling or tenderness.      Cervical back: Neck supple.   Skin:     General: Skin is warm and dry.      Capillary Refill: Capillary refill takes less than 2 seconds.   Neurological:      Mental  Status: She is alert and oriented to person, place, and time.      Comments: +mild right lower facial weakness   Psychiatric:         Mood and Affect: Mood normal.         Behavior: Behavior normal.       Results Review     I reviewed the patient's new clinical results.  Results from last 7 days   Lab Units 06/24/24  0525 06/23/24  0500 06/22/24 0323 06/21/24 2106   WBC 10*3/mm3 7.76 7.02 11.58* 10.72   HEMOGLOBIN g/dL 11.2* 11.4* 11.5* 12.0   PLATELETS 10*3/mm3 203 202 265 245     Results from last 7 days   Lab Units 06/24/24  0525 06/23/24  0500 06/22/24 0323 06/21/24 2106   SODIUM mmol/L 139 138 137 137   POTASSIUM mmol/L 3.7 3.8 3.5 3.8   CHLORIDE mmol/L 108* 108* 106 104   CO2 mmol/L 21.9* 23.0 22.3 22.0   BUN mg/dL 8 10 10 11   CREATININE mg/dL 0.93 0.98 1.23* 1.40*   GLUCOSE mg/dL 134* 168* 56* 197*   EGFR mL/min/1.73 76.9 72.2 55.0* 47.1*     Results from last 7 days   Lab Units 06/21/24 2106 06/18/24  0605   ALBUMIN g/dL 2.9* 2.9*   BILIRUBIN mg/dL <0.2 0.2   ALK PHOS U/L 90 96   AST (SGOT) U/L 12 10   ALT (SGPT) U/L 9 <5     Results from last 7 days   Lab Units 06/24/24 0525 06/23/24  0500 06/22/24 0323 06/21/24 2106 06/19/24  0506 06/18/24  0605   CALCIUM mg/dL 8.6 8.2* 8.4* 8.5*   < > 8.8   ALBUMIN g/dL  --   --   --  2.9*  --  2.9*    < > = values in this interval not displayed.       Glucose   Date/Time Value Ref Range Status   06/24/2024 1116 105 70 - 130 mg/dL Final   06/24/2024 0637 168 (H) 70 - 130 mg/dL Final   06/23/2024 2040 271 (H) 70 - 130 mg/dL Final   06/23/2024 1651 178 (H) 70 - 130 mg/dL Final   06/23/2024 1123 159 (H) 70 - 130 mg/dL Final   06/23/2024 0638 176 (H) 70 - 130 mg/dL Final   06/23/2024 0031 195 (H) 70 - 130 mg/dL Final       MRI Brain Without Contrast    Result Date: 6/22/2024   When compared to the most recent examination dated 06/18/2024, there is interval enlargement of the previously identified acute infarct centered within the genu of the left internal capsule.  Also, there is a new punctate focus of acute infarction within the left insular cortex. The previously identified acute infarction within the white matter of the left parietal lobe is again appreciated. The location of the infarct within the genu of the left internal capsule is most suggestive of an in situ occlusive process (rather than an embolic event) which could be due to lacunar disease or possibly a vasculitis. Please correlate with clinical data.  Moderate changes of chronic small vessel ischemic phenomenon are incidentally appreciated.  These findings and recommendations were discussed with Dr. Castillo on 06/22/2024 at approximately 3:25 p.m.     This report was finalized on 6/22/2024 4:12 PM by Dr. Terrence Thorne M.D on Workstation: [id number]       I have personally reviewed all medications:  Scheduled Medications  aspirin, 325 mg, Oral, Daily   Or  aspirin, 300 mg, Rectal, Daily  atorvastatin, 80 mg, Oral, Nightly  clopidogrel, 75 mg, Oral, Daily  escitalopram, 10 mg, Oral, Daily  icosapent ethyl, 2 g, Oral, BID With Meals  insulin glargine, 20 Units, Subcutaneous, Daily  insulin lispro, 2-7 Units, Subcutaneous, 4x Daily AC & at Bedtime  nicotine, 1 patch, Transdermal, Q24H  NIFEdipine XL, 60 mg, Oral, Q24H  pantoprazole, 40 mg, Oral, QAM  sodium chloride, 10 mL, Intravenous, Q12H  valsartan, 80 mg, Oral, Q24H    Infusions  sodium chloride, 100 mL/hr, Last Rate: 100 mL/hr (06/24/24 1013)    Diet  NPO Diet NPO Type: Strict NPO  Diet: Diabetic; Consistent Carbohydrate; Fluid Consistency: Thin (IDDSI 0)    I have personally reviewed:  [x]  Laboratory   []  Microbiology   [x]  Radiology   [x]  EKG/Telemetry  [x]  Cardiology/Vascular   []  Pathology    [x]  Records    Assessment/Plan     Active Hospital Problems    Diagnosis  POA    **Acute right-sided weakness [R53.1]  Yes    CKD stage 3a, GFR 45-59 ml/min [N18.31]  Yes    Carotid stenosis [I65.29]  Yes    Lacunar cerebrovascular accident (CVA) [I63.81]   Yes    Elevated troponin [R79.89]  Yes    Tobacco abuse [Z72.0]  Yes    Coronary artery disease involving native coronary artery of native heart with unstable angina pectoris [I25.110]  Yes    Gastroparesis diabeticorum [E11.43, 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   Acute CVA  - presented with right-sided weakness which has improved, has mild right facial droop still  - MRI showed enlargement of acute infarct in the genu of the left internal capsule as well as a new infarct in the left insular cortex, and previously identified acute infarction within the white matter of the left parietal lobe  - continue ASA, statin, plavix  - needs aggressive risk factor control, particularly with HTN and DM  - YUE planned for tomorrow  - appreciate cardiology and neurology recs, d/w Dr. Severino     Type 2 DM  - complications include gastroparesis  - on lantus and jardiance as outpatient  - BG currently acceptable but needs better outpatient control, A1C 16.90%  - continue lantus 20 units daily   - cover with ssi/hypoglycemia protocol     HTN/CAD s/p CABG  - BP elevated but improving  - continue nifedipine and valsartan      GERD  - symptoms controlled, continue ppi    SCDs for DVT prophylaxis.  Full code.  Discussed with patient, nursing staff, consulting provider, and care team on multidisciplinary rounds.  Anticipate discharge home later this week.  Expected Discharge Date: 6/27/2024; Expected Discharge Time:       Lenin Huang MD  [facility]  06/24/24  13:02 EDT      Electronically signed by Lenin Huang MD at 06/24/24 1302       Qamar Perez,  MD at 24 1316              Name: Bibiana Inman ADMIT: 2024   : 1980  PCP: Ibrahima Correa MD    MRN: [id number] LOS: 2 days   AGE/SEX: 44 y.o. female  ROOM: [facility]     Subjective     Examined at bedside.  No new complaints today.  MRI findings noted.  Objective   Objective   Vital Signs  Temp:  [98 °F (36.7 °C)-98.6 °F (37 °C)] 98 °F (36.7 °C)  Heart Rate:  [62-69] 62  Resp:  [16-18] 18  BP: (177-185)/(63-79) 177/79  SpO2:  [96 %-98 %] 96 %  on   ;   Device (Oxygen Therapy): room air  Body mass index is 24.79 kg/m².  Physical Exam  Constitutional:       General: She is not in acute distress.  HENT:      Head: Normocephalic and atraumatic.   Cardiovascular:      Rate and Rhythm: Normal rate and regular rhythm.   Abdominal:      General: Abdomen is flat.      Palpations: Abdomen is soft.   Musculoskeletal:      Right lower leg: No edema.      Left lower leg: No edema.   Skin:     General: Skin is warm.   Neurological:      General: No focal deficit present.      Mental Status: She is alert and oriented to person, place, and time.         Results Review     I reviewed the patient's new clinical results.  Results from last 7 days   Lab Units 24  0500 24  0323 24  21024  0605   WBC 10*3/mm3 7.02 11.58* 10.72 9.69   HEMOGLOBIN g/dL 11.4* 11.5* 12.0 12.2   PLATELETS 10*3/mm3 202 265 245 220     Results from last 7 days   Lab Units 24  0500 24  0323 24  0506   SODIUM mmol/L 138 137 137 135*   POTASSIUM mmol/L 3.8 3.5 3.8 3.6   CHLORIDE mmol/L 108* 106 104 105   CO2 mmol/L 23.0 22.3 22.0 21.8*   BUN mg/dL 10 10 11 8   CREATININE mg/dL 0.98 1.23* 1.40* 1.12*   GLUCOSE mg/dL 168* 56* 197* 248*   Estimated Creatinine Clearance: 74.2 mL/min (by C-G formula based on SCr of 0.98 mg/dL).  Results from last 7 days   Lab Units 24  0624  0928   ALBUMIN g/dL 2.9* 2.9* 3.4*   BILIRUBIN mg/dL <0.2 0.2 0.4   ALK PHOS U/L 90 96  134*   AST (SGOT) U/L 12 10 11   ALT (SGPT) U/L 9 <5 5     Results from last 7 days   Lab Units 06/23/24  0500 06/22/24  0323 06/21/24  2106 06/19/24  0506 06/18/24  0605 06/17/24  0928   CALCIUM mg/dL 8.2* 8.4* 8.5* 9.0 8.8 9.1   ALBUMIN g/dL  --   --  2.9*  --  2.9* 3.4*       COVID19   Date Value Ref Range Status   12/28/2021 Detected (C) Not Detected - Ref. Range Final   01/29/2021 Presumptive Negative Presumptive Negative - Ref. Range Final     Glucose   Date/Time Value Ref Range Status   06/23/2024 1123 159 (H) 70 - 130 mg/dL Final   06/23/2024 0638 176 (H) 70 - 130 mg/dL Final   06/23/2024 0031 195 (H) 70 - 130 mg/dL Final   06/22/2024 1636 176 (H) 70 - 130 mg/dL Final   06/22/2024 1134 112 70 - 130 mg/dL Final   06/22/2024 1011 135 (H) 70 - 130 mg/dL Final     No results found for this or any previous visit.      MRI Brain Without Contrast  Narrative: MRI OF THE BRAIN WITHOUT CONTRAST     CLINICAL HISTORY: right side weakness; R53.1-Weakness; E11.65-Type 2  diabetes mellitus with hyperglycemia; Z79.4-Long term (current) use of  insulin; Z72.0-Tobacco use; I65.21-Occlusion and stenosis of right  carotid artery; I25.110-Atherosclerotic heart disease of native coronary  artery with unstable angina pectoris     TECHNIQUE: MRI of the brain was obtained with sagittal T1, axial T1,  axial FLAIR, axial T2, axial diffusion, and susceptibility weighted  images.     COMPARISON: Brain MRI dated 06/18/2024 and 06/17/2024.  FINDINGS:     On the previous MRI of the brain dated 06/17/2024, there is an unusual  linear appearing focus of restricted diffusion within the left parietal  lobe consistent with an acute infarct which measured up to approximately  8 x 3 mm in greatest axial dimensions. On the subsequent MRI of the  brain dated 06/18/2024, there was a new focus of restricted diffusion  compatible with an area of acute infarction within the genu of the left  internal capsule. This abnormality measured up to  approximately 8 mm in  diameter. On the current exam, this abnormality is larger in size  measuring up to approximately 15 mm in greatest dimensions.  Additionally, there is a new focus of restricted diffusion within the  left insular cortex compatible with an acute infarct which measures up  to approximately 3 mm in maximal diameter.     Otherwise, the ventricles, sulci, and cisterns are age-appropriate.  There are moderates change of chronic small vessel ischemic phenomenon.  There are no abnormal foci of susceptibility artifact. The major  intracranial flow related signal voids are within normal limits.     Mild changes of inflammatory paranasal sinus disease are incidentally  noted with scattered areas of mucosal thickening appreciated most  prominently within the sphenoid sinus and the ethmoid air cells.     Impression:    When compared to the most recent examination dated 06/18/2024, there is  interval enlargement of the previously identified acute infarct centered  within the genu of the left internal capsule. Also, there is a new  punctate focus of acute infarction within the left insular cortex. The  previously identified acute infarction within the white matter of the  left parietal lobe is again appreciated. The location of the infarct  within the genu of the left internal capsule is most suggestive of an in  situ occlusive process (rather than an embolic event) which could be due  to lacunar disease or possibly a vasculitis. Please correlate with  clinical data.     Moderate changes of chronic small vessel ischemic phenomenon are  incidentally appreciated.     These findings and recommendations were discussed with Dr. Castillo on  06/22/2024 at approximately 3:25 p.m.              This report was finalized on 6/22/2024 4:12 PM by Dr. Terrence Thorne M.D  on Workstation: [id number]     CT Angiogram Head w AI Analysis of LVO, CT Angiogram Neck, CT CEREBRAL PERFUSION WITH & WITHOUT CONTRAST  Narrative:  NONCONTRAST CRANIAL CT SCAN, CT ANGIOGRAM NECK, CT ANGIOGRAM HEAD,  CRANIAL CT PERFUSION.     HISTORY: Acute CVA,      COMPARISON: None..     TECHNIQUE:  Radiation dose reduction techniques were utilized, including  automated exposure control and exposure modulation based on body size.   Initially, a routine noncontrast cranial CT performed from the skull  base through the vertex region. After review, thin-section contrast  enhanced CT angiogram images obtained from the aortic arch through the  calvarial vertex utilizing angiographic technique. Multi projection 3-D  MIP reformatted images were supplemented and reviewed. Subsequently, CT  perfusion imaging performed with ischemia view software.     FINDINGS CRANIAL CT: No acute hemorrhage or midline shift is  demonstrated..  Ventricular size and configuration is within normal  limits for age.. There is periventricular and deep white matter  microangiopathic change. Recent infarcts noted within the left parietal  subcortical white matter is not well seen on this study. The patient  does have some decreased attenuation involving the posterior limb of the  left internal capsule which is more apparent than on prior imaging.  Postcontrast imaging does not any evidence of venous occlusion or  abnormal enhancement. Bone window images demonstrate some mucosal  thickening within the ethmoid and right maxillary sinuses.     Study was placed on line at 9:16 p.m. Preliminary interpretation  telephoned to extension [phone] during interpretation at 9:25 p.m.        CERVICAL CAROTID CT ANGIOGRAM:     FINDINGS: There is enlargement of the main pulmonary artery, which can  be seen in setting of pulmonary arterial hypertension. There are  coronary artery calcifications. There is normal arch anatomy. There is  mild narrowing of the right common carotid artery, measuring up to 20 to  30%. Plaque continues into the origin of the proximal right internal  carotid artery, with narrowing in the  range of 40-50 %. There is some  further plaque noted within the proximal right internal carotid artery,  resulting in narrowing, in the range of about 30%. Distal cervical right  internal carotid artery is widely patent. There is atherosclerotic  plaque involving the distal left common carotid artery, resulting in  narrowing in the range of 20%. Ulcerated plaque is noted at the left  carotid bifurcation, resulting in mild narrowing, in the range of 20 to  30%. Distal cervical left internal carotid artery is widely patent.  Vertebral arteries appear to be patent throughout their courses. There  is some calcified plaque noted involving the proximal vertebral arteries  bilaterally. It may result in some minimal narrowing bilaterally. Images  through the lung apices do not demonstrate any acute abnormalities.  Thyroid gland and trachea are normal.        NASCET criteria utilized in stenosis measurements. stenosis mild, 0-49%.        CRANIAL CTA ANGIOGRAM:     FINDINGS: The intracranial vertebral arteries are patent. Basilar artery  is patent. The posterior cerebral arteries are patent bilaterally. There  is calcification of the intracranial internal carotid arteries. They  remain patent. There is a small anterior communicating artery. Anterior  cerebral and middle cerebral arteries are patent bilaterally..             CT PERFUSION:     FINDINGS: No areas of cerebral blood flow less than 30% or Tmax greater  than 6 seconds are identified.     Study was placed on line at 9:34 p.m. Preliminary interpretation  telephoned to extension [phone] during interpretation at 9:50 p.m.     AI analysis of LVO was utilized.     Radiation dose reduction techniques were utilized, including automated  exposure control and exposure modulation based on body size.        Impression: 1. No acute intracranial hemorrhage.  2. Indeterminate area of decreased attenuation involving the posterior  limb of the left internal capsule. Consider further  assessment with MRI.  3. No areas of cerebral blood flow less than 30% or Tmax greater than 6  seconds.  4. Approximately 40 to 50% stenosis of the origin of the right internal  carotid artery.  5. Intracranial vessels are patent.        This report was finalized on 6/22/2024 12:03 AM by Dr. Marian Thompson M.D on Workstation: [id number]       Scheduled Medications  aspirin, 325 mg, Oral, Daily   Or  aspirin, 300 mg, Rectal, Daily  atorvastatin, 80 mg, Oral, Nightly  clopidogrel, 75 mg, Oral, Daily  escitalopram, 10 mg, Oral, Daily  icosapent ethyl, 2 g, Oral, BID With Meals  insulin glargine, 20 Units, Subcutaneous, Daily  insulin lispro, 2-7 Units, Subcutaneous, 4x Daily AC & at Bedtime  nicotine, 1 patch, Transdermal, Q24H  pantoprazole, 40 mg, Oral, QAM  sodium chloride, 10 mL, Intravenous, Q12H    Infusions  sodium chloride, 100 mL/hr, Last Rate: 100 mL/hr (06/23/24 0855)    Diet  Diet: Diabetic; Consistent Carbohydrate; Fluid Consistency: Thin (IDDSI 0)      Assessment/Plan     Active Hospital Problems    Diagnosis  POA    **Acute right-sided weakness [R53.1]  Yes    CKD stage 3a, GFR 45-59 ml/min [N18.31]  Yes    Carotid stenosis [I65.29]  Yes    Lacunar cerebrovascular accident (CVA) [I63.81]  Yes    Elevated troponin [R79.89]  Yes    Tobacco abuse [Z72.0]  Yes    Coronary artery disease involving native coronary artery of native heart with unstable angina pectoris [I25.110]  Yes    Diabetic gastroparesis [E11.43, 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       44 y.o. female admitted with Acute right-sided weakness.    Acute right-sided weakness  Acute  CVA  Carotid stenosis  MRI findings noted, concerning for interval enlargement of the previously identified acute infarct centered within the genu of the left internal capsule.  A new focus of acute infarction was noted within the left insular cortex previously identified acute infarction within the white matter of the left parietal lobe was also noted.  TTE pending  Neurology following  ASA, plavix, statin    T2DM c/b gastroparesis   On jardiance and glargine as outpatient. Resume glargine at attenduated dose    CAD s/p CABG  Troponin elevation- chronic  Hypertension    CKD 3  SCR imrpoving with IVF    GERD  -PPI      SCDs for DVT prophylaxis.  Full code.  Discussed with patient and family.  Anticipate discharge after echocardiogram is completed.       Qamar Perez MD  [facility]  06/23/24  13:17 EDT          Electronically signed by Qamar Perez MD at 06/23/24 1317          Consult Notes (last 4 days)        Harvey Duarte PA-C at 06/25/24 1011        Consult Orders    1. Inpatient Cardiothoracic Surgery Consult [[id number]] ordered by Ronaldo Severino MD at 06/25/24 0844              Attestation signed by Benja De Luna MD at 06/26/24 0740    I have reviewed this documentation and agree.                  Patient Care Team:  Ibrahima Correa MD as PCP - General (Family Medicine)  Rachele Zafar, ALEENA as Ambulatory  (Population Health)  Xochilt Jenkins MD as Consulting Physician (Nephrology)    Chief complaint  Aortic valve fibroelastoma  CVA    Subjective     History of Present Illness  Patient is a 44 y.o. female with a past medical history including CAD status post CABG in 2014 by Dr. Fry([facility]), hypertension, hyperlipidemia, uncontrolled diabetes, peripheral neuropathy, obstructive sleep apnea, tobacco abuse, CKD, and recurrent CVA.  She was admitted to [facility] early last week with a CVA.  Further evaluation included CTA of head and neck  that showed 70% stenosis of the right ICA.  She was evaluated by vascular surgery and no mention needed at this time.  Her troponin was slightly elevated and her blood sugar was 600.  She had a cardiac cath in 2024 that showed chronic total occlusion of the RCA, left circumflex 80 to 90% stenosis, ramus 70 to 80% stenosis, totally occluded LAD, patent LIMA to LAD, SVG to circumflex is occluded, and SVG to RCA is patent.  She was continued on aspirin and Plavix.  Once her blood sugar was controlled she was discharged home.  Following day EMS was called for strokelike symptoms.  Per EMS she developed acute right-sided weakness and was flaccid on the right side at presentation but symptoms improved by the time she arrived to the ED.  MRI showed left MCA strokes.  She also underwent a transesophageal echocardiogram that showed aortic valve mass/fibroelastoma.  She is referred for surgical evaluation.    Review of Systems   Constitutional:  Positive for activity change.   Neurological:  Positive for speech difficulty and weakness.        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 Debbie Fry.    Depressive disorder 2023    Diabetic gastroparesis 2021    Diabetic peripheral neuropathy associated with type 2 diabetes mellitus 2024    Gastroesophageal reflux disease 03/10/2021    GERD (gastroesophageal reflux disease)     Intermittent claudication 2017    Iron deficiency anemia 2020    Mixed hypercholesterolemia and hypertriglyceridemia 2014    Myocardial infarction     Obesity     Obstructive sleep apnea 2021    Personal history of COVID-19 2022    Polyp of colon 2023    Spontaneous      Stress  fracture of right ankle with routine healing     Tobacco abuse 2024    Type 2 diabetes mellitus with hyperglycemia, with long-term current use of insulin 2017    Vitamin D deficiency 2024     Past Surgical History:   Procedure Laterality Date    CARDIAC CATHETERIZATION N/A 2024    Procedure: Left Heart Cath and coronary angiogram;  Surgeon: Jena Barron MD;  Location: [facility] CATH INVASIVE LOC[facility];  Service: Cardiology;  Laterality: N/A;     SECTION      CORONARY ARTERY BYPASS GRAFT  2014    LIMA-LAD, SVG-OM2, SVG-PDA, Dr. Debbie Fry.    DILATATION AND CURETTAGE      TONSILLECTOMY       Family History   Family history unknown: Yes     Social History     Tobacco Use    Smoking status: Every Day     Current packs/day: 0.25     Average packs/day: 0.3 packs/day for 15.0 years (3.8 ttl pk-yrs)     Types: Cigarettes    Smokeless tobacco: Never   Vaping Use    Vaping status: Never Used   Substance Use Topics    Alcohol use: Defer    Drug use: Never     Medications Prior to Admission   Medication Sig Dispense Refill Last Dose    albuterol sulfate  (90 Base) MCG/ACT inhaler Inhale 2 puffs every 6 (six) hours if needed for wheezing. 18 g 0 Past Week    aspirin 81 MG chewable tablet Chew 1 tablet Daily.   2024    atorvastatin (LIPITOR) 80 MG tablet Take 1 tablet by mouth Daily.   2024    clopidogrel (PLAVIX) 75 MG tablet Take 1 tablet by mouth Daily. 90 tablet 0 2024    empagliflozin (JARDIANCE) 10 MG tablet tablet Take 1 tablet by mouth Daily. 30 tablet 0 2024    escitalopram (LEXAPRO) 10 MG tablet Take 1 tablet by mouth Daily.   2024    famotidine (PEPCID) 40 MG tablet Take 1 tablet by mouth every night at bedtime. 90 tablet 3 2024    hydrALAZINE (APRESOLINE) 50 MG tablet Take 1 tablet by mouth Every 8 (Eight) Hours. 90 tablet 0 2024    icosapent ethyl (Vascepa) 1 g capsule capsule Take 2 capsules by mouth 2 (two) times a day. 360 capsule 0  2024    icosapent ethyl (Vascepa) 1 g capsule capsule Take 2 g (2 capsules) by mouth 2 (Two) Times a Day With Meals. Indications: Cerebrovascular Accident or Stroke, High Amount of Triglycerides in the Blood, Procedure to Reestablish Blood Supply to the Heart 120 capsule 11 2024    insulin aspart (novoLOG FLEXPEN) 100 UNIT/ML solution pen-injector sc pen Inject  under the skin into the appropriate area as directed 3 times a day. Sliding scale, between 5-20 units TID   2024    insulin detemir (LEVEMIR) 100 UNIT/ML injection Inject 60 Units under the skin into the appropriate area as directed Every Night.   2024    metoclopramide (REGLAN) 5 MG tablet Take 1 tablet by mouth 3 times a day.   2024    metoprolol tartrate (LOPRESSOR) 100 MG tablet Take 1 tablet by mouth 2 (two) times a day. 180 tablet 0 2024    NIFEdipine CC (ADALAT CC) 60 MG 24 hr tablet Take 1 tablet by mouth Daily. 30 tablet 0 2024    pantoprazole (PROTONIX) 40 MG EC tablet Take 1 tablet by mouth Every Morning. 90 tablet 3 2024    [START ON 2024] cloNIDine (CATAPRES-TTS) 0.2 MG/24HR patch Place 1 patch on the skin as directed by provider 1 (One) Time Per Week. 4 patch 0     [] Continuous Glucose  (Dexcom G7 ) device Use 1 each 1 (One) Time for 1 dose. Dx: E11.65 1 each 0     Continuous Glucose Sensor (Dexcom G7 Sensor) misc Use 1 each Every 10 (Ten) Days. Dx: E11.65 3 each 2      aspirin, 325 mg, Oral, Daily   Or  aspirin, 300 mg, Rectal, Daily  atorvastatin, 80 mg, Oral, Nightly  [Held by provider] clopidogrel, 75 mg, Oral, Daily  escitalopram, 10 mg, Oral, Daily  icosapent ethyl, 2 g, Oral, BID With Meals  insulin glargine, 20 Units, Subcutaneous, Daily  insulin lispro, 2-7 Units, Subcutaneous, 4x Daily AC & at Bedtime  nicotine, 1 patch, Transdermal, Q24H  NIFEdipine XL, 60 mg, Oral, Q24H  pantoprazole, 40 mg, Oral, QAM  potassium chloride ER, 40 mEq, Oral, Q4H  sodium chloride, 10  "mL, Intravenous, Q12H  valsartan, 160 mg, Oral, Q24H      Allergies:  Latex    Objective      Vital Signs  Temp:  [98 °F (36.7 °C)-98.8 °F (37.1 °C)] 98.4 °F (36.9 °C)  Heart Rate:  [47-62] 56  Resp:  [16-20] 16  BP: (113-193)/(56-82) 174/71    Flowsheet Rows      Flowsheet Row First Filed Value   Admission Height 162.6 cm (64\") Documented at 06/21/2024 2105   Admission Weight 65.5 kg (144 lb 6.4 oz) Documented at 06/21/2024 2105          162.6 cm (64\")    Physical Exam  Constitutional:       General: She is not in acute distress.  HENT:      Head: Normocephalic and atraumatic.      Mouth/Throat:      Mouth: Mucous membranes are moist.      Pharynx: Oropharynx is clear.   Cardiovascular:      Rate and Rhythm: Normal rate and regular rhythm.      Heart sounds: No murmur heard.  Pulmonary:      Effort: Pulmonary effort is normal. No respiratory distress.   Abdominal:      General: Bowel sounds are normal. There is no distension.   Skin:     General: Skin is warm and dry.      Comments: Previous sternal incision well healed   Neurological:      General: No focal deficit present.      Mental Status: She is alert and oriented to person, place, and time.   Psychiatric:         Mood and Affect: Mood normal.         Behavior: Behavior normal.         Thought Content: Thought content normal.         Judgment: Judgment normal.         Results Review:   Lab Results (last 24 hours)       Procedure Component Value Units Date/Time    POC Glucose Once [[id number]]  (Normal) Collected: 06/25/24 0837    Specimen: Blood Updated: 06/25/24 0838     Glucose 129 mg/dL     POC Creatinine [[id number]]  (Abnormal) Collected: 06/21/24 2114    Specimen: Blood Updated: 06/25/24 0728     Creatinine 1.60 mg/dL      Comment: Serial Number: [id number]:  [id number]       POC Glucose Once [[id number]]  (Normal) Collected: 06/25/24 0603    Specimen: Blood Updated: 06/25/24 0606     Glucose 125 mg/dL     Basic Metabolic Panel [[id number]]  (Abnormal) " Collected: 06/25/24 0434    Specimen: Blood Updated: 06/25/24 0524     Glucose 126 mg/dL      BUN 9 mg/dL      Creatinine 0.82 mg/dL      Sodium 138 mmol/L      Potassium 3.6 mmol/L      Chloride 109 mmol/L      CO2 21.7 mmol/L      Calcium 8.5 mg/dL      BUN/Creatinine Ratio 11.0     Anion Gap 7.3 mmol/L      eGFR 89.5 mL/min/1.73     Narrative:      GFR Normal >60  Chronic Kidney Disease <60  Kidney Failure <15      CBC Auto Differential [[id number]]  (Abnormal) Collected: 06/25/24 0434    Specimen: Blood Updated: 06/25/24 0503     WBC 7.88 10*3/mm3      RBC 3.74 10*6/mm3      Hemoglobin 11.1 g/dL      Hematocrit 33.9 %      MCV 90.6 fL      MCH 29.7 pg      MCHC 32.7 g/dL      RDW 13.6 %      RDW-SD 44.4 fl      MPV 11.1 fL      Platelets 197 10*3/mm3      Neutrophil % 59.6 %      Lymphocyte % 31.2 %      Monocyte % 6.0 %      Eosinophil % 2.3 %      Basophil % 0.6 %      Immature Grans % 0.3 %      Neutrophils, Absolute 4.70 10*3/mm3      Lymphocytes, Absolute 2.46 10*3/mm3      Monocytes, Absolute 0.47 10*3/mm3      Eosinophils, Absolute 0.18 10*3/mm3      Basophils, Absolute 0.05 10*3/mm3      Immature Grans, Absolute 0.02 10*3/mm3      nRBC 0.0 /100 WBC     POC Glucose Once [[id number]]  (Abnormal) Collected: 06/24/24 2140    Specimen: Blood Updated: 06/24/24 2141     Glucose 227 mg/dL     POC Glucose Once [[id number]]  (Abnormal) Collected: 06/24/24 1614    Specimen: Blood Updated: 06/24/24 1615     Glucose 182 mg/dL     POC Glucose Once [[id number]]  (Normal) Collected: 06/24/24 1116    Specimen: Blood Updated: 06/24/24 1118     Glucose 105 mg/dL             YUE 6/25/24    Left ventricular systolic function is normal.  Visually estimated LVEF 60-65%.    Left ventricular wall thickness is consistent with mild to moderate concentric hypertrophy.    There are up to 3 small pedunculated masses attached to the aortic valve leaflets (2 with the noncoronary cusp and a smaller one with left coronary cusp) protruding  into the aortic root with the largest one measuring 0.8 x 0.5cm.  The morphology appears consistent with fibroelastomas.    Estimated right ventricular systolic pressure from tricuspid regurgitation is normal (<35 mmHg).    No evidence of intracardiac thrombus including with close evaluation of both atria, BRADEN, and RAA.    Saline test results are negative.    Mild aortic arch plaques.         Coronary Angiogram 2/21/24     Right dominant circulation     Left main: Left main is a large caliber vessel which gives rise to the Left Anterior Descending and the Left circumflex. No angiographically significant stenosis     Left Anterior Descending Artery: The LAD is  at the ostium     Left Circumflex: Lcx is a small caliber vessel which courses in the AV groove and gives rise to OM branches.  There is a high OM1 which further bifurcates.  There is a long 60 to 70% lesion in the proximal segment of this OM.  The circumflex is diffusely diseased with 60 to 70% stenosis in the proximal and mid segment all the way into OM 3.  There is an 80% lesion in the midsegment.     Right Coronary Artery: The RCA is  at the ostium     Bypass angiography  SVG to PDA is patent at the origin, body, and insertion  SVG to diagonal is occluded at the origin  LIMA to LAD is widely patent at the origin, body, and insertion.  The LAD distal to the touchdown of the LIMA has 70 to 80% stenosis and is very small caliber.  LIMA does retrogradely fill a diagonal branch also.           Assessment & Plan       Acute right-sided weakness    Benign essential hypertension    Gastroparesis diabeticorum    Gastroesophageal reflux disease    Mixed hypercholesterolemia and hypertriglyceridemia    Iron deficiency anemia    Type 2 diabetes mellitus with hyperglycemia, with long-term current use of insulin    Coronary artery disease involving native coronary artery of native heart with unstable angina pectoris    Tobacco abuse    CKD stage 3a, GFR 45-59  ml/min    Carotid stenosis    Lacunar cerebrovascular accident (CVA)    Elevated troponin      Assessment & Plan    -Aortic valve mass/fibroelastoma  -Recurrent MCA stroke  -Coronary artery disease status post CABG in   -Uncontrolled diabetes mellitus, HgbA1c 16.9, with peripheral neuropathy  -CKD, stage 3  -Hypertension  -Hyperlipidemia  -Obesity  -Tobacco abuse-- encourage cessation  -PAD, Carotid stenosis, 70% right ICA  -GERD  -LB  -chronic anemia    Patient see an discussed with Dr. De Luna. Admitted with recurrent CVA. YUE showed mobile aortic valve mass/fibroelastoma. Stop plavix. Order vein mapping for possible redo CABG. Appears vein was used from both thighs CT Chest to evaluate previous grafts/proximity to sternum. Will also try to get previous op-note from [facility].        Thank you for allowing us to participate in the care of this patient.      Harvey Duarte PA-C  24  10:11 EDT              Electronically signed by Benja De Luna MD at 24 0740       Ronaldo Severino MD at 24 0603        Consult Orders    1. Inpatient Cardiology Consult [[id number]] ordered by Yessica Contreras APRN at 24                       [facility]    Patient Name: Bibiana Inman  :1980  44 y.o.  LOS: 3  Encounter Provider: Ronaldo Severino MD      Patient Care Team:  Ibrahima Correa MD as PCP - General (Family Medicine)  Rachele Zafar, RN as Ambulatory  ([facility])  Xochilt Jenkins MD as Consulting Physician (Nephrology)    Chief Complaint/Consult question:  Mobile Mass on echocardiogram, possible YUE    PMH/HPI:    Bibiana Inman is a 44 year-old female with a PMH for multivessel CAD s/p CABG in  by Dr. Garcia at [facility], hypertension, hyperlipidemia, uncontrolled diabetes, tobacco abuse who presented with recurrent stroke and found to have aortic valve mass on echo.    In 2024, she presented to the emergency  department with chest pain. She had an echocardiogram done that showed an LVEF of 69% and no significant valvular abnormalities. She also had a perfusion stress test done that indicated a moderate-sized, severe area of ischemia located in the inferior and lateral wall. She subsequently had a heart catheterization done that showed significant coronary artery disease. It showed a Chronic total occlusion of the RCA, left circumflex with 80-90% stenosis in the proximal segment and mid segment, ramus with proximal 70-80% stenosis, and a totally occluded LAD. LIMA to LAD is patent, however distal/apical LAD has 80% stenosis. SVG to left circumflex is occluded. SVG to RCA is patent. This is stable coronary artery disease. It was recommended to focus on risk factor modification and continue on DAPT and high intensity statin. She may require a redo CABG in the future.     She was admitted at [facility], operated by [facility] from 6/17-6/20/2024 for acute stroke. An MRI showed a linear acute infarct within the left parietal occipital region.  A subsequent CT angiogram of the neck demonstrated 70% stenosis in the right ICA. She was evaluated by vascular surgery and there was no indication for carotid revascularization at that time. She was seen by cardiology at [facility], operated by [facility] on 6/17/2024 for elevated troponin. Her initial HS troponin was noted to be 63, however, her blood sugar was 600 and UA showed pyuria. She was started on an insulin drip. She was found to have an A1c of 16.9. She was also found to have uncontrolled hypertriglyceridemia with triglycerides of >4000. She was started on Vascepa 2g twice daily. She was ultimately discharged on 6/20/2024 with clonidine 0.2mg/24 hour patch, Jardiance 10 mg daily, and Nifedipine 60 mg daily.     Interval History:    She presetnted to the ER on 6/21/2024 with acute right-sided weakness. Initial CT angiogram of the head and neck showed no acute intracranial hemorrhage, and an indeterminate area of  decreased attenuation valving the posterior limb of the left internal capsule. A subsequent MRI was done and showed enlargement of the previously identified acute infarct centered within the genu of the left internal capsule. There is also a new punctate focus of acute infarction within the left insular cortex. 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     She has been hypertensive with blood pressures as high as 199/86. Cardiology has been asked to see this patient for the mobile mass found on the echocardiogram.     At the time of interview, patient is in no acute distress and sleeping comfortably in bed.  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  She states that her BP has been quite elevated at home over the past month or 2 in the range of 160-170s.  Denies chest pain, dyspnea on exertion, palpitation, leg edema, orthopnea, PND, dizziness, syncope, bleeding, or unexpected falls. Smokes 6 cigarettes/day for at least 20 years, denies alcohol or substance abuse.          ECHO 6/23/2024    Left ventricular systolic function is normal. Calculated left ventricular EF = 62.5%    Left ventricular diastolic function is consistent with (grade II w/high LAP) pseudonormalization.    There is a small (6 mm) mobile mass likely with a stalk and appears to be attached  the non-coronary cusp(s) of the aortic valve.  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  Looks like it was also present but not well-visualized on prior echo done on  2/20/2024.    Saline test results are negative for right to left atrial level shunt.    Recommend YUE considering patient's history of CVA.    Cath 2/21/2024  IMPRESSIONS  Multivessel obstructive CAD of the native coronary arteries  Occluded vein graft to what appears to be a  "diagonal  There is diffuse CAD involving the left circumflex and OM which is not bypassed and there is also obstructive CAD distal to the LIMA touchdown  This is stable coronary artery disease  Low left heart filling pressures     RECOMMENDATIONS  Recommend medical management for her diffuse CAD in the setting of severely uncontrolled diabetes and smoking  Uptitration of OMT for stable CAD  Patient needs to be aggressively treated for her uncontrolled diabetes  Counseled extensively on smoking cessation  Start Plavix  Blood pressure control  Aggressive cholesterol control with statin with a goal LDL of less than 70    Stress Test 2/20/2024    Findings consistent with a normal ECG stress test.    (Calculated EF = 63%).    Myocardial perfusion imaging indicates a moderate-sized, severe area of ischemia located in the inferior wall and lateral wall.    Impressions are consistent with an intermediate risk study.    Objective   Vital Signs  Temp:  [97.9 °F (36.6 °C)-98.4 °F (36.9 °C)] 97.9 °F (36.6 °C)  Heart Rate:  [54-66] 54  Resp:  [18] 18  BP: (168-199)/(63-86) 169/84    Intake/Output Summary (Last 24 hours) at 6/24/2024 0820  Last data filed at 6/23/2024 1945  Gross per 24 hour   Intake 240 ml   Output --   Net 240 ml     Flowsheet Rows      Flowsheet Row First Filed Value   Admission Height 162.6 cm (64\") Documented at 06/21/2024 2105   Admission Weight 65.5 kg (144 lb 6.4 oz) Documented at 06/21/2024 2105              Vitals and nursing note reviewed.   Constitutional:       Appearance: Healthy appearance. Not in distress.   Neck:      Vascular: No JVR.   Pulmonary:      Effort: Pulmonary effort is normal.      Breath sounds: Normal breath sounds. No wheezing. No rhonchi. No rales.   Cardiovascular:      Bradycardia present. Regular rhythm.      Murmurs: There is no murmur.   Edema:     Peripheral edema absent.   Abdominal:      General: There is no distension.      Palpations: Abdomen is soft.      Tenderness: " There is no abdominal tenderness.   Musculoskeletal:      Cervical back: Neck supple. Skin:     General: Skin is cool.   Neurological:      Mental Status: Alert and oriented to person, place and time.           Pertinent Test Results:  Results from last 7 days   Lab Units 06/24/24  0525 06/23/24  0500 06/22/24  0323 06/21/24  2106 06/19/24  0506 06/18/24  0605 06/17/24  1820 06/17/24  0928   SODIUM mmol/L 139 138 137 137 135* 134*  --  128*   POTASSIUM mmol/L 3.7 3.8 3.5 3.8 3.6 3.4* 3.1* 3.2*   CHLORIDE mmol/L 108* 108* 106 104 105 103  --  92*   CO2 mmol/L 21.9* 23.0 22.3 22.0 21.8* 23.1  --  23.6   BUN mg/dL 8 10 10 11 8 10  --  9   CREATININE mg/dL 0.93 0.98 1.23* 1.40* 1.12* 1.25*  --  1.29*   GLUCOSE mg/dL 134* 168* 56* 197* 248* 143*  --  589*   CALCIUM mg/dL 8.6 8.2* 8.4* 8.5* 9.0 8.8  --  9.1   AST (SGOT) U/L  --   --   --  12  --  10  --  11   ALT (SGPT) U/L  --   --   --  9  --  <5  --  5     Results from last 7 days   Lab Units 06/21/24  2304 06/21/24 2106 06/17/24  1143 06/17/24  0928   HSTROP T ng/L 53* 56* 48* 61*  63*     Results from last 7 days   Lab Units 06/24/24  0525 06/23/24  0500 06/22/24 0323 06/21/24 2106 06/18/24  0605 06/17/24  0928   WBC 10*3/mm3 7.76 7.02 11.58* 10.72 9.69 14.62*   HEMOGLOBIN g/dL 11.2* 11.4* 11.5* 12.0 12.2 13.7   HEMATOCRIT % 35.8 35.3 35.7 37.1 36.9 41.1   PLATELETS 10*3/mm3 203 202 265 245 220 281     Results from last 7 days   Lab Units 06/21/24  2106 06/17/24  0928   INR  0.98 0.93   APTT seconds 27.5 26.2*         Results from last 7 days   Lab Units 06/22/24  0323 06/18/24  0605 06/17/24  0928   CHOLESTEROL mg/dL 245* 320* 381*   TRIGLYCERIDES mg/dL 610* 1,117* >4,425*   HDL CHOL mg/dL 31* 28* 30*         Results from last 7 days   Lab Units 06/18/24  0605 06/17/24  1820   TSH uIU/mL 1.470 2.230           Medication Review:   aspirin, 325 mg, Oral, Daily   Or  aspirin, 300 mg, Rectal, Daily  atorvastatin, 80 mg, Oral, Nightly  clopidogrel, 75 mg, Oral,  Daily  escitalopram, 10 mg, Oral, Daily  icosapent ethyl, 2 g, Oral, BID With Meals  insulin glargine, 20 Units, Subcutaneous, Daily  insulin lispro, 2-7 Units, Subcutaneous, 4x Daily AC & at Bedtime  nicotine, 1 patch, Transdermal, Q24H  NIFEdipine XL, 60 mg, Oral, Q24H  pantoprazole, 40 mg, Oral, QAM  sodium chloride, 10 mL, Intravenous, Q12H  valsartan, 80 mg, Oral, Q24H         sodium chloride, 100 mL/hr, Last Rate: 100 mL/hr (06/23/24 0855)                        Assessment & Plan     Active Hospital Problems    Diagnosis  POA    **Acute right-sided weakness [R53.1]  Yes    CKD stage 3a, GFR 45-59 ml/min [N18.31]  Yes    Carotid stenosis [I65.29]  Yes    Lacunar cerebrovascular accident (CVA) [I63.81]  Yes    Elevated troponin [R79.89]  Yes    Tobacco abuse [Z72.0]  Yes    Coronary artery disease involving native coronary artery of native heart with unstable angina pectoris [I25.110]  Yes    Diabetic gastroparesis [E11.43, 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        Recurrent stroke/aortic valve mass: Stroke x 2 over the past month, this time presented with dysarthria and MRI showed a new punctate focus of acute infarction within the left insular cortex. 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  This mass may have been present back on echo from 2/2024 but not nearly as well-visualized due to acoustic windows.  This certainly could account for his recurrent stroke although she also  has uncontrolled hypertension, diabetes, and hyperlipidemia as well as his active smoker all of which may very well account for her neurological events.  No fever, chills or other infectious signs to suggest endocarditis.  Patient will need YUE for further evaluation of the aortic valve mass, this is not urgent but we will try to perform prior to hospital discharge.  Patient is extremely hypertensive today and we will focus on optimization first.  I will reach out to hospitalist primary service to discuss optimal timing of YUE.  I will also officially request prior imaging studies from [facility] for comparison.  Hypertension: BP readings very elevated overnight ~200/80s, home meds include Lopressor, clonidine, hydralazine, and nifedipine (recently started last week).  Hold off on beta-blocker given resting bradycardia, patient reports prior cough with losartan but is willing to be rechallenged, will initiate valsartan 80 daily and monitor for tolerance.  Will reinitiate nifedipine 60 daily and uptitrate depending on BP trends.  May need to reintroduce hydralazine but would ideally maxed out the other agents first given ease of use, avoid clonidine if possible to minimize the risk of rebound hypertension.  Hyperlipidemia: Lipids this month showed HDL 31, , uncontrolled.  LDL goal < 70 given history of CABG. continue atorvastatin 80 at home, patient will need addition of Zetia +/- PCSK9 inhibitor upon discharge.  Diabetes mellitus: Hemoglobin A1c 17 his admission, uncontrolled.  Insulin-dependent, management per primary team and other specialists.  Already on Jardiance 10 daily at home for added cardiovascular benefit which I recommend restarting.  CAD s/p CABG: Multivessel CAD s/p CABG in 2014 by Dr. Garcia at [facility]. Most recent cath 2/2024 showed LAD and RCA , proximal to mid left circumflex with 80-90% stenosis, ramus with proximal 70-80% stenosis. LIMA to LAD is patent, however distal/apical LAD has  80% stenosis. SVG to left circumflex is occluded. SVG to RCA is patent. Denies angina, no immediate need for revascularization and focus on medical therapy and optimization.  Continue home aspirin and Plavix, BP and lipid control as above, smoking cessation is key, see below.  Tobacco abuse: Smokes 6 cigarettes/day for at least 20 years, strongly encourage complete cessation.    High risk due to age, frailty, recurrent stroke in the setting of prior CABG and uncontrolled hypertension, diabetes, hyperlipidemia as well as multiple comorbodities predisposing to major adverse events including myocardial infarction, stroke, hospitalization, and multi-agent drug therapy requiring intensive monitoring for toxicity.    Ronaldo Severino MD  [facility]  06/24/24  06:04 EDT     Electronically signed by Ronaldo Severino MD at 06/24/24 8455

## 2024-06-27 NOTE — CASE MANAGEMENT/SOCIAL WORK
Discharge Planning Assessment  [facility]     Patient Name: Bibiana Inman  MRN: [id number]  Today's Date: 6/27/2024    Admit Date: 6/21/2024    Plan: Home w/ Amy GARZON   Discharge Needs Assessment       Row Name 06/27/24 1516       Living Environment    People in Home spouse    Name(s) of People in Home Neal Inman/spouse and Girma/ 12 yo son    Current Living Arrangements home    Potentially Unsafe Housing Conditions none    In the past 12 months has the electric, gas, oil, or water company threatened to shut off services in your home? No    Primary Care Provided by self    Provides Primary Care For child(rajani)    Family Caregiver if Needed spouse    Family Caregiver Names Neal    Quality of Family Relationships helpful;involved;supportive    Able to Return to Prior Arrangements yes       Resource/Environmental Concerns    Resource/Environmental Concerns none    Transportation Concerns none       Transportation Needs    In the past 12 months, has lack of transportation kept you from medical appointments or from getting medications? no    In the past 12 months, has lack of transportation kept you from meetings, work, or from getting things needed for daily living? No       Food Insecurity    Within the past 12 months, you worried that your food would run out before you got the money to buy more. Never true    Within the past 12 months, the food you bought just didn't last and you didn't have money to get more. Never true       Transition Planning    Patient/Family Anticipates Transition to home with family    Patient/Family Anticipated Services at Transition home health care    Transportation Anticipated family or friend will provide       Discharge Needs Assessment    Readmission Within the Last 30 Days no previous admission in last 30 days    Equipment Currently Used at Home bp cuff;scales;glucometer;shower chair;grab bar;pulse ox    Concerns to be Addressed discharge planning    Anticipated Changes  Related to Illness none    Equipment Needed After Discharge none    Discharge Facility/Level of Care Needs home with home health    Provided Post Acute Provider List? Refused    Patient's Choice of Community Agency(s) Pt chose [facility] to follow her at d/c.                   Discharge Plan       Row Name 06/27/24 1518       Plan    Plan Home w/ [facility]    Plan Comments CCP spoke with patient at bedside.  CCP role explained and discharge planning discussed.  Face sheet verified.  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  Patient lives with spouse/Neal Inman and 11-year-old son/Girma.  Pt lives in a single-story home with no entrance stair steps.  Patients PCP confirmed as, Ibrahima Correa.  Patient's pharmacy confirmed as, [facility].  Pt is enrolled in MEDS to BEDS.  Patient has the following DME- glucometer, BP cuff, scale, grab bars, shower chair and pulse oximeter.  Patient has used home health 10 years ago after a previous open-heart surgery but she cannot recall the agency name.  Pt denies past sub-acute rehab.  Patient plans to return home at discharge with 24/7 assistance of spouse.  Pt [facility] to follow her at discharge.  Pt scheduled for surgery on Monday 7/1/24.  CCP will continue to follow….…Corina ANDRADE /FAINA.                  Continued Care and Services - Admitted Since 6/21/2024       Home Medical Care       Service Provider Request Status Selected Services Address Phone Fax Patient Preferred     [facility] Accepted N/A [address] [address] [phone] --                  Selected Continued Care - Episodes Includes continued care and service providers with selected services from the active episodes listed below      Rising Risk Care Management Episode start date: 2/20/2024   There are no active outsourced providers for this episode.                 Expected Discharge Date  and Time       Expected Discharge Date Expected Discharge Time    Jul 9, 2024            Demographic Summary       Row Name 06/27/24 1514       General Information    Admission Type inpatient    Arrived From emergency department    Referral Source admission list    Reason for Consult discharge planning    Preferred Language English       Contact Information    Permission Granted to Share Info With family/designee                   Functional Status       Row Name 06/27/24 1515       Functional Status    Usual Activity Tolerance good    Current Activity Tolerance good       Physical Activity    On average, how many days per week do you engage in moderate to strenuous exercise (like a brisk walk)? 4 days    On average, how many minutes do you engage in exercise at this level? 40 min    Number of minutes of exercise per week 160       Assessment of Health Literacy    How often do you have someone help you read hospital materials? Never    Health Literacy Good       Functional Status, IADL    Medications independent    Meal Preparation independent    Housekeeping independent    Laundry independent    Shopping independent       Mental Status    General Appearance WDL WDL       Mental Status Summary    Recent Changes in Mental Status/Cognitive Functioning no changes       Employment/    Employment Status employed full-time    Current or Previous Occupation healthcare    Employment/ Comments [employer] (Phlebotomist)                   Psychosocial    No documentation.                  Abuse/Neglect    No documentation.                  Legal    No documentation.                  Substance Abuse    No documentation.                  Patient Forms    No documentation.                     Corina Juarez RN

## 2024-06-28 ENCOUNTER — APPOINTMENT (OUTPATIENT)
Dept: CARDIOLOGY | Facility: HOSPITAL | Age: 44
DRG: 981 | End: 2024-06-28
Payer: COMMERCIAL

## 2024-06-28 LAB
ANION GAP SERPL CALCULATED.3IONS-SCNC: 10 MMOL/L (ref 5–15)
BASOPHILS # BLD AUTO: 0.06 10*3/MM3 (ref 0–0.2)
BASOPHILS NFR BLD AUTO: 0.7 % (ref 0–1.5)
BH CV XLRA MEAS LEFT DIST CCA EDV: -23.8 CM/SEC
BH CV XLRA MEAS LEFT DIST CCA PSV: -99.9 CM/SEC
BH CV XLRA MEAS LEFT DIST ICA EDV: -33.4 CM/SEC
BH CV XLRA MEAS LEFT DIST ICA PSV: -88.5 CM/SEC
BH CV XLRA MEAS LEFT ICA/CCA RATIO: 0.96
BH CV XLRA MEAS LEFT MID ICA EDV: -26.9 CM/SEC
BH CV XLRA MEAS LEFT MID ICA PSV: -77.5 CM/SEC
BH CV XLRA MEAS LEFT PROX CCA EDV: 16.6 CM/SEC
BH CV XLRA MEAS LEFT PROX CCA PSV: 80.8 CM/SEC
BH CV XLRA MEAS LEFT PROX ECA EDV: -18.4 CM/SEC
BH CV XLRA MEAS LEFT PROX ECA PSV: -175.3 CM/SEC
BH CV XLRA MEAS LEFT PROX ICA EDV: -29.8 CM/SEC
BH CV XLRA MEAS LEFT PROX ICA PSV: -95.7 CM/SEC
BH CV XLRA MEAS LEFT PROX SCLA PSV: 131.5 CM/SEC
BH CV XLRA MEAS LEFT VERTEBRAL A EDV: -14.1 CM/SEC
BH CV XLRA MEAS LEFT VERTEBRAL A PSV: -50.1 CM/SEC
BH CV XLRA MEAS RIGHT DIST CCA EDV: -15.2 CM/SEC
BH CV XLRA MEAS RIGHT DIST CCA PSV: -75.2 CM/SEC
BH CV XLRA MEAS RIGHT DIST ICA EDV: -24.9 CM/SEC
BH CV XLRA MEAS RIGHT DIST ICA PSV: -85.1 CM/SEC
BH CV XLRA MEAS RIGHT ICA/CCA RATIO: 2.18
BH CV XLRA MEAS RIGHT MID ICA EDV: -30.4 CM/SEC
BH CV XLRA MEAS RIGHT MID ICA PSV: -108.1 CM/SEC
BH CV XLRA MEAS RIGHT PROX CCA EDV: 14.3 CM/SEC
BH CV XLRA MEAS RIGHT PROX CCA PSV: 83.2 CM/SEC
BH CV XLRA MEAS RIGHT PROX ECA EDV: -12.5 CM/SEC
BH CV XLRA MEAS RIGHT PROX ECA PSV: -126.2 CM/SEC
BH CV XLRA MEAS RIGHT PROX ICA EDV: -31 CM/SEC
BH CV XLRA MEAS RIGHT PROX ICA PSV: -163.7 CM/SEC
BH CV XLRA MEAS RIGHT PROX SCLA PSV: 90.7 CM/SEC
BH CV XLRA MEAS RIGHT VERTEBRAL A EDV: -14.3 CM/SEC
BH CV XLRA MEAS RIGHT VERTEBRAL A PSV: -50.1 CM/SEC
BUN SERPL-MCNC: 16 MG/DL (ref 6–20)
BUN/CREAT SERPL: 16 (ref 7–25)
CALCIUM SPEC-SCNC: 9.2 MG/DL (ref 8.6–10.5)
CHLORIDE SERPL-SCNC: 103 MMOL/L (ref 98–107)
CO2 SERPL-SCNC: 24 MMOL/L (ref 22–29)
CREAT SERPL-MCNC: 1 MG/DL (ref 0.57–1)
DEPRECATED RDW RBC AUTO: 45.1 FL (ref 37–54)
EGFRCR SERPLBLD CKD-EPI 2021: 70.5 ML/MIN/1.73
EOSINOPHIL # BLD AUTO: 0.18 10*3/MM3 (ref 0–0.4)
EOSINOPHIL NFR BLD AUTO: 2.1 % (ref 0.3–6.2)
ERYTHROCYTE [DISTWIDTH] IN BLOOD BY AUTOMATED COUNT: 13.5 % (ref 12.3–15.4)
GLUCOSE BLDC GLUCOMTR-MCNC: 132 MG/DL (ref 70–130)
GLUCOSE BLDC GLUCOMTR-MCNC: 162 MG/DL (ref 70–130)
GLUCOSE BLDC GLUCOMTR-MCNC: 220 MG/DL (ref 70–130)
GLUCOSE BLDC GLUCOMTR-MCNC: 307 MG/DL (ref 70–130)
GLUCOSE SERPL-MCNC: 139 MG/DL (ref 65–99)
HCT VFR BLD AUTO: 34.2 % (ref 34–46.6)
HGB BLD-MCNC: 11.2 G/DL (ref 12–15.9)
IMM GRANULOCYTES # BLD AUTO: 0.03 10*3/MM3 (ref 0–0.05)
IMM GRANULOCYTES NFR BLD AUTO: 0.4 % (ref 0–0.5)
LYMPHOCYTES # BLD AUTO: 2.15 10*3/MM3 (ref 0.7–3.1)
LYMPHOCYTES NFR BLD AUTO: 25.2 % (ref 19.6–45.3)
MCH RBC QN AUTO: 29.8 PG (ref 26.6–33)
MCHC RBC AUTO-ENTMCNC: 32.7 G/DL (ref 31.5–35.7)
MCV RBC AUTO: 91 FL (ref 79–97)
MONOCYTES # BLD AUTO: 0.59 10*3/MM3 (ref 0.1–0.9)
MONOCYTES NFR BLD AUTO: 6.9 % (ref 5–12)
NEUTROPHILS NFR BLD AUTO: 5.51 10*3/MM3 (ref 1.7–7)
NEUTROPHILS NFR BLD AUTO: 64.7 % (ref 42.7–76)
NRBC BLD AUTO-RTO: 0 /100 WBC (ref 0–0.2)
PLATELET # BLD AUTO: 229 10*3/MM3 (ref 140–450)
PMV BLD AUTO: 11 FL (ref 6–12)
POTASSIUM SERPL-SCNC: 3.5 MMOL/L (ref 3.5–5.2)
POTASSIUM SERPL-SCNC: 5 MMOL/L (ref 3.5–5.2)
RBC # BLD AUTO: 3.76 10*6/MM3 (ref 3.77–5.28)
SODIUM SERPL-SCNC: 137 MMOL/L (ref 136–145)
WBC NRBC COR # BLD AUTO: 8.52 10*3/MM3 (ref 3.4–10.8)

## 2024-06-28 PROCEDURE — 93880 EXTRACRANIAL BILAT STUDY: CPT | Performed by: SURGERY

## 2024-06-28 PROCEDURE — 63710000001 INSULIN LISPRO (HUMAN) PER 5 UNITS: Performed by: INTERNAL MEDICINE

## 2024-06-28 PROCEDURE — 80048 BASIC METABOLIC PNL TOTAL CA: CPT | Performed by: STUDENT IN AN ORGANIZED HEALTH CARE EDUCATION/TRAINING PROGRAM

## 2024-06-28 PROCEDURE — 85025 COMPLETE CBC W/AUTO DIFF WBC: CPT | Performed by: STUDENT IN AN ORGANIZED HEALTH CARE EDUCATION/TRAINING PROGRAM

## 2024-06-28 PROCEDURE — 82948 REAGENT STRIP/BLOOD GLUCOSE: CPT

## 2024-06-28 PROCEDURE — 84132 ASSAY OF SERUM POTASSIUM: CPT | Performed by: INTERNAL MEDICINE

## 2024-06-28 PROCEDURE — 63710000001 INSULIN GLARGINE PER 5 UNITS

## 2024-06-28 PROCEDURE — 93880 EXTRACRANIAL BILAT STUDY: CPT

## 2024-06-28 RX ORDER — POTASSIUM CHLORIDE 750 MG/1
40 TABLET, FILM COATED, EXTENDED RELEASE ORAL EVERY 4 HOURS
Status: COMPLETED | OUTPATIENT
Start: 2024-06-28 | End: 2024-06-28

## 2024-06-28 RX ADMIN — INSULIN GLARGINE 30 UNITS: 100 INJECTION, SOLUTION SUBCUTANEOUS at 08:17

## 2024-06-28 RX ADMIN — POTASSIUM CHLORIDE 40 MEQ: 750 TABLET, EXTENDED RELEASE ORAL at 06:16

## 2024-06-28 RX ADMIN — HYDRALAZINE HYDROCHLORIDE 25 MG: 25 TABLET ORAL at 20:08

## 2024-06-28 RX ADMIN — ESCITALOPRAM OXALATE 10 MG: 10 TABLET, FILM COATED ORAL at 08:16

## 2024-06-28 RX ADMIN — NIFEDIPINE 90 MG: 60 TABLET, FILM COATED, EXTENDED RELEASE ORAL at 08:16

## 2024-06-28 RX ADMIN — PANTOPRAZOLE SODIUM 40 MG: 40 TABLET, DELAYED RELEASE ORAL at 06:16

## 2024-06-28 RX ADMIN — INSULIN LISPRO 2 UNITS: 100 INJECTION, SOLUTION INTRAVENOUS; SUBCUTANEOUS at 11:38

## 2024-06-28 RX ADMIN — TEMAZEPAM 15 MG: 15 CAPSULE ORAL at 23:45

## 2024-06-28 RX ADMIN — VALSARTAN 320 MG: 320 TABLET, FILM COATED ORAL at 08:16

## 2024-06-28 RX ADMIN — INSULIN LISPRO 7 UNITS: 100 INJECTION, SOLUTION INTRAVENOUS; SUBCUTANEOUS at 16:34

## 2024-06-28 RX ADMIN — POTASSIUM CHLORIDE 40 MEQ: 750 TABLET, EXTENDED RELEASE ORAL at 08:16

## 2024-06-28 RX ADMIN — ASPIRIN 325 MG: 325 TABLET ORAL at 08:16

## 2024-06-28 RX ADMIN — NICOTINE 1 PATCH: 21 PATCH, EXTENDED RELEASE TRANSDERMAL at 08:16

## 2024-06-28 RX ADMIN — Medication 10 ML: at 08:24

## 2024-06-28 NOTE — PLAN OF CARE
Goal Outcome Evaluation:  Plan of Care Reviewed With: patient           Outcome Evaluation: A&O x4. refuses bed alarm, up ad makenzie. VSS. no complaints through the night. plan for MVR on Monday. continue current POC, will update as needed.

## 2024-06-28 NOTE — PAYOR COMM NOTE
"Bibiana Belcher (44 y.o. Female)                  ATTENTION; CONTINUED CLINICALS CASE REF [id number]                   REPLY TO [facility] DEPT  [phone] OR CALL               Date of Birth   1980    Social Security Number       Address   [address] [place] [id number]    Home Phone   [phone]    MRN   [id number]       Evangelical   None    Marital Status                               Admission Date   6/21/24    Admission Type   Emergency    Admitting Provider       Attending Provider   Lenin Huang MD    Department, Room/Bed   [facility] CARDIOVASC UNIT, 2202/1       Discharge Date       Discharge Disposition       Discharge Destination                                 Attending Provider: Lenin Huang MD    Allergies: Latex    Isolation: None   Infection: None   Code Status: CPR    Ht: 162.6 cm (64\")   Wt: 64.9 kg (143 lb 1.3 oz)    Admission Cmt: None   Principal Problem: Acute right-sided weakness [R53.1]                   Active Insurance as of 6/21/2024       Primary Coverage       Payor Plan Insurance Group Employer/Plan Group    [employer] [facility] [employer] [facility] [employer] EMPL[employer] [id number]       Payor Plan Address Payor Plan Phone Number Payor Plan Fax Number Effective Dates    [address] [phone]  2/1/2020 - None Entered    [place] [address]         Subscriber Name Subscriber Birth Date Member ID       BIBIANA BELCHER 1980 [id number]                     Emergency Contacts        (Rel.) Home Phone Work Phone Mobile Phone    HOLLAND BELCHER (Spouse) [phone] -- [phone]              Vital Signs (last day)       Date/Time Temp Temp src Pulse Resp BP Patient Position SpO2    06/28/24 1158 98.7 (37.1) Oral 70 16 153/69 Lying 98    06/28/24 0340 99.2 (37.3) Oral 69 16 130/65 Lying --    06/27/24 2326 98.9 (37.2) Oral 65 16 150/62 Lying 98    06/27/24 1954 98.8 (37.1) Oral 67 16 158/77 Lying 98    06/27/24 1635 98 (36.7) " Oral 63 18 134/77 Sitting 97    06/27/24 1230 97.9 (36.6) Oral 70 18 127/78 Sitting 96    06/27/24 0827 -- -- 62 -- 110/69 -- --    06/27/24 0805 98 (36.7) Oral 66 18 110/69 Lying 97    06/27/24 0410 98.1 (36.7) Oral 59 18 116/59 Lying --          Oxygen Therapy (last day)       Date/Time SpO2 Device (Oxygen Therapy) Flow (L/min) Oxygen Concentration (%) ETCO2 (mmHg)    06/28/24 1158 98 -- -- -- --    06/27/24 2326 98 -- -- -- --    06/27/24 2021 -- room air -- -- --    06/27/24 1954 98 room air -- -- --    06/27/24 1635 97 room air -- -- --    06/27/24 1540 -- room air -- -- --    06/27/24 1230 96 room air -- -- --    06/27/24 0827 -- room air -- -- --    06/27/24 0805 97 room air -- -- --          Lines, Drains & Airways       Active LDAs       Name Placement date Placement time Site Days    Peripheral IV 06/21/24 2102 Right Antecubital 06/21/24 2102  Antecubital  6    Peripheral IV 06/21/24 2102 Left Antecubital 06/21/24 2102  Antecubital  6                  Orders (last 24 hrs)        Start     Ordered    07/01/24 0600  metoprolol tartrate (LOPRESSOR) tablet 12.5 mg  On Call to O.R.         06/25/24 1114    07/01/24 0600  ceFAZolin 2000 mg IVPB in 100 mL NS (MBP)  On Call to O.R.         06/25/24 1114    07/01/24 0001  NPO Diet NPO Type: Sips with Meds  Diet Effective Midnight         06/25/24 1114    06/30/24 1800  mupirocin (BACTROBAN) 2 % nasal ointment 1 Application  Every 12 Hours Scheduled         06/25/24 1114    06/30/24 1800  Chlorhexidine Gluconate Cloth 2 % pads 1 Application  Every 12 Hours         06/25/24 1114 06/30/24 1800  chlorhexidine (PERIDEX) 0.12 % solution 15 mL  Every 12 Hours Scheduled         06/25/24 1114    06/30/24 0000  Type & Screen  Once         06/25/24 1114    06/29/24 0000  Platelet Function ADP  Once         06/28/24 1106    06/29/24 0000  POC Pregnancy, Urine  Once         06/28/24 1125    06/28/24 1359  Potassium  Timed         06/28/24 0458    06/28/24 1117  POC Glucose  Once  PROCEDURE ONCE        Comments: Complete no more than 45 minutes prior to patient eating      06/28/24 1114    06/28/24 1110  Duplex Carotid Ultrasound CAR  Once        Comments: Pre-op open heart    06/28/24 1110    06/28/24 0900  insulin glargine (LANTUS, SEMGLEE) injection 30 Units  Daily         06/27/24 1243    06/28/24 0642  POC Glucose Once  PROCEDURE ONCE        Comments: Complete no more than 45 minutes prior to patient eating      06/28/24 0640    06/28/24 0545  potassium chloride (K-DUR,KLOR-CON) ER tablet 40 mEq  Every 4 Hours         06/28/24 0458    06/27/24 2257  Urinalysis, Microscopic Only - Urine, Clean Catch  Once         06/27/24 2256    06/27/24 2228  Urinalysis With Microscopic If Indicated (No Culture) - Urine, Clean Catch  Once         06/27/24 2227    06/27/24 2100  insulin glargine (LANTUS, SEMGLEE) injection 10 Units  Once         06/27/24 1245    06/27/24 2019  POC Glucose Once  PROCEDURE ONCE        Comments: Complete no more than 45 minutes prior to patient eating      06/27/24 2017    06/27/24 1800  insulin lispro (HUMALOG/ADMELOG) injection 2-7 Units  3 Times Daily With Meals,   Status:  Discontinued         06/27/24 1245    06/27/24 1646  POC Glucose Once  PROCEDURE ONCE        Comments: Complete no more than 45 minutes prior to patient eating      06/27/24 1643    06/27/24 1641  Duplex Vein Mapping Study Upper Extremity - Left CAR  Once        Comments: Probable Radial Artery Graft. Assess radial - ulnar artery and palmar arch    06/27/24 1249    06/27/24 1400  insulin lispro (HUMALOG/ADMELOG) injection 2-7 Units  3 Times Daily With Meals         06/27/24 1311    06/26/24 0900  valsartan (DIOVAN) tablet 320 mg  Every 24 Hours Scheduled         06/25/24 1214    06/26/24 0900  NIFEdipine XL (PROCARDIA XL) 24 hr tablet 90 mg  Every 24 Hours Scheduled         06/26/24 0641    06/25/24 1400  Instruct Patient on Coughing, Deep Breathing and Incentive Spirometry  Every 4 Hours While  "Awake       06/25/24 1114    06/25/24 1200  Neurovascular Checks  Every 4 Hours       06/25/24 1114    06/25/24 1114  temazepam (RESTORIL) capsule 15 mg  Nightly PRN         06/25/24 1114    06/25/24 1114  ALPRAZolam (XANAX) tablet 0.25 mg  Every 8 Hours PRN         06/25/24 1114    06/25/24 1112  Skin Assessment  Every Shift       06/25/24 1114    06/25/24 0718  lactated ringers infusion  Continuous,   Status:  Discontinued         06/25/24 0716    06/24/24 1639  hydrALAZINE (APRESOLINE) tablet 25 mg  Every 6 Hours PRN         06/24/24 1640    06/22/24 1732  Potassium Replacement - Follow Nurse / BPA Driven Protocol  As Needed         06/22/24 1733    06/22/24 0900  aspirin tablet 325 mg  Daily        Placed in \"Or\" Linked Group    06/22/24 0003    06/22/24 0900  aspirin suppository 300 mg  Daily        Placed in \"Or\" Linked Group    06/22/24 0003    06/22/24 0900  escitalopram (LEXAPRO) tablet 10 mg  Daily         06/22/24 0259    06/22/24 0700  POC Glucose 4x Daily Before Meals & at Bedtime  4 Times Daily Before Meals & at Bedtime      Comments: Complete no more than 45 minutes prior to patient eating      06/22/24 0003    06/22/24 0700  pantoprazole (PROTONIX) EC tablet 40 mg  Every Morning         06/22/24 0259    06/22/24 0600  POC Glucose Q6H  Every 6 Hours      Comments: May Discontinue After 2 Consecutive Readings Less Than 140Notify Provider if 2 Readings Greater Than 140      06/22/24 0003    06/22/24 0400  Vital Signs  Every 4 Hours       06/22/24 0003    06/22/24 0400  Intake and Output  Every 4 Hours       06/22/24 0003    06/22/24 0257  albuterol (PROVENTIL) nebulizer solution 0.083% 2.5 mg/3mL  Every 6 Hours PRN         06/22/24 0259    06/22/24 0254  Basic Metabolic Panel  Daily       06/22/24 0253    06/22/24 0254  CBC Auto Differential  Daily       06/22/24 0253    06/22/24 0019  sodium chloride 0.9 % flush 10 mL  Every 12 Hours Scheduled         06/22/24 0003    06/22/24 0019  atorvastatin " "(LIPITOR) tablet 80 mg  Nightly         06/22/24 0003    06/22/24 0019  nicotine (NICODERM CQ) 21 MG/24HR patch 1 patch  Every 24 Hours         06/22/24 0003    06/22/24 0003  nicotine polacrilex (NICORETTE) gum 2 mg  Every 1 Hour PRN         06/22/24 0003    06/22/24 0003  ondansetron ODT (ZOFRAN-ODT) disintegrating tablet 4 mg  Every 6 Hours PRN        Placed in \"Or\" Linked Group    06/22/24 0003    06/22/24 0003  ondansetron (ZOFRAN) injection 4 mg  Every 6 Hours PRN        Placed in \"Or\" Linked Group    06/22/24 0003    06/22/24 0003  sennosides-docusate (PERICOLACE) 8.6-50 MG per tablet 2 tablet  2 Times Daily PRN        Placed in \"And\" Linked Group    06/22/24 0003    06/22/24 0003  polyethylene glycol (MIRALAX) packet 17 g  Daily PRN        Placed in \"And\" Linked Group    06/22/24 0003    06/22/24 0003  bisacodyl (DULCOLAX) EC tablet 5 mg  Daily PRN        Placed in \"And\" Linked Group    06/22/24 0003    06/22/24 0003  bisacodyl (DULCOLAX) suppository 10 mg  Daily PRN        Placed in \"And\" Linked Group    06/22/24 0003    06/22/24 0002  nitroglycerin (NITROSTAT) SL tablet 0.4 mg  Every 5 Minutes PRN         06/22/24 0003    06/22/24 0002  Intake & Output  Every Shift       06/22/24 0003    06/22/24 0001  dextrose (GLUTOSE) oral gel 15 g  Every 15 Minutes PRN         06/22/24 0003    06/22/24 0001  dextrose (D50W) (25 g/50 mL) IV injection 25 g  Every 15 Minutes PRN         06/22/24 0003    06/22/24 0001  glucagon (GLUCAGEN) injection 1 mg  Every 15 Minutes PRN         06/22/24 0003    06/22/24 0001  sodium chloride 0.9 % flush 10 mL  As Needed         06/22/24 0003    06/22/24 0001  sodium chloride 0.9 % infusion 40 mL  As Needed         06/22/24 0003    06/21/24 2200  Neuro Checks  Every 2 Hours      Comments: On arrival and handoff, increase frequency as clinically indicated or for thrombolytic administration, otherwise Q2 hours. Documentation of arrival assessment and any neuro change required.    " 24  sodium chloride 0.9 % flush 10 mL  As Needed         24 210    Unscheduled  Follow Hypoglycemia Standing Orders For Blood Glucose <70 & Notify Provider of Treatment  As Needed      Comments: Follow Hypoglycemia Orders As Outlined in Process Instructions (Open Order Report to View Full Instructions)  Notify Provider Any Time Hypoglycemia Treatment is Administered    24 0003    Unscheduled  Oxygen Therapy- Nasal Cannula; Titrate 1-6 LPM Per SpO2; 90 - 95%  Continuous PRN       24 0744    Unscheduled  Chlorhexidine Skin Prep - Chin to Toes 12 Hours Prior to Surgery & Morning of Surgery  As Needed      Comments: Chlorhexidine Skin wipes and instructions for all patients having a procedure requiring an outward incision if not allergic.  If allergic, give antibacterial skin wipes and instructions.  Do not use for facial cases or on any mucus membranes.    12 Hours Prior to Surgery & The Morning of Surgery    24 1114    Signed and Held  Follow Anesthesia Guidelines / Protocol  Once         Signed and Held    Signed and Held  Instruct Patient on Coughing, Deep Breathing and Incentive Spirometry  Every 4 Hours While Awake       Signed and Held    Signed and Held  Insert Peripheral IV  Once         Signed and Held    Signed and Held  Saline Lock & Maintain IV Access  Continuous         Signed and Held    Signed and Held  sodium chloride 0.9 % flush 10 mL  Every 12 Hours Scheduled         Signed and Held    Signed and Held  sodium chloride 0.9 % flush 10 mL  As Needed         Signed and Held    Signed and Held  sodium chloride 0.9 % infusion 40 mL  As Needed         Signed and Held                     Physician Progress Notes (last 48 hours)        Lenin Huang MD at 24 1152              Name: Bibiana Inman ADMIT: 2024   : 1980  PCP: Ibrahima Correa MD    MRN: [id number] LOS: 7 days   AGE/SEX: 44 y.o. female  ROOM:      Subjective    Subjective   No acute events. Patient denies new complaints. No family at bedside.    Objective   Objective   Vital Signs  Temp:  [97.9 °F (36.6 °C)-99.2 °F (37.3 °C)] 99.2 °F (37.3 °C)  Heart Rate:  [63-70] 69  Resp:  [16-18] 16  BP: (127-158)/(62-78) 130/65  SpO2:  [96 %-98 %] 98 %  on   ;   Device (Oxygen Therapy): room air  Body mass index is 24.56 kg/m².  Physical Exam  Vitals and nursing note reviewed.   Constitutional:       General: She is not in acute distress.     Appearance: She is not toxic-appearing or diaphoretic.   HENT:      Head: Normocephalic and atraumatic.      Nose: Nose normal.      Mouth/Throat:      Mouth: Mucous membranes are moist.      Pharynx: Oropharynx is clear.   Eyes:      Conjunctiva/sclera: Conjunctivae normal.      Pupils: Pupils are equal, round, and reactive to light.   Cardiovascular:      Rate and Rhythm: Normal rate and regular rhythm.      Pulses: Normal pulses.   Pulmonary:      Effort: Pulmonary effort is normal.   Abdominal:      General: Bowel sounds are normal.      Palpations: Abdomen is soft.      Tenderness: There is no abdominal tenderness.   Musculoskeletal:         General: No swelling or tenderness.      Cervical back: Neck supple.   Skin:     General: Skin is warm and dry.      Capillary Refill: Capillary refill takes less than 2 seconds.   Neurological:      Mental Status: She is alert and oriented to person, place, and time.      Comments: +mild right lower facial weakness   Psychiatric:         Mood and Affect: Mood normal.         Behavior: Behavior normal.       Results Review     I reviewed the patient's new clinical results.  Results from last 7 days   Lab Units 06/28/24  0310 06/27/24  0321 06/26/24  0526 06/25/24  0434   WBC 10*3/mm3 8.52 8.25 7.64 7.88   HEMOGLOBIN g/dL 11.2* 10.9* 11.2* 11.1*   PLATELETS 10*3/mm3 229 235 218 197     Results from last 7 days   Lab Units 06/28/24  0310 06/27/24  0321 06/26/24  0526 06/25/24  1804 06/25/24  0434    SODIUM mmol/L 137 137 137  --  138   POTASSIUM mmol/L 3.5 3.9 4.3 4.9 3.6   CHLORIDE mmol/L 103 106 108*  --  109*   CO2 mmol/L 24.0 24.3 23.2  --  21.7*   BUN mg/dL 16 16 13  --  9   CREATININE mg/dL 1.00 1.11* 1.02*  --  0.82   GLUCOSE mg/dL 139* 155* 169*  --  126*   EGFR mL/min/1.73 70.5 62.2 68.9  --  89.5     Results from last 7 days   Lab Units 06/26/24  0526 06/21/24  2106   ALBUMIN g/dL 2.9* 2.9*   BILIRUBIN mg/dL <0.2 <0.2   ALK PHOS U/L 88 90   AST (SGOT) U/L 17 12   ALT (SGPT) U/L 16 9     Results from last 7 days   Lab Units 06/28/24  0310 06/27/24  0321 06/26/24  0526 06/25/24  0434 06/22/24  0323 06/21/24  2106   CALCIUM mg/dL 9.2 8.8 9.1 8.5*   < > 8.5*   ALBUMIN g/dL  --   --  2.9*  --   --  2.9*   MAGNESIUM mg/dL  --   --  1.8  --   --   --     < > = values in this interval not displayed.       Hemoglobin A1C   Date/Time Value Ref Range Status   06/26/2024 0527 13.10 (H) 4.80 - 5.60 % Final     Glucose   Date/Time Value Ref Range Status   06/28/2024 1114 162 (H) 70 - 130 mg/dL Final   06/28/2024 0640 132 (H) 70 - 130 mg/dL Final   06/27/2024 2017 155 (H) 70 - 130 mg/dL Final   06/27/2024 1643 159 (H) 70 - 130 mg/dL Final   06/27/2024 1049 265 (H) 70 - 130 mg/dL Final   06/27/2024 0618 164 (H) 70 - 130 mg/dL Final   06/26/2024 2034 212 (H) 70 - 130 mg/dL Final       No radiology results for the last day    I have personally reviewed all medications:  Scheduled Medications  aspirin, 325 mg, Oral, Daily   Or  aspirin, 300 mg, Rectal, Daily  atorvastatin, 80 mg, Oral, Nightly  [START ON 7/1/2024] ceFAZolin, 2,000 mg, Intravenous, On Call to OR  [START ON 6/30/2024] chlorhexidine, 15 mL, Mouth/Throat, Q12H  [START ON 6/30/2024] Chlorhexidine Gluconate Cloth, 1 Application, Topical, Q12H  escitalopram, 10 mg, Oral, Daily  insulin glargine, 30 Units, Subcutaneous, Daily  insulin lispro, 2-7 Units, Subcutaneous, TID With Meals  [START ON 7/1/2024] metoprolol tartrate, 12.5 mg, Oral, On Call to OR  [START  ON 6/30/2024] mupirocin, 1 Application, Each Nare, Q12H  nicotine, 1 patch, Transdermal, Q24H  NIFEdipine XL, 90 mg, Oral, Q24H  pantoprazole, 40 mg, Oral, QAM  sodium chloride, 10 mL, Intravenous, Q12H  valsartan, 320 mg, Oral, Q24H    Infusions     Diet  Diet: Diabetic; Consistent Carbohydrate; Fluid Consistency: Thin (IDDSI 0)  NPO Diet NPO Type: Sips with Meds    I have personally reviewed:  [x]  Laboratory   []  Microbiology   []  Radiology   [x]  EKG/Telemetry  []  Cardiology/Vascular   []  Pathology    []  Records    Assessment/Plan     Active Hospital Problems    Diagnosis  POA    **Acute right-sided weakness [R53.1]  Yes    CKD stage 3a, GFR 45-59 ml/min [N18.31]  Yes    Carotid stenosis [I65.29]  Yes    Lacunar cerebrovascular accident (CVA) [I63.81]  Yes    Elevated troponin [R79.89]  Yes    Aortic valve disease [I35.9]  Unknown    Tobacco abuse [Z72.0]  Yes    Coronary artery disease involving native coronary artery of native heart with unstable angina pectoris [I25.110]  Yes    Gastroparesis diabeticorum [E11.43, 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   Acute CVA  - presented with right-sided weakness which has improved, has mild right facial droop  - MRI showed enlargement of acute infarct in the genu of the left internal capsule as well as a new infarct in the left insular cortex, and previously identified acute infarction within the white matter of the left parietal lobe  - continue ASA, statin-hold plavix for planned procedure  - needs aggressive risk factor control, particularly with HTN and DM  - YUE showed 3 fibroelastomas-surgery planned for  Monday 7/1 following plavix washout  - appreciate cardiology, CT surgery, and neurology recs     Type 2 DM  - complications include gastroparesis  - on lantus and jardiance as outpatient  - BG acceptable, needs better outpatient control, A1C 16.90%  - continue lantus 30 units daily   - cover with ssi/hypoglycemia protocol  - continue jardiance     HTN/CAD s/p CABG  - BP elevated but improving  - continue nifedipine and valsartan      GERD  - symptoms controlled, continue ppi    SCDs for DVT prophylaxis.  Full code.  Discussed with patient and nursing staff.  Anticipate discharge home with HH vs SNU facility timing yet to be determined.  Expected Discharge Date: 7/9/2024; Expected Discharge Time:       Lenin Huang MD  [facility]  06/28/24  11:52 EDT    Portions of this text have been copied and I have reviewed them. They are accurate as of 6/28/2024      Electronically signed by Lenin Huang MD at 06/28/24 1153       Harvey Duarte PA-C at 06/28/24 1107           LOS: 7 days   Patient Care Team:  Ibrahima Correa MD as PCP - General (Family Medicine)  Rachele Zafar RN as Ambulatory  (Population Health)  Xochilt Jenkins MD as Consulting Physician (Nephrology)    Chief Complaint:   Aortic valve fibroelastoma     Subjective  No complaints.     Vital Signs  Temp:  [97.9 °F (36.6 °C)-99.2 °F (37.3 °C)] 99.2 °F (37.3 °C)  Heart Rate:  [63-70] 69  Resp:  [16-18] 16  BP: (127-158)/(62-78) 130/65      06/23/24  1750 06/25/24  0805 06/26/24  0532   Weight: 65.3 kg (144 lb) 65.3 kg (144 lb) 64.9 kg (143 lb 1.3 oz)     Body mass index is 24.56 kg/m².    Intake/Output Summary (Last 24 hours) at 6/28/2024 1108  Last data filed at 6/28/2024 0815  Gross per 24 hour   Intake 1840 ml   Output 1000 ml   Net 840 ml     I/O this shift:  In: 120 [P.O.:120]  Out: -       Objective:  Vital signs: (most recent): Blood pressure 153/69, pulse 70, temperature 98.7 °F (37.1 °C),  "temperature source Oral, resp. rate 16, height 162.6 cm (64\"), weight 64.9 kg (143 lb 1.3 oz), last menstrual period 01/10/2023, SpO2 98%, not currently breastfeeding.              Results Review:      WBC WBC   Date Value Ref Range Status   06/28/2024 8.52 3.40 - 10.80 10*3/mm3 Final   06/27/2024 8.25 3.40 - 10.80 10*3/mm3 Final   06/26/2024 7.64 3.40 - 10.80 10*3/mm3 Final      HGB Hemoglobin   Date Value Ref Range Status   06/28/2024 11.2 (L) 12.0 - 15.9 g/dL Final   06/27/2024 10.9 (L) 12.0 - 15.9 g/dL Final   06/26/2024 11.2 (L) 12.0 - 15.9 g/dL Final      HCT Hematocrit   Date Value Ref Range Status   06/28/2024 34.2 34.0 - 46.6 % Final   06/27/2024 34.1 34.0 - 46.6 % Final   06/26/2024 34.5 34.0 - 46.6 % Final      Platelets Platelets   Date Value Ref Range Status   06/28/2024 229 140 - 450 10*3/mm3 Final   06/27/2024 235 140 - 450 10*3/mm3 Final   06/26/2024 218 140 - 450 10*3/mm3 Final        PT/INR:    Protime   Date Value Ref Range Status   06/26/2024 13.4 11.7 - 14.2 Seconds Final   /  INR   Date Value Ref Range Status   06/26/2024 1.00 0.90 - 1.10 Final       Sodium Sodium   Date Value Ref Range Status   06/28/2024 137 136 - 145 mmol/L Final   06/27/2024 137 136 - 145 mmol/L Final   06/26/2024 137 136 - 145 mmol/L Final      Potassium Potassium   Date Value Ref Range Status   06/28/2024 3.5 3.5 - 5.2 mmol/L Final   06/27/2024 3.9 3.5 - 5.2 mmol/L Final   06/26/2024 4.3 3.5 - 5.2 mmol/L Final   06/25/2024 4.9 3.5 - 5.2 mmol/L Final      Chloride Chloride   Date Value Ref Range Status   06/28/2024 103 98 - 107 mmol/L Final   06/27/2024 106 98 - 107 mmol/L Final   06/26/2024 108 (H) 98 - 107 mmol/L Final      Bicarbonate CO2   Date Value Ref Range Status   06/28/2024 24.0 22.0 - 29.0 mmol/L Final   06/27/2024 24.3 22.0 - 29.0 mmol/L Final   06/26/2024 23.2 22.0 - 29.0 mmol/L Final      BUN BUN   Date Value Ref Range Status   06/28/2024 16 6 - 20 mg/dL Final   06/27/2024 16 6 - 20 mg/dL Final   06/26/2024 " 13 6 - 20 mg/dL Final      Creatinine Creatinine   Date Value Ref Range Status   06/28/2024 1.00 0.57 - 1.00 mg/dL Final   06/27/2024 1.11 (H) 0.57 - 1.00 mg/dL Final   06/26/2024 1.02 (H) 0.57 - 1.00 mg/dL Final      Calcium Calcium   Date Value Ref Range Status   06/28/2024 9.2 8.6 - 10.5 mg/dL Final   06/27/2024 8.8 8.6 - 10.5 mg/dL Final   06/26/2024 9.1 8.6 - 10.5 mg/dL Final      Magnesium Magnesium   Date Value Ref Range Status   06/26/2024 1.8 1.6 - 2.6 mg/dL Final        aspirin, 325 mg, Oral, Daily   Or  aspirin, 300 mg, Rectal, Daily  atorvastatin, 80 mg, Oral, Nightly  [START ON 7/1/2024] ceFAZolin, 2,000 mg, Intravenous, On Call to OR  [START ON 6/30/2024] chlorhexidine, 15 mL, Mouth/Throat, Q12H  [START ON 6/30/2024] Chlorhexidine Gluconate Cloth, 1 Application, Topical, Q12H  escitalopram, 10 mg, Oral, Daily  insulin glargine, 30 Units, Subcutaneous, Daily  insulin lispro, 2-7 Units, Subcutaneous, TID With Meals  [START ON 7/1/2024] metoprolol tartrate, 12.5 mg, Oral, On Call to OR  [START ON 6/30/2024] mupirocin, 1 Application, Each Nare, Q12H  nicotine, 1 patch, Transdermal, Q24H  NIFEdipine XL, 90 mg, Oral, Q24H  pantoprazole, 40 mg, Oral, QAM  sodium chloride, 10 mL, Intravenous, Q12H  valsartan, 320 mg, Oral, Q24H             Acute right-sided weakness    Benign essential hypertension    Gastroparesis diabeticorum    Gastroesophageal reflux disease    Mixed hypercholesterolemia and hypertriglyceridemia    Iron deficiency anemia    Type 2 diabetes mellitus with hyperglycemia, with long-term current use of insulin    Coronary artery disease involving native coronary artery of native heart with unstable angina pectoris    Tobacco abuse    CKD stage 3a, GFR 45-59 ml/min    Carotid stenosis    Lacunar cerebrovascular accident (CVA)    Elevated troponin    Aortic valve disease      Assessment & Plan    -Aortic valve mass/fibroelastoma  -Recurrent MCA stroke  -Coronary artery disease status post CABG in    -Uncontrolled diabetes mellitus, HgbA1c 16.9, with peripheral neuropathy  -CKD, stage 3  -Hypertension  -Hyperlipidemia  -Obesity  -Tobacco abuse-- encourage cessation  -PAD, Carotid stenosis, 70% right ICA  -GERD  -LB  -chronic anemia    plt ag%  HgbA1c: 13.10  UA: negative  CXR: no acute process  Carotid duplex: pending  Vein mapping:     The right great saphenous vein is patent and very small in the proximal and mid thigh.  The vein may be adequate at distal thigh and knee.  The GSV in the proximal and mid calf is inadequate.    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  The GSV is inadequate in the calf.    The right small saphenous vein is inadequate.  The right SSV is of good diameter, but walls appear thickened throughout.    Radial duplex:     Left radial artery is patent without stenosis and measures 0.2-0.24 cm in diameter.    Left ulnar artery is patent without stenosis and measures 0.22-0.25 cm in diameter.    There is an incomplete palmar arch with equal dominance.  COVID swab: negative    Pre-op workup as above. Scheduled for redo sternotomy aortic valve repair/replacement and possible CABG on Monday. Vein mapping not very promising and radial with incomplete arch. Dr. De Luna to review.       Harvey Duarte PA-C  24  11:08 EDT      Electronically signed by Harvey Duarte PA-C at 24 1302       Lenin Huang MD at 24 1530              Name: Bibiana Inman ADMIT: 2024   : 1980  PCP: Ibrahima Correa MD    MRN: [id number] LOS: 6 days   AGE/SEX: 44 y.o. female  ROOM:      Subjective   Subjective   No acute events. Patient denies new complaints. No family at bedside.    Objective   Objective   Vital Signs  Temp:  [97.9 °F (36.6 °C)-98.9 °F (37.2 °C)] 97.9 °F (36.6 °C)  Heart Rate:  [59-74] 70  Resp:  [18] 18  BP: (110-150)/(59-78) 127/78  SpO2:  [96 %-100 %] 96 %  on   ;   Device (Oxygen Therapy): room air  Body mass index  is 24.56 kg/m².  Physical Exam  Vitals and nursing note reviewed.   Constitutional:       General: She is not in acute distress.     Appearance: She is not toxic-appearing or diaphoretic.   HENT:      Head: Normocephalic and atraumatic.      Nose: Nose normal.      Mouth/Throat:      Mouth: Mucous membranes are moist.      Pharynx: Oropharynx is clear.   Eyes:      Conjunctiva/sclera: Conjunctivae normal.      Pupils: Pupils are equal, round, and reactive to light.   Cardiovascular:      Rate and Rhythm: Normal rate and regular rhythm.      Pulses: Normal pulses.   Pulmonary:      Effort: Pulmonary effort is normal.   Abdominal:      General: Bowel sounds are normal.      Palpations: Abdomen is soft.      Tenderness: There is no abdominal tenderness.   Musculoskeletal:         General: No swelling or tenderness.      Cervical back: Neck supple.   Skin:     General: Skin is warm and dry.      Capillary Refill: Capillary refill takes less than 2 seconds.   Neurological:      Mental Status: She is alert and oriented to person, place, and time.      Comments: +mild right lower facial weakness   Psychiatric:         Mood and Affect: Mood normal.         Behavior: Behavior normal.       Results Review     I reviewed the patient's new clinical results.  Results from last 7 days   Lab Units 06/27/24  0321 06/26/24  0526 06/25/24  0434 06/24/24  0525   WBC 10*3/mm3 8.25 7.64 7.88 7.76   HEMOGLOBIN g/dL 10.9* 11.2* 11.1* 11.2*   PLATELETS 10*3/mm3 235 218 197 203     Results from last 7 days   Lab Units 06/27/24  0321 06/26/24  0526 06/25/24  1804 06/25/24  0434 06/24/24  0525   SODIUM mmol/L 137 137  --  138 139   POTASSIUM mmol/L 3.9 4.3 4.9 3.6 3.7   CHLORIDE mmol/L 106 108*  --  109* 108*   CO2 mmol/L 24.3 23.2  --  21.7* 21.9*   BUN mg/dL 16 13  --  9 8   CREATININE mg/dL 1.11* 1.02*  --  0.82 0.93   GLUCOSE mg/dL 155* 169*  --  126* 134*   EGFR mL/min/1.73 62.2 68.9  --  89.5 76.9     Results from last 7 days   Lab  Units 06/26/24  0526 06/21/24  2106   ALBUMIN g/dL 2.9* 2.9*   BILIRUBIN mg/dL <0.2 <0.2   ALK PHOS U/L 88 90   AST (SGOT) U/L 17 12   ALT (SGPT) U/L 16 9     Results from last 7 days   Lab Units 06/27/24  0321 06/26/24  0526 06/25/24  0434 06/24/24  0525 06/22/24  0323 06/21/24  2106   CALCIUM mg/dL 8.8 9.1 8.5* 8.6   < > 8.5*   ALBUMIN g/dL  --  2.9*  --   --   --  2.9*   MAGNESIUM mg/dL  --  1.8  --   --   --   --     < > = values in this interval not displayed.       Hemoglobin A1C   Date/Time Value Ref Range Status   06/26/2024 0527 13.10 (H) 4.80 - 5.60 % Final     Glucose   Date/Time Value Ref Range Status   06/27/2024 1049 265 (H) 70 - 130 mg/dL Final   06/27/2024 0618 164 (H) 70 - 130 mg/dL Final   06/26/2024 2034 212 (H) 70 - 130 mg/dL Final   06/26/2024 1758 218 (H) 70 - 130 mg/dL Final   06/26/2024 1151 214 (H) 70 - 130 mg/dL Final   06/26/2024 0605 170 (H) 70 - 130 mg/dL Final   06/25/2024 2031 244 (H) 70 - 130 mg/dL Final       No radiology results for the last day    I have personally reviewed all medications:  Scheduled Medications  aspirin, 325 mg, Oral, Daily   Or  aspirin, 300 mg, Rectal, Daily  atorvastatin, 80 mg, Oral, Nightly  [START ON 7/1/2024] ceFAZolin, 2,000 mg, Intravenous, On Call to OR  [START ON 6/30/2024] chlorhexidine, 15 mL, Mouth/Throat, Q12H  [START ON 6/30/2024] Chlorhexidine Gluconate Cloth, 1 Application, Topical, Q12H  escitalopram, 10 mg, Oral, Daily  insulin glargine, 10 Units, Subcutaneous, Once  [START ON 6/28/2024] insulin glargine, 30 Units, Subcutaneous, Daily  insulin lispro, 2-7 Units, Subcutaneous, TID With Meals  [START ON 7/1/2024] metoprolol tartrate, 12.5 mg, Oral, On Call to OR  [START ON 6/30/2024] mupirocin, 1 Application, Each Nare, Q12H  nicotine, 1 patch, Transdermal, Q24H  NIFEdipine XL, 90 mg, Oral, Q24H  pantoprazole, 40 mg, Oral, QAM  sodium chloride, 10 mL, Intravenous, Q12H  valsartan, 320 mg, Oral, Q24H    Infusions  lactated ringers, 9  mL/hr    Diet  Diet: Diabetic; Consistent Carbohydrate; Fluid Consistency: Thin (IDDSI 0)  NPO Diet NPO Type: Sips with Meds    I have personally reviewed:  [x]  Laboratory   []  Microbiology   []  Radiology   [x]  EKG/Telemetry  []  Cardiology/Vascular   []  Pathology    []  Records    Assessment/Plan     Active Hospital Problems    Diagnosis  POA    **Acute right-sided weakness [R53.1]  Yes    CKD stage 3a, GFR 45-59 ml/min [N18.31]  Yes    Carotid stenosis [I65.29]  Yes    Lacunar cerebrovascular accident (CVA) [I63.81]  Yes    Elevated troponin [R79.89]  Yes    Aortic valve disease [I35.9]  Unknown    Tobacco abuse [Z72.0]  Yes    Coronary artery disease involving native coronary artery of native heart with unstable angina pectoris [I25.110]  Yes    Gastroparesis diabeticorum [E11.43, 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   Acute CVA  - presented with right-sided weakness which has improved, has mild right facial droop still  - MRI showed enlargement of acute infarct in the genu of the left internal capsule as well as a new infarct in the left insular cortex, and previously identified acute infarction within the white matter of the left parietal lobe  - continue ASA, statin-hold plavix for planned procedure  - needs aggressive risk factor control, particularly with HTN and DM  - YUE showed 3 fibroelastomas-surgery planned for Monday 7/1 following plavix washout  - appreciate cardiology, CT surgery, and neurology recs     Type 2 DM  - complications include gastroparesis  - on lantus and jardiance as outpatient  - BG is elevated, also needs better outpatient  control, A1C 16.90%  - increase lantus to 30 units daily   - cover with ssi/hypoglycemia protocol  - continue jardiance     HTN/CAD s/p CABG  - BP elevated but improving  - continue nifedipine and valsartan      GERD  - symptoms controlled, continue ppi    SCDs for DVT prophylaxis.  Full code.  Discussed with patient and nursing staff.  Anticipate discharge home with HH vs SNU facility timing yet to be determined.  Expected Discharge Date: 2024; Expected Discharge Time:       Lenin Huang MD  [facility]  24  15:30 EDT    Portions of this text have been copied and I have reviewed them. They are accurate as of 2024      Electronically signed by Lenin Huang MD at 24 1532       Ronaldo Severino MD at 24 1353                [facility]    Patient Name: Bibiana Inman  :1980  44 y.o.  LOS: 6  Encounter Provider: Ronaldo Severino MD      Patient Care Team:  Ibrahima Correa MD as PCP - General (Family Medicine)  Rachele Zafar RN as Ambulatory  ([facility])  Xochilt Jenkins MD as Consulting Physician (Nephrology)    Chief Complaint/Consult question:  recurrent stroke, hx of CABG, aortic valve fibroelastomas    PMH/HPI: Bibiana Inman is a 44 year-old female with a PMH for multivessel CAD s/p CABG in 2014 by Dr. 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     Summary of cardiovascular history:  - 2024, ED for chest pain. NM perfusion stress showed a moderate-sized, severe area of ischemia located in the inferior and lateral wall. Cath showed significant CAD including  of LAD and RCA, left circumflex with 80-90% stenosis in the proximal segment and mid segment, ramus with proximal 70-80% stenosis. LIMA to LAD is patent, however distal/apical LAD has 80% stenosis. SVG to left circumflex is occluded. SVG  to RCA is patent.  Medical therapy was pursued at that time.  - 6/17-6/20/2024: Admitted to [facility] for acute stroke. MRI showed a linear acute infarct within the left parietal occipital region.  CTA neck showed 70% stenosis in the right ICA. She was evaluated by vascular surgery and there was no indication for carotid revascularization at that time.   - 6/21/2024: Presented with acute right-sided weakness.  CTA head/neck showed no acute intracranial hemorrhage. MRI showed enlargement of the previously identified acute infarct centered within the genu of the left internal capsule. There is also a new punctate focus of acute infarction within the left insular cortex. 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 She has been hypertensive with blood pressures as high as 199/86 requiring multiple BP agent initiation and up titration.  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  Cardiovascular surgery consulted, plan for surgery next Monday.    Interval History: Again no acute events overnight, BP is now controlled on higher doses of valsartan and nifedipine. Planned for surgery next Monday (AV fibroelastoma resection and possible re-do CABG)     Results for orders placed during the hospital encounter of 06/21/24    Adult Transesophageal Echo (YUE) W/ Cont if Necessary Per Protocol    Interpretation Summary    Left ventricular systolic function is normal.  Visually estimated LVEF 60-65%.    Left ventricular wall thickness is consistent with mild to moderate concentric hypertrophy.    There are up to 3 small pedunculated masses attached to the aortic valve leaflets (2 with the noncoronary cusp and a smaller one with left coronary cusp) protruding into the aortic root with the largest one measuring 0.8 x 0.5cm.  The morphology appears consistent with  "fibroelastomas.    Estimated right ventricular systolic pressure from tricuspid regurgitation is normal (<35 mmHg).    No evidence of intracardiac thrombus including with close evaluation of both atria, BRADEN, and RAA.    Saline test results are negative.    Mild aortic arch plaques.        Objective   Vital Signs  Temp:  [98 °F (36.7 °C)-98.9 °F (37.2 °C)] 98 °F (36.7 °C)  Heart Rate:  [59-74] 62  Resp:  [18] 18  BP: (110-150)/(59-72) 110/69    Intake/Output Summary (Last 24 hours) at 6/27/2024 1353  Last data filed at 6/27/2024 0850  Gross per 24 hour   Intake 360 ml   Output --   Net 360 ml     Flowsheet Rows      Flowsheet Row First Filed Value   Admission Height 162.6 cm (64\") Documented at 06/21/2024 2105   Admission Weight 65.5 kg (144 lb 6.4 oz) Documented at 06/21/2024 2105              Vitals and nursing note reviewed.   Constitutional:       Appearance: Healthy appearance. Not in distress.   Neck:      Vascular: No JVR.   Pulmonary:      Effort: Pulmonary effort is normal.      Breath sounds: Diffuse and bilateral Rhonchi present. No rales.   Cardiovascular:      Bradycardia present. Regular rhythm.      Murmurs: There is no murmur.   Edema:     Peripheral edema absent.   Abdominal:      General: There is no distension.      Palpations: Abdomen is soft.      Tenderness: There is no abdominal tenderness.   Musculoskeletal:      Cervical back: Neck supple. Skin:     General: Skin is cool.   Neurological:      Mental Status: Alert and oriented to person, place and time.           Pertinent Test Results:  Results from last 7 days   Lab Units 06/27/24  0321 06/26/24  0526 06/25/24  1804 06/25/24  0434 06/24/24  0525 06/23/24  0500 06/22/24  0323 06/21/24 2114 06/21/24 2106   SODIUM mmol/L 137 137  --  138 139 138 137  --  137   POTASSIUM mmol/L 3.9 4.3 4.9 3.6 3.7 3.8 3.5  --  3.8   CHLORIDE mmol/L 106 108*  --  109* 108* 108* 106  --  104   CO2 mmol/L 24.3 23.2  --  21.7* 21.9* 23.0 22.3  --  22.0   BUN mg/dL " 16 13  --  9 8 10 10  --  11   CREATININE mg/dL 1.11* 1.02*  --  0.82 0.93 0.98 1.23* 1.60* 1.40*   GLUCOSE mg/dL 155* 169*  --  126* 134* 168* 56*  --  197*   CALCIUM mg/dL 8.8 9.1  --  8.5* 8.6 8.2* 8.4*  --  8.5*   AST (SGOT) U/L  --  17  --   --   --   --   --   --  12   ALT (SGPT) U/L  --  16  --   --   --   --   --   --  9     Results from last 7 days   Lab Units 06/21/24  2304 06/21/24  2106   HSTROP T ng/L 53* 56*     Results from last 7 days   Lab Units 06/27/24  0321 06/26/24  0526 06/25/24  0434 06/24/24  0525 06/23/24  0500 06/22/24  0323 06/21/24  2106   WBC 10*3/mm3 8.25 7.64 7.88 7.76 7.02 11.58* 10.72   HEMOGLOBIN g/dL 10.9* 11.2* 11.1* 11.2* 11.4* 11.5* 12.0   HEMATOCRIT % 34.1 34.5 33.9* 35.8 35.3 35.7 37.1   PLATELETS 10*3/mm3 235 218 197 203 202 265 245     Results from last 7 days   Lab Units 06/26/24  0526 06/21/24  2106   INR  1.00 0.98   APTT seconds 30.4 27.5     Results from last 7 days   Lab Units 06/26/24  0526   MAGNESIUM mg/dL 1.8     Results from last 7 days   Lab Units 06/22/24  0323   CHOLESTEROL mg/dL 245*   TRIGLYCERIDES mg/dL 610*   HDL CHOL mg/dL 31*     Results from last 7 days   Lab Units 06/26/24  0526   PROBNP pg/mL 721.0*               Medication Review:   aspirin, 325 mg, Oral, Daily   Or  aspirin, 300 mg, Rectal, Daily  atorvastatin, 80 mg, Oral, Nightly  [START ON 7/1/2024] ceFAZolin, 2,000 mg, Intravenous, On Call to OR  [START ON 6/30/2024] chlorhexidine, 15 mL, Mouth/Throat, Q12H  [START ON 6/30/2024] Chlorhexidine Gluconate Cloth, 1 Application, Topical, Q12H  escitalopram, 10 mg, Oral, Daily  insulin glargine, 10 Units, Subcutaneous, Once  [START ON 6/28/2024] insulin glargine, 30 Units, Subcutaneous, Daily  insulin lispro, 2-7 Units, Subcutaneous, TID With Meals  [START ON 7/1/2024] metoprolol tartrate, 12.5 mg, Oral, On Call to OR  [START ON 6/30/2024] mupirocin, 1 Application, Each Nare, Q12H  nicotine, 1 patch, Transdermal, Q24H  NIFEdipine XL, 90 mg, Oral,  Q24H  pantoprazole, 40 mg, Oral, QAM  sodium chloride, 10 mL, Intravenous, Q12H  valsartan, 320 mg, Oral, Q24H         lactated ringers, 9 mL/hr        Assessment & Plan     Active Hospital Problems    Diagnosis  POA    **Acute right-sided weakness [R53.1]  Yes    CKD stage 3a, GFR 45-59 ml/min [N18.31]  Yes    Carotid stenosis [I65.29]  Yes    Lacunar cerebrovascular accident (CVA) [I63.81]  Yes    Elevated troponin [R79.89]  Yes    Aortic valve disease [I35.9]  Unknown    Tobacco abuse [Z72.0]  Yes    Coronary artery disease involving native coronary artery of native heart with unstable angina pectoris [I25.110]  Yes    Gastroparesis diabeticorum [E11.43, 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        Recurrent stroke/aortic valve mass: Stroke x 2 over the past month, this time presented with dysarthria and MRI showed a new punctate focus of acute infarction within the left insular cortex. 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  Cardiothoracic surgery consulted for possible surgical resection, planned for Monday +/- re-do CABG.  BP control, see below.  Hypertension: BP readings very elevated this admission up to ~200/80s, home meds included Lopressor, clonidine, hydralazine, and nifedipine (recently started last week).  Resumed on nifedipine and started on valsartan with aggressive up titration and now BP is  controlled.  Hyperlipidemia: Lipids this month showed HDL 31, , uncontrolled.  LDL goal < 70 given history of CABG. continue home atorvastatin 80, patient will need addition of Zetia +/- PCSK9 inhibitor upon discharge.  Diabetes mellitus: Hemoglobin A1c 17 his admission, uncontrolled.  Insulin-dependent, management per primary team and other specialists.  On Jardiance 10 daily at home.  CAD s/p CABG: Multivessel CAD s/p CABG in 2014 by Dr. Garcia at [facility]. Most recent cath 2/2024 showed LAD and RCA , proximal to mid left circumflex with 80-90% stenosis, ramus with proximal 70-80% stenosis. LIMA to LAD is patent, however distal/apical LAD has 80% stenosis. SVG to left circumflex is occluded. SVG to RCA is patent. Denies angina, possible re-do CABG as above.  Continue home aspirin and Plavix (holding for now per CV surgery request), BP and lipid control as above, smoking cessation is key, see below.  Tobacco abuse: Smokes 6 cigarettes/day for at least 20 years, strongly encourage complete cessation.    Thank you for involving us in the care of Ms. Inman.  Cardiology will sign off at this time but please do not hesitate to reach back out to us if additional questions arise.  Please also reach back to us after surgery as we will be happy to re-participate her care.    Ronaldo Severino MD  [facility]  06/27/24  12:17 EDT     Electronically signed by Ronaldo Severino MD at 06/27/24 1406       Lora Valenzuela APRN at 06/27/24 0974           LOS: 6 days   Patient Care Team:  Ibrahima Correa MD as PCP - General (Family Medicine)  Rachele Zafar RN as Ambulatory  (Population Health)  Xochilt Jenkins MD as Consulting Physician (Nephrology)    Chief Complaint: aortic fibroelastoma     Subjective  No new complaints     Vital Signs  Temp:  [98 °F (36.7 °C)-98.9 °F (37.2 °C)] 98 °F (36.7 °C)  Heart Rate:  [59-74] 62  Resp:  [18] 18  BP: (110-150)/(59-73) 110/69  Body mass  "index is 24.56 kg/m².    Intake/Output Summary (Last 24 hours) at 6/27/2024 0952  Last data filed at 6/27/2024 0850  Gross per 24 hour   Intake 360 ml   Output --   Net 360 ml     I/O this shift:  In: 360 [P.O.:360]  Out: -              06/23/24  1750 06/25/24  0805 06/26/24  0532   Weight: 65.3 kg (144 lb) 65.3 kg (144 lb) 64.9 kg (143 lb 1.3 oz)         Objective:  Vital signs: (most recent): Blood pressure 110/69, pulse 62, temperature 98 °F (36.7 °C), temperature source Oral, resp. rate 18, height 162.6 cm (64\"), weight 64.9 kg (143 lb 1.3 oz), last menstrual period 01/10/2023, SpO2 97%, not currently breastfeeding.                Results Review:        WBC WBC   Date Value Ref Range Status   06/27/2024 8.25 3.40 - 10.80 10*3/mm3 Final   06/26/2024 7.64 3.40 - 10.80 10*3/mm3 Final   06/25/2024 7.88 3.40 - 10.80 10*3/mm3 Final      HGB Hemoglobin   Date Value Ref Range Status   06/27/2024 10.9 (L) 12.0 - 15.9 g/dL Final   06/26/2024 11.2 (L) 12.0 - 15.9 g/dL Final   06/25/2024 11.1 (L) 12.0 - 15.9 g/dL Final      HCT Hematocrit   Date Value Ref Range Status   06/27/2024 34.1 34.0 - 46.6 % Final   06/26/2024 34.5 34.0 - 46.6 % Final   06/25/2024 33.9 (L) 34.0 - 46.6 % Final      Platelets Platelets   Date Value Ref Range Status   06/27/2024 235 140 - 450 10*3/mm3 Final   06/26/2024 218 140 - 450 10*3/mm3 Final   06/25/2024 197 140 - 450 10*3/mm3 Final        PT/INR:    Protime   Date Value Ref Range Status   06/26/2024 13.4 11.7 - 14.2 Seconds Final   /  INR   Date Value Ref Range Status   06/26/2024 1.00 0.90 - 1.10 Final       Sodium Sodium   Date Value Ref Range Status   06/27/2024 137 136 - 145 mmol/L Final   06/26/2024 137 136 - 145 mmol/L Final   06/25/2024 138 136 - 145 mmol/L Final      Potassium Potassium   Date Value Ref Range Status   06/27/2024 3.9 3.5 - 5.2 mmol/L Final   06/26/2024 4.3 3.5 - 5.2 mmol/L Final   06/25/2024 4.9 3.5 - 5.2 mmol/L Final   06/25/2024 3.6 3.5 - 5.2 mmol/L Final      Chloride " Chloride   Date Value Ref Range Status   06/27/2024 106 98 - 107 mmol/L Final   06/26/2024 108 (H) 98 - 107 mmol/L Final   06/25/2024 109 (H) 98 - 107 mmol/L Final      Bicarbonate CO2   Date Value Ref Range Status   06/27/2024 24.3 22.0 - 29.0 mmol/L Final   06/26/2024 23.2 22.0 - 29.0 mmol/L Final   06/25/2024 21.7 (L) 22.0 - 29.0 mmol/L Final      BUN BUN   Date Value Ref Range Status   06/27/2024 16 6 - 20 mg/dL Final   06/26/2024 13 6 - 20 mg/dL Final   06/25/2024 9 6 - 20 mg/dL Final      Creatinine Creatinine   Date Value Ref Range Status   06/27/2024 1.11 (H) 0.57 - 1.00 mg/dL Final   06/26/2024 1.02 (H) 0.57 - 1.00 mg/dL Final   06/25/2024 0.82 0.57 - 1.00 mg/dL Final      Calcium Calcium   Date Value Ref Range Status   06/27/2024 8.8 8.6 - 10.5 mg/dL Final   06/26/2024 9.1 8.6 - 10.5 mg/dL Final   06/25/2024 8.5 (L) 8.6 - 10.5 mg/dL Final      Magnesium Magnesium   Date Value Ref Range Status   06/26/2024 1.8 1.6 - 2.6 mg/dL Final          aspirin, 325 mg, Oral, Daily   Or  aspirin, 300 mg, Rectal, Daily  atorvastatin, 80 mg, Oral, Nightly  [START ON 7/1/2024] ceFAZolin, 2,000 mg, Intravenous, On Call to OR  [START ON 6/30/2024] chlorhexidine, 15 mL, Mouth/Throat, Q12H  [START ON 6/30/2024] Chlorhexidine Gluconate Cloth, 1 Application, Topical, Q12H  empagliflozin, 10 mg, Oral, Daily  escitalopram, 10 mg, Oral, Daily  insulin glargine, 20 Units, Subcutaneous, Daily  insulin lispro, 2-7 Units, Subcutaneous, 4x Daily AC & at Bedtime  [START ON 7/1/2024] metoprolol tartrate, 12.5 mg, Oral, On Call to OR  [START ON 6/30/2024] mupirocin, 1 Application, Each Nare, Q12H  nicotine, 1 patch, Transdermal, Q24H  NIFEdipine XL, 90 mg, Oral, Q24H  pantoprazole, 40 mg, Oral, QAM  sodium chloride, 10 mL, Intravenous, Q12H  valsartan, 320 mg, Oral, Q24H      lactated ringers, 9 mL/hr              Acute right-sided weakness    Benign essential hypertension    Gastroparesis diabeticorum    Gastroesophageal reflux disease     Mixed hypercholesterolemia and hypertriglyceridemia    Iron deficiency anemia    Type 2 diabetes mellitus with hyperglycemia, with long-term current use of insulin    Coronary artery disease involving native coronary artery of native heart with unstable angina pectoris    Tobacco abuse    CKD stage 3a, GFR 45-59 ml/min    Carotid stenosis    Lacunar cerebrovascular accident (CVA)    Elevated troponin    Aortic valve disease      Assessment & Plan    -Aortic valve mass/fibroelastoma  -Recurrent MCA stroke  -Coronary artery disease status post CABG in 2014  -Uncontrolled diabetes mellitus, HgbA1c 16.9, with peripheral neuropathy  -CKD, stage 3  -Hypertension  -Hyperlipidemia  -Obesity  -Tobacco abuse-- encourage cessation  -PAD, Carotid stenosis, 70% right ICA  -GERD  -LB  -chronic anemia      Continue to hold plavix.  Plt agg 82 yesterday  Plan for surgery on Monday.      KAUSHAL Alvarado  06/27/24  09:52 EDT      Electronically signed by Lora Valenzuela APRN at 06/27/24 4617

## 2024-06-28 NOTE — PLAN OF CARE
Goal Outcome Evaluation:  Plan of Care Reviewed With: (P) patient        Progress: (P) no change     Pt VSS, A/O x4, SR in the high 60s to low 70s. Pt went down for a carotid vascular ultrasound. Pt scheduled for open heart Monday 7/1/2024. Pt had no complaints of pain. Treated per MAR. Continue w/ plan of care.

## 2024-06-28 NOTE — PROGRESS NOTES
" LOS: 7 days   Patient Care Team:  Ibrahima Correa MD as PCP - General (Family Medicine)  Rachele Zafar, ALEENA as Ambulatory  (Population Health)  Xochilt Jenkins MD as Consulting Physician (Nephrology)    Chief Complaint:   Aortic valve fibroelastoma     Subjective  No complaints.     Vital Signs  Temp:  [97.9 °F (36.6 °C)-99.2 °F (37.3 °C)] 99.2 °F (37.3 °C)  Heart Rate:  [63-70] 69  Resp:  [16-18] 16  BP: (127-158)/(62-78) 130/65      06/23/24  1750 06/25/24  0805 06/26/24  0532   Weight: 65.3 kg (144 lb) 65.3 kg (144 lb) 64.9 kg (143 lb 1.3 oz)     Body mass index is 24.56 kg/m².    Intake/Output Summary (Last 24 hours) at 6/28/2024 1108  Last data filed at 6/28/2024 0815  Gross per 24 hour   Intake 1840 ml   Output 1000 ml   Net 840 ml     I/O this shift:  In: 120 [P.O.:120]  Out: -       Objective:  Vital signs: (most recent): Blood pressure 153/69, pulse 70, temperature 98.7 °F (37.1 °C), temperature source Oral, resp. rate 16, height 162.6 cm (64\"), weight 64.9 kg (143 lb 1.3 oz), last menstrual period 01/10/2023, SpO2 98%, not currently breastfeeding.              Results Review:      WBC WBC   Date Value Ref Range Status   06/28/2024 8.52 3.40 - 10.80 10*3/mm3 Final   06/27/2024 8.25 3.40 - 10.80 10*3/mm3 Final   06/26/2024 7.64 3.40 - 10.80 10*3/mm3 Final      HGB Hemoglobin   Date Value Ref Range Status   06/28/2024 11.2 (L) 12.0 - 15.9 g/dL Final   06/27/2024 10.9 (L) 12.0 - 15.9 g/dL Final   06/26/2024 11.2 (L) 12.0 - 15.9 g/dL Final      HCT Hematocrit   Date Value Ref Range Status   06/28/2024 34.2 34.0 - 46.6 % Final   06/27/2024 34.1 34.0 - 46.6 % Final   06/26/2024 34.5 34.0 - 46.6 % Final      Platelets Platelets   Date Value Ref Range Status   06/28/2024 229 140 - 450 10*3/mm3 Final   06/27/2024 235 140 - 450 10*3/mm3 Final   06/26/2024 218 140 - 450 10*3/mm3 Final        PT/INR:    Protime   Date Value Ref Range Status   06/26/2024 13.4 11.7 - 14.2 Seconds Final "   /  INR   Date Value Ref Range Status   06/26/2024 1.00 0.90 - 1.10 Final       Sodium Sodium   Date Value Ref Range Status   06/28/2024 137 136 - 145 mmol/L Final   06/27/2024 137 136 - 145 mmol/L Final   06/26/2024 137 136 - 145 mmol/L Final      Potassium Potassium   Date Value Ref Range Status   06/28/2024 3.5 3.5 - 5.2 mmol/L Final   06/27/2024 3.9 3.5 - 5.2 mmol/L Final   06/26/2024 4.3 3.5 - 5.2 mmol/L Final   06/25/2024 4.9 3.5 - 5.2 mmol/L Final      Chloride Chloride   Date Value Ref Range Status   06/28/2024 103 98 - 107 mmol/L Final   06/27/2024 106 98 - 107 mmol/L Final   06/26/2024 108 (H) 98 - 107 mmol/L Final      Bicarbonate CO2   Date Value Ref Range Status   06/28/2024 24.0 22.0 - 29.0 mmol/L Final   06/27/2024 24.3 22.0 - 29.0 mmol/L Final   06/26/2024 23.2 22.0 - 29.0 mmol/L Final      BUN BUN   Date Value Ref Range Status   06/28/2024 16 6 - 20 mg/dL Final   06/27/2024 16 6 - 20 mg/dL Final   06/26/2024 13 6 - 20 mg/dL Final      Creatinine Creatinine   Date Value Ref Range Status   06/28/2024 1.00 0.57 - 1.00 mg/dL Final   06/27/2024 1.11 (H) 0.57 - 1.00 mg/dL Final   06/26/2024 1.02 (H) 0.57 - 1.00 mg/dL Final      Calcium Calcium   Date Value Ref Range Status   06/28/2024 9.2 8.6 - 10.5 mg/dL Final   06/27/2024 8.8 8.6 - 10.5 mg/dL Final   06/26/2024 9.1 8.6 - 10.5 mg/dL Final      Magnesium Magnesium   Date Value Ref Range Status   06/26/2024 1.8 1.6 - 2.6 mg/dL Final        aspirin, 325 mg, Oral, Daily   Or  aspirin, 300 mg, Rectal, Daily  atorvastatin, 80 mg, Oral, Nightly  [START ON 7/1/2024] ceFAZolin, 2,000 mg, Intravenous, On Call to OR  [START ON 6/30/2024] chlorhexidine, 15 mL, Mouth/Throat, Q12H  [START ON 6/30/2024] Chlorhexidine Gluconate Cloth, 1 Application, Topical, Q12H  escitalopram, 10 mg, Oral, Daily  insulin glargine, 30 Units, Subcutaneous, Daily  insulin lispro, 2-7 Units, Subcutaneous, TID With Meals  [START ON 7/1/2024] metoprolol tartrate, 12.5 mg, Oral, On Call to  OR  [START ON 2024] mupirocin, 1 Application, Each Nare, Q12H  nicotine, 1 patch, Transdermal, Q24H  NIFEdipine XL, 90 mg, Oral, Q24H  pantoprazole, 40 mg, Oral, QAM  sodium chloride, 10 mL, Intravenous, Q12H  valsartan, 320 mg, Oral, Q24H             Acute right-sided weakness    Benign essential hypertension    Gastroparesis diabeticorum    Gastroesophageal reflux disease    Mixed hypercholesterolemia and hypertriglyceridemia    Iron deficiency anemia    Type 2 diabetes mellitus with hyperglycemia, with long-term current use of insulin    Coronary artery disease involving native coronary artery of native heart with unstable angina pectoris    Tobacco abuse    CKD stage 3a, GFR 45-59 ml/min    Carotid stenosis    Lacunar cerebrovascular accident (CVA)    Elevated troponin    Aortic valve disease      Assessment & Plan    -Aortic valve mass/fibroelastoma  -Recurrent MCA stroke  -Coronary artery disease status post CABG in   -Uncontrolled diabetes mellitus, HgbA1c 16.9, with peripheral neuropathy  -CKD, stage 3  -Hypertension  -Hyperlipidemia  -Obesity  -Tobacco abuse-- encourage cessation  -PAD, Carotid stenosis, 70% right ICA  -GERD  -LB  -chronic anemia    plt ag%  HgbA1c: 13.10  UA: negative  CXR: no acute process  Carotid duplex: pending  Vein mapping:     The right great saphenous vein is patent and very small in the proximal and mid thigh.  The vein may be adequate at distal thigh and knee.  The GSV in the proximal and mid calf is inadequate.    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  The GSV is inadequate in the calf.    The right small saphenous vein is inadequate.  The right SSV is of good diameter, but walls appear thickened throughout.    Radial duplex:     Left radial artery is patent without stenosis and measures 0.2-0.24 cm in diameter.    Left ulnar artery is patent without stenosis and measures 0.22-0.25 cm in diameter.    There is an incomplete palmar arch  with equal dominance.  COVID swab: negative    Pre-op workup as above. Scheduled for redo sternotomy aortic valve repair/replacement and possible CABG on Monday. Vein mapping not very promising and radial with incomplete arch. Dr. De Luna to review.       Harvey Duarte PA-C  06/28/24  11:08 EDT

## 2024-06-28 NOTE — PROGRESS NOTES
Name: Bibiana Inman ADMIT: 2024   : 1980  PCP: Ibrahima Correa MD    MRN: [id number] LOS: 7 days   AGE/SEX: 44 y.o. female  ROOM:      Subjective   Subjective   No acute events. Patient denies new complaints. No family at bedside.    Objective   Objective   Vital Signs  Temp:  [97.9 °F (36.6 °C)-99.2 °F (37.3 °C)] 99.2 °F (37.3 °C)  Heart Rate:  [63-70] 69  Resp:  [16-18] 16  BP: (127-158)/(62-78) 130/65  SpO2:  [96 %-98 %] 98 %  on   ;   Device (Oxygen Therapy): room air  Body mass index is 24.56 kg/m².  Physical Exam  Vitals and nursing note reviewed.   Constitutional:       General: She is not in acute distress.     Appearance: She is not toxic-appearing or diaphoretic.   HENT:      Head: Normocephalic and atraumatic.      Nose: Nose normal.      Mouth/Throat:      Mouth: Mucous membranes are moist.      Pharynx: Oropharynx is clear.   Eyes:      Conjunctiva/sclera: Conjunctivae normal.      Pupils: Pupils are equal, round, and reactive to light.   Cardiovascular:      Rate and Rhythm: Normal rate and regular rhythm.      Pulses: Normal pulses.   Pulmonary:      Effort: Pulmonary effort is normal.   Abdominal:      General: Bowel sounds are normal.      Palpations: Abdomen is soft.      Tenderness: There is no abdominal tenderness.   Musculoskeletal:         General: No swelling or tenderness.      Cervical back: Neck supple.   Skin:     General: Skin is warm and dry.      Capillary Refill: Capillary refill takes less than 2 seconds.   Neurological:      Mental Status: She is alert and oriented to person, place, and time.      Comments: +mild right lower facial weakness   Psychiatric:         Mood and Affect: Mood normal.         Behavior: Behavior normal.       Results Review     I reviewed the patient's new clinical results.  Results from last 7 days   Lab Units 24  0310 24  0321 24  0526 24  0434   WBC 10*3/mm3 8.52 8.25 7.64 7.88   HEMOGLOBIN g/dL 11.2*  10.9* 11.2* 11.1*   PLATELETS 10*3/mm3 229 235 218 197     Results from last 7 days   Lab Units 06/28/24  0310 06/27/24  0321 06/26/24  0526 06/25/24  1804 06/25/24  0434   SODIUM mmol/L 137 137 137  --  138   POTASSIUM mmol/L 3.5 3.9 4.3 4.9 3.6   CHLORIDE mmol/L 103 106 108*  --  109*   CO2 mmol/L 24.0 24.3 23.2  --  21.7*   BUN mg/dL 16 16 13  --  9   CREATININE mg/dL 1.00 1.11* 1.02*  --  0.82   GLUCOSE mg/dL 139* 155* 169*  --  126*   EGFR mL/min/1.73 70.5 62.2 68.9  --  89.5     Results from last 7 days   Lab Units 06/26/24  0526 06/21/24  2106   ALBUMIN g/dL 2.9* 2.9*   BILIRUBIN mg/dL <0.2 <0.2   ALK PHOS U/L 88 90   AST (SGOT) U/L 17 12   ALT (SGPT) U/L 16 9     Results from last 7 days   Lab Units 06/28/24 0310 06/27/24  0321 06/26/24  0526 06/25/24  0434 06/22/24  0323 06/21/24  2106   CALCIUM mg/dL 9.2 8.8 9.1 8.5*   < > 8.5*   ALBUMIN g/dL  --   --  2.9*  --   --  2.9*   MAGNESIUM mg/dL  --   --  1.8  --   --   --     < > = values in this interval not displayed.       Hemoglobin A1C   Date/Time Value Ref Range Status   06/26/2024 0527 13.10 (H) 4.80 - 5.60 % Final     Glucose   Date/Time Value Ref Range Status   06/28/2024 1114 162 (H) 70 - 130 mg/dL Final   06/28/2024 0640 132 (H) 70 - 130 mg/dL Final   06/27/2024 2017 155 (H) 70 - 130 mg/dL Final   06/27/2024 1643 159 (H) 70 - 130 mg/dL Final   06/27/2024 1049 265 (H) 70 - 130 mg/dL Final   06/27/2024 0618 164 (H) 70 - 130 mg/dL Final   06/26/2024 2034 212 (H) 70 - 130 mg/dL Final       No radiology results for the last day    I have personally reviewed all medications:  Scheduled Medications  aspirin, 325 mg, Oral, Daily   Or  aspirin, 300 mg, Rectal, Daily  atorvastatin, 80 mg, Oral, Nightly  [START ON 7/1/2024] ceFAZolin, 2,000 mg, Intravenous, On Call to OR  [START ON 6/30/2024] chlorhexidine, 15 mL, Mouth/Throat, Q12H  [START ON 6/30/2024] Chlorhexidine Gluconate Cloth, 1 Application, Topical, Q12H  escitalopram, 10 mg, Oral, Daily  insulin  glargine, 30 Units, Subcutaneous, Daily  insulin lispro, 2-7 Units, Subcutaneous, TID With Meals  [START ON 7/1/2024] metoprolol tartrate, 12.5 mg, Oral, On Call to OR  [START ON 6/30/2024] mupirocin, 1 Application, Each Nare, Q12H  nicotine, 1 patch, Transdermal, Q24H  NIFEdipine XL, 90 mg, Oral, Q24H  pantoprazole, 40 mg, Oral, QAM  sodium chloride, 10 mL, Intravenous, Q12H  valsartan, 320 mg, Oral, Q24H    Infusions     Diet  Diet: Diabetic; Consistent Carbohydrate; Fluid Consistency: Thin (IDDSI 0)  NPO Diet NPO Type: Sips with Meds    I have personally reviewed:  [x]  Laboratory   []  Microbiology   []  Radiology   [x]  EKG/Telemetry  []  Cardiology/Vascular   []  Pathology    []  Records    Assessment/Plan     Active Hospital Problems    Diagnosis  POA    **Acute right-sided weakness [R53.1]  Yes    CKD stage 3a, GFR 45-59 ml/min [N18.31]  Yes    Carotid stenosis [I65.29]  Yes    Lacunar cerebrovascular accident (CVA) [I63.81]  Yes    Elevated troponin [R79.89]  Yes    Aortic valve disease [I35.9]  Unknown    Tobacco abuse [Z72.0]  Yes    Coronary artery disease involving native coronary artery of native heart with unstable angina pectoris [I25.110]  Yes    Gastroparesis diabeticorum [E11.43, 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   Acute CVA  - presented with right-sided weakness which has improved, has mild right facial droop  - MRI showed enlargement of acute infarct in the genu of the left internal capsule as well as a new infarct in the left insular cortex, and previously identified acute infarction within the white matter of the left parietal lobe  -  continue ASA, statin-hold plavix for planned procedure  - needs aggressive risk factor control, particularly with HTN and DM  - YUE showed 3 fibroelastomas-surgery planned for Monday 7/1 following plavix washout  - appreciate cardiology, CT surgery, and neurology recs     Type 2 DM  - complications include gastroparesis  - on lantus and jardiance as outpatient  - BG acceptable, needs better outpatient control, A1C 16.90%  - continue lantus 30 units daily   - cover with ssi/hypoglycemia protocol  - continue jardiance     HTN/CAD s/p CABG  - BP elevated but improving  - continue nifedipine and valsartan      GERD  - symptoms controlled, continue ppi    SCDs for DVT prophylaxis.  Full code.  Discussed with patient and nursing staff.  Anticipate discharge home with HH vs SNU facility timing yet to be determined.  Expected Discharge Date: 7/9/2024; Expected Discharge Time:       Lenin Huang MD  [facility]  06/28/24  11:52 EDT    Portions of this text have been copied and I have reviewed them. They are accurate as of 6/28/2024

## 2024-06-29 LAB
ANION GAP SERPL CALCULATED.3IONS-SCNC: 11.2 MMOL/L (ref 5–15)
BASOPHILS # BLD AUTO: 0.05 10*3/MM3 (ref 0–0.2)
BASOPHILS NFR BLD AUTO: 0.7 % (ref 0–1.5)
BUN SERPL-MCNC: 17 MG/DL (ref 6–20)
BUN/CREAT SERPL: 15.2 (ref 7–25)
CALCIUM SPEC-SCNC: 9.2 MG/DL (ref 8.6–10.5)
CHLORIDE SERPL-SCNC: 104 MMOL/L (ref 98–107)
CLOSE TME COLL+ADP + EPINEP PNL BLD: 86 % (ref 86–100)
CO2 SERPL-SCNC: 22.8 MMOL/L (ref 22–29)
CREAT SERPL-MCNC: 1.12 MG/DL (ref 0.57–1)
DEPRECATED RDW RBC AUTO: 48.5 FL (ref 37–54)
EGFRCR SERPLBLD CKD-EPI 2021: 61.5 ML/MIN/1.73
EOSINOPHIL # BLD AUTO: 0.12 10*3/MM3 (ref 0–0.4)
EOSINOPHIL NFR BLD AUTO: 1.6 % (ref 0.3–6.2)
ERYTHROCYTE [DISTWIDTH] IN BLOOD BY AUTOMATED COUNT: 14 % (ref 12.3–15.4)
GLUCOSE BLDC GLUCOMTR-MCNC: 149 MG/DL (ref 70–130)
GLUCOSE BLDC GLUCOMTR-MCNC: 152 MG/DL (ref 70–130)
GLUCOSE BLDC GLUCOMTR-MCNC: 196 MG/DL (ref 70–130)
GLUCOSE BLDC GLUCOMTR-MCNC: 207 MG/DL (ref 70–130)
GLUCOSE SERPL-MCNC: 204 MG/DL (ref 65–99)
HCT VFR BLD AUTO: 35.4 % (ref 34–46.6)
HGB BLD-MCNC: 11.2 G/DL (ref 12–15.9)
IMM GRANULOCYTES # BLD AUTO: 0.02 10*3/MM3 (ref 0–0.05)
IMM GRANULOCYTES NFR BLD AUTO: 0.3 % (ref 0–0.5)
LYMPHOCYTES # BLD AUTO: 2.26 10*3/MM3 (ref 0.7–3.1)
LYMPHOCYTES NFR BLD AUTO: 29.9 % (ref 19.6–45.3)
MCH RBC QN AUTO: 29.6 PG (ref 26.6–33)
MCHC RBC AUTO-ENTMCNC: 31.6 G/DL (ref 31.5–35.7)
MCV RBC AUTO: 93.7 FL (ref 79–97)
MONOCYTES # BLD AUTO: 0.5 10*3/MM3 (ref 0.1–0.9)
MONOCYTES NFR BLD AUTO: 6.6 % (ref 5–12)
NEUTROPHILS NFR BLD AUTO: 4.61 10*3/MM3 (ref 1.7–7)
NEUTROPHILS NFR BLD AUTO: 60.9 % (ref 42.7–76)
NRBC BLD AUTO-RTO: 0 /100 WBC (ref 0–0.2)
PLATELET # BLD AUTO: 220 10*3/MM3 (ref 140–450)
PMV BLD AUTO: 11.2 FL (ref 6–12)
POTASSIUM SERPL-SCNC: 4.1 MMOL/L (ref 3.5–5.2)
RBC # BLD AUTO: 3.78 10*6/MM3 (ref 3.77–5.28)
SODIUM SERPL-SCNC: 138 MMOL/L (ref 136–145)
WBC NRBC COR # BLD AUTO: 7.56 10*3/MM3 (ref 3.4–10.8)

## 2024-06-29 PROCEDURE — 85025 COMPLETE CBC W/AUTO DIFF WBC: CPT | Performed by: STUDENT IN AN ORGANIZED HEALTH CARE EDUCATION/TRAINING PROGRAM

## 2024-06-29 PROCEDURE — 85576 BLOOD PLATELET AGGREGATION: CPT | Performed by: PHYSICIAN ASSISTANT

## 2024-06-29 PROCEDURE — 82948 REAGENT STRIP/BLOOD GLUCOSE: CPT

## 2024-06-29 PROCEDURE — 99232 SBSQ HOSP IP/OBS MODERATE 35: CPT | Performed by: THORACIC SURGERY (CARDIOTHORACIC VASCULAR SURGERY)

## 2024-06-29 PROCEDURE — 63710000001 INSULIN LISPRO (HUMAN) PER 5 UNITS: Performed by: INTERNAL MEDICINE

## 2024-06-29 PROCEDURE — 80048 BASIC METABOLIC PNL TOTAL CA: CPT | Performed by: STUDENT IN AN ORGANIZED HEALTH CARE EDUCATION/TRAINING PROGRAM

## 2024-06-29 RX ORDER — INSULIN LISPRO 100 [IU]/ML
4 INJECTION, SOLUTION INTRAVENOUS; SUBCUTANEOUS
Status: DISCONTINUED | OUTPATIENT
Start: 2024-06-29 | End: 2024-07-01

## 2024-06-29 RX ADMIN — ATORVASTATIN CALCIUM 80 MG: 80 TABLET, FILM COATED ORAL at 21:20

## 2024-06-29 RX ADMIN — NICOTINE 1 PATCH: 21 PATCH, EXTENDED RELEASE TRANSDERMAL at 09:31

## 2024-06-29 RX ADMIN — INSULIN LISPRO 4 UNITS: 100 INJECTION, SOLUTION INTRAVENOUS; SUBCUTANEOUS at 12:48

## 2024-06-29 RX ADMIN — Medication 10 ML: at 09:32

## 2024-06-29 RX ADMIN — ASPIRIN 325 MG: 325 TABLET ORAL at 09:32

## 2024-06-29 RX ADMIN — TEMAZEPAM 15 MG: 15 CAPSULE ORAL at 21:20

## 2024-06-29 RX ADMIN — ESCITALOPRAM OXALATE 10 MG: 10 TABLET, FILM COATED ORAL at 09:31

## 2024-06-29 RX ADMIN — VALSARTAN 320 MG: 320 TABLET, FILM COATED ORAL at 09:31

## 2024-06-29 RX ADMIN — INSULIN LISPRO 4 UNITS: 100 INJECTION, SOLUTION INTRAVENOUS; SUBCUTANEOUS at 17:29

## 2024-06-29 RX ADMIN — NIFEDIPINE 90 MG: 60 TABLET, FILM COATED, EXTENDED RELEASE ORAL at 09:31

## 2024-06-29 RX ADMIN — Medication 10 ML: at 21:20

## 2024-06-29 RX ADMIN — PANTOPRAZOLE SODIUM 40 MG: 40 TABLET, DELAYED RELEASE ORAL at 06:48

## 2024-06-29 NOTE — PLAN OF CARE
Goal Outcome Evaluation:  Plan of Care Reviewed With: patient        Progress: improving  Outcome Evaluation: A&Ox4. SR. Room air. Up ad makenzie. Surgery scheduled Monday. Will continue with current plan of care & adjust as needed.

## 2024-06-29 NOTE — PROGRESS NOTES
Name: Bibiana Inman ADMIT: 2024   : 1980  PCP: Ibrahima Correa MD    MRN: [id number] LOS: 8 days   AGE/SEX: 44 y.o. female  ROOM:      Subjective   Subjective   No acute events. Patient has no new complaints. No family at bedside.    Objective   Objective   Vital Signs  Temp:  [97.6 °F (36.4 °C)-98.8 °F (37.1 °C)] 98.2 °F (36.8 °C)  Heart Rate:  [61-77] 66  Resp:  [16] 16  BP: (130-170)/(63-84) 132/78  SpO2:  [97 %-99 %] 97 %  on   ;      Body mass index is 24.56 kg/m².  Physical Exam  Vitals and nursing note reviewed.   Constitutional:       General: She is not in acute distress.     Appearance: She is not toxic-appearing or diaphoretic.   HENT:      Head: Normocephalic and atraumatic.      Nose: Nose normal.      Mouth/Throat:      Mouth: Mucous membranes are moist.      Pharynx: Oropharynx is clear.   Eyes:      Conjunctiva/sclera: Conjunctivae normal.      Pupils: Pupils are equal, round, and reactive to light.   Cardiovascular:      Rate and Rhythm: Normal rate and regular rhythm.      Pulses: Normal pulses.   Pulmonary:      Effort: Pulmonary effort is normal.   Abdominal:      General: Bowel sounds are normal.      Palpations: Abdomen is soft.      Tenderness: There is no abdominal tenderness.   Musculoskeletal:         General: No swelling or tenderness.      Cervical back: Neck supple.   Skin:     General: Skin is warm and dry.      Capillary Refill: Capillary refill takes less than 2 seconds.   Neurological:      Mental Status: She is alert and oriented to person, place, and time.      Comments: +mild right lower facial weakness   Psychiatric:         Mood and Affect: Mood normal.         Behavior: Behavior normal.       Results Review     I reviewed the patient's new clinical results.  Results from last 7 days   Lab Units 24  0438 24  0310 24  0321 24  0526   WBC 10*3/mm3 7.56 8.52 8.25 7.64   HEMOGLOBIN g/dL 11.2* 11.2* 10.9* 11.2*   PLATELETS 10*3/mm3  220 229 235 218     Results from last 7 days   Lab Units 06/29/24  0438 06/28/24  1407 06/28/24  0310 06/27/24  0321 06/26/24  0526   SODIUM mmol/L 138  --  137 137 137   POTASSIUM mmol/L 4.1 5.0 3.5 3.9 4.3   CHLORIDE mmol/L 104  --  103 106 108*   CO2 mmol/L 22.8  --  24.0 24.3 23.2   BUN mg/dL 17  --  16 16 13   CREATININE mg/dL 1.12*  --  1.00 1.11* 1.02*   GLUCOSE mg/dL 204*  --  139* 155* 169*   EGFR mL/min/1.73 61.5  --  70.5 62.2 68.9     Results from last 7 days   Lab Units 06/26/24  0526   ALBUMIN g/dL 2.9*   BILIRUBIN mg/dL <0.2   ALK PHOS U/L 88   AST (SGOT) U/L 17   ALT (SGPT) U/L 16     Results from last 7 days   Lab Units 06/29/24  0438 06/28/24 0310 06/27/24 0321 06/26/24  0526   CALCIUM mg/dL 9.2 9.2 8.8 9.1   ALBUMIN g/dL  --   --   --  2.9*   MAGNESIUM mg/dL  --   --   --  1.8       Glucose   Date/Time Value Ref Range Status   06/29/2024 0638 196 (H) 70 - 130 mg/dL Final   06/28/2024 1929 220 (H) 70 - 130 mg/dL Final   06/28/2024 1619 307 (H) 70 - 130 mg/dL Final   06/28/2024 1114 162 (H) 70 - 130 mg/dL Final   06/28/2024 0640 132 (H) 70 - 130 mg/dL Final   06/27/2024 2017 155 (H) 70 - 130 mg/dL Final   06/27/2024 1643 159 (H) 70 - 130 mg/dL Final       No radiology results for the last day    I have personally reviewed all medications:  Scheduled Medications  aspirin, 325 mg, Oral, Daily   Or  aspirin, 300 mg, Rectal, Daily  atorvastatin, 80 mg, Oral, Nightly  [START ON 7/1/2024] ceFAZolin, 2,000 mg, Intravenous, On Call to OR  [START ON 6/30/2024] chlorhexidine, 15 mL, Mouth/Throat, Q12H  [START ON 6/30/2024] Chlorhexidine Gluconate Cloth, 1 Application, Topical, Q12H  escitalopram, 10 mg, Oral, Daily  insulin glargine, 30 Units, Subcutaneous, Daily  insulin lispro, 2-7 Units, Subcutaneous, TID With Meals  [START ON 7/1/2024] metoprolol tartrate, 12.5 mg, Oral, On Call to OR  [START ON 6/30/2024] mupirocin, 1 Application, Each Nare, Q12H  nicotine, 1 patch, Transdermal, Q24H  NIFEdipine XL, 90  mg, Oral, Q24H  pantoprazole, 40 mg, Oral, QAM  sodium chloride, 10 mL, Intravenous, Q12H  valsartan, 320 mg, Oral, Q24H    Infusions     Diet  Diet: Diabetic; Consistent Carbohydrate; Fluid Consistency: Thin (IDDSI 0)  NPO Diet NPO Type: Sips with Meds    I have personally reviewed:  [x]  Laboratory   []  Microbiology   []  Radiology   [x]  EKG/Telemetry  []  Cardiology/Vascular   []  Pathology    []  Records    Assessment/Plan     Active Hospital Problems    Diagnosis  POA    **Acute right-sided weakness [R53.1]  Yes    CKD stage 3a, GFR 45-59 ml/min [N18.31]  Yes    Carotid stenosis [I65.29]  Yes    Lacunar cerebrovascular accident (CVA) [I63.81]  Yes    Elevated troponin [R79.89]  Yes    Aortic valve disease [I35.9]  Unknown    Tobacco abuse [Z72.0]  Yes    Coronary artery disease involving native coronary artery of native heart with unstable angina pectoris [I25.110]  Yes    Gastroparesis diabeticorum [E11.43, 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   Acute CVA  - presented with right-sided weakness which has improved, has mild right facial droop  - MRI showed enlargement of acute infarct in the genu of the left internal capsule as well as a new infarct in the left insular cortex, and previously identified acute infarction within the white matter of the left parietal lobe  - continue ASA, statin-hold plavix for planned procedure  - needs aggressive risk factor control, particularly with HTN and DM  - YUE showed 3 fibroelastomas-surgery planned for Monday 7/1 following plavix washout  - appreciate cardiology, CT surgery, and neurology recs     Type 2 DM  -  complications include gastroparesis  - on lantus and jardiance as outpatient  - BG is elevated, needs better outpatient control, A1C 16.90%  - continue lantus 30 units daily   - add lispro 4 units TID with meals  - cover with ssi/hypoglycemia protocol  - continue jardiance     HTN/CAD s/p CABG  - BP acceptable  - continue nifedipine and valsartan      GERD  - symptoms controlled, continue ppi    SCDs for DVT prophylaxis.  Full code.  Discussed with patient and nursing staff.  Anticipate discharge home with HH vs SNU facility timing yet to be determined.  Expected Discharge Date: 7/9/2024; Expected Discharge Time:       Lenin Huang MD  [facility]  06/29/24  10:21 EDT    Portions of this text have been copied and I have reviewed them. They are accurate as of 6/29/2024

## 2024-06-29 NOTE — PROGRESS NOTES
"Will LOS: 8 days   Patient Care Team:  Ibrahima Correa MD as PCP - General (Family Medicine)  Rachele Zafar, ALEENA as Ambulatory  (Population Health)  Xochilt Jenkins MD as Consulting Physician (Nephrology)    Chief Complaint:   Aortic valve fibroelastoma     Subjective  No current complaints.     Vital Signs  Temp:  [97.6 °F (36.4 °C)-98.8 °F (37.1 °C)] 97.6 °F (36.4 °C)  Heart Rate:  [61-77] 61  Resp:  [16] 16  BP: (130-170)/(63-84) 130/63      06/23/24  1750 06/25/24  0805 06/26/24  0532   Weight: 65.3 kg (144 lb) 65.3 kg (144 lb) 64.9 kg (143 lb 1.3 oz)     Body mass index is 24.56 kg/m².    Intake/Output Summary (Last 24 hours) at 6/29/2024 0751  Last data filed at 6/28/2024 1306  Gross per 24 hour   Intake 220 ml   Output --   Net 220 ml     No intake/output data recorded.      Objective:  General Appearance:  Comfortable, well-appearing, in no acute distress and not in pain (Resting in bed).    Vital signs: (most recent): Blood pressure 132/78, pulse 66, temperature 98.2 °F (36.8 °C), temperature source Oral, resp. rate 16, height 162.6 cm (64\"), weight 64.9 kg (143 lb 1.3 oz), last menstrual period 01/10/2023, SpO2 97%, not currently breastfeeding.  Vital signs are normal.  No fever.    Output: Producing urine.    Lungs:  Normal effort and normal respiratory rate.  Breath sounds clear to auscultation.  She is not in respiratory distress.  No decreased breath sounds.    Heart: Normal rate.  Regular rhythm.    Abdomen: Abdomen is soft.    Extremities: Normal range of motion.  There is no dependent edema.    Neurological: Patient is alert and oriented to person, place and time.    Skin:  Warm and dry.              Results Review:      WBC WBC   Date Value Ref Range Status   06/29/2024 7.56 3.40 - 10.80 10*3/mm3 Final   06/28/2024 8.52 3.40 - 10.80 10*3/mm3 Final   06/27/2024 8.25 3.40 - 10.80 10*3/mm3 Final      HGB Hemoglobin   Date Value Ref Range Status   06/29/2024 11.2 (L) 12.0 - " "15.9 g/dL Final   06/28/2024 11.2 (L) 12.0 - 15.9 g/dL Final   06/27/2024 10.9 (L) 12.0 - 15.9 g/dL Final      HCT Hematocrit   Date Value Ref Range Status   06/29/2024 35.4 34.0 - 46.6 % Final   06/28/2024 34.2 34.0 - 46.6 % Final   06/27/2024 34.1 34.0 - 46.6 % Final      Platelets Platelets   Date Value Ref Range Status   06/29/2024 220 140 - 450 10*3/mm3 Final   06/28/2024 229 140 - 450 10*3/mm3 Final   06/27/2024 235 140 - 450 10*3/mm3 Final        PT/INR:    No results found for: \"PROTIME\"  /  No results found for: \"INR\"      Sodium Sodium   Date Value Ref Range Status   06/29/2024 138 136 - 145 mmol/L Final   06/28/2024 137 136 - 145 mmol/L Final   06/27/2024 137 136 - 145 mmol/L Final      Potassium Potassium   Date Value Ref Range Status   06/29/2024 4.1 3.5 - 5.2 mmol/L Final   06/28/2024 5.0 3.5 - 5.2 mmol/L Final   06/28/2024 3.5 3.5 - 5.2 mmol/L Final   06/27/2024 3.9 3.5 - 5.2 mmol/L Final      Chloride Chloride   Date Value Ref Range Status   06/29/2024 104 98 - 107 mmol/L Final   06/28/2024 103 98 - 107 mmol/L Final   06/27/2024 106 98 - 107 mmol/L Final      Bicarbonate CO2   Date Value Ref Range Status   06/29/2024 22.8 22.0 - 29.0 mmol/L Final   06/28/2024 24.0 22.0 - 29.0 mmol/L Final   06/27/2024 24.3 22.0 - 29.0 mmol/L Final      BUN BUN   Date Value Ref Range Status   06/29/2024 17 6 - 20 mg/dL Final   06/28/2024 16 6 - 20 mg/dL Final   06/27/2024 16 6 - 20 mg/dL Final      Creatinine Creatinine   Date Value Ref Range Status   06/29/2024 1.12 (H) 0.57 - 1.00 mg/dL Final   06/28/2024 1.00 0.57 - 1.00 mg/dL Final   06/27/2024 1.11 (H) 0.57 - 1.00 mg/dL Final      Calcium Calcium   Date Value Ref Range Status   06/29/2024 9.2 8.6 - 10.5 mg/dL Final   06/28/2024 9.2 8.6 - 10.5 mg/dL Final   06/27/2024 8.8 8.6 - 10.5 mg/dL Final      Magnesium No results found for: \"MG\"       aspirin, 325 mg, Oral, Daily   Or  aspirin, 300 mg, Rectal, Daily  atorvastatin, 80 mg, Oral, Nightly  [START ON 7/1/2024] " ceFAZolin, 2,000 mg, Intravenous, On Call to OR  [START ON 2024] chlorhexidine, 15 mL, Mouth/Throat, Q12H  [START ON 2024] Chlorhexidine Gluconate Cloth, 1 Application, Topical, Q12H  escitalopram, 10 mg, Oral, Daily  insulin glargine, 30 Units, Subcutaneous, Daily  insulin lispro, 2-7 Units, Subcutaneous, TID With Meals  [START ON 2024] metoprolol tartrate, 12.5 mg, Oral, On Call to OR  [START ON 2024] mupirocin, 1 Application, Each Nare, Q12H  nicotine, 1 patch, Transdermal, Q24H  NIFEdipine XL, 90 mg, Oral, Q24H  pantoprazole, 40 mg, Oral, QAM  sodium chloride, 10 mL, Intravenous, Q12H  valsartan, 320 mg, Oral, Q24H             Acute right-sided weakness    Benign essential hypertension    Gastroparesis diabeticorum    Gastroesophageal reflux disease    Mixed hypercholesterolemia and hypertriglyceridemia    Iron deficiency anemia    Type 2 diabetes mellitus with hyperglycemia, with long-term current use of insulin    Coronary artery disease involving native coronary artery of native heart with unstable angina pectoris    Tobacco abuse    CKD stage 3a, GFR 45-59 ml/min    Carotid stenosis    Lacunar cerebrovascular accident (CVA)    Elevated troponin    Aortic valve disease      Assessment & Plan    -Aortic valve mass/fibroelastoma  -Recurrent MCA stroke  -Coronary artery disease status post CABG in   -Uncontrolled diabetes mellitus, HgbA1c 16.9, with peripheral neuropathy  -CKD, stage 3  -Hypertension  -Hyperlipidemia  -Obesity  -Tobacco abuse-- encourage cessation  -PAD, Carotid stenosis, 70% right ICA  -GERD  -LB  -chronic anemia    plt ag%  HgbA1c: 13.10  UA: negative  CXR: no acute process  Carotid duplex: <50% stenosis bilaterally  Vein mapping:     The right great saphenous vein is patent and very small in the proximal and mid thigh.  The vein may be adequate at distal thigh and knee.  The GSV in the proximal and mid calf is inadequate.    Surgical absence of the left great  saphenous vein in the thigh.  The GSV is inadequate in the calf.    The right small saphenous vein is inadequate.  The right SSV is of good diameter, but walls appear thickened throughout.    Radial duplex:     Left radial artery is patent without stenosis and measures 0.2-0.24 cm in diameter.    Left ulnar artery is patent without stenosis and measures 0.22-0.25 cm in diameter.    There is an incomplete palmar arch with equal dominance.  COVID swab: negative      Scheduled for redo sternotomy, aortic valve repair/replacement and possible CABG on Monday.       ANJU Valladares  06/29/24  07:51 EDT    Addendum  Patient was seen and examined by me, agree with the findings above.  Workup to be completed for aortic valve mass resection.  Plan for surgery next week by Dr. Calixto Portillo MD

## 2024-06-30 LAB
ABO GROUP BLD: NORMAL
ANION GAP SERPL CALCULATED.3IONS-SCNC: 7 MMOL/L (ref 5–15)
B-HCG UR QL: NEGATIVE
BASOPHILS # BLD AUTO: 0.07 10*3/MM3 (ref 0–0.2)
BASOPHILS NFR BLD AUTO: 0.9 % (ref 0–1.5)
BLD GP AB SCN SERPL QL: NEGATIVE
BUN SERPL-MCNC: 18 MG/DL (ref 6–20)
BUN/CREAT SERPL: 15.7 (ref 7–25)
CALCIUM SPEC-SCNC: 9.3 MG/DL (ref 8.6–10.5)
CHLORIDE SERPL-SCNC: 106 MMOL/L (ref 98–107)
CO2 SERPL-SCNC: 25 MMOL/L (ref 22–29)
CREAT SERPL-MCNC: 1.15 MG/DL (ref 0.57–1)
DEPRECATED RDW RBC AUTO: 45.1 FL (ref 37–54)
EGFRCR SERPLBLD CKD-EPI 2021: 59.6 ML/MIN/1.73
EOSINOPHIL # BLD AUTO: 0.15 10*3/MM3 (ref 0–0.4)
EOSINOPHIL NFR BLD AUTO: 2 % (ref 0.3–6.2)
ERYTHROCYTE [DISTWIDTH] IN BLOOD BY AUTOMATED COUNT: 13.5 % (ref 12.3–15.4)
GLUCOSE BLDC GLUCOMTR-MCNC: 166 MG/DL (ref 70–130)
GLUCOSE BLDC GLUCOMTR-MCNC: 179 MG/DL (ref 70–130)
GLUCOSE BLDC GLUCOMTR-MCNC: 194 MG/DL (ref 70–130)
GLUCOSE BLDC GLUCOMTR-MCNC: 229 MG/DL (ref 70–130)
GLUCOSE SERPL-MCNC: 171 MG/DL (ref 65–99)
HCT VFR BLD AUTO: 33.7 % (ref 34–46.6)
HGB BLD-MCNC: 11 G/DL (ref 12–15.9)
IMM GRANULOCYTES # BLD AUTO: 0.02 10*3/MM3 (ref 0–0.05)
IMM GRANULOCYTES NFR BLD AUTO: 0.3 % (ref 0–0.5)
LYMPHOCYTES # BLD AUTO: 2.41 10*3/MM3 (ref 0.7–3.1)
LYMPHOCYTES NFR BLD AUTO: 31.6 % (ref 19.6–45.3)
MCH RBC QN AUTO: 29.9 PG (ref 26.6–33)
MCHC RBC AUTO-ENTMCNC: 32.6 G/DL (ref 31.5–35.7)
MCV RBC AUTO: 91.6 FL (ref 79–97)
MONOCYTES # BLD AUTO: 0.51 10*3/MM3 (ref 0.1–0.9)
MONOCYTES NFR BLD AUTO: 6.7 % (ref 5–12)
NEUTROPHILS NFR BLD AUTO: 4.46 10*3/MM3 (ref 1.7–7)
NEUTROPHILS NFR BLD AUTO: 58.5 % (ref 42.7–76)
NRBC BLD AUTO-RTO: 0 /100 WBC (ref 0–0.2)
PLATELET # BLD AUTO: 246 10*3/MM3 (ref 140–450)
PMV BLD AUTO: 11 FL (ref 6–12)
POTASSIUM SERPL-SCNC: 3.8 MMOL/L (ref 3.5–5.2)
RBC # BLD AUTO: 3.68 10*6/MM3 (ref 3.77–5.28)
RH BLD: POSITIVE
SARS-COV-2 RNA RESP QL NAA+PROBE: NOT DETECTED
SODIUM SERPL-SCNC: 138 MMOL/L (ref 136–145)
T&S EXPIRATION DATE: NORMAL
WBC NRBC COR # BLD AUTO: 7.62 10*3/MM3 (ref 3.4–10.8)

## 2024-06-30 PROCEDURE — 81025 URINE PREGNANCY TEST: CPT | Performed by: PHYSICIAN ASSISTANT

## 2024-06-30 PROCEDURE — 85025 COMPLETE CBC W/AUTO DIFF WBC: CPT | Performed by: STUDENT IN AN ORGANIZED HEALTH CARE EDUCATION/TRAINING PROGRAM

## 2024-06-30 PROCEDURE — 63710000001 INSULIN GLARGINE PER 5 UNITS

## 2024-06-30 PROCEDURE — 87635 SARS-COV-2 COVID-19 AMP PRB: CPT | Performed by: PHYSICIAN ASSISTANT

## 2024-06-30 PROCEDURE — 86923 COMPATIBILITY TEST ELECTRIC: CPT

## 2024-06-30 PROCEDURE — 63710000001 INSULIN LISPRO (HUMAN) PER 5 UNITS: Performed by: INTERNAL MEDICINE

## 2024-06-30 PROCEDURE — 80048 BASIC METABOLIC PNL TOTAL CA: CPT | Performed by: STUDENT IN AN ORGANIZED HEALTH CARE EDUCATION/TRAINING PROGRAM

## 2024-06-30 PROCEDURE — 86901 BLOOD TYPING SEROLOGIC RH(D): CPT | Performed by: NURSE PRACTITIONER

## 2024-06-30 PROCEDURE — 86850 RBC ANTIBODY SCREEN: CPT | Performed by: NURSE PRACTITIONER

## 2024-06-30 PROCEDURE — 86900 BLOOD TYPING SEROLOGIC ABO: CPT | Performed by: NURSE PRACTITIONER

## 2024-06-30 PROCEDURE — 82948 REAGENT STRIP/BLOOD GLUCOSE: CPT

## 2024-06-30 RX ORDER — SODIUM CHLORIDE 0.9 % (FLUSH) 0.9 %
10 SYRINGE (ML) INJECTION AS NEEDED
Status: DISCONTINUED | OUTPATIENT
Start: 2024-06-30 | End: 2024-07-01 | Stop reason: HOSPADM

## 2024-06-30 RX ORDER — SODIUM CHLORIDE 9 MG/ML
40 INJECTION, SOLUTION INTRAVENOUS AS NEEDED
Status: DISCONTINUED | OUTPATIENT
Start: 2024-06-30 | End: 2024-07-01 | Stop reason: HOSPADM

## 2024-06-30 RX ORDER — SODIUM CHLORIDE 0.9 % (FLUSH) 0.9 %
10 SYRINGE (ML) INJECTION EVERY 12 HOURS SCHEDULED
Status: DISCONTINUED | OUTPATIENT
Start: 2024-06-30 | End: 2024-07-01 | Stop reason: HOSPADM

## 2024-06-30 RX ADMIN — 0.12% CHLORHEXIDINE GLUCONATE 15 ML: 1.2 RINSE ORAL at 21:47

## 2024-06-30 RX ADMIN — ATORVASTATIN CALCIUM 80 MG: 80 TABLET, FILM COATED ORAL at 21:47

## 2024-06-30 RX ADMIN — INSULIN LISPRO 4 UNITS: 100 INJECTION, SOLUTION INTRAVENOUS; SUBCUTANEOUS at 09:09

## 2024-06-30 RX ADMIN — Medication 10 ML: at 21:47

## 2024-06-30 RX ADMIN — Medication 10 ML: at 09:10

## 2024-06-30 RX ADMIN — NIFEDIPINE 90 MG: 60 TABLET, FILM COATED, EXTENDED RELEASE ORAL at 09:09

## 2024-06-30 RX ADMIN — MUPIROCIN 1 APPLICATION: 20 OINTMENT TOPICAL at 21:47

## 2024-06-30 RX ADMIN — INSULIN LISPRO 2 UNITS: 100 INJECTION, SOLUTION INTRAVENOUS; SUBCUTANEOUS at 17:14

## 2024-06-30 RX ADMIN — CHLORHEXIDINE GLUCONATE 1 APPLICATION: 500 CLOTH TOPICAL at 21:48

## 2024-06-30 RX ADMIN — INSULIN LISPRO 4 UNITS: 100 INJECTION, SOLUTION INTRAVENOUS; SUBCUTANEOUS at 17:15

## 2024-06-30 RX ADMIN — ESCITALOPRAM OXALATE 10 MG: 10 TABLET, FILM COATED ORAL at 09:07

## 2024-06-30 RX ADMIN — PANTOPRAZOLE SODIUM 40 MG: 40 TABLET, DELAYED RELEASE ORAL at 05:52

## 2024-06-30 RX ADMIN — ALPRAZOLAM 0.25 MG: 0.25 TABLET ORAL at 21:46

## 2024-06-30 RX ADMIN — INSULIN GLARGINE 30 UNITS: 100 INJECTION, SOLUTION SUBCUTANEOUS at 09:08

## 2024-06-30 RX ADMIN — INSULIN LISPRO 4 UNITS: 100 INJECTION, SOLUTION INTRAVENOUS; SUBCUTANEOUS at 12:16

## 2024-06-30 NOTE — PLAN OF CARE
Goal Outcome Evaluation:  Plan of Care Reviewed With: patient           Outcome Evaluation: A&Ox4, VSS. tele SR. refuses bed alarm, up ad makenzie. sleep aide administered per MAR. plan for surgery in AM. will continue current plan of care and adjust as needed.

## 2024-06-30 NOTE — PROGRESS NOTES
"Will LOS: 9 days   Patient Care Team:  Ibrahima Correa MD as PCP - General (Family Medicine)  Rachele Zafar RN as Ambulatory  (Population Health)  Xochilt Jenkins MD as Consulting Physician (Nephrology)    Chief Complaint:   Aortic valve fibroelastoma     Subjective  No current complaints. States she feels ready for surgery tomorrow.    Vital Signs  Temp:  [97.9 °F (36.6 °C)-98.5 °F (36.9 °C)] 97.9 °F (36.6 °C)  Heart Rate:  [66-71] 71  Resp:  [16] 16  BP: (130-158)/(65-89) 130/70      06/25/24  0805 06/26/24  0532 06/30/24  0556   Weight: 65.3 kg (144 lb) 64.9 kg (143 lb 1.3 oz) 63.2 kg (139 lb 6.4 oz)     Body mass index is 23.93 kg/m².    Intake/Output Summary (Last 24 hours) at 6/30/2024 0730  Last data filed at 6/30/2024 0045  Gross per 24 hour   Intake 960 ml   Output 500 ml   Net 460 ml     No intake/output data recorded.      Objective:  General Appearance:  Comfortable, well-appearing, in no acute distress and not in pain (Resting in bed).    Vital signs: (most recent): Blood pressure 149/70, pulse 67, temperature 98 °F (36.7 °C), temperature source Oral, resp. rate 17, height 162.6 cm (64\"), weight 63.2 kg (139 lb 6.4 oz), last menstrual period 01/10/2023, SpO2 94%, not currently breastfeeding.  Vital signs are normal.  No fever.  (Room air).    Output: Producing urine.    Lungs:  Normal effort and normal respiratory rate.  Breath sounds clear to auscultation.  She is not in respiratory distress.  No decreased breath sounds.    Heart: Normal rate.  Regular rhythm.  Positive for murmur.    Abdomen: Abdomen is soft.    Extremities: Normal range of motion.  There is no dependent edema.    Neurological: Patient is alert and oriented to person, place and time.    Skin:  Warm and dry.              Results Review:      WBC WBC   Date Value Ref Range Status   06/30/2024 7.62 3.40 - 10.80 10*3/mm3 Final   06/29/2024 7.56 3.40 - 10.80 10*3/mm3 Final   06/28/2024 8.52 3.40 - 10.80 " "10*3/mm3 Final      HGB Hemoglobin   Date Value Ref Range Status   06/30/2024 11.0 (L) 12.0 - 15.9 g/dL Final   06/29/2024 11.2 (L) 12.0 - 15.9 g/dL Final   06/28/2024 11.2 (L) 12.0 - 15.9 g/dL Final      HCT Hematocrit   Date Value Ref Range Status   06/30/2024 33.7 (L) 34.0 - 46.6 % Final   06/29/2024 35.4 34.0 - 46.6 % Final   06/28/2024 34.2 34.0 - 46.6 % Final      Platelets Platelets   Date Value Ref Range Status   06/30/2024 246 140 - 450 10*3/mm3 Final   06/29/2024 220 140 - 450 10*3/mm3 Final   06/28/2024 229 140 - 450 10*3/mm3 Final        PT/INR:    No results found for: \"PROTIME\"  /  No results found for: \"INR\"      Sodium Sodium   Date Value Ref Range Status   06/30/2024 138 136 - 145 mmol/L Final   06/29/2024 138 136 - 145 mmol/L Final   06/28/2024 137 136 - 145 mmol/L Final      Potassium Potassium   Date Value Ref Range Status   06/30/2024 3.8 3.5 - 5.2 mmol/L Final   06/29/2024 4.1 3.5 - 5.2 mmol/L Final   06/28/2024 5.0 3.5 - 5.2 mmol/L Final   06/28/2024 3.5 3.5 - 5.2 mmol/L Final      Chloride Chloride   Date Value Ref Range Status   06/30/2024 106 98 - 107 mmol/L Final   06/29/2024 104 98 - 107 mmol/L Final   06/28/2024 103 98 - 107 mmol/L Final      Bicarbonate CO2   Date Value Ref Range Status   06/30/2024 25.0 22.0 - 29.0 mmol/L Final   06/29/2024 22.8 22.0 - 29.0 mmol/L Final   06/28/2024 24.0 22.0 - 29.0 mmol/L Final      BUN BUN   Date Value Ref Range Status   06/30/2024 18 6 - 20 mg/dL Final   06/29/2024 17 6 - 20 mg/dL Final   06/28/2024 16 6 - 20 mg/dL Final      Creatinine Creatinine   Date Value Ref Range Status   06/30/2024 1.15 (H) 0.57 - 1.00 mg/dL Final   06/29/2024 1.12 (H) 0.57 - 1.00 mg/dL Final   06/28/2024 1.00 0.57 - 1.00 mg/dL Final      Calcium Calcium   Date Value Ref Range Status   06/30/2024 9.3 8.6 - 10.5 mg/dL Final   06/29/2024 9.2 8.6 - 10.5 mg/dL Final   06/28/2024 9.2 8.6 - 10.5 mg/dL Final      Magnesium No results found for: \"MG\"       aspirin, 325 mg, Oral, " Daily   Or  aspirin, 300 mg, Rectal, Daily  atorvastatin, 80 mg, Oral, Nightly  [START ON 2024] ceFAZolin, 2,000 mg, Intravenous, On Call to OR  chlorhexidine, 15 mL, Mouth/Throat, Q12H  Chlorhexidine Gluconate Cloth, 1 Application, Topical, Q12H  escitalopram, 10 mg, Oral, Daily  insulin glargine, 30 Units, Subcutaneous, Daily  insulin lispro, 2-7 Units, Subcutaneous, TID With Meals  insulin lispro, 4 Units, Subcutaneous, TID With Meals  [START ON 2024] metoprolol tartrate, 12.5 mg, Oral, On Call to OR  mupirocin, 1 Application, Each Nare, Q12H  nicotine, 1 patch, Transdermal, Q24H  NIFEdipine XL, 90 mg, Oral, Q24H  pantoprazole, 40 mg, Oral, QAM  sodium chloride, 10 mL, Intravenous, Q12H  valsartan, 320 mg, Oral, Q24H             Acute right-sided weakness    Benign essential hypertension    Gastroparesis diabeticorum    Gastroesophageal reflux disease    Mixed hypercholesterolemia and hypertriglyceridemia    Iron deficiency anemia    Type 2 diabetes mellitus with hyperglycemia, with long-term current use of insulin    Coronary artery disease involving native coronary artery of native heart with unstable angina pectoris    Tobacco abuse    CKD stage 3a, GFR 45-59 ml/min    Carotid stenosis    Lacunar cerebrovascular accident (CVA)    Elevated troponin    Aortic valve disease      Assessment & Plan    -Aortic valve mass/fibroelastoma  -Recurrent MCA stroke  -Coronary artery disease status post CABG in   -Uncontrolled diabetes mellitus, HgbA1c 16.9, with peripheral neuropathy  -CKD, stage 3  -Hypertension  -Hyperlipidemia  -Obesity  -Tobacco abuse-- encourage cessation  -PAD, Carotid stenosis, 70% right ICA  -GERD  -LB  -chronic anemia    plt ag%  HgbA1c: 13.10  UA: negative  CXR: no acute process  Carotid duplex: <50% stenosis bilaterally  Vein mapping:     The right great saphenous vein is patent and very small in the proximal and mid thigh.  The vein may be adequate at distal thigh and knee.   The GSV in the proximal and mid calf is inadequate.    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  The GSV is inadequate in the calf.    The right small saphenous vein is inadequate.  The right SSV is of good diameter, but walls appear thickened throughout.    Radial duplex:     Left radial artery is patent without stenosis and measures 0.2-0.24 cm in diameter.    Left ulnar artery is patent without stenosis and measures 0.22-0.25 cm in diameter.    There is an incomplete palmar arch with equal dominance.  COVID swab: negative      Scheduled for redo sternotomy, aortic valve repair/replacement and possible CABG on tomorrow morning at 0730 with Dr. De Luna. He is hoping to be able to repair the valve, but if replacement is necessary, a mechanical valve will be used. This was discussed with the patient and she is agreeable. She understands that if she gets a mechanical valve, she will need lifelong Coumadin. COVID screen > 48 hours, so will repeat. Check urine pregnancy test. ARB held. NPO after midnight.        ANJU Valladares  06/30/24  07:30 EDT

## 2024-06-30 NOTE — PLAN OF CARE
Goal Outcome Evaluation:              Outcome Evaluation: Pre-op checklist complete. VSS, NSR on tele, on RA, no c/o pain. Independent when ambulating. Pt first case for aortic value repair/replacement tomorrow. Will continue POC and update as needed.

## 2024-06-30 NOTE — PROGRESS NOTES
Name: Bibiana Inman ADMIT: 2024   : 1980  PCP: Ibrahima Correa MD    MRN: [id number] LOS: 9 days   AGE/SEX: 44 y.o. female  ROOM:      Subjective   Subjective   No acute events. Patient has no new complaints. No family at bedside.    Objective   Objective   Vital Signs  Temp:  [97.9 °F (36.6 °C)-98.5 °F (36.9 °C)] 98 °F (36.7 °C)  Heart Rate:  [67-71] 67  Resp:  [16-17] 17  BP: (130-158)/(65-89) 149/70  SpO2:  [94 %-98 %] 94 %  on   ;   Device (Oxygen Therapy): room air  Body mass index is 23.93 kg/m².  Physical Exam  Vitals and nursing note reviewed.   Constitutional:       General: She is not in acute distress.     Appearance: She is not toxic-appearing or diaphoretic.   HENT:      Head: Normocephalic and atraumatic.      Nose: Nose normal.      Mouth/Throat:      Mouth: Mucous membranes are moist.      Pharynx: Oropharynx is clear.   Eyes:      Conjunctiva/sclera: Conjunctivae normal.      Pupils: Pupils are equal, round, and reactive to light.   Cardiovascular:      Rate and Rhythm: Normal rate and regular rhythm.      Pulses: Normal pulses.   Pulmonary:      Effort: Pulmonary effort is normal.   Abdominal:      General: Bowel sounds are normal.      Palpations: Abdomen is soft.      Tenderness: There is no abdominal tenderness.   Musculoskeletal:         General: No swelling or tenderness.      Cervical back: Neck supple.   Skin:     General: Skin is warm and dry.      Capillary Refill: Capillary refill takes less than 2 seconds.   Neurological:      Mental Status: She is alert and oriented to person, place, and time.      Comments: +mild right lower facial weakness   Psychiatric:         Mood and Affect: Mood normal.         Behavior: Behavior normal.       Results Review     I reviewed the patient's new clinical results.  Results from last 7 days   Lab Units 24  0549 24  0438 24  0310 24  0321   WBC 10*3/mm3 7.62 7.56 8.52 8.25   HEMOGLOBIN g/dL 11.0* 11.2*  11.2* 10.9*   PLATELETS 10*3/mm3 246 220 229 235     Results from last 7 days   Lab Units 06/30/24  0549 06/29/24  0438 06/28/24  1407 06/28/24  0310 06/27/24  0321   SODIUM mmol/L 138 138  --  137 137   POTASSIUM mmol/L 3.8 4.1 5.0 3.5 3.9   CHLORIDE mmol/L 106 104  --  103 106   CO2 mmol/L 25.0 22.8  --  24.0 24.3   BUN mg/dL 18 17  --  16 16   CREATININE mg/dL 1.15* 1.12*  --  1.00 1.11*   GLUCOSE mg/dL 171* 204*  --  139* 155*   EGFR mL/min/1.73 59.6* 61.5  --  70.5 62.2     Results from last 7 days   Lab Units 06/26/24  0526   ALBUMIN g/dL 2.9*   BILIRUBIN mg/dL <0.2   ALK PHOS U/L 88   AST (SGOT) U/L 17   ALT (SGPT) U/L 16     Results from last 7 days   Lab Units 06/30/24  0549 06/29/24  0438 06/28/24  0310 06/27/24  0321 06/26/24  0526   CALCIUM mg/dL 9.3 9.2 9.2 8.8 9.1   ALBUMIN g/dL  --   --   --   --  2.9*   MAGNESIUM mg/dL  --   --   --   --  1.8       Glucose   Date/Time Value Ref Range Status   06/30/2024 1053 179 (H) 70 - 130 mg/dL Final   06/30/2024 0647 166 (H) 70 - 130 mg/dL Final   06/29/2024 2043 152 (H) 70 - 130 mg/dL Final   06/29/2024 1549 207 (H) 70 - 130 mg/dL Final   06/29/2024 1047 149 (H) 70 - 130 mg/dL Final   06/29/2024 0638 196 (H) 70 - 130 mg/dL Final   06/28/2024 1929 220 (H) 70 - 130 mg/dL Final       No radiology results for the last day    I have personally reviewed all medications:  Scheduled Medications  aspirin, 325 mg, Oral, Daily   Or  aspirin, 300 mg, Rectal, Daily  atorvastatin, 80 mg, Oral, Nightly  [START ON 7/1/2024] ceFAZolin, 2,000 mg, Intravenous, On Call to OR  chlorhexidine, 15 mL, Mouth/Throat, Q12H  Chlorhexidine Gluconate Cloth, 1 Application, Topical, Q12H  escitalopram, 10 mg, Oral, Daily  insulin glargine, 30 Units, Subcutaneous, Daily  insulin lispro, 2-7 Units, Subcutaneous, TID With Meals  insulin lispro, 4 Units, Subcutaneous, TID With Meals  [START ON 7/1/2024] metoprolol tartrate, 12.5 mg, Oral, On Call to OR  mupirocin, 1 Application, Each Nare,  Q12H  nicotine, 1 patch, Transdermal, Q24H  NIFEdipine XL, 90 mg, Oral, Q24H  pantoprazole, 40 mg, Oral, QAM  sodium chloride, 10 mL, Intravenous, Q12H  sodium chloride, 10 mL, Intravenous, Q12H    Infusions     Diet  Diet: Diabetic; Consistent Carbohydrate; Fluid Consistency: Thin (IDDSI 0)  NPO Diet NPO Type: Sips with Meds    I have personally reviewed:  [x]  Laboratory   []  Microbiology   []  Radiology   [x]  EKG/Telemetry  []  Cardiology/Vascular   []  Pathology    []  Records    Assessment/Plan     Active Hospital Problems    Diagnosis  POA    **Acute right-sided weakness [R53.1]  Yes    CKD stage 3a, GFR 45-59 ml/min [N18.31]  Yes    Carotid stenosis [I65.29]  Yes    Lacunar cerebrovascular accident (CVA) [I63.81]  Yes    Elevated troponin [R79.89]  Yes    Aortic valve disease [I35.9]  Unknown    Tobacco abuse [Z72.0]  Yes    Coronary artery disease involving native coronary artery of native heart with unstable angina pectoris [I25.110]  Yes    Gastroparesis diabeticorum [E11.43, 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   Acute CVA  - presented with right-sided weakness which has improved, has mild right facial droop  - MRI showed enlargement of acute infarct in the genu of the left internal capsule as well as a new infarct in the left insular cortex, and previously identified acute infarction within the white matter of the left parietal lobe  - continue ASA, statin-hold plavix for planned procedure  - needs aggressive risk factor control, particularly with HTN and DM  - YUE showed 3 fibroelastomas-surgery planned for tomorrow 7/1 following plavix washout  -  appreciate cardiology, CT surgery, and neurology recs     Type 2 DM  - complications include gastroparesis  - on lantus and jardiance as outpatient  - BG is elevated, needs better outpatient control, A1C 16.90%  - continue lantus 30 units daily   - continue lispro 4 units TID with meals  - cover with ssi/hypoglycemia protocol  - continue jardiance     HTN/CAD s/p CABG  - BP acceptable  - continue nifedipine and valsartan      GERD  - symptoms controlled, continue ppi    SCDs for DVT prophylaxis.  Full code.  Discussed with patient and nursing staff.  Anticipate discharge home with HH vs SNU facility timing yet to be determined.  Expected Discharge Date: 7/9/2024; Expected Discharge Time:       Lenin Huang MD  [facility]  06/30/24  11:58 EDT    Portions of this text have been copied and I have reviewed them. They are accurate as of 6/30/2024

## 2024-07-01 ENCOUNTER — ANESTHESIA EVENT (OUTPATIENT)
Dept: PERIOP | Facility: HOSPITAL | Age: 44
End: 2024-07-01
Payer: COMMERCIAL

## 2024-07-01 ENCOUNTER — ANCILLARY PROCEDURE (OUTPATIENT)
Dept: PERIOP | Facility: HOSPITAL | Age: 44
DRG: 981 | End: 2024-07-01
Payer: COMMERCIAL

## 2024-07-01 ENCOUNTER — APPOINTMENT (OUTPATIENT)
Dept: GENERAL RADIOLOGY | Facility: HOSPITAL | Age: 44
DRG: 981 | End: 2024-07-01
Payer: COMMERCIAL

## 2024-07-01 ENCOUNTER — ANESTHESIA (OUTPATIENT)
Dept: PERIOP | Facility: HOSPITAL | Age: 44
End: 2024-07-01
Payer: COMMERCIAL

## 2024-07-01 LAB
ACT BLD: 140 SECONDS (ref 82–152)
ACT BLD: 146 SECONDS (ref 82–152)
ACT BLD: 379 SECONDS (ref 82–152)
ACT BLD: 391 SECONDS (ref 82–152)
ACT BLD: 421 SECONDS (ref 82–152)
ACT BLD: 421 SECONDS (ref 82–152)
ALBUMIN SERPL-MCNC: 3.6 G/DL (ref 3.5–5.2)
ALBUMIN SERPL-MCNC: 3.8 G/DL (ref 3.5–5.2)
ANION GAP SERPL CALCULATED.3IONS-SCNC: 10.9 MMOL/L (ref 5–15)
ANION GAP SERPL CALCULATED.3IONS-SCNC: 11 MMOL/L (ref 5–15)
ANION GAP SERPL CALCULATED.3IONS-SCNC: 9 MMOL/L (ref 5–15)
APTT PPP: 31.3 SECONDS (ref 22.7–35.4)
ARTERIAL PATENCY WRIST A: ABNORMAL
ATMOSPHERIC PRESS: 750.1 MMHG
ATMOSPHERIC PRESS: 751.4 MMHG
ATMOSPHERIC PRESS: 752.6 MMHG
BASE EXCESS BLDA CALC-SCNC: -2.5 MMOL/L (ref 0–2)
BASE EXCESS BLDA CALC-SCNC: -2.9 MMOL/L (ref 0–2)
BASE EXCESS BLDA CALC-SCNC: 0.8 MMOL/L (ref 0–2)
BASOPHILS # BLD AUTO: 0.03 10*3/MM3 (ref 0–0.2)
BASOPHILS # BLD AUTO: 0.05 10*3/MM3 (ref 0–0.2)
BASOPHILS NFR BLD AUTO: 0.2 % (ref 0–1.5)
BASOPHILS NFR BLD AUTO: 0.6 % (ref 0–1.5)
BDY SITE: ABNORMAL
BUN SERPL-MCNC: 19 MG/DL (ref 6–20)
BUN SERPL-MCNC: 19 MG/DL (ref 6–20)
BUN SERPL-MCNC: 20 MG/DL (ref 6–20)
BUN/CREAT SERPL: 10.9 (ref 7–25)
BUN/CREAT SERPL: 12.8 (ref 7–25)
BUN/CREAT SERPL: 16.4 (ref 7–25)
CA-I BLD-MCNC: 4.8 MG/DL (ref 4.6–5.4)
CA-I SERPL ISE-MCNC: 1.19 MMOL/L (ref 1.15–1.35)
CALCIUM SPEC-SCNC: 8.4 MG/DL (ref 8.6–10.5)
CALCIUM SPEC-SCNC: 8.9 MG/DL (ref 8.6–10.5)
CALCIUM SPEC-SCNC: 9.2 MG/DL (ref 8.6–10.5)
CHLORIDE SERPL-SCNC: 101 MMOL/L (ref 98–107)
CHLORIDE SERPL-SCNC: 106 MMOL/L (ref 98–107)
CHLORIDE SERPL-SCNC: 109 MMOL/L (ref 98–107)
CO2 BLDA-SCNC: 23.3 MMOL/L (ref 23–27)
CO2 BLDA-SCNC: 25.6 MMOL/L (ref 23–27)
CO2 SERPL-SCNC: 20.1 MMOL/L (ref 22–29)
CO2 SERPL-SCNC: 23 MMOL/L (ref 22–29)
CO2 SERPL-SCNC: 25 MMOL/L (ref 22–29)
CREAT SERPL-MCNC: 1.22 MG/DL (ref 0.57–1)
CREAT SERPL-MCNC: 1.49 MG/DL (ref 0.57–1)
CREAT SERPL-MCNC: 1.75 MG/DL (ref 0.57–1)
D-LACTATE SERPL-SCNC: <0.4 MMOL/L
DEPRECATED RDW RBC AUTO: 45.7 FL (ref 37–54)
DEPRECATED RDW RBC AUTO: 45.8 FL (ref 37–54)
DEPRECATED RDW RBC AUTO: 45.9 FL (ref 37–54)
DEPRECATED RDW RBC AUTO: 46.9 FL (ref 37–54)
DEVICE COMMENT: ABNORMAL
DEVICE COMMENT: NORMAL
EGFRCR SERPLBLD CKD-EPI 2021: 36 ML/MIN/1.73
EGFRCR SERPLBLD CKD-EPI 2021: 43.7 ML/MIN/1.73
EGFRCR SERPLBLD CKD-EPI 2021: 55.5 ML/MIN/1.73
EOSINOPHIL # BLD AUTO: 0.06 10*3/MM3 (ref 0–0.4)
EOSINOPHIL # BLD AUTO: 0.14 10*3/MM3 (ref 0–0.4)
EOSINOPHIL NFR BLD AUTO: 0.4 % (ref 0.3–6.2)
EOSINOPHIL NFR BLD AUTO: 1.7 % (ref 0.3–6.2)
ERYTHROCYTE [DISTWIDTH] IN BLOOD BY AUTOMATED COUNT: 13.5 % (ref 12.3–15.4)
ERYTHROCYTE [DISTWIDTH] IN BLOOD BY AUTOMATED COUNT: 14 % (ref 12.3–15.4)
ERYTHROCYTE [DISTWIDTH] IN BLOOD BY AUTOMATED COUNT: 14.2 % (ref 12.3–15.4)
ERYTHROCYTE [DISTWIDTH] IN BLOOD BY AUTOMATED COUNT: 14.2 % (ref 12.3–15.4)
FIBRINOGEN PPP-MCNC: 377 MG/DL (ref 219–464)
FIBRINOGEN PPP-MCNC: 407 MG/DL (ref 219–464)
GLUCOSE BLDC GLUCOMTR-MCNC: 129 MG/DL (ref 70–130)
GLUCOSE BLDC GLUCOMTR-MCNC: 136 MG/DL (ref 70–130)
GLUCOSE BLDC GLUCOMTR-MCNC: 138 MG/DL (ref 70–130)
GLUCOSE BLDC GLUCOMTR-MCNC: 171 MG/DL (ref 70–130)
GLUCOSE BLDC GLUCOMTR-MCNC: 172 MG/DL (ref 70–130)
GLUCOSE BLDC GLUCOMTR-MCNC: 186 MG/DL (ref 70–130)
GLUCOSE BLDC GLUCOMTR-MCNC: 193 MG/DL (ref 70–130)
GLUCOSE BLDC GLUCOMTR-MCNC: 203 MG/DL (ref 70–130)
GLUCOSE BLDC GLUCOMTR-MCNC: 208 MG/DL (ref 70–130)
GLUCOSE BLDC GLUCOMTR-MCNC: 210 MG/DL (ref 70–130)
GLUCOSE SERPL-MCNC: 126 MG/DL (ref 65–99)
GLUCOSE SERPL-MCNC: 210 MG/DL (ref 65–99)
GLUCOSE SERPL-MCNC: 211 MG/DL (ref 65–99)
HCO3 BLDA-SCNC: 22.1 MMOL/L (ref 22–28)
HCO3 BLDA-SCNC: 22.9 MMOL/L (ref 22–28)
HCO3 BLDA-SCNC: 24.5 MMOL/L (ref 22–28)
HCT VFR BLD AUTO: 24 % (ref 34–46.6)
HCT VFR BLD AUTO: 28.2 % (ref 34–46.6)
HCT VFR BLD AUTO: 32 % (ref 34–46.6)
HCT VFR BLD AUTO: 33.6 % (ref 34–46.6)
HCT VFR BLDA CALC: 28 % (ref 38–51)
HEMODILUTION: NO
HGB BLD-MCNC: 10 G/DL (ref 12–15.9)
HGB BLD-MCNC: 10.7 G/DL (ref 12–15.9)
HGB BLD-MCNC: 11.3 G/DL (ref 12–15.9)
HGB BLD-MCNC: 8 G/DL (ref 12–15.9)
HGB BLDA-MCNC: 9.5 G/DL (ref 12–17)
IMM GRANULOCYTES # BLD AUTO: 0.02 10*3/MM3 (ref 0–0.05)
IMM GRANULOCYTES # BLD AUTO: 0.08 10*3/MM3 (ref 0–0.05)
IMM GRANULOCYTES NFR BLD AUTO: 0.2 % (ref 0–0.5)
IMM GRANULOCYTES NFR BLD AUTO: 0.5 % (ref 0–0.5)
INHALED O2 CONCENTRATION: 100 %
INHALED O2 CONCENTRATION: 40 %
INHALED O2 CONCENTRATION: 40 %
INR PPP: 1.33 (ref 0.9–1.1)
LYMPHOCYTES # BLD AUTO: 1.08 10*3/MM3 (ref 0.7–3.1)
LYMPHOCYTES # BLD AUTO: 2.24 10*3/MM3 (ref 0.7–3.1)
LYMPHOCYTES NFR BLD AUTO: 27.3 % (ref 19.6–45.3)
LYMPHOCYTES NFR BLD AUTO: 7.3 % (ref 19.6–45.3)
MAGNESIUM SERPL-MCNC: 2.4 MG/DL (ref 1.6–2.6)
MAGNESIUM SERPL-MCNC: 2.7 MG/DL (ref 1.6–2.6)
MCH RBC QN AUTO: 29.2 PG (ref 26.6–33)
MCH RBC QN AUTO: 30.2 PG (ref 26.6–33)
MCH RBC QN AUTO: 31.7 PG (ref 26.6–33)
MCH RBC QN AUTO: 31.9 PG (ref 26.6–33)
MCHC RBC AUTO-ENTMCNC: 31.8 G/DL (ref 31.5–35.7)
MCHC RBC AUTO-ENTMCNC: 33.3 G/DL (ref 31.5–35.7)
MCHC RBC AUTO-ENTMCNC: 35.3 G/DL (ref 31.5–35.7)
MCHC RBC AUTO-ENTMCNC: 35.5 G/DL (ref 31.5–35.7)
MCV RBC AUTO: 89.5 FL (ref 79–97)
MCV RBC AUTO: 90.4 FL (ref 79–97)
MCV RBC AUTO: 90.6 FL (ref 79–97)
MCV RBC AUTO: 91.8 FL (ref 79–97)
MODALITY: ABNORMAL
MONOCYTES # BLD AUTO: 0.49 10*3/MM3 (ref 0.1–0.9)
MONOCYTES # BLD AUTO: 0.81 10*3/MM3 (ref 0.1–0.9)
MONOCYTES NFR BLD AUTO: 5.5 % (ref 5–12)
MONOCYTES NFR BLD AUTO: 6 % (ref 5–12)
NEUTROPHILS NFR BLD AUTO: 12.73 10*3/MM3 (ref 1.7–7)
NEUTROPHILS NFR BLD AUTO: 5.27 10*3/MM3 (ref 1.7–7)
NEUTROPHILS NFR BLD AUTO: 64.2 % (ref 42.7–76)
NEUTROPHILS NFR BLD AUTO: 86.1 % (ref 42.7–76)
NRBC BLD AUTO-RTO: 0 /100 WBC (ref 0–0.2)
NRBC BLD AUTO-RTO: 0 /100 WBC (ref 0–0.2)
O2 A-A PPRESDIFF RESPIRATORY: 0.4 MMHG
O2 A-A PPRESDIFF RESPIRATORY: 0.4 MMHG
O2 A-A PPRESDIFF RESPIRATORY: 0.5 MMHG
PAW @ PEAK INSP FLOW SETTING VENT: 5 CMH2O
PCO2 BLDA: 35.1 MM HG (ref 35–45)
PCO2 BLDA: 38.1 MM HG (ref 35–45)
PCO2 BLDA: 41.6 MM HG (ref 35–45)
PEEP RESPIRATORY: 5 CM[H2O]
PEEP RESPIRATORY: 5 CM[H2O]
PEEP RESPIRATORY: 7 CM[H2O]
PH BLDA: 7.35 PH UNITS (ref 7.35–7.45)
PH BLDA: 7.37 PH UNITS (ref 7.35–7.45)
PH BLDA: 7.45 PH UNITS (ref 7.35–7.45)
PHOSPHATE SERPL-MCNC: 2 MG/DL (ref 2.5–4.5)
PHOSPHATE SERPL-MCNC: 2.9 MG/DL (ref 2.5–4.5)
PLATELET # BLD AUTO: 177 10*3/MM3 (ref 140–450)
PLATELET # BLD AUTO: 180 10*3/MM3 (ref 140–450)
PLATELET # BLD AUTO: 191 10*3/MM3 (ref 140–450)
PLATELET # BLD AUTO: 239 10*3/MM3 (ref 140–450)
PMV BLD AUTO: 10.6 FL (ref 6–12)
PMV BLD AUTO: 10.8 FL (ref 6–12)
PMV BLD AUTO: 11.2 FL (ref 6–12)
PMV BLD AUTO: 11.5 FL (ref 6–12)
PO2 BLD: 262 MM[HG] (ref 0–500)
PO2 BLD: 270 MM[HG] (ref 0–500)
PO2 BLD: 335 MM[HG] (ref 0–500)
PO2 BLDA: 104.9 MM HG (ref 80–100)
PO2 BLDA: 108.1 MM HG (ref 80–100)
PO2 BLDA: 335 MM HG (ref 80–100)
POTASSIUM SERPL-SCNC: 4.2 MMOL/L (ref 3.5–5.2)
POTASSIUM SERPL-SCNC: 4.2 MMOL/L (ref 3.5–5.2)
POTASSIUM SERPL-SCNC: 4.7 MMOL/L (ref 3.5–5.2)
PROTHROMBIN TIME: 16.7 SECONDS (ref 11.7–14.2)
PSV: 8 CMH2O
QT INTERVAL: 410 MS
QTC INTERVAL: 440 MS
RBC # BLD AUTO: 2.65 10*6/MM3 (ref 3.77–5.28)
RBC # BLD AUTO: 3.15 10*6/MM3 (ref 3.77–5.28)
RBC # BLD AUTO: 3.54 10*6/MM3 (ref 3.77–5.28)
RBC # BLD AUTO: 3.66 10*6/MM3 (ref 3.77–5.28)
SAO2 % BLDCOA: 97.9 % (ref 92–98.5)
SAO2 % BLDCOA: 97.9 % (ref 92–98.5)
SAO2 % BLDCOA: 99.9 % (ref 92–98.5)
SET MECH RESP RATE: 14
SET MECH RESP RATE: 15
SODIUM SERPL-SCNC: 135 MMOL/L (ref 136–145)
SODIUM SERPL-SCNC: 140 MMOL/L (ref 136–145)
SODIUM SERPL-SCNC: 140 MMOL/L (ref 136–145)
TOTAL RATE: 14 BREATHS/MINUTE
TOTAL RATE: 15 BREATHS/MINUTE
TOTAL RATE: 22 BREATHS/MINUTE
VENTILATOR MODE: ABNORMAL
VENTILATOR MODE: AC
VENTILATOR MODE: AC
VT ON VENT VENT: 410 ML
VT ON VENT VENT: 500 ML
VT ON VENT VENT: 500 ML
WBC NRBC COR # BLD AUTO: 14.79 10*3/MM3 (ref 3.4–10.8)
WBC NRBC COR # BLD AUTO: 15.62 10*3/MM3 (ref 3.4–10.8)
WBC NRBC COR # BLD AUTO: 8.21 10*3/MM3 (ref 3.4–10.8)
WBC NRBC COR # BLD AUTO: 9.72 10*3/MM3 (ref 3.4–10.8)

## 2024-07-01 PROCEDURE — 06BP4ZZ EXCISION OF RIGHT SAPHENOUS VEIN, PERCUTANEOUS ENDOSCOPIC APPROACH: ICD-10-PCS

## 2024-07-01 PROCEDURE — 94799 UNLISTED PULMONARY SVC/PX: CPT

## 2024-07-01 PROCEDURE — 25010000002 PROPOFOL 10 MG/ML EMULSION: Performed by: NURSE PRACTITIONER

## 2024-07-01 PROCEDURE — 86900 BLOOD TYPING SEROLOGIC ABO: CPT

## 2024-07-01 PROCEDURE — 25010000002 FENTANYL CITRATE (PF) 250 MCG/5ML SOLUTION: Performed by: STUDENT IN AN ORGANIZED HEALTH CARE EDUCATION/TRAINING PROGRAM

## 2024-07-01 PROCEDURE — 85384 FIBRINOGEN ACTIVITY: CPT | Performed by: NURSE PRACTITIONER

## 2024-07-01 PROCEDURE — 25010000002 ONDANSETRON PER 1 MG: Performed by: NURSE PRACTITIONER

## 2024-07-01 PROCEDURE — 25010000002 HEPARIN (PORCINE) PER 1000 UNITS

## 2024-07-01 PROCEDURE — 25010000002 NITROGLYCERIN 200 MCG/ML SOLUTION: Performed by: STUDENT IN AN ORGANIZED HEALTH CARE EDUCATION/TRAINING PROGRAM

## 2024-07-01 PROCEDURE — C9248 INJ, CLEVIDIPINE BUTYRATE: HCPCS | Performed by: STUDENT IN AN ORGANIZED HEALTH CARE EDUCATION/TRAINING PROGRAM

## 2024-07-01 PROCEDURE — 25810000003 SODIUM CHLORIDE 0.9 % SOLUTION: Performed by: NURSE PRACTITIONER

## 2024-07-01 PROCEDURE — 02BF0ZZ EXCISION OF AORTIC VALVE, OPEN APPROACH: ICD-10-PCS

## 2024-07-01 PROCEDURE — 82330 ASSAY OF CALCIUM: CPT | Performed by: NURSE PRACTITIONER

## 2024-07-01 PROCEDURE — 83735 ASSAY OF MAGNESIUM: CPT | Performed by: NURSE PRACTITIONER

## 2024-07-01 PROCEDURE — 80069 RENAL FUNCTION PANEL: CPT | Performed by: NURSE PRACTITIONER

## 2024-07-01 PROCEDURE — 33120 EXC ICAR TUM RESC W/CARD BYP: CPT | Performed by: THORACIC SURGERY (CARDIOTHORACIC VASCULAR SURGERY)

## 2024-07-01 PROCEDURE — 25010000002 CEFAZOLIN PER 500 MG: Performed by: NURSE PRACTITIONER

## 2024-07-01 PROCEDURE — 33510 CABG VEIN SINGLE: CPT

## 2024-07-01 PROCEDURE — 85347 COAGULATION TIME ACTIVATED: CPT

## 2024-07-01 PROCEDURE — 25010000002 MAGNESIUM SULFATE PER 500 MG OF MAGNESIUM: Performed by: STUDENT IN AN ORGANIZED HEALTH CARE EDUCATION/TRAINING PROGRAM

## 2024-07-01 PROCEDURE — 85018 HEMOGLOBIN: CPT

## 2024-07-01 PROCEDURE — 33508 ENDOSCOPIC VEIN HARVEST: CPT

## 2024-07-01 PROCEDURE — 25010000002 ACETAMINOPHEN 10 MG/ML SOLUTION: Performed by: NURSE PRACTITIONER

## 2024-07-01 PROCEDURE — C1713 ANCHOR/SCREW BN/BN,TIS/BN: HCPCS

## 2024-07-01 PROCEDURE — 36430 TRANSFUSION BLD/BLD COMPNT: CPT

## 2024-07-01 PROCEDURE — 33391 VALVULOPLASTY AORTIC VALVE: CPT

## 2024-07-01 PROCEDURE — 82948 REAGENT STRIP/BLOOD GLUCOSE: CPT

## 2024-07-01 PROCEDURE — 25010000002 VANCOMYCIN 1 G RECONSTITUTED SOLUTION

## 2024-07-01 PROCEDURE — 25010000002 ALBUMIN HUMAN 5% PER 50 ML: Performed by: NURSE PRACTITIONER

## 2024-07-01 PROCEDURE — 25010000002 CLEVIDIPINE BUTYRATE PER 1 MG: Performed by: NURSE PRACTITIONER

## 2024-07-01 PROCEDURE — A4648 IMPLANTABLE TISSUE MARKER: HCPCS

## 2024-07-01 PROCEDURE — 80048 BASIC METABOLIC PNL TOTAL CA: CPT | Performed by: STUDENT IN AN ORGANIZED HEALTH CARE EDUCATION/TRAINING PROGRAM

## 2024-07-01 PROCEDURE — 85610 PROTHROMBIN TIME: CPT | Performed by: NURSE PRACTITIONER

## 2024-07-01 PROCEDURE — 25010000002 NICARDIPINE 2.5 MG/ML SOLUTION 10 ML VIAL: Performed by: STUDENT IN AN ORGANIZED HEALTH CARE EDUCATION/TRAINING PROGRAM

## 2024-07-01 PROCEDURE — 85025 COMPLETE CBC W/AUTO DIFF WBC: CPT | Performed by: NURSE PRACTITIONER

## 2024-07-01 PROCEDURE — 93005 ELECTROCARDIOGRAM TRACING: CPT | Performed by: NURSE PRACTITIONER

## 2024-07-01 PROCEDURE — 94002 VENT MGMT INPAT INIT DAY: CPT

## 2024-07-01 PROCEDURE — 82803 BLOOD GASES ANY COMBINATION: CPT | Performed by: NURSE PRACTITIONER

## 2024-07-01 PROCEDURE — 85014 HEMATOCRIT: CPT

## 2024-07-01 PROCEDURE — 25010000002 MIDAZOLAM PER 1 MG: Performed by: STUDENT IN AN ORGANIZED HEALTH CARE EDUCATION/TRAINING PROGRAM

## 2024-07-01 PROCEDURE — 25010000002 METOCLOPRAMIDE PER 10 MG: Performed by: NURSE PRACTITIONER

## 2024-07-01 PROCEDURE — C1729 CATH, DRAINAGE: HCPCS

## 2024-07-01 PROCEDURE — 25010000002 VANCOMYCIN 1 G RECONSTITUTED SOLUTION: Performed by: STUDENT IN AN ORGANIZED HEALTH CARE EDUCATION/TRAINING PROGRAM

## 2024-07-01 PROCEDURE — 25010000002 MAGNESIUM SULFATE IN D5W 1G/100ML (PREMIX) 1-5 GM/100ML-% SOLUTION: Performed by: NURSE PRACTITIONER

## 2024-07-01 PROCEDURE — P9041 ALBUMIN (HUMAN),5%, 50ML: HCPCS | Performed by: NURSE PRACTITIONER

## 2024-07-01 PROCEDURE — P9047 ALBUMIN (HUMAN), 25%, 50ML: HCPCS

## 2024-07-01 PROCEDURE — 82803 BLOOD GASES ANY COMBINATION: CPT

## 2024-07-01 PROCEDURE — 25010000002 HEPARIN (PORCINE) PER 1000 UNITS: Performed by: STUDENT IN AN ORGANIZED HEALTH CARE EDUCATION/TRAINING PROGRAM

## 2024-07-01 PROCEDURE — 71045 X-RAY EXAM CHEST 1 VIEW: CPT

## 2024-07-01 PROCEDURE — C1751 CATH, INF, PER/CENT/MIDLINE: HCPCS | Performed by: STUDENT IN AN ORGANIZED HEALTH CARE EDUCATION/TRAINING PROGRAM

## 2024-07-01 PROCEDURE — C9248 INJ, CLEVIDIPINE BUTYRATE: HCPCS | Performed by: NURSE PRACTITIONER

## 2024-07-01 PROCEDURE — 85384 FIBRINOGEN ACTIVITY: CPT

## 2024-07-01 PROCEDURE — 63710000001 INSULIN REGULAR HUMAN PER 5 UNITS: Performed by: STUDENT IN AN ORGANIZED HEALTH CARE EDUCATION/TRAINING PROGRAM

## 2024-07-01 PROCEDURE — 25010000002 PROTAMINE SULFATE PER 10 MG: Performed by: STUDENT IN AN ORGANIZED HEALTH CARE EDUCATION/TRAINING PROGRAM

## 2024-07-01 PROCEDURE — 82947 ASSAY GLUCOSE BLOOD QUANT: CPT

## 2024-07-01 PROCEDURE — B24BZZ4 ULTRASONOGRAPHY OF HEART WITH AORTA, TRANSESOPHAGEAL: ICD-10-PCS

## 2024-07-01 PROCEDURE — 93010 ELECTROCARDIOGRAM REPORT: CPT | Performed by: INTERNAL MEDICINE

## 2024-07-01 PROCEDURE — 021009W BYPASS CORONARY ARTERY, ONE ARTERY FROM AORTA WITH AUTOLOGOUS VENOUS TISSUE, OPEN APPROACH: ICD-10-PCS

## 2024-07-01 PROCEDURE — 25810000003 SODIUM CHLORIDE 0.9 % SOLUTION 250 ML FLEX CONT: Performed by: NURSE PRACTITIONER

## 2024-07-01 PROCEDURE — 25010000002 NICARDIPINE 2.5 MG/ML SOLUTION: Performed by: STUDENT IN AN ORGANIZED HEALTH CARE EDUCATION/TRAINING PROGRAM

## 2024-07-01 PROCEDURE — 5A1221Z PERFORMANCE OF CARDIAC OUTPUT, CONTINUOUS: ICD-10-PCS

## 2024-07-01 PROCEDURE — 85027 COMPLETE CBC AUTOMATED: CPT | Performed by: NURSE PRACTITIONER

## 2024-07-01 PROCEDURE — 85027 COMPLETE CBC AUTOMATED: CPT

## 2024-07-01 PROCEDURE — 93318 ECHO TRANSESOPHAGEAL INTRAOP: CPT | Performed by: STUDENT IN AN ORGANIZED HEALTH CARE EDUCATION/TRAINING PROGRAM

## 2024-07-01 PROCEDURE — 25010000002 MORPHINE PER 10 MG: Performed by: NURSE PRACTITIONER

## 2024-07-01 PROCEDURE — 85730 THROMBOPLASTIN TIME PARTIAL: CPT | Performed by: NURSE PRACTITIONER

## 2024-07-01 PROCEDURE — 25010000002 NICARDIPINE 2.5 MG/ML SOLUTION: Performed by: NURSE PRACTITIONER

## 2024-07-01 PROCEDURE — 25010000002 PAPAVERINE PER 60 MG

## 2024-07-01 PROCEDURE — 25810000003 SODIUM CHLORIDE 0.9 % SOLUTION

## 2024-07-01 PROCEDURE — 85025 COMPLETE CBC W/AUTO DIFF WBC: CPT | Performed by: STUDENT IN AN ORGANIZED HEALTH CARE EDUCATION/TRAINING PROGRAM

## 2024-07-01 PROCEDURE — 88305 TISSUE EXAM BY PATHOLOGIST: CPT

## 2024-07-01 PROCEDURE — 25010000002 CLEVIDIPINE BUTYRATE PER 1 MG: Performed by: STUDENT IN AN ORGANIZED HEALTH CARE EDUCATION/TRAINING PROGRAM

## 2024-07-01 PROCEDURE — 25810000003 SODIUM CHLORIDE 0.9 % SOLUTION 250 ML FLEX CONT: Performed by: STUDENT IN AN ORGANIZED HEALTH CARE EDUCATION/TRAINING PROGRAM

## 2024-07-01 PROCEDURE — 25010000002 PROPOFOL 10 MG/ML EMULSION: Performed by: STUDENT IN AN ORGANIZED HEALTH CARE EDUCATION/TRAINING PROGRAM

## 2024-07-01 PROCEDURE — 25010000002 HYDRALAZINE PER 20 MG: Performed by: NURSE PRACTITIONER

## 2024-07-01 PROCEDURE — 25010000002 ALBUMIN HUMAN 5% PER 50 ML: Performed by: STUDENT IN AN ORGANIZED HEALTH CARE EDUCATION/TRAINING PROGRAM

## 2024-07-01 PROCEDURE — P9016 RBC LEUKOCYTES REDUCED: HCPCS

## 2024-07-01 PROCEDURE — P9041 ALBUMIN (HUMAN),5%, 50ML: HCPCS | Performed by: STUDENT IN AN ORGANIZED HEALTH CARE EDUCATION/TRAINING PROGRAM

## 2024-07-01 PROCEDURE — 33530 CORONARY ARTERY BYPASS/REOP: CPT

## 2024-07-01 PROCEDURE — 25010000002 ALBUMIN HUMAN 25% PER 50 ML

## 2024-07-01 DEVICE — SS SUTURE, 3 PER SLEEVE
Type: IMPLANTABLE DEVICE | Site: STERNUM | Status: FUNCTIONAL
Brand: MYO/WIRE II

## 2024-07-01 DEVICE — SS SUTURE, 4 PER SLEEVE
Type: IMPLANTABLE DEVICE | Site: STERNUM | Status: FUNCTIONAL
Brand: MYO/WIRE II

## 2024-07-01 RX ORDER — NICOTINE POLACRILEX 4 MG
15 LOZENGE BUCCAL
Status: DISCONTINUED | OUTPATIENT
Start: 2024-07-01 | End: 2024-07-02

## 2024-07-01 RX ORDER — KETAMINE HCL IN NACL, ISO-OSM 100MG/10ML
SYRINGE (ML) INJECTION AS NEEDED
Status: DISCONTINUED | OUTPATIENT
Start: 2024-07-01 | End: 2024-07-01 | Stop reason: SURG

## 2024-07-01 RX ORDER — FENTANYL CITRATE 50 UG/ML
INJECTION, SOLUTION INTRAMUSCULAR; INTRAVENOUS AS NEEDED
Status: DISCONTINUED | OUTPATIENT
Start: 2024-07-01 | End: 2024-07-01 | Stop reason: SURG

## 2024-07-01 RX ORDER — PAPAVERINE HYDROCHLORIDE 30 MG/ML
INJECTION INTRAMUSCULAR; INTRAVENOUS AS NEEDED
Status: DISCONTINUED | OUTPATIENT
Start: 2024-07-01 | End: 2024-07-01 | Stop reason: HOSPADM

## 2024-07-01 RX ORDER — ENOXAPARIN SODIUM 100 MG/ML
40 INJECTION SUBCUTANEOUS DAILY
Status: DISCONTINUED | OUTPATIENT
Start: 2024-07-02 | End: 2024-07-02

## 2024-07-01 RX ORDER — MORPHINE SULFATE 2 MG/ML
4 INJECTION, SOLUTION INTRAMUSCULAR; INTRAVENOUS
Status: DISCONTINUED | OUTPATIENT
Start: 2024-07-01 | End: 2024-07-02

## 2024-07-01 RX ORDER — ASPIRIN 81 MG/1
81 TABLET ORAL DAILY
Status: DISCONTINUED | OUTPATIENT
Start: 2024-07-02 | End: 2024-07-07 | Stop reason: HOSPADM

## 2024-07-01 RX ORDER — SODIUM CHLORIDE 9 MG/ML
INJECTION, SOLUTION INTRAVENOUS CONTINUOUS PRN
Status: DISCONTINUED | OUTPATIENT
Start: 2024-07-01 | End: 2024-07-01 | Stop reason: SURG

## 2024-07-01 RX ORDER — ONDANSETRON 2 MG/ML
4 INJECTION INTRAMUSCULAR; INTRAVENOUS EVERY 6 HOURS PRN
Status: DISCONTINUED | OUTPATIENT
Start: 2024-07-01 | End: 2024-07-07 | Stop reason: HOSPADM

## 2024-07-01 RX ORDER — HYDROCODONE BITARTRATE AND ACETAMINOPHEN 5; 325 MG/1; MG/1
2 TABLET ORAL EVERY 4 HOURS PRN
Status: DISCONTINUED | OUTPATIENT
Start: 2024-07-01 | End: 2024-07-02

## 2024-07-01 RX ORDER — ACETAMINOPHEN 325 MG/1
650 TABLET ORAL EVERY 4 HOURS
Status: ACTIVE | OUTPATIENT
Start: 2024-07-01 | End: 2024-07-02

## 2024-07-01 RX ORDER — DEXMEDETOMIDINE HYDROCHLORIDE 4 UG/ML
.2-1.5 INJECTION, SOLUTION INTRAVENOUS
Status: DISCONTINUED | OUTPATIENT
Start: 2024-07-01 | End: 2024-07-02

## 2024-07-01 RX ORDER — MAGNESIUM HYDROXIDE 1200 MG/15ML
LIQUID ORAL AS NEEDED
Status: DISCONTINUED | OUTPATIENT
Start: 2024-07-01 | End: 2024-07-01 | Stop reason: HOSPADM

## 2024-07-01 RX ORDER — MAGNESIUM SULFATE 1 G/100ML
1 INJECTION INTRAVENOUS EVERY 8 HOURS
Status: DISPENSED | OUTPATIENT
Start: 2024-07-01 | End: 2024-07-02

## 2024-07-01 RX ORDER — CYCLOBENZAPRINE HCL 10 MG
10 TABLET ORAL EVERY 8 HOURS PRN
Status: DISCONTINUED | OUTPATIENT
Start: 2024-07-02 | End: 2024-07-02

## 2024-07-01 RX ORDER — DEXTROSE MONOHYDRATE 25 G/50ML
10-50 INJECTION, SOLUTION INTRAVENOUS
Status: DISCONTINUED | OUTPATIENT
Start: 2024-07-01 | End: 2024-07-02

## 2024-07-01 RX ORDER — NITROGLYCERIN 20 MG/100ML
5-200 INJECTION INTRAVENOUS
Status: DISCONTINUED | OUTPATIENT
Start: 2024-07-01 | End: 2024-07-02

## 2024-07-01 RX ORDER — SODIUM CHLORIDE 0.9 % (FLUSH) 0.9 %
10 SYRINGE (ML) INJECTION EVERY 12 HOURS SCHEDULED
Status: DISCONTINUED | OUTPATIENT
Start: 2024-07-01 | End: 2024-07-01

## 2024-07-01 RX ORDER — ATORVASTATIN CALCIUM 80 MG/1
80 TABLET, FILM COATED ORAL NIGHTLY
Status: DISCONTINUED | OUTPATIENT
Start: 2024-07-01 | End: 2024-07-03

## 2024-07-01 RX ORDER — SODIUM CHLORIDE 9 MG/ML
40 INJECTION, SOLUTION INTRAVENOUS AS NEEDED
Status: DISCONTINUED | OUTPATIENT
Start: 2024-07-01 | End: 2024-07-01

## 2024-07-01 RX ORDER — CHLORHEXIDINE GLUCONATE ORAL RINSE 1.2 MG/ML
15 SOLUTION DENTAL EVERY 12 HOURS
Status: DISCONTINUED | OUTPATIENT
Start: 2024-07-01 | End: 2024-07-04

## 2024-07-01 RX ORDER — ROCURONIUM BROMIDE 10 MG/ML
INJECTION, SOLUTION INTRAVENOUS AS NEEDED
Status: DISCONTINUED | OUTPATIENT
Start: 2024-07-01 | End: 2024-07-01 | Stop reason: SURG

## 2024-07-01 RX ORDER — NITROGLYCERIN 0.4 MG/1
0.4 TABLET SUBLINGUAL
Status: DISCONTINUED | OUTPATIENT
Start: 2024-07-01 | End: 2024-07-07 | Stop reason: HOSPADM

## 2024-07-01 RX ORDER — ALBUMIN, HUMAN INJ 5% 5 %
1500 SOLUTION INTRAVENOUS AS NEEDED
Status: DISPENSED | OUTPATIENT
Start: 2024-07-01 | End: 2024-07-02

## 2024-07-01 RX ORDER — POLYETHYLENE GLYCOL 3350 17 G/17G
17 POWDER, FOR SOLUTION ORAL DAILY PRN
Status: DISCONTINUED | OUTPATIENT
Start: 2024-07-01 | End: 2024-07-07 | Stop reason: HOSPADM

## 2024-07-01 RX ORDER — NITROGLYCERIN 20 MG/100ML
INJECTION INTRAVENOUS AS NEEDED
Status: DISCONTINUED | OUTPATIENT
Start: 2024-07-01 | End: 2024-07-01 | Stop reason: SURG

## 2024-07-01 RX ORDER — LIDOCAINE HYDROCHLORIDE 20 MG/ML
INJECTION, SOLUTION INFILTRATION; PERINEURAL AS NEEDED
Status: DISCONTINUED | OUTPATIENT
Start: 2024-07-01 | End: 2024-07-01 | Stop reason: SURG

## 2024-07-01 RX ORDER — ACETAMINOPHEN 650 MG/1
650 SUPPOSITORY RECTAL EVERY 4 HOURS
Status: ACTIVE | OUTPATIENT
Start: 2024-07-01 | End: 2024-07-02

## 2024-07-01 RX ORDER — ACETAMINOPHEN 10 MG/ML
1000 INJECTION, SOLUTION INTRAVENOUS EVERY 8 HOURS
Status: COMPLETED | OUTPATIENT
Start: 2024-07-01 | End: 2024-07-02

## 2024-07-01 RX ORDER — MIDAZOLAM HYDROCHLORIDE 1 MG/ML
2 INJECTION INTRAMUSCULAR; INTRAVENOUS
Status: DISCONTINUED | OUTPATIENT
Start: 2024-07-01 | End: 2024-07-02

## 2024-07-01 RX ORDER — DEXTROSE MONOHYDRATE 25 G/50ML
10-50 INJECTION, SOLUTION INTRAVENOUS
Status: DISCONTINUED | OUTPATIENT
Start: 2024-07-01 | End: 2024-07-01

## 2024-07-01 RX ORDER — MEPERIDINE HYDROCHLORIDE 25 MG/ML
25 INJECTION INTRAMUSCULAR; INTRAVENOUS; SUBCUTANEOUS EVERY 4 HOURS PRN
Status: ACTIVE | OUTPATIENT
Start: 2024-07-01 | End: 2024-07-02

## 2024-07-01 RX ORDER — AMINOCAPROIC ACID 250 MG/ML
INJECTION, SOLUTION INTRAVENOUS AS NEEDED
Status: DISCONTINUED | OUTPATIENT
Start: 2024-07-01 | End: 2024-07-01 | Stop reason: SURG

## 2024-07-01 RX ORDER — MORPHINE SULFATE 2 MG/ML
1 INJECTION, SOLUTION INTRAMUSCULAR; INTRAVENOUS EVERY 4 HOURS PRN
Status: DISCONTINUED | OUTPATIENT
Start: 2024-07-01 | End: 2024-07-07 | Stop reason: HOSPADM

## 2024-07-01 RX ORDER — SODIUM CHLORIDE 9 MG/ML
75 INJECTION, SOLUTION INTRAVENOUS CONTINUOUS
Status: DISCONTINUED | OUTPATIENT
Start: 2024-07-01 | End: 2024-07-03

## 2024-07-01 RX ORDER — HEPARIN SODIUM 1000 [USP'U]/ML
INJECTION, SOLUTION INTRAVENOUS; SUBCUTANEOUS AS NEEDED
Status: DISCONTINUED | OUTPATIENT
Start: 2024-07-01 | End: 2024-07-01 | Stop reason: SURG

## 2024-07-01 RX ORDER — ACETAMINOPHEN 160 MG/5ML
650 SOLUTION ORAL EVERY 4 HOURS
Status: ACTIVE | OUTPATIENT
Start: 2024-07-01 | End: 2024-07-02

## 2024-07-01 RX ORDER — ACETAMINOPHEN 325 MG/1
650 TABLET ORAL EVERY 4 HOURS PRN
Status: DISCONTINUED | OUTPATIENT
Start: 2024-07-02 | End: 2024-07-07 | Stop reason: HOSPADM

## 2024-07-01 RX ORDER — METOCLOPRAMIDE HYDROCHLORIDE 5 MG/ML
5 INJECTION INTRAMUSCULAR; INTRAVENOUS EVERY 6 HOURS
Status: DISPENSED | OUTPATIENT
Start: 2024-07-01 | End: 2024-07-02

## 2024-07-01 RX ORDER — NICOTINE POLACRILEX 4 MG
15 LOZENGE BUCCAL
Status: DISCONTINUED | OUTPATIENT
Start: 2024-07-01 | End: 2024-07-01

## 2024-07-01 RX ORDER — MAGNESIUM SULFATE HEPTAHYDRATE 500 MG/ML
INJECTION, SOLUTION INTRAMUSCULAR; INTRAVENOUS AS NEEDED
Status: DISCONTINUED | OUTPATIENT
Start: 2024-07-01 | End: 2024-07-01 | Stop reason: SURG

## 2024-07-01 RX ORDER — IBUPROFEN 600 MG/1
1 TABLET ORAL
Status: DISCONTINUED | OUTPATIENT
Start: 2024-07-01 | End: 2024-07-01

## 2024-07-01 RX ORDER — ALBUMIN, HUMAN INJ 5% 5 %
SOLUTION INTRAVENOUS CONTINUOUS PRN
Status: DISCONTINUED | OUTPATIENT
Start: 2024-07-01 | End: 2024-07-01 | Stop reason: SURG

## 2024-07-01 RX ORDER — ACETAMINOPHEN 650 MG/1
650 SUPPOSITORY RECTAL EVERY 4 HOURS PRN
Status: DISCONTINUED | OUTPATIENT
Start: 2024-07-02 | End: 2024-07-07 | Stop reason: HOSPADM

## 2024-07-01 RX ORDER — BISACODYL 10 MG
10 SUPPOSITORY, RECTAL RECTAL DAILY PRN
Status: DISCONTINUED | OUTPATIENT
Start: 2024-07-02 | End: 2024-07-07 | Stop reason: HOSPADM

## 2024-07-01 RX ORDER — PANTOPRAZOLE SODIUM 40 MG/10ML
40 INJECTION, POWDER, LYOPHILIZED, FOR SOLUTION INTRAVENOUS ONCE
Status: COMPLETED | OUTPATIENT
Start: 2024-07-01 | End: 2024-07-01

## 2024-07-01 RX ORDER — NOREPINEPHRINE BITARTRATE 0.03 MG/ML
.02-.2 INJECTION, SOLUTION INTRAVENOUS CONTINUOUS PRN
Status: DISCONTINUED | OUTPATIENT
Start: 2024-07-01 | End: 2024-07-02

## 2024-07-01 RX ORDER — MILRINONE LACTATE 0.2 MG/ML
.25-.375 INJECTION, SOLUTION INTRAVENOUS CONTINUOUS PRN
Status: DISCONTINUED | OUTPATIENT
Start: 2024-07-01 | End: 2024-07-02

## 2024-07-01 RX ORDER — ESCITALOPRAM OXALATE 10 MG/1
10 TABLET ORAL DAILY
Status: DISCONTINUED | OUTPATIENT
Start: 2024-07-02 | End: 2024-07-07 | Stop reason: HOSPADM

## 2024-07-01 RX ORDER — MIDAZOLAM HYDROCHLORIDE 1 MG/ML
INJECTION INTRAMUSCULAR; INTRAVENOUS AS NEEDED
Status: DISCONTINUED | OUTPATIENT
Start: 2024-07-01 | End: 2024-07-01 | Stop reason: SURG

## 2024-07-01 RX ORDER — ATORVASTATIN CALCIUM 80 MG/1
40 TABLET, FILM COATED ORAL NIGHTLY
Status: CANCELLED | OUTPATIENT
Start: 2024-07-01

## 2024-07-01 RX ORDER — AMOXICILLIN 250 MG
2 CAPSULE ORAL NIGHTLY
Status: DISCONTINUED | OUTPATIENT
Start: 2024-07-02 | End: 2024-07-07 | Stop reason: HOSPADM

## 2024-07-01 RX ORDER — VANCOMYCIN HYDROCHLORIDE 1 G/20ML
INJECTION, POWDER, LYOPHILIZED, FOR SOLUTION INTRAVENOUS AS NEEDED
Status: DISCONTINUED | OUTPATIENT
Start: 2024-07-01 | End: 2024-07-01 | Stop reason: SURG

## 2024-07-01 RX ORDER — BISACODYL 5 MG/1
10 TABLET, DELAYED RELEASE ORAL DAILY PRN
Status: DISCONTINUED | OUTPATIENT
Start: 2024-07-01 | End: 2024-07-07 | Stop reason: HOSPADM

## 2024-07-01 RX ORDER — SODIUM CHLORIDE 0.9 % (FLUSH) 0.9 %
10 SYRINGE (ML) INJECTION AS NEEDED
Status: DISCONTINUED | OUTPATIENT
Start: 2024-07-01 | End: 2024-07-01

## 2024-07-01 RX ORDER — PROTAMINE SULFATE 10 MG/ML
INJECTION, SOLUTION INTRAVENOUS AS NEEDED
Status: DISCONTINUED | OUTPATIENT
Start: 2024-07-01 | End: 2024-07-01 | Stop reason: SURG

## 2024-07-01 RX ORDER — ACETAMINOPHEN 160 MG/5ML
650 SOLUTION ORAL EVERY 4 HOURS PRN
Status: DISCONTINUED | OUTPATIENT
Start: 2024-07-02 | End: 2024-07-07 | Stop reason: HOSPADM

## 2024-07-01 RX ORDER — NICARDIPINE HYDROCHLORIDE 2.5 MG/ML
INJECTION INTRAVENOUS AS NEEDED
Status: DISCONTINUED | OUTPATIENT
Start: 2024-07-01 | End: 2024-07-01 | Stop reason: SURG

## 2024-07-01 RX ORDER — PROPOFOL 10 MG/ML
VIAL (ML) INTRAVENOUS AS NEEDED
Status: DISCONTINUED | OUTPATIENT
Start: 2024-07-01 | End: 2024-07-01 | Stop reason: SURG

## 2024-07-01 RX ORDER — PHENYLEPHRINE HCL IN 0.9% NACL 0.5 MG/5ML
.2-2 SYRINGE (ML) INTRAVENOUS CONTINUOUS PRN
Status: DISCONTINUED | OUTPATIENT
Start: 2024-07-01 | End: 2024-07-02

## 2024-07-01 RX ORDER — ALPRAZOLAM 0.25 MG/1
0.25 TABLET ORAL EVERY 8 HOURS PRN
Status: DISCONTINUED | OUTPATIENT
Start: 2024-07-01 | End: 2024-07-07 | Stop reason: HOSPADM

## 2024-07-01 RX ORDER — OXYCODONE HYDROCHLORIDE 5 MG/1
10 TABLET ORAL EVERY 4 HOURS PRN
Status: DISCONTINUED | OUTPATIENT
Start: 2024-07-01 | End: 2024-07-02

## 2024-07-01 RX ORDER — PANTOPRAZOLE SODIUM 40 MG/1
40 TABLET, DELAYED RELEASE ORAL EVERY MORNING
Status: DISCONTINUED | OUTPATIENT
Start: 2024-07-02 | End: 2024-07-07 | Stop reason: HOSPADM

## 2024-07-01 RX ORDER — HYDRALAZINE HYDROCHLORIDE 20 MG/ML
20 INJECTION INTRAMUSCULAR; INTRAVENOUS EVERY 6 HOURS PRN
Status: DISCONTINUED | OUTPATIENT
Start: 2024-07-01 | End: 2024-07-02

## 2024-07-01 RX ORDER — IBUPROFEN 600 MG/1
1 TABLET ORAL
Status: DISCONTINUED | OUTPATIENT
Start: 2024-07-01 | End: 2024-07-02

## 2024-07-01 RX ORDER — DOPAMINE HYDROCHLORIDE 160 MG/100ML
2-20 INJECTION, SOLUTION INTRAVENOUS CONTINUOUS PRN
Status: DISCONTINUED | OUTPATIENT
Start: 2024-07-01 | End: 2024-07-02

## 2024-07-01 RX ORDER — NALOXONE HCL 0.4 MG/ML
0.4 VIAL (ML) INJECTION
Status: DISCONTINUED | OUTPATIENT
Start: 2024-07-01 | End: 2024-07-07 | Stop reason: HOSPADM

## 2024-07-01 RX ADMIN — NITROGLYCERIN 40 MCG: 20 INJECTION INTRAVENOUS at 09:08

## 2024-07-01 RX ADMIN — CLEVIPIDINE 15 MG/HR: 0.5 EMULSION INTRAVENOUS at 14:46

## 2024-07-01 RX ADMIN — FENTANYL CITRATE 50 MCG: 50 INJECTION, SOLUTION INTRAMUSCULAR; INTRAVENOUS at 06:48

## 2024-07-01 RX ADMIN — ALBUMIN (HUMAN) 250 ML: 12.5 INJECTION, SOLUTION INTRAVENOUS at 16:28

## 2024-07-01 RX ADMIN — HEPARIN SODIUM 20000 UNITS: 1000 INJECTION, SOLUTION INTRAVENOUS; SUBCUTANEOUS at 09:23

## 2024-07-01 RX ADMIN — ONDANSETRON 4 MG: 2 INJECTION INTRAMUSCULAR; INTRAVENOUS at 18:58

## 2024-07-01 RX ADMIN — MORPHINE SULFATE 2 MG: 2 INJECTION, SOLUTION INTRAMUSCULAR; INTRAVENOUS at 14:37

## 2024-07-01 RX ADMIN — ROCURONIUM BROMIDE 20 MG: 10 INJECTION, SOLUTION INTRAVENOUS at 08:22

## 2024-07-01 RX ADMIN — NICARDIPINE HYDROCHLORIDE 5 MG/HR: 25 INJECTION, SOLUTION INTRAVENOUS at 07:34

## 2024-07-01 RX ADMIN — HYDRALAZINE HYDROCHLORIDE 20 MG: 20 INJECTION, SOLUTION INTRAMUSCULAR; INTRAVENOUS at 18:10

## 2024-07-01 RX ADMIN — MAGNESIUM SULFATE IN DEXTROSE 1 G: 10 INJECTION, SOLUTION INTRAVENOUS at 21:11

## 2024-07-01 RX ADMIN — SODIUM CHLORIDE 2000 MG: 900 INJECTION INTRAVENOUS at 11:10

## 2024-07-01 RX ADMIN — FENTANYL CITRATE 100 MCG: 50 INJECTION, SOLUTION INTRAMUSCULAR; INTRAVENOUS at 07:57

## 2024-07-01 RX ADMIN — SODIUM CHLORIDE 0.5 MCG/KG/HR: 9 INJECTION, SOLUTION INTRAVENOUS at 07:11

## 2024-07-01 RX ADMIN — PANTOPRAZOLE SODIUM 40 MG: 40 TABLET, DELAYED RELEASE ORAL at 05:51

## 2024-07-01 RX ADMIN — ATORVASTATIN CALCIUM 80 MG: 80 TABLET, FILM COATED ORAL at 20:27

## 2024-07-01 RX ADMIN — SODIUM CHLORIDE 2 G: 900 INJECTION INTRAVENOUS at 18:28

## 2024-07-01 RX ADMIN — LIDOCAINE HYDROCHLORIDE 60 MG: 20 INJECTION, SOLUTION INFILTRATION; PERINEURAL at 06:48

## 2024-07-01 RX ADMIN — CHLORHEXIDINE GLUCONATE 1 APPLICATION: 500 CLOTH TOPICAL at 05:45

## 2024-07-01 RX ADMIN — NITROGLYCERIN 40 MCG: 20 INJECTION INTRAVENOUS at 11:59

## 2024-07-01 RX ADMIN — SODIUM CHLORIDE 9 ML: 9 INJECTION, SOLUTION INTRAVENOUS at 05:49

## 2024-07-01 RX ADMIN — NITROGLYCERIN 40 MCG: 20 INJECTION INTRAVENOUS at 12:10

## 2024-07-01 RX ADMIN — CLEVIPIDINE 13 MG/HR: 0.5 EMULSION INTRAVENOUS at 16:27

## 2024-07-01 RX ADMIN — ALBUMIN (HUMAN): 12.5 INJECTION, SOLUTION INTRAVENOUS at 11:47

## 2024-07-01 RX ADMIN — PROPOFOL 25 MCG/KG/MIN: 10 INJECTION, EMULSION INTRAVENOUS at 13:31

## 2024-07-01 RX ADMIN — NICARDIPINE HYDROCHLORIDE 0.4 MG: 25 INJECTION, SOLUTION INTRAVENOUS at 08:16

## 2024-07-01 RX ADMIN — SODIUM CHLORIDE 2000 MG: 900 INJECTION INTRAVENOUS at 07:11

## 2024-07-01 RX ADMIN — NITROGLYCERIN 40 MCG: 20 INJECTION INTRAVENOUS at 12:04

## 2024-07-01 RX ADMIN — NITROGLYCERIN 40 MCG: 20 INJECTION INTRAVENOUS at 12:06

## 2024-07-01 RX ADMIN — MIDAZOLAM 2 MG: 1 INJECTION INTRAMUSCULAR; INTRAVENOUS at 06:36

## 2024-07-01 RX ADMIN — OXYCODONE HYDROCHLORIDE 10 MG: 5 TABLET ORAL at 21:30

## 2024-07-01 RX ADMIN — PROPOFOL 100 MG: 10 INJECTION, EMULSION INTRAVENOUS at 06:48

## 2024-07-01 RX ADMIN — NICARDIPINE HYDROCHLORIDE 0.4 MG: 25 INJECTION, SOLUTION INTRAVENOUS at 09:07

## 2024-07-01 RX ADMIN — ALBUMIN (HUMAN) 250 ML: 12.5 INJECTION, SOLUTION INTRAVENOUS at 14:30

## 2024-07-01 RX ADMIN — VANCOMYCIN HYDROCHLORIDE 1 G: 1 INJECTION, POWDER, LYOPHILIZED, FOR SOLUTION INTRAVENOUS at 07:35

## 2024-07-01 RX ADMIN — AMINOCAPROIC ACID 10 G: 250 INJECTION, SOLUTION INTRAVENOUS at 11:43

## 2024-07-01 RX ADMIN — MUPIROCIN 1 APPLICATION: 20 OINTMENT TOPICAL at 20:27

## 2024-07-01 RX ADMIN — PROPOFOL 25 MCG/KG/MIN: 10 INJECTION, EMULSION INTRAVENOUS at 07:11

## 2024-07-01 RX ADMIN — ACETAMINOPHEN 1000 MG: 1000 INJECTION INTRAVENOUS at 21:11

## 2024-07-01 RX ADMIN — FENTANYL CITRATE 150 MCG: 50 INJECTION, SOLUTION INTRAMUSCULAR; INTRAVENOUS at 08:00

## 2024-07-01 RX ADMIN — CLEVIPIDINE 5 MG/HR: 0.5 EMULSION INTRAVENOUS at 12:05

## 2024-07-01 RX ADMIN — METOPROLOL TARTRATE 12.5 MG: 25 TABLET, FILM COATED ORAL at 05:42

## 2024-07-01 RX ADMIN — NITROGLYCERIN 40 MCG: 20 INJECTION INTRAVENOUS at 12:13

## 2024-07-01 RX ADMIN — FENTANYL CITRATE 50 MCG: 50 INJECTION, SOLUTION INTRAMUSCULAR; INTRAVENOUS at 06:40

## 2024-07-01 RX ADMIN — PROTAMINE SULFATE 300 MG: 10 INJECTION, SOLUTION INTRAVENOUS at 11:33

## 2024-07-01 RX ADMIN — FENTANYL CITRATE 250 MCG: 50 INJECTION, SOLUTION INTRAMUSCULAR; INTRAVENOUS at 11:57

## 2024-07-01 RX ADMIN — INSULIN HUMAN 3 UNITS/HR: 1 INJECTION, SOLUTION INTRAVENOUS at 05:46

## 2024-07-01 RX ADMIN — METOCLOPRAMIDE 5 MG: 5 INJECTION, SOLUTION INTRAMUSCULAR; INTRAVENOUS at 20:27

## 2024-07-01 RX ADMIN — Medication 25 MG: at 10:03

## 2024-07-01 RX ADMIN — CLEVIPIDINE 15 MG/HR: 0.5 EMULSION INTRAVENOUS at 13:22

## 2024-07-01 RX ADMIN — SODIUM CHLORIDE: 9 INJECTION, SOLUTION INTRAVENOUS at 06:33

## 2024-07-01 RX ADMIN — NITROGLYCERIN 40 MCG: 20 INJECTION INTRAVENOUS at 11:57

## 2024-07-01 RX ADMIN — Medication 25 MG: at 06:48

## 2024-07-01 RX ADMIN — SODIUM CHLORIDE 5 MG/HR: 9 INJECTION, SOLUTION INTRAVENOUS at 15:40

## 2024-07-01 RX ADMIN — MAGNESIUM SULFATE HEPTAHYDRATE 2 G: 500 INJECTION, SOLUTION INTRAMUSCULAR; INTRAVENOUS at 11:09

## 2024-07-01 RX ADMIN — NITROGLYCERIN 40 MCG: 20 INJECTION INTRAVENOUS at 12:01

## 2024-07-01 RX ADMIN — PANTOPRAZOLE SODIUM 40 MG: 40 INJECTION, POWDER, FOR SOLUTION INTRAVENOUS at 13:39

## 2024-07-01 RX ADMIN — ROCURONIUM BROMIDE 100 MG: 10 INJECTION, SOLUTION INTRAVENOUS at 06:48

## 2024-07-01 RX ADMIN — METOCLOPRAMIDE 5 MG: 5 INJECTION, SOLUTION INTRAMUSCULAR; INTRAVENOUS at 13:40

## 2024-07-01 RX ADMIN — NITROGLYCERIN 40 MCG: 20 INJECTION INTRAVENOUS at 12:08

## 2024-07-01 RX ADMIN — ROCURONIUM BROMIDE 30 MG: 10 INJECTION, SOLUTION INTRAVENOUS at 11:24

## 2024-07-01 RX ADMIN — ACETAMINOPHEN 1000 MG: 1000 INJECTION INTRAVENOUS at 13:39

## 2024-07-01 RX ADMIN — MUPIROCIN 1 APPLICATION: 20 OINTMENT TOPICAL at 05:42

## 2024-07-01 RX ADMIN — 0.12% CHLORHEXIDINE GLUCONATE 15 ML: 1.2 RINSE ORAL at 13:39

## 2024-07-01 RX ADMIN — 0.12% CHLORHEXIDINE GLUCONATE 15 ML: 1.2 RINSE ORAL at 05:42

## 2024-07-01 RX ADMIN — ROCURONIUM BROMIDE 30 MG: 10 INJECTION, SOLUTION INTRAVENOUS at 09:59

## 2024-07-01 RX ADMIN — SODIUM CHLORIDE 5 UNITS/HR: 9 INJECTION, SOLUTION INTRAVENOUS at 07:40

## 2024-07-01 RX ADMIN — FENTANYL CITRATE 150 MCG: 50 INJECTION, SOLUTION INTRAMUSCULAR; INTRAVENOUS at 07:42

## 2024-07-01 RX ADMIN — AMINOCAPROIC ACID 10 G: 250 INJECTION, SOLUTION INTRAVENOUS at 09:24

## 2024-07-01 NOTE — ADDENDUM NOTE
Addendum  created 07/01/24 1436 by Artemio Wong MD    Clinical Note Signed, Intraprocedure Blocks edited, SmartForm saved

## 2024-07-01 NOTE — ANESTHESIA PROCEDURE NOTES
Arterial Line      Patient reassessed immediately prior to procedure    Patient location during procedure: OR  Start time: 7/1/2024 6:43 AM  Stop Time:7/1/2024 6:45 AM       Line placed for hemodynamic monitoring and ABGs/Labs/ISTAT.  Performed By   Anesthesiologist: Artemio Wong MD   Preanesthetic Checklist  Completed: patient identified, IV checked, site marked, risks and benefits discussed, surgical consent, monitors and equipment checked, pre-op evaluation and timeout performed  Arterial Line Prep    Sterile Tech: gloves, mask, cap and sterile barriers  Prep: ChloraPrep  Patient monitoring: blood pressure monitoring, continuous pulse oximetry and EKG  Arterial Line Procedure   Laterality:left  Location:  radial artery  Catheter size: 20 G   Guidance: ultrasound guided  PROCEDURE NOTE/ULTRASOUND INTERPRETATION.  Using ultrasound guidance the potential vascular sites for insertion of the catheter were visualized to determine the patency of the vessel to be used for vascular access.  After selecting the appropriate site for insertion, the needle was visualized under ultrasound being inserted into the radial artery, followed by ultrasound confirmation of wire and catheter placement. There were no abnormalities seen on ultrasound; an image was taken; and the patient tolerated the procedure with no complications.   Number of attempts: 1  Successful placement: yes   Post Assessment   Dressing Type: occlusive dressing applied, secured with tape and wrist guard applied.   Complications no  Circ/Move/Sens Assessment: unchanged.   Patient Tolerance: patient tolerated the procedure well with no apparent complications

## 2024-07-01 NOTE — ANESTHESIA PROCEDURE NOTES
Central Line      Patient reassessed immediately prior to procedure    Patient location during procedure: OR  Start time: 7/1/2024 7:00 AM  Stop Time:7/1/2024 7:10 AM  Indications: vascular access  Staff  Anesthesiologist: Artemio Wong MD  Preanesthetic Checklist  Completed: patient identified, IV checked, site marked, risks and benefits discussed, surgical consent, monitors and equipment checked, pre-op evaluation and timeout performed  Central Line Prep  Sterile Tech:cap, gloves, gown, mask and sterile barriers  Prep: chloraprep  Patient monitoring: blood pressure monitoring, continuous pulse oximetry and EKG  Central Line Procedure  Laterality:right  Location:internal jugular  Catheter Type:MAC and double lumen  Catheter Size:9 Fr  Guidance:ultrasound guided  PROCEDURE NOTE/ULTRASOUND INTERPRETATION.  Using ultrasound guidance the potential vascular sites for insertion of the catheter were visualized to determine the patency of the vessel to be used for vascular access.  After selecting the appropriate site for insertion, the needle was visualized under ultrasound being inserted into the internal jugular vein, followed by ultrasound confirmation of wire and catheter placement. There were no abnormalities seen on ultrasound; an image was taken; and the patient tolerated the procedure with no complications. Images: still images obtained, printed/placed on chart  Assessment  Post procedure:biopatch applied, line sutured and occlusive dressing applied  Assessement:blood return through all ports, free fluid flow, no pneumothorax on x-ray and placement verified by x-ray  Complications:no  Patient Tolerance:patient tolerated the procedure well with no apparent complications  Additional Notes  Attempted placement of latex free PAC but balloon malfunctioned and placement was difficult. After discussion with surgeon, we decided to place a SLIC instead.

## 2024-07-01 NOTE — ANESTHESIA PROCEDURE NOTES
Diagnostic IntraOp Andrea    Procedure Performed: Diagnostic IntraOp Andrea       Start Time:        End Time:      Preanesthesia Checklist:  Patient identified, IV assessed, risks and benefits discussed, monitors and equipment assessed, procedure being performed at surgeon's request and anesthesia consent obtained.    General Procedure Information  Diagnostic Indications for Echo:  assessment of ascending aorta, assessment of surgical repair, defect repair evaluation and hemodynamic monitoring  Physician Requesting Echo: Benja De Luna MD  Location performed:  OR  Intubated  Bite block placed  Heart visualized  Probe Insertion:  Easy  Probe Type:  Multiplane  Modalities:  2D only, color flow mapping, continuous wave Doppler and pulse wave Doppler    Echocardiographic and Doppler Measurements    Ventricles    Right Ventricle:  Cavity size normal.  Global function mildly impaired.    Left Ventricle:  Cavity size normal.  Global Function normal.          Valves    Aortic Valve:  Annulus normal.  Stenosis not present.  Area: 2.4 cm².  Mean Gradient: 3 mmHg.  Regurgitation absent.  Leaflets vegetative.      Mitral Valve:  Annulus normal.  Regurgitation trace.      Tricuspid Valve:  Annulus normal.  Regurgitation trace.    Pulmonic Valve:  Annulus normal.  Regurgitation trace.        Aorta    Ascending Aorta:  Size normal.  Diameter 3 cm.  Dissection not present.    Aortic Arch:  Size normal.  Dissection not present.    Descending Aorta:  Size normal.  Dissection not present.        Atria    Right Atrium:  Size normal.    Left Atrium:  Size normal.  Left atrial appendage normal.              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

## 2024-07-01 NOTE — ANESTHESIA PREPROCEDURE EVALUATION
Anesthesia Evaluation     Patient summary reviewed and Nursing notes reviewed   NPO Solid Status: > 8 hours             Airway   Mallampati: II  TM distance: >3 FB  Neck ROM: full  Dental    (+) poor dentition    Pulmonary    (+) ,sleep apnea  Cardiovascular     ECG reviewed  PT is on anticoagulation therapy    (+) hypertension, past MI , CAD, CABG, angina, hyperlipidemia,  carotid artery disease      Neuro/Psych  (+) CVA, psychiatric history  GI/Hepatic/Renal/Endo    (+) GERD poorly controlled, renal disease-, diabetes mellitus type 2 poorly controlled using insulin    Musculoskeletal     Abdominal    Substance History      OB/GYN          Other                          Anesthesia Plan    ASA 4     general, [place], CVL and PAC     (I have reviewed the patient's history with the patient and the chart, including all pertinent laboratory results and imaging. I have explained the risks of anesthesia including but not limited to dental damage, corneal abrasion, nerve injury, MI, stroke, and death. Questions asked and answered. Anesthetic plan discussed with patient and team as indicated. Patient expressed understanding of the above.  )  intravenous induction   Postoperative Plan: Expected vent after surgery  Anesthetic plan, risks, benefits, and alternatives have been provided, discussed and informed consent has been obtained with: patient.    Use of blood products discussed with  Consented to blood products.      CODE STATUS:    Level Of Support Discussed With: Patient  Code Status (Patient has no pulse and is not breathing): CPR (Attempt to Resuscitate)  Medical Interventions (Patient has pulse or is breathing): Full Support      
Performed

## 2024-07-01 NOTE — ANESTHESIA PROCEDURE NOTES
Airway  Urgency: elective    Date/Time: 7/1/2024 6:52 AM  Airway not difficult    General Information and Staff    Patient location during procedure: OR  Anesthesiologist: Artemio Wong MD    Indications and Patient Condition  Indications for airway management: airway protection    Preoxygenated: yes  MILS maintained throughout  Mask difficulty assessment: 0 - not attempted    Final Airway Details  Final airway type: endotracheal airway      Successful airway: ETT  Cuffed: yes   Successful intubation technique: direct laryngoscopy  Facilitating devices/methods: anterior pressure/BURP  Endotracheal tube insertion site: oral  Blade: Evetet  Blade size: 3  ETT size (mm): 7.5  Cormack-Lehane Classification: grade IIb - view of arytenoids or posterior of glottis only  Placement verified by: chest auscultation and capnometry   Measured from: teeth  ETT/EBT  to teeth (cm): 22  Number of attempts at approach: 1  Assessment: lips, teeth, and gum same as pre-op and atraumatic intubation

## 2024-07-01 NOTE — PROGRESS NOTES
Name: Bibiana Inman ADMIT: 2024   : 1980  PCP: Ibrahima Correa MD    MRN: [id number] LOS: 10 days   AGE/SEX: 44 y.o. female  ROOM:      Subjective   Subjective   Patient is seen at bedside, no new complaints.       Objective   Objective   Vital Signs  Temp:  [96.4 °F (35.8 °C)-99.4 °F (37.4 °C)] 96.6 °F (35.9 °C)  Heart Rate:  [67-78] 70  Resp:  [15-16] 15  BP: ()/(62-86) 109/68  Arterial Line BP: (117-125)/(51-56) 121/55  FiO2 (%):  [39 %-100 %] 39 %  SpO2:  [97 %-100 %] 100 %  on   ;   Device (Oxygen Therapy): ventilator  Body mass index is 24.17 kg/m².  Physical Exam  Vitals and nursing note reviewed.   Constitutional:       General: She is not in acute distress.     Appearance: She is not toxic-appearing or diaphoretic.   HENT:      Head: Normocephalic and atraumatic.      Nose: Nose normal.      Mouth/Throat:      Mouth: Mucous membranes are moist.      Pharynx: Oropharynx is clear.   Eyes:      Conjunctiva/sclera: Conjunctivae normal.      Pupils: Pupils are equal, round, and reactive to light.   Cardiovascular:      Rate and Rhythm: Normal rate and regular rhythm.      Pulses: Normal pulses.   Pulmonary:      Effort: Pulmonary effort is normal.   Abdominal:      General: Bowel sounds are normal.      Palpations: Abdomen is soft.      Tenderness: There is no abdominal tenderness.   Musculoskeletal:         General: No swelling or tenderness.      Cervical back: Neck supple.   Skin:     General: Skin is warm and dry.      Capillary Refill: Capillary refill takes less than 2 seconds.   Neurological:      Mental Status: She is alert and oriented to person, place, and time.      Comments: +mild right lower facial weakness   Psychiatric:         Mood and Affect: Mood normal.         Behavior: Behavior normal.       Results Review     I reviewed the patient's new clinical results.  Results from last 7 days   Lab Units 24  1312 24  1111 24  0230 24  0549    WBC 10*3/mm3 14.79* 9.72 8.21 7.62   HEMOGLOBIN g/dL 10.0* 8.0* 10.7* 11.0*   PLATELETS 10*3/mm3 180 191 239 246     Results from last 7 days   Lab Units 07/01/24  1312 07/01/24  0230 06/30/24  0549 06/29/24  0438   SODIUM mmol/L 140 135* 138 138   POTASSIUM mmol/L 4.2 4.2 3.8 4.1   CHLORIDE mmol/L 106 101 106 104   CO2 mmol/L 23.0 25.0 25.0 22.8   BUN mg/dL 19 20 18 17   CREATININE mg/dL 1.49* 1.22* 1.15* 1.12*   GLUCOSE mg/dL 126* 211* 171* 204*   EGFR mL/min/1.73 43.7* 55.5* 59.6* 61.5     Results from last 7 days   Lab Units 07/01/24  1312 06/26/24  0526   ALBUMIN g/dL 3.8 2.9*   BILIRUBIN mg/dL  --  <0.2   ALK PHOS U/L  --  88   AST (SGOT) U/L  --  17   ALT (SGPT) U/L  --  16     Results from last 7 days   Lab Units 07/01/24  1312 07/01/24  0230 06/30/24  0549 06/29/24  0438 06/27/24  0321 06/26/24  0526   CALCIUM mg/dL 8.9 9.2 9.3 9.2   < > 9.1   ALBUMIN g/dL 3.8  --   --   --   --  2.9*   MAGNESIUM mg/dL 2.7*  --   --   --   --  1.8   PHOSPHORUS mg/dL 2.0*  --   --   --   --   --     < > = values in this interval not displayed.       Glucose   Date/Time Value Ref Range Status   07/01/2024 1651 203 (H) 70 - 130 mg/dL Final   07/01/2024 1523 136 (H) 70 - 130 mg/dL Final   07/01/2024 1431 138 (H) 70 - 130 mg/dL Final   07/01/2024 1326 129 70 - 130 mg/dL Final   07/01/2024 0516 208 (H) 70 - 130 mg/dL Final   06/30/2024 2008 229 (H) 70 - 130 mg/dL Final   06/30/2024 1542 194 (H) 70 - 130 mg/dL Final       XR Chest 1 View    Result Date: 7/1/2024  Postsurgical changes, as described.  This report was finalized on 7/1/2024 2:06 PM by Dr. Antonio Ch M.D on Workstation: [email or web address]       I have personally reviewed all medications:  Scheduled Medications  acetaminophen, 1,000 mg, Intravenous, Q8H  acetaminophen, 650 mg, Oral, Q4H   Or  acetaminophen, 650 mg, Oral, Q4H   Or  acetaminophen, 650 mg, Rectal, Q4H  [START ON 7/2/2024] aspirin, 81 mg, Oral, Daily  atorvastatin, 80 mg, Oral, Nightly  ceFAZolin, 2 g,  Intravenous, Q8H  chlorhexidine, 15 mL, Mouth/Throat, Q12H  [START ON 7/2/2024] enoxaparin, 40 mg, Subcutaneous, Daily  [START ON 7/2/2024] escitalopram, 10 mg, Oral, Daily  magnesium sulfate, 1 g, Intravenous, Q8H  metoclopramide, 5 mg, Intravenous, Q6H  [START ON 7/2/2024] metoprolol tartrate, 12.5 mg, Oral, Q12H  mupirocin, , Each Nare, BID  [START ON 7/2/2024] pantoprazole, 40 mg, Oral, QAM  [START ON 7/2/2024] senna-docusate sodium, 2 tablet, Oral, Nightly    Infusions  clevidipine, 2-32 mg/hr, Last Rate: 13 mg/hr (07/01/24 1627)  dexmedetomidine, 0.2-1.5 mcg/kg/hr, Last Rate: Stopped (07/01/24 1459)  DOPamine, 2-20 mcg/kg/min  EPINEPHrine, 0.02-0.1 mcg/kg/min  insulin, 0-100 Units/hr, Last Rate: 1.2 Units/hr (07/01/24 1432)  milrinone, 0.25-0.375 mcg/kg/min  niCARdipine, 5-15 mg/hr, Last Rate: Stopped (07/01/24 1610)  nitroglycerin, 5-200 mcg/min  norepinephrine, 0.02-0.2 mcg/kg/min  phenylephrine, 0.2-2 mcg/kg/min  propofol, 5-50 mcg/kg/min, Last Rate: Stopped (07/01/24 1432)  sodium chloride, 30 mL/hr, Last Rate: 30 mL/hr (07/01/24 1258)    Diet  NPO Diet NPO Type: Sips with Meds    I have personally reviewed:  [x]  Laboratory   [x]  Microbiology   [x]  Radiology   [x]  EKG/Telemetry  [x]  Cardiology/Vascular   []  Pathology    []  Records       Assessment/Plan     Active Hospital Problems    Diagnosis  POA    **Acute right-sided weakness [R53.1]  Yes    CKD stage 3a, GFR 45-59 ml/min [N18.31]  Yes    Carotid stenosis [I65.29]  Yes    Lacunar cerebrovascular accident (CVA) [I63.81]  Yes    Elevated troponin [R79.89]  Yes    Aortic valve disease [I35.9]  Unknown    Tobacco abuse [Z72.0]  Yes    Coronary artery disease involving native coronary artery of native heart with unstable angina pectoris [I25.110]  Yes    Gastroparesis diabeticorum [E11.43, K31.84]  Yes    Gastroesophageal reflux disease [K21.9]  Yes    Iron deficiency anemia [D50.9]  Yes    Type 2 diabetes mellitus with hyperglycemia, with long-term  current use of insulin [E11.65, Z79.4]  Not Applicable    Benign essential hypertension [I10]  Yes    Mixed hypercholesterolemia and hypertriglyceridemia [E78.2]  Yes      Resolved Hospital Problems   No resolved problems to display.       44 y.o. female admitted with Acute right-sided weakness.    Acute CVA  - presented with right-sided weakness which has improved, has mild right facial droop  - MRI showed enlargement of acute infarct in the genu of the left internal capsule as well as a new infarct in the left insular cortex, and previously identified acute infarction within the white matter of the left parietal lobe  - continue ASA, statin-hold plavix for planned procedure  - needs aggressive risk factor control, particularly with HTN and DM  - YUE showed 3 fibroelastomas-surgery planned for tomorrow 7/1 following plavix washout  - appreciate cardiology, CT surgery, and neurology recs     Type 2 DM  - complications include gastroparesis  - on lantus and jardiance as outpatient  - BG is elevated, needs better outpatient control, A1C 16.90%  - continue lantus 30 units daily   - continue lispro 4 units TID with meals  - cover with ssi/hypoglycemia protocol  - continue jardiance      HTN/CAD s/p CABG  - BP acceptable  - continue nifedipine and valsartan      GERD  - symptoms controlled, continue ppi     SCDs for DVT prophylaxis.  Full code.  Discussed with patient and nursing staff.  Anticipate discharge home with HH vs SNU facility timing yet to be determined.  Expected Discharge Date: 7/9/2024      Raul Davis MD  [facility]  07/01/24  16:57 EDT

## 2024-07-01 NOTE — OP NOTE
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 CABG x 1 (Svg to OM)  2. EVH of the right legs. Open vein [place] on the left leg.  3.  Aortic valve fibroblastoma resection.  4.  Aortic valve repair (Shaving of the fibroelastoma base of implantation without damage the aortic valve)   5.  Redo sternotomy    Surgeon: Benja De Luna MD     Assistants: ANJU Parks was responsible for performing the following activities: Cardiac Surgery First assist, Endoscopic Vein [place] for CABG, surgical wound closure and their skilled assistance was necessary for the success of this case.     Anesthesia: General endotracheal anesthesia and YUE    Findings:  The saphenous vein was harvested endoscopically form the right  leg (not usable) and open from the left leg. The vein had a diameter of 3.5 mm and was of good quality. The coronaries had a diameter of 1.5 mm and were of fair quality.    Estimated Blood Loss:  200 mL of Cell Saver returned.    STS Data: The STS Risk score discussed with the patient and family.  Counseling was done regarding abuse of tobacco, alcohol and drugs as needed. They understand and wish to proceed. The antibiotics and b blockers were given in the STS required window.        Description of the procedure:     The patient was placed supine on the operative table. General anesthesia was given and lines placed. The patient was prepped and draped using the usual sterile technique. A median redo sternotomy was performed with a scalpel and the layers carried down to the sternum using the electrocautery. The sternum was split in the midline using a vertical oscillating saw. Hemostasis was achieved.  A Favaloro retractor was placed and the mediastinum exposed. Extensive lysis of adhesions was performed.  300 units/kg of IV heparin was given to an ACT over 400. Cannulation sutures were placed in the ascending aorta and right atrium. Small cannulas were placed and aorto right atrial cardiopulmonary bypass was started. Cardioplegia cannulas were placed. Cardiopulmonary bypass was then established for 107 minutes drifting to 34°C at appropriate flow rates.  The previous patent LIMA was secure and small bulldog applied. The aorta was crossclamped for 85 minutes and we gave 1000 cc of antegrade cold blood cardioplegia then 200L of retrograde cold blood cardioplegia and then repeated doses every 10 to 15 minutes to good effect. 1 veins was anastomosed to the ascending aorta with 6-0 Prolene and marked with washers.  The vein was sewn to the obtuse marginal two of the circunflex artery with 7-0 Prolene.  A transverse aortotomy was performed and the diseased valve exposed.  3 fibroblastoma's in each coronary cusp were identified and resected at its base.  The leaflets were inspected and no damage was observed from the resection. The aortotomy was closed with a double layer of 4.0 Prolene suture and de-aired before closure. A warm dose of cardioplegia was given and then the aortic clamp removed as well as the LIMA bulldog clamp. All anastomoses were checked and had good flow and morphology. The left pleural space was suctioned and the lungs ventilated. The heart was paced till regular atrial rhythm resumed. I allowed the heart to eject and once hemodynamics were acceptable, then the CPB was discontinued and the venous and cardioplegia cannulas removed. The matching dose of protamine was given and the aortic cannula removed as well. AV temporary wires and pleural and mediastinal chest tubes were placed and the wound sprayed with platelet rich plasma. The sternum was closed with single and double wires and soft tissue in layers of reabsorbable material. The wounds were covered with sterile dressings. YUE showed competent aortic  valve without aortic valve regurgitation       Specimen removed:  Aortic valve fibroelastoma    CPB time:  107 minutes.    Aortic clamp time:  85 minutes    Complications:  none           Disposition: Cardiovascular recovery room           Condition: Critical but stable.

## 2024-07-01 NOTE — ANESTHESIA POSTPROCEDURE EVALUATION
Patient: Bibiana Inman    Procedure Summary       Date: 07/01/24 Room / Location: [place] [id number] / [facility] CARDIOVASCULAR OPERATING ROOM    Anesthesia Start: 0632 Anesthesia Stop: 1304    Procedure: YUE; REOPERATIVE STERNOTOMY; CORONARY ARTERY BYPASS GRAFTING TIMES 1 UTILIZING RIGHT SAPHENOUS VEIN; AORTIC VALVE TUMOR ; PRP (Chest) Diagnosis:       Aortic valve disease      (Aortic valve disease [I35.9])    Surgeons: Benja De Luna MD Provider: Artemio Wong MD    Anesthesia Type: general, [place], CVL, PAC ASA Status: 4            Anesthesia Type: general, [place], CVL, PAC    Vitals  Vitals Value Taken Time   /63 07/01/24 1300   Temp     Pulse 77 07/01/24 1303   Resp     SpO2 100 % 07/01/24 1303   Vitals shown include unfiled device data.        Post Anesthesia Care and Evaluation    Patient location during evaluation: bedside  Patient participation: complete - patient cannot participate  Level of consciousness: obtunded/minimal responses  Pain management: adequate    Airway patency: Intubated.  Anesthetic complications: No anesthetic complications  PONV Status: controlled  Cardiovascular status: acceptable  Respiratory status: ETT  Hydration status: acceptable

## 2024-07-02 ENCOUNTER — APPOINTMENT (OUTPATIENT)
Dept: GENERAL RADIOLOGY | Facility: HOSPITAL | Age: 44
DRG: 981 | End: 2024-07-02
Payer: COMMERCIAL

## 2024-07-02 ENCOUNTER — APPOINTMENT (OUTPATIENT)
Dept: ULTRASOUND IMAGING | Facility: HOSPITAL | Age: 44
DRG: 981 | End: 2024-07-02
Payer: COMMERCIAL

## 2024-07-02 LAB
ALBUMIN SERPL-MCNC: 3.7 G/DL (ref 3.5–5.2)
ANION GAP SERPL CALCULATED.3IONS-SCNC: 9.3 MMOL/L (ref 5–15)
ARTERIAL PATENCY WRIST A: ABNORMAL
ATMOSPHERIC PRESS: 749.4 MMHG
BACTERIA UR QL AUTO: NORMAL /HPF
BASE EXCESS BLDA CALC-SCNC: -5.5 MMOL/L (ref 0–2)
BASOPHILS # BLD AUTO: 0.03 10*3/MM3 (ref 0–0.2)
BASOPHILS NFR BLD AUTO: 0.2 % (ref 0–1.5)
BDY SITE: ABNORMAL
BILIRUB UR QL STRIP: NEGATIVE
BUN SERPL-MCNC: 20 MG/DL (ref 6–20)
BUN/CREAT SERPL: 10.4 (ref 7–25)
CA-I BLD-MCNC: 4.9 MG/DL (ref 4.6–5.4)
CA-I SERPL ISE-MCNC: 1.23 MMOL/L (ref 1.15–1.35)
CALCIUM SPEC-SCNC: 8.5 MG/DL (ref 8.6–10.5)
CHLORIDE SERPL-SCNC: 112 MMOL/L (ref 98–107)
CK SERPL-CCNC: 426 U/L (ref 20–180)
CLARITY UR: CLEAR
CO2 BLDA-SCNC: 23.2 MMOL/L (ref 23–27)
CO2 SERPL-SCNC: 20.7 MMOL/L (ref 22–29)
COLOR UR: YELLOW
CREAT SERPL-MCNC: 1.92 MG/DL (ref 0.57–1)
DEPRECATED RDW RBC AUTO: 49.1 FL (ref 37–54)
EGFRCR SERPLBLD CKD-EPI 2021: 32.2 ML/MIN/1.73
EOSINOPHIL # BLD AUTO: 0.01 10*3/MM3 (ref 0–0.4)
EOSINOPHIL NFR BLD AUTO: 0.1 % (ref 0.3–6.2)
EOSINOPHIL SPEC QL MICRO: 0 % EOS/100 CELLS (ref 0–0)
ERYTHROCYTE [DISTWIDTH] IN BLOOD BY AUTOMATED COUNT: 14.6 % (ref 12.3–15.4)
FERRITIN SERPL-MCNC: 57.4 NG/ML (ref 13–150)
GAS FLOW AIRWAY: 4 LPM
GLUCOSE BLDC GLUCOMTR-MCNC: 111 MG/DL (ref 70–130)
GLUCOSE BLDC GLUCOMTR-MCNC: 113 MG/DL (ref 70–130)
GLUCOSE BLDC GLUCOMTR-MCNC: 117 MG/DL (ref 70–130)
GLUCOSE BLDC GLUCOMTR-MCNC: 117 MG/DL (ref 70–130)
GLUCOSE BLDC GLUCOMTR-MCNC: 118 MG/DL (ref 70–130)
GLUCOSE BLDC GLUCOMTR-MCNC: 121 MG/DL (ref 70–130)
GLUCOSE BLDC GLUCOMTR-MCNC: 124 MG/DL (ref 70–130)
GLUCOSE BLDC GLUCOMTR-MCNC: 130 MG/DL (ref 70–130)
GLUCOSE BLDC GLUCOMTR-MCNC: 135 MG/DL (ref 70–130)
GLUCOSE BLDC GLUCOMTR-MCNC: 156 MG/DL (ref 70–130)
GLUCOSE BLDC GLUCOMTR-MCNC: 165 MG/DL (ref 70–130)
GLUCOSE BLDC GLUCOMTR-MCNC: 242 MG/DL (ref 70–130)
GLUCOSE SERPL-MCNC: 117 MG/DL (ref 65–99)
GLUCOSE UR STRIP-MCNC: ABNORMAL MG/DL
HCO3 BLDA-SCNC: 21.6 MMOL/L (ref 22–28)
HCT VFR BLD AUTO: 28.6 % (ref 34–46.6)
HEMODILUTION: NO
HGB BLD-MCNC: 9.1 G/DL (ref 12–15.9)
HGB UR QL STRIP.AUTO: ABNORMAL
HYALINE CASTS UR QL AUTO: NORMAL /LPF
IMM GRANULOCYTES # BLD AUTO: 0.05 10*3/MM3 (ref 0–0.05)
IMM GRANULOCYTES NFR BLD AUTO: 0.4 % (ref 0–0.5)
INR PPP: 1.25 (ref 0.9–1.1)
IRON 24H UR-MRATE: 20 MCG/DL (ref 37–145)
IRON SATN MFR SERPL: 10 % (ref 20–50)
KETONES UR QL STRIP: ABNORMAL
LAB AP CASE REPORT: NORMAL
LEUKOCYTE ESTERASE UR QL STRIP.AUTO: NEGATIVE
LYMPHOCYTES # BLD AUTO: 0.95 10*3/MM3 (ref 0.7–3.1)
LYMPHOCYTES NFR BLD AUTO: 6.8 % (ref 19.6–45.3)
Lab: ABNORMAL
MAGNESIUM SERPL-MCNC: 2.7 MG/DL (ref 1.6–2.6)
MCH RBC QN AUTO: 29.4 PG (ref 26.6–33)
MCHC RBC AUTO-ENTMCNC: 31.8 G/DL (ref 31.5–35.7)
MCV RBC AUTO: 92.6 FL (ref 79–97)
MODALITY: ABNORMAL
MONOCYTES # BLD AUTO: 0.68 10*3/MM3 (ref 0.1–0.9)
MONOCYTES NFR BLD AUTO: 4.9 % (ref 5–12)
NEUTROPHILS NFR BLD AUTO: 12.29 10*3/MM3 (ref 1.7–7)
NEUTROPHILS NFR BLD AUTO: 87.6 % (ref 42.7–76)
NITRITE UR QL STRIP: NEGATIVE
NOTIFIED WHO: ABNORMAL
NRBC BLD AUTO-RTO: 0 /100 WBC (ref 0–0.2)
PATH REPORT.FINAL DX SPEC: NORMAL
PATH REPORT.GROSS SPEC: NORMAL
PCO2 BLDA: 49.4 MM HG (ref 35–45)
PH BLDA: 7.25 PH UNITS (ref 7.35–7.45)
PH UR STRIP.AUTO: 5.5 [PH] (ref 5–8)
PHOSPHATE SERPL-MCNC: 4.8 MG/DL (ref 2.5–4.5)
PLATELET # BLD AUTO: 129 10*3/MM3 (ref 140–450)
PMV BLD AUTO: 11.5 FL (ref 6–12)
PO2 BLDA: 110.8 MM HG (ref 80–100)
POTASSIUM SERPL-SCNC: 4.3 MMOL/L (ref 3.5–5.2)
PROT UR QL STRIP: ABNORMAL
PROTHROMBIN TIME: 15.9 SECONDS (ref 11.7–14.2)
QT INTERVAL: 432 MS
QTC INTERVAL: 502 MS
RBC # BLD AUTO: 3.09 10*6/MM3 (ref 3.77–5.28)
RBC # UR STRIP: NORMAL /HPF
READ BACK: YES
REF LAB TEST METHOD: NORMAL
SAO2 % BLDCOA: 97.4 % (ref 92–98.5)
SET MECH RESP RATE: 18
SODIUM SERPL-SCNC: 142 MMOL/L (ref 136–145)
SODIUM UR-SCNC: 66 MMOL/L
SP GR UR STRIP: >1.03 (ref 1–1.03)
SQUAMOUS #/AREA URNS HPF: NORMAL /HPF
TIBC SERPL-MCNC: 192 MCG/DL (ref 298–536)
TRANSFERRIN SERPL-MCNC: 129 MG/DL (ref 200–360)
URATE SERPL-MCNC: 4.6 MG/DL (ref 2.4–5.7)
UROBILINOGEN UR QL STRIP: ABNORMAL
WBC # UR STRIP: NORMAL /HPF
WBC NRBC COR # BLD AUTO: 14.01 10*3/MM3 (ref 3.4–10.8)

## 2024-07-02 PROCEDURE — 99232 SBSQ HOSP IP/OBS MODERATE 35: CPT | Performed by: INTERNAL MEDICINE

## 2024-07-02 PROCEDURE — 85610 PROTHROMBIN TIME: CPT | Performed by: NURSE PRACTITIONER

## 2024-07-02 PROCEDURE — 81001 URINALYSIS AUTO W/SCOPE: CPT | Performed by: INTERNAL MEDICINE

## 2024-07-02 PROCEDURE — 63710000001 INSULIN LISPRO (HUMAN) PER 5 UNITS: Performed by: NURSE PRACTITIONER

## 2024-07-02 PROCEDURE — 80069 RENAL FUNCTION PANEL: CPT | Performed by: NURSE PRACTITIONER

## 2024-07-02 PROCEDURE — 94799 UNLISTED PULMONARY SVC/PX: CPT

## 2024-07-02 PROCEDURE — 63710000001 INSULIN GLARGINE PER 5 UNITS: Performed by: NURSE PRACTITIONER

## 2024-07-02 PROCEDURE — 25010000002 ENOXAPARIN PER 10 MG: Performed by: INTERNAL MEDICINE

## 2024-07-02 PROCEDURE — 82948 REAGENT STRIP/BLOOD GLUCOSE: CPT

## 2024-07-02 PROCEDURE — 76775 US EXAM ABDO BACK WALL LIM: CPT

## 2024-07-02 PROCEDURE — 94640 AIRWAY INHALATION TREATMENT: CPT

## 2024-07-02 PROCEDURE — 84550 ASSAY OF BLOOD/URIC ACID: CPT | Performed by: INTERNAL MEDICINE

## 2024-07-02 PROCEDURE — 85025 COMPLETE CBC W/AUTO DIFF WBC: CPT | Performed by: NURSE PRACTITIONER

## 2024-07-02 PROCEDURE — 25010000002 NALOXONE PER 1 MG: Performed by: NURSE PRACTITIONER

## 2024-07-02 PROCEDURE — 84300 ASSAY OF URINE SODIUM: CPT | Performed by: INTERNAL MEDICINE

## 2024-07-02 PROCEDURE — 82728 ASSAY OF FERRITIN: CPT | Performed by: INTERNAL MEDICINE

## 2024-07-02 PROCEDURE — 83540 ASSAY OF IRON: CPT | Performed by: INTERNAL MEDICINE

## 2024-07-02 PROCEDURE — 97530 THERAPEUTIC ACTIVITIES: CPT

## 2024-07-02 PROCEDURE — 71045 X-RAY EXAM CHEST 1 VIEW: CPT

## 2024-07-02 PROCEDURE — 93005 ELECTROCARDIOGRAM TRACING: CPT | Performed by: NURSE PRACTITIONER

## 2024-07-02 PROCEDURE — 25010000002 ALBUMIN HUMAN 5% PER 50 ML: Performed by: NURSE PRACTITIONER

## 2024-07-02 PROCEDURE — 82803 BLOOD GASES ANY COMBINATION: CPT | Performed by: NURSE PRACTITIONER

## 2024-07-02 PROCEDURE — 25010000002 CEFAZOLIN PER 500 MG: Performed by: NURSE PRACTITIONER

## 2024-07-02 PROCEDURE — 82330 ASSAY OF CALCIUM: CPT | Performed by: NURSE PRACTITIONER

## 2024-07-02 PROCEDURE — 83735 ASSAY OF MAGNESIUM: CPT | Performed by: NURSE PRACTITIONER

## 2024-07-02 PROCEDURE — 93010 ELECTROCARDIOGRAM REPORT: CPT | Performed by: INTERNAL MEDICINE

## 2024-07-02 PROCEDURE — 25010000002 METOCLOPRAMIDE PER 10 MG: Performed by: NURSE PRACTITIONER

## 2024-07-02 PROCEDURE — 97162 PT EVAL MOD COMPLEX 30 MIN: CPT

## 2024-07-02 PROCEDURE — 25010000002 CALCIUM GLUCONATE 2-0.675 GM/100ML-% SOLUTION: Performed by: NURSE PRACTITIONER

## 2024-07-02 PROCEDURE — 82550 ASSAY OF CK (CPK): CPT | Performed by: INTERNAL MEDICINE

## 2024-07-02 PROCEDURE — P9041 ALBUMIN (HUMAN),5%, 50ML: HCPCS | Performed by: NURSE PRACTITIONER

## 2024-07-02 PROCEDURE — 87205 SMEAR GRAM STAIN: CPT | Performed by: INTERNAL MEDICINE

## 2024-07-02 PROCEDURE — 84466 ASSAY OF TRANSFERRIN: CPT | Performed by: INTERNAL MEDICINE

## 2024-07-02 PROCEDURE — 25010000002 ACETAMINOPHEN 10 MG/ML SOLUTION: Performed by: NURSE PRACTITIONER

## 2024-07-02 RX ORDER — GUAIFENESIN 600 MG/1
1200 TABLET, EXTENDED RELEASE ORAL EVERY 12 HOURS SCHEDULED
Status: DISCONTINUED | OUTPATIENT
Start: 2024-07-02 | End: 2024-07-07 | Stop reason: HOSPADM

## 2024-07-02 RX ORDER — ENOXAPARIN SODIUM 100 MG/ML
30 INJECTION SUBCUTANEOUS DAILY
Status: DISCONTINUED | OUTPATIENT
Start: 2024-07-02 | End: 2024-07-07 | Stop reason: HOSPADM

## 2024-07-02 RX ORDER — CALCIUM GLUCONATE 20 MG/ML
2000 INJECTION, SOLUTION INTRAVENOUS ONCE
Status: COMPLETED | OUTPATIENT
Start: 2024-07-02 | End: 2024-07-02

## 2024-07-02 RX ORDER — NALOXONE HCL 0.4 MG/ML
0.4 VIAL (ML) INJECTION ONCE
Status: COMPLETED | OUTPATIENT
Start: 2024-07-02 | End: 2024-07-02

## 2024-07-02 RX ORDER — NICOTINE POLACRILEX 4 MG
15 LOZENGE BUCCAL
Status: DISCONTINUED | OUTPATIENT
Start: 2024-07-02 | End: 2024-07-07 | Stop reason: HOSPADM

## 2024-07-02 RX ORDER — INSULIN LISPRO 100 [IU]/ML
2-9 INJECTION, SOLUTION INTRAVENOUS; SUBCUTANEOUS
Status: DISCONTINUED | OUTPATIENT
Start: 2024-07-02 | End: 2024-07-07 | Stop reason: HOSPADM

## 2024-07-02 RX ORDER — IPRATROPIUM BROMIDE AND ALBUTEROL SULFATE 2.5; .5 MG/3ML; MG/3ML
3 SOLUTION RESPIRATORY (INHALATION) EVERY 4 HOURS PRN
Status: DISCONTINUED | OUTPATIENT
Start: 2024-07-02 | End: 2024-07-07 | Stop reason: HOSPADM

## 2024-07-02 RX ORDER — HYDROCODONE BITARTRATE AND ACETAMINOPHEN 5; 325 MG/1; MG/1
2 TABLET ORAL EVERY 4 HOURS PRN
Status: DISCONTINUED | OUTPATIENT
Start: 2024-07-02 | End: 2024-07-03

## 2024-07-02 RX ORDER — SODIUM CHLORIDE FOR INHALATION 7 %
4 VIAL, NEBULIZER (ML) INHALATION
Status: DISCONTINUED | OUTPATIENT
Start: 2024-07-02 | End: 2024-07-06

## 2024-07-02 RX ORDER — IBUPROFEN 600 MG/1
1 TABLET ORAL
Status: DISCONTINUED | OUTPATIENT
Start: 2024-07-02 | End: 2024-07-07 | Stop reason: HOSPADM

## 2024-07-02 RX ORDER — DEXTROSE MONOHYDRATE 25 G/50ML
25 INJECTION, SOLUTION INTRAVENOUS
Status: DISCONTINUED | OUTPATIENT
Start: 2024-07-02 | End: 2024-07-07 | Stop reason: HOSPADM

## 2024-07-02 RX ADMIN — OXYCODONE HYDROCHLORIDE 10 MG: 5 TABLET ORAL at 02:20

## 2024-07-02 RX ADMIN — INSULIN GLARGINE 15 UNITS: 100 INJECTION, SOLUTION SUBCUTANEOUS at 08:26

## 2024-07-02 RX ADMIN — ENOXAPARIN SODIUM 30 MG: 100 INJECTION SUBCUTANEOUS at 17:46

## 2024-07-02 RX ADMIN — SODIUM BICARBONATE 50 MEQ: 84 INJECTION INTRAVENOUS at 16:55

## 2024-07-02 RX ADMIN — ACETAMINOPHEN 1000 MG: 1000 INJECTION INTRAVENOUS at 05:58

## 2024-07-02 RX ADMIN — NALOXONE HYDROCHLORIDE 0.4 MG: 0.4 INJECTION, SOLUTION INTRAMUSCULAR; INTRAVENOUS; SUBCUTANEOUS at 09:47

## 2024-07-02 RX ADMIN — MUPIROCIN 1 APPLICATION: 20 OINTMENT TOPICAL at 08:03

## 2024-07-02 RX ADMIN — SODIUM CHLORIDE 2 G: 900 INJECTION INTRAVENOUS at 18:10

## 2024-07-02 RX ADMIN — SENNOSIDES AND DOCUSATE SODIUM 2 TABLET: 50; 8.6 TABLET ORAL at 20:38

## 2024-07-02 RX ADMIN — ESCITALOPRAM OXALATE 10 MG: 10 TABLET, FILM COATED ORAL at 08:03

## 2024-07-02 RX ADMIN — SODIUM BICARBONATE 50 MEQ: 84 INJECTION INTRAVENOUS at 09:46

## 2024-07-02 RX ADMIN — GUAIFENESIN 1200 MG: 600 TABLET, EXTENDED RELEASE ORAL at 08:03

## 2024-07-02 RX ADMIN — ASPIRIN 81 MG: 81 TABLET, COATED ORAL at 08:03

## 2024-07-02 RX ADMIN — PANTOPRAZOLE SODIUM 40 MG: 40 TABLET, DELAYED RELEASE ORAL at 06:00

## 2024-07-02 RX ADMIN — SODIUM CHLORIDE 2 G: 900 INJECTION INTRAVENOUS at 10:02

## 2024-07-02 RX ADMIN — ALBUMIN (HUMAN) 250 ML: 12.5 INJECTION, SOLUTION INTRAVENOUS at 02:19

## 2024-07-02 RX ADMIN — SODIUM BICARBONATE 50 MEQ: 84 INJECTION INTRAVENOUS at 22:34

## 2024-07-02 RX ADMIN — GUAIFENESIN 1200 MG: 600 TABLET, EXTENDED RELEASE ORAL at 20:37

## 2024-07-02 RX ADMIN — 0.12% CHLORHEXIDINE GLUCONATE 15 ML: 1.2 RINSE ORAL at 02:05

## 2024-07-02 RX ADMIN — HYDROCODONE BITARTRATE AND ACETAMINOPHEN 1 TABLET: 5; 325 TABLET ORAL at 18:09

## 2024-07-02 RX ADMIN — SODIUM CHLORIDE 2 G: 900 INJECTION INTRAVENOUS at 02:05

## 2024-07-02 RX ADMIN — INSULIN LISPRO 2 UNITS: 100 INJECTION, SOLUTION INTRAVENOUS; SUBCUTANEOUS at 16:50

## 2024-07-02 RX ADMIN — IPRATROPIUM BROMIDE AND ALBUTEROL SULFATE 3 ML: .5; 3 SOLUTION RESPIRATORY (INHALATION) at 19:52

## 2024-07-02 RX ADMIN — IPRATROPIUM BROMIDE AND ALBUTEROL SULFATE 3 ML: .5; 3 SOLUTION RESPIRATORY (INHALATION) at 09:58

## 2024-07-02 RX ADMIN — METOCLOPRAMIDE 5 MG: 5 INJECTION, SOLUTION INTRAMUSCULAR; INTRAVENOUS at 08:03

## 2024-07-02 RX ADMIN — Medication 4 ML: at 19:52

## 2024-07-02 RX ADMIN — INSULIN LISPRO 4 UNITS: 100 INJECTION, SOLUTION INTRAVENOUS; SUBCUTANEOUS at 20:37

## 2024-07-02 RX ADMIN — CALCIUM GLUCONATE 2000 MG: 20 INJECTION, SOLUTION INTRAVENOUS at 08:26

## 2024-07-02 RX ADMIN — HYDROCODONE BITARTRATE AND ACETAMINOPHEN 1 TABLET: 5; 325 TABLET ORAL at 16:53

## 2024-07-02 RX ADMIN — METOPROLOL TARTRATE 12.5 MG: 25 TABLET, FILM COATED ORAL at 20:38

## 2024-07-02 RX ADMIN — MUPIROCIN 1 APPLICATION: 20 OINTMENT TOPICAL at 20:37

## 2024-07-02 RX ADMIN — HYDROCODONE BITARTRATE AND ACETAMINOPHEN 1 TABLET: 5; 325 TABLET ORAL at 10:20

## 2024-07-02 RX ADMIN — 0.12% CHLORHEXIDINE GLUCONATE 15 ML: 1.2 RINSE ORAL at 14:27

## 2024-07-02 RX ADMIN — Medication 4 ML: at 09:58

## 2024-07-02 NOTE — PLAN OF CARE
Goal Outcome Evaluation:  Plan of Care Reviewed With: patient        Progress: improving  Outcome Evaluation: POD 1 s/p CABGx1. Lethargic & drowsy this AM d/t pain medication, narcan given per order. Norco given for pain management, other meds d/c'd per CTS. A&Ox3, forgetful at times. All VSS, A paced at 70, on RA. Continuous IVF 75ml/hr per Renal. Up to chair x2 today, tolerated well. Encouraged IS use & CDB. Care ongoing.

## 2024-07-02 NOTE — PROGRESS NOTES
" LOS: 11 days   Patient Care Team:  Ibrahima Correa MD as PCP - General (Family Medicine)  Rachele Zafar, ALEENA as Ambulatory  (Population Health)  Xochilt Jenkins MD as Consulting Physician (Nephrology)    Chief Complaint: post op follow-up    Subjective      Vital Signs  Temp:  [96.4 °F (35.8 °C)-97.7 °F (36.5 °C)] 96.8 °F (36 °C)  Heart Rate:  [70-80] 80  Resp:  [11-18] 12  BP: ()/(53-76) 107/59  Arterial Line BP: ()/(49-92) 118/55  FiO2 (%):  [39 %-100 %] 39 %  Body mass index is 26.94 kg/m².    Intake/Output Summary (Last 24 hours) at 7/2/2024 0716  Last data filed at 7/2/2024 0659  Gross per 24 hour   Intake 4542.75 ml   Output 2625 ml   Net 1917.75 ml     No intake/output data recorded.    Chest tube drainage last 8 hours 70/30        06/30/24  0556 07/01/24  0040 07/02/24  0534   Weight: 63.2 kg (139 lb 6.4 oz) 63.9 kg (140 lb 12.8 oz) 71.2 kg (156 lb 15.5 oz)         Objective:  Vital signs: (most recent): Blood pressure 107/59, pulse 80, temperature 96.8 °F (36 °C), resp. rate 12, height 162.6 cm (64\"), weight 71.2 kg (156 lb 15.5 oz), last menstrual period 01/10/2023, SpO2 100%, not currently breastfeeding.                Results Review:        WBC WBC   Date Value Ref Range Status   07/02/2024 14.01 (H) 3.40 - 10.80 10*3/mm3 Final   07/01/2024 15.62 (H) 3.40 - 10.80 10*3/mm3 Final   07/01/2024 14.79 (H) 3.40 - 10.80 10*3/mm3 Final   07/01/2024 9.72 3.40 - 10.80 10*3/mm3 Final   07/01/2024 8.21 3.40 - 10.80 10*3/mm3 Final   06/30/2024 7.62 3.40 - 10.80 10*3/mm3 Final      HGB Hemoglobin   Date Value Ref Range Status   07/02/2024 9.1 (L) 12.0 - 15.9 g/dL Final   07/01/2024 9.5 (L) 12.0 - 17.0 g/dL Final     Comment:     Serial Number: [id number]:  [id number]   07/01/2024 11.3 (L) 12.0 - 15.9 g/dL Final   07/01/2024 10.0 (L) 12.0 - 15.9 g/dL Final   07/01/2024 8.0 (L) 12.0 - 15.9 g/dL Final   07/01/2024 10.7 (L) 12.0 - 15.9 g/dL Final   06/30/2024 11.0 (L) 12.0 - 15.9 " g/dL Final      HCT Hematocrit   Date Value Ref Range Status   07/02/2024 28.6 (L) 34.0 - 46.6 % Final   07/01/2024 28 (L) 38 - 51 % Final   07/01/2024 32.0 (L) 34.0 - 46.6 % Final   07/01/2024 28.2 (L) 34.0 - 46.6 % Final   07/01/2024 24.0 (L) 34.0 - 46.6 % Final   07/01/2024 33.6 (L) 34.0 - 46.6 % Final   06/30/2024 33.7 (L) 34.0 - 46.6 % Final      Platelets Platelets   Date Value Ref Range Status   07/02/2024 129 (L) 140 - 450 10*3/mm3 Final   07/01/2024 177 140 - 450 10*3/mm3 Final   07/01/2024 180 140 - 450 10*3/mm3 Final   07/01/2024 191 140 - 450 10*3/mm3 Final   07/01/2024 239 140 - 450 10*3/mm3 Final   06/30/2024 246 140 - 450 10*3/mm3 Final        PT/INR:    Protime   Date Value Ref Range Status   07/02/2024 15.9 (H) 11.7 - 14.2 Seconds Final   07/01/2024 16.7 (H) 11.7 - 14.2 Seconds Final   /  INR   Date Value Ref Range Status   07/02/2024 1.25 (H) 0.90 - 1.10 Final   07/01/2024 1.33 (H) 0.90 - 1.10 Final       Sodium Sodium   Date Value Ref Range Status   07/02/2024 142 136 - 145 mmol/L Final   07/01/2024 140 136 - 145 mmol/L Final   07/01/2024 140 136 - 145 mmol/L Final   07/01/2024 135 (L) 136 - 145 mmol/L Final   06/30/2024 138 136 - 145 mmol/L Final      Potassium Potassium   Date Value Ref Range Status   07/02/2024 4.3 3.5 - 5.2 mmol/L Final   07/01/2024 4.7 3.5 - 5.2 mmol/L Final     Comment:     Slight hemolysis detected by analyzer. Result may be falsely elevated.   07/01/2024 4.2 3.5 - 5.2 mmol/L Final   07/01/2024 4.2 3.5 - 5.2 mmol/L Final   06/30/2024 3.8 3.5 - 5.2 mmol/L Final      Chloride Chloride   Date Value Ref Range Status   07/02/2024 112 (H) 98 - 107 mmol/L Final   07/01/2024 109 (H) 98 - 107 mmol/L Final   07/01/2024 106 98 - 107 mmol/L Final   07/01/2024 101 98 - 107 mmol/L Final   06/30/2024 106 98 - 107 mmol/L Final      Bicarbonate CO2   Date Value Ref Range Status   07/02/2024 20.7 (L) 22.0 - 29.0 mmol/L Final   07/01/2024 20.1 (L) 22.0 - 29.0 mmol/L Final   07/01/2024 23.0  22.0 - 29.0 mmol/L Final   07/01/2024 25.0 22.0 - 29.0 mmol/L Final   06/30/2024 25.0 22.0 - 29.0 mmol/L Final      BUN BUN   Date Value Ref Range Status   07/02/2024 20 6 - 20 mg/dL Final   07/01/2024 19 6 - 20 mg/dL Final   07/01/2024 19 6 - 20 mg/dL Final   07/01/2024 20 6 - 20 mg/dL Final   06/30/2024 18 6 - 20 mg/dL Final      Creatinine Creatinine   Date Value Ref Range Status   07/02/2024 1.92 (H) 0.57 - 1.00 mg/dL Final   07/01/2024 1.75 (H) 0.57 - 1.00 mg/dL Final   07/01/2024 1.49 (H) 0.57 - 1.00 mg/dL Final   07/01/2024 1.22 (H) 0.57 - 1.00 mg/dL Final   06/30/2024 1.15 (H) 0.57 - 1.00 mg/dL Final      Calcium Calcium   Date Value Ref Range Status   07/02/2024 8.5 (L) 8.6 - 10.5 mg/dL Final   07/01/2024 8.4 (L) 8.6 - 10.5 mg/dL Final   07/01/2024 8.9 8.6 - 10.5 mg/dL Final   07/01/2024 9.2 8.6 - 10.5 mg/dL Final   06/30/2024 9.3 8.6 - 10.5 mg/dL Final      Magnesium Magnesium   Date Value Ref Range Status   07/02/2024 2.7 (H) 1.6 - 2.6 mg/dL Final   07/01/2024 2.4 1.6 - 2.6 mg/dL Final   07/01/2024 2.7 (H) 1.6 - 2.6 mg/dL Final          aspirin, 81 mg, Oral, Daily  atorvastatin, 80 mg, Oral, Nightly  ceFAZolin, 2 g, Intravenous, Q8H  chlorhexidine, 15 mL, Mouth/Throat, Q12H  enoxaparin, 40 mg, Subcutaneous, Daily  escitalopram, 10 mg, Oral, Daily  magnesium sulfate, 1 g, Intravenous, Q8H  metoclopramide, 5 mg, Intravenous, Q6H  metoprolol tartrate, 12.5 mg, Oral, Q12H  mupirocin, , Each Nare, BID  pantoprazole, 40 mg, Oral, QAM  senna-docusate sodium, 2 tablet, Oral, Nightly      clevidipine, 2-32 mg/hr, Last Rate: Stopped (07/01/24 1733)  dexmedetomidine, 0.2-1.5 mcg/kg/hr, Last Rate: Stopped (07/01/24 1459)  DOPamine, 2-20 mcg/kg/min  EPINEPHrine, 0.02-0.1 mcg/kg/min  insulin, 0-100 Units/hr, Last Rate: 0.4 Units/hr (07/02/24 0658)  milrinone, 0.25-0.375 mcg/kg/min  niCARdipine, 5-15 mg/hr, Last Rate: Stopped (07/01/24 1610)  nitroglycerin, 5-200 mcg/min  norepinephrine, 0.02-0.2  mcg/kg/min  phenylephrine, 0.2-2 mcg/kg/min  propofol, 5-50 mcg/kg/min, Last Rate: Stopped (07/01/24 1432)  sodium chloride, 30 mL/hr, Last Rate: 30 mL/hr (07/01/24 1258)              Acute right-sided weakness    Benign essential hypertension    Gastroparesis diabeticorum    Gastroesophageal reflux disease    Mixed hypercholesterolemia and hypertriglyceridemia    Iron deficiency anemia    Type 2 diabetes mellitus with hyperglycemia, with long-term current use of insulin    Coronary artery disease involving native coronary artery of native heart with unstable angina pectoris    Tobacco abuse    CKD stage 3a, GFR 45-59 ml/min    Carotid stenosis    Lacunar cerebrovascular accident (CVA)    Elevated troponin    Aortic valve disease      Assessment & Plan    -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), EVH right, open left, AV fibroelastoma resection, AV repair- [place]  -Recurrent MCA stroke  -Coronary artery disease status post CABG in 2014  -Uncontrolled diabetes mellitus, HgbA1c 16.9, with peripheral neuropathy  -CKD, stage 3  -Hypertension  -Hyperlipidemia  -Obesity  -Tobacco abuse-- encourage cessation  -PAD, Carotid stenosis, 70% right ICA  -GERD  -LB  -chronic anemia- acute worsening expected acute blood loss    POD#1  Up in the chair  4L NC--wean as able   Very drowsy this morning-- will discontinue the oxycodone-- may need to give some narcan--if she does not improve  Sinus rhythm rate in the 50s-- pacing for higher pressure-- replete calcium.  Encourage pulmonary toilet--- continue IS, will add flutter, mucinex and nebs  Mobilize/increase activity  Creatinine 1.92-- will consult nephrology  Continue routine care    KAUSHAL Alvarado  07/02/24  07:16 EDT

## 2024-07-02 NOTE — PLAN OF CARE
Goal Outcome Evaluation:                 Post op day 1. Extubated at 1918 on 7/1. Albumin given to help urine output. Pain controlled. Up in the chair.

## 2024-07-02 NOTE — PAYOR COMM NOTE
"Bibiana Belcher (44 y.o. Female)                         ATTENTION; CONTINUED CLINICALS CASE REF [id number] STARTING 6/29/24                         REPLY TO [facility] DEPT  [phone] OR CALL   KAREN CANSECO LPN           Date of Birth   1980    Social Security Number       Address   [address] [place] [id number]    Home Phone   [phone]    MRN   [id number]       Hinduism   None    Marital Status                               Admission Date   6/21/24    Admission Type   Emergency    Admitting Provider   Raul Davis MD    Attending Provider   Raul Davis MD    Department, Room/Bed   [facility] CARDIO RECOVERY, 2203/1       Discharge Date       Discharge Disposition       Discharge Destination                                 Attending Provider: Raul Davis MD    Allergies: Latex    Isolation: None   Infection: None   Code Status: CPR    Ht: 162.6 cm (64\")   Wt: 71.2 kg (156 lb 15.5 oz)    Admission Cmt: None   Principal Problem: Acute right-sided weakness [R53.1]                   Active Insurance as of 6/21/2024       Primary Coverage       Payor Plan Insurance Group Employer/Plan Group    [employer] EMPLOYEE [id number]       Payor Plan Address Payor Plan Phone Number Payor Plan Fax Number Effective Dates    [address] [phone]  2/1/2020 - None Entered    [place] [address]         Subscriber Name Subscriber Birth Date Member ID       BIBIANA BELCHER 1980 [id number]                     Emergency Contacts        (Rel.) Home Phone Work Phone Mobile Phone    HOLLAND BELCHER (Spouse) [phone] -- [phone]                Orders (last 72 hrs)        Start     Ordered    07/04/24 0600  Wound Care  Daily       07/01/24 1308    07/04/24 0600  XR Chest PA & Lateral  1 Time Imaging         07/01/24 1308    07/03/24 1200  Remove Midsternal Dressing on POD 3. Patient May Shower With Dressing On. If " Dressing Becomes Loose or Wet Remove on POD 2  Once         07/01/24 1308    07/03/24 1200  Leave Incisions Open to Air Unless Draining or Edges Are Not Approximated  Continuous         07/01/24 1308    07/03/24 0600  Change Chest Dressing Daily While Intubated  Daily       07/01/24 1308    07/03/24 0600  Wound Care  Daily       07/01/24 1308    07/03/24 0600  Wound Care  Daily       07/01/24 1308    07/03/24 0600  CBC (No Diff)  Daily       07/01/24 1308    07/03/24 0600  Basic Metabolic Panel  Daily       07/01/24 1308    07/02/24 2100  sennosides-docusate (PERICOLACE) 8.6-50 MG per tablet 2 tablet  Nightly         07/01/24 1308    07/02/24 1800  Enoxaparin Sodium (LOVENOX) syringe 40 mg  Daily         07/01/24 1308    07/02/24 1400  Intake & Output  Every Shift       07/01/24 1308    07/02/24 1200  Vital Signs  Every 4 Hours      Comments: Perform Vitals Less Frequently Only if Patient Stable      07/01/24 1308    07/02/24 1100  POC Glucose 4x Daily Before Meals & at Bedtime  4 Times Daily Before Meals & at Bedtime      Comments: Complete no more than 45 minutes prior to patient eating      07/02/24 0811    07/02/24 0930  sodium chloride 7 % nebulizer solution nebulizer solution 4 mL  2 Times Daily - RT         07/02/24 0726    07/02/24 0900  aspirin EC tablet 81 mg  Daily         07/01/24 1308    07/02/24 0900  escitalopram (LEXAPRO) tablet 10 mg  Daily         07/01/24 1308    07/02/24 0900  metoprolol tartrate (LOPRESSOR) tablet 12.5 mg  Every 12 Hours Scheduled,   Status:  Discontinued         07/01/24 1318    07/02/24 0900  guaiFENesin (MUCINEX) 12 hr tablet 1,200 mg  Every 12 Hours Scheduled         07/02/24 0726 07/02/24 0900  metoprolol tartrate (LOPRESSOR) tablet 25 mg  Every 12 Hours Scheduled,   Status:  Discontinued         07/02/24 0728    07/02/24 0900  metoprolol tartrate (LOPRESSOR) tablet 12.5 mg  Every 12 Hours Scheduled         07/02/24 0810    07/02/24 0900  calcium gluconate 2000-432  "MG/100ML NACL IVPB  Once         07/02/24 0810    07/02/24 0900  insulin lispro (HUMALOG/ADMELOG) injection 2-9 Units  4 Times Daily Before Meals & Nightly         07/02/24 0811    07/02/24 0900  insulin glargine (LANTUS, SEMGLEE) injection 15 Units  Daily         07/02/24 0811    07/02/24 0847  Blood Gas, Arterial -  Once         07/02/24 0846    07/02/24 0830  Oscillating Positive Expiratory Pressure (OPEP)  4 Times Daily - RT       07/02/24 0726    07/02/24 0821  Inpatient Nephrology Consult  Once        Specialty:  Nephrology  Provider:  (Not yet assigned)    07/02/24 0820    07/02/24 0819  POC Glucose Once  PROCEDURE ONCE        Comments: Complete no more than 45 minutes prior to patient eating      07/02/24 0817    07/02/24 0811  dextrose (GLUTOSE) oral gel 15 g  Every 15 Minutes PRN         07/02/24 0811    07/02/24 0811  dextrose (D50W) (25 g/50 mL) IV injection 25 g  Every 15 Minutes PRN         07/02/24 0811    07/02/24 0811  glucagon (GLUCAGEN) injection 1 mg  Every 15 Minutes PRN         07/02/24 0811    07/02/24 0805  HYDROcodone-acetaminophen (NORCO) 5-325 MG per tablet 2 tablet  Every 4 Hours PRN         07/02/24 0805    07/02/24 0800  Wean & Extubate Per Rapid Wean and Extubation Protocol  Every Morning,   Status:  Canceled       07/01/24 1308    07/02/24 0800  Initiate & Follow Rapid Wean & Extubate Protocol  Every Morning,   Status:  Canceled      Comments: Wean and extubate per protocol.    07/01/24 1308    07/02/24 0725  ipratropium-albuterol (DUO-NEB) nebulizer solution 3 mL  Every 4 Hours PRN         07/02/24 0726    07/02/24 0700  pantoprazole (PROTONIX) EC tablet 40 mg  Every Morning        Placed in \"Followed by\" Linked Group    07/01/24 1308    07/02/24 0700  POC Glucose Once  PROCEDURE ONCE        Comments: Complete no more than 45 minutes prior to patient eating      07/02/24 0658    07/02/24 0609  POC Glucose Once  PROCEDURE ONCE        Comments: Complete no more than 45 minutes prior to " "patient eating      07/02/24 0607    07/02/24 0603  Diet: Liquid, Diabetic; Full Liquid; Consistent Carbohydrate; Fluid Consistency: Thin (IDDSI 0)  Diet Effective Now         07/02/24 0603    07/02/24 0600  XR Chest 1 View  Daily       07/01/24 1308    07/02/24 0600  ECG 12 Lead Pre-Op / Pre-Procedure  Daily       07/01/24 1308    07/02/24 0600  RN to Place Order For STAT Chest X-Ray When Chest Tubes Are Removed  Once        Comments: No chest X-ray needed for chest tubes with bulb drain    07/01/24 1308    07/02/24 0529  POC Glucose Once  PROCEDURE ONCE        Comments: Complete no more than 45 minutes prior to patient eating      07/02/24 0527    07/02/24 0403  POC Glucose Once  PROCEDURE ONCE        Comments: Complete no more than 45 minutes prior to patient eating      07/02/24 0401    07/02/24 0313  acetaminophen (TYLENOL) tablet 650 mg  Every 4 Hours PRN        Placed in \"Or\" Linked Group    07/01/24 1308    07/02/24 0313  acetaminophen (TYLENOL) 160 MG/5ML oral solution 650 mg  Every 4 Hours PRN        Placed in \"Or\" Linked Group    07/01/24 1308    07/02/24 0313  acetaminophen (TYLENOL) suppository 650 mg  Every 4 Hours PRN        Placed in \"Or\" Linked Group    07/01/24 1308    07/02/24 0306  POC Glucose Once  PROCEDURE ONCE        Comments: Complete no more than 45 minutes prior to patient eating      07/02/24 0304    07/02/24 0300  Calcium, Ionized  Once         07/01/24 1308    07/02/24 0300  CBC & Differential  Once         07/01/24 1308    07/02/24 0300  Renal Function Panel  Once         07/01/24 1308    07/02/24 0300  Magnesium  Once         07/01/24 1308    07/02/24 0300  Protime-INR  Once         07/01/24 1308    07/02/24 0300  CBC Auto Differential  PROCEDURE ONCE         07/01/24 1901    07/02/24 0205  POC Glucose Once  PROCEDURE ONCE        Comments: Complete no more than 45 minutes prior to patient eating      07/02/24 0204    07/02/24 0107  POC Glucose Once  PROCEDURE ONCE        Comments: " Complete no more than 45 minutes prior to patient eating      07/02/24 0106    07/02/24 0002  POC Glucose Once  PROCEDURE ONCE        Comments: Complete no more than 45 minutes prior to patient eating      07/01/24 2359    07/02/24 0000  bisacodyl (DULCOLAX) suppository 10 mg  Daily PRN         07/01/24 1308    07/02/24 0000  magnesium hydroxide (MILK OF MAGNESIA) suspension 10 mL  Daily PRN         07/01/24 1308    07/02/24 0000  cyclobenzaprine (FLEXERIL) tablet 10 mg  Every 8 Hours PRN,   Status:  Discontinued         07/01/24 1308    07/01/24 2330  Patient May Use Home CPAP / BIPAP For Sleep  At Bedtime - RT       07/01/24 1308    07/01/24 2259  POC Glucose Once  PROCEDURE ONCE        Comments: Complete no more than 45 minutes prior to patient eating      07/01/24 2144    07/01/24 2259  POC Glucose Once  PROCEDURE ONCE        Comments: Complete no more than 45 minutes prior to patient eating      07/01/24 2257    07/01/24 2100  mupirocin (BACTROBAN) 2 % nasal ointment  2 Times Daily         07/01/24 1308    07/01/24 2100  atorvastatin (LIPITOR) tablet 80 mg  Nightly         07/01/24 1308    07/01/24 2045  POC Glucose Once  PROCEDURE ONCE        Comments: Complete no more than 45 minutes prior to patient eating      07/01/24 2043    07/01/24 1931  POC Glucose Once  PROCEDURE ONCE        Comments: Complete no more than 45 minutes prior to patient eating      07/01/24 1929    07/01/24 1915  POC Lactate  PROCEDURE ONCE         07/01/24 1909 07/01/24 1915  POC Chem Panel  PROCEDURE ONCE         07/01/24 1909 07/01/24 1915  POC H&H  PROCEDURE ONCE         07/01/24 1909 07/01/24 1915  Blood Gas, Arterial -  PROCEDURE ONCE         07/01/24 1909 07/01/24 1900  ceFAZolin 2000 mg IVPB in 100 mL NS (MBP)  Every 8 Hours         07/01/24 1308    07/01/24 1809  POC Glucose Once  PROCEDURE ONCE        Comments: Complete no more than 45 minutes prior to patient eating      07/01/24 1807    07/01/24 1800  Ambulate  Patient  3 Times Daily         07/01/24 1308    07/01/24 1654  Blood Gas, Arterial -  PROCEDURE ONCE         07/01/24 1649    07/01/24 1653  POC Glucose Once  PROCEDURE ONCE        Comments: Complete no more than 45 minutes prior to patient eating      07/01/24 1651    07/01/24 1600  Check Peripheral Pulses Every 4 Hours  Every 4 Hours       07/01/24 1308    07/01/24 1600  CBC (No Diff)  Every 4 Hours       07/01/24 1308    07/01/24 1525  POC Glucose Once  PROCEDURE ONCE        Comments: Complete no more than 45 minutes prior to patient eating      07/01/24 1523    07/01/24 1450  hydrALAZINE (APRESOLINE) injection 20 mg  Every 6 Hours PRN,   Status:  Discontinued         07/01/24 1450    07/01/24 1440  POC Glucose Once  PROCEDURE ONCE        Comments: Complete no more than 45 minutes prior to patient eating      07/01/24 1431    07/01/24 1400  Vital Signs Every Hour Until 24 Hours Post Op  Every Hour      Comments: Perform Vitals Less Frequently Only if Patient Stable      07/01/24 1308    07/01/24 1400  Check Peripheral Pulses Every 1 Hour x4  Every Hour       07/01/24 1308    07/01/24 1400  Intake & Output Every Hour Until 24 Hours Post Op  Every Hour       07/01/24 1308    07/01/24 1400  Cardiac Output Parameters On Admission, Then Every 1 Hour x4  Every Hour       07/01/24 1308    07/01/24 1400  Cardiac Output Parameters Every 2 Hours Until 24 Hours Post Op  Every 2 Hours       07/01/24 1308    07/01/24 1400  Incentive Spirometry  Every Hour While Awake       07/01/24 1308    07/01/24 1400  sodium chloride 0.9 % infusion  Continuous         07/01/24 1308    07/01/24 1400  nitroglycerin (TRIDIL) 200 mcg/ml infusion  Titrated,   Status:  Discontinued         07/01/24 1308    07/01/24 1400  chlorhexidine (PERIDEX) 0.12 % solution 15 mL  Every 12 Hours         07/01/24 1308    07/01/24 1400  insulin regular 1 unit/mL in 0.9% sodium chloride (Glucommander)  Titrated,   Status:  Discontinued        Note to Pharmacy:  Upon initiation of Glucommander insulin drip, make sure all other insulin orders and anti-diabetic medications have been discontinued.    07/01/24 1308    07/01/24 1400  dexmedetomidine (PRECEDEX) 400 mcg in 100 mL NS infusion  Titrated,   Status:  Discontinued         07/01/24 1308    07/01/24 1400  metoprolol tartrate (LOPRESSOR) tablet 12.5 mg  Every 12 Hours Scheduled,   Status:  Discontinued         07/01/24 1308    07/01/24 1400  metoclopramide (REGLAN) injection 5 mg  Every 6 Hours         07/01/24 1308    07/01/24 1400  acetaminophen (OFIRMEV) injection 1,000 mg  Every 8 Hours         07/01/24 1308    07/01/24 1400  magnesium sulfate in D5W 1g/100mL (PREMIX)  Every 8 Hours         07/01/24 1308    07/01/24 1328  POC Glucose Once  PROCEDURE ONCE        Comments: Complete no more than 45 minutes prior to patient eating      07/01/24 1326    07/01/24 1309  Transfer Patient  Once         07/01/24 1308    07/01/24 1309  Vital Signs & Post-Op Checks Every 15 Minutes x2, Every 30 Minutes x4  Per Hospital Policy        Comments: Perform Vitals Less Frequently Only if Patient Stable    07/01/24 1308    07/01/24 1309  Daily Weights  Daily       07/01/24 1308    07/01/24 1309  Intake & Output Every 15 Minutes x2, Every 30 Minutes x4  Every Shift         07/01/24 1308    07/01/24 1309  Continuous Cardiac Monitoring  Continuous        Comments: Follow Standing Orders As Outlined in Process Instructions (Open Order Report to View Full Instructions)    07/01/24 1308    07/01/24 1309  Maintain IV Access  Continuous         07/01/24 1308    07/01/24 1309  Telemetry - Place Orders & Notify Provider of Results When Patient Experiences Acute Chest Pain, Dysrhythmia or Respiratory Distress  Continuous        Comments: Open Order Report to View Parameters Requiring Provider Notification    07/01/24 1308    07/01/24 1309  May Be Off Telemetry for Tests  Continuous         07/01/24 1308    07/01/24 1309  Continuous Pulse Oximetry   "Continuous         07/01/24 1308    07/01/24 1309  Discontinue NG After Extubation  Once         07/01/24 1308    07/01/24 1309  Continue Indwelling Urinary Catheter  Once        Placed in \"And\" Linked Group    07/01/24 1308    07/01/24 1309  Assess Need for Indwelling Urinary Catheter - Follow Removal Protocol  Continuous        Comments: Indwelling Urinary Catheter Removal Criteria  Discontinue Indwelling Urinary Catheter Unless One of the Following is Present  Urinary Retention or Obstruction  Chronic Nelson Catheter Use  End of Life  Critical Illness with Strict I/O   Tract or Abdominal Surgery  Stage 3/4 Sacral / Perineal Wound  Required Activity Restriction: Trauma  Required Activity Restriction: Spine Surgery  If Patient is Being Followed by Urology Contact Them PRIOR to Removal  Do Not Remove Indwelling Urinary Catheter Order is Present with a CLINICAL REASON to Maintain the Catheter. Provider is Required to Include a Clinical Reason to Maintain a Urinary Catheter    Chronic Nelson Catheter Use (Present on Admission)  Assess for Continued Need & Document Medical Necessity  If Infection is Suspected, Contact the Provider       Placed in \"And\" Linked Group    07/01/24 1308    07/01/24 1309  Chest & Mediastinal Tubes to 20cm Continuous Suction  Once         07/01/24 1308    07/01/24 1309  Begin Rewarming with Thermal Unit As Needed For Temp Less Than 96F  Once         07/01/24 1308    07/01/24 1309  ACE Wraps to Vein Piqua Donor Site for 24 Hours, Then Apply YENIFER Hose  Continuous         07/01/24 1308    07/01/24 1309  Apply Surgical Bra Per Order Details  Per Order Details        Comments: Apply Surgical Bra For Medial Sternotomy Incision Support    07/01/24 1308    07/01/24 1309  Heart Hugger Vest  Continuous         07/01/24 1308    07/01/24 1309  Monitor QTc  Every Shift       07/01/24 1308    07/01/24 1309  Notify Provider - QTc 500 milliseconds or Greater  Until Discontinued         07/01/24 1308    " 07/01/24 1309  Wound Dressing  Continuous         07/01/24 1308    07/01/24 1309  Wound Dressing  Continuous         07/01/24 1308    07/01/24 1309  Notify Surgeon if Drainage From Surgical Incision Site  Until Discontinued         07/01/24 1308    07/01/24 1309  ISOLATE PACER WIRES  Continuous         07/01/24 1308    07/01/24 1309  Turn Patient Every 2 Hours or Begin Bed Rotation Once Hemodynamically Stable  Now Then Every 2 Hours         07/01/24 1308    07/01/24 1309  Advance PROM to AROM  Now Then Every 4 Hours         07/01/24 1308    07/01/24 1309  Up in Chair for Meals  Until Discontinued         07/01/24 1308    07/01/24 1309  Notify Provider - Urine Output  Until Discontinued         07/01/24 1308    07/01/24 1309  Notify Provider - Chest Tube Output  Until Discontinued         07/01/24 1308    07/01/24 1309  Cardiac Sternal Precautions - Maintain at All Times & Teach Patient  Continuous        Comments: Maintain Sternal Precautions at All Times & Teach Patient    07/01/24 1308    07/01/24 1309  Initial Ventilator Settings Per Pulmonologist / Anesthesiologist  Once         07/01/24 1308    07/01/24 1309  EZ-Pap  Once         07/01/24 1308    07/01/24 1309  XR Chest 1 View  1 Time Imaging         07/01/24 1308    07/01/24 1309  ECG 12 Lead Pre-Op / Pre-Procedure  STAT         07/01/24 1308    07/01/24 1309  Protime-INR  STAT         07/01/24 1308    07/01/24 1309  aPTT  STAT         07/01/24 1308    07/01/24 1309  Calcium, Ionized  STAT         07/01/24 1308    07/01/24 1309  Blood Gas, Arterial -  STAT         07/01/24 1308    07/01/24 1309  CBC & Differential  STAT         07/01/24 1308    07/01/24 1309  Fibrinogen  STAT         07/01/24 1308    07/01/24 1309  Renal Function Panel  Now Then Every 4 Hours       07/01/24 1308    07/01/24 1309  Magnesium  Now Then Every 4 Hours       07/01/24 1308    07/01/24 1309  Potassium  Now Then Every 4 Hours       07/01/24 1308    07/01/24 1309  Advance Diet As  Tolerated -  Until Discontinued         07/01/24 1308 07/01/24 1309  Cardiac Rehab Evaluation and Enrollment  Once        Provider:  (Not yet assigned)    07/01/24 1308    07/01/24 1309  Consult to Case Management /   Once        Provider:  (Not yet assigned)    07/01/24 1308    07/01/24 1309  PT Consult: Eval & Treat  Once         07/01/24 1308    07/01/24 1309  RN to Release PRN POC Glucose Orders Per Glucommander  Continuous,   Status:  Canceled         07/01/24 1308 07/01/24 1309  RN to Order STAT Glucose for BG Less Than 10 mg/dl or Greater Than 600 mg/dl  Continuous,   Status:  Canceled        Comments: Do not delay treatment of unstable patient in order to obtain glucose sample from Lab.   Inform provider of results.    07/01/24 1308 07/01/24 1309  Prior to Initiating Glucommander™, Ensure All Prior Insulin Orders are Discontinued  Once,   Status:  Canceled         07/01/24 1308    07/01/24 1309  PRIOR to START of Intravenous Insulin Infusion, Notify Provider if K+ is Less Than 3.3, for Extra Potassium Orders, if Indicated. Do Not Start Insulin Drip if K+ Less Than 3.3.  Per Order Details,   Status:  Canceled         07/01/24 1308    07/01/24 1309  Use a Dedicated Line for Insulin Infusion (If Possible).  May Use a Carrier Fluid of NS at KVO Rate if Insulin Rate is Insufficient to Maintain IV Patency.  Prime IV Line With Insulin Infusion  Continuous,   Status:  Canceled         07/01/24 1308    07/01/24 1309  If Insulin Infusion is Discontinued, Glucommander Must Also be Discontinued. If Insulin Infusion is Re-Ordered Discontinue Previous Settings in Glucommander & Start New  Per Order Details,   Status:  Canceled         07/01/24 1308    07/01/24 1309  Patient is on Glucommander  Continuous,   Status:  Canceled         07/01/24 1308 07/01/24 1309  Notify Provider  Continuous,   Status:  Canceled        Comments: Open Order Report to View Parameters Requiring Provider Notification  "   07/01/24 1308    07/01/24 1309  If Patient Has Diet Order or Bolus Tube Feeds Utilize the Start Meal / Meal Bolus Feature in Glucommander  Continuous,   Status:  Canceled         07/01/24 1308    07/01/24 1309  Code Status and Medical Interventions:  Continuous         07/01/24 1308    07/01/24 1309  NPO Diet NPO Type: Sips with Meds  Diet Effective Now,   Status:  Canceled         07/01/24 1308    07/01/24 1309  Target Arousal Level RASS -1 to -2  Continuous         07/01/24 1308    07/01/24 1309  CBC Auto Differential  PROCEDURE ONCE         07/01/24 1308    07/01/24 1308  Urinary Catheter Care  Every Shift      Placed in \"And\" Linked Group    07/01/24 1308    07/01/24 1308  acetaminophen (TYLENOL) tablet 650 mg  Every 4 Hours        Placed in \"Or\" Linked Group    07/01/24 1308    07/01/24 1308  acetaminophen (TYLENOL) 160 MG/5ML oral solution 650 mg  Every 4 Hours        Placed in \"Or\" Linked Group    07/01/24 1308    07/01/24 1308  acetaminophen (TYLENOL) suppository 650 mg  Every 4 Hours        Placed in \"Or\" Linked Group    07/01/24 1308    07/01/24 1308  morphine injection 1 mg  Every 4 Hours PRN        Placed in \"And\" Linked Group    07/01/24 1308    07/01/24 1308  naloxone (NARCAN) injection 0.4 mg  Every 5 Minutes PRN        Placed in \"And\" Linked Group    07/01/24 1308    07/01/24 1308  polyethylene glycol (MIRALAX) packet 17 g  Daily PRN         07/01/24 1308    07/01/24 1308  ondansetron (ZOFRAN) injection 4 mg  Every 6 Hours PRN         07/01/24 1308    07/01/24 1308  midazolam (VERSED) injection 2 mg  Every 1 Hour PRN,   Status:  Discontinued         07/01/24 1308    07/01/24 1308  ALPRAZolam (XANAX) tablet 0.25 mg  Every 8 Hours PRN         07/01/24 1308    07/01/24 1308  pantoprazole (PROTONIX) injection 40 mg  Once        Placed in \"Followed by\" Linked Group    07/01/24 1308    07/01/24 1308  DOPamine 400 mg in 250 mL D5W infusion  Continuous PRN,   Status:  Discontinued         07/01/24 1308    " 07/01/24 1308  phenylephrine (NNAMDI-SYNEPHRINE) 50 mg in 250 mL NS infusion  Continuous PRN,   Status:  Discontinued         07/01/24 1308    07/01/24 1308  niCARdipine (CARDENE) 25 mg in 250 mL NS infusion kit  Continuous PRN         07/01/24 1308    07/01/24 1308  norepinephrine (LEVOPHED) 8 mg in 250 mL NS infusion (premix)  Continuous PRN,   Status:  Discontinued         07/01/24 1308    07/01/24 1308  clevidipine (CLEVIPREX) infusion 0.5 mg/mL  Continuous PRN,   Status:  Discontinued         07/01/24 1308    07/01/24 1308  EPINEPHrine 5 mg in 250 mL NS infusion  Continuous PRN,   Status:  Discontinued         07/01/24 1308    07/01/24 1308  milrinone (PRIMACOR) 20 mg in 100 mL D5W infusion  Continuous PRN,   Status:  Discontinued         07/01/24 1308    07/01/24 1308  HYDROcodone-acetaminophen (NORCO) 5-325 MG per tablet 2 tablet  Every 4 Hours PRN,   Status:  Discontinued         07/01/24 1308    07/01/24 1308  oxyCODONE (ROXICODONE) immediate release tablet 10 mg  Every 4 Hours PRN,   Status:  Discontinued         07/01/24 1308    07/01/24 1308  morphine injection 4 mg  Every 30 Minutes PRN,   Status:  Discontinued         07/01/24 1308    07/01/24 1308  Treat Hypoglycemia As Recommended By Glucommander™ & Notify Provider of Treatment  As Needed,   Status:  Canceled      Comments: Follow Hypoglycemia Orders As Outlined in Process Instructions (Open Order Report to View Full Instructions)  Notify Provider Any Time Hypoglycemia Treatment is Administered    07/01/24 1308    07/01/24 1308  If Insulin Infusion is Paused - Follow Glucommander Instructions  As Needed,   Status:  Canceled       07/01/24 1308    07/01/24 1308  dextrose (GLUTOSE) oral gel 15 g  Every 15 Minutes PRN,   Status:  Discontinued         07/01/24 1308    07/01/24 1308  dextrose (D50W) (25 g/50 mL) IV injection 10-50 mL  Every 15 Minutes PRN,   Status:  Discontinued         07/01/24 1308    07/01/24 1308  glucagon (GLUCAGEN) injection 1 mg   Every 15 Minutes PRN,   Status:  Discontinued         07/01/24 1308    07/01/24 1308  POC Glucose PRN  As Needed,   Status:  Canceled      Comments: Glucommander recommended POC testing will vary between 15 minutes and 2 hours.      07/01/24 1308    07/01/24 1308  albumin human 5 % solution 1,500 mL  As Needed         07/01/24 1308    07/01/24 1308  propofol (DIPRIVAN) infusion 10 mg/mL 100 mL  Continuous PRN,   Status:  Discontinued         07/01/24 1308    07/01/24 1308  meperidine (DEMEROL) injection 25 mg  Every 4 Hours PRN         07/01/24 1308    07/01/24 1308  Potassium Replacement - Follow Nurse / BPA Driven Protocol  As Needed         07/01/24 1308    07/01/24 1308  bisacodyl (DULCOLAX) EC tablet 10 mg  Daily PRN         07/01/24 1308    07/01/24 1308  nitroglycerin (NITROSTAT) SL tablet 0.4 mg  Every 5 Minutes PRN         07/01/24 1308    07/01/24 1225  POC Activated Clotting Time  PROCEDURE ONCE         07/01/24 0727    07/01/24 1225  POC Activated Clotting Time  PROCEDURE ONCE         07/01/24 0949    07/01/24 1225  POC Activated Clotting Time  PROCEDURE ONCE         07/01/24 1020    07/01/24 1224  POC Activated Clotting Time  PROCEDURE ONCE         07/01/24 1053    07/01/24 1223  POC Activated Clotting Time  PROCEDURE ONCE         07/01/24 1224    07/01/24 1153  POC Activated Clotting Time  PROCEDURE ONCE         07/01/24 1223    07/01/24 1120  POC Activated Clotting Time  PROCEDURE ONCE         07/01/24 1224    07/01/24 1114  Transfuse RBC  Status:  Canceled       07/01/24 1114    07/01/24 1112  CBC (No Diff)  RELEASE UPON ORDERING         07/01/24 1112    07/01/24 1112  Fibrinogen  RELEASE UPON ORDERING         07/01/24 1112    07/01/24 1105  Tissue Pathology Exam  RELEASE UPON ORDERING         07/01/24 1105    07/01/24 1049  POC Activated Clotting Time  PROCEDURE ONCE         07/01/24 1224    07/01/24 0900  sodium chloride 0.9 % flush 10 mL  Every 12 Hours Scheduled,   Status:  Discontinued          07/01/24 0520    07/01/24 0750  sodium chloride (NS) irrigation solution  As Needed,   Status:  Discontinued         07/01/24 0750    07/01/24 0749  papaverine injection  As Needed,   Status:  Discontinued         07/01/24 0749    07/01/24 0749  electrolyte-A 500 mL with heparin (porcine) 5000 UNIT/ML 10,000 Units mixture  As Needed,   Status:  Discontinued         07/01/24 0749    07/01/24 0633  OR Blood Pickup  Once         07/01/24 0632    07/01/24 0615  insulin regular 1 unit/mL in 0.9% sodium chloride (Glucommander)  Titrated,   Status:  Discontinued        Note to Pharmacy: Upon initiation of Glucommander insulin drip, make sure all other insulin orders and anti-diabetic medications have been discontinued.    07/01/24 0520    07/01/24 0600  metoprolol tartrate (LOPRESSOR) tablet 12.5 mg  On Call to O.R.         06/25/24 1114    07/01/24 0600  ceFAZolin 2000 mg IVPB in 100 mL NS (MBP)  On Call to O.R.         06/25/24 1114    07/01/24 0519  Insert Peripheral IV x2  Once         07/01/24 0520    07/01/24 0519  Saline Lock & Maintain IV Access  Continuous,   Status:  Canceled         07/01/24 0520    07/01/24 0519  Prior to Initiating Glucommander™, Ensure All Prior Insulin Orders Are Discontinued  Once         07/01/24 0520    07/01/24 0519  Do Not Start Insulin Infusion if Potassium < 3.3  Per Order Details,   Status:  Canceled         07/01/24 0520    07/01/24 0519  Use a Dedicated Line for Insulin Infusion (If Possible).  May Use a Carrier Fluid of NS at KVO Rate if Insulin Rate is Insufficient to Maintain IV Patency.  Prime IV Line With Insulin Infusion  Continuous,   Status:  Canceled         07/01/24 0520    07/01/24 0519  Glucommander Must Be Discontinued if Insulin Infusion is Discontinued.  If Insulin Infusion is Restarted, Previous Glucommander Settings Must Be Discontinued and Re-Entered From New Order  Per Order Details,   Status:  Canceled         07/01/24 0520    07/01/24 0519  Patient is on  Glucommander  Continuous,   Status:  Canceled         07/01/24 0520 07/01/24 0519  NPO Diet NPO Type: Strict NPO  Diet Effective Now,   Status:  Canceled         07/01/24 0520 07/01/24 0519  Utilize the Start Meal Feature / Meal Bolus Feature in Glucommander if Patient Starts a Diet or Bolus Tube Feedings  Until Discontinued,   Status:  Canceled         07/01/24 0520 07/01/24 0519  Notify Provider - Insulin Infusion  Continuous,   Status:  Canceled        Comments: Open Order Report to View Parameters Requiring Provider Notification    07/01/24 0520 07/01/24 0519  RN to Release PRN POC Glucose Orders Per Glucommander  Continuous,   Status:  Canceled        Comments: Glucommander Recommended POC Glucose Testing Will Vary Between Every 15 Minutes & Every 2 Hours   Release PRN POC Glucose Orders as Needed    07/01/24 0520 07/01/24 0519  RN to Order STAT Glucose For Any POC Glucose <10 or >600  Continuous,   Status:  Canceled        Comments: Do Not Delay Treatment of Unstable Patient to Obtain Glucose Sample  Inform Provider of Results    07/01/24 0520 07/01/24 0518  sodium chloride 0.9 % flush 10 mL  As Needed,   Status:  Discontinued         07/01/24 0520 07/01/24 0518  sodium chloride 0.9 % infusion 40 mL  As Needed,   Status:  Discontinued         07/01/24 0520 07/01/24 0518  Treat Hypoglycemia As Recommended By Glucommander™ & Notify Provider of Treatment  As Needed,   Status:  Canceled      Comments: Follow Hypoglycemia Orders As Outlined in Process Instructions (Open Order Report to View Full Instructions)  Notify Provider Any Time Hypoglycemia Treatment is Administered    07/01/24 0520 07/01/24 0518  If Insulin Infusion is Paused - Follow Glucommander Instructions  As Needed,   Status:  Canceled       07/01/24 0520 07/01/24 0518  dextrose (GLUTOSE) oral gel 15 g  Every 15 Minutes PRN,   Status:  Discontinued         07/01/24 0520 07/01/24 0518  dextrose (D50W) (25 g/50 mL) IV  injection 10-50 mL  Every 15 Minutes PRN,   Status:  Discontinued         07/01/24 0520    07/01/24 0518  glucagon (GLUCAGEN) injection 1 mg  Every 15 Minutes PRN,   Status:  Discontinued         07/01/24 0520    07/01/24 0518  POC Glucose PRN  As Needed,   Status:  Canceled      Comments: Glucommander Recommended POC Glucose Testing Will Vary Between Every 15 Minutes & Every 2 Hours Release PRN POC Glucose Orders as Needed      07/01/24 0520    07/01/24 0518  POC Glucose Once  PROCEDURE ONCE        Comments: Complete no more than 45 minutes prior to patient eating      07/01/24 0516    07/01/24 0001  NPO Diet NPO Type: Sips with Meds  Diet Effective Midnight,   Status:  Canceled         06/25/24 1114 06/30/24 2010  POC Glucose Once  PROCEDURE ONCE        Comments: Complete no more than 45 minutes prior to patient eating      06/30/24 2008 06/30/24 1800  mupirocin (BACTROBAN) 2 % nasal ointment 1 Application  Every 12 Hours Scheduled         06/25/24 1114 06/30/24 1800  Chlorhexidine Gluconate Cloth 2 % pads 1 Application  Every 12 Hours         06/25/24 1114 06/30/24 1800  chlorhexidine (PERIDEX) 0.12 % solution 15 mL  Every 12 Hours Scheduled         06/25/24 1114    06/30/24 1559  POC Glucose Once  PROCEDURE ONCE        Comments: Complete no more than 45 minutes prior to patient eating      06/30/24 1542    06/30/24 1057  POC Glucose Once  PROCEDURE ONCE        Comments: Complete no more than 45 minutes prior to patient eating      06/30/24 1053    06/30/24 1000  Instruct Patient on Coughing, Deep Breathing and Incentive Spirometry  Every 4 Hours While Awake,   Status:  Canceled       06/30/24 0735    06/30/24 0920  Pregnancy, Urine - Urine, Clean Catch  Once         06/30/24 0920    06/30/24 0900  sodium chloride 0.9 % flush 10 mL  Every 12 Hours Scheduled,   Status:  Discontinued         06/30/24 0735    06/30/24 0805  POC Pregnancy, Urine  Once,   Status:  Canceled         06/30/24 0805    06/30/24  0736  Follow Anesthesia Guidelines / Protocol  Once,   Status:  Canceled         06/30/24 0735    06/30/24 0736  Insert Peripheral IV  Once,   Status:  Canceled         06/30/24 0735    06/30/24 0736  Saline Lock & Maintain IV Access  Continuous,   Status:  Canceled         06/30/24 0735    06/30/24 0736  COVID PRE-OP / PRE-PROCEDURE SCREENING ORDER (NO ISOLATION) - Swab, Nasopharynx  Once         06/30/24 0736    06/30/24 0736  COVID-19, [facility] IN-HOUSE CEPHEID/THERESA NP SWAB IN TRANSPORT MEDIA 1 HR TAT - Swab, Nasopharynx  PROCEDURE ONCE         06/30/24 0736    06/30/24 0735  sodium chloride 0.9 % flush 10 mL  As Needed,   Status:  Discontinued         06/30/24 0735    06/30/24 0735  sodium chloride 0.9 % infusion 40 mL  As Needed,   Status:  Discontinued         06/30/24 0735    06/30/24 0658  POC Glucose Once  PROCEDURE ONCE        Comments: Complete no more than 45 minutes prior to patient eating      06/30/24 0647    06/30/24 0000  Type & Screen  Once         06/25/24 1114    06/29/24 2045  POC Glucose Once  PROCEDURE ONCE        Comments: Complete no more than 45 minutes prior to patient eating      06/29/24 2043    06/29/24 1551  POC Glucose Once  PROCEDURE ONCE        Comments: Complete no more than 45 minutes prior to patient eating      06/29/24 1549    06/29/24 1200  insulin lispro (HUMALOG/ADMELOG) injection 4 Units  3 Times Daily With Meals,   Status:  Discontinued         06/29/24 1022    06/29/24 1049  POC Glucose Once  PROCEDURE ONCE        Comments: Complete no more than 45 minutes prior to patient eating      06/29/24 1047    06/28/24 0900  insulin glargine (LANTUS, SEMGLEE) injection 30 Units  Daily,   Status:  Discontinued         06/27/24 1243    06/27/24 1400  insulin lispro (HUMALOG/ADMELOG) injection 2-7 Units  3 Times Daily With Meals,   Status:  Discontinued         06/27/24 1311    06/26/24 0900  valsartan (DIOVAN) tablet 320 mg  Every 24 Hours Scheduled,   Status:  Discontinued          "06/25/24 1214    06/26/24 0900  NIFEdipine XL (PROCARDIA XL) 24 hr tablet 90 mg  Every 24 Hours Scheduled,   Status:  Discontinued         06/26/24 0641    06/25/24 1400  Instruct Patient on Coughing, Deep Breathing and Incentive Spirometry  Every 4 Hours While Awake,   Status:  Canceled       06/25/24 1114    06/25/24 1200  Neurovascular Checks  Every 4 Hours,   Status:  Canceled       06/25/24 1114    06/25/24 1114  temazepam (RESTORIL) capsule 15 mg  Nightly PRN         06/25/24 1114    06/25/24 1114  ALPRAZolam (XANAX) tablet 0.25 mg  Every 8 Hours PRN,   Status:  Discontinued         06/25/24 1114    06/25/24 1112  Skin Assessment  Every Shift,   Status:  Canceled       06/25/24 1114    06/25/24 1111  Chlorhexidine Skin Prep - Chin to Toes 12 Hours Prior to Surgery & Morning of Surgery  As Needed,   Status:  Canceled      Comments: Chlorhexidine Skin wipes and instructions for all patients having a procedure requiring an outward incision if not allergic.  If allergic, give antibacterial skin wipes and instructions.  Do not use for facial cases or on any mucus membranes.    12 Hours Prior to Surgery & The Morning of Surgery    06/25/24 1114    06/25/24 0744  Oxygen Therapy- Nasal Cannula; Titrate 1-6 LPM Per SpO2; 90 - 95%  Continuous PRN,   Status:  Canceled       06/25/24 0744    06/24/24 1639  hydrALAZINE (APRESOLINE) tablet 25 mg  Every 6 Hours PRN,   Status:  Discontinued         06/24/24 1640    06/22/24 1732  Potassium Replacement - Follow Nurse / BPA Driven Protocol  As Needed,   Status:  Discontinued         06/22/24 1733    06/22/24 0900  aspirin tablet 325 mg  Daily,   Status:  Discontinued        Placed in \"Or\" Linked Group    06/22/24 0003    06/22/24 0900  aspirin suppository 300 mg  Daily,   Status:  Discontinued        Placed in \"Or\" Linked Group    06/22/24 0003    06/22/24 0900  escitalopram (LEXAPRO) tablet 10 mg  Daily,   Status:  Discontinued         06/22/24 0259    06/22/24 0700  POC " "Glucose 4x Daily Before Meals & at Bedtime  4 Times Daily Before Meals & at Bedtime,   Status:  Canceled      Comments: Complete no more than 45 minutes prior to patient eating      06/22/24 0003    06/22/24 0700  pantoprazole (PROTONIX) EC tablet 40 mg  Every Morning,   Status:  Discontinued         06/22/24 0259    06/22/24 0600  POC Glucose Q6H  Every 6 Hours,   Status:  Canceled      Comments: May Discontinue After 2 Consecutive Readings Less Than 140Notify Provider if 2 Readings Greater Than 140      06/22/24 0003    06/22/24 0400  Vital Signs  Every 4 Hours,   Status:  Canceled       06/22/24 0003    06/22/24 0400  Intake and Output  Every 4 Hours,   Status:  Canceled       06/22/24 0003    06/22/24 0257  albuterol (PROVENTIL) nebulizer solution 0.083% 2.5 mg/3mL  Every 6 Hours PRN,   Status:  Discontinued         06/22/24 0259    06/22/24 0254  Basic Metabolic Panel  Daily,   Status:  Canceled       06/22/24 0253    06/22/24 0254  CBC Auto Differential  Daily,   Status:  Canceled       06/22/24 0253    06/22/24 0019  sodium chloride 0.9 % flush 10 mL  Every 12 Hours Scheduled,   Status:  Discontinued         06/22/24 0003    06/22/24 0019  atorvastatin (LIPITOR) tablet 80 mg  Nightly,   Status:  Discontinued         06/22/24 0003    06/22/24 0019  nicotine (NICODERM CQ) 21 MG/24HR patch 1 patch  Every 24 Hours,   Status:  Discontinued         06/22/24 0003    06/22/24 0003  nicotine polacrilex (NICORETTE) gum 2 mg  Every 1 Hour PRN,   Status:  Discontinued         06/22/24 0003    06/22/24 0003  ondansetron ODT (ZOFRAN-ODT) disintegrating tablet 4 mg  Every 6 Hours PRN,   Status:  Discontinued        Placed in \"Or\" Linked Group    06/22/24 0003    06/22/24 0003  ondansetron (ZOFRAN) injection 4 mg  Every 6 Hours PRN,   Status:  Discontinued        Placed in \"Or\" Linked Group    06/22/24 0003    06/22/24 0003  sennosides-docusate (PERICOLACE) 8.6-50 MG per tablet 2 tablet  2 Times Daily PRN,   Status:  " "Discontinued        Placed in \"And\" Linked Group    06/22/24 0003    06/22/24 0003  polyethylene glycol (MIRALAX) packet 17 g  Daily PRN,   Status:  Discontinued        Placed in \"And\" Linked Group    06/22/24 0003    06/22/24 0003  bisacodyl (DULCOLAX) EC tablet 5 mg  Daily PRN,   Status:  Discontinued        Placed in \"And\" Linked Group    06/22/24 0003    06/22/24 0003  bisacodyl (DULCOLAX) suppository 10 mg  Daily PRN,   Status:  Discontinued        Placed in \"And\" Linked Group    06/22/24 0003    06/22/24 0002  nitroglycerin (NITROSTAT) SL tablet 0.4 mg  Every 5 Minutes PRN,   Status:  Discontinued         06/22/24 0003    06/22/24 0002  Intake & Output  Every Shift,   Status:  Canceled       06/22/24 0003    06/22/24 0001  Follow Hypoglycemia Standing Orders For Blood Glucose <70 & Notify Provider of Treatment  As Needed,   Status:  Canceled      Comments: Follow Hypoglycemia Orders As Outlined in Process Instructions (Open Order Report to View Full Instructions)  Notify Provider Any Time Hypoglycemia Treatment is Administered    06/22/24 0003    06/22/24 0001  dextrose (GLUTOSE) oral gel 15 g  Every 15 Minutes PRN,   Status:  Discontinued         06/22/24 0003    06/22/24 0001  dextrose (D50W) (25 g/50 mL) IV injection 25 g  Every 15 Minutes PRN,   Status:  Discontinued         06/22/24 0003    06/22/24 0001  glucagon (GLUCAGEN) injection 1 mg  Every 15 Minutes PRN,   Status:  Discontinued         06/22/24 0003    06/22/24 0001  sodium chloride 0.9 % flush 10 mL  As Needed,   Status:  Discontinued         06/22/24 0003    06/22/24 0001  sodium chloride 0.9 % infusion 40 mL  As Needed,   Status:  Discontinued         06/22/24 0003    06/21/24 2200  Neuro Checks  Every 2 Hours,   Status:  Canceled      Comments: On arrival and handoff, increase frequency as clinically indicated or for thrombolytic administration, otherwise Q2 hours. Documentation of arrival assessment and any neuro change required.    06/21/24 " 2104 06/21/24 2104  sodium chloride 0.9 % flush 10 mL  As Needed,   Status:  Discontinued         06/21/24 2104    Unscheduled  Check Peripheral Pulses As Needed  As Needed       07/01/24 1308    Unscheduled  Cardiac Output Parameters PRN Until 24 Hours Post-Op  As Needed       07/01/24 1308    Unscheduled  Pacemaker Settings - Initiate for Heart Rate Less Than 60 And / Or Hemodynamically Unstable  As Needed      Comments: Mode: AAI  Atrial rate: 90  Atrial mA: 10  Atrial mV: 0.5    07/01/24 1308    Unscheduled  Insert Nasogastric Tube If Indicated & Not Already in Place  As Needed      Comments: Indications: Nausea, Vomiting, Prolonged Intubation or to Administer Medications  Attach to Low Wall Suction if Any Residual    07/01/24 1308    Unscheduled  Cleanse Incision With Normal Saline and [place] With Dry 4x4 Gauze if Incision is Draining  As Needed       07/01/24 1308    Unscheduled  Wound Care  As Needed       07/01/24 1308    Unscheduled  Dangle at Bedside After Extubation  As Needed       07/01/24 1308    Unscheduled  Up in Chair As Tolerated After Extubation  As Needed       07/01/24 1308    Unscheduled  Oxygen Therapy- Nasal Cannula; 2 LPM  Continuous PRN       07/01/24 1308    Unscheduled  Patient May Use Home CPAP / BIPAP As Needed  As Needed       07/01/24 1308    Unscheduled  Blood Gas, Arterial -  As Needed      Comments: 30 Minutes After Ventilator Changes, 30 Minutes After Extubation and PRN      07/01/24 1308    Unscheduled  CBC & Differential  As Needed        Comments: Chest Tube Drainage Greater Than 200mL/hr      07/01/24 1308    Unscheduled  aPTT  As Needed        Comments: Chest Tube Drainage Greater Than 200mL/hr      07/01/24 1308    Unscheduled  Protime-INR  As Needed        Comments: Chest Tube Drainage Greater Than 200mL/hr      07/01/24 1308    Unscheduled  Fibrinogen  As Needed        Comments: Chest Tube Drainage Greater Than 200mL/hr      07/01/24 1308    Unscheduled  Follow  Hypoglycemia Standing Orders For Blood Glucose <70 & Notify Provider of Treatment  As Needed      Comments: Follow Hypoglycemia Orders As Outlined in Process Instructions (Open Order Report to View Full Instructions)  Notify Provider Any Time Hypoglycemia Treatment is Administered    07/02/24 0811    Signed and Held  Insert Indwelling Urinary Catheter  Once        Comments: Follow Protocol As Outlined in Process Instructions (Open Order Report to View Full Instructions)    Signed and Held    Signed and Held  Assess Need for Indwelling Urinary Catheter - Follow Removal Protocol  Continuous        Comments: Follow Protocol As Outlined in Process Instructions (Open Order Report to View Full Instructions)    Signed and Held    Signed and Held  Urinary Catheter Care  Every Shift       Signed and Held    Signed and Held  atorvastatin (LIPITOR) tablet 40 mg  Nightly,   Status:  Canceled         Signed and Held                     Operative/Procedure Notes (most recent note)        Benja De Luna MD at 07/01/24 0742          Operative Note    Date of Dictation: 07/01/24    Date of Procedure: 07/01/24    Referring Physician: Huan Lamb MD    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 CABG x 1 (Svg to OM)  2. EVH of the right legs. Open vein [place] on the left leg.  3.  Aortic valve fibroblastoma resection.  4.  Aortic valve repair (Shaving of the fibroelastoma base of implantation without damage the aortic valve)   5.  Redo sternotomy    Surgeon: Benja De Luna MD     Assistants: ANJU Parks was responsible for performing the following activities: Cardiac Surgery First assist, Endoscopic Vein [place] for CABG, surgical wound closure and their skilled assistance was necessary for the success of this case.     Anesthesia: General endotracheal anesthesia and YUE    Findings:  The saphenous vein was harvested  endoscopically form the right  leg (not usable) and open from the left leg. The vein had a diameter of 3.5 mm and was of good quality. The coronaries had a diameter of 1.5 mm and were of fair quality.    Estimated Blood Loss:  200 mL of Cell Saver returned.    STS Data: The STS Risk score discussed with the patient and family.  Counseling was done regarding abuse of tobacco, alcohol and drugs as needed. They understand and wish to proceed. The antibiotics and b blockers were given in the STS required window.        Description of the procedure:     The patient was placed supine on the operative table. General anesthesia was given and lines placed. The patient was prepped and draped using the usual sterile technique. A median redo sternotomy was performed with a scalpel and the layers carried down to the sternum using the electrocautery. The sternum was split in the midline using a vertical oscillating saw. Hemostasis was achieved.  A Favaloro retractor was placed and the mediastinum exposed. Extensive lysis of adhesions was performed. 300 units/kg of IV heparin was given to an ACT over 400. Cannulation sutures were placed in the ascending aorta and right atrium. Small cannulas were placed and aorto right atrial cardiopulmonary bypass was started. Cardioplegia cannulas were placed. Cardiopulmonary bypass was then established for 107 minutes drifting to 34°C at appropriate flow rates.  The previous patent LIMA was secure and small bulldog applied. The aorta was crossclamped for 85 minutes and we gave 1000 cc of antegrade cold blood cardioplegia then 200L of retrograde cold blood cardioplegia and then repeated doses every 10 to 15 minutes to good effect. 1 veins was anastomosed to the ascending aorta with 6-0 Prolene and marked with washers.  The vein was sewn to the obtuse marginal two of the circunflex artery with 7-0 Prolene.  A transverse aortotomy was performed and the diseased valve exposed.  3 fibroblastoma's in  each coronary cusp were identified and resected at its base.  The leaflets were inspected and no damage was observed from the resection. The aortotomy was closed with a double layer of 4.0 Prolene suture and de-aired before closure. A warm dose of cardioplegia was given and then the aortic clamp removed as well as the LIMA bulldog clamp. All anastomoses were checked and had good flow and morphology. The left pleural space was suctioned and the lungs ventilated. The heart was paced till regular atrial rhythm resumed. I allowed the heart to eject and once hemodynamics were acceptable, then the CPB was discontinued and the venous and cardioplegia cannulas removed. The matching dose of protamine was given and the aortic cannula removed as well. AV temporary wires and pleural and mediastinal chest tubes were placed and the wound sprayed with platelet rich plasma. The sternum was closed with single and double wires and soft tissue in layers of reabsorbable material. The wounds were covered with sterile dressings. YUE showed competent aortic valve without aortic valve regurgitation       Specimen removed:  Aortic valve fibroelastoma    CPB time:  107 minutes.    Aortic clamp time:  85 minutes    Complications:  none           Disposition: Cardiovascular recovery room           Condition: Critical but stable.       Electronically signed by Benja De Luna MD at 07/01/24 1707          Physician Progress Notes (last 72 hours)                          Meg Patel PA at 06/30/24 0730          Will LOS: 9 days   Patient Care Team:  Ibrahima Correa MD as PCP - General (Family Medicine)  Rachele Zafar RN as Ambulatory  ([facility])  Xochilt Jenkins MD as Consulting Physician (Nephrology)    Chief Complaint:   Aortic valve fibroelastoma     Subjective  No current complaints. States she feels ready for surgery tomorrow.    Vital Signs  Temp:  [97.9 °F (36.6 °C)-98.5 °F (36.9  "°C)] 97.9 °F (36.6 °C)  Heart Rate:  [66-71] 71  Resp:  [16] 16  BP: (130-158)/(65-89) 130/70      06/25/24  0805 06/26/24  0532 06/30/24  0556   Weight: 65.3 kg (144 lb) 64.9 kg (143 lb 1.3 oz) 63.2 kg (139 lb 6.4 oz)     Body mass index is 23.93 kg/m².    Intake/Output Summary (Last 24 hours) at 6/30/2024 0730  Last data filed at 6/30/2024 0045  Gross per 24 hour   Intake 960 ml   Output 500 ml   Net 460 ml     No intake/output data recorded.      Objective:  General Appearance:  Comfortable, well-appearing, in no acute distress and not in pain (Resting in bed).    Vital signs: (most recent): Blood pressure 149/70, pulse 67, temperature 98 °F (36.7 °C), temperature source Oral, resp. rate 17, height 162.6 cm (64\"), weight 63.2 kg (139 lb 6.4 oz), last menstrual period 01/10/2023, SpO2 94%, not currently breastfeeding.  Vital signs are normal.  No fever.  (Room air).    Output: Producing urine.    Lungs:  Normal effort and normal respiratory rate.  Breath sounds clear to auscultation.  She is not in respiratory distress.  No decreased breath sounds.    Heart: Normal rate.  Regular rhythm.  Positive for murmur.    Abdomen: Abdomen is soft.    Extremities: Normal range of motion.  There is no dependent edema.    Neurological: Patient is alert and oriented to person, place and time.    Skin:  Warm and dry.              Results Review:      WBC WBC   Date Value Ref Range Status   06/30/2024 7.62 3.40 - 10.80 10*3/mm3 Final   06/29/2024 7.56 3.40 - 10.80 10*3/mm3 Final   06/28/2024 8.52 3.40 - 10.80 10*3/mm3 Final      HGB Hemoglobin   Date Value Ref Range Status   06/30/2024 11.0 (L) 12.0 - 15.9 g/dL Final   06/29/2024 11.2 (L) 12.0 - 15.9 g/dL Final   06/28/2024 11.2 (L) 12.0 - 15.9 g/dL Final      HCT Hematocrit   Date Value Ref Range Status   06/30/2024 33.7 (L) 34.0 - 46.6 % Final   06/29/2024 35.4 34.0 - 46.6 % Final   06/28/2024 34.2 34.0 - 46.6 % Final      Platelets Platelets   Date Value Ref Range Status " "  06/30/2024 246 140 - 450 10*3/mm3 Final   06/29/2024 220 140 - 450 10*3/mm3 Final   06/28/2024 229 140 - 450 10*3/mm3 Final        PT/INR:    No results found for: \"PROTIME\"  /  No results found for: \"INR\"      Sodium Sodium   Date Value Ref Range Status   06/30/2024 138 136 - 145 mmol/L Final   06/29/2024 138 136 - 145 mmol/L Final   06/28/2024 137 136 - 145 mmol/L Final      Potassium Potassium   Date Value Ref Range Status   06/30/2024 3.8 3.5 - 5.2 mmol/L Final   06/29/2024 4.1 3.5 - 5.2 mmol/L Final   06/28/2024 5.0 3.5 - 5.2 mmol/L Final   06/28/2024 3.5 3.5 - 5.2 mmol/L Final      Chloride Chloride   Date Value Ref Range Status   06/30/2024 106 98 - 107 mmol/L Final   06/29/2024 104 98 - 107 mmol/L Final   06/28/2024 103 98 - 107 mmol/L Final      Bicarbonate CO2   Date Value Ref Range Status   06/30/2024 25.0 22.0 - 29.0 mmol/L Final   06/29/2024 22.8 22.0 - 29.0 mmol/L Final   06/28/2024 24.0 22.0 - 29.0 mmol/L Final      BUN BUN   Date Value Ref Range Status   06/30/2024 18 6 - 20 mg/dL Final   06/29/2024 17 6 - 20 mg/dL Final   06/28/2024 16 6 - 20 mg/dL Final      Creatinine Creatinine   Date Value Ref Range Status   06/30/2024 1.15 (H) 0.57 - 1.00 mg/dL Final   06/29/2024 1.12 (H) 0.57 - 1.00 mg/dL Final   06/28/2024 1.00 0.57 - 1.00 mg/dL Final      Calcium Calcium   Date Value Ref Range Status   06/30/2024 9.3 8.6 - 10.5 mg/dL Final   06/29/2024 9.2 8.6 - 10.5 mg/dL Final   06/28/2024 9.2 8.6 - 10.5 mg/dL Final      Magnesium No results found for: \"MG\"       aspirin, 325 mg, Oral, Daily   Or  aspirin, 300 mg, Rectal, Daily  atorvastatin, 80 mg, Oral, Nightly  [START ON 7/1/2024] ceFAZolin, 2,000 mg, Intravenous, On Call to OR  chlorhexidine, 15 mL, Mouth/Throat, Q12H  Chlorhexidine Gluconate Cloth, 1 Application, Topical, Q12H  escitalopram, 10 mg, Oral, Daily  insulin glargine, 30 Units, Subcutaneous, Daily  insulin lispro, 2-7 Units, Subcutaneous, TID With Meals  insulin lispro, 4 Units, " Subcutaneous, TID With Meals  [START ON 2024] metoprolol tartrate, 12.5 mg, Oral, On Call to OR  mupirocin, 1 Application, Each Nare, Q12H  nicotine, 1 patch, Transdermal, Q24H  NIFEdipine XL, 90 mg, Oral, Q24H  pantoprazole, 40 mg, Oral, QAM  sodium chloride, 10 mL, Intravenous, Q12H  valsartan, 320 mg, Oral, Q24H             Acute right-sided weakness    Benign essential hypertension    Gastroparesis diabeticorum    Gastroesophageal reflux disease    Mixed hypercholesterolemia and hypertriglyceridemia    Iron deficiency anemia    Type 2 diabetes mellitus with hyperglycemia, with long-term current use of insulin    Coronary artery disease involving native coronary artery of native heart with unstable angina pectoris    Tobacco abuse    CKD stage 3a, GFR 45-59 ml/min    Carotid stenosis    Lacunar cerebrovascular accident (CVA)    Elevated troponin    Aortic valve disease      Assessment & Plan    -Aortic valve mass/fibroelastoma  -Recurrent MCA stroke  -Coronary artery disease status post CABG in   -Uncontrolled diabetes mellitus, HgbA1c 16.9, with peripheral neuropathy  -CKD, stage 3  -Hypertension  -Hyperlipidemia  -Obesity  -Tobacco abuse-- encourage cessation  -PAD, Carotid stenosis, 70% right ICA  -GERD  -LB  -chronic anemia    plt ag%  HgbA1c: 13.10  UA: negative  CXR: no acute process  Carotid duplex: <50% stenosis bilaterally  Vein mapping:     The right great saphenous vein is patent and very small in the proximal and mid thigh.  The vein may be adequate at distal thigh and knee.  The GSV in the proximal and mid calf is inadequate.    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  The GSV is inadequate in the calf.    The right small saphenous vein is inadequate.  The right SSV is of good diameter, but walls appear thickened throughout.    Radial duplex:     Left radial artery is patent without stenosis and measures 0.2-0.24 cm in diameter.    Left ulnar artery is patent  without stenosis and measures 0.22-0.25 cm in diameter.    There is an incomplete palmar arch with equal dominance.  COVID swab: negative      Scheduled for redo sternotomy, aortic valve repair/replacement and possible CABG on tomorrow morning at 0730 with Dr. De Luna. He is hoping to be able to repair the valve, but if replacement is necessary, a mechanical valve will be used. This was discussed with the patient and she is agreeable. She understands that if she gets a mechanical valve, she will need lifelong Coumadin. COVID screen > 48 hours, so will repeat. Check urine pregnancy test. ARB held. NPO after midnight.        ANJU Valladares  06/30/24  07:30 EDT      Electronically signed by Meg Patel PA at 06/30/24 1016            All medication doses during the admission are shown, including meds that are no longer on order.  Scheduled Meds Sorted by Name  for Bibiana Inman as of 6/30/24 through 7/2/24    1 Day 3 Days 7 Days 10 Days < Today >   Legend:       Medications 06/30/24 07/01/24 07/02/24   acetaminophen (OFIRMEV) injection 1,000 mg  Dose: 1,000 mg  Freq: Every 8 Hours Route: IV  Start: 07/01/24 1400 End: 07/02/24 0613   Admin Instructions:      Order specific questions:        [id number]       0530           acetaminophen (TYLENOL) tablet 650 mg  Dose: 650 mg  Freq: Every 4 Hours Route: PO  Start: 07/01/24 1308 End: 07/02/24 0559   Admin Instructions:        (8138) (4789) [C]   (8733) (0915)   0559-D/C'd       Or   acetaminophen (TYLENOL) 160 MG/5ML oral solution 650 mg  Dose: 650 mg  Freq: Every 4 Hours Route: PO  Start: 07/01/24 1308 End: 07/02/24 0559   Admin Instructions:                       0559-D/C'd       Or   acetaminophen (TYLENOL) suppository 650 mg  Dose: 650 mg  Freq: Every 4 Hours Route: RE  Start: 07/01/24 1308 End: 07/02/24 0559   Admin Instructions:                       0559-D/C'd       aspirin EC tablet 81 mg  Dose: 81 mg  Freq: Daily Route:  PO  Start: 07/02/24 0900   Admin Instructions:         0803           aspirin tablet 325 mg  Dose: 325 mg  Freq: Daily Route: PO  Start: 06/22/24 0900 End: 07/01/24 1308   Admin Instructions:               0632 0900   1308     1308-D/C'd         Or   aspirin suppository 300 mg  Dose: 300 mg  Freq: Daily Route: RE  Start: 06/22/24 0900 End: 07/01/24 1308   Admin Instructions:       (0808)        0632 0900   1308     1308-D/C'd         atorvastatin (LIPITOR) tablet 80 mg  Dose: 80 mg  Freq: Nightly Route: PO  Start: 07/01/24 2100   Admin Instructions:        2027 2100          atorvastatin (LIPITOR) tablet 80 mg  Dose: 80 mg  Freq: Nightly Route: PO  Start: 06/22/24 0019 End: 07/01/24 1308   Admin Instructions:       2147        0632   1308   1308-D/C'd       calcium gluconate 2000-675 MG/100ML NACL IVPB  Dose: 2,000 mg  Freq: Once Route: IV  Start: 07/02/24 0900 End: 07/02/24 0826      0826          ceFAZolin 2000 mg IVPB in 100 mL NS (MBP)  Dose: 2 g  Freq: Every 8 Hours Route: IV  Indications of Use: PERIOPERATIVE PHARMACOPROPHYLAXIS  Start: 07/01/24 1900 End: 07/03/24 1059   Admin Instructions:        1828        0205   1100   1900        ceFAZolin 2000 mg IVPB in 100 mL NS (MBP)  Dose: 2,000 mg  Freq: On Call to O.R. Route: IV  Indications of Use: PERIOPERATIVE PHARMACOPROPHYLAXIS  Start: 07/01/24 0600 End: 07/01/24 1110   Admin Instructions:        0711   1110          chlorhexidine (PERIDEX) 0.12 % solution 15 mL  Dose: 15 mL  Freq: Every 12 Hours Route: MT  Start: 07/01/24 1400   Admin Instructions:        1339        0205   1400         chlorhexidine (PERIDEX) 0.12 % solution 15 mL  Dose: 15 mL  Freq: Every 12 Hours Scheduled Route: MT  Start: 06/30/24 1800 End: 07/01/24 0542   Admin Instructions:       2147        0542           Chlorhexidine Gluconate Cloth 2 % pads 1 Application  Dose: 1 Application  Freq: Every 12 Hours Route: TOP  Start: 06/30/24 1800 End: 07/01/24 0545   Admin Instructions:        2148        0545           clopidogrel (PLAVIX) tablet 75 mg  Dose: 75 mg  Freq: Daily Route: PO  Start: 06/22/24 0900 End: 06/25/24 1127         empagliflozin (JARDIANCE) tablet 10 mg  Dose: 10 mg  Freq: Daily Route: PO  Start: 06/26/24 1430 End: 06/27/24 1243         empagliflozin (JARDIANCE) tablet 10 mg  Dose: 10 mg  Freq: Daily Route: PO  Start: 06/22/24 0900 End: 06/22/24 0303         Enoxaparin Sodium (LOVENOX) syringe 40 mg  Dose: 40 mg  Freq: Daily Route: SC  Indications of Use: PROPHYLAXIS OF VENOUS THROMBOEMBOLISM  Start: 07/02/24 1800   Admin Instructions:         1800          escitalopram (LEXAPRO) tablet 10 mg  Dose: 10 mg  Freq: Daily Route: PO  Start: 07/02/24 0900   Admin Instructions:         0803          escitalopram (LEXAPRO) tablet 10 mg  Dose: 10 mg  Freq: Daily Route: PO  Start: 06/22/24 0900 End: 07/01/24 1308   Admin Instructions:       0907 0632   0900   1308     1308-D/C'd         famotidine (PEPCID) injection 20 mg  Dose: 20 mg  Freq: Once Route: IV  Start: 06/25/24 0718 End: 06/25/24 0917   Admin Instructions:            guaiFENesin (MUCINEX) 12 hr tablet 1,200 mg  Dose: 1,200 mg  Freq: Every 12 Hours Scheduled Route: PO  Start: 07/02/24 0900   Admin Instructions:         0803 2100         icosapent ethyl (VASCEPA) capsule 2 g  Dose: 2 g  Freq: 2 Times Daily With Meals Route: PO  Indications of Use: CEREBROVASCULAR ACCIDENT,HYPERTRIGLYCERIDEMIA,MYOCARDIAL REVASCULARIZATION PROCEDURE  Start: 06/22/24 1800 End: 06/25/24 1927   Admin Instructions:            icosapent ethyl (VASCEPA) capsule 2 g  Dose: 2 g  Freq: 2 Times Daily With Meals Route: PO  Indications of Use: CEREBROVASCULAR ACCIDENT,HYPERTRIGLYCERIDEMIA,MYOCARDIAL REVASCULARIZATION PROCEDURE  Start: 06/22/24 0800 End: 06/22/24 1100   Admin Instructions:            insulin glargine (LANTUS, SEMGLEE) injection 10 Units  Dose: 10 Units  Freq: Once Route: SC  Start: 06/27/24 2100 End: 06/27/24 2114   Admin  Instructions:            insulin glargine (LANTUS, SEMGLEE) injection 15 Units  Dose: 15 Units  Freq: Daily Route: SC  Start: 07/02/24 0900   Admin Instructions:         0826          insulin glargine (LANTUS, SEMGLEE) injection 20 Units  Dose: 20 Units  Freq: Daily Route: SC  Start: 06/22/24 0900 End: 06/27/24 1243   Admin Instructions:            insulin glargine (LANTUS, SEMGLEE) injection 30 Units  Dose: 30 Units  Freq: Daily Route: SC  Start: 06/28/24 0900 End: 07/01/24 0521   Admin Instructions:       0908 0521-D/C'd         insulin lispro (HUMALOG/ADMELOG) injection 2-7 Units  Dose: 2-7 Units  Freq: 3 Times Daily With Meals Route: SC  Start: 06/27/24 1400 End: 07/01/24 0519   Admin Instructions:       0915) (4331) 6684 3507-D/C'd         insulin lispro (HUMALOG/ADMELOG) injection 2-7 Units  Dose: 2-7 Units  Freq: 3 Times Daily With Meals Route: SC  Start: 06/27/24 1345 End: 06/27/24 1311   Admin Instructions:            insulin lispro (HUMALOG/ADMELOG) injection 2-7 Units  Dose: 2-7 Units  Freq: 3 Times Daily With Meals Route: SC  Start: 06/27/24 1800 End: 06/27/24 1258   Admin Instructions:            insulin lispro (HUMALOG/ADMELOG) injection 2-7 Units  Dose: 2-7 Units  Freq: 4 Times Daily Before Meals & Nightly Route: SC  Start: 06/22/24 0730 End: 06/27/24 1245   Admin Instructions:            insulin lispro (HUMALOG/ADMELOG) injection 2-9 Units  Dose: 2-9 Units  Freq: 4 Times Daily Before Meals & Nightly Route: SC  Start: 07/02/24 0900   Admin Instructions:         0900   1130   1730     2100          insulin lispro (HUMALOG/ADMELOG) injection 4 Units  Dose: 4 Units  Freq: 3 Times Daily With Meals Route: SC  Start: 06/29/24 1200 End: 07/01/24 0519   Admin Instructions:       0909   1213   1713 0170-D/C'd         iopamidol (ISOVUE-300) 61 % injection 100 mL  Dose: 100 mL  Freq: Once in Imaging Route: IV  Start: 06/26/24 0815 End: 06/26/24 0732         iopamidol (ISOVUE-370) 76 %  injection 150 mL  Dose: 150 mL  Freq: Once in Imaging Route: IV  Start: 06/21/24 2134 End: 06/21/24 2136         magnesium sulfate in D5W 1g/100mL (PREMIX)  Dose: 1 g  Freq: Every 8 Hours Route: IV  Last Dose: Stopped (07/02/24 0541)  Start: 07/01/24 1400 End: 07/02/24 1359     1429   2111       0541   1359-D/C'd       metoclopramide (REGLAN) injection 5 mg  Dose: 5 mg  Freq: Every 6 Hours Route: IV  Start: 07/01/24 1400 End: 07/02/24 1359   Admin Instructions:        1903   2029 (6896) 0293 3178-D/C'd      metoprolol tartrate (LOPRESSOR) tablet 12.5 mg  Dose: 12.5 mg  Freq: Every 12 Hours Scheduled Route: PO  Start: 07/02/24 0900   Admin Instructions:         0900 2100         metoprolol tartrate (LOPRESSOR) tablet 12.5 mg  Dose: 12.5 mg  Freq: Every 12 Hours Scheduled Route: PO  Start: 07/02/24 0900 End: 07/02/24 0728   Admin Instructions:         0728-D/C'd        metoprolol tartrate (LOPRESSOR) tablet 12.5 mg  Dose: 12.5 mg  Freq: Every 12 Hours Scheduled Route: PO  Start: 07/01/24 1400 End: 07/01/24 1318   Admin Instructions:        1318-D/C'd         metoprolol tartrate (LOPRESSOR) tablet 12.5 mg  Dose: 12.5 mg  Freq: On Call to O.R. Route: PO  Start: 07/01/24 0600 End: 07/01/24 0542   Admin Instructions:        0542           metoprolol tartrate (LOPRESSOR) tablet 25 mg  Dose: 25 mg  Freq: Every 12 Hours Scheduled Route: PO  Start: 07/02/24 0900 End: 07/02/24 0810   Admin Instructions:         0810-D/C'd        mupirocin (BACTROBAN) 2 % nasal ointment  Freq: 2 Times Daily Route: EACH NARE  Start: 07/01/24 2100   Admin Instructions:        2027 0803 2100         mupirocin (BACTROBAN) 2 % nasal ointment 1 Application  Dose: 1 Application  Freq: Every 12 Hours Scheduled Route: EACH NARE  Start: 06/30/24 1800 End: 07/01/24 0542   Admin Instructions:       2147        0542           nicotine (NICODERM CQ) 21 MG/24HR patch 1 patch  Dose: 1 patch  Freq: Every 24 Hours Route: TD  Start: 06/22/24  0019 End: 07/01/24 1308   Admin Instructions:       0905   0909       0632   0900   1308     1308-D/C'd         NIFEdipine XL (PROCARDIA XL) 24 hr tablet 120 mg  Dose: 120 mg  Freq: Every 24 Hours Scheduled Route: PO  Start: 06/26/24 0900 End: 06/26/24 0641   Admin Instructions:            NIFEdipine XL (PROCARDIA XL) 24 hr tablet 30 mg  Dose: 30 mg  Freq: Once Route: PO  Start: 06/25/24 1330 End: 06/25/24 1341   Admin Instructions:            NIFEdipine XL (PROCARDIA XL) 24 hr tablet 60 mg  Dose: 60 mg  Freq: Every 24 Hours Scheduled Route: PO  Start: 06/24/24 0900 End: 06/25/24 1214   Admin Instructions:            NIFEdipine XL (PROCARDIA XL) 24 hr tablet 90 mg  Dose: 90 mg  Freq: Every 24 Hours Scheduled Route: PO  Start: 06/26/24 0900 End: 07/01/24 1308   Admin Instructions:       0909        1308-D/C'd              pantoprazole (PROTONIX) injection 40 mg  Dose: 40 mg  Freq: Once Route: IV  Indications of Use: Gastrointestinal (GI) Bleed  Start: 07/01/24 1308 End: 07/01/24 1339   Admin Instructions:        1339           Followed by   pantoprazole (PROTONIX) EC tablet 40 mg  Dose: 40 mg  Freq: Every Morning Route: PO  Start: 07/02/24 0700   Admin Instructions:         0600          pantoprazole (PROTONIX) EC tablet 40 mg  Dose: 40 mg  Freq: Every Morning Route: PO  Start: 06/22/24 0700 End: 07/01/24 1308   Admin Instructions:       0552          0551   0632        1308   1308-D/C'd        potassium chloride (K-DUR,KLOR-CON) ER tablet 40 mEq  Dose: 40 mEq  Freq: Every 4 Hours Route: PO  Start: 06/28/24 0545 End: 06/28/24 0816   Admin Instructions:            potassium chloride (K-DUR,KLOR-CON) ER tablet 40 mEq  Dose: 40 mEq  Freq: Every 4 Hours Route: PO  Start: 06/25/24 0906 End: 06/25/24 1341   Admin Instructions:            sennosides-docusate (PERICOLACE) 8.6-50 MG per tablet 2 tablet  Dose: 2 tablet  Freq: Nightly Route: PO  Start: 07/02/24 2100 2100          sodium chloride 0.9 % bolus 1,000  mL  Dose: 1,000 mL  Freq: Once Route: IV  Last Dose: Stopped (06/21/24 2235)  Start: 06/21/24 2128 End: 06/21/24 2235         sodium chloride 0.9 % flush 10 mL  Dose: 10 mL  Freq: Every 12 Hours Scheduled Route: IV  Start: 07/01/24 0900 End: 07/01/24 1308     0632   0900   1308     1308-D/C'd         sodium chloride 0.9 % flush 10 mL  Dose: 10 mL  Freq: Every 12 Hours Scheduled Route: IV  Start: 06/30/24 0900 End: 07/01/24 1257    0910   2147       1257-D/C'd             sodium chloride 0.9 % flush 10 mL  Dose: 10 mL  Freq: Every 12 Hours Scheduled Route: IV  Start: 06/22/24 0019 End: 07/01/24 1308    0910   2147       0632   0900   1308     1308-D/C'd         sodium chloride 0.9 % flush 3 mL  Dose: 3 mL  Freq: Every 12 Hours Scheduled Route: IV  Start: 06/25/24 0900 End: 06/25/24 0917         sodium chloride 7 % nebulizer solution nebulizer solution 4 mL  Dose: 4 mL  Freq: 2 Times Daily - RT Route: NEBULIZATION  Start: 07/02/24 0930   Admin Instructions:         0930 2130         valsartan (DIOVAN) tablet 160 mg  Dose: 160 mg  Freq: Once Route: PO  Start: 06/25/24 1330 End: 06/25/24 1341   Admin Instructions:            valsartan (DIOVAN) tablet 160 mg  Dose: 160 mg  Freq: Every 24 Hours Scheduled Route: PO  Start: 06/25/24 0900 End: 06/25/24 1214   Admin Instructions:            valsartan (DIOVAN) tablet 320 mg  Dose: 320 mg  Freq: Every 24 Hours Scheduled Route: PO  Start: 06/26/24 0900 End: 06/30/24 0736   Admin Instructions:       0736-D/C'd          valsartan (DIOVAN) tablet 80 mg  Dose: 80 mg  Freq: Once Route: PO  Start: 06/24/24 1730 End: 06/24/24 1756   Admin Instructions:            valsartan (DIOVAN) tablet 80 mg  Dose: 80 mg  Freq: Every 24 Hours Scheduled Route: PO  Start: 06/24/24 0900 End: 06/24/24 9957   Admin Instructions:                        Continuous Meds Sorted by Name  for Bibiana Inman as of 6/30/24 through 7/2/24  Legend:       Medications 06/30/24 07/01/24 07/02/24   clevidipine  (CLEVIPREX) infusion 0.5 mg/mL  Rate: 4-64 mL/hr Dose: 2-32 mg/hr  Freq: Continuous PRN Route: IV  PRN Comment: See Admin Instructions  Last Dose: Stopped (07/01/24 1733)  Start: 07/01/24 1308 End: 07/02/24 0804   Admin Instructions:        1258   1322   1446     1623   1627   1700     1708   1733       0804-D/C'd        dexmedetomidine (PRECEDEX) 400 mcg in 100 mL NS infusion  Rate: 3.2-24 mL/hr Dose: 0.2-1.5 mcg/kg/hr  Weight Dosing Info: 63.9 kg  Freq: Titrated Route: IV  Last Dose: Stopped (07/01/24 1459)  Start: 07/01/24 1400 End: 07/02/24 0804   Admin Instructions:      Order specific questions:        1258 [C]   1443   1459      0804-D/C'd        DOPamine 400 mg in 250 mL D5W infusion  Rate: 4.79-47.93 mL/hr Dose: 2-20 mcg/kg/min  Weight Dosing Info: 63.9 kg  Freq: Continuous PRN Route: IV  PRN Comment: CVR Protocol  Start: 07/01/24 1308 End: 07/02/24 0804   Admin Instructions:         0804-D/C'd        EPINEPHrine 5 mg in 250 mL NS infusion  Rate: 3.83-19.17 mL/hr Dose: 0.02-0.1 mcg/kg/min  Weight Dosing Info: 63.9 kg  Freq: Continuous PRN Route: IV  PRN Comment: See Admin Instructions  Start: 07/01/24 1308 End: 07/02/24 0804   Admin Instructions:         0804-D/C'd        insulin regular 1 unit/mL in 0.9% sodium chloride (Glucommander)  Rate: 0-100 mL/hr Dose: 0-100 Units/hr  Freq: Titrated Route: IV  Last Dose: 0.4 Units/hr (07/02/24 0658)  Start: 07/01/24 1400 End: 07/02/24 0810   Admin Instructions:      Order specific questions:        1258 [C]   1330   1432     1525   1652   1935     2045   2147   2302      0013   0107   0205     0306   0403   0534     0608   0658   0810-D/C'd      insulin regular 1 unit/mL in 0.9% sodium chloride (Glucommander)  Rate: 0-100 mL/hr Dose: 0-100 Units/hr  Freq: Titrated Route: IV  Last Dose: Stopped (07/01/24 0627)  Start: 07/01/24 0615 End: 07/01/24 1308   Admin Instructions:      Order specific questions:        0546      0627 [C]     1308-D/C'd         lactated ringers  infusion  Rate: 9 mL/hr Dose: 9 mL/hr  Freq: Continuous Route: IV  Start: 06/25/24 0718 End: 06/28/24 1106         milrinone (PRIMACOR) 20 mg in 100 mL D5W infusion  Rate: 4.79-7.19 mL/hr Dose: 0.25-0.375 mcg/kg/min  Weight Dosing Info: 63.9 kg  Freq: Continuous PRN Route: IV  PRN Comment: See Admin Instructions  Start: 07/01/24 1308 End: 07/02/24 0805   Admin Instructions:         0805-D/C'd        niCARdipine (CARDENE) 25 mg in 250 mL NS infusion kit  Rate:  mL/hr Dose: 5-15 mg/hr  Freq: Continuous PRN Route: IV  PRN Comment: See Admin Instructions  Last Dose: Stopped (07/01/24 1610)  Start: 07/01/24 1308   Admin Instructions:        1258   1535   1540     1555   1610          nitroglycerin (TRIDIL) 200 mcg/ml infusion  Rate: 1.5-60 mL/hr Dose: 5-200 mcg/min  Freq: Titrated Route: IV  Start: 07/01/24 1400 End: 07/02/24 0804   Admin Instructions:        (0357)        0804-D/C'd        norepinephrine (LEVOPHED) 8 mg in 250 mL NS infusion (premix)  Rate: 2.4-23.96 mL/hr Dose: 0.02-0.2 mcg/kg/min  Weight Dosing Info: 63.9 kg  Freq: Continuous PRN Route: IV  PRN Comment: See Admin Instructions  Start: 07/01/24 1308 End: 07/02/24 0804   Admin Instructions:         0804-D/C'd        phenylephrine (NNAMDI-SYNEPHRINE) 50 mg in 250 mL NS infusion  Rate: 3.83-38.34 mL/hr Dose: 0.2-2 mcg/kg/min  Weight Dosing Info: 63.9 kg  Freq: Continuous PRN Route: IV  PRN Comment: See Admin Instructions  Start: 07/01/24 1308 End: 07/02/24 0804   Admin Instructions:         0804-D/C'd        propofol (DIPRIVAN) infusion 10 mg/mL 100 mL  Rate: 1.92-19.17 mL/hr Dose: 5-50 mcg/kg/min  Weight Dosing Info: 63.9 kg  Freq: Continuous PRN Route: IV  PRN Comment: See Admin Instructions  Last Dose: Stopped (07/01/24 1432)  Start: 07/01/24 1308 End: 07/02/24 0805   Admin Instructions:      Order specific questions:        1258 [C]   1635 [id number]        0805-D/C'd        sodium chloride 0.9 % infusion  Rate: 30 mL/hr Dose: 30 mL/hr  Freq:  Continuous Route: IV  Last Dose: 30 mL/hr (07/01/24 1258)  Start: 07/01/24 1400     1258           sodium chloride 0.9 % infusion  Rate: 100 mL/hr Dose: 100 mL/hr  Freq: Continuous Route: IV  Last Dose: 100 mL/hr (06/24/24 2010)  Start: 06/22/24 0019 End: 06/25/24 1950                     PRN Meds Sorted by Name  for Bibiana Inman as of 6/30/24 through 7/2/24  Legend:       Medications 06/30/24 07/01/24 07/02/24    acetaminophen (TYLENOL) tablet 650 mg  Dose: 650 mg  Freq: Every 4 Hours PRN Route: PO  PRN Reason: Mild Pain  Start: 07/02/24 0313   Admin Instructions:            Or   acetaminophen (TYLENOL) 160 MG/5ML oral solution 650 mg  Dose: 650 mg  Freq: Every 4 Hours PRN Route: PO  PRN Reason: Mild Pain  Start: 07/02/24 0313   Admin Instructions:            Or   acetaminophen (TYLENOL) suppository 650 mg  Dose: 650 mg  Freq: Every 4 Hours PRN Route: RE  PRN Reason: Mild Pain  Start: 07/02/24 0313   Admin Instructions:            albumin human 5 % solution 1,500 mL  Dose: 1,500 mL  Freq: As Needed Route: IV  PRN Comment: CVR Protocol  Indications of Use: CARDIAC SURGERY  Start: 07/01/24 1308 End: 07/02/24 1307     1430   1628       0219 [C]   1307-D/C'd       albuterol (PROVENTIL) nebulizer solution 0.083% 2.5 mg/3mL  Dose: 2.5 mg  Freq: Every 6 Hours PRN Route: NEBULIZATION  PRN Reasons: Shortness of Air,Wheezing  Start: 06/22/24 0257 End: 07/01/24 1308     0632   1308   1308-D/C'd       ALPRAZolam (XANAX) tablet 0.25 mg  Dose: 0.25 mg  Freq: Every 8 Hours PRN Route: PO  PRN Reason: Anxiety  Start: 07/01/24 1308 End: 07/11/24 1307   Admin Instructions:            ALPRAZolam (XANAX) tablet 0.25 mg  Dose: 0.25 mg  Freq: Every 8 Hours PRN Route: PO  PRN Reason: Anxiety  Start: 06/25/24 1114 End: 07/01/24 1308   Admin Instructions:       2146        0632   1308   1308-D/C'd       bisacodyl (DULCOLAX) EC tablet 10 mg  Dose: 10 mg  Freq: Daily PRN Route: PO  PRN Reason: Constipation  Start: 07/01/24 1308   Admin  Instructions:             sennosides-docusate (PERICOLACE) 8.6-50 MG per tablet 2 tablet  Dose: 2 tablet  Freq: 2 Times Daily PRN Route: PO  PRN Reason: Constipation  Start: 06/22/24 0003 End: 07/01/24 1308   Admin Instructions:        0632   1308   1308-D/C'd       And   polyethylene glycol (MIRALAX) packet 17 g  Dose: 17 g  Freq: Daily PRN Route: PO  PRN Reason: Constipation  PRN Comment: Use if senna-docusate is ineffective  Start: 06/22/24 0003 End: 07/01/24 1308   Admin Instructions:        0632   1308-D/C'd  1308          And   bisacodyl (DULCOLAX) EC tablet 5 mg  Dose: 5 mg  Freq: Daily PRN Route: PO  PRN Reason: Constipation  PRN Comment: Use if polyethylene glycol is ineffective  Start: 06/22/24 0003 End: 07/01/24 1308   Admin Instructions:        0632   1308-D/C'd  1308          And   bisacodyl (DULCOLAX) suppository 10 mg  Dose: 10 mg  Freq: Daily PRN Route: RE  PRN Reason: Constipation  PRN Comment: Use if bisacodyl oral is ineffective  Start: 06/22/24 0003 End: 07/01/24 1308   Admin Instructions:        0632   1308-D/C'd  1308          bisacodyl (DULCOLAX) suppository 10 mg  Dose: 10 mg  Freq: Daily PRN Route: RE  PRN Reason: Constipation  Start: 07/02/24 0000   Admin Instructions:            clevidipine (CLEVIPREX) infusion 0.5 mg/mL  Rate: 4-64 mL/hr Dose: 2-32 mg/hr  Freq: Continuous PRN Route: IV  PRN Comment: See Admin Instructions  Start: 07/01/24 1308 End: 07/02/24 0804   Admin Instructions:        1258   1322   1446     1623   1627   1700     1708   1733       0804-D/C'd        cyclobenzaprine (FLEXERIL) tablet 10 mg  Dose: 10 mg  Freq: Every 8 Hours PRN Route: PO  PRN Reason: Muscle Spasms  Start: 07/02/24 0000 End: 07/02/24 0845      0845-D/C'd        dextrose (D50W) (25 g/50 mL) IV injection 10-50 mL  Dose: 10-50 mL  Freq: Every 15 Minutes PRN Route: IV  PRN Reason: Low Blood Sugar  PRN Comment: per [employer]  Start: 07/01/24 1308 End: 07/02/24 0810   Admin Instructions:         0810-D/C'd         dextrose (D50W) (25 g/50 mL) IV injection 10-50 mL  Dose: 10-50 mL  Freq: Every 15 Minutes PRN Route: IV  PRN Reason: Low Blood Sugar  PRN Comment: per [employer]  Start: 07/01/24 0518 End: 07/01/24 1308   Admin Instructions:        0632   1308   1308-D/C'd       dextrose (D50W) (25 g/50 mL) IV injection 25 g  Dose: 25 g  Freq: Every 15 Minutes PRN Route: IV  PRN Reason: Low Blood Sugar  PRN Comment: Blood Sugar Less Than 70  Start: 07/02/24 0811   Admin Instructions:            dextrose (D50W) (25 g/50 mL) IV injection 25 g  Dose: 25 g  Freq: Every 15 Minutes PRN Route: IV  PRN Reason: Low Blood Sugar  PRN Comment: Blood Sugar Less Than 70  Start: 06/22/24 0001 End: 07/01/24 1308   Admin Instructions:        0632   1308   1308-D/C'd       dextrose (GLUTOSE) oral gel 15 g  Dose: 15 g  Freq: Every 15 Minutes PRN Route: PO  PRN Reason: Low Blood Sugar  PRN Comment: Blood sugar less than 70  Start: 07/02/24 0811   Admin Instructions:            dextrose (GLUTOSE) oral gel 15 g  Dose: 15 g  Freq: Every 15 Minutes PRN Route: PO  PRN Reason: Low Blood Sugar  PRN Comment: per [employer]  Start: 07/01/24 1308 End: 07/02/24 0810   Admin Instructions:         0810-D/C'd        dextrose (GLUTOSE) oral gel 15 g  Dose: 15 g  Freq: Every 15 Minutes PRN Route: PO  PRN Reason: Low Blood Sugar  PRN Comment: per [employer]  Start: 07/01/24 0518 End: 07/01/24 1308   Admin Instructions:        0632   1308   1308-D/C'd       dextrose (GLUTOSE) oral gel 15 g  Dose: 15 g  Freq: Every 15 Minutes PRN Route: PO  PRN Reason: Low Blood Sugar  PRN Comment: Blood sugar less than 70  Start: 06/22/24 0001 End: 07/01/24 0520   Admin Instructions:        0520-D/C'd         diphenhydrAMINE (BENADRYL) injection 12.5 mg  Dose: 12.5 mg  Freq: Every 15 Minutes PRN Route: IV  PRN Reason: Itching  PRN Comment: May repeat x 1  Start: 06/25/24 0744 End: 06/25/24 0917   Admin Instructions:            DOPamine 400 mg in 250 mL D5W infusion  Rate:  4.79-47.93 mL/hr Dose: 2-20 mcg/kg/min  Weight Dosing Info: 63.9 kg  Freq: Continuous PRN Route: IV  PRN Comment: CVR Protocol  Start: 07/01/24 1308 End: 07/02/24 0804   Admin Instructions:         0804-D/C'd         droperidol (INAPSINE) injection 0.625 mg  Dose: 0.625 mg  Freq: Every 20 Minutes PRN Route: IV  PRN Reasons: Nausea,Vomiting  Indications of Use: NAUSEA AND VOMITING  Start: 06/25/24 0744 End: 06/25/24 0917   Admin Instructions:            Or   droperidol (INAPSINE) injection 0.625 mg  Dose: 0.625 mg  Freq: Every 20 Minutes PRN Route: IM  PRN Reasons: Nausea,Vomiting  Indications of Use: NAUSEA AND VOMITING  Start: 06/25/24 0744 End: 06/25/24 0917   Admin Instructions:            electrolyte-A 500 mL with heparin (porcine) 5000 UNIT/ML 10,000 Units mixture  Freq: As Needed  Start: 07/01/24 0749 End: 07/01/24 1254     0749   1254-D/C'd        enalaprilat (VASOTEC) injection 0.625 mg  Dose: 0.625 mg  Freq: Every 6 Hours PRN Route: IV  PRN Reason: High Blood Pressure  PRN Comment: For systolic blood pressure greater than 220 or diastolic blood pressure greater than 120  Start: 06/23/24 2006 End: 06/24/24 0811   Admin Instructions:            EPINEPHrine 5 mg in 250 mL NS infusion  Rate: 3.83-19.17 mL/hr Dose: 0.02-0.1 mcg/kg/min  Weight Dosing Info: 63.9 kg  Freq: Continuous PRN Route: IV  PRN Comment: See Admin Instructions  Start: 07/01/24 1308 End: 07/02/24 0804   Admin Instructions:         0804-D/C'd        fentaNYL citrate (PF) (SUBLIMAZE) injection 50 mcg  Dose: 50 mcg  Freq: Once As Needed Route: IV  PRN Reason: Severe Pain  Start: 06/25/24 0716 End: 06/25/24 0917   Admin Instructions:            flumazenil (ROMAZICON) injection 0.2 mg  Dose: 0.2 mg  Freq: As Needed Route: IV  PRN Comment: for benzodiazepine induced unresponsiveness or sedation  Indications of Use: BENZODIAZEPINE-INDUCED SEDATION  Start: 06/25/24 0744 End: 06/25/24 0917   Admin Instructions:            glucagon (GLUCAGEN) injection  1 mg  Dose: 1 mg  Freq: Every 15 Minutes PRN Route: IM  PRN Reason: Low Blood Sugar  PRN Comment: Blood Glucose Less Than 70  Start: 07/02/24 0811   Admin Instructions:            glucagon (GLUCAGEN) injection 1 mg  Dose: 1 mg  Freq: Every 15 Minutes PRN Route: IM  PRN Reason: Low Blood Sugar  PRN Comment: per [employer]  Start: 07/01/24 1308 End: 07/02/24 0810   Admin Instructions:         0810-D/C'd        glucagon (GLUCAGEN) injection 1 mg  Dose: 1 mg  Freq: Every 15 Minutes PRN Route: IM  PRN Reason: Low Blood Sugar  PRN Comment: per [employer]  Start: 07/01/24 0518 End: 07/01/24 1308   Admin Instructions:        0632   1308   1308-D/C'd       glucagon (GLUCAGEN) injection 1 mg  Dose: 1 mg  Freq: Every 15 Minutes PRN Route: IM  PRN Reason: Low Blood Sugar  PRN Comment: Blood Glucose Less Than 70  Start: 06/22/24 0001 End: 07/01/24 0521   Admin Instructions:        0521-D/C'd         hydrALAZINE (APRESOLINE) injection 20 mg  Dose: 20 mg  Freq: Every 6 Hours PRN Route: IV  PRN Reason: High Blood Pressure  PRN Comment: >150  Start: 07/01/24 1450 End: 07/02/24 0845   Admin Instructions:        1810        0845-D/C'd        hydrALAZINE (APRESOLINE) tablet 25 mg  Dose: 25 mg  Freq: Every 6 Hours PRN Route: PO  PRN Reason: High Blood Pressure  PRN Comment: Administer for SBP > 160, call primary service if clinical/neuro changes.  Start: 06/24/24 1639 End: 07/01/24 1308   Admin Instructions:        1308-D/C'd         HYDROcodone-acetaminophen (NORCO) 5-325 MG per tablet 2 tablet  Dose: 2 tablet  Freq: Every 4 Hours PRN Route: PO  PRN Reason: Severe Pain  Start: 07/02/24 0805 End: 07/11/24 1307   Admin Instructions:            HYDROcodone-acetaminophen (NORCO) 5-325 MG per tablet 2 tablet  Dose: 2 tablet  Freq: Every 4 Hours PRN Route: PO  PRN Reason: Moderate Pain  Start: 07/01/24 1308 End: 07/02/24 0805   Admin Instructions:         0805-D/C'd        ipratropium-albuterol (DUO-NEB) nebulizer solution 3 mL  Dose: 3  mL  Freq: Every 4 Hours PRN Route: NEBULIZATION  PRN Reasons: Wheezing,Shortness of Air  Start: 07/02/24 0725         lidocaine (XYLOCAINE) 1 % injection 0.5 mL  Dose: 0.5 mL  Freq: Once As Needed Route: ID  PRN Comment: IV Start  Start: 06/25/24 0716 End: 06/25/24 0917         magnesium hydroxide (MILK OF MAGNESIA) suspension 10 mL  Dose: 10 mL  Freq: Daily PRN Route: PO  PRN Reason: Constipation  Start: 07/02/24 0000         meperidine (DEMEROL) injection 25 mg  Dose: 25 mg  Freq: Every 4 Hours PRN Route: IV  PRN Reason: Shivering  Start: 07/01/24 1308 End: 07/02/24 0107   Admin Instructions:         0107-D/C'd        midazolam (VERSED) injection 1 mg  Dose: 1 mg  Freq: Every 5 Minutes PRN Route: IV  PRN Comment: Anxiety prophylaxis, Pre-op comfort  Start: 06/25/24 0716 End: 06/25/24 0917   Admin Instructions:            midazolam (VERSED) injection 2 mg  Dose: 2 mg  Freq: Every 1 Hour PRN Route: IV  PRN Reason: Sedation  PRN Comment: while on ventilator  Start: 07/01/24 1308 End: 07/02/24 0805   Admin Instructions:         0805-D/C'd        milrinone (PRIMACOR) 20 mg in 100 mL D5W infusion  Rate: 4.79-7.19 mL/hr Dose: 0.25-0.375 mcg/kg/min  Weight Dosing Info: 63.9 kg  Freq: Continuous PRN Route: IV  PRN Comment: See Admin Instructions  Start: 07/01/24 1308 End: 07/02/24 0805   Admin Instructions:         0805-D/C'd         morphine injection 1 mg  Dose: 1 mg  Freq: Every 4 Hours PRN Route: IV  PRN Reason: Moderate Pain  Start: 07/01/24 1308 End: 07/08/24 1307   Admin Instructions:            And   naloxone (NARCAN) injection 0.4 mg  Dose: 0.4 mg  Freq: Every 5 Minutes PRN Route: IV  PRN Reason: Respiratory Depression  Start: 07/01/24 1308   Admin Instructions:            morphine injection 4 mg  Dose: 4 mg  Freq: Every 30 Minutes PRN Route: IV  PRN Reason: Severe Pain  PRN Comment: while on ventilator  Start: 07/01/24 1308 End: 07/02/24 0805   Admin Instructions:        1437        0805-D/C'd        naloxone  (NARCAN) injection 0.2 mg  Dose: 0.2 mg  Freq: As Needed Route: IV  PRN Reasons: Opioid Reversal,Respiratory Depression  PRN Comment: unresponsiveness, decrease oxygen saturation  Indications of Use: ACUTE RESPIRATORY FAILURE,OPIOID-INDUCED RESPIRATORY DEPRESSION  Start: 06/25/24 0744 End: 06/25/24 0917   Admin Instructions:            niCARdipine (CARDENE) 25 mg in 250 mL NS infusion kit  Rate:  mL/hr Dose: 5-15 mg/hr  Freq: Continuous PRN Route: IV  PRN Comment: See Admin Instructions  Start: 07/01/24 1308   Admin Instructions:        1258   1535   1540     1555   1610          nicotine polacrilex (NICORETTE) gum 2 mg  Dose: 2 mg  Freq: Every 1 Hour PRN Route: MT  PRN Reason: Smoking Cessation  Start: 06/22/24 0003 End: 07/01/24 1308   Admin Instructions:        0632   1308   1308-D/C'd       nitroglycerin (NITROSTAT) SL tablet 0.4 mg  Dose: 0.4 mg  Freq: Every 5 Minutes PRN Route: SL  PRN Reason: Chest Pain  PRN Comment: Systolic BP Greater Than 100  Start: 07/01/24 1308   Admin Instructions:            nitroglycerin (NITROSTAT) SL tablet 0.4 mg  Dose: 0.4 mg  Freq: Every 5 Minutes PRN Route: SL  PRN Reason: Chest Pain  PRN Comment: Only if SBP Greater Than 100  Start: 06/22/24 0002 End: 07/01/24 1308   Admin Instructions:        0632   1308   1308-D/C'd       norepinephrine (LEVOPHED) 8 mg in 250 mL NS infusion (premix)  Rate: 2.4-23.96 mL/hr Dose: 0.02-0.2 mcg/kg/min  Weight Dosing Info: 63.9 kg  Freq: Continuous PRN Route: IV  PRN Comment: See Admin Instructions  Start: 07/01/24 1308 End: 07/02/24 0804   Admin Instructions:         0804-D/C'd        ondansetron (ZOFRAN) injection 4 mg  Dose: 4 mg  Freq: Every 6 Hours PRN Route: IV  PRN Reasons: Nausea,Vomiting  Start: 07/01/24 1308     1858           ondansetron (ZOFRAN) injection 4 mg  Dose: 4 mg  Freq: Once As Needed Route: IV  PRN Reasons: Nausea,Vomiting  Indications of Use: POSTOPERATIVE NAUSEA AND VOMITING  Start: 06/25/24 0744 End: 06/25/24 0917    Admin Instructions:             ondansetron ODT (ZOFRAN-ODT) disintegrating tablet 4 mg  Dose: 4 mg  Freq: Every 6 Hours PRN Route: PO  PRN Reasons: Nausea,Vomiting  Start: 06/22/24 0003 End: 07/01/24 1308   Admin Instructions:        0632   1308   1308-D/C'd       Or   ondansetron (ZOFRAN) injection 4 mg  Dose: 4 mg  Freq: Every 6 Hours PRN Route: IV  PRN Reasons: Nausea,Vomiting  Start: 06/22/24 0003 End: 07/01/24 1308   Admin Instructions:        0632   1308   1308-D/C'd       oxyCODONE (ROXICODONE) immediate release tablet 10 mg  Dose: 10 mg  Freq: Every 4 Hours PRN Route: PO  PRN Reason: Severe Pain  Start: 07/01/24 1308 End: 07/02/24 0805   Admin Instructions:        2130        0220   0805-D/C'd       papaverine injection  Freq: As Needed  Start: 07/01/24 0749 End: 07/01/24 1254     0749   1254-D/C'd        phenylephrine (NNAMDI-SYNEPHRINE) 50 mg in 250 mL NS infusion  Rate: 3.83-38.34 mL/hr Dose: 0.2-2 mcg/kg/min  Weight Dosing Info: 63.9 kg  Freq: Continuous PRN Route: IV  PRN Comment: See Admin Instructions  Start: 07/01/24 1308 End: 07/02/24 0804   Admin Instructions:         0804-D/C'd        polyethylene glycol (MIRALAX) packet 17 g  Dose: 17 g  Freq: Daily PRN Route: PO  PRN Reason: Constipation  Start: 07/01/24 1308   Admin Instructions:            Potassium Replacement - Follow Nurse / BPA Driven Protocol  Freq: As Needed Route: XX  PRN Reason: Other  Start: 07/01/24 1308   Admin Instructions:            Potassium Replacement - Follow Nurse / BPA Driven Protocol  Freq: As Needed Route: XX  PRN Reason: Other  Start: 06/22/24 1732 End: 07/01/24 1308   Admin Instructions:        1308-D/C'd          promethazine (PHENERGAN) suppository 25 mg  Dose: 25 mg  Freq: Once As Needed Route: RE  PRN Reasons: Nausea,Vomiting  Start: 06/25/24 0744 End: 06/25/24 0917   Admin Instructions:            Or   promethazine (PHENERGAN) tablet 25 mg  Dose: 25 mg  Freq: Once As Needed Route: PO  PRN Reasons:  Nausea,Vomiting  Start: 06/25/24 0744 End: 06/25/24 0917   Admin Instructions:            propofol (DIPRIVAN) infusion 10 mg/mL 100 mL  Rate: 1.92-19.17 mL/hr Dose: 5-50 mcg/kg/min  Weight Dosing Info: 63.9 kg  Freq: Continuous PRN Route: IV  PRN Comment: See Admin Instructions  Start: 07/01/24 1308 End: 07/02/24 0805   Admin Instructions:      Order specific questions:        1258 [C]   1315   1331     1432        0805-D/C'd        sodium chloride (NS) irrigation solution  Freq: As Needed  Start: 07/01/24 0750 End: 07/01/24 1254     0750   1254-D/C'd        sodium chloride 0.9 % flush 10 mL  Dose: 10 mL  Freq: As Needed Route: IV  PRN Reason: Line Care  Start: 07/01/24 0518 End: 07/01/24 1308     0632   1308   1308-D/C'd       sodium chloride 0.9 % flush 10 mL  Dose: 10 mL  Freq: As Needed Route: IV  PRN Reason: Line Care  Start: 06/30/24 0735 End: 07/01/24 1257     1257-D/C'd         sodium chloride 0.9 % flush 10 mL  Dose: 10 mL  Freq: As Needed Route: IV  PRN Reason: Line Care  Start: 06/22/24 0001 End: 07/01/24 1308     0632   1308   1308-D/C'd       sodium chloride 0.9 % flush 10 mL  Dose: 10 mL  Freq: As Needed Route: IV  PRN Reason: Line Care  Start: 06/21/24 2104 End: 07/01/24 1308     0632   1308   1308-D/C'd       sodium chloride 0.9 % flush 3-10 mL  Dose: 3-10 mL  Freq: As Needed Route: IV  PRN Reason: Line Care  Start: 06/25/24 0716 End: 06/25/24 0917         sodium chloride 0.9 % infusion 40 mL  Dose: 40 mL  Freq: As Needed Route: IV  PRN Reason: Line Care  Start: 07/01/24 0518 End: 07/01/24 1308   Admin Instructions:        0632   1308   1308-D/C'd       sodium chloride 0.9 % infusion 40 mL  Dose: 40 mL  Freq: As Needed Route: IV  PRN Reason: Line Care  Start: 06/30/24 0735 End: 07/01/24 1257   Admin Instructions:        0549 [C]   1257-D/C'd        sodium chloride 0.9 % infusion 40 mL  Dose: 40 mL  Freq: As Needed Route: IV  PRN Reason: Line Care  Start: 06/22/24 0001 End: 07/01/24 1308   Admin  Instructions:        0632   1308   1308-D/C'd       temazepam (RESTORIL) capsule 15 mg  Dose: 15 mg  Freq: Nightly PRN Route: PO  PRN Reason: Sleep  Start: 06/25/24 1114 End: 06/30/24 1113   Admin Instructions:       1113-D/C'd                         Xenograft Text: The defect edges were debeveled with a #15 scalpel blade.  Given the location of the defect, shape of the defect and the proximity to free margins a xenograft was deemed most appropriate.  The graft was then trimmed to fit the size of the defect.  The graft was then placed in the primary defect and oriented appropriately.

## 2024-07-02 NOTE — PLAN OF CARE
Goal Outcome Evaluation:  Plan of Care Reviewed With: patient           Outcome Evaluation: Pt is a 45 yo F who is post-op CABG and AVR. Pt presents to PT with impaired functional mobility and gait secondary to generalized weakness, decreased activity tolerance, and increased pain. Pt may benefit from skilled PT to address strength, mobility, and gait.      Anticipated Discharge Disposition (PT): home with assist, home with home health (pending progress)

## 2024-07-02 NOTE — PROGRESS NOTES
"Patient Name: Bibiana Inman  :1980  44 y.o.      Patient Care Team:  Ibrahima Correa MD as PCP - General (Family Medicine)  Rachele Zafar RN as Ambulatory  ([facility])  Xochilt Jenkins MD as Consulting Physician (Nephrology)    Interval History:   Status post open heart surgery.    Subjective:  Following for coronary disease and aortic valve mass    Objective   Vital Signs  Temp:  [96.4 °F (35.8 °C)-97.7 °F (36.5 °C)] 96.8 °F (36 °C)  Heart Rate:  [70-80] 80  Resp:  [11-18] 18  BP: ()/(53-76) 112/68  Arterial Line BP: ()/(49-92) 126/56  FiO2 (%):  [39 %-100 %] 39 %    Intake/Output Summary (Last 24 hours) at 2024 1035  Last data filed at 2024 0800  Gross per 24 hour   Intake 4734.75 ml   Output 2696 ml   Net 2038.75 ml     Flowsheet Rows      Flowsheet Row First Filed Value   Admission Height 162.6 cm (64\") Documented at 2024   Admission Weight 65.5 kg (144 lb 6.4 oz) Documented at 2024            Physical Exam:   General Appearance:    Alert, cooperative, in no acute distress   Lungs:     Clear to auscultation.  Normal respiratory effort and rate.      Heart:    Regular rhythm and normal rate, normal S1 and S2, no murmurs, gallops or rubs.     Chest Wall:  Covered   Abdomen:     Soft, nontender, positive bowel sounds.     Extremities:   no cyanosis, clubbing or edema.  No marked joint deformities.  Adequate musculoskeletal strength.       Results Review:    Results from last 7 days   Lab Units 24  0239   SODIUM mmol/L 142   POTASSIUM mmol/L 4.3   CHLORIDE mmol/L 112*   CO2 mmol/L 20.7*   BUN mg/dL 20   CREATININE mg/dL 1.92*   GLUCOSE mg/dL 117*   CALCIUM mg/dL 8.5*         Results from last 7 days   Lab Units 24  0239   WBC 10*3/mm3 14.01*   HEMOGLOBIN g/dL 9.1*   HEMATOCRIT % 28.6*   PLATELETS 10*3/mm3 129*     Results from last 7 days   Lab Units 24  0239 24  1312 24  0526   INR  1.25* 1.33* 1.00 "   APTT seconds  --  31.3 30.4         Results from last 7 days   Lab Units 07/02/24  0239   MAGNESIUM mg/dL 2.7*             Medication Review:   aspirin, 81 mg, Oral, Daily  atorvastatin, 80 mg, Oral, Nightly  ceFAZolin, 2 g, Intravenous, Q8H  chlorhexidine, 15 mL, Mouth/Throat, Q12H  enoxaparin, 40 mg, Subcutaneous, Daily  escitalopram, 10 mg, Oral, Daily  guaiFENesin, 1,200 mg, Oral, Q12H  insulin glargine, 15 Units, Subcutaneous, Daily  insulin lispro, 2-9 Units, Subcutaneous, 4x Daily AC & at Bedtime  magnesium sulfate, 1 g, Intravenous, Q8H  metoclopramide, 5 mg, Intravenous, Q6H  metoprolol tartrate, 12.5 mg, Oral, Q12H  mupirocin, , Each Nare, BID  pantoprazole, 40 mg, Oral, QAM  senna-docusate sodium, 2 tablet, Oral, Nightly  sodium chloride, 4 mL, Nebulization, BID - RT         niCARdipine, 5-15 mg/hr, Last Rate: Stopped (07/01/24 1610)  sodium chloride, 30 mL/hr, Last Rate: 30 mL/hr (07/01/24 1258)        Assessment & Plan     1.  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  Patient presented with strokelike symptoms.  2.  Coronary artery disease status post CABG with a vein graft to the obtuse marginal x 1 on July 1, 2024.  Previous bypass with a  of the LAD.  There is a LIMA to the LAD, vein graft to the circumflex is occluded, vein graft to the right coronary is patent.  3.  Diabetes with a hemoglobin A1c of 16.9.  4.  Hypertension  5.  Hyperlipidemia  6.  Tobacco use  7.  Carotid artery stenosis with a 70% lesion in the right internal carotid artery.  8.  Obstructive sleep apnea.    Allison Miranda MD, [facility]  07/02/24  10:35 EDT

## 2024-07-02 NOTE — PROGRESS NOTES
Name: Bibiana Inman ADMIT: 2024   : 1980  PCP: Ibrahima Correa MD    MRN: [id number] LOS: 11 days   AGE/SEX: 44 y.o. female  ROOM:      Subjective   Subjective   Patient seen at bedside.      Objective   Objective   Vital Signs  Temp:  [96.4 °F (35.8 °C)-97.7 °F (36.5 °C)] 97 °F (36.1 °C)  Heart Rate:  [70-80] 80  Resp:  [11-18] 18  BP: ()/(53-76) 129/61  Arterial Line BP: ()/(49-92) 117/53  FiO2 (%):  [39 %-100 %] 39 %  SpO2:  [96 %-100 %] 100 %  on  Flow (L/min):  [4] 4;   Device (Oxygen Therapy): room air  Body mass index is 26.94 kg/m².  Physical Exam  Vitals and nursing note reviewed.   Constitutional:       General: She is not in acute distress.     Appearance: She is not toxic-appearing or diaphoretic.   HENT:      Head: Normocephalic and atraumatic.      Nose: Nose normal.      Mouth/Throat:      Mouth: Mucous membranes are moist.      Pharynx: Oropharynx is clear.   Eyes:      Conjunctiva/sclera: Conjunctivae normal.      Pupils: Pupils are equal, round, and reactive to light.   Cardiovascular:      Rate and Rhythm: Normal rate and regular rhythm.      Pulses: Normal pulses.   Pulmonary:      Effort: Pulmonary effort is normal.   Abdominal:      General: Bowel sounds are normal.      Palpations: Abdomen is soft.      Tenderness: There is no abdominal tenderness.   Musculoskeletal:         General: No swelling or tenderness.      Cervical back: Neck supple.   Skin:     General: Skin is warm and dry.      Capillary Refill: Capillary refill takes less than 2 seconds.   Neurological:      Mental Status: She is alert and oriented to person, place, and time.      Comments: +mild right lower facial weakness   Psychiatric:         Mood and Affect: Mood normal.         Behavior: Behavior normal.       Copied text material from yesterday's note has been reviewed for appropriate changes and remains accurate as of 24.      Results Review     I reviewed the patient's new  clinical results.  Results from last 7 days   Lab Units 07/02/24  0239 07/01/24  1909 07/01/24 1647 07/01/24  1312 07/01/24  1111   WBC 10*3/mm3 14.01*  --  15.62* 14.79* 9.72   HEMOGLOBIN g/dL 9.1*  --  11.3* 10.0* 8.0*   HEMOGLOBIN, POC g/dL  --  9.5*  --   --   --    PLATELETS 10*3/mm3 129*  --  177 180 191     Results from last 7 days   Lab Units 07/02/24  0239 07/01/24 1647 07/01/24  1312 07/01/24  0230   SODIUM mmol/L 142 140 140 135*   POTASSIUM mmol/L 4.3 4.7 4.2 4.2   CHLORIDE mmol/L 112* 109* 106 101   CO2 mmol/L 20.7* 20.1* 23.0 25.0   BUN mg/dL 20 19 19 20   CREATININE mg/dL 1.92* 1.75* 1.49* 1.22*   GLUCOSE mg/dL 117* 210* 126* 211*   EGFR mL/min/1.73 32.2* 36.0* 43.7* 55.5*     Results from last 7 days   Lab Units 07/02/24 0239 07/01/24 1647 07/01/24 1312 06/26/24  0526   ALBUMIN g/dL 3.7 3.6 3.8 2.9*   BILIRUBIN mg/dL  --   --   --  <0.2   ALK PHOS U/L  --   --   --  88   AST (SGOT) U/L  --   --   --  17   ALT (SGPT) U/L  --   --   --  16     Results from last 7 days   Lab Units 07/02/24  0239 07/01/24 1647 07/01/24  1312 07/01/24  0230 06/27/24  0321 06/26/24  0526   CALCIUM mg/dL 8.5* 8.4* 8.9 9.2   < > 9.1   ALBUMIN g/dL 3.7 3.6 3.8  --   --  2.9*   MAGNESIUM mg/dL 2.7* 2.4 2.7*  --   --  1.8   PHOSPHORUS mg/dL 4.8* 2.9 2.0*  --   --   --     < > = values in this interval not displayed.     Results from last 7 days   Lab Units 07/01/24  1909   LACTATE mmol/L <0.4     Glucose   Date/Time Value Ref Range Status   07/02/2024 1006 135 (H) 70 - 130 mg/dL Final   07/02/2024 0817 124 70 - 130 mg/dL Final   07/02/2024 0658 118 70 - 130 mg/dL Final   07/02/2024 0607 117 70 - 130 mg/dL Final   07/02/2024 0527 117 70 - 130 mg/dL Final   07/02/2024 0401 111 70 - 130 mg/dL Final   07/02/2024 0304 113 70 - 130 mg/dL Final       XR Chest 1 View    Result Date: 7/1/2024  Postsurgical changes, as described.  This report was finalized on 7/1/2024 2:06 PM by Dr. Antonio Ch M.D on Workstation: [employer] [facility]        I have personally reviewed all medications:  Scheduled Medications  aspirin, 81 mg, Oral, Daily  atorvastatin, 80 mg, Oral, Nightly  ceFAZolin, 2 g, Intravenous, Q8H  chlorhexidine, 15 mL, Mouth/Throat, Q12H  enoxaparin, 40 mg, Subcutaneous, Daily  escitalopram, 10 mg, Oral, Daily  guaiFENesin, 1,200 mg, Oral, Q12H  insulin glargine, 15 Units, Subcutaneous, Daily  insulin lispro, 2-9 Units, Subcutaneous, 4x Daily AC & at Bedtime  magnesium sulfate, 1 g, Intravenous, Q8H  metoclopramide, 5 mg, Intravenous, Q6H  metoprolol tartrate, 12.5 mg, Oral, Q12H  mupirocin, , Each Nare, BID  pantoprazole, 40 mg, Oral, QAM  senna-docusate sodium, 2 tablet, Oral, Nightly  sodium chloride, 4 mL, Nebulization, BID - RT    Infusions  niCARdipine, 5-15 mg/hr, Last Rate: Stopped (07/01/24 1610)  sodium chloride, 30 mL/hr, Last Rate: 30 mL/hr (07/01/24 1258)    Diet  Diet: Liquid, Diabetic; Full Liquid; Consistent Carbohydrate; Fluid Consistency: Thin (IDDSI 0)    I have personally reviewed:  [x]  Laboratory   [x]  Microbiology   [x]  Radiology   [x]  EKG/Telemetry  [x]  Cardiology/Vascular   []  Pathology    []  Records       Assessment/Plan     Active Hospital Problems    Diagnosis  POA    **Acute right-sided weakness [R53.1]  Yes    CKD stage 3a, GFR 45-59 ml/min [N18.31]  Yes    Carotid stenosis [I65.29]  Yes    Lacunar cerebrovascular accident (CVA) [I63.81]  Yes    Elevated troponin [R79.89]  Yes    Aortic valve disease [I35.9]  Unknown    Tobacco abuse [Z72.0]  Yes    Coronary artery disease involving native coronary artery of native heart with unstable angina pectoris [I25.110]  Yes    Gastroparesis diabeticorum [E11.43, K31.84]  Yes    Gastroesophageal reflux disease [K21.9]  Yes    Iron deficiency anemia [D50.9]  Yes    Type 2 diabetes mellitus with hyperglycemia, with long-term current use of insulin [E11.65, Z79.4]  Not Applicable    Benign essential hypertension [I10]  Yes    Mixed hypercholesterolemia and  hypertriglyceridemia [E78.2]  Yes      Resolved Hospital Problems   No resolved problems to display.       44 y.o. female admitted with Acute right-sided weakness.    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      Type 2 DM  - complications include gastroparesis  - on lantus and jardiance as outpatient  - BG is elevated, needs better outpatient control, A1C 16.90%  - continue lantus 30 units daily   - continue lispro 4 units TID with meals  - cover with ssi/hypoglycemia protocol  - continue jardiance      HTN/CAD s/p CABG  - BP acceptable  - continue nifedipine and valsartan      GERD  - symptoms controlled, continue ppi    Acute on CKD, nephrology consult today.     SCDs for DVT prophylaxis.  Full code.  Discussed with patient and nursing staff.  Anticipate discharge home with HH vs SNU facility timing yet to be determined.  Expected Discharge Date: 7/9/2024    Raul Davis MD  [facility]  07/02/24  12:47 EDT

## 2024-07-02 NOTE — CONSULTS
NEPHROLOGY CONSULTATION-----[facility]    [facility]  [phone]  Tamiko Jenkins MD    Patient Care Team:  Ibrahima Correa MD as PCP - General (Family Medicine)  Rachele Zafar, ALEENA as Ambulatory  ([facility])  Xochilt Jenkins MD as Consulting Physician (Nephrology)    CC/REASON FOR CONSULTATION: RENAL FAILURE/ELEVATED SERUM CREATININE    PHYSICIAN REQUESTING CONSULTATION:     History of Present Illness    Patient is a 44 y.o. WF, very well known to me as I actively follow her as an outpatient, whom I was asked to see in consultation for evaluation and management of renal failure/elevated serum creatinine. Patient was admitted post CABG and V fibroelastaoma ressection/repair.  Patient with known CRF/CKD. No NSAIDs or recent IV dye exposure. No known h/o hepatitis, TB, rheumatic fever, jaundice, SLE, bleeding/bruising disorders.  No urinary sx. No edema or fluid retention.  +Compliance with home meds. Was not on diuretics prior to admission. Was on ACE-I/ARB in the form of Losartan prior to admission. No herbal med use. Was on SGLT2 inhibitor in the form of jardiance prior to admission. Some hypotension over past 24 hours    Review of Systems   Constitutional:  Positive for activity change, appetite change and fatigue. Negative for chills, diaphoresis, fever and unexpected weight change.   HENT:  Negative for congestion, dental problem, drooling, ear discharge, ear pain, facial swelling, hearing loss, mouth sores, nosebleeds, postnasal drip, rhinorrhea, sinus pressure, sinus pain, sneezing, sore throat, tinnitus, trouble swallowing and voice change.    Eyes:  Negative for photophobia, pain, discharge, redness, itching and visual disturbance.   Respiratory:  Negative for apnea, cough, choking, chest tightness, shortness of breath, wheezing and stridor.    Cardiovascular:  Negative for chest pain,  palpitations and leg swelling.   Gastrointestinal:  Negative for abdominal distention, abdominal pain, anal bleeding, blood in stool, constipation, diarrhea, nausea, rectal pain and vomiting.   Endocrine: Negative for cold intolerance, heat intolerance, polydipsia, polyphagia and polyuria.   Genitourinary:  Positive for decreased urine volume. Negative for difficulty urinating, dysuria, enuresis, flank pain, frequency, genital sores, hematuria and urgency.   Musculoskeletal:  Positive for back pain. Negative for arthralgias, gait problem, joint swelling, myalgias, neck pain and neck stiffness.   Skin:  Negative for color change, pallor, rash and wound.   Allergic/Immunologic: Negative for environmental allergies, food allergies and immunocompromised state.   Neurological:  Positive for weakness. Negative for dizziness, tremors, seizures, syncope, facial asymmetry, speech difficulty, light-headedness, numbness and headaches.   Hematological:  Negative for adenopathy. Does not bruise/bleed easily.   Psychiatric/Behavioral:  Negative for agitation, behavioral problems, confusion, decreased concentration, dysphoric mood, hallucinations, self-injury, sleep disturbance and suicidal ideas. The patient is not nervous/anxious and is not hyperactive.           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 Debbie Fry.    Depressive disorder 08/01/2023    Diabetic gastroparesis 07/20/2021    Diabetic peripheral neuropathy associated with type 2 diabetes mellitus 02/20/2024    Gastroesophageal reflux disease 03/10/2021    GERD (gastroesophageal reflux disease)     Intermittent claudication 04/17/2017    Iron deficiency anemia 03/02/2020    Mixed hypercholesterolemia and  hypertriglyceridemia 2014    Myocardial infarction     Obesity     Obstructive sleep apnea 2021    Personal history of COVID-19 2022    Polyp of colon 2023    Spontaneous      Stress fracture of right ankle with routine healing     Tobacco abuse 2024    Type 2 diabetes mellitus with hyperglycemia, with long-term current use of insulin 2017    Vitamin D deficiency 2024       Past Surgical History:   Procedure Laterality Date    CARDIAC CATHETERIZATION N/A 2024    Procedure: Left Heart Cath and coronary angiogram;  Surgeon: Jena Barron MD;  Location: [facility] CATH INVASIVE LOCATION;  Service: Cardiology;  Laterality: N/A;     SECTION      CORONARY ARTERY BYPASS GRAFT  2014    LIMA-LAD, SVG-OM2, SVG-PDA, Dr. Debbie Fry.    CORONARY ARTERY BYPASS GRAFT WITH AORTIC VALVE REPAIR/REPLACEMENT N/A 2024    Procedure: YUE; REOPERATIVE STERNOTOMY; CORONARY ARTERY BYPASS GRAFTING TIMES 1 UTILIZING RIGHT SAPHENOUS VEIN; AORTIC VALVE TUMOR ; PRP;  Surgeon: Benja De Luna MD;  Location: [facility] CV[facility];  Service: Cardiothoracic;  Laterality: N/A;    DILATATION AND CURETTAGE      TONSILLECTOMY         Family History   Family history unknown: Yes       Social History     Tobacco Use    Smoking status: Every Day     Current packs/day: 0.25     Average packs/day: 0.3 packs/day for 15.0 years (3.8 ttl pk-yrs)     Types: Cigarettes    Smokeless tobacco: Never   Vaping Use    Vaping status: Never Used   Substance Use Topics    Alcohol use: Defer    Drug use: Never       Home Meds:   Medications Prior to Admission   Medication Sig Dispense Refill Last Dose    albuterol sulfate  (90 Base) MCG/ACT inhaler Inhale 2 puffs every 6 (six) hours if needed for wheezing. 18 g 0 Past Week    aspirin 81 MG chewable tablet Chew 1 tablet Daily.   2024    atorvastatin (LIPITOR) 80 MG tablet Take 1 tablet by mouth Daily.   2024    clopidogrel (PLAVIX) 75 MG  tablet Take 1 tablet by mouth Daily. 90 tablet 0 2024    empagliflozin (JARDIANCE) 10 MG tablet tablet Take 1 tablet by mouth Daily. 30 tablet 0 2024    escitalopram (LEXAPRO) 10 MG tablet Take 1 tablet by mouth Daily.   2024    famotidine (PEPCID) 40 MG tablet Take 1 tablet by mouth every night at bedtime. 90 tablet 3 2024    hydrALAZINE (APRESOLINE) 50 MG tablet Take 1 tablet by mouth Every 8 (Eight) Hours. 90 tablet 0 2024    icosapent ethyl (Vascepa) 1 g capsule capsule Take 2 capsules by mouth 2 (two) times a day. 360 capsule 0 2024    icosapent ethyl (Vascepa) 1 g capsule capsule Take 2 g (2 capsules) by mouth 2 (Two) Times a Day With Meals. Indications: Cerebrovascular Accident or Stroke, High Amount of Triglycerides in the Blood, Procedure to Reestablish Blood Supply to the Heart 120 capsule 11 2024    insulin aspart (novoLOG FLEXPEN) 100 UNIT/ML solution pen-injector sc pen Inject  under the skin into the appropriate area as directed 3 times a day. Sliding scale, between 5-20 units TID   2024    insulin detemir (LEVEMIR) 100 UNIT/ML injection Inject 60 Units under the skin into the appropriate area as directed Every Night.   2024    metoclopramide (REGLAN) 5 MG tablet Take 1 tablet by mouth 3 times a day.   2024    metoprolol tartrate (LOPRESSOR) 100 MG tablet Take 1 tablet by mouth 2 (two) times a day. 180 tablet 0 2024    NIFEdipine CC (ADALAT CC) 60 MG 24 hr tablet Take 1 tablet by mouth Daily. 30 tablet 0 2024    pantoprazole (PROTONIX) 40 MG EC tablet Take 1 tablet by mouth Every Morning. 90 tablet 3 2024    cloNIDine (CATAPRES-TTS) 0.2 MG/24HR patch Place 1 patch on the skin as directed by provider 1 (One) Time Per Week. 4 patch 0     [] Continuous Glucose  (Dexcom G7 ) device Use 1 each 1 (One) Time for 1 dose. Dx: E11.65 1 each 0     Continuous Glucose Sensor (Dexcom G7 Sensor) misc Use 1 each Every 10 (Ten)  Days. Dx: E11.65 3 each 2        Scheduled Meds:  aspirin, 81 mg, Oral, Daily  atorvastatin, 80 mg, Oral, Nightly  ceFAZolin, 2 g, Intravenous, Q8H  chlorhexidine, 15 mL, Mouth/Throat, Q12H  enoxaparin, 40 mg, Subcutaneous, Daily  escitalopram, 10 mg, Oral, Daily  guaiFENesin, 1,200 mg, Oral, Q12H  insulin glargine, 15 Units, Subcutaneous, Daily  insulin lispro, 2-9 Units, Subcutaneous, 4x Daily AC & at Bedtime  metoprolol tartrate, 12.5 mg, Oral, Q12H  mupirocin, , Each Nare, BID  pantoprazole, 40 mg, Oral, QAM  senna-docusate sodium, 2 tablet, Oral, Nightly  sodium chloride, 4 mL, Nebulization, BID - RT        Continuous Infusions:  niCARdipine, 5-15 mg/hr, Last Rate: Stopped (07/01/24 1610)  sodium chloride, 30 mL/hr, Last Rate: 30 mL/hr (07/01/24 1258)        PRN Meds:    acetaminophen **OR** acetaminophen **OR** acetaminophen    ALPRAZolam    bisacodyl    bisacodyl    dextrose    dextrose    glucagon (human recombinant)    HYDROcodone-acetaminophen    ipratropium-albuterol    magnesium hydroxide    Morphine **AND** naloxone    niCARdipine    nitroglycerin    ondansetron    polyethylene glycol    Potassium Replacement - Follow Nurse / BPA Driven Protocol    Allergies:  Latex    OBJECTIVE    Vital Signs  Temp:  [96.6 °F (35.9 °C)-97.7 °F (36.5 °C)] 97.5 °F (36.4 °C)  Heart Rate:  [70-80] 70  Resp:  [11-19] 19  BP: ()/(53-76) 124/63  Arterial Line BP: ()/(49-92) 117/53  FiO2 (%):  [39 %] 39 %    I/O this shift:  In: 678 [P.O.:240; I.V.:438]  Out: 516 [Urine:460; Chest Tube:56]  I/O last 3 completed shifts:  In: 4542.8 [P.O.:480; I.V.:1512.8; Blood:800; IV Piggyback:1750]  Out: 3525 [Urine:2045; Other:1200; Chest Tube:280]    Physical Exam:  General Appearance: alert, appears stated age and cooperative  Head: normocephalic, without obvious abnormality and atraumatic. +DRY OP  Eyes: conjunctivae and sclerae normal and no icterus  Neck: supple and no JVD  Lungs: DECREASED BS BIBASILAR  Heart: regular  "rhythm & normal rate and normal S1, S2 +TITI (PACED)  Chest Wall: S/P SURGICAL CHANGES POST STERNOTOMY S/P OPEN HEART SURGERY WITH CT  Abdomen: +HYPOACTIVE BS; SOFT; NT/ND  Extremities: moves extremities well, no edema, no cyanosis  Skin: no bleeding, bruising or rash. +DECREASED SKIN TURGOR AND MILD PALLOR  Neurologic: Alert, and oriented. No focal deficits    Results Review:    I reviewed the patient's new clinical results.    WBC WBC   Date Value Ref Range Status   07/02/2024 14.01 (H) 3.40 - 10.80 10*3/mm3 Final   07/01/2024 15.62 (H) 3.40 - 10.80 10*3/mm3 Final   07/01/2024 14.79 (H) 3.40 - 10.80 10*3/mm3 Final   07/01/2024 9.72 3.40 - 10.80 10*3/mm3 Final   07/01/2024 8.21 3.40 - 10.80 10*3/mm3 Final   06/30/2024 7.62 3.40 - 10.80 10*3/mm3 Final      HGB Hemoglobin   Date Value Ref Range Status   07/02/2024 9.1 (L) 12.0 - 15.9 g/dL Final   07/01/2024 9.5 (L) 12.0 - 17.0 g/dL Final     Comment:     Serial Number: [id number]:  [id number]   07/01/2024 11.3 (L) 12.0 - 15.9 g/dL Final   07/01/2024 10.0 (L) 12.0 - 15.9 g/dL Final   07/01/2024 8.0 (L) 12.0 - 15.9 g/dL Final   07/01/2024 10.7 (L) 12.0 - 15.9 g/dL Final   06/30/2024 11.0 (L) 12.0 - 15.9 g/dL Final      HCT Hematocrit   Date Value Ref Range Status   07/02/2024 28.6 (L) 34.0 - 46.6 % Final   07/01/2024 28 (L) 38 - 51 % Final   07/01/2024 32.0 (L) 34.0 - 46.6 % Final   07/01/2024 28.2 (L) 34.0 - 46.6 % Final   07/01/2024 24.0 (L) 34.0 - 46.6 % Final   07/01/2024 33.6 (L) 34.0 - 46.6 % Final   06/30/2024 33.7 (L) 34.0 - 46.6 % Final      Platelets No results found for: \"LABPLAT\"   MCV MCV   Date Value Ref Range Status   07/02/2024 92.6 79.0 - 97.0 fL Final   07/01/2024 90.4 79.0 - 97.0 fL Final   07/01/2024 89.5 79.0 - 97.0 fL Final   07/01/2024 90.6 79.0 - 97.0 fL Final   07/01/2024 91.8 79.0 - 97.0 fL Final   06/30/2024 91.6 79.0 - 97.0 fL Final          Sodium Sodium   Date Value Ref Range Status   07/02/2024 142 136 - 145 mmol/L Final   07/01/2024 140 " "136 - 145 mmol/L Final   07/01/2024 140 136 - 145 mmol/L Final   07/01/2024 135 (L) 136 - 145 mmol/L Final   06/30/2024 138 136 - 145 mmol/L Final      Potassium Potassium   Date Value Ref Range Status   07/02/2024 4.3 3.5 - 5.2 mmol/L Final   07/01/2024 4.7 3.5 - 5.2 mmol/L Final     Comment:     Slight hemolysis detected by analyzer. Result may be falsely elevated.   07/01/2024 4.2 3.5 - 5.2 mmol/L Final   07/01/2024 4.2 3.5 - 5.2 mmol/L Final   06/30/2024 3.8 3.5 - 5.2 mmol/L Final      Chloride Chloride   Date Value Ref Range Status   07/02/2024 112 (H) 98 - 107 mmol/L Final   07/01/2024 109 (H) 98 - 107 mmol/L Final   07/01/2024 106 98 - 107 mmol/L Final   07/01/2024 101 98 - 107 mmol/L Final   06/30/2024 106 98 - 107 mmol/L Final      CO2 CO2   Date Value Ref Range Status   07/02/2024 20.7 (L) 22.0 - 29.0 mmol/L Final   07/01/2024 20.1 (L) 22.0 - 29.0 mmol/L Final   07/01/2024 23.0 22.0 - 29.0 mmol/L Final   07/01/2024 25.0 22.0 - 29.0 mmol/L Final   06/30/2024 25.0 22.0 - 29.0 mmol/L Final      BUN BUN   Date Value Ref Range Status   07/02/2024 20 6 - 20 mg/dL Final   07/01/2024 19 6 - 20 mg/dL Final   07/01/2024 19 6 - 20 mg/dL Final   07/01/2024 20 6 - 20 mg/dL Final   06/30/2024 18 6 - 20 mg/dL Final      Creatinine Creatinine   Date Value Ref Range Status   07/02/2024 1.92 (H) 0.57 - 1.00 mg/dL Final   07/01/2024 1.75 (H) 0.57 - 1.00 mg/dL Final   07/01/2024 1.49 (H) 0.57 - 1.00 mg/dL Final   07/01/2024 1.22 (H) 0.57 - 1.00 mg/dL Final   06/30/2024 1.15 (H) 0.57 - 1.00 mg/dL Final      Calcium Calcium   Date Value Ref Range Status   07/02/2024 8.5 (L) 8.6 - 10.5 mg/dL Final   07/01/2024 8.4 (L) 8.6 - 10.5 mg/dL Final   07/01/2024 8.9 8.6 - 10.5 mg/dL Final   07/01/2024 9.2 8.6 - 10.5 mg/dL Final   06/30/2024 9.3 8.6 - 10.5 mg/dL Final      PO4 No results found for: \"CAPO4\"   Albumin Albumin   Date Value Ref Range Status   07/02/2024 3.7 3.5 - 5.2 g/dL Final   07/01/2024 3.6 3.5 - 5.2 g/dL Final " "  07/01/2024 3.8 3.5 - 5.2 g/dL Final      Magnesium Magnesium   Date Value Ref Range Status   07/02/2024 2.7 (H) 1.6 - 2.6 mg/dL Final   07/01/2024 2.4 1.6 - 2.6 mg/dL Final   07/01/2024 2.7 (H) 1.6 - 2.6 mg/dL Final      Uric Acid No results found for: \"URICACID\"       Imaging Results (Last 72 Hours)       Procedure Component Value Units Date/Time    XR Chest 1 View [[id number]] Collected: 07/02/24 0530     Updated: 07/02/24 0536    Narrative:      XR CHEST 1 VW-     Clinical: Postop     COMPARISON examination dated 7/1/2024     FINDINGS: There has been interval removal of the endotracheal tube,  central line and chest tubes remain in position as before. Interval  development of bibasilar atelectasis and/or infiltrates. The heart size  is stable. Mediastinal gas less pronounced compared to the previous  examination. No pneumothorax seen. No vascular congestion or gross  pleural effusion has developed. The remainder is unremarkable.     This report was finalized on 7/2/2024 5:33 AM by Dr. Clif Galeas M.D  on Workstation: [id number]       XR Chest 1 View [[id number]] Collected: 07/01/24 1404     Updated: 07/01/24 1409    Narrative:      XR CHEST 1 VW-     HISTORY: Female who is 44 years-old, postoperative evaluation     TECHNIQUE: Frontal view of the chest     COMPARISON: 6/21/2024     FINDINGS: Endotracheal tube tip is 4.9 cm above the dulce maria. Right IJ  catheter extends to the superior vena cava. Sternotomy wires,  mediastinal drains, bilateral chest tubes noted. The heart size is  borderline. Pulmonary vasculature is unremarkable. Mild  pneumomediastinum is apparent there may be trace left apical  pneumothorax. Small likely atelectasis in both lungs. No acute osseous  process.       Impression:      Postsurgical changes, as described.     This report was finalized on 7/1/2024 2:06 PM by Dr. Antonio Ch M.D on Workstation: [id number]                 Results for orders placed during the hospital encounter of " 06/21/24    XR Chest 1 View    Narrative  XR CHEST 1 VW-    Clinical: Postop    COMPARISON examination dated 7/1/2024    FINDINGS: There has been interval removal of the endotracheal tube,  central line and chest tubes remain in position as before. Interval  development of bibasilar atelectasis and/or infiltrates. The heart size  is stable. Mediastinal gas less pronounced compared to the previous  examination. No pneumothorax seen. No vascular congestion or gross  pleural effusion has developed. The remainder is unremarkable.    This report was finalized on 7/2/2024 5:33 AM by Dr. Clif Galeas M.D  on Workstation: [id number]      XR Chest 1 View    Narrative  XR CHEST 1 VW-    HISTORY: Female who is 44 years-old, postoperative evaluation    TECHNIQUE: Frontal view of the chest    COMPARISON: 6/21/2024    FINDINGS: Endotracheal tube tip is 4.9 cm above the dulce maria. Right IJ  catheter extends to the superior vena cava. Sternotomy wires,  mediastinal drains, bilateral chest tubes noted. The heart size is  borderline. Pulmonary vasculature is unremarkable. Mild  pneumomediastinum is apparent there may be trace left apical  pneumothorax. Small likely atelectasis in both lungs. No acute osseous  process.    Impression  Postsurgical changes, as described.    This report was finalized on 7/1/2024 2:06 PM by Dr. Antonio Ch M.D on Workstation: [id number]      XR Chest 1 View    Narrative  SINGLE VIEW OF THE CHEST    HISTORY: Acute stroke protocol    COMPARISON: June 17, 2024    FINDINGS:  There is cardiomegaly. There is no vascular congestion. No pneumothorax,  pleural effusion, or acute infiltrate is seen. Patient is status post  median sternotomy with coronary artery bypass grafting.    Impression  No acute findings.    This report was finalized on 6/21/2024 10:25 PM by Dr. Marian Thompson M.D on Workstation: [id number]        Results for orders placed during the hospital encounter of 06/21/24    Duplex  Carotid Ultrasound CAR    Interpretation Summary    Right internal carotid artery demonstrates a less than 50% stenosis.    Left internal carotid artery demonstrates a less than 50% stenosis.      ASSESSMENT / PLAN      Acute right-sided weakness    Benign essential hypertension    Gastroparesis diabeticorum    Gastroesophageal reflux disease    Mixed hypercholesterolemia and hypertriglyceridemia    Iron deficiency anemia    Type 2 diabetes mellitus with hyperglycemia, with long-term current use of insulin    Coronary artery disease involving native coronary artery of native heart with unstable angina pectoris    Tobacco abuse    CKD stage 3a, GFR 45-59 ml/min    Carotid stenosis    Lacunar cerebrovascular accident (CVA)    Elevated troponin    Aortic valve disease      RENAL FAILURE------Nonoliguric. +ARF/CARLA on top of known CRF/CKD STG   3A, with a baseline serum creatinine of about 1.2. CRF/CKD STG 3A is secondary to DGS/HTN NS. +ARF/CARLA appears to be secondary to prerenal state/intravascular volume depletion and some ATN from hypotension. Hydrate and alkalinize. Support BP and avoid hypotension. Check urine and serum studies and renal US and PVR to further characterize. No NSAIDs or IV dye. Dose meds for CrCl less than 10 cc/min until ARF/CARLA is resolved. Keep off of SGLT2 inhibitor for now    2. ACIDOSIS------Metabolic. No elevation in AGAP. +Type 4 RTA. Give IV NaHCO3 and follow lactic     3. DMII WITH RENAL MANIFESTATIONS-----Hold SGLT2 inhibitor given ARF/CARLA. Lantus, Glucometers, SSI     4. HTN WITH CKD-----BP low. No ACE-I/ARB/DRI/diuretic for now. Follow for pressor need. Hydrate     5. HYPERLIPIDEMIA-----Hold Statin until CK confirmed to be normal     6. OA/DJD------No NSAIDs. Check uric acid     7. DM PERIPHERAL NEUROPATHY-----On Neurontin    8. GERD/DM GASTROPARESIS/PUD PROPHYLAXIS-------Reglan and PPI. Benefits of PPI use outweigh risks, despite renal dysfunction     8. DVT PROPHYLAXIS----Renal dose  adjusted Lovenox     9. PROTEINURIA-----Secondary to DGS     10. VITAMIN D DEFICIENCY-----On Supplementation prior to admission    11. ANEMIA OF CKD AND POST-OP ANEMIA------Check iron studies and follow for iron/EPO need    12. DEPRESSION-----On Lexapro    13. CAD S/P CABG S/P AV FIBROELASTOMA RESECTION/REPAIR-----per , CT Surgery    14. POST OP ATELECTASIS/LB WITH H/O TOBACCO ABUSE-----Encourage IS. No active wheezing and respiratory status stable    I discussed the patient's findings and my recommendations with patient and nursing staff    Will follow along closely. Thank you for allowing us to see this patient in renal consultation.    [facility]  [phone]  MD Tamiko Otoole MD  07/02/24  16:28 EDT

## 2024-07-02 NOTE — THERAPY EVALUATION
Patient Name: Bibiaan Inman  : 1980    MRN: [id number]                              Today's Date: 2024       Admit Date: 2024    Visit Dx:     ICD-10-CM ICD-9-CM   1. Acute right-sided weakness  R53.1 728.87   2. Type 2 diabetes mellitus with hyperglycemia, with long-term current use of insulin  E11.65 250.00    Z79.4 790.29     V58.67   3. Tobacco abuse  Z72.0 305.1   4. Carotid stenosis, asymptomatic, right  I65.21 433.10   5. Coronary artery disease involving native coronary artery of native heart with unstable angina pectoris  I25.110 414.01     411.1   6. 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    Abnormal bone density screening    Benign essential hypertension    Chronic cough    Gastroparesis diabeticorum    Elevated erythrocyte sedimentation rate    Fatigue    Gastroesophageal reflux disease    Mixed hypercholesterolemia and hypertriglyceridemia    Intermittent claudication    Iron deficiency anemia    Multiple joint pain    Need for influenza vaccination    Type 2 diabetes mellitus with hyperglycemia, with long-term current use of insulin    Obstructive sleep apnea    Coronary artery disease involving native coronary artery of native heart with unstable angina pectoris    Abnormal nuclear stress test    Depressive disorder    Back pain    Diabetic peripheral neuropathy associated with type 2 diabetes mellitus    Ingrowing nail, left great toe    Personal history of COVID-19    Polyp of colon    Vitamin D deficiency    Tobacco abuse    Unstable angina    Altered mental status    Carotid stenosis, asymptomatic, right    Acute right-sided weakness    CKD stage 3a, GFR 45-59 ml/min    Carotid stenosis    Lacunar cerebrovascular accident (CVA)    Elevated troponin    Aortic valve disease     Past Medical History:   Diagnosis Date    5,10-methylenetetrahydrofolate  reductase deficiency 2012    Allergic rhinitis 2012    Benign essential hypertension 2016    Coronary arteriosclerosis 2014    Description: s/p CABG (LIMA-LAD, SVG-OM2, SVG-PDA) performed on 14 by Dr. Debbie Fry.    Depressive disorder 2023    Diabetic gastroparesis 2021    Diabetic peripheral neuropathy associated with type 2 diabetes mellitus 2024    Gastroesophageal reflux disease 03/10/2021    GERD (gastroesophageal reflux disease)     Intermittent claudication 2017    Iron deficiency anemia 2020    Mixed hypercholesterolemia and hypertriglyceridemia 2014    Myocardial infarction     Obesity     Obstructive sleep apnea 2021    Personal history of COVID-19 2022    Polyp of colon 2023    Spontaneous      Stress fracture of right ankle with routine healing     Tobacco abuse 2024    Type 2 diabetes mellitus with hyperglycemia, with long-term current use of insulin 2017    Vitamin D deficiency 2024     Past Surgical History:   Procedure Laterality Date    CARDIAC CATHETERIZATION N/A 2024    Procedure: Left Heart Cath and coronary angiogram;  Surgeon: Jena Barron MD;  Location: [facility] CATH INVASIVE LOCATION;  Service: Cardiology;  Laterality: N/A;     SECTION      CORONARY ARTERY BYPASS GRAFT  2014    LIMA-LAD, SVG-OM2, SVG-PDA, Dr. Debbie Fyr.    DILATATION AND CURETTAGE      TONSILLECTOMY        General Information       Row Name 24 1159          Physical Therapy Time and Intention    Document Type evaluation  -     Mode of Treatment individual therapy;physical therapy  -       Row Name 24 1154          General Information    Prior Level of Function independent:;gait;transfer;bed mobility  no AD at baseline  -     Existing Precautions/Restrictions fall  -     Barriers to Rehab medically complex  -       Row Name 24 1157          Living Environment     People in Home spouse  -       Row Name 07/02/24 1159          Home Main Entrance    Number of Stairs, Main Entrance none  -       Row Name 07/02/24 1159          Cognition    Orientation Status (Cognition) oriented x 3  pt a bit lethargic, required cues to keep her eyes open  -       Row Name 07/02/24 1159          Safety Issues, Functional Mobility    Impairments Affecting Function (Mobility) balance;endurance/activity tolerance;pain;strength  -               User Key  (r) = Recorded By, (t) = Taken By, (c) = Cosigned By      Initials Name Provider Type     Mary West, PT Physical Therapist                   Mobility       Row Name 07/02/24 1212          Bed Mobility    Bed Mobility supine-sit;sit-supine  -     Supine-Sit [place] (Bed Mobility) not tested  sitting in chair  -     Sit-Supine [place] (Bed Mobility) verbal cues;nonverbal cues (demo/gesture);minimum assist (75% patient effort);2 person assist  -     Assistive Device (Bed Mobility) bed rails;head of bed elevated  -       Row Name 07/02/24 1212          Sit-Stand Transfer    Sit-Stand [place] (Transfers) verbal cues;nonverbal cues (demo/gesture);minimum assist (75% patient effort);2 person assist  -     Assistive Device (Sit-Stand Transfers) --  HHA  -       Row Name 07/02/24 1212          Gait/Stairs (Locomotion)    [place] Level (Gait) verbal cues;nonverbal cues (demo/gesture);minimum assist (75% patient effort)  -     Assistive Device (Gait) --  A  -     Distance in Feet (Gait) 5  chair to bed  -     Deviations/Abnormal Patterns (Gait) vipul decreased;gait speed decreased;stride length decreased  -     Bilateral Gait Deviations forward flexed posture  -     Comment, (Gait/Stairs) gait is slow and shuffling, pt required repeated cues to take deep breaths, to stop grunting, and to keep her eyes open  -               User Key  (r) = Recorded By, (t) = Taken By, (c) = Cosigned By       Initials Name Provider Type     Mary West, PT Physical Therapist                   Obj/Interventions       Row Name 07/02/24 1221          Range of Motion Comprehensive    General Range of Motion no range of motion deficits identified  -[facility] Name 07/02/24 1221          Strength Comprehensive (MMT)    Comment, General Manual Muscle Testing (MMT) Assessment generalized weakness noted with functional mobility, B LE grossly >/=3/5  -       Row Name 07/02/24 1221          Balance    Balance Assessment standing static balance;standing dynamic balance  -     Static Standing Balance verbal cues;non-verbal cues (demo/gesture);minimal assist;2-person assist  -     Dynamic Standing Balance non-verbal cues (demo/gesture);verbal cues;minimal assist;2-person assist  -               User Key  (r) = Recorded By, (t) = Taken By, (c) = Cosigned By      Initials Name Provider Type     Mary West, PT Physical Therapist                   Goals/Plan       Row Name 07/02/24 1228          Problem Specific Goal 1 (PT)    Problem Specific Goal 1 (PT) Cardiac Level 3  -     Time Frame (Problem Specific Goal 1, PT) 1 week  -[facility] Name 07/02/24 1228          Therapy Assessment/Plan (PT)    Planned Therapy Interventions (PT) balance training;bed mobility training;home exercise program;gait training;patient/family education;strengthening;transfer training  -               User Key  (r) = Recorded By, (t) = Taken By, (c) = Cosigned By      Initials Name Provider Type     Mary West, PT Physical Therapist                   Clinical Impression       Row Name 07/02/24 1222          Pain    Pain Location incisional  -     Pain Location - chest  -     Pre/Posttreatment Pain Comment pt with increased post-op chest pain, pt grunting a lot, repeated cues to not do this and cued to take deep breaths  -     Pain Intervention(s) Repositioned  -       Row Name 07/02/24 1222          Plan of  Care Review    Plan of Care Reviewed With patient  -     Outcome Evaluation Pt is a 45 yo F who is post-op CABG and AVR. Pt presents to PT with impaired functional mobility and gait secondary to generalized weakness, decreased activity tolerance, and increased pain. Pt may benefit from skilled PT to address strength, mobility, and gait.  -       Row Name 07/02/24 1222          Therapy Assessment/Plan (PT)    Patient/Family Therapy Goals Statement (PT) to return to PLOF  -     Rehab Potential (PT) good, to achieve stated therapy goals  -     Criteria for Skilled Interventions Met (PT) skilled treatment is necessary  -     Therapy Frequency (PT) 5 times/wk  -       Row Name 07/02/24 1222          Positioning and Restraints    Pre-Treatment Position sitting in chair/recliner  -     Post Treatment Position bed  -CH     In Bed supine;call light within reach;encouraged to call for assist;exit alarm on;with family/caregiver  -               User Key  (r) = Recorded By, (t) = Taken By, (c) = Cosigned By      Initials Name Provider Type     Mary West, PT Physical Therapist                   Outcome Measures       Row Name 07/02/24 1228          How much help from another person do you currently need...    Turning from your back to your side while in flat bed without using bedrails? 3  -CH     Moving from lying on back to sitting on the side of a flat bed without bedrails? 3  -CH     Moving to and from a bed to a chair (including a wheelchair)? 3  -CH     Standing up from a chair using your arms (e.g., wheelchair, bedside chair)? 3  -CH     Climbing 3-5 steps with a railing? 1  -CH     To walk in hospital room? 2  -CH     AM-PAC 6 Clicks Score (PT) 15  -CH     Highest Level of Mobility Goal 4 --> Transfer to chair/commode  -       Row Name 07/02/24 1228          Functional Assessment    Outcome Measure Options AM-PAC 6 Clicks Basic Mobility (PT)  -               User Key  (r) = Recorded By, (t)  = Taken By, (c) = Cosigned By      Initials Name Provider Type     Mary West PT Physical Therapist                                 Physical Therapy Education       Title: PT OT SLP Therapies (In Progress)       Topic: Physical Therapy (In Progress)       Point: Mobility training (Done)       Learning Progress Summary             Patient Acceptance, E,TB,D, VU,NR by  at 7/2/2024 1229                         Point: Home exercise program (Not Started)       Learner Progress:  Not documented in this visit.              Point: Body mechanics (Done)       Learning Progress Summary             Patient Acceptance, E,TB,D, VU,NR by  at 7/2/2024 1229                         Point: Precautions (Done)       Learning Progress Summary             Patient Acceptance, E,TB,D, VU,NR by  at 7/2/2024 1229                                         User Key       Initials Effective Dates Name Provider Type Discipline     06/16/21 -  Mary West PT Physical Therapist PT                  PT Recommendation and Plan  Planned Therapy Interventions (PT): balance training, bed mobility training, home exercise program, gait training, patient/family education, strengthening, transfer training  Plan of Care Reviewed With: patient  Outcome Evaluation: Pt is a 43 yo F who is post-op CABG and AVR. Pt presents to PT with impaired functional mobility and gait secondary to generalized weakness, decreased activity tolerance, and increased pain. Pt may benefit from skilled PT to address strength, mobility, and gait.     Time Calculation:         PT Charges       Row Name 07/02/24 1229             Time Calculation    Start Time 1118  -      Stop Time 1130  -      Time Calculation (min) 12 min  -      PT Received On 07/02/24  -      PT - Next Appointment 07/03/24  -      PT Goal Re-Cert Due Date 07/09/24  -         Time Calculation- PT    Total Timed Code Minutes- PT 8 minute(s)  -         Timed Charges    11145 - PT  Therapeutic Activity Minutes 8  -CH         Total Minutes    Timed Charges Total Minutes 8  -CH       Total Minutes 8  -CH                User Key  (r) = Recorded By, (t) = Taken By, (c) = Cosigned By      Initials Name Provider Type    Mary Kunz, PT Physical Therapist                  Therapy Charges for Today       Code Description Service Date Service Provider Modifiers Qty    46061296347 HC PT THERAPEUTIC ACT EA 15 MIN 7/2/2024 Mary West, PT GP 1    75238944778 HC PT EVAL MOD COMPLEXITY 2 7/2/2024 Mary West, PT GP 1            PT G-Codes  Outcome Measure Options: AM-PAC 6 Clicks Basic Mobility (PT)  AM-PAC 6 Clicks Score (PT): 15  PT Discharge Summary  Anticipated Discharge Disposition (PT): home with assist, home with home health (pending progress)    Mary West, PT  7/2/2024

## 2024-07-03 ENCOUNTER — APPOINTMENT (OUTPATIENT)
Dept: GENERAL RADIOLOGY | Facility: HOSPITAL | Age: 44
DRG: 981 | End: 2024-07-03
Payer: COMMERCIAL

## 2024-07-03 LAB
ALBUMIN SERPL-MCNC: 3.5 G/DL (ref 3.5–5.2)
ALBUMIN/GLOB SERPL: 1.9 G/DL
ALP SERPL-CCNC: 62 U/L (ref 39–117)
ALT SERPL W P-5'-P-CCNC: <5 U/L (ref 1–33)
ANION GAP SERPL CALCULATED.3IONS-SCNC: 8 MMOL/L (ref 5–15)
ARTERIAL PATENCY WRIST A: ABNORMAL
AST SERPL-CCNC: 23 U/L (ref 1–32)
ATMOSPHERIC PRESS: 747.2 MMHG
BACTERIA UR QL AUTO: NORMAL /HPF
BASE EXCESS BLDA CALC-SCNC: -1.4 MMOL/L (ref 0–2)
BDY SITE: ABNORMAL
BILIRUB SERPL-MCNC: 0.2 MG/DL (ref 0–1.2)
BILIRUB UR QL STRIP: NEGATIVE
BUN BLDA-MCNC: 20 MG/DL (ref 8–26)
BUN SERPL-MCNC: 21 MG/DL (ref 6–20)
BUN/CREAT SERPL: 14.3 (ref 7–25)
CA-I BLD-MCNC: 5.1 MG/DL (ref 4.6–5.4)
CA-I BLDA-SCNC: 1.12 MMOL/L (ref 2.15–2.5)
CA-I SERPL ISE-MCNC: 1.27 MMOL/L (ref 1.15–1.35)
CALCIUM SPEC-SCNC: 9 MG/DL (ref 8.6–10.5)
CHLORIDE BLDA-SCNC: 111 MMOL/L (ref 98–107)
CHLORIDE SERPL-SCNC: 107 MMOL/L (ref 98–107)
CK SERPL-CCNC: 280 U/L (ref 20–180)
CLARITY UR: CLEAR
CO2 BLDA-SCNC: 23.7 MMOL/L (ref 23–27)
CO2 SERPL-SCNC: 26 MMOL/L (ref 22–29)
COLOR UR: YELLOW
CREAT BLDA-MCNC: 1.38 MG/DL (ref 0.6–1.3)
CREAT SERPL-MCNC: 1.47 MG/DL (ref 0.57–1)
D-LACTATE SERPL-SCNC: 1.4 MMOL/L (ref 0.5–2)
D-LACTATE SERPL-SCNC: 2 MMOL/L
DEPRECATED RDW RBC AUTO: 46.6 FL (ref 37–54)
DEVICE COMMENT: ABNORMAL
DEVICE COMMENT: ABNORMAL
DEVICE COMMENT: NORMAL
EGFRCR SERPLBLD CKD-EPI 2021: 44.4 ML/MIN/1.73
ERYTHROCYTE [DISTWIDTH] IN BLOOD BY AUTOMATED COUNT: 13.8 % (ref 12.3–15.4)
GLOBULIN UR ELPH-MCNC: 1.8 GM/DL
GLUCOSE BLDC GLUCOMTR-MCNC: 144 MG/DL (ref 70–130)
GLUCOSE BLDC GLUCOMTR-MCNC: 164 MG/DL (ref 70–130)
GLUCOSE BLDC GLUCOMTR-MCNC: 166 MG/DL (ref 70–130)
GLUCOSE BLDC GLUCOMTR-MCNC: 199 MG/DL (ref 65–99)
GLUCOSE BLDC GLUCOMTR-MCNC: 232 MG/DL (ref 70–130)
GLUCOSE SERPL-MCNC: 158 MG/DL (ref 65–99)
GLUCOSE UR STRIP-MCNC: ABNORMAL MG/DL
HCO3 BLDA-SCNC: 23.7 MMOL/L (ref 22–28)
HCT VFR BLD AUTO: 27.7 % (ref 34–46.6)
HCT VFR BLDA CALC: 24 % (ref 38–51)
HEMODILUTION: NO
HGB BLD-MCNC: 8.9 G/DL (ref 12–15.9)
HGB BLDA-MCNC: 8 G/DL (ref 12–17)
HGB UR QL STRIP.AUTO: ABNORMAL
HYALINE CASTS UR QL AUTO: NORMAL /LPF
KETONES UR QL STRIP: NEGATIVE
LEUKOCYTE ESTERASE UR QL STRIP.AUTO: NEGATIVE
Lab: ABNORMAL
MAGNESIUM SERPL-MCNC: 2.2 MG/DL (ref 1.6–2.6)
MCH RBC QN AUTO: 29.5 PG (ref 26.6–33)
MCHC RBC AUTO-ENTMCNC: 32.1 G/DL (ref 31.5–35.7)
MCV RBC AUTO: 91.7 FL (ref 79–97)
MODALITY: ABNORMAL
NITRITE UR QL STRIP: NEGATIVE
NOTIFIED WHO: ABNORMAL
NOTIFIED WHO: ABNORMAL
PCO2 BLDA: 40.8 MM HG (ref 35–45)
PH BLDA: 7.37 PH UNITS (ref 7.35–7.45)
PH UR STRIP.AUTO: 5.5 [PH] (ref 5–8)
PHOSPHATE SERPL-MCNC: 5.2 MG/DL (ref 2.5–4.5)
PLATELET # BLD AUTO: 123 10*3/MM3 (ref 140–450)
PMV BLD AUTO: 11.7 FL (ref 6–12)
PO2 BLDA: 86.9 MM HG (ref 80–100)
POTASSIUM BLDA-SCNC: 3.8 MMOL/L (ref 3.5–5.2)
POTASSIUM SERPL-SCNC: 4.4 MMOL/L (ref 3.5–5.2)
PROT SERPL-MCNC: 5.3 G/DL (ref 6–8.5)
PROT UR QL STRIP: ABNORMAL
QT INTERVAL: 412 MS
QTC INTERVAL: 415 MS
RBC # BLD AUTO: 3.02 10*6/MM3 (ref 3.77–5.28)
RBC # UR STRIP: NORMAL /HPF
READ BACK: ABNORMAL
READ BACK: YES
REF LAB TEST METHOD: NORMAL
SAO2 % BLDCOA: 96.4 % (ref 92–98.5)
SET MECH RESP RATE: 16
SODIUM BLD-SCNC: 138 MMOL/L (ref 136–145)
SODIUM SERPL-SCNC: 141 MMOL/L (ref 136–145)
SP GR UR STRIP: 1.02 (ref 1–1.03)
SQUAMOUS #/AREA URNS HPF: NORMAL /HPF
TOTAL RATE: 16 BREATHS/MINUTE
UROBILINOGEN UR QL STRIP: ABNORMAL
WBC # UR STRIP: NORMAL /HPF
WBC NRBC COR # BLD AUTO: 10.99 10*3/MM3 (ref 3.4–10.8)

## 2024-07-03 PROCEDURE — 71045 X-RAY EXAM CHEST 1 VIEW: CPT

## 2024-07-03 PROCEDURE — 36600 WITHDRAWAL OF ARTERIAL BLOOD: CPT

## 2024-07-03 PROCEDURE — 83605 ASSAY OF LACTIC ACID: CPT

## 2024-07-03 PROCEDURE — 84100 ASSAY OF PHOSPHORUS: CPT | Performed by: INTERNAL MEDICINE

## 2024-07-03 PROCEDURE — 94799 UNLISTED PULMONARY SVC/PX: CPT

## 2024-07-03 PROCEDURE — 25010000002 NICARDIPINE 2.5 MG/ML SOLUTION: Performed by: NURSE PRACTITIONER

## 2024-07-03 PROCEDURE — 97110 THERAPEUTIC EXERCISES: CPT

## 2024-07-03 PROCEDURE — 94760 N-INVAS EAR/PLS OXIMETRY 1: CPT

## 2024-07-03 PROCEDURE — 80053 COMPREHEN METABOLIC PANEL: CPT | Performed by: INTERNAL MEDICINE

## 2024-07-03 PROCEDURE — 63710000001 INSULIN GLARGINE PER 5 UNITS: Performed by: NURSE PRACTITIONER

## 2024-07-03 PROCEDURE — 25810000003 SODIUM CHLORIDE 0.9 % SOLUTION: Performed by: NURSE PRACTITIONER

## 2024-07-03 PROCEDURE — 25010000002 NA FERRIC GLUC CPLX PER 12.5 MG: Performed by: INTERNAL MEDICINE

## 2024-07-03 PROCEDURE — 85027 COMPLETE CBC AUTOMATED: CPT | Performed by: NURSE PRACTITIONER

## 2024-07-03 PROCEDURE — 81001 URINALYSIS AUTO W/SCOPE: CPT | Performed by: INTERNAL MEDICINE

## 2024-07-03 PROCEDURE — 82948 REAGENT STRIP/BLOOD GLUCOSE: CPT

## 2024-07-03 PROCEDURE — 25810000003 SODIUM CHLORIDE 0.9 % SOLUTION: Performed by: INTERNAL MEDICINE

## 2024-07-03 PROCEDURE — 25010000002 CEFAZOLIN PER 500 MG: Performed by: NURSE PRACTITIONER

## 2024-07-03 PROCEDURE — 82550 ASSAY OF CK (CPK): CPT | Performed by: INTERNAL MEDICINE

## 2024-07-03 PROCEDURE — 25010000002 HYDRALAZINE PER 20 MG: Performed by: NURSE PRACTITIONER

## 2024-07-03 PROCEDURE — 83605 ASSAY OF LACTIC ACID: CPT | Performed by: INTERNAL MEDICINE

## 2024-07-03 PROCEDURE — 82330 ASSAY OF CALCIUM: CPT | Performed by: INTERNAL MEDICINE

## 2024-07-03 PROCEDURE — 80047 BASIC METABLC PNL IONIZED CA: CPT

## 2024-07-03 PROCEDURE — 83735 ASSAY OF MAGNESIUM: CPT | Performed by: INTERNAL MEDICINE

## 2024-07-03 PROCEDURE — 94761 N-INVAS EAR/PLS OXIMETRY MLT: CPT

## 2024-07-03 PROCEDURE — 82803 BLOOD GASES ANY COMBINATION: CPT

## 2024-07-03 PROCEDURE — 93010 ELECTROCARDIOGRAM REPORT: CPT | Performed by: INTERNAL MEDICINE

## 2024-07-03 PROCEDURE — 25010000002 CALCIUM GLUCONATE 2-0.675 GM/100ML-% SOLUTION: Performed by: NURSE PRACTITIONER

## 2024-07-03 PROCEDURE — 25010000002 POTASSIUM CHLORIDE PER 2 MEQ

## 2024-07-03 PROCEDURE — 99232 SBSQ HOSP IP/OBS MODERATE 35: CPT | Performed by: NURSE PRACTITIONER

## 2024-07-03 PROCEDURE — 85018 HEMOGLOBIN: CPT

## 2024-07-03 PROCEDURE — 25010000002 ENOXAPARIN PER 10 MG: Performed by: INTERNAL MEDICINE

## 2024-07-03 PROCEDURE — 94664 DEMO&/EVAL PT USE INHALER: CPT

## 2024-07-03 PROCEDURE — 63710000001 INSULIN LISPRO (HUMAN) PER 5 UNITS: Performed by: NURSE PRACTITIONER

## 2024-07-03 PROCEDURE — 93005 ELECTROCARDIOGRAM TRACING: CPT | Performed by: NURSE PRACTITIONER

## 2024-07-03 RX ORDER — CARVEDILOL 3.12 MG/1
3.12 TABLET ORAL EVERY 12 HOURS
Status: DISCONTINUED | OUTPATIENT
Start: 2024-07-03 | End: 2024-07-05

## 2024-07-03 RX ORDER — HYDRALAZINE HYDROCHLORIDE 25 MG/1
25 TABLET, FILM COATED ORAL ONCE
Status: COMPLETED | OUTPATIENT
Start: 2024-07-03 | End: 2024-07-03

## 2024-07-03 RX ORDER — HYDRALAZINE HYDROCHLORIDE 50 MG/1
50 TABLET, FILM COATED ORAL EVERY 8 HOURS SCHEDULED
Status: DISCONTINUED | OUTPATIENT
Start: 2024-07-03 | End: 2024-07-04

## 2024-07-03 RX ORDER — HYDRALAZINE HYDROCHLORIDE 20 MG/ML
20 INJECTION INTRAMUSCULAR; INTRAVENOUS EVERY 6 HOURS PRN
Status: DISCONTINUED | OUTPATIENT
Start: 2024-07-03 | End: 2024-07-07 | Stop reason: HOSPADM

## 2024-07-03 RX ORDER — ATORVASTATIN CALCIUM 20 MG/1
40 TABLET, FILM COATED ORAL NIGHTLY
Status: DISCONTINUED | OUTPATIENT
Start: 2024-07-03 | End: 2024-07-07 | Stop reason: HOSPADM

## 2024-07-03 RX ORDER — HYDRALAZINE HYDROCHLORIDE 25 MG/1
25 TABLET, FILM COATED ORAL EVERY 8 HOURS SCHEDULED
Status: DISCONTINUED | OUTPATIENT
Start: 2024-07-03 | End: 2024-07-03

## 2024-07-03 RX ORDER — CLONIDINE HYDROCHLORIDE 0.1 MG/1
0.1 TABLET ORAL EVERY 12 HOURS SCHEDULED
Status: DISCONTINUED | OUTPATIENT
Start: 2024-07-03 | End: 2024-07-04

## 2024-07-03 RX ORDER — CALCIUM GLUCONATE 20 MG/ML
2000 INJECTION, SOLUTION INTRAVENOUS ONCE
Status: COMPLETED | OUTPATIENT
Start: 2024-07-03 | End: 2024-07-03

## 2024-07-03 RX ORDER — HYDRALAZINE HYDROCHLORIDE 10 MG/1
10 TABLET, FILM COATED ORAL EVERY 8 HOURS SCHEDULED
Status: DISCONTINUED | OUTPATIENT
Start: 2024-07-03 | End: 2024-07-03

## 2024-07-03 RX ORDER — POTASSIUM CHLORIDE 29.8 MG/ML
20 INJECTION INTRAVENOUS ONCE
Status: COMPLETED | OUTPATIENT
Start: 2024-07-03 | End: 2024-07-03

## 2024-07-03 RX ORDER — HYDROCODONE BITARTRATE AND ACETAMINOPHEN 5; 325 MG/1; MG/1
1 TABLET ORAL EVERY 4 HOURS PRN
Status: DISCONTINUED | OUTPATIENT
Start: 2024-07-03 | End: 2024-07-03

## 2024-07-03 RX ADMIN — ACETAMINOPHEN 325MG 650 MG: 325 TABLET ORAL at 08:05

## 2024-07-03 RX ADMIN — ENOXAPARIN SODIUM 30 MG: 100 INJECTION SUBCUTANEOUS at 18:22

## 2024-07-03 RX ADMIN — CALCIUM GLUCONATE 2000 MG: 20 INJECTION, SOLUTION INTRAVENOUS at 08:05

## 2024-07-03 RX ADMIN — SODIUM CHLORIDE 2 G: 900 INJECTION INTRAVENOUS at 03:05

## 2024-07-03 RX ADMIN — HYDRALAZINE HYDROCHLORIDE 25 MG: 25 TABLET ORAL at 13:57

## 2024-07-03 RX ADMIN — HYDRALAZINE HYDROCHLORIDE 50 MG: 50 TABLET ORAL at 21:44

## 2024-07-03 RX ADMIN — POTASSIUM CHLORIDE 20 MEQ: 29.8 INJECTION, SOLUTION INTRAVENOUS at 01:07

## 2024-07-03 RX ADMIN — SENNOSIDES AND DOCUSATE SODIUM 2 TABLET: 50; 8.6 TABLET ORAL at 20:16

## 2024-07-03 RX ADMIN — ASPIRIN 81 MG: 81 TABLET, COATED ORAL at 08:04

## 2024-07-03 RX ADMIN — HYDRALAZINE HYDROCHLORIDE 20 MG: 20 INJECTION INTRAMUSCULAR; INTRAVENOUS at 18:57

## 2024-07-03 RX ADMIN — CLONIDINE HYDROCHLORIDE 0.1 MG: 0.1 TABLET ORAL at 12:49

## 2024-07-03 RX ADMIN — CARVEDILOL 3.12 MG: 3.12 TABLET, FILM COATED ORAL at 20:16

## 2024-07-03 RX ADMIN — HYDRALAZINE HYDROCHLORIDE 25 MG: 25 TABLET ORAL at 08:05

## 2024-07-03 RX ADMIN — 0.12% CHLORHEXIDINE GLUCONATE 15 ML: 1.2 RINSE ORAL at 14:35

## 2024-07-03 RX ADMIN — INSULIN LISPRO 4 UNITS: 100 INJECTION, SOLUTION INTRAVENOUS; SUBCUTANEOUS at 11:19

## 2024-07-03 RX ADMIN — MUPIROCIN 1 APPLICATION: 20 OINTMENT TOPICAL at 20:16

## 2024-07-03 RX ADMIN — CARVEDILOL 3.12 MG: 3.12 TABLET, FILM COATED ORAL at 08:04

## 2024-07-03 RX ADMIN — ACETAMINOPHEN 325MG 650 MG: 325 TABLET ORAL at 12:50

## 2024-07-03 RX ADMIN — ACETAMINOPHEN 325MG 650 MG: 325 TABLET ORAL at 20:19

## 2024-07-03 RX ADMIN — IPRATROPIUM BROMIDE AND ALBUTEROL SULFATE 3 ML: .5; 3 SOLUTION RESPIRATORY (INHALATION) at 19:05

## 2024-07-03 RX ADMIN — Medication 4 ML: at 06:43

## 2024-07-03 RX ADMIN — SODIUM CHLORIDE 75 ML/HR: 9 INJECTION, SOLUTION INTRAVENOUS at 01:07

## 2024-07-03 RX ADMIN — ATORVASTATIN CALCIUM 40 MG: 20 TABLET, FILM COATED ORAL at 20:16

## 2024-07-03 RX ADMIN — Medication 4 ML: at 19:10

## 2024-07-03 RX ADMIN — IPRATROPIUM BROMIDE AND ALBUTEROL SULFATE 3 ML: .5; 3 SOLUTION RESPIRATORY (INHALATION) at 06:42

## 2024-07-03 RX ADMIN — 0.12% CHLORHEXIDINE GLUCONATE 15 ML: 1.2 RINSE ORAL at 01:07

## 2024-07-03 RX ADMIN — GUAIFENESIN 1200 MG: 600 TABLET, EXTENDED RELEASE ORAL at 08:04

## 2024-07-03 RX ADMIN — SODIUM CHLORIDE 125 MG: 9 INJECTION, SOLUTION INTRAVENOUS at 08:05

## 2024-07-03 RX ADMIN — INSULIN LISPRO 2 UNITS: 100 INJECTION, SOLUTION INTRAVENOUS; SUBCUTANEOUS at 06:30

## 2024-07-03 RX ADMIN — INSULIN GLARGINE 20 UNITS: 100 INJECTION, SOLUTION SUBCUTANEOUS at 08:05

## 2024-07-03 RX ADMIN — ACETAMINOPHEN 325MG 650 MG: 325 TABLET ORAL at 01:18

## 2024-07-03 RX ADMIN — GUAIFENESIN 1200 MG: 600 TABLET, EXTENDED RELEASE ORAL at 20:16

## 2024-07-03 RX ADMIN — CLONIDINE HYDROCHLORIDE 0.1 MG: 0.1 TABLET ORAL at 20:16

## 2024-07-03 RX ADMIN — ESCITALOPRAM OXALATE 10 MG: 10 TABLET, FILM COATED ORAL at 08:04

## 2024-07-03 RX ADMIN — HYDRALAZINE HYDROCHLORIDE 20 MG: 20 INJECTION INTRAMUSCULAR; INTRAVENOUS at 09:40

## 2024-07-03 RX ADMIN — PANTOPRAZOLE SODIUM 40 MG: 40 TABLET, DELAYED RELEASE ORAL at 06:05

## 2024-07-03 RX ADMIN — SODIUM CHLORIDE 7.5 MG/HR: 9 INJECTION, SOLUTION INTRAVENOUS at 06:05

## 2024-07-03 RX ADMIN — MUPIROCIN 1 APPLICATION: 20 OINTMENT TOPICAL at 08:05

## 2024-07-03 RX ADMIN — SODIUM CHLORIDE 5 MG/HR: 9 INJECTION, SOLUTION INTRAVENOUS at 01:19

## 2024-07-03 RX ADMIN — INSULIN LISPRO 2 UNITS: 100 INJECTION, SOLUTION INTRAVENOUS; SUBCUTANEOUS at 20:27

## 2024-07-03 NOTE — PROGRESS NOTES
Name: Bibiaan Inman ADMIT: 2024   : 1980  PCP: Ibrahima Correa MD    MRN: [id number] LOS: 12 days   AGE/SEX: 44 y.o. female  ROOM:      Subjective   Subjective   No acute events. Patient denies new complaints. No family at bedside.    Objective   Objective   Vital Signs  Temp:  [97 °F (36.1 °C)-99 °F (37.2 °C)] 98.2 °F (36.8 °C)  Heart Rate:  [] 64  Resp:  [16-24] 24  BP: (100-176)/() 139/59  SpO2:  [95 %-100 %] 99 %  on   ;   Device (Oxygen Therapy): room air  Body mass index is 28.23 kg/m².  Physical Exam  Vitals and nursing note reviewed.   Constitutional:       General: She is not in acute distress.     Appearance: She is not toxic-appearing or diaphoretic.   HENT:      Head: Normocephalic and atraumatic.      Nose: Nose normal.      Mouth/Throat:      Mouth: Mucous membranes are moist.      Pharynx: Oropharynx is clear.   Eyes:      Conjunctiva/sclera: Conjunctivae normal.      Pupils: Pupils are equal, round, and reactive to light.   Cardiovascular:      Rate and Rhythm: Normal rate and regular rhythm.      Pulses: Normal pulses.      Comments: Surgical dressing in place c/d/i  Pulmonary:      Effort: Pulmonary effort is normal.   Abdominal:      General: Bowel sounds are normal.      Palpations: Abdomen is soft.      Tenderness: There is no abdominal tenderness.   Musculoskeletal:         General: No swelling or tenderness.      Cervical back: Neck supple.   Skin:     General: Skin is warm and dry.      Capillary Refill: Capillary refill takes less than 2 seconds.   Neurological:      Mental Status: She is alert and oriented to person, place, and time.      Comments: +mild right lower facial weakness   Psychiatric:         Mood and Affect: Mood normal.         Behavior: Behavior normal.       Results Review     I reviewed the patient's new clinical results.  Results from last 7 days   Lab Units 24  0301 24  0011 24  0239 24  1909 24  1647  07/01/24  1312   WBC 10*3/mm3 10.99*  --  14.01*  --  15.62* 14.79*   HEMOGLOBIN g/dL 8.9*  --  9.1*  --  11.3* 10.0*   HEMOGLOBIN, POC g/dL  --  8.0*  --  9.5*  --   --    PLATELETS 10*3/mm3 123*  --  129*  --  177 180     Results from last 7 days   Lab Units 07/03/24 0301 07/03/24 0011 07/02/24 0239 07/01/24 1647 07/01/24  1312   SODIUM mmol/L 141  --  142 140 140   POTASSIUM mmol/L 4.4  --  4.3 4.7 4.2   CHLORIDE mmol/L 107  --  112* 109* 106   CO2 mmol/L 26.0  --  20.7* 20.1* 23.0   BUN mg/dL 21*  --  20 19 19   CREATININE mg/dL 1.47* 1.38* 1.92* 1.75* 1.49*   GLUCOSE mg/dL 158*  --  117* 210* 126*   EGFR mL/min/1.73 44.4*  --  32.2* 36.0* 43.7*     Results from last 7 days   Lab Units 07/03/24 0301 07/02/24 0239 07/01/24 1647 07/01/24  1312   ALBUMIN g/dL 3.5 3.7 3.6 3.8   BILIRUBIN mg/dL 0.2  --   --   --    ALK PHOS U/L 62  --   --   --    AST (SGOT) U/L 23  --   --   --    ALT (SGPT) U/L <5  --   --   --      Results from last 7 days   Lab Units 07/03/24 0301 07/02/24 0239 07/01/24 1647 07/01/24  1312   CALCIUM mg/dL 9.0 8.5* 8.4* 8.9   ALBUMIN g/dL 3.5 3.7 3.6 3.8   MAGNESIUM mg/dL 2.2 2.7* 2.4 2.7*   PHOSPHORUS mg/dL 5.2* 4.8* 2.9 2.0*     Results from last 7 days   Lab Units 07/03/24 0301 07/03/24 0011 07/01/24  1909   LACTATE mmol/L 1.4 2.0 <0.4     Glucose   Date/Time Value Ref Range Status   07/03/2024 1116 232 (H) 70 - 130 mg/dL Final   07/03/2024 0558 166 (H) 70 - 130 mg/dL Final   07/03/2024 0011 199 (H) 65 - 99 mg/dL Final     Comment:     Serial Number: [id number]:  [id number]   07/02/2024 2027 242 (H) 70 - 130 mg/dL Final   07/02/2024 1548 165 (H) 70 - 130 mg/dL Final   07/02/2024 1006 135 (H) 70 - 130 mg/dL Final   07/02/2024 0817 124 70 - 130 mg/dL Final       XR Chest 1 View    Result Date: 7/3/2024  FINDINGS AND IMPRESSION: Previously seen thoracostomy tubes appear to been removed. Presumed epicardial pacing wires, median sternotomy wires and right internal jugular sheath are  present.  Findings of pneumomediastinum, as before. Mild left basilar pulmonary opacification persists. No pneumothorax is seen. Cardiac silhouette is accentuated by patient rotation; however, there is to be at least mildly enlarged.  This report was finalized on 7/3/2024 11:22 AM by Dr. Bin Bey M.D on Workstation: [id number]      XR Chest 1 View    Result Date: 7/3/2024  FINDINGS AND IMPRESSION: There appear to be 4 thoracostomy tubes overlying the bilateral lungs a mediastinum. The right internal jugular sheath is present.  Bibasilar pulmonary opacification has mild to moderately decreased. No pneumothorax seen. Cardiac silhouette is accentuated by low lung volumes. Findings suggestive of pneumomediastinum, as before.  This report was finalized on 7/3/2024 6:26 AM by Dr. Bin Bey M.D on Workstation: [id number]      US Renal Bilateral    Result Date: 7/2/2024  Limited renal sonogram demonstrates no hydronephrosis or evidence for acute abnormality.  This report was finalized on 7/2/2024 8:43 PM by Dr. Huan Noriega M.D on Workstation: [id number]      XR Chest 1 View    Result Date: 7/1/2024  Postsurgical changes, as described.  This report was finalized on 7/1/2024 2:06 PM by Dr. Antonio Ch M.D on Workstation: [id number]       I have personally reviewed all medications:  Scheduled Medications  aspirin, 81 mg, Oral, Daily  atorvastatin, 40 mg, Oral, Nightly  carvedilol, 3.125 mg, Oral, Q12H  chlorhexidine, 15 mL, Mouth/Throat, Q12H  cloNIDine, 0.1 mg, Oral, Q12H  enoxaparin, 30 mg, Subcutaneous, Daily  escitalopram, 10 mg, Oral, Daily  ferric gluconate, 125 mg, Intravenous, Daily  guaiFENesin, 1,200 mg, Oral, Q12H  hydrALAZINE, 25 mg, Oral, Q8H  insulin glargine, 20 Units, Subcutaneous, Daily  insulin lispro, 2-9 Units, Subcutaneous, 4x Daily AC & at Bedtime  mupirocin, , Each Nare, BID  pantoprazole, 40 mg, Oral, QAM  senna-docusate sodium, 2 tablet, Oral, Nightly  sodium chloride, 4 mL,  Nebulization, BID - RT    Infusions  niCARdipine, 5-15 mg/hr, Last Rate: Stopped (07/03/24 0812)      Diet  Diet: Cardiac, Diabetic; Healthy Heart (2-3 Na+); Consistent Carbohydrate; Fluid Consistency: Thin (IDDSI 0)    I have personally reviewed:  [x]  Laboratory   []  Microbiology   []  Radiology   [x]  EKG/Telemetry  []  Cardiology/Vascular   []  Pathology    []  Records    Assessment/Plan     Active Hospital Problems    Diagnosis  POA    **Acute right-sided weakness [R53.1]  Yes    CKD stage 3a, GFR 45-59 ml/min [N18.31]  Yes    Carotid stenosis [I65.29]  Yes    Lacunar cerebrovascular accident (CVA) [I63.81]  Yes    Elevated troponin [R79.89]  Yes    Aortic valve disease [I35.9]  Unknown    Tobacco abuse [Z72.0]  Yes    Coronary artery disease involving native coronary artery of native heart with unstable angina pectoris [I25.110]  Yes    Gastroparesis diabeticorum [E11.43, 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 De Luna  - appreciate cardiology, CT surgery, and neurology recs     CARLA on CKD Stage 3b  - pre-renal, now  resolved  - follow volume status and chemistries  - appreciate nephrology recs    Type 2 DM  - complications include gastroparesis  - on lantus and jardiance as outpatient  - BG acceptable, needs better outpatient control, A1C 16.90%  - continue lantus 20 units daily   - cover with ssi/hypoglycemia protocol  - jardiance held    HTN/CAD s/p CABG  - BP acceptable  - continue coreg, clonidine, and hydralazine      GERD  - symptoms controlled, continue ppi    SCDs for DVT prophylaxis.  Full code.  Discussed with patient and nursing staff.  Anticipate discharge home with HH vs SNU facility timing yet to be determined.  Expected Discharge Date: 7/9/2024; Expected Discharge Time:       Lenin Huang MD  [facility]  07/03/24  13:19 EDT    Portions of this text have been copied and I have reviewed them. They are accurate as of 7/3/2024

## 2024-07-03 NOTE — PLAN OF CARE
Goal Outcome Evaluation:              Outcome Evaluation: Improveda ctivity tolerance during PT today, still with some lethargy and confusion, requiring frequent cues.  Able to stand w/ min A using RW.  Ambulated 25' w/ RW and min A, slow shuffling steps, cues for breathing and not grunting.  May benefit from acute rehab pending progress while inpatient.      Anticipated Discharge Disposition (PT): inpatient rehabilitation facility, home with assist (not currently safe for DC to home)

## 2024-07-03 NOTE — PROGRESS NOTES
NEPHROLOGY PROGRESS NOTE------[facility]    [facility]  [phone]  Tamiko Jenkins MD      Patient Care Team:  Ibrahima Correa MD as PCP - General (Family Medicine)  Rachele Zafar RN as Ambulatory  (Population Health)  Xochilt Jenkins MD as Consulting Physician (Nephrology)      Provider:  Tamiko Jenkins MD  Patient Name: Bibiana Inman  :  1980    SUBJECTIVE:    F/U ARF/CARLA/CRF/CKD    Little confused this AM. Up in chair. No dysuria.     Medication:  aspirin, 81 mg, Oral, Daily  [Held by provider] atorvastatin, 80 mg, Oral, Nightly  chlorhexidine, 15 mL, Mouth/Throat, Q12H  enoxaparin, 30 mg, Subcutaneous, Daily  escitalopram, 10 mg, Oral, Daily  guaiFENesin, 1,200 mg, Oral, Q12H  insulin glargine, 15 Units, Subcutaneous, Daily  insulin lispro, 2-9 Units, Subcutaneous, 4x Daily AC & at Bedtime  metoprolol tartrate, 12.5 mg, Oral, Q12H  mupirocin, , Each Nare, BID  pantoprazole, 40 mg, Oral, QAM  senna-docusate sodium, 2 tablet, Oral, Nightly  sodium chloride, 4 mL, Nebulization, BID - RT      niCARdipine, 5-15 mg/hr, Last Rate: 7.5 mg/hr (24 0605)  sodium chloride, 75 mL/hr, Last Rate: 75 mL/hr (24 0107)        OBJECTIVE    Vital Sign Min/Max for last 24 hours  Temp  Min: 96.6 °F (35.9 °C)  Max: 99 °F (37.2 °C)   BP  Min: 98/65  Max: 146/77   Pulse  Min: 62  Max: 105   Resp  Min: 16  Max: 21   SpO2  Min: 95 %  Max: 100 %   No data recorded   Weight  Min: 74.6 kg (164 lb 7.4 oz)  Max: 74.6 kg (164 lb 7.4 oz)     Flowsheet Rows      Flowsheet Row First Filed Value   Admission Height 162.6 cm (64\") Documented at 2024   Admission Weight 65.5 kg (144 lb 6.4 oz) Documented at 2024            I/O this shift:  In: 1396 [P.O.:240; I.V.:1156]  Out: 775 [Urine:615; Chest Tube:160]  I/O last 3 completed shifts:  In: 6009.8 [P.O.:1200; I.V.:2059.8; Blood:800; IV Piggyback:1950]  Out: " "3250 [Urine:1690; Other:1200; Chest Tube:360]    Physical Exam:  General Appearance: alert, appears stated age and cooperative  Head: normocephalic, without obvious abnormality and atraumatic.   Eyes: conjunctivae and sclerae normal and no icterus  Neck: supple and no JVD  Lungs: DECREASED BS BIBASILAR  Heart: regular rhythm & normal rate and normal S1, S2 +TITI (PACED)  Chest Wall: S/P SURGICAL CHANGES POST STERNOTOMY S/P OPEN HEART SURGERY WITH CT  Abdomen: +HYPOACTIVE BS; SOFT; NT/ND  Extremities: moves extremities well, no edema, no cyanosis  Skin: no bleeding, bruising or rash.    Neurologic: Alert, and oriented. No focal deficits    Labs:    WBC WBC   Date Value Ref Range Status   07/03/2024 10.99 (H) 3.40 - 10.80 10*3/mm3 Final   07/02/2024 14.01 (H) 3.40 - 10.80 10*3/mm3 Final   07/01/2024 15.62 (H) 3.40 - 10.80 10*3/mm3 Final   07/01/2024 14.79 (H) 3.40 - 10.80 10*3/mm3 Final   07/01/2024 9.72 3.40 - 10.80 10*3/mm3 Final   07/01/2024 8.21 3.40 - 10.80 10*3/mm3 Final      HGB Hemoglobin   Date Value Ref Range Status   07/03/2024 8.9 (L) 12.0 - 15.9 g/dL Final   07/03/2024 8.0 (C) 12.0 - 17.0 g/dL Final     Comment:     Serial Number: [id number]:  [id number]   07/02/2024 9.1 (L) 12.0 - 15.9 g/dL Final   07/01/2024 9.5 (L) 12.0 - 17.0 g/dL Final     Comment:     Serial Number: [id number]:  [id number]   07/01/2024 11.3 (L) 12.0 - 15.9 g/dL Final   07/01/2024 10.0 (L) 12.0 - 15.9 g/dL Final   07/01/2024 8.0 (L) 12.0 - 15.9 g/dL Final   07/01/2024 10.7 (L) 12.0 - 15.9 g/dL Final      HCT Hematocrit   Date Value Ref Range Status   07/03/2024 27.7 (L) 34.0 - 46.6 % Final   07/03/2024 24 (L) 38 - 51 % Final   07/02/2024 28.6 (L) 34.0 - 46.6 % Final   07/01/2024 28 (L) 38 - 51 % Final   07/01/2024 32.0 (L) 34.0 - 46.6 % Final   07/01/2024 28.2 (L) 34.0 - 46.6 % Final   07/01/2024 24.0 (L) 34.0 - 46.6 % Final   07/01/2024 33.6 (L) 34.0 - 46.6 % Final      Platelets No results found for: \"LABPLAT\"   MCV MCV   Date Value " Ref Range Status   07/03/2024 91.7 79.0 - 97.0 fL Final   07/02/2024 92.6 79.0 - 97.0 fL Final   07/01/2024 90.4 79.0 - 97.0 fL Final   07/01/2024 89.5 79.0 - 97.0 fL Final   07/01/2024 90.6 79.0 - 97.0 fL Final   07/01/2024 91.8 79.0 - 97.0 fL Final          Sodium Sodium   Date Value Ref Range Status   07/03/2024 141 136 - 145 mmol/L Final   07/02/2024 142 136 - 145 mmol/L Final   07/01/2024 140 136 - 145 mmol/L Final   07/01/2024 140 136 - 145 mmol/L Final   07/01/2024 135 (L) 136 - 145 mmol/L Final      Potassium Potassium   Date Value Ref Range Status   07/03/2024 4.4 3.5 - 5.2 mmol/L Final   07/02/2024 4.3 3.5 - 5.2 mmol/L Final   07/01/2024 4.7 3.5 - 5.2 mmol/L Final     Comment:     Slight hemolysis detected by analyzer. Result may be falsely elevated.   07/01/2024 4.2 3.5 - 5.2 mmol/L Final   07/01/2024 4.2 3.5 - 5.2 mmol/L Final      Chloride Chloride   Date Value Ref Range Status   07/03/2024 107 98 - 107 mmol/L Final   07/02/2024 112 (H) 98 - 107 mmol/L Final   07/01/2024 109 (H) 98 - 107 mmol/L Final   07/01/2024 106 98 - 107 mmol/L Final   07/01/2024 101 98 - 107 mmol/L Final      CO2 CO2   Date Value Ref Range Status   07/03/2024 26.0 22.0 - 29.0 mmol/L Final   07/02/2024 20.7 (L) 22.0 - 29.0 mmol/L Final   07/01/2024 20.1 (L) 22.0 - 29.0 mmol/L Final   07/01/2024 23.0 22.0 - 29.0 mmol/L Final   07/01/2024 25.0 22.0 - 29.0 mmol/L Final      BUN BUN   Date Value Ref Range Status   07/03/2024 21 (H) 6 - 20 mg/dL Final   07/02/2024 20 6 - 20 mg/dL Final   07/01/2024 19 6 - 20 mg/dL Final   07/01/2024 19 6 - 20 mg/dL Final   07/01/2024 20 6 - 20 mg/dL Final      Creatinine Creatinine   Date Value Ref Range Status   07/03/2024 1.47 (H) 0.57 - 1.00 mg/dL Final   07/03/2024 1.38 (H) 0.60 - 1.30 mg/dL Final   07/02/2024 1.92 (H) 0.57 - 1.00 mg/dL Final   07/01/2024 1.75 (H) 0.57 - 1.00 mg/dL Final   07/01/2024 1.49 (H) 0.57 - 1.00 mg/dL Final   07/01/2024 1.22 (H) 0.57 - 1.00 mg/dL Final      Calcium Calcium  "  Date Value Ref Range Status   07/03/2024 9.0 8.6 - 10.5 mg/dL Final   07/02/2024 8.5 (L) 8.6 - 10.5 mg/dL Final   07/01/2024 8.4 (L) 8.6 - 10.5 mg/dL Final   07/01/2024 8.9 8.6 - 10.5 mg/dL Final   07/01/2024 9.2 8.6 - 10.5 mg/dL Final      PO4 No components found for: \"PO4\"   Albumin Albumin   Date Value Ref Range Status   07/03/2024 3.5 3.5 - 5.2 g/dL Final   07/02/2024 3.7 3.5 - 5.2 g/dL Final   07/01/2024 3.6 3.5 - 5.2 g/dL Final   07/01/2024 3.8 3.5 - 5.2 g/dL Final      Magnesium Magnesium   Date Value Ref Range Status   07/03/2024 2.2 1.6 - 2.6 mg/dL Final   07/02/2024 2.7 (H) 1.6 - 2.6 mg/dL Final   07/01/2024 2.4 1.6 - 2.6 mg/dL Final   07/01/2024 2.7 (H) 1.6 - 2.6 mg/dL Final      Uric Acid No components found for: \"URIC ACID\"     Imaging Results (Last 72 Hours)       Procedure Component Value Units Date/Time    XR Chest 1 View [[id number]] Resulted: 07/03/24 0602     Updated: 07/03/24 0340    US Renal Bilateral [[id number]] Collected: 07/02/24 1934     Updated: 07/02/24 2046    Narrative:      RENAL SONOGRAM     HISTORY: ARF, CARLA, CRF, CKD     COMPARISON: CT chest 06/26/2024.     FINDINGS: Right kidney measures 8.7 x 5.2 x 5.7 cm and the left kidney  measures 8.8 x 5 x 5.4 cm. No focal renal sonographic abnormality is  evident though note that this is a very limited exam as the patient just  got out of surgery and is unable to move well and has pain. Nelson  catheter is present in the urinary bladder.       Impression:      Limited renal sonogram demonstrates no hydronephrosis or  evidence for acute abnormality.     This report was finalized on 7/2/2024 8:43 PM by Dr. Huan Noriega M.D on Workstation: [id number]       XR Chest 1 View [[id number]] Collected: 07/02/24 0530     Updated: 07/02/24 0536    Narrative:      XR CHEST 1 VW-     Clinical: Postop     COMPARISON examination dated 7/1/2024     FINDINGS: There has been interval removal of the endotracheal tube,  central line and chest tubes remain " in position as before. Interval  development of bibasilar atelectasis and/or infiltrates. The heart size  is stable. Mediastinal gas less pronounced compared to the previous  examination. No pneumothorax seen. No vascular congestion or gross  pleural effusion has developed. The remainder is unremarkable.     This report was finalized on 7/2/2024 5:33 AM by Dr. Clif Galeas M.D  on Workstation: [id number]       XR Chest 1 View [[id number]] Collected: 07/01/24 1404     Updated: 07/01/24 1409    Narrative:      XR CHEST 1 VW-     HISTORY: Female who is 44 years-old, postoperative evaluation     TECHNIQUE: Frontal view of the chest     COMPARISON: 6/21/2024     FINDINGS: Endotracheal tube tip is 4.9 cm above the dulce maria. Right IJ  catheter extends to the superior vena cava. Sternotomy wires,  mediastinal drains, bilateral chest tubes noted. The heart size is  borderline. Pulmonary vasculature is unremarkable. Mild  pneumomediastinum is apparent there may be trace left apical  pneumothorax. Small likely atelectasis in both lungs. No acute osseous  process.       Impression:      Postsurgical changes, as described.     This report was finalized on 7/1/2024 2:06 PM by Dr. Antonio Ch M.D on Workstation: [id number]               Results for orders placed during the hospital encounter of 06/21/24    XR Chest 1 View    Narrative  XR CHEST 1 VW-    Clinical: Postop    COMPARISON examination dated 7/1/2024    FINDINGS: There has been interval removal of the endotracheal tube,  central line and chest tubes remain in position as before. Interval  development of bibasilar atelectasis and/or infiltrates. The heart size  is stable. Mediastinal gas less pronounced compared to the previous  examination. No pneumothorax seen. No vascular congestion or gross  pleural effusion has developed. The remainder is unremarkable.    This report was finalized on 7/2/2024 5:33 AM by Dr. Clif Galeas M.D  on Workstation:  [id number]      XR Chest 1 View    Narrative  XR CHEST 1 VW-    HISTORY: Female who is 44 years-old, postoperative evaluation    TECHNIQUE: Frontal view of the chest    COMPARISON: 6/21/2024    FINDINGS: Endotracheal tube tip is 4.9 cm above the dulce maria. Right IJ  catheter extends to the superior vena cava. Sternotomy wires,  mediastinal drains, bilateral chest tubes noted. The heart size is  borderline. Pulmonary vasculature is unremarkable. Mild  pneumomediastinum is apparent there may be trace left apical  pneumothorax. Small likely atelectasis in both lungs. No acute osseous  process.    Impression  Postsurgical changes, as described.    This report was finalized on 7/1/2024 2:06 PM by Dr. Antonio Ch M.D on Workstation: [id number]      XR Chest 1 View    Narrative  SINGLE VIEW OF THE CHEST    HISTORY: Acute stroke protocol    COMPARISON: June 17, 2024    FINDINGS:  There is cardiomegaly. There is no vascular congestion. No pneumothorax,  pleural effusion, or acute infiltrate is seen. Patient is status post  median sternotomy with coronary artery bypass grafting.    Impression  No acute findings.    This report was finalized on 6/21/2024 10:25 PM by Dr. Marian Thompson M.D on Workstation: [id number]      Results for orders placed during the hospital encounter of 06/21/24    Duplex Carotid Ultrasound CAR    Interpretation Summary    Right internal carotid artery demonstrates a less than 50% stenosis.    Left internal carotid artery demonstrates a less than 50% stenosis.        ASSESSMENT / PLAN      Acute right-sided weakness    Benign essential hypertension    Gastroparesis diabeticorum    Gastroesophageal reflux disease    Mixed hypercholesterolemia and hypertriglyceridemia    Iron deficiency anemia    Type 2 diabetes mellitus with hyperglycemia, with long-term current use of insulin    Coronary artery disease involving native coronary artery of native heart with unstable angina pectoris    Tobacco  abuse    CKD stage 3a, GFR 45-59 ml/min    Carotid stenosis    Lacunar cerebrovascular accident (CVA)    Elevated troponin    Aortic valve disease      ARF/CARLA/CRF/CKD---Nonoliguric. +ARF/CARLA on top of known CRF/CKD STG   3A, with a baseline serum creatinine of about 1.2. CRF/CKD STG 3A is secondary to DGS/HTN NS. +ARF/CARLA appears to be secondary to prerenal state/intravascular volume depletion and some ATN from hypotension. D/C IVFs today. Support BP and avoid hypotension. Renal US negative.  No NSAIDs or IV dye. Dose meds for CrCl less than 10 cc/min until ARF/CARLA is resolved. Keep off of SGLT2 inhibitor for now     2. ACIDOSIS------Resolved     3. DMII WITH RENAL MANIFESTATIONS-----Hold SGLT2 inhibitor given ARF/CARLA. Lantus, Glucometers, SSI     4. HTN WITH CKD-----BP ok. No ACE-I/ARB/DRI/diuretic for now. Follow for pressor need. Follow off of IVFs     5. HYPERLIPIDEMIA-----Can restart low dose statin     6. OA/DJD------No NSAIDs. Uric ok     7. DM PERIPHERAL NEUROPATHY-----On Neurontin     8. GERD/DM GASTROPARESIS/PUD PROPHYLAXIS-------Reglan and PPI. Benefits of PPI use outweigh risks, despite renal dysfunction     8. DVT PROPHYLAXIS----Renal dose adjusted Lovenox     9. PROTEINURIA-----Secondary to DGS     10. VITAMIN D DEFICIENCY-----On Supplementation prior to admission     11. ANEMIA OF CKD AND POST-OP ANEMIA------IV iron for NIGHAT     12. DEPRESSION-----On Lexapro     13. CAD S/P CABG S/P AV FIBROELASTOMA RESECTION/REPAIR-----per , CT Surgery     14. POST OP ATELECTASIS/LB WITH H/O TOBACCO ABUSE-----Encourage IS. No active wheezing and respiratory status stable      Tamiko Jenkins MD  [facility] [place]  [phone]  07/03/24  06:15 EDT

## 2024-07-03 NOTE — PLAN OF CARE
Goal Outcome Evaluation:          Post op day 2. Dr. Thornton called d/t NIH of 6. Better this morning A/Ox3. HR fluctuates often from  sinus. ABG improved. Cardene started at 0130 d/t systolic's in the 150's. BP improved.  Pain treated with tylenol. Up in the chair this morning.

## 2024-07-03 NOTE — PLAN OF CARE
Goal Outcome Evaluation:  Plan of Care Reviewed With: patient, family        Progress: improving  Outcome Evaluation: POD2. SR/SB with epi wires to backup rate. Room air. Still disoriented today. Neurology updated via phone - routine CT ordered; neurologist will see tomorrow. CTx4 and IJ removed per orders. With some hypertension today - PO meds adjusted per CTS, PRN IV hydralazine x1, & intermittent low dose cardene. Ambulating 15 feet with assist x2. Family updated at bedside and via phone. Will continue with current plan of care & adjust as needed.

## 2024-07-03 NOTE — THERAPY TREATMENT NOTE
Patient Name: Bibiana Inman  : 1980    MRN: [id number]                              Today's Date: 7/3/2024       Admit Date: 2024    Visit Dx:     ICD-10-CM ICD-9-CM   1. Acute right-sided weakness  R53.1 728.87   2. Type 2 diabetes mellitus with hyperglycemia, with long-term current use of insulin  E11.65 250.00    Z79.4 790.29     V58.67   3. Tobacco abuse  Z72.0 305.1   4. Carotid stenosis, asymptomatic, right  I65.21 433.10   5. Coronary artery disease involving native coronary artery of native heart with unstable angina pectoris  I25.110 414.01     411.1   6. 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    Abnormal bone density screening    Benign essential hypertension    Chronic cough    Gastroparesis diabeticorum    Elevated erythrocyte sedimentation rate    Fatigue    Gastroesophageal reflux disease    Mixed hypercholesterolemia and hypertriglyceridemia    Intermittent claudication    Iron deficiency anemia    Multiple joint pain    Need for influenza vaccination    Type 2 diabetes mellitus with hyperglycemia, with long-term current use of insulin    Obstructive sleep apnea    Coronary artery disease involving native coronary artery of native heart with unstable angina pectoris    Abnormal nuclear stress test    Depressive disorder    Back pain    Diabetic peripheral neuropathy associated with type 2 diabetes mellitus    Ingrowing nail, left great toe    Personal history of COVID-19    Polyp of colon    Vitamin D deficiency    Tobacco abuse    Unstable angina    Altered mental status    Carotid stenosis, asymptomatic, right    Acute right-sided weakness    CKD stage 3a, GFR 45-59 ml/min    Carotid stenosis    Lacunar cerebrovascular accident (CVA)    Elevated troponin    Aortic valve disease     Past Medical History:   Diagnosis Date    5,10-methylenetetrahydrofolate  reductase deficiency 2012    Allergic rhinitis 2012    Benign essential hypertension 2016    Coronary arteriosclerosis 2014    Description: s/p CABG (LIMA-LAD, SVG-OM2, SVG-PDA) performed on 14 by Dr. Debbie rFy.    Depressive disorder 2023    Diabetic gastroparesis 2021    Diabetic peripheral neuropathy associated with type 2 diabetes mellitus 2024    Gastroesophageal reflux disease 03/10/2021    GERD (gastroesophageal reflux disease)     Intermittent claudication 2017    Iron deficiency anemia 2020    Mixed hypercholesterolemia and hypertriglyceridemia 2014    Myocardial infarction     Obesity     Obstructive sleep apnea 2021    Personal history of COVID-19 2022    Polyp of colon 2023    Spontaneous      Stress fracture of right ankle with routine healing     Tobacco abuse 2024    Type 2 diabetes mellitus with hyperglycemia, with long-term current use of insulin 2017    Vitamin D deficiency 2024     Past Surgical History:   Procedure Laterality Date    CARDIAC CATHETERIZATION N/A 2024    Procedure: Left Heart Cath and coronary angiogram;  Surgeon: Jena Barron MD;  Location: [facility] CATH INVASIVE LOCATION;  Service: Cardiology;  Laterality: N/A;     SECTION      CORONARY ARTERY BYPASS GRAFT  2014    LIMA-LAD, SVG-OM2, SVG-PDA, Dr. Debbie Fry.    CORONARY ARTERY BYPASS GRAFT WITH AORTIC VALVE REPAIR/REPLACEMENT N/A 2024    Procedure: YUE; REOPERATIVE STERNOTOMY; CORONARY ARTERY BYPASS GRAFTING TIMES 1 UTILIZING RIGHT SAPHENOUS VEIN; AORTIC VALVE TUMOR ; PRP;  Surgeon: Benja De Luna MD;  Location: [facility];  Service: Cardiothoracic;  Laterality: N/A;    DILATATION AND CURETTAGE      TONSILLECTOMY        General Information       Row Name 24 1041          Physical Therapy Time and Intention    Document Type therapy note (daily note)  -AR     Mode of Treatment  physical therapy  -AR       Row Name 07/03/24 1041          General Information    Patient Profile Reviewed yes  -AR     Existing Precautions/Restrictions fall  -AR       Row Name 07/03/24 1041          Cognition    Orientation Status (Cognition) oriented to;person;place  -AR               User Key  (r) = Recorded By, (t) = Taken By, (c) = Cosigned By      Initials Name Provider Type    AR Reanna Renee, PT Physical Therapist                   Mobility       Row Name 07/03/24 1041          Bed Mobility    Supine-Sit [place] (Bed Mobility) not tested  -AR     Sit-Supine [place] (Bed Mobility) minimum assist (75% patient effort)  -AR     Assistive Device (Bed Mobility) bed rails  -AR     Comment, (Bed Mobility) cues for log rolling  -AR       Row Name 07/03/24 1041          Transfers    Comment, (Transfers) cues for UE placement  -AR       Row Name 07/03/24 1041          Sit-Stand Transfer    Sit-Stand [place] (Transfers) minimum assist (75% patient effort)  RN present for safety due to some confusion  -AR     Assistive Device (Sit-Stand Transfers) walker, front-wheeled  -AR       Row Name 07/03/24 1041          Gait/Stairs (Locomotion)    [place] Level (Gait) minimum assist (75% patient effort)  -AR     Assistive Device (Gait) walker, front-wheeled  -AR     Distance in Feet (Gait) 25  -AR     Deviations/Abnormal Patterns (Gait) vipul decreased;gait speed decreased;stride length decreased  -AR     Bilateral Gait Deviations forward flexed posture  -AR               User Key  (r) = Recorded By, (t) = Taken By, (c) = Cosigned By      Initials Name Provider Type    AR Reanna Renee, PT Physical Therapist                   Obj/Interventions       Row Name 07/03/24 1042          Motor Skills    Therapeutic Exercise --  cardiac ex 10x, frequent cues  -AR       Row Name 07/03/24 1042          Balance    Dynamic Standing Balance minimal assist  -AR     Position/Device Used, Standing Balance walker,  rolling  -AR               User Key  (r) = Recorded By, (t) = Taken By, (c) = Cosigned By      Initials Name Provider Type    AR Reanna Renee, PT Physical Therapist                   Goals/Plan    No documentation.                  Clinical Impression       Row Name 07/03/24 1042          Pain    Pretreatment Pain Rating 10/10  -AR     Posttreatment Pain Rating 10/10  -AR     Pain Location incisional  -AR     Pain Location - chest  -AR     Pre/Posttreatment Pain Comment reports 10/10, some lethargy/confusion during PT, Rn reprots pt required narcan on 7/2  -AR     Pain Intervention(s) Repositioned  -AR       Row Name 07/03/24 1042          Plan of Care Review    Outcome Evaluation Improveda ctivity tolerance during PT today, still with some lethargy and confusion, requiring frequent cues.  Able to stand w/ min A using RW.  Ambulated 25' w/ RW and min A, slow shuffling steps, cues for breathing and not grunting.  May benefit from acute rehab pending progress while inpatient.  -AR       Row Name 07/03/24 1042          Therapy Assessment/Plan (PT)    Rehab Potential (PT) good, to achieve stated therapy goals  -AR     Therapy Frequency (PT) 6 times/wk  -AR       Row Name 07/03/24 1042          Vital Signs    O2 Delivery Pre Treatment room air  -AR     Recovery Time vitals stabl on room air  -AR       Row Name 07/03/24 1042          Positioning and Restraints    Pre-Treatment Position standing in room  with nursing staff  -AR     Post Treatment Position bed  -AR     In Bed notified nsg;supine;call light within reach;encouraged to call for assist;exit alarm on;with nsg  -AR               User Key  (r) = Recorded By, (t) = Taken By, (c) = Cosigned By      Initials Name Provider Type    Reanna Fink, PT Physical Therapist                   Outcome Measures       Row Name 07/03/24 1047 07/03/24 1046       How much help from another person do you currently need...    Turning from your back to your side while in  flat bed without using bedrails? 3  -AR 3  -AR    Moving from lying on back to sitting on the side of a flat bed without bedrails? 2  -AR 3  -AR    Moving to and from a bed to a chair (including a wheelchair)? 3  -AR 3  -AR    Standing up from a chair using your arms (e.g., wheelchair, bedside chair)? 3  -AR 3  -AR    Climbing 3-5 steps with a railing? 2  -AR 1  -AR    To walk in hospital room? 3  -AR 2  -AR    AM-PAC 6 Clicks Score (PT) 16  -AR 15  -AR    Highest Level of Mobility Goal 5 --> Static standing  -AR 4 --> Transfer to chair/commode  -AR      Row Name 07/03/24 1046          Functional Assessment    Outcome Measure Options AM-PAC 6 Clicks Basic Mobility (PT)  -AR               User Key  (r) = Recorded By, (t) = Taken By, (c) = Cosigned By      Initials Name Provider Type    AR Reanna Renee PT Physical Therapist                                 Physical Therapy Education       Title: PT OT SLP Therapies (In Progress)       Topic: Physical Therapy (In Progress)       Point: Mobility training (In Progress)       Learning Progress Summary             Patient Acceptance, E, NR by AR at 7/3/2024 1047    Acceptance, E,TB,D, VU,NR by  at 7/2/2024 1229                         Point: Home exercise program (In Progress)       Learning Progress Summary             Patient Acceptance, E, NR by AR at 7/3/2024 1047                         Point: Body mechanics (In Progress)       Learning Progress Summary             Patient Acceptance, E, NR by AR at 7/3/2024 1047    Acceptance, E,TB,D, VU,NR by  at 7/2/2024 1229                         Point: Precautions (In Progress)       Learning Progress Summary             Patient Acceptance, E, NR by AR at 7/3/2024 1047    Acceptance, E,TB,D, VU,NR by  at 7/2/2024 1229                                         User Key       Initials Effective Dates Name Provider Type [facility] 06/16/21 -  Mary West PT Physical Therapist PT    AR 06/16/21 -  Víctor  Reanna PT Physical Therapist PT                  PT Recommendation and Plan     Outcome Evaluation: Improveda ctivity tolerance during PT today, still with some lethargy and confusion, requiring frequent cues.  Able to stand w/ min A using RW.  Ambulated 25' w/ RW and min A, slow shuffling steps, cues for breathing and not grunting.  May benefit from acute rehab pending progress while inpatient.     Time Calculation:         PT Charges       Row Name 07/03/24 1041             Time Calculation    Start Time 0850  -AR      Stop Time 0920  -AR      Time Calculation (min) 30 min  -AR      PT Received On 07/03/24  -AR      PT - Next Appointment 07/04/24  -AR                User Key  (r) = Recorded By, (t) = Taken By, (c) = Cosigned By      Initials Name Provider Type    Reanna Fink PT Physical Therapist                  Therapy Charges for Today       Code Description Service Date Service Provider Modifiers Qty    77895304914 HC PT THER PROC EA 15 MIN 7/3/2024 Reanna Renee, PT GP 2    85386199753 HC PT THER SUPP EA 15 MIN 7/3/2024 Reanna Renee, PT GP 1            PT G-Codes  Outcome Measure Options: AM-PAC 6 Clicks Basic Mobility (PT)  AM-PAC 6 Clicks Score (PT): 16  PT Discharge Summary  Anticipated Discharge Disposition (PT): inpatient rehabilitation facility, home with assist (not currently safe for DC to home)    Reanna Renee PT  7/3/2024

## 2024-07-03 NOTE — PROGRESS NOTES
" LOS: 12 days   Patient Care Team:  Ibrahima Correa MD as PCP - General (Family Medicine)  Rachele Zafar, ALEENA as Ambulatory  (Population Health)  Xochilt Jenkins MD as Consulting Physician (Nephrology)    Chief Complaint: post op follow-up    Subjective      Vital Signs  Temp:  [96.6 °F (35.9 °C)-99 °F (37.2 °C)] 98.2 °F (36.8 °C)  Heart Rate:  [] 70  Resp:  [16-21] 18  BP: ()/() 128/84  Arterial Line BP: (117-126)/(53-56) 117/53  Body mass index is 28.23 kg/m².    Intake/Output Summary (Last 24 hours) at 7/3/2024 0704  Last data filed at 7/3/2024 0630  Gross per 24 hour   Intake 3103 ml   Output 1435 ml   Net 1668 ml     No intake/output data recorded.    Chest tube drainage last 8 hours 70/30 07/01/24  0040 07/02/24  0534 07/03/24  0500   Weight: 63.9 kg (140 lb 12.8 oz) 71.2 kg (156 lb 15.5 oz) 74.6 kg (164 lb 7.4 oz)         Objective:  Vital signs: (most recent): Blood pressure 128/84, pulse 70, temperature 98.2 °F (36.8 °C), resp. rate 18, height 162.6 cm (64\"), weight 74.6 kg (164 lb 7.4 oz), last menstrual period 01/10/2023, SpO2 100%, not currently breastfeeding.                Results Review:        WBC WBC   Date Value Ref Range Status   07/03/2024 10.99 (H) 3.40 - 10.80 10*3/mm3 Final   07/02/2024 14.01 (H) 3.40 - 10.80 10*3/mm3 Final   07/01/2024 15.62 (H) 3.40 - 10.80 10*3/mm3 Final   07/01/2024 14.79 (H) 3.40 - 10.80 10*3/mm3 Final   07/01/2024 9.72 3.40 - 10.80 10*3/mm3 Final   07/01/2024 8.21 3.40 - 10.80 10*3/mm3 Final      HGB Hemoglobin   Date Value Ref Range Status   07/03/2024 8.9 (L) 12.0 - 15.9 g/dL Final   07/03/2024 8.0 (C) 12.0 - 17.0 g/dL Final     Comment:     Serial Number: [id number]:  [id number]   07/02/2024 9.1 (L) 12.0 - 15.9 g/dL Final   07/01/2024 9.5 (L) 12.0 - 17.0 g/dL Final     Comment:     Serial Number: [id number]:  [id number]   07/01/2024 11.3 (L) 12.0 - 15.9 g/dL Final   07/01/2024 10.0 (L) 12.0 - 15.9 g/dL Final "   07/01/2024 8.0 (L) 12.0 - 15.9 g/dL Final   07/01/2024 10.7 (L) 12.0 - 15.9 g/dL Final      HCT Hematocrit   Date Value Ref Range Status   07/03/2024 27.7 (L) 34.0 - 46.6 % Final   07/03/2024 24 (L) 38 - 51 % Final   07/02/2024 28.6 (L) 34.0 - 46.6 % Final   07/01/2024 28 (L) 38 - 51 % Final   07/01/2024 32.0 (L) 34.0 - 46.6 % Final   07/01/2024 28.2 (L) 34.0 - 46.6 % Final   07/01/2024 24.0 (L) 34.0 - 46.6 % Final   07/01/2024 33.6 (L) 34.0 - 46.6 % Final      Platelets Platelets   Date Value Ref Range Status   07/03/2024 123 (L) 140 - 450 10*3/mm3 Final   07/02/2024 129 (L) 140 - 450 10*3/mm3 Final   07/01/2024 177 140 - 450 10*3/mm3 Final   07/01/2024 180 140 - 450 10*3/mm3 Final   07/01/2024 191 140 - 450 10*3/mm3 Final   07/01/2024 239 140 - 450 10*3/mm3 Final        PT/INR:    Protime   Date Value Ref Range Status   07/02/2024 15.9 (H) 11.7 - 14.2 Seconds Final   07/01/2024 16.7 (H) 11.7 - 14.2 Seconds Final   /  INR   Date Value Ref Range Status   07/02/2024 1.25 (H) 0.90 - 1.10 Final   07/01/2024 1.33 (H) 0.90 - 1.10 Final       Sodium Sodium   Date Value Ref Range Status   07/03/2024 141 136 - 145 mmol/L Final   07/02/2024 142 136 - 145 mmol/L Final   07/01/2024 140 136 - 145 mmol/L Final   07/01/2024 140 136 - 145 mmol/L Final   07/01/2024 135 (L) 136 - 145 mmol/L Final      Potassium Potassium   Date Value Ref Range Status   07/03/2024 4.4 3.5 - 5.2 mmol/L Final   07/02/2024 4.3 3.5 - 5.2 mmol/L Final   07/01/2024 4.7 3.5 - 5.2 mmol/L Final     Comment:     Slight hemolysis detected by analyzer. Result may be falsely elevated.   07/01/2024 4.2 3.5 - 5.2 mmol/L Final   07/01/2024 4.2 3.5 - 5.2 mmol/L Final      Chloride Chloride   Date Value Ref Range Status   07/03/2024 107 98 - 107 mmol/L Final   07/02/2024 112 (H) 98 - 107 mmol/L Final   07/01/2024 109 (H) 98 - 107 mmol/L Final   07/01/2024 106 98 - 107 mmol/L Final   07/01/2024 101 98 - 107 mmol/L Final      Bicarbonate CO2   Date Value Ref Range  Status   07/03/2024 26.0 22.0 - 29.0 mmol/L Final   07/02/2024 20.7 (L) 22.0 - 29.0 mmol/L Final   07/01/2024 20.1 (L) 22.0 - 29.0 mmol/L Final   07/01/2024 23.0 22.0 - 29.0 mmol/L Final   07/01/2024 25.0 22.0 - 29.0 mmol/L Final      BUN BUN   Date Value Ref Range Status   07/03/2024 21 (H) 6 - 20 mg/dL Final   07/02/2024 20 6 - 20 mg/dL Final   07/01/2024 19 6 - 20 mg/dL Final   07/01/2024 19 6 - 20 mg/dL Final   07/01/2024 20 6 - 20 mg/dL Final      Creatinine Creatinine   Date Value Ref Range Status   07/03/2024 1.47 (H) 0.57 - 1.00 mg/dL Final   07/03/2024 1.38 (H) 0.60 - 1.30 mg/dL Final   07/02/2024 1.92 (H) 0.57 - 1.00 mg/dL Final   07/01/2024 1.75 (H) 0.57 - 1.00 mg/dL Final   07/01/2024 1.49 (H) 0.57 - 1.00 mg/dL Final   07/01/2024 1.22 (H) 0.57 - 1.00 mg/dL Final      Calcium Calcium   Date Value Ref Range Status   07/03/2024 9.0 8.6 - 10.5 mg/dL Final   07/02/2024 8.5 (L) 8.6 - 10.5 mg/dL Final   07/01/2024 8.4 (L) 8.6 - 10.5 mg/dL Final   07/01/2024 8.9 8.6 - 10.5 mg/dL Final   07/01/2024 9.2 8.6 - 10.5 mg/dL Final      Magnesium Magnesium   Date Value Ref Range Status   07/03/2024 2.2 1.6 - 2.6 mg/dL Final   07/02/2024 2.7 (H) 1.6 - 2.6 mg/dL Final   07/01/2024 2.4 1.6 - 2.6 mg/dL Final   07/01/2024 2.7 (H) 1.6 - 2.6 mg/dL Final          aspirin, 81 mg, Oral, Daily  [Held by provider] atorvastatin, 80 mg, Oral, Nightly  chlorhexidine, 15 mL, Mouth/Throat, Q12H  enoxaparin, 30 mg, Subcutaneous, Daily  escitalopram, 10 mg, Oral, Daily  ferric gluconate, 125 mg, Intravenous, Daily  guaiFENesin, 1,200 mg, Oral, Q12H  insulin glargine, 15 Units, Subcutaneous, Daily  insulin lispro, 2-9 Units, Subcutaneous, 4x Daily AC & at Bedtime  metoprolol tartrate, 12.5 mg, Oral, Q12H  mupirocin, , Each Nare, BID  pantoprazole, 40 mg, Oral, QAM  senna-docusate sodium, 2 tablet, Oral, Nightly  sodium chloride, 4 mL, Nebulization, BID - RT      niCARdipine, 5-15 mg/hr, Last Rate: 7.5 mg/hr (07/03/24 0605)               Acute right-sided weakness    Benign essential hypertension    Gastroparesis diabeticorum    Gastroesophageal reflux disease    Mixed hypercholesterolemia and hypertriglyceridemia    Iron deficiency anemia    Type 2 diabetes mellitus with hyperglycemia, with long-term current use of insulin    Coronary artery disease involving native coronary artery of native heart with unstable angina pectoris    Tobacco abuse    CKD stage 3a, GFR 45-59 ml/min    Carotid stenosis    Lacunar cerebrovascular accident (CVA)    Elevated troponin    Aortic valve disease      Assessment & Plan    -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), EVH right, open left, AV fibroelastoma resection, AV repair- [place]  -Recurrent MCA stroke  -Coronary artery disease status post CABG in 2014  -Uncontrolled diabetes mellitus, HgbA1c 16.9, with peripheral neuropathy  -CKD, stage 3  -Hypertension  -Hyperlipidemia  -Obesity  -Tobacco abuse-- encourage cessation  -PAD, Carotid stenosis, 70% right ICA  -GERD  -LB  -chronic anemia- acute worsening expected acute blood loss    POD#2  Up in the chair  4L NC--wean as able   More alert, confused about time and situation--hopefully this will improve-- will discontinue and avoid narcotics   Sinus rhythm rate in the 60s--hypertensive this morning-- on cardene-- will add some PRN hydralazine, switch to coreg for tighter blood pressure control.   Creatinine improved- nephrology following.  Encourage pulmonary toilet--- continue IS/flutter, mucinex and nebs  Mobilize/increase activity-- PT, will consult OT as well.  Will see how much her tubes drain after she ambulates, hopefully will be able to discontinue.  Continue routine care    KAUSHAL Alvarado  07/03/24  07:04 EDT

## 2024-07-03 NOTE — PROGRESS NOTES
Hospital Follow Up    LOS:  LOS: 12 days   Patient Name: Bibiana Inman  Age/Sex: 44 y.o. female  : 1980  MRN: [id number]    Day of Service: 24   Length of Stay: 12  Encounter Provider: KAUSHAL Patel  Place of Service: [facility] CARDIOLOGY  Patient Care Team:  Ibrahima Correa MD as PCP - General (Family Medicine)  Rachele Zafar RN as Ambulatory  (Population Health)  Xochilt Jenkins MD as Consulting Physician (Nephrology)    Subjective:     Chief Complaint: post op follow up    Interval History: no chest pain or shortness of breath. Complains of abdominal pain.    Objective:     Objective:  Temp:  [97 °F (36.1 °C)-99 °F (37.2 °C)] 98.2 °F (36.8 °C)  Heart Rate:  [] 64  Resp:  [16-24] 24  BP: (100-176)/() 139/59     Intake/Output Summary (Last 24 hours) at 7/3/2024 1138  Last data filed at 7/3/2024 0930  Gross per 24 hour   Intake 3151 ml   Output 1320 ml   Net 1831 ml     Body mass index is 28.23 kg/m².      24  0040 24  0534 24  0500   Weight: 63.9 kg (140 lb 12.8 oz) 71.2 kg (156 lb 15.5 oz) 74.6 kg (164 lb 7.4 oz)     Weight change: 3.4 kg (7 lb 7.9 oz)    Physical Exam:   General Appearance:    Awake alert and oriented in no acute distress.   Color:  Skin:  Neuro:  HEENT:    Lungs:     Pink  Warm and dry  No focal, motor or sensory deficits  Neck supple, pupils equal, round and reactive. No JVD, No Bruit  Clear to auscultation,respirations regular, even and                  unlabored, chest tubes present    Heart:    Regular rate and rhythm, S1 and S2, no murmur, no gallop, no rub. No edema, DP/PT pulses are 2+   Chest Wall:    Midsternal incision dressing clean, dry and intact   Abdomen:     Normal bowel sounds, no masses, no organomegaly, soft        non-tender, non-distended, no guarding, no ascites noted   Extremities:   Moves all extremities well, no edema, no cyanosis, no redness       Lab Review:  "  Results from last 7 days   Lab Units 07/03/24  0301 07/03/24  0011 07/02/24  0239   SODIUM mmol/L 141  --  142   POTASSIUM mmol/L 4.4  --  4.3   CHLORIDE mmol/L 107  --  112*   CO2 mmol/L 26.0  --  20.7*   BUN mg/dL 21*  --  20   CREATININE mg/dL 1.47* 1.38* 1.92*   GLUCOSE mg/dL 158*  --  117*   CALCIUM mg/dL 9.0  --  8.5*   AST (SGOT) U/L 23  --   --    ALT (SGPT) U/L <5  --   --      Results from last 7 days   Lab Units 07/03/24  0301 07/02/24  1745   CK TOTAL U/L 280* 426*     Results from last 7 days   Lab Units 07/03/24 0301 07/03/24 0011 07/02/24  0239   WBC 10*3/mm3 10.99*  --  14.01*   HEMOGLOBIN g/dL 8.9*  --  9.1*   HEMOGLOBIN, POC g/dL  --  8.0*  --    HEMATOCRIT % 27.7*  --  28.6*   HEMATOCRIT POC %  --  24*  --    PLATELETS 10*3/mm3 123*  --  129*     Results from last 7 days   Lab Units 07/02/24  0239 07/01/24  1312   INR  1.25* 1.33*   APTT seconds  --  31.3     Results from last 7 days   Lab Units 07/03/24 0301 07/02/24  0239   MAGNESIUM mg/dL 2.2 2.7*           Invalid input(s): \"LDLCALC\"          I reviewed the patient's new clinical results.  I personally viewed and interpreted the patient's EKG  Current Medications:   Scheduled Meds:aspirin, 81 mg, Oral, Daily  atorvastatin, 40 mg, Oral, Nightly  carvedilol, 3.125 mg, Oral, Q12H  chlorhexidine, 15 mL, Mouth/Throat, Q12H  enoxaparin, 30 mg, Subcutaneous, Daily  escitalopram, 10 mg, Oral, Daily  ferric gluconate, 125 mg, Intravenous, Daily  guaiFENesin, 1,200 mg, Oral, Q12H  hydrALAZINE, 25 mg, Oral, Q8H  insulin glargine, 20 Units, Subcutaneous, Daily  insulin lispro, 2-9 Units, Subcutaneous, 4x Daily AC & at Bedtime  mupirocin, , Each Nare, BID  pantoprazole, 40 mg, Oral, QAM  senna-docusate sodium, 2 tablet, Oral, Nightly  sodium chloride, 4 mL, Nebulization, BID - RT      Continuous Infusions:niCARdipine, 5-15 mg/hr, Last Rate: Stopped (07/03/24 0812)        Allergies:  Allergies   Allergen Reactions    Latex Itching       Assessment: "       Acute right-sided weakness    Benign essential hypertension    Gastroparesis diabeticorum    Gastroesophageal reflux disease    Mixed hypercholesterolemia and hypertriglyceridemia    Iron deficiency anemia    Type 2 diabetes mellitus with hyperglycemia, with long-term current use of insulin    Coronary artery disease involving native coronary artery of native heart with unstable angina pectoris    Tobacco abuse    CKD stage 3a, GFR 45-59 ml/min    Carotid stenosis    Lacunar cerebrovascular accident (CVA)    Elevated troponin    Aortic valve disease        Plan:   1.  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  Patient presented with strokelike symptoms.  2.  Coronary artery disease status post CABG with a vein graft to the obtuse marginal x 1 on July 1, 2024.  Previous bypass with a  of the LAD.  There is a LIMA to the LAD, vein graft to the circumflex is occluded, vein graft to the right coronary is patent.  3.  Diabetes with a hemoglobin A1c of 16.9.  4.  Hypertension  5.  Hyperlipidemia  6.  Tobacco use  7.  Carotid artery stenosis with a 70% lesion in the right internal carotid artery.  8.  Obstructive sleep apnea.    -complains of quite a bit of abdominal pain, primarily around the area of her chest tubes insertion sites. Possibly discontinuing these today.  BP remains elevated on Cardene. Beta blocker switched to Coreg.   Continue with current cardiac care.        KAUSHAL Patel  07/03/24  11:38 EDT  Electronically signed by KAUSHAL Patel, 07/03/24, 11:38 AM EDT.

## 2024-07-04 ENCOUNTER — APPOINTMENT (OUTPATIENT)
Dept: GENERAL RADIOLOGY | Facility: HOSPITAL | Age: 44
DRG: 981 | End: 2024-07-04
Payer: COMMERCIAL

## 2024-07-04 ENCOUNTER — APPOINTMENT (OUTPATIENT)
Dept: CT IMAGING | Facility: HOSPITAL | Age: 44
DRG: 981 | End: 2024-07-04
Payer: COMMERCIAL

## 2024-07-04 LAB
ANION GAP SERPL CALCULATED.3IONS-SCNC: 8 MMOL/L (ref 5–15)
BH BB BLOOD EXPIRATION DATE: NORMAL
BH BB BLOOD TYPE BARCODE: 5100
BH BB DISPENSE STATUS: NORMAL
BH BB PRODUCT CODE: NORMAL
BH BB UNIT NUMBER: NORMAL
BUN SERPL-MCNC: 19 MG/DL (ref 6–20)
BUN/CREAT SERPL: 15 (ref 7–25)
CALCIUM SPEC-SCNC: 9 MG/DL (ref 8.6–10.5)
CHLORIDE SERPL-SCNC: 107 MMOL/L (ref 98–107)
CK SERPL-CCNC: 100 U/L (ref 20–180)
CO2 SERPL-SCNC: 24 MMOL/L (ref 22–29)
CREAT SERPL-MCNC: 1.27 MG/DL (ref 0.57–1)
CROSSMATCH INTERPRETATION: NORMAL
DEPRECATED RDW RBC AUTO: 48 FL (ref 37–54)
EGFRCR SERPLBLD CKD-EPI 2021: 52.9 ML/MIN/1.73
ERYTHROCYTE [DISTWIDTH] IN BLOOD BY AUTOMATED COUNT: 14.3 % (ref 12.3–15.4)
GLUCOSE BLDC GLUCOMTR-MCNC: 138 MG/DL (ref 70–130)
GLUCOSE BLDC GLUCOMTR-MCNC: 150 MG/DL (ref 70–130)
GLUCOSE BLDC GLUCOMTR-MCNC: 176 MG/DL (ref 70–130)
GLUCOSE BLDC GLUCOMTR-MCNC: 93 MG/DL (ref 70–130)
GLUCOSE SERPL-MCNC: 91 MG/DL (ref 65–99)
HCT VFR BLD AUTO: 26.8 % (ref 34–46.6)
HGB BLD-MCNC: 8.6 G/DL (ref 12–15.9)
MAGNESIUM SERPL-MCNC: 2 MG/DL (ref 1.6–2.6)
MCH RBC QN AUTO: 29.9 PG (ref 26.6–33)
MCHC RBC AUTO-ENTMCNC: 32.1 G/DL (ref 31.5–35.7)
MCV RBC AUTO: 93.1 FL (ref 79–97)
PHOSPHATE SERPL-MCNC: 2.7 MG/DL (ref 2.5–4.5)
PLATELET # BLD AUTO: 122 10*3/MM3 (ref 140–450)
PMV BLD AUTO: 12.1 FL (ref 6–12)
POTASSIUM SERPL-SCNC: 3.8 MMOL/L (ref 3.5–5.2)
POTASSIUM SERPL-SCNC: 3.9 MMOL/L (ref 3.5–5.2)
RBC # BLD AUTO: 2.88 10*6/MM3 (ref 3.77–5.28)
SODIUM SERPL-SCNC: 139 MMOL/L (ref 136–145)
UNIT  ABO: NORMAL
UNIT  RH: NORMAL
WBC NRBC COR # BLD AUTO: 9.85 10*3/MM3 (ref 3.4–10.8)

## 2024-07-04 PROCEDURE — 94664 DEMO&/EVAL PT USE INHALER: CPT

## 2024-07-04 PROCEDURE — 97530 THERAPEUTIC ACTIVITIES: CPT

## 2024-07-04 PROCEDURE — 97110 THERAPEUTIC EXERCISES: CPT

## 2024-07-04 PROCEDURE — 99232 SBSQ HOSP IP/OBS MODERATE 35: CPT | Performed by: NURSE PRACTITIONER

## 2024-07-04 PROCEDURE — 63710000001 INSULIN LISPRO (HUMAN) PER 5 UNITS: Performed by: NURSE PRACTITIONER

## 2024-07-04 PROCEDURE — 25010000002 NA FERRIC GLUC CPLX PER 12.5 MG: Performed by: INTERNAL MEDICINE

## 2024-07-04 PROCEDURE — 94760 N-INVAS EAR/PLS OXIMETRY 1: CPT

## 2024-07-04 PROCEDURE — 71046 X-RAY EXAM CHEST 2 VIEWS: CPT

## 2024-07-04 PROCEDURE — 83735 ASSAY OF MAGNESIUM: CPT | Performed by: INTERNAL MEDICINE

## 2024-07-04 PROCEDURE — 84132 ASSAY OF SERUM POTASSIUM: CPT

## 2024-07-04 PROCEDURE — 94799 UNLISTED PULMONARY SVC/PX: CPT

## 2024-07-04 PROCEDURE — 80048 BASIC METABOLIC PNL TOTAL CA: CPT | Performed by: NURSE PRACTITIONER

## 2024-07-04 PROCEDURE — 99232 SBSQ HOSP IP/OBS MODERATE 35: CPT | Performed by: PSYCHIATRY & NEUROLOGY

## 2024-07-04 PROCEDURE — 84100 ASSAY OF PHOSPHORUS: CPT | Performed by: INTERNAL MEDICINE

## 2024-07-04 PROCEDURE — 97166 OT EVAL MOD COMPLEX 45 MIN: CPT

## 2024-07-04 PROCEDURE — 70450 CT HEAD/BRAIN W/O DYE: CPT

## 2024-07-04 PROCEDURE — 25010000002 ENOXAPARIN PER 10 MG: Performed by: INTERNAL MEDICINE

## 2024-07-04 PROCEDURE — 94761 N-INVAS EAR/PLS OXIMETRY MLT: CPT

## 2024-07-04 PROCEDURE — 63710000001 INSULIN GLARGINE PER 5 UNITS: Performed by: NURSE PRACTITIONER

## 2024-07-04 PROCEDURE — 82550 ASSAY OF CK (CPK): CPT | Performed by: INTERNAL MEDICINE

## 2024-07-04 PROCEDURE — 85027 COMPLETE CBC AUTOMATED: CPT | Performed by: NURSE PRACTITIONER

## 2024-07-04 PROCEDURE — 82948 REAGENT STRIP/BLOOD GLUCOSE: CPT

## 2024-07-04 RX ORDER — POTASSIUM CHLORIDE 750 MG/1
20 TABLET, FILM COATED, EXTENDED RELEASE ORAL ONCE
Status: COMPLETED | OUTPATIENT
Start: 2024-07-04 | End: 2024-07-04

## 2024-07-04 RX ORDER — CLONIDINE HYDROCHLORIDE 0.1 MG/1
0.2 TABLET ORAL EVERY 12 HOURS SCHEDULED
Status: DISCONTINUED | OUTPATIENT
Start: 2024-07-04 | End: 2024-07-05

## 2024-07-04 RX ADMIN — CLONIDINE HYDROCHLORIDE 0.2 MG: 0.1 TABLET ORAL at 20:54

## 2024-07-04 RX ADMIN — SENNOSIDES AND DOCUSATE SODIUM 2 TABLET: 50; 8.6 TABLET ORAL at 20:54

## 2024-07-04 RX ADMIN — Medication 4 ML: at 19:34

## 2024-07-04 RX ADMIN — ENOXAPARIN SODIUM 30 MG: 100 INJECTION SUBCUTANEOUS at 18:11

## 2024-07-04 RX ADMIN — ESCITALOPRAM OXALATE 10 MG: 10 TABLET, FILM COATED ORAL at 08:40

## 2024-07-04 RX ADMIN — SODIUM CHLORIDE 125 MG: 9 INJECTION, SOLUTION INTRAVENOUS at 08:40

## 2024-07-04 RX ADMIN — ASPIRIN 81 MG: 81 TABLET, COATED ORAL at 08:32

## 2024-07-04 RX ADMIN — IPRATROPIUM BROMIDE AND ALBUTEROL SULFATE 3 ML: .5; 3 SOLUTION RESPIRATORY (INHALATION) at 07:02

## 2024-07-04 RX ADMIN — HYDRALAZINE HYDROCHLORIDE 75 MG: 50 TABLET ORAL at 22:29

## 2024-07-04 RX ADMIN — MUPIROCIN 1 APPLICATION: 20 OINTMENT TOPICAL at 08:32

## 2024-07-04 RX ADMIN — MUPIROCIN 1 APPLICATION: 20 OINTMENT TOPICAL at 20:54

## 2024-07-04 RX ADMIN — ATORVASTATIN CALCIUM 40 MG: 20 TABLET, FILM COATED ORAL at 20:54

## 2024-07-04 RX ADMIN — CARVEDILOL 3.12 MG: 3.12 TABLET, FILM COATED ORAL at 08:32

## 2024-07-04 RX ADMIN — GUAIFENESIN 1200 MG: 600 TABLET, EXTENDED RELEASE ORAL at 08:32

## 2024-07-04 RX ADMIN — HYDRALAZINE HYDROCHLORIDE 50 MG: 50 TABLET ORAL at 05:28

## 2024-07-04 RX ADMIN — INSULIN GLARGINE 20 UNITS: 100 INJECTION, SOLUTION SUBCUTANEOUS at 08:32

## 2024-07-04 RX ADMIN — IPRATROPIUM BROMIDE AND ALBUTEROL SULFATE 3 ML: .5; 3 SOLUTION RESPIRATORY (INHALATION) at 19:29

## 2024-07-04 RX ADMIN — INSULIN LISPRO 2 UNITS: 100 INJECTION, SOLUTION INTRAVENOUS; SUBCUTANEOUS at 11:34

## 2024-07-04 RX ADMIN — GUAIFENESIN 1200 MG: 600 TABLET, EXTENDED RELEASE ORAL at 20:54

## 2024-07-04 RX ADMIN — PANTOPRAZOLE SODIUM 40 MG: 40 TABLET, DELAYED RELEASE ORAL at 06:00

## 2024-07-04 RX ADMIN — POTASSIUM CHLORIDE 20 MEQ: 750 TABLET, EXTENDED RELEASE ORAL at 18:11

## 2024-07-04 RX ADMIN — Medication 4 ML: at 07:04

## 2024-07-04 RX ADMIN — INSULIN LISPRO 2 UNITS: 100 INJECTION, SOLUTION INTRAVENOUS; SUBCUTANEOUS at 20:54

## 2024-07-04 RX ADMIN — CLONIDINE HYDROCHLORIDE 0.2 MG: 0.1 TABLET ORAL at 08:32

## 2024-07-04 RX ADMIN — CARVEDILOL 3.12 MG: 3.12 TABLET, FILM COATED ORAL at 20:54

## 2024-07-04 RX ADMIN — POTASSIUM CHLORIDE 20 MEQ: 750 TABLET, EXTENDED RELEASE ORAL at 05:28

## 2024-07-04 RX ADMIN — HYDRALAZINE HYDROCHLORIDE 75 MG: 50 TABLET ORAL at 13:58

## 2024-07-04 NOTE — PROGRESS NOTES
"NEPHROLOGY PROGRESS NOTE------[facility]    [facility]  [phone]  Tamiko Jenkins MD      Patient Care Team:  Ibrahima Correa MD as PCP - General (Family Medicine)  Rachele Zafar RN as Ambulatory  ([facility])  Xochilt Jenkins MD as Consulting Physician (Nephrology)      Provider:  Tamiko Jenkins MD  Patient Name: Bibiana Inman  :  1980    SUBJECTIVE:    F/U ARF/CARLA/CRF/CKD    Feeling good. No SOB, CP, palpitations, cramping, dysuria.     Medication:  aspirin, 81 mg, Oral, Daily  atorvastatin, 40 mg, Oral, Nightly  carvedilol, 3.125 mg, Oral, Q12H  chlorhexidine, 15 mL, Mouth/Throat, Q12H  cloNIDine, 0.2 mg, Oral, Q12H  enoxaparin, 30 mg, Subcutaneous, Daily  escitalopram, 10 mg, Oral, Daily  ferric gluconate, 125 mg, Intravenous, Daily  guaiFENesin, 1,200 mg, Oral, Q12H  hydrALAZINE, 75 mg, Oral, Q8H  insulin glargine, 20 Units, Subcutaneous, Daily  insulin lispro, 2-9 Units, Subcutaneous, 4x Daily AC & at Bedtime  mupirocin, , Each Nare, BID  pantoprazole, 40 mg, Oral, QAM  senna-docusate sodium, 2 tablet, Oral, Nightly  sodium chloride, 4 mL, Nebulization, BID - RT      niCARdipine, 5-15 mg/hr, Last Rate: Stopped (24 1500)        OBJECTIVE    Vital Sign Min/Max for last 24 hours  Temp  Min: 97.7 °F (36.5 °C)  Max: 99.5 °F (37.5 °C)   BP  Min: 117/72  Max: 167/68   Pulse  Min: 55  Max: 73   Resp  Min: 16  Max: 24   SpO2  Min: 95 %  Max: 100 %   No data recorded   No data recorded     Flowsheet Rows      Flowsheet Row First Filed Value   Admission Height 162.6 cm (64\") Documented at 2024   Admission Weight 65.5 kg (144 lb 6.4 oz) Documented at 2024 2105            No intake/output data recorded.  I/O last 3 completed shifts:  In: 2908 [P.O.:1200; I.V.:1608; IV Piggyback:100]  Out: 3055 [Urine:2885; Chest Tube:170]    Physical Exam:  General Appearance: alert, appears stated " "age and cooperative  Head: normocephalic, without obvious abnormality and atraumatic.   Eyes: conjunctivae and sclerae normal and no icterus  Neck: supple and no JVD  Lungs: DECREASED BS BIBASILAR  Heart: regular rhythm & normal rate and normal S1, S2 +TITI (PACED)  Chest Wall: S/P SURGICAL CHANGES POST STERNOTOMY S/P OPEN HEART SURGERY WITH CT  Abdomen: +HYPOACTIVE BS; SOFT; NT/ND  Extremities: moves extremities well, no edema, no cyanosis  Skin: no bleeding, bruising or rash.    Neurologic: Alert, and oriented. No focal deficits    Labs:    WBC WBC   Date Value Ref Range Status   07/04/2024 9.85 3.40 - 10.80 10*3/mm3 Final   07/03/2024 10.99 (H) 3.40 - 10.80 10*3/mm3 Final   07/02/2024 14.01 (H) 3.40 - 10.80 10*3/mm3 Final   07/01/2024 15.62 (H) 3.40 - 10.80 10*3/mm3 Final   07/01/2024 14.79 (H) 3.40 - 10.80 10*3/mm3 Final   07/01/2024 9.72 3.40 - 10.80 10*3/mm3 Final      HGB Hemoglobin   Date Value Ref Range Status   07/04/2024 8.6 (L) 12.0 - 15.9 g/dL Final   07/03/2024 8.9 (L) 12.0 - 15.9 g/dL Final   07/03/2024 8.0 (C) 12.0 - 17.0 g/dL Final     Comment:     Serial Number: [id number]:  [id number]   07/02/2024 9.1 (L) 12.0 - 15.9 g/dL Final   07/01/2024 9.5 (L) 12.0 - 17.0 g/dL Final     Comment:     Serial Number: [id number]:  [id number]   07/01/2024 11.3 (L) 12.0 - 15.9 g/dL Final   07/01/2024 10.0 (L) 12.0 - 15.9 g/dL Final   07/01/2024 8.0 (L) 12.0 - 15.9 g/dL Final      HCT Hematocrit   Date Value Ref Range Status   07/04/2024 26.8 (L) 34.0 - 46.6 % Final   07/03/2024 27.7 (L) 34.0 - 46.6 % Final   07/03/2024 24 (L) 38 - 51 % Final   07/02/2024 28.6 (L) 34.0 - 46.6 % Final   07/01/2024 28 (L) 38 - 51 % Final   07/01/2024 32.0 (L) 34.0 - 46.6 % Final   07/01/2024 28.2 (L) 34.0 - 46.6 % Final   07/01/2024 24.0 (L) 34.0 - 46.6 % Final      Platelets No results found for: \"LABPLAT\"   MCV MCV   Date Value Ref Range Status   07/04/2024 93.1 79.0 - 97.0 fL Final   07/03/2024 91.7 79.0 - 97.0 fL Final   07/02/2024 " 92.6 79.0 - 97.0 fL Final   07/01/2024 90.4 79.0 - 97.0 fL Final   07/01/2024 89.5 79.0 - 97.0 fL Final   07/01/2024 90.6 79.0 - 97.0 fL Final          Sodium Sodium   Date Value Ref Range Status   07/04/2024 139 136 - 145 mmol/L Final   07/03/2024 141 136 - 145 mmol/L Final   07/02/2024 142 136 - 145 mmol/L Final   07/01/2024 140 136 - 145 mmol/L Final   07/01/2024 140 136 - 145 mmol/L Final      Potassium Potassium   Date Value Ref Range Status   07/04/2024 3.8 3.5 - 5.2 mmol/L Final   07/03/2024 4.4 3.5 - 5.2 mmol/L Final   07/02/2024 4.3 3.5 - 5.2 mmol/L Final   07/01/2024 4.7 3.5 - 5.2 mmol/L Final     Comment:     Slight hemolysis detected by analyzer. Result may be falsely elevated.   07/01/2024 4.2 3.5 - 5.2 mmol/L Final      Chloride Chloride   Date Value Ref Range Status   07/04/2024 107 98 - 107 mmol/L Final   07/03/2024 107 98 - 107 mmol/L Final   07/02/2024 112 (H) 98 - 107 mmol/L Final   07/01/2024 109 (H) 98 - 107 mmol/L Final   07/01/2024 106 98 - 107 mmol/L Final      CO2 CO2   Date Value Ref Range Status   07/04/2024 24.0 22.0 - 29.0 mmol/L Final   07/03/2024 26.0 22.0 - 29.0 mmol/L Final   07/02/2024 20.7 (L) 22.0 - 29.0 mmol/L Final   07/01/2024 20.1 (L) 22.0 - 29.0 mmol/L Final   07/01/2024 23.0 22.0 - 29.0 mmol/L Final      BUN BUN   Date Value Ref Range Status   07/04/2024 19 6 - 20 mg/dL Final   07/03/2024 21 (H) 6 - 20 mg/dL Final   07/02/2024 20 6 - 20 mg/dL Final   07/01/2024 19 6 - 20 mg/dL Final   07/01/2024 19 6 - 20 mg/dL Final      Creatinine Creatinine   Date Value Ref Range Status   07/04/2024 1.27 (H) 0.57 - 1.00 mg/dL Final   07/03/2024 1.47 (H) 0.57 - 1.00 mg/dL Final   07/03/2024 1.38 (H) 0.60 - 1.30 mg/dL Final   07/02/2024 1.92 (H) 0.57 - 1.00 mg/dL Final   07/01/2024 1.75 (H) 0.57 - 1.00 mg/dL Final   07/01/2024 1.49 (H) 0.57 - 1.00 mg/dL Final      Calcium Calcium   Date Value Ref Range Status   07/04/2024 9.0 8.6 - 10.5 mg/dL Final   07/03/2024 9.0 8.6 - 10.5 mg/dL Final  "  07/02/2024 8.5 (L) 8.6 - 10.5 mg/dL Final   07/01/2024 8.4 (L) 8.6 - 10.5 mg/dL Final   07/01/2024 8.9 8.6 - 10.5 mg/dL Final      PO4 No components found for: \"PO4\"   Albumin Albumin   Date Value Ref Range Status   07/03/2024 3.5 3.5 - 5.2 g/dL Final   07/02/2024 3.7 3.5 - 5.2 g/dL Final   07/01/2024 3.6 3.5 - 5.2 g/dL Final   07/01/2024 3.8 3.5 - 5.2 g/dL Final      Magnesium Magnesium   Date Value Ref Range Status   07/04/2024 2.0 1.6 - 2.6 mg/dL Final   07/03/2024 2.2 1.6 - 2.6 mg/dL Final   07/02/2024 2.7 (H) 1.6 - 2.6 mg/dL Final   07/01/2024 2.4 1.6 - 2.6 mg/dL Final   07/01/2024 2.7 (H) 1.6 - 2.6 mg/dL Final      Uric Acid No components found for: \"URIC ACID\"     Imaging Results (Last 72 Hours)       Procedure Component Value Units Date/Time    CT Head Without Contrast [[id number]] Resulted: 07/04/24 0802     Updated: 07/04/24 0802    XR Chest PA & Lateral [[id number]] Resulted: 07/04/24 0803     Updated: 07/04/24 0757    XR Chest 1 View [[id number]] Collected: 07/03/24 1118     Updated: 07/03/24 1125    Narrative:      Portable chest radiograph     HISTORY: Chest tube removal, postop open heart     TECHNIQUE: Single AP portable radiograph of the chest     COMPARISON: Chest radiograph dating back to 7/1/2024       Impression:      FINDINGS AND IMPRESSION:  Previously seen thoracostomy tubes appear to been removed. Presumed  epicardial pacing wires, median sternotomy wires and right internal  jugular sheath are present.     Findings of pneumomediastinum, as before. Mild left basilar pulmonary  opacification persists. No pneumothorax is seen. Cardiac silhouette is  accentuated by patient rotation; however, there is to be at least mildly  enlarged.     This report was finalized on 7/3/2024 11:22 AM by Dr. Bin Bey M.D  on Workstation: [id number]       XR Chest 1 View [[id number]] Collected: 07/03/24 0602     Updated: 07/03/24 0629    Narrative:      Portable chest radiograph     HISTORY: Postop open " heart     TECHNIQUE: Single AP portable radiograph of the chest     COMPARISON: Multiple chest radiographs dating back to 6/21/2024       Impression:      FINDINGS AND IMPRESSION:  There appear to be 4 thoracostomy tubes overlying the bilateral lungs a  mediastinum. The right internal jugular sheath is present.     Bibasilar pulmonary opacification has mild to moderately decreased. No  pneumothorax seen. Cardiac silhouette is accentuated by low lung  volumes. Findings suggestive of pneumomediastinum, as before.     This report was finalized on 7/3/2024 6:26 AM by Dr. Bin Bey M.D  on Workstation: [id number]       US Renal Bilateral [[id number]] Collected: 07/02/24 1934     Updated: 07/02/24 2046    Narrative:      RENAL SONOGRAM     HISTORY: ARF, CARLA, CRF, CKD     COMPARISON: CT chest 06/26/2024.     FINDINGS: Right kidney measures 8.7 x 5.2 x 5.7 cm and the left kidney  measures 8.8 x 5 x 5.4 cm. No focal renal sonographic abnormality is  evident though note that this is a very limited exam as the patient just  got out of surgery and is unable to move well and has pain. Nelson  catheter is present in the urinary bladder.       Impression:      Limited renal sonogram demonstrates no hydronephrosis or  evidence for acute abnormality.     This report was finalized on 7/2/2024 8:43 PM by Dr. Huan Noriega M.D on Workstation: [id number]       XR Chest 1 View [[id number]] Collected: 07/02/24 0530     Updated: 07/02/24 0536    Narrative:      XR CHEST 1 VW-     Clinical: Postop     COMPARISON examination dated 7/1/2024     FINDINGS: There has been interval removal of the endotracheal tube,  central line and chest tubes remain in position as before. Interval  development of bibasilar atelectasis and/or infiltrates. The heart size  is stable. Mediastinal gas less pronounced compared to the previous  examination. No pneumothorax seen. No vascular congestion or gross  pleural effusion has developed. The remainder  is unremarkable.     This report was finalized on 7/2/2024 5:33 AM by Dr. Clif Galeas M.D  on Workstation: [id number]       XR Chest 1 View [[id number]] Collected: 07/01/24 1404     Updated: 07/01/24 1409    Narrative:      XR CHEST 1 VW-     HISTORY: Female who is 44 years-old, postoperative evaluation     TECHNIQUE: Frontal view of the chest     COMPARISON: 6/21/2024     FINDINGS: Endotracheal tube tip is 4.9 cm above the dulce maria. Right IJ  catheter extends to the superior vena cava. Sternotomy wires,  mediastinal drains, bilateral chest tubes noted. The heart size is  borderline. Pulmonary vasculature is unremarkable. Mild  pneumomediastinum is apparent there may be trace left apical  pneumothorax. Small likely atelectasis in both lungs. No acute osseous  process.       Impression:      Postsurgical changes, as described.     This report was finalized on 7/1/2024 2:06 PM by Dr. Antonio Ch M.D on Workstation: [id number]               Results for orders placed during the hospital encounter of 06/21/24    XR Chest 1 View    Narrative  Portable chest radiograph    HISTORY: Chest tube removal, postop open heart    TECHNIQUE: Single AP portable radiograph of the chest    COMPARISON: Chest radiograph dating back to 7/1/2024    Impression  FINDINGS AND IMPRESSION:  Previously seen thoracostomy tubes appear to been removed. Presumed  epicardial pacing wires, median sternotomy wires and right internal  jugular sheath are present.    Findings of pneumomediastinum, as before. Mild left basilar pulmonary  opacification persists. No pneumothorax is seen. Cardiac silhouette is  accentuated by patient rotation; however, there is to be at least mildly  enlarged.    This report was finalized on 7/3/2024 11:22 AM by Dr. Bin Bey M.D  on Workstation: [id number]      XR Chest 1 View    Narrative  Portable chest radiograph    HISTORY: Postop open heart    TECHNIQUE: Single AP portable radiograph of the  chest    COMPARISON: Multiple chest radiographs dating back to 6/21/2024    Impression  FINDINGS AND IMPRESSION:  There appear to be 4 thoracostomy tubes overlying the bilateral lungs a  mediastinum. The right internal jugular sheath is present.    Bibasilar pulmonary opacification has mild to moderately decreased. No  pneumothorax seen. Cardiac silhouette is accentuated by low lung  volumes. Findings suggestive of pneumomediastinum, as before.    This report was finalized on 7/3/2024 6:26 AM by Dr. Bin Bey M.D  on Workstation: [id number]      XR Chest 1 View    Narrative  XR CHEST 1 VW-    Clinical: Postop    COMPARISON examination dated 7/1/2024    FINDINGS: There has been interval removal of the endotracheal tube,  central line and chest tubes remain in position as before. Interval  development of bibasilar atelectasis and/or infiltrates. The heart size  is stable. Mediastinal gas less pronounced compared to the previous  examination. No pneumothorax seen. No vascular congestion or gross  pleural effusion has developed. The remainder is unremarkable.    This report was finalized on 7/2/2024 5:33 AM by Dr. Clif Galeas M.D  on Workstation: [id number]      Results for orders placed during the hospital encounter of 06/21/24    Duplex Carotid Ultrasound CAR    Interpretation Summary    Right internal carotid artery demonstrates a less than 50% stenosis.    Left internal carotid artery demonstrates a less than 50% stenosis.        ASSESSMENT / PLAN      Acute right-sided weakness    Benign essential hypertension    Gastroparesis diabeticorum    Gastroesophageal reflux disease    Mixed hypercholesterolemia and hypertriglyceridemia    Iron deficiency anemia    Type 2 diabetes mellitus with hyperglycemia, with long-term current use of insulin    Coronary artery disease involving native coronary artery of native heart with unstable angina pectoris    Tobacco abuse    CKD stage 3a, GFR 45-59 ml/min    Carotid  stenosis    Lacunar cerebrovascular accident (CVA)    Elevated troponin    Aortic valve disease      ARF/CARLA/CRF/CKD---Nonoliguric. +ARF/CARLA on top of known CRF/CKD STG   3A, with a baseline serum creatinine of about 1.2. CRF/CKD STG 3A is secondary to DGS/HTN NS. +ARF/CARLA appears to be secondary to prerenal state/intravascular volume depletion and some ATN from hypotension. D/C IVFs today. Support BP and avoid hypotension. Renal US negative.  No NSAIDs or IV dye. Dose meds for CrCl 30-45 cc/min.  Keep off of SGLT2 inhibitor for now     2. ACIDOSIS------Resolved     3. DMII WITH RENAL MANIFESTATIONS-----Hold SGLT2 inhibitor given ARF/CARLA. Lantus, Glucometers, SSI     4. HTN WITH CKD-----BP ok. No ACE-I/ARB/DRI/diuretic for now. Follow for pressor need. Follow off of IVFs     5. HYPERLIPIDEMIA----Statin     6. OA/DJD------No NSAIDs. Uric ok     7. DM PERIPHERAL NEUROPATHY-----On Neurontin     8. GERD/DM GASTROPARESIS/PUD PROPHYLAXIS-------Reglan and PPI. Benefits of PPI use outweigh risks, despite renal dysfunction     8. DVT PROPHYLAXIS----Renal dose adjusted Lovenox     9. PROTEINURIA-----Secondary to DGS     10. VITAMIN D DEFICIENCY-----On Supplementation prior to admission     11. ANEMIA OF CKD AND POST-OP ANEMIA------IV iron for NIGHAT     12. DEPRESSION-----On Lexapro     13. CAD S/P CABG S/P AV FIBROELASTOMA RESECTION/REPAIR-----per , CT Surgery     14. POST OP ATELECTASIS/LB WITH H/O TOBACCO ABUSE-----Encourage IS. No active wheezing and respiratory status stable      Tamiko Jenkins MD  [facility]  [phone]  07/04/24  08:03 EDT

## 2024-07-04 NOTE — THERAPY TREATMENT NOTE
Patient Name: Bibiana Inman  : 1980    MRN: [id number]                              Today's Date: 2024       Admit Date: 2024    Visit Dx:     ICD-10-CM ICD-9-CM   1. Acute right-sided weakness  R53.1 728.87   2. Type 2 diabetes mellitus with hyperglycemia, with long-term current use of insulin  E11.65 250.00    Z79.4 790.29     V58.67   3. Tobacco abuse  Z72.0 305.1   4. Carotid stenosis, asymptomatic, right  I65.21 433.10   5. Coronary artery disease involving native coronary artery of native heart with unstable angina pectoris  I25.110 414.01     411.1   6. 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    Abnormal bone density screening    Benign essential hypertension    Chronic cough    Gastroparesis diabeticorum    Elevated erythrocyte sedimentation rate    Fatigue    Gastroesophageal reflux disease    Mixed hypercholesterolemia and hypertriglyceridemia    Intermittent claudication    Iron deficiency anemia    Multiple joint pain    Need for influenza vaccination    Type 2 diabetes mellitus with hyperglycemia, with long-term current use of insulin    Obstructive sleep apnea    Coronary artery disease involving native coronary artery of native heart with unstable angina pectoris    Abnormal nuclear stress test    Depressive disorder    Back pain    Diabetic peripheral neuropathy associated with type 2 diabetes mellitus    Ingrowing nail, left great toe    Personal history of COVID-19    Polyp of colon    Vitamin D deficiency    Tobacco abuse    Unstable angina    Altered mental status    Carotid stenosis, asymptomatic, right    Acute right-sided weakness    CKD stage 3a, GFR 45-59 ml/min    Carotid stenosis    Lacunar cerebrovascular accident (CVA)    Elevated troponin    Aortic valve disease     Past Medical History:   Diagnosis Date    5,10-methylenetetrahydrofolate  reductase deficiency 2012    Allergic rhinitis 2012    Benign essential hypertension 2016    Coronary arteriosclerosis 2014    Description: s/p CABG (LIMA-LAD, SVG-OM2, SVG-PDA) performed on 14 by Dr. Debbie Fry.    Depressive disorder 2023    Diabetic gastroparesis 2021    Diabetic peripheral neuropathy associated with type 2 diabetes mellitus 2024    Gastroesophageal reflux disease 03/10/2021    GERD (gastroesophageal reflux disease)     Intermittent claudication 2017    Iron deficiency anemia 2020    Mixed hypercholesterolemia and hypertriglyceridemia 2014    Myocardial infarction     Obesity     Obstructive sleep apnea 2021    Personal history of COVID-19 2022    Polyp of colon 2023    Spontaneous      Stress fracture of right ankle with routine healing     Tobacco abuse 2024    Type 2 diabetes mellitus with hyperglycemia, with long-term current use of insulin 2017    Vitamin D deficiency 2024     Past Surgical History:   Procedure Laterality Date    CARDIAC CATHETERIZATION N/A 2024    Procedure: Left Heart Cath and coronary angiogram;  Surgeon: Jena Barron MD;  Location: [facility] CATH INVASIVE LOCATION;  Service: Cardiology;  Laterality: N/A;     SECTION      CORONARY ARTERY BYPASS GRAFT  2014    LIMA-LAD, SVG-OM2, SVG-PDA, Dr. Debbie Fry.    CORONARY ARTERY BYPASS GRAFT WITH AORTIC VALVE REPAIR/REPLACEMENT N/A 2024    Procedure: YUE; REOPERATIVE STERNOTOMY; CORONARY ARTERY BYPASS GRAFTING TIMES 1 UTILIZING RIGHT SAPHENOUS VEIN; AORTIC VALVE TUMOR ; PRP;  Surgeon: Benja De Luna MD;  Location: [facility] CVOR;  Service: Cardiothoracic;  Laterality: N/A;    DILATATION AND CURETTAGE      TONSILLECTOMY        General Information       Row Name 24 1049          Physical Therapy Time and Intention    Document Type therapy note (daily note)  -PC     Mode of Treatment  physical therapy  -PC       Row Name 07/04/24 1049          General Information    Existing Precautions/Restrictions cardiac;fall;sternal  -PC       Row Name 07/04/24 1049          Cognition    Orientation Status (Cognition) oriented x 3  -PC               User Key  (r) = Recorded By, (t) = Taken By, (c) = Cosigned By      Initials Name Provider Type    PC Mesha Jerez, PT Physical Therapist                   Mobility       Row Name 07/04/24 1049          Bed Mobility    Sit-Supine [place] (Bed Mobility) minimum assist (75% patient effort)  -PC       Row Name 07/04/24 1049          Sit-Stand Transfer    Sit-Stand [place] (Transfers) minimum assist (75% patient effort)  -PC     Assistive Device (Sit-Stand Transfers) walker, front-wheeled  -PC       Row Name 07/04/24 1049          Gait/Stairs (Locomotion)    [place] Level (Gait) minimum assist (75% patient effort)  -PC     Assistive Device (Gait) walker, front-wheeled  -PC     Distance in Feet (Gait) 40  -PC     Deviations/Abnormal Patterns (Gait) vipul decreased;gait speed decreased  -PC     Bilateral Gait Deviations forward flexed posture;heel strike decreased  -PC               User Key  (r) = Recorded By, (t) = Taken By, (c) = Cosigned By      Initials Name Provider Type    PC Mesha Jerez, PT Physical Therapist                   Obj/Interventions       Row Name 07/04/24 1050          Motor Skills    Therapeutic Exercise --  5 reps cardiac protocol, sluggish DF on Left noted  -PC       Row Name 07/04/24 1050          Balance    Static Standing Balance minimal assist  -PC     Dynamic Standing Balance minimal assist  -PC     Position/Device Used, Standing Balance walker, rolling  -PC               User Key  (r) = Recorded By, (t) = Taken By, (c) = Cosigned By      Initials Name Provider Type    PC Mesha Jerez, PT Physical Therapist                   Goals/Plan    No documentation.                  Clinical Impression       Row Name  07/04/24 1051          Pain    Pretreatment Pain Rating 6/10  -PC     Pain Location incisional  -PC     Pain Location - chest  -PC       Row Name 07/04/24 1051          Plan of Care Review    Plan of Care Reviewed With patient  -PC     Progress improving  -PC     Outcome Evaluation Pt was able to walk farther today with less assist, still depending on rolling walker for support, needed encouragement, but walked 40 ft today with rwx with min assist, PT will cont to follow and progress with activity as tolerated  -PC       Row Name 07/04/24 1051          Vital Signs    Posttreatment Heart Rate (beats/min) 64  -PC     Post SpO2 (%) 98  -PC     O2 Delivery Post Treatment room air  -PC       Row Name 07/04/24 1051          Positioning and Restraints    Pre-Treatment Position sitting in chair/recliner  -PC     Post Treatment Position bsc  -PC     On BS commode sitting;call light within reach;encouraged to call for assist;notified nsg  -PC               User Key  (r) = Recorded By, (t) = Taken By, (c) = Cosigned By      Initials Name Provider Type    PC Mesha Jerez, PT Physical Therapist                   Outcome Measures       Row Name 07/04/24 1054          How much help from another person do you currently need...    Turning from your back to your side while in flat bed without using bedrails? 3  -PC     Moving from lying on back to sitting on the side of a flat bed without bedrails? 3  -PC     Moving to and from a bed to a chair (including a wheelchair)? 3  -PC     Standing up from a chair using your arms (e.g., wheelchair, bedside chair)? 3  -PC     Climbing 3-5 steps with a railing? 2  -PC     To walk in hospital room? 3  -PC     AM-PAC 6 Clicks Score (PT) 17  -PC     Highest Level of Mobility Goal 5 --> Static standing  -PC               User Key  (r) = Recorded By, (t) = Taken By, (c) = Cosigned By      Initials Name Provider Type    PC Mesha Jerez, PT Physical Therapist                                  Physical Therapy Education       Title: PT OT SLP Therapies (Done)       Topic: Physical Therapy (Done)       Point: Mobility training (Done)       Learning Progress Summary             Patient Acceptance, E,D, DU by PC at 7/4/2024 1055    Acceptance, E, NR by AR at 7/3/2024 1047    Acceptance, E,TB,D, VU,NR by  at 7/2/2024 1229                         Point: Home exercise program (Done)       Learning Progress Summary             Patient Acceptance, E,D, DU by PC at 7/4/2024 1055    Acceptance, E, NR by AR at 7/3/2024 1047                         Point: Body mechanics (Done)       Learning Progress Summary             Patient Acceptance, E,D, DU by PC at 7/4/2024 1055    Acceptance, E, NR by AR at 7/3/2024 1047    Acceptance, E,TB,D, VU,NR by  at 7/2/2024 1229                         Point: Precautions (Done)       Learning Progress Summary             Patient Acceptance, E,D, DU by PC at 7/4/2024 1055    Acceptance, E, NR by AR at 7/3/2024 1047    Acceptance, E,TB,D, VU,NR by  at 7/2/2024 1229                                         User Key       Initials Effective Dates Name Provider Type Discipline     06/16/21 -  Mary West, PT Physical Therapist PT     06/16/21 -  Mesha Jerez PT Physical Therapist PT    AR 06/16/21 -  Reanna Renee PT Physical Therapist PT                  PT Recommendation and Plan     Plan of Care Reviewed With: patient  Progress: improving  Outcome Evaluation: Pt was able to walk farther today with less assist, still depending on rolling walker for support, needed encouragement, but walked 40 ft today with rwx with min assist, PT will cont to follow and progress with activity as tolerated     Time Calculation:         PT Charges       Row Name 07/04/24 1055             Time Calculation    Start Time 0913  -PC      Stop Time 0945  -PC      Time Calculation (min) 32 min  -PC      PT Received On 07/04/24  -PC      PT - Next Appointment 07/05/24  -PC                 User Key  (r) = Recorded By, (t) = Taken By, (c) = Cosigned By      Initials Name Provider Type    PC Mesha Jerez, PT Physical Therapist                  Therapy Charges for Today       Code Description Service Date Service Provider Modifiers Qty    81511355523 HC PT THER PROC EA 15 MIN 7/4/2024 Mesha Jerez PT GP 2            PT G-Codes  Outcome Measure Options: AM-PAC 6 Clicks Basic Mobility (PT)  AM-PAC 6 Clicks Score (PT): 17  PT Discharge Summary  Anticipated Discharge Disposition (PT):  (depends on progress, rehab v home with assist)    Mesha Jerez, PT  7/4/2024

## 2024-07-04 NOTE — PROGRESS NOTES
" LOS: 13 days   Patient Care Team:  Ibrahima Correa MD as PCP - General (Family Medicine)  Rachele Zafar, ALEENA as Ambulatory  (Population Health)  Xochilt Jenkins MD as Consulting Physician (Nephrology)    Chief Complaint: post op follow-up    Subjective      Vital Signs  Temp:  [97.7 °F (36.5 °C)-99.5 °F (37.5 °C)] 99.5 °F (37.5 °C)  Heart Rate:  [55-73] 67  Resp:  [16-24] 18  BP: (114-167)/(55-80) 145/65  Body mass index is 28.23 kg/m².    Intake/Output Summary (Last 24 hours) at 7/4/2024 0712  Last data filed at 7/4/2024 0652  Gross per 24 hour   Intake 1272 ml   Output 2245 ml   Net -973 ml     No intake/output data recorded.            07/01/24  0040 07/02/24  0534 07/03/24  0500   Weight: 63.9 kg (140 lb 12.8 oz) 71.2 kg (156 lb 15.5 oz) 74.6 kg (164 lb 7.4 oz)         Objective:  Vital signs: (most recent): Blood pressure 145/65, pulse 67, temperature 99.5 °F (37.5 °C), resp. rate 18, height 162.6 cm (64\"), weight 74.6 kg (164 lb 7.4 oz), last menstrual period 01/10/2023, SpO2 100%, not currently breastfeeding.                Results Review:        WBC WBC   Date Value Ref Range Status   07/04/2024 9.85 3.40 - 10.80 10*3/mm3 Final   07/03/2024 10.99 (H) 3.40 - 10.80 10*3/mm3 Final   07/02/2024 14.01 (H) 3.40 - 10.80 10*3/mm3 Final   07/01/2024 15.62 (H) 3.40 - 10.80 10*3/mm3 Final   07/01/2024 14.79 (H) 3.40 - 10.80 10*3/mm3 Final   07/01/2024 9.72 3.40 - 10.80 10*3/mm3 Final      HGB Hemoglobin   Date Value Ref Range Status   07/04/2024 8.6 (L) 12.0 - 15.9 g/dL Final   07/03/2024 8.9 (L) 12.0 - 15.9 g/dL Final   07/03/2024 8.0 (C) 12.0 - 17.0 g/dL Final     Comment:     Serial Number: [id number]:  [id number]   07/02/2024 9.1 (L) 12.0 - 15.9 g/dL Final   07/01/2024 9.5 (L) 12.0 - 17.0 g/dL Final     Comment:     Serial Number: [id number]:  [id number]   07/01/2024 11.3 (L) 12.0 - 15.9 g/dL Final   07/01/2024 10.0 (L) 12.0 - 15.9 g/dL Final   07/01/2024 8.0 (L) 12.0 - 15.9 g/dL Final    "   HCT Hematocrit   Date Value Ref Range Status   07/04/2024 26.8 (L) 34.0 - 46.6 % Final   07/03/2024 27.7 (L) 34.0 - 46.6 % Final   07/03/2024 24 (L) 38 - 51 % Final   07/02/2024 28.6 (L) 34.0 - 46.6 % Final   07/01/2024 28 (L) 38 - 51 % Final   07/01/2024 32.0 (L) 34.0 - 46.6 % Final   07/01/2024 28.2 (L) 34.0 - 46.6 % Final   07/01/2024 24.0 (L) 34.0 - 46.6 % Final      Platelets Platelets   Date Value Ref Range Status   07/04/2024 122 (L) 140 - 450 10*3/mm3 Final   07/03/2024 123 (L) 140 - 450 10*3/mm3 Final   07/02/2024 129 (L) 140 - 450 10*3/mm3 Final   07/01/2024 177 140 - 450 10*3/mm3 Final   07/01/2024 180 140 - 450 10*3/mm3 Final   07/01/2024 191 140 - 450 10*3/mm3 Final        PT/INR:    Protime   Date Value Ref Range Status   07/02/2024 15.9 (H) 11.7 - 14.2 Seconds Final   07/01/2024 16.7 (H) 11.7 - 14.2 Seconds Final   /  INR   Date Value Ref Range Status   07/02/2024 1.25 (H) 0.90 - 1.10 Final   07/01/2024 1.33 (H) 0.90 - 1.10 Final       Sodium Sodium   Date Value Ref Range Status   07/04/2024 139 136 - 145 mmol/L Final   07/03/2024 141 136 - 145 mmol/L Final   07/02/2024 142 136 - 145 mmol/L Final   07/01/2024 140 136 - 145 mmol/L Final   07/01/2024 140 136 - 145 mmol/L Final      Potassium Potassium   Date Value Ref Range Status   07/04/2024 3.8 3.5 - 5.2 mmol/L Final   07/03/2024 4.4 3.5 - 5.2 mmol/L Final   07/02/2024 4.3 3.5 - 5.2 mmol/L Final   07/01/2024 4.7 3.5 - 5.2 mmol/L Final     Comment:     Slight hemolysis detected by analyzer. Result may be falsely elevated.   07/01/2024 4.2 3.5 - 5.2 mmol/L Final      Chloride Chloride   Date Value Ref Range Status   07/04/2024 107 98 - 107 mmol/L Final   07/03/2024 107 98 - 107 mmol/L Final   07/02/2024 112 (H) 98 - 107 mmol/L Final   07/01/2024 109 (H) 98 - 107 mmol/L Final   07/01/2024 106 98 - 107 mmol/L Final      Bicarbonate CO2   Date Value Ref Range Status   07/04/2024 24.0 22.0 - 29.0 mmol/L Final   07/03/2024 26.0 22.0 - 29.0 mmol/L Final    07/02/2024 20.7 (L) 22.0 - 29.0 mmol/L Final   07/01/2024 20.1 (L) 22.0 - 29.0 mmol/L Final   07/01/2024 23.0 22.0 - 29.0 mmol/L Final      BUN BUN   Date Value Ref Range Status   07/04/2024 19 6 - 20 mg/dL Final   07/03/2024 21 (H) 6 - 20 mg/dL Final   07/02/2024 20 6 - 20 mg/dL Final   07/01/2024 19 6 - 20 mg/dL Final   07/01/2024 19 6 - 20 mg/dL Final      Creatinine Creatinine   Date Value Ref Range Status   07/04/2024 1.27 (H) 0.57 - 1.00 mg/dL Final   07/03/2024 1.47 (H) 0.57 - 1.00 mg/dL Final   07/03/2024 1.38 (H) 0.60 - 1.30 mg/dL Final   07/02/2024 1.92 (H) 0.57 - 1.00 mg/dL Final   07/01/2024 1.75 (H) 0.57 - 1.00 mg/dL Final   07/01/2024 1.49 (H) 0.57 - 1.00 mg/dL Final      Calcium Calcium   Date Value Ref Range Status   07/04/2024 9.0 8.6 - 10.5 mg/dL Final   07/03/2024 9.0 8.6 - 10.5 mg/dL Final   07/02/2024 8.5 (L) 8.6 - 10.5 mg/dL Final   07/01/2024 8.4 (L) 8.6 - 10.5 mg/dL Final   07/01/2024 8.9 8.6 - 10.5 mg/dL Final      Magnesium Magnesium   Date Value Ref Range Status   07/04/2024 2.0 1.6 - 2.6 mg/dL Final   07/03/2024 2.2 1.6 - 2.6 mg/dL Final   07/02/2024 2.7 (H) 1.6 - 2.6 mg/dL Final   07/01/2024 2.4 1.6 - 2.6 mg/dL Final   07/01/2024 2.7 (H) 1.6 - 2.6 mg/dL Final          aspirin, 81 mg, Oral, Daily  atorvastatin, 40 mg, Oral, Nightly  carvedilol, 3.125 mg, Oral, Q12H  chlorhexidine, 15 mL, Mouth/Throat, Q12H  cloNIDine, 0.2 mg, Oral, Q12H  enoxaparin, 30 mg, Subcutaneous, Daily  escitalopram, 10 mg, Oral, Daily  ferric gluconate, 125 mg, Intravenous, Daily  guaiFENesin, 1,200 mg, Oral, Q12H  hydrALAZINE, 75 mg, Oral, Q8H  insulin glargine, 20 Units, Subcutaneous, Daily  insulin lispro, 2-9 Units, Subcutaneous, 4x Daily AC & at Bedtime  mupirocin, , Each Nare, BID  pantoprazole, 40 mg, Oral, QAM  senna-docusate sodium, 2 tablet, Oral, Nightly  sodium chloride, 4 mL, Nebulization, BID - RT      niCARdipine, 5-15 mg/hr, Last Rate: Stopped (07/03/24 1500)              Acute right-sided  weakness    Benign essential hypertension    Gastroparesis diabeticorum    Gastroesophageal reflux disease    Mixed hypercholesterolemia and hypertriglyceridemia    Iron deficiency anemia    Type 2 diabetes mellitus with hyperglycemia, with long-term current use of insulin    Coronary artery disease involving native coronary artery of native heart with unstable angina pectoris    Tobacco abuse    CKD stage 3a, GFR 45-59 ml/min    Carotid stenosis    Lacunar cerebrovascular accident (CVA)    Elevated troponin    Aortic valve disease      Assessment & Plan    -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), EVH right, open left, AV fibroelastoma resection, AV repair- [place]  -Recurrent MCA stroke  -Coronary artery disease status post CABG in 2014  -Uncontrolled diabetes mellitus, HgbA1c 16.9, with peripheral neuropathy  -CKD, stage 3  -Hypertension  -Hyperlipidemia  -Obesity  -Tobacco abuse-- encourage cessation  -PAD, Carotid stenosis, 70% right ICA  -GERD  -LB  -chronic anemia- acute worsening expected acute blood loss    POD#3  Up in the chair  4L NC--wean as able   Her mentation is much better this morning-- repeat CT ordered for this morning  Sinus rhythm rate in the 60s--hypertensive this morning-- will increase hydralazine and  resume clonidine  Creatinine improved- nephrology following.  Encourage pulmonary toilet--- continue IS/flutter, mucinex and nebs  Mobilize/increase activity-- PT, will consult OT as well.  Will leave AV wires one more day  Discontinue jenkins catheter   Continue routine care    KAUSHAL Alvarado  07/04/24  07:12 EDT

## 2024-07-04 NOTE — PROGRESS NOTES
"[facility]    Patient Name: Bibiana Inman  :1980  44 y.o.  LOS: 13  Encounter Provider: KAUSHAL Salazar      Patient Care Team:  Ibrahima Correa MD as PCP - General (Family Medicine)  Rachele Zafar RN as Ambulatory  ([facility][note 3])  Xochilt Jenkins MD as Consulting Physician (Nephrology)    Chief Complaint: Postop    Interval History: Mentation improving.  Patient has been ambulatory with PT and up to bedside chair.       Objective   Vital Signs  Temp:  [97.7 °F (36.5 °C)-99.5 °F (37.5 °C)] 99.5 °F (37.5 °C)  Heart Rate:  [55-73] 67  Resp:  [16-22] 18  BP: (117-167)/(55-80) 145/65    Intake/Output Summary (Last 24 hours) at 2024 0856  Last data filed at 2024 0652  Gross per 24 hour   Intake 1272 ml   Output 2235 ml   Net -963 ml     Flowsheet Rows      Flowsheet Row First Filed Value   Admission Height 162.6 cm (64\") Documented at 2024   Admission Weight 65.5 kg (144 lb 6.4 oz) Documented at 2024              Vitals and nursing note reviewed.   Constitutional:       Appearance: Normal appearance. Well-developed.   Eyes:      Conjunctiva/sclera: Conjunctivae normal.   Neck:      Vascular: No carotid bruit.   Pulmonary:      Breath sounds: Normal breath sounds.   Cardiovascular:      Normal rate. Regular rhythm. Normal S1 with normal intensity. Normal S2 with normal intensity.       Murmurs: There is no murmur.      No gallop.  No click. No rub.      Comments: Midsternal incision is well approximated without signs of wound dehiscence, erythema, soft tissue swelling, drainage.    Edema:     Peripheral edema absent.   Musculoskeletal: Normal range of motion. Skin:     General: Skin is warm and dry.   Neurological:      Mental Status: Alert and oriented to person, place, and time.      GCS: GCS eye subscore is 4. GCS verbal subscore is 5. GCS motor subscore is 6.   Psychiatric:         Speech: Speech normal.         " "Behavior: Behavior normal.         Thought Content: Thought content normal.         Judgment: Judgment normal.           Pertinent Test Results:  Results from last 7 days   Lab Units 07/04/24  0251 07/03/24  0301 07/03/24  0011 07/02/24  0239 07/01/24  1647 07/01/24  1312 07/01/24  0230 06/30/24  0549   SODIUM mmol/L 139 141  --  142 140 140 135* 138   POTASSIUM mmol/L 3.8 4.4  --  4.3 4.7 4.2 4.2 3.8   CHLORIDE mmol/L 107 107  --  112* 109* 106 101 106   CO2 mmol/L 24.0 26.0  --  20.7* 20.1* 23.0 25.0 25.0   BUN mg/dL 19 21*  --  20 19 19 20 18   CREATININE mg/dL 1.27* 1.47* 1.38* 1.92* 1.75* 1.49* 1.22* 1.15*   GLUCOSE mg/dL 91 158*  --  117* 210* 126* 211* 171*   CALCIUM mg/dL 9.0 9.0  --  8.5* 8.4* 8.9 9.2 9.3   AST (SGOT) U/L  --  23  --   --   --   --   --   --    ALT (SGPT) U/L  --  <5  --   --   --   --   --   --      Results from last 7 days   Lab Units 07/04/24 0251 07/03/24 0301 07/02/24  1745   CK TOTAL U/L 100 280* 426*     Results from last 7 days   Lab Units 07/04/24  0251 07/03/24 0301 07/03/24  0011 07/02/24  0239 07/01/24  1909 07/01/24  1647 07/01/24  1312 07/01/24  1111 07/01/24  0230   WBC 10*3/mm3 9.85 10.99*  --  14.01*  --  15.62* 14.79* 9.72 8.21   HEMOGLOBIN g/dL 8.6* 8.9*  --  9.1*  --  11.3* 10.0* 8.0* 10.7*   HEMOGLOBIN, POC g/dL  --   --  8.0*  --  9.5*  --   --   --   --    HEMATOCRIT % 26.8* 27.7*  --  28.6*  --  32.0* 28.2* 24.0* 33.6*   HEMATOCRIT POC %  --   --  24*  --  28*  --   --   --   --    PLATELETS 10*3/mm3 122* 123*  --  129*  --  177 180 191 239     Results from last 7 days   Lab Units 07/02/24  0239 07/01/24  1312   INR  1.25* 1.33*   APTT seconds  --  31.3     Results from last 7 days   Lab Units 07/04/24  0251 07/03/24  0301 07/02/24  0239 07/01/24  1647 07/01/24  1312   MAGNESIUM mg/dL 2.0 2.2 2.7* 2.4 2.7*           Invalid input(s): \"LDLCALC\"                Medication Review:   aspirin, 81 mg, Oral, Daily  atorvastatin, 40 mg, Oral, Nightly  carvedilol, 3.125 mg, " Oral, Q12H  chlorhexidine, 15 mL, Mouth/Throat, Q12H  cloNIDine, 0.2 mg, Oral, Q12H  enoxaparin, 30 mg, Subcutaneous, Daily  escitalopram, 10 mg, Oral, Daily  ferric gluconate, 125 mg, Intravenous, Daily  guaiFENesin, 1,200 mg, Oral, Q12H  hydrALAZINE, 75 mg, Oral, Q8H  insulin glargine, 20 Units, Subcutaneous, Daily  insulin lispro, 2-9 Units, Subcutaneous, 4x Daily AC & at Bedtime  mupirocin, , Each Nare, BID  pantoprazole, 40 mg, Oral, QAM  senna-docusate sodium, 2 tablet, Oral, Nightly  sodium chloride, 4 mL, Nebulization, BID - RT         niCARdipine, 5-15 mg/hr, Last Rate: Stopped (07/03/24 1500)        Assessment & Plan     Active Hospital Problems    Diagnosis  POA    **Acute right-sided weakness [R53.1]  Yes    CKD stage 3a, GFR 45-59 ml/min [N18.31]  Yes    Carotid stenosis [I65.29]  Yes    Lacunar cerebrovascular accident (CVA) [I63.81]  Yes    Elevated troponin [R79.89]  Yes    Aortic valve disease [I35.9]  Unknown    Tobacco abuse [Z72.0]  Yes    Coronary artery disease involving native coronary artery of native heart with unstable angina pectoris [I25.110]  Yes    Gastroparesis diabeticorum [E11.43, 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        Aortic valve mass/fibroblastoma  Status postresection 7/1/2024  CAD  Status post CABG with SVG to OM 7/1/2024  Previous bypass with  of the LAD, LIMA to LAD, SVG to circumflex is occluded, vein graft to RCA is patent  Continue GDMT:  Aspirin  Atorvastatin  Carvedilol  Uncontrolled diabetes  Hemoglobin A1c 16.9  HTN  Hydralazine was increased this morning  Back on clonidine  Continue  carvedilol  Hyperlipidemia  Goal LDL less than 70  Continue atorvastatin  Tobacco abuse  Carotid artery stenosis  70% lesion in the VIDA  LB    Mentation improving  Encourage IS  Encouraged ambulation with PT  AV wires remain           KAUSHAL Salazar  [facility]   [facility]  [address]  [place] [id number]  Office: [phone]    07/04/24  08:56 EDT

## 2024-07-04 NOTE — PLAN OF CARE
Goal Outcome Evaluation:  Plan of Care Reviewed With: patient        Progress: no change  Outcome Evaluation: Pt is a 45 yo F who is post-op CABG and AVR. Pt seen this date for OT STEVEN da silva&Ox3, reports moderate pain. She reports she lives at home with her spouse, no AD used prior, (I) with ADLs. She presents this date below her baseline however much improvement noted since surgery. She is educated on sternal precautions, STS and functional mobility to her new room mostly CGA using rwx with no seated rest break. x1 minimal LOB once approaching chair, min A to correct however no overt LOB. She demo's approp household distances as well. BUE weakness however improving per pt report. She reports her spouse able to asisst as needed at home, can be there 24/7. Pending progress, pt would like to d/c home with spouse.      Anticipated Discharge Disposition (OT): home with 24/7 care, home with assist, inpatient rehabilitation facility

## 2024-07-04 NOTE — THERAPY EVALUATION
Patient Name: Bibiana Inman  : 1980    MRN: [id number]                              Today's Date: 2024       Admit Date: 2024    Visit Dx:     ICD-10-CM ICD-9-CM   1. Acute right-sided weakness  R53.1 728.87   2. Type 2 diabetes mellitus with hyperglycemia, with long-term current use of insulin  E11.65 250.00    Z79.4 790.29     V58.67   3. Tobacco abuse  Z72.0 305.1   4. Carotid stenosis, asymptomatic, right  I65.21 433.10   5. Coronary artery disease involving native coronary artery of native heart with unstable angina pectoris  I25.110 414.01     411.1   6. 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    Abnormal bone density screening    Benign essential hypertension    Chronic cough    Gastroparesis diabeticorum    Elevated erythrocyte sedimentation rate    Fatigue    Gastroesophageal reflux disease    Mixed hypercholesterolemia and hypertriglyceridemia    Intermittent claudication    Iron deficiency anemia    Multiple joint pain    Need for influenza vaccination    Type 2 diabetes mellitus with hyperglycemia, with long-term current use of insulin    Obstructive sleep apnea    Coronary artery disease involving native coronary artery of native heart with unstable angina pectoris    Abnormal nuclear stress test    Depressive disorder    Back pain    Diabetic peripheral neuropathy associated with type 2 diabetes mellitus    Ingrowing nail, left great toe    Personal history of COVID-19    Polyp of colon    Vitamin D deficiency    Tobacco abuse    Unstable angina    Altered mental status    Carotid stenosis, asymptomatic, right    Acute right-sided weakness    CKD stage 3a, GFR 45-59 ml/min    Carotid stenosis    Lacunar cerebrovascular accident (CVA)    Elevated troponin    Aortic valve disease     Past Medical History:   Diagnosis Date    5,10-methylenetetrahydrofolate  reductase deficiency 2012    Allergic rhinitis 2012    Benign essential hypertension 2016    Coronary arteriosclerosis 2014    Description: s/p CABG (LIMA-LAD, SVG-OM2, SVG-PDA) performed on 14 by Dr. Debbie Fry.    Depressive disorder 2023    Diabetic gastroparesis 2021    Diabetic peripheral neuropathy associated with type 2 diabetes mellitus 2024    Gastroesophageal reflux disease 03/10/2021    GERD (gastroesophageal reflux disease)     Intermittent claudication 2017    Iron deficiency anemia 2020    Mixed hypercholesterolemia and hypertriglyceridemia 2014    Myocardial infarction     Obesity     Obstructive sleep apnea 2021    Personal history of COVID-19 2022    Polyp of colon 2023    Spontaneous      Stress fracture of right ankle with routine healing     Tobacco abuse 2024    Type 2 diabetes mellitus with hyperglycemia, with long-term current use of insulin 2017    Vitamin D deficiency 2024     Past Surgical History:   Procedure Laterality Date    CARDIAC CATHETERIZATION N/A 2024    Procedure: Left Heart Cath and coronary angiogram;  Surgeon: Jena Barron MD;  Location: [facility] CATH INVASIVE LOCATION;  Service: Cardiology;  Laterality: N/A;     SECTION      CORONARY ARTERY BYPASS GRAFT  2014    LIMA-LAD, SVG-OM2, SVG-PDA, Dr. Debbie Fry.    CORONARY ARTERY BYPASS GRAFT WITH AORTIC VALVE REPAIR/REPLACEMENT N/A 2024    Procedure: YUE; REOPERATIVE STERNOTOMY; CORONARY ARTERY BYPASS GRAFTING TIMES 1 UTILIZING RIGHT SAPHENOUS VEIN; AORTIC VALVE TUMOR ; PRP;  Surgeon: Benja De Luna MD;  Location: [facility];  Service: Cardiothoracic;  Laterality: N/A;    DILATATION AND CURETTAGE      TONSILLECTOMY        General Information       Row Name 24 [id number]          OT Time and Intention    Document Type evaluation  -MM     Mode of Treatment occupational therapy;individual  therapy  -MM       Row Name 07/04/24 1438          General Information    Patient Profile Reviewed yes  -MM     Prior Level of Function independent:  lives with spouse, no AD at baseline  -MM     Existing Precautions/Restrictions cardiac;fall;sternal  -MM     Barriers to Rehab medically complex  -MM       Row Name 07/04/24 1438          Living Environment    People in Home spouse  -MM       Row Name 07/04/24 1438          Home Main Entrance    Number of Stairs, Main Entrance none  -MM       Row Name 07/04/24 1438          Cognition    Orientation Status (Cognition) oriented x 3  -MM       Row Name 07/04/24 1438          Safety Issues, Functional Mobility    Impairments Affecting Function (Mobility) balance;endurance/activity tolerance;pain;strength  -MM               User Key  (r) = Recorded By, (t) = Taken By, (c) = Cosigned By      Initials Name Provider Type    MM Larisa Benson OT Occupational Therapist                     Mobility/ADL's       Row Name 07/04/24 1438          Bed Mobility    Bed Mobility supine-sit;sit-supine  -MM     Supine-Sit [place] (Bed Mobility) minimum assist (75% patient effort)  -MM     Sit-Supine [place] (Bed Mobility) not tested  -MM     Assistive Device (Bed Mobility) bed rails  -MM     Comment, (Bed Mobility) cues for log roll  -MM       Row Name 07/04/24 1438          Transfers    Transfers sit-stand transfer  -MM       Row Name 07/04/24 1438          Sit-Stand Transfer    Sit-Stand [place] (Transfers) contact guard  -MM     Assistive Device (Sit-Stand Transfers) walker, front-wheeled  -MM       Row Name 07/04/24 1438          Functional Mobility    Functional Mobility- Ind. Level contact guard assist  -MM     Functional Mobility- Device walker, front-wheeled  -MM     Functional Mobility- Comment demo household distances and community, as pt ambulated into hallway and to new room with RN following with chair  -MM       Row Name 07/04/24 1438          Activities of  Daily Living    BADL Assessment/Intervention lower body dressing;toileting;feeding  -MM       [facility] Name 07/04/24 1438          Lower Body Dressing Assessment/Training    Comment, (Lower Body Dressing) socks donned, education provided on maintaining sternal precautions with dressing  -MM       [facility] Name 07/04/24 1438          Toileting Assessment/Training    Comment, (Toileting) approp for BR distances, demo'd today, declined need to void at time of eval  -MM       Row Name 07/04/24 1438          Self-Feeding Assessment/Training    [place] Level (Feeding) feeding skills;set up  -MM               User Key  (r) = Recorded By, (t) = Taken By, (c) = Cosigned By      Initials Name Provider Type    Larisa Wray OT Occupational Therapist                   Obj/Interventions       [facility] Name 07/04/24 1440          Sensory Assessment (Somatosensory)    Sensory Assessment (Somatosensory) UE sensation intact  -MM       Row Name 07/04/24 1440          Vision Assessment/Intervention    Visual Impairment/Limitations WFL  -MM       Row Name 07/04/24 1440          Range of Motion Comprehensive    General Range of Motion bilateral upper extremity ROM WNL  -MM       Row Name 07/04/24 1440          Strength Comprehensive (MMT)    General Manual Muscle Testing (MMT) Assessment upper extremity strength deficits identified  -MM     Comment, General Manual Muscle Testing (MMT) Assessment generalized weakness, BUE grossly 4-/5  -MM       [facility] Name 07/04/24 1440          Motor Skills    Motor Skills functional endurance;coordination  -MM     Coordination WFL;bilateral;upper extremity  -MM     Functional Endurance fair -  -MM       Row Name 07/04/24 1440          Balance    Balance Assessment sitting static balance;sitting dynamic balance;standing static balance;sit to stand dynamic balance;standing dynamic balance  -MM     Static Sitting Balance standby assist  -MM     Dynamic Sitting Balance standby assist  -MM     Position, Sitting  Balance sitting edge of bed;unsupported  -MM     Sit to Stand Dynamic Balance minimal assist;contact guard  -MM     Static Standing Balance contact guard  -MM     Dynamic Standing Balance contact guard  -MM     Position/Device Used, Standing Balance supported;walker, front-wheeled  -MM     Balance Interventions sitting;standing;sit to stand;supported;dynamic;static;moderate challenge;occupation based/functional task;weight shifting activity  -MM     Comment, Balance no seated rest breaks required, x1 minimal LOB as pt approached chair, min A to correct  -MM               User Key  (r) = Recorded By, (t) = Taken By, (c) = Cosigned By      Initials Name Provider Type    Larisa Wray OT Occupational Therapist                   Goals/Plan       Row Name 07/04/24 1444          Transfer Goal 1 (OT)    Activity/Assistive Device (Transfer Goal 1, OT) transfers, all  -MM     [place] Level/Cues Needed (Transfer Goal 1, OT) modified independence  -MM     Time Frame (Transfer Goal 1, OT) short term goal (STG);2 weeks  -MM     Progress/Outcome (Transfer Goal 1, OT) goal ongoing  -MM       Row Name 07/04/24 1444          Dressing Goal 1 (OT)    Activity/Device (Dressing Goal 1, OT) dressing skills, all  -MM     [place]/Cues Needed (Dressing Goal 1, OT) modified independence  -MM     Time Frame (Dressing Goal 1, OT) short term goal (STG);2 weeks  -MM     Progress/Outcome (Dressing Goal 1, OT) goal ongoing  -MM       Row Name 07/04/24 1444          Toileting Goal 1 (OT)    Activity/Device (Toileting Goal 1, OT) toileting skills, all  -MM     [place] Level/Cues Needed (Toileting Goal 1, OT) modified independence  -MM     Time Frame (Toileting Goal 1, OT) short term goal (STG);2 weeks  -MM     Progress/Outcome (Toileting Goal 1, OT) goal ongoing  -MM       Row Name 07/04/24 1444          Grooming Goal 1 (OT)    Activity/Device (Grooming Goal 1, OT) grooming skills, all  -MM     [place] (Grooming Goal 1, OT)  modified independence  -MM     Time Frame (Grooming Goal 1, OT) short term goal (STG);2 weeks  -MM     Progress/Outcome (Grooming Goal 1, OT) goal ongoing  -MM       Row Name 07/04/24 1444          Therapy Assessment/Plan (OT)    Planned Therapy Interventions (OT) activity tolerance training;neuromuscular control/coordination retraining;patient/caregiver education/training;transfer/mobility retraining;strengthening exercise;ROM/therapeutic exercise;occupation/activity based interventions;functional balance retraining  -MM               User Key  (r) = Recorded By, (t) = Taken By, (c) = Cosigned By      Initials Name Provider Type    MM Larisa Benson OT Occupational Therapist                   Clinical Impression       Row Name 07/04/24 1442          Pain Assessment    Pretreatment Pain Rating 6/10  -MM     Posttreatment Pain Rating 6/10  -MM     Pain Location incisional  -MM       Row Name 07/04/24 1442          Plan of Care Review    Plan of Care Reviewed With patient  -MM     Progress no change  -MM     Outcome Evaluation Pt is a 45 yo F who is post-op CABG and AVR. Pt seen this date for OT STEVEN da silva&Ox3, reports moderate pain. She reports she lives at home with her spouse, no AD used prior, (I) with ADLs. She presents this date below her baseline however much improvement noted since surgery. She is educated on sternal precautions, STS and functional mobility to her new room mostly CGA using rwx with no seated rest break. x1 minimal LOB once approaching chair, min A to correct however no overt LOB. She demo's approp household distances as well. BUE weakness however improving per pt report. She reports her spouse able to asisst as needed at home, can be there 24/7. Pending progress, pt would like to d/c home with spouse.  -MM       Row Name 07/04/24 1442          Therapy Assessment/Plan (OT)    Rehab Potential (OT) good, to achieve stated therapy goals  -MM     Criteria for Skilled Therapeutic Interventions Met (OT)  meets criteria;skilled treatment is necessary;yes  -MM     Therapy Frequency (OT) 5 times/wk  -MM       Row Name 07/04/24 1442          Therapy Plan Review/Discharge Plan (OT)    Anticipated Discharge Disposition (OT) home with 24/7 care;home with assist;inpatient rehabilitation facility  -MM       Row Name 07/04/24 1442          Vital Signs    O2 Delivery Pre Treatment room air  -MM       Row Name 07/04/24 1442          Positioning and Restraints    Pre-Treatment Position in bed  -MM     Post Treatment Position chair  -MM     In Chair notified nsg;reclined;call light within reach;encouraged to call for assist;exit alarm on  -MM               User Key  (r) = Recorded By, (t) = Taken By, (c) = Cosigned By      Initials Name Provider Type    Larisa Wray OT Occupational Therapist                   Outcome Measures       Row Name 07/04/24 1445          How much help from another is currently needed...    Putting on and taking off regular lower body clothing? 2  -MM     Bathing (including washing, rinsing, and drying) 3  -MM     Toileting (which includes using toilet bed pan or urinal) 3  -MM     Putting on and taking off regular upper body clothing 3  -MM     Taking care of personal grooming (such as brushing teeth) 3  -MM     Eating meals 4  -MM     AM-PAC 6 Clicks Score (OT) 18  -MM       Row Name 07/04/24 1054          How much help from another person do you currently need...    Turning from your back to your side while in flat bed without using bedrails? 3  -PC     Moving from lying on back to sitting on the side of a flat bed without bedrails? 3  -PC     Moving to and from a bed to a chair (including a wheelchair)? 3  -PC     Standing up from a chair using your arms (e.g., wheelchair, bedside chair)? 3  -PC     Climbing 3-5 steps with a railing? 2  -PC     To walk in hospital room? 3  -PC     AM-PAC 6 Clicks Score (PT) 17  -PC     Highest Level of Mobility Goal 5 --> Static standing  -PC       Row Name  07/04/24 1445          Modified [place] Scale    Modified [place] Scale 2 - Slight disability.  Unable to carry out all previous activities but able to look after own affairs without assistance.  -MM       Row Name 07/04/24 1445          Functional Assessment    Outcome Measure Options AM-PAC 6 Clicks Daily Activity (OT);Modified [place]  -MM               User Key  (r) = Recorded By, (t) = Taken By, (c) = Cosigned By      Initials Name Provider Type    PC Mesha Jerez, PT Physical Therapist     Larisa Benson OT Occupational Therapist                    Occupational Therapy Education       Title: PT OT SLP Therapies (Done)       Topic: Occupational Therapy (Done)       Point: ADL training (Done)       Description:   Instruct learner(s) on proper safety adaptation and remediation techniques during self care or transfers.   Instruct in proper use of assistive devices.                  Learning Progress Summary             Patient Acceptance, E, VU by MM at 7/4/2024 1445    Comment: role of OT, d/c rec                         Point: Precautions (Done)       Description:   Instruct learner(s) on prescribed precautions during self-care and functional transfers.                  Learning Progress Summary             Patient Acceptance, E, VU by MM at 7/4/2024 1445    Comment: role of OT, d/c rec                         Point: Body mechanics (Done)       Description:   Instruct learner(s) on proper positioning and spine alignment during self-care, functional mobility activities and/or exercises.                  Learning Progress Summary             Patient Acceptance, E, VU by MM at 7/4/2024 1445    Comment: role of OT, d/c rec                                         User Key       Initials Effective Dates Name Provider Type Discipline     05/31/24 -  Larisa Benson OT Occupational Therapist OT                  OT Recommendation and Plan  Planned Therapy Interventions (OT): activity tolerance training, neuromuscular  control/coordination retraining, patient/caregiver education/training, transfer/mobility retraining, strengthening exercise, ROM/therapeutic exercise, occupation/activity based interventions, functional balance retraining  Therapy Frequency (OT): 5 times/wk  Plan of Care Review  Plan of Care Reviewed With: patient  Progress: no change  Outcome Evaluation: Pt is a 45 yo F who is post-op CABG and AVR. Pt seen this date for OT eval, A&Ox3, reports moderate pain. She reports she lives at home with her spouse, no AD used prior, (I) with ADLs. She presents this date below her baseline however much improvement noted since surgery. She is educated on sternal precautions, STS and functional mobility to her new room mostly CGA using rwx with no seated rest break. x1 minimal LOB once approaching chair, min A to correct however no overt LOB. She demo's approp household distances as well. BUE weakness however improving per pt report. She reports her spouse able to asisst as needed at home, can be there 24/7. Pending progress, pt would like to d/c home with spouse.     Time Calculation:   Evaluation Complexity (OT)  Review Occupational Profile/Medical/Therapy History Complexity: expanded/moderate complexity  Assessment, Occupational Performance/Identification of Deficit Complexity: 3-5 performance deficits  Clinical Decision Making Complexity (OT): detailed assessment/moderate complexity  Overall Complexity of Evaluation (OT): moderate complexity     Time Calculation- OT       Row Name 07/04/24 1446             Time Calculation- OT    OT Start Time 1241  -MM      OT Stop Time 1256  -MM      OT Time Calculation (min) 15 min  -MM      Total Timed Code Minutes- OT 8 minute(s)  -MM      OT Received On 07/04/24  -MM      OT - Next Appointment 07/05/24  -MM      OT Goal Re-Cert Due Date 07/18/24  -MM         Timed Charges    02854 - OT Therapeutic Activity Minutes 8  -MM         Untimed Charges    OT Eval/Re-eval Minutes 7  -MM          Total Minutes    Timed Charges Total Minutes 8  -MM      Untimed Charges Total Minutes 7  -MM       Total Minutes 15  -MM                User Key  (r) = Recorded By, (t) = Taken By, (c) = Cosigned By      Initials Name Provider Type    Larisa Wray OT Occupational Therapist                  Therapy Charges for Today       Code Description Service Date Service Provider Modifiers Qty    [id number]  OT THERAPEUTIC ACT EA 15 MIN 7/4/2024 Larisa Benson OT GO 1    [id number]  OT EVAL MOD COMPLEXITY 2 7/4/2024 Larisa Benson OT GO 1                 Larisa Benson OT  7/4/2024

## 2024-07-04 NOTE — PLAN OF CARE
Goal Outcome Evaluation:              Outcome Evaluation: POD3, patient much more oriented. Oriented x4 with intermitent confusion. On RA, no drips and in the chair this am. Pt ambulated well with 1x assist. will continue plan of care

## 2024-07-04 NOTE — PROGRESS NOTES
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  She underwent cardiothoracic surgery which was successful.  Postoperatively she has had some fluctuating disorientation.  I was asked to reconsult.  Nursing staff feel that she is doing a little bit better today.    Objective:  Vital signs: /65   Pulse 67   Temp 99.5 °F (37.5 °C)   Resp 18   Ht 162.6 cm (64\")   Wt 74.6 kg (164 lb 7.4 oz)   LMP 01/10/2023 Comment: patient states irregular periods  SpO2 100%   BMI 28.23 kg/m²    Gen: NAD, vitals reviewed  MS: Oriented, memory intact, mildly impaired attention, language intact, no neglect  CN: visual acuity grossly normal, visual fields full, PERRL, EOMI, no facial droop, no dysarthria  Motor: 5/5 throughout upper and lower extremities, normal tone  Sensory: intact to cold temperature throughout    Laboratory results:  Lab Results   Component Value Date    GLUCOSE 91 2024    CALCIUM 9.0 2024     2024    K 3.8 2024    CO2 24.0 2024     2024    BUN 19 2024    CREATININE 1.27 (H) 2024    EGFRIFNONA 26 (L) 2021    BCR 15.0 2024    ANIONGAP 8.0 2024     Lab Results   Component Value Date    WBC 9.85 2024    HGB 8.6 (L) 2024    HCT 26.8 (L) 2024    MCV 93.1 2024     (L) 2024     Lab Results   Component Value Date     (H) 2024    LDL  2024      Comment:      Unable to calculate     (H) 2024            Review of labs: Creatinine 1.3, hemoglobin 8.6    Review and interpretation of imaging: I personally reviewed her MRI performed preoperatively which shows a moderate size left anterior thalamic stroke.  Radiology " report reviewed.  CT head performed this morning reviewed and looks stable compared to the MRI.    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  I would expect this to gradually improve over the next several months.  Her fluctuations postoperatively would be expected with the location of her stroke.    Plan:  Continue aspirin, statin    Will see as needed for changes in neurologic status    Management discussed with nursing staff.

## 2024-07-04 NOTE — PROGRESS NOTES
Name: Bibiana Inman ADMIT: 2024   : 1980  PCP: Ibrahima Correa MD    MRN: [id number] LOS: 13 days   AGE/SEX: 44 y.o. female  ROOM:      Subjective   Subjective   No acute events. Patient denies new complaints. Family at bedside.    Objective   Objective   Vital Signs  Temp:  [97.7 °F (36.5 °C)-99.5 °F (37.5 °C)] 99.5 °F (37.5 °C)  Heart Rate:  [55-73] 61  Resp:  [16-22] 18  BP: (115-156)/(55-80) 147/63  SpO2:  [94 %-100 %] 94 %  on   ;   Device (Oxygen Therapy): room air  Body mass index is 28.23 kg/m².  Physical Exam  Vitals and nursing note reviewed.   Constitutional:       General: She is not in acute distress.     Appearance: She is not toxic-appearing or diaphoretic.   HENT:      Head: Normocephalic and atraumatic.      Nose: Nose normal.      Mouth/Throat:      Mouth: Mucous membranes are moist.      Pharynx: Oropharynx is clear.   Eyes:      Conjunctiva/sclera: Conjunctivae normal.      Pupils: Pupils are equal, round, and reactive to light.   Cardiovascular:      Rate and Rhythm: Normal rate and regular rhythm.      Pulses: Normal pulses.      Comments: Surgical dressing in place c/d/i  Pulmonary:      Effort: Pulmonary effort is normal.   Abdominal:      General: Bowel sounds are normal.      Palpations: Abdomen is soft.      Tenderness: There is no abdominal tenderness.   Musculoskeletal:         General: No swelling or tenderness.      Cervical back: Neck supple.   Skin:     General: Skin is warm and dry.      Capillary Refill: Capillary refill takes less than 2 seconds.   Neurological:      Mental Status: She is alert and oriented to person, place, and time.      Comments: +mild right lower facial weakness   Psychiatric:         Mood and Affect: Mood normal.         Behavior: Behavior normal.       Results Review     I reviewed the patient's new clinical results.  Results from last 7 days   Lab Units 24  0251 24  0301 24  0011 24  0239 24  1909  07/01/24  1647   WBC 10*3/mm3 9.85 10.99*  --  14.01*  --  15.62*   HEMOGLOBIN g/dL 8.6* 8.9*  --  9.1*  --  11.3*   HEMOGLOBIN, POC g/dL  --   --  8.0*  --    < >  --    PLATELETS 10*3/mm3 122* 123*  --  129*  --  177    < > = values in this interval not displayed.     Results from last 7 days   Lab Units 07/04/24  1048 07/04/24 0251 07/03/24 0301 07/03/24 0011 07/02/24 0239 07/01/24 1647   SODIUM mmol/L  --  139 141  --  142 140   POTASSIUM mmol/L 3.9 3.8 4.4  --  4.3 4.7   CHLORIDE mmol/L  --  107 107  --  112* 109*   CO2 mmol/L  --  24.0 26.0  --  20.7* 20.1*   BUN mg/dL  --  19 21*  --  20 19   CREATININE mg/dL  --  1.27* 1.47* 1.38* 1.92* 1.75*   GLUCOSE mg/dL  --  91 158*  --  117* 210*   EGFR mL/min/1.73  --  52.9* 44.4*  --  32.2* 36.0*     Results from last 7 days   Lab Units 07/03/24 0301 07/02/24 0239 07/01/24 1647 07/01/24  1312   ALBUMIN g/dL 3.5 3.7 3.6 3.8   BILIRUBIN mg/dL 0.2  --   --   --    ALK PHOS U/L 62  --   --   --    AST (SGOT) U/L 23  --   --   --    ALT (SGPT) U/L <5  --   --   --      Results from last 7 days   Lab Units 07/04/24  0251 07/03/24 0301 07/02/24 0239 07/01/24 1647 07/01/24  1312   CALCIUM mg/dL 9.0 9.0 8.5* 8.4* 8.9   ALBUMIN g/dL  --  3.5 3.7 3.6 3.8   MAGNESIUM mg/dL 2.0 2.2 2.7* 2.4 2.7*   PHOSPHORUS mg/dL 2.7 5.2* 4.8* 2.9 2.0*     Results from last 7 days   Lab Units 07/03/24  0301 07/03/24  0011 07/01/24  1909   LACTATE mmol/L 1.4 2.0 <0.4     Glucose   Date/Time Value Ref Range Status   07/04/2024 1127 150 (H) 70 - 130 mg/dL Final   07/04/2024 0547 93 70 - 130 mg/dL Final   07/03/2024 2022 164 (H) 70 - 130 mg/dL Final   07/03/2024 1700 144 (H) 70 - 130 mg/dL Final   07/03/2024 1116 232 (H) 70 - 130 mg/dL Final   07/03/2024 0558 166 (H) 70 - 130 mg/dL Final   07/03/2024 0011 199 (H) 65 - 99 mg/dL Final     Comment:     Serial Number: [id number]:  [id number]       CT Head Without Contrast    Result Date: 7/4/2024   No acute intracranial hemorrhage or  hydrocephalus. Post infarct changes at left basal ganglia. If there is further clinical concern, MRI could be considered for further evaluation.  This report was finalized on 7/4/2024 8:12 AM by Dr. Antonio hC M.D on Workstation: [id number]      XR Chest PA & Lateral    Result Date: 7/4/2024  As described.  This report was finalized on 7/4/2024 8:04 AM by Dr. Antonio Ch M.D on Workstation: [id number]      XR Chest 1 View    Result Date: 7/3/2024  FINDINGS AND IMPRESSION: Previously seen thoracostomy tubes appear to been removed. Presumed epicardial pacing wires, median sternotomy wires and right internal jugular sheath are present.  Findings of pneumomediastinum, as before. Mild left basilar pulmonary opacification persists. No pneumothorax is seen. Cardiac silhouette is accentuated by patient rotation; however, there is to be at least mildly enlarged.  This report was finalized on 7/3/2024 11:22 AM by Dr. Bin Bey M.D on Workstation: [id number]      XR Chest 1 View    Result Date: 7/3/2024  FINDINGS AND IMPRESSION: There appear to be 4 thoracostomy tubes overlying the bilateral lungs a mediastinum. The right internal jugular sheath is present.  Bibasilar pulmonary opacification has mild to moderately decreased. No pneumothorax seen. Cardiac silhouette is accentuated by low lung volumes. Findings suggestive of pneumomediastinum, as before.  This report was finalized on 7/3/2024 6:26 AM by Dr. Bin Bey M.D on Workstation: [id number]      US Renal Bilateral    Result Date: 7/2/2024  Limited renal sonogram demonstrates no hydronephrosis or evidence for acute abnormality.  This report was finalized on 7/2/2024 8:43 PM by Dr. Huan Noriega M.D on Workstation: [id number]       I have personally reviewed all medications:  Scheduled Medications  aspirin, 81 mg, Oral, Daily  atorvastatin, 40 mg, Oral, Nightly  carvedilol, 3.125 mg, Oral, Q12H  cloNIDine, 0.2 mg, Oral, Q12H  enoxaparin, 30 mg,  Subcutaneous, Daily  escitalopram, 10 mg, Oral, Daily  ferric gluconate, 125 mg, Intravenous, Daily  guaiFENesin, 1,200 mg, Oral, Q12H  hydrALAZINE, 75 mg, Oral, Q8H  insulin glargine, 20 Units, Subcutaneous, Daily  insulin lispro, 2-9 Units, Subcutaneous, 4x Daily AC & at Bedtime  mupirocin, , Each Nare, BID  pantoprazole, 40 mg, Oral, QAM  senna-docusate sodium, 2 tablet, Oral, Nightly  sodium chloride, 4 mL, Nebulization, BID - RT    Infusions  niCARdipine, 5-15 mg/hr, Last Rate: Stopped (07/03/24 1500)      Diet  Diet: Cardiac, Diabetic; Healthy Heart (2-3 Na+); Consistent Carbohydrate; Fluid Consistency: Thin (IDDSI 0)    I have personally reviewed:  [x]  Laboratory   []  Microbiology   []  Radiology   [x]  EKG/Telemetry  []  Cardiology/Vascular   []  Pathology    []  Records    Assessment/Plan     Active Hospital Problems    Diagnosis  POA    **Acute right-sided weakness [R53.1]  Yes    CKD stage 3a, GFR 45-59 ml/min [N18.31]  Yes    Carotid stenosis [I65.29]  Yes    Lacunar cerebrovascular accident (CVA) [I63.81]  Yes    Elevated troponin [R79.89]  Yes    Aortic valve disease [I35.9]  Unknown    Tobacco abuse [Z72.0]  Yes    Coronary artery disease involving native coronary artery of native heart with unstable angina pectoris [I25.110]  Yes    Gastroparesis diabeticorum [E11.43, 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 De Luna  - appreciate cardiology, CT surgery, and neurology recs, d/w Dr. Wild     CARLA on CKD Stage 3b  - pre-renal, now resolved  - follow volume status and chemistries  - appreciate nephrology recs    Type 2 DM  - complications include gastroparesis  - on lantus and jardiance as outpatient  - BG acceptable, needs better outpatient control, A1C 16.90%  - continue lantus 20 units daily   - cover with ssi/hypoglycemia protocol  - jardiance held    HTN/CAD s/p CABG  - BP acceptable  - continue coreg, clonidine, and hydralazine      GERD  - symptoms controlled, continue ppi    SCDs for DVT prophylaxis.  Full code.  Discussed with patient, family, nursing staff, and consulting provider.  Anticipate discharge home with home health in 2-3 days.  Expected Discharge Date: 7/8/2024; Expected Discharge Time:       Lenin Huang MD  [facility]  07/04/24  14:30 EDT    Portions of this text have been copied and I have reviewed them. They are accurate as of 7/4/2024

## 2024-07-04 NOTE — PLAN OF CARE
Goal Outcome Evaluation:  Plan of Care Reviewed With: patient        Progress: improving  Outcome Evaluation: Pt was able to walk farther today with less assist, still depending on rolling walker for support, needed encouragement, but walked 40 ft today with rwx with min assist, PT will cont to follow and progress with activity as tolerated      Anticipated Discharge Disposition (PT):  (depends on progress, rehab v home with assist)

## 2024-07-05 LAB
ACT BLD: 519 SECONDS (ref 82–152)
ANION GAP SERPL CALCULATED.3IONS-SCNC: 9.8 MMOL/L (ref 5–15)
BASE EXCESS BLDA CALC-SCNC: -1 MMOL/L (ref -5–5)
BASE EXCESS BLDA CALC-SCNC: -2 MMOL/L (ref -5–5)
BASE EXCESS BLDA CALC-SCNC: -3 MMOL/L (ref -5–5)
BASE EXCESS BLDA CALC-SCNC: -3 MMOL/L (ref -5–5)
BASE EXCESS BLDA CALC-SCNC: 0 MMOL/L (ref -5–5)
BASE EXCESS BLDA CALC-SCNC: 0 MMOL/L (ref -5–5)
BASE EXCESS BLDA CALC-SCNC: 1 MMOL/L (ref -5–5)
BUN SERPL-MCNC: 16 MG/DL (ref 6–20)
BUN/CREAT SERPL: 15 (ref 7–25)
CALCIUM SPEC-SCNC: 8.5 MG/DL (ref 8.6–10.5)
CHLORIDE SERPL-SCNC: 106 MMOL/L (ref 98–107)
CO2 BLDA-SCNC: 23 MMOL/L (ref 24–29)
CO2 BLDA-SCNC: 24 MMOL/L (ref 24–29)
CO2 BLDA-SCNC: 25 MMOL/L (ref 24–29)
CO2 BLDA-SCNC: 28 MMOL/L (ref 24–29)
CO2 SERPL-SCNC: 20.2 MMOL/L (ref 22–29)
CREAT SERPL-MCNC: 1.07 MG/DL (ref 0.57–1)
DEPRECATED RDW RBC AUTO: 47.6 FL (ref 37–54)
EGFRCR SERPLBLD CKD-EPI 2021: 65 ML/MIN/1.73
ERYTHROCYTE [DISTWIDTH] IN BLOOD BY AUTOMATED COUNT: 13.9 % (ref 12.3–15.4)
GLUCOSE BLDC GLUCOMTR-MCNC: 121 MG/DL (ref 70–130)
GLUCOSE BLDC GLUCOMTR-MCNC: 130 MG/DL (ref 70–130)
GLUCOSE BLDC GLUCOMTR-MCNC: 134 MG/DL (ref 70–130)
GLUCOSE BLDC GLUCOMTR-MCNC: 135 MG/DL (ref 70–130)
GLUCOSE BLDC GLUCOMTR-MCNC: 143 MG/DL (ref 70–130)
GLUCOSE BLDC GLUCOMTR-MCNC: 149 MG/DL (ref 70–130)
GLUCOSE BLDC GLUCOMTR-MCNC: 161 MG/DL (ref 70–130)
GLUCOSE BLDC GLUCOMTR-MCNC: 167 MG/DL (ref 70–130)
GLUCOSE BLDC GLUCOMTR-MCNC: 174 MG/DL (ref 70–130)
GLUCOSE BLDC GLUCOMTR-MCNC: 200 MG/DL (ref 70–130)
GLUCOSE BLDC GLUCOMTR-MCNC: 231 MG/DL (ref 70–130)
GLUCOSE SERPL-MCNC: 93 MG/DL (ref 65–99)
HCO3 BLDA-SCNC: 22 MMOL/L (ref 22–26)
HCO3 BLDA-SCNC: 23.1 MMOL/L (ref 22–26)
HCO3 BLDA-SCNC: 23.4 MMOL/L (ref 22–26)
HCO3 BLDA-SCNC: 24.1 MMOL/L (ref 22–26)
HCO3 BLDA-SCNC: 24.2 MMOL/L (ref 22–26)
HCO3 BLDA-SCNC: 24.4 MMOL/L (ref 22–26)
HCO3 BLDA-SCNC: 26.2 MMOL/L (ref 22–26)
HCT VFR BLD AUTO: 26.2 % (ref 34–46.6)
HCT VFR BLDA CALC: 21 % (ref 38–51)
HCT VFR BLDA CALC: 22 % (ref 38–51)
HCT VFR BLDA CALC: 23 % (ref 38–51)
HCT VFR BLDA CALC: 23 % (ref 38–51)
HCT VFR BLDA CALC: 25 % (ref 38–51)
HCT VFR BLDA CALC: 26 % (ref 38–51)
HCT VFR BLDA CALC: 27 % (ref 38–51)
HGB BLD-MCNC: 8.5 G/DL (ref 12–15.9)
HGB BLDA-MCNC: 7.1 G/DL (ref 12–17)
HGB BLDA-MCNC: 7.5 G/DL (ref 12–17)
HGB BLDA-MCNC: 7.8 G/DL (ref 12–17)
HGB BLDA-MCNC: 7.8 G/DL (ref 12–17)
HGB BLDA-MCNC: 8.5 G/DL (ref 12–17)
HGB BLDA-MCNC: 8.8 G/DL (ref 12–17)
HGB BLDA-MCNC: 9.2 G/DL (ref 12–17)
MAGNESIUM SERPL-MCNC: 2 MG/DL (ref 1.6–2.6)
MCH RBC QN AUTO: 29.9 PG (ref 26.6–33)
MCHC RBC AUTO-ENTMCNC: 32.4 G/DL (ref 31.5–35.7)
MCV RBC AUTO: 92.3 FL (ref 79–97)
PCO2 BLDA: 35 MM HG (ref 35–45)
PCO2 BLDA: 35.9 MM HG (ref 35–45)
PCO2 BLDA: 36.9 MM HG (ref 35–45)
PCO2 BLDA: 37.7 MM HG (ref 35–45)
PCO2 BLDA: 40.6 MM HG (ref 35–45)
PCO2 BLDA: 44.1 MM HG (ref 35–45)
PCO2 BLDA: 48.5 MM HG (ref 35–45)
PH BLDA: 7.29 PH UNITS (ref 7.35–7.6)
PH BLDA: 7.38 PH UNITS (ref 7.35–7.6)
PH BLDA: 7.41 PH UNITS (ref 7.35–7.6)
PH BLDA: 7.42 PH UNITS (ref 7.35–7.6)
PH BLDA: 7.43 PH UNITS (ref 7.35–7.6)
PH BLDA: 7.44 PH UNITS (ref 7.35–7.6)
PH BLDA: 7.49 PH UNITS (ref 7.35–7.6)
PLATELET # BLD AUTO: 131 10*3/MM3 (ref 140–450)
PMV BLD AUTO: 12.3 FL (ref 6–12)
PO2 BLDA: 250 MMHG (ref 80–105)
PO2 BLDA: 34 MMHG (ref 80–105)
PO2 BLDA: 346 MMHG (ref 80–105)
PO2 BLDA: 430 MMHG (ref 80–105)
PO2 BLDA: 456 MMHG (ref 80–105)
PO2 BLDA: 475 MMHG (ref 80–105)
PO2 BLDA: 480 MMHG (ref 80–105)
POTASSIUM BLDA-SCNC: 3.8 MMOL/L (ref 3.5–4.9)
POTASSIUM BLDA-SCNC: 3.9 MMOL/L (ref 3.5–4.9)
POTASSIUM BLDA-SCNC: 3.9 MMOL/L (ref 3.5–4.9)
POTASSIUM BLDA-SCNC: 4 MMOL/L (ref 3.5–4.9)
POTASSIUM BLDA-SCNC: 4.3 MMOL/L (ref 3.5–4.9)
POTASSIUM BLDA-SCNC: 4.4 MMOL/L (ref 3.5–4.9)
POTASSIUM BLDA-SCNC: 4.7 MMOL/L (ref 3.5–4.9)
POTASSIUM SERPL-SCNC: 4.1 MMOL/L (ref 3.5–5.2)
RBC # BLD AUTO: 2.84 10*6/MM3 (ref 3.77–5.28)
SAO2 % BLDA: 100 % (ref 95–98)
SAO2 % BLDA: 65 % (ref 95–98)
SODIUM SERPL-SCNC: 136 MMOL/L (ref 136–145)
WBC NRBC COR # BLD AUTO: 8.86 10*3/MM3 (ref 3.4–10.8)

## 2024-07-05 PROCEDURE — 25010000002 NA FERRIC GLUC CPLX PER 12.5 MG: Performed by: INTERNAL MEDICINE

## 2024-07-05 PROCEDURE — 94799 UNLISTED PULMONARY SVC/PX: CPT

## 2024-07-05 PROCEDURE — 63710000001 INSULIN GLARGINE PER 5 UNITS: Performed by: NURSE PRACTITIONER

## 2024-07-05 PROCEDURE — 97535 SELF CARE MNGMENT TRAINING: CPT

## 2024-07-05 PROCEDURE — 63710000001 INSULIN LISPRO (HUMAN) PER 5 UNITS: Performed by: NURSE PRACTITIONER

## 2024-07-05 PROCEDURE — 83735 ASSAY OF MAGNESIUM: CPT | Performed by: INTERNAL MEDICINE

## 2024-07-05 PROCEDURE — 94664 DEMO&/EVAL PT USE INHALER: CPT

## 2024-07-05 PROCEDURE — 94760 N-INVAS EAR/PLS OXIMETRY 1: CPT

## 2024-07-05 PROCEDURE — 80048 BASIC METABOLIC PNL TOTAL CA: CPT | Performed by: NURSE PRACTITIONER

## 2024-07-05 PROCEDURE — 85027 COMPLETE CBC AUTOMATED: CPT | Performed by: NURSE PRACTITIONER

## 2024-07-05 PROCEDURE — 94761 N-INVAS EAR/PLS OXIMETRY MLT: CPT

## 2024-07-05 PROCEDURE — 99232 SBSQ HOSP IP/OBS MODERATE 35: CPT | Performed by: INTERNAL MEDICINE

## 2024-07-05 PROCEDURE — 97530 THERAPEUTIC ACTIVITIES: CPT

## 2024-07-05 PROCEDURE — 25010000002 CALCIUM GLUCONATE-NACL 1-0.675 GM/50ML-% SOLUTION: Performed by: INTERNAL MEDICINE

## 2024-07-05 PROCEDURE — 25010000002 ENOXAPARIN PER 10 MG: Performed by: INTERNAL MEDICINE

## 2024-07-05 PROCEDURE — 82948 REAGENT STRIP/BLOOD GLUCOSE: CPT

## 2024-07-05 RX ORDER — CARVEDILOL 12.5 MG/1
12.5 TABLET ORAL EVERY 12 HOURS
Status: DISCONTINUED | OUTPATIENT
Start: 2024-07-05 | End: 2024-07-07 | Stop reason: HOSPADM

## 2024-07-05 RX ORDER — CALCIUM GLUCONATE 20 MG/ML
1000 INJECTION, SOLUTION INTRAVENOUS ONCE
Status: COMPLETED | OUTPATIENT
Start: 2024-07-05 | End: 2024-07-05

## 2024-07-05 RX ORDER — SODIUM BICARBONATE 650 MG/1
650 TABLET ORAL DAILY
Status: DISCONTINUED | OUTPATIENT
Start: 2024-07-05 | End: 2024-07-07 | Stop reason: HOSPADM

## 2024-07-05 RX ADMIN — GUAIFENESIN 1200 MG: 600 TABLET, EXTENDED RELEASE ORAL at 21:08

## 2024-07-05 RX ADMIN — HYDRALAZINE HYDROCHLORIDE 75 MG: 50 TABLET ORAL at 14:30

## 2024-07-05 RX ADMIN — CARVEDILOL 12.5 MG: 12.5 TABLET, FILM COATED ORAL at 21:07

## 2024-07-05 RX ADMIN — CARVEDILOL 3.12 MG: 3.12 TABLET, FILM COATED ORAL at 08:18

## 2024-07-05 RX ADMIN — MUPIROCIN 1 APPLICATION: 20 OINTMENT TOPICAL at 21:08

## 2024-07-05 RX ADMIN — ATORVASTATIN CALCIUM 40 MG: 20 TABLET, FILM COATED ORAL at 21:07

## 2024-07-05 RX ADMIN — GUAIFENESIN 1200 MG: 600 TABLET, EXTENDED RELEASE ORAL at 08:18

## 2024-07-05 RX ADMIN — SODIUM BICARBONATE 650 MG: 650 TABLET, ORALLY DISINTEGRATING ORAL at 16:19

## 2024-07-05 RX ADMIN — Medication 4 ML: at 20:08

## 2024-07-05 RX ADMIN — ASPIRIN 81 MG: 81 TABLET, COATED ORAL at 08:18

## 2024-07-05 RX ADMIN — CLONIDINE HYDROCHLORIDE 0.2 MG: 0.1 TABLET ORAL at 09:47

## 2024-07-05 RX ADMIN — ESCITALOPRAM OXALATE 10 MG: 10 TABLET, FILM COATED ORAL at 09:47

## 2024-07-05 RX ADMIN — INSULIN LISPRO 2 UNITS: 100 INJECTION, SOLUTION INTRAVENOUS; SUBCUTANEOUS at 21:08

## 2024-07-05 RX ADMIN — INSULIN LISPRO 4 UNITS: 100 INJECTION, SOLUTION INTRAVENOUS; SUBCUTANEOUS at 16:52

## 2024-07-05 RX ADMIN — HYDRALAZINE HYDROCHLORIDE 75 MG: 50 TABLET ORAL at 06:06

## 2024-07-05 RX ADMIN — IPRATROPIUM BROMIDE AND ALBUTEROL SULFATE 3 ML: .5; 3 SOLUTION RESPIRATORY (INHALATION) at 07:09

## 2024-07-05 RX ADMIN — CALCIUM GLUCONATE 1000 MG: 20 INJECTION, SOLUTION INTRAVENOUS at 08:17

## 2024-07-05 RX ADMIN — Medication 4 ML: at 07:10

## 2024-07-05 RX ADMIN — SODIUM CHLORIDE 125 MG: 9 INJECTION, SOLUTION INTRAVENOUS at 09:47

## 2024-07-05 RX ADMIN — ENOXAPARIN SODIUM 30 MG: 100 INJECTION SUBCUTANEOUS at 08:18

## 2024-07-05 RX ADMIN — IPRATROPIUM BROMIDE AND ALBUTEROL SULFATE 3 ML: .5; 3 SOLUTION RESPIRATORY (INHALATION) at 20:05

## 2024-07-05 RX ADMIN — INSULIN GLARGINE 20 UNITS: 100 INJECTION, SOLUTION SUBCUTANEOUS at 08:17

## 2024-07-05 RX ADMIN — PANTOPRAZOLE SODIUM 40 MG: 40 TABLET, DELAYED RELEASE ORAL at 06:06

## 2024-07-05 RX ADMIN — ACETAMINOPHEN 325MG 650 MG: 325 TABLET ORAL at 14:30

## 2024-07-05 RX ADMIN — MUPIROCIN 1 APPLICATION: 20 OINTMENT TOPICAL at 08:18

## 2024-07-05 NOTE — THERAPY TREATMENT NOTE
Patient Name: Bibiana Inman  : 1980    MRN: [id number]                              Today's Date: 2024       Admit Date: 2024    Visit Dx:     ICD-10-CM ICD-9-CM   1. Acute right-sided weakness  R53.1 728.87   2. Type 2 diabetes mellitus with hyperglycemia, with long-term current use of insulin  E11.65 250.00    Z79.4 790.29     V58.67   3. Tobacco abuse  Z72.0 305.1   4. Carotid stenosis, asymptomatic, right  I65.21 433.10   5. Coronary artery disease involving native coronary artery of native heart with unstable angina pectoris  I25.110 414.01     411.1   6. 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    Abnormal bone density screening    Benign essential hypertension    Chronic cough    Gastroparesis diabeticorum    Elevated erythrocyte sedimentation rate    Fatigue    Gastroesophageal reflux disease    Mixed hypercholesterolemia and hypertriglyceridemia    Intermittent claudication    Iron deficiency anemia    Multiple joint pain    Need for influenza vaccination    Type 2 diabetes mellitus with hyperglycemia, with long-term current use of insulin    Obstructive sleep apnea    Coronary artery disease involving native coronary artery of native heart with unstable angina pectoris    Abnormal nuclear stress test    Depressive disorder    Back pain    Diabetic peripheral neuropathy associated with type 2 diabetes mellitus    Ingrowing nail, left great toe    Personal history of COVID-19    Polyp of colon    Vitamin D deficiency    Tobacco abuse    Unstable angina    Altered mental status    Carotid stenosis, asymptomatic, right    Acute right-sided weakness    CKD stage 3a, GFR 45-59 ml/min    Carotid stenosis    Lacunar cerebrovascular accident (CVA)    Elevated troponin    Aortic valve disease     Past Medical History:   Diagnosis Date    5,10-methylenetetrahydrofolate  reductase deficiency 2012    Allergic rhinitis 2012    Benign essential hypertension 2016    Coronary arteriosclerosis 2014    Description: s/p CABG (LIMA-LAD, SVG-OM2, SVG-PDA) performed on 14 by Dr. Debbie Fry.    Depressive disorder 2023    Diabetic gastroparesis 2021    Diabetic peripheral neuropathy associated with type 2 diabetes mellitus 2024    Gastroesophageal reflux disease 03/10/2021    GERD (gastroesophageal reflux disease)     Intermittent claudication 2017    Iron deficiency anemia 2020    Mixed hypercholesterolemia and hypertriglyceridemia 2014    Myocardial infarction     Obesity     Obstructive sleep apnea 2021    Personal history of COVID-19 2022    Polyp of colon 2023    Spontaneous      Stress fracture of right ankle with routine healing     Tobacco abuse 2024    Type 2 diabetes mellitus with hyperglycemia, with long-term current use of insulin 2017    Vitamin D deficiency 2024     Past Surgical History:   Procedure Laterality Date    CARDIAC CATHETERIZATION N/A 2024    Procedure: Left Heart Cath and coronary angiogram;  Surgeon: Jena Barron MD;  Location: [facility] CATH INVASIVE LOCATION;  Service: Cardiology;  Laterality: N/A;     SECTION      CORONARY ARTERY BYPASS GRAFT  2014    LIMA-LAD, SVG-OM2, SVG-PDA, Dr. Debbie Fry.    CORONARY ARTERY BYPASS GRAFT WITH AORTIC VALVE REPAIR/REPLACEMENT N/A 2024    Procedure: YUE; REOPERATIVE STERNOTOMY; CORONARY ARTERY BYPASS GRAFTING TIMES 1 UTILIZING RIGHT SAPHENOUS VEIN; AORTIC VALVE TUMOR ; PRP;  Surgeon: Benja De Luna MD;  Location: [facility];  Service: Cardiothoracic;  Laterality: N/A;    DILATATION AND CURETTAGE      TONSILLECTOMY        General Information       Row Name 24 1341          OT Time and Intention    Document Type therapy note (daily note)  -LE     Mode of Treatment individual  "therapy;occupational therapy  -       Row Name 07/05/24 1341          General Information    Existing Precautions/Restrictions fall;cardiac;sternal  -LE     Barriers to Rehab medically complex;cognitive status  -       Row Name 07/05/24 1341          Living Environment    People in Home spouse  pt reports spouse has had amputation and uses w/c for mobility.  -       Row Name 07/05/24 1341          Cognition    Orientation Status (Cognition) oriented to;person;place  -       Row Name 07/05/24 1341          Safety Issues, Functional Mobility    Safety Issues Affecting Function (Mobility) insight into deficits/self-awareness;judgment;problem-solving;sequencing abilities  word finding difficulty and confusion noted along with fogginess.  -LE     Impairments Affecting Function (Mobility) cognition;endurance/activity tolerance;pain  -LE     Comment, Safety Issues/Impairments (Mobility) Non skid socks and gait belt worn  -LE               User Key  (r) = Recorded By, (t) = Taken By, (c) = Cosigned By      Initials Name Provider Type    Cynthia Mcnulty OTR Occupational Therapist                     Mobility/ADL's       Row Name 07/05/24 1343          Bed Mobility    Bed Mobility supine-sit;sit-supine;scooting/bridging  -LE     Supine-Sit [place] (Bed Mobility) minimum assist (75% patient effort);set up;verbal cues  OT ed pt on \"move in the tube\" sternal precuations  -       Row Name 07/05/24 1343          Transfers    Transfers sit-stand transfer;stand-sit transfer;bed-chair transfer;toilet transfer  -LE     Comment, (Transfers) discuss wiating to get into shower until cleared for shower regarding incisions and then wait until feels strong enough to attempt.  pt reports has tub bench that spouse uses.  -       Row Name 07/05/24 1343          Bed-Chair Transfer    Bed-Chair [place] (Transfers) contact guard;standby assist  -       Row Name 07/05/24 1343          Sit-Stand Transfer    Sit-Stand " [place] (Transfers) contact guard;standby assist  -LE     Assistive Device (Sit-Stand Transfers) walker, front-wheeled  -LE       Row Name 07/05/24 1343          Functional Mobility    Functional Mobility- Ind. Level contact guard assist;standby assist  CGA without walker, Close SBA with walker.   pt walked away from walker (seeming to forget about the walker) when leaving bathroom.  -LE     Functional Mobility- Device walker, front-wheeled  -LE     Functional Mobility- Comment bed to bathroom to chair.  -       Row Name 07/05/24 1343          Activities of Daily Living    BADL Assessment/Intervention toileting;feeding;grooming;lower body dressing;upper body dressing;bathing  -[facility] Name 07/05/24 1343          Lower Body Dressing Assessment/Training    Comment, (Lower Body Dressing) pt stands to hike shorts at walker with set up by OT.  -[facility] Name 07/05/24 1343          Upper Body Dressing Assessment/Training    Comment, (Upper Body Dressing) change of gown with setup and cueing.  -[facility] Name 07/05/24 1343          Bathing Assessment/Intervention    Comment, (Bathing) pt reports she let the aid do the bathing.  -[facility] Name 07/05/24 1343          Grooming Assessment/Training    [place] Level (Grooming) oral care regimen;set up;standby assist  -LE     Position (Grooming) unsupported standing;sink side  -LE               User Key  (r) = Recorded By, (t) = Taken By, (c) = Cosigned By      Initials Name Provider Type    Cynthia Mcnulty OTR Occupational Therapist                   Obj/Interventions       Row Name 07/05/24 1346          Balance    Balance Assessment sitting static balance;standing static balance;standing dynamic balance  -LE     Static Sitting Balance standby assist  -LE     Static Standing Balance standby assist  -LE     Dynamic Standing Balance contact guard  -LE               User Key  (r) = Recorded By, (t) = Taken By, (c) = Cosigned By      Initials Name  Provider Type    Cynthia Mcnulty OTR Occupational Therapist                   Goals/Plan    No documentation.                  Clinical Impression       Row Name 07/05/24 1327          Pain Assessment    Pre/Posttreatment Pain Comment c/o pain at chest after coughing.  OT ed on using pillow and heart hugger  -LE     Pain Intervention(s) Repositioned  -LE       Row Name 07/05/24 1327          Plan of Care Review    Plan of Care Reviewed With patient  mother in law present.  -LE     Outcome Evaluation Aid finishing bath when OT enters.  OT assist pt with donning gown and heart hugger and reviews sternal precautions as pt reaches to pull self up to sit EOB.  Pt is CGA/close SBA to walk to BR with walker to brush teeth. Pt leaves walker and is close SBA/CGA to walk out of bathroom and to chair.  Once in chair pt asks to return to bed but encouraged to stay up in chair at least an hour.  Throughout session pt demo fogginess and word finding difficulty. Pt reports plan to d/c home with spouse who is an amputee and uses a w/c for mobility.  Mother in law presens and reports family can also assist pt at d/c.  OT recommends HH OT and 24/7 assist/supervision if pt returns home.   Will cont to follow for skilled OT while in acute care.  -LE       Row Name 07/05/24 1327          Vital Signs    O2 Delivery Pre Treatment room air  -LE     Pre Patient Position Supine  -LE     Intra Patient Position Standing  -LE     Post Patient Position Sitting  -LE       Row Name 07/05/24 1327          Positioning and Restraints    Pre-Treatment Position in bed  -LE     Post Treatment Position chair  -LE     In Chair notified nsg;sitting;call light within reach;encouraged to call for assist;exit alarm on;with family/caregiver  discuss pt mentation with RN at end of session.  RN reports neurologist contributes cognitive deficits to CVA.  -LE               User Key  (r) = Recorded By, (t) = Taken By, (c) = Cosigned By      Initials Name Provider  Type    Cynthia Mcnulty OTR Occupational Therapist                   Outcome Measures       Row Name 07/05/24 1347          How much help from another is currently needed...    Putting on and taking off regular lower body clothing? 2  -LE     Bathing (including washing, rinsing, and drying) 2  -LE     Toileting (which includes using toilet bed pan or urinal) 3  -LE     Putting on and taking off regular upper body clothing 3  -LE     Taking care of personal grooming (such as brushing teeth) 3  -LE     Eating meals 4  -LE     AM-PAC 6 Clicks Score (OT) 17  -LE       Row Name 07/05/24 0818          How much help from another person do you currently need...    Turning from your back to your side while in flat bed without using bedrails? 3  -KM     Moving from lying on back to sitting on the side of a flat bed without bedrails? 3  -KM     Moving to and from a bed to a chair (including a wheelchair)? 3  -KM     Standing up from a chair using your arms (e.g., wheelchair, bedside chair)? 3  -KM     Climbing 3-5 steps with a railing? 2  -KM     To walk in hospital room? 3  -KM     AM-PAC 6 Clicks Score (PT) 17  -KM     Highest Level of Mobility Goal 5 --> Static standing  -KM       Row Name 07/05/24 1347          Functional Assessment    Outcome Measure Options AM-PAC 6 Clicks Daily Activity (OT)  -LE               User Key  (r) = Recorded By, (t) = Taken By, (c) = Cosigned By      Initials Name Provider Type    Cynthia Mcnulty OTR Occupational Therapist     Vero Eisenberg, RN Registered Nurse                    Occupational Therapy Education       Title: PT OT SLP Therapies (Done)       Topic: Occupational Therapy (Done)       Point: ADL training (Done)       Description:   Instruct learner(s) on proper safety adaptation and remediation techniques during self care or transfers.   Instruct in proper use of assistive devices.                  Learning Progress Summary             Patient Acceptance, E, VU by MM at  7/4/2024 1445    Comment: role of OT, d/c rec                         Point: Precautions (Done)       Description:   Instruct learner(s) on prescribed precautions during self-care and functional transfers.                  Learning Progress Summary             Patient Acceptance, E, VU by MM at 7/4/2024 1445    Comment: role of OT, d/c rec                         Point: Body mechanics (Done)       Description:   Instruct learner(s) on proper positioning and spine alignment during self-care, functional mobility activities and/or exercises.                  Learning Progress Summary             Patient Acceptance, E, VU by MM at 7/4/2024 1445    Comment: role of OT, d/c rec                                         User Key       Initials Effective Dates Name Provider Type Discipline    MM 05/31/24 -  Larisa Benson OT Occupational Therapist OT                  OT Recommendation and Plan     Plan of Care Review  Plan of Care Reviewed With: patient (mother in law present.)  Outcome Evaluation: Aid finishing bath when OT enters.  OT assist pt with donning gown and heart hugger and reviews sternal precautions as pt reaches to pull self up to sit EOB.  Pt is CGA/close SBA to walk to BR with walker to brush teeth. Pt leaves walker and is close SBA/CGA to walk out of bathroom and to chair.  Once in chair pt asks to return to bed but encouraged to stay up in chair at least an hour.  Throughout session pt demo fogginess and word finding difficulty. Pt reports plan to d/c home with spouse who is an amputee and uses a w/c for mobility.  Mother in law presens and reports family can also assist pt at d/c.  OT recommends HH OT and 24/7 assist/supervision if pt returns home.   Will cont to follow for skilled OT while in acute care.     Time Calculation:         Time Calculation- OT       Row Name 07/05/24 0147             Time Calculation- OT    OT Start Time 1300  -LE      OT Stop Time 1325  -LE      OT Time Calculation (min) 25 min   -LE      Total Timed Code Minutes- OT 25 minute(s)  -LE      OT Received On 07/05/24  -LE      OT - Next Appointment 07/08/24  -LE         Timed Charges    05471 - OT Self Care/Mgmt Minutes 25  -LE         Total Minutes    Timed Charges Total Minutes 25  -LE       Total Minutes 25  -LE                User Key  (r) = Recorded By, (t) = Taken By, (c) = Cosigned By      Initials Name Provider Type    Cynthia Mcnulty OTR Occupational Therapist                  Therapy Charges for Today       Code Description Service Date Service Provider Modifiers Qty    [id number] HC OT SELF CARE/MGMT/TRAIN EA 15 MIN 7/5/2024 Cynthia Singleton OTR GO 2                 LIANET Love  7/5/2024

## 2024-07-05 NOTE — PROGRESS NOTES
" LOS: 14 days   Patient Care Team:  Ibrahima Correa MD as PCP - General (Family Medicine)  Rachele Zafar, ALEENA as Ambulatory  (Population Health)  Xochilt Jenkins MD as Consulting Physician (Nephrology)    Chief Complaint: post op follow-up    Subjective      Vital Signs  Temp:  [98.3 °F (36.8 °C)-98.9 °F (37.2 °C)] 98.8 °F (37.1 °C)  Heart Rate:  [54-65] 60  Resp:  [16-20] 16  BP: (105-148)/(49-71) 105/49  Body mass index is 26.04 kg/m².    Intake/Output Summary (Last 24 hours) at 7/5/2024 0832  Last data filed at 7/5/2024 0620  Gross per 24 hour   Intake 100 ml   Output 1700 ml   Net -1600 ml     No intake/output data recorded.            07/02/24  0534 07/03/24  0500 07/05/24  0620   Weight: 71.2 kg (156 lb 15.5 oz) 74.6 kg (164 lb 7.4 oz) 68.8 kg (151 lb 11.2 oz)         Objective:  Vital signs: (most recent): Blood pressure 105/49, pulse 60, temperature 98.8 °F (37.1 °C), temperature source Oral, resp. rate 16, height 162.6 cm (64\"), weight 68.8 kg (151 lb 11.2 oz), last menstrual period 01/10/2023, SpO2 100%, not currently breastfeeding.                Results Review:        WBC WBC   Date Value Ref Range Status   07/05/2024 8.86 3.40 - 10.80 10*3/mm3 Final   07/04/2024 9.85 3.40 - 10.80 10*3/mm3 Final   07/03/2024 10.99 (H) 3.40 - 10.80 10*3/mm3 Final      HGB Hemoglobin   Date Value Ref Range Status   07/05/2024 8.5 (L) 12.0 - 15.9 g/dL Final   07/04/2024 8.6 (L) 12.0 - 15.9 g/dL Final   07/03/2024 8.9 (L) 12.0 - 15.9 g/dL Final   07/03/2024 8.0 (C) 12.0 - 17.0 g/dL Final     Comment:     Serial Number: [id number]:  [id number]      HCT Hematocrit   Date Value Ref Range Status   07/05/2024 26.2 (L) 34.0 - 46.6 % Final   07/04/2024 26.8 (L) 34.0 - 46.6 % Final   07/03/2024 27.7 (L) 34.0 - 46.6 % Final   07/03/2024 24 (L) 38 - 51 % Final      Platelets Platelets   Date Value Ref Range Status   07/05/2024 131 (L) 140 - 450 10*3/mm3 Final   07/04/2024 122 (L) 140 - 450 10*3/mm3 Final " "  07/03/2024 123 (L) 140 - 450 10*3/mm3 Final        PT/INR:    No results found for: \"PROTIME\"  /  No results found for: \"INR\"      Sodium Sodium   Date Value Ref Range Status   07/05/2024 136 136 - 145 mmol/L Final   07/04/2024 139 136 - 145 mmol/L Final   07/03/2024 141 136 - 145 mmol/L Final      Potassium Potassium   Date Value Ref Range Status   07/05/2024 4.1 3.5 - 5.2 mmol/L Final   07/04/2024 3.9 3.5 - 5.2 mmol/L Final   07/04/2024 3.8 3.5 - 5.2 mmol/L Final   07/03/2024 4.4 3.5 - 5.2 mmol/L Final      Chloride Chloride   Date Value Ref Range Status   07/05/2024 106 98 - 107 mmol/L Final   07/04/2024 107 98 - 107 mmol/L Final   07/03/2024 107 98 - 107 mmol/L Final      Bicarbonate CO2   Date Value Ref Range Status   07/05/2024 20.2 (L) 22.0 - 29.0 mmol/L Final   07/04/2024 24.0 22.0 - 29.0 mmol/L Final   07/03/2024 26.0 22.0 - 29.0 mmol/L Final      BUN BUN   Date Value Ref Range Status   07/05/2024 16 6 - 20 mg/dL Final   07/04/2024 19 6 - 20 mg/dL Final   07/03/2024 21 (H) 6 - 20 mg/dL Final      Creatinine Creatinine   Date Value Ref Range Status   07/05/2024 1.07 (H) 0.57 - 1.00 mg/dL Final   07/04/2024 1.27 (H) 0.57 - 1.00 mg/dL Final   07/03/2024 1.47 (H) 0.57 - 1.00 mg/dL Final   07/03/2024 1.38 (H) 0.60 - 1.30 mg/dL Final      Calcium Calcium   Date Value Ref Range Status   07/05/2024 8.5 (L) 8.6 - 10.5 mg/dL Final   07/04/2024 9.0 8.6 - 10.5 mg/dL Final   07/03/2024 9.0 8.6 - 10.5 mg/dL Final      Magnesium Magnesium   Date Value Ref Range Status   07/05/2024 2.0 1.6 - 2.6 mg/dL Final   07/04/2024 2.0 1.6 - 2.6 mg/dL Final   07/03/2024 2.2 1.6 - 2.6 mg/dL Final          aspirin, 81 mg, Oral, Daily  atorvastatin, 40 mg, Oral, Nightly  carvedilol, 3.125 mg, Oral, Q12H  cloNIDine, 0.2 mg, Oral, Q12H  enoxaparin, 30 mg, Subcutaneous, Daily  escitalopram, 10 mg, Oral, Daily  ferric gluconate, 125 mg, Intravenous, Daily  guaiFENesin, 1,200 mg, Oral, Q12H  hydrALAZINE, 75 mg, Oral, Q8H  insulin glargine, " 20 Units, Subcutaneous, Daily  insulin lispro, 2-9 Units, Subcutaneous, 4x Daily AC & at Bedtime  mupirocin, , Each Nare, BID  pantoprazole, 40 mg, Oral, QAM  senna-docusate sodium, 2 tablet, Oral, Nightly  sodium chloride, 4 mL, Nebulization, BID - RT      niCARdipine, 5-15 mg/hr, Last Rate: Stopped (07/03/24 1500)              Acute right-sided weakness    Benign essential hypertension    Gastroparesis diabeticorum    Gastroesophageal reflux disease    Mixed hypercholesterolemia and hypertriglyceridemia    Iron deficiency anemia    Type 2 diabetes mellitus with hyperglycemia, with long-term current use of insulin    Coronary artery disease involving native coronary artery of native heart with unstable angina pectoris    Tobacco abuse    CKD stage 3a, GFR 45-59 ml/min    Carotid stenosis    Lacunar cerebrovascular accident (CVA)    Elevated troponin    Aortic valve disease      Assessment & Plan    -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  Encourage pulmonary toilet--- continue IS/flutter, mucinex and nebs  Mobilize/increase activity-- PT/OT  Discontinue AV wires   Making good progress.  May be able to get out of here in the next few days.  Continue routine care    KAUSHAL Alvarado  07/05/24  08:32 EDT

## 2024-07-05 NOTE — PROGRESS NOTES
Name: Bibiana Inman ADMIT: 2024   : 1980  PCP: Ibrahima Correa MD    MRN: [id number] LOS: 14 days   AGE/SEX: 44 y.o. female  ROOM:      Subjective   Subjective   No acute events. Patient denies new complaints. No family at bedside.    Objective   Objective   Vital Signs  Temp:  [98.3 °F (36.8 °C)-98.9 °F (37.2 °C)] 98.8 °F (37.1 °C)  Heart Rate:  [54-63] 60  Resp:  [16-20] 16  BP: (105-148)/(49-71) 105/49  SpO2:  [94 %-100 %] 100 %  on   ;   Device (Oxygen Therapy): room air  Body mass index is 26.04 kg/m².  Physical Exam  Vitals and nursing note reviewed.   Constitutional:       General: She is not in acute distress.     Appearance: She is not toxic-appearing or diaphoretic.   HENT:      Head: Normocephalic and atraumatic.      Nose: Nose normal.      Mouth/Throat:      Mouth: Mucous membranes are moist.      Pharynx: Oropharynx is clear.   Eyes:      Conjunctiva/sclera: Conjunctivae normal.      Pupils: Pupils are equal, round, and reactive to light.   Cardiovascular:      Rate and Rhythm: Normal rate and regular rhythm.      Pulses: Normal pulses.      Comments: Surgical wound c/d/i  Pulmonary:      Effort: Pulmonary effort is normal.   Abdominal:      General: Bowel sounds are normal.      Palpations: Abdomen is soft.      Tenderness: There is no abdominal tenderness.   Musculoskeletal:         General: No swelling or tenderness.      Cervical back: Neck supple.   Skin:     General: Skin is warm and dry.      Capillary Refill: Capillary refill takes less than 2 seconds.   Neurological:      Mental Status: She is alert and oriented to person, place, and time.      Comments: +mild right lower facial weakness   Psychiatric:         Mood and Affect: Mood normal.         Behavior: Behavior normal.       Results Review     I reviewed the patient's new clinical results.  Results from last 7 days   Lab Units 24  0220 24  0251 24  0301 24  0011 24  0239   WBC  10*3/mm3 8.86 9.85 10.99*  --  14.01*   HEMOGLOBIN g/dL 8.5* 8.6* 8.9*  --  9.1*   HEMOGLOBIN, POC g/dL  --   --   --  8.0*  --    PLATELETS 10*3/mm3 131* 122* 123*  --  129*     Results from last 7 days   Lab Units 07/05/24  0220 07/04/24  1048 07/04/24 0251 07/03/24 0301 07/03/24  0011 07/02/24  0239   SODIUM mmol/L 136  --  139 141  --  142   POTASSIUM mmol/L 4.1 3.9 3.8 4.4  --  4.3   CHLORIDE mmol/L 106  --  107 107  --  112*   CO2 mmol/L 20.2*  --  24.0 26.0  --  20.7*   BUN mg/dL 16  --  19 21*  --  20   CREATININE mg/dL 1.07*  --  1.27* 1.47* 1.38* 1.92*   GLUCOSE mg/dL 93  --  91 158*  --  117*   EGFR mL/min/1.73 65.0  --  52.9* 44.4*  --  32.2*     Results from last 7 days   Lab Units 07/03/24 0301 07/02/24 0239 07/01/24 1647 07/01/24  1312   ALBUMIN g/dL 3.5 3.7 3.6 3.8   BILIRUBIN mg/dL 0.2  --   --   --    ALK PHOS U/L 62  --   --   --    AST (SGOT) U/L 23  --   --   --    ALT (SGPT) U/L <5  --   --   --      Results from last 7 days   Lab Units 07/05/24  0220 07/04/24  0251 07/03/24 0301 07/02/24 0239 07/01/24 1647 07/01/24  1312   CALCIUM mg/dL 8.5* 9.0 9.0 8.5* 8.4* 8.9   ALBUMIN g/dL  --   --  3.5 3.7 3.6 3.8   MAGNESIUM mg/dL 2.0 2.0 2.2 2.7* 2.4 2.7*   PHOSPHORUS mg/dL  --  2.7 5.2* 4.8* 2.9 2.0*     Results from last 7 days   Lab Units 07/03/24  0301 07/03/24  0011 07/01/24  1909   LACTATE mmol/L 1.4 2.0 <0.4     Glucose   Date/Time Value Ref Range Status   07/05/2024 0633 121 70 - 130 mg/dL Final   07/04/2024 2018 176 (H) 70 - 130 mg/dL Final   07/04/2024 1619 138 (H) 70 - 130 mg/dL Final   07/04/2024 1127 150 (H) 70 - 130 mg/dL Final   07/04/2024 0547 93 70 - 130 mg/dL Final   07/03/2024 2022 164 (H) 70 - 130 mg/dL Final   07/03/2024 1700 144 (H) 70 - 130 mg/dL Final       CT Head Without Contrast    Result Date: 7/4/2024   No acute intracranial hemorrhage or hydrocephalus. Post infarct changes at left basal ganglia. If there is further clinical concern, MRI could be considered for  further evaluation.  This report was finalized on 7/4/2024 8:12 AM by Dr. Antonio Ch M.D on Workstation: [id number]      XR Chest PA & Lateral    Result Date: 7/4/2024  As described.  This report was finalized on 7/4/2024 8:04 AM by Dr. Antonio Ch M.D on Workstation: [id number]      XR Chest 1 View    Result Date: 7/3/2024  FINDINGS AND IMPRESSION: Previously seen thoracostomy tubes appear to been removed. Presumed epicardial pacing wires, median sternotomy wires and right internal jugular sheath are present.  Findings of pneumomediastinum, as before. Mild left basilar pulmonary opacification persists. No pneumothorax is seen. Cardiac silhouette is accentuated by patient rotation; however, there is to be at least mildly enlarged.  This report was finalized on 7/3/2024 11:22 AM by Dr. Bin Bey M.D on Workstation: [id number]       I have personally reviewed all medications:  Scheduled Medications  aspirin, 81 mg, Oral, Daily  atorvastatin, 40 mg, Oral, Nightly  carvedilol, 3.125 mg, Oral, Q12H  cloNIDine, 0.2 mg, Oral, Q12H  enoxaparin, 30 mg, Subcutaneous, Daily  escitalopram, 10 mg, Oral, Daily  ferric gluconate, 125 mg, Intravenous, Daily  guaiFENesin, 1,200 mg, Oral, Q12H  hydrALAZINE, 75 mg, Oral, Q8H  insulin glargine, 20 Units, Subcutaneous, Daily  insulin lispro, 2-9 Units, Subcutaneous, 4x Daily AC & at Bedtime  mupirocin, , Each Nare, BID  pantoprazole, 40 mg, Oral, QAM  senna-docusate sodium, 2 tablet, Oral, Nightly  sodium chloride, 4 mL, Nebulization, BID - RT    Infusions  niCARdipine, 5-15 mg/hr, Last Rate: Stopped (07/03/24 1500)      Diet  Diet: Cardiac, Diabetic; Healthy Heart (2-3 Na+); Consistent Carbohydrate; Fluid Consistency: Thin (IDDSI 0)    I have personally reviewed:  [x]  Laboratory   []  Microbiology   [x]  Radiology   [x]  EKG/Telemetry  []  Cardiology/Vascular   []  Pathology    []  Records    Assessment/Plan     Active Hospital Problems    Diagnosis  POA    **Acute  right-sided weakness [R53.1]  Yes    CKD stage 3a, GFR 45-59 ml/min [N18.31]  Yes    Carotid stenosis [I65.29]  Yes    Lacunar cerebrovascular accident (CVA) [I63.81]  Yes    Elevated troponin [R79.89]  Yes    Aortic valve disease [I35.9]  Unknown    Tobacco abuse [Z72.0]  Yes    Coronary artery disease involving native coronary artery of native heart with unstable angina pectoris [I25.110]  Yes    Gastroparesis diabeticorum [E11.43, 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   Acute CVA  - presented with right-sided weakness which has improved, has mild right facial droop  - MRI showed enlargement of acute infarct in the genu of the left internal capsule as well as a new infarct in the left insular cortex, and previously identified acute infarction within the white matter of the left parietal lobe  - continue ASA, statin  - intermittent confusion is expected and should gradually improve-CT head 7/4/24 was stable  - needs aggressive risk factor control, particularly with HTN and DM  - YUE showed 3 fibroelastomas-s/p AV fibroelastoma resection, AV valve repair, and CABG x1 (SVG to OM) 7/1/24 with Dr. De Luna  - appreciate cardiology, CT surgery, and neurology recs     CARLA on CKD Stage 3b  - pre-renal, now resolved  - follow volume status and chemistries  - appreciate nephrology recs    Type 2 DM  - complications include gastroparesis  - on lantus and jardiance as outpatient  - BG acceptable, needs better outpatient control, A1C 16.90%  - continue lantus 20 units daily   - cover with ssi/hypoglycemia protocol  - jardiance held    HTN/CAD s/p CABG  - BP  acceptable  - continue coreg, clonidine, and hydralazine      GERD  - symptoms controlled, continue ppi    SCDs for DVT prophylaxis.  Full code.  Discussed with patient and nursing staff.  Anticipate discharge home with home health in 2-3 days.  Expected Discharge Date: 7/8/2024; Expected Discharge Time:       Lenin Huang MD  [facility]  07/05/24  10:01 EDT    Portions of this text have been copied and I have reviewed them. They are accurate as of 7/5/2024

## 2024-07-05 NOTE — PLAN OF CARE
Goal Outcome Evaluation:  Plan of Care Reviewed With: patient           Outcome Evaluation: Pt demonstrates increased activity tolerance and functional strength as she was able to increase her gait distance and required less assistance. Pt with LOB x2 during ambulation requiring [place] to correct. PT will continue to follow to address strength, mobility, and gait.      Anticipated Discharge Disposition (PT): skilled nursing facility

## 2024-07-05 NOTE — PLAN OF CARE
Goal Outcome Evaluation:  Plan of Care Reviewed With: patient (mother in law present.)           Outcome Evaluation: Aid finishing bath when OT enters.  OT assist pt with donning gown and heart hugger and reviews sternal precautions as pt reaches to pull self up to sit EOB.  Pt is CGA/close SBA to walk to BR with walker to brush teeth. Pt leaves walker and is close SBA/CGA to walk out of bathroom and to chair.  Once in chair pt asks to return to bed but encouraged to stay up in chair at least an hour.  Throughout session pt demo fogginess and word finding difficulty. Pt reports plan to d/c home with spouse who is an amputee and uses a w/c for mobility.  Mother in law presens and reports family can also assist pt at d/c.  OT recommends  OT and 24/7 assist/supervision if pt returns home.   Will cont to follow for skilled OT while in acute care.

## 2024-07-05 NOTE — THERAPY TREATMENT NOTE
Patient Name: Bibiana Inman  : 1980    MRN: [id number]                              Today's Date: 2024       Admit Date: 2024    Visit Dx:     ICD-10-CM ICD-9-CM   1. Acute right-sided weakness  R53.1 728.87   2. Type 2 diabetes mellitus with hyperglycemia, with long-term current use of insulin  E11.65 250.00    Z79.4 790.29     V58.67   3. Tobacco abuse  Z72.0 305.1   4. Carotid stenosis, asymptomatic, right  I65.21 433.10   5. Coronary artery disease involving native coronary artery of native heart with unstable angina pectoris  I25.110 414.01     411.1   6. 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    Abnormal bone density screening    Benign essential hypertension    Chronic cough    Gastroparesis diabeticorum    Elevated erythrocyte sedimentation rate    Fatigue    Gastroesophageal reflux disease    Mixed hypercholesterolemia and hypertriglyceridemia    Intermittent claudication    Iron deficiency anemia    Multiple joint pain    Need for influenza vaccination    Type 2 diabetes mellitus with hyperglycemia, with long-term current use of insulin    Obstructive sleep apnea    Coronary artery disease involving native coronary artery of native heart with unstable angina pectoris    Abnormal nuclear stress test    Depressive disorder    Back pain    Diabetic peripheral neuropathy associated with type 2 diabetes mellitus    Ingrowing nail, left great toe    Personal history of COVID-19    Polyp of colon    Vitamin D deficiency    Tobacco abuse    Unstable angina    Altered mental status    Carotid stenosis, asymptomatic, right    Acute right-sided weakness    CKD stage 3a, GFR 45-59 ml/min    Carotid stenosis    Lacunar cerebrovascular accident (CVA)    Elevated troponin    Aortic valve disease     Past Medical History:   Diagnosis Date    5,10-methylenetetrahydrofolate  reductase deficiency 2012    Allergic rhinitis 2012    Benign essential hypertension 2016    Coronary arteriosclerosis 2014    Description: s/p CABG (LIMA-LAD, SVG-OM2, SVG-PDA) performed on 14 by Dr. Debbie Fry.    Depressive disorder 2023    Diabetic gastroparesis 2021    Diabetic peripheral neuropathy associated with type 2 diabetes mellitus 2024    Gastroesophageal reflux disease 03/10/2021    GERD (gastroesophageal reflux disease)     Intermittent claudication 2017    Iron deficiency anemia 2020    Mixed hypercholesterolemia and hypertriglyceridemia 2014    Myocardial infarction     Obesity     Obstructive sleep apnea 2021    Personal history of COVID-19 2022    Polyp of colon 2023    Spontaneous      Stress fracture of right ankle with routine healing     Tobacco abuse 2024    Type 2 diabetes mellitus with hyperglycemia, with long-term current use of insulin 2017    Vitamin D deficiency 2024     Past Surgical History:   Procedure Laterality Date    CARDIAC CATHETERIZATION N/A 2024    Procedure: Left Heart Cath and coronary angiogram;  Surgeon: Jena Barron MD;  Location: [facility] CATH INVASIVE LOCATION;  Service: Cardiology;  Laterality: N/A;     SECTION      CORONARY ARTERY BYPASS GRAFT  2014    LIMA-LAD, SVG-OM2, SVG-PDA, Dr. Debbie Fry.    CORONARY ARTERY BYPASS GRAFT WITH AORTIC VALVE REPAIR/REPLACEMENT N/A 2024    Procedure: YUE; REOPERATIVE STERNOTOMY; CORONARY ARTERY BYPASS GRAFTING TIMES 1 UTILIZING RIGHT SAPHENOUS VEIN; AORTIC VALVE TUMOR ; PRP;  Surgeon: Benja De Luna MD;  Location: [facility] CV[facility];  Service: Cardiothoracic;  Laterality: N/A;    DILATATION AND CURETTAGE      TONSILLECTOMY        General Information       Row Name 24 1615          Physical Therapy Time and Intention    Document Type therapy note (daily note)  -CH     Mode of Treatment  individual therapy;physical therapy  -       Row Name 07/05/24 1615          General Information    Existing Precautions/Restrictions fall;cardiac;sternal  -       Row Name 07/05/24 1615          Cognition    Orientation Status (Cognition) oriented x 3  -       Row Name 07/05/24 1615          Safety Issues, Functional Mobility    Impairments Affecting Function (Mobility) endurance/activity tolerance;pain;strength;balance  -               User Key  (r) = Recorded By, (t) = Taken By, (c) = Cosigned By      Initials Name Provider Type     Mary West PT Physical Therapist                   Mobility       Row Name 07/05/24 1619          Bed Mobility    Bed Mobility supine-sit;sit-supine  -     Supine-Sit [place] (Bed Mobility) verbal cues;nonverbal cues (demo/gesture);contact guard  -     Sit-Supine [place] (Bed Mobility) verbal cues;nonverbal cues (demo/gesture);minimum assist (75% patient effort)  -     Assistive Device (Bed Mobility) bed rails;head of bed elevated  -       Row Name 07/05/24 1619          Sit-Stand Transfer    Sit-Stand [place] (Transfers) verbal cues;nonverbal cues (demo/gesture);contact guard  -       Row Name 07/05/24 1619          Gait/Stairs (Locomotion)    [place] Level (Gait) verbal cues;nonverbal cues (demo/gesture);minimum assist (75% patient effort)  -     Distance in Feet (Gait) 60  -     Deviations/Abnormal Patterns (Gait) vipul decreased;gait speed decreased  -     Bilateral Gait Deviations forward flexed posture;heel strike decreased  -     Comment, (Gait/Stairs) no AD, pt with LOB x2 requiring Sanket to correct, gait distance limited by dizziness  -               User Key  (r) = Recorded By, (t) = Taken By, (c) = Cosigned By      Initials Name Provider Type     Mary West PT Physical Therapist                   Obj/Interventions       Row Name 07/05/24 1620          Motor Skills    Therapeutic Exercise --  10 reps  cardiac protocol except only 5 reps shoulder rolls secondary to pain  -               User Key  (r) = Recorded By, (t) = Taken By, (c) = Cosigned By      Initials Name Provider Type    Mary Kunz, PT Physical Therapist                   Goals/Plan    No documentation.                  Clinical Impression       Row Name 07/05/24 1625          Pain    Pretreatment Pain Rating 4/10  -     Posttreatment Pain Rating 4/10  -     Pain Location - Side/Orientation Left  -     Pain Location - shoulder  -     Pain Intervention(s) Repositioned  -       Row Name 07/05/24 1625          Plan of Care Review    Plan of Care Reviewed With patient  -     Outcome Evaluation Pt demonstrates increased activity tolerance and functional strength as she was able to increase her gait distance and required less assistance. Pt with LOB x2 during ambulation requiring Sanket to correct. PT will continue to follow to address strength, mobility, and gait.  -CH       Row Name 07/05/24 1625          Positioning and Restraints    Pre-Treatment Position in bed  -     Post Treatment Position bed  -     In Bed supine;call light within reach;encouraged to call for assist;exit alarm on  -               User Key  (r) = Recorded By, (t) = Taken By, (c) = Cosigned By      Initials Name Provider Type    Mary Kunz, PT Physical Therapist                   Outcome Measures       Row Name 07/05/24 1634 07/05/24 1409       How much help from another person do you currently need...    Turning from your back to your side while in flat bed without using bedrails? 3  -CH 3  -KM    Moving from lying on back to sitting on the side of a flat bed without bedrails? 3  -CH 3  -KM    Moving to and from a bed to a chair (including a wheelchair)? 3  -CH 3  -KM    Standing up from a chair using your arms (e.g., wheelchair, bedside chair)? 3  -CH 3  -KM    Climbing 3-5 steps with a railing? 2  -CH 2  -KM    To walk in hospital room? 3   - 3  -KM    AM-PAC 6 Clicks Score (PT) 17  -CH 17  -KM    Highest Level of Mobility Goal 5 --> Static standing  -CH 5 --> Static standing  -KM      Row Name 07/05/24 0818          How much help from another person do you currently need...    Turning from your back to your side while in flat bed without using bedrails? 3  -KM     Moving from lying on back to sitting on the side of a flat bed without bedrails? 3  -KM     Moving to and from a bed to a chair (including a wheelchair)? 3  -KM     Standing up from a chair using your arms (e.g., wheelchair, bedside chair)? 3  -KM     Climbing 3-5 steps with a railing? 2  -KM     To walk in hospital room? 3  -KM     AM-PAC 6 Clicks Score (PT) 17  -KM     Highest Level of Mobility Goal 5 --> Static standing  -KM       Row Name 07/05/24 1634 07/05/24 1347       Functional Assessment    Outcome Measure Options AM-PAC 6 Clicks Basic Mobility (PT)  - AM-PAC 6 Clicks Daily Activity (OT)  -              User Key  (r) = Recorded By, (t) = Taken By, (c) = Cosigned By      Initials Name Provider Type    Cynthia Mcnulty OTR Occupational Therapist    Mary Kunz, PT Physical Therapist     Vero Eisenberg, RN Registered Nurse                                 Physical Therapy Education       Title: PT OT SLP Therapies (Done)       Topic: Physical Therapy (Done)       Point: Mobility training (Done)       Learning Progress Summary             Patient Acceptance, E,TB,D, VU,NR by  at 7/5/2024 1634    Acceptance, E,D, DU by PC at 7/4/2024 1055    Acceptance, E, NR by AR at 7/3/2024 1047    Acceptance, E,TB,D, VU,NR by  at 7/2/2024 1229                         Point: Home exercise program (Done)       Learning Progress Summary             Patient Acceptance, E,TB,D, VU,NR by  at 7/5/2024 1634    Acceptance, E,D, DU by PC at 7/4/2024 1055    Acceptance, E, NR by AR at 7/3/2024 1047                         Point: Body mechanics (Done)       Learning Progress Summary              Patient Acceptance, E,TB,D, VU,NR by  at 7/5/2024 1634    Acceptance, E,D, DU by PC at 7/4/2024 1055    Acceptance, E, NR by AR at 7/3/2024 1047    Acceptance, E,TB,D, VU,NR by  at 7/2/2024 1229                         Point: Precautions (Done)       Learning Progress Summary             Patient Acceptance, E,TB,D, VU,NR by  at 7/5/2024 1634    Acceptance, E,D, DU by  at 7/4/2024 1055    Acceptance, E, NR by AR at 7/3/2024 1047    Acceptance, E,TB,D, VU,NR by  at 7/2/2024 1229                                         User Key       Initials Effective Dates Name Provider Type Discipline     06/16/21 -  Mary West, PT Physical Therapist PT    PC 06/16/21 -  Mesha Jerez PT Physical Therapist PT    AR 06/16/21 -  Reanna Renee PT Physical Therapist PT                  PT Recommendation and Plan  Planned Therapy Interventions (PT): balance training, bed mobility training, home exercise program, gait training, patient/family education, strengthening, transfer training  Plan of Care Reviewed With: patient  Outcome Evaluation: Pt demonstrates increased activity tolerance and functional strength as she was able to increase her gait distance and required less assistance. Pt with LOB x2 during ambulation requiring Sanket to correct. PT will continue to follow to address strength, mobility, and gait.     Time Calculation:         PT Charges       Row Name 07/05/24 1635             Time Calculation    Start Time 1518  -      Stop Time 1528  -      Time Calculation (min) 10 min  -CH      PT Received On 07/05/24  -      PT - Next Appointment 07/06/24  -         Time Calculation- PT    Total Timed Code Minutes- PT 10 minute(s)  -CH         Timed Charges    69290 - PT Therapeutic Activity Minutes 10  -CH         Total Minutes    Timed Charges Total Minutes 10  -CH       Total Minutes 10  -CH                User Key  (r) = Recorded By, (t) = Taken By, (c) = Cosigned By      Initials Name  Provider Type     Mary West, PT Physical Therapist                  Therapy Charges for Today       Code Description Service Date Service Provider Modifiers Qty    25642544761 HC PT THERAPEUTIC ACT EA 15 MIN 7/5/2024 Mary West, PT GP 1            PT G-Codes  Outcome Measure Options: AM-PAC 6 Clicks Basic Mobility (PT)  AM-PAC 6 Clicks Score (PT): 17  AM-PAC 6 Clicks Score (OT): 17  Modified Marianne Scale: 2 - Slight disability.  Unable to carry out all previous activities but able to look after own affairs without assistance.  PT Discharge Summary  Anticipated Discharge Disposition (PT): skilled nursing facility    Mary West PT  7/5/2024

## 2024-07-05 NOTE — PROGRESS NOTES
"Nutrition Services    Patient Name:  Bibiana Inman  YOB: 1980  MRN: [id number]  Admit Date:  6/21/2024  Assessment Date:  07/05/24    NUTRITION SCREENING      Reason for Encounter Follow-up protocol   Diagnosis/Problem Acute right sided weakness       PO Diet Diet: Cardiac, Diabetic; Healthy Heart (2-3 Na+); Consistent Carbohydrate; Fluid Consistency: Thin (IDDSI 0)   Supplements    PO Intake % %       Medications MAR reviewed by RD   Labs  Listed below, reviewed   Physical Findings Facial droop, teeth missing, alert, oriented   GI Function Last BM 7/4   Skin Status intact       Height  Weight  BMI  Weight Trend     Height: 162.6 cm (64\")  Weight: 68.8 kg (151 lb 11.2 oz) (07/05/24 0620)  Body mass index is 26.04 kg/m².  Loss       Nutrition Problem (PES) Altered nutrition related labs related to DM2 as evidence by HgA1c 13.10 on 6/26       Intervention/Plan Pt states appetite is improving and tolerating HH/ConsCHO diet well with % po intake of meals.     RD to follow up per protocol.     Results from last 7 days   Lab Units 07/05/24  0220 07/04/24  1048 07/04/24  0251 07/03/24  0301   SODIUM mmol/L 136  --  139 141   POTASSIUM mmol/L 4.1 3.9 3.8 4.4   CHLORIDE mmol/L 106  --  107 107   CO2 mmol/L 20.2*  --  24.0 26.0   BUN mg/dL 16  --  19 21*   CREATININE mg/dL 1.07*  --  1.27* 1.47*   CALCIUM mg/dL 8.5*  --  9.0 9.0   BILIRUBIN mg/dL  --   --   --  0.2   ALK PHOS U/L  --   --   --  62   ALT (SGPT) U/L  --   --   --  <5   AST (SGOT) U/L  --   --   --  23   GLUCOSE mg/dL 93  --  91 158*     Results from last 7 days   Lab Units 07/05/24  0220 07/04/24  0251 07/03/24  0301   MAGNESIUM mg/dL 2.0 2.0 2.2   PHOSPHORUS mg/dL  --  2.7 5.2*   HEMOGLOBIN g/dL 8.5* 8.6* 8.9*   HEMATOCRIT % 26.2* 26.8* 27.7*     Lab Results   Component Value Date    HGBA1C 13.10 (H) 06/26/2024         Electronically signed by:  Tania Sanders RD  07/05/24 14:37 EDT  "

## 2024-07-05 NOTE — CASE MANAGEMENT/SOCIAL WORK
Continued Stay Note  [facility]     Patient Name: Bibiana Inman  MRN: [id number]  Today's Date: 7/5/2024    Admit Date: 6/21/2024    Plan: Home w/ spouse & Christianity    Discharge Plan       Row Name 07/05/24 1206       Plan    Plan Home w/ spouse & Christianity     Plan Comments Followed up with patient to discuss her d/c plan.  Pt reports her plan remains to go home with 24/7 assist of her spouse.  [facility] has accepted and will follow her at discharge.  [facility] continues to follow.............Corina ANDRADE/YONI                   Discharge Codes    No documentation.                 Expected Discharge Date and Time       Expected Discharge Date Expected Discharge Time    Jul 8, 2024               Corina Juarez RN

## 2024-07-05 NOTE — PROGRESS NOTES
"NEPHROLOGY PROGRESS NOTE------[facility]    [facility]  [phone]  Tamiko Jenkins MD      Patient Care Team:  Ibrahima Correa MD as PCP - General (Family Medicine)  Rachele Zafar RN as Ambulatory  ([facility])  Xochilt Jenkins MD as Consulting Physician (Nephrology)      Provider:  Tamiko Jenkins MD  Patient Name: Bibiana Inman  :  1980    SUBJECTIVE:    F/U ARF/CARLA/CRF/CKD    No new events noted overnight. No SOB, CP    Medication:  aspirin, 81 mg, Oral, Daily  atorvastatin, 40 mg, Oral, Nightly  carvedilol, 3.125 mg, Oral, Q12H  cloNIDine, 0.2 mg, Oral, Q12H  enoxaparin, 30 mg, Subcutaneous, Daily  escitalopram, 10 mg, Oral, Daily  ferric gluconate, 125 mg, Intravenous, Daily  guaiFENesin, 1,200 mg, Oral, Q12H  hydrALAZINE, 75 mg, Oral, Q8H  insulin glargine, 20 Units, Subcutaneous, Daily  insulin lispro, 2-9 Units, Subcutaneous, 4x Daily AC & at Bedtime  mupirocin, , Each Nare, BID  pantoprazole, 40 mg, Oral, QAM  senna-docusate sodium, 2 tablet, Oral, Nightly  sodium chloride, 4 mL, Nebulization, BID - RT      niCARdipine, 5-15 mg/hr, Last Rate: Stopped (24 1500)        OBJECTIVE    Vital Sign Min/Max for last 24 hours  Temp  Min: 98.3 °F (36.8 °C)  Max: 98.9 °F (37.2 °C)   BP  Min: 115/64  Max: 148/65   Pulse  Min: 54  Max: 68   Resp  Min: 16  Max: 20   SpO2  Min: 94 %  Max: 100 %   No data recorded   Weight  Min: 68.8 kg (151 lb 11.2 oz)  Max: 68.8 kg (151 lb 11.2 oz)     Flowsheet Rows      Flowsheet Row First Filed Value   Admission Height 162.6 cm (64\") Documented at 2024   Admission Weight 65.5 kg (144 lb 6.4 oz) Documented at 2024            No intake/output data recorded.  I/O last 3 completed shifts:  In: 100 [IV Piggyback:100]  Out: 2860 [Urine:2860]    Physical Exam:  General Appearance: alert, appears stated age and cooperative  Head: normocephalic, " "without obvious abnormality and atraumatic.   Eyes: conjunctivae and sclerae normal and no icterus  Neck: supple and no JVD  Lungs: DECREASED BS BIBASILAR  Heart: regular rhythm & normal rate and normal S1, S2 +TITI (PACED)  Chest Wall: S/P SURGICAL CHANGES POST STERNOTOMY S/P OPEN HEART SURGERY WITH CT  Abdomen: +HYPOACTIVE BS; SOFT; NT/ND  Extremities: moves extremities well, no edema, no cyanosis  Skin: no bleeding, bruising or rash.    Neurologic: Alert, and oriented. No focal deficits    Labs:    WBC WBC   Date Value Ref Range Status   07/05/2024 8.86 3.40 - 10.80 10*3/mm3 Final   07/04/2024 9.85 3.40 - 10.80 10*3/mm3 Final   07/03/2024 10.99 (H) 3.40 - 10.80 10*3/mm3 Final      HGB Hemoglobin   Date Value Ref Range Status   07/05/2024 8.5 (L) 12.0 - 15.9 g/dL Final   07/04/2024 8.6 (L) 12.0 - 15.9 g/dL Final   07/03/2024 8.9 (L) 12.0 - 15.9 g/dL Final   07/03/2024 8.0 (C) 12.0 - 17.0 g/dL Final     Comment:     Serial Number: [id number]:  [id number]      HCT Hematocrit   Date Value Ref Range Status   07/05/2024 26.2 (L) 34.0 - 46.6 % Final   07/04/2024 26.8 (L) 34.0 - 46.6 % Final   07/03/2024 27.7 (L) 34.0 - 46.6 % Final   07/03/2024 24 (L) 38 - 51 % Final      Platelets No results found for: \"LABPLAT\"   MCV MCV   Date Value Ref Range Status   07/05/2024 92.3 79.0 - 97.0 fL Final   07/04/2024 93.1 79.0 - 97.0 fL Final   07/03/2024 91.7 79.0 - 97.0 fL Final          Sodium Sodium   Date Value Ref Range Status   07/05/2024 136 136 - 145 mmol/L Final   07/04/2024 139 136 - 145 mmol/L Final   07/03/2024 141 136 - 145 mmol/L Final      Potassium Potassium   Date Value Ref Range Status   07/05/2024 4.1 3.5 - 5.2 mmol/L Final   07/04/2024 3.9 3.5 - 5.2 mmol/L Final   07/04/2024 3.8 3.5 - 5.2 mmol/L Final   07/03/2024 4.4 3.5 - 5.2 mmol/L Final      Chloride Chloride   Date Value Ref Range Status   07/05/2024 106 98 - 107 mmol/L Final   07/04/2024 107 98 - 107 mmol/L Final   07/03/2024 107 98 - 107 mmol/L Final    " "  CO2 CO2   Date Value Ref Range Status   07/05/2024 20.2 (L) 22.0 - 29.0 mmol/L Final   07/04/2024 24.0 22.0 - 29.0 mmol/L Final   07/03/2024 26.0 22.0 - 29.0 mmol/L Final      BUN BUN   Date Value Ref Range Status   07/05/2024 16 6 - 20 mg/dL Final   07/04/2024 19 6 - 20 mg/dL Final   07/03/2024 21 (H) 6 - 20 mg/dL Final      Creatinine Creatinine   Date Value Ref Range Status   07/05/2024 1.07 (H) 0.57 - 1.00 mg/dL Final   07/04/2024 1.27 (H) 0.57 - 1.00 mg/dL Final   07/03/2024 1.47 (H) 0.57 - 1.00 mg/dL Final   07/03/2024 1.38 (H) 0.60 - 1.30 mg/dL Final      Calcium Calcium   Date Value Ref Range Status   07/05/2024 8.5 (L) 8.6 - 10.5 mg/dL Final   07/04/2024 9.0 8.6 - 10.5 mg/dL Final   07/03/2024 9.0 8.6 - 10.5 mg/dL Final      PO4 No components found for: \"PO4\"   Albumin Albumin   Date Value Ref Range Status   07/03/2024 3.5 3.5 - 5.2 g/dL Final      Magnesium Magnesium   Date Value Ref Range Status   07/05/2024 2.0 1.6 - 2.6 mg/dL Final   07/04/2024 2.0 1.6 - 2.6 mg/dL Final   07/03/2024 2.2 1.6 - 2.6 mg/dL Final      Uric Acid No components found for: \"URIC ACID\"     Imaging Results (Last 72 Hours)       Procedure Component Value Units Date/Time    CT Head Without Contrast [[id number]] Collected: 07/04/24 0807     Updated: 07/04/24 0815    Narrative:      CT HEAD WO CONTRAST-     INDICATIONS: Stroke     TECHNIQUE: Radiation dose reduction techniques were utilized, including  automated exposure control and exposure modulation based on body size.  Noncontrast head CT     COMPARISON: CT from 6/21/2024, MRI from 6/22/2024     FINDINGS:     Encephalomalacia is apparent in the left basal ganglia, about 1.7 cm.     No acute intracranial hemorrhage, midline shift or mass effect.     Mild to moderate periventricular hypodensities suggest chronic small  vessel ischemic change in a patient this age.     Arterial calcifications are seen at the base of the brain.     Ventricles, cisterns, cerebral sulci are stable.   "   The visualized paranasal sinuses, orbits, mastoid air cells are  unremarkable.          Impression:         No acute intracranial hemorrhage or hydrocephalus. Post infarct changes  at left basal ganglia. If there is further clinical concern, MRI could  be considered for further evaluation.     This report was finalized on 7/4/2024 8:12 AM by Dr. Antonio Ch M.D on Workstation: [id number]       XR Chest PA & Lateral [[id number]] Collected: 07/04/24 0803     Updated: 07/04/24 0808    Narrative:      XR CHEST PA AND LATERAL-     HISTORY: Female who is 44 years-old, postoperative evaluation     TECHNIQUE: Frontal and lateral views of the chest     COMPARISON: 7/3/2024     FINDINGS: The heart is enlarged. Pulmonary vasculature is congested.  Sternotomy hardware is noted. Minimal pleural effusions are apparent,  with basilar atelectasis or infiltrate posteriorly on the lateral view,  follow-up suggested. No pneumothorax. No acute osseous process.       Impression:      As described.     This report was finalized on 7/4/2024 8:04 AM by Dr. Antonio Ch M.D on Workstation: [id number]       XR Chest 1 View [[id number]] Collected: 07/03/24 1118     Updated: 07/03/24 1125    Narrative:      Portable chest radiograph     HISTORY: Chest tube removal, postop open heart     TECHNIQUE: Single AP portable radiograph of the chest     COMPARISON: Chest radiograph dating back to 7/1/2024       Impression:      FINDINGS AND IMPRESSION:  Previously seen thoracostomy tubes appear to been removed. Presumed  epicardial pacing wires, median sternotomy wires and right internal  jugular sheath are present.     Findings of pneumomediastinum, as before. Mild left basilar pulmonary  opacification persists. No pneumothorax is seen. Cardiac silhouette is  accentuated by patient rotation; however, there is to be at least mildly  enlarged.     This report was finalized on 7/3/2024 11:22 AM by Dr. Bin Bey M.D  on Workstation:  [id number]       XR Chest 1 View [[id number]] Collected: 07/03/24 0602     Updated: 07/03/24 0629    Narrative:      Portable chest radiograph     HISTORY: Postop open heart     TECHNIQUE: Single AP portable radiograph of the chest     COMPARISON: Multiple chest radiographs dating back to 6/21/2024       Impression:      FINDINGS AND IMPRESSION:  There appear to be 4 thoracostomy tubes overlying the bilateral lungs a  mediastinum. The right internal jugular sheath is present.     Bibasilar pulmonary opacification has mild to moderately decreased. No  pneumothorax seen. Cardiac silhouette is accentuated by low lung  volumes. Findings suggestive of pneumomediastinum, as before.     This report was finalized on 7/3/2024 6:26 AM by Dr. Bin Bey M.D  on Workstation: [id number]       US Renal Bilateral [[id number]] Collected: 07/02/24 1934     Updated: 07/02/24 2046    Narrative:      RENAL SONOGRAM     HISTORY: ARF, CARLA, CRF, CKD     COMPARISON: CT chest 06/26/2024.     FINDINGS: Right kidney measures 8.7 x 5.2 x 5.7 cm and the left kidney  measures 8.8 x 5 x 5.4 cm. No focal renal sonographic abnormality is  evident though note that this is a very limited exam as the patient just  got out of surgery and is unable to move well and has pain. Nelson  catheter is present in the urinary bladder.       Impression:      Limited renal sonogram demonstrates no hydronephrosis or  evidence for acute abnormality.     This report was finalized on 7/2/2024 8:43 PM by Dr. Huan Noriega M.D on Workstation: [id number]               Results for orders placed during the hospital encounter of 06/21/24    XR Chest 1 View    Narrative  Portable chest radiograph    HISTORY: Chest tube removal, postop open heart    TECHNIQUE: Single AP portable radiograph of the chest    COMPARISON: Chest radiograph dating back to 7/1/2024    Impression  FINDINGS AND IMPRESSION:  Previously seen thoracostomy tubes appear to been removed.  Presumed  epicardial pacing wires, median sternotomy wires and right internal  jugular sheath are present.    Findings of pneumomediastinum, as before. Mild left basilar pulmonary  opacification persists. No pneumothorax is seen. Cardiac silhouette is  accentuated by patient rotation; however, there is to be at least mildly  enlarged.    This report was finalized on 7/3/2024 11:22 AM by Dr. Bin Bey M.D  on Workstation: [id number]      XR Chest PA & Lateral    Narrative  XR CHEST PA AND LATERAL-    HISTORY: Female who is 44 years-old, postoperative evaluation    TECHNIQUE: Frontal and lateral views of the chest    COMPARISON: 7/3/2024    FINDINGS: The heart is enlarged. Pulmonary vasculature is congested.  Sternotomy hardware is noted. Minimal pleural effusions are apparent,  with basilar atelectasis or infiltrate posteriorly on the lateral view,  follow-up suggested. No pneumothorax. No acute osseous process.    Impression  As described.    This report was finalized on 7/4/2024 8:04 AM by Dr. Antonio Ch M.D on Workstation: [id number]      XR Chest 1 View    Narrative  Portable chest radiograph    HISTORY: Postop open heart    TECHNIQUE: Single AP portable radiograph of the chest    COMPARISON: Multiple chest radiographs dating back to 6/21/2024    Impression  FINDINGS AND IMPRESSION:  There appear to be 4 thoracostomy tubes overlying the bilateral lungs a  mediastinum. The right internal jugular sheath is present.    Bibasilar pulmonary opacification has mild to moderately decreased. No  pneumothorax seen. Cardiac silhouette is accentuated by low lung  volumes. Findings suggestive of pneumomediastinum, as before.    This report was finalized on 7/3/2024 6:26 AM by Dr. Bin Bey M.D  on Workstation: [id number]      Results for orders placed during the hospital encounter of 06/21/24    Duplex Carotid Ultrasound CAR    Interpretation Summary    Right internal carotid artery demonstrates a less  than 50% stenosis.    Left internal carotid artery demonstrates a less than 50% stenosis.        ASSESSMENT / PLAN      Acute right-sided weakness    Benign essential hypertension    Gastroparesis diabeticorum    Gastroesophageal reflux disease    Mixed hypercholesterolemia and hypertriglyceridemia    Iron deficiency anemia    Type 2 diabetes mellitus with hyperglycemia, with long-term current use of insulin    Coronary artery disease involving native coronary artery of native heart with unstable angina pectoris    Tobacco abuse    CKD stage 3a, GFR 45-59 ml/min    Carotid stenosis    Lacunar cerebrovascular accident (CVA)    Elevated troponin    Aortic valve disease      ARF/CARLA/CRF/CKD---Nonoliguric. +ARF/CARLA on top of known CRF/CKD STG   3A, with a baseline serum creatinine of about 1.2. CRF/CKD STG 3A is secondary to DGS/HTN NS. +ARF/CARLA appears to be secondary to prerenal state/intravascular volume depletion and some ATN from hypotension. CARLA resolved. Support BP and avoid hypotension. Renal US negative.  No NSAIDs or IV dye. Dose meds for CrCl 30-45 cc/min.  Keep off of SGLT2 inhibitor for now     2. ACIDOSIS-----PO NaHCO3     3. DMII WITH RENAL MANIFESTATIONS-----Hold SGLT2 inhibitor given ARF/CARLA. Lantus, Glucometers, SSI     4. HTN WITH CKD-----BP ok. No ACE-I/ARB/DRI/diuretic for now. Follow for pressor need. Follow off of IVFs     5. HYPERLIPIDEMIA----Statin     6. OA/DJD------No NSAIDs. Uric ok     7. DM PERIPHERAL NEUROPATHY-----On Neurontin     8. GERD/DM GASTROPARESIS/PUD PROPHYLAXIS-------Reglan and PPI. Benefits of PPI use outweigh risks, despite renal dysfunction     8. DVT PROPHYLAXIS----Renal dose adjusted Lovenox     9. PROTEINURIA-----Secondary to DGS     10. VITAMIN D DEFICIENCY-----On Supplementation prior to admission     11. ANEMIA OF CKD AND POST-OP ANEMIA------IV iron for NIGHAT     12. DEPRESSION-----On Lexapro     13. CAD S/P CABG S/P AV FIBROELASTOMA RESECTION/REPAIR-----per  , CT Surgery     14. POST OP ATELECTASIS/LB WITH H/O TOBACCO ABUSE-----Encourage IS. No active wheezing and respiratory status stable    15. HYPOCALCEMIA------Replace IV      Tamiko Jenkins MD  [facility]  [phone]  07/05/24  07:26 EDT

## 2024-07-05 NOTE — PAYOR COMM NOTE
"Bibiana Belcher (44 y.o. Female)                            ATTENTION; CONTINUED CLINICALS REF [id number]                         REPLY TO [facility] DEPT  [phone]           Date of Birth   1980    Social Security Number       Address   [address] [place] [id number]    Home Phone   [phone]    MRN   [id number]       [employer]   None    Marital Status                               Admission Date   6/21/24    Admission Type   Emergency    Admitting Provider   Raul Davis MD    Attending Provider   Lenin Huang MD    Department, Room/Bed   [facility] CARDIOVASC UNIT, 2212/1       Discharge Date       Discharge Disposition       Discharge Destination                                 Attending Provider: Lenin Huang MD    Allergies: Latex    Isolation: None   Infection: None   Code Status: CPR    Ht: 162.6 cm (64\")   Wt: 68.8 kg (151 lb 11.2 oz)    Admission Cmt: None   Principal Problem: Acute right-sided weakness [R53.1]                   Active Insurance as of 6/21/2024       Primary Coverage       Payor Plan Insurance Group Employer/Plan Group    [facility] [employer] [facility] [employer] EMPLOYEE [id number]       Payor Plan Address Payor Plan Phone Number Payor Plan Fax Number Effective Dates    [address] [phone]  2/1/2020 - None Entered    [place] [address]         Subscriber Name Subscriber Birth Date Member ID       BIBIANA BELCHER 1980 [id number]                     Emergency Contacts        (Rel.) Home Phone Work Phone Mobile Phone    HOLLAND BELCHER (Spouse) [phone] -- [phone]              Vital Signs (last day)       Date/Time Temp Temp src Pulse Resp BP Patient Position SpO2    07/05/24 1200 98.9 (37.2) Oral 60 16 134/69 Sitting --    07/05/24 0818 -- -- 60 16 105/49 Sitting --    07/05/24 0713 -- -- 56 -- -- -- --    07/05/24 0712 -- -- 57 -- -- -- 100    07/05/24 0710 -- -- 58 -- -- -- 100    07/05/24 0709 -- -- " 57 16 -- -- 99    07/05/24 0355 98.8 (37.1) Oral 55 16 122/62 Lying 96    07/04/24 2335 98.7 (37.1) Oral 54 16 138/67 Lying 95    07/04/24 2100 -- -- 63 -- 140/69 -- 94    07/04/24 1940 98.5 (36.9) Oral 58 16 145/68 Lying 98    07/04/24 1934 -- -- 59 20 -- -- 100    07/04/24 1929 -- -- 59 20 -- -- 96    07/04/24 1900 -- -- 61 -- -- -- 98    07/04/24 1618 98.9 (37.2) Oral 58 18 132/68 Lying 98    07/04/24 1200 98.3 (36.8) Axillary 59 18 148/65 Lying 97    07/04/24 1100 -- -- 59 -- 138/71 -- 96    07/04/24 1059 -- -- 61 -- -- -- 94    07/04/24 1000 -- -- 62 -- 147/63 -- 94    07/04/24 0900 -- -- 65 -- 134/55 -- 97    07/04/24 0815 -- -- 68 18 115/64 Sitting 99    07/04/24 0709 -- -- 67 18 -- -- 100    07/04/24 0704 -- -- 62 18 -- -- 100    07/04/24 0702 -- -- 65 17 -- -- 98    07/04/24 0653 -- -- 62 -- -- -- 98    07/04/24 0600 -- -- 60 -- 145/65 -- 98    07/04/24 0500 99.5 (37.5) -- 58 -- 130/63 -- 95    07/04/24 0400 99.5 (37.5) Bladder 62 16 134/64 Lying 96    07/04/24 0300 99.3 (37.4) -- 61 -- 120/55 -- 95    07/04/24 0200 99.3 (37.4) -- 60 -- 121/57 -- 96    07/04/24 0100 99.1 (37.3) Bladder 58 22 132/59 Lying 96    07/04/24 0000 99 (37.2) -- 58 -- 127/57 -- 96          Oxygen Therapy (last day)       Date/Time SpO2 Device (Oxygen Therapy) Flow (L/min) Oxygen Concentration (%) ETCO2 (mmHg)    07/05/24 1409 -- room air -- -- --    07/05/24 0818 -- room air -- -- --    07/05/24 0712 100 -- -- -- --    07/05/24 0710 100 -- -- -- --    07/05/24 0709 99 room air -- 21 --    07/05/24 0400 -- room air -- -- --    07/05/24 0355 96 -- -- -- --    07/05/24 0000 -- room air -- -- --    07/04/24 2335 95 -- -- -- --    07/04/24 2100 94 -- -- -- --    07/04/24 1945 -- room air -- -- --    07/04/24 1940 98 -- -- -- --    07/04/24 1934 100 -- -- -- --    07/04/24 1929 96 room air -- -- --    07/04/24 1900 98 -- -- -- --    07/04/24 1618 98 -- -- -- --    07/04/24 1200 97 room air -- -- --    07/04/24 1100 96 -- -- -- --     07/04/24 1059 94 -- -- -- --    07/04/24 1000 94 -- -- -- --    07/04/24 0900 97 -- -- -- --    07/04/24 0815 99 room air -- -- --    07/04/24 0709 100 room air -- -- --    07/04/24 0704 100 room air -- -- --    07/04/24 0702 98 room air -- -- --    07/04/24 0653 98 -- -- -- --    07/04/24 0600 98 -- -- -- --    07/04/24 0500 95 -- -- -- --    07/04/24 0400 96 room air -- -- --    07/04/24 0300 95 -- -- -- --    07/04/24 0200 96 -- -- -- --    07/04/24 0100 96 room air -- -- --    07/04/24 0000 96 -- -- -- --          Lines, Drains & Airways       Active LDAs       Name Placement date Placement time Site Days    Peripheral IV 07/01/24 1300 Anterior;Left Forearm 07/01/24  1300  prior to arrival to this unit  Forearm  4                  Orders (last 24 hrs)        Start     Ordered    07/06/24 0600  CBC (No Diff)  Morning Draw         07/05/24 0727 07/06/24 0600  Basic Metabolic Panel  Morning Draw         07/05/24 0727 07/06/24 0600  Magnesium  Morning Draw         07/05/24 0727    07/06/24 0600  Phosphorus  Morning Draw         07/05/24 0727 07/05/24 2045  carvedilol (COREG) tablet 12.5 mg  Every 12 Hours         07/05/24 1036    07/05/24 1600  sodium bicarbonate tablet 650 mg  Daily         07/05/24 1401    07/05/24 1127  POC Glucose Once  PROCEDURE ONCE        Comments: Complete no more than 45 minutes prior to patient eating      07/05/24 1125    07/05/24 0932  AV Wires Discontinue  Until Discontinued         07/05/24 0931    07/05/24 0815  calcium gluconate 1000 Mg/50ml 0.675% NaCl IV SOLN  Once         07/05/24 0727 07/05/24 0635  POC Glucose Once  PROCEDURE ONCE        Comments: Complete no more than 45 minutes prior to patient eating      07/05/24 0633    07/05/24 0610  POC OR Panel, ISTAT  PROCEDURE ONCE         07/01/24 1145    07/05/24 0609  POC Activated Clotting Time  PROCEDURE ONCE         07/01/24 0927    07/05/24 0609  POC OR Panel, ISTAT  PROCEDURE ONCE         07/01/24 0949    07/05/24  0609  POC OR Panel, ISTAT  PROCEDURE ONCE         07/01/24 0954    07/05/24 0609  POC OR Panel, ISTAT  PROCEDURE ONCE         07/01/24 1123    07/05/24 0608  POC OR Panel, ISTAT  PROCEDURE ONCE         07/01/24 0728    07/05/24 0608  POC OR Panel, ISTAT  PROCEDURE ONCE         07/01/24 1021    07/05/24 0608  POC OR Panel, ISTAT  PROCEDURE ONCE         07/01/24 1054    07/05/24 0600  CBC (No Diff)  Morning Draw,   Status:  Canceled         07/04/24 0805    07/05/24 0600  Basic Metabolic Panel  Morning Draw,   Status:  Canceled         07/04/24 0805    07/05/24 0600  Magnesium  Morning Draw         07/04/24 0805    07/04/24 2019  POC Glucose Once  PROCEDURE ONCE        Comments: Complete no more than 45 minutes prior to patient eating      07/04/24 2018    07/04/24 1745  potassium chloride (K-DUR,KLOR-CON) ER tablet 20 mEq  Once         07/04/24 1650    07/04/24 1621  POC Glucose Once  PROCEDURE ONCE        Comments: Complete no more than 45 minutes prior to patient eating      07/04/24 1619    07/04/24 1400  hydrALAZINE (APRESOLINE) tablet 75 mg  Every 8 Hours Scheduled         07/04/24 0647    07/04/24 0900  cloNIDine (CATAPRES) tablet 0.2 mg  Every 12 Hours Scheduled,   Status:  Discontinued         07/04/24 0646    07/04/24 0600  Wound Care  Daily       07/01/24 1308    07/03/24 2100  atorvastatin (LIPITOR) tablet 40 mg  Nightly         07/03/24 0707    07/03/24 0900  ferric gluconate (FERRLECIT)125 MG in sodium chloride 0.9 % 100 mL IVPB  Daily         07/03/24 0619    07/03/24 0900  insulin glargine (LANTUS, SEMGLEE) injection 20 Units  Daily         07/03/24 0708    07/03/24 0845  carvedilol (COREG) tablet 3.125 mg  Every 12 Hours,   Status:  Discontinued         07/03/24 0747    07/03/24 0747  hydrALAZINE (APRESOLINE) injection 20 mg  Every 6 Hours PRN         07/03/24 0748    07/03/24 0600  Change Chest Dressing Daily While Intubated  Daily       07/01/24 1308    07/03/24 0600  Wound Care  Daily        "07/01/24 1308    07/03/24 0600  Wound Care  Daily       07/01/24 1308    07/03/24 0600  CBC (No Diff)  Daily       07/01/24 1308    07/03/24 0600  Basic Metabolic Panel  Daily       07/01/24 1308    07/02/24 2100  sennosides-docusate (PERICOLACE) 8.6-50 MG per tablet 2 tablet  Nightly         07/01/24 1308    07/02/24 1800  Enoxaparin Sodium (LOVENOX) syringe 30 mg  Daily         07/02/24 1635    07/02/24 1400  Intake & Output  Every Shift       07/01/24 1308    07/02/24 1200  Vital Signs  Every 4 Hours      Comments: Perform Vitals Less Frequently Only if Patient Stable      07/01/24 1308    07/02/24 1100  POC Glucose 4x Daily Before Meals & at Bedtime  4 Times Daily Before Meals & at Bedtime      Comments: Complete no more than 45 minutes prior to patient eating      07/02/24 0811    07/02/24 0930  sodium chloride 7 % nebulizer solution nebulizer solution 4 mL  2 Times Daily - RT         07/02/24 0726    07/02/24 0900  aspirin EC tablet 81 mg  Daily         07/01/24 1308    07/02/24 0900  escitalopram (LEXAPRO) tablet 10 mg  Daily         07/01/24 1308    07/02/24 0900  guaiFENesin (MUCINEX) 12 hr tablet 1,200 mg  Every 12 Hours Scheduled         07/02/24 0726    07/02/24 0900  insulin lispro (HUMALOG/ADMELOG) injection 2-9 Units  4 Times Daily Before Meals & Nightly         07/02/24 0811    07/02/24 0830  Oscillating Positive Expiratory Pressure (OPEP)  4 Times Daily - RT       07/02/24 0726    07/02/24 0811  dextrose (GLUTOSE) oral gel 15 g  Every 15 Minutes PRN         07/02/24 0811    07/02/24 0811  dextrose (D50W) (25 g/50 mL) IV injection 25 g  Every 15 Minutes PRN         07/02/24 0811    07/02/24 0811  glucagon (GLUCAGEN) injection 1 mg  Every 15 Minutes PRN         07/02/24 0811    07/02/24 0725  ipratropium-albuterol (DUO-NEB) nebulizer solution 3 mL  Every 4 Hours PRN         07/02/24 0726    07/02/24 0700  pantoprazole (PROTONIX) EC tablet 40 mg  Every Morning        Placed in \"Followed by\" Linked " "Group    07/01/24 1308    07/02/24 0313  acetaminophen (TYLENOL) tablet 650 mg  Every 4 Hours PRN        Placed in \"Or\" Linked Group    07/01/24 1308    07/02/24 0313  acetaminophen (TYLENOL) 160 MG/5ML oral solution 650 mg  Every 4 Hours PRN        Placed in \"Or\" Linked Group    07/01/24 1308    07/02/24 0313  acetaminophen (TYLENOL) suppository 650 mg  Every 4 Hours PRN        Placed in \"Or\" Linked Group    07/01/24 1308    07/02/24 0000  bisacodyl (DULCOLAX) suppository 10 mg  Daily PRN         07/01/24 1308    07/02/24 0000  magnesium hydroxide (MILK OF MAGNESIA) suspension 10 mL  Daily PRN         07/01/24 1308    07/01/24 2330  Patient May Use Home CPAP / BIPAP For Sleep  At Bedtime - RT       07/01/24 1308    07/01/24 2100  mupirocin (BACTROBAN) 2 % nasal ointment  2 Times Daily         07/01/24 1308    07/01/24 1600  Check Peripheral Pulses Every 4 Hours  Every 4 Hours       07/01/24 1308    07/01/24 1400  Incentive Spirometry  Every Hour While Awake       07/01/24 1308    07/01/24 1309  Daily Weights  Daily       07/01/24 1308    07/01/24 1309  Monitor QTc  Every Shift       07/01/24 1308    07/01/24 1308  morphine injection 1 mg  Every 4 Hours PRN        Placed in \"And\" Linked Group    07/01/24 1308    07/01/24 1308  naloxone (NARCAN) injection 0.4 mg  Every 5 Minutes PRN        Placed in \"And\" Linked Group    07/01/24 1308    07/01/24 1308  polyethylene glycol (MIRALAX) packet 17 g  Daily PRN         07/01/24 1308    07/01/24 1308  ondansetron (ZOFRAN) injection 4 mg  Every 6 Hours PRN         07/01/24 1308    07/01/24 1308  ALPRAZolam (XANAX) tablet 0.25 mg  Every 8 Hours PRN         07/01/24 1308    07/01/24 1308  niCARdipine (CARDENE) 25 mg in 250 mL NS infusion kit  Continuous PRN         07/01/24 1308    07/01/24 1308  Potassium Replacement - Follow Nurse / BPA Driven Protocol  As Needed         07/01/24 1308    07/01/24 1308  bisacodyl (DULCOLAX) EC tablet 10 mg  Daily PRN         07/01/24 1308    " 07/01/24 1308  nitroglycerin (NITROSTAT) SL tablet 0.4 mg  Every 5 Minutes PRN         07/01/24 1308    Unscheduled  Check Peripheral Pulses As Needed  As Needed       07/01/24 1308    Unscheduled  Cardiac Output Parameters PRN Until 24 Hours Post-Op  As Needed       07/01/24 1308    Unscheduled  Pacemaker Settings - Initiate for Heart Rate Less Than 60 And / Or Hemodynamically Unstable  As Needed      Comments: Mode: AAI  Atrial rate: 90  Atrial mA: 10  Atrial mV: 0.5    07/01/24 1308    Unscheduled  Insert Nasogastric Tube If Indicated & Not Already in Place  As Needed      Comments: Indications: Nausea, Vomiting, Prolonged Intubation or to Administer Medications  Attach to Low Wall Suction if Any Residual    07/01/24 1308    Unscheduled  Cleanse Incision With Normal Saline and Redress With Dry 4x4 Gauze if Incision is Draining  As Needed       07/01/24 1308    Unscheduled  Wound Care  As Needed       07/01/24 1308    Unscheduled  Dangle at Bedside After Extubation  As Needed       07/01/24 1308    Unscheduled  Up in Chair As Tolerated After Extubation  As Needed       07/01/24 1308    Unscheduled  Oxygen Therapy- Nasal Cannula; 2 LPM  Continuous PRN       07/01/24 1308    Unscheduled  Patient May Use Home CPAP / BIPAP As Needed  As Needed       07/01/24 1308    Unscheduled  Blood Gas, Arterial -  As Needed      Comments: 30 Minutes After Ventilator Changes, 30 Minutes After Extubation and PRN      07/01/24 1308    Unscheduled  CBC & Differential  As Needed        Comments: Chest Tube Drainage Greater Than 200mL/hr      07/01/24 1308    Unscheduled  aPTT  As Needed        Comments: Chest Tube Drainage Greater Than 200mL/hr      07/01/24 1308    Unscheduled  Protime-INR  As Needed        Comments: Chest Tube Drainage Greater Than 200mL/hr      07/01/24 1308    Unscheduled  Fibrinogen  As Needed        Comments: Chest Tube Drainage Greater Than 200mL/hr      07/01/24 1308    Unscheduled  Follow Hypoglycemia Standing  Orders For Blood Glucose <70 & Notify Provider of Treatment  As Needed      Comments: Follow Hypoglycemia Orders As Outlined in Process Instructions (Open Order Report to View Full Instructions)  Notify Provider Any Time Hypoglycemia Treatment is Administered    24 0811    Unscheduled  Bladder Scan if Patient Unable to Void 4-6 Hours After Catheter Removal  As Needed         24 07    Unscheduled  If Bladder Scan Volume is Less Than 500mL & Patient is Without Symptoms of Bladder Discomfort / Distention Monitor Every 1-2 Hours for Spontaneous Void  As Needed       24 07    Unscheduled  Straight Cath Every 4-6 Hours As Needed If Patient is Unable to Void After 4-6 Hours, Bladder Scan Volume is Greater Than 500mL & Patient Has Symptoms of Bladder Discomfort / Distention  As Needed       24 07    Unscheduled  Schedule / Prompt Voiding For Patients With Urinary Incontinence  As Needed       24 07    Signed and Held  atorvastatin (LIPITOR) tablet 40 mg  Nightly,   Status:  Canceled         Signed and Held                     Physician Progress Notes (last 24 hours)        Allison Miranda MD at 24 1032          Patient Name: Bibiana Inman  :1980  44 y.o.      Patient Care Team:  Ibrahima Correa MD as PCP - General (Family Medicine)  Rachele Zafar RN as Ambulatory  (Population Health)  Xochilt Jenkins MD as Consulting Physician (Nephrology)    Interval History:   Heart rate and blood pressure controlled    Subjective:  Following for cardiac issue    Objective   Vital Signs  Temp:  [98.3 °F (36.8 °C)-98.9 °F (37.2 °C)] 98.8 °F (37.1 °C)  Heart Rate:  [54-63] 60  Resp:  [16-20] 16  BP: (105-148)/(49-71) 105/49    Intake/Output Summary (Last 24 hours) at 2024 1032  Last data filed at 2024 0620  Gross per 24 hour   Intake --   Output 1600 ml   Net -1600 ml     Flowsheet Rows      Flowsheet Row First Filed Value   Admission Height 162.6 cm  "(64\") Documented at 06/21/2024 2105   Admission Weight 65.5 kg (144 lb 6.4 oz) Documented at 06/21/2024 2105            Physical Exam:   General Appearance:    Alert, cooperative, in no acute distress   Lungs:     Clear to auscultation.  Normal respiratory effort and rate.      Heart:    Regular rhythm and normal rate, normal S1 and S2, no murmurs, gallops or rubs.     Chest Wall:  Healing well   Abdomen:     Soft, nontender, positive bowel sounds.     Extremities:   no cyanosis, clubbing or edema.  No marked joint deformities.  Adequate musculoskeletal strength.       Results Review:    Results from last 7 days   Lab Units 07/05/24  0220   SODIUM mmol/L 136   POTASSIUM mmol/L 4.1   CHLORIDE mmol/L 106   CO2 mmol/L 20.2*   BUN mg/dL 16   CREATININE mg/dL 1.07*   GLUCOSE mg/dL 93   CALCIUM mg/dL 8.5*     Results from last 7 days   Lab Units 07/04/24  0251 07/03/24  0301 07/02/24  1745   CK TOTAL U/L 100 280* 426*     Results from last 7 days   Lab Units 07/05/24  0220   WBC 10*3/mm3 8.86   HEMOGLOBIN g/dL 8.5*   HEMATOCRIT % 26.2*   PLATELETS 10*3/mm3 131*     Results from last 7 days   Lab Units 07/02/24  0239 07/01/24  1312   INR  1.25* 1.33*   APTT seconds  --  31.3         Results from last 7 days   Lab Units 07/05/24  0220   MAGNESIUM mg/dL 2.0             Medication Review:   aspirin, 81 mg, Oral, Daily  atorvastatin, 40 mg, Oral, Nightly  carvedilol, 3.125 mg, Oral, Q12H  cloNIDine, 0.2 mg, Oral, Q12H  enoxaparin, 30 mg, Subcutaneous, Daily  escitalopram, 10 mg, Oral, Daily  ferric gluconate, 125 mg, Intravenous, Daily  guaiFENesin, 1,200 mg, Oral, Q12H  hydrALAZINE, 75 mg, Oral, Q8H  insulin glargine, 20 Units, Subcutaneous, Daily  insulin lispro, 2-9 Units, Subcutaneous, 4x Daily AC & at Bedtime  mupirocin, , Each Nare, BID  pantoprazole, 40 mg, Oral, QAM  senna-docusate sodium, 2 tablet, Oral, Nightly  sodium chloride, 4 mL, Nebulization, BID - RT         niCARdipine, 5-15 mg/hr, Last Rate: Stopped " (24 1500)        Assessment & Plan     1.  Aortic valve mass/fibroelastoma.  Status post resection on 2024.  Patient presented with strokelike symptoms.  2.  Coronary artery disease status post CABG with a vein graft to the obtuse marginal x 1 on 2024.  Previous bypass with a  of the LAD.  There is a LIMA to the LAD, vein graft to the circumflex is occluded, vein graft to the right coronary is patent.  3.  Diabetes with a hemoglobin A1c of 16.9.  4.  Hypertension.  Blood pressure is controlled.  Stop clonidine and increase carvedilol.  5.  Hyperlipidemia  6.  Tobacco use  7.  Carotid artery stenosis with a 70% lesion in the right internal carotid artery.  8.  Obstructive sleep apnea.    Discontinue clonidine and increase carvedilol tonight.  Stable from a cardiac standpoint.    Allison Miranda MD, [facility]  24  10:32 EDT          Electronically signed by Allison Miranda MD at 24 1201       Lenin Huang MD at 24 0955              Name: Bibiana Inman ADMIT: 2024   : 1980  PCP: Ibrahima Correa MD    MRN: [id number] LOS: 14 days   AGE/SEX: 44 y.o. female  ROOM: /     Subjective   Subjective   No acute events. Patient denies new complaints. No family at bedside.    Objective   Objective   Vital Signs  Temp:  [98.3 °F (36.8 °C)-98.9 °F (37.2 °C)] 98.8 °F (37.1 °C)  Heart Rate:  [54-63] 60  Resp:  [16-20] 16  BP: (105-148)/(49-71) 105/49  SpO2:  [94 %-100 %] 100 %  on   ;   Device (Oxygen Therapy): room air  Body mass index is 26.04 kg/m².  Physical Exam  Vitals and nursing note reviewed.   Constitutional:       General: She is not in acute distress.     Appearance: She is not toxic-appearing or diaphoretic.   HENT:      Head: Normocephalic and atraumatic.      Nose: Nose normal.      Mouth/Throat:      Mouth: Mucous membranes are moist.      Pharynx: Oropharynx is clear.   Eyes:      Conjunctiva/sclera: Conjunctivae  normal.      Pupils: Pupils are equal, round, and reactive to light.   Cardiovascular:      Rate and Rhythm: Normal rate and regular rhythm.      Pulses: Normal pulses.      Comments: Surgical wound c/d/i  Pulmonary:      Effort: Pulmonary effort is normal.   Abdominal:      General: Bowel sounds are normal.      Palpations: Abdomen is soft.      Tenderness: There is no abdominal tenderness.   Musculoskeletal:         General: No swelling or tenderness.      Cervical back: Neck supple.   Skin:     General: Skin is warm and dry.      Capillary Refill: Capillary refill takes less than 2 seconds.   Neurological:      Mental Status: She is alert and oriented to person, place, and time.      Comments: +mild right lower facial weakness   Psychiatric:         Mood and Affect: Mood normal.         Behavior: Behavior normal.       Results Review     I reviewed the patient's new clinical results.  Results from last 7 days   Lab Units 07/05/24  0220 07/04/24  0251 07/03/24  0301 07/03/24  0011 07/02/24  0239   WBC 10*3/mm3 8.86 9.85 10.99*  --  14.01*   HEMOGLOBIN g/dL 8.5* 8.6* 8.9*  --  9.1*   HEMOGLOBIN, POC g/dL  --   --   --  8.0*  --    PLATELETS 10*3/mm3 131* 122* 123*  --  129*     Results from last 7 days   Lab Units 07/05/24  0220 07/04/24  1048 07/04/24  0251 07/03/24  0301 07/03/24  0011 07/02/24  0239   SODIUM mmol/L 136  --  139 141  --  142   POTASSIUM mmol/L 4.1 3.9 3.8 4.4  --  4.3   CHLORIDE mmol/L 106  --  107 107  --  112*   CO2 mmol/L 20.2*  --  24.0 26.0  --  20.7*   BUN mg/dL 16  --  19 21*  --  20   CREATININE mg/dL 1.07*  --  1.27* 1.47* 1.38* 1.92*   GLUCOSE mg/dL 93  --  91 158*  --  117*   EGFR mL/min/1.73 65.0  --  52.9* 44.4*  --  32.2*     Results from last 7 days   Lab Units 07/03/24  0301 07/02/24  0239 07/01/24  1647 07/01/24  1312   ALBUMIN g/dL 3.5 3.7 3.6 3.8   BILIRUBIN mg/dL 0.2  --   --   --    ALK PHOS U/L 62  --   --   --    AST (SGOT) U/L 23  --   --   --    ALT (SGPT) U/L <5  --   --    --      Results from last 7 days   Lab Units 07/05/24  0220 07/04/24  0251 07/03/24  0301 07/02/24  0239 07/01/24  1647 07/01/24  1312   CALCIUM mg/dL 8.5* 9.0 9.0 8.5* 8.4* 8.9   ALBUMIN g/dL  --   --  3.5 3.7 3.6 3.8   MAGNESIUM mg/dL 2.0 2.0 2.2 2.7* 2.4 2.7*   PHOSPHORUS mg/dL  --  2.7 5.2* 4.8* 2.9 2.0*     Results from last 7 days   Lab Units 07/03/24  0301 07/03/24  0011 07/01/24  1909   LACTATE mmol/L 1.4 2.0 <0.4     Glucose   Date/Time Value Ref Range Status   07/05/2024 0633 121 70 - 130 mg/dL Final   07/04/2024 2018 176 (H) 70 - 130 mg/dL Final   07/04/2024 1619 138 (H) 70 - 130 mg/dL Final   07/04/2024 1127 150 (H) 70 - 130 mg/dL Final   07/04/2024 0547 93 70 - 130 mg/dL Final   07/03/2024 2022 164 (H) 70 - 130 mg/dL Final   07/03/2024 1700 144 (H) 70 - 130 mg/dL Final       CT Head Without Contrast    Result Date: 7/4/2024   No acute intracranial hemorrhage or hydrocephalus. Post infarct changes at left basal ganglia. If there is further clinical concern, MRI could be considered for further evaluation.  This report was finalized on 7/4/2024 8:12 AM by Dr. Antonio Ch M.D on Workstation: [id number]      XR Chest PA & Lateral    Result Date: 7/4/2024  As described.  This report was finalized on 7/4/2024 8:04 AM by Dr. Antonio Ch M.D on Workstation: [id number]      XR Chest 1 View    Result Date: 7/3/2024  FINDINGS AND IMPRESSION: Previously seen thoracostomy tubes appear to been removed. Presumed epicardial pacing wires, median sternotomy wires and right internal jugular sheath are present.  Findings of pneumomediastinum, as before. Mild left basilar pulmonary opacification persists. No pneumothorax is seen. Cardiac silhouette is accentuated by patient rotation; however, there is to be at least mildly enlarged.  This report was finalized on 7/3/2024 11:22 AM by Dr. Bin Bey M.D on Workstation: [id number]       I have personally reviewed all medications:  Scheduled Medications  aspirin,  81 mg, Oral, Daily  atorvastatin, 40 mg, Oral, Nightly  carvedilol, 3.125 mg, Oral, Q12H  cloNIDine, 0.2 mg, Oral, Q12H  enoxaparin, 30 mg, Subcutaneous, Daily  escitalopram, 10 mg, Oral, Daily  ferric gluconate, 125 mg, Intravenous, Daily  guaiFENesin, 1,200 mg, Oral, Q12H  hydrALAZINE, 75 mg, Oral, Q8H  insulin glargine, 20 Units, Subcutaneous, Daily  insulin lispro, 2-9 Units, Subcutaneous, 4x Daily AC & at Bedtime  mupirocin, , Each Nare, BID  pantoprazole, 40 mg, Oral, QAM  senna-docusate sodium, 2 tablet, Oral, Nightly  sodium chloride, 4 mL, Nebulization, BID - RT    Infusions  niCARdipine, 5-15 mg/hr, Last Rate: Stopped (07/03/24 1500)      Diet  Diet: Cardiac, Diabetic; Healthy Heart (2-3 Na+); Consistent Carbohydrate; Fluid Consistency: Thin (IDDSI 0)    I have personally reviewed:  [x]  Laboratory   []  Microbiology   [x]  Radiology   [x]  EKG/Telemetry  []  Cardiology/Vascular   []  Pathology    []  Records    Assessment/Plan     Active Hospital Problems    Diagnosis  POA    **Acute right-sided weakness [R53.1]  Yes    CKD stage 3a, GFR 45-59 ml/min [N18.31]  Yes    Carotid stenosis [I65.29]  Yes    Lacunar cerebrovascular accident (CVA) [I63.81]  Yes    Elevated troponin [R79.89]  Yes    Aortic valve disease [I35.9]  Unknown    Tobacco abuse [Z72.0]  Yes    Coronary artery disease involving native coronary artery of native heart with unstable angina pectoris [I25.110]  Yes    Gastroparesis diabeticorum [E11.43, 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 De Luna  - appreciate cardiology, CT surgery, and neurology recs     CARLA on CKD Stage 3b  - pre-renal, now resolved  - follow volume status and chemistries  - appreciate nephrology recs    Type 2 DM  - complications include gastroparesis  - on lantus and jardiance as outpatient  - BG acceptable, needs better outpatient control, A1C 16.90%  - continue lantus 20 units daily   - cover with ssi/hypoglycemia protocol  - jardiance held    HTN/CAD s/p CABG  - BP acceptable  - continue coreg, clonidine, and hydralazine      GERD  - symptoms controlled, continue ppi    SCDs for DVT prophylaxis.  Full code.  Discussed with patient and nursing staff.  Anticipate discharge home with home health in 2-3 days.  Expected Discharge Date: 7/8/2024; Expected Discharge Time:       Lenin Huang MD  [facility]  07/05/24  10:01 EDT    Portions of this text have been copied and I have reviewed them. They are accurate as of 7/5/2024      Electronically signed by Lenin Huang MD at 07/05/24 1001       Lora Valenzuela APRN at 07/05/24 0832           LOS: 14 days   Patient Care Team:  Ibrahima Correa MD as PCP - General (Family Medicine)  Rachele Zafar, RN as Ambulatory  (Population Health)  Xochilt Jenkins MD as Consulting Physician (Nephrology)    Chief Complaint: post op follow-up    Subjective      Vital Signs  Temp:  [98.3 °F (36.8 °C)-98.9 °F  "(37.2 °C)] 98.8 °F (37.1 °C)  Heart Rate:  [54-65] 60  Resp:  [16-20] 16  BP: (105-148)/(49-71) 105/49  Body mass index is 26.04 kg/m².    Intake/Output Summary (Last 24 hours) at 7/5/2024 0832  Last data filed at 7/5/2024 0620  Gross per 24 hour   Intake 100 ml   Output 1700 ml   Net -1600 ml     No intake/output data recorded.            07/02/24  0534 07/03/24  0500 07/05/24  0620   Weight: 71.2 kg (156 lb 15.5 oz) 74.6 kg (164 lb 7.4 oz) 68.8 kg (151 lb 11.2 oz)         Objective:  Vital signs: (most recent): Blood pressure 105/49, pulse 60, temperature 98.8 °F (37.1 °C), temperature source Oral, resp. rate 16, height 162.6 cm (64\"), weight 68.8 kg (151 lb 11.2 oz), last menstrual period 01/10/2023, SpO2 100%, not currently breastfeeding.                Results Review:        WBC WBC   Date Value Ref Range Status   07/05/2024 8.86 3.40 - 10.80 10*3/mm3 Final   07/04/2024 9.85 3.40 - 10.80 10*3/mm3 Final   07/03/2024 10.99 (H) 3.40 - 10.80 10*3/mm3 Final      HGB Hemoglobin   Date Value Ref Range Status   07/05/2024 8.5 (L) 12.0 - 15.9 g/dL Final   07/04/2024 8.6 (L) 12.0 - 15.9 g/dL Final   07/03/2024 8.9 (L) 12.0 - 15.9 g/dL Final   07/03/2024 8.0 (C) 12.0 - 17.0 g/dL Final     Comment:     Serial Number: [id number]:  [id number]      HCT Hematocrit   Date Value Ref Range Status   07/05/2024 26.2 (L) 34.0 - 46.6 % Final   07/04/2024 26.8 (L) 34.0 - 46.6 % Final   07/03/2024 27.7 (L) 34.0 - 46.6 % Final   07/03/2024 24 (L) 38 - 51 % Final      Platelets Platelets   Date Value Ref Range Status   07/05/2024 131 (L) 140 - 450 10*3/mm3 Final   07/04/2024 122 (L) 140 - 450 10*3/mm3 Final   07/03/2024 123 (L) 140 - 450 10*3/mm3 Final        PT/INR:    No results found for: \"PROTIME\"  /  No results found for: \"INR\"      Sodium Sodium   Date Value Ref Range Status   07/05/2024 136 136 - 145 mmol/L Final   07/04/2024 139 136 - 145 mmol/L Final   07/03/2024 141 136 - 145 mmol/L Final      Potassium Potassium   Date Value " Ref Range Status   07/05/2024 4.1 3.5 - 5.2 mmol/L Final   07/04/2024 3.9 3.5 - 5.2 mmol/L Final   07/04/2024 3.8 3.5 - 5.2 mmol/L Final   07/03/2024 4.4 3.5 - 5.2 mmol/L Final      Chloride Chloride   Date Value Ref Range Status   07/05/2024 106 98 - 107 mmol/L Final   07/04/2024 107 98 - 107 mmol/L Final   07/03/2024 107 98 - 107 mmol/L Final      Bicarbonate CO2   Date Value Ref Range Status   07/05/2024 20.2 (L) 22.0 - 29.0 mmol/L Final   07/04/2024 24.0 22.0 - 29.0 mmol/L Final   07/03/2024 26.0 22.0 - 29.0 mmol/L Final      BUN BUN   Date Value Ref Range Status   07/05/2024 16 6 - 20 mg/dL Final   07/04/2024 19 6 - 20 mg/dL Final   07/03/2024 21 (H) 6 - 20 mg/dL Final      Creatinine Creatinine   Date Value Ref Range Status   07/05/2024 1.07 (H) 0.57 - 1.00 mg/dL Final   07/04/2024 1.27 (H) 0.57 - 1.00 mg/dL Final   07/03/2024 1.47 (H) 0.57 - 1.00 mg/dL Final   07/03/2024 1.38 (H) 0.60 - 1.30 mg/dL Final      Calcium Calcium   Date Value Ref Range Status   07/05/2024 8.5 (L) 8.6 - 10.5 mg/dL Final   07/04/2024 9.0 8.6 - 10.5 mg/dL Final   07/03/2024 9.0 8.6 - 10.5 mg/dL Final      Magnesium Magnesium   Date Value Ref Range Status   07/05/2024 2.0 1.6 - 2.6 mg/dL Final   07/04/2024 2.0 1.6 - 2.6 mg/dL Final   07/03/2024 2.2 1.6 - 2.6 mg/dL Final          aspirin, 81 mg, Oral, Daily  atorvastatin, 40 mg, Oral, Nightly  carvedilol, 3.125 mg, Oral, Q12H  cloNIDine, 0.2 mg, Oral, Q12H  enoxaparin, 30 mg, Subcutaneous, Daily  escitalopram, 10 mg, Oral, Daily  ferric gluconate, 125 mg, Intravenous, Daily  guaiFENesin, 1,200 mg, Oral, Q12H  hydrALAZINE, 75 mg, Oral, Q8H  insulin glargine, 20 Units, Subcutaneous, Daily  insulin lispro, 2-9 Units, Subcutaneous, 4x Daily AC & at Bedtime  mupirocin, , Each Nare, BID  pantoprazole, 40 mg, Oral, QAM  senna-docusate sodium, 2 tablet, Oral, Nightly  sodium chloride, 4 mL, Nebulization, BID - RT      niCARdipine, 5-15 mg/hr, Last Rate: Stopped (07/03/24 1500)               Acute right-sided weakness    Benign essential hypertension    Gastroparesis diabeticorum    Gastroesophageal reflux disease    Mixed hypercholesterolemia and hypertriglyceridemia    Iron deficiency anemia    Type 2 diabetes mellitus with hyperglycemia, with long-term current use of insulin    Coronary artery disease involving native coronary artery of native heart with unstable angina pectoris    Tobacco abuse    CKD stage 3a, GFR 45-59 ml/min    Carotid stenosis    Lacunar cerebrovascular accident (CVA)    Elevated troponin    Aortic valve disease      Assessment & Plan    -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  Encourage pulmonary toilet--- continue IS/flutter, mucinex and nebs  Mobilize/increase activity-- PT/OT  Discontinue AV wires   Making good progress.  May be able to get out of here in the next few days.  Continue routine care    KAUSHAL Alvarado  07/05/24  08:32 EDT      Electronically signed by Lora Valenzuela APRN at 07/05/24 0931       Xochilt Jenkins MD at 07/05/24 0710          NEPHROLOGY PROGRESS NOTE------[facility]    [facility]  [phone]  Tamiko Jenkins MD      Patient Care Team:  Ibrahima Correa MD as PCP - General (Family Medicine)  Rachele Zafar, RN as Ambulatory  (Population  "Health)  Xochilt Jenkins MD as Consulting Physician (Nephrology)      Provider:  Tamiko Jenkins MD  Patient Name: Bibiana Inman  :  1980    SUBJECTIVE:    F/U ARF/CARLA/CRF/CKD    No new events noted overnight. No SOB, CP    Medication:  aspirin, 81 mg, Oral, Daily  atorvastatin, 40 mg, Oral, Nightly  carvedilol, 3.125 mg, Oral, Q12H  cloNIDine, 0.2 mg, Oral, Q12H  enoxaparin, 30 mg, Subcutaneous, Daily  escitalopram, 10 mg, Oral, Daily  ferric gluconate, 125 mg, Intravenous, Daily  guaiFENesin, 1,200 mg, Oral, Q12H  hydrALAZINE, 75 mg, Oral, Q8H  insulin glargine, 20 Units, Subcutaneous, Daily  insulin lispro, 2-9 Units, Subcutaneous, 4x Daily AC & at Bedtime  mupirocin, , Each Nare, BID  pantoprazole, 40 mg, Oral, QAM  senna-docusate sodium, 2 tablet, Oral, Nightly  sodium chloride, 4 mL, Nebulization, BID - RT      niCARdipine, 5-15 mg/hr, Last Rate: Stopped (24 1500)        OBJECTIVE    Vital Sign Min/Max for last 24 hours  Temp  Min: 98.3 °F (36.8 °C)  Max: 98.9 °F (37.2 °C)   BP  Min: 115/64  Max: 148/65   Pulse  Min: 54  Max: 68   Resp  Min: 16  Max: 20   SpO2  Min: 94 %  Max: 100 %   No data recorded   Weight  Min: 68.8 kg (151 lb 11.2 oz)  Max: 68.8 kg (151 lb 11.2 oz)     Flowsheet Rows      Flowsheet Row First Filed Value   Admission Height 162.6 cm (64\") Documented at 2024   Admission Weight 65.5 kg (144 lb 6.4 oz) Documented at 2024            No intake/output data recorded.  I/O last 3 completed shifts:  In: 100 [IV Piggyback:100]  Out: 2860 [Urine:2860]    Physical Exam:  General Appearance: alert, appears stated age and cooperative  Head: normocephalic, without obvious abnormality and atraumatic.   Eyes: conjunctivae and sclerae normal and no icterus  Neck: supple and no JVD  Lungs: DECREASED BS BIBASILAR  Heart: regular rhythm & normal rate and normal S1, S2 +TITI (PACED)  Chest Wall: S/P SURGICAL CHANGES POST STERNOTOMY S/P OPEN HEART SURGERY " "WITH CT  Abdomen: +HYPOACTIVE BS; SOFT; NT/ND  Extremities: moves extremities well, no edema, no cyanosis  Skin: no bleeding, bruising or rash.    Neurologic: Alert, and oriented. No focal deficits    Labs:    WBC WBC   Date Value Ref Range Status   07/05/2024 8.86 3.40 - 10.80 10*3/mm3 Final   07/04/2024 9.85 3.40 - 10.80 10*3/mm3 Final   07/03/2024 10.99 (H) 3.40 - 10.80 10*3/mm3 Final      HGB Hemoglobin   Date Value Ref Range Status   07/05/2024 8.5 (L) 12.0 - 15.9 g/dL Final   07/04/2024 8.6 (L) 12.0 - 15.9 g/dL Final   07/03/2024 8.9 (L) 12.0 - 15.9 g/dL Final   07/03/2024 8.0 (C) 12.0 - 17.0 g/dL Final     Comment:     Serial Number: [id number]:  [id number]      HCT Hematocrit   Date Value Ref Range Status   07/05/2024 26.2 (L) 34.0 - 46.6 % Final   07/04/2024 26.8 (L) 34.0 - 46.6 % Final   07/03/2024 27.7 (L) 34.0 - 46.6 % Final   07/03/2024 24 (L) 38 - 51 % Final      Platelets No results found for: \"LABPLAT\"   MCV MCV   Date Value Ref Range Status   07/05/2024 92.3 79.0 - 97.0 fL Final   07/04/2024 93.1 79.0 - 97.0 fL Final   07/03/2024 91.7 79.0 - 97.0 fL Final          Sodium Sodium   Date Value Ref Range Status   07/05/2024 136 136 - 145 mmol/L Final   07/04/2024 139 136 - 145 mmol/L Final   07/03/2024 141 136 - 145 mmol/L Final      Potassium Potassium   Date Value Ref Range Status   07/05/2024 4.1 3.5 - 5.2 mmol/L Final   07/04/2024 3.9 3.5 - 5.2 mmol/L Final   07/04/2024 3.8 3.5 - 5.2 mmol/L Final   07/03/2024 4.4 3.5 - 5.2 mmol/L Final      Chloride Chloride   Date Value Ref Range Status   07/05/2024 106 98 - 107 mmol/L Final   07/04/2024 107 98 - 107 mmol/L Final   07/03/2024 107 98 - 107 mmol/L Final      CO2 CO2   Date Value Ref Range Status   07/05/2024 20.2 (L) 22.0 - 29.0 mmol/L Final   07/04/2024 24.0 22.0 - 29.0 mmol/L Final   07/03/2024 26.0 22.0 - 29.0 mmol/L Final      BUN BUN   Date Value Ref Range Status   07/05/2024 16 6 - 20 mg/dL Final   07/04/2024 19 6 - 20 mg/dL Final   07/03/2024 " "21 (H) 6 - 20 mg/dL Final      Creatinine Creatinine   Date Value Ref Range Status   07/05/2024 1.07 (H) 0.57 - 1.00 mg/dL Final   07/04/2024 1.27 (H) 0.57 - 1.00 mg/dL Final   07/03/2024 1.47 (H) 0.57 - 1.00 mg/dL Final   07/03/2024 1.38 (H) 0.60 - 1.30 mg/dL Final      Calcium Calcium   Date Value Ref Range Status   07/05/2024 8.5 (L) 8.6 - 10.5 mg/dL Final   07/04/2024 9.0 8.6 - 10.5 mg/dL Final   07/03/2024 9.0 8.6 - 10.5 mg/dL Final      PO4 No components found for: \"PO4\"   Albumin Albumin   Date Value Ref Range Status   07/03/2024 3.5 3.5 - 5.2 g/dL Final      Magnesium Magnesium   Date Value Ref Range Status   07/05/2024 2.0 1.6 - 2.6 mg/dL Final   07/04/2024 2.0 1.6 - 2.6 mg/dL Final   07/03/2024 2.2 1.6 - 2.6 mg/dL Final      Uric Acid No components found for: \"URIC ACID\"     Imaging Results (Last 72 Hours)       Procedure Component Value Units Date/Time    CT Head Without Contrast [[id number]] Collected: 07/04/24 0807     Updated: 07/04/24 0815    Narrative:      CT HEAD WO CONTRAST-     INDICATIONS: Stroke     TECHNIQUE: Radiation dose reduction techniques were utilized, including  automated exposure control and exposure modulation based on body size.  Noncontrast head CT     COMPARISON: CT from 6/21/2024, MRI from 6/22/2024     FINDINGS:     Encephalomalacia is apparent in the left basal ganglia, about 1.7 cm.     No acute intracranial hemorrhage, midline shift or mass effect.     Mild to moderate periventricular hypodensities suggest chronic small  vessel ischemic change in a patient this age.     Arterial calcifications are seen at the base of the brain.     Ventricles, cisterns, cerebral sulci are stable.     The visualized paranasal sinuses, orbits, mastoid air cells are  unremarkable.          Impression:         No acute intracranial hemorrhage or hydrocephalus. Post infarct changes  at left basal ganglia. If there is further clinical concern, MRI could  be considered for further evaluation.   "   This report was finalized on 7/4/2024 8:12 AM by Dr. Antonio Ch M.D on Workstation: [id number]       XR Chest PA & Lateral [[id number]] Collected: 07/04/24 0803     Updated: 07/04/24 0808    Narrative:      XR CHEST PA AND LATERAL-     HISTORY: Female who is 44 years-old, postoperative evaluation     TECHNIQUE: Frontal and lateral views of the chest     COMPARISON: 7/3/2024     FINDINGS: The heart is enlarged. Pulmonary vasculature is congested.  Sternotomy hardware is noted. Minimal pleural effusions are apparent,  with basilar atelectasis or infiltrate posteriorly on the lateral view,  follow-up suggested. No pneumothorax. No acute osseous process.       Impression:      As described.     This report was finalized on 7/4/2024 8:04 AM by Dr. Antonio Ch M.D on Workstation: [id number]       XR Chest 1 View [[id number]] Collected: 07/03/24 1118     Updated: 07/03/24 1125    Narrative:      Portable chest radiograph     HISTORY: Chest tube removal, postop open heart     TECHNIQUE: Single AP portable radiograph of the chest     COMPARISON: Chest radiograph dating back to 7/1/2024       Impression:      FINDINGS AND IMPRESSION:  Previously seen thoracostomy tubes appear to been removed. Presumed  epicardial pacing wires, median sternotomy wires and right internal  jugular sheath are present.     Findings of pneumomediastinum, as before. Mild left basilar pulmonary  opacification persists. No pneumothorax is seen. Cardiac silhouette is  accentuated by patient rotation; however, there is to be at least mildly  enlarged.     This report was finalized on 7/3/2024 11:22 AM by Dr. Bin Bey M.D  on Workstation: [id number]       XR Chest 1 View [[id number]] Collected: 07/03/24 0602     Updated: 07/03/24 0629    Narrative:      Portable chest radiograph     HISTORY: Postop open heart     TECHNIQUE: Single AP portable radiograph of the chest     COMPARISON: Multiple chest radiographs dating back to 6/21/2024        Impression:      FINDINGS AND IMPRESSION:  There appear to be 4 thoracostomy tubes overlying the bilateral lungs a  mediastinum. The right internal jugular sheath is present.     Bibasilar pulmonary opacification has mild to moderately decreased. No  pneumothorax seen. Cardiac silhouette is accentuated by low lung  volumes. Findings suggestive of pneumomediastinum, as before.     This report was finalized on 7/3/2024 6:26 AM by Dr. Bin Bey M.D  on Workstation: [id number]        Renal Bilateral [[id number]] Collected: 07/02/24 1934     Updated: 07/02/24 2046    Narrative:      RENAL SONOGRAM     HISTORY: ARF, CARLA, CRF, CKD     COMPARISON: CT chest 06/26/2024.     FINDINGS: Right kidney measures 8.7 x 5.2 x 5.7 cm and the left kidney  measures 8.8 x 5 x 5.4 cm. No focal renal sonographic abnormality is  evident though note that this is a very limited exam as the patient just  got out of surgery and is unable to move well and has pain. Nelson  catheter is present in the urinary bladder.       Impression:      Limited renal sonogram demonstrates no hydronephrosis or  evidence for acute abnormality.     This report was finalized on 7/2/2024 8:43 PM by Dr. Huan Noriega M.D on Workstation: [id number]               Results for orders placed during the hospital encounter of 06/21/24    XR Chest 1 View    Narrative  Portable chest radiograph    HISTORY: Chest tube removal, postop open heart    TECHNIQUE: Single AP portable radiograph of the chest    COMPARISON: Chest radiograph dating back to 7/1/2024    Impression  FINDINGS AND IMPRESSION:  Previously seen thoracostomy tubes appear to been removed. Presumed  epicardial pacing wires, median sternotomy wires and right internal  jugular sheath are present.    Findings of pneumomediastinum, as before. Mild left basilar pulmonary  opacification persists. No pneumothorax is seen. Cardiac silhouette is  accentuated by patient rotation; however, there is to be at  least mildly  enlarged.    This report was finalized on 7/3/2024 11:22 AM by Dr. Bin Bey M.D  on Workstation: [id number]      XR Chest PA & Lateral    Narrative  XR CHEST PA AND LATERAL-    HISTORY: Female who is 44 years-old, postoperative evaluation    TECHNIQUE: Frontal and lateral views of the chest    COMPARISON: 7/3/2024    FINDINGS: The heart is enlarged. Pulmonary vasculature is congested.  Sternotomy hardware is noted. Minimal pleural effusions are apparent,  with basilar atelectasis or infiltrate posteriorly on the lateral view,  follow-up suggested. No pneumothorax. No acute osseous process.    Impression  As described.    This report was finalized on 7/4/2024 8:04 AM by Dr. Antonio Ch M.D on Workstation: [id number]      XR Chest 1 View    Narrative  Portable chest radiograph    HISTORY: Postop open heart    TECHNIQUE: Single AP portable radiograph of the chest    COMPARISON: Multiple chest radiographs dating back to 6/21/2024    Impression  FINDINGS AND IMPRESSION:  There appear to be 4 thoracostomy tubes overlying the bilateral lungs a  mediastinum. The right internal jugular sheath is present.    Bibasilar pulmonary opacification has mild to moderately decreased. No  pneumothorax seen. Cardiac silhouette is accentuated by low lung  volumes. Findings suggestive of pneumomediastinum, as before.    This report was finalized on 7/3/2024 6:26 AM by Dr. Bin Bey M.D  on Workstation: [id number]      Results for orders placed during the hospital encounter of 06/21/24    Duplex Carotid Ultrasound CAR    Interpretation Summary    Right internal carotid artery demonstrates a less than 50% stenosis.    Left internal carotid artery demonstrates a less than 50% stenosis.        ASSESSMENT / PLAN      Acute right-sided weakness    Benign essential hypertension    Gastroparesis diabeticorum    Gastroesophageal reflux disease    Mixed hypercholesterolemia and hypertriglyceridemia    Iron  deficiency anemia    Type 2 diabetes mellitus with hyperglycemia, with long-term current use of insulin    Coronary artery disease involving native coronary artery of native heart with unstable angina pectoris    Tobacco abuse    CKD stage 3a, GFR 45-59 ml/min    Carotid stenosis    Lacunar cerebrovascular accident (CVA)    Elevated troponin    Aortic valve disease      ARF/CARLA/CRF/CKD---Nonoliguric. +ARF/CARLA on top of known CRF/CKD STG   3A, with a baseline serum creatinine of about 1.2. CRF/CKD STG 3A is secondary to DGS/HTN NS. +ARF/CARLA appears to be secondary to prerenal state/intravascular volume depletion and some ATN from hypotension. CARLA resolved. Support BP and avoid hypotension. Renal US negative.  No NSAIDs or IV dye. Dose meds for CrCl 30-45 cc/min.  Keep off of SGLT2 inhibitor for now     2. ACIDOSIS-----PO NaHCO3     3. DMII WITH RENAL MANIFESTATIONS-----Hold SGLT2 inhibitor given ARF/CARLA. Lantus, Glucometers, SSI     4. HTN WITH CKD-----BP ok. No ACE-I/ARB/DRI/diuretic for now. Follow for pressor need. Follow off of IVFs     5. HYPERLIPIDEMIA----Statin     6. OA/DJD------No NSAIDs. Uric ok     7. DM PERIPHERAL NEUROPATHY-----On Neurontin     8. GERD/DM GASTROPARESIS/PUD PROPHYLAXIS-------Reglan and PPI. Benefits of PPI use outweigh risks, despite renal dysfunction     8. DVT PROPHYLAXIS----Renal dose adjusted Lovenox     9. PROTEINURIA-----Secondary to DGS     10. VITAMIN D DEFICIENCY-----On Supplementation prior to admission     11. ANEMIA OF CKD AND POST-OP ANEMIA------IV iron for NIGHAT     12. DEPRESSION-----On Lexapro     13. CAD S/P CABG S/P AV FIBROELASTOMA RESECTION/REPAIR-----per , CT Surgery     14. POST OP ATELECTASIS/LB WITH H/O TOBACCO ABUSE-----Encourage IS. No active wheezing and respiratory status stable    15. HYPOCALCEMIA------Replace IV      Tamiko Jenkins MD  [facility]  [phone]  07/05/24  07:26 EDT      Electronically signed  by Xochilt Jenkins MD at 07/05/24 5075

## 2024-07-05 NOTE — PROGRESS NOTES
"Patient Name: Bibiana Inman  :1980  44 y.o.      Patient Care Team:  Ibrahima Correa MD as PCP - General (Family Medicine)  Rachele Zafar RN as Ambulatory  (Population Health)  Xochilt Jenkins MD as Consulting Physician (Nephrology)    Interval History:   Heart rate and blood pressure controlled    Subjective:  Following for cardiac issue    Objective   Vital Signs  Temp:  [98.3 °F (36.8 °C)-98.9 °F (37.2 °C)] 98.8 °F (37.1 °C)  Heart Rate:  [54-63] 60  Resp:  [16-20] 16  BP: (105-148)/(49-71) 105/49    Intake/Output Summary (Last 24 hours) at 2024 1032  Last data filed at 2024 0620  Gross per 24 hour   Intake --   Output 1600 ml   Net -1600 ml     Flowsheet Rows      Flowsheet Row First Filed Value   Admission Height 162.6 cm (64\") Documented at 2024   Admission Weight 65.5 kg (144 lb 6.4 oz) Documented at 2024            Physical Exam:   General Appearance:    Alert, cooperative, in no acute distress   Lungs:     Clear to auscultation.  Normal respiratory effort and rate.      Heart:    Regular rhythm and normal rate, normal S1 and S2, no murmurs, gallops or rubs.     Chest Wall:  Healing well   Abdomen:     Soft, nontender, positive bowel sounds.     Extremities:   no cyanosis, clubbing or edema.  No marked joint deformities.  Adequate musculoskeletal strength.       Results Review:    Results from last 7 days   Lab Units 24  0220   SODIUM mmol/L 136   POTASSIUM mmol/L 4.1   CHLORIDE mmol/L 106   CO2 mmol/L 20.2*   BUN mg/dL 16   CREATININE mg/dL 1.07*   GLUCOSE mg/dL 93   CALCIUM mg/dL 8.5*     Results from last 7 days   Lab Units 24  0251 24  0301 24  1745   CK TOTAL U/L 100 280* 426*     Results from last 7 days   Lab Units 24  0220   WBC 10*3/mm3 8.86   HEMOGLOBIN g/dL 8.5*   HEMATOCRIT % 26.2*   PLATELETS 10*3/mm3 131*     Results from last 7 days   Lab Units 24  0239 24  1312   INR  1.25* 1.33* "   APTT seconds  --  31.3         Results from last 7 days   Lab Units 07/05/24  0220   MAGNESIUM mg/dL 2.0             Medication Review:   aspirin, 81 mg, Oral, Daily  atorvastatin, 40 mg, Oral, Nightly  carvedilol, 3.125 mg, Oral, Q12H  cloNIDine, 0.2 mg, Oral, Q12H  enoxaparin, 30 mg, Subcutaneous, Daily  escitalopram, 10 mg, Oral, Daily  ferric gluconate, 125 mg, Intravenous, Daily  guaiFENesin, 1,200 mg, Oral, Q12H  hydrALAZINE, 75 mg, Oral, Q8H  insulin glargine, 20 Units, Subcutaneous, Daily  insulin lispro, 2-9 Units, Subcutaneous, 4x Daily AC & at Bedtime  mupirocin, , Each Nare, BID  pantoprazole, 40 mg, Oral, QAM  senna-docusate sodium, 2 tablet, Oral, Nightly  sodium chloride, 4 mL, Nebulization, BID - RT         niCARdipine, 5-15 mg/hr, Last Rate: Stopped (07/03/24 1500)        Assessment & Plan     1.  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  Patient presented with strokelike symptoms.  2.  Coronary artery disease status post CABG with a vein graft to the obtuse marginal x 1 on July 1, 2024.  Previous bypass with a  of the LAD.  There is a LIMA to the LAD, vein graft to the circumflex is occluded, vein graft to the right coronary is patent.  3.  Diabetes with a hemoglobin A1c of 16.9.  4.  Hypertension.  Blood pressure is controlled.  Stop clonidine and increase carvedilol.  5.  Hyperlipidemia  6.  Tobacco use  7.  Carotid artery stenosis with a 70% lesion in the right internal carotid artery.  8.  Obstructive sleep apnea.    Discontinue clonidine and increase carvedilol tonight.  Stable from a cardiac standpoint.    Allison Miranda MD, [facility]  07/05/24  10:32 EDT

## 2024-07-06 LAB
ANION GAP SERPL CALCULATED.3IONS-SCNC: 8 MMOL/L (ref 5–15)
BUN SERPL-MCNC: 15 MG/DL (ref 6–20)
BUN/CREAT SERPL: 15.2 (ref 7–25)
CALCIUM SPEC-SCNC: 8.6 MG/DL (ref 8.6–10.5)
CHLORIDE SERPL-SCNC: 106 MMOL/L (ref 98–107)
CO2 SERPL-SCNC: 22 MMOL/L (ref 22–29)
CREAT SERPL-MCNC: 0.99 MG/DL (ref 0.57–1)
DEPRECATED RDW RBC AUTO: 46.2 FL (ref 37–54)
EGFRCR SERPLBLD CKD-EPI 2021: 71.4 ML/MIN/1.73
ERYTHROCYTE [DISTWIDTH] IN BLOOD BY AUTOMATED COUNT: 13.6 % (ref 12.3–15.4)
GLUCOSE BLDC GLUCOMTR-MCNC: 116 MG/DL (ref 70–130)
GLUCOSE BLDC GLUCOMTR-MCNC: 166 MG/DL (ref 70–130)
GLUCOSE BLDC GLUCOMTR-MCNC: 183 MG/DL (ref 70–130)
GLUCOSE BLDC GLUCOMTR-MCNC: 195 MG/DL (ref 70–130)
GLUCOSE SERPL-MCNC: 137 MG/DL (ref 65–99)
HCT VFR BLD AUTO: 25.6 % (ref 34–46.6)
HGB BLD-MCNC: 8.4 G/DL (ref 12–15.9)
MAGNESIUM SERPL-MCNC: 2.1 MG/DL (ref 1.6–2.6)
MCH RBC QN AUTO: 30.1 PG (ref 26.6–33)
MCHC RBC AUTO-ENTMCNC: 32.8 G/DL (ref 31.5–35.7)
MCV RBC AUTO: 91.8 FL (ref 79–97)
PHOSPHATE SERPL-MCNC: 3.1 MG/DL (ref 2.5–4.5)
PLATELET # BLD AUTO: 148 10*3/MM3 (ref 140–450)
PMV BLD AUTO: 11.5 FL (ref 6–12)
POTASSIUM SERPL-SCNC: 3.7 MMOL/L (ref 3.5–5.2)
POTASSIUM SERPL-SCNC: 3.8 MMOL/L (ref 3.5–5.2)
POTASSIUM SERPL-SCNC: 4.5 MMOL/L (ref 3.5–5.2)
RBC # BLD AUTO: 2.79 10*6/MM3 (ref 3.77–5.28)
SODIUM SERPL-SCNC: 136 MMOL/L (ref 136–145)
WBC NRBC COR # BLD AUTO: 7 10*3/MM3 (ref 3.4–10.8)

## 2024-07-06 PROCEDURE — 80048 BASIC METABOLIC PNL TOTAL CA: CPT | Performed by: NURSE PRACTITIONER

## 2024-07-06 PROCEDURE — 25010000002 ENOXAPARIN PER 10 MG: Performed by: INTERNAL MEDICINE

## 2024-07-06 PROCEDURE — 94664 DEMO&/EVAL PT USE INHALER: CPT

## 2024-07-06 PROCEDURE — 99232 SBSQ HOSP IP/OBS MODERATE 35: CPT | Performed by: PHYSICIAN ASSISTANT

## 2024-07-06 PROCEDURE — 94799 UNLISTED PULMONARY SVC/PX: CPT

## 2024-07-06 PROCEDURE — 94761 N-INVAS EAR/PLS OXIMETRY MLT: CPT

## 2024-07-06 PROCEDURE — 83735 ASSAY OF MAGNESIUM: CPT | Performed by: INTERNAL MEDICINE

## 2024-07-06 PROCEDURE — 97530 THERAPEUTIC ACTIVITIES: CPT

## 2024-07-06 PROCEDURE — 63710000001 INSULIN GLARGINE PER 5 UNITS: Performed by: NURSE PRACTITIONER

## 2024-07-06 PROCEDURE — 85027 COMPLETE CBC AUTOMATED: CPT | Performed by: INTERNAL MEDICINE

## 2024-07-06 PROCEDURE — 84132 ASSAY OF SERUM POTASSIUM: CPT

## 2024-07-06 PROCEDURE — 84100 ASSAY OF PHOSPHORUS: CPT | Performed by: INTERNAL MEDICINE

## 2024-07-06 PROCEDURE — 63710000001 INSULIN LISPRO (HUMAN) PER 5 UNITS: Performed by: NURSE PRACTITIONER

## 2024-07-06 PROCEDURE — 82948 REAGENT STRIP/BLOOD GLUCOSE: CPT

## 2024-07-06 RX ORDER — POTASSIUM CHLORIDE 750 MG/1
20 TABLET, FILM COATED, EXTENDED RELEASE ORAL ONCE
Status: COMPLETED | OUTPATIENT
Start: 2024-07-06 | End: 2024-07-06

## 2024-07-06 RX ORDER — POTASSIUM CHLORIDE 750 MG/1
40 TABLET, FILM COATED, EXTENDED RELEASE ORAL ONCE
Status: COMPLETED | OUTPATIENT
Start: 2024-07-06 | End: 2024-07-06

## 2024-07-06 RX ORDER — POTASSIUM CHLORIDE 750 MG/1
20 TABLET, FILM COATED, EXTENDED RELEASE ORAL ONCE
Status: DISCONTINUED | OUTPATIENT
Start: 2024-07-06 | End: 2024-07-06

## 2024-07-06 RX ADMIN — INSULIN LISPRO 2 UNITS: 100 INJECTION, SOLUTION INTRAVENOUS; SUBCUTANEOUS at 11:47

## 2024-07-06 RX ADMIN — ENOXAPARIN SODIUM 30 MG: 100 INJECTION SUBCUTANEOUS at 09:10

## 2024-07-06 RX ADMIN — HYDRALAZINE HYDROCHLORIDE 75 MG: 50 TABLET ORAL at 14:42

## 2024-07-06 RX ADMIN — ASPIRIN 81 MG: 81 TABLET, COATED ORAL at 09:10

## 2024-07-06 RX ADMIN — HYDRALAZINE HYDROCHLORIDE 75 MG: 50 TABLET ORAL at 06:43

## 2024-07-06 RX ADMIN — POTASSIUM CHLORIDE 20 MEQ: 750 TABLET, EXTENDED RELEASE ORAL at 16:44

## 2024-07-06 RX ADMIN — PANTOPRAZOLE SODIUM 40 MG: 40 TABLET, DELAYED RELEASE ORAL at 06:43

## 2024-07-06 RX ADMIN — CARVEDILOL 12.5 MG: 12.5 TABLET, FILM COATED ORAL at 09:10

## 2024-07-06 RX ADMIN — SODIUM BICARBONATE 650 MG: 650 TABLET, ORALLY DISINTEGRATING ORAL at 09:10

## 2024-07-06 RX ADMIN — ESCITALOPRAM OXALATE 10 MG: 10 TABLET, FILM COATED ORAL at 09:10

## 2024-07-06 RX ADMIN — ATORVASTATIN CALCIUM 40 MG: 20 TABLET, FILM COATED ORAL at 22:04

## 2024-07-06 RX ADMIN — CARVEDILOL 12.5 MG: 12.5 TABLET, FILM COATED ORAL at 22:05

## 2024-07-06 RX ADMIN — INSULIN GLARGINE 20 UNITS: 100 INJECTION, SOLUTION SUBCUTANEOUS at 09:10

## 2024-07-06 RX ADMIN — HYDRALAZINE HYDROCHLORIDE 75 MG: 50 TABLET ORAL at 22:04

## 2024-07-06 RX ADMIN — INSULIN LISPRO 2 UNITS: 100 INJECTION, SOLUTION INTRAVENOUS; SUBCUTANEOUS at 16:44

## 2024-07-06 RX ADMIN — Medication 4 ML: at 06:58

## 2024-07-06 RX ADMIN — INSULIN LISPRO 2 UNITS: 100 INJECTION, SOLUTION INTRAVENOUS; SUBCUTANEOUS at 22:11

## 2024-07-06 RX ADMIN — POTASSIUM CHLORIDE 40 MEQ: 750 TABLET, EXTENDED RELEASE ORAL at 09:10

## 2024-07-06 RX ADMIN — GUAIFENESIN 1200 MG: 600 TABLET, EXTENDED RELEASE ORAL at 09:10

## 2024-07-06 RX ADMIN — IPRATROPIUM BROMIDE AND ALBUTEROL SULFATE 3 ML: .5; 3 SOLUTION RESPIRATORY (INHALATION) at 06:57

## 2024-07-06 RX ADMIN — GUAIFENESIN 1200 MG: 600 TABLET, EXTENDED RELEASE ORAL at 22:04

## 2024-07-06 NOTE — PROGRESS NOTES
" LOS: 15 days   Patient Care Team:  Ibrahima Correa MD as PCP - General (Family Medicine)  Rachele Zafar, ALEENA as Ambulatory  (Population Health)  Xochilt Jenkins MD as Consulting Physician (Nephrology)    Chief Complaint: post op follow-up    Subjective      Vital Signs  Temp:  [98.3 °F (36.8 °C)-99.1 °F (37.3 °C)] 98.5 °F (36.9 °C)  Heart Rate:  [52-60] 52  Resp:  [16-20] 20  BP: (105-154)/(49-79) 136/79  Body mass index is 26.19 kg/m².    Intake/Output Summary (Last 24 hours) at 7/6/2024 0729  Last data filed at 7/6/2024 0500  Gross per 24 hour   Intake --   Output 500 ml   Net -500 ml     No intake/output data recorded.            07/03/24  0500 07/05/24  0620 07/06/24  0500   Weight: 74.6 kg (164 lb 7.4 oz) 68.8 kg (151 lb 11.2 oz) 69.2 kg (152 lb 9.6 oz)         Objective:  Vital signs: (most recent): Blood pressure 116/54, pulse 67, temperature 98.2 °F (36.8 °C), temperature source Oral, resp. rate 19, height 162.6 cm (64\"), weight 69.2 kg (152 lb 9.6 oz), last menstrual period 01/10/2023, SpO2 97%, not currently breastfeeding.                Results Review:        WBC WBC   Date Value Ref Range Status   07/06/2024 7.00 3.40 - 10.80 10*3/mm3 Final   07/05/2024 8.86 3.40 - 10.80 10*3/mm3 Final   07/04/2024 9.85 3.40 - 10.80 10*3/mm3 Final      HGB Hemoglobin   Date Value Ref Range Status   07/06/2024 8.4 (L) 12.0 - 15.9 g/dL Final   07/05/2024 8.5 (L) 12.0 - 15.9 g/dL Final   07/04/2024 8.6 (L) 12.0 - 15.9 g/dL Final      HCT Hematocrit   Date Value Ref Range Status   07/06/2024 25.6 (L) 34.0 - 46.6 % Final   07/05/2024 26.2 (L) 34.0 - 46.6 % Final   07/04/2024 26.8 (L) 34.0 - 46.6 % Final      Platelets Platelets   Date Value Ref Range Status   07/06/2024 148 140 - 450 10*3/mm3 Final   07/05/2024 131 (L) 140 - 450 10*3/mm3 Final   07/04/2024 122 (L) 140 - 450 10*3/mm3 Final        PT/INR:    No results found for: \"PROTIME\"  /  No results found for: \"INR\"      Sodium Sodium   Date " Value Ref Range Status   07/06/2024 136 136 - 145 mmol/L Final   07/05/2024 136 136 - 145 mmol/L Final   07/04/2024 139 136 - 145 mmol/L Final      Potassium Potassium   Date Value Ref Range Status   07/06/2024 3.7 3.5 - 5.2 mmol/L Final   07/05/2024 4.1 3.5 - 5.2 mmol/L Final   07/04/2024 3.9 3.5 - 5.2 mmol/L Final   07/04/2024 3.8 3.5 - 5.2 mmol/L Final      Chloride Chloride   Date Value Ref Range Status   07/06/2024 106 98 - 107 mmol/L Final   07/05/2024 106 98 - 107 mmol/L Final   07/04/2024 107 98 - 107 mmol/L Final      Bicarbonate CO2   Date Value Ref Range Status   07/06/2024 22.0 22.0 - 29.0 mmol/L Final   07/05/2024 20.2 (L) 22.0 - 29.0 mmol/L Final   07/04/2024 24.0 22.0 - 29.0 mmol/L Final      BUN BUN   Date Value Ref Range Status   07/06/2024 15 6 - 20 mg/dL Final   07/05/2024 16 6 - 20 mg/dL Final   07/04/2024 19 6 - 20 mg/dL Final      Creatinine Creatinine   Date Value Ref Range Status   07/06/2024 0.99 0.57 - 1.00 mg/dL Final   07/05/2024 1.07 (H) 0.57 - 1.00 mg/dL Final   07/04/2024 1.27 (H) 0.57 - 1.00 mg/dL Final      Calcium Calcium   Date Value Ref Range Status   07/06/2024 8.6 8.6 - 10.5 mg/dL Final   07/05/2024 8.5 (L) 8.6 - 10.5 mg/dL Final   07/04/2024 9.0 8.6 - 10.5 mg/dL Final      Magnesium Magnesium   Date Value Ref Range Status   07/06/2024 2.1 1.6 - 2.6 mg/dL Final   07/05/2024 2.0 1.6 - 2.6 mg/dL Final   07/04/2024 2.0 1.6 - 2.6 mg/dL Final          aspirin, 81 mg, Oral, Daily  atorvastatin, 40 mg, Oral, Nightly  carvedilol, 12.5 mg, Oral, Q12H  enoxaparin, 30 mg, Subcutaneous, Daily  escitalopram, 10 mg, Oral, Daily  guaiFENesin, 1,200 mg, Oral, Q12H  hydrALAZINE, 75 mg, Oral, Q8H  insulin glargine, 20 Units, Subcutaneous, Daily  insulin lispro, 2-9 Units, Subcutaneous, 4x Daily AC & at Bedtime  mupirocin, , Each Nare, BID  pantoprazole, 40 mg, Oral, QAM  potassium chloride ER, 20 mEq, Oral, Once  senna-docusate sodium, 2 tablet, Oral, Nightly  sodium bicarbonate, 650 mg, Oral,  Daily  sodium chloride, 4 mL, Nebulization, BID - RT      niCARdipine, 5-15 mg/hr, Last Rate: Stopped (07/03/24 1500)              Acute right-sided weakness    Benign essential hypertension    Gastroparesis diabeticorum    Gastroesophageal reflux disease    Mixed hypercholesterolemia and hypertriglyceridemia    Iron deficiency anemia    Type 2 diabetes mellitus with hyperglycemia, with long-term current use of insulin    Coronary artery disease involving native coronary artery of native heart with unstable angina pectoris    Tobacco abuse    CKD stage 3a, GFR 45-59 ml/min    Carotid stenosis    Lacunar cerebrovascular accident (CVA)    Elevated troponin    Aortic valve disease      Assessment & Plan    -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), EVH right, open left, AV fibroelastoma resection, AV repair- [place]  -Recurrent MCA stroke  -Coronary artery disease status post CABG in 2014  -Uncontrolled diabetes mellitus, HgbA1c 16.9, with peripheral neuropathy  -CKD, stage 3  -Hypertension  -Hyperlipidemia  -Obesity  -Tobacco abuse-- encourage cessation  -PAD, Carotid stenosis, 70% right ICA  -GERD  -LB  -chronic anemia- acute worsening expected acute blood loss    POD#5  Up in the chair  On room air-- tolerating  Sinus rhythm rate in the 60s-- tolerated beta blocker  Creatinine normalized- nephrology following.  Encourage pulmonary toilet--- continue IS/flutter, mucinex and nebs  Mobilize/increase activity-- PT/OT  Making good progress.  Home with home health vs rehab.  She wants to return home , PT recs noted for skilled nursing. Will see how she does with therapy today.  Continue routine care    KAUSHAL Alvarado  07/06/24  07:29 EDT

## 2024-07-06 NOTE — PROGRESS NOTES
"Enter Query Response Below      Query Response: Altered mental status associated with CVA             If applicable, please update the problem list.     Patient: Bibiana Inman        : 1980  Account: [id number]           Admit Date: 2024        How to Respond to this query:       a. Click New Note     b. Answer query within the yellow box.                c. Update the Problem List, if applicable.      If you have any questions about this query contact me at: [email or web address]     ,    Patient presents with altered mental status, with staring episode and not making sense when talking, that is noted to have resolved prior to arrival to ED.  Discharge summary includes diagnoses of acute CVA, hypertensive emergency and carotid atherosclerosis.  -Neurology note ( @1604) documents mental status changes as \"likely hypertensive encephalopathy and UTI and hyperglycemia so classic multifactorial toxic and metabolic encephalopathy\" and noting \"Incidental infarct, high parietal occipital region on the left side which is nothing to do with her symptoms and even the timings are questionable.\"  -Neurology note () documents CTA carotids \"showed right-sided disease which would not correspond to her symptoms.\"  -Cardiology () documents CT head with no acute intracranial abnormality, IV antibiotics for UTI (-) and \"Uncontrolled diabetes with A1c of more than 16.9.\"    She is noted to be alert and oriented  @ 1108 per H&P, patient went aphasic around 2130 with decreased mental status.  - Blood glucose @ 0928 = 589-525 (arrival @ 0921), @ 1856 = 286, @ 2136 = 165  - Nursing assessment - GCS @ 1539 = 14, @ 2100 = 15, @ 2155 = 12, to 11 at midnight ()  - vitals flowsheet - B/P @ 0922 = 115/64, @ 1035 = 185/76, @ 4743-5276 = 195//78, @5918-4825 - systolic 180-206 and diastolic 75-99, @ 0457-5660 - 103-124 systolic and 52-62 diastolic.  Hospitalist note  @ 2338 " "documents that treating hypertension \"most likely lead to poor perfusion in the setting of ICA stenosis.\"  -6/18 Blood glucose @ 0723 = 166, @ 1130 = 306  -6/18 Nursing assessment - GCS @ 6447-9161 = 13, with return to 15 @ 0733, with GCS remaining at 15 for the remainder of the stay, with B/P 6/18 @ 1638-midnight = 190s-230s systolic and 70s-90s diastolic.   -6/19 Blood glucose @ 0728 = 240, @ 2017 = 165  -Blood pressure 6/19 180s-230 systolic and 70s-100s diastolic (Midnight - 1300).    Treatment included Aspirin, Lipitor, Clonidine patch, Plavix, IV Hydralazine 6/17 @ 1138 and 2019, Labetalol 6/19 @ 0147, PO Hydralazine, Insulin (scheduled and per sliding scale) and Procardia.    Please clarify the etiology of altered mental status as one or more of the following:    *Altered mental state related to hyperglycemia  *Altered mental state related to urinary tract infection  *Altered mental state related to CVA, supported by additional indicators ____________ (specify)  *Altered mental state related to hypertensive encephalopathy, supported by additional indicators ____________ (specify)  *Other ___________ (specify)  *Unable to determine    By submitting this query, we are merely seeking further clarification of documentation to accurately reflect all conditions that you are monitoring, evaluating, treating or that extend the hospitalization or utilize additional resources of care. Please utilize your independent clinical judgment when addressing the question(s) above.     This query and your response, once completed, will be entered into the legal medical record.    Sincerely,  Lauren LINDER, RN  Clinical Documentation Integrity Program   [email or web address]  "

## 2024-07-06 NOTE — PROGRESS NOTES
"NEPHROLOGY PROGRESS NOTE------[facility]    [facility]  [phone]  Tamiko Jenkins MD      Patient Care Team:  Ibrahima Correa MD as PCP - General (Family Medicine)  Rachele Zafar RN as Ambulatory  ([facility])  Xochilt Jenkins MD as Consulting Physician (Nephrology)      Provider:  Tamiko Jenkins MD  Patient Name: Bibiana Inman  :  1980    SUBJECTIVE:    F/U ARF/CARLA/CRF/CKD    No complaints whatsoever. No SOB, CP, dysuria, palpitations, cramping. Appetite good. No edema.     Medication:  aspirin, 81 mg, Oral, Daily  atorvastatin, 40 mg, Oral, Nightly  carvedilol, 12.5 mg, Oral, Q12H  enoxaparin, 30 mg, Subcutaneous, Daily  escitalopram, 10 mg, Oral, Daily  guaiFENesin, 1,200 mg, Oral, Q12H  hydrALAZINE, 75 mg, Oral, Q8H  insulin glargine, 20 Units, Subcutaneous, Daily  insulin lispro, 2-9 Units, Subcutaneous, 4x Daily AC & at Bedtime  mupirocin, , Each Nare, BID  pantoprazole, 40 mg, Oral, QAM  potassium chloride ER, 20 mEq, Oral, Once  senna-docusate sodium, 2 tablet, Oral, Nightly  sodium bicarbonate, 650 mg, Oral, Daily  sodium chloride, 4 mL, Nebulization, BID - RT      niCARdipine, 5-15 mg/hr, Last Rate: Stopped (24 1500)        OBJECTIVE    Vital Sign Min/Max for last 24 hours  Temp  Min: 98.2 °F (36.8 °C)  Max: 99.1 °F (37.3 °C)   BP  Min: 105/49  Max: 154/69   Pulse  Min: 52  Max: 60   Resp  Min: 16  Max: 20   SpO2  Min: 96 %  Max: 100 %   No data recorded   Weight  Min: 69.2 kg (152 lb 9.6 oz)  Max: 69.2 kg (152 lb 9.6 oz)     Flowsheet Rows      Flowsheet Row First Filed Value   Admission Height 162.6 cm (64\") Documented at 2024   Admission Weight 65.5 kg (144 lb 6.4 oz) Documented at 2024            No intake/output data recorded.  I/O last 3 completed shifts:  In: -   Out: 1500 [Urine:1500]    Physical Exam:  General Appearance: alert, appears stated age and " "cooperative  Head: normocephalic, without obvious abnormality and atraumatic.   Eyes: conjunctivae and sclerae normal and no icterus  Neck: supple and no JVD  Lungs: CTA BILAT  Heart: regular rhythm & normal rate and normal S1, S2 +ITTI (PACED)  Chest Wall: S/P SURGICAL CHANGES POST STERNOTOMY S/P OPEN HEART SURGERY WITH CT  Abdomen: + BS; SOFT; NT/ND  Extremities: moves extremities well, no edema, no cyanosis  Skin: no bleeding, bruising or rash.    Neurologic: Alert, and oriented. No focal deficits    Labs:    WBC WBC   Date Value Ref Range Status   07/06/2024 7.00 3.40 - 10.80 10*3/mm3 Final   07/05/2024 8.86 3.40 - 10.80 10*3/mm3 Final   07/04/2024 9.85 3.40 - 10.80 10*3/mm3 Final      HGB Hemoglobin   Date Value Ref Range Status   07/06/2024 8.4 (L) 12.0 - 15.9 g/dL Final   07/05/2024 8.5 (L) 12.0 - 15.9 g/dL Final   07/04/2024 8.6 (L) 12.0 - 15.9 g/dL Final      HCT Hematocrit   Date Value Ref Range Status   07/06/2024 25.6 (L) 34.0 - 46.6 % Final   07/05/2024 26.2 (L) 34.0 - 46.6 % Final   07/04/2024 26.8 (L) 34.0 - 46.6 % Final      Platelets No results found for: \"LABPLAT\"   MCV MCV   Date Value Ref Range Status   07/06/2024 91.8 79.0 - 97.0 fL Final   07/05/2024 92.3 79.0 - 97.0 fL Final   07/04/2024 93.1 79.0 - 97.0 fL Final          Sodium Sodium   Date Value Ref Range Status   07/06/2024 136 136 - 145 mmol/L Final   07/05/2024 136 136 - 145 mmol/L Final   07/04/2024 139 136 - 145 mmol/L Final      Potassium Potassium   Date Value Ref Range Status   07/06/2024 3.7 3.5 - 5.2 mmol/L Final   07/05/2024 4.1 3.5 - 5.2 mmol/L Final   07/04/2024 3.9 3.5 - 5.2 mmol/L Final   07/04/2024 3.8 3.5 - 5.2 mmol/L Final      Chloride Chloride   Date Value Ref Range Status   07/06/2024 106 98 - 107 mmol/L Final   07/05/2024 106 98 - 107 mmol/L Final   07/04/2024 107 98 - 107 mmol/L Final      CO2 CO2   Date Value Ref Range Status   07/06/2024 22.0 22.0 - 29.0 mmol/L Final   07/05/2024 20.2 (L) 22.0 - 29.0 mmol/L Final " "  07/04/2024 24.0 22.0 - 29.0 mmol/L Final      BUN BUN   Date Value Ref Range Status   07/06/2024 15 6 - 20 mg/dL Final   07/05/2024 16 6 - 20 mg/dL Final   07/04/2024 19 6 - 20 mg/dL Final      Creatinine Creatinine   Date Value Ref Range Status   07/06/2024 0.99 0.57 - 1.00 mg/dL Final   07/05/2024 1.07 (H) 0.57 - 1.00 mg/dL Final   07/04/2024 1.27 (H) 0.57 - 1.00 mg/dL Final      Calcium Calcium   Date Value Ref Range Status   07/06/2024 8.6 8.6 - 10.5 mg/dL Final   07/05/2024 8.5 (L) 8.6 - 10.5 mg/dL Final   07/04/2024 9.0 8.6 - 10.5 mg/dL Final      PO4 No components found for: \"PO4\"   Albumin No results found for: \"ALBUMIN\"     Magnesium Magnesium   Date Value Ref Range Status   07/06/2024 2.1 1.6 - 2.6 mg/dL Final   07/05/2024 2.0 1.6 - 2.6 mg/dL Final   07/04/2024 2.0 1.6 - 2.6 mg/dL Final      Uric Acid No components found for: \"URIC ACID\"     Imaging Results (Last 72 Hours)       Procedure Component Value Units Date/Time    CT Head Without Contrast [[id number]] Collected: 07/04/24 0807     Updated: 07/04/24 0815    Narrative:      CT HEAD WO CONTRAST-     INDICATIONS: Stroke     TECHNIQUE: Radiation dose reduction techniques were utilized, including  automated exposure control and exposure modulation based on body size.  Noncontrast head CT     COMPARISON: CT from 6/21/2024, MRI from 6/22/2024     FINDINGS:     Encephalomalacia is apparent in the left basal ganglia, about 1.7 cm.     No acute intracranial hemorrhage, midline shift or mass effect.     Mild to moderate periventricular hypodensities suggest chronic small  vessel ischemic change in a patient this age.     Arterial calcifications are seen at the base of the brain.     Ventricles, cisterns, cerebral sulci are stable.     The visualized paranasal sinuses, orbits, mastoid air cells are  unremarkable.          Impression:         No acute intracranial hemorrhage or hydrocephalus. Post infarct changes  at left basal ganglia. If there is further " clinical concern, MRI could  be considered for further evaluation.     This report was finalized on 7/4/2024 8:12 AM by Dr. Antonio Ch M.D on Workstation: [id number]       XR Chest PA & Lateral [[id number]] Collected: 07/04/24 0803     Updated: 07/04/24 0808    Narrative:      XR CHEST PA AND LATERAL-     HISTORY: Female who is 44 years-old, postoperative evaluation     TECHNIQUE: Frontal and lateral views of the chest     COMPARISON: 7/3/2024     FINDINGS: The heart is enlarged. Pulmonary vasculature is congested.  Sternotomy hardware is noted. Minimal pleural effusions are apparent,  with basilar atelectasis or infiltrate posteriorly on the lateral view,  follow-up suggested. No pneumothorax. No acute osseous process.       Impression:      As described.     This report was finalized on 7/4/2024 8:04 AM by Dr. Antonio Ch M.D on Workstation: [id number]       XR Chest 1 View [[id number]] Collected: 07/03/24 1118     Updated: 07/03/24 1125    Narrative:      Portable chest radiograph     HISTORY: Chest tube removal, postop open heart     TECHNIQUE: Single AP portable radiograph of the chest     COMPARISON: Chest radiograph dating back to 7/1/2024       Impression:      FINDINGS AND IMPRESSION:  Previously seen thoracostomy tubes appear to been removed. Presumed  epicardial pacing wires, median sternotomy wires and right internal  jugular sheath are present.     Findings of pneumomediastinum, as before. Mild left basilar pulmonary  opacification persists. No pneumothorax is seen. Cardiac silhouette is  accentuated by patient rotation; however, there is to be at least mildly  enlarged.     This report was finalized on 7/3/2024 11:22 AM by Dr. Bin Bey M.D  on Workstation: [id number]               Results for orders placed during the hospital encounter of 06/21/24    XR Chest 1 View    Narrative  Portable chest radiograph    HISTORY: Chest tube removal, postop open heart    TECHNIQUE: Single AP  portable radiograph of the chest    COMPARISON: Chest radiograph dating back to 7/1/2024    Impression  FINDINGS AND IMPRESSION:  Previously seen thoracostomy tubes appear to been removed. Presumed  epicardial pacing wires, median sternotomy wires and right internal  jugular sheath are present.    Findings of pneumomediastinum, as before. Mild left basilar pulmonary  opacification persists. No pneumothorax is seen. Cardiac silhouette is  accentuated by patient rotation; however, there is to be at least mildly  enlarged.    This report was finalized on 7/3/2024 11:22 AM by Dr. Bni Bey M.D  on Workstation: [id number]      XR Chest PA & Lateral    Narrative  XR CHEST PA AND LATERAL-    HISTORY: Female who is 44 years-old, postoperative evaluation    TECHNIQUE: Frontal and lateral views of the chest    COMPARISON: 7/3/2024    FINDINGS: The heart is enlarged. Pulmonary vasculature is congested.  Sternotomy hardware is noted. Minimal pleural effusions are apparent,  with basilar atelectasis or infiltrate posteriorly on the lateral view,  follow-up suggested. No pneumothorax. No acute osseous process.    Impression  As described.    This report was finalized on 7/4/2024 8:04 AM by Dr. Antonio Ch M.D on Workstation: [id number]      XR Chest 1 View    Narrative  Portable chest radiograph    HISTORY: Postop open heart    TECHNIQUE: Single AP portable radiograph of the chest    COMPARISON: Multiple chest radiographs dating back to 6/21/2024    Impression  FINDINGS AND IMPRESSION:  There appear to be 4 thoracostomy tubes overlying the bilateral lungs a  mediastinum. The right internal jugular sheath is present.    Bibasilar pulmonary opacification has mild to moderately decreased. No  pneumothorax seen. Cardiac silhouette is accentuated by low lung  volumes. Findings suggestive of pneumomediastinum, as before.    This report was finalized on 7/3/2024 6:26 AM by Dr. Bin Bey M.D  on Workstation:  [id number]      Results for orders placed during the hospital encounter of 06/21/24    Duplex Carotid Ultrasound CAR    Interpretation Summary    Right internal carotid artery demonstrates a less than 50% stenosis.    Left internal carotid artery demonstrates a less than 50% stenosis.        ASSESSMENT / PLAN      Acute right-sided weakness    Benign essential hypertension    Gastroparesis diabeticorum    Gastroesophageal reflux disease    Mixed hypercholesterolemia and hypertriglyceridemia    Iron deficiency anemia    Type 2 diabetes mellitus with hyperglycemia, with long-term current use of insulin    Coronary artery disease involving native coronary artery of native heart with unstable angina pectoris    Tobacco abuse    CKD stage 3a, GFR 45-59 ml/min    Carotid stenosis    Lacunar cerebrovascular accident (CVA)    Elevated troponin    Aortic valve disease      ARF/CARLA/CRF/CKD---Nonoliguric. +ARF/CARLA on top of known CRF/CKD STG   3A, with a baseline serum creatinine of about 1.2. CRF/CKD STG 3A is secondary to DGS/HTN NS. +ARF/CARLA appears to be secondary to prerenal state/intravascular volume depletion and some ATN from hypotension. CARLA resolved. Support BP and avoid hypotension. Renal US negative.  No NSAIDs or IV dye. Dose meds for CrCl 30-45 cc/min.  Keep off of SGLT2 inhibitor for now     2. ACIDOSIS-----PO NaHCO3. Stable bicarb levels today     3. DMII WITH RENAL MANIFESTATIONS-----Hold SGLT2 inhibitor given ARF/CARLA. Lantus, Glucometers, SSI     4. HTN WITH CKD-----BP ok. No ACE-I/ARB/DRI/diuretic for now. Follow for pressor need. Follow off of IVFs     5. HYPERLIPIDEMIA----Statin     6. OA/DJD------No NSAIDs. Uric ok     7. DM PERIPHERAL NEUROPATHY-----On Neurontin     8. GERD/DM GASTROPARESIS/PUD PROPHYLAXIS-------Reglan and PPI. Benefits of PPI use outweigh risks, despite renal dysfunction     8. DVT PROPHYLAXIS----Renal dose adjusted Lovenox     9. PROTEINURIA-----Secondary to DGS     10. VITAMIN D  DEFICIENCY-----On Supplementation prior to admission     11. ANEMIA OF CKD AND POST-OP ANEMIA------IV iron for NIGHAT     12. DEPRESSION-----On Lexapro     13. CAD S/P CABG S/P AV FIBROELASTOMA RESECTION/REPAIR-----per , CT Surgery     14. POST OP ATELECTASIS/LB WITH H/O TOBACCO ABUSE-----Encourage IS. No active wheezing and respiratory status stable    15. HYPOCALCEMIA------Replaced      Tamiko Jeknins MD  [facility]  [phone]  07/06/24  08:06 EDT

## 2024-07-06 NOTE — PROGRESS NOTES
Name: Bibiana Inman ADMIT: 2024   : 1980  PCP: Ibrahima Correa MD    MRN: [id number] LOS: 15 days   AGE/SEX: 44 y.o. female  ROOM:      Subjective   Subjective   No acute events. Patient denies new complaints. No family at bedside.    Objective   Objective   Vital Signs  Temp:  [98 °F (36.7 °C)-98.6 °F (37 °C)] 98.3 °F (36.8 °C)  Heart Rate:  [52-67] 59  Resp:  [16-20] 19  BP: (116-138)/(54-79) 132/67  SpO2:  [93 %-100 %] 95 %  on   ;   Device (Oxygen Therapy): room air  Body mass index is 26.19 kg/m².  Physical Exam  Vitals and nursing note reviewed.   Constitutional:       General: She is not in acute distress.     Appearance: She is not toxic-appearing or diaphoretic.   HENT:      Head: Normocephalic and atraumatic.      Nose: Nose normal.      Mouth/Throat:      Mouth: Mucous membranes are moist.      Pharynx: Oropharynx is clear.   Eyes:      Conjunctiva/sclera: Conjunctivae normal.      Pupils: Pupils are equal, round, and reactive to light.   Cardiovascular:      Rate and Rhythm: Normal rate and regular rhythm.      Pulses: Normal pulses.      Comments: Surgical wound c/d/i  Pulmonary:      Effort: Pulmonary effort is normal.   Abdominal:      General: Bowel sounds are normal.      Palpations: Abdomen is soft.      Tenderness: There is no abdominal tenderness.   Musculoskeletal:         General: No swelling or tenderness.      Cervical back: Neck supple.   Skin:     General: Skin is warm and dry.      Capillary Refill: Capillary refill takes less than 2 seconds.   Neurological:      Mental Status: She is alert and oriented to person, place, and time.      Comments: +mild right lower facial weakness   Psychiatric:         Mood and Affect: Mood normal.         Behavior: Behavior normal.       Results Review     I reviewed the patient's new clinical results.  Results from last 7 days   Lab Units 24  0309 24  0220 24  0251 24  0301   WBC 10*3/mm3 7.00 8.86 9.85  10.99*   HEMOGLOBIN g/dL 8.4* 8.5* 8.6* 8.9*   PLATELETS 10*3/mm3 148 131* 122* 123*     Results from last 7 days   Lab Units 07/06/24  1101 07/06/24 0309 07/05/24 0220 07/04/24  1048 07/04/24 0251 07/03/24 0301   SODIUM mmol/L  --  136 136  --  139 141   POTASSIUM mmol/L 3.8 3.7 4.1 3.9 3.8 4.4   CHLORIDE mmol/L  --  106 106  --  107 107   CO2 mmol/L  --  22.0 20.2*  --  24.0 26.0   BUN mg/dL  --  15 16  --  19 21*   CREATININE mg/dL  --  0.99 1.07*  --  1.27* 1.47*   GLUCOSE mg/dL  --  137* 93  --  91 158*   EGFR mL/min/1.73  --  71.4 65.0  --  52.9* 44.4*     Results from last 7 days   Lab Units 07/03/24 0301 07/02/24 0239 07/01/24 1647 07/01/24  1312   ALBUMIN g/dL 3.5 3.7 3.6 3.8   BILIRUBIN mg/dL 0.2  --   --   --    ALK PHOS U/L 62  --   --   --    AST (SGOT) U/L 23  --   --   --    ALT (SGPT) U/L <5  --   --   --      Results from last 7 days   Lab Units 07/06/24 0309 07/05/24 0220 07/04/24 0251 07/03/24 0301 07/02/24 0239 07/01/24 1647 07/01/24  1312   CALCIUM mg/dL 8.6 8.5* 9.0 9.0 8.5* 8.4* 8.9   ALBUMIN g/dL  --   --   --  3.5 3.7 3.6 3.8   MAGNESIUM mg/dL 2.1 2.0 2.0 2.2 2.7* 2.4 2.7*   PHOSPHORUS mg/dL 3.1  --  2.7 5.2* 4.8* 2.9 2.0*     Results from last 7 days   Lab Units 07/03/24  0301 07/03/24  0011 07/01/24  1909   LACTATE mmol/L 1.4 2.0 <0.4     Glucose   Date/Time Value Ref Range Status   07/06/2024 1542 195 (H) 70 - 130 mg/dL Final   07/06/2024 1058 166 (H) 70 - 130 mg/dL Final   07/06/2024 0628 116 70 - 130 mg/dL Final   07/05/2024 2100 174 (H) 70 - 130 mg/dL Final   07/05/2024 1647 231 (H) 70 - 130 mg/dL Final   07/05/2024 1125 134 (H) 70 - 130 mg/dL Final   07/05/2024 0633 121 70 - 130 mg/dL Final       No radiology results for the last day    I have personally reviewed all medications:  Scheduled Medications  aspirin, 81 mg, Oral, Daily  atorvastatin, 40 mg, Oral, Nightly  carvedilol, 12.5 mg, Oral, Q12H  enoxaparin, 30 mg, Subcutaneous, Daily  escitalopram, 10 mg, Oral,  Daily  guaiFENesin, 1,200 mg, Oral, Q12H  hydrALAZINE, 75 mg, Oral, Q8H  insulin glargine, 20 Units, Subcutaneous, Daily  insulin lispro, 2-9 Units, Subcutaneous, 4x Daily AC & at Bedtime  mupirocin, , Each Nare, BID  pantoprazole, 40 mg, Oral, QAM  potassium chloride ER, 20 mEq, Oral, Once  senna-docusate sodium, 2 tablet, Oral, Nightly  sodium bicarbonate, 650 mg, Oral, Daily    Infusions  niCARdipine, 5-15 mg/hr, Last Rate: Stopped (07/03/24 1500)      Diet  Diet: Cardiac, Diabetic; Healthy Heart (2-3 Na+); Consistent Carbohydrate; Fluid Consistency: Thin (IDDSI 0)    I have personally reviewed:  [x]  Laboratory   []  Microbiology   []  Radiology   [x]  EKG/Telemetry  []  Cardiology/Vascular   []  Pathology    []  Records    Assessment/Plan     Active Hospital Problems    Diagnosis  POA    **Acute right-sided weakness [R53.1]  Yes    CKD stage 3a, GFR 45-59 ml/min [N18.31]  Yes    Carotid stenosis [I65.29]  Yes    Lacunar cerebrovascular accident (CVA) [I63.81]  Yes    Elevated troponin [R79.89]  Yes    Aortic valve disease [I35.9]  Unknown    Tobacco abuse [Z72.0]  Yes    Coronary artery disease involving native coronary artery of native heart with unstable angina pectoris [I25.110]  Yes    Gastroparesis diabeticorum [E11.43, 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 De Luna  - appreciate cardiology, CT surgery, and neurology recs     CARLA on CKD Stage 3b  - pre-renal, now resolved  - follow volume status and chemistries  - appreciate nephrology recs    Type 2 DM  - complications include gastroparesis  - on lantus and jardiance as outpatient  - BG acceptable, needs better outpatient control, A1C 16.90%  - continue lantus 20 units daily   - cover with ssi/hypoglycemia protocol  - jardiance held    HTN/CAD s/p CABG  - BP acceptable  - continue coreg, clonidine, and hydralazine      GERD  - symptoms controlled, continue ppi    Lovenox 30 mg SC daily for DVT prophylaxis.  Full code.  Discussed with patient and nursing staff.  Anticipate discharge home with home health in 1-2 days.  Expected Discharge Date: 7/8/2024; Expected Discharge Time:       Lenin Huang MD  [facility]  07/06/24  16:29 EDT    Portions of this text have been copied and I have reviewed them. They are accurate as of 7/6/2024

## 2024-07-06 NOTE — PLAN OF CARE
Goal Outcome Evaluation:              Outcome Evaluation: POD 3 CABGx1, A&Ox4, RA, SR-SB, -140's sys. AV wires removed per order. Will continue with current POC and update as needed.

## 2024-07-06 NOTE — PROGRESS NOTES
"Patient Name: Bibiana Inman  :1980  44 y.o.      Patient Care Team:  Ibrahima Crorea MD as PCP - General (Family Medicine)  Rachele Zafar RN as Ambulatory  ([facility])  Xochilt Jenkins MD as Consulting Physician (Nephrology)    Interval History:   Post op follow up    Subjective:  Feeling better. No CP, SOA, or palpitations    Objective   Vital Signs  Temp:  [98 °F (36.7 °C)-99.1 °F (37.3 °C)] 98 °F (36.7 °C)  Heart Rate:  [52-67] 56  Resp:  [16-20] 19  BP: (116-154)/(54-79) 131/56    Intake/Output Summary (Last 24 hours) at 2024 144  Last data filed at 2024 1342  Gross per 24 hour   Intake 600 ml   Output 500 ml   Net 100 ml     Flowsheet Rows      Flowsheet Row First Filed Value   Admission Height 162.6 cm (64\") Documented at 2024   Admission Weight 65.5 kg (144 lb 6.4 oz) Documented at 2024            Physical Exam:   General Appearance:    Alert, cooperative, in no acute distress   Lungs:     Clear to auscultation.  Normal respiratory effort and rate.      Heart:    Regular rhythm and normal rate, no murmurs, gallops or rubs.     Chest Wall:    No abnormalities observed   Abdomen:     Soft, nontender, positive bowel sounds.     Extremities:   no cyanosis, clubbing or edema.  Surgical incision noted to left lower leg from vein harvesting.       Results Review:    Results from last 7 days   Lab Units 24  1101 24  0309   SODIUM mmol/L  --  136   POTASSIUM mmol/L 3.8 3.7   CHLORIDE mmol/L  --  106   CO2 mmol/L  --  22.0   BUN mg/dL  --  15   CREATININE mg/dL  --  0.99   GLUCOSE mg/dL  --  137*   CALCIUM mg/dL  --  8.6     Results from last 7 days   Lab Units 24  0251 24  0301 24  1745   CK TOTAL U/L 100 280* 426*     Results from last 7 days   Lab Units 24  0309   WBC 10*3/mm3 7.00   HEMOGLOBIN g/dL 8.4*   HEMATOCRIT % 25.6*   PLATELETS 10*3/mm3 148     Results from last 7 days   Lab Units 24  0239 " 07/01/24  1312   INR  1.25* 1.33*   APTT seconds  --  31.3         Results from last 7 days   Lab Units 07/06/24  0309   MAGNESIUM mg/dL 2.1             Medication Review:   aspirin, 81 mg, Oral, Daily  atorvastatin, 40 mg, Oral, Nightly  carvedilol, 12.5 mg, Oral, Q12H  enoxaparin, 30 mg, Subcutaneous, Daily  escitalopram, 10 mg, Oral, Daily  guaiFENesin, 1,200 mg, Oral, Q12H  hydrALAZINE, 75 mg, Oral, Q8H  insulin glargine, 20 Units, Subcutaneous, Daily  insulin lispro, 2-9 Units, Subcutaneous, 4x Daily AC & at Bedtime  mupirocin, , Each Nare, BID  pantoprazole, 40 mg, Oral, QAM  potassium chloride ER, 20 mEq, Oral, Once  senna-docusate sodium, 2 tablet, Oral, Nightly  sodium bicarbonate, 650 mg, Oral, Daily         niCARdipine, 5-15 mg/hr, Last Rate: Stopped (07/03/24 1500)        Assessment & Plan     1.  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  Patient presented with strokelike symptoms.  2.  Coronary artery disease status post CABG with a vein graft to the obtuse marginal x 1 on July 1, 2024.  Previous bypass with a  of the LAD.  There is a LIMA to the LAD, vein graft to the circumflex is occluded, vein graft to the right coronary is patent.  3.  Diabetes with a hemoglobin A1c of 16.9.  4.  Hypertension.  Blood pressure is controlled.  Carvedilol increased yesterday. Tolerating carvedilol.   5.  Hyperlipidemia  6.  Tobacco use  7.  Carotid artery stenosis with a 70% lesion in the right internal carotid artery.  8.  Obstructive sleep apnea  9. Mobilize/increase activity-- PT/OT     Stable from a cardiac standpoint. Will continue to follow.     Jacinto Sharif PA-C, [facility]  07/06/24  14:46 EDT

## 2024-07-06 NOTE — THERAPY TREATMENT NOTE
Patient Name: Bibiana Inman  : 1980    MRN: [id number]                              Today's Date: 2024       Admit Date: 2024    Visit Dx:     ICD-10-CM ICD-9-CM   1. Acute right-sided weakness  R53.1 728.87   2. Type 2 diabetes mellitus with hyperglycemia, with long-term current use of insulin  E11.65 250.00    Z79.4 790.29     V58.67   3. Tobacco abuse  Z72.0 305.1   4. Carotid stenosis, asymptomatic, right  I65.21 433.10   5. Coronary artery disease involving native coronary artery of native heart with unstable angina pectoris  I25.110 414.01     411.1   6. Aortic valve disease  I35.9 424.1   7. 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    Abnormal bone density screening    Benign essential hypertension    Chronic cough    Gastroparesis diabeticorum    Elevated erythrocyte sedimentation rate    Fatigue    Gastroesophageal reflux disease    Mixed hypercholesterolemia and hypertriglyceridemia    Intermittent claudication    Iron deficiency anemia    Multiple joint pain    Need for influenza vaccination    Type 2 diabetes mellitus with hyperglycemia, with long-term current use of insulin    Obstructive sleep apnea    Coronary artery disease involving native coronary artery of native heart with unstable angina pectoris    Abnormal nuclear stress test    Depressive disorder    Back pain    Diabetic peripheral neuropathy associated with type 2 diabetes mellitus    Ingrowing nail, left great toe    Personal history of COVID-19    Polyp of colon    Vitamin D deficiency    Tobacco abuse    Unstable angina    Altered mental status    Carotid stenosis, asymptomatic, right    Acute right-sided weakness    CKD stage 3a, GFR 45-59 ml/min    Carotid stenosis    Lacunar cerebrovascular accident (CVA)    Elevated troponin    Aortic valve disease     Past Medical History:   Diagnosis Date     5,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 Debbie Fry.    Depressive disorder 2023    Diabetic gastroparesis 2021    Diabetic peripheral neuropathy associated with type 2 diabetes mellitus 2024    Gastroesophageal reflux disease 03/10/2021    GERD (gastroesophageal reflux disease)     Intermittent claudication 2017    Iron deficiency anemia 2020    Mixed hypercholesterolemia and hypertriglyceridemia 2014    Myocardial infarction     Obesity     Obstructive sleep apnea 2021    Personal history of COVID-19 2022    Polyp of colon 2023    Spontaneous      Stress fracture of right ankle with routine healing     Tobacco abuse 2024    Type 2 diabetes mellitus with hyperglycemia, with long-term current use of insulin 2017    Vitamin D deficiency 2024     Past Surgical History:   Procedure Laterality Date    CARDIAC CATHETERIZATION N/A 2024    Procedure: Left Heart Cath and coronary angiogram;  Surgeon: Jena Barron MD;  Location: [facility] CATH INVASIVE LOCATION;  Service: Cardiology;  Laterality: N/A;     SECTION      CORONARY ARTERY BYPASS GRAFT  2014    LIMA-LAD, SVG-OM2, SVG-PDA, Dr. Debbie Fry.    CORONARY ARTERY BYPASS GRAFT WITH AORTIC VALVE REPAIR/REPLACEMENT N/A 2024    Procedure: YUE; REOPERATIVE STERNOTOMY; CORONARY ARTERY BYPASS GRAFTING TIMES 1 UTILIZING RIGHT SAPHENOUS VEIN; AORTIC VALVE TUMOR ; PRP;  Surgeon: Benja De Luna MD;  Location: [facility] CVOR;  Service: Cardiothoracic;  Laterality: N/A;    DILATATION AND CURETTAGE      TONSILLECTOMY        General Information       Row Name 24 1113          Physical Therapy Time and Intention    Document Type therapy note (daily  note)  -EM     Mode of Treatment individual therapy;physical therapy  -EM       Row Name 07/06/24 1113          General Information    Existing Precautions/Restrictions fall;cardiac;sternal  -EM               User Key  (r) = Recorded By, (t) = Taken By, (c) = Cosigned By      Initials Name Provider Type    Meg Dahl PT Physical Therapist                   Mobility       Row Name 07/06/24 1114          Bed Mobility    Supine-Sit [place] (Bed Mobility) standby assist  -EM     Sit-Supine [place] (Bed Mobility) standby assist  -EM       Row Name 07/06/24 1114          Sit-Stand Transfer    Sit-Stand [place] (Transfers) standby assist  -EM       Row Name 07/06/24 1114          Gait/Stairs (Locomotion)    [place] Level (Gait) contact guard  -EM     Distance in Feet (Gait) 100  -EM     Deviations/Abnormal Patterns (Gait) stride length decreased;gait speed decreased  -EM     Bilateral Gait Deviations heel strike decreased  -EM     Comment, (Gait/Stairs) slightly unsteady but no overt LOB  -EM               User Key  (r) = Recorded By, (t) = Taken By, (c) = Cosigned By      Initials Name Provider Type    Meg Dahl PT Physical Therapist                   Obj/Interventions       Row Name 07/06/24 1115          Motor Skills    Therapeutic Exercise other (see comments)  5 reps per cardiac protocol level 2  -EM               User Key  (r) = Recorded By, (t) = Taken By, (c) = Cosigned By      Initials Name Provider Type    Meg Dahl PT Physical Therapist                   Goals/Plan    No documentation.                  Clinical Impression       Row Name 07/06/24 1116          Pain    Pretreatment Pain Rating 0/10 - no pain  -EM     Pre/Posttreatment Pain Comment no c/o pain, patient c/o being cold  -EM       Row Name 07/06/24 1116          Plan of Care Review    Plan of Care Reviewed With patient  -EM     Outcome Evaluation Pt in L sidelying, awake and alert, c/o  being cold. patient peformed bed mobility with SBA, sit to stand with SBA, ambulated about 100 feet with CGA, demonstrating mildly unsteady gait but no overt LOB. patient encouraged to ambulate with nsg staff, anticipate d/c home soon, would benefit from HHPT.  -EM       Row Name 07/06/24 1116          Therapy Assessment/Plan (PT)    Therapy Frequency (PT) 5 times/wk  -EM       Row Name 07/06/24 1116          Positioning and Restraints    Pre-Treatment Position in bed  -EM     Post Treatment Position bed  -EM     In Bed supine;call light within reach;exit alarm on  -EM               User Key  (r) = Recorded By, (t) = Taken By, (c) = Cosigned By      Initials Name Provider Type    Meg Dahl PT Physical Therapist                   Outcome Measures       Row Name 07/06/24 1118 07/06/24 0913       How much help from another person do you currently need...    Turning from your back to your side while in flat bed without using bedrails? 3  -EM 3  -KM    Moving from lying on back to sitting on the side of a flat bed without bedrails? 3  -EM 3  -KM    Moving to and from a bed to a chair (including a wheelchair)? 3  -EM 3  -KM    Standing up from a chair using your arms (e.g., wheelchair, bedside chair)? 3  -EM 3  -KM    Climbing 3-5 steps with a railing? 3  -EM 3  -KM    To walk in hospital room? 3  -EM 3  -KM    AM-PAC 6 Clicks Score (PT) 18  -EM 18  -KM    Highest Level of Mobility Goal 6 --> Walk 10 steps or more  -EM 6 --> Walk 10 steps or more  -KM              User Key  (r) = Recorded By, (t) = Taken By, (c) = Cosigned By      Initials Name Provider Type    Meg Dahl PT Physical Therapist    Vero Starr, RN Registered Nurse                                 Physical Therapy Education       Title: PT OT SLP Therapies (Done)       Topic: Physical Therapy (Done)       Point: Mobility training (Done)       Learning Progress Summary             Patient Acceptance, E, VU by EM at 7/6/2024  1118    Acceptance, E,TB,D, VU,NR by  at 7/5/2024 1634    Acceptance, E,D, DU by PC at 7/4/2024 1055    Acceptance, E, NR by AR at 7/3/2024 1047    Acceptance, E,TB,D, VU,NR by  at 7/2/2024 1229                         Point: Home exercise program (Done)       Learning Progress Summary             Patient Acceptance, E, VU by EM at 7/6/2024 1118    Acceptance, E,TB,D, VU,NR by  at 7/5/2024 1634    Acceptance, E,D, DU by PC at 7/4/2024 1055    Acceptance, E, NR by AR at 7/3/2024 1047                         Point: Body mechanics (Done)       Learning Progress Summary             Patient Acceptance, E,TB,D, VU,NR by  at 7/5/2024 1634    Acceptance, E,D, DU by PC at 7/4/2024 1055    Acceptance, E, NR by AR at 7/3/2024 1047    Acceptance, E,TB,D, VU,NR by  at 7/2/2024 1229                         Point: Precautions (Done)       Learning Progress Summary             Patient Acceptance, E,TB,D, VU,NR by  at 7/5/2024 1634    Acceptance, E,D, DU by PC at 7/4/2024 1055    Acceptance, E, NR by AR at 7/3/2024 1047    Acceptance, E,TB,D, VU,NR by  at 7/2/2024 1229                                         User Key       Initials Effective Dates Name Provider Type Discipline     06/16/21 -  Mary West, PT Physical Therapist PT    PC 06/16/21 -  Mesha Jerez, PT Physical Therapist PT    EM 06/16/21 -  Meg Lane PT Physical Therapist PT    AR 06/16/21 -  Reanna Renee, PT Physical Therapist PT                  PT Recommendation and Plan     Plan of Care Reviewed With: patient  Outcome Evaluation: Pt in L sidelying, awake and alert, c/o being cold. patient peformed bed mobility with SBA, sit to stand with SBA, ambulated about 100 feet with CGA, demonstrating mildly unsteady gait but no overt LOB. patient encouraged to ambulate with ns staff, anticipate d/c home soon, would benefit from HHPT.     Time Calculation:         PT Charges       Row Name 07/06/24 1119             Time Calculation     Start Time 1034  -EM      Stop Time 1044  -EM      Time Calculation (min) 10 min  -EM      PT Received On 07/06/24  -EM      PT - Next Appointment 07/08/24  -EM         Time Calculation- PT    Total Timed Code Minutes- PT 10 minute(s)  -EM         Timed Charges    43076 - PT Therapeutic Exercise Minutes 4  -EM      49205 - PT Therapeutic Activity Minutes 6  -EM         Total Minutes    Timed Charges Total Minutes 10  -EM       Total Minutes 10  -EM                User Key  (r) = Recorded By, (t) = Taken By, (c) = Cosigned By      Initials Name Provider Type    Meg Dahl PT Physical Therapist                  Therapy Charges for Today       Code Description Service Date Service Provider Modifiers Qty    69220916502 HC PT THERAPEUTIC ACT EA 15 MIN 7/6/2024 Meg Lane PT GP 1            PT G-Codes  Outcome Measure Options: AM-PAC 6 Clicks Basic Mobility (PT)  AM-PAC 6 Clicks Score (PT): 18  AM-PAC 6 Clicks Score (OT): 17  Modified Blanco Scale: 2 - Slight disability.  Unable to carry out all previous activities but able to look after own affairs without assistance.  PT Discharge Summary  Anticipated Discharge Disposition (PT): home with assist, home with home health    Meg Lane PT  7/6/2024

## 2024-07-06 NOTE — PLAN OF CARE
Goal Outcome Evaluation:  Plan of Care Reviewed With: patient           Outcome Evaluation: Pt in L sidelying, awake and alert, c/o being cold. patient peformed bed mobility with SBA, sit to stand with SBA, ambulated about 100 feet with CGA, demonstrating mildly unsteady gait but no overt LOB. patient encouraged to ambulate with nsg staff, anticipate d/c home soon, would benefit from HHPT.      Anticipated Discharge Disposition (PT): home with assist, home with home health

## 2024-07-06 NOTE — PLAN OF CARE
Goal Outcome Evaluation:  Plan of Care Reviewed With: patient        Progress: improving     Pt slept well, did great job ambulating to bathroom.  No complaints

## 2024-07-06 NOTE — PLAN OF CARE
Goal Outcome Evaluation:              Outcome Evaluation: VSS, RA, n/c of pain, stand by assist up to BR. Pt ambulated with nursing staff around unit. Will continue with current POC and update as needed.

## 2024-07-07 ENCOUNTER — DOCUMENTATION (OUTPATIENT)
Dept: HOME HEALTH SERVICES | Facility: HOME HEALTHCARE | Age: 44
End: 2024-07-07
Payer: COMMERCIAL

## 2024-07-07 ENCOUNTER — READMISSION MANAGEMENT (OUTPATIENT)
Dept: CALL CENTER | Facility: HOSPITAL | Age: 44
End: 2024-07-07
Payer: COMMERCIAL

## 2024-07-07 ENCOUNTER — HOME HEALTH ADMISSION (OUTPATIENT)
Dept: HOME HEALTH SERVICES | Facility: HOME HEALTHCARE | Age: 44
End: 2024-07-07
Payer: COMMERCIAL

## 2024-07-07 VITALS
HEIGHT: 64 IN | HEART RATE: 61 BPM | BODY MASS INDEX: 25.92 KG/M2 | SYSTOLIC BLOOD PRESSURE: 149 MMHG | OXYGEN SATURATION: 95 % | WEIGHT: 151.8 LBS | DIASTOLIC BLOOD PRESSURE: 55 MMHG | TEMPERATURE: 98.2 F | RESPIRATION RATE: 17 BRPM

## 2024-07-07 LAB
ANION GAP SERPL CALCULATED.3IONS-SCNC: 9 MMOL/L (ref 5–15)
BUN SERPL-MCNC: 14 MG/DL (ref 6–20)
BUN/CREAT SERPL: 14.4 (ref 7–25)
CALCIUM SPEC-SCNC: 8.6 MG/DL (ref 8.6–10.5)
CHLORIDE SERPL-SCNC: 107 MMOL/L (ref 98–107)
CO2 SERPL-SCNC: 22 MMOL/L (ref 22–29)
CREAT SERPL-MCNC: 0.97 MG/DL (ref 0.57–1)
DEPRECATED RDW RBC AUTO: 46.6 FL (ref 37–54)
EGFRCR SERPLBLD CKD-EPI 2021: 73.1 ML/MIN/1.73
ERYTHROCYTE [DISTWIDTH] IN BLOOD BY AUTOMATED COUNT: 13.9 % (ref 12.3–15.4)
GLUCOSE BLDC GLUCOMTR-MCNC: 122 MG/DL (ref 70–130)
GLUCOSE BLDC GLUCOMTR-MCNC: 126 MG/DL (ref 70–130)
GLUCOSE BLDC GLUCOMTR-MCNC: 139 MG/DL (ref 70–130)
GLUCOSE SERPL-MCNC: 121 MG/DL (ref 65–99)
HCT VFR BLD AUTO: 26.8 % (ref 34–46.6)
HGB BLD-MCNC: 8.7 G/DL (ref 12–15.9)
MAGNESIUM SERPL-MCNC: 1.9 MG/DL (ref 1.6–2.6)
MCH RBC QN AUTO: 29.8 PG (ref 26.6–33)
MCHC RBC AUTO-ENTMCNC: 32.5 G/DL (ref 31.5–35.7)
MCV RBC AUTO: 91.8 FL (ref 79–97)
PHOSPHATE SERPL-MCNC: 2.8 MG/DL (ref 2.5–4.5)
PLATELET # BLD AUTO: 184 10*3/MM3 (ref 140–450)
PMV BLD AUTO: 11.2 FL (ref 6–12)
POTASSIUM SERPL-SCNC: 4.2 MMOL/L (ref 3.5–5.2)
RBC # BLD AUTO: 2.92 10*6/MM3 (ref 3.77–5.28)
SODIUM SERPL-SCNC: 138 MMOL/L (ref 136–145)
WBC NRBC COR # BLD AUTO: 7.49 10*3/MM3 (ref 3.4–10.8)

## 2024-07-07 PROCEDURE — 83735 ASSAY OF MAGNESIUM: CPT | Performed by: INTERNAL MEDICINE

## 2024-07-07 PROCEDURE — 25010000002 MAGNESIUM SULFATE 2 GM/50ML SOLUTION: Performed by: INTERNAL MEDICINE

## 2024-07-07 PROCEDURE — 85027 COMPLETE CBC AUTOMATED: CPT | Performed by: INTERNAL MEDICINE

## 2024-07-07 PROCEDURE — 99232 SBSQ HOSP IP/OBS MODERATE 35: CPT | Performed by: PHYSICIAN ASSISTANT

## 2024-07-07 PROCEDURE — 82948 REAGENT STRIP/BLOOD GLUCOSE: CPT

## 2024-07-07 PROCEDURE — 80048 BASIC METABOLIC PNL TOTAL CA: CPT | Performed by: NURSE PRACTITIONER

## 2024-07-07 PROCEDURE — 63710000001 INSULIN GLARGINE PER 5 UNITS: Performed by: NURSE PRACTITIONER

## 2024-07-07 PROCEDURE — 84100 ASSAY OF PHOSPHORUS: CPT | Performed by: INTERNAL MEDICINE

## 2024-07-07 PROCEDURE — 25010000002 ENOXAPARIN PER 10 MG: Performed by: INTERNAL MEDICINE

## 2024-07-07 RX ORDER — HYDRALAZINE HYDROCHLORIDE 25 MG/1
75 TABLET, FILM COATED ORAL EVERY 8 HOURS SCHEDULED
Qty: 90 TABLET | Refills: 0 | Status: SHIPPED | OUTPATIENT
Start: 2024-07-07

## 2024-07-07 RX ORDER — SODIUM BICARBONATE 650 MG/1
650 TABLET ORAL DAILY
Qty: 30 TABLET | Refills: 0 | Status: SHIPPED | OUTPATIENT
Start: 2024-07-08

## 2024-07-07 RX ORDER — MAGNESIUM SULFATE HEPTAHYDRATE 40 MG/ML
2 INJECTION, SOLUTION INTRAVENOUS ONCE
Status: COMPLETED | OUTPATIENT
Start: 2024-07-07 | End: 2024-07-07

## 2024-07-07 RX ORDER — CARVEDILOL 12.5 MG/1
12.5 TABLET ORAL EVERY 12 HOURS
Qty: 60 TABLET | Refills: 0 | Status: SHIPPED | OUTPATIENT
Start: 2024-07-07

## 2024-07-07 RX ORDER — GUAIFENESIN 600 MG/1
1200 TABLET, EXTENDED RELEASE ORAL EVERY 12 HOURS SCHEDULED
Qty: 40 TABLET | Refills: 0 | Status: SHIPPED | OUTPATIENT
Start: 2024-07-07

## 2024-07-07 RX ADMIN — HYDRALAZINE HYDROCHLORIDE 75 MG: 50 TABLET ORAL at 14:53

## 2024-07-07 RX ADMIN — HYDRALAZINE HYDROCHLORIDE 75 MG: 50 TABLET ORAL at 06:10

## 2024-07-07 RX ADMIN — CARVEDILOL 12.5 MG: 12.5 TABLET, FILM COATED ORAL at 09:26

## 2024-07-07 RX ADMIN — ASPIRIN 81 MG: 81 TABLET, COATED ORAL at 09:26

## 2024-07-07 RX ADMIN — INSULIN GLARGINE 20 UNITS: 100 INJECTION, SOLUTION SUBCUTANEOUS at 09:26

## 2024-07-07 RX ADMIN — MAGNESIUM SULFATE HEPTAHYDRATE 2 G: 2 INJECTION, SOLUTION INTRAVENOUS at 10:14

## 2024-07-07 RX ADMIN — ENOXAPARIN SODIUM 30 MG: 100 INJECTION SUBCUTANEOUS at 09:26

## 2024-07-07 RX ADMIN — SODIUM BICARBONATE 650 MG: 650 TABLET, ORALLY DISINTEGRATING ORAL at 09:00

## 2024-07-07 RX ADMIN — ESCITALOPRAM OXALATE 10 MG: 10 TABLET, FILM COATED ORAL at 09:26

## 2024-07-07 RX ADMIN — GUAIFENESIN 1200 MG: 600 TABLET, EXTENDED RELEASE ORAL at 09:26

## 2024-07-07 RX ADMIN — PANTOPRAZOLE SODIUM 40 MG: 40 TABLET, DELAYED RELEASE ORAL at 06:10

## 2024-07-07 NOTE — PROGRESS NOTES
Patient is discharging today . Spoke to family who is agreeable to home health and have no current  home health. Face sheet is correct and please call spouse- Neal - [phone].Orders for home health in [facility] for SN ,PT and OT. Thank you !

## 2024-07-07 NOTE — PROGRESS NOTES
" LOS: 16 days   Patient Care Team:  Ibrahima Correa MD as PCP - General (Family Medicine)  Rachele Zafar, ALEENA as Ambulatory  (Population Health)  Xochilt Jenkins MD as Consulting Physician (Nephrology)    Chief Complaint: post op follow-up    Subjective      Vital Signs  Temp:  [98 °F (36.7 °C)-99.2 °F (37.3 °C)] 98.6 °F (37 °C)  Heart Rate:  [56-75] 62  Resp:  [16-20] 16  BP: (116-169)/(54-75) 158/56  Body mass index is 26.06 kg/m².    Intake/Output Summary (Last 24 hours) at 7/7/2024 0714  Last data filed at 7/7/2024 0353  Gross per 24 hour   Intake 600 ml   Output 1200 ml   Net -600 ml     No intake/output data recorded.            07/05/24  0620 07/06/24  0500 07/07/24  0600   Weight: 68.8 kg (151 lb 11.2 oz) 69.2 kg (152 lb 9.6 oz) 68.9 kg (151 lb 12.8 oz)         Objective:  Vital signs: (most recent): Blood pressure 154/71, pulse 63, temperature 98.5 °F (36.9 °C), temperature source Oral, resp. rate 17, height 162.6 cm (64\"), weight 68.9 kg (151 lb 12.8 oz), last menstrual period 01/10/2023, SpO2 94%, not currently breastfeeding.                Results Review:        WBC WBC   Date Value Ref Range Status   07/07/2024 7.49 3.40 - 10.80 10*3/mm3 Final   07/06/2024 7.00 3.40 - 10.80 10*3/mm3 Final   07/05/2024 8.86 3.40 - 10.80 10*3/mm3 Final      HGB Hemoglobin   Date Value Ref Range Status   07/07/2024 8.7 (L) 12.0 - 15.9 g/dL Final   07/06/2024 8.4 (L) 12.0 - 15.9 g/dL Final   07/05/2024 8.5 (L) 12.0 - 15.9 g/dL Final      HCT Hematocrit   Date Value Ref Range Status   07/07/2024 26.8 (L) 34.0 - 46.6 % Final   07/06/2024 25.6 (L) 34.0 - 46.6 % Final   07/05/2024 26.2 (L) 34.0 - 46.6 % Final      Platelets Platelets   Date Value Ref Range Status   07/07/2024 184 140 - 450 10*3/mm3 Final   07/06/2024 148 140 - 450 10*3/mm3 Final   07/05/2024 131 (L) 140 - 450 10*3/mm3 Final        PT/INR:    No results found for: \"PROTIME\"  /  No results found for: \"INR\"      Sodium Sodium   Date " Value Ref Range Status   07/07/2024 138 136 - 145 mmol/L Final   07/06/2024 136 136 - 145 mmol/L Final   07/05/2024 136 136 - 145 mmol/L Final      Potassium Potassium   Date Value Ref Range Status   07/07/2024 4.2 3.5 - 5.2 mmol/L Final   07/06/2024 4.5 3.5 - 5.2 mmol/L Final   07/06/2024 3.8 3.5 - 5.2 mmol/L Final   07/06/2024 3.7 3.5 - 5.2 mmol/L Final   07/05/2024 4.1 3.5 - 5.2 mmol/L Final   07/04/2024 3.9 3.5 - 5.2 mmol/L Final      Chloride Chloride   Date Value Ref Range Status   07/07/2024 107 98 - 107 mmol/L Final   07/06/2024 106 98 - 107 mmol/L Final   07/05/2024 106 98 - 107 mmol/L Final      Bicarbonate CO2   Date Value Ref Range Status   07/07/2024 22.0 22.0 - 29.0 mmol/L Final   07/06/2024 22.0 22.0 - 29.0 mmol/L Final   07/05/2024 20.2 (L) 22.0 - 29.0 mmol/L Final      BUN BUN   Date Value Ref Range Status   07/07/2024 14 6 - 20 mg/dL Final   07/06/2024 15 6 - 20 mg/dL Final   07/05/2024 16 6 - 20 mg/dL Final      Creatinine Creatinine   Date Value Ref Range Status   07/07/2024 0.97 0.57 - 1.00 mg/dL Final   07/06/2024 0.99 0.57 - 1.00 mg/dL Final   07/05/2024 1.07 (H) 0.57 - 1.00 mg/dL Final      Calcium Calcium   Date Value Ref Range Status   07/07/2024 8.6 8.6 - 10.5 mg/dL Final   07/06/2024 8.6 8.6 - 10.5 mg/dL Final   07/05/2024 8.5 (L) 8.6 - 10.5 mg/dL Final      Magnesium Magnesium   Date Value Ref Range Status   07/07/2024 1.9 1.6 - 2.6 mg/dL Final   07/06/2024 2.1 1.6 - 2.6 mg/dL Final   07/05/2024 2.0 1.6 - 2.6 mg/dL Final          aspirin, 81 mg, Oral, Daily  atorvastatin, 40 mg, Oral, Nightly  carvedilol, 12.5 mg, Oral, Q12H  enoxaparin, 30 mg, Subcutaneous, Daily  escitalopram, 10 mg, Oral, Daily  guaiFENesin, 1,200 mg, Oral, Q12H  hydrALAZINE, 75 mg, Oral, Q8H  insulin glargine, 20 Units, Subcutaneous, Daily  insulin lispro, 2-9 Units, Subcutaneous, 4x Daily AC & at Bedtime  mupirocin, , Each Nare, BID  pantoprazole, 40 mg, Oral, QAM  senna-docusate sodium, 2 tablet, Oral,  Nightly  sodium bicarbonate, 650 mg, Oral, Daily      niCARdipine, 5-15 mg/hr, Last Rate: Stopped (07/03/24 1500)              Acute right-sided weakness    Benign essential hypertension    Gastroparesis diabeticorum    Gastroesophageal reflux disease    Mixed hypercholesterolemia and hypertriglyceridemia    Iron deficiency anemia    Type 2 diabetes mellitus with hyperglycemia, with long-term current use of insulin    Coronary artery disease involving native coronary artery of native heart with unstable angina pectoris    Tobacco abuse    CKD stage 3a, GFR 45-59 ml/min    Carotid stenosis    Lacunar cerebrovascular accident (CVA)    Elevated troponin    Aortic valve disease      Assessment & Plan    -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  Encourage pulmonary toilet--- continue IS/flutter, mucinex and nebs  Mobilize/increase activity-- PT/OT  Making good progress.  Okay from our standpoint for discharge home with home health.  Home health and home PT orders placed.  Follow up with me in 1 month (my office will call her with appointment on Monday)  Continue routine care    KAUSHAL Alvarado  07/07/24  07:14 EDT

## 2024-07-07 NOTE — PROGRESS NOTES
"Patient Name: Bibiana Inman  :1980  44 y.o.      Patient Care Team:  Ibrahima Correa MD as PCP - General (Family Medicine)  Rachele Zafar RN as Ambulatory  ([facility])  Xochilt Jenkins MD as Consulting Physician (Nephrology)    Interval History:   Post op follow up     Subjective:  No events overnight. Pt wants to go home. No chest pain, SOA, or palpitations. No issues with surgical incisions.     Objective   Vital Signs  Temp:  [98 °F (36.7 °C)-99.2 °F (37.3 °C)] 98.6 °F (37 °C)  Heart Rate:  [56-75] 62  Resp:  [16-20] 16  BP: (126-169)/(54-75) 158/56    Intake/Output Summary (Last 24 hours) at 2024 0754  Last data filed at 2024 0353  Gross per 24 hour   Intake 840 ml   Output 1200 ml   Net -360 ml     Flowsheet Rows      Flowsheet Row First Filed Value   Admission Height 162.6 cm (64\") Documented at 2024   Admission Weight 65.5 kg (144 lb 6.4 oz) Documented at 2024            Physical Exam:   General Appearance:    Alert, cooperative, in no acute distress   Lungs:     Clear to auscultation.  Normal respiratory effort and rate.      Heart:    Regular rhythm and normal rate, normal S1 and S2, no murmurs, gallops or rubs.     Chest Wall:    No abnormalities observed   Abdomen:     Soft, nontender, positive bowel sounds.     Extremities:   no cyanosis, clubbing or edema.  No marked joint deformities.  Adequate musculoskeletal strength.       Results Review:    Results from last 7 days   Lab Units 24  0513   SODIUM mmol/L 138   POTASSIUM mmol/L 4.2   CHLORIDE mmol/L 107   CO2 mmol/L 22.0   BUN mg/dL 14   CREATININE mg/dL 0.97   GLUCOSE mg/dL 121*   CALCIUM mg/dL 8.6     Results from last 7 days   Lab Units 24  0251 24  0301 24  1745   CK TOTAL U/L 100 280* 426*     Results from last 7 days   Lab Units 24  0513   WBC 10*3/mm3 7.49   HEMOGLOBIN g/dL 8.7*   HEMATOCRIT % 26.8*   PLATELETS 10*3/mm3 184     Results from " last 7 days   Lab Units 07/02/24  0239 07/01/24  1312   INR  1.25* 1.33*   APTT seconds  --  31.3         Results from last 7 days   Lab Units 07/07/24  0513   MAGNESIUM mg/dL 1.9             Medication Review:   aspirin, 81 mg, Oral, Daily  atorvastatin, 40 mg, Oral, Nightly  carvedilol, 12.5 mg, Oral, Q12H  enoxaparin, 30 mg, Subcutaneous, Daily  escitalopram, 10 mg, Oral, Daily  guaiFENesin, 1,200 mg, Oral, Q12H  hydrALAZINE, 75 mg, Oral, Q8H  insulin glargine, 20 Units, Subcutaneous, Daily  insulin lispro, 2-9 Units, Subcutaneous, 4x Daily AC & at Bedtime  mupirocin, , Each Nare, BID  pantoprazole, 40 mg, Oral, QAM  senna-docusate sodium, 2 tablet, Oral, Nightly  sodium bicarbonate, 650 mg, Oral, Daily       niCARdipine, 5-15 mg/hr, Last Rate: Stopped (07/03/24 1500)        Assessment & Plan   1.  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  Patient presented with strokelike symptoms.  2.  Coronary artery disease status post CABG with a vein graft to the obtuse marginal x 1 on July 1, 2024.  Previous bypass with a  of the LAD.  There is a LIMA to the LAD, vein graft to the circumflex is occluded, vein graft to the right coronary is patent.  3.  Diabetes with a hemoglobin A1c of 16.9.  4.  Hypertension.  Blood pressure is controlled.  Carvedilol increased yesterday. Tolerating carvedilol.   5.  Hyperlipidemia  6.  Tobacco use  7.  Carotid artery stenosis with a 70% lesion in the right internal carotid artery.  8.  Obstructive sleep apnea  9. Mobilize/increase activity-- PT/OT        Stable from a cardiac standpoint. CT surgery seeing and ok for discharge home with home health from there standpoint. Home health and home PT orders placed     Jacinto Sharif PA-C, [facility]  07/07/24  07:54 EDT

## 2024-07-07 NOTE — PLAN OF CARE
Goal Outcome Evaluation:  Plan of Care Reviewed With: patient        Progress: improving  Outcome Evaluation: VSS; A&Ox4.Pt remains room air; -161. Pt w/o c/o of pain. Pt SR on the monitor with heart rate in the 70s-80s. Pt remains standby assist w walker. Pt expresses no additional needs at this time. Plan of care is ongoing.

## 2024-07-07 NOTE — DISCHARGE SUMMARY
Date of Admission: 6/21/2024  Date of Discharge:  7/7/2024  Primary Care Physician: Ibrahima Correa MD     Discharge Diagnosis:  Active Hospital Problems    Diagnosis  POA    **Acute right-sided weakness [R53.1]  Yes    CKD stage 3a, GFR 45-59 ml/min [N18.31]  Yes    Carotid stenosis [I65.29]  Yes    Lacunar cerebrovascular accident (CVA) [I63.81]  Yes    Elevated troponin [R79.89]  Yes    Aortic valve disease [I35.9]  Unknown    Tobacco abuse [Z72.0]  Yes    Coronary artery disease involving native coronary artery of native heart with unstable angina pectoris [I25.110]  Yes    Gastroparesis diabeticorum [E11.43, 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       Presenting Problem/History of Present Illness:  Tobacco abuse [Z72.0]  Acute right-sided weakness [R53.1]  Carotid stenosis, asymptomatic, right [I65.21]  Coronary artery disease involving native coronary artery of native heart with unstable angina pectoris [I25.110]  Type 2 diabetes mellitus with hyperglycemia, with long-term current use of insulin [E11.65, Z79.4]     Hospital Course:  The patient is a 44 y.o. female with history of CAD, CABG, type 2 diabetes, GERD, iron deficiency anemia, and recent CVA who presented with right-sided weakness. Please see admission H&P for further details. Brain MRI showed enlargement of acute infarct in the genu of the left internal capsule as well as a new infarct in the left insular cortex, and previously identified acute infarction within the white matter of the left parietal lobe. 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 She was evaluated by cardiothoracic surgery and had an AV fibroelastoma resection, AV valve repair, and CABG x1 (SVG to OM) 7/1/24 with Dr. De Luna. She tolerated this well and her postoperative course was relatively uneventful. She had some ongoing intermittent confusion which is expected with her stroke and should improve over the next few months.   She had an CARLA following her surger and this has resolved. She was followed by nephrology and her CARLA eventually resolved. She will need to follow up with nephrology as an outpatient and stay off of her SGLT-2 inhibitor in the mean time.     Exam Today:  Blood pressure 149/55, pulse 61, temperature 98.2 °F (36.8 °C), temperature source Oral, resp. rate 17, height 162.6 cm (64\"), weight 68.9 kg (151 lb 12.8 oz), last menstrual period 01/10/2023, SpO2 95%, not currently breastfeeding.  Vitals and nursing note reviewed.   Constitutional:       General: She is not in acute distress.     Appearance: She is not toxic-appearing or diaphoretic.   HENT:      Head: Normocephalic and atraumatic.      Nose: Nose normal.      Mouth/Throat:      Mouth: Mucous membranes are moist.      Pharynx: Oropharynx is clear.   Eyes:      Conjunctiva/sclera: Conjunctivae normal.      Pupils: Pupils are equal, round, and reactive to light.   Cardiovascular:      Rate and Rhythm: Normal rate and regular rhythm.      Pulses: Normal pulses.      Comments: Surgical wound c/d/i  Pulmonary:      Effort: Pulmonary effort is normal.   Abdominal:      General: Bowel sounds are normal.      Palpations: Abdomen is soft.      Tenderness: There is no abdominal tenderness.   Musculoskeletal:         General: No swelling or tenderness.      Cervical back: Neck supple.   Skin:     General: Skin is warm and dry.      Capillary Refill: Capillary refill takes less than 2 seconds.   Neurological:      Mental Status: She is alert and oriented to person, place, and time.      " Comments: +mild right lower facial weakness   Psychiatric:         Mood and Affect: Mood normal.         Behavior: Behavior normal.     Procedures Performed:  Procedure(s):  YUE; REOPERATIVE STERNOTOMY; CORONARY ARTERY BYPASS GRAFTING TIMES 1 UTILIZING RIGHT SAPHENOUS VEIN; AORTIC VALVE TUMOR ; PRP      Consults:   Consults       Date and Time Order Name Status Description    7/2/2024  8:20 AM Inpatient Nephrology Consult Completed     6/25/2024  8:44 AM Inpatient Cardiothoracic Surgery Consult Completed     6/23/2024  8:02 PM Inpatient Cardiology Consult Completed     6/22/2024 12:03 AM Inpatient Neurology Consult Stroke Completed     6/21/2024  9:04 PM Inpatient Neurology Consult Stroke Completed     6/21/2024  9:04 PM Inpatient Neurology Consult Stroke Completed     6/18/2024  2:54 AM Inpatient Vascular Surgery Consult Completed     6/17/2024 11:13 AM Inpatient Endocrinology Consult Completed     6/17/2024 10:52 AM Inpatient Neurology Consult General Completed     6/17/2024 10:52 AM Cardiology (on-call MD unless specified) Completed              Discharge Disposition:  Home or Self Care    Discharge Medications:     Discharge Medications        New Medications        Instructions Start Date   carvedilol 12.5 MG tablet  Commonly known as: COREG   12.5 mg, Oral, Every 12 Hours      guaiFENesin 600 MG 12 hr tablet  Commonly known as: MUCINEX   1,200 mg, Oral, Every 12 Hours Scheduled      mupirocin 2 % ointment  Commonly known as: BACTROBAN   Apply 1 Application to each nare 2 (Two) Times a Day.      sodium bicarbonate 650 MG tablet   650 mg, Oral, Daily   Start Date: July 8, 2024            Changes to Medications        Instructions Start Date   hydrALAZINE 25 MG tablet  Commonly known as: APRESOLINE  What changed:   medication strength  how much to take   75 mg, Oral, Every 8 Hours Scheduled      insulin detemir 100 UNIT/ML injection  Commonly known as: LEVEMIR  What changed: how much to take   30 Units,  Subcutaneous, Nightly      Vascepa 1 g capsule capsule  Generic drug: icosapent ethyl  What changed: Another medication with the same name was removed. Continue taking this medication, and follow the directions you see here.   Take 2 g (2 capsules) by mouth 2 (Two) Times a Day With Meals. Indications: Cerebrovascular Accident or Stroke, High Amount of Triglycerides in the Blood, Procedure to Reestablish Blood Supply to the Heart             Continue These Medications        Instructions Start Date   aspirin 81 MG chewable tablet   81 mg, Oral, Daily      atorvastatin 80 MG tablet  Commonly known as: LIPITOR   80 mg, Oral, Daily      Dexcom G7 Sensor misc   1 each, Does not apply, Every 10 Days, Dx: E11.65      escitalopram 10 MG tablet  Commonly known as: LEXAPRO   10 mg, Oral, Daily      famotidine 40 MG tablet  Commonly known as: PEPCID   40 mg, Oral, Every Night at Bedtime      insulin aspart 100 UNIT/ML solution pen-injector sc pen  Commonly known as: novoLOG FLEXPEN   Subcutaneous, 3 times daily, Sliding scale, between 5-20 units TID      pantoprazole 40 MG EC tablet  Commonly known as: PROTONIX   40 mg, Oral, Every Morning      Ventolin  (90 Base) MCG/ACT inhaler  Generic drug: albuterol sulfate HFA   Inhale 2 puffs every 6 (six) hours if needed for wheezing.             Stop These Medications      cloNIDine 0.2 MG/24HR patch  Commonly known as: CATAPRES-TTS     clopidogrel 75 MG tablet  Commonly known as: PLAVIX     Jardiance 10 MG tablet tablet  Generic drug: empagliflozin     metoclopramide 5 MG tablet  Commonly known as: REGLAN     metoprolol tartrate 100 MG tablet  Commonly known as: LOPRESSOR     NIFEdipine CC 60 MG 24 hr tablet  Commonly known as: ADALAT CC            ASK your doctor about these medications        Instructions Start Date   Dexcom G7  device  Ask about: Should I take this medication?   1 each, Does not apply, Once, Dx: E11.65               Discharge Diet:   Diet  Instructions       Diet: Cardiac Diets, Diabetic Diets; Healthy Heart (2-3 Na+); Regular (IDDSI 7); Thin (IDDSI 0); Consistent Carbohydrate      Discharge Diet:  Cardiac Diets  Diabetic Diets       Cardiac Diet: Healthy Heart (2-3 Na+)    Texture: Regular (IDDSI 7)    Fluid Consistency: Thin (IDDSI 0)    Diabetic Diet: Consistent Carbohydrate            Activity at Discharge: Per CT surgery instructions    Follow-up Appointments:  Future Appointments   Date Time Provider Department Center   7/26/2024  9:15 AM [facility] 4 [facility]   7/30/2024  8:30 AM Neal Peoples II, MD [facility]   8/2/2024  1:00 PM Lora Valenzuela APRN [facility]   8/7/2024  3:30 PM Rocio Denny APRN [facility]     Additional Instructions for the Follow-ups that You Need to Schedule       Ambulatory Referral to Home Health   As directed      Face to Face Visit Date: 7/6/2024   Follow-up provider for Plan of Care?: I will be treating the patient on an ongoing basis.  Please send me the Plan of Care for signature.   Follow-up provider: VINCE SMITH [[id number]]   Reason/Clinical Findings: post op open heart   Describe mobility limitations that make leaving home difficult: weakness   Nursing/Therapeutic Services Requested: Skilled Nursing Physical Therapy Occupational Therapy   Skilled nursing orders: Post CABG care   PT orders: Total joint pathway   Occupational orders: Cognition   Frequency: 1 Week 1        Call MD With Problems / Concerns   As directed      Instructions:  Call office at [phone] for any drainage, increased redness, or fever over 100.5    Order Comments: Instructions:  Call office at [phone] for any drainage, increased redness, or fever over 100.5         Discharge Follow-up with PCP   As directed       Currently Documented PCP:    Ibrahima Correa MD    PCP Phone Number:    [phone]     Follow Up Details: in 1 week        Discharge Follow-up with Specialty:  Cardiologist APRN/PA; 1 Week   As directed      Specialty: Cardiologist APRN/PA   Follow Up: 1 Week   Follow Up Details: bring all prescription bottles to appointment, call for appointment        Discharge Follow-up with Specialty: nephrology; 1 Week   As directed      Specialty: nephrology   Follow Up: 1 Week        Discharge Follow-up with Specialty: neurology   As directed      Specialty: neurology   Follow Up Details: 6-8 weeks        Discharge Follow-up with Specified Provider: Cardiologist; 1 Month   As directed      To: Cardiologist   Follow Up: 1 Month   Follow Up Details: call for appointment, bring all medication bottles to appointment        Discharge Follow-up with Specified Provider: Lora Avila CT surgery APRN 8/2 at 1pm   As directed      To: Lora Avila CT surgery APRN 8/2 at 1pm   Follow Up Details: 4-6 weeks, bring all current medications to appointment        Basic Metabolic Panel    Jul 13, 2024 (Approximate)      Release to patient: Routine Release                Test Results Pending at Discharge:  Pending Labs       Order Current Status    POC Chem Panel In process             Lenin Huang MD  07/07/24  15:41 EDT    Time Spent on Discharge Activities: Greater than 30 minutes.

## 2024-07-07 NOTE — PROGRESS NOTES
"NEPHROLOGY PROGRESS NOTE------[facility]    [facility]  [phone]  Tamiko Jenkins MD      Patient Care Team:  Ibrahima Correa MD as PCP - General (Family Medicine)  Rachele Zafar RN as Ambulatory  (Population [facility])  Xochilt Jenkins MD as Consulting Physician (Nephrology)      Provider:  Tamiko Jenkins MD  Patient Name: Bibiana Inman  :  1980    SUBJECTIVE:    F/U ARF/CARLA/CRF/CKD    Feeling well. No SOB, CP, dysuria. Appetite ok. No edema. No events overnight    Medication:  aspirin, 81 mg, Oral, Daily  atorvastatin, 40 mg, Oral, Nightly  carvedilol, 12.5 mg, Oral, Q12H  enoxaparin, 30 mg, Subcutaneous, Daily  escitalopram, 10 mg, Oral, Daily  guaiFENesin, 1,200 mg, Oral, Q12H  hydrALAZINE, 75 mg, Oral, Q8H  insulin glargine, 20 Units, Subcutaneous, Daily  insulin lispro, 2-9 Units, Subcutaneous, 4x Daily AC & at Bedtime  mupirocin, , Each Nare, BID  pantoprazole, 40 mg, Oral, QAM  senna-docusate sodium, 2 tablet, Oral, Nightly  sodium bicarbonate, 650 mg, Oral, Daily      niCARdipine, 5-15 mg/hr, Last Rate: Stopped (24 1500)        OBJECTIVE    Vital Sign Min/Max for last 24 hours  Temp  Min: 98 °F (36.7 °C)  Max: 99.2 °F (37.3 °C)   BP  Min: 126/54  Max: 169/61   Pulse  Min: 56  Max: 75   Resp  Min: 16  Max: 20   SpO2  Min: 90 %  Max: 95 %   No data recorded   Weight  Min: 68.9 kg (151 lb 12.8 oz)  Max: 68.9 kg (151 lb 12.8 oz)     Flowsheet Rows      Flowsheet Row First Filed Value   Admission Height 162.6 cm (64\") Documented at 2024   Admission Weight 65.5 kg (144 lb 6.4 oz) Documented at 2024            I/O this shift:  In: 240 [P.O.:240]  Out: -   I/O last 3 completed shifts:  In: 840 [P.O.:840]  Out: 1700 [Urine:1700]    Physical Exam:  General Appearance: alert, appears stated age and cooperative  Head: normocephalic, without obvious abnormality and atraumatic.   Eyes: " "conjunctivae and sclerae normal and no icterus  Neck: supple and no JVD  Lungs: CTA BILAT  Heart: regular rhythm & normal rate and normal S1, S2 +TITI (PACED)  Chest Wall: S/P SURGICAL CHANGES POST STERNOTOMY S/P OPEN HEART SURGERY    Abdomen: + BS; SOFT; NT/ND  Extremities: moves extremities well, no edema, no cyanosis  Skin: no bleeding, bruising or rash.    Neurologic: Alert, and oriented. No focal deficits    Labs:    WBC WBC   Date Value Ref Range Status   07/07/2024 7.49 3.40 - 10.80 10*3/mm3 Final   07/06/2024 7.00 3.40 - 10.80 10*3/mm3 Final   07/05/2024 8.86 3.40 - 10.80 10*3/mm3 Final      HGB Hemoglobin   Date Value Ref Range Status   07/07/2024 8.7 (L) 12.0 - 15.9 g/dL Final   07/06/2024 8.4 (L) 12.0 - 15.9 g/dL Final   07/05/2024 8.5 (L) 12.0 - 15.9 g/dL Final      HCT Hematocrit   Date Value Ref Range Status   07/07/2024 26.8 (L) 34.0 - 46.6 % Final   07/06/2024 25.6 (L) 34.0 - 46.6 % Final   07/05/2024 26.2 (L) 34.0 - 46.6 % Final      Platelets No results found for: \"LABPLAT\"   MCV MCV   Date Value Ref Range Status   07/07/2024 91.8 79.0 - 97.0 fL Final   07/06/2024 91.8 79.0 - 97.0 fL Final   07/05/2024 92.3 79.0 - 97.0 fL Final          Sodium Sodium   Date Value Ref Range Status   07/07/2024 138 136 - 145 mmol/L Final   07/06/2024 136 136 - 145 mmol/L Final   07/05/2024 136 136 - 145 mmol/L Final      Potassium Potassium   Date Value Ref Range Status   07/07/2024 4.2 3.5 - 5.2 mmol/L Final   07/06/2024 4.5 3.5 - 5.2 mmol/L Final   07/06/2024 3.8 3.5 - 5.2 mmol/L Final   07/06/2024 3.7 3.5 - 5.2 mmol/L Final   07/05/2024 4.1 3.5 - 5.2 mmol/L Final   07/04/2024 3.9 3.5 - 5.2 mmol/L Final      Chloride Chloride   Date Value Ref Range Status   07/07/2024 107 98 - 107 mmol/L Final   07/06/2024 106 98 - 107 mmol/L Final   07/05/2024 106 98 - 107 mmol/L Final      CO2 CO2   Date Value Ref Range Status   07/07/2024 22.0 22.0 - 29.0 mmol/L Final   07/06/2024 22.0 22.0 - 29.0 mmol/L Final   07/05/2024 20.2 " "(L) 22.0 - 29.0 mmol/L Final      BUN BUN   Date Value Ref Range Status   07/07/2024 14 6 - 20 mg/dL Final   07/06/2024 15 6 - 20 mg/dL Final   07/05/2024 16 6 - 20 mg/dL Final      Creatinine Creatinine   Date Value Ref Range Status   07/07/2024 0.97 0.57 - 1.00 mg/dL Final   07/06/2024 0.99 0.57 - 1.00 mg/dL Final   07/05/2024 1.07 (H) 0.57 - 1.00 mg/dL Final      Calcium Calcium   Date Value Ref Range Status   07/07/2024 8.6 8.6 - 10.5 mg/dL Final   07/06/2024 8.6 8.6 - 10.5 mg/dL Final   07/05/2024 8.5 (L) 8.6 - 10.5 mg/dL Final      PO4 No components found for: \"PO4\"   Albumin No results found for: \"ALBUMIN\"     Magnesium Magnesium   Date Value Ref Range Status   07/07/2024 1.9 1.6 - 2.6 mg/dL Final   07/06/2024 2.1 1.6 - 2.6 mg/dL Final   07/05/2024 2.0 1.6 - 2.6 mg/dL Final      Uric Acid No components found for: \"URIC ACID\"     Imaging Results (Last 72 Hours)       ** No results found for the last 72 hours. **            Results for orders placed during the hospital encounter of 06/21/24    XR Chest 1 View    Narrative  Portable chest radiograph    HISTORY: Chest tube removal, postop open heart    TECHNIQUE: Single AP portable radiograph of the chest    COMPARISON: Chest radiograph dating back to 7/1/2024    Impression  FINDINGS AND IMPRESSION:  Previously seen thoracostomy tubes appear to been removed. Presumed  epicardial pacing wires, median sternotomy wires and right internal  jugular sheath are present.    Findings of pneumomediastinum, as before. Mild left basilar pulmonary  opacification persists. No pneumothorax is seen. Cardiac silhouette is  accentuated by patient rotation; however, there is to be at least mildly  enlarged.    This report was finalized on 7/3/2024 11:22 AM by Dr. Bin Bey M.D  on Workstation: [id number]      XR Chest PA & Lateral    Narrative  XR CHEST PA AND LATERAL-    HISTORY: Female who is 44 years-old, postoperative evaluation    TECHNIQUE: Frontal and lateral views of " the chest    COMPARISON: 7/3/2024    FINDINGS: The heart is enlarged. Pulmonary vasculature is congested.  Sternotomy hardware is noted. Minimal pleural effusions are apparent,  with basilar atelectasis or infiltrate posteriorly on the lateral view,  follow-up suggested. No pneumothorax. No acute osseous process.    Impression  As described.    This report was finalized on 7/4/2024 8:04 AM by Dr. Antonio Ch M.D on Workstation: [id number]      XR Chest 1 View    Narrative  Portable chest radiograph    HISTORY: Postop open heart    TECHNIQUE: Single AP portable radiograph of the chest    COMPARISON: Multiple chest radiographs dating back to 6/21/2024    Impression  FINDINGS AND IMPRESSION:  There appear to be 4 thoracostomy tubes overlying the bilateral lungs a  mediastinum. The right internal jugular sheath is present.    Bibasilar pulmonary opacification has mild to moderately decreased. No  pneumothorax seen. Cardiac silhouette is accentuated by low lung  volumes. Findings suggestive of pneumomediastinum, as before.    This report was finalized on 7/3/2024 6:26 AM by Dr. Bin Bey M.D  on Workstation: [id number]      Results for orders placed during the hospital encounter of 06/21/24    Duplex Carotid Ultrasound CAR    Interpretation Summary    Right internal carotid artery demonstrates a less than 50% stenosis.    Left internal carotid artery demonstrates a less than 50% stenosis.        ASSESSMENT / PLAN      Acute right-sided weakness    Benign essential hypertension    Gastroparesis diabeticorum    Gastroesophageal reflux disease    Mixed hypercholesterolemia and hypertriglyceridemia    Iron deficiency anemia    Type 2 diabetes mellitus with hyperglycemia, with long-term current use of insulin    Coronary artery disease involving native coronary artery of native heart with unstable angina pectoris    Tobacco abuse    CKD stage 3a, GFR 45-59 ml/min    Carotid stenosis    Lacunar cerebrovascular  accident (CVA)    Elevated troponin    Aortic valve disease      ARF/CARLA/CRF/CKD---Nonoliguric. +ARF/CARLA on top of known CRF/CKD STG   3A, with a baseline serum creatinine of about 1.2. CRF/CKD STG 3A is secondary to DGS/HTN NS. +ARF/CARLA appears to be secondary to prerenal state/intravascular volume depletion and some ATN from hypotension. CARLA resolved. Support BP and avoid hypotension. Renal US negative.  No NSAIDs or IV dye. Dose meds for CrCl 30-45 cc/min.  Keep off of SGLT2 inhibitor for now     2. ACIDOSIS-----PO NaHCO3. Stable bicarb levels today     3. DMII WITH RENAL MANIFESTATIONS-----Hold SGLT2 inhibitor given ARF/CARLA. Lantus, Glucometers, SSI     4. HTN WITH CKD-----BP ok. No ACE-I/ARB/DRI/diuretic for now. Follow for pressor need. Follow off of IVFs     5. HYPERLIPIDEMIA----Statin     6. OA/DJD------No NSAIDs. Uric ok     7. DM PERIPHERAL NEUROPATHY-----On Neurontin     8. GERD/DM GASTROPARESIS/PUD PROPHYLAXIS-------Reglan and PPI. Benefits of PPI use outweigh risks, despite renal dysfunction     8. DVT PROPHYLAXIS----Renal dose adjusted Lovenox     9. PROTEINURIA-----Secondary to DGS     10. VITAMIN D DEFICIENCY-----On Supplementation prior to admission     11. ANEMIA OF CKD AND POST-OP ANEMIA------IV iron for NIGHAT     12. DEPRESSION-----On Lexapro     13. CAD S/P CABG S/P AV FIBROELASTOMA RESECTION/REPAIR-----per , CT Surgery     14. POST OP ATELECTASIS/LB WITH H/O TOBACCO ABUSE-----Encourage IS. No active wheezing and respiratory status stable    15. HYPOCALCEMIA------Replaced    Okay from RENAL standpoint to d/c today but to remain OFF of home SGLT2 inhibitor until follow up in our office. Thanks    Tamiko Jenkins MD  [facility]  [phone]  07/07/24  09:31 EDT

## 2024-07-08 NOTE — OUTREACH NOTE
Prep Survey      Flowsheet Row Responses   [place] facility patient discharged from? [facility]   Is LACE score < 7 ? No   Eligibility Readm Mgmt   Discharge diagnosis Acute right-sided weakness, REOPERATIVE STERNOTOMY,  CORONARY ARTERY BYPASS GRAFTING TIMES 1 UTILIZING RIGHT SAPHENOUS VEIN,  AORTIC VALVE TUMOR ,    Does the patient have one of the following disease processes/diagnoses(primary or secondary)? Cardiothoracic surgery   Does the patient have Home health ordered? Yes   What is the Home health agency?  [facility]   Is there a DME ordered? No   Prep survey completed? Yes            Rubina Snyder Registered Nurse

## 2024-07-08 NOTE — CASE MANAGEMENT/SOCIAL WORK
Case Management Discharge Note      Final Note: Home with [facility]    Provided Post Acute Provider List?: Refused    Selected Continued Care - Discharged on 7/7/2024 Admission date: 6/21/2024 - Discharge disposition: Home or Self Care      Destination    No services have been selected for the patient.                Durable Medical Equipment    No services have been selected for the patient.                Dialysis/Infusion    No services have been selected for the patient.                Home Medical Care Coordination complete.      Service Provider Selected Services Address Phone Fax Patient Preferred     [facility] [address] [place] [id number] [phone] --              Therapy    No services have been selected for the patient.                Community Resources    No services have been selected for the patient.                Community & DME    No services have been selected for the patient.                    Selected Continued Care - Episodes Includes continued care and service providers with selected services from the active episodes listed below      Rising Risk Care Management Episode start date: 2/20/2024   There are no active outsourced providers for this episode.                 Transportation Services  Private: Car    Final Discharge Disposition Code: 06 - home with home health care

## 2024-07-09 ENCOUNTER — HOME CARE VISIT (OUTPATIENT)
Dept: HOME HEALTH SERVICES | Facility: HOME HEALTHCARE | Age: 44
End: 2024-07-09
Payer: COMMERCIAL

## 2024-07-09 VITALS
SYSTOLIC BLOOD PRESSURE: 150 MMHG | DIASTOLIC BLOOD PRESSURE: 82 MMHG | HEART RATE: 65 BPM | RESPIRATION RATE: 18 BRPM | TEMPERATURE: 98.3 F | OXYGEN SATURATION: 90 %

## 2024-07-09 PROCEDURE — G0299 HHS/HOSPICE OF RN EA 15 MIN: HCPCS

## 2024-07-09 NOTE — PAYOR COMM NOTE
"Bibiana Belcher (44 y.o. Female)                  ATTENTION; CONTINUED CLINICALS CASE REF [id number] FOR 7/5-7/7                 REPLY TO [facility] DEPT  [phone]           Date of Birth   1980    Social Security Number       Address   [address] [place] [id number]    Home Phone   [phone]    MRN   [id number]       Sikhism   None    Marital Status                               Admission Date   6/21/24    Admission Type   Emergency    Admitting Provider   Raul Davis MD    Attending Provider       Department, Room/Bed   [facility] CARDIOVASC UNIT, 2212/1       Discharge Date   7/7/2024    Discharge Disposition   Home or Self Care    Discharge Destination                                 Attending Provider: (none)   Allergies: Latex    Isolation: None   Infection: None   Code Status: Prior    Ht: 162.6 cm (64\")   Wt: 68.9 kg (151 lb 12.8 oz)    Admission Cmt: None   Principal Problem: Acute right-sided weakness [R53.1]                   Active Insurance as of 6/21/2024       Primary Coverage       Payor Plan Insurance Group Employer/Plan Group    [facility] EMPLOYEE [id number]       Payor Plan Address Payor Plan Phone Number Payor Plan Fax Number Effective Dates    [address] [phone]  2/1/2020 - None Entered    [place] [address]         Subscriber Name Subscriber Birth Date Member ID       BIBIANA BELCHER 1980 [id number]                     Emergency Contacts        (Rel.) Home Phone Work Phone Mobile Phone    HOLLAND BELCHER (Spouse) [phone] -- [phone]              Vital Signs (last 3 days) before discharge       Date/Time Temp Temp src Pulse Resp BP Patient Position SpO2    07/07/24 1453 -- -- 61 -- -- -- --    07/07/24 1241 98.2 (36.8) Oral 57 17 149/55 Lying 95    07/07/24 0926 -- -- 64 -- -- -- --    07/07/24 0823 -- -- 63 -- 154/71 -- 94    07/07/24 0822 98.5 (36.9) Oral 63 17 154/71 Sitting 94    " 07/07/24 0820 -- -- 62 -- -- -- 95    07/07/24 0610 -- -- 62 -- 158/56 -- --    07/07/24 0353 98.6 (37) Oral 64 16 139/62 Lying 93    07/07/24 0031 99.2 (37.3) Oral 60 20 126/54 Lying --    07/06/24 2204 -- -- 75 -- 169/61 -- 93    07/06/24 1945 98.8 (37.1) Oral 65 18 159/75 Lying 90    07/06/24 1555 98.3 (36.8) Oral 59 19 132/67 Lying 95    07/06/24 1442 -- -- 56 -- -- -- --    07/06/24 1147 98 (36.7) Oral 60 19 131/56 Lying 93    07/06/24 0920 -- -- 60 -- -- -- 95    07/06/24 0910 -- -- 67 -- -- -- 97    07/06/24 0750 98.2 (36.8) Oral 57 19 116/54 Sitting 96    07/06/24 0701 -- -- 52 -- -- -- 100    07/06/24 0700 -- -- 52 -- -- -- 100    07/06/24 0658 -- -- 52 -- -- -- 100    07/06/24 0657 -- -- 52 20 -- -- 100    07/06/24 0337 98.5 (36.9) Oral -- 20 -- Lying --    07/05/24 2349 98.6 (37) Oral -- 16 136/79 Lying --    07/05/24 2008 -- -- 57 20 -- -- 100    07/05/24 2005 -- -- 57 20 -- -- 96    07/05/24 1928 98.3 (36.8) Oral -- 16 138/61 Lying --    07/05/24 1620 99.1 (37.3) Oral -- 16 154/69 Lying --    07/05/24 1200 98.9 (37.2) Oral 60 16 134/69 Sitting --    07/05/24 0818 -- -- 60 16 105/49 Sitting --    07/05/24 0713 -- -- 56 -- -- -- --    07/05/24 0712 -- -- 57 -- -- -- 100    07/05/24 0710 -- -- 58 -- -- -- 100    07/05/24 0709 -- -- 57 16 -- -- 99    07/05/24 0355 98.8 (37.1) Oral 55 16 122/62 Lying 96    07/04/24 2335 98.7 (37.1) Oral 54 16 138/67 Lying 95    07/04/24 2100 -- -- 63 -- 140/69 -- 94    07/04/24 1940 98.5 (36.9) Oral 58 16 145/68 Lying 98    07/04/24 1934 -- -- 59 20 -- -- 100    07/04/24 1929 -- -- 59 20 -- -- 96    07/04/24 1900 -- -- 61 -- -- -- 98    07/04/24 1618 98.9 (37.2) Oral 58 18 132/68 Lying 98    07/04/24 1200 98.3 (36.8) Axillary 59 18 148/65 Lying 97    07/04/24 1100 -- -- 59 -- 138/71 -- 96    07/04/24 1059 -- -- 61 -- -- -- 94    07/04/24 1000 -- -- 62 -- 147/63 -- 94    07/04/24 0900 -- -- 65 -- 134/55 -- 97    07/04/24 0815 -- -- 68 18 115/64 Sitting 99    07/04/24 0709 --  -- 67 18 -- -- 100    07/04/24 0704 -- -- 62 18 -- -- 100    07/04/24 0702 -- -- 65 17 -- -- 98    07/04/24 0653 -- -- 62 -- -- -- 98    07/04/24 0600 -- -- 60 -- 145/65 -- 98    07/04/24 0500 99.5 (37.5) -- 58 -- 130/63 -- 95    07/04/24 0400 99.5 (37.5) Bladder 62 16 134/64 Lying 96    07/04/24 0300 99.3 (37.4) -- 61 -- 120/55 -- 95    07/04/24 0200 99.3 (37.4) -- 60 -- 121/57 -- 96    07/04/24 0100 99.1 (37.3) Bladder 58 22 132/59 Lying 96    07/04/24 0000 99 (37.2) -- 58 -- 127/57 -- 96          Oxygen Therapy (last 3 days) before discharge       Date/Time SpO2 Device (Oxygen Therapy) Flow (L/min) Oxygen Concentration (%) ETCO2 (mmHg)    07/07/24 1607 -- room air -- -- --    07/07/24 1241 95 -- -- -- --    07/07/24 0823 94 -- -- -- --    07/07/24 0822 94 room air -- -- --    07/07/24 0820 95 -- -- -- --    07/07/24 0353 93 room air -- -- --    07/06/24 2204 93 -- -- -- --    07/06/24 2000 -- room air -- -- --    07/06/24 1945 90 -- -- -- --    07/06/24 1600 -- room air -- -- --    07/06/24 1555 95 -- -- -- --    07/06/24 1147 93 -- -- -- --    07/06/24 0920 95 -- -- -- --    07/06/24 0913 -- room air -- -- --    07/06/24 0910 97 -- -- -- --    07/06/24 0750 96 -- -- -- --    07/06/24 0701 100 -- -- -- --    07/06/24 0700 100 -- -- -- --    07/06/24 0658 100 -- -- -- --    07/06/24 0657 100 room air -- 21 --    07/05/24 2008 100 -- -- -- --    07/05/24 2005 96 room air -- -- --    07/05/24 2000 -- room air -- -- --    07/05/24 1928 -- room air -- -- --    07/05/24 1409 -- room air -- -- --    07/05/24 0818 -- room air -- -- --    07/05/24 0712 100 -- -- -- --    07/05/24 0710 100 -- -- -- --    07/05/24 0709 99 room air -- 21 --    07/05/24 0400 -- room air -- -- --    07/05/24 0355 96 -- -- -- --    07/05/24 0000 -- room air -- -- --    07/04/24 2335 95 -- -- -- --    07/04/24 2100 94 -- -- -- --    07/04/24 1945 -- room air -- -- --    07/04/24 1940 98 -- -- -- --    07/04/24 1934 100 -- -- -- --    07/04/24  1929 96 room air -- -- --    07/04/24 1900 98 -- -- -- --    07/04/24 1618 98 -- -- -- --    07/04/24 1200 97 room air -- -- --    07/04/24 1100 96 -- -- -- --    07/04/24 1059 94 -- -- -- --    07/04/24 1000 94 -- -- -- --    07/04/24 0900 97 -- -- -- --    07/04/24 0815 99 room air -- -- --    07/04/24 0709 100 room air -- -- --    07/04/24 0704 100 room air -- -- --    07/04/24 0702 98 room air -- -- --    07/04/24 0653 98 -- -- -- --    07/04/24 0600 98 -- -- -- --    07/04/24 0500 95 -- -- -- --    07/04/24 0400 96 room air -- -- --    07/04/24 0300 95 -- -- -- --    07/04/24 0200 96 -- -- -- --    07/04/24 0100 96 room air -- -- --    07/04/24 0000 96 -- -- -- --          Orders (last 72 hrs)        Start     Ordered    07/08/24 0600  CBC (No Diff)  Morning Draw,   Status:  Canceled         07/07/24 0934    07/08/24 0600  Basic Metabolic Panel  Morning Draw,   Status:  Canceled         07/07/24 0934    07/08/24 0600  Magnesium  Morning Draw,   Status:  Canceled         07/07/24 0934    07/08/24 0600  Phosphorus  Morning Draw,   Status:  Canceled         07/07/24 0934    07/08/24 0000  sodium bicarbonate 650 MG tablet  Daily         07/07/24 1531    07/07/24 1600  POC Glucose Once  PROCEDURE ONCE        Comments: Complete no more than 45 minutes prior to patient eating      07/07/24 1556    07/07/24 1541  Discontinue IV  Once,   Status:  Canceled         07/07/24 1541    07/07/24 1528  Discharge patient  Once         07/07/24 1531    07/07/24 1114  POC Glucose Once  PROCEDURE ONCE        Comments: Complete no more than 45 minutes prior to patient eating      07/07/24 1106    07/07/24 1030  magnesium sulfate 2g/50 mL (PREMIX) infusion  Once         07/07/24 0934    07/07/24 0650  POC Glucose Once  PROCEDURE ONCE        Comments: Complete no more than 45 minutes prior to patient eating      07/07/24 0648    07/07/24 0600  CBC (No Diff)  Morning Draw         07/06/24 0807    07/07/24 0600  Basic Metabolic Panel   Morning Draw,   Status:  Canceled         07/06/24 0807    07/07/24 0600  Magnesium  Morning Draw         07/06/24 0807    07/07/24 0600  Phosphorus  Morning Draw         07/06/24 0807    07/07/24 0000  insulin detemir (LEVEMIR) 100 UNIT/ML injection  Nightly         07/07/24 1531    07/07/24 0000  hydrALAZINE (APRESOLINE) 25 MG tablet  Every 8 Hours Scheduled         07/07/24 1531    07/07/24 0000  carvedilol (COREG) 12.5 MG tablet  Every 12 Hours         07/07/24 1531    07/07/24 0000  guaiFENesin (MUCINEX) 600 MG 12 hr tablet  Every 12 Hours Scheduled         07/07/24 1531    07/07/24 0000  mupirocin (BACTROBAN) 2 % ointment  2 Times Daily         07/07/24 1531    07/07/24 0000  Discharge Follow-up with Specialty: nephrology; 1 Week         07/07/24 1531    07/07/24 0000  Discharge Follow-up with Specialty: neurology         07/07/24 1540    07/07/24 0000  Diet: Cardiac Diets, Diabetic Diets; Healthy Heart (2-3 Na+); Regular (IDDSI 7); Thin (IDDSI 0); Consistent Carbohydrate         07/07/24 1541    07/06/24 2121  POC Glucose Once  PROCEDURE ONCE        Comments: Complete no more than 45 minutes prior to patient eating      07/06/24 2119    07/06/24 2045  Potassium  Timed         07/06/24 1444    07/06/24 1544  POC Glucose Once  PROCEDURE ONCE        Comments: Complete no more than 45 minutes prior to patient eating      07/06/24 1542    07/06/24 1530  potassium chloride (K-DUR,KLOR-CON) ER tablet 20 mEq  Once         07/06/24 1444    07/06/24 1108  Potassium  Timed         07/06/24 0507    07/06/24 1101  POC Glucose Once  PROCEDURE ONCE        Comments: Complete no more than 45 minutes prior to patient eating      07/06/24 1058    07/06/24 0900  potassium chloride (K-DUR,KLOR-CON) ER tablet 40 mEq  Once         07/06/24 0807    07/06/24 0835  Remove Sutures  Once,   Status:  Canceled         07/06/24 0836    07/06/24 0800  potassium chloride (K-DUR,KLOR-CON) ER tablet 20 mEq  Once,   Status:  Discontinued          07/06/24 0507    07/06/24 0000  Discharge Instructions        Comments: 1) No driving for 2 weeks and no longer taking narcotics, ride in the back seat for 2 weeks.   2) May shower, no tub bathing, no immersion in pools or hot tubs   3) Do not lift / push / pull more then 10 lbs.  4) Walk at least 10 minutes at least 3 times daily    07/06/24 0836    07/06/24 0000  Discharge Dressing / Wound Instructions        Comments: Instructions: Clean incision daily with soap and water, otherwise keep clean and dry. Do not apply creams, lotions, neosporin, vitamin E, etc to incisions.    07/06/24 0836    07/06/24 0000  No Dressing Needed         07/06/24 0836    07/06/24 0000  Discharge Follow-up with PCP         07/06/24 0836    07/06/24 0000  Discharge Follow-up with Specified Provider: Cardiologist; 1 Month         07/06/24 0836    07/06/24 0000  Discharge Follow-up with Specified Provider: Lora Avila CT surgery APRN 8/2 at 1pm         07/06/24 0836    07/06/24 0000  Discharge Follow-up with Specialty: Cardiologist APRN/PA; 1 Week         07/06/24 0836    07/06/24 0000  Call MD With Problems / Concerns        Comments: Instructions:  Call office at [phone] for any drainage, increased redness, or fever over 100.5    07/06/24 0836    07/06/24 0000  Ambulatory Referral to Home Health         07/06/24 0836    07/05/24 2045  carvedilol (COREG) tablet 12.5 mg  Every 12 Hours,   Status:  Discontinued         07/05/24 1036    07/05/24 1600  sodium bicarbonate tablet 650 mg  Daily,   Status:  Discontinued         07/05/24 1401    07/04/24 1400  hydrALAZINE (APRESOLINE) tablet 75 mg  Every 8 Hours Scheduled,   Status:  Discontinued         07/04/24 0647    07/04/24 0731  Bladder Scan if Patient Unable to Void 4-6 Hours After Catheter Removal  As Needed,   Status:  Canceled         07/04/24 0731    07/04/24 0731  If Bladder Scan Volume is Less Than 500mL & Patient is Without Symptoms of Bladder Discomfort / Distention  Monitor Every 1-2 Hours for Spontaneous Void  As Needed,   Status:  Canceled       07/04/24 0731    07/04/24 0731  Straight Cath Every 4-6 Hours As Needed If Patient is Unable to Void After 4-6 Hours, Bladder Scan Volume is Greater Than 500mL & Patient Has Symptoms of Bladder Discomfort / Distention  As Needed,   Status:  Canceled       07/04/24 0731    07/04/24 0731  Schedule / Prompt Voiding For Patients With Urinary Incontinence  As Needed,   Status:  Canceled       07/04/24 0731    07/04/24 0600  Wound Care  Daily,   Status:  Canceled       07/01/24 1308    07/03/24 2100  atorvastatin (LIPITOR) tablet 40 mg  Nightly,   Status:  Discontinued         07/03/24 0707    07/03/24 0900  insulin glargine (LANTUS, SEMGLEE) injection 20 Units  Daily,   Status:  Discontinued         07/03/24 0708    07/03/24 0747  hydrALAZINE (APRESOLINE) injection 20 mg  Every 6 Hours PRN,   Status:  Discontinued         07/03/24 0748    07/03/24 0600  Change Chest Dressing Daily While Intubated  Daily,   Status:  Canceled       07/01/24 1308    07/03/24 0600  Wound Care  Daily,   Status:  Canceled       07/01/24 1308    07/03/24 0600  Wound Care  Daily,   Status:  Canceled       07/01/24 1308    07/03/24 0600  Basic Metabolic Panel  Daily,   Status:  Canceled       07/01/24 1308    07/02/24 2100  sennosides-docusate (PERICOLACE) 8.6-50 MG per tablet 2 tablet  Nightly,   Status:  Discontinued         07/01/24 1308    07/02/24 1800  Enoxaparin Sodium (LOVENOX) syringe 30 mg  Daily,   Status:  Discontinued         07/02/24 1635    07/02/24 1400  Intake & Output  Every Shift,   Status:  Canceled       07/01/24 1308    07/02/24 1200  Vital Signs  Every 4 Hours,   Status:  Canceled      Comments: Perform Vitals Less Frequently Only if Patient Stable      07/01/24 1308    07/02/24 1100  POC Glucose 4x Daily Before Meals & at Bedtime  4 Times Daily Before Meals & at Bedtime,   Status:  Canceled      Comments: Complete no more than 45 minutes  "prior to patient eating      07/02/24 0811    07/02/24 0930  sodium chloride 7 % nebulizer solution nebulizer solution 4 mL  2 Times Daily - RT,   Status:  Discontinued         07/02/24 0726    07/02/24 0900  aspirin EC tablet 81 mg  Daily,   Status:  Discontinued         07/01/24 1308    07/02/24 0900  escitalopram (LEXAPRO) tablet 10 mg  Daily,   Status:  Discontinued         07/01/24 1308 07/02/24 0900  guaiFENesin (MUCINEX) 12 hr tablet 1,200 mg  Every 12 Hours Scheduled,   Status:  Discontinued         07/02/24 0726 07/02/24 0900  insulin lispro (HUMALOG/ADMELOG) injection 2-9 Units  4 Times Daily Before Meals & Nightly,   Status:  Discontinued         07/02/24 0811 07/02/24 0830  Oscillating Positive Expiratory Pressure (OPEP)  4 Times Daily - RT,   Status:  Canceled       07/02/24 0726 07/02/24 0811  Follow Hypoglycemia Standing Orders For Blood Glucose <70 & Notify Provider of Treatment  As Needed,   Status:  Canceled      Comments: Follow Hypoglycemia Orders As Outlined in Process Instructions (Open Order Report to View Full Instructions)  Notify Provider Any Time Hypoglycemia Treatment is Administered    07/02/24 0811 07/02/24 0811  dextrose (GLUTOSE) oral gel 15 g  Every 15 Minutes PRN,   Status:  Discontinued         07/02/24 0811    07/02/24 0811  dextrose (D50W) (25 g/50 mL) IV injection 25 g  Every 15 Minutes PRN,   Status:  Discontinued         07/02/24 0811    07/02/24 0811  glucagon (GLUCAGEN) injection 1 mg  Every 15 Minutes PRN,   Status:  Discontinued         07/02/24 0811    07/02/24 0725  ipratropium-albuterol (DUO-NEB) nebulizer solution 3 mL  Every 4 Hours PRN,   Status:  Discontinued         07/02/24 0726    07/02/24 0700  pantoprazole (PROTONIX) EC tablet 40 mg  Every Morning,   Status:  Discontinued        Placed in \"Followed by\" Linked Group    07/01/24 1308    07/02/24 0313  acetaminophen (TYLENOL) tablet 650 mg  Every 4 Hours PRN,   Status:  Discontinued        Placed in " "\"Or\" Linked Group    07/01/24 1308    07/02/24 0313  acetaminophen (TYLENOL) 160 MG/5ML oral solution 650 mg  Every 4 Hours PRN,   Status:  Discontinued        Placed in \"Or\" Linked Group    07/01/24 1308    07/02/24 0313  acetaminophen (TYLENOL) suppository 650 mg  Every 4 Hours PRN,   Status:  Discontinued        Placed in \"Or\" Linked Group    07/01/24 1308    07/02/24 0000  bisacodyl (DULCOLAX) suppository 10 mg  Daily PRN,   Status:  Discontinued         07/01/24 1308    07/02/24 0000  magnesium hydroxide (MILK OF MAGNESIA) suspension 10 mL  Daily PRN,   Status:  Discontinued         07/01/24 1308    07/01/24 2330  Patient May Use Home CPAP / BIPAP For Sleep  At Bedtime - RT,   Status:  Canceled       07/01/24 1308    07/01/24 2100  mupirocin (BACTROBAN) 2 % nasal ointment  2 Times Daily,   Status:  Discontinued         07/01/24 1308    07/01/24 1600  Check Peripheral Pulses Every 4 Hours  Every 4 Hours,   Status:  Canceled       07/01/24 1308    07/01/24 1400  Incentive Spirometry  Every Hour While Awake,   Status:  Canceled       07/01/24 1308    07/01/24 1309  Daily Weights  Daily,   Status:  Canceled       07/01/24 1308    07/01/24 1309  Monitor QTc  Every Shift,   Status:  Canceled       07/01/24 1308    07/01/24 1308  morphine injection 1 mg  Every 4 Hours PRN,   Status:  Discontinued        Placed in \"And\" Linked Group    07/01/24 1308    07/01/24 1308  naloxone (NARCAN) injection 0.4 mg  Every 5 Minutes PRN,   Status:  Discontinued        Placed in \"And\" Linked Group    07/01/24 1308    07/01/24 1308  polyethylene glycol (MIRALAX) packet 17 g  Daily PRN,   Status:  Discontinued         07/01/24 1308    07/01/24 1308  ondansetron (ZOFRAN) injection 4 mg  Every 6 Hours PRN,   Status:  Discontinued         07/01/24 1308    07/01/24 1308  ALPRAZolam (XANAX) tablet 0.25 mg  Every 8 Hours PRN,   Status:  Discontinued         07/01/24 1308    07/01/24 1308  CBC & Differential  As Needed,   Status:  Canceled     "    Comments: Chest Tube Drainage Greater Than 200mL/hr      07/01/24 1308    07/01/24 1308  aPTT  As Needed,   Status:  Canceled        Comments: Chest Tube Drainage Greater Than 200mL/hr      07/01/24 1308    07/01/24 1308  Protime-INR  As Needed,   Status:  Canceled        Comments: Chest Tube Drainage Greater Than 200mL/hr      07/01/24 1308    07/01/24 1308  Fibrinogen  As Needed,   Status:  Canceled        Comments: Chest Tube Drainage Greater Than 200mL/hr      07/01/24 1308    07/01/24 1308  niCARdipine (CARDENE) 25 mg in 250 mL NS infusion kit  Continuous PRN,   Status:  Discontinued         07/01/24 1308    07/01/24 1308  Potassium Replacement - Follow Nurse / BPA Driven Protocol  As Needed,   Status:  Discontinued         07/01/24 1308    07/01/24 1308  bisacodyl (DULCOLAX) EC tablet 10 mg  Daily PRN,   Status:  Discontinued         07/01/24 1308    07/01/24 1308  Check Peripheral Pulses As Needed  As Needed,   Status:  Canceled       07/01/24 1308    07/01/24 1308  Cardiac Output Parameters PRN Until 24 Hours Post-Op  As Needed,   Status:  Canceled       07/01/24 1308    07/01/24 1308  nitroglycerin (NITROSTAT) SL tablet 0.4 mg  Every 5 Minutes PRN,   Status:  Discontinued         07/01/24 1308    07/01/24 1308  Pacemaker Settings - Initiate for Heart Rate Less Than 60 And / Or Hemodynamically Unstable  As Needed,   Status:  Canceled      Comments: Mode: AAI  Atrial rate: 90  Atrial mA: 10  Atrial mV: 0.5    07/01/24 1308    07/01/24 1308  Insert Nasogastric Tube If Indicated & Not Already in Place  As Needed,   Status:  Canceled      Comments: Indications: Nausea, Vomiting, Prolonged Intubation or to Administer Medications  Attach to Low Wall Suction if Any Residual    07/01/24 1308    07/01/24 1308  Cleanse Incision With Normal Saline and Redress With Dry 4x4 Gauze if Incision is Draining  As Needed,   Status:  Canceled       07/01/24 1308    07/01/24 1308  Wound Care  As Needed,   Status:  Canceled        24 1308    24 1308  Dangle at Bedside After Extubation  As Needed,   Status:  Canceled       24 1308    24 1308  Up in Chair As Tolerated After Extubation  As Needed,   Status:  Canceled       24 1308    24 1308  Oxygen Therapy- Nasal Cannula; 2 LPM  Continuous PRN,   Status:  Canceled       24 1308    24 1308  Patient May Use Home CPAP / BIPAP As Needed  As Needed,   Status:  Canceled       24 1308    24 1308  Blood Gas, Arterial -  As Needed,   Status:  Canceled      Comments: 30 Minutes After Ventilator Changes, 30 Minutes After Extubation and PRN      24 130    Signed and Held  atorvastatin (LIPITOR) tablet 40 mg  Nightly,   Status:  Canceled         Signed and Held                               Physician Progress Notes (last 4 days)        Xochilt Jenkins MD at 24 0931          NEPHROLOGY PROGRESS NOTE------[facility]    [facility]  [phone]  Tamiko Jenkins MD      Patient Care Team:  Ibrahima Correa MD as PCP - General (Family Medicine)  Rachele Zafar RN as Ambulatory  ([facility])  Xochilt Jenkins MD as Consulting Physician (Nephrology)      Provider:  Tamiko Jenkins MD  Patient Name: Bibiana Inman  :  1980    SUBJECTIVE:    F/U ARF/CARLA/CRF/CKD    Feeling well. No SOB, CP, dysuria. Appetite ok. No edema. No events overnight    Medication:  aspirin, 81 mg, Oral, Daily  atorvastatin, 40 mg, Oral, Nightly  carvedilol, 12.5 mg, Oral, Q12H  enoxaparin, 30 mg, Subcutaneous, Daily  escitalopram, 10 mg, Oral, Daily  guaiFENesin, 1,200 mg, Oral, Q12H  hydrALAZINE, 75 mg, Oral, Q8H  insulin glargine, 20 Units, Subcutaneous, Daily  insulin lispro, 2-9 Units, Subcutaneous, 4x Daily AC & at Bedtime  mupirocin, , Each Nare, BID  pantoprazole, 40 mg, Oral, QAM  senna-docusate sodium, 2 tablet, Oral, Nightly  sodium  "bicarbonate, 650 mg, Oral, Daily      niCARdipine, 5-15 mg/hr, Last Rate: Stopped (07/03/24 1500)        OBJECTIVE    Vital Sign Min/Max for last 24 hours  Temp  Min: 98 °F (36.7 °C)  Max: 99.2 °F (37.3 °C)   BP  Min: 126/54  Max: 169/61   Pulse  Min: 56  Max: 75   Resp  Min: 16  Max: 20   SpO2  Min: 90 %  Max: 95 %   No data recorded   Weight  Min: 68.9 kg (151 lb 12.8 oz)  Max: 68.9 kg (151 lb 12.8 oz)     Flowsheet Rows      Flowsheet Row First Filed Value   Admission Height 162.6 cm (64\") Documented at 06/21/2024 2105   Admission Weight 65.5 kg (144 lb 6.4 oz) Documented at 06/21/2024 2105            I/O this shift:  In: 240 [P.O.:240]  Out: -   I/O last 3 completed shifts:  In: 840 [P.O.:840]  Out: 1700 [Urine:1700]    Physical Exam:  General Appearance: alert, appears stated age and cooperative  Head: normocephalic, without obvious abnormality and atraumatic.   Eyes: conjunctivae and sclerae normal and no icterus  Neck: supple and no JVD  Lungs: CTA BILAT  Heart: regular rhythm & normal rate and normal S1, S2 +TITI (PACED)  Chest Wall: S/P SURGICAL CHANGES POST STERNOTOMY S/P OPEN HEART SURGERY    Abdomen: + BS; SOFT; NT/ND  Extremities: moves extremities well, no edema, no cyanosis  Skin: no bleeding, bruising or rash.    Neurologic: Alert, and oriented. No focal deficits    Labs:    WBC WBC   Date Value Ref Range Status   07/07/2024 7.49 3.40 - 10.80 10*3/mm3 Final   07/06/2024 7.00 3.40 - 10.80 10*3/mm3 Final   07/05/2024 8.86 3.40 - 10.80 10*3/mm3 Final      HGB Hemoglobin   Date Value Ref Range Status   07/07/2024 8.7 (L) 12.0 - 15.9 g/dL Final   07/06/2024 8.4 (L) 12.0 - 15.9 g/dL Final   07/05/2024 8.5 (L) 12.0 - 15.9 g/dL Final      HCT Hematocrit   Date Value Ref Range Status   07/07/2024 26.8 (L) 34.0 - 46.6 % Final   07/06/2024 25.6 (L) 34.0 - 46.6 % Final   07/05/2024 26.2 (L) 34.0 - 46.6 % Final      Platelets No results found for: \"LABPLAT\"   MCV MCV   Date Value Ref Range Status   07/07/2024 91.8 " "79.0 - 97.0 fL Final   07/06/2024 91.8 79.0 - 97.0 fL Final   07/05/2024 92.3 79.0 - 97.0 fL Final          Sodium Sodium   Date Value Ref Range Status   07/07/2024 138 136 - 145 mmol/L Final   07/06/2024 136 136 - 145 mmol/L Final   07/05/2024 136 136 - 145 mmol/L Final      Potassium Potassium   Date Value Ref Range Status   07/07/2024 4.2 3.5 - 5.2 mmol/L Final   07/06/2024 4.5 3.5 - 5.2 mmol/L Final   07/06/2024 3.8 3.5 - 5.2 mmol/L Final   07/06/2024 3.7 3.5 - 5.2 mmol/L Final   07/05/2024 4.1 3.5 - 5.2 mmol/L Final   07/04/2024 3.9 3.5 - 5.2 mmol/L Final      Chloride Chloride   Date Value Ref Range Status   07/07/2024 107 98 - 107 mmol/L Final   07/06/2024 106 98 - 107 mmol/L Final   07/05/2024 106 98 - 107 mmol/L Final      CO2 CO2   Date Value Ref Range Status   07/07/2024 22.0 22.0 - 29.0 mmol/L Final   07/06/2024 22.0 22.0 - 29.0 mmol/L Final   07/05/2024 20.2 (L) 22.0 - 29.0 mmol/L Final      BUN BUN   Date Value Ref Range Status   07/07/2024 14 6 - 20 mg/dL Final   07/06/2024 15 6 - 20 mg/dL Final   07/05/2024 16 6 - 20 mg/dL Final      Creatinine Creatinine   Date Value Ref Range Status   07/07/2024 0.97 0.57 - 1.00 mg/dL Final   07/06/2024 0.99 0.57 - 1.00 mg/dL Final   07/05/2024 1.07 (H) 0.57 - 1.00 mg/dL Final      Calcium Calcium   Date Value Ref Range Status   07/07/2024 8.6 8.6 - 10.5 mg/dL Final   07/06/2024 8.6 8.6 - 10.5 mg/dL Final   07/05/2024 8.5 (L) 8.6 - 10.5 mg/dL Final      PO4 No components found for: \"PO4\"   Albumin No results found for: \"ALBUMIN\"     Magnesium Magnesium   Date Value Ref Range Status   07/07/2024 1.9 1.6 - 2.6 mg/dL Final   07/06/2024 2.1 1.6 - 2.6 mg/dL Final   07/05/2024 2.0 1.6 - 2.6 mg/dL Final      Uric Acid No components found for: \"URIC ACID\"     Imaging Results (Last 72 Hours)       ** No results found for the last 72 hours. **            Results for orders placed during the hospital encounter of 06/21/24    XR Chest 1 View    Narrative  Portable chest " radiograph    HISTORY: Chest tube removal, postop open heart    TECHNIQUE: Single AP portable radiograph of the chest    COMPARISON: Chest radiograph dating back to 7/1/2024    Impression  FINDINGS AND IMPRESSION:  Previously seen thoracostomy tubes appear to been removed. Presumed  epicardial pacing wires, median sternotomy wires and right internal  jugular sheath are present.    Findings of pneumomediastinum, as before. Mild left basilar pulmonary  opacification persists. No pneumothorax is seen. Cardiac silhouette is  accentuated by patient rotation; however, there is to be at least mildly  enlarged.    This report was finalized on 7/3/2024 11:22 AM by Dr. Bin Bey M.D  on Workstation: [id number]      XR Chest PA & Lateral    Narrative  XR CHEST PA AND LATERAL-    HISTORY: Female who is 44 years-old, postoperative evaluation    TECHNIQUE: Frontal and lateral views of the chest    COMPARISON: 7/3/2024    FINDINGS: The heart is enlarged. Pulmonary vasculature is congested.  Sternotomy hardware is noted. Minimal pleural effusions are apparent,  with basilar atelectasis or infiltrate posteriorly on the lateral view,  follow-up suggested. No pneumothorax. No acute osseous process.    Impression  As described.    This report was finalized on 7/4/2024 8:04 AM by Dr. Antonio Ch M.D on Workstation: [id number]      XR Chest 1 View    Narrative  Portable chest radiograph    HISTORY: Postop open heart    TECHNIQUE: Single AP portable radiograph of the chest    COMPARISON: Multiple chest radiographs dating back to 6/21/2024    Impression  FINDINGS AND IMPRESSION:  There appear to be 4 thoracostomy tubes overlying the bilateral lungs a  mediastinum. The right internal jugular sheath is present.    Bibasilar pulmonary opacification has mild to moderately decreased. No  pneumothorax seen. Cardiac silhouette is accentuated by low lung  volumes. Findings suggestive of pneumomediastinum, as before.    This report was  finalized on 7/3/2024 6:26 AM by Dr. Bin Bey M.D  on Workstation: [id number]      Results for orders placed during the hospital encounter of 06/21/24    Duplex Carotid Ultrasound CAR    Interpretation Summary    Right internal carotid artery demonstrates a less than 50% stenosis.    Left internal carotid artery demonstrates a less than 50% stenosis.        ASSESSMENT / PLAN      Acute right-sided weakness    Benign essential hypertension    Gastroparesis diabeticorum    Gastroesophageal reflux disease    Mixed hypercholesterolemia and hypertriglyceridemia    Iron deficiency anemia    Type 2 diabetes mellitus with hyperglycemia, with long-term current use of insulin    Coronary artery disease involving native coronary artery of native heart with unstable angina pectoris    Tobacco abuse    CKD stage 3a, GFR 45-59 ml/min    Carotid stenosis    Lacunar cerebrovascular accident (CVA)    Elevated troponin    Aortic valve disease      ARF/CARLA/CRF/CKD---Nonoliguric. +ARF/CARLA on top of known CRF/CKD STG   3A, with a baseline serum creatinine of about 1.2. CRF/CKD STG 3A is secondary to DGS/HTN NS. +ARF/CARLA appears to be secondary to prerenal state/intravascular volume depletion and some ATN from hypotension. CARLA resolved. Support BP and avoid hypotension. Renal US negative.  No NSAIDs or IV dye. Dose meds for CrCl 30-45 cc/min.  Keep off of SGLT2 inhibitor for now     2. ACIDOSIS-----PO NaHCO3. Stable bicarb levels today     3. DMII WITH RENAL MANIFESTATIONS-----Hold SGLT2 inhibitor given ARF/CARLA. Lantus, Glucometers, SSI     4. HTN WITH CKD-----BP ok. No ACE-I/ARB/DRI/diuretic for now. Follow for pressor need. Follow off of IVFs     5. HYPERLIPIDEMIA----Statin     6. OA/DJD------No NSAIDs. Uric ok     7. DM PERIPHERAL NEUROPATHY-----On Neurontin     8. GERD/DM GASTROPARESIS/PUD PROPHYLAXIS-------Reglan and PPI. Benefits of PPI use outweigh risks, despite renal dysfunction     8. DVT PROPHYLAXIS----Renal dose  "adjusted Lovenox     9. PROTEINURIA-----Secondary to DGS     10. VITAMIN D DEFICIENCY-----On Supplementation prior to admission     11. ANEMIA OF CKD AND POST-OP ANEMIA------IV iron for NIGHAT     12. DEPRESSION-----On Lexapro     13. CAD S/P CABG S/P AV FIBROELASTOMA RESECTION/REPAIR-----per , CT Surgery     14. POST OP ATELECTASIS/LB WITH H/O TOBACCO ABUSE-----Encourage IS. No active wheezing and respiratory status stable    15. HYPOCALCEMIA------Replaced    Okay from RENAL standpoint to d/c today but to remain OFF of home SGLT2 inhibitor until follow up in our office. Thanks    Tamiko Jenkins MD  [facility]  [phone]  24  09:31 EDT      Electronically signed by Xochilt Jenkins MD at 24 1350       Jacinto Sharif PA-C at 24 9620       Attestation signed by Ronaldo Severino MD at 24 6231    I have reviewed this documentation and agree.                  Patient Name: Bibiana Inman  :1980  44 y.o.      Patient Care Team:  Ibrahima Correa MD as PCP - General (Family Medicine)  Rachele Zafar, RN as Ambulatory  ([facility])  Xochilt Jenkins MD as Consulting Physician (Nephrology)    Interval History:   Post op follow up     Subjective:  No events overnight. Pt wants to go home. No chest pain, SOA, or palpitations. No issues with surgical incisions.     Objective   Vital Signs  Temp:  [98 °F (36.7 °C)-99.2 °F (37.3 °C)] 98.6 °F (37 °C)  Heart Rate:  [56-75] 62  Resp:  [16-20] 16  BP: (126-169)/(54-75) 158/56    Intake/Output Summary (Last 24 hours) at 2024 1327  Last data filed at 2024 0352  Gross per 24 hour   Intake 840 ml   Output 1200 ml   Net -360 ml     Flowsheet Rows      Flowsheet Row First Filed Value   Admission Height 162.6 cm (64\") Documented at 2024   Admission Weight 65.5 kg (144 lb 6.4 oz) Documented at 2024            Physical Exam:   General " Appearance:    Alert, cooperative, in no acute distress   Lungs:     Clear to auscultation.  Normal respiratory effort and rate.      Heart:    Regular rhythm and normal rate, normal S1 and S2, no murmurs, gallops or rubs.     Chest Wall:    No abnormalities observed   Abdomen:     Soft, nontender, positive bowel sounds.     Extremities:   no cyanosis, clubbing or edema.  No marked joint deformities.  Adequate musculoskeletal strength.       Results Review:    Results from last 7 days   Lab Units 07/07/24  0513   SODIUM mmol/L 138   POTASSIUM mmol/L 4.2   CHLORIDE mmol/L 107   CO2 mmol/L 22.0   BUN mg/dL 14   CREATININE mg/dL 0.97   GLUCOSE mg/dL 121*   CALCIUM mg/dL 8.6     Results from last 7 days   Lab Units 07/04/24  0251 07/03/24  0301 07/02/24  1745   CK TOTAL U/L 100 280* 426*     Results from last 7 days   Lab Units 07/07/24  0513   WBC 10*3/mm3 7.49   HEMOGLOBIN g/dL 8.7*   HEMATOCRIT % 26.8*   PLATELETS 10*3/mm3 184     Results from last 7 days   Lab Units 07/02/24  0239 07/01/24  1312   INR  1.25* 1.33*   APTT seconds  --  31.3         Results from last 7 days   Lab Units 07/07/24  0513   MAGNESIUM mg/dL 1.9             Medication Review:   aspirin, 81 mg, Oral, Daily  atorvastatin, 40 mg, Oral, Nightly  carvedilol, 12.5 mg, Oral, Q12H  enoxaparin, 30 mg, Subcutaneous, Daily  escitalopram, 10 mg, Oral, Daily  guaiFENesin, 1,200 mg, Oral, Q12H  hydrALAZINE, 75 mg, Oral, Q8H  insulin glargine, 20 Units, Subcutaneous, Daily  insulin lispro, 2-9 Units, Subcutaneous, 4x Daily AC & at Bedtime  mupirocin, , Each Nare, BID  pantoprazole, 40 mg, Oral, QAM  senna-docusate sodium, 2 tablet, Oral, Nightly  sodium bicarbonate, 650 mg, Oral, Daily       niCARdipine, 5-15 mg/hr, Last Rate: Stopped (07/03/24 1500)        Assessment & Plan   1.  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  Patient presented with strokelike symptoms.  2.  Coronary artery disease status post CABG with a vein  "graft to the obtuse marginal x 1 on July 1, 2024.  Previous bypass with a  of the LAD.  There is a LIMA to the LAD, vein graft to the circumflex is occluded, vein graft to the right coronary is patent.  3.  Diabetes with a hemoglobin A1c of 16.9.  4.  Hypertension.  Blood pressure is controlled.  Carvedilol increased yesterday. Tolerating carvedilol.   5.  Hyperlipidemia  6.  Tobacco use  7.  Carotid artery stenosis with a 70% lesion in the right internal carotid artery.  8.  Obstructive sleep apnea  9. Mobilize/increase activity-- PT/OT        Stable from a cardiac standpoint. CT surgery seeing and ok for discharge home with home health from there standpoint. Home health and home PT orders placed     Jacinto Sharif PA-C, [facility]  07/07/24  07:54 EDT          Electronically signed by Ronaldo Severino MD at 07/07/24 1737       Lora Vaelnzuela APRN at 07/07/24 0714           LOS: 16 days   Patient Care Team:  Ibrahima Correa MD as PCP - General (Family Medicine)  Rachele Zafar RN as Ambulatory  ([facility])  Xochilt Jenkins MD as Consulting Physician (Nephrology)    Chief Complaint: post op follow-up    Subjective      Vital Signs  Temp:  [98 °F (36.7 °C)-99.2 °F (37.3 °C)] 98.6 °F (37 °C)  Heart Rate:  [56-75] 62  Resp:  [16-20] 16  BP: (116-169)/(54-75) 158/56  Body mass index is 26.06 kg/m².    Intake/Output Summary (Last 24 hours) at 7/7/2024 0714  Last data filed at 7/7/2024 0353  Gross per 24 hour   Intake 600 ml   Output 1200 ml   Net -600 ml     No intake/output data recorded.            07/05/24  0620 07/06/24  0500 07/07/24  0600   Weight: 68.8 kg (151 lb 11.2 oz) 69.2 kg (152 lb 9.6 oz) 68.9 kg (151 lb 12.8 oz)         Objective:  Vital signs: (most recent): Blood pressure 154/71, pulse 63, temperature 98.5 °F (36.9 °C), temperature source Oral, resp. rate 17, height 162.6 cm (64\"), weight 68.9 kg (151 lb 12.8 oz), last menstrual period " "01/10/2023, SpO2 94%, not currently breastfeeding.                Results Review:        WBC WBC   Date Value Ref Range Status   07/07/2024 7.49 3.40 - 10.80 10*3/mm3 Final   07/06/2024 7.00 3.40 - 10.80 10*3/mm3 Final   07/05/2024 8.86 3.40 - 10.80 10*3/mm3 Final      HGB Hemoglobin   Date Value Ref Range Status   07/07/2024 8.7 (L) 12.0 - 15.9 g/dL Final   07/06/2024 8.4 (L) 12.0 - 15.9 g/dL Final   07/05/2024 8.5 (L) 12.0 - 15.9 g/dL Final      HCT Hematocrit   Date Value Ref Range Status   07/07/2024 26.8 (L) 34.0 - 46.6 % Final   07/06/2024 25.6 (L) 34.0 - 46.6 % Final   07/05/2024 26.2 (L) 34.0 - 46.6 % Final      Platelets Platelets   Date Value Ref Range Status   07/07/2024 184 140 - 450 10*3/mm3 Final   07/06/2024 148 140 - 450 10*3/mm3 Final   07/05/2024 131 (L) 140 - 450 10*3/mm3 Final        PT/INR:    No results found for: \"PROTIME\"  /  No results found for: \"INR\"      Sodium Sodium   Date Value Ref Range Status   07/07/2024 138 136 - 145 mmol/L Final   07/06/2024 136 136 - 145 mmol/L Final   07/05/2024 136 136 - 145 mmol/L Final      Potassium Potassium   Date Value Ref Range Status   07/07/2024 4.2 3.5 - 5.2 mmol/L Final   07/06/2024 4.5 3.5 - 5.2 mmol/L Final   07/06/2024 3.8 3.5 - 5.2 mmol/L Final   07/06/2024 3.7 3.5 - 5.2 mmol/L Final   07/05/2024 4.1 3.5 - 5.2 mmol/L Final   07/04/2024 3.9 3.5 - 5.2 mmol/L Final      Chloride Chloride   Date Value Ref Range Status   07/07/2024 107 98 - 107 mmol/L Final   07/06/2024 106 98 - 107 mmol/L Final   07/05/2024 106 98 - 107 mmol/L Final      Bicarbonate CO2   Date Value Ref Range Status   07/07/2024 22.0 22.0 - 29.0 mmol/L Final   07/06/2024 22.0 22.0 - 29.0 mmol/L Final   07/05/2024 20.2 (L) 22.0 - 29.0 mmol/L Final      BUN BUN   Date Value Ref Range Status   07/07/2024 14 6 - 20 mg/dL Final   07/06/2024 15 6 - 20 mg/dL Final   07/05/2024 16 6 - 20 mg/dL Final      Creatinine Creatinine   Date Value Ref Range Status   07/07/2024 0.97 0.57 - 1.00 mg/dL " Final   07/06/2024 0.99 0.57 - 1.00 mg/dL Final   07/05/2024 1.07 (H) 0.57 - 1.00 mg/dL Final      Calcium Calcium   Date Value Ref Range Status   07/07/2024 8.6 8.6 - 10.5 mg/dL Final   07/06/2024 8.6 8.6 - 10.5 mg/dL Final   07/05/2024 8.5 (L) 8.6 - 10.5 mg/dL Final      Magnesium Magnesium   Date Value Ref Range Status   07/07/2024 1.9 1.6 - 2.6 mg/dL Final   07/06/2024 2.1 1.6 - 2.6 mg/dL Final   07/05/2024 2.0 1.6 - 2.6 mg/dL Final          aspirin, 81 mg, Oral, Daily  atorvastatin, 40 mg, Oral, Nightly  carvedilol, 12.5 mg, Oral, Q12H  enoxaparin, 30 mg, Subcutaneous, Daily  escitalopram, 10 mg, Oral, Daily  guaiFENesin, 1,200 mg, Oral, Q12H  hydrALAZINE, 75 mg, Oral, Q8H  insulin glargine, 20 Units, Subcutaneous, Daily  insulin lispro, 2-9 Units, Subcutaneous, 4x Daily AC & at Bedtime  mupirocin, , Each Nare, BID  pantoprazole, 40 mg, Oral, QAM  senna-docusate sodium, 2 tablet, Oral, Nightly  sodium bicarbonate, 650 mg, Oral, Daily      niCARdipine, 5-15 mg/hr, Last Rate: Stopped (07/03/24 1500)              Acute right-sided weakness    Benign essential hypertension    Gastroparesis diabeticorum    Gastroesophageal reflux disease    Mixed hypercholesterolemia and hypertriglyceridemia    Iron deficiency anemia    Type 2 diabetes mellitus with hyperglycemia, with long-term current use of insulin    Coronary artery disease involving native coronary artery of native heart with unstable angina pectoris    Tobacco abuse    CKD stage 3a, GFR 45-59 ml/min    Carotid stenosis    Lacunar cerebrovascular accident (CVA)    Elevated troponin    Aortic valve disease      Assessment & Plan    -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), EVH right, open left, AV fibroelastoma resection, AV repair- [place]  -Recurrent MCA stroke  -Coronary artery disease status post CABG in 2014  -Uncontrolled diabetes mellitus, HgbA1c 16.9, with peripheral neuropathy  -CKD, stage  3  -Hypertension  -Hyperlipidemia  -Obesity  -Tobacco abuse-- encourage cessation  -PAD, Carotid stenosis, 70% right ICA  -GERD  -LB  -chronic anemia- acute worsening expected acute blood loss    POD#7  Up in the chair  On room air-- tolerating  Sinus rhythm rate in the 60s-- tolerated beta blocker  Creatinine normalized- nephrology following.  Encourage pulmonary toilet--- continue IS/flutter, mucinex and nebs  Mobilize/increase activity-- PT/OT  Making good progress.  Okay from our standpoint for discharge home with home health.  Home health and home PT orders placed.  Follow up with me in 1 month (my office will call her with appointment on Monday)  Continue routine care    KAUSHAL Alvarado  24  07:14 EDT      Electronically signed by Lora Valenzuela APRN at 24 0856       Lenin Huang MD at 24 5351              Name: Bibiana Inman ADMIT: 2024   : 1980  PCP: Ibrahima Correa MD    MRN: [id number] LOS: 15 days   AGE/SEX: 44 y.o. female  ROOM: [facility]     Subjective   Subjective   No acute events. Patient denies new complaints. No family at bedside.    Objective   Objective   Vital Signs  Temp:  [98 °F (36.7 °C)-98.6 °F (37 °C)] 98.3 °F (36.8 °C)  Heart Rate:  [52-67] 59  Resp:  [16-20] 19  BP: (116-138)/(54-79) 132/67  SpO2:  [93 %-100 %] 95 %  on   ;   Device (Oxygen Therapy): room air  Body mass index is 26.19 kg/m².  Physical Exam  Vitals and nursing note reviewed.   Constitutional:       General: She is not in acute distress.     Appearance: She is not toxic-appearing or diaphoretic.   HENT:      Head: Normocephalic and atraumatic.      Nose: Nose normal.      Mouth/Throat:      Mouth: Mucous membranes are moist.      Pharynx: Oropharynx is clear.   Eyes:      Conjunctiva/sclera: Conjunctivae normal.      Pupils: Pupils are equal, round, and reactive to light.   Cardiovascular:      Rate and Rhythm: Normal rate and regular rhythm.      Pulses: Normal pulses.       Comments: Surgical wound c/d/i  Pulmonary:      Effort: Pulmonary effort is normal.   Abdominal:      General: Bowel sounds are normal.      Palpations: Abdomen is soft.      Tenderness: There is no abdominal tenderness.   Musculoskeletal:         General: No swelling or tenderness.      Cervical back: Neck supple.   Skin:     General: Skin is warm and dry.      Capillary Refill: Capillary refill takes less than 2 seconds.   Neurological:      Mental Status: She is alert and oriented to person, place, and time.      Comments: +mild right lower facial weakness   Psychiatric:         Mood and Affect: Mood normal.         Behavior: Behavior normal.       Results Review     I reviewed the patient's new clinical results.  Results from last 7 days   Lab Units 07/06/24  0309 07/05/24  0220 07/04/24  0251 07/03/24  0301   WBC 10*3/mm3 7.00 8.86 9.85 10.99*   HEMOGLOBIN g/dL 8.4* 8.5* 8.6* 8.9*   PLATELETS 10*3/mm3 148 131* 122* 123*     Results from last 7 days   Lab Units 07/06/24  1101 07/06/24  0309 07/05/24  0220 07/04/24  1048 07/04/24  0251 07/03/24  0301   SODIUM mmol/L  --  136 136  --  139 141   POTASSIUM mmol/L 3.8 3.7 4.1 3.9 3.8 4.4   CHLORIDE mmol/L  --  106 106  --  107 107   CO2 mmol/L  --  22.0 20.2*  --  24.0 26.0   BUN mg/dL  --  15 16  --  19 21*   CREATININE mg/dL  --  0.99 1.07*  --  1.27* 1.47*   GLUCOSE mg/dL  --  137* 93  --  91 158*   EGFR mL/min/1.73  --  71.4 65.0  --  52.9* 44.4*     Results from last 7 days   Lab Units 07/03/24  0301 07/02/24  0239 07/01/24  1647 07/01/24  1312   ALBUMIN g/dL 3.5 3.7 3.6 3.8   BILIRUBIN mg/dL 0.2  --   --   --    ALK PHOS U/L 62  --   --   --    AST (SGOT) U/L 23  --   --   --    ALT (SGPT) U/L <5  --   --   --      Results from last 7 days   Lab Units 07/06/24  0309 07/05/24  0220 07/04/24  0251 07/03/24  0301 07/02/24  0239 07/01/24  1647 07/01/24  1312   CALCIUM mg/dL 8.6 8.5* 9.0 9.0 8.5* 8.4* 8.9   ALBUMIN g/dL  --   --   --  3.5 3.7 3.6 3.8   MAGNESIUM  mg/dL 2.1 2.0 2.0 2.2 2.7* 2.4 2.7*   PHOSPHORUS mg/dL 3.1  --  2.7 5.2* 4.8* 2.9 2.0*     Results from last 7 days   Lab Units 07/03/24  0301 07/03/24  0011 07/01/24  1909   LACTATE mmol/L 1.4 2.0 <0.4     Glucose   Date/Time Value Ref Range Status   07/06/2024 1542 195 (H) 70 - 130 mg/dL Final   07/06/2024 1058 166 (H) 70 - 130 mg/dL Final   07/06/2024 0628 116 70 - 130 mg/dL Final   07/05/2024 2100 174 (H) 70 - 130 mg/dL Final   07/05/2024 1647 231 (H) 70 - 130 mg/dL Final   07/05/2024 1125 134 (H) 70 - 130 mg/dL Final   07/05/2024 0633 121 70 - 130 mg/dL Final       No radiology results for the last day    I have personally reviewed all medications:  Scheduled Medications  aspirin, 81 mg, Oral, Daily  atorvastatin, 40 mg, Oral, Nightly  carvedilol, 12.5 mg, Oral, Q12H  enoxaparin, 30 mg, Subcutaneous, Daily  escitalopram, 10 mg, Oral, Daily  guaiFENesin, 1,200 mg, Oral, Q12H  hydrALAZINE, 75 mg, Oral, Q8H  insulin glargine, 20 Units, Subcutaneous, Daily  insulin lispro, 2-9 Units, Subcutaneous, 4x Daily AC & at Bedtime  mupirocin, , Each Nare, BID  pantoprazole, 40 mg, Oral, QAM  potassium chloride ER, 20 mEq, Oral, Once  senna-docusate sodium, 2 tablet, Oral, Nightly  sodium bicarbonate, 650 mg, Oral, Daily    Infusions  niCARdipine, 5-15 mg/hr, Last Rate: Stopped (07/03/24 1500)      Diet  Diet: Cardiac, Diabetic; Healthy Heart (2-3 Na+); Consistent Carbohydrate; Fluid Consistency: Thin (IDDSI 0)    I have personally reviewed:  [x]  Laboratory   []  Microbiology   []  Radiology   [x]  EKG/Telemetry  []  Cardiology/Vascular   []  Pathology    []  Records    Assessment/Plan     Active Hospital Problems    Diagnosis  POA    **Acute right-sided weakness [R53.1]  Yes    CKD stage 3a, GFR 45-59 ml/min [N18.31]  Yes    Carotid stenosis [I65.29]  Yes    Lacunar cerebrovascular accident (CVA) [I63.81]  Yes    Elevated troponin [R79.89]  Yes    Aortic valve disease [I35.9]  Unknown    Tobacco abuse [Z72.0]  Yes     Coronary artery disease involving native coronary artery of native heart with unstable angina pectoris [I25.110]  Yes    Gastroparesis diabeticorum [E11.43, 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   Acute CVA  - presented with right-sided weakness which has improved, has mild right facial droop  - MRI showed enlargement of acute infarct in the genu of the left internal capsule as well as a new infarct in the left insular cortex, and previously identified acute infarction within the white matter of the left parietal lobe  - continue ASA, statin  - intermittent confusion is expected and should gradually improve-CT head 7/4/24 was stable  - needs aggressive risk factor control, particularly with HTN and DM  - YUE showed 3 fibroelastomas-s/p AV fibroelastoma resection, AV valve repair, and CABG x1 (SVG to OM) 7/1/24 with Dr. De Luna  - appreciate cardiology, CT surgery, and neurology recs     CARLA on CKD Stage 3b  - pre-renal, now resolved  - follow volume status and chemistries  - appreciate nephrology recs    Type 2 DM  - complications include gastroparesis  - on lantus and jardiance as outpatient  - BG acceptable, needs better outpatient control, A1C 16.90%  - continue lantus 20 units daily   - cover with ssi/hypoglycemia protocol  - jardiance held    HTN/CAD s/p CABG  - BP acceptable  - continue coreg, clonidine, and hydralazine      GERD  - symptoms controlled, continue ppi    Lovenox 30 mg SC daily for DVT prophylaxis.  Full code.  Discussed with patient and nursing staff.  Anticipate discharge home with home health in 1-2 days.  Expected Discharge Date:  "2024; Expected Discharge Time:       Lenin Huang MD  [facility]  24  16:29 EDT    Portions of this text have been copied and I have reviewed them. They are accurate as of 2024      Electronically signed by Lenin Huang MD at 24 1631       Jacinto Sharif PA-C at 24 0812       Attestation signed by Ronaldo Severino MD at 24 1754    I have reviewed this documentation and agree.                  Patient Name: Bibiana Inman  :1980  44 y.o.      Patient Care Team:  Ibrahima Correa MD as PCP - General (Family Medicine)  Rachele Zafar RN as Ambulatory  ([facility])  Xochilt Jenkins MD as Consulting Physician (Nephrology)    Interval History:   Post op follow up    Subjective:  Feeling better. No CP, SOA, or palpitations    Objective   Vital Signs  Temp:  [98 °F (36.7 °C)-99.1 °F (37.3 °C)] 98 °F (36.7 °C)  Heart Rate:  [52-67] 56  Resp:  [16-20] 19  BP: (116-154)/(54-79) 131/56    Intake/Output Summary (Last 24 hours) at 2024 1446  Last data filed at 2024 1342  Gross per 24 hour   Intake 600 ml   Output 500 ml   Net 100 ml     Flowsheet Rows      Flowsheet Row First Filed Value   Admission Height 162.6 cm (64\") Documented at 2024   Admission Weight 65.5 kg (144 lb 6.4 oz) Documented at 2024            Physical Exam:   General Appearance:    Alert, cooperative, in no acute distress   Lungs:     Clear to auscultation.  Normal respiratory effort and rate.      Heart:    Regular rhythm and normal rate, no murmurs, gallops or rubs.     Chest Wall:    No abnormalities observed   Abdomen:     Soft, nontender, positive bowel sounds.     Extremities:   no cyanosis, clubbing or edema.  Surgical incision noted to left lower leg from vein harvesting.       Results Review:    Results from last 7 days   Lab Units 24  1101 24  0309   SODIUM mmol/L  --  136   POTASSIUM mmol/L 3.8 3.7 "   CHLORIDE mmol/L  --  106   CO2 mmol/L  --  22.0   BUN mg/dL  --  15   CREATININE mg/dL  --  0.99   GLUCOSE mg/dL  --  137*   CALCIUM mg/dL  --  8.6     Results from last 7 days   Lab Units 07/04/24  0251 07/03/24  0301 07/02/24  1745   CK TOTAL U/L 100 280* 426*     Results from last 7 days   Lab Units 07/06/24  0309   WBC 10*3/mm3 7.00   HEMOGLOBIN g/dL 8.4*   HEMATOCRIT % 25.6*   PLATELETS 10*3/mm3 148     Results from last 7 days   Lab Units 07/02/24  0239 07/01/24  1312   INR  1.25* 1.33*   APTT seconds  --  31.3         Results from last 7 days   Lab Units 07/06/24  0309   MAGNESIUM mg/dL 2.1             Medication Review:   aspirin, 81 mg, Oral, Daily  atorvastatin, 40 mg, Oral, Nightly  carvedilol, 12.5 mg, Oral, Q12H  enoxaparin, 30 mg, Subcutaneous, Daily  escitalopram, 10 mg, Oral, Daily  guaiFENesin, 1,200 mg, Oral, Q12H  hydrALAZINE, 75 mg, Oral, Q8H  insulin glargine, 20 Units, Subcutaneous, Daily  insulin lispro, 2-9 Units, Subcutaneous, 4x Daily AC & at Bedtime  mupirocin, , Each Nare, BID  pantoprazole, 40 mg, Oral, QAM  potassium chloride ER, 20 mEq, Oral, Once  senna-docusate sodium, 2 tablet, Oral, Nightly  sodium bicarbonate, 650 mg, Oral, Daily         niCARdipine, 5-15 mg/hr, Last Rate: Stopped (07/03/24 1500)        Assessment & Plan     1.  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  Patient presented with strokelike symptoms.  2.  Coronary artery disease status post CABG with a vein graft to the obtuse marginal x 1 on July 1, 2024.  Previous bypass with a  of the LAD.  There is a LIMA to the LAD, vein graft to the circumflex is occluded, vein graft to the right coronary is patent.  3.  Diabetes with a hemoglobin A1c of 16.9.  4.  Hypertension.  Blood pressure is controlled.  Carvedilol increased yesterday. Tolerating carvedilol.   5.  Hyperlipidemia  6.  Tobacco use  7.  Carotid artery stenosis with a 70% lesion in the right internal carotid artery.  8.   Obstructive sleep apnea  9. Mobilize/increase activity-- PT/OT     Stable from a cardiac standpoint. Will continue to follow.     Jacinto Sharif PA-C, [facility]  24  14:46 EDT         Electronically signed by Ronaldo Severino MD at 24 [phone]       Xochilt Jenkins MD at 24 0806          NEPHROLOGY PROGRESS NOTE------[facility]    [facility]  [phone]  Tamiko Jenkins MD      Patient Care Team:  Ibrahima Correa MD as PCP - General (Family Medicine)  Rachele Zafar RN as Ambulatory  ([facility])  Xochilt Jenkins MD as Consulting Physician (Nephrology)      Provider:  Tamiko Jenkins MD  Patient Name: Bibiana Inman  :  1980    SUBJECTIVE:    F/U ARF/CARLA/CRF/CKD    No complaints whatsoever. No SOB, CP, dysuria, palpitations, cramping. Appetite good. No edema.     Medication:  aspirin, 81 mg, Oral, Daily  atorvastatin, 40 mg, Oral, Nightly  carvedilol, 12.5 mg, Oral, Q12H  enoxaparin, 30 mg, Subcutaneous, Daily  escitalopram, 10 mg, Oral, Daily  guaiFENesin, 1,200 mg, Oral, Q12H  hydrALAZINE, 75 mg, Oral, Q8H  insulin glargine, 20 Units, Subcutaneous, Daily  insulin lispro, 2-9 Units, Subcutaneous, 4x Daily AC & at Bedtime  mupirocin, , Each Nare, BID  pantoprazole, 40 mg, Oral, QAM  potassium chloride ER, 20 mEq, Oral, Once  senna-docusate sodium, 2 tablet, Oral, Nightly  sodium bicarbonate, 650 mg, Oral, Daily  sodium chloride, 4 mL, Nebulization, BID - RT      niCARdipine, 5-15 mg/hr, Last Rate: Stopped (24 1500)        OBJECTIVE    Vital Sign Min/Max for last 24 hours  Temp  Min: 98.2 °F (36.8 °C)  Max: 99.1 °F (37.3 °C)   BP  Min: 105/49  Max: 154/69   Pulse  Min: 52  Max: 60   Resp  Min: 16  Max: 20   SpO2  Min: 96 %  Max: 100 %   No data recorded   Weight  Min: 69.2 kg (152 lb 9.6 oz)  Max: 69.2 kg (152 lb 9.6 oz)     Flowsheet Rows   "    Flowsheet Row First Filed Value   Admission Height 162.6 cm (64\") Documented at 06/21/2024 2105   Admission Weight 65.5 kg (144 lb 6.4 oz) Documented at 06/21/2024 2105            No intake/output data recorded.  I/O last 3 completed shifts:  In: -   Out: 1500 [Urine:1500]    Physical Exam:  General Appearance: alert, appears stated age and cooperative  Head: normocephalic, without obvious abnormality and atraumatic.   Eyes: conjunctivae and sclerae normal and no icterus  Neck: supple and no JVD  Lungs: CTA BILAT  Heart: regular rhythm & normal rate and normal S1, S2 +TITI (PACED)  Chest Wall: S/P SURGICAL CHANGES POST STERNOTOMY S/P OPEN HEART SURGERY WITH CT  Abdomen: + BS; SOFT; NT/ND  Extremities: moves extremities well, no edema, no cyanosis  Skin: no bleeding, bruising or rash.    Neurologic: Alert, and oriented. No focal deficits    Labs:    WBC WBC   Date Value Ref Range Status   07/06/2024 7.00 3.40 - 10.80 10*3/mm3 Final   07/05/2024 8.86 3.40 - 10.80 10*3/mm3 Final   07/04/2024 9.85 3.40 - 10.80 10*3/mm3 Final      HGB Hemoglobin   Date Value Ref Range Status   07/06/2024 8.4 (L) 12.0 - 15.9 g/dL Final   07/05/2024 8.5 (L) 12.0 - 15.9 g/dL Final   07/04/2024 8.6 (L) 12.0 - 15.9 g/dL Final      HCT Hematocrit   Date Value Ref Range Status   07/06/2024 25.6 (L) 34.0 - 46.6 % Final   07/05/2024 26.2 (L) 34.0 - 46.6 % Final   07/04/2024 26.8 (L) 34.0 - 46.6 % Final      Platelets No results found for: \"LABPLAT\"   MCV MCV   Date Value Ref Range Status   07/06/2024 91.8 79.0 - 97.0 fL Final   07/05/2024 92.3 79.0 - 97.0 fL Final   07/04/2024 93.1 79.0 - 97.0 fL Final          Sodium Sodium   Date Value Ref Range Status   07/06/2024 136 136 - 145 mmol/L Final   07/05/2024 136 136 - 145 mmol/L Final   07/04/2024 139 136 - 145 mmol/L Final      Potassium Potassium   Date Value Ref Range Status   07/06/2024 3.7 3.5 - 5.2 mmol/L Final   07/05/2024 4.1 3.5 - 5.2 mmol/L Final   07/04/2024 3.9 3.5 - 5.2 mmol/L Final " "  07/04/2024 3.8 3.5 - 5.2 mmol/L Final      Chloride Chloride   Date Value Ref Range Status   07/06/2024 106 98 - 107 mmol/L Final   07/05/2024 106 98 - 107 mmol/L Final   07/04/2024 107 98 - 107 mmol/L Final      CO2 CO2   Date Value Ref Range Status   07/06/2024 22.0 22.0 - 29.0 mmol/L Final   07/05/2024 20.2 (L) 22.0 - 29.0 mmol/L Final   07/04/2024 24.0 22.0 - 29.0 mmol/L Final      BUN BUN   Date Value Ref Range Status   07/06/2024 15 6 - 20 mg/dL Final   07/05/2024 16 6 - 20 mg/dL Final   07/04/2024 19 6 - 20 mg/dL Final      Creatinine Creatinine   Date Value Ref Range Status   07/06/2024 0.99 0.57 - 1.00 mg/dL Final   07/05/2024 1.07 (H) 0.57 - 1.00 mg/dL Final   07/04/2024 1.27 (H) 0.57 - 1.00 mg/dL Final      Calcium Calcium   Date Value Ref Range Status   07/06/2024 8.6 8.6 - 10.5 mg/dL Final   07/05/2024 8.5 (L) 8.6 - 10.5 mg/dL Final   07/04/2024 9.0 8.6 - 10.5 mg/dL Final      PO4 No components found for: \"PO4\"   Albumin No results found for: \"ALBUMIN\"     Magnesium Magnesium   Date Value Ref Range Status   07/06/2024 2.1 1.6 - 2.6 mg/dL Final   07/05/2024 2.0 1.6 - 2.6 mg/dL Final   07/04/2024 2.0 1.6 - 2.6 mg/dL Final      Uric Acid No components found for: \"URIC ACID\"     Imaging Results (Last 72 Hours)       Procedure Component Value Units Date/Time    CT Head Without Contrast [[id number]] Collected: 07/04/24 0807     Updated: 07/04/24 0815    Narrative:      CT HEAD WO CONTRAST-     INDICATIONS: Stroke     TECHNIQUE: Radiation dose reduction techniques were utilized, including  automated exposure control and exposure modulation based on body size.  Noncontrast head CT     COMPARISON: CT from 6/21/2024, MRI from 6/22/2024     FINDINGS:     Encephalomalacia is apparent in the left basal ganglia, about 1.7 cm.     No acute intracranial hemorrhage, midline shift or mass effect.     Mild to moderate periventricular hypodensities suggest chronic small  vessel ischemic change in a patient this age.   "   Arterial calcifications are seen at the base of the brain.     Ventricles, cisterns, cerebral sulci are stable.     The visualized paranasal sinuses, orbits, mastoid air cells are  unremarkable.          Impression:         No acute intracranial hemorrhage or hydrocephalus. Post infarct changes  at left basal ganglia. If there is further clinical concern, MRI could  be considered for further evaluation.     This report was finalized on 7/4/2024 8:12 AM by Dr. Antonio Ch M.D on Workstation: [id number]       XR Chest PA & Lateral [[id number]] Collected: 07/04/24 0803     Updated: 07/04/24 0808    Narrative:      XR CHEST PA AND LATERAL-     HISTORY: Female who is 44 years-old, postoperative evaluation     TECHNIQUE: Frontal and lateral views of the chest     COMPARISON: 7/3/2024     FINDINGS: The heart is enlarged. Pulmonary vasculature is congested.  Sternotomy hardware is noted. Minimal pleural effusions are apparent,  with basilar atelectasis or infiltrate posteriorly on the lateral view,  follow-up suggested. No pneumothorax. No acute osseous process.       Impression:      As described.     This report was finalized on 7/4/2024 8:04 AM by Dr. Antonio Ch M.D on Workstation: [id number]       XR Chest 1 View [[id number]] Collected: 07/03/24 1118     Updated: 07/03/24 1125    Narrative:      Portable chest radiograph     HISTORY: Chest tube removal, postop open heart     TECHNIQUE: Single AP portable radiograph of the chest     COMPARISON: Chest radiograph dating back to 7/1/2024       Impression:      FINDINGS AND IMPRESSION:  Previously seen thoracostomy tubes appear to been removed. Presumed  epicardial pacing wires, median sternotomy wires and right internal  jugular sheath are present.     Findings of pneumomediastinum, as before. Mild left basilar pulmonary  opacification persists. No pneumothorax is seen. Cardiac silhouette is  accentuated by patient rotation; however, there is to be at least  mildly  enlarged.     This report was finalized on 7/3/2024 11:22 AM by Dr. Bin Bey M.D  on Workstation: [id number]               Results for orders placed during the hospital encounter of 06/21/24    XR Chest 1 View    Narrative  Portable chest radiograph    HISTORY: Chest tube removal, postop open heart    TECHNIQUE: Single AP portable radiograph of the chest    COMPARISON: Chest radiograph dating back to 7/1/2024    Impression  FINDINGS AND IMPRESSION:  Previously seen thoracostomy tubes appear to been removed. Presumed  epicardial pacing wires, median sternotomy wires and right internal  jugular sheath are present.    Findings of pneumomediastinum, as before. Mild left basilar pulmonary  opacification persists. No pneumothorax is seen. Cardiac silhouette is  accentuated by patient rotation; however, there is to be at least mildly  enlarged.    This report was finalized on 7/3/2024 11:22 AM by Dr. Bin Bey M.D  on Workstation: [id number]      XR Chest PA & Lateral    Narrative  XR CHEST PA AND LATERAL-    HISTORY: Female who is 44 years-old, postoperative evaluation    TECHNIQUE: Frontal and lateral views of the chest    COMPARISON: 7/3/2024    FINDINGS: The heart is enlarged. Pulmonary vasculature is congested.  Sternotomy hardware is noted. Minimal pleural effusions are apparent,  with basilar atelectasis or infiltrate posteriorly on the lateral view,  follow-up suggested. No pneumothorax. No acute osseous process.    Impression  As described.    This report was finalized on 7/4/2024 8:04 AM by Dr. Antonio Ch M.D on Workstation: [id number]      XR Chest 1 View    Narrative  Portable chest radiograph    HISTORY: Postop open heart    TECHNIQUE: Single AP portable radiograph of the chest    COMPARISON: Multiple chest radiographs dating back to 6/21/2024    Impression  FINDINGS AND IMPRESSION:  There appear to be 4 thoracostomy tubes overlying the bilateral lungs a  mediastinum. The right  internal jugular sheath is present.    Bibasilar pulmonary opacification has mild to moderately decreased. No  pneumothorax seen. Cardiac silhouette is accentuated by low lung  volumes. Findings suggestive of pneumomediastinum, as before.    This report was finalized on 7/3/2024 6:26 AM by Dr. Bin Bey M.D  on Workstation: [id number]      Results for orders placed during the hospital encounter of 06/21/24    Duplex Carotid Ultrasound CAR    Interpretation Summary    Right internal carotid artery demonstrates a less than 50% stenosis.    Left internal carotid artery demonstrates a less than 50% stenosis.        ASSESSMENT / PLAN      Acute right-sided weakness    Benign essential hypertension    Gastroparesis diabeticorum    Gastroesophageal reflux disease    Mixed hypercholesterolemia and hypertriglyceridemia    Iron deficiency anemia    Type 2 diabetes mellitus with hyperglycemia, with long-term current use of insulin    Coronary artery disease involving native coronary artery of native heart with unstable angina pectoris    Tobacco abuse    CKD stage 3a, GFR 45-59 ml/min    Carotid stenosis    Lacunar cerebrovascular accident (CVA)    Elevated troponin    Aortic valve disease      ARF/CARLA/CRF/CKD---Nonoliguric. +ARF/CARLA on top of known CRF/CKD STG   3A, with a baseline serum creatinine of about 1.2. CRF/CKD STG 3A is secondary to DGS/HTN NS. +ARF/CARLA appears to be secondary to prerenal state/intravascular volume depletion and some ATN from hypotension. CARLA resolved. Support BP and avoid hypotension. Renal US negative.  No NSAIDs or IV dye. Dose meds for CrCl 30-45 cc/min.  Keep off of SGLT2 inhibitor for now     2. ACIDOSIS-----PO NaHCO3. Stable bicarb levels today     3. DMII WITH RENAL MANIFESTATIONS-----Hold SGLT2 inhibitor given ARF/CARLA. Lantus, Glucometers, SSI     4. HTN WITH CKD-----BP ok. No ACE-I/ARB/DRI/diuretic for now. Follow for pressor need. Follow off of IVFs     5.  HYPERLIPIDEMIA----Statin     6. OA/DJD------No NSAIDs. Uric ok     7. DM PERIPHERAL NEUROPATHY-----On Neurontin     8. GERD/DM GASTROPARESIS/PUD PROPHYLAXIS-------Reglan and PPI. Benefits of PPI use outweigh risks, despite renal dysfunction     8. DVT PROPHYLAXIS----Renal dose adjusted Lovenox     9. PROTEINURIA-----Secondary to DGS     10. VITAMIN D DEFICIENCY-----On Supplementation prior to admission     11. ANEMIA OF CKD AND POST-OP ANEMIA------IV iron for NIGHAT     12. DEPRESSION-----On Lexapro     13. CAD S/P CABG S/P AV FIBROELASTOMA RESECTION/REPAIR-----per , CT Surgery     14. POST OP ATELECTASIS/LB WITH H/O TOBACCO ABUSE-----Encourage IS. No active wheezing and respiratory status stable    15. HYPOCALCEMIA------Replaced      Tamiko Jenkins MD  [facility]  [phone]  07/06/24  08:06 EDT      Electronically signed by Xochilt Jenkins MD at 07/06/24 1432       Lora Valenzuela APRN at 07/06/24 0729       Attestation signed by Benja De Luna MD at 07/06/24 1748    I have reviewed this documentation and agree.                   LOS: 15 days   Patient Care Team:  Ibrahima Correa MD as PCP - General (Family Medicine)  Rachele Zafar RN as Ambulatory  (Population [facility])  Xochilt Jenkins MD as Consulting Physician (Nephrology)    Chief Complaint: post op follow-up    Subjective      Vital Signs  Temp:  [98.3 °F (36.8 °C)-99.1 °F (37.3 °C)] 98.5 °F (36.9 °C)  Heart Rate:  [52-60] 52  Resp:  [16-20] 20  BP: (105-154)/(49-79) 136/79  Body mass index is 26.19 kg/m².    Intake/Output Summary (Last 24 hours) at 7/6/2024 0729  Last data filed at 7/6/2024 0500  Gross per 24 hour   Intake --   Output 500 ml   Net -500 ml     No intake/output data recorded.            07/03/24  0500 07/05/24  0620 07/06/24  0500   Weight: 74.6 kg (164 lb 7.4 oz) 68.8 kg (151 lb 11.2 oz) 69.2 kg (152 lb 9.6 oz)         Objective:  Vital  "signs: (most recent): Blood pressure 116/54, pulse 67, temperature 98.2 °F (36.8 °C), temperature source Oral, resp. rate 19, height 162.6 cm (64\"), weight 69.2 kg (152 lb 9.6 oz), last menstrual period 01/10/2023, SpO2 97%, not currently breastfeeding.                Results Review:        WBC WBC   Date Value Ref Range Status   07/06/2024 7.00 3.40 - 10.80 10*3/mm3 Final   07/05/2024 8.86 3.40 - 10.80 10*3/mm3 Final   07/04/2024 9.85 3.40 - 10.80 10*3/mm3 Final      HGB Hemoglobin   Date Value Ref Range Status   07/06/2024 8.4 (L) 12.0 - 15.9 g/dL Final   07/05/2024 8.5 (L) 12.0 - 15.9 g/dL Final   07/04/2024 8.6 (L) 12.0 - 15.9 g/dL Final      HCT Hematocrit   Date Value Ref Range Status   07/06/2024 25.6 (L) 34.0 - 46.6 % Final   07/05/2024 26.2 (L) 34.0 - 46.6 % Final   07/04/2024 26.8 (L) 34.0 - 46.6 % Final      Platelets Platelets   Date Value Ref Range Status   07/06/2024 148 140 - 450 10*3/mm3 Final   07/05/2024 131 (L) 140 - 450 10*3/mm3 Final   07/04/2024 122 (L) 140 - 450 10*3/mm3 Final        PT/INR:    No results found for: \"PROTIME\"  /  No results found for: \"INR\"      Sodium Sodium   Date Value Ref Range Status   07/06/2024 136 136 - 145 mmol/L Final   07/05/2024 136 136 - 145 mmol/L Final   07/04/2024 139 136 - 145 mmol/L Final      Potassium Potassium   Date Value Ref Range Status   07/06/2024 3.7 3.5 - 5.2 mmol/L Final   07/05/2024 4.1 3.5 - 5.2 mmol/L Final   07/04/2024 3.9 3.5 - 5.2 mmol/L Final   07/04/2024 3.8 3.5 - 5.2 mmol/L Final      Chloride Chloride   Date Value Ref Range Status   07/06/2024 106 98 - 107 mmol/L Final   07/05/2024 106 98 - 107 mmol/L Final   07/04/2024 107 98 - 107 mmol/L Final      Bicarbonate CO2   Date Value Ref Range Status   07/06/2024 22.0 22.0 - 29.0 mmol/L Final   07/05/2024 20.2 (L) 22.0 - 29.0 mmol/L Final   07/04/2024 24.0 22.0 - 29.0 mmol/L Final      BUN BUN   Date Value Ref Range Status   07/06/2024 15 6 - 20 mg/dL Final   07/05/2024 16 6 - 20 mg/dL Final "   07/04/2024 19 6 - 20 mg/dL Final      Creatinine Creatinine   Date Value Ref Range Status   07/06/2024 0.99 0.57 - 1.00 mg/dL Final   07/05/2024 1.07 (H) 0.57 - 1.00 mg/dL Final   07/04/2024 1.27 (H) 0.57 - 1.00 mg/dL Final      Calcium Calcium   Date Value Ref Range Status   07/06/2024 8.6 8.6 - 10.5 mg/dL Final   07/05/2024 8.5 (L) 8.6 - 10.5 mg/dL Final   07/04/2024 9.0 8.6 - 10.5 mg/dL Final      Magnesium Magnesium   Date Value Ref Range Status   07/06/2024 2.1 1.6 - 2.6 mg/dL Final   07/05/2024 2.0 1.6 - 2.6 mg/dL Final   07/04/2024 2.0 1.6 - 2.6 mg/dL Final          aspirin, 81 mg, Oral, Daily  atorvastatin, 40 mg, Oral, Nightly  carvedilol, 12.5 mg, Oral, Q12H  enoxaparin, 30 mg, Subcutaneous, Daily  escitalopram, 10 mg, Oral, Daily  guaiFENesin, 1,200 mg, Oral, Q12H  hydrALAZINE, 75 mg, Oral, Q8H  insulin glargine, 20 Units, Subcutaneous, Daily  insulin lispro, 2-9 Units, Subcutaneous, 4x Daily AC & at Bedtime  mupirocin, , Each Nare, BID  pantoprazole, 40 mg, Oral, QAM  potassium chloride ER, 20 mEq, Oral, Once  senna-docusate sodium, 2 tablet, Oral, Nightly  sodium bicarbonate, 650 mg, Oral, Daily  sodium chloride, 4 mL, Nebulization, BID - RT      niCARdipine, 5-15 mg/hr, Last Rate: Stopped (07/03/24 1500)              Acute right-sided weakness    Benign essential hypertension    Gastroparesis diabeticorum    Gastroesophageal reflux disease    Mixed hypercholesterolemia and hypertriglyceridemia    Iron deficiency anemia    Type 2 diabetes mellitus with hyperglycemia, with long-term current use of insulin    Coronary artery disease involving native coronary artery of native heart with unstable angina pectoris    Tobacco abuse    CKD stage 3a, GFR 45-59 ml/min    Carotid stenosis    Lacunar cerebrovascular accident (CVA)    Elevated troponin    Aortic valve disease      Assessment & Plan    -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), EVH right, open left, AV  "fibroelastoma resection, AV repair- [place]  -Recurrent MCA stroke  -Coronary artery disease status post CABG in 2014  -Uncontrolled diabetes mellitus, HgbA1c 16.9, with peripheral neuropathy  -CKD, stage 3  -Hypertension  -Hyperlipidemia  -Obesity  -Tobacco abuse-- encourage cessation  -PAD, Carotid stenosis, 70% right ICA  -GERD  -LB  -chronic anemia- acute worsening expected acute blood loss    POD#5  Up in the chair  On room air-- tolerating  Sinus rhythm rate in the 60s-- tolerated beta blocker  Creatinine normalized- nephrology following.  Encourage pulmonary toilet--- continue IS/flutter, mucinex and nebs  Mobilize/increase activity-- PT/OT  Making good progress.  Home with home health vs rehab.  She wants to return home , PT recs noted for skilled nursing. Will see how she does with therapy today.  Continue routine care    Lora KAUSHAL Avila  24  07:29 EDT      Electronically signed by Benja De Luna MD at 24 0646       Allison Miranda MD at 24 1032          Patient Name: Bibiana Inman  :1980  44 y.o.      Patient Care Team:  Ibrahima Correa MD as PCP - General (Family Medicine)  Rachele Zafar RN as Ambulatory  (Population Health)  Xochilt Jenkins MD as Consulting Physician (Nephrology)    Interval History:   Heart rate and blood pressure controlled    Subjective:  Following for cardiac issue    Objective   Vital Signs  Temp:  [98.3 °F (36.8 °C)-98.9 °F (37.2 °C)] 98.8 °F (37.1 °C)  Heart Rate:  [54-63] 60  Resp:  [16-20] 16  BP: (105-148)/(49-71) 105/49    Intake/Output Summary (Last 24 hours) at 2024 1032  Last data filed at 2024 0620  Gross per 24 hour   Intake --   Output 1600 ml   Net -1600 ml     Flowsheet Rows      Flowsheet Row First Filed Value   Admission Height 162.6 cm (64\") Documented at 2024 210   Admission Weight 65.5 kg (144 lb 6.4 oz) Documented at 2024 9950            Physical Exam:   General " Appearance:    Alert, cooperative, in no acute distress   Lungs:     Clear to auscultation.  Normal respiratory effort and rate.      Heart:    Regular rhythm and normal rate, normal S1 and S2, no murmurs, gallops or rubs.     Chest Wall:  Healing well   Abdomen:     Soft, nontender, positive bowel sounds.     Extremities:   no cyanosis, clubbing or edema.  No marked joint deformities.  Adequate musculoskeletal strength.       Results Review:    Results from last 7 days   Lab Units 07/05/24  0220   SODIUM mmol/L 136   POTASSIUM mmol/L 4.1   CHLORIDE mmol/L 106   CO2 mmol/L 20.2*   BUN mg/dL 16   CREATININE mg/dL 1.07*   GLUCOSE mg/dL 93   CALCIUM mg/dL 8.5*     Results from last 7 days   Lab Units 07/04/24  0251 07/03/24  0301 07/02/24  1745   CK TOTAL U/L 100 280* 426*     Results from last 7 days   Lab Units 07/05/24  0220   WBC 10*3/mm3 8.86   HEMOGLOBIN g/dL 8.5*   HEMATOCRIT % 26.2*   PLATELETS 10*3/mm3 131*     Results from last 7 days   Lab Units 07/02/24  0239 07/01/24  1312   INR  1.25* 1.33*   APTT seconds  --  31.3         Results from last 7 days   Lab Units 07/05/24  0220   MAGNESIUM mg/dL 2.0             Medication Review:   aspirin, 81 mg, Oral, Daily  atorvastatin, 40 mg, Oral, Nightly  carvedilol, 3.125 mg, Oral, Q12H  cloNIDine, 0.2 mg, Oral, Q12H  enoxaparin, 30 mg, Subcutaneous, Daily  escitalopram, 10 mg, Oral, Daily  ferric gluconate, 125 mg, Intravenous, Daily  guaiFENesin, 1,200 mg, Oral, Q12H  hydrALAZINE, 75 mg, Oral, Q8H  insulin glargine, 20 Units, Subcutaneous, Daily  insulin lispro, 2-9 Units, Subcutaneous, 4x Daily AC & at Bedtime  mupirocin, , Each Nare, BID  pantoprazole, 40 mg, Oral, QAM  senna-docusate sodium, 2 tablet, Oral, Nightly  sodium chloride, 4 mL, Nebulization, BID - RT         niCARdipine, 5-15 mg/hr, Last Rate: Stopped (07/03/24 1500)        Assessment & Plan     1.  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  Patient presented with  strokelike symptoms.  2.  Coronary artery disease status post CABG with a vein graft to the obtuse marginal x 1 on 2024.  Previous bypass with a  of the LAD.  There is a LIMA to the LAD, vein graft to the circumflex is occluded, vein graft to the right coronary is patent.  3.  Diabetes with a hemoglobin A1c of 16.9.  4.  Hypertension.  Blood pressure is controlled.  Stop clonidine and increase carvedilol.  5.  Hyperlipidemia  6.  Tobacco use  7.  Carotid artery stenosis with a 70% lesion in the right internal carotid artery.  8.  Obstructive sleep apnea.    Discontinue clonidine and increase carvedilol tonight.  Stable from a cardiac standpoint.    Allison Miranda MD, [facility]  24  10:32 EDT          Electronically signed by Allison Miranda MD at 24 1201       Lenin Huang MD at 24 0955              Name: Bibiana Inman ADMIT: 2024   : 1980  PCP: Ibrahima Correa MD    MRN: [id number] LOS: 14 days   AGE/SEX: 44 y.o. female  ROOM: [facility]     Subjective   Subjective   No acute events. Patient denies new complaints. No family at bedside.    Objective   Objective   Vital Signs  Temp:  [98.3 °F (36.8 °C)-98.9 °F (37.2 °C)] 98.8 °F (37.1 °C)  Heart Rate:  [54-63] 60  Resp:  [16-20] 16  BP: (105-148)/(49-71) 105/49  SpO2:  [94 %-100 %] 100 %  on   ;   Device (Oxygen Therapy): room air  Body mass index is 26.04 kg/m².  Physical Exam  Vitals and nursing note reviewed.   Constitutional:       General: She is not in acute distress.     Appearance: She is not toxic-appearing or diaphoretic.   HENT:      Head: Normocephalic and atraumatic.      Nose: Nose normal.      Mouth/Throat:      Mouth: Mucous membranes are moist.      Pharynx: Oropharynx is clear.   Eyes:      Conjunctiva/sclera: Conjunctivae normal.      Pupils: Pupils are equal, round, and reactive to light.   Cardiovascular:      Rate and Rhythm: Normal rate and regular rhythm.       Pulses: Normal pulses.      Comments: Surgical wound c/d/i  Pulmonary:      Effort: Pulmonary effort is normal.   Abdominal:      General: Bowel sounds are normal.      Palpations: Abdomen is soft.      Tenderness: There is no abdominal tenderness.   Musculoskeletal:         General: No swelling or tenderness.      Cervical back: Neck supple.   Skin:     General: Skin is warm and dry.      Capillary Refill: Capillary refill takes less than 2 seconds.   Neurological:      Mental Status: She is alert and oriented to person, place, and time.      Comments: +mild right lower facial weakness   Psychiatric:         Mood and Affect: Mood normal.         Behavior: Behavior normal.       Results Review     I reviewed the patient's new clinical results.  Results from last 7 days   Lab Units 07/05/24  0220 07/04/24  0251 07/03/24  0301 07/03/24  0011 07/02/24  0239   WBC 10*3/mm3 8.86 9.85 10.99*  --  14.01*   HEMOGLOBIN g/dL 8.5* 8.6* 8.9*  --  9.1*   HEMOGLOBIN, POC g/dL  --   --   --  8.0*  --    PLATELETS 10*3/mm3 131* 122* 123*  --  129*     Results from last 7 days   Lab Units 07/05/24  0220 07/04/24  1048 07/04/24  0251 07/03/24  0301 07/03/24  0011 07/02/24  0239   SODIUM mmol/L 136  --  139 141  --  142   POTASSIUM mmol/L 4.1 3.9 3.8 4.4  --  4.3   CHLORIDE mmol/L 106  --  107 107  --  112*   CO2 mmol/L 20.2*  --  24.0 26.0  --  20.7*   BUN mg/dL 16  --  19 21*  --  20   CREATININE mg/dL 1.07*  --  1.27* 1.47* 1.38* 1.92*   GLUCOSE mg/dL 93  --  91 158*  --  117*   EGFR mL/min/1.73 65.0  --  52.9* 44.4*  --  32.2*     Results from last 7 days   Lab Units 07/03/24  0301 07/02/24  0239 07/01/24  1647 07/01/24  1312   ALBUMIN g/dL 3.5 3.7 3.6 3.8   BILIRUBIN mg/dL 0.2  --   --   --    ALK PHOS U/L 62  --   --   --    AST (SGOT) U/L 23  --   --   --    ALT (SGPT) U/L <5  --   --   --      Results from last 7 days   Lab Units 07/05/24  0220 07/04/24  0251 07/03/24  0301 07/02/24  0239 07/01/24  1647 07/01/24  1312    CALCIUM mg/dL 8.5* 9.0 9.0 8.5* 8.4* 8.9   ALBUMIN g/dL  --   --  3.5 3.7 3.6 3.8   MAGNESIUM mg/dL 2.0 2.0 2.2 2.7* 2.4 2.7*   PHOSPHORUS mg/dL  --  2.7 5.2* 4.8* 2.9 2.0*     Results from last 7 days   Lab Units 07/03/24  0301 07/03/24  0011 07/01/24  1909   LACTATE mmol/L 1.4 2.0 <0.4     Glucose   Date/Time Value Ref Range Status   07/05/2024 0633 121 70 - 130 mg/dL Final   07/04/2024 2018 176 (H) 70 - 130 mg/dL Final   07/04/2024 1619 138 (H) 70 - 130 mg/dL Final   07/04/2024 1127 150 (H) 70 - 130 mg/dL Final   07/04/2024 0547 93 70 - 130 mg/dL Final   07/03/2024 2022 164 (H) 70 - 130 mg/dL Final   07/03/2024 1700 144 (H) 70 - 130 mg/dL Final       CT Head Without Contrast    Result Date: 7/4/2024   No acute intracranial hemorrhage or hydrocephalus. Post infarct changes at left basal ganglia. If there is further clinical concern, MRI could be considered for further evaluation.  This report was finalized on 7/4/2024 8:12 AM by Dr. Antonio Ch M.D on Workstation: [id number]      XR Chest PA & Lateral    Result Date: 7/4/2024  As described.  This report was finalized on 7/4/2024 8:04 AM by Dr. Antonio Ch M.D on Workstation: [id number]      XR Chest 1 View    Result Date: 7/3/2024  FINDINGS AND IMPRESSION: Previously seen thoracostomy tubes appear to been removed. Presumed epicardial pacing wires, median sternotomy wires and right internal jugular sheath are present.  Findings of pneumomediastinum, as before. Mild left basilar pulmonary opacification persists. No pneumothorax is seen. Cardiac silhouette is accentuated by patient rotation; however, there is to be at least mildly enlarged.  This report was finalized on 7/3/2024 11:22 AM by Dr. Bin Bey M.D on Workstation: [id number]       I have personally reviewed all medications:  Scheduled Medications  aspirin, 81 mg, Oral, Daily  atorvastatin, 40 mg, Oral, Nightly  carvedilol, 3.125 mg, Oral, Q12H  cloNIDine, 0.2 mg, Oral, Q12H  enoxaparin, 30  mg, Subcutaneous, Daily  escitalopram, 10 mg, Oral, Daily  ferric gluconate, 125 mg, Intravenous, Daily  guaiFENesin, 1,200 mg, Oral, Q12H  hydrALAZINE, 75 mg, Oral, Q8H  insulin glargine, 20 Units, Subcutaneous, Daily  insulin lispro, 2-9 Units, Subcutaneous, 4x Daily AC & at Bedtime  mupirocin, , Each Nare, BID  pantoprazole, 40 mg, Oral, QAM  senna-docusate sodium, 2 tablet, Oral, Nightly  sodium chloride, 4 mL, Nebulization, BID - RT    Infusions  niCARdipine, 5-15 mg/hr, Last Rate: Stopped (07/03/24 1500)      Diet  Diet: Cardiac, Diabetic; Healthy Heart (2-3 Na+); Consistent Carbohydrate; Fluid Consistency: Thin (IDDSI 0)    I have personally reviewed:  [x]  Laboratory   []  Microbiology   [x]  Radiology   [x]  EKG/Telemetry  []  Cardiology/Vascular   []  Pathology    []  Records    Assessment/Plan     Active Hospital Problems    Diagnosis  POA    **Acute right-sided weakness [R53.1]  Yes    CKD stage 3a, GFR 45-59 ml/min [N18.31]  Yes    Carotid stenosis [I65.29]  Yes    Lacunar cerebrovascular accident (CVA) [I63.81]  Yes    Elevated troponin [R79.89]  Yes    Aortic valve disease [I35.9]  Unknown    Tobacco abuse [Z72.0]  Yes    Coronary artery disease involving native coronary artery of native heart with unstable angina pectoris [I25.110]  Yes    Gastroparesis diabeticorum [E11.43, 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 De Luna  - appreciate cardiology, CT surgery, and neurology recs     CARLA on CKD Stage 3b  - pre-renal, now resolved  - follow volume status and chemistries  - appreciate nephrology recs    Type 2 DM  - complications include gastroparesis  - on lantus and jardiance as outpatient  - BG acceptable, needs better outpatient control, A1C 16.90%  - continue lantus 20 units daily   - cover with ssi/hypoglycemia protocol  - jardiance held    HTN/CAD s/p CABG  - BP acceptable  - continue coreg, clonidine, and hydralazine      GERD  - symptoms controlled, continue ppi    SCDs for DVT prophylaxis.  Full code.  Discussed with patient and nursing staff.  Anticipate discharge home with home health in 2-3 days.  Expected Discharge Date: 7/8/2024; Expected Discharge Time:       Lenin Huang MD  [facility]  07/05/24  10:01 EDT    Portions of this text have been copied and I have reviewed them. They are accurate as of 7/5/2024      Electronically signed by Lenin Huang MD at 07/05/24 1001       Lora Valenzuela APRN at 07/05/24 0832       Attestation signed by Benja De Luna MD at 07/06/24 3586    I have reviewed this documentation and agree.                   LOS: 14 days   Patient Care Team:  Ibrahima Correa MD as PCP - General (Family Medicine)  Rachele Zafar, RN as Ambulatory  (Population Health)  Xochilt Jenkins MD as Consulting Physician (Nephrology)    Chief Complaint: post op follow-up    Subjective      Vital Signs  Temp:  [98.3 °F (36.8 °C)-98.9 °F (37.2 °C)]  "98.8 °F (37.1 °C)  Heart Rate:  [54-65] 60  Resp:  [16-20] 16  BP: (105-148)/(49-71) 105/49  Body mass index is 26.04 kg/m².    Intake/Output Summary (Last 24 hours) at 7/5/2024 0832  Last data filed at 7/5/2024 0620  Gross per 24 hour   Intake 100 ml   Output 1700 ml   Net -1600 ml     No intake/output data recorded.            07/02/24  0534 07/03/24  0500 07/05/24  0620   Weight: 71.2 kg (156 lb 15.5 oz) 74.6 kg (164 lb 7.4 oz) 68.8 kg (151 lb 11.2 oz)         Objective:  Vital signs: (most recent): Blood pressure 105/49, pulse 60, temperature 98.8 °F (37.1 °C), temperature source Oral, resp. rate 16, height 162.6 cm (64\"), weight 68.8 kg (151 lb 11.2 oz), last menstrual period 01/10/2023, SpO2 100%, not currently breastfeeding.                Results Review:        WBC WBC   Date Value Ref Range Status   07/05/2024 8.86 3.40 - 10.80 10*3/mm3 Final   07/04/2024 9.85 3.40 - 10.80 10*3/mm3 Final   07/03/2024 10.99 (H) 3.40 - 10.80 10*3/mm3 Final      HGB Hemoglobin   Date Value Ref Range Status   07/05/2024 8.5 (L) 12.0 - 15.9 g/dL Final   07/04/2024 8.6 (L) 12.0 - 15.9 g/dL Final   07/03/2024 8.9 (L) 12.0 - 15.9 g/dL Final   07/03/2024 8.0 (C) 12.0 - 17.0 g/dL Final     Comment:     Serial Number: [id number]  [id number]      HCT Hematocrit   Date Value Ref Range Status   07/05/2024 26.2 (L) 34.0 - 46.6 % Final   07/04/2024 26.8 (L) 34.0 - 46.6 % Final   07/03/2024 27.7 (L) 34.0 - 46.6 % Final   07/03/2024 24 (L) 38 - 51 % Final      Platelets Platelets   Date Value Ref Range Status   07/05/2024 131 (L) 140 - 450 10*3/mm3 Final   07/04/2024 122 (L) 140 - 450 10*3/mm3 Final   07/03/2024 123 (L) 140 - 450 10*3/mm3 Final        PT/INR:    No results found for: \"PROTIME\"  /  No results found for: \"INR\"      Sodium Sodium   Date Value Ref Range Status   07/05/2024 136 136 - 145 mmol/L Final   07/04/2024 139 136 - 145 mmol/L Final   07/03/2024 141 136 - 145 mmol/L Final      Potassium Potassium   Date Value Ref Range " Status   07/05/2024 4.1 3.5 - 5.2 mmol/L Final   07/04/2024 3.9 3.5 - 5.2 mmol/L Final   07/04/2024 3.8 3.5 - 5.2 mmol/L Final   07/03/2024 4.4 3.5 - 5.2 mmol/L Final      Chloride Chloride   Date Value Ref Range Status   07/05/2024 106 98 - 107 mmol/L Final   07/04/2024 107 98 - 107 mmol/L Final   07/03/2024 107 98 - 107 mmol/L Final      Bicarbonate CO2   Date Value Ref Range Status   07/05/2024 20.2 (L) 22.0 - 29.0 mmol/L Final   07/04/2024 24.0 22.0 - 29.0 mmol/L Final   07/03/2024 26.0 22.0 - 29.0 mmol/L Final      BUN BUN   Date Value Ref Range Status   07/05/2024 16 6 - 20 mg/dL Final   07/04/2024 19 6 - 20 mg/dL Final   07/03/2024 21 (H) 6 - 20 mg/dL Final      Creatinine Creatinine   Date Value Ref Range Status   07/05/2024 1.07 (H) 0.57 - 1.00 mg/dL Final   07/04/2024 1.27 (H) 0.57 - 1.00 mg/dL Final   07/03/2024 1.47 (H) 0.57 - 1.00 mg/dL Final   07/03/2024 1.38 (H) 0.60 - 1.30 mg/dL Final      Calcium Calcium   Date Value Ref Range Status   07/05/2024 8.5 (L) 8.6 - 10.5 mg/dL Final   07/04/2024 9.0 8.6 - 10.5 mg/dL Final   07/03/2024 9.0 8.6 - 10.5 mg/dL Final      Magnesium Magnesium   Date Value Ref Range Status   07/05/2024 2.0 1.6 - 2.6 mg/dL Final   07/04/2024 2.0 1.6 - 2.6 mg/dL Final   07/03/2024 2.2 1.6 - 2.6 mg/dL Final          aspirin, 81 mg, Oral, Daily  atorvastatin, 40 mg, Oral, Nightly  carvedilol, 3.125 mg, Oral, Q12H  cloNIDine, 0.2 mg, Oral, Q12H  enoxaparin, 30 mg, Subcutaneous, Daily  escitalopram, 10 mg, Oral, Daily  ferric gluconate, 125 mg, Intravenous, Daily  guaiFENesin, 1,200 mg, Oral, Q12H  hydrALAZINE, 75 mg, Oral, Q8H  insulin glargine, 20 Units, Subcutaneous, Daily  insulin lispro, 2-9 Units, Subcutaneous, 4x Daily AC & at Bedtime  mupirocin, , Each Nare, BID  pantoprazole, 40 mg, Oral, QAM  senna-docusate sodium, 2 tablet, Oral, Nightly  sodium chloride, 4 mL, Nebulization, BID - RT      niCARdipine, 5-15 mg/hr, Last Rate: Stopped (07/03/24 1500)              Acute  right-sided weakness    Benign essential hypertension    Gastroparesis diabeticorum    Gastroesophageal reflux disease    Mixed hypercholesterolemia and hypertriglyceridemia    Iron deficiency anemia    Type 2 diabetes mellitus with hyperglycemia, with long-term current use of insulin    Coronary artery disease involving native coronary artery of native heart with unstable angina pectoris    Tobacco abuse    CKD stage 3a, GFR 45-59 ml/min    Carotid stenosis    Lacunar cerebrovascular accident (CVA)    Elevated troponin    Aortic valve disease      Assessment & Plan    -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  Encourage pulmonary toilet--- continue IS/flutter, mucinex and nebs  Mobilize/increase activity-- PT/OT  Discontinue AV wires   Making good progress.  May be able to get out of here in the next few days.  Continue routine care    KAUSHAL Alvarado  07/05/24  08:32 EDT      Electronically signed by Benja De Luna MD at 07/06/24 5287            Discharge Summary        Lenin Huang MD at 07/07/24 1531          Date of Admission: 6/21/2024  Date of Discharge:  7/7/2024  Primary Care Physician: Ibrahima Correa MD     Discharge Diagnosis:  Active Hospital Problems    Diagnosis  POA    **Acute right-sided weakness [R53.1]  Yes    CKD stage 3a, GFR 45-59 ml/min [N18.31]  Yes     Carotid stenosis [I65.29]  Yes    Lacunar cerebrovascular accident (CVA) [I63.81]  Yes    Elevated troponin [R79.89]  Yes    Aortic valve disease [I35.9]  Unknown    Tobacco abuse [Z72.0]  Yes    Coronary artery disease involving native coronary artery of native heart with unstable angina pectoris [I25.110]  Yes    Gastroparesis diabeticorum [E11.43, 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       Presenting Problem/History of Present Illness:  Tobacco abuse [Z72.0]  Acute right-sided weakness [R53.1]  Carotid stenosis, asymptomatic, right [I65.21]  Coronary artery disease involving native coronary artery of native heart with unstable angina pectoris [I25.110]  Type 2 diabetes mellitus with hyperglycemia, with long-term current use of insulin [E11.65, Z79.4]     Hospital Course:  The patient is a 44 y.o. female with history of CAD, CABG, type 2 diabetes, GERD, iron deficiency anemia, and recent CVA who presented with right-sided weakness. Please see admission H&P for further details. Brain MRI showed enlargement of acute infarct in the genu of the left internal capsule as well as a new infarct in the left insular cortex, and previously identified acute infarction within the white matter of the left parietal lobe. She was evaluated by neurology and cardiology and had a YUE which showed 3 fibroelastomas on the aortic valve. She was evaluated by cardiothoracic surgery and had an AV fibroelastoma resection, AV valve repair, and CABG x1 (SVG to OM) 7/1/24 with Dr. De Luna. She tolerated this well and her postoperative course was relatively uneventful. She had some  "ongoing intermittent confusion which is expected with her stroke and should improve over the next few months.   She had an CARLA following her surger and this has resolved. She was followed by nephrology and her CARLA eventually resolved. She will need to follow up with nephrology as an outpatient and stay off of her SGLT-2 inhibitor in the mean time.     Exam Today:  Blood pressure 149/55, pulse 61, temperature 98.2 °F (36.8 °C), temperature source Oral, resp. rate 17, height 162.6 cm (64\"), weight 68.9 kg (151 lb 12.8 oz), last menstrual period 01/10/2023, SpO2 95%, not currently breastfeeding.  Vitals and nursing note reviewed.   Constitutional:       General: She is not in acute distress.     Appearance: She is not toxic-appearing or diaphoretic.   HENT:      Head: Normocephalic and atraumatic.      Nose: Nose normal.      Mouth/Throat:      Mouth: Mucous membranes are moist.      Pharynx: Oropharynx is clear.   Eyes:      Conjunctiva/sclera: Conjunctivae normal.      Pupils: Pupils are equal, round, and reactive to light.   Cardiovascular:      Rate and Rhythm: Normal rate and regular rhythm.      Pulses: Normal pulses.      Comments: Surgical wound c/d/i  Pulmonary:      Effort: Pulmonary effort is normal.   Abdominal:      General: Bowel sounds are normal.      Palpations: Abdomen is soft.      Tenderness: There is no abdominal tenderness.   Musculoskeletal:         General: No swelling or tenderness.      Cervical back: Neck supple.   Skin:     General: Skin is warm and dry.      Capillary Refill: Capillary refill takes less than 2 seconds.   Neurological:      Mental Status: She is alert and oriented to person, place, and time.      Comments: +mild right lower facial weakness   Psychiatric:         Mood and Affect: Mood normal.         Behavior: Behavior normal.     Procedures Performed:  Procedure(s):  YUE; REOPERATIVE STERNOTOMY; CORONARY ARTERY BYPASS GRAFTING TIMES 1 UTILIZING RIGHT SAPHENOUS VEIN; AORTIC " VALVE TUMOR ; PRP      Consults:   Consults       Date and Time Order Name Status Description    7/2/2024  8:20 AM Inpatient Nephrology Consult Completed     6/25/2024  8:44 AM Inpatient Cardiothoracic Surgery Consult Completed     6/23/2024  8:02 PM Inpatient Cardiology Consult Completed     6/22/2024 12:03 AM Inpatient Neurology Consult Stroke Completed     6/21/2024  9:04 PM Inpatient Neurology Consult Stroke Completed     6/21/2024  9:04 PM Inpatient Neurology Consult Stroke Completed     6/18/2024  2:54 AM Inpatient Vascular Surgery Consult Completed     6/17/2024 11:13 AM Inpatient Endocrinology Consult Completed     6/17/2024 10:52 AM Inpatient Neurology Consult General Completed     6/17/2024 10:52 AM Cardiology (on-call MD unless specified) Completed              Discharge Disposition:  Home or Self Care    Discharge Medications:     Discharge Medications        New Medications        Instructions Start Date   carvedilol 12.5 MG tablet  Commonly known as: COREG   12.5 mg, Oral, Every 12 Hours      guaiFENesin 600 MG 12 hr tablet  Commonly known as: MUCINEX   1,200 mg, Oral, Every 12 Hours Scheduled      mupirocin 2 % ointment  Commonly known as: BACTROBAN   Apply 1 Application to each nare 2 (Two) Times a Day.      sodium bicarbonate 650 MG tablet   650 mg, Oral, Daily   Start Date: July 8, 2024            Changes to Medications        Instructions Start Date   hydrALAZINE 25 MG tablet  Commonly known as: APRESOLINE  What changed:   medication strength  how much to take   75 mg, Oral, Every 8 Hours Scheduled      insulin detemir 100 UNIT/ML injection  Commonly known as: LEVEMIR  What changed: how much to take   30 Units, Subcutaneous, Nightly      Vascepa 1 g capsule capsule  Generic drug: icosapent ethyl  What changed: Another medication with the same name was removed. Continue taking this medication, and follow the directions you see here.   Take 2 g (2 capsules) by mouth 2 (Two) Times a Day With Meals.  Indications: Cerebrovascular Accident or Stroke, High Amount of Triglycerides in the Blood, Procedure to Reestablish Blood Supply to the Heart             Continue These Medications        Instructions Start Date   aspirin 81 MG chewable tablet   81 mg, Oral, Daily      atorvastatin 80 MG tablet  Commonly known as: LIPITOR   80 mg, Oral, Daily      Dexcom G7 Sensor misc   1 each, Does not apply, Every 10 Days, Dx: E11.65      escitalopram 10 MG tablet  Commonly known as: LEXAPRO   10 mg, Oral, Daily      famotidine 40 MG tablet  Commonly known as: PEPCID   40 mg, Oral, Every Night at Bedtime      insulin aspart 100 UNIT/ML solution pen-injector sc pen  Commonly known as: novoLOG FLEXPEN   Subcutaneous, 3 times daily, Sliding scale, between 5-20 units TID      pantoprazole 40 MG EC tablet  Commonly known as: PROTONIX   40 mg, Oral, Every Morning      Ventolin  (90 Base) MCG/ACT inhaler  Generic drug: albuterol sulfate HFA   Inhale 2 puffs every 6 (six) hours if needed for wheezing.             Stop These Medications      cloNIDine 0.2 MG/24HR patch  Commonly known as: CATAPRES-TTS     clopidogrel 75 MG tablet  Commonly known as: PLAVIX     Jardiance 10 MG tablet tablet  Generic drug: empagliflozin     metoclopramide 5 MG tablet  Commonly known as: REGLAN     metoprolol tartrate 100 MG tablet  Commonly known as: LOPRESSOR     NIFEdipine CC 60 MG 24 hr tablet  Commonly known as: ADALAT CC            ASK your doctor about these medications        Instructions Start Date   Dexcom G7  device  Ask about: Should I take this medication?   1 each, Does not apply, Once, Dx: E11.65               Discharge Diet:   Diet Instructions       Diet: Cardiac Diets, Diabetic Diets; Healthy Heart (2-3 Na+); Regular (IDDSI 7); Thin (IDDSI 0); Consistent Carbohydrate      Discharge Diet:  Cardiac Diets  Diabetic Diets       Cardiac Diet: Healthy Heart (2-3 Na+)    Texture: Regular (IDDSI 7)    Fluid Consistency: Thin (IDDSI  0)    Diabetic Diet: Consistent Carbohydrate            Activity at Discharge: Per CT surgery instructions    Follow-up Appointments:  Future Appointments   Date Time Provider Department Center   7/26/2024  9:15 AM [facility]   7/30/2024  8:30 AM Neal Peoples II, MD [facility]   8/2/2024  1:00 PM Lora Valenzuela, KAUSHAL [facility]   8/7/2024  3:30 PM Rocio Denny APRN [facility]     Additional Instructions for the Follow-ups that You Need to Schedule       Ambulatory Referral to Home Health   As directed      Face to Face Visit Date: 7/6/2024   Follow-up provider for Plan of Care?: I will be treating the patient on an ongoing basis.  Please send me the Plan of Care for signature.   Follow-up provider: VINCE SMITH [[id number]]   Reason/Clinical Findings: post op open heart   Describe mobility limitations that make leaving home difficult: weakness   Nursing/Therapeutic Services Requested: Skilled Nursing Physical Therapy Occupational Therapy   Skilled nursing orders: Post CABG care   PT orders: Total joint pathway   Occupational orders: Cognition   Frequency: 1 Week 1        Call MD With Problems / Concerns   As directed      Instructions:  Call office at [phone] for any drainage, increased redness, or fever over 100.5    Order Comments: Instructions:  Call office at [phone] for any drainage, increased redness, or fever over 100.5         Discharge Follow-up with PCP   As directed       Currently Documented PCP:    Ibrahima Correa MD    PCP Phone Number:    [phone]     Follow Up Details: in 1 week        Discharge Follow-up with Specialty: Cardiologist APRN/PA; 1 Week   As directed      Specialty: Cardiologist APRN/PA   Follow Up: 1 Week   Follow Up Details: bring all prescription bottles to appointment, call for appointment        Discharge Follow-up with Specialty: nephrology; 1 Week   As directed      Specialty: nephrology   Follow Up:  1 Week        Discharge Follow-up with Specialty: neurology   As directed      Specialty: neurology   Follow Up Details: 6-8 weeks        Discharge Follow-up with Specified Provider: Cardiologist; 1 Month   As directed      To: Cardiologist   Follow Up: 1 Month   Follow Up Details: call for appointment, bring all medication bottles to appointment        Discharge Follow-up with Specified Provider: Lora Avila CT surgery APRN 8/2 at 1pm   As directed      To: Lora Avila CT surgery APRN 8/2 at 1pm   Follow Up Details: 4-6 weeks, bring all current medications to appointment        Basic Metabolic Panel    Jul 13, 2024 (Approximate)      Release to patient: Routine Release                Test Results Pending at Discharge:  Pending Labs       Order Current Status    POC Chem Panel In process             Lenin Huang MD  07/07/24  15:41 EDT    Time Spent on Discharge Activities: Greater than 30 minutes.          Electronically signed by Lenin Huang MD at 07/07/24 1463

## 2024-07-10 ENCOUNTER — HOME CARE VISIT (OUTPATIENT)
Dept: HOME HEALTH SERVICES | Facility: HOME HEALTHCARE | Age: 44
End: 2024-07-10
Payer: COMMERCIAL

## 2024-07-10 VITALS
RESPIRATION RATE: 16 BRPM | SYSTOLIC BLOOD PRESSURE: 140 MMHG | HEART RATE: 65 BPM | DIASTOLIC BLOOD PRESSURE: 70 MMHG | OXYGEN SATURATION: 95 %

## 2024-07-10 PROCEDURE — G0151 HHCP-SERV OF PT,EA 15 MIN: HCPCS

## 2024-07-10 NOTE — HOME HEALTH
44F with a history of CAD, CABG, type 2 diabetes, GERD, iron deficiency anemia,  Recent hospitalization due to CVA and CABG with AV valve repair on 7/1/24 with Dr. De Luna. She tolerated this well and her postoperative course was relatively uneventful. She had some ongoing intermittent confusion due to her stroke, SLP will evaluate.  She had an CARLA following her surgery, and has BMP order for next week. She lives at home with her  who provides assistance.  We reconciled her medications, but her mother is picking up all her new medications today.  She ambulates without any assistive device.  Her inicision is without s/s of infection.  Her BS is WNL.  SN FOC CABG.  SN to teach and instruct CABG and medication regime.

## 2024-07-11 ENCOUNTER — HOME CARE VISIT (OUTPATIENT)
Dept: HOME HEALTH SERVICES | Facility: HOME HEALTHCARE | Age: 44
End: 2024-07-11
Payer: COMMERCIAL

## 2024-07-11 VITALS
OXYGEN SATURATION: 95 % | HEART RATE: 74 BPM | SYSTOLIC BLOOD PRESSURE: 160 MMHG | DIASTOLIC BLOOD PRESSURE: 99 MMHG | TEMPERATURE: 98.2 F

## 2024-07-11 PROCEDURE — G0152 HHCP-SERV OF OT,EA 15 MIN: HCPCS

## 2024-07-11 NOTE — Clinical Note
Dear Dr. De Luna,   OT rekha completed 7/11/24 with no skilled OT services indicated at this time. OT did recommend AE and spouse is strong assist as needed with IADLs.  Thank you,   Ariane Montelongo OTR/L

## 2024-07-11 NOTE — HOME HEALTH
Pt is a 44F with a history of CAD, CABG, type 2 diabetes, GERD, iron deficiency anemia, Recent hospitalization due to CVA and CABG with AV valve repair on 7/1/24 with Dr. De Luna. She had some ongoing intermittent confusion due to her stroke, SLP will evaluate. She lives at home with her  who provides assistance but is in a wheelchair. She has an 12yo son. Her LE and chest inicision is without s/s of infection. 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   Pt presented supine in bed and agreeable to PT.  She denies pain and denies falls. Skilled PT necessary 1wk2 to instruct pt in HEP for optimal gait/balance.  She tolerated standing exercises well today.    Plan for next visit: PT DC, review standing HEP and gait on uneven surfaces

## 2024-07-11 NOTE — HOME HEALTH
"REASON FOR REFERRAL: Pt is a 43 y/o female who was recently hospitalized with CVA and CABG with AV value repair 7/1/24. Pt has some intermittent confusion including difficulty with word finding and ST memory. SLP order in place. OT services recommended to assess safety with ADL routine and potential need for AE.   PMHx: CAD, CABG, type 2 diabetes, GERD, iron deficiency anemia  OT's FOCUS OF CARE: home safety  SUBJECTIVE: \"I'm tired a lot.\"  SOCIAL & ENVIRONMENTAL SITUATION: Pt lives in a H with her spouse and adolescent son. Pt was previous I with all ADLs. Pt has a tub/shower combo with a shower chair and HH shower head.  is home and able to assist with IADLs as needed, but reports pt has been completing light meal prep since being home. No AD for functional mobility.  PATIENT'S &/OR CAREGIVER'S GOAL: Pt and spouse wish pt's confusion would resolve.  INTERVENTIONS: home safety edu  ASSESSMENT: NO skilled OT services indicated. Pt is Sup-I with all ADLs. Spouse to assist. AE recommended. See interventions.  PLAN: OT eval only  PLAN FOR NEXT VISIT: N/A"

## 2024-07-12 ENCOUNTER — HOME CARE VISIT (OUTPATIENT)
Dept: HOME HEALTH SERVICES | Facility: HOME HEALTHCARE | Age: 44
End: 2024-07-12
Payer: COMMERCIAL

## 2024-07-12 ENCOUNTER — READMISSION MANAGEMENT (OUTPATIENT)
Dept: CALL CENTER | Facility: HOSPITAL | Age: 44
End: 2024-07-12
Payer: COMMERCIAL

## 2024-07-12 PROCEDURE — G0300 HHS/HOSPICE OF LPN EA 15 MIN: HCPCS

## 2024-07-12 NOTE — OUTREACH NOTE
CT Surgery Week 1 Survey      Flowsheet Row Responses   [facility] patient discharged from? [facility]   Does the patient have one of the following disease processes/diagnoses(primary or secondary)? Cardiothoracic surgery   Week 1 attempt successful? No   Unsuccessful attempts Attempt 1              Silvia Snyder Licensed Nurse

## 2024-07-13 VITALS
SYSTOLIC BLOOD PRESSURE: 158 MMHG | RESPIRATION RATE: 18 BRPM | TEMPERATURE: 98.1 F | OXYGEN SATURATION: 93 % | HEART RATE: 66 BPM | DIASTOLIC BLOOD PRESSURE: 88 MMHG

## 2024-07-15 ENCOUNTER — TELEPHONE (OUTPATIENT)
Dept: CARDIAC SURGERY | Facility: CLINIC | Age: 44
End: 2024-07-15

## 2024-07-15 NOTE — TELEPHONE ENCOUNTER
Caller: HOLLAND BELCHER    Relationship: Emergency Contact    Best call back number: [phone]    What is the best time to reach you: ANY    Who are you requesting to speak with (clinical staff, provider,  specific staff member): CLINICAL    Do you know the name of the person who called: PTS     What was the call regarding: PTS  STATES THEY CAN NOT MAKE THAT POST OP APPT THAT IS SCHEDULED FOR 8.2.24 AS THE PT HAS A APPT WITH HER KIDNEY IN [place]. PLEASE GIVE HIM A CALL BACK TO DISCUSS SCHEDULING OPTIONS. THANK YOU    Is it okay if the provider responds through [employer]: NO

## 2024-07-16 ENCOUNTER — HOME CARE VISIT (OUTPATIENT)
Dept: HOME HEALTH SERVICES | Facility: HOME HEALTHCARE | Age: 44
End: 2024-07-16
Payer: COMMERCIAL

## 2024-07-16 PROCEDURE — G0300 HHS/HOSPICE OF LPN EA 15 MIN: HCPCS

## 2024-07-17 ENCOUNTER — HOME CARE VISIT (OUTPATIENT)
Dept: HOME HEALTH SERVICES | Facility: HOME HEALTHCARE | Age: 44
End: 2024-07-17
Payer: COMMERCIAL

## 2024-07-17 VITALS
SYSTOLIC BLOOD PRESSURE: 164 MMHG | DIASTOLIC BLOOD PRESSURE: 84 MMHG | OXYGEN SATURATION: 96 % | HEART RATE: 76 BPM | RESPIRATION RATE: 18 BRPM | TEMPERATURE: 97.6 F

## 2024-07-17 VITALS
RESPIRATION RATE: 18 BRPM | HEART RATE: 85 BPM | DIASTOLIC BLOOD PRESSURE: 90 MMHG | SYSTOLIC BLOOD PRESSURE: 160 MMHG | OXYGEN SATURATION: 97 %

## 2024-07-17 VITALS
OXYGEN SATURATION: 99 % | HEART RATE: 77 BPM | DIASTOLIC BLOOD PRESSURE: 78 MMHG | SYSTOLIC BLOOD PRESSURE: 150 MMHG | TEMPERATURE: 97.1 F

## 2024-07-17 PROCEDURE — G0151 HHCP-SERV OF PT,EA 15 MIN: HCPCS

## 2024-07-17 PROCEDURE — G0153 HHCP-SVS OF S/L PATH,EA 15MN: HCPCS

## 2024-07-17 NOTE — HOME HEALTH
REASON FOR REFERRAL: Speech Therapy referral post hospitalization for CVA and  CABG with AV valve repair on 7/1/24. Speech Therapy to evaluate and establish a plan of care.   PRIMARY DIAGNOSIS: Cognitive Communication Impairment  SECONDARY DIAGNOSIS:  Lacunar cerebrovascular accident (CVA)  VFSS OR FEES:  NA  PERTINENT HISTORY: CAD, CABG, type 2 diabetes, GERD, iron deficiency anemia, Recent hospitalization due to CVA and CABG with AV valve repair on 7/1/24. Tobacco abuse, Unstable angina, Altered mental status, Carotid stenosis, asymptomatic, right, Acute right-sided weakness, CKD stage 3a, Carotid stenosis  PRIOR LEVEL OF FUNCTION: Patient was working and managing her ADLs independently.    FOCUS OF CARE: Speech Therapy to focus care on increasing cognitive communication skills post CVA. Speech Therapy interventions are necessary due to a decline in cognitive linguistic skills and increased dependence on caregiver.    PLAN FOR NEXT VISIT   MEDICAL NECESSITY FOR ONGOING SKILLED THERAPY: Cognitive Linguistic Therapy to increase cognitive communication skills for increased safety, communication and function in home.  SPECIFIC INTERVENTIONS AND GOALS TO ADDRESS ON NEXT VISIT:   Memory tasks/strategies  Processing tasks to increase mental flexibility  FREQUENCY AND DURATION: 2w4  ANY OTHER FOLLOW UP NEEDED: None  REASSESSMENT DUE DATE: 8/17/24

## 2024-07-17 NOTE — Clinical Note
7/17/24  Speech Therapy evaluation completed today. ST will follow 2w4 for Cognitive Communication Therapy and education.   Laurie Cole MA.CCC/SLP

## 2024-07-18 NOTE — HOME HEALTH
Pt presented sitting in living room and had just finished ST.   Denies pain (1/10 sternal incision pain)  Denies falls.   Reports she has been doing her standing HEP.   Agreeable to practice shower transfers and therapist clarified with SN (ok to shower).        She tolerated shower transfers well.  She was able to amb unlimited distances without device on level and unlevel surfaces.  Focussed on quick start/stops and head turns while ambulating.  No concerns.      Pt and spouse agreeable to PT DC.  All PT goals met this date.

## 2024-07-19 ENCOUNTER — HOME CARE VISIT (OUTPATIENT)
Dept: HOME HEALTH SERVICES | Facility: HOME HEALTHCARE | Age: 44
End: 2024-07-19
Payer: COMMERCIAL

## 2024-07-19 VITALS
RESPIRATION RATE: 18 BRPM | OXYGEN SATURATION: 96 % | DIASTOLIC BLOOD PRESSURE: 80 MMHG | HEART RATE: 80 BPM | TEMPERATURE: 97.9 F | SYSTOLIC BLOOD PRESSURE: 168 MMHG

## 2024-07-19 PROCEDURE — G0300 HHS/HOSPICE OF LPN EA 15 MIN: HCPCS

## 2024-07-20 ENCOUNTER — TELEPHONE (OUTPATIENT)
Dept: CARDIOLOGY | Facility: CLINIC | Age: 44
End: 2024-07-20
Payer: COMMERCIAL

## 2024-07-20 NOTE — TELEPHONE ENCOUNTER
RAMILA WITH HOME HEALTH CALLED TO SEE IF THIS IS 'S PATIENT AND IF THE PATIENT IS SUPPOSED TO BE TAKING CARVEDILOL.     I EXPLAINED TO HER THAT THE PATIENT WAS SEEN IN THE HOSPITAL BACK IN FEBRUARY, BUT HAS NEVER FOLLOWED UP WITH US.     I READ THROUGH THE MOST RECENT HOSPITALIZATION NOTES AND READ TO HER THAT THE PATIENT WAS PRESCRIBED THE CARVEDILOL BEFORE LEAVING THE HOSPITAL BY DR.MATTHEW HERNANDEZ; HOWEVER, IT LOOKS LIKE THE PHARMACY CANCELED THE DISPENSE, BUT I AM NOT ABLE TO SEE WHY.     REFERRED HER TO THE PHARMACY FOR FURTHER INFORMATION ON WHY THAT RX WAS NOT FILLED.     ALSO REFERRED HER TO  FOR CLARIFICATION AS TO WHAT MEDICATIONS THE PATIENT SHOULD BE TAKING SINCE THE PATIENT HAS NOT SEEN ANY OF OUR PROVIDERS IN OFFICE.

## 2024-07-22 ENCOUNTER — TELEPHONE (OUTPATIENT)
Dept: CARDIAC SURGERY | Facility: CLINIC | Age: 44
End: 2024-07-22
Payer: COMMERCIAL

## 2024-07-22 RX ORDER — CARVEDILOL 12.5 MG/1
12.5 TABLET ORAL 2 TIMES DAILY WITH MEALS
Qty: 120 TABLET | Refills: 0 | Status: SHIPPED | OUTPATIENT
Start: 2024-07-22

## 2024-07-23 ENCOUNTER — HOME CARE VISIT (OUTPATIENT)
Dept: HOME HEALTH SERVICES | Facility: HOME HEALTHCARE | Age: 44
End: 2024-07-23
Payer: COMMERCIAL

## 2024-07-23 VITALS
SYSTOLIC BLOOD PRESSURE: 112 MMHG | OXYGEN SATURATION: 99 % | TEMPERATURE: 97.1 F | HEART RATE: 58 BPM | RESPIRATION RATE: 18 BRPM | DIASTOLIC BLOOD PRESSURE: 62 MMHG

## 2024-07-23 VITALS
TEMPERATURE: 96.2 F | DIASTOLIC BLOOD PRESSURE: 66 MMHG | OXYGEN SATURATION: 100 % | HEART RATE: 81 BPM | SYSTOLIC BLOOD PRESSURE: 118 MMHG

## 2024-07-23 PROCEDURE — G0153 HHCP-SVS OF S/L PATH,EA 15MN: HCPCS

## 2024-07-23 PROCEDURE — G0493 RN CARE EA 15 MIN HH/HOSPICE: HCPCS

## 2024-07-24 ENCOUNTER — HOME CARE VISIT (OUTPATIENT)
Dept: HOME HEALTH SERVICES | Facility: HOME HEALTHCARE | Age: 44
End: 2024-07-24
Payer: COMMERCIAL

## 2024-07-24 PROCEDURE — G0155 HHCP-SVS OF CSW,EA 15 MIN: HCPCS

## 2024-07-24 NOTE — HOME HEALTH
MSW eval on this date with patient, spouse and MIL. Patient was working full time prior to hospitalization and short term disability has not started yet. Spouse has been out of work for 3 years and unable to obtain SSI. Provided information on beginning process for long term disability through social security. Also recommended applying for SNAP and provided contact/application site. Referral to [facility] for assistance for utilities and food pantry. Referral to [employer] for additional food pantry in area. Currently MIL is assisting financially and with household chores and assistance with 12 yo son. Patient denies depression and is not understanding of severity of current health issues. Family agreeable to contact MSW with additional needs or concerns.

## 2024-07-24 NOTE — PROGRESS NOTES
CC: Stroke follow-up  Pt seen in follow up today, however the problem is new to the examiner.      HPI:  Bibiana Inman is a  44 y.o.  right-handed female, current smoker, with hypertension, hyperlipidemia, uncontrolled diabetes with associated gastroparesis, CKD, LB not on CPAP, and CAD status post CABG, valvular heart disease status post AVR who is being seen in follow-up today for stroke.  She is accompanied by her .    She originally presented to [facility] in June with transient episode of confusion.  SBP was 198.  EEG was unremarkable and initial concern was for hypertensive encephalopathy as well as UTI.  She was found to have a hemoglobin A1c of 16.9% at that time.  MRI brain completed on 6/17 showed tiny acute infarct in left parietal occipital region.  CTA head/neck read as showing approximately 70% stenosis of the right ICA at the origin with moderate to severe stenosis at the origin of the right M2 segment of the MCA.  She was already on DAPT with low-dose aspirin and Plavix due to history of CAD and P2 Y12 was 168.  She had a repeat MRI brain with and without contrast on 6/18 which showed stable small acute lacunar infarct in left parietal subcortical white matter.  There was also diffusion hyperintensity of the left internal capsule.  She was discharged 6/19 but unfortunately came to [facility] [place], operated by [facility] ED on 6/21 following acute onset of right-sided weakness.  BP was initially 105/54 and symptoms improved after normal saline bolus.  Repeat CTA head/neck here showed 40 to 50% right ICA stenosis, no mention of MCA stenosis.  MRI brain 6/22 showed interval enlargement of the previously identified acute infarct in the left internal capsule as well as new punctate focus of acute infarct in the left insular cortex, left parietal white matter stroke again noted.  Carotid ultrasound showed less than 50% stenosis bilaterally.  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  She ultimately required aortic valve fibroelastoma resection, aortic valve repair, CABG x 1.  She did well postoperatively and was discharged home.  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    She went home with home health nursing, PT, OT, and ST.  She notes some short-term memory difficulty and word finding difficulty since her stroke but no focal weakness or vision change.  No new TIA or stroke symptoms since discharge from the hospital.  Plavix was discontinued in the hospital and she is now on aspirin 81 mg daily and Lipitor 80 mg daily.  She has established care with Dr. Peoples with vascular surgery and he is planning follow-up carotid ultrasound in 6 months.  She is continuing to smoke 10 or less cigarettes a day.  She is using insulin as instructed but notes that sugars continue to be high.  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    Of note she does have LB with a CPAP at home which she does not use.  She has not seen her sleep medicine provider in approximately 2 years and is agreeable to a referral.    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 Debbie Fry.    Depressive disorder 08/01/2023    Diabetic gastroparesis 07/20/2021    Diabetic peripheral neuropathy associated with type 2 diabetes mellitus 02/20/2024    Gastroesophageal reflux disease 03/10/2021    GERD (gastroesophageal reflux disease)     Intermittent claudication 04/17/2017    Iron deficiency anemia 03/02/2020    Mixed hypercholesterolemia and hypertriglyceridemia 03/07/2014    Myocardial infarction     Obesity     Obstructive sleep apnea 12/14/2021    Personal history of  COVID-19 2022    Polyp of colon 2023    Spontaneous      Stress fracture of right ankle with routine healing     Tobacco abuse 2024    Type 2 diabetes mellitus with hyperglycemia, with long-term current use of insulin 2017    Vitamin D deficiency 2024         Past Surgical History:   Procedure Laterality Date    CARDIAC CATHETERIZATION N/A 2024    Procedure: Left Heart Cath and coronary angiogram;  Surgeon: Jena Barron MD;  Location: [facility] CATH INVASIVE LOCATION;  Service: Cardiology;  Laterality: N/A;     SECTION      CORONARY ARTERY BYPASS GRAFT  2014    LIMA-LAD, SVG-OM2, SVG-PDA, Dr. Debbie Fry.    CORONARY ARTERY BYPASS GRAFT WITH AORTIC VALVE REPAIR/REPLACEMENT N/A 2024    Procedure: YUE; REOPERATIVE STERNOTOMY; CORONARY ARTERY BYPASS GRAFTING TIMES 1 UTILIZING RIGHT SAPHENOUS VEIN; AORTIC VALVE TUMOR ; PRP;  Surgeon: Benja De Luna MD;  Location: [facility];  Service: Cardiothoracic;  Laterality: N/A;    DILATATION AND CURETTAGE      TONSILLECTOMY             Current Outpatient Medications:     albuterol sulfate  (90 Base) MCG/ACT inhaler, Inhale 2 puffs every 6 (six) hours if needed for wheezing., Disp: 18 g, Rfl: 0    aspirin 81 MG chewable tablet, Chew 1 tablet Daily. Indications: Disease involving Lipid Deposits in the Arteries, Disp: , Rfl:     atorvastatin (LIPITOR) 80 MG tablet, Take 1 tablet by mouth Daily. Indications: High Amount of Fats in the Blood, Disp: , Rfl:     carvedilol (COREG) 12.5 MG tablet, Take 1 tablet by mouth 2 (Two) Times a Day With Meals., Disp: 120 tablet, Rfl: 0    Continuous Glucose Sensor (Dexcom G7 Sensor) misc, Use 1 each Every 10 (Ten) Days. Dx: E11.65, Disp: 3 each, Rfl: 2    hydrALAZINE (APRESOLINE) 25 MG tablet, Take 3 tablets by mouth Every 8 (Eight) Hours., Disp: 90 tablet, Rfl: 0    icosapent ethyl (Vascepa) 1 g capsule capsule, Take 2 g (2 capsules) by mouth 2 (Two) Times a Day With  Meals. Indications: Cerebrovascular Accident or Stroke, High Amount of Triglycerides in the Blood, Procedure to Reestablish Blood Supply to the Heart, Disp: 120 capsule, Rfl: 11    insulin aspart (novoLOG FLEXPEN) 100 UNIT/ML solution pen-injector sc pen, Inject  under the skin into the appropriate area as directed 3 times a day. Sliding scale, between 5-20 units TID  Indications: Type 2 Diabetes, Disp: , Rfl:     insulin detemir (LEVEMIR) 100 UNIT/ML injection, Inject 30 Units under the skin into the appropriate area as directed Every Night., Disp: , Rfl:     sodium bicarbonate 650 MG tablet, Take 1 tablet by mouth Daily., Disp: 30 tablet, Rfl: 0    escitalopram (LEXAPRO) 10 MG tablet, Take 1 tablet by mouth Daily. (Patient not taking: Reported on 8/7/2024), Disp: , Rfl:     Current Facility-Administered Medications:     nicotine (NICODERM CQ) 14 MG/24HR patch 1 patch, 1 patch, Transdermal, Q24H, Rocio Denny APRN    nicotine polacrilex (NICORETTE) gum 2 mg, 2 mg, Mouth/Throat, Q1H PRN, Rocio Denny APRN      Family History   Family history unknown: Yes         Social History     Socioeconomic History    Marital status:    Tobacco Use    Smoking status: Some Days     Current packs/day: 0.25     Average packs/day: 0.3 packs/day for 15.0 years (3.8 ttl pk-yrs)     Types: Cigarettes    Smokeless tobacco: Never   Vaping Use    Vaping status: Never Used   Substance and Sexual Activity    Alcohol use: Defer    Drug use: Never    Sexual activity: Defer         Allergies   Allergen Reactions    Latex Itching         Pain Scale:        ROS:  Review of Systems   Musculoskeletal:  Positive for gait problem (balance problems, no falls).   Psychiatric/Behavioral:  Positive for confusion and decreased concentration.      ROS completed by the MA was reviewed by me and I agree.  Physical Exam:  Vitals:    08/07/24 1527   BP: 110/60   Pulse: 67   SpO2: 99%   Weight: 94.2 kg (207 lb 11.2 oz)   Height: 162.6  "cm (64\")     Orthostatic BP:    Body mass index is 35.65 kg/m².    General appearance: Well developed, well nourished,alert and cooperative.   HEENT: Normocephalic.   Cardiac: Regular rate and rhythm. No murmurs.   Chest Exam: Clear to auscultation bilaterally, no wheezes, no rhonchi.  Extremities: Normal, no edema.   Skin: Multiple healing abrasions on lower extremities attributed to cat scratches.  Left lower extremity incision from endoscopic vein harvest healing.    Higher integrative function: Oriented to time, place, person, intact recent and remote memory, attention span, concentration and language. Spontaneous speech, fund of vocabulary are normal.   CN II: Normal visual fields.   CN III IV VI: Extraocular movements are full without nystagmus. Pupils are equal, round, and reactive to light.   CN V: Normal facial sensation.  CN VII: Mild facial asymmetry with right nasolabial fold flattening.  CN VIII: Auditory acuity is normal.   CN IX & X: Symmetric palatal movement.   CN XI: Sternocleidomastoid and trapezius are normal. No weakness.   CN XII: The tongue is midline. No atrophy or fasciculations.   Motor: Normal muscle strength, bulk, and tone in upper and lower extremities. No fasciculations, rigidity, spasticity or abnormal movements.   Sensation: Intact/symmetric to light touch in arms and legs.  Station and gait: Normal gait and station.   Coordination: Finger to nose test showed no dysmetria. Rapid alternating movements were normal. Heel to shin normal.       Results:      Lab Results   Component Value Date    GLUCOSE 264 (H) 07/26/2024    BUN 22 (H) 07/26/2024    CREATININE 1.39 (H) 07/26/2024    EGFRIFNONA 26 (L) 01/29/2021    BCR 15.8 07/26/2024    CO2 23.8 07/26/2024    CALCIUM 9.5 07/26/2024    ALBUMIN 3.5 07/03/2024    AST 23 07/03/2024    ALT <5 07/03/2024       Lab Results   Component Value Date    WBC 7.49 07/07/2024    HGB 8.7 (L) 07/07/2024    HCT 26.8 (L) 07/07/2024    MCV 91.8 07/07/2024    " " 07/07/2024         .No results found for: \"RPR\"      Lab Results   Component Value Date    TSH 1.470 06/18/2024         Lab Results   Component Value Date    GHAHRILD53 1,216 (H) 06/22/2024         Lab Results   Component Value Date    FOLATE 14.70 06/17/2024         Lab Results   Component Value Date    HGBA1C 13.10 (H) 06/26/2024         Lab Results   Component Value Date    GLUCOSE 264 (H) 07/26/2024    BUN 22 (H) 07/26/2024    CREATININE 1.39 (H) 07/26/2024    EGFRIFNONA 26 (L) 01/29/2021    BCR 15.8 07/26/2024    K 4.0 07/26/2024    CO2 23.8 07/26/2024    CALCIUM 9.5 07/26/2024    ALBUMIN 3.5 07/03/2024    AST 23 07/03/2024    ALT <5 07/03/2024         Lab Results   Component Value Date    WBC 7.49 07/07/2024    HGB 8.7 (L) 07/07/2024    HCT 26.8 (L) 07/07/2024    MCV 91.8 07/07/2024     07/07/2024             Assessment/Plan:       Diagnoses and all orders for this visit:    1. History of stroke within last year (Primary)    2. Tobacco abuse  -     nicotine (NICODERM CQ) 14 MG/24HR patch 1 patch  -     Tobacco Cessation Education; Standing  -     nicotine polacrilex (NICORETTE) gum 2 mg  -     Tobacco Cessation Education    3. LB (obstructive sleep apnea)  -     Ambulatory Referral to Sleep Medicine    She was seen today for stroke secondary to fibroelastoma and uncontrolled vascular risk factors with previous hemoglobin A1c of 16.9%.  She continues to smoke 10 or less cigarettes a day and has untreated LB.    For stroke prevention:   Continue aspirin 81 mg daily   Blood pressure control to <130/80   Goal LDL <70-recommend high dose statins-continue Lipitor 80 m    Serum glucose < 140   Call 911 for stroke any stroke symptoms  Recommend smoking cessation, patches ordered   Recommend she start using CPAP again, referral placed to sleep medicine    OK to return to work from neurology standpoint     Follow-up in 6 months or sooner if needed      Total time: Greater than 40 " minutes            Dictated utilizing Dragon dictation.

## 2024-07-25 ENCOUNTER — HOME CARE VISIT (OUTPATIENT)
Dept: HOME HEALTH SERVICES | Facility: HOME HEALTHCARE | Age: 44
End: 2024-07-25
Payer: COMMERCIAL

## 2024-07-25 ENCOUNTER — READMISSION MANAGEMENT (OUTPATIENT)
Dept: CALL CENTER | Facility: HOSPITAL | Age: 44
End: 2024-07-25
Payer: COMMERCIAL

## 2024-07-25 VITALS
SYSTOLIC BLOOD PRESSURE: 108 MMHG | OXYGEN SATURATION: 99 % | HEART RATE: 59 BPM | DIASTOLIC BLOOD PRESSURE: 70 MMHG | TEMPERATURE: 96.8 F

## 2024-07-25 PROCEDURE — G0153 HHCP-SVS OF S/L PATH,EA 15MN: HCPCS

## 2024-07-25 NOTE — OUTREACH NOTE
CT Surgery Week 3 Survey      Flowsheet Row Responses   [facility] patient discharged from? [place]   Does the patient have one of the following disease processes/diagnoses(primary or secondary)? Cardiothoracic surgery   Week 3 attempt successful? No   Unsuccessful attempts Attempt 1            Silvia Snyder Licensed Nurse

## 2024-07-26 ENCOUNTER — LAB (OUTPATIENT)
Dept: LAB | Facility: HOSPITAL | Age: 44
End: 2024-07-26
Payer: COMMERCIAL

## 2024-07-26 ENCOUNTER — HOME CARE VISIT (OUTPATIENT)
Dept: HOME HEALTH SERVICES | Facility: HOME HEALTHCARE | Age: 44
End: 2024-07-26
Payer: COMMERCIAL

## 2024-07-26 ENCOUNTER — HOSPITAL ENCOUNTER (OUTPATIENT)
Dept: CARDIOLOGY | Facility: HOSPITAL | Age: 44
Discharge: HOME | End: 2024-07-26
Admitting: NURSE PRACTITIONER
Payer: COMMERCIAL

## 2024-07-26 VITALS
DIASTOLIC BLOOD PRESSURE: 70 MMHG | SYSTOLIC BLOOD PRESSURE: 110 MMHG | HEART RATE: 76 BPM | OXYGEN SATURATION: 100 % | RESPIRATION RATE: 18 BRPM

## 2024-07-26 DIAGNOSIS — N18.31 CKD STAGE 3A, GFR 45-59 ML/MIN: ICD-10-CM

## 2024-07-26 DIAGNOSIS — I65.21 CAROTID STENOSIS, ASYMPTOMATIC, RIGHT: ICD-10-CM

## 2024-07-26 LAB
ANION GAP SERPL CALCULATED.3IONS-SCNC: 14.2 MMOL/L (ref 5–15)
BH CV XLRA MEAS LEFT DIST CCA EDV: -22 CM/SEC
BH CV XLRA MEAS LEFT DIST CCA PSV: -97.2 CM/SEC
BH CV XLRA MEAS LEFT DIST ICA EDV: -25.1 CM/SEC
BH CV XLRA MEAS LEFT DIST ICA PSV: -81.5 CM/SEC
BH CV XLRA MEAS LEFT ICA/CCA RATIO: -1.23
BH CV XLRA MEAS LEFT PROX CCA EDV: 21.2 CM/SEC
BH CV XLRA MEAS LEFT PROX CCA PSV: 97.2 CM/SEC
BH CV XLRA MEAS LEFT PROX ECA PSV: -148 CM/SEC
BH CV XLRA MEAS LEFT PROX ICA EDV: -28.2 CM/SEC
BH CV XLRA MEAS LEFT PROX ICA PSV: -120 CM/SEC
BH CV XLRA MEAS LEFT PROX SCLA PSV: 166 CM/SEC
BH CV XLRA MEAS LEFT VERTEBRAL A EDV: 22.7 CM/SEC
BH CV XLRA MEAS LEFT VERTEBRAL A PSV: 86.2 CM/SEC
BH CV XLRA MEAS RIGHT DIST CCA EDV: 17.4 CM/SEC
BH CV XLRA MEAS RIGHT DIST CCA PSV: 86.4 CM/SEC
BH CV XLRA MEAS RIGHT DIST ICA EDV: -37.3 CM/SEC
BH CV XLRA MEAS RIGHT DIST ICA PSV: -121 CM/SEC
BH CV XLRA MEAS RIGHT ICA/CCA RATIO: -1.95
BH CV XLRA MEAS RIGHT PROX CCA EDV: 13.7 CM/SEC
BH CV XLRA MEAS RIGHT PROX CCA PSV: 107 CM/SEC
BH CV XLRA MEAS RIGHT PROX ECA PSV: -114 CM/SEC
BH CV XLRA MEAS RIGHT PROX ICA EDV: -52.7 CM/SEC
BH CV XLRA MEAS RIGHT PROX ICA PSV: -209 CM/SEC
BH CV XLRA MEAS RIGHT PROX SCLA PSV: 152 CM/SEC
BH CV XLRA MEAS RIGHT VERTEBRAL A EDV: -16.1 CM/SEC
BH CV XLRA MEAS RIGHT VERTEBRAL A PSV: -75.7 CM/SEC
BUN SERPL-MCNC: 22 MG/DL (ref 6–20)
BUN/CREAT SERPL: 15.8 (ref 7–25)
CALCIUM SPEC-SCNC: 9.5 MG/DL (ref 8.6–10.5)
CHLORIDE SERPL-SCNC: 102 MMOL/L (ref 98–107)
CO2 SERPL-SCNC: 23.8 MMOL/L (ref 22–29)
CREAT SERPL-MCNC: 1.39 MG/DL (ref 0.57–1)
EGFRCR SERPLBLD CKD-EPI 2021: 47.5 ML/MIN/1.73
GLUCOSE SERPL-MCNC: 264 MG/DL (ref 65–99)
POTASSIUM SERPL-SCNC: 4 MMOL/L (ref 3.5–5.2)
SODIUM SERPL-SCNC: 140 MMOL/L (ref 136–145)

## 2024-07-26 PROCEDURE — 93880 EXTRACRANIAL BILAT STUDY: CPT

## 2024-07-26 PROCEDURE — G0493 RN CARE EA 15 MIN HH/HOSPICE: HCPCS

## 2024-07-26 PROCEDURE — 36415 COLL VENOUS BLD VENIPUNCTURE: CPT

## 2024-07-26 PROCEDURE — 80048 BASIC METABOLIC PNL TOTAL CA: CPT

## 2024-07-29 ENCOUNTER — HOME CARE VISIT (OUTPATIENT)
Dept: HOME HEALTH SERVICES | Facility: HOME HEALTHCARE | Age: 44
End: 2024-07-29
Payer: COMMERCIAL

## 2024-07-29 VITALS
RESPIRATION RATE: 18 BRPM | OXYGEN SATURATION: 98 % | HEART RATE: 58 BPM | SYSTOLIC BLOOD PRESSURE: 140 MMHG | DIASTOLIC BLOOD PRESSURE: 80 MMHG

## 2024-07-29 PROCEDURE — G0493 RN CARE EA 15 MIN HH/HOSPICE: HCPCS

## 2024-07-29 NOTE — PROGRESS NOTES
"Chief Complaint  Carotid Artery Disease (Hospotal Follow up with CTD scan 7/18/24)    Follow-up for carotid stenosis    Subjective        Bibiana Inman presents to [facility] VASCULAR SURGERY  History of Present Illness    Patient is a pleasant 44-year-old lady with a history of coronary artery disease status post coronary bypass in 2014, hypertension, hyperlipidemia, sleep apnea, poorly controlled type 2 diabetes, tobacco abuse, l, gastroesophageal reflux who was admitted to the hospital in mid June with expressive aphasia and right arm weakness.  She was worked up for stroke and found left hemispheric stroke in the lacunar area is consistent with in situ process per radiologist read.  She was to have greater than 50% right carotid stenosis but less than 50% left carotid stenosis so she was not a candidate for carotid intervention.    She is here today for her first follow-up with us since discharge from the hospital.    Doing okay overall.  Still with intermittent confusion.  Denies any interval episodes of unilateral arm or leg weakness numbness slurred speech vision loss facial droop or any other problems, besides the confusion.    Objective   Vital Signs:  /76 (BP Location: Left arm, Patient Position: Sitting, Cuff Size: Small Adult)   Ht 162.6 cm (64\")   Wt 63 kg (139 lb)   BMI 23.86 kg/m²   Estimated body mass index is 23.86 kg/m² as calculated from the following:    Height as of this encounter: 162.6 cm (64\").    Weight as of this encounter: 63 kg (139 lb).         BMI is >= 25 and <30. (Overweight) The following options were offered after discussion;: information on healthy weight added to patient's after visit summary          Physical Exam   NAD  Incisions in the legs from vein harvest healing without evidence of infection.  Palpable radial pulses bilaterally  5 out of 5 strength x 4, cranial nerves II through XII intact, sensation grossly intact throughout.  Chronically " ill-appearing  Result Review :                            I have independently reviewed and interpreted the images from her carotid duplex and from the CTA of her neck.  She has less than 50% stenosis in bilateral carotid arteries based on the CTA.  50 to 70% based on carotid duplex.               Assessment and Plan     44-year-old lady with 50 to 70% right carotid stenosis, less than 50% left carotid stenosis.  Both lesions are asymptomatic.  Significant risk factors include very poorly controlled diabetes and tobacco abuse.  Since having been seen by us in the hospital she was readmitted and required an open aortic valve repair and coronary bypass x 1 Dr. De Luna  No indications for intervention for carotid arteries right now.  Is on aspirin and atorvastatin.  Can follow-up in 6 months with repeat bilateral carotid duplex  Symptoms of stroke were discussed with the patient she was urged to seek emergency medical care if she develops any of these    Diagnoses and all orders for this visit:    1. Carotid stenosis, asymptomatic, right (Primary)  Assessment & Plan:  Continue aspirin and atorvastatin.   Followup in 6months with carotid duplex.     Orders:  -     Duplex Carotid Ultrasound CAR; Future              Patient BMI noted. Educational material for patient for health risks of being overweight added to patient's after visit summary.           Follow Up     Return in about 6 months (around 1/30/2025).  Patient was given instructions and counseling regarding her condition or for health maintenance advice. Please see specific information pulled into the AVS if appropriate.

## 2024-07-30 ENCOUNTER — OFFICE VISIT (OUTPATIENT)
Age: 44
End: 2024-07-30
Payer: COMMERCIAL

## 2024-07-30 ENCOUNTER — HOME CARE VISIT (OUTPATIENT)
Dept: HOME HEALTH SERVICES | Facility: HOME HEALTHCARE | Age: 44
End: 2024-07-30
Payer: COMMERCIAL

## 2024-07-30 VITALS
HEIGHT: 64 IN | WEIGHT: 139 LBS | SYSTOLIC BLOOD PRESSURE: 157 MMHG | BODY MASS INDEX: 23.73 KG/M2 | DIASTOLIC BLOOD PRESSURE: 76 MMHG

## 2024-07-30 DIAGNOSIS — I65.21 CAROTID STENOSIS, ASYMPTOMATIC, RIGHT: Primary | ICD-10-CM

## 2024-07-30 PROCEDURE — 99204 OFFICE O/P NEW MOD 45 MIN: CPT | Performed by: STUDENT IN AN ORGANIZED HEALTH CARE EDUCATION/TRAINING PROGRAM

## 2024-07-31 ENCOUNTER — TELEPHONE (OUTPATIENT)
Age: 44
End: 2024-07-31
Payer: COMMERCIAL

## 2024-07-31 ENCOUNTER — HOME CARE VISIT (OUTPATIENT)
Dept: HOME HEALTH SERVICES | Facility: HOME HEALTHCARE | Age: 44
End: 2024-07-31
Payer: COMMERCIAL

## 2024-07-31 ENCOUNTER — READMISSION MANAGEMENT (OUTPATIENT)
Dept: CALL CENTER | Facility: HOSPITAL | Age: 44
End: 2024-07-31
Payer: COMMERCIAL

## 2024-07-31 NOTE — TELEPHONE ENCOUNTER
Diandra from patients insurance called asking about patients current status with us. They are needing to know if patient is cleared from a vascular standpoint to drive due to patient having intermittent confusion.

## 2024-07-31 NOTE — OUTREACH NOTE
CT Surgery Week 3 Survey      Flowsheet Row Responses   [facility] patient discharged from? [place]   Does the patient have one of the following disease processes/diagnoses(primary or secondary)? Cardiothoracic surgery   Week 3 attempt successful? No   Unsuccessful attempts Attempt 2            Gloria TURK - Registered Nurse

## 2024-08-01 ENCOUNTER — HOME CARE VISIT (OUTPATIENT)
Dept: HOME HEALTH SERVICES | Facility: HOME HEALTHCARE | Age: 44
End: 2024-08-01
Payer: COMMERCIAL

## 2024-08-01 VITALS
TEMPERATURE: 96.8 F | HEART RATE: 60 BPM | DIASTOLIC BLOOD PRESSURE: 70 MMHG | SYSTOLIC BLOOD PRESSURE: 132 MMHG | OXYGEN SATURATION: 99 %

## 2024-08-01 PROCEDURE — G0153 HHCP-SVS OF S/L PATH,EA 15MN: HCPCS

## 2024-08-06 ENCOUNTER — OFFICE VISIT (OUTPATIENT)
Dept: CARDIAC SURGERY | Facility: CLINIC | Age: 44
End: 2024-08-06
Payer: COMMERCIAL

## 2024-08-06 VITALS
OXYGEN SATURATION: 100 % | SYSTOLIC BLOOD PRESSURE: 138 MMHG | BODY MASS INDEX: 23.05 KG/M2 | WEIGHT: 135 LBS | RESPIRATION RATE: 18 BRPM | DIASTOLIC BLOOD PRESSURE: 79 MMHG | HEIGHT: 64 IN | HEART RATE: 62 BPM | TEMPERATURE: 97.5 F

## 2024-08-06 DIAGNOSIS — I25.110 CORONARY ARTERY DISEASE INVOLVING NATIVE CORONARY ARTERY OF NATIVE HEART WITH UNSTABLE ANGINA PECTORIS: Primary | ICD-10-CM

## 2024-08-06 PROCEDURE — 99024 POSTOP FOLLOW-UP VISIT: CPT | Performed by: PHYSICIAN ASSISTANT

## 2024-08-06 NOTE — LETTER
August 6, 2024       No Recipients    Patient: Bibiana Inman   YOB: 1980   Date of Visit: 8/6/2024     Dear Ibrahima Correa MD:       Thank you for referring Bibiana Inman to me for evaluation. Below are the relevant portions of my assessment and plan of care.    If you have questions, please do not hesitate to call me. I look forward to following Bibiana along with you.         Sincerely,        KAUSHAL Alvarado        CC:   No Recipients    Harvey Duarte PA-C  08/06/24 1410  Sign when Signing Visit  CARDIOVASCULAR SURGERY FOLLOW-UP PROGRESS NOTE  Chief Complaint: Post-op follow-up appointment     HPI:   Dear Dr. Su and Colleagues:    It was my pleasure to see your patient Bibiana Inman in the office today.  As you know, she is a 44 y.o. female with CAD s/p CABG, CVA, CKD, DM, HTN, and HLD who underwent redo sternotomy, CABG x 1 (SVG to OM), aortic valve fibroelastoma resection, aortic valve repair, and endoscopic vein harvest by Dr. De Luna on 7/1/2024. She did well postoperatively and was discharged shortly after surgery. She comes in today complaining of nothing.  Her activity level has been good.  She states she has been doing very well since discharge and wants to go back to work. She has follow-up with neurology and cardiology soon.       Medications:    Current Outpatient Medications:   •  albuterol sulfate  (90 Base) MCG/ACT inhaler, Inhale 2 puffs every 6 (six) hours if needed for wheezing., Disp: 18 g, Rfl: 0  •  aspirin 81 MG chewable tablet, Chew 1 tablet Daily. Indications: Disease involving Lipid Deposits in the Arteries, Disp: , Rfl:   •  atorvastatin (LIPITOR) 80 MG tablet, Take 1 tablet by mouth Daily. Indications: High Amount of Fats in the Blood, Disp: , Rfl:   •  carvedilol (COREG) 12.5 MG tablet, Take 1 tablet by mouth 2 (Two) Times a Day With Meals., Disp: 120 tablet, Rfl: 0  •  Continuous Glucose Sensor (Dexcom G7 Sensor) misc, Use 1 each Every 10 (Ten)  "Days. Dx: E11.65, Disp: 3 each, Rfl: 2  •  escitalopram (LEXAPRO) 10 MG tablet, Take 1 tablet by mouth Daily., Disp: , Rfl:   •  famotidine (PEPCID) 40 MG tablet, Take 1 tablet by mouth every night at bedtime., Disp: 90 tablet, Rfl: 3  •  hydrALAZINE (APRESOLINE) 25 MG tablet, Take 3 tablets by mouth Every 8 (Eight) Hours., Disp: 90 tablet, Rfl: 0  •  icosapent ethyl (Vascepa) 1 g capsule capsule, Take 2 g (2 capsules) by mouth 2 (Two) Times a Day With Meals. Indications: Cerebrovascular Accident or Stroke, High Amount of Triglycerides in the Blood, Procedure to Reestablish Blood Supply to the Heart, Disp: 120 capsule, Rfl: 11  •  insulin aspart (novoLOG FLEXPEN) 100 UNIT/ML solution pen-injector sc pen, Inject  under the skin into the appropriate area as directed 3 times a day. Sliding scale, between 5-20 units TID  Indications: Type 2 Diabetes, Disp: , Rfl:   •  insulin detemir (LEVEMIR) 100 UNIT/ML injection, Inject 30 Units under the skin into the appropriate area as directed Every Night., Disp: , Rfl:   •  sodium bicarbonate 650 MG tablet, Take 1 tablet by mouth Daily., Disp: 30 tablet, Rfl: 0        Physical Exam:         /79 (BP Location: Left arm, Patient Position: Sitting, Cuff Size: Adult)   Pulse 62   Temp 97.5 °F (36.4 °C)   Resp 18   Ht 162.6 cm (64\")   Wt 61.2 kg (135 lb)   LMP 01/10/2023 Comment: patient states irregular periods  SpO2 100%   BMI 23.17 kg/m²   Heart:  regular rate and rhythm, no murmurs  Lungs:  clear to auscultation bilaterally  Extremities:  no edema  Incision(s): sternal incision healing well; Sternum stable to palpation. Open vein harvest site with eschar, no erythema or drainage    Assessment/Plan:     S/P redo sternotomy, CABGx1, aortic valve fibroelastoma resection, aortic valve repair. Overall, she is doing well.    No significant post-op complications  Keep incisions clean and dry. Continue to wash with antibacterial soap and warm water.  OK to begin cardiac " rehab.  Follow-up as scheduled with cardiology and neurology.  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 Will need to be cleared by neurology for work and driving.      Thank you for allowing me to participate in the care of your patient. If you have any questions or concerns feel free to contact myself or Dr. De Luna.    Regards,  Harvey Duarte PA-C

## 2024-08-06 NOTE — PROGRESS NOTES
CARDIOVASCULAR SURGERY FOLLOW-UP PROGRESS NOTE  Chief Complaint: Post-op follow-up appointment     HPI:   Dear Dr. Su and Colleagues:    It was my pleasure to see your patient Bibiana Inman in the office today.  As you know, she is a 44 y.o. female with CAD s/p CABG, CVA, CKD, DM, HTN, and HLD who underwent redo sternotomy, CABG x 1 (SVG to OM), aortic valve fibroelastoma resection, aortic valve repair, and endoscopic vein harvest by Dr. De Luna on 7/1/2024. She did well postoperatively and was discharged shortly after surgery. She comes in today complaining of nothing.  Her activity level has been good.  She states she has been doing very well since discharge and wants to go back to work. She has follow-up with neurology and cardiology soon.       Medications:    Current Outpatient Medications:     albuterol sulfate  (90 Base) MCG/ACT inhaler, Inhale 2 puffs every 6 (six) hours if needed for wheezing., Disp: 18 g, Rfl: 0    aspirin 81 MG chewable tablet, Chew 1 tablet Daily. Indications: Disease involving Lipid Deposits in the Arteries, Disp: , Rfl:     atorvastatin (LIPITOR) 80 MG tablet, Take 1 tablet by mouth Daily. Indications: High Amount of Fats in the Blood, Disp: , Rfl:     carvedilol (COREG) 12.5 MG tablet, Take 1 tablet by mouth 2 (Two) Times a Day With Meals., Disp: 120 tablet, Rfl: 0    Continuous Glucose Sensor (Dexcom G7 Sensor) misc, Use 1 each Every 10 (Ten) Days. Dx: E11.65, Disp: 3 each, Rfl: 2    escitalopram (LEXAPRO) 10 MG tablet, Take 1 tablet by mouth Daily., Disp: , Rfl:     famotidine (PEPCID) 40 MG tablet, Take 1 tablet by mouth every night at bedtime., Disp: 90 tablet, Rfl: 3    hydrALAZINE (APRESOLINE) 25 MG tablet, Take 3 tablets by mouth Every 8 (Eight) Hours., Disp: 90 tablet, Rfl: 0    icosapent ethyl (Vascepa) 1 g capsule capsule, Take 2 g (2 capsules) by mouth 2 (Two) Times a Day With Meals. Indications: Cerebrovascular Accident or Stroke, High Amount of  "Triglycerides in the Blood, Procedure to Reestablish Blood Supply to the Heart, Disp: 120 capsule, Rfl: 11    insulin aspart (novoLOG FLEXPEN) 100 UNIT/ML solution pen-injector sc pen, Inject  under the skin into the appropriate area as directed 3 times a day. Sliding scale, between 5-20 units TID  Indications: Type 2 Diabetes, Disp: , Rfl:     insulin detemir (LEVEMIR) 100 UNIT/ML injection, Inject 30 Units under the skin into the appropriate area as directed Every Night., Disp: , Rfl:     sodium bicarbonate 650 MG tablet, Take 1 tablet by mouth Daily., Disp: 30 tablet, Rfl: 0        Physical Exam:         /79 (BP Location: Left arm, Patient Position: Sitting, Cuff Size: Adult)   Pulse 62   Temp 97.5 °F (36.4 °C)   Resp 18   Ht 162.6 cm (64\")   Wt 61.2 kg (135 lb)   LMP 01/10/2023 Comment: patient states irregular periods  SpO2 100%   BMI 23.17 kg/m²   Heart:  regular rate and rhythm, no murmurs  Lungs:  clear to auscultation bilaterally  Extremities:  no edema  Incision(s): sternal incision healing well; Sternum stable to palpation. Open vein harvest site with eschar, no erythema or drainage    Assessment/Plan:     S/P redo sternotomy, CABGx1, aortic valve fibroelastoma resection, aortic valve repair. Overall, she is doing well.    No significant post-op complications  Keep incisions clean and dry. Continue to wash with antibacterial soap and warm water.  OK to begin cardiac rehab.  Follow-up as scheduled with cardiology and neurology.  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 Will need to be cleared by neurology for work and driving.      Thank you for allowing me to participate in the care of your patient. If you have any questions or concerns feel free to contact myself or Dr. De Luna.    Regards,  Harvey Duarte PA-C    "

## 2024-08-07 ENCOUNTER — OFFICE VISIT (OUTPATIENT)
Dept: NEUROLOGY | Facility: CLINIC | Age: 44
End: 2024-08-07
Payer: COMMERCIAL

## 2024-08-07 ENCOUNTER — HOME CARE VISIT (OUTPATIENT)
Dept: HOME HEALTH SERVICES | Facility: HOME HEALTHCARE | Age: 44
End: 2024-08-07
Payer: COMMERCIAL

## 2024-08-07 ENCOUNTER — TELEPHONE (OUTPATIENT)
Dept: NEUROLOGY | Facility: CLINIC | Age: 44
End: 2024-08-07

## 2024-08-07 ENCOUNTER — LAB (OUTPATIENT)
Dept: LAB | Facility: HOSPITAL | Age: 44
End: 2024-08-07
Payer: COMMERCIAL

## 2024-08-07 VITALS
HEART RATE: 67 BPM | WEIGHT: 207.7 LBS | DIASTOLIC BLOOD PRESSURE: 60 MMHG | OXYGEN SATURATION: 99 % | HEIGHT: 64 IN | BODY MASS INDEX: 35.46 KG/M2 | SYSTOLIC BLOOD PRESSURE: 110 MMHG

## 2024-08-07 VITALS
DIASTOLIC BLOOD PRESSURE: 84 MMHG | SYSTOLIC BLOOD PRESSURE: 130 MMHG | OXYGEN SATURATION: 97 % | TEMPERATURE: 96.9 F | HEART RATE: 63 BPM

## 2024-08-07 DIAGNOSIS — G47.33 OSA (OBSTRUCTIVE SLEEP APNEA): ICD-10-CM

## 2024-08-07 DIAGNOSIS — N18.31 STAGE 3A CHRONIC KIDNEY DISEASE: ICD-10-CM

## 2024-08-07 DIAGNOSIS — Z72.0 TOBACCO ABUSE: ICD-10-CM

## 2024-08-07 DIAGNOSIS — Z86.73 HISTORY OF STROKE WITHIN LAST YEAR: Primary | ICD-10-CM

## 2024-08-07 LAB
ANION GAP SERPL CALCULATED.3IONS-SCNC: 13.4 MMOL/L (ref 5–15)
BUN SERPL-MCNC: 25 MG/DL (ref 6–20)
BUN/CREAT SERPL: 16.1 (ref 7–25)
CALCIUM SPEC-SCNC: 8.9 MG/DL (ref 8.6–10.5)
CHLORIDE SERPL-SCNC: 99 MMOL/L (ref 98–107)
CO2 SERPL-SCNC: 25.6 MMOL/L (ref 22–29)
CREAT SERPL-MCNC: 1.55 MG/DL (ref 0.57–1)
EGFRCR SERPLBLD CKD-EPI 2021: 41.7 ML/MIN/1.73
GLUCOSE SERPL-MCNC: 384 MG/DL (ref 65–99)
POTASSIUM SERPL-SCNC: 3.4 MMOL/L (ref 3.5–5.2)
SODIUM SERPL-SCNC: 138 MMOL/L (ref 136–145)

## 2024-08-07 PROCEDURE — 80048 BASIC METABOLIC PNL TOTAL CA: CPT

## 2024-08-07 PROCEDURE — G0153 HHCP-SVS OF S/L PATH,EA 15MN: HCPCS

## 2024-08-07 PROCEDURE — 36415 COLL VENOUS BLD VENIPUNCTURE: CPT

## 2024-08-07 RX ORDER — NICOTINE 21 MG/24HR
1 PATCH, TRANSDERMAL 24 HOURS TRANSDERMAL EVERY 24 HOURS
Status: SHIPPED | OUTPATIENT
Start: 2024-08-07

## 2024-08-07 NOTE — LETTER
August 7, 2024       No Recipients    Patient: Bibiana Inman   YOB: 1980   Date of Visit: 8/7/2024     Dear Ibrahima Correa MD:       Thank you for referring Bibiana Inman to me for evaluation. Below are the relevant portions of my assessment and plan of care.    If you have questions, please do not hesitate to call me. I look forward to following Bibiana along with you.         Sincerely,        KAUSHAL Mccarthy        CC:   No Recipients    Rocio Denny APRN  08/07/24 1628  Sign when Signing Visit  CC: Stroke follow-up  Pt seen in follow up today, however the problem is new to the examiner.      HPI:  Bibiana Inman is a  44 y.o.  right-handed female, current smoker, with hypertension, hyperlipidemia, uncontrolled diabetes with associated gastroparesis, CKD, LB not on CPAP, and CAD status post CABG, valvular heart disease status post AVR who is being seen in follow-up today for stroke.  She is accompanied by her .    She originally presented to [facility] in June with transient episode of confusion.  SBP was 198.  EEG was unremarkable and initial concern was for hypertensive encephalopathy as well as UTI.  She was found to have a hemoglobin A1c of 16.9% at that time.  MRI brain completed on 6/17 showed tiny acute infarct in left parietal occipital region.  CTA head/neck read as showing approximately 70% stenosis of the right ICA at the origin with moderate to severe stenosis at the origin of the right M2 segment of the MCA.  She was already on DAPT with low-dose aspirin and Plavix due to history of CAD and P2 Y12 was 168.  She had a repeat MRI brain with and without contrast on 6/18 which showed stable small acute lacunar infarct in left parietal subcortical white matter.  There was also diffusion hyperintensity of the left internal capsule.  She was discharged 6/19 but unfortunately came to [facility], [place], operated by [facility] ED on 6/21 following acute onset of right-sided weakness.  BP was  initially 105/54 and symptoms improved after normal saline bolus.  Repeat CTA head/neck here showed 40 to 50% right ICA stenosis, no mention of MCA stenosis.  MRI brain 6/22 showed interval enlargement of the previously identified acute infarct in the left internal capsule as well as new punctate focus of acute infarct in the left insular cortex, left parietal white matter stroke again noted.  Carotid ultrasound showed less than 50% stenosis bilaterally.  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  She ultimately required aortic valve fibroelastoma resection, aortic valve repair, CABG x 1.  She did well postoperatively and was discharged home.  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    She went home with home health nursing, PT, OT, and ST.  She notes some short-term memory difficulty and word finding difficulty since her stroke but no focal weakness or vision change.  No new TIA or stroke symptoms since discharge from the hospital.  Plavix was discontinued in the hospital and she is now on aspirin 81 mg daily and Lipitor 80 mg daily.  She has established care with Dr. Peoples with vascular surgery and he is planning follow-up carotid ultrasound in 6 months.  She is continuing to smoke 10 or less cigarettes a day.  She is using insulin as instructed but notes that sugars continue to be high.  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    Of note she does have LB with a CPAP at home which she does not use.  She has not seen her sleep medicine provider in approximately 2 years and is agreeable to a referral.    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 Debbie Fry.   • Depressive disorder 2023   • Diabetic gastroparesis 2021   • Diabetic peripheral neuropathy associated with type 2 diabetes mellitus 2024   • Gastroesophageal reflux disease 03/10/2021   • GERD (gastroesophageal reflux disease)    • Intermittent claudication 2017   • Iron deficiency anemia 2020   • Mixed hypercholesterolemia and hypertriglyceridemia 2014   • Myocardial infarction    • Obesity    • Obstructive sleep apnea 2021   • Personal history of COVID-19 2022   • Polyp of colon 2023   • Spontaneous     • Stress fracture of right ankle with routine healing    • Tobacco abuse 2024   • Type 2 diabetes mellitus with hyperglycemia, with long-term current use of insulin 2017   • Vitamin D deficiency 2024         Past Surgical History:   Procedure Laterality Date   • CARDIAC CATHETERIZATION N/A 2024    Procedure: Left Heart Cath and coronary angiogram;  Surgeon: Jena Barron MD;  Location: [facility] CATH INVASIVE LOCATION;  Service: Cardiology;  Laterality: N/A;   •  SECTION     • CORONARY ARTERY BYPASS GRAFT  2014    LIMA-LAD, SVG-OM2, SVG-PDA, Dr. Debbie Fry.   • CORONARY ARTERY BYPASS GRAFT WITH AORTIC VALVE REPAIR/REPLACEMENT N/A 2024    Procedure: YUE; REOPERATIVE STERNOTOMY; CORONARY ARTERY BYPASS GRAFTING TIMES 1 UTILIZING RIGHT SAPHENOUS VEIN; AORTIC VALVE TUMOR ; PRP;  Surgeon: Benja De Luna MD;  Location: [facility] CVOR;  Service: Cardiothoracic;  Laterality: N/A;   • DILATATION AND CURETTAGE     • TONSILLECTOMY             Current Outpatient Medications:   •  albuterol sulfate  (90 Base) MCG/ACT inhaler, Inhale 2 puffs every 6 (six) hours if needed for wheezing., Disp: 18 g, Rfl: 0  •  aspirin 81 MG chewable tablet, Chew 1 tablet Daily. Indications: Disease involving Lipid Deposits in the Arteries, Disp: , Rfl:   •   atorvastatin (LIPITOR) 80 MG tablet, Take 1 tablet by mouth Daily. Indications: High Amount of Fats in the Blood, Disp: , Rfl:   •  carvedilol (COREG) 12.5 MG tablet, Take 1 tablet by mouth 2 (Two) Times a Day With Meals., Disp: 120 tablet, Rfl: 0  •  Continuous Glucose Sensor (Dexcom G7 Sensor) misc, Use 1 each Every 10 (Ten) Days. Dx: E11.65, Disp: 3 each, Rfl: 2  •  hydrALAZINE (APRESOLINE) 25 MG tablet, Take 3 tablets by mouth Every 8 (Eight) Hours., Disp: 90 tablet, Rfl: 0  •  icosapent ethyl (Vascepa) 1 g capsule capsule, Take 2 g (2 capsules) by mouth 2 (Two) Times a Day With Meals. Indications: Cerebrovascular Accident or Stroke, High Amount of Triglycerides in the Blood, Procedure to Reestablish Blood Supply to the Heart, Disp: 120 capsule, Rfl: 11  •  insulin aspart (novoLOG FLEXPEN) 100 UNIT/ML solution pen-injector sc pen, Inject  under the skin into the appropriate area as directed 3 times a day. Sliding scale, between 5-20 units TID  Indications: Type 2 Diabetes, Disp: , Rfl:   •  insulin detemir (LEVEMIR) 100 UNIT/ML injection, Inject 30 Units under the skin into the appropriate area as directed Every Night., Disp: , Rfl:   •  sodium bicarbonate 650 MG tablet, Take 1 tablet by mouth Daily., Disp: 30 tablet, Rfl: 0  •  escitalopram (LEXAPRO) 10 MG tablet, Take 1 tablet by mouth Daily. (Patient not taking: Reported on 8/7/2024), Disp: , Rfl:     Current Facility-Administered Medications:   •  nicotine (NICODERM CQ) 14 MG/24HR patch 1 patch, 1 patch, Transdermal, Q24H, Rocio Denny APRN  •  nicotine polacrilex (NICORETTE) gum 2 mg, 2 mg, Mouth/Throat, Q1H PRN, Rocio Denny APRN      Family History   Family history unknown: Yes         Social History     Socioeconomic History   • Marital status:    Tobacco Use   • Smoking status: Some Days     Current packs/day: 0.25     Average packs/day: 0.3 packs/day for 15.0 years (3.8 ttl pk-yrs)     Types: Cigarettes   • Smokeless tobacco:  "Never   Vaping Use   • Vaping status: Never Used   Substance and Sexual Activity   • Alcohol use: Defer   • Drug use: Never   • Sexual activity: Defer         Allergies   Allergen Reactions   • Latex Itching         Pain Scale:        ROS:  Review of Systems   Musculoskeletal:  Positive for gait problem (balance problems, no falls).   Psychiatric/Behavioral:  Positive for confusion and decreased concentration.      ROS completed by the MA was reviewed by me and I agree.  Physical Exam:  Vitals:    08/07/24 1527   BP: 110/60   Pulse: 67   SpO2: 99%   Weight: 94.2 kg (207 lb 11.2 oz)   Height: 162.6 cm (64\")     Orthostatic BP:    Body mass index is 35.65 kg/m².    General appearance: Well developed, well nourished,alert and cooperative.   HEENT: Normocephalic.   Cardiac: Regular rate and rhythm. No murmurs.   Chest Exam: Clear to auscultation bilaterally, no wheezes, no rhonchi.  Extremities: Normal, no edema.   Skin: Multiple healing abrasions on lower extremities attributed to cat scratches.  Left lower extremity incision from endoscopic vein harvest healing.    Higher integrative function: Oriented to time, place, person, intact recent and remote memory, attention span, concentration and language. Spontaneous speech, fund of vocabulary are normal.   CN II: Normal visual fields.   CN III IV VI: Extraocular movements are full without nystagmus. Pupils are equal, round, and reactive to light.   CN V: Normal facial sensation.  CN VII: Mild facial asymmetry with right nasolabial fold flattening.  CN VIII: Auditory acuity is normal.   CN IX & X: Symmetric palatal movement.   CN XI: Sternocleidomastoid and trapezius are normal. No weakness.   CN XII: The tongue is midline. No atrophy or fasciculations.   Motor: Normal muscle strength, bulk, and tone in upper and lower extremities. No fasciculations, rigidity, spasticity or abnormal movements.   Sensation: Intact/symmetric to light touch in arms and legs.  Station and " "gait: Normal gait and station.   Coordination: Finger to nose test showed no dysmetria. Rapid alternating movements were normal. Heel to shin normal.       Results:      Lab Results   Component Value Date    GLUCOSE 264 (H) 07/26/2024    BUN 22 (H) 07/26/2024    CREATININE 1.39 (H) 07/26/2024    EGFRIFNONA 26 (L) 01/29/2021    BCR 15.8 07/26/2024    CO2 23.8 07/26/2024    CALCIUM 9.5 07/26/2024    ALBUMIN 3.5 07/03/2024    AST 23 07/03/2024    ALT <5 07/03/2024       Lab Results   Component Value Date    WBC 7.49 07/07/2024    HGB 8.7 (L) 07/07/2024    HCT 26.8 (L) 07/07/2024    MCV 91.8 07/07/2024     07/07/2024         .No results found for: \"RPR\"      Lab Results   Component Value Date    TSH 1.470 06/18/2024         Lab Results   Component Value Date    AREREWKM96 1,216 (H) 06/22/2024         Lab Results   Component Value Date    FOLATE 14.70 06/17/2024         Lab Results   Component Value Date    HGBA1C 13.10 (H) 06/26/2024         Lab Results   Component Value Date    GLUCOSE 264 (H) 07/26/2024    BUN 22 (H) 07/26/2024    CREATININE 1.39 (H) 07/26/2024    EGFRIFNONA 26 (L) 01/29/2021    BCR 15.8 07/26/2024    K 4.0 07/26/2024    CO2 23.8 07/26/2024    CALCIUM 9.5 07/26/2024    ALBUMIN 3.5 07/03/2024    AST 23 07/03/2024    ALT <5 07/03/2024         Lab Results   Component Value Date    WBC 7.49 07/07/2024    HGB 8.7 (L) 07/07/2024    HCT 26.8 (L) 07/07/2024    MCV 91.8 07/07/2024     07/07/2024             Assessment/Plan:       Diagnoses and all orders for this visit:    1. History of stroke within last year (Primary)    2. Tobacco abuse  -     nicotine (NICODERM CQ) 14 MG/24HR patch 1 patch  -     Tobacco Cessation Education; Standing  -     nicotine polacrilex (NICORETTE) gum 2 mg  -     Tobacco Cessation Education    3. LB (obstructive sleep apnea)  -     Ambulatory Referral to Sleep Medicine    She was seen today for stroke secondary to fibroelastoma and uncontrolled vascular risk factors " with previous hemoglobin A1c of 16.9%.  She continues to smoke 10 or less cigarettes a day and has untreated LB.    For stroke prevention:   Continue aspirin 81 mg daily   Blood pressure control to <130/80   Goal LDL <70-recommend high dose statins-continue Lipitor 80 m    Serum glucose < 140   Call 911 for stroke any stroke symptoms  Recommend smoking cessation, patches ordered   Recommend she start using CPAP again, referral placed to sleep medicine    OK to return to work from neurology standpoint     Follow-up in 6 months or sooner if needed      Total time: Greater than 40 minutes            Dictated utilizing Dragon dictation.

## 2024-08-07 NOTE — TELEPHONE ENCOUNTER
Caller: HOLLAND     Venkat call back number: [phone]     What was the call regarding: PT WAS TOLD YOU WERE CALLING IN RX FOR PATCH AND GUM FOR SMOKING . THEY ARE AT PHARMACY ([facility] ) NOTHING WAS CALLED IN / CALLED OFFICE AND PROVIDER IS ALREADY GONE INFORMED PT .     Is it okay if the provider responds through MyChart: CALL TO INFORM WHEN RX IS CALLED IN

## 2024-08-08 ENCOUNTER — HOME CARE VISIT (OUTPATIENT)
Dept: HOME HEALTH SERVICES | Facility: HOME HEALTHCARE | Age: 44
End: 2024-08-08
Payer: COMMERCIAL

## 2024-08-08 ENCOUNTER — TELEPHONE (OUTPATIENT)
Dept: NEUROLOGY | Facility: CLINIC | Age: 44
End: 2024-08-08
Payer: COMMERCIAL

## 2024-08-08 VITALS
TEMPERATURE: 96.8 F | OXYGEN SATURATION: 98 % | HEART RATE: 62 BPM | DIASTOLIC BLOOD PRESSURE: 76 MMHG | SYSTOLIC BLOOD PRESSURE: 140 MMHG

## 2024-08-08 DIAGNOSIS — Z72.0 TOBACCO ABUSE: ICD-10-CM

## 2024-08-08 PROCEDURE — G0153 HHCP-SVS OF S/L PATH,EA 15MN: HCPCS

## 2024-08-08 RX ORDER — NICOTINE 21 MG/24HR
1 PATCH, TRANSDERMAL 24 HOURS TRANSDERMAL EVERY 24 HOURS
Qty: 28 EACH | Refills: 0 | Status: SHIPPED | OUTPATIENT
Start: 2024-08-08

## 2024-08-09 ENCOUNTER — HOME CARE VISIT (OUTPATIENT)
Dept: HOME HEALTH SERVICES | Facility: HOME HEALTHCARE | Age: 44
End: 2024-08-09
Payer: COMMERCIAL

## 2024-08-13 ENCOUNTER — HOME CARE VISIT (OUTPATIENT)
Dept: HOME HEALTH SERVICES | Facility: HOME HEALTHCARE | Age: 44
End: 2024-08-13
Payer: COMMERCIAL

## 2024-08-13 VITALS
SYSTOLIC BLOOD PRESSURE: 160 MMHG | OXYGEN SATURATION: 97 % | HEART RATE: 66 BPM | DIASTOLIC BLOOD PRESSURE: 84 MMHG | TEMPERATURE: 97.5 F

## 2024-08-13 PROCEDURE — G0153 HHCP-SVS OF S/L PATH,EA 15MN: HCPCS

## 2024-08-13 NOTE — Clinical Note
8/13/24  Patient discharged from  and the agency. Patient reports that she plans to return to work on 8/19/24.   Laurie Cole MA.CCC/SLP

## 2024-08-14 NOTE — HOME HEALTH
Discharge Summary/Summary of Care Provided:   Patient received home health for diagnosis: Patient was seen by SN, PT, ST and OT post hospitalization for CVA and CABG with AV valve repair on 7/1/24.  Current level of functional ability: SN FOC CABG. SN to teach and instruct CABG and medication regime. Goal met  PT- Patient tolerates shower transfers well. She is able to amb unlimited distances without device on level and unlevel surfaces.  OT- No skilled OT services indicated. Pt is Sup-I with all ADLs. Spouse to assist. AE recommended. Evaluation only.  ST- Speech Therapy focused care on increasing cognitive communication skills (memory, higher level cognitive skills). Patient performed higher level tasks at 80-90 percent accuracy. She continues to demonstrate short term memory deficits and  reported decreased decision making.   Living arrangements: Patient lives in her single family home with her  and son.  Progress towards goals and/or Were all goals met? All goals met  If not all goals met, barriers that prevented patient from meeting goals: None  SDOH concerns (i.e. Caregiver availability, social isolation, environment, income, transportation access, food insecurity etc.) Income concerns. Patient will be returning to work 8/19/24.  expressed concerns that patient is not ready to return to work. He expressed concerns that she is not ready to drive to work and demonstrates some decision making concerns.   Follow-up appointment plans and community resources provided: None  Other imporant information. None

## 2024-08-15 ENCOUNTER — DOCUMENTATION (OUTPATIENT)
Dept: NEUROLOGY | Facility: CLINIC | Age: 44
End: 2024-08-15
Payer: COMMERCIAL

## 2024-08-18 NOTE — PROGRESS NOTES
Encounter Date:08/30/2024        Patient ID: Bibiana Inman is a 44 y.o. female.      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  In July 2024 she underwent fibroelastoma resection and redo CABG with vein graft to left circumflex artery.  She has a patent LIMA to LAD and vein graft to RCA from previous CABG.    Current cardiac medications include aspirin, statin, Coreg, hydralazine, Vascepa.    The following portions of the patient's history were reviewed and updated as appropriate: allergies, current medications, past family history, past medical history, past social history, past surgical history, and problem list.    Review of Systems   Constitutional: Positive for malaise/fatigue.   Cardiovascular:  Negative for chest pain, dyspnea on exertion, leg swelling and palpitations.   Respiratory:  Negative for cough and shortness of breath.    Gastrointestinal:  Negative for abdominal pain, nausea and vomiting.   Neurological:  Negative for dizziness, focal weakness, headaches, light-headedness and numbness.   All other systems reviewed and are negative.        Current Outpatient Medications:     albuterol sulfate  (90 Base) MCG/ACT inhaler, Inhale 2 puffs every 6 (six) hours if needed for wheezing., Disp: 18 g, Rfl: 0    aspirin 81 MG chewable tablet, Chew 1 tablet Daily. Indications: Disease involving Lipid Deposits in the Arteries, Disp: , Rfl:     atorvastatin (LIPITOR) 80 MG tablet, Take 1 tablet by mouth Daily. Indications: High Amount of Fats in the Blood, Disp: , Rfl:     carvedilol (COREG) 12.5 MG tablet, Take 1 tablet by mouth 2 (Two) Times a Day With Meals., Disp: 120 tablet, Rfl: 0    Continuous Glucose Sensor (Dexcom G7 Sensor) misc, Use 1 each Every 10 (Ten)  Days. Dx: E11.65, Disp: 3 each, Rfl: 2    hydrALAZINE (APRESOLINE) 25 MG tablet, Take 3 tablets by mouth Every 8 (Eight) Hours., Disp: 90 tablet, Rfl: 0    icosapent ethyl (Vascepa) 1 g capsule capsule, Take 2 g (2 capsules) by mouth 2 (Two) Times a Day With Meals. Indications: Cerebrovascular Accident or Stroke, High Amount of Triglycerides in the Blood, Procedure to Reestablish Blood Supply to the Heart, Disp: 120 capsule, Rfl: 11    insulin aspart (novoLOG FLEXPEN) 100 UNIT/ML solution pen-injector sc pen, Inject  under the skin into the appropriate area as directed 3 times a day. Sliding scale, between 5-20 units TID  Indications: Type 2 Diabetes, Disp: , Rfl:     insulin detemir (LEVEMIR) 100 UNIT/ML injection, Inject 30 Units under the skin into the appropriate area as directed Every Night., Disp: , Rfl:     nicotine (NICODERM CQ) 14 MG/24HR patch, Place 1 patch on the skin as directed by provider Daily., Disp: 28 each, Rfl: 0    nicotine polacrilex (NICORETTE) 2 MG gum, Chew 1 each As Needed for Smoking Cessation., Disp: 100 each, Rfl: 0    sodium bicarbonate 650 MG tablet, Take 1 tablet by mouth Daily., Disp: 30 tablet, Rfl: 0    escitalopram (LEXAPRO) 10 MG tablet, Take 1 tablet by mouth Daily. (Patient not taking: Reported on 8/7/2024), Disp: , Rfl:     hydrALAZINE (APRESOLINE) 25 MG tablet, take 1 tablet by oral route 2 times every day with food (Patient not taking: Reported on 8/30/2024), Disp: 180 tablet, Rfl: 3    hydrALAZINE (APRESOLINE) 50 MG tablet, take 1 tablet by oral route 2 times every day with food (Patient not taking: Reported on 8/30/2024), Disp: 180 tablet, Rfl: 3    Current Facility-Administered Medications:     nicotine (NICODERM CQ) 14 MG/24HR patch 1 patch, 1 patch, Transdermal, Q24H, Rocio Denny APRN    nicotine polacrilex (NICORETTE) gum 2 mg, 2 mg, Mouth/Throat, Q1H PRN, Rocio Denny APRN    Current outpatient and discharge medications have been reconciled for the  patient.  Reviewed by: Cristian Su MD       Allergies   Allergen Reactions    Latex Itching       Family History   Family history unknown: Yes       Past Surgical History:   Procedure Laterality Date    CARDIAC CATHETERIZATION N/A 2024    Procedure: Left Heart Cath and coronary angiogram;  Surgeon: Jena Barron MD;  Location: [facility] CATH INVASIVE LOCATION;  Service: Cardiology;  Laterality: N/A;     SECTION      CORONARY ARTERY BYPASS GRAFT  2014    LIMA-LAD, SVG-OM2, SVG-PDA, Dr. Debbie Fry.    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 Debbie Fry.    Depressive disorder 2023    Diabetic gastroparesis 2021    Diabetic peripheral neuropathy associated with type 2 diabetes mellitus 2024    Gastroesophageal reflux disease 03/10/2021    GERD (gastroesophageal reflux disease)     Intermittent claudication 2017    Iron deficiency anemia 2020    Mixed hypercholesterolemia and hypertriglyceridemia 2014    Myocardial infarction     Obesity     Obstructive sleep apnea 2021    Personal history of COVID-19 2022    Polyp of colon 2023    Spontaneous      Stress fracture of right ankle with routine healing     Tobacco abuse 2024    Type 2 diabetes mellitus with hyperglycemia, with long-term current use of  "insulin 05/16/2017    Vitamin D deficiency 02/02/2024       Family History   Family history unknown: Yes       Social History     Socioeconomic History    Marital status:    Tobacco Use    Smoking status: Some Days     Current packs/day: 0.25     Average packs/day: 0.3 packs/day for 15.0 years (3.8 ttl pk-yrs)     Types: Cigarettes    Smokeless tobacco: Never   Vaping Use    Vaping status: Never Used   Substance and Sexual Activity    Alcohol use: Defer    Drug use: Never    Sexual activity: Defer               Objective:       Physical Exam    /65   Pulse 66   Ht 162.3 cm (63.9\")   Wt 59.4 kg (131 lb)   LMP 01/10/2023 Comment: patient states irregular periods  SpO2 98%   BMI 22.56 kg/m²   The patient is alert, oriented and in no distress.    Vital signs as noted above.    Head and neck revealed no carotid bruits or jugular venous distension.  No thyromegaly or lymphadenopathy is present.    Lungs clear.  No wheezing.  Breath sounds are normal bilaterally.    Heart normal first and second heart sounds.  No murmur..  No pericardial rub is present.  No gallop is present.    Abdomen soft and nontender.  No organomegaly is present.    Extremities revealed good peripheral pulses without any pedal edema.    Skin warm and dry.    Musculoskeletal system is grossly normal.    CNS grossly normal.           Diagnosis Plan   1. Coronary artery disease of native artery of native heart with stable angina pectoris        2. S/P CABG (coronary artery bypass graft)        3. Type 2 diabetes mellitus with hyperglycemia, with long-term current use of insulin        4. Mixed hypercholesterolemia and hypertriglyceridemia        5. Benign essential hypertension        6. Tobacco abuse        LAB RESULTS (LAST 7 DAYS)    CBC        BMP        CMP         BNP        TROPONIN        CoAg        Creatinine Clearance  Estimated Creatinine Clearance: 42.5 mL/min (A) (by C-G formula based on SCr of 1.55 mg/dL (H)).    ABG    "     Radiology  No radiology results for the last day    EKG  Procedures    Stress test  Results for orders placed during the hospital encounter of 02/19/24    Stress Test With Myocardial Perfusion One Day    Interpretation Summary    Findings consistent with a normal ECG stress test.    (Calculated EF = 63%).    Myocardial perfusion imaging indicates a moderate-sized, severe area of ischemia located in the inferior wall and lateral wall.    Impressions are consistent with an intermediate risk study.      Echocardiogram  Results for orders placed during the hospital encounter of 06/21/24    Adult Transesophageal Echo (YUE) W/ Cont if Necessary Per Protocol    Interpretation Summary    Left ventricular systolic function is normal.  Visually estimated LVEF 60-65%.    Left ventricular wall thickness is consistent with mild to moderate concentric hypertrophy.    There are up to 3 small pedunculated masses attached to the aortic valve leaflets (2 with the noncoronary cusp and a smaller one with left coronary cusp) protruding into the aortic root with the largest one measuring 0.8 x 0.5cm.  The morphology appears consistent with fibroelastomas.    Estimated right ventricular systolic pressure from tricuspid regurgitation is normal (<35 mmHg).    No evidence of intracardiac thrombus including with close evaluation of both atria, BRADEN, and RAA.    Saline test results are negative.    Mild aortic arch plaques.      Cardiac catheterization  Results for orders placed during the hospital encounter of 02/19/24    Cardiac Catheterization/Vascular Study    Conclusion  OPERATORS  Jena Barron M.D. (Attending Cardiologist)      PROCEDURES PERFORMED  Ultrasound guided Vascular access  Left Heart Catheterization  Coronary Angiogram  Bypass angiography  Moderate sedation    INDICATIONS FOR PROCEDURE  Positive stress test    PROCEDURE IN DETAIL  Informed consent was obtained from the patient after explaining the risks, benefits, and  alternative options of the procedure. After obtaining informed consent, the patient was brought to the cath lab and was prepped in a sterile fashion. Lidocaine 2% was used for local anesthesia into the right femoral access site. The right femoral artery was accessed with a micropuncture needle via modified Seldinger technique under ultrasound guidance. A 6F sheath was inserted successfully. Afterwards, 6F JR4 and JL4 diagnostic catheters were advanced over a wire into the ascending aorta and were used to engage the ostia of the left main and RCA respectively. JR4 used to cross the AV and obtain LV pressures and gradient across the AV measured via pullback technique. Images of the right and left coronary systems were obtained. Images of the vein grafts and selective LIMA angiography was performed. All the catheters were exchanged over a wire and subsequently removed. Angiogram of the femoral access site was obtained and did not show complications. The patient tolerated the procedure well without any complications. The pictures were reviewed at the end of the procedure. An angioseal closure device was applied.    HEMODYNAMICS    LV: 157/5/11  AO: 157/64/99  Gradient: None    FINDINGS  Coronary Angiogram    Right dominant circulation    Left main: Left main is a large caliber vessel which gives rise to the Left Anterior Descending and the Left circumflex. No angiographically significant stenosis    Left Anterior Descending Artery: The LAD is  at the ostium    Left Circumflex: Lcx is a small caliber vessel which courses in the AV groove and gives rise to OM branches.  There is a high OM1 which further bifurcates.  There is a long 60 to 70% lesion in the proximal segment of this OM.  The circumflex is diffusely diseased with 60 to 70% stenosis in the proximal and mid segment all the way into OM 3.  There is an 80% lesion in the midsegment.    Right Coronary Artery: The RCA is  at the ostium    Bypass  angiography  SVG to PDA is patent at the origin, body, and insertion  SVG to diagonal is occluded at the origin  LIMA to LAD is widely patent at the origin, body, and insertion.  The LAD distal to the touchdown of the LIMA has 70 to 80% stenosis and is very small caliber.  LIMA does retrogradely fill a diagonal branch also.      Femoral angiography  The right SFA has a stent in its body with severe 80 to 90% ISR.    ESTIMATED BLOOD LOSS:  10 ml    COMPLICATIONS:  None    PROCEDURE DATA:  Contrast Used: 100 cc  Sedation Time: 30 minutes    IMPRESSIONS  Multivessel obstructive CAD of the native coronary arteries  Occluded vein graft to what appears to be a diagonal  There is diffuse CAD involving the left circumflex and OM which is not bypassed and there is also obstructive CAD distal to the LIMA touchdown  This is stable coronary artery disease  Low left heart filling pressures    RECOMMENDATIONS  Recommend medical management for her diffuse CAD in the setting of severely uncontrolled diabetes and smoking  Uptitration of OMT for stable CAD  Patient needs to be aggressively treated for her uncontrolled diabetes  Counseled extensively on smoking cessation  Start Plavix  Blood pressure control  Aggressive cholesterol control with statin with a goal LDL of less than 70          Assessment and Plan       Diagnoses and all orders for this visit:    1. Coronary artery disease of native artery of native heart with stable angina pectoris (Primary)    2. S/P CABG (coronary artery bypass graft)    3. Type 2 diabetes mellitus with hyperglycemia, with long-term current use of insulin    4. Mixed hypercholesterolemia and hypertriglyceridemia    5. Benign essential hypertension    6. Tobacco abuse         Stroke  Aortic valve fibroblastoma  CT head with no acute intracranial abnormality.  Atherosclerosis in distal carotid and vertebral artery noted.  MRI showed 1.1 cm linear acute infarct within the left parieto-occipital  region.  CTA showed extensive atherosclerotic disease with multifocal areas of stenoses as characterized above, most significantly including moderate, approximately 70%, stenosis at the origin of the right cervical internal carotid artery and moderate to severe stenosis at   the origin of the superior segment of the right M2 segment MCA.   Status post fibroelastoma resection in July 2024  Continue antiplatelet therapy and high intensity statin  Referral to cardiac rehab today: She has returned to work with minimal limitations     Coronary artery disease with elevated troponin  Known coronary disease with history of CABG  Echocardiogram shows preserved LV function.  Normal diastolic function and no significant valvular abnormalities.  Cardiac catheterization showed RCA , 80 to 90% left circumflex lesion, 70 to 80% ramus lesion, LAD .  Patent LIMA to LAD with 80% lesion in the distal/apical LAD, patent vein graft to RCA.  Status post vein graft to left circumflex in July 2024 during fibroelastoma resection  Continue dual antiplatelet therapy, high intensity statin.  Recommended to focus on risk factors modification.     Hypertension  Currently on Coreg and hydralazine  Blood pressure is well-controlled     Diabetes  Uncontrolled diabetes with A1c of more than 16.9  Repeat A1c pending  Follow-up with endocrine     Chronic kidney disease  Creatinine is 1.12, GFR is 61.5  Appears to be at baseline renal function  Followed by nephrology     Hyperlipidemia  She has uncontrolled hypertriglyceridemia with triglycerides of >4000.  Repeat triglycerides 1117.  Continue high intensity statin and Vascepa.  Repeat lipid panel pending

## 2024-08-30 ENCOUNTER — OFFICE VISIT (OUTPATIENT)
Dept: CARDIOLOGY | Facility: CLINIC | Age: 44
End: 2024-08-30
Payer: COMMERCIAL

## 2024-08-30 VITALS
BODY MASS INDEX: 22.36 KG/M2 | OXYGEN SATURATION: 98 % | HEART RATE: 66 BPM | SYSTOLIC BLOOD PRESSURE: 132 MMHG | HEIGHT: 64 IN | DIASTOLIC BLOOD PRESSURE: 65 MMHG | WEIGHT: 131 LBS

## 2024-08-30 DIAGNOSIS — Z79.4 TYPE 2 DIABETES MELLITUS WITH HYPERGLYCEMIA, WITH LONG-TERM CURRENT USE OF INSULIN: Chronic | ICD-10-CM

## 2024-08-30 DIAGNOSIS — I10 BENIGN ESSENTIAL HYPERTENSION: Chronic | ICD-10-CM

## 2024-08-30 DIAGNOSIS — I25.118 CORONARY ARTERY DISEASE OF NATIVE ARTERY OF NATIVE HEART WITH STABLE ANGINA PECTORIS: Primary | ICD-10-CM

## 2024-08-30 DIAGNOSIS — E78.2 MIXED HYPERCHOLESTEROLEMIA AND HYPERTRIGLYCERIDEMIA: Chronic | ICD-10-CM

## 2024-08-30 DIAGNOSIS — E11.65 TYPE 2 DIABETES MELLITUS WITH HYPERGLYCEMIA, WITH LONG-TERM CURRENT USE OF INSULIN: Chronic | ICD-10-CM

## 2024-08-30 DIAGNOSIS — Z95.1 S/P CABG (CORONARY ARTERY BYPASS GRAFT): ICD-10-CM

## 2024-08-30 DIAGNOSIS — Z72.0 TOBACCO ABUSE: Chronic | ICD-10-CM

## 2024-09-04 ENCOUNTER — TELEPHONE (OUTPATIENT)
Dept: CARDIAC REHAB | Facility: HOSPITAL | Age: 44
End: 2024-09-04
Payer: COMMERCIAL

## 2024-09-23 RX ORDER — CARVEDILOL 12.5 MG/1
12.5 TABLET ORAL 2 TIMES DAILY WITH MEALS
Qty: 120 TABLET | Refills: 0 | OUTPATIENT
Start: 2024-09-23

## 2024-09-23 RX ORDER — SODIUM BICARBONATE 650 MG/1
650 TABLET ORAL DAILY
Qty: 30 TABLET | Refills: 0 | OUTPATIENT
Start: 2024-09-23

## 2024-11-25 ENCOUNTER — OFFICE (OUTPATIENT)
Age: 44
End: 2024-11-25
Payer: COMMERCIAL

## 2024-11-25 ENCOUNTER — OFFICE (OUTPATIENT)
Dept: URBAN - METROPOLITAN AREA CLINIC 64 | Facility: CLINIC | Age: 44
End: 2024-11-25
Payer: COMMERCIAL

## 2024-11-25 VITALS
SYSTOLIC BLOOD PRESSURE: 171 MMHG | SYSTOLIC BLOOD PRESSURE: 170 MMHG | HEART RATE: 91 BPM | HEART RATE: 91 BPM | WEIGHT: 142 LBS | DIASTOLIC BLOOD PRESSURE: 107 MMHG | DIASTOLIC BLOOD PRESSURE: 107 MMHG | DIASTOLIC BLOOD PRESSURE: 107 MMHG | HEART RATE: 91 BPM | DIASTOLIC BLOOD PRESSURE: 107 MMHG | DIASTOLIC BLOOD PRESSURE: 107 MMHG | HEIGHT: 64 IN | HEIGHT: 64 IN | HEIGHT: 64 IN | WEIGHT: 142 LBS | SYSTOLIC BLOOD PRESSURE: 170 MMHG | SYSTOLIC BLOOD PRESSURE: 171 MMHG | WEIGHT: 142 LBS | SYSTOLIC BLOOD PRESSURE: 171 MMHG | DIASTOLIC BLOOD PRESSURE: 104 MMHG | SYSTOLIC BLOOD PRESSURE: 170 MMHG | WEIGHT: 142 LBS | DIASTOLIC BLOOD PRESSURE: 104 MMHG | SYSTOLIC BLOOD PRESSURE: 171 MMHG | SYSTOLIC BLOOD PRESSURE: 170 MMHG | HEART RATE: 91 BPM | SYSTOLIC BLOOD PRESSURE: 171 MMHG | WEIGHT: 142 LBS | HEART RATE: 91 BPM | DIASTOLIC BLOOD PRESSURE: 104 MMHG | DIASTOLIC BLOOD PRESSURE: 107 MMHG | DIASTOLIC BLOOD PRESSURE: 104 MMHG | WEIGHT: 142 LBS | HEART RATE: 91 BPM | WEIGHT: 142 LBS | DIASTOLIC BLOOD PRESSURE: 104 MMHG | HEIGHT: 64 IN | HEART RATE: 91 BPM | SYSTOLIC BLOOD PRESSURE: 171 MMHG | SYSTOLIC BLOOD PRESSURE: 170 MMHG | SYSTOLIC BLOOD PRESSURE: 171 MMHG | SYSTOLIC BLOOD PRESSURE: 170 MMHG | HEIGHT: 64 IN | HEIGHT: 64 IN | DIASTOLIC BLOOD PRESSURE: 104 MMHG | DIASTOLIC BLOOD PRESSURE: 107 MMHG | SYSTOLIC BLOOD PRESSURE: 170 MMHG | HEIGHT: 64 IN | DIASTOLIC BLOOD PRESSURE: 104 MMHG

## 2024-11-25 DIAGNOSIS — K21.9 GASTRO-ESOPHAGEAL REFLUX DISEASE WITHOUT ESOPHAGITIS: ICD-10-CM

## 2024-11-25 PROCEDURE — 99213 OFFICE O/P EST LOW 20 MIN: CPT | Performed by: INTERNAL MEDICINE

## 2024-11-25 RX ORDER — ONDANSETRON HYDROCHLORIDE 4 MG/1
TABLET, FILM COATED ORAL
Qty: 30 | Refills: 5 | Status: ACTIVE

## 2024-11-25 RX ORDER — FAMOTIDINE 40 MG/1
40 TABLET, FILM COATED ORAL
Qty: 90 | Refills: 3 | Status: ACTIVE
Start: 2022-12-05

## 2024-11-25 RX ORDER — PANTOPRAZOLE 40 MG/1
40 TABLET, DELAYED RELEASE ORAL
Qty: 90 | Refills: 3 | Status: ACTIVE
Start: 2022-12-06

## 2025-02-03 ENCOUNTER — TELEPHONE (OUTPATIENT)
Dept: CARDIOLOGY | Facility: CLINIC | Age: 45
End: 2025-02-03
Payer: COMMERCIAL

## 2025-02-03 NOTE — TELEPHONE ENCOUNTER
FACILITY: [facility] [place]  DR:   PHONE: [phone]  FAX: [phone]  PROCEDURE: cataract sx   SCHEDULED: TBD  MEDS TO HOLD: ASA    PT has appt on 02/17/25 for cardiac clearance and labs called pt due to LOV Dr Su wanted her to f/u in 3 months and get labs done pt verbzlied understanding. Will give Ale GAMING the cardiac clearance to add with pt chart for appt

## 2025-02-04 PROBLEM — I65.21 CAROTID STENOSIS, ASYMPTOMATIC, RIGHT: Status: RESOLVED | Noted: 2024-06-18 | Resolved: 2025-02-04

## 2025-02-04 NOTE — PROGRESS NOTES
[facility] VASCULAR SURGERY    Chief Complaint  Carotid Artery Disease    Subjective          History of Present Illness  Bibiana Inman is a 44 y.o. female with carotid stenosis. She presents to the office today for follow up. She is followed by Dr. Peoples. She has not had any previous vascular interventions performed. She denies any hospitalizations or new diagnosis since we last saw her. She denies any symptoms of TIA/CVA, including amaurosis fugax, slurred speech, or unilateral paresthesias or paralysis. She is maintained on aspirin and a statin. She reports compliance with her medications. She is a smoker.  Smoking about 1/4 pack of cigarettes daily.  She is trying to quit.  She tells me she has nicotine products at home to help.    Review of Systems   Neurological:  Negative for facial asymmetry, speech difficulty and weakness.        Allergies: Latex    Prior to Admission medications    Medication Sig Start Date End Date Taking? Authorizing Provider   albuterol sulfate  (90 Base) MCG/ACT inhaler Inhale 2 puffs every 6 (six) hours if needed for wheezing. 5/9/23      aspirin 81 MG chewable tablet Chew 1 tablet Daily. Indications: Disease involving Lipid Deposits in the Arteries    Provider, MD Joseline   atorvastatin (LIPITOR) 80 MG tablet Take 1 tablet by mouth Daily. Indications: High Amount of Fats in the Blood    ProviderJoseline MD   azithromycin (Zithromax Z-John) 250 MG tablet Take 2 tablets by mouth once on day 1, then take 1 tablet by mouth once daily on days 2-5. 9/19/24      carvedilol (COREG) 12.5 MG tablet Take 1 tablet by mouth 2 (Two) Times a Day With Meals. 7/22/24   Benja De Luna MD   Continuous Glucose  (Dexcom G7 ) device Use 1 each 1 (One) Time for 1 dose. Dx: E11.65 6/19/24 6/19/24  Brammell, Timothy Duane, MD   Continuous Glucose Sensor (Dexcom G7 Sensor) misc Use 1 each Every 10 (Ten) Days. Dx: E11.65 6/19/24   Brammell, Timothy Duane,  MD   escitalopram (LEXAPRO) 10 MG tablet Take 1 tablet by mouth Daily.  Patient not taking: Reported on 8/7/2024    ProviderJoseline MD   famotidine (PEPCID) 40 MG tablet Take 1 tablet by mouth at bedtime for 90 days 11/25/24      hydrALAZINE (APRESOLINE) 50 MG tablet take 1 tablet by oral route 2 times every day with food  Patient not taking: Reported on 8/30/2024 8/9/24      icosapent ethyl (Vascepa) 1 g capsule capsule Take 2 g (2 capsules) by mouth 2 (Two) Times a Day With Meals. Indications: Cerebrovascular Accident or Stroke, High Amount of Triglycerides in the Blood, Procedure to Reestablish Blood Supply to the Heart 6/18/24   Paty Dawson APRN   insulin aspart (novoLOG FLEXPEN) 100 UNIT/ML solution pen-injector sc pen Inject  under the skin into the appropriate area as directed 3 times a day. Sliding scale, between 5-20 units TID  Indications: Type 2 Diabetes    ProviderJoseline MD   insulin detemir (LEVEMIR) 100 UNIT/ML injection Inject 30 Units under the skin into the appropriate area as directed Every Night. 7/7/24   Lenin Huang MD   metoclopramide (REGLAN) 5 MG tablet Take 1 tablet by mouth four times a day 11/27/24      nicotine (NICODERM CQ) 14 MG/24HR patch Place 1 patch on the skin as directed by provider Daily. 8/8/24   Ac Dawn APRN   nicotine polacrilex (NICORETTE) 2 MG gum Chew 1 each As Needed for Smoking Cessation. 8/8/24   Ac Dawn APRN   ondansetron (ZOFRAN) 4 MG tablet Take 1 tablet by mouth three times a day as needed 11/25/24      pantoprazole (PROTONIX) 40 MG EC tablet Take 1 tablet by mouth every morning for 90 days 11/25/24      sodium bicarbonate 650 MG tablet Take 1 tablet by mouth Daily. 7/8/24   Lenin Huang MD   glyburide (DIAbeta) 5 MG tablet Take 1 tablet by mouth 2 (Two) Times a Day. 2/3/21 3/10/21     hydroCHLOROthiazide (HYDRODIURIL) 25 MG tablet Take 1 tablet by mouth Daily. 1/20/23 8/1/23     metFORMIN (GLUCOPHAGE) 1000 MG  "tablet Take 1 tablet by mouth 2 (Two) Times a Day With Meals. 5/9/23 7/7/23     omeprazole (priLOSEC) 20 MG capsule Take 1 capsule (20 mg total) by mouth 1 (one) time each day. Do not crush or chew. 3/10/21 7/20/21           Objective   Vital Signs:  BP (!) 215/85 (BP Location: Right arm)   Ht 162.3 cm (63.9\")   Wt 67.1 kg (148 lb)   BMI 25.49 kg/m²   Estimated body mass index is 25.49 kg/m² as calculated from the following:    Height as of this encounter: 162.3 cm (63.9\").    Weight as of this encounter: 67.1 kg (148 lb).       Physical Exam  Vitals reviewed.   Constitutional:       General: She is not in acute distress.     Appearance: She is not ill-appearing.   Cardiovascular:      Rate and Rhythm: Normal rate.      Pulses:           Radial pulses are 2+ on the right side and 2+ on the left side.   Pulmonary:      Effort: Pulmonary effort is normal. No respiratory distress.   Skin:     General: Skin is warm and dry.   Neurological:      General: No focal deficit present.      Mental Status: She is alert and oriented to person, place, and time.      GCS: GCS eye subscore is 4. GCS verbal subscore is 5. GCS motor subscore is 6.        Result Review :    The following data was reviewed by: KAUSHAL Navarrete on 02/05/2025:  Duplex Carotid Ultrasound CAR (02/05/2025 09:24)   Right: 50 to 69% stenosis  Left: Less than 50% stenosis    Progress Notes by Cristian Su MD (08/30/2024 11:15)   Progress Notes by Rocio Denny APRN (08/07/2024 15:30)      Assessment and Plan     Diagnoses and all orders for this visit:    1. Bilateral carotid artery stenosis (Primary)  -     Duplex Carotid Ultrasound CAR; Future    2. Benign essential hypertension    3. Mixed hypercholesterolemia and hypertriglyceridemia    4. Tobacco abuse    5. Overweight (BMI 25.0-29.9)    BMI is >= 25 and <30. (Overweight) The following options were offered after discussion;: Information on healthy weight added to patient's after visit " summary.          Bibiana Inman is a 44 y.o. female with carotid stenosis. She denies symptoms of TIA/CVA. Her most recent carotid duplex shows 50 to 69% stenosis in the right ICA and less than 50% stenosis in the left ICA, antegrade flow in the bilateral vertebral arteries.  Surgery is not warranted unless stenosis reaches or exceeds 70% or patient becomes symptomatic.  We reviewed the signs and symptoms of stroke, she was instructed to seek emergency medical care if she experiences any of these. She verbalizes understanding. She is maintained on appropriate antiplatelet, statin, and antihypertensive medications which we recommend she continue. I will plan to see her in the office in 6 months with a carotid duplex. She was instructed to call our office if she had any questions or concerns.     Follow Up     Return in about 6 months (around 8/5/2025) for Carotid duplex.    Patient was given instructions and counseling regarding her condition or for health maintenance advice. Please see specific information pulled into the AVS if appropriate.     Basia Dejesus, KAUSHAL

## 2025-02-05 ENCOUNTER — HOSPITAL ENCOUNTER (OUTPATIENT)
Dept: CARDIOLOGY | Facility: HOSPITAL | Age: 45
Discharge: HOME | End: 2025-02-05
Admitting: STUDENT IN AN ORGANIZED HEALTH CARE EDUCATION/TRAINING PROGRAM
Payer: COMMERCIAL

## 2025-02-05 ENCOUNTER — OFFICE VISIT (OUTPATIENT)
Age: 45
End: 2025-02-05
Payer: COMMERCIAL

## 2025-02-05 VITALS
BODY MASS INDEX: 25.27 KG/M2 | WEIGHT: 148 LBS | DIASTOLIC BLOOD PRESSURE: 85 MMHG | SYSTOLIC BLOOD PRESSURE: 215 MMHG | HEIGHT: 64 IN

## 2025-02-05 DIAGNOSIS — Z72.0 TOBACCO ABUSE: Chronic | ICD-10-CM

## 2025-02-05 DIAGNOSIS — E66.3 OVERWEIGHT (BMI 25.0-29.9): ICD-10-CM

## 2025-02-05 DIAGNOSIS — E78.2 MIXED HYPERCHOLESTEROLEMIA AND HYPERTRIGLYCERIDEMIA: Chronic | ICD-10-CM

## 2025-02-05 DIAGNOSIS — I10 BENIGN ESSENTIAL HYPERTENSION: Chronic | ICD-10-CM

## 2025-02-05 DIAGNOSIS — I65.21 CAROTID STENOSIS, ASYMPTOMATIC, RIGHT: ICD-10-CM

## 2025-02-05 DIAGNOSIS — I65.23 BILATERAL CAROTID ARTERY STENOSIS: Primary | ICD-10-CM

## 2025-02-05 PROCEDURE — 93880 EXTRACRANIAL BILAT STUDY: CPT

## 2025-02-06 LAB
BH CV XLRA MEAS LEFT CAROTID BULB PSV: -119 CM/SEC
BH CV XLRA MEAS LEFT DIST CCA EDV: -36.4 CM/SEC
BH CV XLRA MEAS LEFT DIST CCA PSV: -132 CM/SEC
BH CV XLRA MEAS LEFT DIST ICA EDV: -35.5 CM/SEC
BH CV XLRA MEAS LEFT DIST ICA PSV: -111 CM/SEC
BH CV XLRA MEAS LEFT ICA/CCA RATIO: 0.91
BH CV XLRA MEAS LEFT PROX CCA EDV: 18.2 CM/SEC
BH CV XLRA MEAS LEFT PROX CCA PSV: 88.3 CM/SEC
BH CV XLRA MEAS LEFT PROX ECA PSV: -215 CM/SEC
BH CV XLRA MEAS LEFT PROX ICA EDV: -39 CM/SEC
BH CV XLRA MEAS LEFT PROX ICA PSV: -120 CM/SEC
BH CV XLRA MEAS LEFT PROX SCLA PSV: 233 CM/SEC
BH CV XLRA MEAS LEFT VERTEBRAL A PSV: -64.5 CM/SEC
BH CV XLRA MEAS RIGHT CAROTID BULB EDV: -58 CM/SEC
BH CV XLRA MEAS RIGHT CAROTID BULB PSV: -210 CM/SEC
BH CV XLRA MEAS RIGHT DIST CCA EDV: -18.8 CM/SEC
BH CV XLRA MEAS RIGHT DIST CCA PSV: -94.9 CM/SEC
BH CV XLRA MEAS RIGHT DIST ICA EDV: -34.7 CM/SEC
BH CV XLRA MEAS RIGHT DIST ICA PSV: -138 CM/SEC
BH CV XLRA MEAS RIGHT ICA/CCA RATIO: 2
BH CV XLRA MEAS RIGHT PROX CCA EDV: 25.9 CM/SEC
BH CV XLRA MEAS RIGHT PROX CCA PSV: 117 CM/SEC
BH CV XLRA MEAS RIGHT PROX ECA PSV: -141 CM/SEC
BH CV XLRA MEAS RIGHT PROX ICA EDV: -39.2 CM/SEC
BH CV XLRA MEAS RIGHT PROX ICA PSV: -234 CM/SEC
BH CV XLRA MEAS RIGHT PROX SCLA PSV: 166 CM/SEC
BH CV XLRA MEAS RIGHT VERTEBRAL A PSV: -71.9 CM/SEC
LEFT ARM BP: NORMAL MMHG
RIGHT ARM BP: NORMAL MMHG

## 2025-02-13 PROBLEM — I20.0 UNSTABLE ANGINA: Status: RESOLVED | Noted: 2024-02-21 | Resolved: 2025-02-13

## 2025-02-13 PROBLEM — R53.1 ACUTE RIGHT-SIDED WEAKNESS: Status: RESOLVED | Noted: 2024-06-21 | Resolved: 2025-02-13

## 2025-02-13 PROBLEM — R41.82 ALTERED MENTAL STATUS: Status: RESOLVED | Noted: 2024-06-17 | Resolved: 2025-02-13

## 2025-02-13 PROBLEM — I65.23 BILATERAL CAROTID ARTERY STENOSIS: Chronic | Status: ACTIVE | Noted: 2024-06-22

## 2025-02-13 PROBLEM — I25.118 CORONARY ARTERY DISEASE OF NATIVE ARTERY OF NATIVE HEART WITH STABLE ANGINA PECTORIS: Chronic | Status: ACTIVE | Noted: 2024-02-19

## 2025-02-13 PROBLEM — Z86.73 HISTORY OF CVA (CEREBROVASCULAR ACCIDENT): Status: ACTIVE | Noted: 2024-06-22

## 2025-02-13 NOTE — PROGRESS NOTES
Subjective:     Encounter Date:02/17/2025        Patient ID: Bibiana Inman 1980     Chief Complaint:  preop clearance    History of Present Illness:  Bibiana Inman is a 44 y.o. 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 In July 2024 she underwent fibroelastoma resection and redo CABG with vein graft to left circumflex artery.  She has a patent LIMA to LAD and vein graft to RCA from previous CABG. She presents to the office today for preop clearance for cataract surgery.          Review of systems:  Review of Systems   Constitutional: Negative for malaise/fatigue.   Cardiovascular:  Negative for chest pain, dyspnea on exertion, leg swelling and palpitations.   Respiratory:  Positive for shortness of breath. Negative for cough.    Gastrointestinal:  Negative for abdominal pain, nausea and vomiting.   Neurological:  Negative for dizziness, focal weakness, headaches, light-headedness and numbness.   All other systems reviewed and are negative.        The following portions of the patient's history were reviewed and updated as appropriate: allergies, current medications, past family history, past medical history, past social history, past surgical history and problem list.    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 Debbie Fry.    Depressive disorder 08/01/2023    Diabetic gastroparesis 07/20/2021    Diabetic peripheral neuropathy associated with type 2 diabetes mellitus 02/20/2024    Gastroesophageal reflux  disease 03/10/2021    GERD (gastroesophageal reflux disease)     Intermittent claudication 2017    Iron deficiency anemia 2020    Lacunar cerebrovascular accident (CVA) 2024    Mixed hypercholesterolemia and hypertriglyceridemia 2014    Myocardial infarction     Obesity     Obstructive sleep apnea 2021    Personal history of COVID-19 2022    Polyp of colon 2023    Spontaneous      Stress fracture of right ankle with routine healing     Stroke 2024    Tobacco abuse 2024    Type 2 diabetes mellitus with hyperglycemia, with long-term current use of insulin 2017    Vitamin D deficiency 2024       Past Surgical History:  Past Surgical History:   Procedure Laterality Date    CARDIAC CATHETERIZATION N/A 2024    Procedure: Left Heart Cath and coronary angiogram;  Surgeon: Jena Barron MD;  Location: [facility] CATH INVASIVE LOCATION;  Service: Cardiology;  Laterality: N/A;     SECTION      CORONARY ARTERY BYPASS GRAFT  2014    LIMA-LAD, SVG-OM2, SVG-PDA, Dr. Debbie Fry.    CORONARY ARTERY BYPASS GRAFT WITH AORTIC VALVE REPAIR/REPLACEMENT N/A 2024    Procedure: YUE; REOPERATIVE STERNOTOMY; CORONARY ARTERY BYPASS GRAFTING TIMES 1 UTILIZING RIGHT SAPHENOUS VEIN; AORTIC VALVE TUMOR ; PRP;  Surgeon: Benja De Luna MD;  Location: [facility];  Service: Cardiothoracic;  Laterality: N/A;    DILATATION AND CURETTAGE      TONSILLECTOMY          Allergies:  Allergies   Allergen Reactions    Latex Itching       Current Meds:     Current Outpatient Medications:     albuterol sulfate  (90 Base) MCG/ACT inhaler, Inhale 2 puffs every 6 (six) hours if needed for wheezing., Disp: 18 g, Rfl: 0    aspirin 81 MG chewable tablet, Chew 1 tablet Daily. Indications: Disease involving Lipid Deposits in the Arteries, Disp: , Rfl:     atorvastatin (LIPITOR) 80 MG tablet, Take 1 tablet by mouth Daily. Indications: High Amount of Fats in the  Blood, Disp: , Rfl:     carvedilol (COREG) 12.5 MG tablet, Take 1 tablet by mouth 2 (Two) Times a Day With Meals., Disp: 120 tablet, Rfl: 0    Continuous Glucose Sensor (Dexcom G7 Sensor) misc, Use 1 each Every 10 (Ten) Days. Dx: E11.65, Disp: 3 each, Rfl: 2    famotidine (PEPCID) 40 MG tablet, Take 1 tablet by mouth at bedtime for 90 days, Disp: 90 tablet, Rfl: 3    hydrALAZINE (APRESOLINE) 50 MG tablet, take 1 tablet by oral route 2 times every day with food, Disp: 180 tablet, Rfl: 3    icosapent ethyl (Vascepa) 1 g capsule capsule, Take 2 g (2 capsules) by mouth 2 (Two) Times a Day With Meals. Indications: Cerebrovascular Accident or Stroke, High Amount of Triglycerides in the Blood, Procedure to Reestablish Blood Supply to the Heart, Disp: 120 capsule, Rfl: 11    insulin aspart (novoLOG FLEXPEN) 100 UNIT/ML solution pen-injector sc pen, Inject  under the skin into the appropriate area as directed 3 times a day. Sliding scale, between 5-20 units TID  Indications: Type 2 Diabetes, Disp: , Rfl:     insulin detemir (LEVEMIR) 100 UNIT/ML injection, Inject 30 Units under the skin into the appropriate area as directed Every Night., Disp: , Rfl:     metoclopramide (REGLAN) 5 MG tablet, Take 1 tablet by mouth four times a day, Disp: 90 tablet, Rfl: 12    ondansetron (ZOFRAN) 4 MG tablet, Take 1 tablet by mouth three times a day as needed, Disp: 30 tablet, Rfl: 5    pantoprazole (PROTONIX) 40 MG EC tablet, Take 1 tablet by mouth every morning for 90 days, Disp: 90 tablet, Rfl: 3    sodium bicarbonate 650 MG tablet, Take 1 tablet by mouth Daily., Disp: 30 tablet, Rfl: 0    Continuous Glucose  (Dexcom G7 ) device, Use 1 each 1 (One) Time for 1 dose. Dx: E11.65, Disp: 1 each, Rfl: 0    lisinopril (PRINIVIL,ZESTRIL) 5 MG tablet, Take 1 tablet by mouth Daily., Disp: 90 tablet, Rfl: 3    Current Facility-Administered Medications:     nicotine (NICODERM CQ) 14 MG/24HR patch 1 patch, 1 patch, Transdermal, Q24H,  "Rocio Denny APRN    nicotine polacrilex (NICORETTE) gum 2 mg, 2 mg, Mouth/Throat, Q1H PRN, Eduin KAUSHAL Heck    Social History:   Social History     Tobacco Use    Smoking status: Light Smoker     Current packs/day: 0.25     Average packs/day: 0.3 packs/day for 27.1 years (6.8 ttl pk-yrs)     Types: Cigarettes     Start date: 1998    Smokeless tobacco: Never   Substance Use Topics    Alcohol use: Not Currently        Family History:  Family History   Problem Relation Age of Onset    Heart disease Mother     Hypertension Mother                  Objective:       Physical Exam:  Vitals reviewed.   Constitutional:       Appearance: Well-developed, well-groomed and normal weight.   Eyes:      Conjunctiva/sclera: Conjunctivae normal.      Pupils: Pupils are equal, round, and reactive to light.      Comments: Wears glasses   HENT:      Head: Normocephalic and atraumatic.    Mouth/Throat:      Mouth: Mucous membranes are moist.      Pharynx: Oropharynx is clear.   Pulmonary:      Effort: Pulmonary effort is normal.      Breath sounds: Normal breath sounds.   Cardiovascular:      PMI at left midclavicular line. Normal rate. Regular rhythm.   Pulses:     Intact distal pulses.   Edema:     Peripheral edema absent.   Abdominal:      General: Bowel sounds are normal.      Palpations: Abdomen is soft.   Musculoskeletal: Normal range of motion.      Cervical back: Normal range of motion and neck supple. Skin:     General: Skin is warm and dry.   Neurological:      Mental Status: Alert and oriented to person, place, and time.   Psychiatric:         Mood and Affect: Mood normal.         Behavior: Behavior normal.         Vital signs:  /75 (BP Location: Left arm, Patient Position: Sitting, Cuff Size: Adult)   Pulse 57   Ht 162.6 cm (64\")   Wt 64.4 kg (142 lb)   LMP 01/10/2023 Comment: patient states irregular periods  SpO2 100%   BMI 24.37 kg/m²       Recent labs reviewed:    CBC        BMP        CMP   "     Creatinine Clearance  CrCl cannot be calculated (Patient's most recent lab result is older than the maximum 30 days allowed.).    BNP        TROPONIN        CoAg          Cardiology results reviewed:    EKG    ECG 12 Lead    Date/Time: 2/17/2025 9:04 AM  Performed by: Paty Dawson APRN    Authorized by: Paty Dawson APRN  Comparison: compared with previous ECG from 6/21/2024  Similar to previous ECG  Rhythm: sinus arrhythmia  Rate: normal  Other findings: left ventricular hypertrophy    Clinical impression: abnormal EKG  Comments: HR 77 bpm,  ms,  ms, QTc 503 ms            Stress test  Results for orders placed during the hospital encounter of 02/19/24    Stress Test With Myocardial Perfusion One Day    Interpretation Summary    Findings consistent with a normal ECG stress test.    (Calculated EF = 63%).    Myocardial perfusion imaging indicates a moderate-sized, severe area of ischemia located in the inferior wall and lateral wall.    Impressions are consistent with an intermediate risk study.      Echocardiogram  Results for orders placed during the hospital encounter of 06/21/24    Adult Transesophageal Echo (YUE) W/ Cont if Necessary Per Protocol    Interpretation Summary    Left ventricular systolic function is normal.  Visually estimated LVEF 60-65%.    Left ventricular wall thickness is consistent with mild to moderate concentric hypertrophy.    There are up to 3 small pedunculated masses attached to the aortic valve leaflets (2 with the noncoronary cusp and a smaller one with left coronary cusp) protruding into the aortic root with the largest one measuring 0.8 x 0.5cm.  The morphology appears consistent with fibroelastomas.    Estimated right ventricular systolic pressure from tricuspid regurgitation is normal (<35 mmHg).    No evidence of intracardiac thrombus including with close evaluation of both atria, BRADEN, and RAA.    Saline test results are negative.    Mild aortic arch  plaques.      Cardiac catheterization  Results for orders placed during the hospital encounter of 02/19/24    Cardiac Catheterization/Vascular Study    Conclusion  OPERATORS  Jena Barron M.D. (Attending Cardiologist)      PROCEDURES PERFORMED  Ultrasound guided Vascular access  Left Heart Catheterization  Coronary Angiogram  Bypass angiography  Moderate sedation    INDICATIONS FOR PROCEDURE  Positive stress test    PROCEDURE IN DETAIL  Informed consent was obtained from the patient after explaining the risks, benefits, and alternative options of the procedure. After obtaining informed consent, the patient was brought to the cath lab and was prepped in a sterile fashion. Lidocaine 2% was used for local anesthesia into the right femoral access site. The right femoral artery was accessed with a micropuncture needle via modified Seldinger technique under ultrasound guidance. A 6F sheath was inserted successfully. Afterwards, 6F JR4 and JL4 diagnostic catheters were advanced over a wire into the ascending aorta and were used to engage the ostia of the left main and RCA respectively. JR4 used to cross the AV and obtain LV pressures and gradient across the AV measured via pullback technique. Images of the right and left coronary systems were obtained. Images of the vein grafts and selective LIMA angiography was performed. All the catheters were exchanged over a wire and subsequently removed. Angiogram of the femoral access site was obtained and did not show complications. The patient tolerated the procedure well without any complications. The pictures were reviewed at the end of the procedure. An angioseal closure device was applied.    HEMODYNAMICS    LV: 157/5/11  AO: 157/64/99  Gradient: None    FINDINGS  Coronary Angiogram    Right dominant circulation    Left main: Left main is a large caliber vessel which gives rise to the Left Anterior Descending and the Left circumflex. No angiographically significant  stenosis    Left Anterior Descending Artery: The LAD is  at the ostium    Left Circumflex: Lcx is a small caliber vessel which courses in the AV groove and gives rise to OM branches.  There is a high OM1 which further bifurcates.  There is a long 60 to 70% lesion in the proximal segment of this OM.  The circumflex is diffusely diseased with 60 to 70% stenosis in the proximal and mid segment all the way into OM 3.  There is an 80% lesion in the midsegment.    Right Coronary Artery: The RCA is  at the ostium    Bypass angiography  SVG to PDA is patent at the origin, body, and insertion  SVG to diagonal is occluded at the origin  LIMA to LAD is widely patent at the origin, body, and insertion.  The LAD distal to the touchdown of the LIMA has 70 to 80% stenosis and is very small caliber.  LIMA does retrogradely fill a diagonal branch also.      Femoral angiography  The right SFA has a stent in its body with severe 80 to 90% ISR.    ESTIMATED BLOOD LOSS:  10 ml    COMPLICATIONS:  None    PROCEDURE DATA:  Contrast Used: 100 cc  Sedation Time: 30 minutes    IMPRESSIONS  Multivessel obstructive CAD of the native coronary arteries  Occluded vein graft to what appears to be a diagonal  There is diffuse CAD involving the left circumflex and OM which is not bypassed and there is also obstructive CAD distal to the LIMA touchdown  This is stable coronary artery disease  Low left heart filling pressures    RECOMMENDATIONS  Recommend medical management for her diffuse CAD in the setting of severely uncontrolled diabetes and smoking  Uptitration of OMT for stable CAD  Patient needs to be aggressively treated for her uncontrolled diabetes  Counseled extensively on smoking cessation  Start Plavix  Blood pressure control  Aggressive cholesterol control with statin with a goal LDL of less than 70               Assessment:         Diagnoses and all orders for this visit:    1. Preoperative cardiovascular examination (Primary)    2.  History of CVA (cerebrovascular accident)    3. Aortic valve disease    4. Coronary artery disease of native artery of native heart with stable angina pectoris    5. Mixed hypercholesterolemia and hypertriglyceridemia    6. Benign essential hypertension    7. Type 2 diabetes mellitus with hyperglycemia, with long-term current use of insulin    8. Obstructive sleep apnea    9. Tobacco abuse    10. Bilateral carotid artery stenosis    Other orders  -     lisinopril (PRINIVIL,ZESTRIL) 5 MG tablet; Take 1 tablet by mouth Daily.  Dispense: 90 tablet; Refill: 3                Plan:       Preoperative cardiovascular examination   The patient needs cardiac clearance for upcoming bilateral cataract surgery.   EKG shows sinus arrhythmia with LVH; unchanged compared to previous EKG.  She denies any chest pain or shortness of breath.   She may proceed with cataract surgery without further cardiac testing.     History of CVA (cerebrovascular accident)  Aortic valve disease  Status post fibroelastoma resection in July 2024  Continue aspirin and high intensity statin    Coronary artery disease of native artery of native heart without angina pectoris  History of CABG, redo CABG with vein graft to left circumflex in July 2024  Patent LIMA to LAD with 80% lesion in the distal/apical LAD, patent vein graft to RCA.   Continue aspirin, high intensity statin and beta blocker  Risk factor modifications recommended, including smoking cessation and better diabetes control    Mixed hypercholesterolemia and hypertriglyceridemia  Continue atorvastatin and Vascepa  Goal LDL <70  Lipid panel pending     Benign essential hypertension, chronic  Continue carvedilol and hydralazine  Start lisinopril 5 mg daily     Type 2 diabetes mellitus with hyperglycemia, with long-term current use of insulin  Uncontrolled  A1c 14.80% today  On Novolog and Levermir   Better diabetes control recommended  The patient was advised to follow up with her  endocrinologist.   I have started on her an ACE-I    Obstructive sleep apnea  Compliance with CPAP recommended    Tobacco abuse  The patient states she currently smokes 1/2 ppd.  Smoking cessation recommended     Bilateral carotid artery stenosis  Recent carotid duplex shows 50 to 69% stenosis in the right ICA and less than 50% stenosis in the left ICA, antegrade flow in the bilateral vertebral arteries.  As she is asymptomatic, no surgical intervention recommended at this time.  She is followed by vascular surgery.      Electronically signed by KAUSHAL Mullins, 02/17/25, 9:16 AM EST.

## 2025-02-17 ENCOUNTER — OFFICE VISIT (OUTPATIENT)
Dept: CARDIOLOGY | Facility: CLINIC | Age: 45
End: 2025-02-17
Payer: COMMERCIAL

## 2025-02-17 ENCOUNTER — LAB (OUTPATIENT)
Dept: LAB | Facility: HOSPITAL | Age: 45
End: 2025-02-17
Payer: COMMERCIAL

## 2025-02-17 VITALS
DIASTOLIC BLOOD PRESSURE: 75 MMHG | WEIGHT: 142 LBS | OXYGEN SATURATION: 100 % | SYSTOLIC BLOOD PRESSURE: 132 MMHG | BODY MASS INDEX: 24.24 KG/M2 | HEART RATE: 57 BPM | HEIGHT: 64 IN

## 2025-02-17 DIAGNOSIS — E11.65 TYPE 2 DIABETES MELLITUS WITH HYPERGLYCEMIA, WITH LONG-TERM CURRENT USE OF INSULIN: Chronic | ICD-10-CM

## 2025-02-17 DIAGNOSIS — E78.2 MIXED HYPERCHOLESTEROLEMIA AND HYPERTRIGLYCERIDEMIA: Chronic | ICD-10-CM

## 2025-02-17 DIAGNOSIS — Z86.73 HISTORY OF CVA (CEREBROVASCULAR ACCIDENT): ICD-10-CM

## 2025-02-17 DIAGNOSIS — I10 BENIGN ESSENTIAL HYPERTENSION: Chronic | ICD-10-CM

## 2025-02-17 DIAGNOSIS — I65.23 BILATERAL CAROTID ARTERY STENOSIS: Chronic | ICD-10-CM

## 2025-02-17 DIAGNOSIS — G47.33 OBSTRUCTIVE SLEEP APNEA: Chronic | ICD-10-CM

## 2025-02-17 DIAGNOSIS — I25.118 CORONARY ARTERY DISEASE OF NATIVE ARTERY OF NATIVE HEART WITH STABLE ANGINA PECTORIS: Chronic | ICD-10-CM

## 2025-02-17 DIAGNOSIS — I35.9 AORTIC VALVE DISEASE: ICD-10-CM

## 2025-02-17 DIAGNOSIS — Z79.4 TYPE 2 DIABETES MELLITUS WITH HYPERGLYCEMIA, WITH LONG-TERM CURRENT USE OF INSULIN: Chronic | ICD-10-CM

## 2025-02-17 DIAGNOSIS — Z72.0 TOBACCO ABUSE: Chronic | ICD-10-CM

## 2025-02-17 DIAGNOSIS — Z01.810 PREOPERATIVE CARDIOVASCULAR EXAMINATION: Primary | ICD-10-CM

## 2025-02-17 DIAGNOSIS — E78.2 MIXED HYPERCHOLESTEROLEMIA AND HYPERTRIGLYCERIDEMIA: ICD-10-CM

## 2025-02-17 LAB
ARTICHOKE IGE QN: 97 MG/DL (ref 0–100)
CHOLEST SERPL-MCNC: 263 MG/DL (ref 0–200)
HBA1C MFR BLD: 14.8 % (ref 4.8–5.6)
HDLC SERPL-MCNC: 23 MG/DL (ref 40–60)
LDLC SERPL CALC-MCNC: ABNORMAL MG/DL
LDLC/HDLC SERPL: ABNORMAL {RATIO}
TRIGL SERPL-MCNC: 1083 MG/DL (ref 0–150)
VLDLC SERPL-MCNC: ABNORMAL MG/DL

## 2025-02-17 PROCEDURE — 99214 OFFICE O/P EST MOD 30 MIN: CPT | Performed by: NURSE PRACTITIONER

## 2025-02-17 PROCEDURE — 83721 ASSAY OF BLOOD LIPOPROTEIN: CPT

## 2025-02-17 PROCEDURE — 93000 ELECTROCARDIOGRAM COMPLETE: CPT | Performed by: NURSE PRACTITIONER

## 2025-02-17 PROCEDURE — 36415 COLL VENOUS BLD VENIPUNCTURE: CPT

## 2025-02-17 PROCEDURE — 83036 HEMOGLOBIN GLYCOSYLATED A1C: CPT

## 2025-02-17 PROCEDURE — 80061 LIPID PANEL: CPT

## 2025-02-17 RX ORDER — LISINOPRIL 5 MG/1
5 TABLET ORAL DAILY
Qty: 90 TABLET | Refills: 3 | Status: SHIPPED | OUTPATIENT
Start: 2025-02-17

## 2025-02-18 ENCOUNTER — TELEPHONE (OUTPATIENT)
Dept: CARDIOLOGY | Facility: CLINIC | Age: 45
End: 2025-02-18
Payer: COMMERCIAL

## 2025-02-18 DIAGNOSIS — Z79.4 TYPE 2 DIABETES MELLITUS WITH HYPERGLYCEMIA, WITH LONG-TERM CURRENT USE OF INSULIN: ICD-10-CM

## 2025-02-18 DIAGNOSIS — E11.65 TYPE 2 DIABETES MELLITUS WITH HYPERGLYCEMIA, WITH LONG-TERM CURRENT USE OF INSULIN: ICD-10-CM

## 2025-02-18 DIAGNOSIS — Z86.73 HISTORY OF CVA (CEREBROVASCULAR ACCIDENT): Primary | ICD-10-CM

## 2025-02-18 DIAGNOSIS — E78.2 MIXED HYPERCHOLESTEROLEMIA AND HYPERTRIGLYCERIDEMIA: ICD-10-CM

## 2025-02-18 NOTE — TELEPHONE ENCOUNTER
Called and spoke to patient. We discussed the results of her lipid panel. She confirms she was fasting. She states she has been taking atorvastatin 80 mg daily, but has not been taking Vascepa as prescribed. She will restart Vascepa 2 g twice daily. I will repeat a lipid panel in 3 months. I will consider adding PCSK9-I at that time. She verbalizes understanding and is agreeable.

## 2025-05-05 ENCOUNTER — APPOINTMENT (OUTPATIENT)
Dept: CT IMAGING | Facility: HOSPITAL | Age: 45
End: 2025-05-05
Payer: MEDICAID

## 2025-05-05 ENCOUNTER — APPOINTMENT (OUTPATIENT)
Dept: GENERAL RADIOLOGY | Facility: HOSPITAL | Age: 45
End: 2025-05-05
Payer: MEDICAID

## 2025-05-05 ENCOUNTER — HOSPITAL ENCOUNTER (INPATIENT)
Facility: HOSPITAL | Age: 45
LOS: 2 days | Discharge: HOME | End: 2025-05-08
Attending: EMERGENCY MEDICINE | Admitting: INTERNAL MEDICINE
Payer: MEDICAID

## 2025-05-05 DIAGNOSIS — H53.8 BLURRED VISION: Primary | ICD-10-CM

## 2025-05-05 DIAGNOSIS — I16.1 HYPERTENSIVE EMERGENCY: ICD-10-CM

## 2025-05-05 DIAGNOSIS — Z72.0 TOBACCO ABUSE: ICD-10-CM

## 2025-05-05 DIAGNOSIS — E11.65 POORLY CONTROLLED DIABETES MELLITUS: ICD-10-CM

## 2025-05-05 DIAGNOSIS — I73.9 INTERMITTENT CLAUDICATION: ICD-10-CM

## 2025-05-05 DIAGNOSIS — M25.50 MULTIPLE JOINT PAIN: ICD-10-CM

## 2025-05-05 LAB
ALBUMIN SERPL-MCNC: 2.3 G/DL (ref 3.5–5.2)
ALBUMIN/GLOB SERPL: 0.7 G/DL
ALP SERPL-CCNC: 234 U/L (ref 39–117)
ALT SERPL W P-5'-P-CCNC: 8 U/L (ref 1–33)
ANION GAP SERPL CALCULATED.3IONS-SCNC: 15.8 MMOL/L (ref 5–15)
APTT PPP: 29.9 SECONDS (ref 22.7–35.4)
AST SERPL-CCNC: 11 U/L (ref 1–32)
BASOPHILS # BLD AUTO: 0.06 10*3/MM3 (ref 0–0.2)
BASOPHILS NFR BLD AUTO: 0.8 % (ref 0–1.5)
BILIRUB SERPL-MCNC: <0.2 MG/DL (ref 0–1.2)
BUN SERPL-MCNC: 12 MG/DL (ref 6–20)
BUN/CREAT SERPL: 7.9 (ref 7–25)
CALCIUM SPEC-SCNC: 8.1 MG/DL (ref 8.6–10.5)
CHLORIDE SERPL-SCNC: 93 MMOL/L (ref 98–107)
CO2 SERPL-SCNC: 18.2 MMOL/L (ref 22–29)
CREAT SERPL-MCNC: 1.52 MG/DL (ref 0.57–1)
DEPRECATED RDW RBC AUTO: 40.1 FL (ref 37–54)
EGFRCR SERPLBLD CKD-EPI 2021: 42.7 ML/MIN/1.73
EOSINOPHIL # BLD AUTO: 0.18 10*3/MM3 (ref 0–0.4)
EOSINOPHIL NFR BLD AUTO: 2.5 % (ref 0.3–6.2)
ERYTHROCYTE [DISTWIDTH] IN BLOOD BY AUTOMATED COUNT: 12.5 % (ref 12.3–15.4)
GLOBULIN UR ELPH-MCNC: 3.5 GM/DL
GLUCOSE SERPL-MCNC: 750 MG/DL (ref 65–99)
HCT VFR BLD AUTO: 38.7 % (ref 34–46.6)
HGB BLD-MCNC: 12.7 G/DL (ref 12–15.9)
IMM GRANULOCYTES # BLD AUTO: 0.02 10*3/MM3 (ref 0–0.05)
IMM GRANULOCYTES NFR BLD AUTO: 0.3 % (ref 0–0.5)
INR PPP: 1.25 (ref 0.9–1.1)
LYMPHOCYTES # BLD AUTO: 1.45 10*3/MM3 (ref 0.7–3.1)
LYMPHOCYTES NFR BLD AUTO: 20.1 % (ref 19.6–45.3)
MAGNESIUM SERPL-MCNC: 1.6 MG/DL (ref 1.6–2.6)
MCH RBC QN AUTO: 29.8 PG (ref 26.6–33)
MCHC RBC AUTO-ENTMCNC: 32.8 G/DL (ref 31.5–35.7)
MCV RBC AUTO: 90.8 FL (ref 79–97)
MONOCYTES # BLD AUTO: 0.31 10*3/MM3 (ref 0.1–0.9)
MONOCYTES NFR BLD AUTO: 4.3 % (ref 5–12)
NEUTROPHILS NFR BLD AUTO: 5.21 10*3/MM3 (ref 1.7–7)
NEUTROPHILS NFR BLD AUTO: 72 % (ref 42.7–76)
NRBC BLD AUTO-RTO: 0 /100 WBC (ref 0–0.2)
NT-PROBNP SERPL-MCNC: 1688 PG/ML (ref 0–450)
PLATELET # BLD AUTO: 377 10*3/MM3 (ref 140–450)
PMV BLD AUTO: 10.2 FL (ref 6–12)
POTASSIUM SERPL-SCNC: 2.9 MMOL/L (ref 3.5–5.2)
PROT SERPL-MCNC: 5.8 G/DL (ref 6–8.5)
PROTHROMBIN TIME: 15.6 SECONDS (ref 11.7–14.2)
RBC # BLD AUTO: 4.26 10*6/MM3 (ref 3.77–5.28)
SODIUM SERPL-SCNC: 127 MMOL/L (ref 136–145)
TROPONIN T SERPL HS-MCNC: 42 NG/L
WBC NRBC COR # BLD AUTO: 7.23 10*3/MM3 (ref 3.4–10.8)

## 2025-05-05 PROCEDURE — 85610 PROTHROMBIN TIME: CPT | Performed by: PHYSICIAN ASSISTANT

## 2025-05-05 PROCEDURE — 83735 ASSAY OF MAGNESIUM: CPT | Performed by: PHYSICIAN ASSISTANT

## 2025-05-05 PROCEDURE — 85730 THROMBOPLASTIN TIME PARTIAL: CPT | Performed by: PHYSICIAN ASSISTANT

## 2025-05-05 PROCEDURE — 70450 CT HEAD/BRAIN W/O DYE: CPT

## 2025-05-05 PROCEDURE — 83880 ASSAY OF NATRIURETIC PEPTIDE: CPT | Performed by: PHYSICIAN ASSISTANT

## 2025-05-05 PROCEDURE — 80053 COMPREHEN METABOLIC PANEL: CPT | Performed by: PHYSICIAN ASSISTANT

## 2025-05-05 PROCEDURE — 71045 X-RAY EXAM CHEST 1 VIEW: CPT

## 2025-05-05 PROCEDURE — 93010 ELECTROCARDIOGRAM REPORT: CPT | Performed by: INTERNAL MEDICINE

## 2025-05-05 PROCEDURE — 93005 ELECTROCARDIOGRAM TRACING: CPT | Performed by: PHYSICIAN ASSISTANT

## 2025-05-05 PROCEDURE — 99291 CRITICAL CARE FIRST HOUR: CPT

## 2025-05-05 PROCEDURE — 84484 ASSAY OF TROPONIN QUANT: CPT | Performed by: PHYSICIAN ASSISTANT

## 2025-05-05 PROCEDURE — 85025 COMPLETE CBC W/AUTO DIFF WBC: CPT | Performed by: PHYSICIAN ASSISTANT

## 2025-05-05 RX ORDER — SODIUM CHLORIDE 0.9 % (FLUSH) 0.9 %
10 SYRINGE (ML) INJECTION AS NEEDED
Status: DISCONTINUED | OUTPATIENT
Start: 2025-05-05 | End: 2025-05-08 | Stop reason: HOSPADM

## 2025-05-05 RX ORDER — POTASSIUM CHLORIDE 1500 MG/1
40 TABLET, EXTENDED RELEASE ORAL ONCE
Status: COMPLETED | OUTPATIENT
Start: 2025-05-06 | End: 2025-05-06

## 2025-05-05 RX ORDER — LABETALOL HYDROCHLORIDE 5 MG/ML
20 INJECTION, SOLUTION INTRAVENOUS ONCE
Status: COMPLETED | OUTPATIENT
Start: 2025-05-05 | End: 2025-05-05

## 2025-05-05 RX ADMIN — Medication 20 MG: at 23:06

## 2025-05-06 ENCOUNTER — APPOINTMENT (OUTPATIENT)
Dept: GENERAL RADIOLOGY | Facility: HOSPITAL | Age: 45
End: 2025-05-06
Payer: MEDICAID

## 2025-05-06 PROBLEM — I16.1 HYPERTENSIVE EMERGENCY: Status: ACTIVE | Noted: 2025-05-06

## 2025-05-06 LAB
ANION GAP SERPL CALCULATED.3IONS-SCNC: 11.7 MMOL/L (ref 5–15)
ATMOSPHERIC PRESS: 749.2 MMHG
B-HCG UR QL: NEGATIVE
B-OH-BUTYR SERPL-SCNC: 0.12 MMOL/L (ref 0.02–0.27)
BACTERIA UR QL AUTO: NORMAL /HPF
BASE EXCESS BLDV CALC-SCNC: -1 MMOL/L (ref -2–2)
BASOPHILS # BLD AUTO: 0.06 10*3/MM3 (ref 0–0.2)
BASOPHILS NFR BLD AUTO: 0.6 % (ref 0–1.5)
BILIRUB UR QL STRIP: NEGATIVE
BUN SERPL-MCNC: 12 MG/DL (ref 6–20)
BUN/CREAT SERPL: 7.5 (ref 7–25)
CALCIUM SPEC-SCNC: 8 MG/DL (ref 8.6–10.5)
CHLORIDE SERPL-SCNC: 100 MMOL/L (ref 98–107)
CLARITY UR: CLEAR
CO2 SERPL-SCNC: 22.3 MMOL/L (ref 22–29)
COLOR UR: YELLOW
CREAT SERPL-MCNC: 1.61 MG/DL (ref 0.57–1)
DEPRECATED RDW RBC AUTO: 42 FL (ref 37–54)
DEVICE COMMENT: ABNORMAL
EGFRCR SERPLBLD CKD-EPI 2021: 39.8 ML/MIN/1.73
EOSINOPHIL # BLD AUTO: 0.25 10*3/MM3 (ref 0–0.4)
EOSINOPHIL NFR BLD AUTO: 2.4 % (ref 0.3–6.2)
ERYTHROCYTE [DISTWIDTH] IN BLOOD BY AUTOMATED COUNT: 12.9 % (ref 12.3–15.4)
GEN 5 1HR TROPONIN T REFLEX: 41 NG/L
GLUCOSE BLDC GLUCOMTR-MCNC: 100 MG/DL (ref 70–130)
GLUCOSE BLDC GLUCOMTR-MCNC: 102 MG/DL (ref 70–130)
GLUCOSE BLDC GLUCOMTR-MCNC: 102 MG/DL (ref 70–130)
GLUCOSE BLDC GLUCOMTR-MCNC: 106 MG/DL (ref 70–130)
GLUCOSE BLDC GLUCOMTR-MCNC: 108 MG/DL (ref 70–130)
GLUCOSE BLDC GLUCOMTR-MCNC: 117 MG/DL (ref 70–130)
GLUCOSE BLDC GLUCOMTR-MCNC: 122 MG/DL (ref 70–130)
GLUCOSE BLDC GLUCOMTR-MCNC: 172 MG/DL (ref 70–130)
GLUCOSE BLDC GLUCOMTR-MCNC: 259 MG/DL (ref 70–130)
GLUCOSE BLDC GLUCOMTR-MCNC: 281 MG/DL (ref 70–130)
GLUCOSE BLDC GLUCOMTR-MCNC: 342 MG/DL (ref 70–130)
GLUCOSE BLDC GLUCOMTR-MCNC: 398 MG/DL (ref 70–130)
GLUCOSE BLDC GLUCOMTR-MCNC: 546 MG/DL (ref 70–130)
GLUCOSE BLDC GLUCOMTR-MCNC: 583 MG/DL (ref 70–130)
GLUCOSE BLDC GLUCOMTR-MCNC: >599 MG/DL (ref 70–130)
GLUCOSE SERPL-MCNC: 400 MG/DL (ref 65–99)
GLUCOSE UR STRIP-MCNC: ABNORMAL MG/DL
HBA1C MFR BLD: 16.6 % (ref 4.8–5.6)
HCO3 BLDV-SCNC: 20.9 MMOL/L (ref 22–28)
HCT VFR BLD AUTO: 34 % (ref 34–46.6)
HGB BLD-MCNC: 11.1 G/DL (ref 12–15.9)
HGB UR QL STRIP.AUTO: ABNORMAL
HYALINE CASTS UR QL AUTO: NORMAL /LPF
IMM GRANULOCYTES # BLD AUTO: 0.05 10*3/MM3 (ref 0–0.05)
IMM GRANULOCYTES NFR BLD AUTO: 0.5 % (ref 0–0.5)
INHALED O2 CONCENTRATION: 21 %
KETONES UR QL STRIP: ABNORMAL
LEUKOCYTE ESTERASE UR QL STRIP.AUTO: NEGATIVE
LYMPHOCYTES # BLD AUTO: 2.1 10*3/MM3 (ref 0.7–3.1)
LYMPHOCYTES NFR BLD AUTO: 20.3 % (ref 19.6–45.3)
MAGNESIUM SERPL-MCNC: 1.9 MG/DL (ref 1.6–2.6)
MCH RBC QN AUTO: 29.4 PG (ref 26.6–33)
MCHC RBC AUTO-ENTMCNC: 32.6 G/DL (ref 31.5–35.7)
MCV RBC AUTO: 89.9 FL (ref 79–97)
MODALITY: ABNORMAL
MONOCYTES # BLD AUTO: 0.47 10*3/MM3 (ref 0.1–0.9)
MONOCYTES NFR BLD AUTO: 4.5 % (ref 5–12)
NEUTROPHILS NFR BLD AUTO: 7.41 10*3/MM3 (ref 1.7–7)
NEUTROPHILS NFR BLD AUTO: 71.7 % (ref 42.7–76)
NITRITE UR QL STRIP: NEGATIVE
NRBC BLD AUTO-RTO: 0 /100 WBC (ref 0–0.2)
PCO2 BLDV: 26.4 MM HG (ref 41–51)
PH BLDV: 7.51 PH UNITS (ref 7.31–7.41)
PH UR STRIP.AUTO: 8 [PH] (ref 5–8)
PLATELET # BLD AUTO: 348 10*3/MM3 (ref 140–450)
PMV BLD AUTO: 10.2 FL (ref 6–12)
PO2 BLDV: 20.5 MM HG (ref 35–45)
POTASSIUM SERPL-SCNC: 2.2 MMOL/L (ref 3.5–5.2)
POTASSIUM SERPL-SCNC: 3.5 MMOL/L (ref 3.5–5.2)
PROT UR QL STRIP: ABNORMAL
RBC # BLD AUTO: 3.78 10*6/MM3 (ref 3.77–5.28)
RBC # UR STRIP: NORMAL /HPF
REF LAB TEST METHOD: NORMAL
SAO2 % BLDCOV: 41.1 % (ref 45–75)
SET MECH RESP RATE: 18
SODIUM SERPL-SCNC: 134 MMOL/L (ref 136–145)
SP GR UR STRIP: 1.03 (ref 1–1.03)
SQUAMOUS #/AREA URNS HPF: NORMAL /HPF
TROPONIN T % DELTA: -2
TROPONIN T NUMERIC DELTA: -1 NG/L
UROBILINOGEN UR QL STRIP: ABNORMAL
WBC # UR STRIP: NORMAL /HPF
WBC NRBC COR # BLD AUTO: 10.34 10*3/MM3 (ref 3.4–10.8)

## 2025-05-06 PROCEDURE — 82010 KETONE BODYS QUAN: CPT

## 2025-05-06 PROCEDURE — 82948 REAGENT STRIP/BLOOD GLUCOSE: CPT

## 2025-05-06 PROCEDURE — 25010000002 ONDANSETRON PER 1 MG

## 2025-05-06 PROCEDURE — 81025 URINE PREGNANCY TEST: CPT

## 2025-05-06 PROCEDURE — 81001 URINALYSIS AUTO W/SCOPE: CPT | Performed by: PHYSICIAN ASSISTANT

## 2025-05-06 PROCEDURE — 80048 BASIC METABOLIC PNL TOTAL CA: CPT

## 2025-05-06 PROCEDURE — 73502 X-RAY EXAM HIP UNI 2-3 VIEWS: CPT

## 2025-05-06 PROCEDURE — 82803 BLOOD GASES ANY COMBINATION: CPT | Performed by: PHYSICIAN ASSISTANT

## 2025-05-06 PROCEDURE — 73070 X-RAY EXAM OF ELBOW: CPT

## 2025-05-06 PROCEDURE — 63710000001 INSULIN REGULAR HUMAN PER 5 UNITS: Performed by: PHYSICIAN ASSISTANT

## 2025-05-06 PROCEDURE — 73610 X-RAY EXAM OF ANKLE: CPT

## 2025-05-06 PROCEDURE — 83036 HEMOGLOBIN GLYCOSYLATED A1C: CPT

## 2025-05-06 PROCEDURE — 84132 ASSAY OF SERUM POTASSIUM: CPT

## 2025-05-06 PROCEDURE — 63710000001 INSULIN GLARGINE PER 5 UNITS: Performed by: STUDENT IN AN ORGANIZED HEALTH CARE EDUCATION/TRAINING PROGRAM

## 2025-05-06 PROCEDURE — 25010000002 NICARDIPINE HCL IN NACL 20-0.9 MG/200ML-% SOLUTION: Performed by: PHYSICIAN ASSISTANT

## 2025-05-06 PROCEDURE — 36415 COLL VENOUS BLD VENIPUNCTURE: CPT

## 2025-05-06 PROCEDURE — 83735 ASSAY OF MAGNESIUM: CPT

## 2025-05-06 PROCEDURE — 63710000001 INSULIN LISPRO (HUMAN) PER 5 UNITS: Performed by: STUDENT IN AN ORGANIZED HEALTH CARE EDUCATION/TRAINING PROGRAM

## 2025-05-06 PROCEDURE — 85025 COMPLETE CBC W/AUTO DIFF WBC: CPT

## 2025-05-06 PROCEDURE — 84484 ASSAY OF TROPONIN QUANT: CPT | Performed by: PHYSICIAN ASSISTANT

## 2025-05-06 PROCEDURE — 25810000003 SODIUM CHLORIDE 0.9 % SOLUTION: Performed by: PHYSICIAN ASSISTANT

## 2025-05-06 PROCEDURE — 25810000003 SODIUM CHLORIDE 0.9 % SOLUTION

## 2025-05-06 RX ORDER — INSULIN LISPRO 100 [IU]/ML
2-9 INJECTION, SOLUTION INTRAVENOUS; SUBCUTANEOUS
Status: DISCONTINUED | OUTPATIENT
Start: 2025-05-06 | End: 2025-05-08 | Stop reason: HOSPADM

## 2025-05-06 RX ORDER — NITROGLYCERIN 0.4 MG/1
0.4 TABLET SUBLINGUAL
Status: DISCONTINUED | OUTPATIENT
Start: 2025-05-06 | End: 2025-05-08 | Stop reason: HOSPADM

## 2025-05-06 RX ORDER — ONDANSETRON 2 MG/ML
4 INJECTION INTRAMUSCULAR; INTRAVENOUS EVERY 6 HOURS PRN
Status: DISCONTINUED | OUTPATIENT
Start: 2025-05-06 | End: 2025-05-08 | Stop reason: HOSPADM

## 2025-05-06 RX ORDER — IBUPROFEN 600 MG/1
1 TABLET ORAL
Status: DISCONTINUED | OUTPATIENT
Start: 2025-05-06 | End: 2025-05-06

## 2025-05-06 RX ORDER — NICOTINE POLACRILEX 4 MG
15 LOZENGE BUCCAL
Status: DISCONTINUED | OUTPATIENT
Start: 2025-05-06 | End: 2025-05-08 | Stop reason: HOSPADM

## 2025-05-06 RX ORDER — BISACODYL 5 MG/1
5 TABLET, DELAYED RELEASE ORAL DAILY PRN
Status: DISCONTINUED | OUTPATIENT
Start: 2025-05-06 | End: 2025-05-08 | Stop reason: HOSPADM

## 2025-05-06 RX ORDER — POLYETHYLENE GLYCOL 3350 17 G/17G
17 POWDER, FOR SOLUTION ORAL DAILY PRN
Status: DISCONTINUED | OUTPATIENT
Start: 2025-05-06 | End: 2025-05-08 | Stop reason: HOSPADM

## 2025-05-06 RX ORDER — IBUPROFEN 600 MG/1
1 TABLET ORAL
Status: DISCONTINUED | OUTPATIENT
Start: 2025-05-06 | End: 2025-05-08 | Stop reason: HOSPADM

## 2025-05-06 RX ORDER — PANTOPRAZOLE SODIUM 40 MG/1
40 TABLET, DELAYED RELEASE ORAL
Status: DISCONTINUED | OUTPATIENT
Start: 2025-05-06 | End: 2025-05-08 | Stop reason: HOSPADM

## 2025-05-06 RX ORDER — POTASSIUM CHLORIDE 1500 MG/1
40 TABLET, EXTENDED RELEASE ORAL EVERY 4 HOURS
Status: COMPLETED | OUTPATIENT
Start: 2025-05-06 | End: 2025-05-06

## 2025-05-06 RX ORDER — BISACODYL 10 MG
10 SUPPOSITORY, RECTAL RECTAL DAILY PRN
Status: DISCONTINUED | OUTPATIENT
Start: 2025-05-06 | End: 2025-05-08 | Stop reason: HOSPADM

## 2025-05-06 RX ORDER — SODIUM CHLORIDE 0.9 % (FLUSH) 0.9 %
10 SYRINGE (ML) INJECTION AS NEEDED
Status: DISCONTINUED | OUTPATIENT
Start: 2025-05-06 | End: 2025-05-08 | Stop reason: HOSPADM

## 2025-05-06 RX ORDER — POTASSIUM CHLORIDE 1.5 G/1.58G
40 POWDER, FOR SOLUTION ORAL EVERY 4 HOURS
Status: COMPLETED | OUTPATIENT
Start: 2025-05-06 | End: 2025-05-07

## 2025-05-06 RX ORDER — AMOXICILLIN 250 MG
2 CAPSULE ORAL 2 TIMES DAILY
Status: DISCONTINUED | OUTPATIENT
Start: 2025-05-06 | End: 2025-05-08 | Stop reason: HOSPADM

## 2025-05-06 RX ORDER — SODIUM CHLORIDE 0.9 % (FLUSH) 0.9 %
10 SYRINGE (ML) INJECTION EVERY 12 HOURS SCHEDULED
Status: DISCONTINUED | OUTPATIENT
Start: 2025-05-06 | End: 2025-05-08 | Stop reason: HOSPADM

## 2025-05-06 RX ORDER — SODIUM CHLORIDE 9 MG/ML
40 INJECTION, SOLUTION INTRAVENOUS AS NEEDED
Status: DISCONTINUED | OUTPATIENT
Start: 2025-05-06 | End: 2025-05-06

## 2025-05-06 RX ORDER — ALBUTEROL SULFATE 90 UG/1
2 INHALANT RESPIRATORY (INHALATION) EVERY 8 HOURS PRN
Status: DISCONTINUED | OUTPATIENT
Start: 2025-05-06 | End: 2025-05-08 | Stop reason: HOSPADM

## 2025-05-06 RX ORDER — SODIUM CHLORIDE 0.9 % (FLUSH) 0.9 %
10 SYRINGE (ML) INJECTION EVERY 12 HOURS SCHEDULED
Status: DISCONTINUED | OUTPATIENT
Start: 2025-05-06 | End: 2025-05-06

## 2025-05-06 RX ORDER — ATORVASTATIN CALCIUM 80 MG/1
80 TABLET, FILM COATED ORAL DAILY
Status: DISCONTINUED | OUTPATIENT
Start: 2025-05-06 | End: 2025-05-08 | Stop reason: HOSPADM

## 2025-05-06 RX ORDER — HYDRALAZINE HYDROCHLORIDE 50 MG/1
50 TABLET, FILM COATED ORAL EVERY 12 HOURS SCHEDULED
Status: DISCONTINUED | OUTPATIENT
Start: 2025-05-06 | End: 2025-05-08 | Stop reason: HOSPADM

## 2025-05-06 RX ORDER — DEXTROSE MONOHYDRATE 25 G/50ML
10-50 INJECTION, SOLUTION INTRAVENOUS
Status: DISCONTINUED | OUTPATIENT
Start: 2025-05-06 | End: 2025-05-06

## 2025-05-06 RX ORDER — SODIUM CHLORIDE 9 MG/ML
40 INJECTION, SOLUTION INTRAVENOUS AS NEEDED
Status: DISCONTINUED | OUTPATIENT
Start: 2025-05-06 | End: 2025-05-08 | Stop reason: HOSPADM

## 2025-05-06 RX ORDER — POTASSIUM CHLORIDE 1500 MG/1
40 TABLET, EXTENDED RELEASE ORAL EVERY 4 HOURS
Status: DISCONTINUED | OUTPATIENT
Start: 2025-05-06 | End: 2025-05-06

## 2025-05-06 RX ORDER — SODIUM CHLORIDE 0.9 % (FLUSH) 0.9 %
10 SYRINGE (ML) INJECTION AS NEEDED
Status: DISCONTINUED | OUTPATIENT
Start: 2025-05-06 | End: 2025-05-06

## 2025-05-06 RX ORDER — SODIUM CHLORIDE 9 MG/ML
30 INJECTION, SOLUTION INTRAVENOUS CONTINUOUS
Status: ACTIVE | OUTPATIENT
Start: 2025-05-06 | End: 2025-05-07

## 2025-05-06 RX ORDER — DEXTROSE MONOHYDRATE 25 G/50ML
25 INJECTION, SOLUTION INTRAVENOUS
Status: DISCONTINUED | OUTPATIENT
Start: 2025-05-06 | End: 2025-05-08 | Stop reason: HOSPADM

## 2025-05-06 RX ORDER — NICOTINE POLACRILEX 4 MG
15 LOZENGE BUCCAL
Status: DISCONTINUED | OUTPATIENT
Start: 2025-05-06 | End: 2025-05-06

## 2025-05-06 RX ADMIN — POTASSIUM CHLORIDE 40 MEQ: 1500 TABLET, EXTENDED RELEASE ORAL at 05:15

## 2025-05-06 RX ADMIN — ATORVASTATIN CALCIUM 80 MG: 80 TABLET, FILM COATED ORAL at 21:09

## 2025-05-06 RX ADMIN — PANTOPRAZOLE SODIUM 40 MG: 40 TABLET, DELAYED RELEASE ORAL at 05:15

## 2025-05-06 RX ADMIN — POTASSIUM CHLORIDE 40 MEQ: 1500 TABLET, EXTENDED RELEASE ORAL at 08:41

## 2025-05-06 RX ADMIN — INSULIN HUMAN 5 UNITS: 100 INJECTION, SOLUTION PARENTERAL at 00:04

## 2025-05-06 RX ADMIN — Medication 10 ML: at 08:41

## 2025-05-06 RX ADMIN — MUPIROCIN 1 APPLICATION: 20 OINTMENT TOPICAL at 21:09

## 2025-05-06 RX ADMIN — SODIUM CHLORIDE 30 ML/HR: 9 INJECTION, SOLUTION INTRAVENOUS at 02:23

## 2025-05-06 RX ADMIN — NICARDIPINE HYDROCHLORIDE 5 MG/HR: 0.1 INJECTION INTRAVENOUS at 08:39

## 2025-05-06 RX ADMIN — MUPIROCIN 1 APPLICATION: 20 OINTMENT TOPICAL at 02:58

## 2025-05-06 RX ADMIN — POTASSIUM CHLORIDE 40 MEQ: 1500 TABLET, EXTENDED RELEASE ORAL at 00:04

## 2025-05-06 RX ADMIN — Medication 10 ML: at 21:10

## 2025-05-06 RX ADMIN — SODIUM CHLORIDE 1000 ML: 9 INJECTION, SOLUTION INTRAVENOUS at 00:00

## 2025-05-06 RX ADMIN — ONDANSETRON 4 MG: 2 INJECTION, SOLUTION INTRAMUSCULAR; INTRAVENOUS at 12:55

## 2025-05-06 RX ADMIN — HYDRALAZINE HYDROCHLORIDE 50 MG: 50 TABLET ORAL at 12:44

## 2025-05-06 RX ADMIN — MUPIROCIN 1 APPLICATION: 20 OINTMENT TOPICAL at 08:41

## 2025-05-06 RX ADMIN — INSULIN LISPRO 6 UNITS: 100 INJECTION, SOLUTION INTRAVENOUS; SUBCUTANEOUS at 21:09

## 2025-05-06 RX ADMIN — INSULIN GLARGINE 15 UNITS: 100 INJECTION, SOLUTION SUBCUTANEOUS at 15:45

## 2025-05-06 RX ADMIN — Medication 10 ML: at 01:52

## 2025-05-06 RX ADMIN — Medication 10 ML: at 02:41

## 2025-05-06 RX ADMIN — POTASSIUM CHLORIDE 40 MEQ: 1.5 POWDER, FOR SOLUTION ORAL at 22:19

## 2025-05-06 RX ADMIN — NICARDIPINE HYDROCHLORIDE 5 MG/HR: 0.1 INJECTION INTRAVENOUS at 01:11

## 2025-05-06 RX ADMIN — POTASSIUM CHLORIDE 40 MEQ: 1500 TABLET, EXTENDED RELEASE ORAL at 12:44

## 2025-05-06 RX ADMIN — INSULIN HUMAN 10.5 UNITS/HR: 1 INJECTION, SOLUTION INTRAVENOUS at 01:58

## 2025-05-06 RX ADMIN — HYDRALAZINE HYDROCHLORIDE 50 MG: 50 TABLET ORAL at 21:09

## 2025-05-06 NOTE — CONSULTS
Chap and pt completed an advance directive, and it was notarized.  Chap made copies, put it in pt's file, gave copies back.    Pt named Hollie (mother-in-law) as HCS #1 and Yamileth (sis-in-law) as HCS #2

## 2025-05-06 NOTE — CONSULTS
[facility]   Consult Note    Patient Name: Bibiana Inman  : 1980  MRN: [id number]  Primary Care Physician:  Ibrahima Correa MD  Referring Physician: Deandre Metcalf MD  Date of admission: 2025    Consults  Subjective   Subjective     Reason for Consult/ Chief Complaint: Blurry vision     History of Present Illness  Bibiana Inman is a 44 y.o. female who is admitted to the hospital for control of hypertension.  She states that a day ago, she noted blurred vision, primarily in her left eye, at the same time her blood pressure was very high.  Since then the vision has improved but is not back to normal for her.      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 Debbie Fry.    Depressive disorder 2023    Diabetic gastroparesis 2021    Diabetic peripheral neuropathy associated with type 2 diabetes mellitus 2024    Gastroesophageal reflux disease 03/10/2021    GERD (gastroesophageal reflux disease)     Intermittent claudication 2017    Iron deficiency anemia 2020    Lacunar cerebrovascular accident (CVA) 2024    Mixed hypercholesterolemia and hypertriglyceridemia 2014    Myocardial infarction 2021    Obesity 3/7/2014    Obstructive sleep apnea 2021    Personal history of COVID-19 2022    Polyp of colon 2023    Spontaneous  2012    Stress fracture of right ankle with routine healing 2024    Stroke 2024    Tobacco abuse 2024    Type 2 diabetes mellitus with hyperglycemia, with long-term current use of insulin 2017    Vitamin D deficiency  2024       Past Surgical History:   Procedure Laterality Date    CARDIAC CATHETERIZATION N/A 2024    Procedure: Left Heart Cath and coronary angiogram;  Surgeon: Jena Barron MD;  Location: [facility] CATH INVASIVE LOCATION;  Service: Cardiology;  Laterality: N/A;     SECTION      CORONARY ARTERY BYPASS GRAFT  2014    LIMA-LAD, SVG-OM2, SVG-PDA, Dr. Debbie Fry.    CORONARY ARTERY BYPASS GRAFT WITH AORTIC VALVE REPAIR/REPLACEMENT N/A 2024    Procedure: YUE; REOPERATIVE STERNOTOMY; CORONARY ARTERY BYPASS GRAFTING TIMES 1 UTILIZING RIGHT SAPHENOUS VEIN; AORTIC VALVE TUMOR ; PRP;  Surgeon: Benja De Luna MD;  Location: [facility] CVOR;  Service: Cardiothoracic;  Laterality: N/A;    DILATATION AND CURETTAGE      TONSILLECTOMY         Family History: family history includes Heart disease in her mother; Hypertension in her mother. Otherwise pertinent FHx was reviewed and not pertinent to current issue.    Social History:  reports that she has been smoking cigarettes. She started smoking about 27 years ago. She has a 6.8 pack-year smoking history. She has never used smokeless tobacco. She reports that she does not currently use alcohol. She reports that she does not use drugs.    Home Medications:   Dexcom G7 , Dexcom G7 Sensor, albuterol sulfate HFA, aspirin, atorvastatin, carvedilol, famotidine, glyburide, hydrALAZINE, hydroCHLOROthiazide, icosapent ethyl, insulin aspart, insulin detemir, lisinopril, metFORMIN, metoclopramide, omeprazole, ondansetron, pantoprazole, and sodium bicarbonate    Allergies:  Allergies   Allergen Reactions    Latex Itching       Objective    Objective     Vitals:  Temp:  [96.9 °F (36.1 °C)-97.9 °F (36.6 °C)] 97.4 °F (36.3 °C)  Heart Rate:  [65-95] 71  Resp:  [16-18] 16  BP: (120-244)/() 162/74    Physical Exam    The vision is 20/40 OD and 20/70 OS.  There is right hypotropia present.  The ductions are full.  Confrontational visual fields are  full.  Pupils are normal and reactive.  The anterior exam shows mild cataract OU.  The posterior exam shows normal optic nerves and macula.  There is mild tortuosity of the vessels.    Result Review    Result Review:  I have personally reviewed the results from the time of this admission to 5/6/2025 18:55 EDT and agree with these findings:  []  Laboratory list / accordion  []  Microbiology  []  Radiology  []  EKG/Telemetry   []  Cardiology/Vascular   []  Pathology  []  Old records  []  Other:        Assessment & Plan   Assessment / Plan     Brief Patient Summary:  Bibiana Inman is a 44 y.o. female who experienced blurred vision associated with elevated blood pressure.  Her exam is consistent with hypertensive retinopathy.  Treating her blood pressure should resolve her ocular findings.  I asked her to see me as an outpatient if the vision does not return to normal    Active Hospital Problems:  Active Hospital Problems    Diagnosis     **Hypertensive emergency      Plan:   Outpatient followup.      Florin Arreaga MD

## 2025-05-06 NOTE — ED PROVIDER NOTES
MD ATTESTATION NOTE    SHARED VISIT: This visit was performed by BOTH a physician and an APC. The substantive portion of the medical decision making was performed by this attesting physician who made or approved the management plan and takes responsibility for patient management. All studies in the APC note (if performed) were independently interpreted by me.     The AWILDA and I have discussed this patient's history, physical exam, and treatment plan.  I have reviewed the documentation and affirm the documentation and agree with the treatment and plan.  The attached note describes my personal findings.      Independent Historians: Patient    A complete HPI/ROS/PMH/PSH/SH/FH are unobtainable due to: None    Chronic or social conditions impacting patient care (social determinants of health): None    Bibiana Inman is a 44 y.o. female who presents to the ED c/o acute blurred vision in setting of noncompliance with home medications for her DM and HTN. Pt with h/o CVA but denies headache, speech disturbance, dizziness, or syncope          On exam:  GENERAL: cooperative f lying in bed with eyes closed initially, conversant, alert, no acute distress  SKIN: Warm, dry  HENT: Normocephalic, atraumatic  EYES: no scleral icterus,  pupils equal and round  RESPIRATORY: relaxed breathing  NEURO: alert, face symmetric, speech clear and fluent, moves all extremities, follows commands                                                             Labs  Recent Results (from the past 24 hours)   Comprehensive Metabolic Panel    Collection Time: 05/05/25 11:05 PM    Specimen: Blood   Result Value Ref Range    Glucose 750 (C) 65 - 99 mg/dL    BUN 12 6 - 20 mg/dL    Creatinine 1.52 (H) 0.57 - 1.00 mg/dL    Sodium 127 (L) 136 - 145 mmol/L    Potassium 2.9 (L) 3.5 - 5.2 mmol/L    Chloride 93 (L) 98 - 107 mmol/L    CO2 18.2 (L) 22.0 - 29.0 mmol/L    Calcium 8.1 (L) 8.6 - 10.5 mg/dL    Total Protein 5.8 (L) 6.0 - 8.5 g/dL    Albumin 2.3 (L) 3.5 -  5.2 g/dL    ALT (SGPT) 8 1 - 33 U/L    AST (SGOT) 11 1 - 32 U/L    Alkaline Phosphatase 234 (H) 39 - 117 U/L    Total Bilirubin <0.2 0.0 - 1.2 mg/dL    Globulin 3.5 gm/dL    A/G Ratio 0.7 g/dL    BUN/Creatinine Ratio 7.9 7.0 - 25.0    Anion Gap 15.8 (H) 5.0 - 15.0 mmol/L    eGFR 42.7 (L) >60.0 mL/min/1.73   Protime-INR    Collection Time: 05/05/25 11:05 PM    Specimen: Blood   Result Value Ref Range    Protime 15.6 (H) 11.7 - 14.2 Seconds    INR 1.25 (H) 0.90 - 1.10   aPTT    Collection Time: 05/05/25 11:05 PM    Specimen: Blood   Result Value Ref Range    PTT 29.9 22.7 - 35.4 seconds   BNP    Collection Time: 05/05/25 11:05 PM    Specimen: Blood   Result Value Ref Range    proBNP 1,688.0 (H) 0.0 - 450.0 pg/mL   High Sensitivity Troponin T    Collection Time: 05/05/25 11:05 PM    Specimen: Blood   Result Value Ref Range    HS Troponin T 42 (H) <14 ng/L   Magnesium    Collection Time: 05/05/25 11:05 PM    Specimen: Blood   Result Value Ref Range    Magnesium 1.6 1.6 - 2.6 mg/dL   CBC Auto Differential    Collection Time: 05/05/25 11:05 PM    Specimen: Blood   Result Value Ref Range    WBC 7.23 3.40 - 10.80 10*3/mm3    RBC 4.26 3.77 - 5.28 10*6/mm3    Hemoglobin 12.7 12.0 - 15.9 g/dL    Hematocrit 38.7 34.0 - 46.6 %    MCV 90.8 79.0 - 97.0 fL    MCH 29.8 26.6 - 33.0 pg    MCHC 32.8 31.5 - 35.7 g/dL    RDW 12.5 12.3 - 15.4 %    RDW-SD 40.1 37.0 - 54.0 fl    MPV 10.2 6.0 - 12.0 fL    Platelets 377 140 - 450 10*3/mm3    Neutrophil % 72.0 42.7 - 76.0 %    Lymphocyte % 20.1 19.6 - 45.3 %    Monocyte % 4.3 (L) 5.0 - 12.0 %    Eosinophil % 2.5 0.3 - 6.2 %    Basophil % 0.8 0.0 - 1.5 %    Immature Grans % 0.3 0.0 - 0.5 %    Neutrophils, Absolute 5.21 1.70 - 7.00 10*3/mm3    Lymphocytes, Absolute 1.45 0.70 - 3.10 10*3/mm3    Monocytes, Absolute 0.31 0.10 - 0.90 10*3/mm3    Eosinophils, Absolute 0.18 0.00 - 0.40 10*3/mm3    Basophils, Absolute 0.06 0.00 - 0.20 10*3/mm3    Immature Grans, Absolute 0.02 0.00 - 0.05 10*3/mm3     nRBC 0.0 0.0 - 0.2 /100 WBC   ECG 12 Lead Stroke Evaluation    Collection Time: 05/05/25 11:11 PM   Result Value Ref Range    QT Interval 420 ms    QTC Interval 488 ms   Blood Gas, Venous -    Collection Time: 05/06/25 12:07 AM    Specimen: Venous Blood   Result Value Ref Range    pH, Venous 7.507 (H) 7.310 - 7.410 pH Units    pCO2, Venous 26.4 (L) 41.0 - 51.0 mm Hg    pO2, Venous 20.5 (L) 35.0 - 45.0 mm Hg    HCO3, Venous 20.9 (L) 22.0 - 28.0 mmol/L    Base Excess, Venous -1.0 -2.0 - 2.0 mmol/L    O2 Saturation, Venous 41.1 (L) 45.0 - 75.0 %    Barometric Pressure for Blood Gas 749.2000 mmHg    Modality Room Air     FIO2 21 %    Set Mech Resp Rate 18     Device Comment sats 100%    High Sensitivity Troponin T 1Hr    Collection Time: 05/06/25 12:09 AM    Specimen: Blood   Result Value Ref Range    HS Troponin T 41 (H) <14 ng/L    Troponin T Numeric Delta -1 ng/L    Troponin T % Delta -2 Abnormal if >/= 20%   Urinalysis With Microscopic If Indicated (No Culture) - Urine, Clean Catch    Collection Time: 05/06/25  1:21 AM    Specimen: Urine, Clean Catch   Result Value Ref Range    Color, UA Yellow Yellow, Straw    Appearance, UA Clear Clear    pH, UA 8.0 5.0 - 8.0    Specific Gravity, UA 1.027 1.005 - 1.030    Glucose, UA >=1000 mg/dL (3+) (A) Negative    Ketones, UA Trace (A) Negative    Bilirubin, UA Negative Negative    Blood, UA Trace (A) Negative    Protein, UA >=300 mg/dL (3+) (A) Negative    Leuk Esterase, UA Negative Negative    Nitrite, UA Negative Negative    Urobilinogen, UA 0.2 E.U./dL 0.2 - 1.0 E.U./dL   Urinalysis, Microscopic Only - Urine, Clean Catch    Collection Time: 05/06/25  1:21 AM    Specimen: Urine, Clean Catch   Result Value Ref Range    RBC, UA 0-2 None Seen, 0-2 /HPF    WBC, UA 0-2 None Seen, 0-2 /HPF    Bacteria, UA None Seen None Seen /HPF    Squamous Epithelial Cells, UA 0-2 None Seen, 0-2 /HPF    Hyaline Casts, UA 0-2 None Seen /LPF    Methodology Automated Microscopy    POC Glucose Once     Collection Time: 05/06/25  1:57 AM    Specimen: Blood   Result Value Ref Range    Glucose 583 (C) 70 - 130 mg/dL       Radiology  CT Head Without Contrast  Result Date: 5/6/2025  HEAD CT WITHOUT CONTRAST  REASON:  bilateral blurry vision and HTN  COMPARISON STUDIES: 7/4/2024.  TECHNIQUE:  Axial images were acquired from the skull base to vertex without contrast, including multiplanar reformats, per standard departmental protocol.    Radiation dose reduction techniques were utilized, including automated exposure control, and exposure modulation based on body size.  FINDINGS:  There is no CT evidence of acute intracranial hemorrhage, mass, or infarct. There is volume loss, but there is no evidence of hydrocephalus or extra-axial fluid collection.  There is left basal ganglier encephalomalacia, and there is mild nonspecific white matter change, but there is no evidence of acute intracranial abnormality.  Skull base, calvarium, and extracranial soft tissues show no acute abnormality.       Chronic changes as noted above, no acute intracranial abnormality.        This report was finalized on 5/6/2025 12:38 AM by Dr. Avelino Eagle M.D on Workstation: [id number]      XR Chest 1 View  Result Date: 5/6/2025  CXR ONE VIEW  HISTORY: HTN emergency  COMPARISON: 7/4/2024  TECHNIQUE: single portable AP       The heart size is within normal limits. Prior sternotomy and bypass.  The lungs are normally aerated. There is no pleural effusion or pneumothorax.    This report was finalized on 5/6/2025 12:18 AM by Dr. Avelino Eagle M.D on Workstation: [id number]        Medical Decision Making:  ED Course as of 05/06/25 0212   Mon May 05, 2025   2341 WBC: 7.23 [MP]   2341 Hemoglobin: 12.7 [MP]   2342 CT scan of head independently interpreted by me as no hemorrhage [MP]   Tue May 06, 2025   0105 Patient was having severe refractory hypertension despite IV medication.  Will start patient on Cardene drip and plan to admit to the  ICU [MP]   0117 Spoke with Lauren Kim with pulmonology.  Discussed patient presentation and ED findings.  She agrees to admit patient to an ICU bed to the service of Dr. Metcalf. [MP]   0124 EKG ER MD interpretation   Time: 23: 11  Rhythm and rate: Sinus rhythm at a rate of 81  Axis: Normal  P waves: Normal except for short IL interval  QRS complexes: Normal  ST segments: Elevation V1 and V2 with no reciprocal changes  T waves: no inversions  Comparison EKG is from July 3, 2024 where patient had ST depressions and T wave inversions in numerous leads including 1, aVL, 2, 3, aVF and V6 [AR]      ED Course User Index  [AR] Leanna Zhang MD  [MP] Glo Bernal PA-C       MDM: Pt here with hypertensive emergency and blurred vision. Plan moderate BP lowering with even initiation of cardene drip if necessary. Plan labs, ekg to assess for ACS, electrolyte disturbances, dka, renal failure, profound anemia, sepsis--among other possibilities.    Procedures:  Procedures        PPE: I followed hospital protocols for proper PPE based on patient presentation including use of N95 mask for suspected infectious respiratory conditions.  Proper hand hygiene was performed both before and after the patient encounter.          Diagnosis  Final diagnoses:   Blurred vision   Poorly controlled diabetes mellitus   Hypertensive emergency       Note Disclaimer: At [facility], we believe that sharing information builds trust and better relationships. You are receiving this note because you recently visited [facility]. It is possible you will see health information before a provider has talked with you about it. This kind of information can be easy to misunderstand. To help you fully understand what it means for your health, we urge you to discuss this note with your provider.       Leanna Zhang MD  05/06/25 3602

## 2025-05-06 NOTE — CASE MANAGEMENT/SOCIAL WORK
Discharge Planning Assessment  [facility]     Patient Name: Bibiana Inman  MRN: [id number]  Today's Date: 5/6/2025    Admit Date: 5/5/2025    Plan: Pending - PT eval pending   Discharge Needs Assessment       Row Name 05/06/25 1045       Living Environment    People in Home spouse    Current Living Arrangements home    Primary Care Provided by self    Provides Primary Care For no one    Family Caregiver if Needed spouse    Able to Return to Prior Arrangements yes       Resource/Environmental Concerns    Resource/Environmental Concerns none       Transition Planning    Patient/Family Anticipates Transition to home    Transportation Anticipated family or friend will provide       Discharge Needs Assessment    Equipment Currently Used at Home none    Concerns to be Addressed discharge planning                   Discharge Plan       Row Name 05/06/25 1046       Plan    Plan Pending - PT eval pending    Patient/Family in Agreement with Plan yes    Plan Comments CCP spoke with patient at bedside; explained role, verified facesheet, and discussed dc plan. Patient uses no DME and is independent for mobility at baseline. She lives with her spouse and son in a 1 level home with a ramp to enter. She has used HH in the past but cannot recall which agency. She has no history of SNF. Patient reports frequent falls recently. Patient has no insurance ([employer] to screen). With no insurance, any type of rehab would be private pay. CCP following for [employer] screen to see if patient is elidgible for any type of medicaid and following for PT evaluation. FAVIAN, CSW                       Demographic Summary       Row Name 05/06/25 1045       General Information    Admission Type inpatient    Arrived From home    Referral Source admission list    Reason for Consult discharge planning    Preferred Language English                   Functional Status       Row Name 05/06/25 1045       Functional Status    Usual Activity Tolerance  moderate    Current Activity Tolerance fair       Functional Status, IADL    Medications independent    Meal Preparation independent    Housekeeping independent    Laundry independent    Shopping independent       Mental Status    General Appearance WDL WDL                   Psychosocial    No documentation.                  Abuse/Neglect    No documentation.                  Legal    No documentation.                  Substance Abuse    No documentation.                  Patient Forms    No documentation.                     CHRIS Jeronimo

## 2025-05-06 NOTE — ED NOTES
Nursing report ED to floor  Bibiana Inman  44 y.o.  female    HPI :  HPI  Stated Reason for Visit: patient from home with report of blurred vision since 7am today, she has been unable to ambulate without holding onto something for most of the day. patient has history of strokes and reports frequent falls.  History Obtained From: patient    Chief Complaint  Chief Complaint   Patient presents with    Blurred Vision       Admitting doctor:   Deandre Metcalf MD    Admitting diagnosis:   The primary encounter diagnosis was Blurred vision. Diagnoses of Poorly controlled diabetes mellitus and Hypertensive emergency were also pertinent to this visit.    Code status:   Current Code Status       Date Active Code Status Order ID Comments User Context       Prior            Allergies:   Latex    Isolation:   No active isolations    Intake and Output  No intake or output data in the 24 hours ending 05/06/25 0124    Weight:       05/05/25  2249   Weight: 56.7 kg (125 lb)       Most recent vitals:   Vitals:    05/05/25 2306 05/05/25 2311 05/06/25 0039 05/06/25 0111   BP:  (!) 194/92 (!) 221/107 (!) 234/103   BP Location:       Patient Position:       Pulse: 83 81 76 75   Resp:   18    Temp:       TempSrc:       SpO2:  100% 99%    Weight:       Height:           Active LDAs/IV Access:   Lines, Drains & Airways       Active LDAs       Name Placement date Placement time Site Days    Peripheral IV 05/05/25 2304 20 G Right Antecubital 05/05/25 2304  Antecubital  less than 1                    Labs (abnormal labs have a star):   Labs Reviewed   COMPREHENSIVE METABOLIC PANEL - Abnormal; Notable for the following components:       Result Value    Glucose 750 (*)     Creatinine 1.52 (*)     Sodium 127 (*)     Potassium 2.9 (*)     Chloride 93 (*)     CO2 18.2 (*)     Calcium 8.1 (*)     Total Protein 5.8 (*)     Albumin 2.3 (*)     Alkaline Phosphatase 234 (*)     Anion Gap 15.8 (*)     eGFR 42.7 (*)     All other components within normal  limits    Narrative:     GFR Categories in Chronic Kidney Disease (CKD)              GFR Category          GFR (mL/min/1.73)    Interpretation  G1                    90 or greater        Normal or high (1)  G2                    60-89                Mild decrease (1)  G3a                   45-59                Mild to moderate decrease  G3b                   30-44                Moderate to severe decrease  G4                    15-29                Severe decrease  G5                    14 or less           Kidney failure    (1)In the absence of evidence of kidney disease, neither GFR category G1 or G2 fulfill the criteria for CKD.    eGFR calculation 2021 CKD-EPI creatinine equation, which does not include race as a factor   PROTIME-INR - Abnormal; Notable for the following components:    Protime 15.6 (*)     INR 1.25 (*)     All other components within normal limits   BNP (IN-HOUSE) - Abnormal; Notable for the following components:    proBNP 1,688.0 (*)     All other components within normal limits    Narrative:     This assay is used as an aid in the diagnosis of individuals suspected of having heart failure. It can be used as an aid in the diagnosis of acute decompensated heart failure (ADHF) in patients presenting with signs and symptoms of ADHF to the emergency department (ED). In addition, NT-proBNP of <300 pg/mL indicates ADHF is not likely.    Age Range Result Interpretation  NT-proBNP Concentration (pg/mL:      <50             Positive            >450                   Gray                 300-450                    Negative             <300    50-75           Positive            >900                  Gray                300-900                  Negative            <300      >75             Positive            >1800                  Gray                300-1800                  Negative            <300   TROPONIN - Abnormal; Notable for the following components:    HS Troponin T 42 (*)     All other  components within normal limits    Narrative:     High Sensitive Troponin T Reference Range:  <14.0 ng/L- Negative Female for AMI  <22.0 ng/L- Negative Male for AMI  >=14 - Abnormal Female indicating possible myocardial injury.  >=22 - Abnormal Male indicating possible myocardial injury.   Clinicians would have to utilize clinical acumen, EKG, Troponin, and serial changes to determine if it is an Acute Myocardial Infarction or myocardial injury due to an underlying chronic condition.        CBC WITH AUTO DIFFERENTIAL - Abnormal; Notable for the following components:    Monocyte % 4.3 (*)     All other components within normal limits   HIGH SENSITIVITIY TROPONIN T 1HR - Abnormal; Notable for the following components:    HS Troponin T 41 (*)     All other components within normal limits    Narrative:     High Sensitive Troponin T Reference Range:  <14.0 ng/L- Negative Female for AMI  <22.0 ng/L- Negative Male for AMI  >=14 - Abnormal Female indicating possible myocardial injury.  >=22 - Abnormal Male indicating possible myocardial injury.   Clinicians would have to utilize clinical acumen, EKG, Troponin, and serial changes to determine if it is an Acute Myocardial Infarction or myocardial injury due to an underlying chronic condition.        BLOOD GAS, VENOUS - Abnormal; Notable for the following components:    pH, Venous 7.507 (*)     pCO2, Venous 26.4 (*)     pO2, Venous 20.5 (*)     HCO3, Venous 20.9 (*)     O2 Saturation, Venous 41.1 (*)     All other components within normal limits   APTT - Normal   MAGNESIUM - Normal   URINALYSIS W/ MICROSCOPIC IF INDICATED (NO CULTURE)   CBC AND DIFFERENTIAL    Narrative:     The following orders were created for panel order CBC & Differential.  Procedure                               Abnormality         Status                     ---------                               -----------         ------                     CBC Auto Differential[[id number]]        Abnormal             Final result                 Please view results for these tests on the individual orders.       EKG:   ECG 12 Lead Stroke Evaluation   Preliminary Result   HEART RATE=81  bpm   RR Lyumsqnj=598  ms   AL Gxgsjbty=591  ms   P Horizontal Axis=-64  deg   P Front Axis=-55  deg   QRSD Iivxpjod=216  ms   QT Douyiawy=366  ms   HSlP=207  ms   QRS Axis=24  deg   T Wave Axis=87  deg   - ABNORMAL ECG -   Sinus or ectopic atrial rhythm   Short AL interval   Nonspecific repol abnormality, lateral leads   Minimal ST elevation, anterior leads   Date and Time of Study:2025-05-05 23:11:09          Meds given in ED:   Medications   sodium chloride 0.9 % flush 10 mL (has no administration in time range)   niCARdipine (CARDENE) 20 mg in 200 mL NS infusion (5 mg/hr Intravenous New Bag 5/6/25 0111)   labetalol (NORMODYNE,TRANDATE) injection 20 mg (20 mg Intravenous Given 5/5/25 2306)   sodium chloride 0.9 % bolus 1,000 mL (1,000 mL Intravenous New Bag 5/6/25 0000)   insulin regular (humuLIN R,novoLIN R) injection 5 Units (5 Units Intravenous Given 5/6/25 0004)   potassium chloride (KLOR-CON M20) CR tablet 40 mEq (40 mEq Oral Given 5/6/25 0004)       Imaging results:  CT Head Without Contrast  Result Date: 5/6/2025   Chronic changes as noted above, no acute intracranial abnormality.        This report was finalized on 5/6/2025 12:38 AM by Dr. Avelino Eagle M.D on Workstation: [id number]      XR Chest 1 View  Result Date: 5/6/2025   The heart size is within normal limits. Prior sternotomy and bypass.  The lungs are normally aerated. There is no pleural effusion or pneumothorax.    This report was finalized on 5/6/2025 12:18 AM by Dr. Avelino Eagle M.D on Workstation: [id number]        Ambulatory status:   - as tolerated    Social issues:   Social History     Socioeconomic History    Marital status:    Tobacco Use    Smoking status: Light Smoker     Current packs/day: 0.25     Average packs/day: 0.3 packs/day for 27.3 years  (6.8 ttl pk-yrs)     Types: Cigarettes     Start date: 1998    Smokeless tobacco: Never   Vaping Use    Vaping status: Never Used   Substance and Sexual Activity    Alcohol use: Not Currently    Drug use: Never    Sexual activity: Not Currently     Partners: Male     Birth control/protection: Post-menopausal, None       Peripheral Neurovascular       Neuro Cognitive       Learning       Respiratory       Abdominal Pain       Pain Assessments  Pain (Adult)  (0-10) Pain Rating: Rest: 0    NIH Stroke Scale       Nunu Goldstien RN  05/06/25 01:24 EDT

## 2025-05-06 NOTE — PLAN OF CARE
Goal Outcome Evaluation:  Plan of Care Reviewed With: patient        Progress: improving  Outcome Evaluation: Insulin gtt off @ 1530, cardene gtt off @ 1045. Potassium recheck at 3.5, reordered PO potassium protocol. Restarted home dose PO hydralazine. Put on a diet, cons carb/heart healthy. x1 episode of vomiting given 1 dose IV zofran.

## 2025-05-06 NOTE — ED PROVIDER NOTES
EMERGENCY DEPARTMENT ENCOUNTER  Room Number:  07/07  PCP: Ibrahima Correa MD  Independent Historians: Patient      HPI:  Chief Complaint: had concerns including Blurred Vision.     A complete HPI/ROS/PMH/PSH/SH/FH are unobtainable due to: None    Chronic or social conditions impacting patient care (Social Determinants of Health): None      Context: Bibiana Inman is a 44 y.o. female with a medical history of hypertension, GERD, hyperlipidemia, diabetes, CAD, depression, chronic back pain, tobacco use, vitamin D deficiency, CKD, CVA, aortic valve disease who presents to the ED c/o acute blurred vision.  Patient reports when she awoke at 0700 in the morning she noticed blurred vision to bilateral eyes.  This has progressively worsened throughout the day.  She is now unable to walk without holding onto the wall.  Denies preceding fall or head injury.  Denies associated headache or dizziness.  No nausea or vomiting.  No speech abnormality.  Upon arrival to the emergency department, patient noted to be severely hypertensive.  Patient reports she has not been taking her blood pressure medications because she cannot afford them.  Does have history of CVA last year.  She is on baby aspirin.  No known sick contacts.  No other systemic complaints at this time.      Review of prior external notes (non-ED) -and- Review of prior external test results outside of this encounter:  Patient seen in office by cardiology on 2/17/2025 for preoperative examination.  Reviewed assessment and plan.  Lisinopril added to blood pressure regimen.  Reviewed labs collected on 2/17/2025.  Lipid panel with triglyceride 1083, hemoglobin A1c 14.80.    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    Abnormal bone density screening  2012    Benign essential hypertension 2016    Chronic cough 2015    Gastroparesis diabeticorum 2021    Elevated erythrocyte sedimentation rate 2021    Fatigue 2021    Gastroesophageal reflux disease 03/10/2021    Mixed hypercholesterolemia and hypertriglyceridemia 2014    Intermittent claudication 2017    Iron deficiency anemia 2020    Multiple joint pain 2021    Need for influenza vaccination 2017    Type 2 diabetes mellitus with hyperglycemia, with long-term current use of insulin 2017    Obstructive sleep apnea 2021    Coronary artery disease of native artery of native heart with stable angina pectoris 2024    Abnormal nuclear stress test 2024    Depressive disorder 2023    Back pain 2023    Diabetic peripheral neuropathy associated with type 2 diabetes mellitus 2024    Ingrowing nail, left great toe 2024    Personal history of COVID-19 2022    Polyp of colon 2023    Vitamin D deficiency 2024    Tobacco abuse 2024    CKD stage 3a, GFR 45-59 ml/min 2024    Bilateral carotid artery stenosis 2024    History of CVA (cerebrovascular accident) 2024    Elevated troponin 2024    Aortic valve disease 2024    Overweight (BMI 25.0-29.9) 2025     Resolved Ambulatory Problems     Diagnosis Date Noted    Hypothyroidism 2014    Spontaneous  2012    Myocardial infarction 2021    Obesity 2014    Stress fracture of right ankle with routine healing 2024    Unstable angina 2024    Altered mental status 2024    Carotid stenosis, asymptomatic, right 2024    Acute right-sided weakness 2024     Past Medical History:   Diagnosis Date    Abnormal ECG 2014    CHF (congestive heart failure) 2013    Coronary arteriosclerosis 2014    Diabetic gastroparesis 2021    GERD (gastroesophageal reflux  disease)     Lacunar cerebrovascular accident (CVA) 2024    Stroke 2024         PAST SURGICAL HISTORY  Past Surgical History:   Procedure Laterality Date    CARDIAC CATHETERIZATION N/A 2024    Procedure: Left Heart Cath and coronary angiogram;  Surgeon: Jena Barron MD;  Location: [facility] CATH INVASIVE LOCATION;  Service: Cardiology;  Laterality: N/A;     SECTION      CORONARY ARTERY BYPASS GRAFT  2014    LIMA-LAD, SVG-OM2, SVG-PDA, Dr. Debbie Fry.    CORONARY ARTERY BYPASS GRAFT WITH AORTIC VALVE REPAIR/REPLACEMENT N/A 2024    Procedure: YUE; REOPERATIVE STERNOTOMY; CORONARY ARTERY BYPASS GRAFTING TIMES 1 UTILIZING RIGHT SAPHENOUS VEIN; AORTIC VALVE TUMOR ; PRP;  Surgeon: Benja De Luna MD;  Location: [facility] CVOR;  Service: Cardiothoracic;  Laterality: N/A;    DILATATION AND CURETTAGE      TONSILLECTOMY           FAMILY HISTORY  Family History   Problem Relation Age of Onset    Heart disease Mother     Hypertension Mother          SOCIAL HISTORY  Social History     Socioeconomic History    Marital status:    Tobacco Use    Smoking status: Light Smoker     Current packs/day: 0.25     Average packs/day: 0.3 packs/day for 27.3 years (6.8 ttl pk-yrs)     Types: Cigarettes     Start date:     Smokeless tobacco: Never   Vaping Use    Vaping status: Never Used   Substance and Sexual Activity    Alcohol use: Not Currently    Drug use: Never    Sexual activity: Not Currently     Partners: Male     Birth control/protection: Post-menopausal, None         ALLERGIES  Latex      REVIEW OF SYSTEMS  Included in HPI  All systems reviewed and negative except for those discussed in HPI.      PHYSICAL EXAM    I have reviewed the triage vital signs and nursing notes.    ED Triage Vitals   Temp Heart Rate Resp BP SpO2   259 25   96.9 °F (36.1 °C) 95 18 (!) 205/106 100 %      Temp src Heart Rate Source Patient Position  BP Location FiO2 (%)   05/05/25 2249 -- 05/05/25 2251 05/05/25 2251 --   Tympanic  Sitting Right arm        Physical Exam  Constitutional:       General: She is not in acute distress.     Appearance: She is well-developed.   HENT:      Head: Normocephalic and atraumatic.   Eyes:      Conjunctiva/sclera: Conjunctivae normal.      Pupils: Pupils are equal, round, and reactive to light.      Comments: Patient having difficulty with EOM secondary to blurred vision, although she does not have any overt nystagmus   Cardiovascular:      Rate and Rhythm: Normal rate and regular rhythm.      Heart sounds: Normal heart sounds.      Comments: Distal pulses intact  Pulmonary:      Effort: Pulmonary effort is normal.      Breath sounds: Normal breath sounds.   Abdominal:      General: There is no distension.   Skin:     General: Skin is warm.   Neurological:      General: No focal deficit present.      Mental Status: She is alert and oriented to person, place, and time.      Comments: CN II through XII intact.  No aphasia or dysarthria.  No facial asymmetry.  No drift of the upper or lower extremities   Psychiatric:         Mood and Affect: Mood normal.             LAB RESULTS  Recent Results (from the past 24 hours)   Comprehensive Metabolic Panel    Collection Time: 05/05/25 11:05 PM    Specimen: Blood   Result Value Ref Range    Glucose 750 (C) 65 - 99 mg/dL    BUN 12 6 - 20 mg/dL    Creatinine 1.52 (H) 0.57 - 1.00 mg/dL    Sodium 127 (L) 136 - 145 mmol/L    Potassium 2.9 (L) 3.5 - 5.2 mmol/L    Chloride 93 (L) 98 - 107 mmol/L    CO2 18.2 (L) 22.0 - 29.0 mmol/L    Calcium 8.1 (L) 8.6 - 10.5 mg/dL    Total Protein 5.8 (L) 6.0 - 8.5 g/dL    Albumin 2.3 (L) 3.5 - 5.2 g/dL    ALT (SGPT) 8 1 - 33 U/L    AST (SGOT) 11 1 - 32 U/L    Alkaline Phosphatase 234 (H) 39 - 117 U/L    Total Bilirubin <0.2 0.0 - 1.2 mg/dL    Globulin 3.5 gm/dL    A/G Ratio 0.7 g/dL    BUN/Creatinine Ratio 7.9 7.0 - 25.0    Anion Gap 15.8 (H) 5.0 - 15.0  mmol/L    eGFR 42.7 (L) >60.0 mL/min/1.73   Protime-INR    Collection Time: 05/05/25 11:05 PM    Specimen: Blood   Result Value Ref Range    Protime 15.6 (H) 11.7 - 14.2 Seconds    INR 1.25 (H) 0.90 - 1.10   aPTT    Collection Time: 05/05/25 11:05 PM    Specimen: Blood   Result Value Ref Range    PTT 29.9 22.7 - 35.4 seconds   BNP    Collection Time: 05/05/25 11:05 PM    Specimen: Blood   Result Value Ref Range    proBNP 1,688.0 (H) 0.0 - 450.0 pg/mL   High Sensitivity Troponin T    Collection Time: 05/05/25 11:05 PM    Specimen: Blood   Result Value Ref Range    HS Troponin T 42 (H) <14 ng/L   Magnesium    Collection Time: 05/05/25 11:05 PM    Specimen: Blood   Result Value Ref Range    Magnesium 1.6 1.6 - 2.6 mg/dL   CBC Auto Differential    Collection Time: 05/05/25 11:05 PM    Specimen: Blood   Result Value Ref Range    WBC 7.23 3.40 - 10.80 10*3/mm3    RBC 4.26 3.77 - 5.28 10*6/mm3    Hemoglobin 12.7 12.0 - 15.9 g/dL    Hematocrit 38.7 34.0 - 46.6 %    MCV 90.8 79.0 - 97.0 fL    MCH 29.8 26.6 - 33.0 pg    MCHC 32.8 31.5 - 35.7 g/dL    RDW 12.5 12.3 - 15.4 %    RDW-SD 40.1 37.0 - 54.0 fl    MPV 10.2 6.0 - 12.0 fL    Platelets 377 140 - 450 10*3/mm3    Neutrophil % 72.0 42.7 - 76.0 %    Lymphocyte % 20.1 19.6 - 45.3 %    Monocyte % 4.3 (L) 5.0 - 12.0 %    Eosinophil % 2.5 0.3 - 6.2 %    Basophil % 0.8 0.0 - 1.5 %    Immature Grans % 0.3 0.0 - 0.5 %    Neutrophils, Absolute 5.21 1.70 - 7.00 10*3/mm3    Lymphocytes, Absolute 1.45 0.70 - 3.10 10*3/mm3    Monocytes, Absolute 0.31 0.10 - 0.90 10*3/mm3    Eosinophils, Absolute 0.18 0.00 - 0.40 10*3/mm3    Basophils, Absolute 0.06 0.00 - 0.20 10*3/mm3    Immature Grans, Absolute 0.02 0.00 - 0.05 10*3/mm3    nRBC 0.0 0.0 - 0.2 /100 WBC   ECG 12 Lead Stroke Evaluation    Collection Time: 05/05/25 11:11 PM   Result Value Ref Range    QT Interval 420 ms    QTC Interval 488 ms   Blood Gas, Venous -    Collection Time: 05/06/25 12:07 AM    Specimen: Venous Blood   Result  Value Ref Range    pH, Venous 7.507 (H) 7.310 - 7.410 pH Units    pCO2, Venous 26.4 (L) 41.0 - 51.0 mm Hg    pO2, Venous 20.5 (L) 35.0 - 45.0 mm Hg    HCO3, Venous 20.9 (L) 22.0 - 28.0 mmol/L    Base Excess, Venous -1.0 -2.0 - 2.0 mmol/L    O2 Saturation, Venous 41.1 (L) 45.0 - 75.0 %    Barometric Pressure for Blood Gas 749.2000 mmHg    Modality Room Air     FIO2 21 %    Set [facility] Resp Rate 18     Device Comment sats 100%    High Sensitivity Troponin T 1Hr    Collection Time: 05/06/25 12:09 AM    Specimen: Blood   Result Value Ref Range    HS Troponin T 41 (H) <14 ng/L    Troponin T Numeric Delta -1 ng/L    Troponin T % Delta -2 Abnormal if >/= 20%         RADIOLOGY  CT Head Without Contrast  Result Date: 5/6/2025  HEAD CT WITHOUT CONTRAST  REASON:  bilateral blurry vision and HTN  COMPARISON STUDIES: 7/4/2024.  TECHNIQUE:  Axial images were acquired from the skull base to vertex without contrast, including multiplanar reformats, per standard departmental protocol.    Radiation dose reduction techniques were utilized, including automated exposure control, and exposure modulation based on body size.  FINDINGS:  There is no CT evidence of acute intracranial hemorrhage, mass, or infarct. There is volume loss, but there is no evidence of hydrocephalus or extra-axial fluid collection.  There is left basal ganglier encephalomalacia, and there is mild nonspecific white matter change, but there is no evidence of acute intracranial abnormality.  Skull base, calvarium, and extracranial soft tissues show no acute abnormality.       Chronic changes as noted above, no acute intracranial abnormality.        This report was finalized on 5/6/2025 12:38 AM by Dr. Avelino Eagle M.D on Workstation: [id number]      XR Chest 1 View  Result Date: 5/6/2025  CXR ONE VIEW  HISTORY: HTN emergency  COMPARISON: 7/4/2024  TECHNIQUE: single portable AP       The heart size is within normal limits. Prior sternotomy and bypass.  The lungs are  normally aerated. There is no pleural effusion or pneumothorax.    This report was finalized on 5/6/2025 12:18 AM by Dr. Avelino Eagle M.D on Workstation: [id number]          MEDICATIONS GIVEN IN ER  Medications   sodium chloride 0.9 % flush 10 mL (has no administration in time range)   niCARdipine (CARDENE) 20 mg in 200 mL NS infusion (5 mg/hr Intravenous New Bag 5/6/25 0111)   labetalol (NORMODYNE,TRANDATE) injection 20 mg (20 mg Intravenous Given 5/5/25 2306)   sodium chloride 0.9 % bolus 1,000 mL (1,000 mL Intravenous New Bag 5/6/25 0000)   insulin regular (humuLIN R,novoLIN R) injection 5 Units (5 Units Intravenous Given 5/6/25 0004)   potassium chloride (KLOR-CON M20) CR tablet 40 mEq (40 mEq Oral Given 5/6/25 0004)         ORDERS PLACED DURING THIS VISIT:  Orders Placed This Encounter   Procedures    XR Chest 1 View    CT Head Without Contrast    Comprehensive Metabolic Panel    Protime-INR    aPTT    BNP    High Sensitivity Troponin T    Magnesium    CBC Auto Differential    High Sensitivity Troponin T 1Hr    Urinalysis With Microscopic If Indicated (No Culture) - Urine, Clean Catch    Blood Gas, Venous -    Pulmonology (on-call MD unless specified)    ECG 12 Lead Stroke Evaluation    Insert Peripheral IV    Inpatient Admission    CBC & Differential         OUTPATIENT MEDICATION MANAGEMENT:  Current Facility-Administered Medications Ordered in Epic   Medication Dose Route Frequency Provider Last Rate Last Admin    niCARdipine (CARDENE) 20 mg in 200 mL NS infusion  5-15 mg/hr Intravenous Titrated Glo Bernal PA-C 50 mL/hr at 05/06/25 0111 5 mg/hr at 05/06/25 0111    nicotine (NICODERM CQ) 14 MG/24HR patch 1 patch  1 patch Transdermal Q24H Rocio Denny APRN        nicotine polacrilex (NICORETTE) gum 2 mg  2 mg Mouth/Throat Q1H PRN Rocio Denny APRN        sodium chloride 0.9 % flush 10 mL  10 mL Intravenous PRN Glo Bernal PA-C         Current Outpatient Medications Ordered  in Epic   Medication Sig Dispense Refill    albuterol sulfate  (90 Base) MCG/ACT inhaler Inhale 2 puffs every 6 (six) hours if needed for wheezing. 18 g 0    aspirin 81 MG chewable tablet Chew 1 tablet Daily. Indications: Disease involving Lipid Deposits in the Arteries      atorvastatin (LIPITOR) 80 MG tablet Take 1 tablet by mouth Daily. Indications: High Amount of Fats in the Blood      carvedilol (COREG) 12.5 MG tablet Take 1 tablet by mouth 2 (Two) Times a Day With Meals. 120 tablet 0    Continuous Glucose  (Dexcom G7 ) device Use 1 each 1 (One) Time for 1 dose. Dx: E11.65 1 each 0    Continuous Glucose Sensor (Dexcom G7 Sensor) misc Use 1 each Every 10 (Ten) Days. Dx: E11.65 3 each 2    famotidine (PEPCID) 40 MG tablet Take 1 tablet by mouth at bedtime for 90 days 90 tablet 3    hydrALAZINE (APRESOLINE) 50 MG tablet take 1 tablet by oral route 2 times every day with food 180 tablet 3    icosapent ethyl (Vascepa) 1 g capsule capsule Take 2 g (2 capsules) by mouth 2 (Two) Times a Day With Meals. Indications: Cerebrovascular Accident or Stroke, High Amount of Triglycerides in the Blood, Procedure to Reestablish Blood Supply to the Heart 120 capsule 11    insulin aspart (novoLOG FLEXPEN) 100 UNIT/ML solution pen-injector sc pen Inject  under the skin into the appropriate area as directed 3 times a day. Sliding scale, between 5-20 units TID  Indications: Type 2 Diabetes      insulin detemir (LEVEMIR) 100 UNIT/ML injection Inject 30 Units under the skin into the appropriate area as directed Every Night.      lisinopril (PRINIVIL,ZESTRIL) 5 MG tablet Take 1 tablet by mouth Daily. 90 tablet 3    metoclopramide (REGLAN) 5 MG tablet Take 1 tablet by mouth 4 (Four) Times a Day. 90 tablet 12    ondansetron (ZOFRAN) 4 MG tablet Take 1 tablet by mouth three times a day as needed 30 tablet 5    pantoprazole (PROTONIX) 40 MG EC tablet Take 1 tablet by mouth Every Morning for 90 days 90 tablet 3     sodium bicarbonate 650 MG tablet Take 1 tablet by mouth Daily. 30 tablet 0         40 minutes of critical care provided. This time excludes other billable procedures. Time does include preparation of documents, medical consultations, review of old records, and direct bedside care. Patient is at high risk for life-threatening deterioration due to hypertensive urgency requiring close cardiopulmonary monitoring, labs, Cardene drip as well as severe hyperglycemia requiring IV insulin, IV fluid, admission to the ICU.         PROGRESS, DATA ANALYSIS, CONSULTS, AND MEDICAL DECISION MAKING  All labs have been independently interpreted by me.  All radiology studies have been reviewed by me. All EKG's have been independently viewed and interpreted by me.  Discussion below represents my analysis of pertinent findings related to patient's condition, differential diagnosis, treatment plan and final disposition.    Differential diagnosis includes but is not limited to hypertensive emergency, glaucoma, CVA.        ED Course as of 05/06/25 0134   Mon May 05, 2025   2341 WBC: 7.23 [MP]   2341 Hemoglobin: 12.7 [MP]   2342 CT scan of head independently interpreted by me as no hemorrhage [MP]   Tue May 06, 2025   0105 Patient was having severe refractory hypertension despite IV medication.  Will start patient on Cardene drip and plan to admit to the ICU [MP]   0117 Spoke with Lauren Kim with pulmonology.  Discussed patient presentation and ED findings.  She agrees to admit patient to an ICU bed to the service of Dr. Metcalf. [MP]   0124 EKG ER MD interpretation   Time: 23: 11  Rhythm and rate: Sinus rhythm at a rate of 81  Axis: Normal  P waves: Normal except for short LA interval  QRS complexes: Normal  ST segments: Elevation V1 and V2 with no reciprocal changes  T waves: no inversions  Comparison EKG is from July 3, 2024 where patient had ST depressions and T wave inversions in numerous leads including 1, aVL, 2, 3, aVF and V6 [AR]       ED Course User Index  [AR] Leanna Zhang MD  [MP] Glo Bernal PA-C             AS OF 01:18 EDT VITALS:    BP - (!) 234/103  HR - 75  TEMP - 96.9 °F (36.1 °C) (Tympanic)  O2 SATS - 99%    COMPLEXITY OF CARE  The patient requires admission.      DIAGNOSIS  Final diagnoses:   Blurred vision   Poorly controlled diabetes mellitus   Hypertensive emergency         DISPOSITION  ED Disposition       ED Disposition   Decision to Admit    Condition   --    Comment   Level of Care: Critical Care [6]   Diagnosis: Hypertensive emergency [[id number]]   Admitting Physician: DES CHUA [[id number]]   Attending Physician: DES CHUA [[id number]]   Certification: I Certify That Inpatient Hospital Services Are Medically Necessary For Greater Than 2 Midnights                  Please note that portions of this document were completed with a voice recognition program.    Note Disclaimer: At [facility], we believe that sharing information builds trust and better relationships. You are receiving this note because you recently visited [facility]. It is possible you will see health information before a provider has talked with you about it. This kind of information can be easy to misunderstand. To help you fully understand what it means for your health, we urge you to discuss this note with your provider.     Glo Bernal PA-C  05/06/25 0134

## 2025-05-06 NOTE — PLAN OF CARE
Goal Outcome Evaluation:  Plan of Care Reviewed With: patient        Progress: no change        Pt from ER. A&O x4. VSS. Insulin gtt- infusing per MAR. Nicardipine available. No c/o pain. K replaced. See Labs.

## 2025-05-06 NOTE — H&P
Group: [facility]        HISTORY AND PHYSICAL    Patient Identification:  Bibiana Inman  44 y.o.  female  1980  [id number]            CC: Blurry vision    History of Present Illness:  Bibiana Inman is a 44-year-old female with a complicated past medical history including: Hypertension, coronary artery disease (status post CABG), gastroparesis, diabetes, iron deficiency anemia, hyperlipidemia, stroke, tobacco abuse, LB.  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    She presented to the emergency department on 5/5/2025 with complaints of blurry vision.  Patient awoke this morning around 7 AM and noticed blurry vision to both eyes.  This progressively worsened throughout the day to the point where she was unable to walk without holding onto a wall.  She denies any recent fall or head injury, denies headache or dizziness, denies focal neurological complaints including weakness, sensation differences, or word finding difficulties.  Denies flashes, floaters, pain, visual field defects, photophobia, or recent trauma.  Family insisted patient come into the ED for further evaluation-upon arrival, patient was hypertensive with a blood pressure of 205/106.  Patient reports that she has been noncompliant with all of her medications due to inability to afford them this includes her antihypertensive (medications carvedilol, hydralazine, lisinopril) and insulin for her diabetes.    Patient continues to smoke 2 packs/day.    ED evaluation revealed: High-sensitivity troponin 42, reflex 41, proBNP 1688, sodium 127, potassium 2.9, serum bicarbonate 18.2, anion gap 15.8, creatinine 1.52, glucose 570.  Urinalysis was positive for 3+ glucose, trace ketones.  CT head without contrast showed chronic changes, no acute abnormalities.    Patient admitted to the ICU for hypertensive urgency and uncontrolled hyperglycemia.    Review of  Systems:  CONSTITUTIONAL:  Denies fevers or chills  EYE:  +bilateral blurry vision, no pain  EAR:  No change in hearing  CARDIAC:  No chest pain  PULMONARY:  No productive cough or shortness of breath  GI:  No diarrhea, hematemesis or hematochezia  RENAL:  No dysuria or urinary frequency  MUSCULOSKELETAL:  No musculoskeletal complaints  ENDOCRINE:  No heat or cold intolerance  INTEGUMENTARY: No skin rashes  NEUROLOGICAL:  No dizziness or confusion.  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 Debbie Fry.    Depressive disorder 2023    Diabetic gastroparesis 2021    Diabetic peripheral neuropathy associated with type 2 diabetes mellitus 2024    Gastroesophageal reflux disease 03/10/2021    GERD (gastroesophageal reflux disease)     Intermittent claudication 2017    Iron deficiency anemia 2020    Lacunar cerebrovascular accident (CVA) 2024    Mixed hypercholesterolemia and hypertriglyceridemia 2014    Myocardial infarction 2021    Obesity 3/7/2014    Obstructive sleep apnea 2021    Personal history of COVID-19 2022    Polyp of colon 2023    Spontaneous  2012    Stress fracture of right ankle with routine healing 2024    Stroke 2024    Tobacco abuse 2024    Type 2 diabetes mellitus with hyperglycemia, with long-term current use of insulin 2017    Vitamin D deficiency 2024       Past Surgical History:  Past Surgical History:   Procedure Laterality  Date    CARDIAC CATHETERIZATION N/A 2024    Procedure: Left Heart Cath and coronary angiogram;  Surgeon: Jena Barron MD;  Location: [facility] CATH INVASIVE LOCATION;  Service: Cardiology;  Laterality: N/A;     SECTION      CORONARY ARTERY BYPASS GRAFT  2014    LIMA-LAD, SVG-OM2, SVG-PDA, Dr. Debbie Fry.    CORONARY ARTERY BYPASS GRAFT WITH AORTIC VALVE REPAIR/REPLACEMENT N/A 2024    Procedure: YUE; REOPERATIVE STERNOTOMY; CORONARY ARTERY BYPASS GRAFTING TIMES 1 UTILIZING RIGHT SAPHENOUS VEIN; AORTIC VALVE TUMOR ; PRP;  Surgeon: Benja De Luna MD;  [facility];  Service: Cardiothoracic;  Laterality: N/A;    DILATATION AND CURETTAGE      TONSILLECTOMY          Home Meds:  Facility-Administered Medications Prior to Admission   Medication Dose Route Frequency Provider Last Rate Last Admin    nicotine (NICODERM CQ) 14 MG/24HR patch 1 patch  1 patch Transdermal Q24H Rocio Denny APRN        nicotine polacrilex (NICORETTE) gum 2 mg  2 mg Mouth/Throat Q1H PRN Rocio Denny APRN         Medications Prior to Admission   Medication Sig Dispense Refill Last Dose/Taking    albuterol sulfate  (90 Base) MCG/ACT inhaler Inhale 2 puffs every 6 (six) hours if needed for wheezing. 18 g 0     aspirin 81 MG chewable tablet Chew 1 tablet Daily. Indications: Disease involving Lipid Deposits in the Arteries       atorvastatin (LIPITOR) 80 MG tablet Take 1 tablet by mouth Daily. Indications: High Amount of Fats in the Blood       carvedilol (COREG) 12.5 MG tablet Take 1 tablet by mouth 2 (Two) Times a Day With Meals. 120 tablet 0     Continuous Glucose  (Dexcom G7 ) device Use 1 each 1 (One) Time for 1 dose. Dx: E11.65 1 each 0     Continuous Glucose Sensor (Dexcom G7 Sensor) misc Use 1 each Every 10 (Ten) Days. Dx: E11.65 3 each 2     famotidine (PEPCID) 40 MG tablet Take 1 tablet by mouth at bedtime for 90 days 90 tablet 3     hydrALAZINE (APRESOLINE) 50  MG tablet take 1 tablet by oral route 2 times every day with food 180 tablet 3     icosapent ethyl (Vascepa) 1 g capsule capsule Take 2 g (2 capsules) by mouth 2 (Two) Times a Day With Meals. Indications: Cerebrovascular Accident or Stroke, High Amount of Triglycerides in the Blood, Procedure to Reestablish Blood Supply to the Heart 120 capsule 11     insulin aspart (novoLOG FLEXPEN) 100 UNIT/ML solution pen-injector sc pen Inject  under the skin into the appropriate area as directed 3 times a day. Sliding scale, between 5-20 units TID  Indications: Type 2 Diabetes       insulin detemir (LEVEMIR) 100 UNIT/ML injection Inject 30 Units under the skin into the appropriate area as directed Every Night.       lisinopril (PRINIVIL,ZESTRIL) 5 MG tablet Take 1 tablet by mouth Daily. 90 tablet 3     metoclopramide (REGLAN) 5 MG tablet Take 1 tablet by mouth 4 (Four) Times a Day. 90 tablet 12     ondansetron (ZOFRAN) 4 MG tablet Take 1 tablet by mouth three times a day as needed 30 tablet 5     pantoprazole (PROTONIX) 40 MG EC tablet Take 1 tablet by mouth Every Morning for 90 days 90 tablet 3     sodium bicarbonate 650 MG tablet Take 1 tablet by mouth Daily. 30 tablet 0        Allergies:  Allergies   Allergen Reactions    Latex Itching       Social History:   Social History     Socioeconomic History    Marital status:    Tobacco Use    Smoking status: Light Smoker     Current packs/day: 0.25     Average packs/day: 0.3 packs/day for 27.3 years (6.8 ttl pk-yrs)     Types: Cigarettes     Start date: 1998    Smokeless tobacco: Never   Vaping Use    Vaping status: Never Used   Substance and Sexual Activity    Alcohol use: Not Currently    Drug use: Never    Sexual activity: Not Currently     Partners: Male     Birth control/protection: Post-menopausal, None       Family History:  Family History   Problem Relation Age of Onset    Heart disease Mother     Hypertension Mother        Physical Exam:  /80   Pulse 72    "Temp 96.9 °F (36.1 °C) (Tympanic)   Resp 18   Ht 162.6 cm (64\")   Wt 62.1 kg (136 lb 14.5 oz)   LMP 01/10/2023 Comment: patient states irregular periods  SpO2 99%   BMI 23.50 kg/m²  Body mass index is 23.5 kg/m². 99% 62.1 kg (136 lb 14.5 oz)  Constitutional: Middle aged, chronically ill-appearing female pt in bed, No acute respiratory distress, no accessory muscle use  Head: - NCAT  Eyes: No pallor, Anicteric conjunctiva, EOMI.  ENMT:  Mallampati 3, no oral thrush. Dry MM.   NECK: Trachea midline, No thyromegaly, no palpable cervical LNpathy no JVD  Heart: RRR, no murmur. No pedal edema   Lungs: EBER +, No wheezes/ crackles heard    Abdomen: Soft. No tenderness, guarding or rigidity. No palpable masses  Extremities: Extremities warm and well perfused. No cyanosis/ clubbing  Neuro: Conscious, answers appropriately, no gross focal neuro deficits  Psych: Mood and affect appropriate    PPE recommended per [facility] infectious disease Isolation protocol for the current clinical scenario(as mentioned below) was followed.      LABS:  COVID19   Date Value Ref Range Status   06/30/2024 Not Detected Not Detected - Ref. Range Final       Lab Results   Component Value Date    CALCIUM 8.1 (L) 05/05/2025    PHOS 2.8 07/07/2024     Results from last 7 days   Lab Units 05/05/25  2305   MAGNESIUM mg/dL 1.6   SODIUM mmol/L 127*   POTASSIUM mmol/L 2.9*   CHLORIDE mmol/L 93*   CO2 mmol/L 18.2*   BUN mg/dL 12   CREATININE mg/dL 1.52*   GLUCOSE mg/dL 750*   CALCIUM mg/dL 8.1*   WBC 10*3/mm3 7.23   HEMOGLOBIN g/dL 12.7   PLATELETS 10*3/mm3 377   ALT (SGPT) U/L 8   AST (SGOT) U/L 11   PROBNP pg/mL 1,688.0*     Lab Results   Component Value Date    CKTOTAL 100 07/04/2024    TROPONINT 41 (H) 05/06/2025     Results from last 7 days   Lab Units 05/06/25  0009 05/05/25  2305   HSTROP T ng/L 41* 42*             Results from last 7 days   Lab Units 05/06/25  0007   MODALITY  Room Air         Results from last 7 days   Lab Units " 05/05/25  2305   INR  1.25*         Lab Results   Component Value Date    TSH 1.470 06/18/2024     Estimated Creatinine Clearance: 45.3 mL/min (A) (by C-G formula based on SCr of 1.52 mg/dL (H)).  Results from last 7 days   Lab Units 05/06/25  0121   NITRITE UA  Negative   WBC UA /HPF 0-2   BACTERIA UA /HPF None Seen   SQUAM EPITHEL UA /HPF 0-2     Microbiology Results (last 10 days)       ** No results found for the last 240 hours. **               Imaging: I personally visualized the images of scans/x-rays performed within last 3 days.      Assessment:  Hypertensive urgency  Visual disturbance  Hyperglycemia  Pseudohyponatremia  Hypokalemia  Chronic kidney disease  Hyperlipidemia  History of stroke  Obstructive sleep apnea  Tobacco abuse  Medical noncompliance    Recommendations:  Hypertensive urgency  Blood pressure documented as high as 244/110-treated with IV labetalol and subsequently IV nicardipine drip.  No evidence of endorgan damage, plan to gradually reduce over the next 24 to 48 hours to prevent hypoperfusion.  Current systolic blood pressure goal ~180, will further normalize to ~160 over the next 24 hours.  Home medication: Carvedilol, hydralazine, lisinopril-patient reports noncompliance with all home medications, reintroduce as appropriate.    Visual disturbance  CT head basal ganglia encephalomalacia ence of acute intracranial hemorrhage, mass or infarct, left and mild nonspecific white matter change noted.  No complaints of pain, flashes, floaters, curtain like vision loss, halos, nausea or vomiting, visual field cut, or photophobia.  Suspect largely due to hypertension and hyperglycemia-ophthalmology consult ordered to assess for diabetic retinopathy papilledema or macular edema.    Hyperglycemia  Initial glucose 750- Urinalysis showing 3+ glucose, trace ketones.  Beta-hydroxybutyrate pending.  Given elevated proBNP, history of congestive heart failure, and severe hypertension, will hold off on IV  fluids at this time.  Insulin drip for hyperglycemia initiated.    Pseudohyponatremia  Sodium 127 in the presence of elevated glucose-suspect will improve with improvement in blood glucose.    Hypokalemia  Potassium 2.9-replacement protocol initiated.    Chronic kidney disease  Creatinine 1.52, BUN 12- close to baseline with a creatinine 1.55 and BUN 25 on 8/7/24.    Hyperlipidemia  Home medication: Atorvastatin, continue.    History of stroke  Noted in July 2024-symptoms included right-sided weakness and speech difficulties.    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  Home medication: Aspirin, hold for now.    Obstructive sleep apnea  Suspected, does not use any medical equipment at home.    Tobacco abuse  Patient reports that she smokes half a pack a day, family at bedside reports that she smokes up to 2 packs a day.  Tobacco cessation education provided including information about concern for lung disease, worsening cardiac disease, further strokes, and worsening visual issues with vascular impairment related to smoking.  Nicotine patch available.    Medical noncompliance  Patient has been noncompliant with all of her medications due to cost and losing insurance.  Patient was terminated from her job several weeks ago and therefore lost her insurance.  Case management contacted to aid  with resources regarding good truth including completed.    Patient's  is currently inpatient at [facility]- mother in law and 11 year son at bedside wanting information about how to fill out living will paperwork since her NOK is in the hospital. Advanced care planning consult placed.    Full code  SCDs  NPO    Patient was placed in face mask upon entering room and kept mask on throughout our encounter. I wore full protective equipment throughout this patient encounter including a face mask, gown and gloves. Hand hygiene was performed before donning protective  equipment and after removal when leaving the room.    Angelica Kim, APRN  5/6/2025  03:41 EDT      Much of this encounter note is an electronic transcription/translation of spoken language to printed text using Dragon Software.

## 2025-05-07 LAB
ANION GAP SERPL CALCULATED.3IONS-SCNC: 4.3 MMOL/L (ref 5–15)
BASOPHILS # BLD AUTO: 0.08 10*3/MM3 (ref 0–0.2)
BASOPHILS NFR BLD AUTO: 0.8 % (ref 0–1.5)
BUN SERPL-MCNC: 9 MG/DL (ref 6–20)
BUN/CREAT SERPL: 5.9 (ref 7–25)
CALCIUM SPEC-SCNC: 8.6 MG/DL (ref 8.6–10.5)
CHLORIDE SERPL-SCNC: 108 MMOL/L (ref 98–107)
CHOLEST SERPL-MCNC: 309 MG/DL (ref 0–200)
CO2 SERPL-SCNC: 17.7 MMOL/L (ref 22–29)
CREAT SERPL-MCNC: 1.53 MG/DL (ref 0.57–1)
DEPRECATED RDW RBC AUTO: 41.1 FL (ref 37–54)
EGFRCR SERPLBLD CKD-EPI 2021: 42.3 ML/MIN/1.73
EOSINOPHIL # BLD AUTO: 0.32 10*3/MM3 (ref 0–0.4)
EOSINOPHIL NFR BLD AUTO: 3.1 % (ref 0.3–6.2)
ERYTHROCYTE [DISTWIDTH] IN BLOOD BY AUTOMATED COUNT: 12.7 % (ref 12.3–15.4)
GLUCOSE BLDC GLUCOMTR-MCNC: 229 MG/DL (ref 70–130)
GLUCOSE BLDC GLUCOMTR-MCNC: 242 MG/DL (ref 70–130)
GLUCOSE BLDC GLUCOMTR-MCNC: 307 MG/DL (ref 70–130)
GLUCOSE BLDC GLUCOMTR-MCNC: 317 MG/DL (ref 70–130)
GLUCOSE BLDC GLUCOMTR-MCNC: 340 MG/DL (ref 70–130)
GLUCOSE SERPL-MCNC: 214 MG/DL (ref 65–99)
HCT VFR BLD AUTO: 35.4 % (ref 34–46.6)
HDLC SERPL-MCNC: 33 MG/DL (ref 40–60)
HGB BLD-MCNC: 11.6 G/DL (ref 12–15.9)
IMM GRANULOCYTES # BLD AUTO: 0.04 10*3/MM3 (ref 0–0.05)
IMM GRANULOCYTES NFR BLD AUTO: 0.4 % (ref 0–0.5)
LDLC SERPL CALC-MCNC: 165 MG/DL (ref 0–100)
LDLC/HDLC SERPL: 5 {RATIO}
LYMPHOCYTES # BLD AUTO: 2.28 10*3/MM3 (ref 0.7–3.1)
LYMPHOCYTES NFR BLD AUTO: 22.4 % (ref 19.6–45.3)
MAGNESIUM SERPL-MCNC: 1.9 MG/DL (ref 1.6–2.6)
MCH RBC QN AUTO: 29.2 PG (ref 26.6–33)
MCHC RBC AUTO-ENTMCNC: 32.8 G/DL (ref 31.5–35.7)
MCV RBC AUTO: 89.2 FL (ref 79–97)
MONOCYTES # BLD AUTO: 0.48 10*3/MM3 (ref 0.1–0.9)
MONOCYTES NFR BLD AUTO: 4.7 % (ref 5–12)
NEUTROPHILS NFR BLD AUTO: 68.6 % (ref 42.7–76)
NEUTROPHILS NFR BLD AUTO: 7 10*3/MM3 (ref 1.7–7)
NRBC BLD AUTO-RTO: 0 /100 WBC (ref 0–0.2)
PLATELET # BLD AUTO: 387 10*3/MM3 (ref 140–450)
PMV BLD AUTO: 10.8 FL (ref 6–12)
POTASSIUM SERPL-SCNC: 5.7 MMOL/L (ref 3.5–5.2)
POTASSIUM SERPL-SCNC: 5.8 MMOL/L (ref 3.5–5.2)
QT INTERVAL: 420 MS
QTC INTERVAL: 488 MS
RBC # BLD AUTO: 3.97 10*6/MM3 (ref 3.77–5.28)
SODIUM SERPL-SCNC: 130 MMOL/L (ref 136–145)
TRIGL SERPL-MCNC: 555 MG/DL (ref 0–150)
VLDLC SERPL-MCNC: 111 MG/DL (ref 5–40)
WBC NRBC COR # BLD AUTO: 10.2 10*3/MM3 (ref 3.4–10.8)

## 2025-05-07 PROCEDURE — 83735 ASSAY OF MAGNESIUM: CPT

## 2025-05-07 PROCEDURE — 84132 ASSAY OF SERUM POTASSIUM: CPT | Performed by: STUDENT IN AN ORGANIZED HEALTH CARE EDUCATION/TRAINING PROGRAM

## 2025-05-07 PROCEDURE — 63710000001 INSULIN GLARGINE PER 5 UNITS: Performed by: STUDENT IN AN ORGANIZED HEALTH CARE EDUCATION/TRAINING PROGRAM

## 2025-05-07 PROCEDURE — 97162 PT EVAL MOD COMPLEX 30 MIN: CPT

## 2025-05-07 PROCEDURE — 82948 REAGENT STRIP/BLOOD GLUCOSE: CPT

## 2025-05-07 PROCEDURE — 80061 LIPID PANEL: CPT | Performed by: STUDENT IN AN ORGANIZED HEALTH CARE EDUCATION/TRAINING PROGRAM

## 2025-05-07 PROCEDURE — 63710000001 INSULIN LISPRO (HUMAN) PER 5 UNITS: Performed by: STUDENT IN AN ORGANIZED HEALTH CARE EDUCATION/TRAINING PROGRAM

## 2025-05-07 PROCEDURE — 80048 BASIC METABOLIC PNL TOTAL CA: CPT

## 2025-05-07 PROCEDURE — 85025 COMPLETE CBC W/AUTO DIFF WBC: CPT

## 2025-05-07 RX ORDER — LABETALOL HYDROCHLORIDE 5 MG/ML
20 INJECTION, SOLUTION INTRAVENOUS
Status: DISCONTINUED | OUTPATIENT
Start: 2025-05-07 | End: 2025-05-08 | Stop reason: HOSPADM

## 2025-05-07 RX ORDER — LISINOPRIL 10 MG/1
10 TABLET ORAL
Status: DISCONTINUED | OUTPATIENT
Start: 2025-05-07 | End: 2025-05-08 | Stop reason: HOSPADM

## 2025-05-07 RX ADMIN — Medication 20 MG: at 07:11

## 2025-05-07 RX ADMIN — MUPIROCIN 1 APPLICATION: 20 OINTMENT TOPICAL at 08:23

## 2025-05-07 RX ADMIN — PANTOPRAZOLE SODIUM 40 MG: 40 TABLET, DELAYED RELEASE ORAL at 06:44

## 2025-05-07 RX ADMIN — POTASSIUM CHLORIDE 40 MEQ: 1.5 POWDER, FOR SOLUTION ORAL at 02:33

## 2025-05-07 RX ADMIN — INSULIN LISPRO 4 UNITS: 100 INJECTION, SOLUTION INTRAVENOUS; SUBCUTANEOUS at 06:51

## 2025-05-07 RX ADMIN — INSULIN GLARGINE 15 UNITS: 100 INJECTION, SOLUTION SUBCUTANEOUS at 11:24

## 2025-05-07 RX ADMIN — HYDRALAZINE HYDROCHLORIDE 50 MG: 50 TABLET ORAL at 08:23

## 2025-05-07 RX ADMIN — Medication 10 ML: at 21:30

## 2025-05-07 RX ADMIN — INSULIN LISPRO 7 UNITS: 100 INJECTION, SOLUTION INTRAVENOUS; SUBCUTANEOUS at 11:24

## 2025-05-07 RX ADMIN — LISINOPRIL 10 MG: 10 TABLET ORAL at 11:24

## 2025-05-07 RX ADMIN — MUPIROCIN 1 APPLICATION: 20 OINTMENT TOPICAL at 21:26

## 2025-05-07 RX ADMIN — INSULIN LISPRO 7 UNITS: 100 INJECTION, SOLUTION INTRAVENOUS; SUBCUTANEOUS at 17:40

## 2025-05-07 RX ADMIN — HYDRALAZINE HYDROCHLORIDE 50 MG: 50 TABLET ORAL at 21:27

## 2025-05-07 RX ADMIN — INSULIN LISPRO 7 UNITS: 100 INJECTION, SOLUTION INTRAVENOUS; SUBCUTANEOUS at 21:30

## 2025-05-07 RX ADMIN — INSULIN GLARGINE 15 UNITS: 100 INJECTION, SOLUTION SUBCUTANEOUS at 08:23

## 2025-05-07 RX ADMIN — ATORVASTATIN CALCIUM 80 MG: 80 TABLET, FILM COATED ORAL at 21:26

## 2025-05-07 NOTE — PROGRESS NOTES
[facility]  [phone]  Dr. Julio Boudreaux    Subjective:  LOS: 1    No acute events overnight. Glucose still a little high. Has been on nicardipine drip for 24 hours or so.    Objective:  Vital Signs past 24hrs    Temp range: Temp (24hrs), Av.6 °F (37 °C), Min:97.4 °F (36.3 °C), Max:99.1 °F (37.3 °C)    BP range: BP: (108-212)/(49-94) 172/75  Pulse range: Heart Rate:  [70-94] 77  Resp rate range: Resp:  [16-18] 16  60.8 kg (134 lb 0.6 oz); Body mass index is 23.01 kg/m².    Device (Oxygen Therapy): room air     Oxygen range:SpO2:  [85 %-100 %] 100 %            Intake/Output Summary (Last 24 hours) at 2025 1355  Last data filed at 2025 0400  Gross per 24 hour   Intake 979.11 ml   Output --   Net 979.11 ml       Physical Exam  Constitutional:       Appearance: Normal appearance.   HENT:      Head: Normocephalic and atraumatic.      Nose: Nose normal.      Mouth/Throat:      Mouth: Mucous membranes are moist.      Pharynx: Oropharynx is clear.   Eyes:      Extraocular Movements: Extraocular movements intact.      Conjunctiva/sclera: Conjunctivae normal.   Cardiovascular:      Rate and Rhythm: Normal rate and regular rhythm.      Pulses: Normal pulses.      Heart sounds: Normal heart sounds. No murmur heard.  Pulmonary:      Effort: Pulmonary effort is normal. No respiratory distress.      Breath sounds: Normal breath sounds. No stridor. No wheezing or rales.   Abdominal:      General: Abdomen is flat. Bowel sounds are normal.      Palpations: Abdomen is soft.   Skin:     General: Skin is warm and dry.   Neurological:      General: No focal deficit present.      Mental Status: She is alert and oriented to person, place, and time. Mental status is at baseline.   Psychiatric:         Mood and Affect: Mood normal.         Behavior: Behavior normal.            Result Review:  I have reviewed the results from last note by LPC physician and agree with these findings:  []  Laboratory  accordion  []  Microbiology  []  Radiology  []  EKG/Telemetry   []  Cardiology/Vascular   []  Pathology  []  Old records  []  Other:    Medication Review:  I have reviewed the current MAR.  Antibiotics  This patient does not have an active medication from one of the medication groupers.     Scheduled Medications  atorvastatin, 80 mg, Oral, Daily  hydrALAZINE, 50 mg, Oral, Q12H  [START ON 5/8/2025] insulin glargine, 30 Units, Subcutaneous, Daily  insulin lispro, 2-9 Units, Subcutaneous, 4x Daily AC & at Bedtime  lisinopril, 10 mg, Oral, Q24H  mupirocin, 1 Application, Each Nare, BID  pantoprazole, 40 mg, Oral, Q AM  senna-docusate sodium, 2 tablet, Oral, BID  sodium chloride, 10 mL, Intravenous, Q12H      ICU Drips     PRN Medications    albuterol sulfate HFA    senna-docusate sodium **AND** polyethylene glycol **AND** bisacodyl **AND** bisacodyl    Calcium Replacement - Follow Nurse / BPA Driven Protocol    dextrose    dextrose    glucagon (human recombinant)    labetalol    Magnesium Standard Dose Replacement - Follow Nurse / BPA Driven Protocol    nitroglycerin    ondansetron    Potassium Replacement - Follow Nurse / BPA Driven Protocol    [COMPLETED] Insert Peripheral IV **AND** sodium chloride    sodium chloride    sodium chloride    Lines, Drains & Airways       Active LDAs       Name Placement date Placement time Site Days    Peripheral IV 05/05/25 2304 20 G Right Antecubital 05/05/25  2304  Antecubital  1    Peripheral IV 05/06/25 0155 18 G Anterior;Left Forearm 05/06/25  0155  Forearm  1                    Diet Orders (active) (From admission, onward)       Start     Ordered    05/06/25 1543  Diet: Diabetic, Cardiac; Healthy Heart (2-3 Na+); Consistent Carbohydrate; Fluid Consistency: Thin (IDDSI 0)  Diet Effective Now         05/06/25 1542                      Assessment:  Hypertensive urgency  Visual disturbance  Hyperglycemia  Pseudohyponatremia  Hypokalemia  Chronic kidney  disease  Hyperlipidemia  History of stroke  Obstructive sleep apnea  Tobacco abuse  Medical noncompliance      Plan of Treatment  - For BP, continue hydralazine 50 mg daily. Add lisinopril 10 mg daily  - Optho consulted, exam consistent with HTN retinopathy. No specific treatment needed except to manage the HTN.  - For DM, glargine 30 units daily, SSI  - Suspected LB, will need outpatient sleep study  - Case management consulted due to loss of health insurance and losing job recently.     Okay for transfer out of ICU today. Will likely need 1-2 more days as inpatient for close monitoring of BP and optimizing DM regimen.      Julio Boudreaux MD  [facility], [facility]  Pulmonary and Critical Care Medicine

## 2025-05-07 NOTE — PLAN OF CARE
Goal Outcome Evaluation:      Pt remains in the CICU. On RA. Receiving sliding scale insulin per order. K replaced er order- recheck pending. Up to the BSC.

## 2025-05-07 NOTE — PLAN OF CARE
Goal Outcome Evaluation:  Plan of Care Reviewed With: patient      Pt is 45 y/o F admitted to [facility] on 5/6/25 with blurry vision - found to have hypertensive retinopathy. PMH: HTN, CAD s/p CABG, CVA, gastroparesis, HLD. At baseline pt is from home with spouse and 10 y/o child - reports spouse if currently at [facility] for recent CVA. Pt is indep with mobility at baseline, reports she does not use AD but does have frequent falls at home. Pt currently demos CGA/min A for transfers and room ambulation. Pt is limited by vision at this time - reports it is getting better but continues to be blurry/double vision - RN aware. Improved balance noted with HHA as pt reaching for furniture throughout session to maintain balance. Mild impulsivity noted as well with cues for safety throughout. Pt demos mobility below baseline and therefore would benefit from acute skilled PT to address functional mobility deficits. Pt may benefit from short rehab stay at d/c as spouse is not home to assist and pt has 10 y/o child at home.            Anticipated Discharge Disposition (PT): home with assist, home with home health, skilled nursing facility (pending progress and assist available at home)

## 2025-05-07 NOTE — THERAPY EVALUATION
Patient Name: Bibiana Inman  : 1980    MRN: [id number]                              Today's Date: 2025       Admit Date: 2025    Visit Dx:     ICD-10-CM ICD-9-CM   1. Blurred vision  H53.8 368.8   2. Poorly controlled diabetes mellitus  E11.65 250.00   3. Hypertensive emergency  I16.1 401.9   4. 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    Abnormal bone density screening    Benign essential hypertension    Chronic cough    Gastroparesis diabeticorum    Elevated erythrocyte sedimentation rate    Fatigue    Gastroesophageal reflux disease    Mixed hypercholesterolemia and hypertriglyceridemia    Intermittent claudication    Iron deficiency anemia    Multiple joint pain    Need for influenza vaccination    Type 2 diabetes mellitus with hyperglycemia, with long-term current use of insulin    Obstructive sleep apnea    Coronary artery disease of native artery of native heart with stable angina pectoris    Abnormal nuclear stress test    Depressive disorder    Back pain    Diabetic peripheral neuropathy associated with type 2 diabetes mellitus    Ingrowing nail, left great toe    Personal history of COVID-19    Polyp of colon    Vitamin D deficiency    Tobacco abuse    CKD stage 3a, GFR 45-59 ml/min    Bilateral carotid artery stenosis    History of CVA (cerebrovascular accident)    Elevated troponin    Aortic valve disease    Overweight (BMI 25.0-29.9)    Hypertensive emergency     Past Medical History:   Diagnosis Date    5,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 Debbie Fry.    Depressive disorder  2023    Diabetic gastroparesis 2021    Diabetic peripheral neuropathy associated with type 2 diabetes mellitus 2024    Gastroesophageal reflux disease 03/10/2021    GERD (gastroesophageal reflux disease)     Intermittent claudication 2017    Iron deficiency anemia 2020    Lacunar cerebrovascular accident (CVA) 2024    Mixed hypercholesterolemia and hypertriglyceridemia 2014    Myocardial infarction 2021    Obesity 3/7/2014    Obstructive sleep apnea 2021    Personal history of COVID-19 2022    Polyp of colon 2023    Spontaneous  2012    Stress fracture of right ankle with routine healing 2024    Stroke 2024    Tobacco abuse 2024    Type 2 diabetes mellitus with hyperglycemia, with long-term current use of insulin 2017    Vitamin D deficiency 2024     Past Surgical History:   Procedure Laterality Date    CARDIAC CATHETERIZATION N/A 2024    Procedure: Left Heart Cath and coronary angiogram;  Surgeon: Jena Barron MD;  Location: [facility] CATH INVASIVE LOCATION;  Service: Cardiology;  Laterality: N/A;     SECTION      CORONARY ARTERY BYPASS GRAFT  2014    LIMA-LAD, SVG-OM2, SVG-PDA, Dr. Debbie Fry.    CORONARY ARTERY BYPASS GRAFT WITH AORTIC VALVE REPAIR/REPLACEMENT N/A 2024    Procedure: YUE; REOPERATIVE STERNOTOMY; CORONARY ARTERY BYPASS GRAFTING TIMES 1 UTILIZING RIGHT SAPHENOUS VEIN; AORTIC VALVE TUMOR ; PRP;  Surgeon: Benja De Luna MD;  Location: [facility];  Service: Cardiothoracic;  Laterality: N/A;    DILATATION AND CURETTAGE      TONSILLECTOMY        General Information       Row Name 25 1133          Physical Therapy Time and Intention    Document Type evaluation  -ST     Mode of Treatment physical therapy  -ST       Row Name 25 1133          General Information    Patient Profile Reviewed yes  -ST     Prior Level of Function independent:;all household  mobility  -ST     Existing Precautions/Restrictions fall  -ST     Barriers to Rehab visual deficit  -[facility] Name 05/07/25 1133          Living Environment    Current Living Arrangements home  -     People in Home spouse  -[facility] Name 05/07/25 1133          Home Main Entrance    Number of Stairs, Main Entrance none  -[facility] Name 05/07/25 1133          Cognition    Orientation Status (Cognition) oriented x 4  -[facility] Name 05/07/25 1133          Safety Issues/Impairments Affecting Functional Mobility    Safety Issues Affecting Function (Mobility) safety precautions follow-through/compliance;insight into deficits/self-awareness;impulsivity;awareness of need for assistance  -     Impairments Affecting Function (Mobility) balance;endurance/activity tolerance;postural/trunk control  -     Comment, Safety Issues/Impairments (Mobility) gait belt, nonskid socks donned  -               User Key  (r) = Recorded By, (t) = Taken By, (c) = Cosigned By      Initials Name Provider Type    Angela Mauro, PT Physical Therapist                   Mobility       [facility] Name 05/07/25 1134          Bed Mobility    Bed Mobility supine-sit;sit-supine  -     Supine-Sit [place] (Bed Mobility) standby assist  -     Assistive Device (Bed Mobility) bed rails;head of bed elevated  -[facility] Name 05/07/25 1134          Bed-Chair Transfer    Bed-Chair [place] (Transfers) verbal cues;contact guard;minimum assist (75% patient effort)  -     Comment, (Bed-Chair Transfer) HHA to and from [facility]  -[facility] Name 05/07/25 1134          Sit-Stand Transfer    Sit-Stand [place] (Transfers) verbal cues;contact guard;minimum assist (75% patient effort)  -     Comment, (Sit-Stand Transfer) HHA  -[facility] Name 05/07/25 1134          Gait/Stairs (Locomotion)    [place] Level (Gait) verbal cues;contact guard;minimum assist (75% patient effort)  -     Assistive Device (Gait) --  HHA  -      Distance in Feet (Gait) 12  -ST     Deviations/Abnormal Patterns (Gait) gait speed decreased;stride length decreased;vipul decreased;base of support, narrow  -ST     Bilateral Gait Deviations forward flexed posture;heel strike decreased  -ST     Comment, (Gait/Stairs) pt mildly impulsive - reaching for furniture as vision remains decreased  -ST               User Key  (r) = Recorded By, (t) = Taken By, (c) = Cosigned By      Initials Name Provider Type    Angela Mauro, PT Physical Therapist                   Obj/Interventions       Row Name 05/07/25 1135          Range of Motion Comprehensive    Comment, General Range of Motion bilat LE WFL  -ST       Row Name 05/07/25 1135          Strength Comprehensive (MMT)    Comment, General Manual Muscle Testing (MMT) Assessment bilat LE WFL - grossly 4/5 bilat  -ST       Row Name 05/07/25 1135          Balance    Comment, Balance mild unsteadiness and impulsiveness as pt frequently reaching for objects - mild improvement in balance with use of HHA  -ST               User Key  (r) = Recorded By, (t) = Taken By, (c) = Cosigned By      Initials Name Provider Type    Angela Mauro, PT Physical Therapist                   Goals/Plan       [facility] Name 05/07/25 1142          Bed Mobility Goal 1 (PT)    Activity/Assistive Device (Bed Mobility Goal 1, PT) bed mobility activities, all  -ST     [place] Level/Cues Needed (Bed Mobility Goal 1, PT) standby assist  -ST     Time Frame (Bed Mobility Goal 1, PT) 1 week  -ST     Progress/Outcomes (Bed Mobility Goal 1, PT) new goal  -[facility] Name 05/07/25 1142          Transfer Goal 1 (PT)    Activity/Assistive Device (Transfer Goal 1, PT) sit-to-stand/stand-to-sit;bed-to-chair/chair-to-bed  -ST     [place] Level/Cues Needed (Transfer Goal 1, PT) supervision required  -ST     Time Frame (Transfer Goal 1, PT) 1 week  -ST     Progress/Outcome (Transfer Goal 1, PT) new goal  -[facility] Name 05/07/25 1142           Gait Training Goal 1 (PT)    Activity/Assistive Device (Gait Training Goal 1, PT) gait (walking locomotion);assistive device use  -ST     [place] Level (Gait Training Goal 1, PT) standby assist  -ST     Distance (Gait Training Goal 1, PT) 150'  -ST     Time Frame (Gait Training Goal 1, PT) 1 week  -ST     Progress/Outcome (Gait Training Goal 1, PT) new goal  -ST               User Key  (r) = Recorded By, (t) = Taken By, (c) = Cosigned By      Initials Name Provider Type    ST Angela Ty, PT Physical Therapist                   Clinical Impression       Row Name 05/07/25 1141          Pain    Pretreatment Pain Rating 0/10 - no pain  -ST     Posttreatment Pain Rating 0/10 - no pain  -ST       Row Name 05/07/25 1141          Plan of Care Review    Plan of Care Reviewed With patient  -ST     Outcome Evaluation Pt is 43 y/o F admitted to [facility] on 5/6/25 with blurry vision - found to have hypertensive retinopathy. PMH: HTN, CAD s/p CABG, CVA, gastroparesis, HLD. At baseline pt is from home with spouse and 10 y/o child - reports spouse if currently at IRF for recent CVA. Pt is indep with mobility at baseline, reports she does not use AD but does have frequent falls at home. Pt currently demos CGA/min A for transfers and room ambulation. Pt is limited by vision at this time - reports it is getting better but continues to be blurry/double vision - RN aware. Improved balance noted with HHA as pt reaching for furniture throughout session to maintain balance. Mild impulsivity noted as well with cues for safety throughout. Pt demos mobility below baseline and therefore would benefit from acute skilled PT to address functional mobility deficits. Pt may benefit from short rehab stay at d/c as spouse is not home to assist and pt has 10 y/o child at home.  -ST       Row Name 05/07/25 1141          Therapy Assessment/Plan (PT)    Rehab Potential (PT) good  -ST     Criteria for Skilled Interventions Met (PT) yes  -ST      Therapy Frequency (PT) 6 times/wk  -ST     Predicted Duration of Therapy Intervention (PT) 1 week  -ST       Row Name 05/07/25 1141          Positioning and Restraints    Pre-Treatment Position in bed  -ST     Post Treatment Position chair  -ST     In Chair notified nsg;reclined;call light within reach;encouraged to call for assist;exit alarm on  -ST               User Key  (r) = Recorded By, (t) = Taken By, (c) = Cosigned By      Initials Name Provider Type    Angela Mauro, CLIFF Physical Therapist                   Outcome Measures       Row Name 05/07/25 1142 05/07/25 0800       How much help from another person do you currently need...    Turning from your back to your side while in flat bed without using bedrails? 4  -ST 4  -KP    Moving from lying on back to sitting on the side of a flat bed without bedrails? 3  -ST 4  -KP    Moving to and from a bed to a chair (including a wheelchair)? 3  -ST 3  -KP    Standing up from a chair using your arms (e.g., wheelchair, bedside chair)? 3  -ST 3  -KP    Climbing 3-5 steps with a railing? 3  -ST 3  -KP    To walk in hospital room? 3  -ST 3  -KP    AM-PAC 6 Clicks Score (PT) 19  -ST 20  -KP      Row Name 05/07/25 0000          How much help from another person do you currently need...    Turning from your back to your side while in flat bed without using bedrails? 4  -JM     Moving from lying on back to sitting on the side of a flat bed without bedrails? 4  -JM     Moving to and from a bed to a chair (including a wheelchair)? 4  -JM     Standing up from a chair using your arms (e.g., wheelchair, bedside chair)? 3  -JM     Climbing 3-5 steps with a railing? 3  -JM     To walk in hospital room? 3  -JM     AM-PAC 6 Clicks Score (PT) 21  -JM               User Key  (r) = Recorded By, (t) = Taken By, (c) = Cosigned By      Initials Name Provider Type    Rosaura Romano, RN Registered Nurse    Naina Tamez, RN Registered Nurse    Angela Mauro, CLIFF  Physical Therapist                                 Physical Therapy Education       Title: PT OT SLP Therapies (In Progress)       Topic: Physical Therapy (In Progress)       Point: Mobility training (Done)       Learning Progress Summary            Patient Acceptance, E,TB, VU,NR by  at 5/7/2025 1143                      Point: Home exercise program (Not Started)       Learner Progress:  Not documented in this visit.              Point: Body mechanics (Done)       Learning Progress Summary            Patient Acceptance, E,TB, VU,NR by  at 5/7/2025 1143                      Point: Precautions (Not Started)       Learner Progress:  Not documented in this visit.                              User Key       Initials Effective Dates Name Provider Type Discipline     09/22/22 -  Angela Ty, CLIFF Physical Therapist PT                  PT Recommendation and Plan     Outcome Evaluation: Pt is 43 y/o F admitted to [facility] on 5/6/25 with blurry vision - found to have hypertensive retinopathy. PMH: HTN, CAD s/p CABG, CVA, gastroparesis, HLD. At baseline pt is from home with spouse and 10 y/o child - reports spouse if currently at IRF for recent CVA. Pt is indep with mobility at baseline, reports she does not use AD but does have frequent falls at home. Pt currently demos CGA/min A for transfers and room ambulation. Pt is limited by vision at this time - reports it is getting better but continues to be blurry/double vision - RN aware. Improved balance noted with HHA as pt reaching for furniture throughout session to maintain balance. Mild impulsivity noted as well with cues for safety throughout. Pt demos mobility below baseline and therefore would benefit from acute skilled PT to address functional mobility deficits. Pt may benefit from short rehab stay at d/c as spouse is not home to assist and pt has 10 y/o child at home.     Time Calculation:         PT Charges       Row Name 05/07/25 1146             Time  Calculation    Start Time 1055  -ST      Stop Time 1115  -ST      Time Calculation (min) 20 min  -ST      PT Received On 05/07/25  -ST      PT - Next Appointment 05/08/25  -ST      PT Goal Re-Cert Due Date 05/14/25  -ST         Time Calculation- PT    Total Timed Code Minutes- PT 0 minute(s)  -ST                User Key  (r) = Recorded By, (t) = Taken By, (c) = Cosigned By      Initials Name Provider Type    Angela Mauro, PT Physical Therapist                  Therapy Charges for Today       Code Description Service Date Service Provider Modifiers Qty    [id number] HC PT EVAL MOD COMPLEXITY 3 5/7/2025 Angela Ty, PT GP 1            PT G-Codes  AM-PAC 6 Clicks Score (PT): 19  PT Discharge Summary  Anticipated Discharge Disposition (PT): home with assist, home with home health, skilled nursing facility (pending progress and assist available at home)    Angela Ty, PT  5/7/2025

## 2025-05-08 ENCOUNTER — READMISSION MANAGEMENT (OUTPATIENT)
Dept: CALL CENTER | Facility: HOSPITAL | Age: 45
End: 2025-05-08
Payer: MEDICAID

## 2025-05-08 VITALS
DIASTOLIC BLOOD PRESSURE: 73 MMHG | HEIGHT: 64 IN | WEIGHT: 138.1 LBS | HEART RATE: 94 BPM | OXYGEN SATURATION: 99 % | BODY MASS INDEX: 23.58 KG/M2 | TEMPERATURE: 98.2 F | RESPIRATION RATE: 18 BRPM | SYSTOLIC BLOOD PRESSURE: 160 MMHG

## 2025-05-08 LAB
ANION GAP SERPL CALCULATED.3IONS-SCNC: 7 MMOL/L (ref 5–15)
BASOPHILS # BLD AUTO: 0.06 10*3/MM3 (ref 0–0.2)
BASOPHILS NFR BLD AUTO: 0.6 % (ref 0–1.5)
BUN SERPL-MCNC: 13 MG/DL (ref 6–20)
BUN/CREAT SERPL: 7.9 (ref 7–25)
CALCIUM SPEC-SCNC: 8.2 MG/DL (ref 8.6–10.5)
CHLORIDE SERPL-SCNC: 108 MMOL/L (ref 98–107)
CO2 SERPL-SCNC: 18 MMOL/L (ref 22–29)
CREAT SERPL-MCNC: 1.65 MG/DL (ref 0.57–1)
DEPRECATED RDW RBC AUTO: 44 FL (ref 37–54)
EGFRCR SERPLBLD CKD-EPI 2021: 38.7 ML/MIN/1.73
EOSINOPHIL # BLD AUTO: 0.39 10*3/MM3 (ref 0–0.4)
EOSINOPHIL NFR BLD AUTO: 3.9 % (ref 0.3–6.2)
ERYTHROCYTE [DISTWIDTH] IN BLOOD BY AUTOMATED COUNT: 13.1 % (ref 12.3–15.4)
GLUCOSE BLDC GLUCOMTR-MCNC: 138 MG/DL (ref 70–130)
GLUCOSE BLDC GLUCOMTR-MCNC: 241 MG/DL (ref 70–130)
GLUCOSE BLDC GLUCOMTR-MCNC: 301 MG/DL (ref 70–130)
GLUCOSE SERPL-MCNC: 132 MG/DL (ref 65–99)
HCT VFR BLD AUTO: 32.3 % (ref 34–46.6)
HGB BLD-MCNC: 10.3 G/DL (ref 12–15.9)
IMM GRANULOCYTES # BLD AUTO: 0.04 10*3/MM3 (ref 0–0.05)
IMM GRANULOCYTES NFR BLD AUTO: 0.4 % (ref 0–0.5)
LYMPHOCYTES # BLD AUTO: 2.34 10*3/MM3 (ref 0.7–3.1)
LYMPHOCYTES NFR BLD AUTO: 23.7 % (ref 19.6–45.3)
MAGNESIUM SERPL-MCNC: 1.8 MG/DL (ref 1.6–2.6)
MCH RBC QN AUTO: 29.4 PG (ref 26.6–33)
MCHC RBC AUTO-ENTMCNC: 31.9 G/DL (ref 31.5–35.7)
MCV RBC AUTO: 92.3 FL (ref 79–97)
MONOCYTES # BLD AUTO: 0.53 10*3/MM3 (ref 0.1–0.9)
MONOCYTES NFR BLD AUTO: 5.4 % (ref 5–12)
NEUTROPHILS NFR BLD AUTO: 6.53 10*3/MM3 (ref 1.7–7)
NEUTROPHILS NFR BLD AUTO: 66 % (ref 42.7–76)
NRBC BLD AUTO-RTO: 0 /100 WBC (ref 0–0.2)
PLATELET # BLD AUTO: 314 10*3/MM3 (ref 140–450)
PMV BLD AUTO: 10.7 FL (ref 6–12)
POTASSIUM SERPL-SCNC: 4.4 MMOL/L (ref 3.5–5.2)
RBC # BLD AUTO: 3.5 10*6/MM3 (ref 3.77–5.28)
SODIUM SERPL-SCNC: 133 MMOL/L (ref 136–145)
WBC NRBC COR # BLD AUTO: 9.89 10*3/MM3 (ref 3.4–10.8)

## 2025-05-08 PROCEDURE — 36415 COLL VENOUS BLD VENIPUNCTURE: CPT

## 2025-05-08 PROCEDURE — 85025 COMPLETE CBC W/AUTO DIFF WBC: CPT

## 2025-05-08 PROCEDURE — 80048 BASIC METABOLIC PNL TOTAL CA: CPT

## 2025-05-08 PROCEDURE — 82948 REAGENT STRIP/BLOOD GLUCOSE: CPT

## 2025-05-08 PROCEDURE — 83735 ASSAY OF MAGNESIUM: CPT

## 2025-05-08 PROCEDURE — 97116 GAIT TRAINING THERAPY: CPT

## 2025-05-08 PROCEDURE — 63710000001 INSULIN GLARGINE PER 5 UNITS: Performed by: STUDENT IN AN ORGANIZED HEALTH CARE EDUCATION/TRAINING PROGRAM

## 2025-05-08 PROCEDURE — 63710000001 INSULIN LISPRO (HUMAN) PER 5 UNITS: Performed by: STUDENT IN AN ORGANIZED HEALTH CARE EDUCATION/TRAINING PROGRAM

## 2025-05-08 RX ORDER — PEN NEEDLE, DIABETIC 30 GX3/16"
1 NEEDLE, DISPOSABLE MISCELLANEOUS 4 TIMES DAILY
Qty: 100 EACH | Refills: 0 | Status: SHIPPED | OUTPATIENT
Start: 2025-05-08

## 2025-05-08 RX ORDER — LISINOPRIL 10 MG/1
10 TABLET ORAL
Qty: 30 TABLET | Refills: 1 | Status: SHIPPED | OUTPATIENT
Start: 2025-05-09

## 2025-05-08 RX ORDER — INSULIN DEGLUDEC 100 U/ML
30 INJECTION, SOLUTION SUBCUTANEOUS DAILY
Qty: 15 ML | Refills: 1 | Status: SHIPPED | OUTPATIENT
Start: 2025-05-08

## 2025-05-08 RX ORDER — INSULIN ASPART 100 [IU]/ML
5 INJECTION, SOLUTION INTRAVENOUS; SUBCUTANEOUS
Qty: 15 ML | Refills: 1 | Status: SHIPPED | OUTPATIENT
Start: 2025-05-08

## 2025-05-08 RX ADMIN — INSULIN GLARGINE 30 UNITS: 100 INJECTION, SOLUTION SUBCUTANEOUS at 08:56

## 2025-05-08 RX ADMIN — HYDRALAZINE HYDROCHLORIDE 50 MG: 50 TABLET ORAL at 08:56

## 2025-05-08 RX ADMIN — INSULIN LISPRO 4 UNITS: 100 INJECTION, SOLUTION INTRAVENOUS; SUBCUTANEOUS at 12:14

## 2025-05-08 RX ADMIN — Medication 10 ML: at 08:57

## 2025-05-08 RX ADMIN — LISINOPRIL 10 MG: 10 TABLET ORAL at 08:56

## 2025-05-08 RX ADMIN — MUPIROCIN 1 APPLICATION: 20 OINTMENT TOPICAL at 08:56

## 2025-05-08 RX ADMIN — PANTOPRAZOLE SODIUM 40 MG: 40 TABLET, DELAYED RELEASE ORAL at 06:56

## 2025-05-08 RX ADMIN — INSULIN LISPRO 7 UNITS: 100 INJECTION, SOLUTION INTRAVENOUS; SUBCUTANEOUS at 16:47

## 2025-05-08 NOTE — THERAPY TREATMENT NOTE
Patient Name: Bibiana Inman  : 1980    MRN: [id number]                              Today's Date: 2025       Admit Date: 2025    Visit Dx:     ICD-10-CM ICD-9-CM   1. Blurred vision  H53.8 368.8   2. Poorly controlled diabetes mellitus  E11.65 250.00   3. Hypertensive emergency  I16.1 401.9   4. Tobacco abuse  Z72.0 305.1   5. Multiple joint pain  M25.50 719.49   6. 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    Abnormal bone density screening    Benign essential hypertension    Chronic cough    Gastroparesis diabeticorum    Elevated erythrocyte sedimentation rate    Fatigue    Gastroesophageal reflux disease    Mixed hypercholesterolemia and hypertriglyceridemia    Intermittent claudication    Iron deficiency anemia    Multiple joint pain    Need for influenza vaccination    Type 2 diabetes mellitus with hyperglycemia, with long-term current use of insulin    Obstructive sleep apnea    Coronary artery disease of native artery of native heart with stable angina pectoris    Abnormal nuclear stress test    Depressive disorder    Back pain    Diabetic peripheral neuropathy associated with type 2 diabetes mellitus    Ingrowing nail, left great toe    Personal history of COVID-19    Polyp of colon    Vitamin D deficiency    Tobacco abuse    CKD stage 3a, GFR 45-59 ml/min    Bilateral carotid artery stenosis    History of CVA (cerebrovascular accident)    Elevated troponin    Aortic valve disease    Overweight (BMI 25.0-29.9)    Hypertensive emergency     Past Medical History:   Diagnosis Date    5,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 Debbie Fry.    Depressive disorder 2023    Diabetic gastroparesis 2021    Diabetic peripheral neuropathy associated with type 2 diabetes mellitus 2024    Gastroesophageal reflux disease 03/10/2021    GERD (gastroesophageal reflux disease)     Intermittent claudication 2017    Iron deficiency anemia 2020    Lacunar cerebrovascular accident (CVA) 2024    Mixed hypercholesterolemia and hypertriglyceridemia 2014    Myocardial infarction 2021    Obesity 3/7/2014    Obstructive sleep apnea 2021    Personal history of COVID-19 2022    Polyp of colon 2023    Spontaneous  2012    Stress fracture of right ankle with routine healing 2024    Stroke 2024    Tobacco abuse 2024    Type 2 diabetes mellitus with hyperglycemia, with long-term current use of insulin 2017    Vitamin D deficiency 2024     Past Surgical History:   Procedure Laterality Date    CARDIAC CATHETERIZATION N/A 2024    Procedure: Left Heart Cath and coronary angiogram;  Surgeon: Jena Barron MD;  Location: [facility] CATH [facility]VAS[facility];  Service: Cardiology;  Laterality: N/A;     SECTION      CORONARY ARTERY BYPASS GRAFT  2014    LIMA-LAD, SVG-OM2, SVG-PDA, Dr. Debbie Fry.    CORONARY ARTERY BYPASS GRAFT WITH AORTIC VALVE REPAIR/REPLACEMENT N/A 2024    Procedure: YUE; REOPERATIVE STERNOTOMY; CORONARY ARTERY BYPASS GRAFTING TIMES 1 UTILIZING RIGHT SAPHENOUS VEIN; AORTIC VALVE TUMOR ; PRP;  Surgeon: Benja De Luna MD;  Location: [facility];  Service: Cardiothoracic;  Laterality: N/A;    DILATATION AND CURETTAGE      TONSILLECTOMY        General Information       Row Name 25 1549          Physical Therapy Time and Intention    Document Type therapy note (daily note)  -ST     Mode of Treatment physical therapy  -ST       Row Name 25 1549          General Information     Patient Profile Reviewed yes  -ST     Existing Precautions/Restrictions fall  -ST       Row Name 05/08/25 1549          Cognition    Orientation Status (Cognition) oriented x 4  -ST       Row Name 05/08/25 1549          Safety Issues/Impairments Affecting Functional Mobility    Impairments Affecting Function (Mobility) balance;endurance/activity tolerance;postural/trunk control  -ST     Comment, Safety Issues/Impairments (Mobility) gait belt, nonskid socks donned  -ST               User Key  (r) = Recorded By, (t) = Taken By, (c) = Cosigned By      Initials Name Provider Type    Angela Mauro, PT Physical Therapist                   Mobility       Row Name 05/08/25 1550          Bed Mobility    Supine-Sit Benn[place] (Bed Mobility) standby assist  -     Sit-Supine [place] (Bed Mobility) standby assist  -       Row Name 05/08/25 1550          Sit-Stand Transfer    Sit-Stand Bennet (Transfers) contact guard;verbal cues  -ST     Assistive Device (Sit-Stand Transfers) walker, front-wheeled  -ST     Comment, (Sit-Stand Transfer) with and without rwx, cues for hand placement  -       Row Name 05/08/25 1550          Gait/Stairs (Locomotion)    Bennet Level (Gait) verbal cues;contact guard;standby assist  -     Assistive Device (Gait) --  HHA vs rwx  -ST     Distance in Feet (Gait) 50  with HHA, 75' with rwx  -ST     Deviations/Abnormal Patterns (Gait) gait speed decreased;stride length decreased;vipul decreased;base of support, narrow  -ST     Bilateral Gait Deviations forward flexed posture;heel strike decreased  -ST     Comment, (Gait/Stairs) less impulsivety today - improved overall mobility with rwx and pt appears more comfortable  -ST               User Key  (r) = Recorded By, (t) = Taken By, (c) = Cosigned By      Initials Name Provider Type    Angela Mauro PT Physical Therapist                   Obj/Interventions       Row Name 05/08/25 1551          Balance     Comment, Balance mild unsteadiness initially needing CGA with HHA as pt strongly  PT's hand. significant improvement to SBA with us of rwx vs no AD. no overt LOB today  -ST               User Key  (r) = Recorded By, (t) = Taken By, (c) = Cosigned By      Initials Name Provider Type    Angela Mauro PT Physical Therapist                   Goals/Plan    No documentation.                  Clinical Impression       Row Name 05/08/25 1552          Pain    Pretreatment Pain Rating 0/10 - no pain  -ST     Posttreatment Pain Rating 0/10 - no pain  -ST       Row Name 05/08/25 1552          Plan of Care Review    Plan of Care Reviewed With patient  -ST     Progress improving  -ST     Outcome Evaluation Pt seen for PT this PM - continues mild blurry vision but reports it continues to improve. Visual deficits noted most with mobility - pt with strong  on PT's hand while ambulated without AD in hallway and appears very guarded and anxious. Educated pt on need for lights on when mobilizing and slow transitions/turns. Did trial rolling walker with signficant improvement in balance to SBA/supervision and less anxiousness regarding mobility. Would recommend rolling walker for use at home for optimal safety at this time. Plans to stay with mother in law for a while to have some assist as well.  -       Row Name 05/08/25 1552          Therapy Assessment/Plan (PT)    Rehab Potential (PT) good  -ST     Criteria for Skilled Interventions Met (PT) yes  -ST     Therapy Frequency (PT) 6 times/wk  -       Row Name 05/08/25 1552          Positioning and Restraints    Pre-Treatment Position in bed  -ST     Post Treatment Position bed  -ST     In Bed notified nsg;sitting EOB;call light within reach;encouraged to call for assist;exit alarm on  -ST               User Key  (r) = Recorded By, (t) = Taken By, (c) = Cosigned By      Initials Name Provider Type    Angela Mauro PT Physical Therapist                    Outcome Measures       Row Name 05/08/25 1554 05/08/25 0747       How much help from another person do you currently need...    Turning from your back to your side while in flat bed without using bedrails? 4  -ST 4  -MB    Moving from lying on back to sitting on the side of a flat bed without bedrails? 4  -ST 4  -MB    Moving to and from a bed to a chair (including a wheelchair)? 3  -ST 3  -MB    Standing up from a chair using your arms (e.g., wheelchair, bedside chair)? 3  -ST 3  -MB    Climbing 3-5 steps with a railing? 3  -ST 3  -MB    To walk in hospital room? 3  -ST 3  -MB    AM-PAC 6 Clicks Score (PT) 20  -ST 20  -MB              User Key  (r) = Recorded By, (t) = Taken By, (c) = Cosigned By      Initials Name Provider Type    Glo Robert, RN Registered Nurse    Angela Mauro PT Physical Therapist                                 Physical Therapy Education       Title: PT OT SLP Therapies (In Progress)       Topic: Physical Therapy (In Progress)       Point: Mobility training (Done)       Learning Progress Summary            Patient Acceptance, TB,E, VU,NR by  at 5/8/2025 1554    Acceptance, E,TB, VU,NR by  at 5/7/2025 1143                      Point: Home exercise program (Not Started)       Learner Progress:  Not documented in this visit.              Point: Body mechanics (Done)       Learning Progress Summary            Patient Acceptance, TB,E, VU,NR by  at 5/8/2025 1554    Acceptance, E,TB, VU,NR by  at 5/7/2025 1143                      Point: Precautions (Done)       Learning Progress Summary            Patient Acceptance, TB,E, VU,NR by  at 5/8/2025 1554                                      User Key       Initials Effective Dates Name Provider Type Discipline     09/22/22 -  Angela Ty PT Physical Therapist PT                  PT Recommendation and Plan     Progress: improving  Outcome Evaluation: Pt seen for PT this PM - continues mild blurry vision but  reports it continues to improve. Visual deficits noted most with mobility - pt with strong  on PT's hand while ambulated without AD in hallway and appears very guarded and anxious. Educated pt on need for lights on when mobilizing and slow transitions/turns. Did trial rolling walker with signficant improvement in balance to SBA/supervision and less anxiousness regarding mobility. Would recommend rolling walker for use at home for optimal safety at this time. Plans to stay with mother in law for a while to have some assist as well.     Time Calculation:         PT Charges       Row Name 05/08/25 1554             Time Calculation    Start Time 1447  -ST      Stop Time 1501  -ST      Time Calculation (min) 14 min  -ST      PT Received On 05/08/25  -ST      PT - Next Appointment 05/09/25  -ST         Time Calculation- PT    Total Timed Code Minutes- PT 14 minute(s)  -ST         Timed Charges    71080 - Gait Training Minutes  10  -ST      31028 - PT Therapeutic Activity Minutes 4  -ST         Total Minutes    Timed Charges Total Minutes 14  -ST       Total Minutes 14  -ST                User Key  (r) = Recorded By, (t) = Taken By, (c) = Cosigned By      Initials Name Provider Type    ST Angela Ty, PT Physical Therapist                  Therapy Charges for Today       Code Description Service Date Service Provider Modifiers Qty    20596007264 HC PT EVAL MOD COMPLEXITY 3 5/7/2025 Angela Ty, PT GP 1    17296621575 HC GAIT TRAINING EA 15 MIN 5/8/2025 Angela Ty PT GP 1            PT G-Codes  AM-PAC 6 Clicks Score (PT): 20  PT Discharge Summary  Anticipated Discharge Disposition (PT): home with assist, home with outpatient therapy services    Angela Ty PT  5/8/2025

## 2025-05-08 NOTE — DISCHARGE SUMMARY
Patient Name: Bibiana Inman  : 1980  MRN: [id number]    Date of Admission: 2025  Date of Discharge:  2025  Primary Care Physician: Ibrahima Correa MD    Chief Complaint:   Blurred Vision      Discharge Diagnoses     Hypertensive urgency  Hypertensive retinopathy  Hyperglycemia  Pseudohyponatremia  Hypokalemia  Chronic kidney disease  Hyperlipidemia  History of stroke  Obstructive sleep apnea  Tobacco abuse  Medical noncompliance    Hospital Course     Patient was admitted to the ICU for hypertensive urgency requiring nicardipine drip.  She was transition to home BP meds, lisinopril 10 mg daily and hydralazine 50 mg twice daily.  She has not been taking these blood pressure meds for approximately 2 months.  Her blood pressure normalized.  Ophthalmology was consulted, they evaluated her and diagnosed her with hypertensive retinopathy.  No specific treatment was recommended except for improved blood pressure control.  Her vision was blurry on admission, but improved throughout her stay.  Vision is not completely at baseline yet.  I recommended to her that she not drive until her vision is back to her baseline.  At time of discharge, she is being discharged with recommendations to take Tresiba and short acting insulin.  I recommended that she contact her primary care doctor to be seen within 1 to 2 weeks for reevaluation of her blood pressure and further education regarding her diabetes management.  I will get her follow-up in our pulmonary clinic for further evaluation of possible sleep apnea.      At the time of discharge patient was told to take all medications as prescribed, keep all follow-up appointments, and call their doctor or return to the hospital with any worsening or concerning symptoms.    Day of Discharge     Subjective:  No acute events overnight.  Blood pressure has been better controlled.    Review of Systems   All other systems reviewed and are negative.      Physical  Exam:  Temp:  [98.2 °F (36.8 °C)-98.6 °F (37 °C)] 98.2 °F (36.8 °C)  Heart Rate:  [71-94] 94  Resp:  [18] 18  BP: (132-184)/(61-86) 160/73  Body mass index is 23.7 kg/m².  Physical Exam  Constitutional:       Appearance: Normal appearance.   HENT:      Head: Normocephalic and atraumatic.      Nose: Nose normal.      Mouth/Throat:      Mouth: Mucous membranes are moist.      Pharynx: Oropharynx is clear.   Eyes:      Extraocular Movements: Extraocular movements intact.      Conjunctiva/sclera: Conjunctivae normal.   Cardiovascular:      Rate and Rhythm: Normal rate and regular rhythm.      Pulses: Normal pulses.      Heart sounds: Normal heart sounds. No murmur heard.  Pulmonary:      Effort: Pulmonary effort is normal. No respiratory distress.      Breath sounds: Normal breath sounds. No stridor. No wheezing or rales.   Abdominal:      General: Abdomen is flat. Bowel sounds are normal.      Palpations: Abdomen is soft.   Skin:     General: Skin is warm and dry.   Neurological:      General: No focal deficit present.      Mental Status: She is alert and oriented to person, place, and time. Mental status is at baseline.   Psychiatric:         Mood and Affect: Mood normal.         Behavior: Behavior normal.         Consultants     Consult Orders (all) (From admission, onward)       Start     Ordered    05/06/25 0350  Inpatient Ophthalmology Consult  Once        Specialty:  Ophthalmology  Provider:  Florin Arreaga MD    05/06/25 0349    05/06/25 0335  Inpatient Diabetes Educator Consult  Once        Provider:  (Not yet assigned)    05/06/25 0336    05/06/25 0150  Inpatient Advance Care Planning Consult  Once        Provider:  (Not yet assigned)    05/06/25 0149    05/06/25 0149  Inpatient Case Management  Consult  Once        Provider:  (Not yet assigned)    05/06/25 0148    05/06/25 0107  Pulmonology (on-call MD unless specified)  Once        Specialty:  Pulmonary Disease  Provider:  Deandre Metcalf  MD    05/06/25 0106                  Procedures     Imaging Results (All)       Procedure Component Value Units Date/Time    XR Ankle 3+ View Right [[id number]] Collected: 05/06/25 0350     Updated: 05/06/25 0350    Narrative:        Patient: SAVITA BELCHER  Time Out: 03:49  Exam(s): XR RIGHT ANKLE     EXAM:    XR Right Ankle Complete, 3 or More Views    CLINICAL HISTORY:     Reason for exam: fall, pain.    TECHNIQUE:    Frontal, lateral and oblique views of the right ankle.    COMPARISON:    No relevant prior studies available.    FINDINGS:    Bones joints:  No evidence of acute fracture or dislocation.  Plantar   calcaneal spur.    Soft tissues:  Unremarkable.    IMPRESSION:         No evidence of acute fracture or dislocation.      Impression:          Electronically signed by Thai Mays MD on 05-06-25 at 0349    XR Hip With or Without Pelvis 2 - 3 View Right [[id number]] Collected: 05/06/25 0350     Updated: 05/06/25 0350    Narrative:        Patient: SAVITA BELCHER  Time Out: 03:49  Exam(s): XR HIP + PELVIS     EXAM:    XR Pelvis, 1 or 2 Views    CLINICAL HISTORY:     Reason for exam: fall, pain.    TECHNIQUE:    Frontal view of the pelvis.    COMPARISON:    No relevant prior studies available.    FINDINGS:    Bones joints:  No evidence of acute fracture or dislocation.    Soft tissues:  Unremarkable.    IMPRESSION:         No evidence of acute fracture or dislocation.      Impression:          Electronically signed by Thai Mays MD on 05-06-25 at 0349    XR Elbow 2 View Right [[id number]] Collected: 05/06/25 0349     Updated: 05/06/25 0349    Narrative:        Patient: SAVITA BELCHER  Time Out: 03:48  Exam(s): XR RIGHT ELBOW     EXAM:    XR Right Elbow Complete, 3 or More Views    CLINICAL HISTORY:     Reason for exam: fall, pain.    TECHNIQUE:    Frontal, lateral and oblique views of the right elbow.    COMPARISON:    No relevant prior studies available.    FINDINGS:    Bones joints:  No evidence of acute  fracture or dislocation.    Soft tissues:  Unremarkable.    IMPRESSION:         No evidence of acute fracture or dislocation.      Impression:          Electronically signed by Thai Mays MD on 05-06-25 at 0348    CT Head Without Contrast [[id number]] Collected: 05/06/25 0036     Updated: 05/06/25 0041    Narrative:      HEAD CT WITHOUT CONTRAST     REASON:  bilateral blurry vision and HTN      COMPARISON STUDIES: 7/4/2024.     TECHNIQUE:  Axial images were acquired from the skull base to vertex  without contrast, including multiplanar reformats, per standard  departmental protocol.    Radiation dose reduction techniques were  utilized, including automated exposure control, and exposure modulation  based on body size.     FINDINGS:     There is no CT evidence of acute intracranial hemorrhage, mass, or  infarct. There is volume loss, but there is no evidence of hydrocephalus  or extra-axial fluid collection.     There is left basal ganglier encephalomalacia, and there is mild  nonspecific white matter change, but there is no evidence of acute  intracranial abnormality.     Skull base, calvarium, and extracranial soft tissues show no acute  abnormality.       Impression:         Chronic changes as noted above, no acute intracranial abnormality.                        This report was finalized on 5/6/2025 12:38 AM by Dr. Avelino Eagle M.D on Workstation: [id number]       XR Chest 1 View [[id number]] Collected: 05/06/25 0016     Updated: 05/06/25 0021    Narrative:      CXR ONE VIEW      HISTORY: HTN emergency     COMPARISON: 7/4/2024     TECHNIQUE: single portable AP       Impression:         The heart size is within normal limits. Prior sternotomy and bypass.     The lungs are normally aerated. There is no pleural effusion or  pneumothorax.           This report was finalized on 5/6/2025 12:18 AM by Dr. Avelino Eagle M.D on Workstation: [id number]               Pertinent Labs     Results from last 7 days    Lab Units 05/08/25  0241 05/07/25  0335 05/06/25  0358 05/05/25  2305   WBC 10*3/mm3 9.89 10.20 10.34 7.23   HEMOGLOBIN g/dL 10.3* 11.6* 11.1* 12.7   PLATELETS 10*3/mm3 314 387 348 377     Results from last 7 days   Lab Units 05/08/25  0241 05/07/25  0650 05/07/25  0335 05/06/25  1658 05/06/25 0358 05/05/25  2305   SODIUM mmol/L 133*  --  130*  --  134* 127*   POTASSIUM mmol/L 4.4 5.8* 5.7* 3.5 2.2* 2.9*   CHLORIDE mmol/L 108*  --  108*  --  100 93*   CO2 mmol/L 18.0*  --  17.7*  --  22.3 18.2*   BUN mg/dL 13  --  9  --  12 12   CREATININE mg/dL 1.65*  --  1.53*  --  1.61* 1.52*   GLUCOSE mg/dL 132*  --  214*  --  400* 750*   Estimated Creatinine Clearance: 42.1 mL/min (A) (by C-G formula based on SCr of 1.65 mg/dL (H)).  Results from last 7 days   Lab Units 05/05/25  2305   ALBUMIN g/dL 2.3*   BILIRUBIN mg/dL <0.2   ALK PHOS U/L 234*   AST (SGOT) U/L 11   ALT (SGPT) U/L 8     Results from last 7 days   Lab Units 05/08/25  0241 05/07/25 0335 05/06/25 0358 05/05/25  2305   CALCIUM mg/dL 8.2* 8.6 8.0* 8.1*   ALBUMIN g/dL  --   --   --  2.3*   MAGNESIUM mg/dL 1.8 1.9 1.9 1.6       Results from last 7 days   Lab Units 05/06/25  0009 05/05/25  2305   HSTROP T ng/L 41* 42*   PROBNP pg/mL  --  1,688.0*       Results from last 7 days   Lab Units 05/07/25  0335   CHOLESTEROL mg/dL 309*   TRIGLYCERIDES mg/dL 555*   HDL CHOL mg/dL 33*   LDL CHOL mg/dL 165*           Test Results Pending at Discharge       Discharge Details        Discharge Medications        New Medications        Instructions Start Date   Pen Needles 32G X 4 MM misc   1 each, Not Applicable, 4 Times Daily      Tresiba FlexTouch 100 UNIT/ML solution pen-injector injection  Generic drug: insulin degludec   30 Units, Subcutaneous, Daily             Changes to Medications        Instructions Start Date   NovoLOG FlexPen 100 UNIT/ML solution pen-injector sc pen  Generic drug: insulin aspart  What changed: You were already taking a medication with the same  name, and this prescription was added. Make sure you understand how and when to take each.   5 Units, Subcutaneous, 3 Times Daily With Meals      insulin aspart 100 UNIT/ML solution pen-injector sc pen  Commonly known as: novoLOG FLEXPEN  What changed: Another medication with the same name was added. Make sure you understand how and when to take each.   3 times daily      lisinopril 10 MG tablet  Commonly known as: PRINIVILZESTRIL  What changed:   medication strength  how much to take  when to take this   10 mg, Oral, Every 24 Hours Scheduled   Start Date: May 9, 2025            Continue These Medications        Instructions Start Date   aspirin 81 MG chewable tablet   1 tablet, Daily      atorvastatin 80 MG tablet  Commonly known as: LIPITOR   1 tablet, Daily      carvedilol 12.5 MG tablet  Commonly known as: COREG   12.5 mg, Oral, 2 Times Daily With Meals      Dexcom G7  device   1 each, Not Applicable, Once, Dx: E11.65      Dexcom G7 Sensor misc   1 each, Not Applicable, Every 10 Days, Dx: E11.65      famotidine 40 MG tablet  Commonly known as: PEPCID   Take 1 tablet by mouth at bedtime for 90 days      hydrALAZINE 50 MG tablet  Commonly known as: APRESOLINE   take 1 tablet by oral route 2 times every day with food      icosapent ethyl 1 g capsule capsule  Commonly known as: Vascepa   Take 2 g (2 capsules) by mouth 2 (Two) Times a Day With Meals. Indications: Cerebrovascular Accident or Stroke, High Amount of Triglycerides in the Blood, Procedure to Reestablish Blood Supply to the Heart      metoclopramide 5 MG tablet  Commonly known as: REGLAN   5 mg, Oral, 4 Times Daily      ondansetron 4 MG tablet  Commonly known as: ZOFRAN   Take 1 tablet by mouth three times a day as needed      pantoprazole 40 MG EC tablet  Commonly known as: PROTONIX   Take 1 tablet by mouth Every Morning for 90 days      sodium bicarbonate 650 MG tablet   650 mg, Oral, Daily      Ventolin  (90 Base) MCG/ACT  inhaler  Generic drug: albuterol sulfate HFA   Inhale 2 puffs every 6 (six) hours if needed for wheezing.             Stop These Medications      insulin detemir 100 UNIT/ML injection  Commonly known as: LEVEMIR              Allergies   Allergen Reactions    Latex Itching         Discharge Disposition:  Home or Self Care    Discharge Diet:  Diet Order   Procedures    Diet: Diabetic, Cardiac; Healthy Heart (2-3 Na+); Consistent Carbohydrate; Fluid Consistency: Thin (IDDSI 0)       Discharge Activity:   As tolerated    CODE STATUS:    Code Status and Medical Interventions: CPR (Attempt to Resuscitate); Full Support   Ordered at: 05/06/25 0151     Code Status (Patient has no pulse and is not breathing):    CPR (Attempt to Resuscitate)     Medical Interventions (Patient has pulse or is breathing):    Full Support       Future Appointments   Date Time Provider Department Center   8/13/2025  9:15 AM [facility]   8/13/2025 10:15 AM Basia Dejesus APRN [facility]   8/22/2025  9:15 AM Cristian Su MD [facility]      Contact information for follow-up providers       Ibrahima Correa MD .    Specialty: Family Medicine  Contact information:  [address]  [facility] [address] [phone]                       Contact information for after-discharge care       Durable Medical Equipment       [facility] .    Service: Durable Medical Equipment  Contact information:  [address] [place] [address] [phone]                                   Time Spent on Discharge:  Greater than 30 minutes      Julio Boudreaux MD  05/08/25  16:24 EDT

## 2025-05-08 NOTE — PROGRESS NOTES
"Nutrition Services    Patient Name: Bibiana Inman  YOB: 1980  MRN: [id number]  Admission date: 5/5/2025    Assessment Date:  05/08/25    NUTRITION EVALUATION      Reason for Encounter MST score 2+   Diagnosis/Problem Admission Diagnosis:  Blurred vision [H53.8]  Tobacco abuse [Z72.0]  Poorly controlled diabetes mellitus [E11.65]  Hypertensive emergency [I16.1]    Problem List:    Hypertensive emergency     Narrative 44 yoF admitted with blurred vision, hyperglycemia, non compliance with home DM and HTN meds.        PO Diet Diet: Diabetic, Cardiac; Healthy Heart (2-3 Na+); Consistent Carbohydrate; Fluid Consistency: Thin (IDDSI 0)   Allergies NKFA   Supplements n/a   PO Intake % %       Chewing/Swallowing Difficulty no issues identified at this time       Medications reviewed, insulin, protonix, pericolace   Labs  reviewed, Na 133, glucose 108-340       Physical Findings alert, oriented     Edema 1+ (trace)    GI Function last bowel movement: 5/7   Skin Status skin intact    Lines/Drains none   I/O reviewed        Height  Weight  BMI  Weight Trend     Height: 162.6 cm (64\")  Weight: 62.6 kg (138 lb 1.6 oz) (05/08/25 0620)  Body mass index is 23.7 kg/m².  Other: pt states weight fluctuates 130-140s    Weight change: No significant changes       NFPE No clinical signs of muscle wasting or fat loss, Date Completed: 5/8       Nutrition Problem (PES) Problem: Altered Nutrition Related to Labs  Etiology: Medical Diagnosis - DM, hypertensive emergency     Signs/Symptoms: Biochemical       Intervention/Plan CTM oral intakes and encourage PO >75%  [facility]     RD to follow up per protocol.     Results from last 7 days   Lab Units 05/08/25  0241 05/07/25  0650 05/07/25  0335 05/06/25  1658 05/06/25  0358 05/05/25  2305   SODIUM mmol/L 133*  --  130*  --  134* 127*   POTASSIUM mmol/L 4.4 5.8* 5.7*   < > 2.2* 2.9*   CHLORIDE mmol/L 108*  --  108*  --  100 93*   CO2 mmol/L " 18.0*  --  17.7*  --  22.3 18.2*   BUN mg/dL 13  --  9  --  12 12   CREATININE mg/dL 1.65*  --  1.53*  --  1.61* 1.52*   CALCIUM mg/dL 8.2*  --  8.6  --  8.0* 8.1*   BILIRUBIN mg/dL  --   --   --   --   --  <0.2   ALK PHOS U/L  --   --   --   --   --  234*   ALT (SGPT) U/L  --   --   --   --   --  8   AST (SGOT) U/L  --   --   --   --   --  11   GLUCOSE mg/dL 132*  --  214*  --  400* 750*    < > = values in this interval not displayed.     Results from last 7 days   Lab Units 05/08/25  0241 05/07/25  0335 05/06/25  0358   MAGNESIUM mg/dL 1.8 1.9 1.9   HEMOGLOBIN g/dL 10.3* 11.6* 11.1*   HEMATOCRIT % 32.3* 35.4 34.0   TRIGLYCERIDES mg/dL  --  555*  --      Lab Results   Component Value Date    HGBA1C 16.60 (H) 05/06/2025     Wt Readings from Last 10 Encounters:   05/08/25 62.6 kg (138 lb 1.6 oz)   02/17/25 64.4 kg (142 lb)   02/05/25 67.1 kg (148 lb)   08/30/24 59.4 kg (131 lb)   08/07/24 94.2 kg (207 lb 11.2 oz)   08/06/24 61.2 kg (135 lb)   07/30/24 63 kg (139 lb)   07/07/24 68.9 kg (151 lb 12.8 oz)   06/17/24 63.6 kg (140 lb 4.8 oz)   02/22/24 71.2 kg (156 lb 15.5 oz)       Electronically signed by:  Lola Miller RD  05/08/25 13:22 EDT

## 2025-05-08 NOTE — PLAN OF CARE
Goal Outcome Evaluation:  Plan of Care Reviewed With: patient        Progress: improving  Outcome Evaluation: pt aox4, on RA, no c/o pain. some high pressures 180s sys asymptomatic, improved with scheduled hydralazine. 140s sys this morning all other VSS. ambulating standby assist. Continue plan of care.

## 2025-05-08 NOTE — PLAN OF CARE
Goal Outcome Evaluation:  Plan of Care Reviewed With: patient        Progress: improving  Outcome Evaluation: Pt seen for PT this PM - continues mild blurry vision but reports it continues to improve. Visual deficits noted most with mobility - pt with strong  on PT's hand while ambulated without AD in hallway and appears very guarded and anxious. Educated pt on need for lights on when mobilizing and slow transitions/turns. Did trial rolling walker with signficant improvement in balance to SBA/supervision and less anxiousness regarding mobility. Would recommend rolling walker for use at home for optimal safety at this time. Plans to stay with mother in law for a while to have some assist as well.    Anticipated Discharge Disposition (PT): home with assist, home with outpatient therapy services

## 2025-05-08 NOTE — CONSULTS
"Diabetes Education  Assessment/Teaching    Patient Name:  Bibiana Inman  YOB: 1980  MRN: [id number]  Admit Date:  5/5/2025      Assessment Date:  5/8/2025  Flowsheet Row Most Recent Value   General Information     Referral From: Other (comment)  [RN consult A1c >7%]   Height 162.6 cm (64\")   Height Method Stated   Weight 62.6 kg (138 lb 1.6 oz)   Weight Method Standing scale   Patient expressed need I need insulin at home   Diabetes History    What type of diabetes do you have? Type 2   Length of Diabetes Diagnosis 10 + years  [Developed T2DM after having GDM 11 years ago]   Current DM knowledge fair   Do you test your blood sugar at home? yes   Have you had low blood sugar? (<70mg/dl) no   What makes it difficult for you to take care of your diabetes or yourself? Lack of insulin, insurance coverage   Do you have any diabetes complications? circulation problems, nephropathy   Education Preferences    What areas of diabetes would you like to learn about? avoiding high blood sugar, avoiding low blood sugar, medications for diabetes   Barriers to Learning vision loss (cannot read newspaper)  [currently has blurred vision from high BP]   Assessment Topics    Healthy Eating - Assessment Needs education, reports her diet is a big barrier   Being Active - Assessment Needs education   Taking Medication - Assessment Needs education  [Has been out of insulin for past couple months]   Problem Solving - Assessment Needs education   Reducing Risk - Assessment Needs education   Healthy Coping - Assessment Needs education   Monitoring - Assessment Needs education   DM Goals    Healthy Eating - Goal 0-7 days from discharge   Being Active - Goal 0-7 days from discharge  [encouraged 30m/day]   Taking Medication - Goal 0-7 days from discharge   Problem Solving - Goal 0-7 days from discharge   Reducing Risk - Goal 0-30 days from discharge   Healthy Coping - Goal 0-30 days from discharge  [discussed burden of DM] "   Monitoring - Goal 0-7 days from discharge  [Informed to test at least 4x/day]   Contact Plan Follow-up medical care, 0-30 days from discharge     Flowsheet Row Most Recent Value   DM Education Needs    Meter Has own   Medication Insulin, Pen   Problem Solving Hypoglycemia, Hyperglycemia, Symptoms, Treatment, Signs   Reducing Risks A1C testing   Physical Activity None   Healthy Coping Appropriate   Discharge Plan Home, Follow-up with PCP   Motivation Moderate   Teaching Method Explanation, Discussion   Patient Response Verbalized understanding       Other Comments:  Met with pt at bedside, tearful during discussion. She was diagnosed with T2DM 11 years ago, A1c 16.6. She is on insulin but recently ran out of it & does not have insurance coverage. Reports lack of insulin is her biggest barrier, but her A1c has been >10 for 2 years. She is able to go home with [employer] trial & pended for d/c. Does not consistently test BG at home. Discussed importance of testing BG & keeping log to give to provider.   Encouraged to follow up with PCP after discharge for refills.         Electronically signed by:  Elmer Reyna RN  05/08/25 15:07 EDT

## 2025-05-08 NOTE — CASE MANAGEMENT/SOCIAL WORK
Continued Stay Note  [facility]     Patient Name: Bibiana Inman  MRN: [id number]  Today's Date: 5/8/2025    Admit Date: 5/5/2025    Plan: Home w/ rolling walker ([employer] will provide)   Discharge Plan       Row Name 05/08/25 1506       Plan    Plan Home w/ rolling walker ([employer] will provide)    Plan Comments S/W Physical therapist and they recommend Pt have a rolling walker at d/c due to her issues with vision.  Referral called to Connor/Nida..........Corina ANDRADE/YONI                   Discharge Codes    No documentation.                 Expected Discharge Date and Time       Expected Discharge Date Expected Discharge Time    May 10, 2025               Corina Juarez RN

## 2025-05-08 NOTE — DISCHARGE PLACEMENT REQUEST
"Bibiana Belcher (44 y.o. Female)       Date of Birth   1980    Social Security Number       Address   [address] [place] [address]    Home Phone   [phone]    MRN   [id number]       Scientologist   None    Marital Status                               Admission Date   5/5/2025    Admission Type   Emergency    Admitting Provider   Deandre Metcalf MD    Attending Provider   Deandre Metcalf MD    Department, Room/Bed   [facility] CARDIOVASC UNIT, 2203/1       Discharge Date       Discharge Disposition       Discharge Destination                                 Attending Provider: Deandre Metcalf MD    Allergies: Latex    Isolation: None   Infection: None   Code Status: CPR    Ht: 162.6 cm (64\")   Wt: 62.6 kg (138 lb 1.6 oz)    Admission Cmt: None   Principal Problem: Hypertensive emergency [I16.1]                   Active Insurance as of 5/5/2025       Primary Coverage       Payor Plan Insurance Group Employer/Plan Group    MEDICAID PENDING MEDICAID PENDING        Payor Plan Address Payor Plan Phone Number Payor Plan Fax Number Effective Dates       5/5/2025 - 6/30/2025      Subscriber Name Subscriber Birth Date Member ID       BIBIANA BELCHER 1980 [id number]                     Emergency Contacts        (Rel.) Home Phone Work Phone Mobile Phone    BECKYHOLLAND VICENTE (Spouse) [phone] -- [phone]    NORMA BELCHER(MOTHER IN LAW) (Relative) -- -- [phone]    AMARI DONALDSON (SIS IN LAW) (Relative) -- -- [phone]                "

## 2025-05-09 NOTE — CASE MANAGEMENT/SOCIAL WORK
Case Management Discharge Note      Final Note: Home w/ rolling walker from [employer] via private vehicle         Selected Continued Care - Discharged on 5/8/2025 Admission date: 5/5/2025 - Discharge disposition: Home or Self Care      Destination    No services have been selected for the patient.                Durable Medical Equipment Coordination complete.      Service Provider Services Address Phone Fax Patient Preferred    [facility] [address], [place] [id number] [phone] --              Dialysis/Infusion    No services have been selected for the patient.                Home Medical Care    No services have been selected for the patient.                Therapy    No services have been selected for the patient.                Community Resources    No services have been selected for the patient.                Community & DME    No services have been selected for the patient.                    Transportation Services  Private: Car    Final Discharge Disposition Code: 01 - home or self-care

## 2025-05-09 NOTE — OUTREACH NOTE
Prep Survey      Flowsheet Row Responses   [facility] facility patient discharged from? [facility]   Is LACE score < 7 ? No   Eligibility Readm Mgmt   Discharge diagnosis Hypertensive emergency   Does the patient have one of the following disease processes/diagnoses(primary or secondary)? Other   Does the patient have Home health ordered? No   Is there a DME ordered? Yes   What DME was ordered? rolling walker   Prep survey completed? Yes            Rubina TURK - Registered Nurse

## 2025-05-14 ENCOUNTER — READMISSION MANAGEMENT (OUTPATIENT)
Dept: CALL CENTER | Facility: HOSPITAL | Age: 45
End: 2025-05-14
Payer: MEDICAID

## 2025-05-14 NOTE — OUTREACH NOTE
Medical Week 1 Survey      Flowsheet Row Responses   [facility] patient discharged from? [place]   Does the patient have one of the following disease processes/diagnoses(primary or secondary)? Other   Week 1 attempt successful? No   Unsuccessful attempts Attempt 1            DAVID MCKNIGHT - Registered Nurse

## 2025-05-19 ENCOUNTER — READMISSION MANAGEMENT (OUTPATIENT)
Dept: CALL CENTER | Facility: HOSPITAL | Age: 45
End: 2025-05-19
Payer: MEDICAID

## 2025-05-19 NOTE — OUTREACH NOTE
Medical Week 1 Survey      Flowsheet Row Responses   [facility] patient discharged from? [place]   Does the patient have one of the following disease processes/diagnoses(primary or secondary)? Other   Week 1 attempt successful? No   Unsuccessful attempts Attempt 2            Judy WALKER - Registered Nurse

## 2025-05-23 ENCOUNTER — READMISSION MANAGEMENT (OUTPATIENT)
Dept: CALL CENTER | Facility: HOSPITAL | Age: 45
End: 2025-05-23
Payer: MEDICAID

## 2025-05-23 NOTE — OUTREACH NOTE
Medical Week 1 Survey      Flowsheet Row Responses   [facility] patient discharged from? [place]   Does the patient have one of the following disease processes/diagnoses(primary or secondary)? Other   Week 1 attempt successful? No   Unsuccessful attempts Attempt 3            Laurie SANABRIA - Licensed Nurse

## 2025-07-17 ENCOUNTER — HOSPITAL ENCOUNTER (EMERGENCY)
Facility: HOSPITAL | Age: 45
Discharge: HOME | End: 2025-07-17
Attending: EMERGENCY MEDICINE
Payer: MEDICAID

## 2025-07-17 VITALS
BODY MASS INDEX: 24.26 KG/M2 | HEIGHT: 64 IN | WEIGHT: 142.1 LBS | HEART RATE: 56 BPM | RESPIRATION RATE: 18 BRPM | SYSTOLIC BLOOD PRESSURE: 168 MMHG | TEMPERATURE: 98.7 F | DIASTOLIC BLOOD PRESSURE: 83 MMHG | OXYGEN SATURATION: 100 %

## 2025-07-17 DIAGNOSIS — E11.65 HYPERGLYCEMIA DUE TO DIABETES MELLITUS: Primary | ICD-10-CM

## 2025-07-17 DIAGNOSIS — N18.9 CHRONIC KIDNEY DISEASE, UNSPECIFIED CKD STAGE: ICD-10-CM

## 2025-07-17 DIAGNOSIS — I10 HYPERTENSION NOT AT GOAL: ICD-10-CM

## 2025-07-17 LAB
ALBUMIN SERPL-MCNC: 2.4 G/DL (ref 3.5–5.2)
ALBUMIN/GLOB SERPL: 0.7 G/DL
ALP SERPL-CCNC: 163 U/L (ref 39–117)
ALT SERPL W P-5'-P-CCNC: 7 U/L (ref 1–33)
ANION GAP SERPL CALCULATED.3IONS-SCNC: 11.9 MMOL/L (ref 5–15)
ANION GAP SERPL CALCULATED.3IONS-SCNC: 6.7 MMOL/L (ref 5–15)
AST SERPL-CCNC: 10 U/L (ref 1–32)
ATMOSPHERIC PRESS: 748.8 MMHG
ATMOSPHERIC PRESS: 749.6 MMHG
B-OH-BUTYR SERPL-SCNC: 0.15 MMOL/L (ref 0.02–0.27)
BACTERIA UR QL AUTO: ABNORMAL /HPF
BASE EXCESS BLDV CALC-SCNC: -4 MMOL/L (ref -2–2)
BASE EXCESS BLDV CALC-SCNC: -6 MMOL/L (ref -2–2)
BASOPHILS # BLD AUTO: 0.08 10*3/MM3 (ref 0–0.2)
BASOPHILS NFR BLD AUTO: 0.9 % (ref 0–1.5)
BILIRUB SERPL-MCNC: <0.2 MG/DL (ref 0–1.2)
BILIRUB UR QL STRIP: NEGATIVE
BUN SERPL-MCNC: 17 MG/DL (ref 6–20)
BUN SERPL-MCNC: 18 MG/DL (ref 6–20)
BUN/CREAT SERPL: 11.6 (ref 7–25)
BUN/CREAT SERPL: 9.8 (ref 7–25)
CALCIUM SPEC-SCNC: 7.9 MG/DL (ref 8.6–10.5)
CALCIUM SPEC-SCNC: 8.4 MG/DL (ref 8.6–10.5)
CHLORIDE SERPL-SCNC: 104 MMOL/L (ref 98–107)
CHLORIDE SERPL-SCNC: 89 MMOL/L (ref 98–107)
CLARITY UR: CLEAR
CO2 SERPL-SCNC: 19.3 MMOL/L (ref 22–29)
CO2 SERPL-SCNC: 20.1 MMOL/L (ref 22–29)
COLOR UR: YELLOW
CREAT SERPL-MCNC: 1.46 MG/DL (ref 0.57–1)
CREAT SERPL-MCNC: 1.84 MG/DL (ref 0.57–1)
DEPRECATED RDW RBC AUTO: 47.3 FL (ref 37–54)
EGFRCR SERPLBLD CKD-EPI 2021: 33.7 ML/MIN/1.73
EGFRCR SERPLBLD CKD-EPI 2021: 44.5 ML/MIN/1.73
EOSINOPHIL # BLD AUTO: 0.13 10*3/MM3 (ref 0–0.4)
EOSINOPHIL NFR BLD AUTO: 1.4 % (ref 0.3–6.2)
ERYTHROCYTE [DISTWIDTH] IN BLOOD BY AUTOMATED COUNT: 13.8 % (ref 12.3–15.4)
GLOBULIN UR ELPH-MCNC: 3.5 GM/DL
GLUCOSE BLDC GLUCOMTR-MCNC: 299 MG/DL (ref 70–130)
GLUCOSE BLDC GLUCOMTR-MCNC: 362 MG/DL (ref 70–130)
GLUCOSE BLDC GLUCOMTR-MCNC: 448 MG/DL (ref 70–130)
GLUCOSE SERPL-MCNC: 307 MG/DL (ref 65–99)
GLUCOSE SERPL-MCNC: 725 MG/DL (ref 65–99)
GLUCOSE UR STRIP-MCNC: ABNORMAL MG/DL
HCO3 BLDV-SCNC: 20.2 MMOL/L (ref 22–28)
HCO3 BLDV-SCNC: 23 MMOL/L (ref 22–28)
HCT VFR BLD AUTO: 39.7 % (ref 34–46.6)
HGB BLD-MCNC: 12.4 G/DL (ref 12–15.9)
HGB UR QL STRIP.AUTO: ABNORMAL
HOLD SPECIMEN: NORMAL
HOLD SPECIMEN: NORMAL
HYALINE CASTS UR QL AUTO: ABNORMAL /LPF
IMM GRANULOCYTES # BLD AUTO: 0.03 10*3/MM3 (ref 0–0.05)
IMM GRANULOCYTES NFR BLD AUTO: 0.3 % (ref 0–0.5)
KETONES UR QL STRIP: NEGATIVE
LEUKOCYTE ESTERASE UR QL STRIP.AUTO: NEGATIVE
LYMPHOCYTES # BLD AUTO: 1.8 10*3/MM3 (ref 0.7–3.1)
LYMPHOCYTES NFR BLD AUTO: 19.2 % (ref 19.6–45.3)
MCH RBC QN AUTO: 29.7 PG (ref 26.6–33)
MCHC RBC AUTO-ENTMCNC: 31.2 G/DL (ref 31.5–35.7)
MCV RBC AUTO: 95 FL (ref 79–97)
MODALITY: ABNORMAL
MODALITY: ABNORMAL
MONOCYTES # BLD AUTO: 0.45 10*3/MM3 (ref 0.1–0.9)
MONOCYTES NFR BLD AUTO: 4.8 % (ref 5–12)
NEUTROPHILS NFR BLD AUTO: 6.88 10*3/MM3 (ref 1.7–7)
NEUTROPHILS NFR BLD AUTO: 73.4 % (ref 42.7–76)
NITRITE UR QL STRIP: NEGATIVE
NOTIFIED WHO: ABNORMAL
NRBC BLD AUTO-RTO: 0 /100 WBC (ref 0–0.2)
PCO2 BLDV: 41.5 MM HG (ref 41–51)
PCO2 BLDV: 49.4 MM HG (ref 41–51)
PH BLDV: 7.28 PH UNITS (ref 7.31–7.41)
PH BLDV: 7.29 PH UNITS (ref 7.31–7.41)
PH UR STRIP.AUTO: 6.5 [PH] (ref 5–8)
PLATELET # BLD AUTO: 285 10*3/MM3 (ref 140–450)
PMV BLD AUTO: 10.5 FL (ref 6–12)
PO2 BLDV: 31.6 MM HG (ref 35–45)
PO2 BLDV: 41.2 MM HG (ref 35–45)
POTASSIUM SERPL-SCNC: 4 MMOL/L (ref 3.5–5.2)
POTASSIUM SERPL-SCNC: 4.1 MMOL/L (ref 3.5–5.2)
PROT SERPL-MCNC: 5.9 G/DL (ref 6–8.5)
PROT UR QL STRIP: ABNORMAL
RBC # BLD AUTO: 4.18 10*6/MM3 (ref 3.77–5.28)
RBC # UR STRIP: ABNORMAL /HPF
REF LAB TEST METHOD: ABNORMAL
SAO2 % BLDCOV: 52.2 % (ref 45–75)
SAO2 % BLDCOV: 71 % (ref 45–75)
SODIUM SERPL-SCNC: 121 MMOL/L (ref 136–145)
SODIUM SERPL-SCNC: 130 MMOL/L (ref 136–145)
SP GR UR STRIP: >1.03 (ref 1–1.03)
SQUAMOUS #/AREA URNS HPF: ABNORMAL /HPF
TOTAL RATE: 20 BREATHS/MINUTE
TOTAL RATE: 20 BREATHS/MINUTE
UROBILINOGEN UR QL STRIP: ABNORMAL
WBC # UR STRIP: ABNORMAL /HPF
WBC NRBC COR # BLD AUTO: 9.37 10*3/MM3 (ref 3.4–10.8)
WHOLE BLOOD HOLD COAG: NORMAL
WHOLE BLOOD HOLD SPECIMEN: NORMAL

## 2025-07-17 PROCEDURE — 25810000003 SODIUM CHLORIDE 0.9 % SOLUTION: Performed by: EMERGENCY MEDICINE

## 2025-07-17 PROCEDURE — 81001 URINALYSIS AUTO W/SCOPE: CPT | Performed by: EMERGENCY MEDICINE

## 2025-07-17 PROCEDURE — 82948 REAGENT STRIP/BLOOD GLUCOSE: CPT

## 2025-07-17 PROCEDURE — 82803 BLOOD GASES ANY COMBINATION: CPT | Performed by: EMERGENCY MEDICINE

## 2025-07-17 PROCEDURE — 82803 BLOOD GASES ANY COMBINATION: CPT

## 2025-07-17 PROCEDURE — 99283 EMERGENCY DEPT VISIT LOW MDM: CPT

## 2025-07-17 PROCEDURE — 80053 COMPREHEN METABOLIC PANEL: CPT | Performed by: EMERGENCY MEDICINE

## 2025-07-17 PROCEDURE — 63710000001 INSULIN REGULAR HUMAN PER 5 UNITS: Performed by: EMERGENCY MEDICINE

## 2025-07-17 PROCEDURE — 85025 COMPLETE CBC W/AUTO DIFF WBC: CPT | Performed by: EMERGENCY MEDICINE

## 2025-07-17 PROCEDURE — 82010 KETONE BODYS QUAN: CPT | Performed by: EMERGENCY MEDICINE

## 2025-07-17 RX ADMIN — SODIUM CHLORIDE 1000 ML: 9 INJECTION, SOLUTION INTRAVENOUS at 11:35

## 2025-07-17 RX ADMIN — SODIUM CHLORIDE 500 ML: 9 INJECTION, SOLUTION INTRAVENOUS at 14:28

## 2025-07-17 RX ADMIN — INSULIN HUMAN 12 UNITS: 100 INJECTION, SOLUTION PARENTERAL at 12:38

## 2025-07-17 NOTE — ED PROVIDER NOTES
EMERGENCY DEPARTMENT ENCOUNTER  Room Number:  21/21  PCP: Ibrahima Correa MD  Independent Historians: Patient      HPI:  Chief Complaint: had concerns including Hyperglycemia.   A complete HPI/ROS/PMH/PSH/SH/FH are unobtainable due to: None    Context: Bibiana Inman is a 45 y.o. female with a medical history of diabetes, GERD, sleep apnea and hypercholesterolemia who presents to the ED c/o acute elevation in blood glucose levels recently.  Patient received a call from her PCP advising that she come to the nearest emergency room for further evaluation because her blood sugar level was dangerously high.  Patient denies any particular symptoms of concern today.  She denies chest pain.  Denies abdominal pain or nausea vomiting or difficulties breathing.  She denies any fevers or flulike symptoms.  She says that there have been some difficulties getting her insulin filled at her pharmacy recently.  She says that there was a problem with prior authorization.  However she thinks that is being worked out and she is hoping that she can  her insulin supply later today at the pharmacy now that it is supposedly being approved.      Review of prior external notes (non-ED) -and- Review of prior external test results outside of this encounter: I independently reviewed the family medicine progress note from yesterday, July 16, 2025.  Assessment and plan was for management of diabetes, hypertension, hyperlipidemia, depression and medication management.  It looks like there was a prescription for Tresiba and NovoLog arranged at that time.    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    Abnormal bone density screening 11/11/2012    Benign essential hypertension 08/24/2016     Chronic cough 2015    Gastroparesis diabeticorum 2021    Elevated erythrocyte sedimentation rate 2021    Fatigue 2021    Gastroesophageal reflux disease 03/10/2021    Mixed hypercholesterolemia and hypertriglyceridemia 2014    Intermittent claudication 2017    Iron deficiency anemia 2020    Multiple joint pain 2021    Need for influenza vaccination 2017    Type 2 diabetes mellitus with hyperglycemia, with long-term current use of insulin 2017    Obstructive sleep apnea 2021    Coronary artery disease of native artery of native heart with stable angina pectoris 2024    Abnormal nuclear stress test 2024    Depressive disorder 2023    Back pain 2023    Diabetic peripheral neuropathy associated with type 2 diabetes mellitus 2024    Ingrowing nail, left great toe 2024    Personal history of COVID-19 2022    Polyp of colon 2023    Vitamin D deficiency 2024    Tobacco abuse 2024    CKD stage 3a, GFR 45-59 ml/min 2024    Bilateral carotid artery stenosis 2024    History of CVA (cerebrovascular accident) 2024    Elevated troponin 2024    Aortic valve disease 2024    Overweight (BMI 25.0-29.9) 2025    Hypertensive emergency 2025     Resolved Ambulatory Problems     Diagnosis Date Noted    Hypothyroidism 2014    Spontaneous  2012    Myocardial infarction 2021    Obesity 2014    Stress fracture of right ankle with routine healing 2024    Unstable angina 2024    Altered mental status 2024    Carotid stenosis, asymptomatic, right 2024    Acute right-sided weakness 2024     Past Medical History:   Diagnosis Date    Abnormal ECG 2014    CHF (congestive heart failure) 2013    Coronary arteriosclerosis 2014    Diabetic gastroparesis 2021    GERD (gastroesophageal reflux disease)     Lacunar  cerebrovascular accident (CVA) 2024    Stroke 2024         PAST SURGICAL HISTORY  Past Surgical History:   Procedure Laterality Date    CARDIAC CATHETERIZATION N/A 2024    Procedure: Left Heart Cath and coronary angiogram;  Surgeon: Jena Barron MD;  Location: [facility] CATH INVASIVE LOCATION;  Service: Cardiology;  Laterality: N/A;     SECTION      CORONARY ARTERY BYPASS GRAFT  2014    LIMA-LAD, SVG-OM2, SVG-PDA, Dr. Debbie Fry.    CORONARY ARTERY BYPASS GRAFT WITH AORTIC VALVE REPAIR/REPLACEMENT N/A 2024    Procedure: YUE; REOPERATIVE STERNOTOMY; CORONARY ARTERY BYPASS GRAFTING TIMES 1 UTILIZING RIGHT SAPHENOUS VEIN; AORTIC VALVE TUMOR ; PRP;  Surgeon: Benja De Luna MD;  Location: [facility];  Service: Cardiothoracic;  Laterality: N/A;    DILATATION AND CURETTAGE      TONSILLECTOMY           FAMILY HISTORY  Family History   Problem Relation Age of Onset    Heart disease Mother     Hypertension Mother          SOCIAL HISTORY  Social History     Socioeconomic History    Marital status:    Tobacco Use    Smoking status: Light Smoker     Current packs/day: 0.25     Average packs/day: 0.3 packs/day for 27.5 years (6.9 ttl pk-yrs)     Types: Cigarettes     Start date:     Smokeless tobacco: Never   Vaping Use    Vaping status: Never Used   Substance and Sexual Activity    Alcohol use: Not Currently    Drug use: Never    Sexual activity: Not Currently     Partners: Male     Birth control/protection: Post-menopausal, None       Chronic or social conditions impacting patient care (Social Determinants of Health):  Social Drivers of Health     Tobacco Use: High Risk (2025)    Patient History     Smoking Tobacco Use: Light Smoker     Smokeless Tobacco Use: Never     Passive Exposure: Not on file   Alcohol Use: Not At Risk (2025)    AUDIT-C     Frequency of Alcohol Consumption: Never     Average Number of Drinks: Patient does not drink     Frequency of Binge  Drinking: Never   Financial Resource Strain: Medium Risk (7/16/2025)    Received from [facility]    Overall Financial Resource Strain (CARDIA)     Difficulty of Paying Living Expenses: Somewhat hard   Food Insecurity: Food Insecurity Present (7/16/2025)    Received from [facility]    Hunger Vital Sign     Worried About Running Out of Food in the Last Year: Sometimes true     Ran Out of Food in the Last Year: Never true   Transportation Needs: Unmet Transportation Needs (7/16/2025)    Received from [facility]    PRAPARE - Transportation     Lack of Transportation (Medical): Yes     Lack of Transportation (Non-Medical): No   Physical Activity: Sufficiently Active (5/7/2025)    Exercise Vital Sign     Days of Exercise per Week: 4 days     Minutes of Exercise per Session: 40 min   Stress: Patient Declined (5/7/2025)    [place] [employer] - Occupational Stress Questionnaire     Feeling of Stress : Patient declined   Social Connections: Unknown (5/7/2025)    Family and Community Support     Help with Day-to-Day Activities: Patient declined     Lonely or Isolated: Patient declined   Interpersonal Safety: Not At Risk (7/17/2025)    Abuse Screen     Unsafe at Home or Work/School: no     Feels Threatened by Someone?: no     Does Anyone Keep You from Contacting Others or Doint Things Outside the Home?: no     Physical Sign of Abuse Present: no   Depression: Not at risk (6/24/2024)    PHQ-2     PHQ-2 Score: 2   Recent Concern: Depression - At risk (6/17/2024)    PHQ-2     PHQ-2 Score: 6   Housing Stability: High Risk (7/16/2025)    Received from [facility]    Housing Stability Vital Sign     Unable to Pay for Housing in the Last Year: Yes     Number of Times Moved in the Last Year: Not on file     Homeless in the Last Year: No   Utilities: At Risk (7/16/2025)    Received from [facility]    [facility] Utilities     Threatened with loss of utilities: Yes   Health Literacy: Not At Risk  (5/7/2025)    Education     Help with school or training?: No     Preferred Language: English   Employment: Not At Risk (5/7/2025)    Employment     Do you want help finding or keeping work or a job?: I do not need or want help   Disabilities: Not At Risk (5/7/2025)    Disabilities     Concentrating, Remembering, or Making Decisions Difficulty: no     Doing Errands Independently Difficulty: no       ALLERGIES  Latex      REVIEW OF SYSTEMS  Review of Systems  Included in HPI  All systems reviewed and negative except for those discussed in HPI.      PHYSICAL EXAM    I have reviewed the triage vital signs and nursing notes.    ED Triage Vitals   Temp Heart Rate Resp BP SpO2   07/17/25 1038 07/17/25 1038 07/17/25 1038 07/17/25 1040 07/17/25 1038   98.7 °F (37.1 °C) 79 18 148/85 99 %      Temp src Heart Rate Source Patient Position BP Location FiO2 (%)   07/17/25 1038 -- -- -- --   Oral           Physical Exam  GENERAL: alert, no acute distress  SKIN: Warm, dry, no rashes  HENT: Normocephalic, atraumatic  EYES: no scleral icterus, normal conjunctiva  CV: regular rhythm, regular rate  RESPIRATORY: normal effort, lungs clear  ABDOMEN: soft, nondistended, nontender  MUSCULOSKELETAL: no deformity, no asymmetry of extremities  NEURO: alert, moves all extremities, follows commands      LAB RESULTS  Recent Results (from the past 24 hours)   Comprehensive Metabolic Panel    Collection Time: 07/17/25 10:58 AM    Specimen: Blood   Result Value Ref Range    Glucose 725 (C) 65 - 99 mg/dL    BUN 18.0 6.0 - 20.0 mg/dL    Creatinine 1.84 (H) 0.57 - 1.00 mg/dL    Sodium 121 (L) 136 - 145 mmol/L    Potassium 4.0 3.5 - 5.2 mmol/L    Chloride 89 (L) 98 - 107 mmol/L    CO2 20.1 (L) 22.0 - 29.0 mmol/L    Calcium 8.4 (L) 8.6 - 10.5 mg/dL    Total Protein 5.9 (L) 6.0 - 8.5 g/dL    Albumin 2.4 (L) 3.5 - 5.2 g/dL    ALT (SGPT) 7 1 - 33 U/L    AST (SGOT) 10 1 - 32 U/L    Alkaline Phosphatase 163 (H) 39 - 117 U/L    Total Bilirubin <0.2 0.0 - 1.2  mg/dL    Globulin 3.5 gm/dL    A/G Ratio 0.7 g/dL    BUN/Creatinine Ratio 9.8 7.0 - 25.0    Anion Gap 11.9 5.0 - 15.0 mmol/L    eGFR 33.7 (L) >60.0 mL/min/1.73   CBC Auto Differential    Collection Time: 07/17/25 10:58 AM    Specimen: Blood   Result Value Ref Range    WBC 9.37 3.40 - 10.80 10*3/mm3    RBC 4.18 3.77 - 5.28 10*6/mm3    Hemoglobin 12.4 12.0 - 15.9 g/dL    Hematocrit 39.7 34.0 - 46.6 %    MCV 95.0 79.0 - 97.0 fL    MCH 29.7 26.6 - 33.0 pg    MCHC 31.2 (L) 31.5 - 35.7 g/dL    RDW 13.8 12.3 - 15.4 %    RDW-SD 47.3 37.0 - 54.0 fl    MPV 10.5 6.0 - 12.0 fL    Platelets 285 140 - 450 10*3/mm3    Neutrophil % 73.4 42.7 - 76.0 %    Lymphocyte % 19.2 (L) 19.6 - 45.3 %    Monocyte % 4.8 (L) 5.0 - 12.0 %    Eosinophil % 1.4 0.3 - 6.2 %    Basophil % 0.9 0.0 - 1.5 %    Immature Grans % 0.3 0.0 - 0.5 %    Neutrophils, Absolute 6.88 1.70 - 7.00 10*3/mm3    Lymphocytes, Absolute 1.80 0.70 - 3.10 10*3/mm3    Monocytes, Absolute 0.45 0.10 - 0.90 10*3/mm3    Eosinophils, Absolute 0.13 0.00 - 0.40 10*3/mm3    Basophils, Absolute 0.08 0.00 - 0.20 10*3/mm3    Immature Grans, Absolute 0.03 0.00 - 0.05 10*3/mm3    nRBC 0.0 0.0 - 0.2 /100 WBC   Green Top (Gel)    Collection Time: 07/17/25 10:58 AM   Result Value Ref Range    Extra Tube Hold for add-ons.    Lavender Top    Collection Time: 07/17/25 10:58 AM   Result Value Ref Range    Extra Tube hold for add-on    Gold Top - SST    Collection Time: 07/17/25 10:58 AM   Result Value Ref Range    Extra Tube Hold for add-ons.    Light Blue Top    Collection Time: 07/17/25 10:58 AM   Result Value Ref Range    Extra Tube Hold for add-ons.    Urinalysis With Microscopic If Indicated (No Culture) - Urine, Clean Catch    Collection Time: 07/17/25 11:37 AM    Specimen: Urine, Clean Catch   Result Value Ref Range    Color, UA Yellow Yellow, Straw    Appearance, UA Clear Clear    pH, UA 6.5 5.0 - 8.0    Specific Gravity, UA >1.030 (H) 1.005 - 1.030    Glucose, UA >=1000 mg/dL (3+) (A)  Negative    Ketones, UA Negative Negative    Bilirubin, UA Negative Negative    Blood, UA Trace (A) Negative    Protein, UA >=300 mg/dL (3+) (A) Negative    Leuk Esterase, UA Negative Negative    Nitrite, UA Negative Negative    Urobilinogen, UA 0.2 E.U./dL 0.2 - 1.0 E.U./dL   Blood Gas, Venous -    Collection Time: 07/17/25 11:37 AM    Specimen: Venous Blood   Result Value Ref Range    pH, Venous 7.295 (L) 7.310 - 7.410 pH Units    pCO2, Venous 41.5 41.0 - 51.0 mm Hg    pO2, Venous 41.2 35.0 - 45.0 mm Hg    HCO3, Venous 20.2 (L) 22.0 - 28.0 mmol/L    Base Excess, Venous -6.0 (L) -2.0 - 2.0 mmol/L    O2 Saturation, Venous 71.0 45.0 - 75.0 %    Barometric Pressure for Blood Gas 749.6000 mmHg    Modality Room Air     Rate 20 Breaths/minute   Urinalysis, Microscopic Only - Urine, Clean Catch    Collection Time: 07/17/25 11:37 AM    Specimen: Urine, Clean Catch   Result Value Ref Range    RBC, UA 3-5 (A) None Seen, 0-2 /HPF    WBC, UA 0-2 None Seen, 0-2 /HPF    Bacteria, UA None Seen None Seen /HPF    Squamous Epithelial Cells, UA 0-2 None Seen, 0-2 /HPF    Hyaline Casts, UA 3-6 None Seen /LPF    Methodology Automated Microscopy    POC Glucose Once    Collection Time: 07/17/25  1:20 PM    Specimen: Blood   Result Value Ref Range    Glucose 448 (C) 70 - 130 mg/dL   POC Glucose Once    Collection Time: 07/17/25  2:24 PM    Specimen: Blood   Result Value Ref Range    Glucose 362 (H) 70 - 130 mg/dL   Basic Metabolic Panel    Collection Time: 07/17/25  2:59 PM    Specimen: Blood   Result Value Ref Range    Glucose 307 (H) 65 - 99 mg/dL    BUN 17.0 6.0 - 20.0 mg/dL    Creatinine 1.46 (H) 0.57 - 1.00 mg/dL    Sodium 130 (L) 136 - 145 mmol/L    Potassium 4.1 3.5 - 5.2 mmol/L    Chloride 104 98 - 107 mmol/L    CO2 19.3 (L) 22.0 - 29.0 mmol/L    Calcium 7.9 (L) 8.6 - 10.5 mg/dL    BUN/Creatinine Ratio 11.6 7.0 - 25.0    Anion Gap 6.7 5.0 - 15.0 mmol/L    eGFR 44.5 (L) >60.0 mL/min/1.73   Beta Hydroxybutyrate Quantitative     Collection Time: 07/17/25  2:59 PM    Specimen: Blood   Result Value Ref Range    Beta-Hydroxybutyrate Quant 0.146 0.020 - 0.270 mmol/L   Blood Gas, Venous -    Collection Time: 07/17/25  3:05 PM    Specimen: Venous Blood   Result Value Ref Range    pH, Venous 7.277 (C) 7.310 - 7.410 pH Units    pCO2, Venous 49.4 41.0 - 51.0 mm Hg    pO2, Venous 31.6 (L) 35.0 - 45.0 mm Hg    HCO3, Venous 23.0 22.0 - 28.0 mmol/L    Base Excess, Venous -4.0 (L) -2.0 - 2.0 mmol/L    O2 Saturation, Venous 52.2 45.0 - 75.0 %    Barometric Pressure for Blood Gas 748.8000 mmHg    Modality Room Air     Rate 20 Breaths/minute    Notified Who minnie    POC Glucose Once    Collection Time: 07/17/25  4:33 PM    Specimen: Blood   Result Value Ref Range    Glucose 299 (H) 70 - 130 mg/dL         RADIOLOGY  No Radiology Exams Resulted Within Past 24 Hours      MEDICATIONS GIVEN IN ER  Medications   sodium chloride 0.9 % bolus 1,000 mL (0 mL Intravenous Stopped 7/17/25 1426)   insulin regular (humuLIN R,novoLIN R) injection 12 Units (12 Units Intravenous Given 7/17/25 1238)   sodium chloride 0.9 % bolus 500 mL (0 mL Intravenous Stopped 7/17/25 1459)         ORDERS PLACED DURING THIS VISIT:  Orders Placed This Encounter   Procedures    Comprehensive Metabolic Panel    [facility] Draw    CBC Auto Differential    Urinalysis With Microscopic If Indicated (No Culture) - Urine, Clean Catch    Blood Gas, Venous -    Blood Gas, Venous -    Urinalysis, Microscopic Only - Urine, Clean Catch    Basic Metabolic Panel    Blood Gas, Venous -    Beta Hydroxybutyrate Quantitative    POC Glucose Once    POC Glucose Once    POC Glucose Once    CBC & Differential    Green Top (Gel)    Lavender Top    Gold Top - SST    Light Blue Top    Ketone Bodies, Serum (Not performed at [facility])         OUTPATIENT MEDICATION MANAGEMENT:  Current Facility-Administered Medications Ordered in Epic   Medication Dose Route Frequency Provider Last Rate Last Admin    nicotine (NICODERM CQ)  14 MG/24HR patch 1 patch  1 patch Transdermal Q24H Rocio Denny APRN        nicotine polacrilex (NICORETTE) gum 2 mg  2 mg Mouth/Throat Q1H PRN Rocio Denny APRN         Current Outpatient Medications Ordered in Epic   Medication Sig Dispense Refill    albuterol sulfate  (90 Base) MCG/ACT inhaler Inhale 2 puffs every 6 (six) hours if needed for wheezing. 18 g 0    aspirin 81 MG chewable tablet Chew 1 tablet Daily. Indications: Disease involving Lipid Deposits in the Arteries      atorvastatin (LIPITOR) 80 MG tablet Take 1 tablet by mouth Daily. Indications: High Amount of Fats in the Blood      carvedilol (COREG) 12.5 MG tablet Take 1 tablet by mouth 2 (Two) Times a Day With Meals. 120 tablet 0    Continuous Glucose  (Dexcom G7 ) device Use 1 each 1 (One) Time for 1 dose. Dx: E11.65 1 each 0    Continuous Glucose Sensor (Dexcom G7 Sensor) misc Use 1 each Every 10 (Ten) Days. Dx: E11.65 3 each 2    famotidine (PEPCID) 40 MG tablet Take 1 tablet by mouth every night at bedtime. 90 tablet 3    hydrALAZINE (APRESOLINE) 50 MG tablet take 1 tablet by oral route 2 times every day with food 180 tablet 3    icosapent ethyl (Vascepa) 1 g capsule capsule Take 2 g (2 capsules) by mouth 2 (Two) Times a Day With Meals. Indications: Cerebrovascular Accident or Stroke, High Amount of Triglycerides in the Blood, Procedure to Reestablish Blood Supply to the Heart 120 capsule 11    insulin aspart (novoLOG FLEXPEN) 100 UNIT/ML solution pen-injector sc pen Inject  under the skin into the appropriate area as directed 3 times a day. Sliding scale, between 5-20 units TID  Indications: Type 2 Diabetes      insulin aspart (NovoLOG FlexPen) 100 UNIT/ML solution pen-injector sc pen Inject 5 Units under the skin into the appropriate area as directed 3 (Three) Times a Day With Meals. 15 mL 1    insulin degludec (Tresiba FlexTouch) 100 UNIT/ML solution pen-injector injection Inject 30 Units under the skin  into the appropriate area as directed Daily. 15 mL 1    Insulin Pen Needle (Pen Needles) 32G X 4 MM misc Use 1 each 4 (Four) Times a Day. 100 each 0    lisinopril (PRINIVIL,ZESTRIL) 10 MG tablet Take 1 tablet by mouth Daily. 30 tablet 1    metoclopramide (REGLAN) 5 MG tablet Take 1 tablet by mouth 4 (Four) Times a Day. 90 tablet 12    ondansetron (ZOFRAN) 4 MG tablet Take 1 tablet by mouth three times a day as needed 30 tablet 5    pantoprazole (PROTONIX) 40 MG EC tablet Take 1 tablet by mouth Every Morning. 90 tablet 3    sodium bicarbonate 650 MG tablet Take 1 tablet by mouth Daily. 30 tablet 0         PROCEDURES  Procedures        PROGRESS, DATA ANALYSIS, CONSULTS, AND MEDICAL DECISION MAKING  All labs have been independently interpreted by me.  All radiology studies have been reviewed by me. All EKG's have been independently viewed and interpreted by me.  Discussion below represents my analysis of pertinent findings related to patient's condition, differential diagnosis, treatment plan and final disposition.    Differential diagnosis includes but is not limited to hyperglycemia, diabetic ketoacidosis, dehydration, UTI, electrolyte abnormality.    Clinical Scores:                                   ED Course as of 07/17/25 1845   Thu Jul 17, 2025   1424 There are no clinical features of diabetic ketoacidosis at this time.  Patient looks entirely well.  There is no tachypnea or tachycardia at all.  I am administering some IV insulin and some IV fluids to see if we can lower her glucose level to a more reasonable range and then we will recheck some basic labs to see if there have been favorable adjustments with the metabolic profile.  Continuing to monitor carefully throughout the ED stay. [ALTHEA]   1424 Glucose(!!): 725 [ALTHEA]   1424 Glucose(!!): 448 [ALTHEA]   1642 Glucose(!): 299 [ALTHEA]   1642 Beta-Hydroxybutyrate Quant: 0.146 [ALTHEA]   1642 On repeat examination, patient feels very well.  She has absolutely no symptoms of  "concern at all.  She is sitting up on the edge of the bed and eager to discharge home.  The repeat VBG shows a pH of 7.27.  But patient does not show any signs of acidosis or metabolic emergency from a clinical standpoint.  We have given the insulin and sufficient IV fluids.  At this time, I think it is reasonable to discharge her, as she is requesting.  She says that she wants to get to her pharmacy before 7:00 tonight to try and  her insulin dosage.  Therefore, per her request I will discharge her now in good condition and have her follow-up promptly with her PCP for repeat evaluation and careful review of her medications.  She is agreeable with that plan as outlined.  Will review with her the usual \"return to ER \"instructions prior to discharge also. [ALTHEA]      ED Course User Index  [ALTHEA] Harvey Daly MD           AS OF 18:45 EDT VITALS:    BP - 168/83  HR - 56  TEMP - 98.7 °F (37.1 °C) (Oral)  O2 SATS - 100%    COMPLEXITY OF CARE  Admission was considered but after careful review of the patient's presentation, physical examination, diagnostic results, and response to treatment the patient may be safely discharged with outpatient follow-up.      DIAGNOSIS  Final diagnoses:   Hyperglycemia due to diabetes mellitus   Chronic kidney disease, unspecified CKD stage   Hypertension not at goal         DISPOSITION  ED Disposition       ED Disposition   Discharge    Condition   Stable    Comment   --                Please note that portions of this document were completed with a voice recognition program.    Note Disclaimer: At [facility], we believe that sharing information builds trust and better relationships. You are receiving this note because you recently visited [facility]. It is possible you will see health information before a provider has talked with you about it. This kind of information can be easy to misunderstand. To help you fully understand what it means for your health, we urge you to " discuss this note with your provider.         Harvey Daly MD  07/17/25 0966

## 2025-07-17 NOTE — ED TRIAGE NOTES
Patient to ED via pv from home. Patient sent to ED for abnormal high glucose and hyponatremia. Patient sent by PCP who is with [facility]. Patient BG reading HI at triage. Patient reports she has not had her insulin today because she is waiting on her pharmacy to fill.

## 2025-07-17 NOTE — DISCHARGE INSTRUCTIONS
Drink plenty of fluids as tolerated.  Must continue insulin therapy as prescribed by your PCP.  Please return to the emergency room for any worsening pain, fevers, weakness, dizziness, nausea, difficulties breathing or any other concerns.

## 2025-07-18 LAB — GLUCOSE BLDC GLUCOMTR-MCNC: >599 MG/DL (ref 70–130)

## 2025-07-22 ENCOUNTER — HOSPITAL ENCOUNTER (INPATIENT)
Facility: HOSPITAL | Age: 45
LOS: 10 days | Discharge: HOME | End: 2025-08-01
Attending: EMERGENCY MEDICINE | Admitting: INTERNAL MEDICINE
Payer: MEDICAID

## 2025-07-22 ENCOUNTER — APPOINTMENT (OUTPATIENT)
Dept: GENERAL RADIOLOGY | Facility: HOSPITAL | Age: 45
End: 2025-07-22
Payer: MEDICAID

## 2025-07-22 DIAGNOSIS — R79.89 ELEVATED TROPONIN: ICD-10-CM

## 2025-07-22 DIAGNOSIS — E13.65 UNCONTROLLED OTHER SPECIFIED DIABETES MELLITUS WITH HYPERGLYCEMIA: Primary | ICD-10-CM

## 2025-07-22 DIAGNOSIS — R55 SYNCOPE AND COLLAPSE: ICD-10-CM

## 2025-07-22 DIAGNOSIS — E87.6 HYPOKALEMIA: ICD-10-CM

## 2025-07-22 DIAGNOSIS — R94.31 ABNORMAL EKG: ICD-10-CM

## 2025-07-22 DIAGNOSIS — N17.9 AKI (ACUTE KIDNEY INJURY): ICD-10-CM

## 2025-07-22 DIAGNOSIS — I70.25 ATHEROSCLEROSIS OF NATIVE ARTERIES OF THE EXTREMITIES WITH ULCERATION: ICD-10-CM

## 2025-07-22 LAB
ALBUMIN SERPL-MCNC: 1.7 G/DL (ref 3.5–5.2)
ALBUMIN/GLOB SERPL: 0.5 G/DL
ALP SERPL-CCNC: 135 U/L (ref 39–117)
ALT SERPL W P-5'-P-CCNC: 5 U/L (ref 1–33)
ANION GAP SERPL CALCULATED.3IONS-SCNC: 8.8 MMOL/L (ref 5–15)
AST SERPL-CCNC: 7 U/L (ref 1–32)
ATMOSPHERIC PRESS: 751.4 MMHG
BASE EXCESS BLDV CALC-SCNC: -4.9 MMOL/L (ref -2–2)
BASOPHILS # BLD AUTO: 0.04 10*3/MM3 (ref 0–0.2)
BASOPHILS NFR BLD AUTO: 0.4 % (ref 0–1.5)
BILIRUB SERPL-MCNC: <0.2 MG/DL (ref 0–1.2)
BUN SERPL-MCNC: 17 MG/DL (ref 6–20)
BUN/CREAT SERPL: 8.1 (ref 7–25)
CALCIUM SPEC-SCNC: 7.9 MG/DL (ref 8.6–10.5)
CHLORIDE SERPL-SCNC: 99 MMOL/L (ref 98–107)
CO2 SERPL-SCNC: 21.2 MMOL/L (ref 22–29)
CREAT SERPL-MCNC: 2.09 MG/DL (ref 0.57–1)
DEPRECATED RDW RBC AUTO: 45.3 FL (ref 37–54)
DEVICE COMMENT: ABNORMAL
EGFRCR SERPLBLD CKD-EPI 2021: 28.9 ML/MIN/1.73
EOSINOPHIL # BLD AUTO: 0.12 10*3/MM3 (ref 0–0.4)
EOSINOPHIL NFR BLD AUTO: 1.3 % (ref 0.3–6.2)
ERYTHROCYTE [DISTWIDTH] IN BLOOD BY AUTOMATED COUNT: 13.4 % (ref 12.3–15.4)
GEN 5 1HR TROPONIN T REFLEX: 237 NG/L
GLOBULIN UR ELPH-MCNC: 3.7 GM/DL
GLUCOSE BLDC GLUCOMTR-MCNC: 381 MG/DL (ref 70–130)
GLUCOSE BLDC GLUCOMTR-MCNC: 450 MG/DL (ref 70–130)
GLUCOSE SERPL-MCNC: 626 MG/DL (ref 65–99)
HBA1C MFR BLD: 13.6 % (ref 4.8–5.6)
HCO3 BLDV-SCNC: 21.2 MMOL/L (ref 22–28)
HCT VFR BLD AUTO: 35.4 % (ref 34–46.6)
HGB BLD-MCNC: 11.2 G/DL (ref 12–15.9)
IMM GRANULOCYTES # BLD AUTO: 0.04 10*3/MM3 (ref 0–0.05)
IMM GRANULOCYTES NFR BLD AUTO: 0.4 % (ref 0–0.5)
LYMPHOCYTES # BLD AUTO: 0.9 10*3/MM3 (ref 0.7–3.1)
LYMPHOCYTES NFR BLD AUTO: 10 % (ref 19.6–45.3)
MAGNESIUM SERPL-MCNC: 2.1 MG/DL (ref 1.6–2.6)
MCH RBC QN AUTO: 29.9 PG (ref 26.6–33)
MCHC RBC AUTO-ENTMCNC: 31.6 G/DL (ref 31.5–35.7)
MCV RBC AUTO: 94.4 FL (ref 79–97)
MODALITY: ABNORMAL
MONOCYTES # BLD AUTO: 0.37 10*3/MM3 (ref 0.1–0.9)
MONOCYTES NFR BLD AUTO: 4.1 % (ref 5–12)
NEUTROPHILS NFR BLD AUTO: 7.57 10*3/MM3 (ref 1.7–7)
NEUTROPHILS NFR BLD AUTO: 83.8 % (ref 42.7–76)
NRBC BLD AUTO-RTO: 0 /100 WBC (ref 0–0.2)
NT-PROBNP SERPL-MCNC: 7803 PG/ML (ref 0–450)
PCO2 BLDV: 42.3 MM HG (ref 41–51)
PH BLDV: 7.31 PH UNITS (ref 7.31–7.41)
PLATELET # BLD AUTO: 233 10*3/MM3 (ref 140–450)
PMV BLD AUTO: 10.9 FL (ref 6–12)
PO2 BLDV: 26.7 MM HG (ref 35–45)
POTASSIUM SERPL-SCNC: 3.3 MMOL/L (ref 3.5–5.2)
PROT SERPL-MCNC: 5.4 G/DL (ref 6–8.5)
QT INTERVAL: 477 MS
QTC INTERVAL: 456 MS
RBC # BLD AUTO: 3.75 10*6/MM3 (ref 3.77–5.28)
SAO2 % BLDCOV: 43.8 % (ref 45–75)
SODIUM SERPL-SCNC: 129 MMOL/L (ref 136–145)
TOTAL RATE: 16 BREATHS/MINUTE
TROPONIN T % DELTA: -6
TROPONIN T NUMERIC DELTA: -16 NG/L
TROPONIN T SERPL HS-MCNC: 253 NG/L
WBC NRBC COR # BLD AUTO: 9.04 10*3/MM3 (ref 3.4–10.8)

## 2025-07-22 PROCEDURE — 82948 REAGENT STRIP/BLOOD GLUCOSE: CPT

## 2025-07-22 PROCEDURE — 83880 ASSAY OF NATRIURETIC PEPTIDE: CPT | Performed by: EMERGENCY MEDICINE

## 2025-07-22 PROCEDURE — 83036 HEMOGLOBIN GLYCOSYLATED A1C: CPT | Performed by: EMERGENCY MEDICINE

## 2025-07-22 PROCEDURE — 93005 ELECTROCARDIOGRAM TRACING: CPT | Performed by: EMERGENCY MEDICINE

## 2025-07-22 PROCEDURE — 25010000002 SODIUM CHLORIDE 0.9 % WITH KCL 20 MEQ 20-0.9 MEQ/L-% SOLUTION: Performed by: INTERNAL MEDICINE

## 2025-07-22 PROCEDURE — 63710000001 INSULIN LISPRO (HUMAN) PER 5 UNITS: Performed by: INTERNAL MEDICINE

## 2025-07-22 PROCEDURE — 84484 ASSAY OF TROPONIN QUANT: CPT | Performed by: EMERGENCY MEDICINE

## 2025-07-22 PROCEDURE — 63710000001 INSULIN REGULAR HUMAN PER 5 UNITS: Performed by: PHYSICIAN ASSISTANT

## 2025-07-22 PROCEDURE — 83735 ASSAY OF MAGNESIUM: CPT | Performed by: EMERGENCY MEDICINE

## 2025-07-22 PROCEDURE — 99285 EMERGENCY DEPT VISIT HI MDM: CPT

## 2025-07-22 PROCEDURE — 63710000001 INSULIN GLARGINE PER 5 UNITS: Performed by: INTERNAL MEDICINE

## 2025-07-22 PROCEDURE — 80053 COMPREHEN METABOLIC PANEL: CPT | Performed by: EMERGENCY MEDICINE

## 2025-07-22 PROCEDURE — 93010 ELECTROCARDIOGRAM REPORT: CPT | Performed by: INTERNAL MEDICINE

## 2025-07-22 PROCEDURE — 85025 COMPLETE CBC W/AUTO DIFF WBC: CPT | Performed by: EMERGENCY MEDICINE

## 2025-07-22 PROCEDURE — 82803 BLOOD GASES ANY COMBINATION: CPT | Performed by: EMERGENCY MEDICINE

## 2025-07-22 PROCEDURE — 25810000003 SODIUM CHLORIDE 0.9 % SOLUTION: Performed by: EMERGENCY MEDICINE

## 2025-07-22 PROCEDURE — 71111 X-RAY EXAM RIBS/CHEST4/> VWS: CPT

## 2025-07-22 PROCEDURE — 25810000003 SODIUM CHLORIDE 0.9 % SOLUTION: Performed by: PHYSICIAN ASSISTANT

## 2025-07-22 PROCEDURE — 36415 COLL VENOUS BLD VENIPUNCTURE: CPT

## 2025-07-22 RX ORDER — INSULIN LISPRO 100 [IU]/ML
5 INJECTION, SOLUTION INTRAVENOUS; SUBCUTANEOUS
Status: DISCONTINUED | OUTPATIENT
Start: 2025-07-23 | End: 2025-07-23

## 2025-07-22 RX ORDER — ONDANSETRON 4 MG/1
4 TABLET, ORALLY DISINTEGRATING ORAL EVERY 6 HOURS PRN
Status: DISCONTINUED | OUTPATIENT
Start: 2025-07-22 | End: 2025-08-01 | Stop reason: HOSPADM

## 2025-07-22 RX ORDER — ATORVASTATIN CALCIUM 80 MG/1
80 TABLET, FILM COATED ORAL DAILY
Status: DISCONTINUED | OUTPATIENT
Start: 2025-07-23 | End: 2025-08-01 | Stop reason: HOSPADM

## 2025-07-22 RX ORDER — NICOTINE POLACRILEX 4 MG
15 LOZENGE BUCCAL
Status: DISCONTINUED | OUTPATIENT
Start: 2025-07-22 | End: 2025-08-01 | Stop reason: HOSPADM

## 2025-07-22 RX ORDER — ACETAMINOPHEN 325 MG/1
650 TABLET ORAL ONCE
Status: COMPLETED | OUTPATIENT
Start: 2025-07-22 | End: 2025-07-22

## 2025-07-22 RX ORDER — INSULIN LISPRO 100 [IU]/ML
2-9 INJECTION, SOLUTION INTRAVENOUS; SUBCUTANEOUS
Status: DISCONTINUED | OUTPATIENT
Start: 2025-07-22 | End: 2025-08-01 | Stop reason: HOSPADM

## 2025-07-22 RX ORDER — LISINOPRIL 2.5 MG/1
10 TABLET ORAL
Status: DISCONTINUED | OUTPATIENT
Start: 2025-07-23 | End: 2025-07-29

## 2025-07-22 RX ORDER — POLYETHYLENE GLYCOL 3350 17 G/17G
17 POWDER, FOR SOLUTION ORAL DAILY PRN
Status: DISCONTINUED | OUTPATIENT
Start: 2025-07-22 | End: 2025-08-01 | Stop reason: HOSPADM

## 2025-07-22 RX ORDER — ACETAMINOPHEN 160 MG/5ML
650 SOLUTION ORAL EVERY 4 HOURS PRN
Status: DISCONTINUED | OUTPATIENT
Start: 2025-07-22 | End: 2025-08-01 | Stop reason: HOSPADM

## 2025-07-22 RX ORDER — CARVEDILOL 12.5 MG/1
12.5 TABLET ORAL 2 TIMES DAILY WITH MEALS
Status: DISCONTINUED | OUTPATIENT
Start: 2025-07-22 | End: 2025-08-01 | Stop reason: HOSPADM

## 2025-07-22 RX ORDER — ASPIRIN 81 MG/1
81 TABLET, CHEWABLE ORAL DAILY
Status: DISCONTINUED | OUTPATIENT
Start: 2025-07-23 | End: 2025-08-01 | Stop reason: HOSPADM

## 2025-07-22 RX ORDER — HYDROCODONE BITARTRATE AND ACETAMINOPHEN 5; 325 MG/1; MG/1
1 TABLET ORAL ONCE AS NEEDED
Refills: 0 | Status: COMPLETED | OUTPATIENT
Start: 2025-07-22 | End: 2025-07-22

## 2025-07-22 RX ORDER — ALBUTEROL SULFATE 0.83 MG/ML
2.5 SOLUTION RESPIRATORY (INHALATION) EVERY 6 HOURS PRN
Status: DISCONTINUED | OUTPATIENT
Start: 2025-07-22 | End: 2025-08-01 | Stop reason: HOSPADM

## 2025-07-22 RX ORDER — PANTOPRAZOLE SODIUM 40 MG/1
40 TABLET, DELAYED RELEASE ORAL
Status: DISCONTINUED | OUTPATIENT
Start: 2025-07-23 | End: 2025-08-01 | Stop reason: HOSPADM

## 2025-07-22 RX ORDER — HYDRALAZINE HYDROCHLORIDE 50 MG/1
50 TABLET, FILM COATED ORAL EVERY 12 HOURS SCHEDULED
Status: DISCONTINUED | OUTPATIENT
Start: 2025-07-22 | End: 2025-07-27

## 2025-07-22 RX ORDER — FAMOTIDINE 20 MG/1
40 TABLET, FILM COATED ORAL NIGHTLY
Status: DISCONTINUED | OUTPATIENT
Start: 2025-07-22 | End: 2025-07-22

## 2025-07-22 RX ORDER — ICOSAPENT ETHYL 1 G/1
2 CAPSULE ORAL 2 TIMES DAILY WITH MEALS
Status: DISCONTINUED | OUTPATIENT
Start: 2025-07-22 | End: 2025-07-22 | Stop reason: RX

## 2025-07-22 RX ORDER — LIDOCAINE 4 G/G
1 PATCH TOPICAL ONCE
Status: COMPLETED | OUTPATIENT
Start: 2025-07-22 | End: 2025-07-23

## 2025-07-22 RX ORDER — ACETAMINOPHEN 325 MG/1
650 TABLET ORAL EVERY 4 HOURS PRN
Status: DISCONTINUED | OUTPATIENT
Start: 2025-07-22 | End: 2025-08-01 | Stop reason: HOSPADM

## 2025-07-22 RX ORDER — BISACODYL 10 MG
10 SUPPOSITORY, RECTAL RECTAL DAILY PRN
Status: DISCONTINUED | OUTPATIENT
Start: 2025-07-22 | End: 2025-08-01 | Stop reason: HOSPADM

## 2025-07-22 RX ORDER — ONDANSETRON 2 MG/ML
4 INJECTION INTRAMUSCULAR; INTRAVENOUS EVERY 6 HOURS PRN
Status: DISCONTINUED | OUTPATIENT
Start: 2025-07-22 | End: 2025-08-01 | Stop reason: HOSPADM

## 2025-07-22 RX ORDER — DEXTROSE MONOHYDRATE 25 G/50ML
25 INJECTION, SOLUTION INTRAVENOUS
Status: DISCONTINUED | OUTPATIENT
Start: 2025-07-22 | End: 2025-08-01 | Stop reason: HOSPADM

## 2025-07-22 RX ORDER — POTASSIUM CHLORIDE 1500 MG/1
40 TABLET, EXTENDED RELEASE ORAL ONCE
Status: COMPLETED | OUTPATIENT
Start: 2025-07-22 | End: 2025-07-22

## 2025-07-22 RX ORDER — IBUPROFEN 600 MG/1
1 TABLET ORAL
Status: DISCONTINUED | OUTPATIENT
Start: 2025-07-22 | End: 2025-08-01 | Stop reason: HOSPADM

## 2025-07-22 RX ORDER — BISACODYL 5 MG/1
5 TABLET, DELAYED RELEASE ORAL DAILY PRN
Status: DISCONTINUED | OUTPATIENT
Start: 2025-07-22 | End: 2025-08-01 | Stop reason: HOSPADM

## 2025-07-22 RX ORDER — SODIUM CHLORIDE AND POTASSIUM CHLORIDE 150; 900 MG/100ML; MG/100ML
100 INJECTION, SOLUTION INTRAVENOUS CONTINUOUS
Status: DISPENSED | OUTPATIENT
Start: 2025-07-22 | End: 2025-07-23

## 2025-07-22 RX ORDER — ACETAMINOPHEN 650 MG/1
650 SUPPOSITORY RECTAL EVERY 4 HOURS PRN
Status: DISCONTINUED | OUTPATIENT
Start: 2025-07-22 | End: 2025-08-01 | Stop reason: HOSPADM

## 2025-07-22 RX ORDER — METOCLOPRAMIDE 5 MG/1
5 TABLET ORAL 4 TIMES DAILY
Status: DISCONTINUED | OUTPATIENT
Start: 2025-07-22 | End: 2025-08-01 | Stop reason: HOSPADM

## 2025-07-22 RX ORDER — AMOXICILLIN 250 MG
2 CAPSULE ORAL 2 TIMES DAILY PRN
Status: DISCONTINUED | OUTPATIENT
Start: 2025-07-22 | End: 2025-08-01 | Stop reason: HOSPADM

## 2025-07-22 RX ADMIN — ACETAMINOPHEN 650 MG: 325 TABLET ORAL at 15:52

## 2025-07-22 RX ADMIN — Medication 2.5 MG: at 21:17

## 2025-07-22 RX ADMIN — INSULIN LISPRO 8 UNITS: 100 INJECTION, SOLUTION INTRAVENOUS; SUBCUTANEOUS at 21:22

## 2025-07-22 RX ADMIN — POTASSIUM CHLORIDE 40 MEQ: 1500 TABLET, EXTENDED RELEASE ORAL at 16:54

## 2025-07-22 RX ADMIN — SODIUM CHLORIDE 1000 ML: 9 INJECTION, SOLUTION INTRAVENOUS at 16:50

## 2025-07-22 RX ADMIN — SODIUM CHLORIDE 1000 ML: 9 INJECTION, SOLUTION INTRAVENOUS at 15:53

## 2025-07-22 RX ADMIN — INSULIN GLARGINE 25 UNITS: 100 INJECTION, SOLUTION SUBCUTANEOUS at 21:21

## 2025-07-22 RX ADMIN — HYDRALAZINE HYDROCHLORIDE 50 MG: 50 TABLET ORAL at 21:18

## 2025-07-22 RX ADMIN — ACETAMINOPHEN 650 MG: 325 TABLET, FILM COATED ORAL at 21:17

## 2025-07-22 RX ADMIN — HYDROCODONE BITARTRATE AND ACETAMINOPHEN 1 TABLET: 5; 325 TABLET ORAL at 17:03

## 2025-07-22 RX ADMIN — POTASSIUM CHLORIDE AND SODIUM CHLORIDE 100 ML/HR: 900; 150 INJECTION, SOLUTION INTRAVENOUS at 21:19

## 2025-07-22 RX ADMIN — INSULIN HUMAN 10 UNITS: 100 INJECTION, SOLUTION PARENTERAL at 16:48

## 2025-07-22 RX ADMIN — CARVEDILOL 12.5 MG: 12.5 TABLET, FILM COATED ORAL at 21:18

## 2025-07-22 RX ADMIN — LIDOCAINE 1 PATCH: 4 PATCH TOPICAL at 15:53

## 2025-07-22 NOTE — ED PROVIDER NOTES
ED Course as of 07/22/25 1632   Tue Jul 22, 2025   1523 My differential diagnosis for syncope includes but is not limited to:  Vasovagal reflex - situational stimulus, micturition, defecation, cough, sneezing, swallowing, postprandial state, react sinus hypersensitivity  Vascular-prolonged recumbency, sudden postural change, prolonged standing, hypovolemia, vasodilator drugs, autonomic neuropathy, adrenal insufficiency, subclavian steal, pulmonary embolism  Cardiac -arrhythmia, heart block, myocardial infarction, aortic stenosis, cardiac myxoma, cardiac, LV Dysfunction, Aortic Dissection, Pulmonary Hypertension, Pulmonary Stenosis, Pacemaker Failure  CNS-seizure, hypoxia, hypoglycemia, TIA,(basal vertebral), hydrocephalus     [JG]   1523 I viewed chest x-ray with bilateral rib series in PACS, no pneumothorax per my read. [JG]   1543 EKG independently viewed and contemporaneously interpreted by ED physician. Time: 1540.  Rate 55.  Interpretation: Normal sinus rhythm, normal axis, inferior Q waves, nonspecific interventricular conduction delay, LVH, ST elevation in V1 through V3 with trace ST depression and T wave inversion in lead I and aVL, no ST depression or T wave inversion in inferior leads, QTc normal. [JG]   1545 EKG has concerning anterior lateral ST elevation but no reciprocal changes in inferior leads and QTc is normal.  Patient reassessed, complains of localized pain where she struck her ribs but otherwise denies any chest pain shortness of breath nausea vomiting, no signs or symptoms of ACS.  Consulting interventional cardiology. [JG]   1626 I discussed the case with MD Guzman with Cardiology at this time regarding the patient.  I discussed work-up, results, concerns.  I discussed the consulting provider's desire for admitting the patient to the hospitalist service with cardiology consult tomorrow.  No indication for Cath Lab at this time as the patient does not have chest pain or shortness of breath  or anginal equivalents.  Her nonspecific EKG changes are like related to LVH.  Plan for continued metabolic workup and treatment with subsequent admission to the hospitalist service for further management.  Patient agreeable.   [DC]   2467 I discussed the case with MD Gwendolyn with [facility] at this time regarding the patient.  I discussed work-up, results, concerns.  I discussed the consulting provider's desire for tele admit.    [DC]      ED Course User Index  [DC] Montana Brewer, PA  [JG] Trey David MD Creason, David J PA  07/22/25 7725

## 2025-07-22 NOTE — LETTER
[facility]  [phone]    ACCESS CENTER STATEMENT OF DISPOSITION  I, Bibiana Inman, was assessed in the [facility] at [facility] on 8/1/2025.  I understand the recommendations below and what follow-up action is expected of me.    --THERAPY--  [facility], [facility]  [address]  [place] [id number] [facility]  [phone]    [facility]  [address]  [place] [id number] [facility]  [phone]    [facility], [place] [phone]    MEDICATION  [facility]  [address]  [place] [id number] [facility]  [phone]    [facility]  [place] [phone]    ________________________________  Patient/Parent/Guardian/POA Signature    ________________________________  Clinician Signature    8/1/2025  15:51 EDT

## 2025-07-22 NOTE — ED NOTES
Pt to ED from home due to near syncopal episode, fall, complaining of L side/rib pain.  , Pt stated she has been out of her insulin for 3 months  EMS stated they preformed orthostatics and pts systolic dropped 40 points.

## 2025-07-22 NOTE — ED NOTES
"Nursing report ED to floor  Bibiana Inman  45 y.o.  female    HPI :  HPI  Stated Reason for Visit: near syncopal episode, hyperglycemia, \"I have been out of my insulin\"  History Obtained From: patient  Did patient miss a scheduled dialysis appointment?: No    Chief Complaint  Chief Complaint   Patient presents with    Syncope    Hyperglycemia       Admitting doctor:   Selina Snow MD    Admitting diagnosis:   The primary encounter diagnosis was Uncontrolled other specified diabetes mellitus with hyperglycemia. Diagnoses of Hypokalemia, Elevated troponin, Abnormal EKG, CARLA (acute kidney injury), and Syncope and collapse were also pertinent to this visit.    Code status:   Current Code Status       Date Active Code Status Order ID Comments User Context       7/22/2025 1707 CPR (Attempt to Resuscitate) [id number]  Selina Snow MD ED        Question Answer    Code Status (Patient has no pulse and is not breathing) CPR (Attempt to Resuscitate)    Medical Interventions (Patient has pulse or is breathing) Full                    Allergies:   Latex    Isolation:   No active isolations    Intake and Output    Intake/Output Summary (Last 24 hours) at 7/22/2025 1716  Last data filed at 7/22/2025 1656  Gross per 24 hour   Intake 1500 ml   Output --   Net 1500 ml       Weight:   There were no vitals filed for this visit.    Most recent vitals:   Vitals:    07/22/25 1546 07/22/25 1554 07/22/25 1600 07/22/25 1700   BP: (!) 81/56 109/60 133/62 144/68   Patient Position: Standing      Pulse: 65 56 54 60   Resp:       Temp:       TempSrc:       SpO2:  100% 100% 100%       Active LDAs/IV Access:   Lines, Drains & Airways       Active LDAs       Name Placement date Placement time Site Days    Peripheral IV 07/22/25 18 G Anterior;Left Forearm 07/22/25  --  Forearm  less than 1    Peripheral IV 07/22/25 1527 20 G Anterior;Right Forearm 07/22/25  1527  Forearm  less than 1                    Labs (abnormal labs have a " star):   Labs Reviewed   COMPREHENSIVE METABOLIC PANEL - Abnormal; Notable for the following components:       Result Value    Glucose 626 (*)     Creatinine 2.09 (*)     Sodium 129 (*)     Potassium 3.3 (*)     CO2 21.2 (*)     Calcium 7.9 (*)     Total Protein 5.4 (*)     Albumin 1.7 (*)     Alkaline Phosphatase 135 (*)     eGFR 28.9 (*)     All other components within normal limits    Narrative:     GFR Categories in Chronic Kidney Disease (CKD)              GFR Category          GFR (mL/min/1.73)    Interpretation  G1                    90 or greater        Normal or high (1)  G2                    60-89                Mild decrease (1)  G3a                   45-59                Mild to moderate decrease  G3b                   30-44                Moderate to severe decrease  G4                    15-29                Severe decrease  G5                    14 or less           Kidney failure    (1)In the absence of evidence of kidney disease, neither GFR category G1 or G2 fulfill the criteria for CKD.    eGFR calculation 2021 CKD-EPI creatinine equation, which does not include race as a factor   TROPONIN - Abnormal; Notable for the following components:    HS Troponin T 253 (*)     All other components within normal limits    Narrative:     High Sensitive Troponin T Reference Range:  <14.0 ng/L- Negative Female for AMI  <22.0 ng/L- Negative Male for AMI  >=14 - Abnormal Female indicating possible myocardial injury.  >=22 - Abnormal Male indicating possible myocardial injury.   Clinicians would have to utilize clinical acumen, EKG, Troponin, and serial changes to determine if it is an Acute Myocardial Infarction or myocardial injury due to an underlying chronic condition.        BNP (IN-HOUSE) - Abnormal; Notable for the following components:    proBNP 7,803.0 (*)     All other components within normal limits    Narrative:     This assay is used as an aid in the diagnosis of individuals suspected of having heart  failure. It can be used as an aid in the diagnosis of acute decompensated heart failure (ADHF) in patients presenting with signs and symptoms of ADHF to the emergency department (ED). In addition, NT-proBNP of <300 pg/mL indicates ADHF is not likely.    Age Range Result Interpretation  NT-proBNP Concentration (pg/mL:      <50             Positive            >450                   Gray                 300-450                    Negative             <300    50-75           Positive            >900                  Gray                300-900                  Negative            <300      >75             Positive            >1800                  Gray                300-1800                  Negative            <300   BLOOD GAS, VENOUS - Abnormal; Notable for the following components:    pH, Venous 7.308 (*)     pO2, Venous 26.7 (*)     HCO3, Venous 21.2 (*)     Base Excess, Venous -4.9 (*)     O2 Saturation, Venous 43.8 (*)     All other components within normal limits   CBC WITH AUTO DIFFERENTIAL - Abnormal; Notable for the following components:    RBC 3.75 (*)     Hemoglobin 11.2 (*)     Neutrophil % 83.8 (*)     Lymphocyte % 10.0 (*)     Monocyte % 4.1 (*)     Neutrophils, Absolute 7.57 (*)     All other components within normal limits   HEMOGLOBIN A1C - Abnormal; Notable for the following components:    Hemoglobin A1C 13.60 (*)     All other components within normal limits    Narrative:     Hemoglobin A1C Ranges:    Increased Risk for Diabetes  5.7% to 6.4%  Diabetes                     >= 6.5%  Diabetic Goal                < 7.0%   HIGH SENSITIVITIY TROPONIN T 1HR - Abnormal; Notable for the following components:    HS Troponin T 237 (*)     All other components within normal limits    Narrative:     High Sensitive Troponin T Reference Range:  <14.0 ng/L- Negative Female for AMI  <22.0 ng/L- Negative Male for AMI  >=14 - Abnormal Female indicating possible myocardial injury.  >=22 - Abnormal Male indicating  possible myocardial injury.   Clinicians would have to utilize clinical acumen, EKG, Troponin, and serial changes to determine if it is an Acute Myocardial Infarction or myocardial injury due to an underlying chronic condition.        MAGNESIUM - Normal   URINALYSIS W/ MICROSCOPIC IF INDICATED (NO CULTURE)   POCT GLUCOSE FINGERSTICK   POCT GLUCOSE FINGERSTICK   POCT GLUCOSE FINGERSTICK   POCT GLUCOSE FINGERSTICK   CBC AND DIFFERENTIAL    Narrative:     The following orders were created for panel order CBC & Differential.  Procedure                               Abnormality         Status                     ---------                               -----------         ------                     CBC Auto Differential[[id number]]        Abnormal            Final result                 Please view results for these tests on the individual orders.       EKG:   ECG 12 Lead Syncope   Preliminary Result   HEART RATE=55  bpm   RR Bbzuowyj=3651  ms   CT Ovjwhyna=571  ms   P Horizontal Axis=11  deg   P Front Axis=70  deg   QRSD Svbaxddk=970  ms   QT Pmnnlfnh=031  ms   MQhA=507  ms   QRS Axis=17  deg   T Wave Axis=137  deg   - ABNORMAL ECG -   Sinus rhythm   LVH with secondary repolarization abnormality   Consider inferior infarct   Anterior ST elevation, probably due to LVH   Date and Time of Study:2025-07-22 15:40:45          Meds given in ED:   Medications   Lidocaine 4 % 1 patch (1 patch Transdermal Medication Applied 7/22/25 1551)   sodium chloride 0.9 % bolus 1,000 mL (1,000 mL Intravenous New Bag 7/22/25 1650)   Potassium Replacement - Follow Nurse / BPA Driven Protocol (has no administration in time range)   Magnesium Standard Dose Replacement - Follow Nurse / BPA Driven Protocol (has no administration in time range)   acetaminophen (TYLENOL) tablet 650 mg (has no administration in time range)     Or   acetaminophen (TYLENOL) 160 MG/5ML oral solution 650 mg (has no administration in time range)     Or   acetaminophen  (TYLENOL) suppository 650 mg (has no administration in time range)   ondansetron ODT (ZOFRAN-ODT) disintegrating tablet 4 mg (has no administration in time range)     Or   ondansetron (ZOFRAN) injection 4 mg (has no administration in time range)   melatonin tablet 2.5 mg (has no administration in time range)   sennosides-docusate (PERICOLACE) 8.6-50 MG per tablet 2 tablet (has no administration in time range)     And   polyethylene glycol (MIRALAX) packet 17 g (has no administration in time range)     And   bisacodyl (DULCOLAX) EC tablet 5 mg (has no administration in time range)     And   bisacodyl (DULCOLAX) suppository 10 mg (has no administration in time range)   dextrose (GLUTOSE) oral gel 15 g (has no administration in time range)   dextrose (D50W) (25 g/50 mL) IV injection 25 g (has no administration in time range)   glucagon (GLUCAGEN) injection 1 mg (has no administration in time range)   insulin lispro (HUMALOG/ADMELOG) injection 2-9 Units (has no administration in time range)   sodium chloride 0.9 % bolus 1,000 mL (0 mL Intravenous Stopped 7/22/25 1656)   HYDROcodone-acetaminophen (NORCO) 5-325 MG per tablet 1 tablet (1 tablet Oral Given 7/22/25 1703)   acetaminophen (TYLENOL) tablet 650 mg (650 mg Oral Given 7/22/25 1552)   insulin regular (humuLIN R,novoLIN R) injection 10 Units (10 Units Intravenous Given 7/22/25 1648)   potassium chloride (KLOR-CON M20) CR tablet 40 mEq (40 mEq Oral Given 7/22/25 1654)       Imaging results:  No radiology results for the last day    Ambulatory status:   - bedrest     Social issues:   Social History     Socioeconomic History    Marital status:    Tobacco Use    Smoking status: Light Smoker     Current packs/day: 0.25     Average packs/day: 0.3 packs/day for 27.6 years (6.9 ttl pk-yrs)     Types: Cigarettes     Start date: 1998    Smokeless tobacco: Never   Vaping Use    Vaping status: Never Used   Substance and Sexual Activity    Alcohol use: Not Currently     Drug use: Never    Sexual activity: Not Currently     Partners: Male     Birth control/protection: Post-menopausal, None       Peripheral Neurovascular  Peripheral Neurovascular (Adult)  Peripheral Neurovascular WDL: WDL    Neuro Cognitive  Neuro Cognitive (Adult)  Cognitive/Neuro/Behavioral WDL: WDL    Learning  Learning Assessment  Learning Readiness and Ability: no barriers identified  Education Provided  Person Taught: patient  Teaching Method: verbal instruction  Education Outcome Evaluation: eager to learn, verbalizes understanding    Respiratory  Respiratory WDL  Respiratory WDL: WDL, all  Rhythm/Pattern, Respiratory: no shortness of breath reported, pattern regular, unlabored, depth regular    Abdominal Pain       Pain Assessments  Pain (Adult)  (0-10) Pain Rating: Rest: 8  (0-10) Pain Rating: Activity: 8  Pain Location: abdomen  Pain Side/Orientation: left, anterior  Pain Description: constant, sharp  Response to Pain Interventions: nonverbal indicators present, intervention not effective per patient    NIH Stroke Scale       Bria Vicente RN  07/22/25 17:16 EDT

## 2025-07-22 NOTE — ED PROVIDER NOTES
EMERGENCY DEPARTMENT ENCOUNTER  Room Number:  N640/1  Date of encounter:  7/23/2025  PCP: Ibrahima Correa MD  Patient Care Team:  Ibrahima Correa MD as PCP - General (Family Medicine)  Xochilt Jenkins MD as Consulting Physician (Nephrology)     HPI:  Context: Bibiana Inman is a 45 y.o. female who presents to the ED c/o chief complaint of syncope and hyperglycemia.  Patient reports that she has an insulin-dependent diabetic, has been out of her insulin for the last 3 months, sugar has been running elevated although patient reports that she does not check it regularly.  Patient reports that she was able to get a refill of oral medications last week although they were out of both of her long-acting as well as her short acting insulin.  Patient reports that she got up to go to the bathroom today and passed out, fell and injured her left lateral ribs.  Patient denied any chest pain shortness of breath or palpitations associated with syncope, no head injury, no neck or back pain.  Patient reports that she was having nausea vomiting over the weekend but nausea or vomiting at send for resolved, no diarrhea, no abdominal pain.  Patient denies any urinary symptoms other than urinary frequency, denies any fevers.    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    Abnormal bone density screening 11/11/2012    Benign essential hypertension 08/24/2016    Chronic cough 03/17/2015    Gastroparesis diabeticorum 07/20/2021    Elevated erythrocyte sedimentation rate 07/20/2021    Fatigue 07/20/2021    Gastroesophageal reflux disease 03/10/2021    Mixed hypercholesterolemia and hypertriglyceridemia 03/07/2014    Intermittent claudication 04/17/2017    Iron deficiency anemia 03/02/2020    Multiple joint pain  2021    Need for influenza vaccination 2017    Type 2 diabetes mellitus with hyperglycemia, with long-term current use of insulin 2017    Obstructive sleep apnea 2021    Coronary artery disease of native artery of native heart with stable angina pectoris 2024    Abnormal nuclear stress test 2024    Depressive disorder 2023    Back pain 2023    Diabetic peripheral neuropathy associated with type 2 diabetes mellitus 2024    Ingrowing nail, left great toe 2024    Personal history of COVID-19 2022    Polyp of colon 2023    Vitamin D deficiency 2024    Tobacco abuse 2024    CKD stage 3a, GFR 45-59 ml/min 2024    Bilateral carotid artery stenosis 2024    History of CVA (cerebrovascular accident) 2024    Elevated troponin 2024    Aortic valve disease 2024    Overweight (BMI 25.0-29.9) 2025    Hypertensive emergency 2025     Resolved Ambulatory Problems     Diagnosis Date Noted    Hypothyroidism 2014    Spontaneous  2012    Myocardial infarction 2021    Obesity 2014    Stress fracture of right ankle with routine healing 2024    Unstable angina 2024    Altered mental status 2024    Carotid stenosis, asymptomatic, right 2024    Acute right-sided weakness 2024     Past Medical History:   Diagnosis Date    Abnormal ECG 2014    CHF (congestive heart failure) 2013    Coronary arteriosclerosis 2014    Diabetic gastroparesis 2021    GERD (gastroesophageal reflux disease)     Lacunar cerebrovascular accident (CVA) 2024    Stroke 2024       PAST SURGICAL HISTORY  Past Surgical History:   Procedure Laterality Date    CARDIAC CATHETERIZATION N/A 2024    Procedure: Left Heart Cath and coronary angiogram;  Surgeon: Jena Barron MD;  Location: [facility] CATH INVASIVE LOCATION;  Service: Cardiology;  Laterality: N/A;      SECTION      CORONARY ARTERY BYPASS GRAFT  2014    LIMA-LAD, SVG-OM2, SVG-PDA, Dr. Debbie Fry.    CORONARY ARTERY BYPASS GRAFT WITH AORTIC VALVE REPAIR/REPLACEMENT N/A 2024    Procedure: YUE; REOPERATIVE STERNOTOMY; CORONARY ARTERY BYPASS GRAFTING TIMES 1 UTILIZING RIGHT SAPHENOUS VEIN; AORTIC VALVE TUMOR ; PRP;  Surgeon: Benja De Luna MD;  Location: [facility];  Service: Cardiothoracic;  Laterality: N/A;    DILATATION AND CURETTAGE      TONSILLECTOMY         FAMILY HISTORY  Family History   Problem Relation Age of Onset    Heart disease Mother     Hypertension Mother        SOCIAL HISTORY  Social History     Socioeconomic History    Marital status:    Tobacco Use    Smoking status: Former     Current packs/day: 0.00     Average packs/day: 0.3 packs/day for 27.5 years (6.9 ttl pk-yrs)     Types: Cigarettes     Start date:      Quit date: 2025     Years since quittin.0    Smokeless tobacco: Never   Vaping Use    Vaping status: Never Used   Substance and Sexual Activity    Alcohol use: Not Currently    Drug use: Never    Sexual activity: Not Currently     Partners: Male     Birth control/protection: Post-menopausal, None       ALLERGIES  Latex    The patient's allergies have been reviewed    PHYSICAL EXAM  I have reviewed the triage vital signs and nursing notes.  ED Triage Vitals [25 [id number]]   Temp Heart Rate Resp BP SpO2   98.2 °F (36.8 °C) 57 21 118/69 97 %      Temp src Heart Rate Source Patient Position BP Location FiO2 (%)   Oral Monitor -- -- --       General: No acute distress.  HENT: NCAT, PERRL, Nares patent.  Eyes: no scleral icterus.  Neck: trachea midline, no ROM limitations.  CV: regular rhythm, regular rate.  Respiratory: normal effort, CTAB.  Chest wall: Tenderness along midclavicular inferior ribs, no splinting or paradoxical motion  Abdomen: soft, nondistended, NTTP, no rebound tenderness, no guarding or rigidity.  Musculoskeletal: no  deformity.  Neuro: alert, moves all extremities, follows commands.  Skin: warm, dry.    LAB RESULTS  Recent Results (from the past 24 hours)   POC Glucose Once    Collection Time: 07/23/25  3:43 AM    Specimen: Blood   Result Value Ref Range    Glucose 330 (H) 70 - 130 mg/dL   Urinalysis With Microscopic If Indicated (No Culture) - Urine, Clean Catch    Collection Time: 07/23/25  3:46 AM    Specimen: Urine, Clean Catch   Result Value Ref Range    Color, UA Yellow Yellow, Straw    Appearance, UA Clear Clear    pH, UA 6.0 5.0 - 8.0    Specific Gravity, UA 1.030 1.005 - 1.030    Glucose, UA >=1000 mg/dL (3+) (A) Negative    Ketones, UA Negative Negative    Bilirubin, UA Negative Negative    Blood, UA Negative Negative    Protein, UA >=300 mg/dL (3+) (A) Negative    Leuk Esterase, UA Negative Negative    Nitrite, UA Negative Negative    Urobilinogen, UA 0.2 E.U./dL 0.2 - 1.0 E.U./dL   Urinalysis, Microscopic Only - Urine, Clean Catch    Collection Time: 07/23/25  3:46 AM    Specimen: Urine, Clean Catch   Result Value Ref Range    RBC, UA 0-2 None Seen, 0-2 /HPF    WBC, UA 3-5 (A) None Seen, 0-2 /HPF    Bacteria, UA None Seen None Seen /HPF    Squamous Epithelial Cells, UA 0-2 None Seen, 0-2 /HPF    Hyaline Casts, UA 3-6 None Seen /LPF    Methodology Automated Microscopy    POC Glucose Once    Collection Time: 07/23/25  6:22 AM    Specimen: Blood   Result Value Ref Range    Glucose 346 (H) 70 - 130 mg/dL   Basic Metabolic Panel    Collection Time: 07/23/25  6:34 AM    Specimen: Blood   Result Value Ref Range    Glucose 332 (H) 65 - 99 mg/dL    BUN 16.0 6.0 - 20.0 mg/dL    Creatinine 1.80 (H) 0.57 - 1.00 mg/dL    Sodium 135 (L) 136 - 145 mmol/L    Potassium 3.9 3.5 - 5.2 mmol/L    Chloride 108 (H) 98 - 107 mmol/L    CO2 21.0 (L) 22.0 - 29.0 mmol/L    Calcium 7.8 (L) 8.6 - 10.5 mg/dL    BUN/Creatinine Ratio 8.9 7.0 - 25.0    Anion Gap 6.0 5.0 - 15.0 mmol/L    eGFR 34.6 (L) >60.0 mL/min/1.73   CBC (No Diff)    Collection  Time: 07/23/25  6:34 AM    Specimen: Blood   Result Value Ref Range    WBC 8.72 3.40 - 10.80 10*3/mm3    RBC 3.40 (L) 3.77 - 5.28 10*6/mm3    Hemoglobin 10.1 (L) 12.0 - 15.9 g/dL    Hematocrit 31.4 (L) 34.0 - 46.6 %    MCV 92.4 79.0 - 97.0 fL    MCH 29.7 26.6 - 33.0 pg    MCHC 32.2 31.5 - 35.7 g/dL    RDW 13.1 12.3 - 15.4 %    RDW-SD 43.7 37.0 - 54.0 fl    MPV 10.9 6.0 - 12.0 fL    Platelets 220 140 - 450 10*3/mm3   POC Glucose Once    Collection Time: 07/23/25 12:40 PM    Specimen: Blood   Result Value Ref Range    Glucose 216 (H) 70 - 130 mg/dL   Adult Transthoracic Echo Complete W/ Cont if Necessary Per Protocol    Collection Time: 07/23/25  2:30 PM   Result Value Ref Range    EF(MOD-bp) 57.6 %    LV GLOBAL STRAIN  -18.9 %    LVIDd 4.7 cm    LVIDs 2.9 cm    IVSd 1.19 cm    LVPWd 1.19 cm    FS 36.9 %    IVS/LVPW 1.00 cm    ESV(cubed) 25.5 ml    LV Sys Vol (BSA corrected) 22.1 cm2    EDV(cubed) 101.6 ml    LV Avina Vol (BSA corrected) 49.7 cm2    LV mass(C)d 206.2 grams    LVOT area 3.3 cm2    LVOT diam 2.05 cm    EDV(MOD-sp2) 87.0 ml    EDV(MOD-sp4) 83.0 ml    ESV(MOD-sp2) 33.0 ml    ESV(MOD-sp4) 37.0 ml    SV(MOD-sp2) 54.0 ml    SV(MOD-sp4) 46.0 ml    SVi(MOD-SP2) 32.3 ml/m2    SVi(MOD-SP4) 27.5 ml/m2    SVi (LVOT) 44.0 ml/m2    EF(MOD-sp2) 62.1 %    EF(MOD-sp4) 55.4 %    MV E max ronn 109.0 cm/sec    MV A max ronn 90.8 cm/sec    MV dec time 0.23 sec    MV E/A 1.20     Pulm A Revs Dur 0.13 sec    MV A dur 0.11 sec    LA ESV Index (BP) 37.1 ml/m2    Med Peak E' Ronn 3.5 cm/sec    Lat Peak E' Ronn 5.7 cm/sec    Avg E/e' ratio 23.70     SV(LVOT) 73.5 ml    TAPSE (>1.6) 1.17 cm    RV S' 8.6 cm/sec    Pulm Sys Ronn 60.5 cm/sec    Pulm Avina Ronn 80.0 cm/sec    Pulm S/D 0.76     Pulm A Revs Ronn 39.0 cm/sec    LV V1 max 93.8 cm/sec    LV V1 max PG 3.5 mmHg    LV V1 mean PG 1.71 mmHg    LV V1 VTI 22.2 cm    Ao pk ronn 152.0 cm/sec    Ao max PG 9.2 mmHg    Ao mean PG 4.7 mmHg    Ao V2 VTI 33.5 cm    JEAN(I,D) 2.19 cm2     Dimensionless Index 0.66 (DI)    MV max PG 9.4 mmHg    MV mean PG 2.31 mmHg    MV V2 VTI 49.6 cm    MV P1/2t 99.7 msec    MVA(P1/2t) 2.21 cm2    MVA(VTI) 1.48 cm2    MV dec slope 452.7 cm/sec2    MR max russell 508.7 cm/sec    MR max .5 mmHg    RV V1 max PG 2.35 mmHg    RV V1 max 76.7 cm/sec    RV V1 VTI 20.2 cm    PA V2 max 81.6 cm/sec    PA acc time 0.15 sec    Ao root diam 3.4 cm    ACS 1.81 cm    Sinus 2.6 cm    RAP systole 3 mmHg   POC Glucose Once    Collection Time: 07/23/25  4:12 PM    Specimen: Blood   Result Value Ref Range    Glucose 179 (H) 70 - 130 mg/dL   POC Glucose Once    Collection Time: 07/23/25  9:49 PM    Specimen: Blood   Result Value Ref Range    Glucose 118 70 - 130 mg/dL       I ordered the above labs and reviewed the results.    RADIOLOGY  Adult Transthoracic Echo Complete W/ Cont if Necessary Per Protocol  Result Date: 7/23/2025    Left ventricular systolic function is normal. Left ventricular ejection fraction appears to be 61 - 65%.   Left ventricular wall thickness is consistent with mild to moderate concentric hypertrophy.   Left ventricular diastolic function is consistent with (grade II w/high LAP) pseudonormalization.   Normal right ventricular cavity size and systolic function noted.   The left atrial cavity is mild to moderately dilated.   Mild mitral valve regurgitation is present.   Insufficient TR velocity profile to estimate the right ventricular systolic pressure.   There is no evidence of pericardial effusion.       I ordered the above noted radiological studies. I reviewed the images and results. I agree with the radiologist interpretation.    PROCEDURES  Procedures    MEDICATIONS GIVEN IN ER  Medications   acetaminophen (TYLENOL) tablet 650 mg (650 mg Oral Given 7/23/25 0829)     Or   acetaminophen (TYLENOL) 160 MG/5ML oral solution 650 mg ( Oral Not Given:  See Alt 7/23/25 0829)     Or   acetaminophen (TYLENOL) suppository 650 mg ( Rectal Not Given:  See Alt 7/23/25  0829)   ondansetron ODT (ZOFRAN-ODT) disintegrating tablet 4 mg (has no administration in time range)     Or   ondansetron (ZOFRAN) injection 4 mg (has no administration in time range)   melatonin tablet 2.5 mg (2.5 mg Oral Given 7/22/25 2117)   sennosides-docusate (PERICOLACE) 8.6-50 MG per tablet 2 tablet (has no administration in time range)     And   polyethylene glycol (MIRALAX) packet 17 g (has no administration in time range)     And   bisacodyl (DULCOLAX) EC tablet 5 mg (has no administration in time range)     And   bisacodyl (DULCOLAX) suppository 10 mg (has no administration in time range)   dextrose (GLUTOSE) oral gel 15 g (has no administration in time range)   dextrose (D50W) (25 g/50 mL) IV injection 25 g (has no administration in time range)   glucagon (GLUCAGEN) injection 1 mg (has no administration in time range)   insulin lispro (HUMALOG/ADMELOG) injection 2-9 Units ( Subcutaneous Not Given 7/23/25 2217)   albuterol (PROVENTIL) nebulizer solution 0.083% 2.5 mg/3mL (has no administration in time range)   aspirin chewable tablet 81 mg (81 mg Oral Given 7/23/25 0815)   atorvastatin (LIPITOR) tablet 80 mg (80 mg Oral Given 7/23/25 0815)   carvedilol (COREG) tablet 12.5 mg (12.5 mg Oral Given 7/23/25 1701)   hydrALAZINE (APRESOLINE) tablet 50 mg (50 mg Oral Not Given 7/23/25 2216)   lisinopril (PRINIVIL,ZESTRIL) tablet 10 mg ( Oral Dose Auto Held 7/31/25 0900)   metoclopramide (REGLAN) tablet 5 mg (5 mg Oral Given 7/23/25 2215)   pantoprazole (PROTONIX) EC tablet 40 mg ( Oral Canceled Entry 7/23/25 0600)   sodium chloride 0.9 % with KCl 20 mEq/L infusion (0 mL/hr Intravenous Stopped 7/23/25 1142)   insulin glargine (LANTUS, SEMGLEE) injection 35 Units (35 Units Subcutaneous Given 7/23/25 9373)   insulin lispro (HUMALOG/ADMELOG) injection 8 Units (8 Units Subcutaneous Given 7/23/25 1701)   Potassium Replacement - Follow Nurse / BPA Driven Protocol (has no administration in time range)   Magnesium  Standard Dose Replacement - Follow Nurse / BPA Driven Protocol (has no administration in time range)   Phosphorus Replacement - Follow Nurse / BPA Driven Protocol (has no administration in time range)   Calcium Replacement - Follow Nurse / BPA Driven Protocol (has no administration in time range)   lactated ringers infusion (75 mL/hr Intravenous New Bag 7/23/25 1143)   HYDROcodone-acetaminophen (NORCO) 5-325 MG per tablet 1 tablet (1 tablet Oral Given 7/23/25 1241)   sodium chloride 0.9 % bolus 1,000 mL (0 mL Intravenous Stopped 7/22/25 1656)   Lidocaine 4 % 1 patch (1 patch Transdermal Medication Removed 7/23/25 0329)   HYDROcodone-acetaminophen (NORCO) 5-325 MG per tablet 1 tablet (1 tablet Oral Given 7/22/25 1703)   acetaminophen (TYLENOL) tablet 650 mg (650 mg Oral Given 7/22/25 1552)   sodium chloride 0.9 % bolus 1,000 mL (1,000 mL Intravenous New Bag 7/22/25 1650)   insulin regular (humuLIN R,novoLIN R) injection 10 Units (10 Units Intravenous Given 7/22/25 1648)   potassium chloride (KLOR-CON M20) CR tablet 40 mEq (40 mEq Oral Given 7/22/25 1654)   insulin glargine (LANTUS, SEMGLEE) injection 10 Units (10 Units Subcutaneous Given 7/23/25 1245)       PROGRESS, DATA ANALYSIS, CONSULTS, AND MEDICAL DECISION MAKING  A complete history and physical exam have been performed.  All available laboratory and imaging results have been reviewed by myself prior to disposition.    MDM    After the initial H&P, I discussed pertinent information from history and physical exam with patient/family.  Discussed differential diagnosis.  Discussed plan for ED evaluation/workup/treatment.  All questions answered.  Patient/family is agreeable with plan.  ED Course as of 07/23/25 2243   Tue Jul 22, 2025   1523 My differential diagnosis for syncope includes but is not limited to:  Vasovagal reflex - situational stimulus, micturition, defecation, cough, sneezing, swallowing, postprandial state, react sinus  hypersensitivity  Vascular-prolonged recumbency, sudden postural change, prolonged standing, hypovolemia, vasodilator drugs, autonomic neuropathy, adrenal insufficiency, subclavian steal, pulmonary embolism  Cardiac -arrhythmia, heart block, myocardial infarction, aortic stenosis, cardiac myxoma, cardiac, LV Dysfunction, Aortic Dissection, Pulmonary Hypertension, Pulmonary Stenosis, Pacemaker Failure  CNS-seizure, hypoxia, hypoglycemia, TIA,(basal vertebral), hydrocephalus     [JG]   1523 I viewed chest x-ray with bilateral rib series in PACS, no pneumothorax per my read. [JG]   1543 EKG independently viewed and contemporaneously interpreted by ED physician. Time: 1540.  Rate 55.  Interpretation: Normal sinus rhythm, normal axis, inferior Q waves, nonspecific interventricular conduction delay, LVH, ST elevation in V1 through V3 with trace ST depression and T wave inversion in lead I and aVL, no ST depression or T wave inversion in inferior leads, QTc normal. [JG]   1545 EKG has concerning anterior lateral ST elevation but no reciprocal changes in inferior leads and QTc is normal.  Patient reassessed, complains of localized pain where she struck her ribs but otherwise denies any chest pain shortness of breath nausea vomiting, no signs or symptoms of ACS.  Consulting interventional cardiology. [JG]   1629 I discussed the case with MD Guzman with Cardiology at this time regarding the patient.  I discussed work-up, results, concerns.  I discussed the consulting provider's desire for admitting the patient to the hospitalist service with cardiology consult tomorrow.  No indication for Cath Lab at this time as the patient does not have chest pain or shortness of breath or anginal equivalents.  Her nonspecific EKG changes are like related to LVH.  Plan for continued metabolic workup and treatment with subsequent admission to the hospitalist service for further management.  Patient agreeable.   [DC]   1624 I discussed  the case with MD Gwendolyn with [facility] at this time regarding the patient.  I discussed work-up, results, concerns.  I discussed the consulting provider's desire for tele admit.    [DC]      ED Course User Index  [DC] Montana Brewer PA  [JG] Trey David MD       AS OF 22:43 EDT VITALS:    BP - 176/64  HR - (!) 49  TEMP - 98.2 °F (36.8 °C) (Oral)  O2 SATS - 97%    DIAGNOSIS  Final diagnoses:   Uncontrolled other specified diabetes mellitus with hyperglycemia   Hypokalemia   Elevated troponin   Abnormal EKG   CARLA (acute kidney injury)   Syncope and collapse         DISPOSITION  ADMISSION    Discussed treatment plan and reason for admission with pt/family and admitting physician.  Pt/family voiced understanding of the plan for admission for further testing/treatment as needed.        Trey David MD  07/23/25 4251

## 2025-07-23 ENCOUNTER — APPOINTMENT (OUTPATIENT)
Dept: CARDIOLOGY | Facility: HOSPITAL | Age: 45
End: 2025-07-23
Payer: MEDICAID

## 2025-07-23 PROBLEM — D64.9 ANEMIA: Status: ACTIVE | Noted: 2025-07-23

## 2025-07-23 PROBLEM — R42 DIZZINESS: Status: ACTIVE | Noted: 2025-07-23

## 2025-07-23 PROBLEM — L97.509 TYPE 2 DIABETES MELLITUS WITH DIABETIC FOOT ULCER: Status: ACTIVE | Noted: 2025-07-23

## 2025-07-23 PROBLEM — I25.10 CAD (CORONARY ARTERY DISEASE): Status: ACTIVE | Noted: 2025-07-23

## 2025-07-23 PROBLEM — E11.621 TYPE 2 DIABETES MELLITUS WITH DIABETIC FOOT ULCER: Status: ACTIVE | Noted: 2025-07-23

## 2025-07-23 PROBLEM — N18.32 CKD STAGE 3B, GFR 30-44 ML/MIN: Status: ACTIVE | Noted: 2025-07-23

## 2025-07-23 LAB
ANION GAP SERPL CALCULATED.3IONS-SCNC: 6 MMOL/L (ref 5–15)
AORTIC DIMENSIONLESS INDEX: 0.66 (DI)
AV MEAN PRESS GRAD SYS DOP V1V2: 4.7 MMHG
AV VMAX SYS DOP: 152 CM/SEC
BACTERIA UR QL AUTO: ABNORMAL /HPF
BH CV ECHO LEFT VENTRICLE GLOBAL LONGITUDINAL STRAIN: -18.9 %
BH CV ECHO MEAS - ACS: 1.81 CM
BH CV ECHO MEAS - AO MAX PG: 9.2 MMHG
BH CV ECHO MEAS - AO ROOT DIAM: 3.4 CM
BH CV ECHO MEAS - AO V2 VTI: 33.5 CM
BH CV ECHO MEAS - AVA(I,D): 2.19 CM2
BH CV ECHO MEAS - EDV(CUBED): 101.6 ML
BH CV ECHO MEAS - EDV(MOD-SP2): 87 ML
BH CV ECHO MEAS - EDV(MOD-SP4): 83 ML
BH CV ECHO MEAS - EF(MOD-SP2): 62.1 %
BH CV ECHO MEAS - EF(MOD-SP4): 55.4 %
BH CV ECHO MEAS - ESV(CUBED): 25.5 ML
BH CV ECHO MEAS - ESV(MOD-SP2): 33 ML
BH CV ECHO MEAS - ESV(MOD-SP4): 37 ML
BH CV ECHO MEAS - FS: 36.9 %
BH CV ECHO MEAS - IVS/LVPW: 1 CM
BH CV ECHO MEAS - IVSD: 1.19 CM
BH CV ECHO MEAS - LAT PEAK E' VEL: 5.7 CM/SEC
BH CV ECHO MEAS - LV DIASTOLIC VOL/BSA (35-75): 49.7 CM2
BH CV ECHO MEAS - LV MASS(C)D: 206.2 GRAMS
BH CV ECHO MEAS - LV MAX PG: 3.5 MMHG
BH CV ECHO MEAS - LV MEAN PG: 1.71 MMHG
BH CV ECHO MEAS - LV SYSTOLIC VOL/BSA (12-30): 22.1 CM2
BH CV ECHO MEAS - LV V1 MAX: 93.8 CM/SEC
BH CV ECHO MEAS - LV V1 VTI: 22.2 CM
BH CV ECHO MEAS - LVIDD: 4.7 CM
BH CV ECHO MEAS - LVIDS: 2.9 CM
BH CV ECHO MEAS - LVOT AREA: 3.3 CM2
BH CV ECHO MEAS - LVOT DIAM: 2.05 CM
BH CV ECHO MEAS - LVPWD: 1.19 CM
BH CV ECHO MEAS - MED PEAK E' VEL: 3.5 CM/SEC
BH CV ECHO MEAS - MR MAX PG: 103.5 MMHG
BH CV ECHO MEAS - MR MAX VEL: 508.7 CM/SEC
BH CV ECHO MEAS - MV A DUR: 0.11 SEC
BH CV ECHO MEAS - MV A MAX VEL: 90.8 CM/SEC
BH CV ECHO MEAS - MV DEC SLOPE: 452.7 CM/SEC2
BH CV ECHO MEAS - MV DEC TIME: 0.23 SEC
BH CV ECHO MEAS - MV E MAX VEL: 109 CM/SEC
BH CV ECHO MEAS - MV E/A: 1.2
BH CV ECHO MEAS - MV MAX PG: 9.4 MMHG
BH CV ECHO MEAS - MV MEAN PG: 2.31 MMHG
BH CV ECHO MEAS - MV P1/2T: 99.7 MSEC
BH CV ECHO MEAS - MV V2 VTI: 49.6 CM
BH CV ECHO MEAS - MVA(P1/2T): 2.21 CM2
BH CV ECHO MEAS - MVA(VTI): 1.48 CM2
BH CV ECHO MEAS - PA ACC TIME: 0.15 SEC
BH CV ECHO MEAS - PA V2 MAX: 81.6 CM/SEC
BH CV ECHO MEAS - PULM A REVS DUR: 0.13 SEC
BH CV ECHO MEAS - PULM A REVS VEL: 39 CM/SEC
BH CV ECHO MEAS - PULM DIAS VEL: 80 CM/SEC
BH CV ECHO MEAS - PULM S/D: 0.76
BH CV ECHO MEAS - PULM SYS VEL: 60.5 CM/SEC
BH CV ECHO MEAS - RAP SYSTOLE: 3 MMHG
BH CV ECHO MEAS - RV MAX PG: 2.35 MMHG
BH CV ECHO MEAS - RV V1 MAX: 76.7 CM/SEC
BH CV ECHO MEAS - RV V1 VTI: 20.2 CM
BH CV ECHO MEAS - SV(LVOT): 73.5 ML
BH CV ECHO MEAS - SV(MOD-SP2): 54 ML
BH CV ECHO MEAS - SV(MOD-SP4): 46 ML
BH CV ECHO MEAS - SVI(LVOT): 44 ML/M2
BH CV ECHO MEAS - SVI(MOD-SP2): 32.3 ML/M2
BH CV ECHO MEAS - SVI(MOD-SP4): 27.5 ML/M2
BH CV ECHO MEAS - TAPSE (>1.6): 1.17 CM
BH CV ECHO MEASUREMENTS AVERAGE E/E' RATIO: 23.7
BH CV XLRA - TDI S': 8.6 CM/SEC
BILIRUB UR QL STRIP: NEGATIVE
BUN SERPL-MCNC: 16 MG/DL (ref 6–20)
BUN/CREAT SERPL: 8.9 (ref 7–25)
CALCIUM SPEC-SCNC: 7.8 MG/DL (ref 8.6–10.5)
CHLORIDE SERPL-SCNC: 108 MMOL/L (ref 98–107)
CLARITY UR: CLEAR
CO2 SERPL-SCNC: 21 MMOL/L (ref 22–29)
COLOR UR: YELLOW
CREAT SERPL-MCNC: 1.8 MG/DL (ref 0.57–1)
DEPRECATED RDW RBC AUTO: 43.7 FL (ref 37–54)
EGFRCR SERPLBLD CKD-EPI 2021: 34.6 ML/MIN/1.73
ERYTHROCYTE [DISTWIDTH] IN BLOOD BY AUTOMATED COUNT: 13.1 % (ref 12.3–15.4)
GLUCOSE BLDC GLUCOMTR-MCNC: 118 MG/DL (ref 70–130)
GLUCOSE BLDC GLUCOMTR-MCNC: 179 MG/DL (ref 70–130)
GLUCOSE BLDC GLUCOMTR-MCNC: 216 MG/DL (ref 70–130)
GLUCOSE BLDC GLUCOMTR-MCNC: 330 MG/DL (ref 70–130)
GLUCOSE BLDC GLUCOMTR-MCNC: 346 MG/DL (ref 70–130)
GLUCOSE SERPL-MCNC: 332 MG/DL (ref 65–99)
GLUCOSE UR STRIP-MCNC: ABNORMAL MG/DL
HCT VFR BLD AUTO: 31.4 % (ref 34–46.6)
HGB BLD-MCNC: 10.1 G/DL (ref 12–15.9)
HGB UR QL STRIP.AUTO: NEGATIVE
HYALINE CASTS UR QL AUTO: ABNORMAL /LPF
KETONES UR QL STRIP: NEGATIVE
LEFT ATRIUM VOLUME INDEX: 37.1 ML/M2
LEUKOCYTE ESTERASE UR QL STRIP.AUTO: NEGATIVE
LV EF BIPLANE MOD: 57.6 %
MCH RBC QN AUTO: 29.7 PG (ref 26.6–33)
MCHC RBC AUTO-ENTMCNC: 32.2 G/DL (ref 31.5–35.7)
MCV RBC AUTO: 92.4 FL (ref 79–97)
NITRITE UR QL STRIP: NEGATIVE
PH UR STRIP.AUTO: 6 [PH] (ref 5–8)
PLATELET # BLD AUTO: 220 10*3/MM3 (ref 140–450)
PMV BLD AUTO: 10.9 FL (ref 6–12)
POTASSIUM SERPL-SCNC: 3.9 MMOL/L (ref 3.5–5.2)
PROT UR QL STRIP: ABNORMAL
RBC # BLD AUTO: 3.4 10*6/MM3 (ref 3.77–5.28)
RBC # UR STRIP: ABNORMAL /HPF
REF LAB TEST METHOD: ABNORMAL
SINUS: 2.6 CM
SODIUM SERPL-SCNC: 135 MMOL/L (ref 136–145)
SP GR UR STRIP: 1.03 (ref 1–1.03)
SQUAMOUS #/AREA URNS HPF: ABNORMAL /HPF
UROBILINOGEN UR QL STRIP: ABNORMAL
WBC # UR STRIP: ABNORMAL /HPF
WBC NRBC COR # BLD AUTO: 8.72 10*3/MM3 (ref 3.4–10.8)

## 2025-07-23 PROCEDURE — 97530 THERAPEUTIC ACTIVITIES: CPT

## 2025-07-23 PROCEDURE — 36415 COLL VENOUS BLD VENIPUNCTURE: CPT | Performed by: INTERNAL MEDICINE

## 2025-07-23 PROCEDURE — 63710000001 INSULIN LISPRO (HUMAN) PER 5 UNITS: Performed by: STUDENT IN AN ORGANIZED HEALTH CARE EDUCATION/TRAINING PROGRAM

## 2025-07-23 PROCEDURE — 63710000001 INSULIN GLARGINE PER 5 UNITS: Performed by: STUDENT IN AN ORGANIZED HEALTH CARE EDUCATION/TRAINING PROGRAM

## 2025-07-23 PROCEDURE — 93306 TTE W/DOPPLER COMPLETE: CPT

## 2025-07-23 PROCEDURE — 25010000002 SODIUM CHLORIDE 0.9 % WITH KCL 20 MEQ 20-0.9 MEQ/L-% SOLUTION: Performed by: INTERNAL MEDICINE

## 2025-07-23 PROCEDURE — G0378 HOSPITAL OBSERVATION PER HR: HCPCS

## 2025-07-23 PROCEDURE — 81001 URINALYSIS AUTO W/SCOPE: CPT | Performed by: EMERGENCY MEDICINE

## 2025-07-23 PROCEDURE — 97162 PT EVAL MOD COMPLEX 30 MIN: CPT

## 2025-07-23 PROCEDURE — 93356 MYOCRD STRAIN IMG SPCKL TRCK: CPT | Performed by: INTERNAL MEDICINE

## 2025-07-23 PROCEDURE — 93356 MYOCRD STRAIN IMG SPCKL TRCK: CPT

## 2025-07-23 PROCEDURE — 82948 REAGENT STRIP/BLOOD GLUCOSE: CPT

## 2025-07-23 PROCEDURE — 63710000001 INSULIN LISPRO (HUMAN) PER 5 UNITS: Performed by: INTERNAL MEDICINE

## 2025-07-23 PROCEDURE — 80048 BASIC METABOLIC PNL TOTAL CA: CPT | Performed by: INTERNAL MEDICINE

## 2025-07-23 PROCEDURE — 25810000003 LACTATED RINGERS PER 1000 ML: Performed by: STUDENT IN AN ORGANIZED HEALTH CARE EDUCATION/TRAINING PROGRAM

## 2025-07-23 PROCEDURE — 93306 TTE W/DOPPLER COMPLETE: CPT | Performed by: INTERNAL MEDICINE

## 2025-07-23 PROCEDURE — 99254 IP/OBS CNSLTJ NEW/EST MOD 60: CPT | Performed by: INTERNAL MEDICINE

## 2025-07-23 PROCEDURE — 85027 COMPLETE CBC AUTOMATED: CPT | Performed by: INTERNAL MEDICINE

## 2025-07-23 RX ORDER — HYDROCODONE BITARTRATE AND ACETAMINOPHEN 5; 325 MG/1; MG/1
1 TABLET ORAL EVERY 6 HOURS PRN
Refills: 0 | Status: DISCONTINUED | OUTPATIENT
Start: 2025-07-23 | End: 2025-07-26

## 2025-07-23 RX ORDER — INSULIN LISPRO 100 [IU]/ML
8 INJECTION, SOLUTION INTRAVENOUS; SUBCUTANEOUS
Status: DISCONTINUED | OUTPATIENT
Start: 2025-07-23 | End: 2025-07-25

## 2025-07-23 RX ORDER — INSULIN ASPART 100 [IU]/ML
INJECTION, SOLUTION INTRAVENOUS; SUBCUTANEOUS
Qty: 15 ML | Refills: 1 | Status: CANCELLED | OUTPATIENT
Start: 2025-07-23

## 2025-07-23 RX ORDER — SODIUM CHLORIDE, SODIUM LACTATE, POTASSIUM CHLORIDE, CALCIUM CHLORIDE 600; 310; 30; 20 MG/100ML; MG/100ML; MG/100ML; MG/100ML
75 INJECTION, SOLUTION INTRAVENOUS CONTINUOUS
Status: ACTIVE | OUTPATIENT
Start: 2025-07-23 | End: 2025-07-24

## 2025-07-23 RX ADMIN — HYDRALAZINE HYDROCHLORIDE 50 MG: 50 TABLET ORAL at 08:16

## 2025-07-23 RX ADMIN — METOCLOPRAMIDE 5 MG: 5 TABLET ORAL at 07:59

## 2025-07-23 RX ADMIN — SODIUM CHLORIDE, POTASSIUM CHLORIDE, SODIUM LACTATE AND CALCIUM CHLORIDE 75 ML/HR: 600; 310; 30; 20 INJECTION, SOLUTION INTRAVENOUS at 23:38

## 2025-07-23 RX ADMIN — HYDROCODONE BITARTRATE AND ACETAMINOPHEN 1 TABLET: 5; 325 TABLET ORAL at 12:41

## 2025-07-23 RX ADMIN — INSULIN LISPRO 8 UNITS: 100 INJECTION, SOLUTION INTRAVENOUS; SUBCUTANEOUS at 12:46

## 2025-07-23 RX ADMIN — CARVEDILOL 12.5 MG: 12.5 TABLET, FILM COATED ORAL at 17:01

## 2025-07-23 RX ADMIN — ACETAMINOPHEN 650 MG: 325 TABLET, FILM COATED ORAL at 03:56

## 2025-07-23 RX ADMIN — ASPIRIN 81 MG CHEWABLE TABLET 81 MG: 81 TABLET CHEWABLE at 08:15

## 2025-07-23 RX ADMIN — METOCLOPRAMIDE 5 MG: 5 TABLET ORAL at 22:15

## 2025-07-23 RX ADMIN — INSULIN LISPRO 7 UNITS: 100 INJECTION, SOLUTION INTRAVENOUS; SUBCUTANEOUS at 08:00

## 2025-07-23 RX ADMIN — ATORVASTATIN CALCIUM 80 MG: 80 TABLET, FILM COATED ORAL at 08:15

## 2025-07-23 RX ADMIN — PANTOPRAZOLE SODIUM 40 MG: 40 TABLET, DELAYED RELEASE ORAL at 03:56

## 2025-07-23 RX ADMIN — INSULIN GLARGINE 35 UNITS: 100 INJECTION, SOLUTION SUBCUTANEOUS at 22:16

## 2025-07-23 RX ADMIN — SODIUM CHLORIDE, POTASSIUM CHLORIDE, SODIUM LACTATE AND CALCIUM CHLORIDE 75 ML/HR: 600; 310; 30; 20 INJECTION, SOLUTION INTRAVENOUS at 11:43

## 2025-07-23 RX ADMIN — METOCLOPRAMIDE 5 MG: 5 TABLET ORAL at 17:00

## 2025-07-23 RX ADMIN — INSULIN GLARGINE 10 UNITS: 100 INJECTION, SOLUTION SUBCUTANEOUS at 12:45

## 2025-07-23 RX ADMIN — LISINOPRIL 10 MG: 10 TABLET ORAL at 08:16

## 2025-07-23 RX ADMIN — INSULIN LISPRO 5 UNITS: 100 INJECTION, SOLUTION INTRAVENOUS; SUBCUTANEOUS at 08:00

## 2025-07-23 RX ADMIN — INSULIN LISPRO 2 UNITS: 100 INJECTION, SOLUTION INTRAVENOUS; SUBCUTANEOUS at 17:01

## 2025-07-23 RX ADMIN — POTASSIUM CHLORIDE AND SODIUM CHLORIDE 100 ML/HR: 900; 150 INJECTION, SOLUTION INTRAVENOUS at 06:54

## 2025-07-23 RX ADMIN — ACETAMINOPHEN 650 MG: 325 TABLET, FILM COATED ORAL at 08:29

## 2025-07-23 RX ADMIN — METOCLOPRAMIDE 5 MG: 5 TABLET ORAL at 12:41

## 2025-07-23 RX ADMIN — INSULIN LISPRO 4 UNITS: 100 INJECTION, SOLUTION INTRAVENOUS; SUBCUTANEOUS at 12:46

## 2025-07-23 RX ADMIN — INSULIN LISPRO 8 UNITS: 100 INJECTION, SOLUTION INTRAVENOUS; SUBCUTANEOUS at 17:01

## 2025-07-23 RX ADMIN — CARVEDILOL 12.5 MG: 12.5 TABLET, FILM COATED ORAL at 08:15

## 2025-07-23 NOTE — THERAPY EVALUATION
Patient Name: Bibiana Inman  : 1980    MRN: [id number]                              Today's Date: 2025       Admit Date: 2025    Visit Dx:     ICD-10-CM ICD-9-CM   1. Uncontrolled other specified diabetes mellitus with hyperglycemia  E13.65 250.02   2. Hypokalemia  E87.6 276.8   3. Elevated troponin  R79.89 790.6   4. Abnormal EKG  R94.31 794.31   5. CARLA (acute kidney injury)  N17.9 584.9   6. 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    Abnormal bone density screening    Benign essential hypertension    Chronic cough    Gastroparesis diabeticorum    Elevated erythrocyte sedimentation rate    Fatigue    Gastroesophageal reflux disease    Mixed hypercholesterolemia and hypertriglyceridemia    Intermittent claudication    Iron deficiency anemia    Multiple joint pain    Need for influenza vaccination    Type 2 diabetes mellitus with hyperglycemia, with long-term current use of insulin    Obstructive sleep apnea    Coronary artery disease of native artery of native heart with stable angina pectoris    Abnormal nuclear stress test    Depressive disorder    Back pain    Diabetic peripheral neuropathy associated with type 2 diabetes mellitus    Ingrowing nail, left great toe    Personal history of COVID-19    Polyp of colon    Vitamin D deficiency    Tobacco abuse    CKD stage 3a, GFR 45-59 ml/min    Bilateral carotid artery stenosis    History of CVA (cerebrovascular accident)    Elevated troponin    Aortic valve disease    Overweight (BMI 25.0-29.9)    Hypertensive emergency    Syncope and collapse    Dizziness    CAD (coronary artery disease)    CKD stage 3b, GFR 30-44 ml/min    Anemia    Type 2 diabetes mellitus with diabetic foot ulcer     Past Medical History:   Diagnosis Date    5,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 Debbie Fry.    Depressive disorder 2023    Diabetic gastroparesis 2021    Diabetic peripheral neuropathy associated with type 2 diabetes mellitus 2024    Gastroesophageal reflux disease 03/10/2021    GERD (gastroesophageal reflux disease)     Intermittent claudication 2017    Iron deficiency anemia 2020    [place]un[place] cerebrovascular accident (CVA) 2024    Mixed hypercholesterolemia and hypertriglyceridemia 2014    Myocardial infarction 2021    Obesity 3/7/2014    Obstructive sleep apnea 2021    Personal history of COVID-19 2022    Polyp of colon 2023    Spontaneous  2012    Stress fracture of right ankle with routine healing 2024    Stroke 2024    Tobacco abuse 2024    Type 2 diabetes mellitus with hyperglycemia, with long-term current use of insulin 2017    Vitamin D deficiency 2024     Past Surgical History:   Procedure Laterality Date    CARDIAC CATHETERIZATION N/A 2024    Procedure: Left Heart Cath and coronary angiogram;  Surgeon: Jena Barron MD;  Location:  INVASIVE LOCATION;  Service: Cardiology;  Laterality: N/A;     SECTION      CORONARY ARTERY BYPASS GRAFT  2014    LIMA-LAD, SVG-OM2, SVG-PDA, Dr. Debbie Fry.    CORONARY ARTERY BYPASS GRAFT WITH AORTIC VALVE REPAIR/REPLACEMENT N/A 2024    Procedure: YUE; REOPERATIVE STERNOTOMY; CORONARY ARTERY BYPASS GRAFTING TIMES 1 UTILIZING RIGHT SAPHENOUS VEIN; AORTIC VALVE TUMOR ; PRP;  Surgeon: Benja De Luna MD;  Location: [facility];  Service: Cardiothoracic;  Laterality: N/A;    DILATATION AND CURETTAGE      TONSILLECTOMY        General Information       Row Name 25 [id number]           Physical Therapy Time and Intention    Document Type evaluation  -NL     Mode of Treatment individual therapy;physical therapy  -NL       Row Name 07/23/25 1337          General Information    Patient Profile Reviewed yes  -NL     Prior Level of Function independent:  -NL     Existing Precautions/Restrictions fall  -NL     Barriers to Rehab previous functional deficit  -NL       Row Name 07/23/25 1337          Living Environment    Current Living Arrangements home  -NL     People in Home alone  pt states she lives alone with her 12 y/o son.  -NL       Row Name 07/23/25 1337          Cognition    Orientation Status (Cognition) oriented x 4  -NL       Row Name 07/23/25 1337          Safety Issues/Impairments Affecting Functional Mobility    Safety Issues Affecting Function (Mobility) awareness of need for assistance;insight into deficits/self-awareness  -NL     Impairments Affecting Function (Mobility) balance;endurance/activity tolerance;postural/trunk control;range of motion (ROM);shortness of breath;strength  -NL               User Key  (r) = Recorded By, (t) = Taken By, (c) = Cosigned By      Initials Name Provider Type    NL Kasi Downs, PT Physical Therapist                   Mobility       Row Name 07/23/25 1341          Bed Mobility    Bed Mobility bed mobility (all) activities  -NL     All Activities, [place] (Bed Mobility) standby assist;verbal cues  -NL     Assistive Device (Bed Mobility) bed rails;head of bed elevated  -NL       Row Name 07/23/25 1341          Sit-Stand Transfer    Sit-Stand [place] (Transfers) contact guard;verbal cues  -NL     Assistive Device (Sit-Stand Transfers) walker, front-wheeled  -NL       Row Name 07/23/25 1341          Gait/Stairs (Locomotion)    [place] Level (Gait) contact guard;verbal cues  -NL     Assistive Device (Gait) walker, front-wheeled  -NL     Distance in Feet (Gait) 120  -NL     Deviations/Abnormal Patterns (Gait) vipul decreased;gait  speed decreased;stride length decreased  -NL     Bilateral Gait Deviations forward flexed posture  -NL     Comment, (Gait/Stairs) pt able to ambulate around entire unit. [place] on UE support, and cueing required for AD management with navigating obstacles in hallway with good correction  -NL               User Key  (r) = Recorded By, (t) = Taken By, (c) = Cosigned By      Initials Name Provider Type    NL Kasi Downs, PT Physical Therapist                   Obj/Interventions       Row Name 07/23/25 1343          Range of Motion Comprehensive    General Range of Motion bilateral lower extremity ROM WFL  -NL       Row Name 07/23/25 1343          Strength Comprehensive (MMT)    General Manual Muscle Testing (MMT) Assessment no strength deficits identified  -NL     Comment, General Manual Muscle Testing (MMT) Assessment grossly 3/5, assesed during functional mobiltiy  -NL       Row Name 07/23/25 1343          Balance    Balance Assessment sitting dynamic balance;standing dynamic balance  -NL     Dynamic Sitting Balance standby assist;verbal cues  -NL     Position, Sitting Balance sitting edge of bed  -NL     Dynamic Standing Balance contact guard;verbal cues  -NL     Position/Device Used, Standing Balance walker, front-wheeled  -NL     Balance Interventions sitting;standing;sit to stand;static;dynamic  -NL       [facility] Name 07/23/25 1343          Sensory Assessment (Somatosensory)    Sensory Assessment (Somatosensory) sensation intact  -NL               User Key  (r) = Recorded By, (t) = Taken By, (c) = Cosigned By      Initials Name Provider Type    NL Kasi Downs, PT Physical Therapist                   Goals/Plan       Row Name 07/23/25 1351          Bed Mobility Goal 1 (PT)    Activity/Assistive Device (Bed Mobility Goal 1, PT) bed mobility activities, all  -NL     [place] Level/Cues Needed (Bed Mobility Goal 1, PT) modified independence  -NL     Time Frame (Bed Mobility Goal 1, PT) short term goal  (STG);2 weeks  -NL     Progress/Outcomes (Bed Mobility Goal 1, PT) goal ongoing  -NL       Row Name 07/23/25 1351          Transfer Goal 1 (PT)    Activity/Assistive Device (Transfer Goal 1, PT) transfers, all  -NL     [place] Level/Cues Needed (Transfer Goal 1, PT) modified independence  -NL     Time Frame (Transfer Goal 1, PT) short term goal (STG);2 weeks  -NL     Progress/Outcome (Transfer Goal 1, PT) goal ongoing  -NL       Row Name 07/23/25 1351          Gait Training Goal 1 (PT)    Activity/Assistive Device (Gait Training Goal 1, PT) gait (walking locomotion)  -NL     [place] Level (Gait Training Goal 1, PT) modified independence  -NL     Distance (Gait Training Goal 1, PT) 200  -NL     Time Frame (Gait Training Goal 1, PT) short term goal (STG);2 weeks  -NL     Progress/Outcome (Gait Training Goal 1, PT) goal ongoing  -NL       Row Name 07/23/25 1351          Therapy Assessment/Plan (PT)    Planned Therapy Interventions (PT) balance training;bed mobility training;gait training;home exercise program;motor coordination training;neuromuscular re-education;patient/family education;postural re-education;ROM (range of motion);stair training;strengthening;stretching;transfer training  -NL               User Key  (r) = Recorded By, (t) = Taken By, (c) = Cosigned By      Initials Name Provider Type    NL Kasi Downs, PT Physical Therapist                   Clinical Impression       Row Name 07/23/25 2368          Pain    Pretreatment Pain Rating 10/10  -NL     Posttreatment Pain Rating 10/10  -NL     Pain Location other (see comments)  L sided pain below breast  -NL     Pain Side/Orientation left;anterior  -NL     Pain Management Interventions exercise or physical activity utilized  -NL     Response to Pain Interventions activity and movement patterns unchanged  -NL       Row Name 07/23/25 1341          Plan of Care Review    Plan of Care Reviewed With patient;parent  -NL     Progress improving  -NL      Outcome Evaluation Pt is a 44 y/o female admitted following syncopal episode with accompanied dizziness. Pt also has a hx of diabetes, and has been out of her insulin for quite some time. Pt reports she lives alone with her 10 y/o son (0STE), IND with ADL's and does not use an AD at baseline. Today, patient was able to complete all bed mobility and transfers with SBA/CGA, and able to ambulate long household distance, around entire unit with CGA and Rwx. Pt presents with function below baseline, impaired functional strength/endurance, decreased tolerance to changes in positon, hx of syncopal episodes indicating her as a high falls risk, and decrease balance/postural control. Pt will benefit from skilled inpatient PT to address functional deficits and promote transition to next level of care. PT recc Home w/ Assist, HH or OP services upon d/c  -NL       Row Name 07/23/25 1344          Therapy Assessment/Plan (PT)    Rehab Potential (PT) good  -NL     Criteria for Skilled Interventions Met (PT) yes  -NL     Therapy Frequency (PT) 5 times/wk  -NL       Row Name 07/23/25 1344          Positioning and Restraints    Pre-Treatment Position in bed  -NL     Post Treatment Position bed  -NL     In Bed notified nsg;supine;call light within reach;encouraged to call for assist;exit alarm on;with family/caregiver  -NL               User Key  (r) = Recorded By, (t) = Taken By, (c) = Cosigned By      Initials Name Provider Type    NL Kasi Downs, PT Physical Therapist                   Outcome Measures       Row Name 07/23/25 1352 07/23/25 0814       How much help from another person do you currently need...    Turning from your back to your side while in flat bed without using bedrails? 4  -NL 4  -AC    Moving from lying on back to sitting on the side of a flat bed without bedrails? 4  -NL 4  -AC    Moving to and from a bed to a chair (including a wheelchair)? 3  -NL 3  -AC    Standing up from a chair using your arms  (e.g., wheelchair, bedside chair)? 4  -NL 4  -AC    Climbing 3-5 steps with a railing? 3  -NL 3  -AC    To walk in hospital room? 3  -NL 3  -AC    AM-PAC 6 Clicks Score (PT) 21  -NL 21  -AC    Highest Level of Mobility Goal Walk 10 Steps or More-6  -NL Walk 10 Steps or More-6  -AC      Row Name 07/23/25 1352          Functional Assessment    Outcome Measure Options AM-PAC 6 Clicks Basic Mobility (PT)  -NL               User Key  (r) = Recorded By, (t) = Taken By, (c) = Cosigned By      Initials Name Provider Type    AC Paige Luna, RN Registered Nurse    NL Kasi Downs, CLIFF Physical Therapist                                 Physical Therapy Education       Title: PT OT SLP Therapies (Done)       Topic: Physical Therapy (Done)       Point: Mobility training (Done)       Learning Progress Summary            Patient Acceptance, E, VU by NL at 7/23/2025 1353                      Point: Home exercise program (Done)       Learning Progress Summary            Patient Acceptance, E, VU by NL at 7/23/2025 1353                      Point: Body mechanics (Done)       Learning Progress Summary            Patient Acceptance, E, VU by NL at 7/23/2025 1353                      Point: Precautions (Done)       Learning Progress Summary            Patient Acceptance, E, VU by NL at 7/23/2025 1353                                      User Key       Initials Effective Dates Name Provider Type Discipline    NL 06/11/25 -  Kasi Downs PT Physical Therapist PT                  PT Recommendation and Plan  Planned Therapy Interventions (PT): balance training, bed mobility training, gait training, home exercise program, motor coordination training, neuromuscular re-education, patient/family education, postural re-education, ROM (range of motion), stair training, strengthening, stretching, transfer training  Progress: improving  Outcome Evaluation: Pt is a 46 y/o female admitted following syncopal episode with accompanied  dizziness. Pt also has a hx of diabetes, and has been out of her insulin for quite some time. Pt reports she lives alone with her 12 y/o son (0STE), IND with ADL's and does not use an AD at baseline. Today, patient was able to complete all bed mobility and transfers with SBA/CGA, and able to ambulate long household distance, around entire unit with CGA and Rwx. Pt presents with function below baseline, impaired functional strength/endurance, decreased tolerance to changes in positon, hx of syncopal episodes indicating her as a high falls risk, and decrease balance/postural control. Pt will benefit from skilled inpatient PT to address functional deficits and promote transition to next level of care. PT recc Home w/ Assist, HH or OP services upon d/c     Time Calculation:   PT Evaluation Complexity  History, PT Evaluation Complexity: 1-2 personal factors and/or comorbidities  Examination of Body Systems (PT Eval Complexity): total of 3 or more elements  Clinical Presentation (PT Evaluation Complexity): evolving  Clinical Decision Making (PT Evaluation Complexity): moderate complexity  Overall Complexity (PT Evaluation Complexity): moderate complexity     PT Charges       Row Name 07/23/25 1353             Time Calculation    Start Time 1118  -NL      Stop Time 1136  -NL      Time Calculation (min) 18 min  -NL      PT Received On 07/23/25  -NL      PT - Next Appointment 07/24/25  -NL      PT Goal Re-Cert Due Date 08/06/25  -NL         Time Calculation- PT    Total Timed Code Minutes- PT 13 minute(s)  -NL                User Key  (r) = Recorded By, (t) = Taken By, (c) = Cosigned By      Initials Name Provider Type    NL Kasi Downs, PT Physical Therapist                  Therapy Charges for Today       Code Description Service Date Service Provider Modifiers Qty    [id number]  PT EVAL MOD COMPLEXITY 3 7/23/2025 Kasi Downs, PT GP 1    [id number]  PT THERAPEUTIC ACT EA 15 MIN 7/23/2025 Himanshu  Kasi, PT GP 1            PT G-Codes  Outcome Measure Options: AM-PAC 6 Clicks Basic Mobility (PT)  AM-PAC 6 Clicks Score (PT): 21  PT Discharge Summary  Anticipated Discharge Disposition (PT): home with assist, home with home health, home with outpatient therapy services    Kasi Downs, PT  7/23/2025

## 2025-07-23 NOTE — PLAN OF CARE
Goal Outcome Evaluation:  Plan of Care Reviewed With: patient, parent        Progress: improving  Outcome Evaluation: Pt is a 46 y/o female admitted following syncopal episode with accompanied dizziness. Pt also has a hx of diabetes, and has been out of her insulin for quite some time. Pt reports she lives alone with her 10 y/o son (0STE), IND with ADL's and does not use an AD at baseline. Today, patient was able to complete all bed mobility and transfers with SBA/CGA, and able to ambulate long household distance, around entire unit with CGA and Rwx. Pt presents with function below baseline, impaired functional strength/endurance, decreased tolerance to changes in positon, hx of syncopal episodes indicating her as a high falls risk, and decrease balance/postural control. Pt will benefit from skilled inpatient PT to address functional deficits and promote transition to next level of care. PT recc Home w/ Assist, HH or OP services upon d/c    Anticipated Discharge Disposition (PT): home with assist, home with home health, home with outpatient therapy services

## 2025-07-23 NOTE — PROGRESS NOTES
Name: Bibiana Inman ADMIT: 2025   : 1980  PCP: Ibrahima Correa MD    MRN: [id number] LOS: 1 days   AGE/SEX: 45 y.o. female  ROOM: [facility]     Subjective   Subjective   Patient awake and resting in bed.  She is feeling slightly better today when compared to prior.  No dizziness at present.  She is having some anterolateral chest wall discomfort that is tender to palpation.       Objective   Objective   Vital Signs  Temp:  [97.3 °F (36.3 °C)-98.2 °F (36.8 °C)] 97.5 °F (36.4 °C)  Heart Rate:  [47-84] 54  Resp:  [18-21] 18  BP: ()/(56-84) 140/62  SpO2:  [97 %-100 %] 99 %  on   ;   Device (Oxygen Therapy): room air  Body mass index is 23.8 kg/m².  Physical Exam  Vitals and nursing note reviewed.   Constitutional:       General: She is not in acute distress.     Comments: Chronically ill appearing   Cardiovascular:      Rate and Rhythm: Normal rate.   Pulmonary:      Effort: Pulmonary effort is normal. No respiratory distress.      Breath sounds: No wheezing.   Abdominal:      Palpations: Abdomen is soft.      Tenderness: There is no abdominal tenderness.   Musculoskeletal:      Right lower leg: No edema.      Left lower leg: No edema.      Comments: Right foot 5th digit with ulcer and necrosis   Skin:     General: Skin is warm and dry.   Neurological:      Mental Status: She is alert.       Results Review     I reviewed the patient's new clinical results.  Results from last 7 days   Lab Units 25  0634 25  1519 25  1058   WBC 10*3/mm3 8.72 9.04 9.37   HEMOGLOBIN g/dL 10.1* 11.2* 12.4   PLATELETS 10*3/mm3 220 233 285     Results from last 7 days   Lab Units 25  0634 25  1519 25  1459 25  1058   SODIUM mmol/L 135* 129* 130* 121*   POTASSIUM mmol/L 3.9 3.3* 4.1 4.0   CHLORIDE mmol/L 108* 99 104 89*   CO2 mmol/L 21.0* 21.2* 19.3* 20.1*   BUN mg/dL 16.0 17.0 17.0 18.0   CREATININE mg/dL 1.80* 2.09* 1.46* 1.84*   GLUCOSE mg/dL 332* 626* 307* 725*   EGFR  mL/min/1.73 34.6* 28.9* 44.5* 33.7*     Results from last 7 days   Lab Units 07/22/25  1519 07/17/25  1058   ALBUMIN g/dL 1.7* 2.4*   BILIRUBIN mg/dL <0.2 <0.2   ALK PHOS U/L 135* 163*   AST (SGOT) U/L 7 10   ALT (SGPT) U/L 5 7     Results from last 7 days   Lab Units 07/23/25  0634 07/22/25  1519 07/17/25  1459 07/17/25  1058   CALCIUM mg/dL 7.8* 7.9* 7.9* 8.4*   ALBUMIN g/dL  --  1.7*  --  2.4*   MAGNESIUM mg/dL  --  2.1  --   --        Hemoglobin A1C   Date/Time Value Ref Range Status   07/22/2025 1519 13.60 (H) 4.80 - 5.60 % Final     Glucose   Date/Time Value Ref Range Status   07/23/2025 0622 346 (H) 70 - 130 mg/dL Final   07/23/2025 0343 330 (H) 70 - 130 mg/dL Final   07/22/2025 2054 381 (H) 70 - 130 mg/dL Final   07/22/2025 1727 450 (C) 70 - 130 mg/dL Final       No radiology results for the last day    I have personally reviewed all medications:  Scheduled Medications  aspirin, 81 mg, Oral, Daily  atorvastatin, 80 mg, Oral, Daily  carvedilol, 12.5 mg, Oral, BID With Meals  hydrALAZINE, 50 mg, Oral, Q12H  insulin glargine, 25 Units, Subcutaneous, Nightly  insulin lispro, 2-9 Units, Subcutaneous, 4x Daily AC & at Bedtime  insulin lispro, 5 Units, Subcutaneous, TID With Meals  lisinopril, 10 mg, Oral, Q24H  melatonin, 2.5 mg, Oral, Nightly  metoclopramide, 5 mg, Oral, 4x Daily  pantoprazole, 40 mg, Oral, Q AM    Infusions  sodium chloride 0.9 % with KCl 20 mEq, 100 mL/hr, Last Rate: 100 mL/hr (07/23/25 0654)    Diet  Diet: Cardiac, Diabetic; Healthy Heart (2-3 Na+); Consistent Carbohydrate; Fluid Consistency: Thin (IDDSI 0)    I have personally reviewed:  [x]  Laboratory   []  Microbiology   [x]  Radiology   [x]  EKG/Telemetry  [x]  Cardiology/Vascular   []  Pathology    []  Records       Assessment/Plan     Active Hospital Problems    Diagnosis  POA    **Syncope and collapse [R55]  Yes    Dizziness [R42]  Yes    CAD (coronary artery disease) [I25.10]  Yes    CKD stage 3b, GFR 30-44 ml/min [N18.32]  Yes     Anemia [D64.9]  Yes    Type 2 diabetes mellitus with diabetic foot ulcer [E11.621, L97.509]  Yes    Bilateral carotid artery stenosis [I65.23]  Yes    Elevated troponin [R79.89]  Yes    History of CVA (cerebrovascular accident) [Z86.73]  Not Applicable    Obstructive sleep apnea [G47.33]  Yes    Gastroesophageal reflux disease [K21.9]  Yes    Type 2 diabetes mellitus with hyperglycemia, with long-term current use of insulin [E11.65, Z79.4]  Not Applicable    Benign essential hypertension [I10]  Yes      Resolved Hospital Problems   No resolved problems to display.       45 y.o. female admitted with Syncope and collapse.    Syncope and collapse  Dizziness  Elevated troponin/CAD  - Based on her characterization, could be related to possible orthostasis. BP was soft on arrival, now slightly elevated at times; she experienced dizziness and lightheadedness prior to the fall. Hold Lisinopril for now. Troponin elevated, she does have some left sided pain near left anterolateral chest wall- x ray negative for rib fractures.  - Check orthostatics, and echocardiogram. Cardiology consulted. Monitor on telemetry.    Uncontrolled diabetes mellitus with hyperglycemia  Diabetes mellitus with foot ulcer  - A1c 13.60%, she says she has been out of insulin for ~3 months because of cost. Glucose significantly elevated on arrival, anion gap normal. pH slightly low on VBG, could have had component of HHS. She has received insulin and fluids, glucose is improved from admission, but remains elevated.  - Increase Glargine to 35 units nightly, give additional 10 now to account for dose change. Increase scheduled Lispro to 8 units TID with meals, continue SSI.  - Consult Podiatry for foot ulcer.  - Diabetes educator consulted.    Hypertension  - BP soft on arrival, but now slightly elevated at times, as above. Continue Coreg and Hydralazine with hold parameters, hold Lisinopril.  - Trend BP to guide management.    CKD stage 3B  - Renal  function appears to be relatively close to prior baseline values.  - Order repeat CBC in AM for reassessment. Monitor for urinary retention.    Anemia  - Hemoglobin low on most recent labs. No evidence of overt blood loss. No indications for acute intervention at this time.    - Order repeat CBC in AM for reassessment. Continue to monitor, transfuse for hemoglobin <7.    GERD  - Continue PPI as prescribed.       SCDs for DVT prophylaxis.  Full code.  Discussed with patient, nursing staff, CCP, and care team on multidisciplinary rounds.  Anticipate discharge TBD timing yet to be determined.  Expected discharge date/ time has not been documented.      Wyatt Hobson DO  [facility]  07/23/25

## 2025-07-23 NOTE — CONSULTS
Date of Consultation: 25    Referral Provider: Selina Snow, *     Reason for Consultation: Syncope, elevated troponin.    Encounter Provider: Estrada Hinds MD    Group of Service: [facility]     Patient Name: Bibiana Inman    :1980    Chief complaint: Syncope.    History of Present Illness:      This is a very pleasant 45 year-old female who is normally followed by Dr. Su at [facility].  The patient has a history of multiple medical issues, both cardiac and noncardiac.  She has a history of coronary artery disease and initially underwent a three-vessel CABG at [facility] on 2014 (LIMA to LAD, SVG to OM2, and SVG to PDA).  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  A left heart catheterization in 2024 had shown her SVG to OM 2 graft to be occluded (the LIMA to LAD was patent with a distal apical LAD 80% stenosis, and SVG to PDA was patent).  She underwent a redo sternotomy with aortic valve fibroblastoma resection and repeat CABG with an SVG to OM on 2024 by Dr. De Luna.  She also has a chronically elevated troponin, hypertension, stage IIIb chronic kidney disease, anemia of chronic disease, GERD, hyperlipidemia, tobacco use, and insulin-dependent diabetes which has been poorly controlled.    The patient presented to the hospital on 2025 after an episode of syncope.  She evidently had been out of her insulin at home for quite some time, and her blood glucose has been uncontrolled.  She got up to go to the restroom at home, and had a melissa syncopal event.  She evidently was orthostatic per EMS.  Her only chest discomfort is on her left ribs where she fell, although she does not have a fracture noted.  She was noted to have multiple lab abnormalities, including acute on chronic kidney injury, and severe hyperglycemia with a blood glucose of up to 626 this admission.  Her  troponin was also elevated but flat at 253 and then 237.  Her EKG did not show any ischemic changes.      Intra-op transesophageal echocardiogram 7/1/24    Echocardiogram Comments:       44 year old female with poorly controlled DM presents for REOP CABG, possible AV repair (AV fibroelastoma removal).  - Small LV cavity, +LVH with normal function  - Normal RV size, mild RV dysfunction  - AV with likely fibroelastomas attached to NCC and LCC without evidence of valvular insufficiency or stenosis  - Trace MR/TR  - No pericardial/pleural effusion  - No aortic dissection     S/p AV fibroelastoma resection, CABG x1:  - Normal LV function; hypovolemic  - Prior fibroelastomas on AV no longer noted; no AS/AI  - Transient ROB; improved with resuscitation  - No aortic dissection    Cardiac Catheterization 2/21/24    IMPRESSIONS  Multivessel obstructive CAD of the native coronary arteries  Occluded vein graft to what appears to be a diagonal  There is diffuse CAD involving the left circumflex and OM which is not bypassed and there is also obstructive CAD distal to the LIMA touchdown  This is stable coronary artery disease  Low left heart filling pressures     RECOMMENDATIONS  Recommend medical management for her diffuse CAD in the setting of severely uncontrolled diabetes and smoking  Uptitration of OMT for stable CAD  Patient needs to be aggressively treated for her uncontrolled diabetes  Counseled extensively on smoking cessation  Start Plavix  Blood pressure control  Aggressive cholesterol control with statin with a goal LDL of less than 70     Past Medical History:   Diagnosis Date    5,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 Debbie Fry.     Depressive disorder 2023    Diabetic gastroparesis 2021    Diabetic peripheral neuropathy associated with type 2 diabetes mellitus 2024    Gastroesophageal reflux disease 03/10/2021    GERD (gastroesophageal reflux disease)     Intermittent claudication 2017    Iron deficiency anemia 2020    Lacunar cerebrovascular accident (CVA) 2024    Mixed hypercholesterolemia and hypertriglyceridemia 2014    Myocardial infarction 2021    Obesity 3/7/2014    Obstructive sleep apnea 2021    Personal history of COVID-19 2022    Polyp of colon 2023    Spontaneous  2012    Stress fracture of right ankle with routine healing 2024    Stroke 2024    Tobacco abuse 2024    Type 2 diabetes mellitus with hyperglycemia, with long-term current use of insulin 2017    Vitamin D deficiency 2024         Past Surgical History:   Procedure Laterality Date    CARDIAC CATHETERIZATION N/A 2024    Procedure: Left Heart Cath and coronary angiogram;  Surgeon: Jena Barron MD;  Location: [facility] CATH INVASIVE LOCATION;  Service: Cardiology;  Laterality: N/A;     SECTION      CORONARY ARTERY BYPASS GRAFT  2014    LIMA-LAD, SVG-OM2, SVG-PDA, Dr. Debbie Fyr.    CORONARY ARTERY BYPASS GRAFT WITH AORTIC VALVE REPAIR/REPLACEMENT N/A 2024    Procedure: YUE; REOPERATIVE STERNOTOMY; CORONARY ARTERY BYPASS GRAFTING TIMES 1 UTILIZING RIGHT SAPHENOUS VEIN; AORTIC VALVE TUMOR ; PRP;  Surgeon: Benja De Luna MD;  [facility];  Service: Cardiothoracic;  Laterality: N/A;    DILATATION AND CURETTAGE      TONSILLECTOMY           Allergies   Allergen Reactions    Latex Itching         No current facility-administered medications on file prior to encounter.     Current Outpatient Medications on File Prior to Encounter   Medication Sig Dispense Refill    aspirin 81 MG chewable tablet Chew 1 tablet Daily. Indications: Disease  involving Lipid Deposits in the Arteries      atorvastatin (LIPITOR) 80 MG tablet Take 1 tablet by mouth Daily. Indications: High Amount of Fats in the Blood      carvedilol (COREG) 12.5 MG tablet Take 1 tablet by mouth 2 (Two) Times a Day With Meals. 120 tablet 0    famotidine (PEPCID) 40 MG tablet Take 1 tablet by mouth every night at bedtime. 90 tablet 3    hydrALAZINE (APRESOLINE) 50 MG tablet take 1 tablet by oral route 2 times every day with food 180 tablet 3    icosapent ethyl (Vascepa) 1 g capsule capsule Take 2 g (2 capsules) by mouth 2 (Two) Times a Day With Meals. Indications: Cerebrovascular Accident or Stroke, High Amount of Triglycerides in the Blood, Procedure to Reestablish Blood Supply to the Heart 120 capsule 11    lisinopril (PRINIVIL,ZESTRIL) 10 MG tablet Take 1 tablet by mouth Daily. 30 tablet 1    metoclopramide (REGLAN) 5 MG tablet Take 1 tablet by mouth 4 (Four) Times a Day. 90 tablet 12    ondansetron (ZOFRAN) 4 MG tablet Take 1 tablet by mouth three times a day as needed 30 tablet 5    pantoprazole (PROTONIX) 40 MG EC tablet Take 1 tablet by mouth Every Morning. 90 tablet 3    albuterol sulfate  (90 Base) MCG/ACT inhaler Inhale 2 puffs every 6 (six) hours if needed for wheezing. 18 g 0    Continuous Glucose  (Dexcom G7 ) device Use 1 each 1 (One) Time for 1 dose. Dx: E11.65 1 each 0    Continuous Glucose Sensor (Dexcom G7 Sensor) misc Use 1 each Every 10 (Ten) Days. Dx: E11.65 3 each 2    insulin aspart (novoLOG FLEXPEN) 100 UNIT/ML solution pen-injector sc pen Inject  under the skin into the appropriate area as directed 3 times a day. Sliding scale, between 5-20 units TID  Indications: Type 2 Diabetes      insulin aspart (NovoLOG FlexPen) 100 UNIT/ML solution pen-injector sc pen Inject 5 Units under the skin into the appropriate area as directed 3 (Three) Times a Day With Meals. 15 mL 1    insulin degludec (Tresiba FlexTouch) 100 UNIT/ML solution pen-injector  "injection Inject 30 Units under the skin into the appropriate area as directed Daily. 15 mL 1    Insulin Pen Needle (Pen Needles) 32G X 4 MM misc Use 1 each 4 (Four) Times a Day. 100 each 0    sodium bicarbonate 650 MG tablet Take 1 tablet by mouth Daily. (Patient not taking: Reported on 2025) 30 tablet 0    [DISCONTINUED] glyburide (DIAbeta) 5 MG tablet Take 1 tablet by mouth 2 (Two) Times a Day. 180 tablet 0    [DISCONTINUED] hydroCHLOROthiazide (HYDRODIURIL) 25 MG tablet Take 1 tablet by mouth Daily. 90 tablet 3    [DISCONTINUED] metFORMIN (GLUCOPHAGE) 1000 MG tablet Take 1 tablet by mouth 2 (Two) Times a Day With Meals. 180 tablet 0    [DISCONTINUED] omeprazole (priLOSEC) 20 MG capsule Take 1 capsule (20 mg total) by mouth 1 (one) time each day. Do not crush or chew. 30 capsule 5         Social History     Socioeconomic History    Marital status:    Tobacco Use    Smoking status: Former     Current packs/day: 0.00     Average packs/day: 0.3 packs/day for 27.5 years (6.9 ttl pk-yrs)     Types: Cigarettes     Start date:      Quit date: 2025     Years since quittin.0    Smokeless tobacco: Never   Vaping Use    Vaping status: Never Used   Substance and Sexual Activity    Alcohol use: Not Currently    Drug use: Never    Sexual activity: Not Currently     Partners: Male     Birth control/protection: Post-menopausal, None         Family History   Problem Relation Age of Onset    Heart disease Mother     Hypertension Mother        REVIEW OF SYSTEMS:   Pertinent positives are noted in the HPI above.  Otherwise, all other systems were reviewed, and are negative.     Objective:     Vitals:    25 1321 25 1412 25 1610 25 1701   BP: 117/56 139/74  140/64   BP Location: Left arm      Patient Position: Standing      Pulse: 50   52   Resp:       Temp:       TempSrc:       SpO2:       Weight:  62.6 kg (138 lb) 65.6 kg (144 lb 9.9 oz)    Height:  162.6 cm (64\")       Body mass " index is 24.82 kg/m².  Flowsheet Rows      Flowsheet Row First Filed Value   Admission Height --   Admission Weight 63.3 kg (139 lb 8.8 oz) Documented at 07/22/2025 1845             General:    No acute distress, alert and oriented x4, pleasant, chronically ill-appearing.                   Head:    Normocephalic, atraumatic.   Eyes:          Conjunctivae and sclerae normal, no icterus,.   Throat:   No oral lesions, no thrush, oral mucosa moist.    Neck:   Supple, trachea midline.   Lungs:     Clear to auscultation bilaterally     Heart:    Regular rhythm and normal rate.  No murmurs, gallops, or rubs noted.   Abdomen:     Soft, non-tender, non-distended, positive bowel sounds.    Extremities:   No clubbing, cyanosis, or edema.     Pulses:   Pulses palpable and equal bilaterally.    Skin:   No bleeding or rash.   Neuro:   Non-focal.  Moves all extremities well.    Psychiatric:   Normal mood and affect.                 Lab Review:                Results from last 7 days   Lab Units 07/23/25  0634   SODIUM mmol/L 135*   POTASSIUM mmol/L 3.9   CHLORIDE mmol/L 108*   CO2 mmol/L 21.0*   BUN mg/dL 16.0   CREATININE mg/dL 1.80*   GLUCOSE mg/dL 332*   CALCIUM mg/dL 7.8*     Results from last 7 days   Lab Units 07/22/25  1622 07/22/25  1519   HSTROP T ng/L 237* 253*     Results from last 7 days   Lab Units 07/23/25  0634   WBC 10*3/mm3 8.72   HEMOGLOBIN g/dL 10.1*   HEMATOCRIT % 31.4*   PLATELETS 10*3/mm3 220             Results from last 7 days   Lab Units 07/22/25  1519   MAGNESIUM mg/dL 2.1           EKG (reviewed by me personally):                Assessment:   1.  Syncope, likely secondary to #2  2.  Orthostatic hypotension  3.  Elevated troponin, type II NSTEMI secondary to #2, #4, and #5  4.  Acute kidney injury with stage IIIb chronic kidney disease  5.  Insulin-dependent diabetes with severe hyperglycemia (initial blood glucose 636).  She has been out of insulin for several months.  6.  Right foot diabetic foot  ulcer  7.  Coronary artery disease.  Status post 3 vessel CABG at [facility] on 1/21/2014 (LIMA to LAD, SVG to OM2, and SVG to PDA).  SVG to OM 2 later found to be occluded by cath in February 2024.  Status post redo CABG with an SVG to OM on 7/1/2024 at the time of fibroelastoma removal.  8.  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  Status post fibroblastoma resection and aortic valve repair on  7/1/2024 by Dr. De Luna.  9.  Anemia of chronic disease  10.  Gastroesophageal reflux disease  11.  Systemic hypertension  12.  Diabetic gastroparesis  13.  Peripheral neuropathy secondary to diabetes  14.  Tobacco use     Plan:       Strongly suspect that she was volume depleted, likely precipitated by severe hyperglycemia.  This likely led to hypotension and the syncopal episode.  She was orthostatic per EMS upon arrival.  Her hyperglycemia is being treated, and she has been hydrated aggressively, which she has had a good response to thus far.    The troponin elevation is a type II NSTEMI.  She has a chronically elevated troponin, although these levels are higher than normal for her.  This is secondary to the hypotension, severe hyperglycemia, and kidney disease.  I do not feel that this represents a type I NSTEMI or ACS.  She has had no chest pain other than soreness over her left ribs from her fall.  Her EKG did not show any ischemic changes.  I also checked an echocardiogram earlier today which showed a normal ejection fraction of 60 to 65% without wall motion abnormalities.  She did not have significant valvular disease.  This is also reassuring.  Continue aspirin, Lipitor, and carvedilol.    She is also on hydralazine for blood pressure control.  Watch response to her current medications now that she is no longer orthostatic after hydration.  Her lisinopril is appropriately being held because of her acute kidney injury with stage IIIb chronic kidney disease.  She evidently quit smoking  approximately 6 days ago, and smoking cessation was strongly encouraged permanently.    Cardiology will continue to follow.  Thank you very much for this consult.    Carlos Hinds MD

## 2025-07-23 NOTE — H&P
HISTORY AND PHYSICAL   [facility]        Date of Admission: 2025  Patient Identification:  Name: Bibiana Inman  Age: 45 y.o.  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 Debbie Fry.    Depressive disorder 2023    Diabetic gastroparesis 2021    Diabetic peripheral neuropathy associated with type 2 diabetes mellitus 2024    Gastroesophageal reflux disease 03/10/2021    GERD (gastroesophageal reflux disease)     Intermittent claudication 2017    Iron deficiency anemia 2020    Lacunar cerebrovascular accident (CVA) 2024    Mixed hypercholesterolemia and hypertriglyceridemia 2014    Myocardial infarction 2021    Obesity 3/7/2014    Obstructive sleep apnea 2021    Personal history of COVID-19 2022    Polyp of colon 2023    Spontaneous  2012    Stress fracture of right ankle with routine healing 2024    Stroke 2024    Tobacco abuse 2024    Type 2 diabetes mellitus with hyperglycemia, with long-term current use of insulin 2017    Vitamin D deficiency 2024     Past Surgical History:  Past Surgical  History:   Procedure Laterality Date    CARDIAC CATHETERIZATION N/A 2024    Procedure: Left Heart Cath and coronary angiogram;  Surgeon: Jena Barron MD;  Location: [facility] CATH INVASIVE LOCATION;  Service: Cardiology;  Laterality: N/A;     SECTION      CORONARY ARTERY BYPASS GRAFT  2014    LIMA-LAD, SVG-OM2, SVG-PDA, Dr. Debbie Fry.    CORONARY ARTERY BYPASS GRAFT WITH AORTIC VALVE REPAIR/REPLACEMENT N/A 2024    Procedure: YUE; REOPERATIVE STERNOTOMY; CORONARY ARTERY BYPASS GRAFTING TIMES 1 UTILIZING RIGHT SAPHENOUS VEIN; AORTIC VALVE TUMOR ; PRP;  Surgeon: Benja De Luna MD;  Location: [facility] CVOR;  Service: Cardiothoracic;  Laterality: N/A;    DILATATION AND CURETTAGE      TONSILLECTOMY        Home Meds:  Facility-Administered Medications Prior to Admission   Medication Dose Route Frequency Provider Last Rate Last Admin    nicotine (NICODERM CQ) 14 MG/24HR patch 1 patch  1 patch Transdermal Q24H Rocio Denny APRN        nicotine polacrilex (NICORETTE) gum 2 mg  2 mg Mouth/Throat Q1H PRN Rocio Denny APRN         Medications Prior to Admission   Medication Sig Dispense Refill Last Dose/Taking    aspirin 81 MG chewable tablet Chew 1 tablet Daily. Indications: Disease involving Lipid Deposits in the Arteries   2025 Morning    atorvastatin (LIPITOR) 80 MG tablet Take 1 tablet by mouth Daily. Indications: High Amount of Fats in the Blood   2025    carvedilol (COREG) 12.5 MG tablet Take 1 tablet by mouth 2 (Two) Times a Day With Meals. 120 tablet 0 2025 Morning    famotidine (PEPCID) 40 MG tablet Take 1 tablet by mouth every night at bedtime. 90 tablet 3 2025 Morning    hydrALAZINE (APRESOLINE) 50 MG tablet take 1 tablet by oral route 2 times every day with food 180 tablet 3 2025 Morning    icosapent ethyl (Vascepa) 1 g capsule capsule Take 2 g (2 capsules) by mouth 2 (Two) Times a Day With Meals. Indications: Cerebrovascular Accident or  Stroke, High Amount of Triglycerides in the Blood, Procedure to Reestablish Blood Supply to the Heart 120 capsule 11 7/21/2025 Evening    lisinopril (PRINIVIL,ZESTRIL) 10 MG tablet Take 1 tablet by mouth Daily. 30 tablet 1 7/22/2025 Morning    metoclopramide (REGLAN) 5 MG tablet Take 1 tablet by mouth 4 (Four) Times a Day. 90 tablet 12 7/22/2025 Morning    ondansetron (ZOFRAN) 4 MG tablet Take 1 tablet by mouth three times a day as needed 30 tablet 5 Past Week    pantoprazole (PROTONIX) 40 MG EC tablet Take 1 tablet by mouth Every Morning. 90 tablet 3 7/22/2025 Morning    albuterol sulfate  (90 Base) MCG/ACT inhaler Inhale 2 puffs every 6 (six) hours if needed for wheezing. 18 g 0 More than a month    Continuous Glucose  (Dexcom G7 ) device Use 1 each 1 (One) Time for 1 dose. Dx: E11.65 1 each 0     Continuous Glucose Sensor (Dexcom G7 Sensor) misc Use 1 each Every 10 (Ten) Days. Dx: E11.65 3 each 2 Unknown    insulin aspart (novoLOG FLEXPEN) 100 UNIT/ML solution pen-injector sc pen Inject  under the skin into the appropriate area as directed 3 times a day. Sliding scale, between 5-20 units TID  Indications: Type 2 Diabetes   Unknown    insulin aspart (NovoLOG FlexPen) 100 UNIT/ML solution pen-injector sc pen Inject 5 Units under the skin into the appropriate area as directed 3 (Three) Times a Day With Meals. 15 mL 1 Unknown    insulin degludec (Tresiba FlexTouch) 100 UNIT/ML solution pen-injector injection Inject 30 Units under the skin into the appropriate area as directed Daily. 15 mL 1 Unknown    Insulin Pen Needle (Pen Needles) 32G X 4 MM misc Use 1 each 4 (Four) Times a Day. 100 each 0 Unknown    sodium bicarbonate 650 MG tablet Take 1 tablet by mouth Daily. (Patient not taking: Reported on 7/22/2025) 30 tablet 0 Not Taking       Allergies:  Allergies   Allergen Reactions    Latex Itching     Immunizations:  Immunization History   Administered Date(s) Administered    COVID-19 (PFIZER)  Purple Cap Monovalent 2021, 2021    FluMist 2-49yrs (Nasal) 2017    Fluzone (or Fluarix & Flulaval for VFC) >6mos 10/07/2020    Influenza Injectable Mdck Pf Quad 2017    Influenza Seasonal Injectable 2021, 2023    Influenza Split Preservative Free ID 2012    Influenza, Unspecified 10/01/2012, 10/14/2022, 2022    PPD Test 2021    Pneumococcal Conjugate 20-Valent (PCV20) 2023    Pneumococcal Polysaccharide (PPSV23) 04/15/2013    Tdap 2022     Social History:   Social History     Social History Narrative    Not on file     Social History     Socioeconomic History    Marital status:    Tobacco Use    Smoking status: Former     Current packs/day: 0.00     Average packs/day: 0.3 packs/day for 27.5 years (6.9 ttl pk-yrs)     Types: Cigarettes     Start date:      Quit date: 2025     Years since quittin.0    Smokeless tobacco: Never   Vaping Use    Vaping status: Never Used   Substance and Sexual Activity    Alcohol use: Not Currently    Drug use: Never    Sexual activity: Not Currently     Partners: Male     Birth control/protection: Post-menopausal, None       Family History:  Family History   Problem Relation Age of Onset    Heart disease Mother     Hypertension Mother         Review of Systems  See history of present illness and past medical history.  Patient denies headache,  trauma, change in vision, change in hearing, change in taste, changes in weight, changes in appetite, focal weakness, numbness, or paresthesia.  Patient denies chest pain, palpitations, dyspnea, orthopnea, PND, cough, sinus pressure, rhinorrhea, epistaxis, hemoptysis, nausea, vomiting,hematemesis, diarrhea, constipation or hematochezia.  Denies cold or heat intolerance, polydipsia, polyuria, polyphagia. Denies hematuria, pyuria, dysuria, hesitancy, frequency or urgency. Denies consumption of raw and under cooked meats foods or change in water source.  Denies  fever, chills, sweats, night sweats.  Denies missing any routine medications.   Objective:  T Max 24 hrs: Temp (24hrs), Av.7 °F (36.5 °C), Min:97.5 °F (36.4 °C), Max:98.2 °F (36.8 °C)    Vitals Ranges:   Temp:  [97.5 °F (36.4 °C)-98.2 °F (36.8 °C)] 97.5 °F (36.4 °C)  Heart Rate:  [54-78] 60  Resp:  [20-21] 20  BP: ()/(56-75) 178/75      Exam:  /75 (BP Location: Right arm, Patient Position: Lying)   Pulse 60   Temp 97.5 °F (36.4 °C) (Oral)   Resp 20   Wt 63.2 kg (139 lb 6.4 oz)   LMP 01/10/2023 Comment: patient states irregular periods  SpO2 99%   BMI 23.93 kg/m²     General Appearance:    Alert, cooperative, no distress, appears stated age; chronically ill appearing   Head:    Normocephalic, without obvious abnormality, atraumatic   Eyes:    PERRL, conjunctivae/corneas clear, EOM's intact, both eyes   Ears:    Normal external ear canals, both ears   Nose:   Nares normal, septum midline, mucosa normal, no drainage    or sinus tenderness   Throat:   Lips, mucosa, and tongue normal   Neck:   Supple, symmetrical, trachea midline, no adenopathy;     thyroid:  no enlargement/tenderness/nodules; no carotid    bruit or JVD   Back:     Symmetric, no curvature, ROM normal, no CVA tenderness   Lungs:     Clear to auscultation bilaterally, respirations unlabored   Chest Wall:    No tenderness or deformity    Heart:    Regular rate and rhythm, S1 and S2 normal, no murmur, rub   or gallop   Abdomen:     Soft, nontender, bowel sounds active all four quadrants,     no masses, no hepatomegaly, no splenomegaly   Extremities:   Extremities normal, atraumatic, no cyanosis or edema                       .    Data Review:  Labs in chart were reviewed.  WBC   Date Value Ref Range Status   2025 9.04 3.40 - 10.80 10*3/mm3 Final     Hemoglobin   Date Value Ref Range Status   2025 11.2 (L) 12.0 - 15.9 g/dL Final     Hematocrit   Date Value Ref Range Status   2025 35.4 34.0 - 46.6 % Final     Platelets    Date Value Ref Range Status   07/22/2025 233 140 - 450 10*3/mm3 Final     Sodium   Date Value Ref Range Status   07/22/2025 129 (L) 136 - 145 mmol/L Final     Potassium   Date Value Ref Range Status   07/22/2025 3.3 (L) 3.5 - 5.2 mmol/L Final     Chloride   Date Value Ref Range Status   07/22/2025 99 98 - 107 mmol/L Final     CO2   Date Value Ref Range Status   07/22/2025 21.2 (L) 22.0 - 29.0 mmol/L Final     BUN   Date Value Ref Range Status   07/22/2025 17.0 6.0 - 20.0 mg/dL Final     Creatinine   Date Value Ref Range Status   07/22/2025 2.09 (H) 0.57 - 1.00 mg/dL Final     Glucose   Date Value Ref Range Status   07/22/2025 626 (C) 65 - 99 mg/dL Final     Calcium   Date Value Ref Range Status   07/22/2025 7.9 (L) 8.6 - 10.5 mg/dL Final     Magnesium   Date Value Ref Range Status   07/22/2025 2.1 1.6 - 2.6 mg/dL Final     AST (SGOT)   Date Value Ref Range Status   07/22/2025 7 1 - 32 U/L Final     ALT (SGPT)   Date Value Ref Range Status   07/22/2025 5 1 - 33 U/L Final     Alkaline Phosphatase   Date Value Ref Range Status   07/22/2025 135 (H) 39 - 117 U/L Final           Results from last 7 days   Lab Units 07/22/25  1519   HEMOGLOBIN A1C % 13.60*      Imaging Results (All)       Procedure Component Value Units Date/Time    XR Ribs Bilateral 4+ View With PA Chest [[id number]] Collected: 07/22/25 1556     Updated: 07/22/25 1602    Narrative:      8 VIEW BILATERAL RIBS AND 1 VIEW PA CHEST     HISTORY: Syncope. Fell. Rib pain, left greater than right.     FINDINGS: The vertebral height and disc spaces are well-maintained. The  heart is top normal in size with sternal wires from previous cardiac  surgery and there is no change from 5/5/2025.     Multiple views of the left and right ribs show no definite fracture at  the present time.     This report was finalized on 7/22/2025 3:59 PM by Dr. Fortunato Fournier M.D  on Workstation: [id number]                 Assessment:  Active Hospital Problems    Diagnosis  POA     **Syncope and collapse [R55]  Yes      Resolved Hospital Problems   No resolved problems to display.   Dizziness  Diabetes with hyperglycemia  Sleep apnea  Noncompliance  Gastroparesis  hyperlipidemia    Plan:  Fluids  Diabetes educator to see  Monitor on telemetry  Echo  Trend labs  Dw patient, ed provider    Selina Snow MD  7/22/2025  20:31 EDT

## 2025-07-24 ENCOUNTER — APPOINTMENT (OUTPATIENT)
Dept: GENERAL RADIOLOGY | Facility: HOSPITAL | Age: 45
End: 2025-07-24
Payer: MEDICAID

## 2025-07-24 ENCOUNTER — APPOINTMENT (OUTPATIENT)
Dept: CARDIOLOGY | Facility: HOSPITAL | Age: 45
End: 2025-07-24
Payer: MEDICAID

## 2025-07-24 LAB
ANION GAP SERPL CALCULATED.3IONS-SCNC: 7.7 MMOL/L (ref 5–15)
BASOPHILS # BLD AUTO: 0.04 10*3/MM3 (ref 0–0.2)
BASOPHILS NFR BLD AUTO: 0.5 % (ref 0–1.5)
BH CV LOWER ARTERIAL LEFT ABI RATIO: 0.48
BH CV LOWER ARTERIAL LEFT DORSALIS PEDIS SYS MAX: 77
BH CV LOWER ARTERIAL LEFT GREAT TOE SYS MAX: 66
BH CV LOWER ARTERIAL LEFT POST TIBIAL SYS MAX: 96
BH CV LOWER ARTERIAL LEFT TBI RATIO: 0.33
BH CV LOWER ARTERIAL RIGHT ABI RATIO: 0.45
BH CV LOWER ARTERIAL RIGHT DORSALIS PEDIS SYS MAX: 77
BH CV LOWER ARTERIAL RIGHT GREAT TOE SYS MAX: 66
BH CV LOWER ARTERIAL RIGHT POST TIBIAL SYS MAX: 90
BH CV LOWER ARTERIAL RIGHT TBI RATIO: 0.33
BUN SERPL-MCNC: 16 MG/DL (ref 6–20)
BUN/CREAT SERPL: 8.6 (ref 7–25)
CALCIUM SPEC-SCNC: 8 MG/DL (ref 8.6–10.5)
CHLORIDE SERPL-SCNC: 108 MMOL/L (ref 98–107)
CO2 SERPL-SCNC: 18.3 MMOL/L (ref 22–29)
CREAT SERPL-MCNC: 1.87 MG/DL (ref 0.57–1)
DEPRECATED RDW RBC AUTO: 43.1 FL (ref 37–54)
EGFRCR SERPLBLD CKD-EPI 2021: 33.1 ML/MIN/1.73
EOSINOPHIL # BLD AUTO: 0.2 10*3/MM3 (ref 0–0.4)
EOSINOPHIL NFR BLD AUTO: 2.3 % (ref 0.3–6.2)
ERYTHROCYTE [DISTWIDTH] IN BLOOD BY AUTOMATED COUNT: 13.1 % (ref 12.3–15.4)
GLUCOSE BLDC GLUCOMTR-MCNC: 109 MG/DL (ref 70–130)
GLUCOSE BLDC GLUCOMTR-MCNC: 111 MG/DL (ref 70–130)
GLUCOSE BLDC GLUCOMTR-MCNC: 135 MG/DL (ref 70–130)
GLUCOSE BLDC GLUCOMTR-MCNC: 85 MG/DL (ref 70–130)
GLUCOSE SERPL-MCNC: 105 MG/DL (ref 65–99)
HCT VFR BLD AUTO: 28.9 % (ref 34–46.6)
HGB BLD-MCNC: 9.5 G/DL (ref 12–15.9)
IMM GRANULOCYTES # BLD AUTO: 0.03 10*3/MM3 (ref 0–0.05)
IMM GRANULOCYTES NFR BLD AUTO: 0.3 % (ref 0–0.5)
LYMPHOCYTES # BLD AUTO: 1.78 10*3/MM3 (ref 0.7–3.1)
LYMPHOCYTES NFR BLD AUTO: 20.2 % (ref 19.6–45.3)
MCH RBC QN AUTO: 30.1 PG (ref 26.6–33)
MCHC RBC AUTO-ENTMCNC: 32.9 G/DL (ref 31.5–35.7)
MCV RBC AUTO: 91.5 FL (ref 79–97)
MONOCYTES # BLD AUTO: 0.49 10*3/MM3 (ref 0.1–0.9)
MONOCYTES NFR BLD AUTO: 5.6 % (ref 5–12)
NEUTROPHILS NFR BLD AUTO: 6.28 10*3/MM3 (ref 1.7–7)
NEUTROPHILS NFR BLD AUTO: 71.1 % (ref 42.7–76)
NRBC BLD AUTO-RTO: 0 /100 WBC (ref 0–0.2)
PLATELET # BLD AUTO: 221 10*3/MM3 (ref 140–450)
PMV BLD AUTO: 10.6 FL (ref 6–12)
POTASSIUM SERPL-SCNC: 3.7 MMOL/L (ref 3.5–5.2)
RBC # BLD AUTO: 3.16 10*6/MM3 (ref 3.77–5.28)
SODIUM SERPL-SCNC: 134 MMOL/L (ref 136–145)
UPPER ARTERIAL LEFT ARM BRACHIAL SYS MAX: 199
UPPER ARTERIAL RIGHT ARM BRACHIAL SYS MAX: 202
WBC NRBC COR # BLD AUTO: 8.82 10*3/MM3 (ref 3.4–10.8)

## 2025-07-24 PROCEDURE — G0378 HOSPITAL OBSERVATION PER HR: HCPCS

## 2025-07-24 PROCEDURE — 80048 BASIC METABOLIC PNL TOTAL CA: CPT | Performed by: STUDENT IN AN ORGANIZED HEALTH CARE EDUCATION/TRAINING PROGRAM

## 2025-07-24 PROCEDURE — 25010000002 ONDANSETRON PER 1 MG: Performed by: STUDENT IN AN ORGANIZED HEALTH CARE EDUCATION/TRAINING PROGRAM

## 2025-07-24 PROCEDURE — 25010000002 ONDANSETRON PER 1 MG: Performed by: INTERNAL MEDICINE

## 2025-07-24 PROCEDURE — 93923 UPR/LXTR ART STDY 3+ LVLS: CPT

## 2025-07-24 PROCEDURE — 97530 THERAPEUTIC ACTIVITIES: CPT

## 2025-07-24 PROCEDURE — 82948 REAGENT STRIP/BLOOD GLUCOSE: CPT

## 2025-07-24 PROCEDURE — 97110 THERAPEUTIC EXERCISES: CPT

## 2025-07-24 PROCEDURE — 63710000001 INSULIN LISPRO (HUMAN) PER 5 UNITS: Performed by: STUDENT IN AN ORGANIZED HEALTH CARE EDUCATION/TRAINING PROGRAM

## 2025-07-24 PROCEDURE — 85025 COMPLETE CBC W/AUTO DIFF WBC: CPT | Performed by: STUDENT IN AN ORGANIZED HEALTH CARE EDUCATION/TRAINING PROGRAM

## 2025-07-24 PROCEDURE — 73630 X-RAY EXAM OF FOOT: CPT

## 2025-07-24 PROCEDURE — 99232 SBSQ HOSP IP/OBS MODERATE 35: CPT | Performed by: INTERNAL MEDICINE

## 2025-07-24 PROCEDURE — 93923 UPR/LXTR ART STDY 3+ LVLS: CPT | Performed by: SURGERY

## 2025-07-24 RX ADMIN — Medication 2.5 MG: at 01:11

## 2025-07-24 RX ADMIN — INSULIN LISPRO 8 UNITS: 100 INJECTION, SOLUTION INTRAVENOUS; SUBCUTANEOUS at 13:00

## 2025-07-24 RX ADMIN — CARVEDILOL 12.5 MG: 12.5 TABLET, FILM COATED ORAL at 09:32

## 2025-07-24 RX ADMIN — ASPIRIN 81 MG CHEWABLE TABLET 81 MG: 81 TABLET CHEWABLE at 09:32

## 2025-07-24 RX ADMIN — METOCLOPRAMIDE 5 MG: 5 TABLET ORAL at 20:51

## 2025-07-24 RX ADMIN — METOCLOPRAMIDE 5 MG: 5 TABLET ORAL at 18:21

## 2025-07-24 RX ADMIN — ONDANSETRON 4 MG: 2 INJECTION INTRAMUSCULAR; INTRAVENOUS at 20:50

## 2025-07-24 RX ADMIN — PANTOPRAZOLE SODIUM 40 MG: 40 TABLET, DELAYED RELEASE ORAL at 07:46

## 2025-07-24 RX ADMIN — METOCLOPRAMIDE 5 MG: 5 TABLET ORAL at 13:00

## 2025-07-24 RX ADMIN — INSULIN LISPRO 8 UNITS: 100 INJECTION, SOLUTION INTRAVENOUS; SUBCUTANEOUS at 09:32

## 2025-07-24 RX ADMIN — HYDROCODONE BITARTRATE AND ACETAMINOPHEN 1 TABLET: 5; 325 TABLET ORAL at 01:10

## 2025-07-24 RX ADMIN — HYDRALAZINE HYDROCHLORIDE 50 MG: 50 TABLET ORAL at 20:50

## 2025-07-24 RX ADMIN — ATORVASTATIN CALCIUM 80 MG: 80 TABLET, FILM COATED ORAL at 09:32

## 2025-07-24 RX ADMIN — HYDRALAZINE HYDROCHLORIDE 50 MG: 50 TABLET ORAL at 09:32

## 2025-07-24 RX ADMIN — CARVEDILOL 12.5 MG: 12.5 TABLET, FILM COATED ORAL at 18:21

## 2025-07-24 RX ADMIN — METOCLOPRAMIDE 5 MG: 5 TABLET ORAL at 09:32

## 2025-07-24 RX ADMIN — ACETAMINOPHEN 650 MG: 325 TABLET, FILM COATED ORAL at 20:50

## 2025-07-24 NOTE — CONSULTS
Podiatry Consult Note      Patient: Bibiana Inman Admit Date: 2025    Age: 45 y.o.   PCP: Ibrahima Correa MD    MRN: [id number]  Room: [facility]        Subjective     Chief Complaint     Chief Complaint   Patient presents with    Syncope    Hyperglycemia        HPI     This is a 45-year-old female who has necrosis to the distal aspect of her right fifth digit.  Patient states she did fall about a week ago and may have injured the toe at that time.  She denies any drainage present from the toe.  She has very minimal to no pain present.  Able to ambulate without difficulty.    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 Debbie Fry.    Depressive disorder 2023    Diabetic gastroparesis 2021    Diabetic peripheral neuropathy associated with type 2 diabetes mellitus 2024    Gastroesophageal reflux disease 03/10/2021    GERD (gastroesophageal reflux disease)     Intermittent claudication 2017    Iron deficiency anemia 2020    Lacunar cerebrovascular accident (CVA) 2024    Mixed hypercholesterolemia and hypertriglyceridemia 2014    Myocardial infarction 2021    Obesity 3/7/2014    Obstructive sleep apnea 2021    Personal history of COVID-19 2022    Polyp of colon 2023    Spontaneous  2012    Stress fracture of right ankle with routine healing 2024    Stroke 2024    Tobacco abuse 2024    Type 2 diabetes mellitus with hyperglycemia, with long-term current use of insulin 2017    Vitamin D deficiency 2024        Past Surgical History:   Procedure Laterality Date     CARDIAC CATHETERIZATION N/A 2024    Procedure: Left Heart Cath and coronary angiogram;  Surgeon: Jena Barron MD;  Location:  [facility] CATH INVASIVE LOCATION;  Service: Cardiology;  Laterality: N/A;     SECTION      CORONARY ARTERY BYPASS GRAFT  2014    LIMA-LAD, SVG-OM2, SVG-PDA, Dr. Debbie rFy.    CORONARY ARTERY BYPASS GRAFT WITH AORTIC VALVE REPAIR/REPLACEMENT N/A 2024    Procedure: YUE; REOPERATIVE STERNOTOMY; CORONARY ARTERY BYPASS GRAFTING TIMES 1 UTILIZING RIGHT SAPHENOUS VEIN; AORTIC VALVE TUMOR ; PRP;  Surgeon: Benja De Luna MD;  Location[facility];  Service: Cardiothoracic;  Laterality: N/A;    DILATATION AND CURETTAGE      TONSILLECTOMY          Allergies   Allergen Reactions    Latex Itching        Social History     Tobacco Use   Smoking Status Former    Current packs/day: 0.00    Average packs/day: 0.3 packs/day for 27.5 years (6.9 ttl pk-yrs)    Types: Cigarettes    Start date:     Quit date: 2025    Years since quittin.0   Smokeless Tobacco Never        Objective   Physical Exam    Vitals:    25 0750   BP: 148/64   Pulse: 66   Resp: 16   Temp: 98.1 °F (36.7 °C)   SpO2: 93%          DP and PT pulses are palpable  Sensations intact light touch  Necrosis that is dry to the distal aspect of the right fifth digit.  No extension to the MTPJ.    Labs     Lab Results   Component Value Date    HGBA1C 13.60 (H) 2025    POCGLU 135 (H) 2025        CBC:      Lab 25  0655 25  0634 25  1519   WBC 8.82 8.72 9.04   HEMOGLOBIN 9.5* 10.1* 11.2*   HEMATOCRIT 28.9* 31.4* 35.4   PLATELETS 221 220 233   NEUTROS ABS 6.28  --  7.57*   IMMATURE GRANS (ABS) 0.03  --  0.04   LYMPHS ABS 1.78  --  0.90   MONOS ABS 0.49  --  0.37   EOS ABS 0.20  --  0.12   MCV 91.5 92.4 94.4          No results found for this or any previous visit.     Adult Transthoracic Echo Complete W/ Cont if Necessary Per Protocol    Left ventricular systolic function is  normal. Left ventricular ejection   fraction appears to be 61 - 65%.    Left ventricular wall thickness is consistent with mild to moderate   concentric hypertrophy.    Left ventricular diastolic function is consistent with (grade II w/high   LAP) pseudonormalization.    Normal right ventricular cavity size and systolic function noted.    The left atrial cavity is mild to moderately dilated.    Mild mitral valve regurgitation is present.    Insufficient TR velocity profile to estimate the right ventricular   systolic pressure.    There is no evidence of pericardial effusion.       Assessment/Plan       45-year-old female with dry gangrene to the fifth toe of the right foot    -patient examined evaluated by myself  - ABIs have been ordered and will follow those results  - No concern for any acute infection present to the toe.  The necrosis is relatively distal at the fifth digit and have the option to let  the tissue slowly slough off and demarcate or could do an amputation.  - This procedure is nonurgent and if I can get the patient onto the operating room schedule early next week and she is still in the hospital we can perform it then.  If not, she can follow-up with me outpatient and we can perform an amputation on the outpatient setting.      Weston Tinajero DPM  [facility]  Office: [phone]

## 2025-07-24 NOTE — PROGRESS NOTES
"Nutrition Services    Patient Name: Bibiana Inman  YOB: 1980  MRN: [id number]  Admission date: 7/22/2025    Assessment Date:  07/24/25    NUTRITION EVALUATION      Reason for Encounter MST score 2+   Diagnosis/Problem Admission Diagnosis:  Syncope and collapse [R55]  Hypokalemia [E87.6]  Abnormal EKG [R94.31]  Elevated troponin [R79.89]  CARLA (acute kidney injury) [N17.9]  Uncontrolled other specified diabetes mellitus with hyperglycemia [E13.65]    Problem List:    Syncope and collapse    Benign essential hypertension    Gastroesophageal reflux disease    Type 2 diabetes mellitus with hyperglycemia, with long-term current use of insulin    Obstructive sleep apnea    Bilateral carotid artery stenosis    History of CVA (cerebrovascular accident)    Elevated troponin    Dizziness    CAD (coronary artery disease)    CKD stage 3b, GFR 30-44 ml/min    Anemia    Type 2 diabetes mellitus with diabetic foot ulcer     Narrative 45 yoF presents with syncope and collapse and severe hyperglycemia. RD visited pt to provide DM diet edu and she did not want to have a discussion, only wanted me to leave handouts.        PO Diet Diet: Cardiac, Diabetic; Healthy Heart (2-3 Na+); Consistent Carbohydrate; Fluid Consistency: Thin (IDDSI 0)   Allergies NKFA   Supplements n/a   PO Intake % %       Chewing/Swallowing Difficulty no issues identified at this time       Medications reviewed   Labs  reviewed       Physical Findings alert, oriented     Edema no edema    GI Function last bowel movement: 7/22   Skin Status other: R great toe, R fifth toe, R plantar; rash R and L leg    Lines/Drains none   I/O reviewed        Height  Weight  BMI  Weight Trend     Height: 162.6 cm (64\")  Weight: 63 kg (138 lb 14.2 oz) (07/24/25 0725)  Body mass index is 23.84 kg/m².  Stable    Weight change: No significant changes       NFPE Not indicated at this time       Nutrition Problem (PES) Problem: Altered Nutrition Related to " Labs  Etiology: Medical Diagnosis - DM   Signs/Symptoms: Biochemical, -381       Intervention/Plan CTM oral intakes and encourage PO >75%  CCHO diet for glucose control- provided education handouts     RD to follow up per protocol.     Results from last 7 days   Lab Units 07/24/25  0655 07/23/25  0634 07/22/25  1519   SODIUM mmol/L 134* 135* 129*   POTASSIUM mmol/L 3.7 3.9 3.3*   CHLORIDE mmol/L 108* 108* 99   CO2 mmol/L 18.3* 21.0* 21.2*   BUN mg/dL 16.0 16.0 17.0   CREATININE mg/dL 1.87* 1.80* 2.09*   CALCIUM mg/dL 8.0* 7.8* 7.9*   BILIRUBIN mg/dL  --   --  <0.2   ALK PHOS U/L  --   --  135*   ALT (SGPT) U/L  --   --  5   AST (SGOT) U/L  --   --  7   GLUCOSE mg/dL 105* 332* 626*     Results from last 7 days   Lab Units 07/24/25  0655 07/23/25  0634 07/22/25  1519   MAGNESIUM mg/dL  --   --  2.1   HEMOGLOBIN g/dL 9.5*   < > 11.2*   HEMATOCRIT % 28.9*   < > 35.4    < > = values in this interval not displayed.     Lab Results   Component Value Date    HGBA1C 13.60 (H) 07/22/2025     Wt Readings from Last 10 Encounters:   07/24/25 63 kg (138 lb 14.2 oz)   07/17/25 64.5 kg (142 lb 1.6 oz)   05/08/25 62.6 kg (138 lb 1.6 oz)   02/17/25 64.4 kg (142 lb)   02/05/25 67.1 kg (148 lb)   08/30/24 59.4 kg (131 lb)   08/07/24 94.2 kg (207 lb 11.2 oz)   08/06/24 61.2 kg (135 lb)   07/30/24 63 kg (139 lb)   07/07/24 68.9 kg (151 lb 12.8 oz)       Electronically signed by:  Lola Miller RD  07/24/25 16:08 EDT

## 2025-07-24 NOTE — CASE MANAGEMENT/SOCIAL WORK
Discharge Planning Assessment  [facility]     Patient Name: Bibiana Inman  MRN: [id number]  Today's Date: 7/24/2025    Admit Date: 7/22/2025    Plan: home with spouse   Discharge Needs Assessment       Row Name 07/24/25 1244       Living Environment    People in Home spouse    Current Living Arrangements home    Potentially Unsafe Housing Conditions none    Primary Care Provided by self    Provides Primary Care For spouse    Family Caregiver if Needed spouse    Quality of Family Relationships involved;helpful       Resource/Environmental Concerns    Resource/Environmental Concerns none       Transition Planning    Patient/Family Anticipates Transition to home with family    Patient/Family Anticipated Services at Transition none    Transportation Anticipated family or friend will provide       Discharge Needs Assessment    Readmission Within the Last 30 Days no previous admission in last 30 days    Equipment Currently Used at Home bp cuff;glucometer    Equipment Needed After Discharge none    Provided Post Acute Provider List? N/A                   Discharge Plan       Row Name 07/24/25 1242       Plan    Plan home with spouse    Patient/Family in Agreement with Plan yes    Plan Comments Spoke with pt. Confirmed facesheet correct. Pt lives with spouse. No DME used. No HH/SNF history. Verified PCP and pharmacy. Diabetic educator consulted. CCP to follow for needs.                  Continued Care and Services - Admitted Since 7/22/2025    No active coordination exists.       Selected Continued Care - Prior Encounters Includes continued care and service providers with selected services from prior encounters from 4/23/2025 to 7/24/2025      Discharged on 5/8/2025 Admission date: 5/5/2025 - Discharge disposition: Home or Self Care      Durable Medical Equipment       Service Provider Services Address Phone Fax Patient Preferred    [employer] [facility] [employer] [address] [place] [address]  [phone] --                          Expected Discharge Date and Time       Expected Discharge Date Expected Discharge Time    Jul 25, 2025            Demographic Summary    No documentation.                  Functional Status    No documentation.                  Psychosocial    No documentation.                  Abuse/Neglect    No documentation.                  Legal    No documentation.                  Substance Abuse    No documentation.                  Patient Forms    No documentation.                     SID Mata

## 2025-07-24 NOTE — PROGRESS NOTES
LOS: 1 day   Patient Care Team:  Ibrahima Correa MD as PCP - General (Family Medicine)  Xochilt Jenkins MD as Consulting Physician (Nephrology)    Chief Complaint: Follow-up syncope, orthostatic hypotension, elevated troponin, coronary artery disease.    Interval History: She feels better.  She is still having pain over her left ribs.  No shortness of breath.    Vital Signs:  Temp:  [98.1 °F (36.7 °C)-99.1 °F (37.3 °C)] 98.2 °F (36.8 °C)  Heart Rate:  [49-69] 69  Resp:  [16] 16  BP: (129-176)/(54-72) 129/54    Intake/Output Summary (Last 24 hours) at 7/24/2025 1428  Last data filed at 7/24/2025 1335  Gross per 24 hour   Intake --   Output 1100 ml   Net -1100 ml       Physical Exam:   General Appearance:    No acute distress, alert and oriented x4   Lungs:     Clear to auscultation bilaterally     Heart:    Regular rhythm and normal rate.  No murmurs, gallops, or   rubs.   Abdomen:     Soft, nontender, nondistended.    Extremities:   No clubbing, cyanosis, or edema.     Results Review:    Results from last 7 days   Lab Units 07/24/25  0655   SODIUM mmol/L 134*   POTASSIUM mmol/L 3.7   CHLORIDE mmol/L 108*   CO2 mmol/L 18.3*   BUN mg/dL 16.0   CREATININE mg/dL 1.87*   GLUCOSE mg/dL 105*   CALCIUM mg/dL 8.0*     Results from last 7 days   Lab Units 07/22/25  1622 07/22/25  1519   HSTROP T ng/L 237* 253*     Results from last 7 days   Lab Units 07/24/25  0655   WBC 10*3/mm3 8.82   HEMOGLOBIN g/dL 9.5*   HEMATOCRIT % 28.9*   PLATELETS 10*3/mm3 221             Results from last 7 days   Lab Units 07/22/25  1519   MAGNESIUM mg/dL 2.1           I reviewed the patient's new clinical results.        Assessment:  1.  Syncope, likely secondary to #2  2.  Orthostatic hypotension  3.  Elevated troponin, type II NSTEMI secondary to #2, #4, and #5  4.  Acute kidney injury with stage IIIb chronic kidney disease  5.  Insulin-dependent diabetes with severe hyperglycemia (initial blood glucose 636).  She has been  out of insulin for several months.  6.  Right foot diabetic foot ulcer  7.  Coronary artery disease.  Status post 3 vessel CABG at [facility] on 1/21/2014 (LIMA to LAD, SVG to OM2, and SVG to PDA).  SVG to OM 2 later found to be occluded by cath in February 2024.  Status post redo CABG with an SVG to OM on 7/1/2024 at the time of fibroelastoma removal.  8.  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  Status post fibroblastoma resection and aortic valve repair on  7/1/2024 by Dr. De Luna.  9.  Anemia of chronic disease  10.  Gastroesophageal reflux disease  11.  Systemic hypertension  12.  Diabetic gastroparesis  13.  Peripheral neuropathy secondary to diabetes  14.  Tobacco use     Plan:  - Again, echocardiogram from 7/23/2025 showed a normal ejection fraction with no wall motion abnormalities.  Strongly suspect the troponin elevation is a type II NSTEMI secondary to the hypotension, kidney disease, and severe hyperglycemia.    -Mild bradycardia noted.  This is not causing any issues.  Continue carvedilol at current dose.    -Continue aspirin, Coreg, and Lipitor for coronary artery disease.  No anginal symptoms currently.    -Holding lisinopril because of renal function.  Blood pressure is now stable.  Continue hydralazine and Coreg.    -Podiatry to see regarding necrotic ulcer and necrosis of the right fifth toe.  If intervention or surgery is needed, she is considered moderate cardiac risk to proceed.    -She is fairly stable from a cardiology perspective.  Cardiology will sign off for now.  Please call back if needed.    Estrada Hinds MD  07/24/25  14:28 EDT

## 2025-07-24 NOTE — THERAPY TREATMENT NOTE
Patient Name: Bibiana Inman  : 1980    MRN: [id number]                              Today's Date: 2025       Admit Date: 2025    Visit Dx:     ICD-10-CM ICD-9-CM   1. Uncontrolled other specified diabetes mellitus with hyperglycemia  E13.65 250.02   2. Hypokalemia  E87.6 276.8   3. Elevated troponin  R79.89 790.6   4. Abnormal EKG  R94.31 794.31   5. CARLA (acute kidney injury)  N17.9 584.9   6. 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    Abnormal bone density screening    Benign essential hypertension    Chronic cough    Gastroparesis diabeticorum    Elevated erythrocyte sedimentation rate    Fatigue    Gastroesophageal reflux disease    Mixed hypercholesterolemia and hypertriglyceridemia    Intermittent claudication    Iron deficiency anemia    Multiple joint pain    Need for influenza vaccination    Type 2 diabetes mellitus with hyperglycemia, with long-term current use of insulin    Obstructive sleep apnea    Coronary artery disease of native artery of native heart with stable angina pectoris    Abnormal nuclear stress test    Depressive disorder    Back pain    Diabetic peripheral neuropathy associated with type 2 diabetes mellitus    Ingrowing nail, left great toe    Personal history of COVID-19    Polyp of colon    Vitamin D deficiency    Tobacco abuse    CKD stage 3a, GFR 45-59 ml/min    Bilateral carotid artery stenosis    History of CVA (cerebrovascular accident)    Elevated troponin    Aortic valve disease    Overweight (BMI 25.0-29.9)    Hypertensive emergency    Syncope and collapse    Dizziness    CAD (coronary artery disease)    CKD stage 3b, GFR 30-44 ml/min    Anemia    Type 2 diabetes mellitus with diabetic foot ulcer     Past Medical History:   Diagnosis Date    5,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 Debbie Fry.    Depressive disorder 2023    Diabetic gastroparesis 2021    Diabetic peripheral neuropathy associated with type 2 diabetes mellitus 2024    Gastroesophageal reflux disease 03/10/2021    GERD (gastroesophageal reflux disease)     Intermittent claudication 2017    Iron deficiency anemia 2020    Lacunar cerebrovascular accident (CVA) 2024    Mixed hypercholesterolemia and hypertriglyceridemia 2014    Myocardial infarction 2021    Obesity 3/7/2014    Obstructive sleep apnea 2021    Personal history of COVID-19 2022    Polyp of colon 2023    Spontaneous  2012    Stress fracture of right ankle with routine healing 2024    Stroke 2024    Tobacco abuse 2024    Type 2 diabetes mellitus with hyperglycemia, with long-term current use of insulin 2017    Vitamin D deficiency 2024     Past Surgical History:   Procedure Laterality Date    CARDIAC CATHETERIZATION N/A 2024    Procedure: Left Heart Cath and coronary angiogram;  Surgeon: Jena Barron MD;  Location: [facility] INVASIVE LOCATION;  Service: Cardiology;  Laterality: N/A;     SECTION      CORONARY ARTERY BYPASS GRAFT  2014    LIMA-LAD, SVG-OM2, SVG-PDA, Dr. Debbie Fry.    CORONARY ARTERY BYPASS GRAFT WITH AORTIC VALVE REPAIR/REPLACEMENT N/A 2024    Procedure: YUE; REOPERATIVE STERNOTOMY; CORONARY ARTERY BYPASS GRAFTING TIMES 1 UTILIZING RIGHT SAPHENOUS VEIN; AORTIC VALVE TUMOR ; PRP;  Surgeon: Benja De Luna MD;  Location: [facility];  Service: Cardiothoracic;  Laterality: N/A;    DILATATION AND CURETTAGE      TONSILLECTOMY        General Information       Row Name 25 2128           Physical Therapy Time and Intention    Document Type therapy note (daily note)  -AR     Mode of Treatment physical therapy  -AR       Row Name 07/24/25 1133          General Information    Patient Profile Reviewed yes  -AR     Prior Level of Function independent:  uses RW at home  -AR     Existing Precautions/Restrictions fall  -AR       Row Name 07/24/25 1133          Living Environment    Current Living Arrangements home  -AR     People in Home child(rajani), dependent  -AR       Row Name 07/24/25 1133          Cognition    Orientation Status (Cognition) oriented x 3  -AR       Row Name 07/24/25 1133          Safety Issues/Impairments Affecting Functional Mobility    Safety Issues Affecting Function (Mobility) judgment  -AR               User Key  (r) = Recorded By, (t) = Taken By, (c) = Cosigned By      Initials Name Provider Type    AR Reanna Renee PT Physical Therapist                   Mobility       Row Name 07/24/25 1134          Bed Mobility    Bed Mobility supine-sit  -AR     Supine-Sit [place] (Bed Mobility) standby assist  -AR     Assistive Device (Bed Mobility) bed rails;head of bed elevated  -AR       Row Name 07/24/25 1134          Sit-Stand Transfer    Sit-Stand [place] (Transfers) contact guard  -AR     Comment, (Sit-Stand Transfer) from bed and toilet  -AR       Row Name 07/24/25 1134          Gait/Stairs (Locomotion)    [place] Level (Gait) contact guard  -AR     Assistive Device (Gait) walker, front-wheeled  -AR     Patient was able to Ambulate yes  -AR     Distance in Feet (Gait) 160  -AR     Deviations/Abnormal Patterns (Gait) vipul decreased;gait speed decreased;stride length decreased  -AR     Bilateral Gait Deviations forward flexed posture  -AR               User Key  (r) = Recorded By, (t) = Taken By, (c) = Cosigned By      Initials Name Provider Type    AR Reanna Renee PT Physical Therapist                   Obj/Interventions       Row Name 07/24/25 1134           Balance    Dynamic Standing Balance contact guard  -AR     Position/Device Used, Standing Balance walker, rolling  -AR               User Key  (r) = Recorded By, (t) = Taken By, (c) = Cosigned By      Initials Name Provider Type    AR Reanna Renee, PT Physical Therapist                   Goals/Plan    No documentation.                  Clinical Impression       Row Name 07/24/25 1134          Pain    Pretreatment Pain Rating 0/10 - no pain  -AR     Posttreatment Pain Rating 0/10 - no pain  -AR     Response to Pain Interventions activity participation with tolerable pain  -AR       Row Name 07/24/25 1134          Plan of Care Review    Outcome Evaluation Improving activity tolerance during PT today.  Able to ambulate 160' w/ CGA using RW,slow gait but no loss of balance or safety concerns.  Recommend pt sit in chair for meals and a mbulating in [place] w/ staff.  Anticipate DC to home, continued use of her RW, she reports her MIL can assist her and her son as needed after DC.  -AR       Row Name 07/24/25 1134          Therapy Assessment/Plan (PT)    Therapy Frequency (PT) 3 times/wk  -AR       Row Name 07/24/25 1134          Vital Signs    O2 Delivery Pre Treatment room air  -AR       Row Name 07/24/25 1134          Positioning and Restraints    Pre-Treatment Position in bed  -AR     Post Treatment Position chair  -AR     In Chair notified nsg;sitting;call light within reach;encouraged to call for assist;exit alarm on  -AR               User Key  (r) = Recorded By, (t) = Taken By, (c) = Cosigned By      Initials Name Provider Type    Reanna Fink, PT Physical Therapist                   Outcome Measures       Row Name 07/24/25 1136          How much help from another person do you currently need...    Turning from your back to your side while in flat bed without using bedrails? 4  -AR     Moving from lying on back to sitting on the side of a flat bed without bedrails? 4  -AR     Moving to and from a bed to  a chair (including a wheelchair)? 4  -AR     Standing up from a chair using your arms (e.g., wheelchair, bedside chair)? 3  -AR     Climbing 3-5 steps with a railing? 3  -AR     To walk in hospital room? 3  -AR     AM-PAC 6 Clicks Score (PT) 21  -AR     Highest Level of Mobility Goal Walk 10 Steps or More-6  -AR       Row Name 07/24/25 1136          Functional Assessment    Outcome Measure Options AM-PAC 6 Clicks Basic Mobility (PT)  -AR               User Key  (r) = Recorded By, (t) = Taken By, (c) = Cosigned By      Initials Name Provider Type    AR Reanna Renee, PT Physical Therapist                                 Physical Therapy Education       Title: PT OT SLP Therapies (In Progress)       Topic: Physical Therapy (In Progress)       Point: Mobility training (In Progress)       Learning Progress Summary            Patient Acceptance, E, NR by AR at 7/24/2025 1136    Acceptance, E, VU by NL at 7/23/2025 1353                      Point: Home exercise program (In Progress)       Learning Progress Summary            Patient Acceptance, E, NR by AR at 7/24/2025 1136    Acceptance, E, VU by NL at 7/23/2025 1353                      Point: Body mechanics (In Progress)       Learning Progress Summary            Patient Acceptance, E, NR by AR at 7/24/2025 1136    Acceptance, E, VU by NL at 7/23/2025 1353                      Point: Precautions (In Progress)       Learning Progress Summary            Patient Acceptance, E, NR by AR at 7/24/2025 1136    Acceptance, E, VU by NL at 7/23/2025 1353                                      User Key       Initials Effective Dates Name Provider Type Discipline    AR 06/16/21 -  Reanna Renee, PT Physical Therapist PT    NL 06/11/25 -  Kasi Downs PT Physical Therapist PT                  PT Recommendation and Plan     Outcome Evaluation: Improving activity tolerance during PT today.  Able to ambulate 160' w/ CGA using RW,slow gait but no loss of balance or safety  concerns.  Recommend pt sit in chair for meals and a mbulating in [place] w/ staff.  Anticipate DC to home, continued use of her RW, she reports her MIL can assist her and her son as needed after DC.     Time Calculation:         PT Charges       Row Name 07/24/25 1133             Time Calculation    Start Time 0855  -AR      Stop Time 0919  -AR      Time Calculation (min) 24 min  -AR      PT Received On 07/24/25  -AR      PT - Next Appointment 07/25/25  -AR                User Key  (r) = Recorded By, (t) = Taken By, (c) = Cosigned By      Initials Name Provider Type    AR Reanna Renee, PT Physical Therapist                  Therapy Charges for Today       Code Description Service Date Service Provider Modifiers Qty    56306743969 HC PT THER PROC EA 15 MIN 7/24/2025 Reanna Renee, PT GP 1    24509468451 HC PT THERAPEUTIC ACT EA 15 MIN 7/24/2025 Reanna Renee, PT GP 1            PT G-Codes  Outcome Measure Options: AM-PAC 6 Clicks Basic Mobility (PT)  AM-PAC 6 Clicks Score (PT): 21  PT Discharge Summary  Anticipated Discharge Disposition (PT): home with home health    Reanna Renee PT  7/24/2025

## 2025-07-24 NOTE — PROGRESS NOTES
Name: Bibiana Inman ADMIT: 2025   : 1980  PCP: Ibrahima Correa MD    MRN: [id number] LOS: 1 days   AGE/SEX: 45 y.o. female  ROOM: [facility]     Subjective   Subjective   Chief Complaint   Patient presents with    Syncope    Hyperglycemia     She could not tell me how long her right fifth toe had looked necrotic/ischemic.  She is not reporting any pain.  No calf pain.  She not reporting any acute headache or vision change.  No chest pain palpitation shortness of breath.  No nausea vomiting or abdominal pain.     Objective   Objective   Vital Signs  Temp:  [98.1 °F (36.7 °C)-99.1 °F (37.3 °C)] 98.1 °F (36.7 °C)  Heart Rate:  [49-66] 66  Resp:  [16] 16  BP: (139-176)/(64-74) 148/64  SpO2:  [93 %-97 %] 93 %  on   ;   Device (Oxygen Therapy): room air  Body mass index is 23.84 kg/m².    Physical Exam  Vitals and nursing note reviewed.   Constitutional:       General: She is not in acute distress.     Appearance: She is ill-appearing (chronically). She is not diaphoretic.      Comments: Chronically ill appearing   Cardiovascular:      Rate and Rhythm: Regular rhythm. Bradycardia present.   Pulmonary:      Effort: Pulmonary effort is normal.      Breath sounds: No wheezing.   Abdominal:      Palpations: Abdomen is soft.      Tenderness: There is no abdominal tenderness.   Musculoskeletal:      Right lower leg: No edema.      Left lower leg: No edema.      Comments: Right foot 5th digit with ulcer and necrosis   Skin:     Findings: Lesion present.   Neurological:      Mental Status: She is alert. Mental status is at baseline.   Psychiatric:         Mood and Affect: Mood normal.         Behavior: Behavior normal.       Results Review  I reviewed the patient's new clinical results.    Results from last 7 days   Lab Units 25  0655 25  0634 25  1519   WBC 10*3/mm3 8.82 8.72 9.04   HEMOGLOBIN g/dL 9.5* 10.1* 11.2*   PLATELETS 10*3/mm3 221 220 233     Results from last 7 days   Lab Units  07/24/25  0655 07/23/25  0634 07/22/25  1519 07/17/25  1459   SODIUM mmol/L 134* 135* 129* 130*   POTASSIUM mmol/L 3.7 3.9 3.3* 4.1   CHLORIDE mmol/L 108* 108* 99 104   CO2 mmol/L 18.3* 21.0* 21.2* 19.3*   BUN mg/dL 16.0 16.0 17.0 17.0   CREATININE mg/dL 1.87* 1.80* 2.09* 1.46*   GLUCOSE mg/dL 105* 332* 626* 307*   EGFR mL/min/1.73 33.1* 34.6* 28.9* 44.5*     Results from last 7 days   Lab Units 07/22/25  1519   ALBUMIN g/dL 1.7*   BILIRUBIN mg/dL <0.2   ALK PHOS U/L 135*   AST (SGOT) U/L 7   ALT (SGPT) U/L 5     Results from last 7 days   Lab Units 07/24/25  0655 07/23/25  0634 07/22/25  1519 07/17/25  1459   CALCIUM mg/dL 8.0* 7.8* 7.9* 7.9*   ALBUMIN g/dL  --   --  1.7*  --    MAGNESIUM mg/dL  --   --  2.1  --          Hemoglobin A1C   Date/Time Value Ref Range Status   07/22/2025 1519 13.60 (H) 4.80 - 5.60 % Final     Glucose   Date/Time Value Ref Range Status   07/24/2025 1153 135 (H) 70 - 130 mg/dL Final   07/24/2025 0658 109 70 - 130 mg/dL Final   07/23/2025 2149 118 70 - 130 mg/dL Final   07/23/2025 1612 179 (H) 70 - 130 mg/dL Final   07/23/2025 1240 216 (H) 70 - 130 mg/dL Final   07/23/2025 0622 346 (H) 70 - 130 mg/dL Final   07/23/2025 0343 330 (H) 70 - 130 mg/dL Final       No radiology results for the last day    I have personally reviewed all medications:  Scheduled Medications  aspirin, 81 mg, Oral, Daily  atorvastatin, 80 mg, Oral, Daily  carvedilol, 12.5 mg, Oral, BID With Meals  hydrALAZINE, 50 mg, Oral, Q12H  insulin glargine, 35 Units, Subcutaneous, Nightly  insulin lispro, 2-9 Units, Subcutaneous, 4x Daily AC & at Bedtime  insulin lispro, 8 Units, Subcutaneous, TID With Meals  [Held by provider] lisinopril, 10 mg, Oral, Q24H  melatonin, 2.5 mg, Oral, Nightly  metoclopramide, 5 mg, Oral, 4x Daily  pantoprazole, 40 mg, Oral, Q AM      Infusions     Diet  Diet: Cardiac, Diabetic; Healthy Heart (2-3 Na+); Consistent Carbohydrate; Fluid Consistency: Thin (IDDSI 0)       Assessment/Plan     Active  Hospital Problems    Diagnosis  POA    **Syncope and collapse [R55]  Yes    Dizziness [R42]  Yes    CAD (coronary artery disease) [I25.10]  Yes    CKD stage 3b, GFR 30-44 ml/min [N18.32]  Yes    Anemia [D64.9]  Yes    Type 2 diabetes mellitus with diabetic foot ulcer [E11.621, L97.509]  Yes    Bilateral carotid artery stenosis [I65.23]  Yes    Elevated troponin [R79.89]  Yes    History of CVA (cerebrovascular accident) [Z86.73]  Not Applicable    Obstructive sleep apnea [G47.33]  Yes    Gastroesophageal reflux disease [K21.9]  Yes    Type 2 diabetes mellitus with hyperglycemia, with long-term current use of insulin [E11.65, Z79.4]  Not Applicable    Benign essential hypertension [I10]  Yes      Resolved Hospital Problems   No resolved problems to display.       45 y.o. female admitted with Syncope and collapse.    Syncope: Orthostatic on presentation.  She had severe hyperglycemia which likely added to hypovolemia and hypotension.  Had some bradycardia with heart rate in the 50s during exam but she was not reporting any dizziness while sitting.  Cardiology following.  Diabetes with hyperglycemia: A1c 13.6.  Patient had been out of diabetic medications for some time prior to presentation.  Continuing Lantus 35 units, lispro 8 units plus SSI.  Monitoring requirements  History of strokes due to aortic valve fibroelastoma: She had resection of this and aortic valve repair in July 2024.  On ASA and statin.  Hypertension: ACRLA on presentation.  Lisinopril held.  Monitoring progression.  Right fifth toe ulcer/necrosis: Uncertain of duration.  Check x-ray and CHHAYA.  CARLA on CKD3: Monitoring with resuscitation.  CAD: No chest pain reported.  ASA/statin  PPX: SCD  Disposition: TBD    Expected Discharge Date: 7/26/2025; Expected Discharge Time:      Adrian Cuenca MD  [facility]  07/24/25  13:50 EDT    Dictated portions of note using Dragon dictation software.  Copied text in this note has been reviewed  by me and remains accurate as of 07/24/25

## 2025-07-24 NOTE — CONSULTS
"Diabetes Education  Assessment/Teaching    Patient Name:  Bibiana Inman  YOB: 1980  MRN: [id number]  Admit Date:  7/22/2025      Assessment Date:  7/24/2025  Flowsheet Row Most Recent Value   General Information     Referral From: MD order. Meet with 46 y/o at bedside on 6N to assess receptivity to DM ed.    Height 162.6 cm (64\")   Weight 63 kg (138 lb 14.2 oz)   Weight Method Standing scale   Diabetes History    What type of diabetes do you have? Type 2. Now insulin deficient   Have you had diabetes education/teaching in the past? yes   Do you test your blood sugar at home? no -(pt told me she checks BGs bid to tid. pt told her primary RN she has not checked BG for a couple of months.)   Have you had high blood sugar? (>140mg/dl) yes -of note, in pt admissions this yr a1c level has not been below 13.6   Do you have any diabetes complications? foot ulcer, neuropathy, recurrent hospitalizations r/t hyperglycemia.   Education Preferences    Barriers to Learning -- receptivity. denial (of DM seriousness and dz process.)   Assessment Topics    Taking Medication - Assessment -- -per our [facility] pharmacist, all needed DM rxs are pended for dc. all $0 copays.   Reducing Risk - Assessment Needs education   Monitoring - Assessment Needs education   DM Goals    Contact Plan Follow-up medical care            Flowsheet Row Most Recent Value   DM Education Needs    Reducing Risks Neuropathy, ft sores   Healthy Coping Appropriate   Discharge Plan Home   Motivation Not interested. (Pt states DM educators have said the \"same thing over and over.\")   Teaching Method Explanation   Patient Response ---pt not receptive to DM ed or behavior change at this time.        Other Comments:    Electronically signed by:  Cecilia Huerta, RN, BSN, [facility]  07/24/25 13:04 EDT  "

## 2025-07-24 NOTE — CONSULTS
Nutrition Services    Patient Name:  Bibiana Inman  YOB: 1980  MRN: [id number]  Admit Date:  7/22/2025            Education topic: Carbohydrate counting     Resources given:  Printed materials     Advised regarding: Educated offered and refused, pt accepted handouts, but did not want to have discussion      Learner:  Patient     Readiness to learn: Other: left handouts      RD contact info provided: Yes     Outpatient referral: -       Electronically signed by:  Lola Miller RD  07/24/25 14:50 EDT

## 2025-07-24 NOTE — SIGNIFICANT NOTE
07/24/25 1033   OTHER   Discipline occupational therapist   Rehab Time/Intention   Session Not Performed other (see comments)  (pt with no reported acute OT needs currently or at d/c, will sign off at this time.)   Therapy Assessment/Plan (PT)   Criteria for Skilled Interventions Met (PT) no problems identified which require skilled intervention

## 2025-07-24 NOTE — CONSULTS
I was requested to see patient regarding an Advance Directive.  Patient already has AD and it is the chart.

## 2025-07-24 NOTE — PLAN OF CARE
Goal Outcome Evaluation:              Outcome Evaluation: Improving activity tolerance during PT today.  Able to ambulate 160' w/ CGA using RW,slow gait but no loss of balance or safety concerns.  Recommend pt sit in chair for meals and a mbulating in [place] w/ staff.  Anticipate DC to home, continued use of her RW, she reports her MIL can assist her and her son as needed after DC.    Anticipated Discharge Disposition (PT): home with home health

## 2025-07-25 LAB
ALBUMIN SERPL-MCNC: 1.9 G/DL (ref 3.5–5.2)
AMMONIA BLD-SCNC: 17 UMOL/L (ref 11–51)
ANION GAP SERPL CALCULATED.3IONS-SCNC: 8.2 MMOL/L (ref 5–15)
BASOPHILS # BLD AUTO: 0.04 10*3/MM3 (ref 0–0.2)
BASOPHILS NFR BLD AUTO: 0.5 % (ref 0–1.5)
BUN SERPL-MCNC: 17 MG/DL (ref 6–20)
BUN/CREAT SERPL: 9 (ref 7–25)
CALCIUM SPEC-SCNC: 8.1 MG/DL (ref 8.6–10.5)
CHLORIDE SERPL-SCNC: 110 MMOL/L (ref 98–107)
CO2 SERPL-SCNC: 17.8 MMOL/L (ref 22–29)
CREAT SERPL-MCNC: 1.89 MG/DL (ref 0.57–1)
D-LACTATE SERPL-SCNC: 0.7 MMOL/L (ref 0.5–2)
DEPRECATED RDW RBC AUTO: 42.5 FL (ref 37–54)
EGFRCR SERPLBLD CKD-EPI 2021: 32.6 ML/MIN/1.73
EOSINOPHIL # BLD AUTO: 0.14 10*3/MM3 (ref 0–0.4)
EOSINOPHIL NFR BLD AUTO: 1.7 % (ref 0.3–6.2)
ERYTHROCYTE [DISTWIDTH] IN BLOOD BY AUTOMATED COUNT: 13 % (ref 12.3–15.4)
FOLATE SERPL-MCNC: 16.3 NG/ML (ref 4.78–24.2)
GLUCOSE BLDC GLUCOMTR-MCNC: 154 MG/DL (ref 70–130)
GLUCOSE BLDC GLUCOMTR-MCNC: 191 MG/DL (ref 70–130)
GLUCOSE BLDC GLUCOMTR-MCNC: 197 MG/DL (ref 70–130)
GLUCOSE BLDC GLUCOMTR-MCNC: 219 MG/DL (ref 70–130)
GLUCOSE SERPL-MCNC: 159 MG/DL (ref 65–99)
HCT VFR BLD AUTO: 32.7 % (ref 34–46.6)
HGB BLD-MCNC: 10.5 G/DL (ref 12–15.9)
IMM GRANULOCYTES # BLD AUTO: 0.05 10*3/MM3 (ref 0–0.05)
IMM GRANULOCYTES NFR BLD AUTO: 0.6 % (ref 0–0.5)
LYMPHOCYTES # BLD AUTO: 1.72 10*3/MM3 (ref 0.7–3.1)
LYMPHOCYTES NFR BLD AUTO: 21 % (ref 19.6–45.3)
MCH RBC QN AUTO: 29.2 PG (ref 26.6–33)
MCHC RBC AUTO-ENTMCNC: 32.1 G/DL (ref 31.5–35.7)
MCV RBC AUTO: 91.1 FL (ref 79–97)
MONOCYTES # BLD AUTO: 0.67 10*3/MM3 (ref 0.1–0.9)
MONOCYTES NFR BLD AUTO: 8.2 % (ref 5–12)
NEUTROPHILS NFR BLD AUTO: 5.58 10*3/MM3 (ref 1.7–7)
NEUTROPHILS NFR BLD AUTO: 68 % (ref 42.7–76)
NRBC BLD AUTO-RTO: 0 /100 WBC (ref 0–0.2)
PHOSPHATE SERPL-MCNC: 3.9 MG/DL (ref 2.5–4.5)
PLATELET # BLD AUTO: 248 10*3/MM3 (ref 140–450)
PMV BLD AUTO: 11 FL (ref 6–12)
POTASSIUM SERPL-SCNC: 4.2 MMOL/L (ref 3.5–5.2)
RBC # BLD AUTO: 3.59 10*6/MM3 (ref 3.77–5.28)
SODIUM SERPL-SCNC: 136 MMOL/L (ref 136–145)
VIT B12 BLD-MCNC: 639 PG/ML (ref 211–946)
WBC NRBC COR # BLD AUTO: 8.2 10*3/MM3 (ref 3.4–10.8)

## 2025-07-25 PROCEDURE — 82607 VITAMIN B-12: CPT | Performed by: NURSE PRACTITIONER

## 2025-07-25 PROCEDURE — G0378 HOSPITAL OBSERVATION PER HR: HCPCS

## 2025-07-25 PROCEDURE — 99222 1ST HOSP IP/OBS MODERATE 55: CPT | Performed by: STUDENT IN AN ORGANIZED HEALTH CARE EDUCATION/TRAINING PROGRAM

## 2025-07-25 PROCEDURE — 82746 ASSAY OF FOLIC ACID SERUM: CPT | Performed by: NURSE PRACTITIONER

## 2025-07-25 PROCEDURE — 83605 ASSAY OF LACTIC ACID: CPT | Performed by: NURSE PRACTITIONER

## 2025-07-25 PROCEDURE — 87040 BLOOD CULTURE FOR BACTERIA: CPT | Performed by: NURSE PRACTITIONER

## 2025-07-25 PROCEDURE — 63710000001 INSULIN LISPRO (HUMAN) PER 5 UNITS: Performed by: INTERNAL MEDICINE

## 2025-07-25 PROCEDURE — 85025 COMPLETE CBC W/AUTO DIFF WBC: CPT | Performed by: INTERNAL MEDICINE

## 2025-07-25 PROCEDURE — 82140 ASSAY OF AMMONIA: CPT | Performed by: NURSE PRACTITIONER

## 2025-07-25 PROCEDURE — 80069 RENAL FUNCTION PANEL: CPT | Performed by: INTERNAL MEDICINE

## 2025-07-25 PROCEDURE — 25810000003 SODIUM CHLORIDE 0.9 % SOLUTION: Performed by: STUDENT IN AN ORGANIZED HEALTH CARE EDUCATION/TRAINING PROGRAM

## 2025-07-25 PROCEDURE — 63710000001 INSULIN LISPRO (HUMAN) PER 5 UNITS: Performed by: STUDENT IN AN ORGANIZED HEALTH CARE EDUCATION/TRAINING PROGRAM

## 2025-07-25 PROCEDURE — 63710000001 INSULIN GLARGINE PER 5 UNITS: Performed by: INTERNAL MEDICINE

## 2025-07-25 PROCEDURE — 82948 REAGENT STRIP/BLOOD GLUCOSE: CPT

## 2025-07-25 RX ORDER — INSULIN LISPRO 100 [IU]/ML
2 INJECTION, SOLUTION INTRAVENOUS; SUBCUTANEOUS
Status: DISCONTINUED | OUTPATIENT
Start: 2025-07-25 | End: 2025-07-26

## 2025-07-25 RX ORDER — SODIUM CHLORIDE 9 MG/ML
75 INJECTION, SOLUTION INTRAVENOUS CONTINUOUS
Status: ACTIVE | OUTPATIENT
Start: 2025-07-25 | End: 2025-07-26

## 2025-07-25 RX ADMIN — INSULIN LISPRO 2 UNITS: 100 INJECTION, SOLUTION INTRAVENOUS; SUBCUTANEOUS at 21:29

## 2025-07-25 RX ADMIN — INSULIN LISPRO 2 UNITS: 100 INJECTION, SOLUTION INTRAVENOUS; SUBCUTANEOUS at 16:56

## 2025-07-25 RX ADMIN — INSULIN LISPRO 2 UNITS: 100 INJECTION, SOLUTION INTRAVENOUS; SUBCUTANEOUS at 16:54

## 2025-07-25 RX ADMIN — METOCLOPRAMIDE 5 MG: 5 TABLET ORAL at 12:16

## 2025-07-25 RX ADMIN — ATORVASTATIN CALCIUM 80 MG: 80 TABLET, FILM COATED ORAL at 08:29

## 2025-07-25 RX ADMIN — ACETAMINOPHEN 650 MG: 325 TABLET, FILM COATED ORAL at 20:16

## 2025-07-25 RX ADMIN — INSULIN GLARGINE 10 UNITS: 100 INJECTION, SOLUTION SUBCUTANEOUS at 20:16

## 2025-07-25 RX ADMIN — METOCLOPRAMIDE 5 MG: 5 TABLET ORAL at 18:00

## 2025-07-25 RX ADMIN — INSULIN LISPRO 8 UNITS: 100 INJECTION, SOLUTION INTRAVENOUS; SUBCUTANEOUS at 12:16

## 2025-07-25 RX ADMIN — HYDRALAZINE HYDROCHLORIDE 50 MG: 50 TABLET ORAL at 08:29

## 2025-07-25 RX ADMIN — CARVEDILOL 12.5 MG: 12.5 TABLET, FILM COATED ORAL at 18:00

## 2025-07-25 RX ADMIN — INSULIN LISPRO 8 UNITS: 100 INJECTION, SOLUTION INTRAVENOUS; SUBCUTANEOUS at 08:29

## 2025-07-25 RX ADMIN — INSULIN LISPRO 2 UNITS: 100 INJECTION, SOLUTION INTRAVENOUS; SUBCUTANEOUS at 12:16

## 2025-07-25 RX ADMIN — METOCLOPRAMIDE 5 MG: 5 TABLET ORAL at 08:29

## 2025-07-25 RX ADMIN — ASPIRIN 81 MG CHEWABLE TABLET 81 MG: 81 TABLET CHEWABLE at 08:29

## 2025-07-25 RX ADMIN — HYDRALAZINE HYDROCHLORIDE 50 MG: 50 TABLET ORAL at 20:16

## 2025-07-25 RX ADMIN — PANTOPRAZOLE SODIUM 40 MG: 40 TABLET, DELAYED RELEASE ORAL at 05:20

## 2025-07-25 RX ADMIN — METOCLOPRAMIDE 5 MG: 5 TABLET ORAL at 20:16

## 2025-07-25 RX ADMIN — SODIUM CHLORIDE 75 ML/HR: 9 INJECTION, SOLUTION INTRAVENOUS at 17:25

## 2025-07-25 RX ADMIN — CARVEDILOL 12.5 MG: 12.5 TABLET, FILM COATED ORAL at 08:29

## 2025-07-25 NOTE — PLAN OF CARE
Goal Outcome Evaluation:           Progress: declining     Patient had temp of 101.3 F, BG-88, not wanting to eat during the day, new incontinent episodes, more sleepy, Oxygen dropping and [facility] notified. N.O placed for folate, ammonia, lactic acid, plasma, and blood culture labs for am by Mackenzie EASLEY. Tylenol and food administered and BG increased to 102. Will continue to monitor.

## 2025-07-25 NOTE — PROGRESS NOTES
Name: Bibiana Inman ADMIT: 2025   : 1980  PCP: Ibrahima Correa MD    MRN: [id number] LOS: 1 days   AGE/SEX: 45 y.o. female  ROOM: [facility]     Subjective   Subjective   Chief Complaint   Patient presents with    Syncope    Hyperglycemia     No chest pain shortness of breath nausea vomiting or abdominal pain.  She had a fever yesterday but is not reporting any symptoms. Toe is necrotic but without surrounding erythema. Discussed CHHAYA and duplex results with her.     Objective   Objective   Vital Signs  Temp:  [98.1 °F (36.7 °C)-101.3 °F (38.5 °C)] 98.1 °F (36.7 °C)  Heart Rate:  [69-81] 81  Resp:  [14-16] 14  BP: (125-148)/(54-66) 148/66  SpO2:  [96 %-98 %] 98 %  on   ;   Device (Oxygen Therapy): room air  Body mass index is 25.16 kg/m².    Physical Exam  Vitals and nursing note reviewed.   Constitutional:       General: She is not in acute distress.     Appearance: She is ill-appearing (chronically). She is not diaphoretic.      Comments: Chronically ill appearing   Cardiovascular:      Rate and Rhythm: Regular rhythm. Bradycardia present.   Pulmonary:      Effort: Pulmonary effort is normal.      Breath sounds: No wheezing.   Abdominal:      Palpations: Abdomen is soft.      Tenderness: There is no abdominal tenderness.   Musculoskeletal:      Right lower leg: No edema.      Left lower leg: No edema.      Comments: Right foot 5th digit with ulcer and necrosis   Skin:     Findings: Lesion present.   Neurological:      Mental Status: She is alert. Mental status is at baseline.   Psychiatric:         Mood and Affect: Mood normal.         Behavior: Behavior normal.       Results Review  I reviewed the patient's new clinical results.    Results from last 7 days   Lab Units 25  0536 25  0655 25  0634 25  1519   WBC 10*3/mm3 8.20 8.82 8.72 9.04   HEMOGLOBIN g/dL 10.5* 9.5* 10.1* 11.2*   PLATELETS 10*3/mm3 248 221 220 233     Results from last 7 days   Lab Units 25  0536  07/24/25  0655 07/23/25  0634 07/22/25  1519   SODIUM mmol/L 136 134* 135* 129*   POTASSIUM mmol/L 4.2 3.7 3.9 3.3*   CHLORIDE mmol/L 110* 108* 108* 99   CO2 mmol/L 17.8* 18.3* 21.0* 21.2*   BUN mg/dL 17.0 16.0 16.0 17.0   CREATININE mg/dL 1.89* 1.87* 1.80* 2.09*   GLUCOSE mg/dL 159* 105* 332* 626*   EGFR mL/min/1.73 32.6* 33.1* 34.6* 28.9*     Results from last 7 days   Lab Units 07/25/25  0536 07/22/25  1519   ALBUMIN g/dL 1.9* 1.7*   BILIRUBIN mg/dL  --  <0.2   ALK PHOS U/L  --  135*   AST (SGOT) U/L  --  7   ALT (SGPT) U/L  --  5     Results from last 7 days   Lab Units 07/25/25  0536 07/24/25  0655 07/23/25  0634 07/22/25  1519   CALCIUM mg/dL 8.1* 8.0* 7.8* 7.9*   ALBUMIN g/dL 1.9*  --   --  1.7*   MAGNESIUM mg/dL  --   --   --  2.1   PHOSPHORUS mg/dL 3.9  --   --   --      Results from last 7 days   Lab Units 07/25/25  0536   LACTATE mmol/L 0.7     Hemoglobin A1C   Date/Time Value Ref Range Status   07/22/2025 1519 13.60 (H) 4.80 - 5.60 % Final     Glucose   Date/Time Value Ref Range Status   07/25/2025 1103 197 (H) 70 - 130 mg/dL Final   07/25/2025 0555 154 (H) 70 - 130 mg/dL Final   07/24/2025 2021 85 70 - 130 mg/dL Final   07/24/2025 1624 111 70 - 130 mg/dL Final   07/24/2025 1153 135 (H) 70 - 130 mg/dL Final   07/24/2025 0658 109 70 - 130 mg/dL Final   07/23/2025 2149 118 70 - 130 mg/dL Final       No radiology results for the last day    I have personally reviewed all medications:  Scheduled Medications  aspirin, 81 mg, Oral, Daily  atorvastatin, 80 mg, Oral, Daily  carvedilol, 12.5 mg, Oral, BID With Meals  hydrALAZINE, 50 mg, Oral, Q12H  insulin glargine, 35 Units, Subcutaneous, Nightly  insulin lispro, 2-9 Units, Subcutaneous, 4x Daily AC & at Bedtime  insulin lispro, 8 Units, Subcutaneous, TID With Meals  [Held by provider] lisinopril, 10 mg, Oral, Q24H  metoclopramide, 5 mg, Oral, 4x Daily  pantoprazole, 40 mg, Oral, Q AM      Infusions     Diet  Diet: Cardiac, Diabetic; Healthy Heart (2-3 Na+);  Consistent Carbohydrate; Fluid Consistency: Thin (IDDSI 0)       Assessment/Plan     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 ulcer [E11.621, L97.509]  Yes    Bilateral carotid artery stenosis [I65.23]  Yes    Elevated troponin [R79.89]  Yes    History of CVA (cerebrovascular accident) [Z86.73]  Not Applicable    Obstructive sleep apnea [G47.33]  Yes    Gastroesophageal reflux disease [K21.9]  Yes    Type 2 diabetes mellitus with hyperglycemia, with long-term current use of insulin [E11.65, Z79.4]  Not Applicable    Benign essential hypertension [I10]  Yes      Resolved Hospital Problems   No resolved problems to display.       45 y.o. female admitted with Syncope and collapse.    Syncope: Orthostatic on presentation.  She had severe hyperglycemia which likely added to hypovolemia and hypotension.  Had some bradycardia with heart rate in the 50s during exam but she was not reporting any dizziness while sitting.  Cardiology evaluated.  Diabetes with hyperglycemia: A1c 13.6.  Patient had been out of diabetic medications for some time prior to presentation.  She did not receive any long-acting insulin yesterday and only had 16 total units of insulin.  Will decrease her Lantus to 10 units this evening and lower the Premeal to 2 units plus SSI.  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  On ASA and statin.  Hypertension: CARLA on presentation.  Lisinopril held.  Creatinine plateaued.  If she requires a CTA with runoff per vascular surgery then would start IVF.  Right fifth toe ulcer/necrosis: Uncertain of duration.  CHHAYA with PAD.  Podiatry evaluated and can plan amputation here or as outpatient.  Asking vascular surgery to  review the CHHAYA.  CARLA on CKD3: Monitoring with resuscitation.  CAD: No chest pain reported.  ASA/statin  PPX: SCD  Disposition: TBD    Expected Discharge Date: 7/26/2025; Expected Discharge Time:      Adrian Cuenca MD  [facility]  07/25/25  12:56 EDT    Dictated portions of note using Dragon dictation software.  Copied text in this note has been reviewed by me and remains accurate as of 07/25/25

## 2025-07-25 NOTE — CASE MANAGEMENT/SOCIAL WORK
Continued Stay Note  [facility]     Patient Name: Bibiana Inman  MRN: [id number]  Today's Date: 7/25/2025    Admit Date: 7/22/2025    Plan: plans to d/c to [facility]   Discharge Plan       Row Name 07/25/25 1314       Plan    Plan plans to d/c to [facility]    Patient/Family in Agreement with Plan yes    Plan Comments Spoke with pt agreeable for CSW to talk with [facility]. Spoke with EMMANUEL Browne via phone. Plan is for pt to d/c to her house she is currently caring for pt's spouse and their son. She declines HH stating she has used before and didn't feel it was beneficial. Offered to arrange if she changes her mind. She reports pt will need help caring for herself at home offered in home caregiver list, she declined. CCP to follow for needs.                   Discharge Codes    No documentation.                 Expected Discharge Date and Time       Expected Discharge Date Expected Discharge Time    Jul 26, 2025               SID Mata

## 2025-07-25 NOTE — CONSULTS
[facility] Vascular Surgery  Consult Note    Patient Name: Bibiana Inman  : 1980  MRN: [id number]  Primary Care Physician:  Ibrahima Correa MD  Referring Physician: Selina Snow MD  Date of admission: 2025    Inpatient Vascular Surgery Consult  Consult performed by: Neal Peoples II, MD  Consult ordered by: Adrian Cuenca MD      Subjective   Subjective     Reason for Consult/ Chief Complaint: Right fifth toe wound, peripheral arterial disease    History of Present Illness  Bibiana Inman is a 45 y.o. 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  During this admission patient has been found to have ischemic appearing right fifth toe.  Podiatry was consulted who ordered ABIs which showed evidence of severe peripheral arterial disease and we have been consulted to evaluate for this reason.  Previously seen the patient in the outpatient setting for carotid disease.    At the time of my evaluation the patient is resting comfortably in bed with no acute complaints.  She had a fever of 101.3 yesterday but does not feel febrile today.    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 Debbie Fry.    Depressive disorder 2023    Diabetic gastroparesis 2021    Diabetic peripheral neuropathy  associated with type 2 diabetes mellitus 2024    Gastroesophageal reflux disease 03/10/2021    GERD (gastroesophageal reflux disease)     Intermittent claudication 2017    Iron deficiency anemia 2020    Lacunar cerebrovascular accident (CVA) 2024    Mixed hypercholesterolemia and hypertriglyceridemia 2014    Myocardial infarction 2021    Obesity 3/7/2014    Obstructive sleep apnea 2021    Personal history of COVID-19 2022    Polyp of colon 2023    Spontaneous  2012    Stress fracture of right ankle with routine healing 2024    Stroke 2024    Tobacco abuse 2024    Type 2 diabetes mellitus with hyperglycemia, with long-term current use of insulin 2017    Vitamin D deficiency 2024       Past Surgical History:   Procedure Laterality Date    CARDIAC CATHETERIZATION N/A 2024    Procedure: Left Heart Cath and coronary angiogram;  Surgeon: Jena Barron MD;  Location: [facility] CATH INVASIVE LOCATION;  Service: Cardiology;  Laterality: N/A;     SECTION      CORONARY ARTERY BYPASS GRAFT  2014    LIMA-LAD, SVG-OM2, SVG-PDA, Dr. Debbie Fry.    CORONARY ARTERY BYPASS GRAFT WITH AORTIC VALVE REPAIR/REPLACEMENT N/A 2024    Procedure: YUE; REOPERATIVE STERNOTOMY; CORONARY ARTERY BYPASS GRAFTING TIMES 1 UTILIZING RIGHT SAPHENOUS VEIN; AORTIC VALVE TUMOR ; PRP;  Surgeon: Benja De Luna MD;  Location: [facility];  Service: Cardiothoracic;  Laterality: N/A;    DILATATION AND CURETTAGE      TONSILLECTOMY         Family History: Her family history includes Heart disease in her mother; Hypertension in her mother.     Social History: She  reports that she quit smoking 8 days ago. Her smoking use included cigarettes. She started smoking about 27 years ago. She has a 6.9 pack-year smoking history. She has never used smokeless tobacco. She reports that she does not currently use alcohol. She reports that she does not  use drugs.    Home Medications:  Dexcom G7 , Dexcom G7 Sensor, Insulin Pen Needle, albuterol sulfate HFA, aspirin, atorvastatin, carvedilol, famotidine, glyburide, hydrALAZINE, hydroCHLOROthiazide, icosapent ethyl, insulin aspart, insulin degludec, lisinopril, metFORMIN, metoclopramide, omeprazole, ondansetron, pantoprazole, and sodium bicarbonate    Allergies:  She is allergic to latex.    Objective    Objective     Vitals:    Temp:  [98.1 °F (36.7 °C)-101.3 °F (38.5 °C)] 98.8 °F (37.1 °C)  Heart Rate:  [72-81] 81  Resp:  [14-16] 16  BP: (125-148)/(61-66) 141/61    Physical Exam  Chronically ill-appearing and appears older than stated age.  Respirations unlabored  Abdomen soft, nontender  Palpable femoral pulses bilaterally left stronger than right  Nonpalpable popliteal or pedal pulses bilaterally.  Several superficial scratches and wounds on both of her lower legs which she attributes to cats  Dry gangrene developing in right fifth toe, no erythema or hyperemia or purulence.  Definitely looks ischemic.    Result Review    Result Review:  I have personally reviewed the results from the time of this admission to 7/25/2025 16:25 EDT and agree with these findings:  [x]  Laboratory list / accordion  [x]  Microbiology  [x]  Radiology  []  EKG/Telemetry   [x]  Cardiology/Vascular   []  Pathology  [x]  Old records  []  Other:  Most notable findings include: no leukocytosis, Cr 1.89 today. Blood cultures pending.   ABIs consistent with severe peripheral arterial disease bilaterally.  Hemoglobin A1c has been consistently severely elevated for the last 2 years.    CBC    Results from last 7 days   Lab Units 07/25/25  0536 07/24/25  0655 07/23/25  0634 07/22/25  1519   WBC 10*3/mm3 8.20 8.82 8.72 9.04   HEMOGLOBIN g/dL 10.5* 9.5* 10.1* 11.2*   PLATELETS 10*3/mm3 248 221 220 233     BMP   Results from last 7 days   Lab Units 07/25/25  0536 07/24/25  0655 07/23/25  0634 07/22/25  1519   SODIUM mmol/L 136 134* 135* 129*  "  POTASSIUM mmol/L 4.2 3.7 3.9 3.3*   CHLORIDE mmol/L 110* 108* 108* 99   CO2 mmol/L 17.8* 18.3* 21.0* 21.2*   BUN mg/dL 17.0 16.0 16.0 17.0   CREATININE mg/dL 1.89* 1.87* 1.80* 2.09*   GLUCOSE mg/dL 159* 105* 332* 626*   MAGNESIUM mg/dL  --   --   --  2.1   PHOSPHORUS mg/dL 3.9  --   --   --      Cr Clearance Estimated Creatinine Clearance: 34.5 mL/min (A) (by C-G formula based on SCr of 1.89 mg/dL (H)).  Coag     HbA1C   Lab Results   Component Value Date    HGBA1C 13.60 (H) 07/22/2025    HGBA1C 16.60 (H) 05/06/2025    HGBA1C 14.80 (H) 02/17/2025     Blood Glucose   Glucose   Date/Time Value Ref Range Status   07/25/2025 1604 219 (H) 70 - 130 mg/dL Final   07/25/2025 1103 197 (H) 70 - 130 mg/dL Final   07/25/2025 0555 154 (H) 70 - 130 mg/dL Final   07/24/2025 2021 85 70 - 130 mg/dL Final   07/24/2025 1624 111 70 - 130 mg/dL Final   07/24/2025 1153 135 (H) 70 - 130 mg/dL Final   07/24/2025 0658 109 70 - 130 mg/dL Final   07/23/2025 2149 118 70 - 130 mg/dL Final     Infection     CMP   Results from last 7 days   Lab Units 07/25/25  0536 07/24/25  0655 07/23/25  0634 07/22/25  1519   SODIUM mmol/L 136 134* 135* 129*   POTASSIUM mmol/L 4.2 3.7 3.9 3.3*   CHLORIDE mmol/L 110* 108* 108* 99   CO2 mmol/L 17.8* 18.3* 21.0* 21.2*   BUN mg/dL 17.0 16.0 16.0 17.0   CREATININE mg/dL 1.89* 1.87* 1.80* 2.09*   GLUCOSE mg/dL 159* 105* 332* 626*   ALBUMIN g/dL 1.9*  --   --  1.7*   BILIRUBIN mg/dL  --   --   --  <0.2   ALK PHOS U/L  --   --   --  135*   AST (SGOT) U/L  --   --   --  7   ALT (SGPT) U/L  --   --   --  5   AMMONIA umol/L 17  --   --   --      ABG      UA    Results from last 7 days   Lab Units 07/23/25  0346   NITRITE UA  Negative   WBC UA /HPF 3-5*   BACTERIA UA /HPF None Seen   SQUAM EPITHEL UA /HPF 0-2     JOZEF  No results found for: \"POCMETH\", \"POCAMPHET\", \"POCBARBITUR\", \"POCBENZO\", \"POCCOCAINE\", \"POCOPIATES\", \"POCOXYCODO\", \"POCPHENCYC\", \"POCPROPOXY\", \"POCTHC\", \"POCTRICYC\"  Lysis Labs   Results from last 7 days "   Lab Units 07/25/25  0536 07/24/25  0655 07/23/25  0634 07/22/25  1519   HEMOGLOBIN g/dL 10.5* 9.5* 10.1* 11.2*   PLATELETS 10*3/mm3 248 221 220 233   CREATININE mg/dL 1.89* 1.87* 1.80* 2.09*     Radiology(recent) No radiology results for the last day    Assessment & Plan   Assessment / Plan           Brief Patient Summary:  Bibiana Inman is a 45 y.o. female with peripheral arterial disease causing tissue loss in her right foot.  Patient appears to have severe peripheral arterial disease related to her chronically uncontrolled diabetes and smoking history.    I discussed with the patient that she likely has a significant component of chronic microvascular disease from her poorly controlled diabetes but likely some peripheral arterial disease as well that would be amenable to treatment.  Will start with CTA of her aorta with runoff.  If patient's glucose is not better controlled and if she continues to smoke (patient states she quit, 11 days ago) there will be nothing that we can do surgically to prevent her from requiring lower extremity amputations, only possible delay the inevitable.      Active Hospital Problems:  Active Hospital Problems    Diagnosis     **Syncope and collapse     Dizziness     CAD (coronary artery disease)     CKD stage 3b, GFR 30-44 ml/min     Anemia     Type 2 diabetes mellitus with diabetic foot ulcer     Bilateral carotid artery stenosis     Elevated troponin     History of CVA (cerebrovascular accident)     Obstructive sleep apnea     Gastroesophageal reflux disease     Type 2 diabetes mellitus with hyperglycemia, with long-term current use of insulin     Benign essential hypertension        Plan:   CTA aorta with runoff ordered.  Normal saline at 75 mL an hour for 12 hours ordered for hydration.  Will follow.  Continue aspirin and atorvastatin.  We will continue to monitor her cerebrovascular disease in the outpatient setting as previously scheduled.       VTE Prophylaxis:  Mechanical VTE  prophylaxis orders are present.         MD Neal Bridges II, II, MD  07/25/25  16:25 EDT  Office Number [phone]    [facility] [place] [facility]

## 2025-07-26 ENCOUNTER — APPOINTMENT (OUTPATIENT)
Dept: CT IMAGING | Facility: HOSPITAL | Age: 45
End: 2025-07-26
Payer: MEDICAID

## 2025-07-26 LAB
ANION GAP SERPL CALCULATED.3IONS-SCNC: 9 MMOL/L (ref 5–15)
BASOPHILS # BLD AUTO: 0.06 10*3/MM3 (ref 0–0.2)
BASOPHILS NFR BLD AUTO: 0.6 % (ref 0–1.5)
BUN SERPL-MCNC: 16 MG/DL (ref 6–20)
BUN/CREAT SERPL: 8.6 (ref 7–25)
CALCIUM SPEC-SCNC: 8 MG/DL (ref 8.6–10.5)
CHLORIDE SERPL-SCNC: 105 MMOL/L (ref 98–107)
CO2 SERPL-SCNC: 19 MMOL/L (ref 22–29)
CREAT SERPL-MCNC: 1.86 MG/DL (ref 0.57–1)
DEPRECATED RDW RBC AUTO: 46.2 FL (ref 37–54)
EGFRCR SERPLBLD CKD-EPI 2021: 33.3 ML/MIN/1.73
EOSINOPHIL # BLD AUTO: 0.16 10*3/MM3 (ref 0–0.4)
EOSINOPHIL NFR BLD AUTO: 1.7 % (ref 0.3–6.2)
ERYTHROCYTE [DISTWIDTH] IN BLOOD BY AUTOMATED COUNT: 13.5 % (ref 12.3–15.4)
GLUCOSE BLDC GLUCOMTR-MCNC: 161 MG/DL (ref 70–130)
GLUCOSE BLDC GLUCOMTR-MCNC: 182 MG/DL (ref 70–130)
GLUCOSE BLDC GLUCOMTR-MCNC: 219 MG/DL (ref 70–130)
GLUCOSE BLDC GLUCOMTR-MCNC: 226 MG/DL (ref 70–130)
GLUCOSE SERPL-MCNC: 182 MG/DL (ref 65–99)
HCT VFR BLD AUTO: 32 % (ref 34–46.6)
HGB BLD-MCNC: 10 G/DL (ref 12–15.9)
IMM GRANULOCYTES # BLD AUTO: 0.05 10*3/MM3 (ref 0–0.05)
IMM GRANULOCYTES NFR BLD AUTO: 0.5 % (ref 0–0.5)
LYMPHOCYTES # BLD AUTO: 2.02 10*3/MM3 (ref 0.7–3.1)
LYMPHOCYTES NFR BLD AUTO: 20.9 % (ref 19.6–45.3)
MCH RBC QN AUTO: 29.3 PG (ref 26.6–33)
MCHC RBC AUTO-ENTMCNC: 31.3 G/DL (ref 31.5–35.7)
MCV RBC AUTO: 93.8 FL (ref 79–97)
MONOCYTES # BLD AUTO: 0.92 10*3/MM3 (ref 0.1–0.9)
MONOCYTES NFR BLD AUTO: 9.5 % (ref 5–12)
NEUTROPHILS NFR BLD AUTO: 6.45 10*3/MM3 (ref 1.7–7)
NEUTROPHILS NFR BLD AUTO: 66.8 % (ref 42.7–76)
NRBC BLD AUTO-RTO: 0 /100 WBC (ref 0–0.2)
PLATELET # BLD AUTO: 235 10*3/MM3 (ref 140–450)
PMV BLD AUTO: 10.3 FL (ref 6–12)
POTASSIUM SERPL-SCNC: 4.1 MMOL/L (ref 3.5–5.2)
RBC # BLD AUTO: 3.41 10*6/MM3 (ref 3.77–5.28)
SODIUM SERPL-SCNC: 133 MMOL/L (ref 136–145)
WBC NRBC COR # BLD AUTO: 9.66 10*3/MM3 (ref 3.4–10.8)

## 2025-07-26 PROCEDURE — 63710000001 INSULIN LISPRO (HUMAN) PER 5 UNITS: Performed by: INTERNAL MEDICINE

## 2025-07-26 PROCEDURE — 99232 SBSQ HOSP IP/OBS MODERATE 35: CPT | Performed by: STUDENT IN AN ORGANIZED HEALTH CARE EDUCATION/TRAINING PROGRAM

## 2025-07-26 PROCEDURE — G0378 HOSPITAL OBSERVATION PER HR: HCPCS

## 2025-07-26 PROCEDURE — 25510000001 IOPAMIDOL PER 1 ML: Performed by: INTERNAL MEDICINE

## 2025-07-26 PROCEDURE — 25810000003 SODIUM CHLORIDE 0.9 % SOLUTION: Performed by: INTERNAL MEDICINE

## 2025-07-26 PROCEDURE — 63710000001 INSULIN GLARGINE PER 5 UNITS: Performed by: INTERNAL MEDICINE

## 2025-07-26 PROCEDURE — 80048 BASIC METABOLIC PNL TOTAL CA: CPT | Performed by: INTERNAL MEDICINE

## 2025-07-26 PROCEDURE — 63710000001 INSULIN LISPRO (HUMAN) PER 5 UNITS: Performed by: STUDENT IN AN ORGANIZED HEALTH CARE EDUCATION/TRAINING PROGRAM

## 2025-07-26 PROCEDURE — 85025 COMPLETE CBC W/AUTO DIFF WBC: CPT | Performed by: INTERNAL MEDICINE

## 2025-07-26 PROCEDURE — 75635 CT ANGIO ABDOMINAL ARTERIES: CPT

## 2025-07-26 PROCEDURE — 82948 REAGENT STRIP/BLOOD GLUCOSE: CPT

## 2025-07-26 RX ORDER — SODIUM CHLORIDE 9 MG/ML
100 INJECTION, SOLUTION INTRAVENOUS CONTINUOUS
Status: DISCONTINUED | OUTPATIENT
Start: 2025-07-26 | End: 2025-07-26

## 2025-07-26 RX ORDER — SODIUM CHLORIDE 9 MG/ML
100 INJECTION, SOLUTION INTRAVENOUS CONTINUOUS
Status: ACTIVE | OUTPATIENT
Start: 2025-07-26 | End: 2025-07-27

## 2025-07-26 RX ORDER — IOPAMIDOL 755 MG/ML
100 INJECTION, SOLUTION INTRAVASCULAR
Status: COMPLETED | OUTPATIENT
Start: 2025-07-26 | End: 2025-07-26

## 2025-07-26 RX ORDER — INSULIN LISPRO 100 [IU]/ML
4 INJECTION, SOLUTION INTRAVENOUS; SUBCUTANEOUS
Status: DISCONTINUED | OUTPATIENT
Start: 2025-07-26 | End: 2025-07-28

## 2025-07-26 RX ORDER — HYDROCODONE BITARTRATE AND ACETAMINOPHEN 5; 325 MG/1; MG/1
1 TABLET ORAL EVERY 4 HOURS PRN
Refills: 0 | Status: DISCONTINUED | OUTPATIENT
Start: 2025-07-26 | End: 2025-07-29

## 2025-07-26 RX ADMIN — CARVEDILOL 12.5 MG: 12.5 TABLET, FILM COATED ORAL at 09:05

## 2025-07-26 RX ADMIN — ATORVASTATIN CALCIUM 80 MG: 80 TABLET, FILM COATED ORAL at 09:05

## 2025-07-26 RX ADMIN — INSULIN GLARGINE 15 UNITS: 100 INJECTION, SOLUTION SUBCUTANEOUS at 20:32

## 2025-07-26 RX ADMIN — HYDROCODONE BITARTRATE AND ACETAMINOPHEN 1 TABLET: 5; 325 TABLET ORAL at 20:45

## 2025-07-26 RX ADMIN — METOCLOPRAMIDE 5 MG: 5 TABLET ORAL at 18:00

## 2025-07-26 RX ADMIN — METOCLOPRAMIDE 5 MG: 5 TABLET ORAL at 09:05

## 2025-07-26 RX ADMIN — ACETAMINOPHEN 650 MG: 325 TABLET, FILM COATED ORAL at 00:19

## 2025-07-26 RX ADMIN — METOCLOPRAMIDE 5 MG: 5 TABLET ORAL at 12:16

## 2025-07-26 RX ADMIN — INSULIN LISPRO 2 UNITS: 100 INJECTION, SOLUTION INTRAVENOUS; SUBCUTANEOUS at 12:16

## 2025-07-26 RX ADMIN — INSULIN LISPRO 2 UNITS: 100 INJECTION, SOLUTION INTRAVENOUS; SUBCUTANEOUS at 17:59

## 2025-07-26 RX ADMIN — ASPIRIN 81 MG CHEWABLE TABLET 81 MG: 81 TABLET CHEWABLE at 09:05

## 2025-07-26 RX ADMIN — HYDRALAZINE HYDROCHLORIDE 50 MG: 50 TABLET ORAL at 20:32

## 2025-07-26 RX ADMIN — INSULIN LISPRO 2 UNITS: 100 INJECTION, SOLUTION INTRAVENOUS; SUBCUTANEOUS at 09:06

## 2025-07-26 RX ADMIN — PANTOPRAZOLE SODIUM 40 MG: 40 TABLET, DELAYED RELEASE ORAL at 06:45

## 2025-07-26 RX ADMIN — INSULIN LISPRO 4 UNITS: 100 INJECTION, SOLUTION INTRAVENOUS; SUBCUTANEOUS at 17:59

## 2025-07-26 RX ADMIN — METOCLOPRAMIDE 5 MG: 5 TABLET ORAL at 20:32

## 2025-07-26 RX ADMIN — SODIUM CHLORIDE 100 ML/HR: 9 INJECTION, SOLUTION INTRAVENOUS at 18:02

## 2025-07-26 RX ADMIN — INSULIN LISPRO 2 UNITS: 100 INJECTION, SOLUTION INTRAVENOUS; SUBCUTANEOUS at 09:12

## 2025-07-26 RX ADMIN — INSULIN LISPRO 4 UNITS: 100 INJECTION, SOLUTION INTRAVENOUS; SUBCUTANEOUS at 12:17

## 2025-07-26 RX ADMIN — CARVEDILOL 12.5 MG: 12.5 TABLET, FILM COATED ORAL at 18:00

## 2025-07-26 RX ADMIN — HYDRALAZINE HYDROCHLORIDE 50 MG: 50 TABLET ORAL at 09:05

## 2025-07-26 RX ADMIN — IOPAMIDOL 95 ML: 755 INJECTION, SOLUTION INTRAVENOUS at 17:17

## 2025-07-26 RX ADMIN — INSULIN LISPRO 4 UNITS: 100 INJECTION, SOLUTION INTRAVENOUS; SUBCUTANEOUS at 20:35

## 2025-07-26 NOTE — PROGRESS NOTES
Name: Bibiana Inman ADMIT: 2025   : 1980  PCP: Ibrahima Correa MD    MRN: [id number] LOS: 1 days   AGE/SEX: 45 y.o. female  ROOM:  [place] [address]     CC: PAD, R 5th toe wound/ischemia  Interval History: CTA not done.Pt stable with no acute complaints.   Subjective   Subjective     Review of Systems  Objective   Objective     Vitals:   Temp:  [98.1 °F (36.7 °C)-100.4 °F (38 °C)] 98.8 °F (37.1 °C)  Heart Rate:  [71-79] 71  Resp:  [16-18] 16  BP: (135-185)/(60-76) 162/60    No intake/output data recorded.    Scheduled Meds:     aspirin, 81 mg, Oral, Daily  atorvastatin, 80 mg, Oral, Daily  carvedilol, 12.5 mg, Oral, BID With Meals  hydrALAZINE, 50 mg, Oral, Q12H  insulin glargine, 10 Units, Subcutaneous, Nightly  insulin lispro, 2 Units, Subcutaneous, TID With Meals  insulin lispro, 2-9 Units, Subcutaneous, 4x Daily AC & at Bedtime  [Held by provider] lisinopril, 10 mg, Oral, Q24H  metoclopramide, 5 mg, Oral, 4x Daily  pantoprazole, 40 mg, Oral, Q AM      IV Meds:        Physical Exam  R 5th toe ischemia stable.     Data Reviewed:  CBC    Results from last 7 days   Lab Units 25  0701 25  0536 25  0655 25  0634 25  1519   WBC 10*3/mm3 9.66 8.20 8.82 8.72 9.04   HEMOGLOBIN g/dL 10.0* 10.5* 9.5* 10.1* 11.2*   PLATELETS 10*3/mm3 235 248 221 220 233     BMP   Results from last 7 days   Lab Units 25  0701 25  0536 25  0655 25  0634 25  1519   SODIUM mmol/L 133* 136 134* 135* 129*   POTASSIUM mmol/L 4.1 4.2 3.7 3.9 3.3*   CHLORIDE mmol/L 105 110* 108* 108* 99   CO2 mmol/L 19.0* 17.8* 18.3* 21.0* 21.2*   BUN mg/dL 16.0 17.0 16.0 16.0 17.0   CREATININE mg/dL 1.86* 1.89* 1.87* 1.80* 2.09*   GLUCOSE mg/dL 182* 159* 105* 332* 626*   MAGNESIUM mg/dL  --   --   --   --  2.1   PHOSPHORUS mg/dL  --  3.9  --   --   --      Cr Clearance Estimated Creatinine Clearance: 35.2 mL/min (A) (by C-G formula based on SCr of 1.86 mg/dL (H)).  Coag     HbA1C   Lab  "Results   Component Value Date    HGBA1C 13.60 (H) 07/22/2025    HGBA1C 16.60 (H) 05/06/2025    HGBA1C 14.80 (H) 02/17/2025     Blood Glucose   Glucose   Date/Time Value Ref Range Status   07/26/2025 1114 219 (H) 70 - 130 mg/dL Final   07/26/2025 0557 161 (H) 70 - 130 mg/dL Final   07/25/2025 2029 191 (H) 70 - 130 mg/dL Final   07/25/2025 1604 219 (H) 70 - 130 mg/dL Final   07/25/2025 1103 197 (H) 70 - 130 mg/dL Final   07/25/2025 0555 154 (H) 70 - 130 mg/dL Final   07/24/2025 2021 85 70 - 130 mg/dL Final   07/24/2025 1624 111 70 - 130 mg/dL Final     Infection   Results from last 7 days   Lab Units 07/25/25  0536 07/25/25  0535   BLOODCX  No growth at 24 hours No growth at 24 hours     CMP   Results from last 7 days   Lab Units 07/26/25  0701 07/25/25  0536 07/24/25  0655 07/23/25  0634 07/22/25  1519   SODIUM mmol/L 133* 136 134* 135* 129*   POTASSIUM mmol/L 4.1 4.2 3.7 3.9 3.3*   CHLORIDE mmol/L 105 110* 108* 108* 99   CO2 mmol/L 19.0* 17.8* 18.3* 21.0* 21.2*   BUN mg/dL 16.0 17.0 16.0 16.0 17.0   CREATININE mg/dL 1.86* 1.89* 1.87* 1.80* 2.09*   GLUCOSE mg/dL 182* 159* 105* 332* 626*   ALBUMIN g/dL  --  1.9*  --   --  1.7*   BILIRUBIN mg/dL  --   --   --   --  <0.2   ALK PHOS U/L  --   --   --   --  135*   AST (SGOT) U/L  --   --   --   --  7   ALT (SGPT) U/L  --   --   --   --  5   AMMONIA umol/L  --  17  --   --   --      ABG      UA    Results from last 7 days   Lab Units 07/23/25  0346   NITRITE UA  Negative   WBC UA /HPF 3-5*   BACTERIA UA /HPF None Seen   SQUAM EPITHEL UA /HPF 0-2     JOZEF  No results found for: \"POCMETH\", \"POCAMPHET\", \"POCBARBITUR\", \"POCBENZO\", \"POCCOCAINE\", \"POCOPIATES\", \"POCOXYCODO\", \"POCPHENCYC\", \"POCPROPOXY\", \"POCTHC\", \"POCTRICYC\"  Lysis Labs   Results from last 7 days   Lab Units 07/26/25  0701 07/25/25  0536 07/24/25  0655 07/23/25  0634 07/22/25  1519   HEMOGLOBIN g/dL 10.0* 10.5* 9.5* 10.1* 11.2*   PLATELETS 10*3/mm3 235 248 221 220 233   CREATININE mg/dL 1.86* 1.89* 1.87* 1.80* " 2.09*     Radiology(recent) No radiology results for the last day    Most notable findings include: Cr 1.86, no leukocytosis.     Active Hospital Problems:   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 ulcer [E11.621, L97.509]  Yes    Bilateral carotid artery stenosis [I65.23]  Yes    Elevated troponin [R79.89]  Yes    History of CVA (cerebrovascular accident) [Z86.73]  Not Applicable    Obstructive sleep apnea [G47.33]  Yes    Gastroesophageal reflux disease [K21.9]  Yes    Type 2 diabetes mellitus with hyperglycemia, with long-term current use of insulin [E11.65, Z79.4]  Not Applicable    Benign essential hypertension [I10]  Yes      Resolved Hospital Problems   No resolved problems to display.      Assessment & Plan     Assessment / Plan     45-year-old lady with chronically poorly controlled diabetes, significant cardiac disease, peripheral arterial disease with an ischemic right fifth toe.  CTA pending  Will follow-up results of this.  Normal saline had been ordered for hydration but CT still not done.  May need more IV fluid after contrast load.  Will hold off on ordering until CT actually gets done so she does not get too much IV fluid.  Continue aspirin and atorvastatin.      Neal Peoples II, MD  07/26/25  13:17 EDT  Available via Secure Chat during the day for non-urgent issues.  After hours and for urgent issues please call the office at [phone]

## 2025-07-26 NOTE — PLAN OF CARE
Problem: Adult Inpatient Plan of Care  Goal: Plan of Care Review  Outcome: Progressing  Flowsheets (Taken 7/26/2025 0612)  Progress: no change  Outcome Evaluation:   A&OX4, flat affect   pt slept most of the shift   low grage fevers x2 overnight - tylenol given   SR on monitor   no acute distress noted   makes needs known, c/o pain only w/movement     Plan of Care Reviewed With: patient   Goal Outcome Evaluation:  Plan of Care Reviewed With: patient        Progress: no change  Outcome Evaluation: A&OX4, flat affect; pt slept most of the shift; low grage fevers x2 overnight - tylenol given; SR on monitor; no acute distress noted; makes needs known, c/o pain only w/movement;

## 2025-07-26 NOTE — PLAN OF CARE
Goal Outcome Evaluation:              Outcome Evaluation: slept most of morning and afternoon.  Arouses easily.  Currently awake and alert.  No complaints except some left rib area tenderness with activity.  Up with help to bathroom.  Continent of urine.

## 2025-07-26 NOTE — PROGRESS NOTES
Name: Bibiana Inman ADMIT: 2025   : 1980  PCP: Ibrahima Correa MD    MRN: [id number] LOS: 1 days   AGE/SEX: 45 y.o. female  ROOM: [facility]     Subjective   Subjective   Chief Complaint   Patient presents with    Syncope    Hyperglycemia     No chest pain shortness of breath nausea vomiting or abdominal pain.     Objective   Objective   Vital Signs  Temp:  [98.1 °F (36.7 °C)-100.4 °F (38 °C)] 98.8 °F (37.1 °C)  Heart Rate:  [71-79] 71  Resp:  [16-18] 16  BP: (135-185)/(60-76) 162/60  SpO2:  [95 %-98 %] 98 %  on   ;   Device (Oxygen Therapy): room air  Body mass index is 25.32 kg/m².    Physical Exam  Vitals and nursing note reviewed.   Constitutional:       General: She is not in acute distress.     Appearance: She is ill-appearing (chronically). She is not diaphoretic.      Comments: Chronically ill appearing   Cardiovascular:      Rate and Rhythm: Regular rhythm. Bradycardia present.   Pulmonary:      Effort: Pulmonary effort is normal.      Breath sounds: No wheezing.   Abdominal:      Palpations: Abdomen is soft.      Tenderness: There is no abdominal tenderness.   Musculoskeletal:      Right lower leg: No edema.      Left lower leg: No edema.      Comments: Right foot 5th digit with ulcer and necrosis   Skin:     Findings: Lesion present.   Neurological:      Mental Status: She is alert. Mental status is at baseline.   Psychiatric:         Mood and Affect: Mood normal.         Behavior: Behavior normal.       Results Review  I reviewed the patient's new clinical results.    Results from last 7 days   Lab Units 25  0701 25  0536 25  0655 25  0634   WBC 10*3/mm3 9.66 8.20 8.82 8.72   HEMOGLOBIN g/dL 10.0* 10.5* 9.5* 10.1*   PLATELETS 10*3/mm3 235 248 221 220     Results from last 7 days   Lab Units 25  0701 25  0536 25  0655 25  0634   SODIUM mmol/L 133* 136 134* 135*   POTASSIUM mmol/L 4.1 4.2 3.7 3.9   CHLORIDE mmol/L 105 110* 108* 108*   CO2  mmol/L 19.0* 17.8* 18.3* 21.0*   BUN mg/dL 16.0 17.0 16.0 16.0   CREATININE mg/dL 1.86* 1.89* 1.87* 1.80*   GLUCOSE mg/dL 182* 159* 105* 332*   EGFR mL/min/1.73 33.3* 32.6* 33.1* 34.6*     Results from last 7 days   Lab Units 07/25/25  0536 07/22/25  1519   ALBUMIN g/dL 1.9* 1.7*   BILIRUBIN mg/dL  --  <0.2   ALK PHOS U/L  --  135*   AST (SGOT) U/L  --  7   ALT (SGPT) U/L  --  5     Results from last 7 days   Lab Units 07/26/25  0701 07/25/25  0536 07/24/25  0655 07/23/25  0634 07/22/25  1519   CALCIUM mg/dL 8.0* 8.1* 8.0* 7.8* 7.9*   ALBUMIN g/dL  --  1.9*  --   --  1.7*   MAGNESIUM mg/dL  --   --   --   --  2.1   PHOSPHORUS mg/dL  --  3.9  --   --   --      Results from last 7 days   Lab Units 07/25/25  0536   LACTATE mmol/L 0.7     Glucose   Date/Time Value Ref Range Status   07/26/2025 1114 219 (H) 70 - 130 mg/dL Final   07/26/2025 0557 161 (H) 70 - 130 mg/dL Final   07/25/2025 2029 191 (H) 70 - 130 mg/dL Final   07/25/2025 1604 219 (H) 70 - 130 mg/dL Final   07/25/2025 1103 197 (H) 70 - 130 mg/dL Final   07/25/2025 0555 154 (H) 70 - 130 mg/dL Final   07/24/2025 2021 85 70 - 130 mg/dL Final       No radiology results for the last day    I have personally reviewed all medications:  Scheduled Medications  aspirin, 81 mg, Oral, Daily  atorvastatin, 80 mg, Oral, Daily  carvedilol, 12.5 mg, Oral, BID With Meals  hydrALAZINE, 50 mg, Oral, Q12H  insulin glargine, 10 Units, Subcutaneous, Nightly  insulin lispro, 2 Units, Subcutaneous, TID With Meals  insulin lispro, 2-9 Units, Subcutaneous, 4x Daily AC & at Bedtime  [Held by provider] lisinopril, 10 mg, Oral, Q24H  metoclopramide, 5 mg, Oral, 4x Daily  pantoprazole, 40 mg, Oral, Q AM      Infusions     Diet  Diet: Cardiac, Diabetic; Healthy Heart (2-3 Na+); Consistent Carbohydrate; Fluid Consistency: Thin (IDDSI 0)       Assessment/Plan     Active Hospital Problems    Diagnosis  POA    **Syncope and collapse [R55]  Yes    Dizziness [R42]  Yes    CAD (coronary artery  disease) [I25.10]  Yes    CKD stage 3b, GFR 30-44 ml/min [N18.32]  Yes    Anemia [D64.9]  Yes    Type 2 diabetes mellitus with diabetic foot ulcer [E11.621, L97.509]  Yes    Bilateral carotid artery stenosis [I65.23]  Yes    Elevated troponin [R79.89]  Yes    History of CVA (cerebrovascular accident) [Z86.73]  Not Applicable    Obstructive sleep apnea [G47.33]  Yes    Gastroesophageal reflux disease [K21.9]  Yes    Type 2 diabetes mellitus with hyperglycemia, with long-term current use of insulin [E11.65, Z79.4]  Not Applicable    Benign essential hypertension [I10]  Yes      Resolved Hospital Problems   No resolved problems to display.       45 y.o. female admitted with Syncope and collapse.    Syncope: Orthostatic on presentation.  She had severe hyperglycemia which likely added to hypovolemia and hypotension.  Had some bradycardia with heart rate in the 50s during exam but she was not reporting any dizziness while sitting.  Cardiology evaluated.  Diabetes with hyperglycemia: A1c 13.6.  Patient had been out of diabetic medications for some time prior to presentation.  Titrate to 15 units Lantus and 4 units plus SSI Premeal.  Monitor requirements.  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  On ASA and statin.  Hypertension: CARLA on presentation.  Lisinopril held.  Creatinine plateaued.  Received IVF in anticipation of CTA however CT has still not been done today.  Additional fluids when timing of that is determined.  Right fifth toe ulcer/necrosis: Uncertain of duration.  CHHAYA with PAD.  Podiatry evaluated and can plan amputation here or as outpatient.  CTA planned.  Vascular surgery following.  CARLA on CKD3: Monitoring with resuscitation.  CAD: No chest pain reported.  ASA/statin  Fever: Fever curve improving.  Monitoring progression.  No focal symptoms.  PPX: SCD  Disposition: Home/few days    Expected Discharge Date: 7/26/2025; Expected  Discharge Time:  3:00 PM     Adrian Cuenca MD  [facility]  07/26/25  13:19 EDT    Dictated portions of note using Dragon dictation software.  Copied text in this note has been reviewed by me and remains accurate as of 07/26/25

## 2025-07-27 ENCOUNTER — APPOINTMENT (OUTPATIENT)
Dept: CARDIOLOGY | Facility: HOSPITAL | Age: 45
End: 2025-07-27
Payer: MEDICAID

## 2025-07-27 LAB
ANION GAP SERPL CALCULATED.3IONS-SCNC: 9 MMOL/L (ref 5–15)
BH CV XLRA MEAS - DIST GSV CALF DIST LEFT: 0.2 CM
BH CV XLRA MEAS - DIST GSV CALF DIST RIGHT: 0.13 CM
BH CV XLRA MEAS - DIST GSV THIGH DIST RIGHT: 0.17 CM
BH CV XLRA MEAS - DIST LSV CALF DIST LEFT: 0.27 CM
BH CV XLRA MEAS - DIST LSV CALF DIST RIGHT: 0.2 CM
BH CV XLRA MEAS - GSV ANKLE DIST LEFT: 0.16 CM
BH CV XLRA MEAS - GSV ANKLE DIST RIGHT: 0.22 CM
BH CV XLRA MEAS - GSV KNEE DIST RIGHT: 0.12 CM
BH CV XLRA MEAS - GSV ORIGIN DIST LEFT: 0.53 CM
BH CV XLRA MEAS - GSV ORIGIN DIST RIGHT: 0.68 CM
BH CV XLRA MEAS - MID GSV CALF LEFT: 0.19 CM
BH CV XLRA MEAS - MID GSV CALF RIGHT: 0.13 CM
BH CV XLRA MEAS - MID GSV THIGH  RIGHT: 0.23 CM
BH CV XLRA MEAS - MID LSV CALF DIST LEFT: 0.27 CM
BH CV XLRA MEAS - MID LSV CALF DIST RIGHT: 0.13 CM
BH CV XLRA MEAS - PROX GSV CALF DIST LEFT: 0.2 CM
BH CV XLRA MEAS - PROX GSV CALF DIST RIGHT: 0.32 CM
BH CV XLRA MEAS - PROX GSV THIGH  LEFT: 0.27 CM
BH CV XLRA MEAS - PROX GSV THIGH  RIGHT: 0.48 CM
BH CV XLRA MEAS - PROX LSV CALF DIST LEFT: 0.28 CM
BH CV XLRA MEAS - PROX LSV CALF DIST RIGHT: 0.17 CM
BUN SERPL-MCNC: 16 MG/DL (ref 6–20)
BUN/CREAT SERPL: 7.7 (ref 7–25)
CALCIUM SPEC-SCNC: 7.9 MG/DL (ref 8.6–10.5)
CHLORIDE SERPL-SCNC: 104 MMOL/L (ref 98–107)
CK SERPL-CCNC: 45 U/L (ref 20–180)
CO2 SERPL-SCNC: 18 MMOL/L (ref 22–29)
CREAT SERPL-MCNC: 2.07 MG/DL (ref 0.57–1)
DEPRECATED RDW RBC AUTO: 44.8 FL (ref 37–54)
EGFRCR SERPLBLD CKD-EPI 2021: 29.3 ML/MIN/1.73
ERYTHROCYTE [DISTWIDTH] IN BLOOD BY AUTOMATED COUNT: 13.1 % (ref 12.3–15.4)
FERRITIN SERPL-MCNC: 184 NG/ML (ref 13–150)
GLUCOSE BLDC GLUCOMTR-MCNC: 136 MG/DL (ref 70–130)
GLUCOSE BLDC GLUCOMTR-MCNC: 177 MG/DL (ref 70–130)
GLUCOSE BLDC GLUCOMTR-MCNC: 182 MG/DL (ref 70–130)
GLUCOSE BLDC GLUCOMTR-MCNC: 193 MG/DL (ref 70–130)
GLUCOSE SERPL-MCNC: 134 MG/DL (ref 65–99)
HCT VFR BLD AUTO: 30.7 % (ref 34–46.6)
HGB BLD-MCNC: 9.7 G/DL (ref 12–15.9)
IRON 24H UR-MRATE: 32 MCG/DL (ref 37–145)
IRON SATN MFR SERPL: 24 % (ref 20–50)
MCH RBC QN AUTO: 29.4 PG (ref 26.6–33)
MCHC RBC AUTO-ENTMCNC: 31.6 G/DL (ref 31.5–35.7)
MCV RBC AUTO: 93 FL (ref 79–97)
OSMOLALITY UR: 416 MOSM/KG
PLATELET # BLD AUTO: 271 10*3/MM3 (ref 140–450)
PMV BLD AUTO: 10.5 FL (ref 6–12)
POTASSIUM SERPL-SCNC: 3.6 MMOL/L (ref 3.5–5.2)
POTASSIUM SERPL-SCNC: 4.8 MMOL/L (ref 3.5–5.2)
RBC # BLD AUTO: 3.3 10*6/MM3 (ref 3.77–5.28)
SODIUM SERPL-SCNC: 131 MMOL/L (ref 136–145)
SODIUM UR-SCNC: 60 MMOL/L
TIBC SERPL-MCNC: 131 MCG/DL (ref 298–536)
TRANSFERRIN SERPL-MCNC: 88 MG/DL (ref 200–360)
URATE SERPL-MCNC: 4.8 MG/DL (ref 2.4–5.7)
WBC NRBC COR # BLD AUTO: 9.5 10*3/MM3 (ref 3.4–10.8)

## 2025-07-27 PROCEDURE — 63710000001 INSULIN GLARGINE PER 5 UNITS: Performed by: INTERNAL MEDICINE

## 2025-07-27 PROCEDURE — 63710000001 INSULIN LISPRO (HUMAN) PER 5 UNITS: Performed by: INTERNAL MEDICINE

## 2025-07-27 PROCEDURE — 63710000001 INSULIN LISPRO (HUMAN) PER 5 UNITS: Performed by: STUDENT IN AN ORGANIZED HEALTH CARE EDUCATION/TRAINING PROGRAM

## 2025-07-27 PROCEDURE — 84550 ASSAY OF BLOOD/URIC ACID: CPT | Performed by: INTERNAL MEDICINE

## 2025-07-27 PROCEDURE — G0378 HOSPITAL OBSERVATION PER HR: HCPCS

## 2025-07-27 PROCEDURE — 84132 ASSAY OF SERUM POTASSIUM: CPT | Performed by: INTERNAL MEDICINE

## 2025-07-27 PROCEDURE — 83540 ASSAY OF IRON: CPT | Performed by: INTERNAL MEDICINE

## 2025-07-27 PROCEDURE — 99232 SBSQ HOSP IP/OBS MODERATE 35: CPT | Performed by: STUDENT IN AN ORGANIZED HEALTH CARE EDUCATION/TRAINING PROGRAM

## 2025-07-27 PROCEDURE — 83935 ASSAY OF URINE OSMOLALITY: CPT | Performed by: INTERNAL MEDICINE

## 2025-07-27 PROCEDURE — 82550 ASSAY OF CK (CPK): CPT | Performed by: INTERNAL MEDICINE

## 2025-07-27 PROCEDURE — 93970 EXTREMITY STUDY: CPT

## 2025-07-27 PROCEDURE — 93970 EXTREMITY STUDY: CPT | Performed by: STUDENT IN AN ORGANIZED HEALTH CARE EDUCATION/TRAINING PROGRAM

## 2025-07-27 PROCEDURE — 25010000002 ONDANSETRON PER 1 MG: Performed by: STUDENT IN AN ORGANIZED HEALTH CARE EDUCATION/TRAINING PROGRAM

## 2025-07-27 PROCEDURE — 25010000002 ONDANSETRON PER 1 MG: Performed by: INTERNAL MEDICINE

## 2025-07-27 PROCEDURE — 80048 BASIC METABOLIC PNL TOTAL CA: CPT | Performed by: INTERNAL MEDICINE

## 2025-07-27 PROCEDURE — 84300 ASSAY OF URINE SODIUM: CPT | Performed by: INTERNAL MEDICINE

## 2025-07-27 PROCEDURE — 85027 COMPLETE CBC AUTOMATED: CPT | Performed by: INTERNAL MEDICINE

## 2025-07-27 PROCEDURE — 82948 REAGENT STRIP/BLOOD GLUCOSE: CPT

## 2025-07-27 PROCEDURE — 84466 ASSAY OF TRANSFERRIN: CPT | Performed by: INTERNAL MEDICINE

## 2025-07-27 PROCEDURE — 82728 ASSAY OF FERRITIN: CPT | Performed by: INTERNAL MEDICINE

## 2025-07-27 RX ORDER — POTASSIUM CHLORIDE 1500 MG/1
40 TABLET, EXTENDED RELEASE ORAL EVERY 4 HOURS
Status: COMPLETED | OUTPATIENT
Start: 2025-07-27 | End: 2025-07-27

## 2025-07-27 RX ORDER — HYDRALAZINE HYDROCHLORIDE 50 MG/1
50 TABLET, FILM COATED ORAL EVERY 8 HOURS SCHEDULED
Status: DISCONTINUED | OUTPATIENT
Start: 2025-07-27 | End: 2025-07-30

## 2025-07-27 RX ADMIN — INSULIN GLARGINE 15 UNITS: 100 INJECTION, SOLUTION SUBCUTANEOUS at 20:20

## 2025-07-27 RX ADMIN — ONDANSETRON 4 MG: 2 INJECTION INTRAMUSCULAR; INTRAVENOUS at 13:11

## 2025-07-27 RX ADMIN — METOCLOPRAMIDE 5 MG: 5 TABLET ORAL at 08:35

## 2025-07-27 RX ADMIN — CARVEDILOL 12.5 MG: 12.5 TABLET, FILM COATED ORAL at 08:35

## 2025-07-27 RX ADMIN — HYDRALAZINE HYDROCHLORIDE 50 MG: 50 TABLET ORAL at 17:01

## 2025-07-27 RX ADMIN — POTASSIUM CHLORIDE 40 MEQ: 1500 TABLET, EXTENDED RELEASE ORAL at 13:14

## 2025-07-27 RX ADMIN — INSULIN LISPRO 2 UNITS: 100 INJECTION, SOLUTION INTRAVENOUS; SUBCUTANEOUS at 20:19

## 2025-07-27 RX ADMIN — ACETAMINOPHEN 650 MG: 325 TABLET, FILM COATED ORAL at 04:48

## 2025-07-27 RX ADMIN — INSULIN LISPRO 2 UNITS: 100 INJECTION, SOLUTION INTRAVENOUS; SUBCUTANEOUS at 17:00

## 2025-07-27 RX ADMIN — METOCLOPRAMIDE 5 MG: 5 TABLET ORAL at 20:19

## 2025-07-27 RX ADMIN — POTASSIUM CHLORIDE 40 MEQ: 1500 TABLET, EXTENDED RELEASE ORAL at 08:35

## 2025-07-27 RX ADMIN — METOCLOPRAMIDE 5 MG: 5 TABLET ORAL at 17:02

## 2025-07-27 RX ADMIN — INSULIN LISPRO 4 UNITS: 100 INJECTION, SOLUTION INTRAVENOUS; SUBCUTANEOUS at 13:14

## 2025-07-27 RX ADMIN — INSULIN LISPRO 2 UNITS: 100 INJECTION, SOLUTION INTRAVENOUS; SUBCUTANEOUS at 13:15

## 2025-07-27 RX ADMIN — CARVEDILOL 12.5 MG: 12.5 TABLET, FILM COATED ORAL at 17:01

## 2025-07-27 RX ADMIN — ATORVASTATIN CALCIUM 80 MG: 80 TABLET, FILM COATED ORAL at 08:35

## 2025-07-27 RX ADMIN — ACETAMINOPHEN 650 MG: 325 TABLET, FILM COATED ORAL at 23:25

## 2025-07-27 RX ADMIN — INSULIN LISPRO 4 UNITS: 100 INJECTION, SOLUTION INTRAVENOUS; SUBCUTANEOUS at 08:35

## 2025-07-27 RX ADMIN — HYDRALAZINE HYDROCHLORIDE 50 MG: 50 TABLET ORAL at 08:35

## 2025-07-27 RX ADMIN — METOCLOPRAMIDE 5 MG: 5 TABLET ORAL at 13:13

## 2025-07-27 RX ADMIN — ASPIRIN 81 MG CHEWABLE TABLET 81 MG: 81 TABLET CHEWABLE at 08:35

## 2025-07-27 RX ADMIN — PANTOPRAZOLE SODIUM 40 MG: 40 TABLET, DELAYED RELEASE ORAL at 06:54

## 2025-07-27 RX ADMIN — INSULIN LISPRO 4 UNITS: 100 INJECTION, SOLUTION INTRAVENOUS; SUBCUTANEOUS at 17:00

## 2025-07-27 NOTE — PROGRESS NOTES
Name: Bibiana Inman ADMIT: 2025   : 1980  PCP: Ibrahima Correa MD    MRN: [id number] LOS: 1 days   AGE/SEX: 45 y.o. female  ROOM: [facility]     Subjective   Subjective   Chief Complaint   Patient presents with    Syncope    Hyperglycemia     She had vomiting of her food when she was eating lunch.  She was tolerating liquids okay and did not report any abdominal pain.  Denies dysuria and flank pain.  No diarrhea.  Not reporting any chest pain palpitations or shortness of breath.     Objective   Objective   Vital Signs  Temp:  [98.2 °F (36.8 °C)-100.2 °F (37.9 °C)] 98.4 °F (36.9 °C)  Heart Rate:  [58-78] 69  Resp:  [18] 18  BP: (128-171)/(59-68) 142/68  SpO2:  [93 %-95 %] 94 %  on   ;   Device (Oxygen Therapy): room air  Body mass index is 24.6 kg/m².    Physical Exam  Vitals and nursing note reviewed.   Constitutional:       General: She is not in acute distress.     Appearance: She is ill-appearing (chronically). She is not diaphoretic.   Cardiovascular:      Rate and Rhythm: Normal rate and regular rhythm.   Pulmonary:      Effort: Pulmonary effort is normal.      Breath sounds: No wheezing.   Abdominal:      Palpations: Abdomen is soft.      Tenderness: There is no abdominal tenderness.   Musculoskeletal:      Right lower leg: No edema.      Left lower leg: No edema.      Comments: Right foot 5th digit with ulcer and necrosis   Skin:     Findings: Lesion present.   Neurological:      Mental Status: She is alert. Mental status is at baseline.   Psychiatric:         Mood and Affect: Mood normal.         Behavior: Behavior normal.       Results Review  I reviewed the patient's new clinical results.    Results from last 7 days   Lab Units 25  0543 25  0701 25  0536 25  0655   WBC 10*3/mm3 9.50 9.66 8.20 8.82   HEMOGLOBIN g/dL 9.7* 10.0* 10.5* 9.5*   PLATELETS 10*3/mm3 271 235 248 221     Results from last 7 days   Lab Units 25  0543 25  0701 25  0536  07/24/25  0655   SODIUM mmol/L 131* 133* 136 134*   POTASSIUM mmol/L 3.6 4.1 4.2 3.7   CHLORIDE mmol/L 104 105 110* 108*   CO2 mmol/L 18.0* 19.0* 17.8* 18.3*   BUN mg/dL 16.0 16.0 17.0 16.0   CREATININE mg/dL 2.07* 1.86* 1.89* 1.87*   GLUCOSE mg/dL 134* 182* 159* 105*   EGFR mL/min/1.73 29.3* 33.3* 32.6* 33.1*     Results from last 7 days   Lab Units 07/25/25  0536 07/22/25  1519   ALBUMIN g/dL 1.9* 1.7*   BILIRUBIN mg/dL  --  <0.2   ALK PHOS U/L  --  135*   AST (SGOT) U/L  --  7   ALT (SGPT) U/L  --  5     Results from last 7 days   Lab Units 07/27/25  0543 07/26/25  0701 07/25/25  0536 07/24/25  0655 07/23/25  0634 07/22/25  1519   CALCIUM mg/dL 7.9* 8.0* 8.1* 8.0*   < > 7.9*   ALBUMIN g/dL  --   --  1.9*  --   --  1.7*   MAGNESIUM mg/dL  --   --   --   --   --  2.1   PHOSPHORUS mg/dL  --   --  3.9  --   --   --     < > = values in this interval not displayed.     Results from last 7 days   Lab Units 07/25/25  0536   LACTATE mmol/L 0.7     Glucose   Date/Time Value Ref Range Status   07/27/2025 1119 177 (H) 70 - 130 mg/dL Final   07/27/2025 0609 136 (H) 70 - 130 mg/dL Final   07/26/2025 2018 226 (H) 70 - 130 mg/dL Final   07/26/2025 1640 182 (H) 70 - 130 mg/dL Final   07/26/2025 1114 219 (H) 70 - 130 mg/dL Final   07/26/2025 0557 161 (H) 70 - 130 mg/dL Final   07/25/2025 2029 191 (H) 70 - 130 mg/dL Final       CT Angio Abdominal Aorta Bilateral Iliofem Runoff  Result Date: 7/27/2025  1. Multifocal atherosclerotic disease. Long segment of moderate stenosis of the left main renal artery associated with partially calcified plaque. Mild narrowing origin right renal artery. 2. Atherosclerotic calcification involving the common aorta and iliac vasculature with severe stenosis of the left external iliac artery proximally 4.5 cm distal to its origin. Multifocal iliac and common femoral arterial stenoses. 3. Left superficial femoral artery stent is occluded with reconstitution just below the stent. Multifocal stenoses of  the distal left superficial femoral artery, left popliteal artery. Three-vessel runoff to the left lower extremity. 4. On the right, the right superficial femoral artery is small with multifocal severe stenoses extending from its origin to the popliteal artery but without evidence for complete occlusion. Right popliteal artery is small with multifocal moderate to severe stenoses. Calcification involving the proximal anterior and posterior tibial arteries and peroneal artery. There is flow within the anterior and posterior tibial arteries on the right extending to the foot and there is flow of the right peroneal artery extending to the distal calf. 5. Moderate bilateral pleural effusions with adjacent compressive lower lobe atelectasis. 6. Cholelithiasis. 7. Generalized body wall edema.   This report was finalized on 7/27/2025 12:56 PM by Huan Noriega M.D on Workstation: [id number]        I have personally reviewed all medications:  Scheduled Medications  aspirin, 81 mg, Oral, Daily  atorvastatin, 80 mg, Oral, Daily  carvedilol, 12.5 mg, Oral, BID With Meals  hydrALAZINE, 50 mg, Oral, Q12H  insulin glargine, 15 Units, Subcutaneous, Nightly  insulin lispro, 2-9 Units, Subcutaneous, 4x Daily AC & at Bedtime  insulin lispro, 4 Units, Subcutaneous, TID With Meals  [Held by provider] lisinopril, 10 mg, Oral, Q24H  metoclopramide, 5 mg, Oral, 4x Daily  pantoprazole, 40 mg, Oral, Q AM      Infusions     Diet  Diet: Cardiac, Diabetic; Healthy Heart (2-3 Na+); Consistent Carbohydrate; Fluid Consistency: Thin (IDDSI 0)       Assessment/Plan     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 ulcer [E11.621, L97.509]  Yes    Bilateral carotid artery stenosis [I65.23]  Yes    Elevated troponin [R79.89]   Yes    History of CVA (cerebrovascular accident) [Z86.73]  Not Applicable    Obstructive sleep apnea [G47.33]  Yes    Gastroesophageal reflux disease [K21.9]  Yes    Type 2 diabetes mellitus with hyperglycemia, with long-term current use of insulin [E11.65, Z79.4]  Not Applicable    Benign essential hypertension [I10]  Yes      Resolved Hospital Problems   No resolved problems to display.       45 y.o. female admitted with Syncope and collapse.    Syncope: Orthostatic on presentation.  She had severe hyperglycemia which likely added to hypovolemia and hypotension.  Had some bradycardia with heart rate in the 50s earlier but not symptomatic.  Cardiology evaluated.  Diabetes with hyperglycemia: A1c 13.6.  Patient had been out of diabetic medications for some time prior to presentation.  Received the increased 15 units of Lantus last night.  Continue to monitor requirements today.  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  On ASA and statin.  Hypertension: CARLA on presentation.  Lisinopril held.  Creatinine plateaued.  Blood pressure more elevated today will adjust hydralazine to 3 times a day and monitor.  Right fifth toe ulcer/necrosis: Uncertain of duration.  CHHAYA with PAD.  Podiatry evaluated and can plan amputation here or as outpatient.  CTA obtained.  Vascular surgery following.  CARLA on CKD3: Creatinine plateaued.  She did receive fluids after the CTA yesterday.  Will ask nephrology to consult with additional vascular intervention likely.  I offered patient more IV fluids today but she did not want them currently.  CAD: No chest pain reported.  ASA/statin  Fever: Fever curve improving off antibiotics.  No leukocytosis.  PPX: SCD  Disposition: Home/TBD    Discussed with Dr Peoples    Expected Discharge Date: 7/26/2025; Expected Discharge Time:  3:00 PM     Adrian Cuenca MD  [facility]  07/27/25  14:27  EDT    Dictated portions of note using Dragon dictation software.  Copied text in this note has been reviewed by me and remains accurate as of 07/27/25

## 2025-07-27 NOTE — PROGRESS NOTES
Name: Bibiana Inman ADMIT: 2025   : 1980  PCP: Ibrahima Correa MD    MRN: [id number] LOS: 1 days   AGE/SEX: 45 y.o. female  ROOM:  [place] [address]     CC: PAD, R 5th toe wound/ischemia  Interval History: CTA done. Pt resting comfortably.    Subjective   Subjective     Review of Systems  Objective   Objective     Vitals:   Temp:  [98.2 °F (36.8 °C)-100.2 °F (37.9 °C)] 98.4 °F (36.9 °C)  Heart Rate:  [56-78] 69  Resp:  [18] 18  BP: (115-171)/(59-68) 142/68    No intake/output data recorded.    Scheduled Meds:     aspirin, 81 mg, Oral, Daily  atorvastatin, 80 mg, Oral, Daily  carvedilol, 12.5 mg, Oral, BID With Meals  hydrALAZINE, 50 mg, Oral, Q12H  insulin glargine, 15 Units, Subcutaneous, Nightly  insulin lispro, 2-9 Units, Subcutaneous, 4x Daily AC & at Bedtime  insulin lispro, 4 Units, Subcutaneous, TID With Meals  [Held by provider] lisinopril, 10 mg, Oral, Q24H  metoclopramide, 5 mg, Oral, 4x Daily  pantoprazole, 40 mg, Oral, Q AM      IV Meds:        Physical Exam  R 5th toe ischemia stable.     Data Reviewed:  CBC    Results from last 7 days   Lab Units 25  0543 25  0701 25  0536 25  0655 25  0634 25  1519   WBC 10*3/mm3 9.50 9.66 8.20 8.82 8.72 9.04   HEMOGLOBIN g/dL 9.7* 10.0* 10.5* 9.5* 10.1* 11.2*   PLATELETS 10*3/mm3 271 235 248 221 220 233     BMP   Results from last 7 days   Lab Units 25  0543 25  0701 25  0536 25  0655 25  0634 25  1519   SODIUM mmol/L 131* 133* 136 134* 135* 129*   POTASSIUM mmol/L 3.6 4.1 4.2 3.7 3.9 3.3*   CHLORIDE mmol/L 104 105 110* 108* 108* 99   CO2 mmol/L 18.0* 19.0* 17.8* 18.3* 21.0* 21.2*   BUN mg/dL 16.0 16.0 17.0 16.0 16.0 17.0   CREATININE mg/dL 2.07* 1.86* 1.89* 1.87* 1.80* 2.09*   GLUCOSE mg/dL 134* 182* 159* 105* 332* 626*   MAGNESIUM mg/dL  --   --   --   --   --  2.1   PHOSPHORUS mg/dL  --   --  3.9  --   --   --      Cr Clearance Estimated Creatinine Clearance: 34.5 mL/min  "(A) (by C-G formula based on SCr of 2.07 mg/dL (H)).  Coag     HbA1C   Lab Results   Component Value Date    HGBA1C 13.60 (H) 07/22/2025    HGBA1C 16.60 (H) 05/06/2025    HGBA1C 14.80 (H) 02/17/2025     Blood Glucose   Glucose   Date/Time Value Ref Range Status   07/27/2025 1119 177 (H) 70 - 130 mg/dL Final   07/27/2025 0609 136 (H) 70 - 130 mg/dL Final   07/26/2025 2018 226 (H) 70 - 130 mg/dL Final   07/26/2025 1640 182 (H) 70 - 130 mg/dL Final   07/26/2025 1114 219 (H) 70 - 130 mg/dL Final   07/26/2025 0557 161 (H) 70 - 130 mg/dL Final   07/25/2025 2029 191 (H) 70 - 130 mg/dL Final   07/25/2025 1604 219 (H) 70 - 130 mg/dL Final     Infection   Results from last 7 days   Lab Units 07/25/25  0536 07/25/25  0535   BLOODCX  No growth at 2 days No growth at 2 days     CMP   Results from last 7 days   Lab Units 07/27/25  0543 07/26/25  0701 07/25/25  0536 07/24/25  0655 07/23/25  0634 07/22/25  1519   SODIUM mmol/L 131* 133* 136 134* 135* 129*   POTASSIUM mmol/L 3.6 4.1 4.2 3.7 3.9 3.3*   CHLORIDE mmol/L 104 105 110* 108* 108* 99   CO2 mmol/L 18.0* 19.0* 17.8* 18.3* 21.0* 21.2*   BUN mg/dL 16.0 16.0 17.0 16.0 16.0 17.0   CREATININE mg/dL 2.07* 1.86* 1.89* 1.87* 1.80* 2.09*   GLUCOSE mg/dL 134* 182* 159* 105* 332* 626*   ALBUMIN g/dL  --   --  1.9*  --   --  1.7*   BILIRUBIN mg/dL  --   --   --   --   --  <0.2   ALK PHOS U/L  --   --   --   --   --  135*   AST (SGOT) U/L  --   --   --   --   --  7   ALT (SGPT) U/L  --   --   --   --   --  5   AMMONIA umol/L  --   --  17  --   --   --      ABG      UA    Results from last 7 days   Lab Units 07/23/25  0346   NITRITE UA  Negative   WBC UA /HPF 3-5*   BACTERIA UA /HPF None Seen   SQUAM EPITHEL UA /HPF 0-2     JOZEF  No results found for: \"POCMETH\", \"POCAMPHET\", \"POCBARBITUR\", \"POCBENZO\", \"POCCOCAINE\", \"POCOPIATES\", \"POCOXYCODO\", \"POCPHENCYC\", \"POCPROPOXY\", \"POCTHC\", \"POCTRICYC\"  Lysis Labs   Results from last 7 days   Lab Units 07/27/25  0543 07/26/25  0701 07/25/25  0536 " 07/24/25  0655 07/23/25  0634 07/22/25  1519   HEMOGLOBIN g/dL 9.7* 10.0* 10.5* 9.5* 10.1* 11.2*   PLATELETS 10*3/mm3 271 235 248 221 220 233   CREATININE mg/dL 2.07* 1.86* 1.89* 1.87* 1.80* 2.09*     Radiology(recent) CT Angio Abdominal Aorta Bilateral Iliofem Runoff  Result Date: 7/27/2025  1. Multifocal atherosclerotic disease. Long segment of moderate stenosis of the left main renal artery associated with partially calcified plaque. Mild narrowing origin right renal artery. 2. Atherosclerotic calcification involving the common aorta and iliac vasculature with severe stenosis of the left external iliac artery proximally 4.5 cm distal to its origin. Multifocal iliac and common femoral arterial stenoses. 3. Left superficial femoral artery stent is occluded with reconstitution just below the stent. Multifocal stenoses of the distal left superficial femoral artery, left popliteal artery. Three-vessel runoff to the left lower extremity. 4. On the right, the right superficial femoral artery is small with multifocal severe stenoses extending from its origin to the popliteal artery but without evidence for complete occlusion. Right popliteal artery is small with multifocal moderate to severe stenoses. Calcification involving the proximal anterior and posterior tibial arteries and peroneal artery. There is flow within the anterior and posterior tibial arteries on the right extending to the foot and there is flow of the right peroneal artery extending to the distal calf. 5. Moderate bilateral pleural effusions with adjacent compressive lower lobe atelectasis. 6. Cholelithiasis. 7. Generalized body wall edema.   This report was finalized on 7/27/2025 12:56 PM by Huan Noriega M.D on Workstation: [id number]        Most notable findings include: Cr 2.07. CTA results reviewed. Multifocal stenoses, focal occlusions at multiple levels, but appears may be candidate for endovascular treatment.       Active Hospital Problems:    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 ulcer [E11.621, L97.509]  Yes    Bilateral carotid artery stenosis [I65.23]  Yes    Elevated troponin [R79.89]  Yes    History of CVA (cerebrovascular accident) [Z86.73]  Not Applicable    Obstructive sleep apnea [G47.33]  Yes    Gastroesophageal reflux disease [K21.9]  Yes    Type 2 diabetes mellitus with hyperglycemia, with long-term current use of insulin [E11.65, Z79.4]  Not Applicable    Benign essential hypertension [I10]  Yes      Resolved Hospital Problems   No resolved problems to display.      Assessment & Plan     Assessment / Plan     45-year-old lady with chronically poorly controlled diabetes, significant cardiac disease, peripheral arterial disease with an ischemic right fifth toe.  CTA reviewed.  She has multi focal stenoses at both iliac, femoral-popliteal, and probably tibial level as well but does appear that she may be a candidate for endovascular treatment, likely right leg angiogram with angioplasty, possible stent, bilateral iliac stents.  Will get vein mapping for completion of workup in case endovascular treatment fails.  I anticipate she likely does not have any good vein after having had 2 coronary bypass surgeries.  Cr up a little.  On schedule not really favorable for intervention tomorrow, which will give her more time for recovery after contrast load from CTA.  Anticipate angiogram Tuesday or Wednesday.  Continue asprin and atorvastatin.       Neal Peoples II, MD  07/27/25  13:25 EDT  Available via Secure Chat during the day for non-urgent issues.  After hours and for urgent issues please call the office at [phone]

## 2025-07-27 NOTE — PROGRESS NOTES
[facility]    Chart reviewed  This patient is followed by Dr. Sawyer Jenkins  Will re-direct consult to his service  Thank you nonetheless for the consideration of this consult    --Adrian Ordaz MD

## 2025-07-27 NOTE — PLAN OF CARE
Goal Outcome Evaluation:              Outcome Evaluation: resting comfortably.  Denies dizziness.  N/V x1 today, relieved with Zofran.  No pain.  Up with standby assist to bathroom to void.

## 2025-07-28 PROBLEM — I70.25 ATHEROSCLEROSIS OF NATIVE ARTERIES OF THE EXTREMITIES WITH ULCERATION: Status: ACTIVE | Noted: 2025-07-22

## 2025-07-28 LAB
ALBUMIN SERPL-MCNC: 2 G/DL (ref 3.5–5.2)
ALBUMIN/GLOB SERPL: 0.7 G/DL
ALP SERPL-CCNC: 114 U/L (ref 39–117)
ALT SERPL W P-5'-P-CCNC: 8 U/L (ref 1–33)
ANION GAP SERPL CALCULATED.3IONS-SCNC: 7.4 MMOL/L (ref 5–15)
AST SERPL-CCNC: 13 U/L (ref 1–32)
B-HCG UR QL: NEGATIVE
BACTERIA UR QL AUTO: ABNORMAL /HPF
BASOPHILS # BLD AUTO: 0.04 10*3/MM3 (ref 0–0.2)
BASOPHILS NFR BLD AUTO: 0.5 % (ref 0–1.5)
BILIRUB SERPL-MCNC: <0.2 MG/DL (ref 0–1.2)
BILIRUB UR QL STRIP: NEGATIVE
BUN SERPL-MCNC: 19 MG/DL (ref 6–20)
BUN/CREAT SERPL: 8.6 (ref 7–25)
CALCIUM SPEC-SCNC: 8 MG/DL (ref 8.6–10.5)
CHLORIDE SERPL-SCNC: 106 MMOL/L (ref 98–107)
CLARITY UR: CLEAR
CO2 SERPL-SCNC: 17.6 MMOL/L (ref 22–29)
COLOR UR: YELLOW
CREAT SERPL-MCNC: 2.22 MG/DL (ref 0.57–1)
CREAT UR-MCNC: 53.9 MG/DL
DEPRECATED RDW RBC AUTO: 43.1 FL (ref 37–54)
EGFRCR SERPLBLD CKD-EPI 2021: 26.9 ML/MIN/1.73
EOSINOPHIL # BLD AUTO: 0.18 10*3/MM3 (ref 0–0.4)
EOSINOPHIL NFR BLD AUTO: 2.1 % (ref 0.3–6.2)
ERYTHROCYTE [DISTWIDTH] IN BLOOD BY AUTOMATED COUNT: 12.9 % (ref 12.3–15.4)
GLOBULIN UR ELPH-MCNC: 3 GM/DL
GLUCOSE BLDC GLUCOMTR-MCNC: 158 MG/DL (ref 70–130)
GLUCOSE BLDC GLUCOMTR-MCNC: 198 MG/DL (ref 70–130)
GLUCOSE BLDC GLUCOMTR-MCNC: 267 MG/DL (ref 70–130)
GLUCOSE SERPL-MCNC: 136 MG/DL (ref 65–99)
GLUCOSE UR STRIP-MCNC: ABNORMAL MG/DL
HCT VFR BLD AUTO: 29.2 % (ref 34–46.6)
HGB BLD-MCNC: 9.3 G/DL (ref 12–15.9)
HGB UR QL STRIP.AUTO: NEGATIVE
HYALINE CASTS UR QL AUTO: ABNORMAL /LPF
IMM GRANULOCYTES # BLD AUTO: 0.04 10*3/MM3 (ref 0–0.05)
IMM GRANULOCYTES NFR BLD AUTO: 0.5 % (ref 0–0.5)
KETONES UR QL STRIP: NEGATIVE
LEUKOCYTE ESTERASE UR QL STRIP.AUTO: NEGATIVE
LYMPHOCYTES # BLD AUTO: 1.61 10*3/MM3 (ref 0.7–3.1)
LYMPHOCYTES NFR BLD AUTO: 18.6 % (ref 19.6–45.3)
MAGNESIUM SERPL-MCNC: 1.9 MG/DL (ref 1.6–2.6)
MCH RBC QN AUTO: 29.2 PG (ref 26.6–33)
MCHC RBC AUTO-ENTMCNC: 31.8 G/DL (ref 31.5–35.7)
MCV RBC AUTO: 91.8 FL (ref 79–97)
MONOCYTES # BLD AUTO: 0.78 10*3/MM3 (ref 0.1–0.9)
MONOCYTES NFR BLD AUTO: 9 % (ref 5–12)
NEUTROPHILS NFR BLD AUTO: 6.02 10*3/MM3 (ref 1.7–7)
NEUTROPHILS NFR BLD AUTO: 69.3 % (ref 42.7–76)
NITRITE UR QL STRIP: NEGATIVE
NRBC BLD AUTO-RTO: 0 /100 WBC (ref 0–0.2)
PH UR STRIP.AUTO: 6 [PH] (ref 5–8)
PHOSPHATE SERPL-MCNC: 2.7 MG/DL (ref 2.5–4.5)
PLATELET # BLD AUTO: 283 10*3/MM3 (ref 140–450)
PMV BLD AUTO: 10.6 FL (ref 6–12)
POTASSIUM SERPL-SCNC: 4.5 MMOL/L (ref 3.5–5.2)
PROT SERPL-MCNC: 5 G/DL (ref 6–8.5)
PROT UR QL STRIP: ABNORMAL
RBC # BLD AUTO: 3.18 10*6/MM3 (ref 3.77–5.28)
RBC # UR STRIP: ABNORMAL /HPF
REF LAB TEST METHOD: ABNORMAL
SODIUM SERPL-SCNC: 131 MMOL/L (ref 136–145)
SODIUM UR-SCNC: 67 MMOL/L
SP GR UR STRIP: 1.02 (ref 1–1.03)
SQUAMOUS #/AREA URNS HPF: ABNORMAL /HPF
TSH SERPL DL<=0.05 MIU/L-ACNC: 3.74 UIU/ML (ref 0.27–4.2)
UROBILINOGEN UR QL STRIP: ABNORMAL
WBC # UR STRIP: ABNORMAL /HPF
WBC NRBC COR # BLD AUTO: 8.67 10*3/MM3 (ref 3.4–10.8)

## 2025-07-28 PROCEDURE — G0378 HOSPITAL OBSERVATION PER HR: HCPCS

## 2025-07-28 PROCEDURE — 63710000001 INSULIN GLARGINE PER 5 UNITS: Performed by: STUDENT IN AN ORGANIZED HEALTH CARE EDUCATION/TRAINING PROGRAM

## 2025-07-28 PROCEDURE — 83735 ASSAY OF MAGNESIUM: CPT | Performed by: INTERNAL MEDICINE

## 2025-07-28 PROCEDURE — 25810000003 SODIUM CHLORIDE 0.9 % SOLUTION: Performed by: STUDENT IN AN ORGANIZED HEALTH CARE EDUCATION/TRAINING PROGRAM

## 2025-07-28 PROCEDURE — 63710000001 INSULIN LISPRO (HUMAN) PER 5 UNITS: Performed by: STUDENT IN AN ORGANIZED HEALTH CARE EDUCATION/TRAINING PROGRAM

## 2025-07-28 PROCEDURE — 81001 URINALYSIS AUTO W/SCOPE: CPT | Performed by: INTERNAL MEDICINE

## 2025-07-28 PROCEDURE — 25010000002 HEPARIN (PORCINE) PER 1000 UNITS: Performed by: STUDENT IN AN ORGANIZED HEALTH CARE EDUCATION/TRAINING PROGRAM

## 2025-07-28 PROCEDURE — 84443 ASSAY THYROID STIM HORMONE: CPT | Performed by: INTERNAL MEDICINE

## 2025-07-28 PROCEDURE — 99024 POSTOP FOLLOW-UP VISIT: CPT | Performed by: STUDENT IN AN ORGANIZED HEALTH CARE EDUCATION/TRAINING PROGRAM

## 2025-07-28 PROCEDURE — 84100 ASSAY OF PHOSPHORUS: CPT | Performed by: INTERNAL MEDICINE

## 2025-07-28 PROCEDURE — 97530 THERAPEUTIC ACTIVITIES: CPT

## 2025-07-28 PROCEDURE — 63710000001 INSULIN LISPRO (HUMAN) PER 5 UNITS: Performed by: INTERNAL MEDICINE

## 2025-07-28 PROCEDURE — 82948 REAGENT STRIP/BLOOD GLUCOSE: CPT

## 2025-07-28 PROCEDURE — 84300 ASSAY OF URINE SODIUM: CPT | Performed by: INTERNAL MEDICINE

## 2025-07-28 PROCEDURE — 81025 URINE PREGNANCY TEST: CPT | Performed by: STUDENT IN AN ORGANIZED HEALTH CARE EDUCATION/TRAINING PROGRAM

## 2025-07-28 PROCEDURE — 82570 ASSAY OF URINE CREATININE: CPT | Performed by: INTERNAL MEDICINE

## 2025-07-28 PROCEDURE — 85025 COMPLETE CBC W/AUTO DIFF WBC: CPT | Performed by: INTERNAL MEDICINE

## 2025-07-28 PROCEDURE — 80053 COMPREHEN METABOLIC PANEL: CPT | Performed by: INTERNAL MEDICINE

## 2025-07-28 RX ORDER — SODIUM BICARBONATE 650 MG/1
1300 TABLET ORAL 3 TIMES DAILY
Status: DISCONTINUED | OUTPATIENT
Start: 2025-07-28 | End: 2025-08-01 | Stop reason: HOSPADM

## 2025-07-28 RX ORDER — INSULIN LISPRO 100 [IU]/ML
7 INJECTION, SOLUTION INTRAVENOUS; SUBCUTANEOUS
Status: DISCONTINUED | OUTPATIENT
Start: 2025-07-28 | End: 2025-08-01

## 2025-07-28 RX ORDER — SODIUM CHLORIDE 9 MG/ML
100 INJECTION, SOLUTION INTRAVENOUS CONTINUOUS
Status: DISCONTINUED | OUTPATIENT
Start: 2025-07-28 | End: 2025-07-29

## 2025-07-28 RX ORDER — HEPARIN SODIUM 5000 [USP'U]/ML
5000 INJECTION, SOLUTION INTRAVENOUS; SUBCUTANEOUS EVERY 8 HOURS SCHEDULED
Status: DISCONTINUED | OUTPATIENT
Start: 2025-07-28 | End: 2025-07-30

## 2025-07-28 RX ADMIN — INSULIN LISPRO 4 UNITS: 100 INJECTION, SOLUTION INTRAVENOUS; SUBCUTANEOUS at 10:05

## 2025-07-28 RX ADMIN — INSULIN LISPRO 7 UNITS: 100 INJECTION, SOLUTION INTRAVENOUS; SUBCUTANEOUS at 17:06

## 2025-07-28 RX ADMIN — HYDRALAZINE HYDROCHLORIDE 50 MG: 50 TABLET ORAL at 10:05

## 2025-07-28 RX ADMIN — HYDRALAZINE HYDROCHLORIDE 50 MG: 50 TABLET ORAL at 21:58

## 2025-07-28 RX ADMIN — INSULIN LISPRO 2 UNITS: 100 INJECTION, SOLUTION INTRAVENOUS; SUBCUTANEOUS at 11:59

## 2025-07-28 RX ADMIN — SODIUM BICARBONATE 1300 MG: 650 TABLET ORAL at 15:55

## 2025-07-28 RX ADMIN — INSULIN LISPRO 2 UNITS: 100 INJECTION, SOLUTION INTRAVENOUS; SUBCUTANEOUS at 17:06

## 2025-07-28 RX ADMIN — METOCLOPRAMIDE 5 MG: 5 TABLET ORAL at 11:59

## 2025-07-28 RX ADMIN — INSULIN LISPRO 4 UNITS: 100 INJECTION, SOLUTION INTRAVENOUS; SUBCUTANEOUS at 11:59

## 2025-07-28 RX ADMIN — METOCLOPRAMIDE 5 MG: 5 TABLET ORAL at 17:07

## 2025-07-28 RX ADMIN — HEPARIN SODIUM 5000 UNITS: 5000 INJECTION, SOLUTION INTRAVENOUS; SUBCUTANEOUS at 21:58

## 2025-07-28 RX ADMIN — METOCLOPRAMIDE 5 MG: 5 TABLET ORAL at 10:05

## 2025-07-28 RX ADMIN — CARVEDILOL 12.5 MG: 12.5 TABLET, FILM COATED ORAL at 10:05

## 2025-07-28 RX ADMIN — ASPIRIN 81 MG CHEWABLE TABLET 81 MG: 81 TABLET CHEWABLE at 10:05

## 2025-07-28 RX ADMIN — SODIUM CHLORIDE 100 ML/HR: 9 INJECTION, SOLUTION INTRAVENOUS at 15:55

## 2025-07-28 RX ADMIN — HYDRALAZINE HYDROCHLORIDE 50 MG: 50 TABLET ORAL at 15:55

## 2025-07-28 RX ADMIN — INSULIN GLARGINE 20 UNITS: 100 INJECTION, SOLUTION SUBCUTANEOUS at 21:58

## 2025-07-28 RX ADMIN — CARVEDILOL 12.5 MG: 12.5 TABLET, FILM COATED ORAL at 17:06

## 2025-07-28 RX ADMIN — METOCLOPRAMIDE 5 MG: 5 TABLET ORAL at 21:58

## 2025-07-28 RX ADMIN — INSULIN LISPRO 2 UNITS: 100 INJECTION, SOLUTION INTRAVENOUS; SUBCUTANEOUS at 21:57

## 2025-07-28 RX ADMIN — SODIUM BICARBONATE 1300 MG: 650 TABLET ORAL at 21:58

## 2025-07-28 RX ADMIN — PANTOPRAZOLE SODIUM 40 MG: 40 TABLET, DELAYED RELEASE ORAL at 10:05

## 2025-07-28 RX ADMIN — ATORVASTATIN CALCIUM 80 MG: 80 TABLET, FILM COATED ORAL at 10:05

## 2025-07-28 NOTE — PLAN OF CARE
Goal Outcome Evaluation:  Plan of Care Reviewed With: patient           Outcome Evaluation: Pt participated with PT this morning. Pt up in chair at arrival and agreeable to ambulate in the hallway. He reports he has been up ad makenzie in her room. She completed transfers with supervision and ambulated 180' supervision without assistive device. Pace slow but no overt loss of balance observed. Pt primarily limited by endurance deficits, encouraged pt to continue mobilizing throughout the day but recommend supervision from family or nsg staff when in hallways. Pt agreeable. PT will sign off at this time, anticipate return home.    Anticipated Discharge Disposition (PT): home, home with outpatient therapy services

## 2025-07-28 NOTE — THERAPY TREATMENT NOTE
Patient Name: Bibiana Inman  : 1980    MRN: [id number]                              Today's Date: 2025       Admit Date: 2025    Visit Dx:     ICD-10-CM ICD-9-CM   1. Uncontrolled other specified diabetes mellitus with hyperglycemia  E13.65 250.02   2. Hypokalemia  E87.6 276.8   3. Elevated troponin  R79.89 790.6   4. Abnormal EKG  R94.31 794.31   5. CARLA (acute kidney injury)  N17.9 584.9   6. 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    Abnormal bone density screening    Benign essential hypertension    Chronic cough    Gastroparesis diabeticorum    Elevated erythrocyte sedimentation rate    Fatigue    Gastroesophageal reflux disease    Mixed hypercholesterolemia and hypertriglyceridemia    Intermittent claudication    Iron deficiency anemia    Multiple joint pain    Need for influenza vaccination    Type 2 diabetes mellitus with hyperglycemia, with long-term current use of insulin    Obstructive sleep apnea    Coronary artery disease of native artery of native heart with stable angina pectoris    Abnormal nuclear stress test    Depressive disorder    Back pain    Diabetic peripheral neuropathy associated with type 2 diabetes mellitus    Ingrowing nail, left great toe    Personal history of COVID-19    Polyp of colon    Vitamin D deficiency    Tobacco abuse    CKD stage 3a, GFR 45-59 ml/min    Bilateral carotid artery stenosis    History of CVA (cerebrovascular accident)    Elevated troponin    Aortic valve disease    Overweight (BMI 25.0-29.9)    Hypertensive emergency    Syncope and collapse    Dizziness    CAD (coronary artery disease)    CKD stage 3b, GFR 30-44 ml/min    Anemia    Type 2 diabetes mellitus with diabetic foot ulcer     Past Medical History:   Diagnosis Date    5,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 Debbie Fry.    Depressive disorder 2023    Diabetic gastroparesis 2021    Diabetic peripheral neuropathy associated with type 2 diabetes mellitus 2024    Gastroesophageal reflux disease 03/10/2021    GERD (gastroesophageal reflux disease)     Intermittent claudication 2017    Iron deficiency anemia 2020    Lacunar cerebrovascular accident (CVA) 2024    Mixed hypercholesterolemia and hypertriglyceridemia 2014    Myocardial infarction 2021    Obesity 3/7/2014    Obstructive sleep apnea 2021    Personal history of COVID-19 2022    Polyp of colon 2023    Spontaneous  2012    Stress fracture of right ankle with routine healing 2024    Stroke 2024    Tobacco abuse 2024    Type 2 diabetes mellitus with hyperglycemia, with long-term current use of insulin 2017    Vitamin D deficiency 2024     Past Surgical History:   Procedure Laterality Date    CARDIAC CATHETERIZATION N/A 2024    Procedure: Left Heart Cath and coronary angiogram;  Surgeon: Jena Barron MD;  Location: [facility] INVASIVE LOCATION;  Service: Cardiology;  Laterality: N/A;     SECTION      CORONARY ARTERY BYPASS GRAFT  2014    LIMA-LAD, SVG-OM2, SVG-PDA, Dr. Debbie Fry.    CORONARY ARTERY BYPASS GRAFT WITH AORTIC VALVE REPAIR/REPLACEMENT N/A 2024    Procedure: YUE; REOPERATIVE STERNOTOMY; CORONARY ARTERY BYPASS GRAFTING TIMES 1 UTILIZING RIGHT SAPHENOUS VEIN; AORTIC VALVE TUMOR ; PRP;  Surgeon: Benja De Luna MD;  Location: [facility];  Service: Cardiothoracic;  Laterality: N/A;    DILATATION AND CURETTAGE      TONSILLECTOMY        General Information       Row Name 25 1145           Physical Therapy Time and Intention    Document Type discharge treatment  -CW     Mode of Treatment physical therapy;individual therapy  -CW       Row Name 07/28/25 1143          General Information    Patient Profile Reviewed yes  -CW       Row Name 07/28/25 1143          Cognition    Orientation Status (Cognition) oriented x 3  -CW       Row Name 07/28/25 1143          Safety Issues/Impairments Affecting Functional Mobility    Impairments Affecting Function (Mobility) endurance/activity tolerance  -CW               User Key  (r) = Recorded By, (t) = Taken By, (c) = Cosigned By      Initials Name Provider Type    Katlin Schultz PT Physical Therapist                   Mobility       Row Name 07/28/25 1143          Bed Mobility    Comment, (Bed Mobility) UIC at arrival and departure  -CW       Row Name 07/28/25 1143          Sit-Stand Transfer    Sit-Stand [place] (Transfers) supervision  -CW     Comment, (Sit-Stand Transfer) no AD  -CW       Row Name 07/28/25 1143          Gait/Stairs (Locomotion)    [place] Level (Gait) standby assist  -CW     Distance in Feet (Gait) 180  -CW     Deviations/Abnormal Patterns (Gait) gait speed decreased  -CW     Comment, (Gait/Stairs) 1 short standing rest break, no AD utilized and no overt loss of balance. Primarily limited by endurance deficits  -CW               User Key  (r) = Recorded By, (t) = Taken By, (c) = Cosigned By      Initials Name Provider Type    Katlin Schultz PT Physical Therapist                   Obj/Interventions       Row Name 07/28/25 1144          Balance    Balance Assessment standing static balance;standing dynamic balance  -CW     Static Standing Balance standby assist  -CW     Dynamic Standing Balance standby assist  -CW     Position/Device Used, Standing Balance supported  -CW               User Key  (r) = Recorded By, (t) = Taken By, (c) = Cosigned By      Initials Name Provider Type    Katlin Schultz PT Physical  Therapist                   Goals/Plan    No documentation.                  Clinical Impression       Row Name 07/28/25 1149          Pain    Pretreatment Pain Rating 0/10 - no pain  -CW     Posttreatment Pain Rating 0/10 - no pain  -CW       Row Name 07/28/25 1149          Plan of Care Review    Plan of Care Reviewed With patient  -CW     Outcome Evaluation Pt participated with PT this morning. Pt up in chair at arrival and agreeable to ambulate in the hallway. He reports he has been up ad makenzie in her room. She completed transfers with supervision and ambulated 180' supervision without assistive device. Pace slow but no overt loss of balance observed. Pt primarily limited by endurance deficits, encouraged pt to continue mobilizing throughout the day but recommend supervision from family or nsg staff when in hallways. Pt agreeable. PT will sign off at this time, anticipate return home.  -CW       Row Name 07/28/25 1149          Vital Signs    O2 Delivery Pre Treatment room air  -CW       Row Name 07/28/25 1149          Positioning and Restraints    Pre-Treatment Position sitting in chair/recliner  -CW     Post Treatment Position chair  -CW     In Chair sitting;call light within reach;encouraged to call for assist  no exit alarm at arrival, reports ad makenzie to bathroom  -CW               User Key  (r) = Recorded By, (t) = Taken By, (c) = Cosigned By      Initials Name Provider Type    Katlin Schultz, PT Physical Therapist                   Outcome Measures       Row Name 07/28/25 1153 07/28/25 0800       How much help from another person do you currently need...    Turning from your back to your side while in flat bed without using bedrails? 4  -CW 4  -JW    Moving from lying on back to sitting on the side of a flat bed without bedrails? 4  -CW 4  -JW    Moving to and from a bed to a chair (including a wheelchair)? 4  -CW 4  -JW    Standing up from a chair using your arms (e.g., wheelchair, bedside chair)? 4  -CW  4  -JW    Climbing 3-5 steps with a railing? 4  -CW 4  -JW    To walk in hospital room? 4  -CW 4  -JW    AM-PAC 6 Clicks Score (PT) 24  -CW 24  -JW    Highest Level of Mobility Goal Walk 250 Feet or More - 8  -CW Walk 250 Feet or More - 8  -JW      Row Name 07/28/25 1153          Functional Assessment    Outcome Measure Options AM-PAC 6 Clicks Basic Mobility (PT)  -CW               User Key  (r) = Recorded By, (t) = Taken By, (c) = Cosigned By      Initials Name Provider Type    CW Katlin Blanco, PT Physical Therapist    Gloria Aj, RN Registered Nurse                                 Physical Therapy Education       Title: PT OT SLP Therapies (In Progress)       Topic: Physical Therapy (In Progress)       Point: Mobility training (In Progress)       Learning Progress Summary            Patient Acceptance, E, NR by AR at 7/24/2025 1136    Acceptance, E, VU by NL at 7/23/2025 1353                      Point: Home exercise program (In Progress)       Learning Progress Summary            Patient Acceptance, E, NR by AR at 7/24/2025 1136    Acceptance, E, VU by NL at 7/23/2025 1353                      Point: Body mechanics (In Progress)       Learning Progress Summary            Patient Acceptance, E, NR by AR at 7/24/2025 1136    Acceptance, E, VU by NL at 7/23/2025 1353                      Point: Precautions (In Progress)       Learning Progress Summary            Patient Acceptance, E, NR by AR at 7/24/2025 1136    Acceptance, E, VU by NL at 7/23/2025 1353                                      User Key       Initials Effective Dates Name Provider Type Discipline    AR 06/16/21 -  Reanna Renee, PT Physical Therapist PT    NL 06/11/25 -  Kasi Downs, CLIFF Physical Therapist PT                  PT Recommendation and Plan     Outcome Evaluation: Pt participated with PT this morning. Pt up in chair at arrival and agreeable to ambulate in the hallway. He reports he has been up ad makenzie in her room. She  completed transfers with supervision and ambulated 180' supervision without assistive device. Pace slow but no overt loss of balance observed. Pt primarily limited by endurance deficits, encouraged pt to continue mobilizing throughout the day but recommend supervision from family or [facility] staff when in hallways. Pt agreeable. PT will sign off at this time, anticipate return home.     Time Calculation:         PT Charges       Row Name 07/28/25 1141             Time Calculation    Start Time 1120  -CW      Stop Time 1132  -CW      Time Calculation (min) 12 min  -CW      PT Received On 07/28/25  -CW                User Key  (r) = Recorded By, (t) = Taken By, (c) = Cosigned By      Initials Name Provider Type    CW Katlin Blanco, PT Physical Therapist                  Therapy Charges for Today       Code Description Service Date Service Provider Modifiers Qty    [id number]  PT THERAPEUTIC ACT EA 15 MIN 7/28/2025 Katlin Blanco, CLIFF GP 1            PT G-Codes  Outcome Measure Options: AM-PAC 6 Clicks Basic Mobility (PT)  AM-PAC 6 Clicks Score (PT): 24  PT Discharge Summary  Anticipated Discharge Disposition (PT): home, home with outpatient therapy services    Katlin Blanco PT  7/28/2025

## 2025-07-28 NOTE — CONSULTS
NEPHROLOGY CONSULTATION-----[facility]    [facility]  [phone]  Raheem Benites MD    Patient Care Team:  Ibrahima Correa MD as PCP - General (Family Medicine)  Xochilt Jenkins MD as Consulting Physician (Nephrology)    CC/REASON FOR CONSULTATION: Elevated creatinine    PHYSICIAN REQUESTING CONSULTATION: Clark Dawson MD     History of Present Illness    45 female with past medical history of CKD stage III, T2DM, hypertension presented to the hospital on 7/22/2025 with dizziness and after a fall.  She had severe hyperglycemia at the time of admission.  Her creatinine is 2.2 today.  It was 1.8 few days ago.  She did have IV contrast exposure after admission.  Denies any dysuria or gross hematuria.  No vomiting or diarrhea.  Blood pressure controlled.  She was on lisinopril, currently held.    Review of Systems   As noted above otherwise 10 systems are reviewed and were negative.    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 Debbie Fry.    Depressive disorder 08/01/2023    Diabetic gastroparesis 07/20/2021    Diabetic peripheral neuropathy associated with type 2 diabetes mellitus 02/20/2024    Gastroesophageal reflux disease 03/10/2021    GERD (gastroesophageal reflux disease)     Intermittent claudication 04/17/2017    Iron deficiency anemia 03/02/2020    Lacunar cerebrovascular accident (CVA) 06/22/2024    Mixed hypercholesterolemia and hypertriglyceridemia 03/07/2014    Myocardial infarction 7/20/2021    Obesity 3/7/2014    Obstructive sleep apnea 12/14/2021    Personal history of COVID-19 05/31/2022    Polyp  of colon 2023    Spontaneous  2012    Stress fracture of right ankle with routine healing 2024    Stroke 2024    Tobacco abuse 2024    Type 2 diabetes mellitus with hyperglycemia, with long-term current use of insulin 2017    Vitamin D deficiency 2024       Past Surgical History:   Procedure Laterality Date    CARDIAC CATHETERIZATION N/A 2024    Procedure: Left Heart Cath and coronary angiogram;  Surgeon: Jena Barron MD;  Location: [facility] CATH INVASIVE LOCATION;  Service: Cardiology;  Laterality: N/A;     SECTION      CORONARY ARTERY BYPASS GRAFT  2014    LIMA-LAD, SVG-OM2, SVG-PDA, Dr. Debbie Fry.    CORONARY ARTERY BYPASS GRAFT WITH AORTIC VALVE REPAIR/REPLACEMENT N/A 2024    Procedure: YUE; REOPERATIVE STERNOTOMY; CORONARY ARTERY BYPASS GRAFTING TIMES 1 UTILIZING RIGHT SAPHENOUS VEIN; AORTIC VALVE TUMOR ; PRP;  Surgeon: Benja De Luna MD;  Location: [facility];  Service: Cardiothoracic;  Laterality: N/A;    DILATATION AND CURETTAGE      TONSILLECTOMY         Family History   Problem Relation Age of Onset    Heart disease Mother     Hypertension Mother        Social History     Tobacco Use    Smoking status: Former     Current packs/day: 0.00     Average packs/day: 0.3 packs/day for 27.5 years (6.9 ttl pk-yrs)     Types: Cigarettes     Start date:      Quit date: 2025     Years since quittin.0    Smokeless tobacco: Never   Vaping Use    Vaping status: Never Used   Substance Use Topics    Alcohol use: Not Currently    Drug use: Never       Home Meds:   Facility-Administered Medications Prior to Admission   Medication Dose Route Frequency Provider Last Rate Last Admin    nicotine (NICODERM CQ) 14 MG/24HR patch 1 patch  1 patch Transdermal Q24H Rocio Denny APRN        nicotine polacrilex (NICORETTE) gum 2 mg  2 mg Mouth/Throat Q1H PRN Rocio Denny APRN         Medications Prior to Admission    Medication Sig Dispense Refill Last Dose/Taking    aspirin 81 MG chewable tablet Chew 1 tablet Daily. Indications: Disease involving Lipid Deposits in the Arteries   7/22/2025 Morning    atorvastatin (LIPITOR) 80 MG tablet Take 1 tablet by mouth Daily. Indications: High Amount of Fats in the Blood   7/22/2025    carvedilol (COREG) 12.5 MG tablet Take 1 tablet by mouth 2 (Two) Times a Day With Meals. 120 tablet 0 7/22/2025 Morning    famotidine (PEPCID) 40 MG tablet Take 1 tablet by mouth every night at bedtime. 90 tablet 3 7/22/2025 Morning    hydrALAZINE (APRESOLINE) 50 MG tablet take 1 tablet by oral route 2 times every day with food 180 tablet 3 7/22/2025 Morning    icosapent ethyl (Vascepa) 1 g capsule capsule Take 2 g (2 capsules) by mouth 2 (Two) Times a Day With Meals. Indications: Cerebrovascular Accident or Stroke, High Amount of Triglycerides in the Blood, Procedure to Reestablish Blood Supply to the Heart 120 capsule 11 7/21/2025 Evening    lisinopril (PRINIVIL,ZESTRIL) 10 MG tablet Take 1 tablet by mouth Daily. 30 tablet 1 7/22/2025 Morning    metoclopramide (REGLAN) 5 MG tablet Take 1 tablet by mouth 4 (Four) Times a Day. 90 tablet 12 7/22/2025 Morning    ondansetron (ZOFRAN) 4 MG tablet Take 1 tablet by mouth three times a day as needed 30 tablet 5 Past Week    pantoprazole (PROTONIX) 40 MG EC tablet Take 1 tablet by mouth Every Morning. 90 tablet 3 7/22/2025 Morning    albuterol sulfate  (90 Base) MCG/ACT inhaler Inhale 2 puffs every 6 (six) hours if needed for wheezing. 18 g 0 More than a month    Continuous Glucose  (Dexcom G7 ) device Use 1 each 1 (One) Time for 1 dose. Dx: E11.65 1 each 0     Continuous Glucose Sensor (Dexcom G7 Sensor) misc Use 1 each Every 10 (Ten) Days. Dx: E11.65 3 each 2 Unknown    insulin aspart (novoLOG FLEXPEN) 100 UNIT/ML solution pen-injector sc pen Inject  under the skin into the appropriate area as directed 3 times a day. Sliding scale, between  5-20 units TID  Indications: Type 2 Diabetes   Unknown    insulin aspart (NovoLOG FlexPen) 100 UNIT/ML solution pen-injector sc pen Inject 5 Units under the skin into the appropriate area as directed 3 (Three) Times a Day With Meals. 15 mL 1 Unknown    insulin degludec (Tresiba FlexTouch) 100 UNIT/ML solution pen-injector injection Inject 30 Units under the skin into the appropriate area as directed Daily. 15 mL 1 Unknown    Insulin Pen Needle (Pen Needles) 32G X 4 MM misc Use 1 each 4 (Four) Times a Day. 100 each 0 Unknown    sodium bicarbonate 650 MG tablet Take 1 tablet by mouth Daily. (Patient not taking: Reported on 7/22/2025) 30 tablet 0 Not Taking       Scheduled Meds:  aspirin, 81 mg, Oral, Daily  atorvastatin, 80 mg, Oral, Daily  carvedilol, 12.5 mg, Oral, BID With Meals  hydrALAZINE, 50 mg, Oral, Q8H  insulin glargine, 15 Units, Subcutaneous, Nightly  insulin lispro, 2-9 Units, Subcutaneous, 4x Daily AC & at Bedtime  insulin lispro, 4 Units, Subcutaneous, TID With Meals  [Held by provider] lisinopril, 10 mg, Oral, Q24H  metoclopramide, 5 mg, Oral, 4x Daily  pantoprazole, 40 mg, Oral, Q AM        Continuous Infusions:       PRN Meds:    acetaminophen **OR** acetaminophen **OR** acetaminophen    albuterol    senna-docusate sodium **AND** polyethylene glycol **AND** bisacodyl **AND** bisacodyl    Calcium Replacement - Follow Nurse / BPA Driven Protocol    dextrose    dextrose    glucagon (human recombinant)    HYDROcodone-acetaminophen    Magnesium Standard Dose Replacement - Follow Nurse / BPA Driven Protocol    melatonin    ondansetron ODT **OR** ondansetron    Phosphorus Replacement - Follow Nurse / BPA Driven Protocol    Potassium Replacement - Follow Nurse / BPA Driven Protocol    Allergies:  Latex    OBJECTIVE    Vital Signs  Temp:  [98.1 °F (36.7 °C)-100.2 °F (37.9 °C)] 98.2 °F (36.8 °C)  Heart Rate:  [58-77] 64  Resp:  [18] 18  BP: (120-160)/(51-78) 157/71    No intake/output data recorded.  I/O last  "3 completed shifts:  In: 480 [P.O.:480]  Out: 1200 [Urine:1200]    Physical Exam:  General Appearance: alert, appears stated age and cooperative  Head: normocephalic, without obvious abnormality and atraumatic  Eyes: conjunctivae and sclerae normal and no icterus  Neck: supple and no JVD  Lungs: clear to auscultation and respirations regular  Heart: regular rhythm & normal rate and normal S1, S2  Chest Wall: no abnormalities observed  Abdomen: normal bowel sounds and soft, nontender  Extremities: moves extremities well, no edema, no cyanosis  Skin: no bleeding, bruising or rash  Neurologic: Alert, and oriented. No focal deficits    Results Review:    I reviewed the patient's new clinical results.    WBC WBC   Date Value Ref Range Status   07/28/2025 8.67 3.40 - 10.80 10*3/mm3 Final   07/27/2025 9.50 3.40 - 10.80 10*3/mm3 Final   07/26/2025 9.66 3.40 - 10.80 10*3/mm3 Final      HGB Hemoglobin   Date Value Ref Range Status   07/28/2025 9.3 (L) 12.0 - 15.9 g/dL Final   07/27/2025 9.7 (L) 12.0 - 15.9 g/dL Final   07/26/2025 10.0 (L) 12.0 - 15.9 g/dL Final      HCT Hematocrit   Date Value Ref Range Status   07/28/2025 29.2 (L) 34.0 - 46.6 % Final   07/27/2025 30.7 (L) 34.0 - 46.6 % Final   07/26/2025 32.0 (L) 34.0 - 46.6 % Final      Platelets No results found for: \"LABPLAT\"   MCV MCV   Date Value Ref Range Status   07/28/2025 91.8 79.0 - 97.0 fL Final   07/27/2025 93.0 79.0 - 97.0 fL Final   07/26/2025 93.8 79.0 - 97.0 fL Final          Sodium Sodium   Date Value Ref Range Status   07/28/2025 131 (L) 136 - 145 mmol/L Final   07/27/2025 131 (L) 136 - 145 mmol/L Final   07/26/2025 133 (L) 136 - 145 mmol/L Final      Potassium Potassium   Date Value Ref Range Status   07/28/2025 4.5 3.5 - 5.2 mmol/L Final   07/27/2025 4.8 3.5 - 5.2 mmol/L Final   07/27/2025 3.6 3.5 - 5.2 mmol/L Final   07/26/2025 4.1 3.5 - 5.2 mmol/L Final      Chloride Chloride   Date Value Ref Range Status   07/28/2025 106 98 - 107 mmol/L Final " "  07/27/2025 104 98 - 107 mmol/L Final   07/26/2025 105 98 - 107 mmol/L Final      CO2 CO2   Date Value Ref Range Status   07/28/2025 17.6 (L) 22.0 - 29.0 mmol/L Final   07/27/2025 18.0 (L) 22.0 - 29.0 mmol/L Final   07/26/2025 19.0 (L) 22.0 - 29.0 mmol/L Final      BUN BUN   Date Value Ref Range Status   07/28/2025 19.0 6.0 - 20.0 mg/dL Final   07/27/2025 16.0 6.0 - 20.0 mg/dL Final   07/26/2025 16.0 6.0 - 20.0 mg/dL Final      Creatinine Creatinine   Date Value Ref Range Status   07/28/2025 2.22 (H) 0.57 - 1.00 mg/dL Final   07/27/2025 2.07 (H) 0.57 - 1.00 mg/dL Final   07/26/2025 1.86 (H) 0.57 - 1.00 mg/dL Final      Calcium Calcium   Date Value Ref Range Status   07/28/2025 8.0 (L) 8.6 - 10.5 mg/dL Final   07/27/2025 7.9 (L) 8.6 - 10.5 mg/dL Final   07/26/2025 8.0 (L) 8.6 - 10.5 mg/dL Final      PO4 No results found for: \"CAPO4\"   Albumin Albumin   Date Value Ref Range Status   07/28/2025 2.0 (L) 3.5 - 5.2 g/dL Final      Magnesium Magnesium   Date Value Ref Range Status   07/28/2025 1.9 1.6 - 2.6 mg/dL Final      Uric Acid Uric Acid   Date Value Ref Range Status   07/27/2025 4.8 2.4 - 5.7 mg/dL Final          Imaging Results (Last 72 Hours)       Procedure Component Value Units Date/Time    CT Angio Abdominal Aorta Bilateral Iliofem Runoff [[id number]] Collected: 07/27/25 1236     Updated: 07/27/25 1259    Narrative:      CT ANGIOGRAM ABDOMEN AND PELVIS WITH ILIOFEMORAL RUNOFF     HISTORY: Peripheral artery disease with tissue loss. Diabetes.     COMPARISON: Right foot x-rays 07/24/2025. CT chest 06/26/2024..     TECHNIQUE: Axial images were obtained from the lung bases through the  lower extremities following the administration of IV contrast. Coronal  and sagittal MIP images were obtained as well as 3D volume rendering.   Radiation dose reduction techniques were utilized, including automated  exposure control and exposure modulation based on body size.     FINDINGS:  Atherosclerotic calcifications are present " involving the distal thoracic  aorta abdominal aorta with mild narrowing. The celiac artery, superior  mesenteric artery are patent. There is a long segment of moderate  stenosis of the left main renal artery associated with partially  calcified plaque that extends 2.5 cm in length. Mild narrowing of the  origin of the right renal artery. Calcified and noncalcified plaque  involving the inferior abdominal aorta with moderate narrowing just  above its bifurcation. No aneurysm.     On the left, there is a moderate stenosis of the origin of the left  common iliac artery. Multifocal moderate left common iliac arterial  stenoses. Mild narrowing of the left internal iliac artery. There is a  severe stenosis of the left external iliac artery approximately 4.5 cm  distal to its origin. Moderate stenosis left common femoral artery. Left  deep femoral artery is patent. There is a left superficial femoral  artery stent that is occluded. Left superficial femoral artery  reconstitutes at the distal margin of the stent at the level of the mid  thigh and is small with multifocal stenoses that are up to severe in the  region of the adductor hiatus. Multifocal popliteal artery stenoses of  the left popliteal arteries patent. Left anterior tibial artery contains  calcification proximally with moderate narrowing. The left tibioperoneal  trunk is patent. There is three-vessel runoff to the left lower  extremity.     On the right, there is moderate stenosis origin right common iliac  artery. Mild to moderate narrowing of the distal right common iliac  artery. Moderate to severe stenosis of the proximal right internal iliac  artery. Moderate right external iliac arterial stenoses. Moderate  stenosis of the right common femoral artery. The right superficial  femoral artery is small with multifocal severe stenoses extending from  its origin to the popliteal artery though no evidence for complete  occlusion. The right deep femoral artery is  patent. Right popliteal  artery is small with areas of moderate to severe stenoses without  evidence for complete occlusion. Multifocal calcifications involving the  proximal anterior posterior tibial arteries and the tibial peroneal  trunk. There is flow within the right anterior and posterior tibial  arteries extending into the ankle. Flow within the peroneal artery  extends into the distal calf.     Moderate bilateral pleural effusions layer dependently with adjacent  compressive lower lobe atelectasis.     Liver, spleen, pancreas exhibit normal early arterial phase of contrast  appearance. Multiple gallstones. Bilateral adrenal thickening. Kidneys  appear within normal limits. There is no hydronephrosis.     Mild ascites with free fluid extending into the pelvis. Colonic  diverticulosis without evidence for diverticulitis. Mild generalized  body wall edema with third spacing of fluid.                Impression:      1. Multifocal atherosclerotic disease. Long segment of moderate stenosis  of the left main renal artery associated with partially calcified  plaque. Mild narrowing origin right renal artery.  2. Atherosclerotic calcification involving the common aorta and iliac  vasculature with severe stenosis of the left external iliac artery  proximally 4.5 cm distal to its origin. Multifocal iliac and common  femoral arterial stenoses.  3. Left superficial femoral artery stent is occluded with reconstitution  just below the stent. Multifocal stenoses of the distal left superficial  femoral artery, left popliteal artery. Three-vessel runoff to the left  lower extremity.  4. On the right, the right superficial femoral artery is small with  multifocal severe stenoses extending from its origin to the popliteal  artery but without evidence for complete occlusion. Right popliteal  artery is small with multifocal moderate to severe stenoses.  Calcification involving the proximal anterior and posterior tibial  arteries  and peroneal artery. There is flow within the anterior and  posterior tibial arteries on the right extending to the foot and there  is flow of the right peroneal artery extending to the distal calf.  5. Moderate bilateral pleural effusions with adjacent compressive lower  lobe atelectasis.  6. Cholelithiasis.  7. Generalized body wall edema.        This report was finalized on 7/27/2025 12:56 PM by Huan Noriega M.D  on Workstation: [id number]                 Results for orders placed during the hospital encounter of 07/22/25    XR Foot 3+ View Right 07/24/2025  2:49 PM    Narrative  XR FOOT 3+ VW RIGHT-    CLINICAL: Small toe ischemia.    FINDINGS: No 5th toe cortical bone destruction. No acute osseous  abnormality. No soft tissue gas or radiopaque foreign body. The 5th  metatarsal is satisfactory in appearance. There is 1st  metatarsophalangeal joint degeneration of a mild-to-moderate degree. The  forefoot is otherwise unremarkable.    Moderate size calcaneal spur, hind foot articulations preserved. Midfoot  articulations within normal limits.    CONCLUSION: No 5th toe acute osseous or articular abnormality. No soft  tissue gas or radiopaque foreign body seen.      This report was finalized on 7/24/2025 2:49 PM by Dr. Clif Galeas M.D  on Workstation: [id number]      XR Ribs Bilateral 4+ View With PA Chest 07/22/2025  3:59 PM    Narrative  8 VIEW BILATERAL RIBS AND 1 VIEW PA CHEST    HISTORY: Syncope. Fell. Rib pain, left greater than right.    FINDINGS: The vertebral height and disc spaces are well-maintained. The  heart is top normal in size with sternal wires from previous cardiac  surgery and there is no change from 5/5/2025.    Multiple views of the left and right ribs show no definite fracture at  the present time.    This report was finalized on 7/22/2025 3:59 PM by Dr. Fortunato Fournier M.D  on Workstation: [id number]      Results for orders placed during the hospital encounter of 05/05/25    XR  Ankle 3+ View Right 05/06/2025  3:49 AM    Narrative  Patient: SAVITA BELCHER  Time Out: 03:49  Exam(s): XR RIGHT ANKLE    EXAM:  XR Right Ankle Complete, 3 or More Views    CLINICAL HISTORY:  Reason for exam: fall, pain.    TECHNIQUE:  Frontal, lateral and oblique views of the right ankle.    COMPARISON:  No relevant prior studies available.    FINDINGS:  Bones joints:  No evidence of acute fracture or dislocation.  Plantar  calcaneal spur.  Soft tissues:  Unremarkable.    Impression  No evidence of acute fracture or dislocation.    IMPRESSION:      Electronically signed by Thai Mays MD on 05-06-25 at 0349        Results for orders placed during the hospital encounter of 07/22/25    Duplex Vein Mapping Lower Extremity - Bilateral CAR 07/27/2025  3:46 PM    Interpretation Summary    Greater saphenous vein and small saphenous veins appear to be inadequately sized for bypass bilaterally.      ASSESSMENT / PLAN      Syncope and collapse    Benign essential hypertension    Gastroesophageal reflux disease    Type 2 diabetes mellitus with hyperglycemia, with long-term current use of insulin    Obstructive sleep apnea    Bilateral carotid artery stenosis    History of CVA (cerebrovascular accident)    Elevated troponin    Dizziness    CAD (coronary artery disease)    CKD stage 3b, GFR 30-44 ml/min    Anemia    Type 2 diabetes mellitus with diabetic foot ulcer    CARLA-likely prerenal in the setting of volume depletion and or contrast exposure  T2DM  Hypertension-blood pressure controlled.  Hold lisinopril  Syncope not orthostatic on admission.  Probably volume depleted given severe hyperglycemia  History coronary artery disease  Metabolic acidosis-add p.o. sodium bicarb  Right fifth toe ulcer/necrosis-vascular/podiatry following    Agree holding lisinopril  Check UA urine sodium and creatinine  Avoid hypotension  Monitor renal function, fluid status and electrolytes    I discussed the patient's findings and my  recommendations with patient    Will follow along closely. Thank you for allowing us to see this patient in renal consultation.    [facility]  [phone]  MD Raheem Poe MD  07/28/25  12:34 EDT

## 2025-07-28 NOTE — PROGRESS NOTES
Name: Bibiana Inman ADMIT: 2025   : 1980  PCP: Ibrahima Correa MD    MRN: [id number] LOS: 1 days   AGE/SEX: 45 y.o. female  ROOM: [facility]     Subjective   Subjective     No events overnight. No complaints. She became tearful while we were discussing her care and voiced repeatedly that she just wants to go home.        Objective   Objective   Vital Signs  Temp:  [98.2 °F (36.8 °C)-100.2 °F (37.9 °C)] 98.4 °F (36.9 °C)  Heart Rate:  [58-77] 58  Resp:  [18] 18  BP: (129-160)/(61-78) 129/64  SpO2:  [84 %-98 %] 98 %  on   ;   Device (Oxygen Therapy): nasal cannula  Body mass index is 26.09 kg/m².  Physical Exam  Constitutional:       General: She is not in acute distress.     Appearance: She is not toxic-appearing.   Cardiovascular:      Rate and Rhythm: Normal rate and regular rhythm.      Heart sounds: Normal heart sounds.   Pulmonary:      Effort: Pulmonary effort is normal.      Breath sounds: Normal breath sounds.   Abdominal:      General: Bowel sounds are normal.      Palpations: Abdomen is soft.   Neurological:      Mental Status: She is alert.   Psychiatric:         Mood and Affect: Affect is tearful.         Results Review     I reviewed the patient's new clinical results.  Results from last 7 days   Lab Units 25  0506 25  0543 25  0701 25  0536   WBC 10*3/mm3 8.67 9.50 9.66 8.20   HEMOGLOBIN g/dL 9.3* 9.7* 10.0* 10.5*   PLATELETS 10*3/mm3 283 271 235 248     Results from last 7 days   Lab Units 25  0506 25  1631 25  0543 25  0701 25  0536   SODIUM mmol/L 131*  --  131* 133* 136   POTASSIUM mmol/L 4.5 4.8 3.6 4.1 4.2   CHLORIDE mmol/L 106  --  104 105 110*   CO2 mmol/L 17.6*  --  18.0* 19.0* 17.8*   BUN mg/dL 19.0  --  16.0 16.0 17.0   CREATININE mg/dL 2.22*  --  2.07* 1.86* 1.89*   GLUCOSE mg/dL 136*  --  134* 182* 159*   Estimated Creatinine Clearance: 29.9 mL/min (A) (by C-G formula based on SCr of 2.22 mg/dL (H)).  Results from last  7 days   Lab Units 07/28/25  0506 07/25/25  0536 07/22/25  1519   ALBUMIN g/dL 2.0* 1.9* 1.7*   BILIRUBIN mg/dL <0.2  --  <0.2   ALK PHOS U/L 114  --  135*   AST (SGOT) U/L 13  --  7   ALT (SGPT) U/L 8  --  5     Results from last 7 days   Lab Units 07/28/25  0506 07/27/25  0543 07/26/25  0701 07/25/25  0536 07/23/25  0634 07/22/25  1519   CALCIUM mg/dL 8.0* 7.9* 8.0* 8.1*   < > 7.9*   ALBUMIN g/dL 2.0*  --   --  1.9*  --  1.7*   MAGNESIUM mg/dL 1.9  --   --   --   --  2.1   PHOSPHORUS mg/dL 2.7  --   --  3.9  --   --     < > = values in this interval not displayed.     Results from last 7 days   Lab Units 07/25/25  0536   LACTATE mmol/L 0.7     COVID19   Date Value Ref Range Status   06/30/2024 Not Detected Not Detected - Ref. Range Final   06/27/2024 Not Detected Not Detected - Ref. Range Final   12/28/2021 Detected (C) Not Detected - Ref. Range Final   01/29/2021 Presumptive Negative Presumptive Negative - Ref. Range Final     Glucose   Date/Time Value Ref Range Status   07/28/2025 1624 198 (H) 70 - 130 mg/dL Final   07/28/2025 1119 267 (H) 70 - 130 mg/dL Final   07/27/2025 1953 182 (H) 70 - 130 mg/dL Final   07/27/2025 1626 193 (H) 70 - 130 mg/dL Final   07/27/2025 1119 177 (H) 70 - 130 mg/dL Final   07/27/2025 0609 136 (H) 70 - 130 mg/dL Final   07/26/2025 2018 226 (H) 70 - 130 mg/dL Final       Duplex Vein Mapping Lower Extremity - Bilateral CAR    Greater saphenous vein and small saphenous veins appear to be   inadequately sized for bypass bilaterally.  CT Angio Abdominal Aorta Bilateral Iliofem Runoff  Narrative: CT ANGIOGRAM ABDOMEN AND PELVIS WITH ILIOFEMORAL RUNOFF     HISTORY: Peripheral artery disease with tissue loss. Diabetes.     COMPARISON: Right foot x-rays 07/24/2025. CT chest 06/26/2024..     TECHNIQUE: Axial images were obtained from the lung bases through the  lower extremities following the administration of IV contrast. Coronal  and sagittal MIP images were obtained as well as 3D volume  rendering.   Radiation dose reduction techniques were utilized, including automated  exposure control and exposure modulation based on body size.     FINDINGS:  Atherosclerotic calcifications are present involving the distal thoracic  aorta abdominal aorta with mild narrowing. The celiac artery, superior  mesenteric artery are patent. There is a long segment of moderate  stenosis of the left main renal artery associated with partially  calcified plaque that extends 2.5 cm in length. Mild narrowing of the  origin of the right renal artery. Calcified and noncalcified plaque  involving the inferior abdominal aorta with moderate narrowing just  above its bifurcation. No aneurysm.     On the left, there is a moderate stenosis of the origin of the left  common iliac artery. Multifocal moderate left common iliac arterial  stenoses. Mild narrowing of the left internal iliac artery. There is a  severe stenosis of the left external iliac artery approximately 4.5 cm  distal to its origin. Moderate stenosis left common femoral artery. Left  deep femoral artery is patent. There is a left superficial femoral  artery stent that is occluded. Left superficial femoral artery  reconstitutes at the distal margin of the stent at the level of the mid  thigh and is small with multifocal stenoses that are up to severe in the  region of the adductor hiatus. Multifocal popliteal artery stenoses of  the left popliteal arteries patent. Left anterior tibial artery contains  calcification proximally with moderate narrowing. The left tibioperoneal  trunk is patent. There is three-vessel runoff to the left lower  extremity.     On the right, there is moderate stenosis origin right common iliac  artery. Mild to moderate narrowing of the distal right common iliac  artery. Moderate to severe stenosis of the proximal right internal iliac  artery. Moderate right external iliac arterial stenoses. Moderate  stenosis of the right common femoral artery.  The right superficial  femoral artery is small with multifocal severe stenoses extending from  its origin to the popliteal artery though no evidence for complete  occlusion. The right deep femoral artery is patent. Right popliteal  artery is small with areas of moderate to severe stenoses without  evidence for complete occlusion. Multifocal calcifications involving the  proximal anterior posterior tibial arteries and the tibial peroneal  trunk. There is flow within the right anterior and posterior tibial  arteries extending into the ankle. Flow within the peroneal artery  extends into the distal calf.     Moderate bilateral pleural effusions layer dependently with adjacent  compressive lower lobe atelectasis.     Liver, spleen, pancreas exhibit normal early arterial phase of contrast  appearance. Multiple gallstones. Bilateral adrenal thickening. Kidneys  appear within normal limits. There is no hydronephrosis.     Mild ascites with free fluid extending into the pelvis. Colonic  diverticulosis without evidence for diverticulitis. Mild generalized  body wall edema with third spacing of fluid.              Impression: 1. Multifocal atherosclerotic disease. Long segment of moderate stenosis  of the left main renal artery associated with partially calcified  plaque. Mild narrowing origin right renal artery.  2. Atherosclerotic calcification involving the common aorta and iliac  vasculature with severe stenosis of the left external iliac artery  proximally 4.5 cm distal to its origin. Multifocal iliac and common  femoral arterial stenoses.  3. Left superficial femoral artery stent is occluded with reconstitution  just below the stent. Multifocal stenoses of the distal left superficial  femoral artery, left popliteal artery. Three-vessel runoff to the left  lower extremity.  4. On the right, the right superficial femoral artery is small with  multifocal severe stenoses extending from its origin to the popliteal  artery  but without evidence for complete occlusion. Right popliteal  artery is small with multifocal moderate to severe stenoses.  Calcification involving the proximal anterior and posterior tibial  arteries and peroneal artery. There is flow within the anterior and  posterior tibial arteries on the right extending to the foot and there  is flow of the right peroneal artery extending to the distal calf.  5. Moderate bilateral pleural effusions with adjacent compressive lower  lobe atelectasis.  6. Cholelithiasis.  7. Generalized body wall edema.        This report was finalized on 7/27/2025 12:56 PM by Huan Noriega M.D  on Workstation: [id number]       Scheduled Medications  aspirin, 81 mg, Oral, Daily  atorvastatin, 80 mg, Oral, Daily  carvedilol, 12.5 mg, Oral, BID With Meals  hydrALAZINE, 50 mg, Oral, Q8H  insulin glargine, 15 Units, Subcutaneous, Nightly  insulin lispro, 2-9 Units, Subcutaneous, 4x Daily AC & at Bedtime  insulin lispro, 4 Units, Subcutaneous, TID With Meals  [Held by provider] lisinopril, 10 mg, Oral, Q24H  metoclopramide, 5 mg, Oral, 4x Daily  pantoprazole, 40 mg, Oral, Q AM  sodium bicarbonate, 1,300 mg, Oral, TID    Infusions  sodium chloride, 100 mL/hr, Last Rate: 100 mL/hr (07/28/25 1555)    Diet  Diet: Cardiac, Diabetic; Healthy Heart (2-3 Na+); Consistent Carbohydrate; Fluid Consistency: Thin (IDDSI 0)  NPO Diet NPO Type: Sips with Meds       Assessment/Plan     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 ulcer [E11.621, L97.509]  Yes    Atherosclerosis of native arteries of the extremities with ulceration [I70.25]  Unknown    Bilateral carotid artery stenosis [I65.23]  Yes    Elevated troponin [R79.89]  Yes    History of CVA (cerebrovascular accident) [Z86.73]  Not  Applicable    Obstructive sleep apnea [G47.33]  Yes    Gastroesophageal reflux disease [K21.9]  Yes    Type 2 diabetes mellitus with hyperglycemia, with long-term current use of insulin [E11.65, Z79.4]  Not Applicable    Benign essential hypertension [I10]  Yes      Resolved Hospital Problems   No resolved problems to display.       45 y.o. female admitted with Syncope and collapse.    45 year old woman who presented following a syncopal episode and was found to have severe hyperglycemia, an NSTEMI, an CARLA, and orthostatic hypotension. She was also found to have an ischemic right fifth toe.    Syncope due to orthostatic hypotension-likely due to hyperglycemia. Echocardiogram was unremarkable.  Peripheral artery disease with an ischemic right fifth toe-vascular surgery is planning angiogram on Tuesday or Wednesday. Podiatry is weighing watchful waiting vs. Amputation either as an inpatient or outpatient (felt to be non-urgent). Continue asa/statin  Type 2 NSTEMI-no wall motion abnormalities on echocardiogram. [place] to be secondary to hypotension, CKD, severe hyperglycemia. No further investigation is planned  CARLA on CKD 3b-felt to be due to volume depletion and/or contrast exposure. Nephrology is following. Hold nephrotoxic medications. On IVF  Type 2 diabetes with hyperglycemia, gastroparesis and peripheral neuropathy-A1c 13.6. she reports that she has been out of insulin for months prior to admission. Glucose remains above goal. Increase lantus to 20 units and lispro to 7 units tidac. 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  Full code.  Discussed with  patient and nursing staff.  Anticipate discharge home with family timing yet to be determined.      Clark Dawson MD  [facility]  07/28/25  16:30 EDT

## 2025-07-28 NOTE — PROGRESS NOTES
[facility]   Vascular Surgery Progress Note    Patient Name: Bibiana Inman  : 1980  MRN: [id number]  Date of admission: 2025  Surgical Procedures Since Admission:  Procedure(s):  ANGIOPLASTY FEMORAL ARTERY, possible iliac artery angioplasty and stent placement  Surgeon:  Kathy Hopkins MD  Status:  * No surgery found *  -------------------    Subjective   Subjective     Chief Complaint: right foot ulceration    History of Present Illness   No acute events overnight.  Afebrile.  She denies any new complaints.    Review of Systems   Constitutional:  Negative for chills and fever.       Objective   Objective     Vitals:   Temp:  [98.2 °F (36.8 °C)-100.2 °F (37.9 °C)] 98.4 °F (36.9 °C)  Heart Rate:  [58-77] 58  Resp:  [18] 18  BP: (129-160)/(64-78) 129/64    Physical Exam  Vitals reviewed.   Constitutional:       General: She is not in acute distress.     Appearance: Normal appearance.   HENT:      Head: Normocephalic and atraumatic.      Right Ear: External ear normal.      Left Ear: External ear normal.      Nose: Nose normal.      Mouth/Throat:      Mouth: Mucous membranes are moist.      Pharynx: Oropharynx is clear.   Eyes:      Extraocular Movements: Extraocular movements intact.      Conjunctiva/sclera: Conjunctivae normal.      Pupils: Pupils are equal, round, and reactive to light.   Cardiovascular:      Rate and Rhythm: Normal rate and regular rhythm.      Pulses:           Carotid pulses are 2+ on the right side and 2+ on the left side.       Radial pulses are 2+ on the right side and 2+ on the left side.        Femoral pulses are 1+ on the right side and 1+ on the left side.       Dorsalis pedis pulses are detected w/ Doppler on the right side and detected w/ Doppler on the left side.        Posterior tibial pulses are detected w/ Doppler on the right side and detected w/ Doppler on the left side.   Pulmonary:      Comments: Non labored respirations on room air, equal chest  rise  Abdominal:      General: Abdomen is flat. There is no distension.      Palpations: Abdomen is soft.   Musculoskeletal:      Cervical back: Normal range of motion. No rigidity.      Right lower leg: No edema.      Left lower leg: No edema.   Skin:     General: Skin is warm and dry.      Capillary Refill: Capillary refill takes less than 2 seconds.   Neurological:      General: No focal deficit present.      Mental Status: She is alert and oriented to person, place, and time.   Psychiatric:         Mood and Affect: Mood normal.         Behavior: Behavior normal.           Result Review    Result Review:  I have personally reviewed the results from the time of this admission to 7/28/2025 17:36 EDT and agree with these findings:  [x]  Laboratory list / accordion  [x]  Microbiology  []  Radiology  []  EKG/Telemetry   []  Cardiology/Vascular   []  Pathology  []  Old records  []  Other:  Most notable findings include: WBC 8,670; Hb 9 g/dl      Assessment & Plan   Assessment / Plan     Brief Patient Summary:  Bibiana Inman is a 45 y.o. female who has a right foot diabetic ulcer and PAD    Active Hospital Problems:   Active Hospital Problems    Diagnosis     **Syncope and collapse     Dizziness     CAD (coronary artery disease)     CKD stage 3b, GFR 30-44 ml/min     Anemia     Type 2 diabetes mellitus with diabetic foot ulcer     Atherosclerosis of native arteries of the extremities with ulceration     Bilateral carotid artery stenosis     Elevated troponin     History of CVA (cerebrovascular accident)     Obstructive sleep apnea     Gastroesophageal reflux disease     Type 2 diabetes mellitus with hyperglycemia, with long-term current use of insulin     Benign essential hypertension      Plan:   -I reviewed her CTA runoff and non invasive testing.  I agree that she does likely have microvascular disease  in the foot but she has severe right SFA and popliteal disease.  I do think revascularization would be beneficial in  terms of limb salvage.  Unfortunately, she does not have sufficient vein for a bypass, but she does appear to have significant tibial disease as well.  She has CKD stage 3.  I will start NS @ 100 for prehydration for the procedure.  We discussed smoking cessation again.  NPO @ MN.        VTE Prophylaxis:  Pharmacologic & mechanical VTE prophylaxis orders are present.        Kathy Hopkins MD     Any information that has been copied and pasted into this note has been reviewed and edited to represent today's findings.

## 2025-07-29 ENCOUNTER — APPOINTMENT (OUTPATIENT)
Dept: GENERAL RADIOLOGY | Facility: HOSPITAL | Age: 45
End: 2025-07-29
Payer: MEDICAID

## 2025-07-29 ENCOUNTER — ANESTHESIA (OUTPATIENT)
Dept: PERIOP | Facility: HOSPITAL | Age: 45
End: 2025-07-29
Payer: MEDICAID

## 2025-07-29 ENCOUNTER — ANESTHESIA EVENT (OUTPATIENT)
Dept: PERIOP | Facility: HOSPITAL | Age: 45
End: 2025-07-29
Payer: MEDICAID

## 2025-07-29 LAB
ABO GROUP BLD: NORMAL
ALBUMIN SERPL-MCNC: 1.9 G/DL (ref 3.5–5.2)
ANION GAP SERPL CALCULATED.3IONS-SCNC: 8.2 MMOL/L (ref 5–15)
ANION GAP SERPL CALCULATED.3IONS-SCNC: 8.8 MMOL/L (ref 5–15)
BLD GP AB SCN SERPL QL: NEGATIVE
BUN SERPL-MCNC: 17 MG/DL (ref 6–20)
BUN SERPL-MCNC: 17 MG/DL (ref 6–20)
BUN/CREAT SERPL: 10 (ref 7–25)
BUN/CREAT SERPL: 9.6 (ref 7–25)
CALCIUM SPEC-SCNC: 7.7 MG/DL (ref 8.6–10.5)
CALCIUM SPEC-SCNC: 7.7 MG/DL (ref 8.6–10.5)
CHLORIDE SERPL-SCNC: 105 MMOL/L (ref 98–107)
CHLORIDE SERPL-SCNC: 107 MMOL/L (ref 98–107)
CO2 SERPL-SCNC: 17.8 MMOL/L (ref 22–29)
CO2 SERPL-SCNC: 18.2 MMOL/L (ref 22–29)
CREAT SERPL-MCNC: 1.7 MG/DL (ref 0.57–1)
CREAT SERPL-MCNC: 1.78 MG/DL (ref 0.57–1)
DEPRECATED RDW RBC AUTO: 42.3 FL (ref 37–54)
DEPRECATED RDW RBC AUTO: 47.3 FL (ref 37–54)
EGFRCR SERPLBLD CKD-EPI 2021: 35.1 ML/MIN/1.73
EGFRCR SERPLBLD CKD-EPI 2021: 37.1 ML/MIN/1.73
ERYTHROCYTE [DISTWIDTH] IN BLOOD BY AUTOMATED COUNT: 12.9 % (ref 12.3–15.4)
ERYTHROCYTE [DISTWIDTH] IN BLOOD BY AUTOMATED COUNT: 14.3 % (ref 12.3–15.4)
GLUCOSE BLDC GLUCOMTR-MCNC: 115 MG/DL (ref 65–99)
GLUCOSE BLDC GLUCOMTR-MCNC: 157 MG/DL (ref 65–99)
GLUCOSE BLDC GLUCOMTR-MCNC: 72 MG/DL (ref 70–130)
GLUCOSE BLDC GLUCOMTR-MCNC: 78 MG/DL (ref 65–99)
GLUCOSE BLDC GLUCOMTR-MCNC: 78 MG/DL (ref 65–99)
GLUCOSE SERPL-MCNC: 125 MG/DL (ref 65–99)
GLUCOSE SERPL-MCNC: 66 MG/DL (ref 65–99)
HCT VFR BLD AUTO: 27.9 % (ref 34–46.6)
HCT VFR BLD AUTO: 31.6 % (ref 34–46.6)
HGB BLD-MCNC: 10.1 G/DL (ref 12–15.9)
HGB BLD-MCNC: 9.1 G/DL (ref 12–15.9)
MCH RBC QN AUTO: 29.4 PG (ref 26.6–33)
MCH RBC QN AUTO: 29.5 PG (ref 26.6–33)
MCHC RBC AUTO-ENTMCNC: 32 G/DL (ref 31.5–35.7)
MCHC RBC AUTO-ENTMCNC: 32.6 G/DL (ref 31.5–35.7)
MCV RBC AUTO: 90.6 FL (ref 79–97)
MCV RBC AUTO: 92.1 FL (ref 79–97)
PHOSPHATE SERPL-MCNC: 3 MG/DL (ref 2.5–4.5)
PLATELET # BLD AUTO: 243 10*3/MM3 (ref 140–450)
PLATELET # BLD AUTO: 289 10*3/MM3 (ref 140–450)
PMV BLD AUTO: 10 FL (ref 6–12)
PMV BLD AUTO: 10.6 FL (ref 6–12)
POTASSIUM SERPL-SCNC: 4.4 MMOL/L (ref 3.5–5.2)
POTASSIUM SERPL-SCNC: 4.9 MMOL/L (ref 3.5–5.2)
RBC # BLD AUTO: 3.08 10*6/MM3 (ref 3.77–5.28)
RBC # BLD AUTO: 3.43 10*6/MM3 (ref 3.77–5.28)
RH BLD: POSITIVE
SODIUM SERPL-SCNC: 131 MMOL/L (ref 136–145)
SODIUM SERPL-SCNC: 134 MMOL/L (ref 136–145)
T&S EXPIRATION DATE: NORMAL
WBC NRBC COR # BLD AUTO: 8.94 10*3/MM3 (ref 3.4–10.8)
WBC NRBC COR # BLD AUTO: 9.02 10*3/MM3 (ref 3.4–10.8)

## 2025-07-29 PROCEDURE — 25510000001 IOPAMIDOL PER 1 ML: Performed by: STUDENT IN AN ORGANIZED HEALTH CARE EDUCATION/TRAINING PROGRAM

## 2025-07-29 PROCEDURE — 25010000002 LIDOCAINE 1 % SOLUTION 20 ML VIAL: Performed by: STUDENT IN AN ORGANIZED HEALTH CARE EDUCATION/TRAINING PROGRAM

## 2025-07-29 PROCEDURE — 25010000002 HEPARIN (PORCINE) PER 1000 UNITS

## 2025-07-29 PROCEDURE — 25010000002 BUPIVACAINE (PF) 0.25 % SOLUTION 30 ML VIAL: Performed by: STUDENT IN AN ORGANIZED HEALTH CARE EDUCATION/TRAINING PROGRAM

## 2025-07-29 PROCEDURE — 25010000002 PHENYLEPHRINE 10 MG/ML SOLUTION 5 ML VIAL

## 2025-07-29 PROCEDURE — 25810000003 SODIUM CHLORIDE 0.9 % SOLUTION: Performed by: STUDENT IN AN ORGANIZED HEALTH CARE EDUCATION/TRAINING PROGRAM

## 2025-07-29 PROCEDURE — C1769 GUIDE WIRE: HCPCS | Performed by: STUDENT IN AN ORGANIZED HEALTH CARE EDUCATION/TRAINING PROGRAM

## 2025-07-29 PROCEDURE — 25010000002 PROTAMINE SULFATE PER 10 MG: Performed by: STUDENT IN AN ORGANIZED HEALTH CARE EDUCATION/TRAINING PROGRAM

## 2025-07-29 PROCEDURE — 82948 REAGENT STRIP/BLOOD GLUCOSE: CPT

## 2025-07-29 PROCEDURE — 82948 REAGENT STRIP/BLOOD GLUCOSE: CPT | Performed by: STUDENT IN AN ORGANIZED HEALTH CARE EDUCATION/TRAINING PROGRAM

## 2025-07-29 PROCEDURE — C1894 INTRO/SHEATH, NON-LASER: HCPCS | Performed by: STUDENT IN AN ORGANIZED HEALTH CARE EDUCATION/TRAINING PROGRAM

## 2025-07-29 PROCEDURE — 25010000002 LIDOCAINE 2% SOLUTION

## 2025-07-29 PROCEDURE — 25010000002 HYDROMORPHONE PER 4 MG

## 2025-07-29 PROCEDURE — 86900 BLOOD TYPING SEROLOGIC ABO: CPT | Performed by: STUDENT IN AN ORGANIZED HEALTH CARE EDUCATION/TRAINING PROGRAM

## 2025-07-29 PROCEDURE — C1760 CLOSURE DEV, VASC: HCPCS | Performed by: STUDENT IN AN ORGANIZED HEALTH CARE EDUCATION/TRAINING PROGRAM

## 2025-07-29 PROCEDURE — P9016 RBC LEUKOCYTES REDUCED: HCPCS

## 2025-07-29 PROCEDURE — 25010000002 PROPOFOL 10 MG/ML EMULSION

## 2025-07-29 PROCEDURE — C1725 CATH, TRANSLUMIN NON-LASER: HCPCS | Performed by: STUDENT IN AN ORGANIZED HEALTH CARE EDUCATION/TRAINING PROGRAM

## 2025-07-29 PROCEDURE — 86923 COMPATIBILITY TEST ELECTRIC: CPT

## 2025-07-29 PROCEDURE — 25010000002 CEFAZOLIN PER 500 MG: Performed by: STUDENT IN AN ORGANIZED HEALTH CARE EDUCATION/TRAINING PROGRAM

## 2025-07-29 PROCEDURE — 25010000002 ONDANSETRON PER 1 MG: Performed by: STUDENT IN AN ORGANIZED HEALTH CARE EDUCATION/TRAINING PROGRAM

## 2025-07-29 PROCEDURE — C1887 CATHETER, GUIDING: HCPCS | Performed by: STUDENT IN AN ORGANIZED HEALTH CARE EDUCATION/TRAINING PROGRAM

## 2025-07-29 PROCEDURE — 25010000002 HEPARIN (PORCINE) PER 1000 UNITS: Performed by: STUDENT IN AN ORGANIZED HEALTH CARE EDUCATION/TRAINING PROGRAM

## 2025-07-29 PROCEDURE — 25810000003 LACTATED RINGERS PER 1000 ML: Performed by: ANESTHESIOLOGY

## 2025-07-29 PROCEDURE — 047J3DZ DILATION OF LEFT EXTERNAL ILIAC ARTERY WITH INTRALUMINAL DEVICE, PERCUTANEOUS APPROACH: ICD-10-PCS | Performed by: STUDENT IN AN ORGANIZED HEALTH CARE EDUCATION/TRAINING PROGRAM

## 2025-07-29 PROCEDURE — 04CL3ZZ EXTIRPATION OF MATTER FROM LEFT FEMORAL ARTERY, PERCUTANEOUS APPROACH: ICD-10-PCS | Performed by: STUDENT IN AN ORGANIZED HEALTH CARE EDUCATION/TRAINING PROGRAM

## 2025-07-29 PROCEDURE — 25010000002 PHENYLEPHRINE 10 MG/ML SOLUTION

## 2025-07-29 PROCEDURE — 25810000003 SODIUM CHLORIDE 0.9 % SOLUTION 250 ML FLEX CONT

## 2025-07-29 PROCEDURE — 25010000002 DEXAMETHASONE SODIUM PHOSPHATE 20 MG/5ML SOLUTION: Performed by: STUDENT IN AN ORGANIZED HEALTH CARE EDUCATION/TRAINING PROGRAM

## 2025-07-29 PROCEDURE — 86901 BLOOD TYPING SEROLOGIC RH(D): CPT | Performed by: STUDENT IN AN ORGANIZED HEALTH CARE EDUCATION/TRAINING PROGRAM

## 2025-07-29 PROCEDURE — C1874 STENT, COATED/COV W/DEL SYS: HCPCS | Performed by: STUDENT IN AN ORGANIZED HEALTH CARE EDUCATION/TRAINING PROGRAM

## 2025-07-29 PROCEDURE — 80069 RENAL FUNCTION PANEL: CPT | Performed by: INTERNAL MEDICINE

## 2025-07-29 PROCEDURE — 25010000002 HYDROMORPHONE 1 MG/ML SOLUTION: Performed by: STUDENT IN AN ORGANIZED HEALTH CARE EDUCATION/TRAINING PROGRAM

## 2025-07-29 PROCEDURE — 80048 BASIC METABOLIC PNL TOTAL CA: CPT | Performed by: STUDENT IN AN ORGANIZED HEALTH CARE EDUCATION/TRAINING PROGRAM

## 2025-07-29 PROCEDURE — 047E3EZ DILATION OF RIGHT INTERNAL ILIAC ARTERY WITH TWO INTRALUMINAL DEVICES, PERCUTANEOUS APPROACH: ICD-10-PCS | Performed by: STUDENT IN AN ORGANIZED HEALTH CARE EDUCATION/TRAINING PROGRAM

## 2025-07-29 PROCEDURE — 36430 TRANSFUSION BLD/BLD COMPNT: CPT

## 2025-07-29 PROCEDURE — 25010000002 SUGAMMADEX 200 MG/2ML SOLUTION: Performed by: STUDENT IN AN ORGANIZED HEALTH CARE EDUCATION/TRAINING PROGRAM

## 2025-07-29 PROCEDURE — 04UL3KZ SUPPLEMENT LEFT FEMORAL ARTERY WITH NONAUTOLOGOUS TISSUE SUBSTITUTE, PERCUTANEOUS APPROACH: ICD-10-PCS | Performed by: STUDENT IN AN ORGANIZED HEALTH CARE EDUCATION/TRAINING PROGRAM

## 2025-07-29 PROCEDURE — 86900 BLOOD TYPING SEROLOGIC ABO: CPT

## 2025-07-29 PROCEDURE — 85027 COMPLETE CBC AUTOMATED: CPT | Performed by: INTERNAL MEDICINE

## 2025-07-29 PROCEDURE — 25010000002 MIDAZOLAM PER 1 MG: Performed by: ANESTHESIOLOGY

## 2025-07-29 PROCEDURE — 35372 RECHANNELING OF ARTERY: CPT | Performed by: STUDENT IN AN ORGANIZED HEALTH CARE EDUCATION/TRAINING PROGRAM

## 2025-07-29 PROCEDURE — 86850 RBC ANTIBODY SCREEN: CPT | Performed by: STUDENT IN AN ORGANIZED HEALTH CARE EDUCATION/TRAINING PROGRAM

## 2025-07-29 PROCEDURE — 85027 COMPLETE CBC AUTOMATED: CPT | Performed by: STUDENT IN AN ORGANIZED HEALTH CARE EDUCATION/TRAINING PROGRAM

## 2025-07-29 PROCEDURE — 25010000002 HYDRALAZINE PER 20 MG

## 2025-07-29 PROCEDURE — C1768 GRAFT, VASCULAR: HCPCS | Performed by: STUDENT IN AN ORGANIZED HEALTH CARE EDUCATION/TRAINING PROGRAM

## 2025-07-29 PROCEDURE — C1876 STENT, NON-COA/NON-COV W/DEL: HCPCS | Performed by: STUDENT IN AN ORGANIZED HEALTH CARE EDUCATION/TRAINING PROGRAM

## 2025-07-29 PROCEDURE — B41D1ZZ FLUOROSCOPY OF AORTA AND BILATERAL LOWER EXTREMITY ARTERIES USING LOW OSMOLAR CONTRAST: ICD-10-PCS | Performed by: STUDENT IN AN ORGANIZED HEALTH CARE EDUCATION/TRAINING PROGRAM

## 2025-07-29 PROCEDURE — 37223 PR REVSC OPN/PRQ ILIAC ART W/STNT & ANGIOP IPSILATL: CPT | Performed by: STUDENT IN AN ORGANIZED HEALTH CARE EDUCATION/TRAINING PROGRAM

## 2025-07-29 PROCEDURE — 047F3EZ DILATION OF LEFT INTERNAL ILIAC ARTERY WITH TWO INTRALUMINAL DEVICES, PERCUTANEOUS APPROACH: ICD-10-PCS | Performed by: STUDENT IN AN ORGANIZED HEALTH CARE EDUCATION/TRAINING PROGRAM

## 2025-07-29 PROCEDURE — 76937 US GUIDE VASCULAR ACCESS: CPT | Performed by: STUDENT IN AN ORGANIZED HEALTH CARE EDUCATION/TRAINING PROGRAM

## 2025-07-29 PROCEDURE — 37221 PR REVSC OPN/PRQ ILIAC ART W/STNT PLMT & ANGIOPLSTY: CPT | Performed by: STUDENT IN AN ORGANIZED HEALTH CARE EDUCATION/TRAINING PROGRAM

## 2025-07-29 DEVICE — STENT PRB35-06-060-080 PROTEGE EF V07
Type: IMPLANTABLE DEVICE | Site: EXTERNAL ILIAC | Status: FUNCTIONAL
Brand: EVERFLEX™ PROTÉGÉ™ EVERFLEX™

## 2025-07-29 DEVICE — IMPLANTABLE DEVICE: Type: IMPLANTABLE DEVICE | Site: ARTERY ILIAC | Status: FUNCTIONAL

## 2025-07-29 DEVICE — LIGACLIP MCA MULTIPLE CLIP APPLIERS, 20 MEDIUM CLIPS
Type: IMPLANTABLE DEVICE | Site: EXTERNAL ILIAC | Status: FUNCTIONAL
Brand: LIGACLIP

## 2025-07-29 DEVICE — ABSORBABLE HEMOSTAT (OXIDIZED REGENERATED CELLULOSE, U.S.P.)
Type: IMPLANTABLE DEVICE | Site: EXTERNAL ILIAC | Status: FUNCTIONAL
Brand: SURGICEL FIBRILLAR

## 2025-07-29 DEVICE — FLOSEAL WITH RECOTHROM - 5ML
Type: IMPLANTABLE DEVICE | Site: EXTERNAL ILIAC | Status: FUNCTIONAL
Brand: FLOSEAL HEMOSTATIC MATRIX

## 2025-07-29 DEVICE — VASCU-GUARD IS PREPARED FROM BOVINE PERICARDIUM CROSS-LINKED WITH GLUTARALDEHYDE, AND TREATED WITH 1 MOLAR SODIUM HYDROXIDE FOR A MINIMUM OF 60 MINUTES AT 20-25 DEGREES C. VASCU-GUARD IS TERMINALLY STERILIZED USING GAMMA IRRADIATION. AND PACKAGED WITHIN A DOUBLE-POUCH SYSTEM. THE CONTENTS OF THE UNOPENED, UNDAMAGED OUTER POUCH ARE STERILE.
Type: IMPLANTABLE DEVICE | Site: EXTERNAL ILIAC | Status: FUNCTIONAL
Brand: VASCU-GUARD

## 2025-07-29 DEVICE — VIABAHN SX ENDO HEPARIN 18 RO 6MMX5CM 6FR 120CM CATH
Type: IMPLANTABLE DEVICE | Site: ARTERY ILIAC | Status: FUNCTIONAL
Brand: GORE VIABAHN ENDOPROSTHESIS WITH HEPARIN

## 2025-07-29 RX ORDER — LIDOCAINE HYDROCHLORIDE 10 MG/ML
0.5 INJECTION, SOLUTION INFILTRATION; PERINEURAL ONCE AS NEEDED
Status: DISCONTINUED | OUTPATIENT
Start: 2025-07-29 | End: 2025-07-29 | Stop reason: HOSPADM

## 2025-07-29 RX ORDER — ROCURONIUM BROMIDE 10 MG/ML
INJECTION, SOLUTION INTRAVENOUS AS NEEDED
Status: DISCONTINUED | OUTPATIENT
Start: 2025-07-29 | End: 2025-07-29 | Stop reason: SURG

## 2025-07-29 RX ORDER — LABETALOL HYDROCHLORIDE 5 MG/ML
5 INJECTION, SOLUTION INTRAVENOUS
Status: DISCONTINUED | OUTPATIENT
Start: 2025-07-29 | End: 2025-07-29 | Stop reason: HOSPADM

## 2025-07-29 RX ORDER — PROMETHAZINE HYDROCHLORIDE 25 MG/1
25 SUPPOSITORY RECTAL ONCE AS NEEDED
Status: DISCONTINUED | OUTPATIENT
Start: 2025-07-29 | End: 2025-07-29 | Stop reason: HOSPADM

## 2025-07-29 RX ORDER — FLUMAZENIL 0.1 MG/ML
0.2 INJECTION INTRAVENOUS AS NEEDED
Status: DISCONTINUED | OUTPATIENT
Start: 2025-07-29 | End: 2025-07-29 | Stop reason: HOSPADM

## 2025-07-29 RX ORDER — EPHEDRINE SULFATE 50 MG/ML
5 INJECTION, SOLUTION INTRAVENOUS ONCE AS NEEDED
Status: DISCONTINUED | OUTPATIENT
Start: 2025-07-29 | End: 2025-07-29 | Stop reason: HOSPADM

## 2025-07-29 RX ORDER — ONDANSETRON 2 MG/ML
INJECTION INTRAMUSCULAR; INTRAVENOUS AS NEEDED
Status: DISCONTINUED | OUTPATIENT
Start: 2025-07-29 | End: 2025-07-29 | Stop reason: SURG

## 2025-07-29 RX ORDER — ONDANSETRON 2 MG/ML
4 INJECTION INTRAMUSCULAR; INTRAVENOUS ONCE AS NEEDED
Status: DISCONTINUED | OUTPATIENT
Start: 2025-07-29 | End: 2025-07-29 | Stop reason: HOSPADM

## 2025-07-29 RX ORDER — HEPARIN SODIUM 1000 [USP'U]/ML
INJECTION, SOLUTION INTRAVENOUS; SUBCUTANEOUS AS NEEDED
Status: DISCONTINUED | OUTPATIENT
Start: 2025-07-29 | End: 2025-07-29 | Stop reason: SURG

## 2025-07-29 RX ORDER — PROPOFOL 10 MG/ML
VIAL (ML) INTRAVENOUS AS NEEDED
Status: DISCONTINUED | OUTPATIENT
Start: 2025-07-29 | End: 2025-07-29 | Stop reason: SURG

## 2025-07-29 RX ORDER — DIPHENHYDRAMINE HYDROCHLORIDE 50 MG/ML
12.5 INJECTION, SOLUTION INTRAMUSCULAR; INTRAVENOUS
Status: DISCONTINUED | OUTPATIENT
Start: 2025-07-29 | End: 2025-07-29 | Stop reason: HOSPADM

## 2025-07-29 RX ORDER — IOPAMIDOL 510 MG/ML
300 INJECTION, SOLUTION INTRAVASCULAR
Status: COMPLETED | OUTPATIENT
Start: 2025-07-29 | End: 2025-07-29

## 2025-07-29 RX ORDER — SODIUM CHLORIDE 0.9 % (FLUSH) 0.9 %
3 SYRINGE (ML) INJECTION EVERY 12 HOURS SCHEDULED
Status: DISCONTINUED | OUTPATIENT
Start: 2025-07-29 | End: 2025-07-29 | Stop reason: HOSPADM

## 2025-07-29 RX ORDER — EPHEDRINE SULFATE 50 MG/ML
INJECTION, SOLUTION INTRAVENOUS AS NEEDED
Status: DISCONTINUED | OUTPATIENT
Start: 2025-07-29 | End: 2025-07-29 | Stop reason: SURG

## 2025-07-29 RX ORDER — HYDROMORPHONE HYDROCHLORIDE 1 MG/ML
0.5 INJECTION, SOLUTION INTRAMUSCULAR; INTRAVENOUS; SUBCUTANEOUS
Status: DISCONTINUED | OUTPATIENT
Start: 2025-07-29 | End: 2025-08-01 | Stop reason: HOSPADM

## 2025-07-29 RX ORDER — FENTANYL CITRATE 50 UG/ML
50 INJECTION, SOLUTION INTRAMUSCULAR; INTRAVENOUS ONCE AS NEEDED
Status: DISCONTINUED | OUTPATIENT
Start: 2025-07-29 | End: 2025-07-29 | Stop reason: HOSPADM

## 2025-07-29 RX ORDER — DEXAMETHASONE SODIUM PHOSPHATE 4 MG/ML
INJECTION, SOLUTION INTRA-ARTICULAR; INTRALESIONAL; INTRAMUSCULAR; INTRAVENOUS; SOFT TISSUE AS NEEDED
Status: DISCONTINUED | OUTPATIENT
Start: 2025-07-29 | End: 2025-07-29 | Stop reason: SURG

## 2025-07-29 RX ORDER — SODIUM CHLORIDE, SODIUM LACTATE, POTASSIUM CHLORIDE, CALCIUM CHLORIDE 600; 310; 30; 20 MG/100ML; MG/100ML; MG/100ML; MG/100ML
9 INJECTION, SOLUTION INTRAVENOUS CONTINUOUS
Status: DISCONTINUED | OUTPATIENT
Start: 2025-07-29 | End: 2025-07-29

## 2025-07-29 RX ORDER — ATROPINE SULFATE 0.4 MG/ML
0.4 INJECTION, SOLUTION INTRAMUSCULAR; INTRAVENOUS; SUBCUTANEOUS ONCE AS NEEDED
Status: DISCONTINUED | OUTPATIENT
Start: 2025-07-29 | End: 2025-07-29 | Stop reason: HOSPADM

## 2025-07-29 RX ORDER — NALOXONE HCL 0.4 MG/ML
0.2 VIAL (ML) INJECTION AS NEEDED
Status: DISCONTINUED | OUTPATIENT
Start: 2025-07-29 | End: 2025-07-29 | Stop reason: HOSPADM

## 2025-07-29 RX ORDER — DROPERIDOL 2.5 MG/ML
0.62 INJECTION, SOLUTION INTRAMUSCULAR; INTRAVENOUS
Status: DISCONTINUED | OUTPATIENT
Start: 2025-07-29 | End: 2025-07-29 | Stop reason: HOSPADM

## 2025-07-29 RX ORDER — LIDOCAINE HYDROCHLORIDE 20 MG/ML
INJECTION, SOLUTION INFILTRATION; PERINEURAL AS NEEDED
Status: DISCONTINUED | OUTPATIENT
Start: 2025-07-29 | End: 2025-07-29 | Stop reason: SURG

## 2025-07-29 RX ORDER — OXYCODONE AND ACETAMINOPHEN 7.5; 325 MG/1; MG/1
1 TABLET ORAL EVERY 4 HOURS PRN
Status: DISCONTINUED | OUTPATIENT
Start: 2025-07-29 | End: 2025-07-29 | Stop reason: HOSPADM

## 2025-07-29 RX ORDER — OXYCODONE HYDROCHLORIDE 5 MG/1
5 TABLET ORAL EVERY 4 HOURS PRN
Status: DISCONTINUED | OUTPATIENT
Start: 2025-07-29 | End: 2025-08-01 | Stop reason: HOSPADM

## 2025-07-29 RX ORDER — HYDROMORPHONE HYDROCHLORIDE 1 MG/ML
0.5 INJECTION, SOLUTION INTRAMUSCULAR; INTRAVENOUS; SUBCUTANEOUS
Status: DISCONTINUED | OUTPATIENT
Start: 2025-07-29 | End: 2025-07-29 | Stop reason: HOSPADM

## 2025-07-29 RX ORDER — HYDROCODONE BITARTRATE AND ACETAMINOPHEN 5; 325 MG/1; MG/1
1 TABLET ORAL ONCE AS NEEDED
Status: DISCONTINUED | OUTPATIENT
Start: 2025-07-29 | End: 2025-07-29 | Stop reason: HOSPADM

## 2025-07-29 RX ORDER — SODIUM CHLORIDE 9 MG/ML
INJECTION, SOLUTION INTRAVENOUS CONTINUOUS PRN
Status: DISCONTINUED | OUTPATIENT
Start: 2025-07-29 | End: 2025-07-29 | Stop reason: SURG

## 2025-07-29 RX ORDER — PROTAMINE SULFATE 10 MG/ML
INJECTION, SOLUTION INTRAVENOUS AS NEEDED
Status: DISCONTINUED | OUTPATIENT
Start: 2025-07-29 | End: 2025-07-29 | Stop reason: SURG

## 2025-07-29 RX ORDER — OXYCODONE HYDROCHLORIDE 10 MG/1
10 TABLET ORAL EVERY 4 HOURS PRN
Status: DISCONTINUED | OUTPATIENT
Start: 2025-07-29 | End: 2025-08-01 | Stop reason: HOSPADM

## 2025-07-29 RX ORDER — SODIUM CHLORIDE 0.9 % (FLUSH) 0.9 %
3-10 SYRINGE (ML) INJECTION AS NEEDED
Status: DISCONTINUED | OUTPATIENT
Start: 2025-07-29 | End: 2025-07-29 | Stop reason: HOSPADM

## 2025-07-29 RX ORDER — ACETAMINOPHEN 500 MG
1000 TABLET ORAL EVERY 8 HOURS SCHEDULED
Status: DISCONTINUED | OUTPATIENT
Start: 2025-07-30 | End: 2025-08-01 | Stop reason: HOSPADM

## 2025-07-29 RX ORDER — SODIUM CHLORIDE 9 MG/ML
125 INJECTION, SOLUTION INTRAVENOUS CONTINUOUS
Status: DISCONTINUED | OUTPATIENT
Start: 2025-07-30 | End: 2025-07-30

## 2025-07-29 RX ORDER — IPRATROPIUM BROMIDE AND ALBUTEROL SULFATE 2.5; .5 MG/3ML; MG/3ML
3 SOLUTION RESPIRATORY (INHALATION) ONCE AS NEEDED
Status: DISCONTINUED | OUTPATIENT
Start: 2025-07-29 | End: 2025-07-29 | Stop reason: HOSPADM

## 2025-07-29 RX ORDER — HYDRALAZINE HYDROCHLORIDE 20 MG/ML
5 INJECTION INTRAMUSCULAR; INTRAVENOUS
Status: DISCONTINUED | OUTPATIENT
Start: 2025-07-29 | End: 2025-07-29 | Stop reason: HOSPADM

## 2025-07-29 RX ORDER — MIDAZOLAM HYDROCHLORIDE 1 MG/ML
1 INJECTION, SOLUTION INTRAMUSCULAR; INTRAVENOUS
Status: DISCONTINUED | OUTPATIENT
Start: 2025-07-29 | End: 2025-07-29 | Stop reason: HOSPADM

## 2025-07-29 RX ORDER — PROMETHAZINE HYDROCHLORIDE 25 MG/1
25 TABLET ORAL ONCE AS NEEDED
Status: DISCONTINUED | OUTPATIENT
Start: 2025-07-29 | End: 2025-07-29 | Stop reason: HOSPADM

## 2025-07-29 RX ORDER — PHENYLEPHRINE HYDROCHLORIDE 10 MG/ML
INJECTION INTRAVENOUS AS NEEDED
Status: DISCONTINUED | OUTPATIENT
Start: 2025-07-29 | End: 2025-07-29 | Stop reason: SURG

## 2025-07-29 RX ADMIN — DEXAMETHASONE SODIUM PHOSPHATE 6 MG: 4 INJECTION, SOLUTION INTRAMUSCULAR; INTRAVENOUS at 20:02

## 2025-07-29 RX ADMIN — PHENYLEPHRINE HYDROCHLORIDE 200 MCG: 10 INJECTION INTRAVENOUS at 18:32

## 2025-07-29 RX ADMIN — PHENYLEPHRINE HYDROCHLORIDE 200 MCG: 10 INJECTION INTRAVENOUS at 17:56

## 2025-07-29 RX ADMIN — HEPARIN SODIUM 7000 UNITS: 1000 INJECTION, SOLUTION INTRAVENOUS; SUBCUTANEOUS at 18:56

## 2025-07-29 RX ADMIN — SUGAMMADEX 200 MG: 100 INJECTION, SOLUTION INTRAVENOUS at 20:44

## 2025-07-29 RX ADMIN — PROPOFOL 100 MCG/KG/MIN: 10 INJECTION, EMULSION INTRAVENOUS at 16:11

## 2025-07-29 RX ADMIN — HYDRALAZINE HYDROCHLORIDE 50 MG: 50 TABLET ORAL at 06:00

## 2025-07-29 RX ADMIN — PHENYLEPHRINE HYDROCHLORIDE 200 MCG: 10 INJECTION INTRAVENOUS at 18:08

## 2025-07-29 RX ADMIN — HYDROMORPHONE HYDROCHLORIDE 0.25 MG: 1 INJECTION, SOLUTION INTRAMUSCULAR; INTRAVENOUS; SUBCUTANEOUS at 20:00

## 2025-07-29 RX ADMIN — CEFAZOLIN 2000 MG: 2 INJECTION, POWDER, FOR SOLUTION INTRAMUSCULAR; INTRAVENOUS at 15:57

## 2025-07-29 RX ADMIN — CEFAZOLIN 2 G: 2 INJECTION, POWDER, LYOPHILIZED, FOR SOLUTION INTRAVENOUS at 19:55

## 2025-07-29 RX ADMIN — HYDROMORPHONE HYDROCHLORIDE 0.5 MG: 1 INJECTION, SOLUTION INTRAMUSCULAR; INTRAVENOUS; SUBCUTANEOUS at 22:20

## 2025-07-29 RX ADMIN — PHENYLEPHRINE HYDROCHLORIDE 0.5 MCG/KG/MIN: 10 INJECTION, SOLUTION INTRAVENOUS at 18:50

## 2025-07-29 RX ADMIN — PROTAMINE SULFATE 30 MG: 10 INJECTION, SOLUTION INTRAVENOUS at 20:34

## 2025-07-29 RX ADMIN — SODIUM CHLORIDE, POTASSIUM CHLORIDE, SODIUM LACTATE AND CALCIUM CHLORIDE 9 ML/HR: 600; 310; 30; 20 INJECTION, SOLUTION INTRAVENOUS at 13:30

## 2025-07-29 RX ADMIN — MIDAZOLAM 1 MG: 1 INJECTION INTRAMUSCULAR; INTRAVENOUS at 14:14

## 2025-07-29 RX ADMIN — PHENYLEPHRINE HYDROCHLORIDE 200 MCG: 10 INJECTION INTRAVENOUS at 17:48

## 2025-07-29 RX ADMIN — IOPAMIDOL 240 ML: 510 INJECTION, SOLUTION INTRAVASCULAR at 20:44

## 2025-07-29 RX ADMIN — LIDOCAINE HYDROCHLORIDE 70 MG: 20 INJECTION, SOLUTION INFILTRATION; PERINEURAL at 16:09

## 2025-07-29 RX ADMIN — SODIUM CHLORIDE: 9 INJECTION, SOLUTION INTRAVENOUS at 18:58

## 2025-07-29 RX ADMIN — HEPARIN SODIUM 3500 UNITS: 1000 INJECTION, SOLUTION INTRAVENOUS; SUBCUTANEOUS at 20:08

## 2025-07-29 RX ADMIN — HEPARIN SODIUM 3500 UNITS: 1000 INJECTION, SOLUTION INTRAVENOUS; SUBCUTANEOUS at 17:34

## 2025-07-29 RX ADMIN — HEPARIN SODIUM 5000 UNITS: 5000 INJECTION, SOLUTION INTRAVENOUS; SUBCUTANEOUS at 06:01

## 2025-07-29 RX ADMIN — PHENYLEPHRINE HYDROCHLORIDE 200 MCG: 10 INJECTION INTRAVENOUS at 18:23

## 2025-07-29 RX ADMIN — ROCURONIUM BROMIDE 60 MG: 10 INJECTION INTRAVENOUS at 18:29

## 2025-07-29 RX ADMIN — SODIUM CHLORIDE 125 ML/HR: 9 INJECTION, SOLUTION INTRAVENOUS at 23:23

## 2025-07-29 RX ADMIN — EPHEDRINE SULFATE 10 MG: 50 INJECTION INTRAVENOUS at 18:44

## 2025-07-29 RX ADMIN — ROCURONIUM BROMIDE 20 MG: 10 INJECTION INTRAVENOUS at 19:15

## 2025-07-29 RX ADMIN — PANTOPRAZOLE SODIUM 40 MG: 40 TABLET, DELAYED RELEASE ORAL at 06:00

## 2025-07-29 RX ADMIN — EPHEDRINE SULFATE 10 MG: 50 INJECTION INTRAVENOUS at 18:52

## 2025-07-29 RX ADMIN — METOCLOPRAMIDE 5 MG: 5 TABLET ORAL at 09:14

## 2025-07-29 RX ADMIN — SODIUM BICARBONATE 1300 MG: 650 TABLET ORAL at 09:14

## 2025-07-29 RX ADMIN — PROPOFOL 80 MG: 10 INJECTION, EMULSION INTRAVENOUS at 16:10

## 2025-07-29 RX ADMIN — HYDRALAZINE HYDROCHLORIDE 5 MG: 20 INJECTION INTRAMUSCULAR; INTRAVENOUS at 22:21

## 2025-07-29 RX ADMIN — ASPIRIN 81 MG CHEWABLE TABLET 81 MG: 81 TABLET CHEWABLE at 09:14

## 2025-07-29 RX ADMIN — ONDANSETRON 4 MG: 2 INJECTION, SOLUTION INTRAMUSCULAR; INTRAVENOUS at 20:04

## 2025-07-29 RX ADMIN — ATORVASTATIN CALCIUM 80 MG: 80 TABLET, FILM COATED ORAL at 09:14

## 2025-07-29 RX ADMIN — HEPARIN SODIUM 8000 UNITS: 1000 INJECTION, SOLUTION INTRAVENOUS; SUBCUTANEOUS at 16:38

## 2025-07-29 RX ADMIN — CARVEDILOL 12.5 MG: 12.5 TABLET, FILM COATED ORAL at 09:14

## 2025-07-29 NOTE — PLAN OF CARE
Problem: Adult Inpatient Plan of Care  Goal: Plan of Care Review  7/29/2025 0617 by Uriel Bernabe RN  Outcome: Progressing  Flowsheets  Taken 7/29/2025 0615 by Uriel Bernabe RN  Progress: improving  Taken 7/28/2025 1149 by Katlin Blanco, CLIFF  Plan of Care Reviewed With: patient  7/29/2025 0615 by Uriel Bernabe RN  Outcome: Progressing  Flowsheets  Taken 7/29/2025 0615 by Uriel Bernabe RN  Progress: improving  Taken 7/28/2025 1149 by Katlin Blanco, PT  Plan of Care Reviewed With: patient  Goal: Patient-Specific Goal (Individualized)  7/29/2025 0617 by Uriel Bernabe RN  Outcome: Progressing  7/29/2025 0615 by Uriel Bernabe RN  Outcome: Progressing  Goal: Absence of Hospital-Acquired Illness or Injury  7/29/2025 0617 by Uriel Bernabe RN  Outcome: Progressing  7/29/2025 0615 by Uriel Bernabe RN  Outcome: Progressing  Goal: Optimal Comfort and Wellbeing  7/29/2025 0617 by Uriel Bernabe RN  Outcome: Progressing  7/29/2025 0615 by Uriel Bernabe RN  Outcome: Progressing  Goal: Readiness for Transition of Care  7/29/2025 0617 by Uriel Bernabe RN  Outcome: Progressing  7/29/2025 0615 by Uriel Bernabe RN  Outcome: Progressing     Problem: Fall Injury Risk  Goal: Absence of Fall and Fall-Related Injury  7/29/2025 0617 by Uriel Bernabe RN  Outcome: Progressing  7/29/2025 0615 by Uriel Bernabe RN  Outcome: Progressing     Problem: Skin Injury Risk Increased  Goal: Skin Health and Integrity  7/29/2025 0617 by Uriel Bernabe RN  Outcome: Progressing  7/29/2025 0615 by Uriel Bernabe RN  Outcome: Progressing     Problem: Comorbidity Management  Goal: Blood Glucose Level Within Target Range  7/29/2025 0617 by Uriel Bernabe RN  Outcome: Progressing  7/29/2025 0615 by Uriel Bernabe RN  Outcome: Progressing  Goal: Maintenance of Heart Failure Symptom Control  7/29/2025 0617 by Uriel Bernabe RN  Outcome: Progressing  7/29/2025 0615 by Uriel Benrabe RN  Outcome:  Progressing  Goal: Blood Pressure in Desired Range  7/29/2025 0617 by Uriel Bernabe RN  Outcome: Progressing  7/29/2025 0615 by Uriel Bernabe RN  Outcome: Progressing     Problem: Lower Extremity Revascularization  Goal: Absence of Bleeding  Outcome: Progressing  Goal: Fluid and Electrolyte Balance  Outcome: Progressing  Goal: Absence of Infection Signs and Symptoms  Outcome: Progressing  Goal: Effective Tissue Perfusion  Outcome: Progressing   Goal Outcome Evaluation:           Progress: improving

## 2025-07-29 NOTE — PROGRESS NOTES
"NEPHROLOGY PROGRESS NOTE------[facility]    [facility]  [phone]  Raheem Benites MD      Patient Care Team:  Ibrahima Correa MD as PCP - General (Family Medicine)  Xochilt Jenkins MD as Consulting Physician (Nephrology)      Provider:  Raheem Benites MD  Patient Name: Bibiana Inman  :  1980    SUBJECTIVE:  F/u CARLA/CKD  No chest pain or SOA    Medication:  aspirin, 81 mg, Oral, Daily  atorvastatin, 80 mg, Oral, Daily  carvedilol, 12.5 mg, Oral, BID With Meals  heparin (porcine), 5,000 Units, Subcutaneous, Q8H  hydrALAZINE, 50 mg, Oral, Q8H  insulin glargine, 20 Units, Subcutaneous, Nightly  insulin lispro, 2-9 Units, Subcutaneous, 4x Daily AC & at Bedtime  insulin lispro, 7 Units, Subcutaneous, TID With Meals  [Held by provider] lisinopril, 10 mg, Oral, Q24H  metoclopramide, 5 mg, Oral, 4x Daily  pantoprazole, 40 mg, Oral, Q AM  sodium bicarbonate, 1,300 mg, Oral, TID      sodium chloride, 100 mL/hr, Last Rate: 100 mL/hr (25)        OBJECTIVE    Vital Sign Min/Max for last 24 hours  Temp  Min: 98.4 °F (36.9 °C)  Max: 100.9 °F (38.3 °C)   BP  Min: 117/65  Max: 178/76   Pulse  Min: 58  Max: 80   Resp  Min: 16  Max: 18   SpO2  Min: 98 %  Max: 99 %   No data recorded   Weight  Min: 71.2 kg (156 lb 15.5 oz)  Max: 71.9 kg (158 lb 8.2 oz)     Flowsheet Rows      Flowsheet Row First Filed Value   Admission Height 162.6 cm (64\") Documented at 2025 1412   Admission Weight 63.3 kg (139 lb 8.8 oz) Documented at 2025 1845            No intake/output data recorded.  I/O last 3 completed shifts:  In: 598.3 [I.V.:598.3]  Out: 1450 [Urine:1450]    Physical Exam:  General Appearance: alert, appears stated age and cooperative  Head: normocephalic, without obvious abnormality and atraumatic  Eyes: conjunctivae and sclerae normal and no icterus  Neck: supple and no JVD  Lungs: clear to auscultation and respirations " "regular  Heart: regular rhythm & normal rate and normal S1, S2  Chest: Wall no abnormalities observed  Abdomen: normal bowel sounds and soft, nontender  Extremities: moves extremities well, no edema, no cyanosis and no redness  Skin: no bleeding,   Neurologic: Alert, and oriented. No focal deficits    Labs:    WBC WBC   Date Value Ref Range Status   07/29/2025 8.94 3.40 - 10.80 10*3/mm3 Final   07/28/2025 8.67 3.40 - 10.80 10*3/mm3 Final   07/27/2025 9.50 3.40 - 10.80 10*3/mm3 Final      HGB Hemoglobin   Date Value Ref Range Status   07/29/2025 9.1 (L) 12.0 - 15.9 g/dL Final   07/28/2025 9.3 (L) 12.0 - 15.9 g/dL Final   07/27/2025 9.7 (L) 12.0 - 15.9 g/dL Final      HCT Hematocrit   Date Value Ref Range Status   07/29/2025 27.9 (L) 34.0 - 46.6 % Final   07/28/2025 29.2 (L) 34.0 - 46.6 % Final   07/27/2025 30.7 (L) 34.0 - 46.6 % Final      Platelets No results found for: \"LABPLAT\"   MCV MCV   Date Value Ref Range Status   07/29/2025 90.6 79.0 - 97.0 fL Final   07/28/2025 91.8 79.0 - 97.0 fL Final   07/27/2025 93.0 79.0 - 97.0 fL Final          Sodium Sodium   Date Value Ref Range Status   07/29/2025 134 (L) 136 - 145 mmol/L Final   07/28/2025 131 (L) 136 - 145 mmol/L Final   07/27/2025 131 (L) 136 - 145 mmol/L Final      Potassium Potassium   Date Value Ref Range Status   07/29/2025 4.4 3.5 - 5.2 mmol/L Final   07/28/2025 4.5 3.5 - 5.2 mmol/L Final   07/27/2025 4.8 3.5 - 5.2 mmol/L Final   07/27/2025 3.6 3.5 - 5.2 mmol/L Final      Chloride Chloride   Date Value Ref Range Status   07/29/2025 107 98 - 107 mmol/L Final   07/28/2025 106 98 - 107 mmol/L Final   07/27/2025 104 98 - 107 mmol/L Final      CO2 CO2   Date Value Ref Range Status   07/29/2025 18.2 (L) 22.0 - 29.0 mmol/L Final   07/28/2025 17.6 (L) 22.0 - 29.0 mmol/L Final   07/27/2025 18.0 (L) 22.0 - 29.0 mmol/L Final      BUN BUN   Date Value Ref Range Status   07/29/2025 17.0 6.0 - 20.0 mg/dL Final   07/28/2025 19.0 6.0 - 20.0 mg/dL Final   07/27/2025 16.0 " "6.0 - 20.0 mg/dL Final      Creatinine Creatinine   Date Value Ref Range Status   07/29/2025 1.70 (H) 0.57 - 1.00 mg/dL Final   07/28/2025 2.22 (H) 0.57 - 1.00 mg/dL Final   07/27/2025 2.07 (H) 0.57 - 1.00 mg/dL Final      Calcium Calcium   Date Value Ref Range Status   07/29/2025 7.7 (L) 8.6 - 10.5 mg/dL Final   07/28/2025 8.0 (L) 8.6 - 10.5 mg/dL Final   07/27/2025 7.9 (L) 8.6 - 10.5 mg/dL Final      PO4 No components found for: \"PO4\"   Albumin Albumin   Date Value Ref Range Status   07/29/2025 1.9 (L) 3.5 - 5.2 g/dL Final   07/28/2025 2.0 (L) 3.5 - 5.2 g/dL Final      Magnesium Magnesium   Date Value Ref Range Status   07/28/2025 1.9 1.6 - 2.6 mg/dL Final      Uric Acid No components found for: \"URIC ACID\"     Imaging Results (Last 72 Hours)       Procedure Component Value Units Date/Time    CT Angio Abdominal Aorta Bilateral Iliofem Runoff [[id number]] Collected: 07/27/25 1236     Updated: 07/27/25 1259    Narrative:      CT ANGIOGRAM ABDOMEN AND PELVIS WITH ILIOFEMORAL RUNOFF     HISTORY: Peripheral artery disease with tissue loss. Diabetes.     COMPARISON: Right foot x-rays 07/24/2025. CT chest 06/26/2024..     TECHNIQUE: Axial images were obtained from the lung bases through the  lower extremities following the administration of IV contrast. Coronal  and sagittal MIP images were obtained as well as 3D volume rendering.   Radiation dose reduction techniques were utilized, including automated  exposure control and exposure modulation based on body size.     FINDINGS:  Atherosclerotic calcifications are present involving the distal thoracic  aorta abdominal aorta with mild narrowing. The celiac artery, superior  mesenteric artery are patent. There is a long segment of moderate  stenosis of the left main renal artery associated with partially  calcified plaque that extends 2.5 cm in length. Mild narrowing of the  origin of the right renal artery. Calcified and noncalcified plaque  involving the inferior abdominal " aorta with moderate narrowing just  above its bifurcation. No aneurysm.     On the left, there is a moderate stenosis of the origin of the left  common iliac artery. Multifocal moderate left common iliac arterial  stenoses. Mild narrowing of the left internal iliac artery. There is a  severe stenosis of the left external iliac artery approximately 4.5 cm  distal to its origin. Moderate stenosis left common femoral artery. Left  deep femoral artery is patent. There is a left superficial femoral  artery stent that is occluded. Left superficial femoral artery  reconstitutes at the distal margin of the stent at the level of the mid  thigh and is small with multifocal stenoses that are up to severe in the  region of the adductor hiatus. Multifocal popliteal artery stenoses of  the left popliteal arteries patent. Left anterior tibial artery contains  calcification proximally with moderate narrowing. The left tibioperoneal  trunk is patent. There is three-vessel runoff to the left lower  extremity.     On the right, there is moderate stenosis origin right common iliac  artery. Mild to moderate narrowing of the distal right common iliac  artery. Moderate to severe stenosis of the proximal right internal iliac  artery. Moderate right external iliac arterial stenoses. Moderate  stenosis of the right common femoral artery. The right superficial  femoral artery is small with multifocal severe stenoses extending from  its origin to the popliteal artery though no evidence for complete  occlusion. The right deep femoral artery is patent. Right popliteal  artery is small with areas of moderate to severe stenoses without  evidence for complete occlusion. Multifocal calcifications involving the  proximal anterior posterior tibial arteries and the tibial peroneal  trunk. There is flow within the right anterior and posterior tibial  arteries extending into the ankle. Flow within the peroneal artery  extends into the distal calf.      Moderate bilateral pleural effusions layer dependently with adjacent  compressive lower lobe atelectasis.     Liver, spleen, pancreas exhibit normal early arterial phase of contrast  appearance. Multiple gallstones. Bilateral adrenal thickening. Kidneys  appear within normal limits. There is no hydronephrosis.     Mild ascites with free fluid extending into the pelvis. Colonic  diverticulosis without evidence for diverticulitis. Mild generalized  body wall edema with third spacing of fluid.                Impression:      1. Multifocal atherosclerotic disease. Long segment of moderate stenosis  of the left main renal artery associated with partially calcified  plaque. Mild narrowing origin right renal artery.  2. Atherosclerotic calcification involving the common aorta and iliac  vasculature with severe stenosis of the left external iliac artery  proximally 4.5 cm distal to its origin. Multifocal iliac and common  femoral arterial stenoses.  3. Left superficial femoral artery stent is occluded with reconstitution  just below the stent. Multifocal stenoses of the distal left superficial  femoral artery, left popliteal artery. Three-vessel runoff to the left  lower extremity.  4. On the right, the right superficial femoral artery is small with  multifocal severe stenoses extending from its origin to the popliteal  artery but without evidence for complete occlusion. Right popliteal  artery is small with multifocal moderate to severe stenoses.  Calcification involving the proximal anterior and posterior tibial  arteries and peroneal artery. There is flow within the anterior and  posterior tibial arteries on the right extending to the foot and there  is flow of the right peroneal artery extending to the distal calf.  5. Moderate bilateral pleural effusions with adjacent compressive lower  lobe atelectasis.  6. Cholelithiasis.  7. Generalized body wall edema.        This report was finalized on 7/27/2025 12:56 PM by  Huan Noriega M.D  on Workstation: [id number]               Results for orders placed during the hospital encounter of 07/22/25    XR Foot 3+ View Right 07/24/2025 1449    Narrative  XR FOOT 3+ VW RIGHT-    CLINICAL: Small toe ischemia.    FINDINGS: No 5th toe cortical bone destruction. No acute osseous  abnormality. No soft tissue gas or radiopaque foreign body. The 5th  metatarsal is satisfactory in appearance. There is 1st  metatarsophalangeal joint degeneration of a mild-to-moderate degree. The  forefoot is otherwise unremarkable.    Moderate size calcaneal spur, hind foot articulations preserved. Midfoot  articulations within normal limits.    CONCLUSION: No 5th toe acute osseous or articular abnormality. No soft  tissue gas or radiopaque foreign body seen.      This report was finalized on 7/24/2025 2:49 PM by Dr. Clif Galeas M.D  on Workstation: [id number]      XR Ribs Bilateral 4+ View With PA Chest 07/22/2025 1559    Narrative  8 VIEW BILATERAL RIBS AND 1 VIEW PA CHEST    HISTORY: Syncope. Fell. Rib pain, left greater than right.    FINDINGS: The vertebral height and disc spaces are well-maintained. The  heart is top normal in size with sternal wires from previous cardiac  surgery and there is no change from 5/5/2025.    Multiple views of the left and right ribs show no definite fracture at  the present time.    This report was finalized on 7/22/2025 3:59 PM by Dr. Fortunato Fournier M.D  on Workstation: [id number]      Results for orders placed during the hospital encounter of 05/05/25    XR Ankle 3+ View Right 05/06/2025 0349    Narrative  Patient: SAVITA BELCHER  Time Out: 03:49  Exam(s): XR RIGHT ANKLE    EXAM:  XR Right Ankle Complete, 3 or More Views    CLINICAL HISTORY:  Reason for exam: fall, pain.    TECHNIQUE:  Frontal, lateral and oblique views of the right ankle.    COMPARISON:  No relevant prior studies available.    FINDINGS:  Bones joints:  No evidence of acute fracture or dislocation.   Plantar  calcaneal spur.  Soft tissues:  Unremarkable.    Impression  No evidence of acute fracture or dislocation.    IMPRESSION:      Electronically signed by Thai Mays MD on 05-06-25 at 0349      Results for orders placed during the hospital encounter of 07/22/25    Duplex Vein Mapping Lower Extremity - Bilateral CAR 07/27/2025 9083    Interpretation Summary    Greater saphenous vein and small saphenous veins appear to be inadequately sized for bypass bilaterally.        ASSESSMENT / PLAN      CARLA-likely prerenal in the setting of volume depletion and or contrast exposure  T2DM  Hypertension-blood pressure controlled.  Hold lisinopril  Syncope not orthostatic on admission.  Probably volume depleted given severe hyperglycemia  History coronary artery disease  Metabolic acidosis-add p.o. sodium bicarb  Right fifth toe ulcer/necrosis-vascular/podiatry following     CR stable, continue sodium bicarb  Stop IVF  Avoid hypotension  Monitor renal function, fluid status and electrolytes        Raheem Benites MD  [facility]  [phone]  07/29/25  12:08 EDT

## 2025-07-29 NOTE — ANESTHESIA PREPROCEDURE EVALUATION
Anesthesia Evaluation     Patient summary reviewed and Nursing notes reviewed   NPO Solid Status: > 8 hours  NPO Liquid Status: > 2 hours           Airway   Mallampati: II  TM distance: >3 FB  Neck ROM: full  No difficulty expected  Comment: Grade IIb view with MAC 3  Dental    (+) poor dentition    Pulmonary - normal exam    breath sounds clear to auscultation  (+) a smoker Former,sleep apnea  Cardiovascular     ECG reviewed  Rhythm: regular  Rate: normal    (+) hypertension 2 medications or greater, past MI  >12 months, CAD, CABG >6 Months, angina, CHF , PVD, hyperlipidemia,  carotid artery disease    ROS comment: CAD with hx MI and CABG X3 about 11 years ago, then CABG X1 plus 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 on 7/1/24/EF 65%, mild MR by ECHO 7/25    Neuro/Psych  (+) CVA, dizziness/light headedness, syncope, numbness, psychiatric history    ROS Comment: Diabetic peripheral neuropathy/lacunar CVA  GI/Hepatic/Renal/Endo    (+) GERD, renal disease- CRI, diabetes mellitus type 2 using insulin    ROS Comment: Diabetic gastroparesis    Musculoskeletal     (+) back pain  Abdominal  - normal exam   Substance History      OB/GYN          Other   blood dyscrasia anemia,       Other Comment: MTHFR deficiency/Hgb 9.1                Anesthesia Plan    ASA 3     MAC     (No art line needed per surgeon)  intravenous induction     Anesthetic plan, risks, benefits, and alternatives have been provided, discussed and informed consent has been obtained with: patient.      CODE STATUS:    Code Status (Patient has no pulse and is not breathing): CPR (Attempt to Resuscitate)  Medical Interventions (Patient has pulse or is breathing): Full

## 2025-07-29 NOTE — ANESTHESIA PROCEDURE NOTES
Airway  Reason: elective    Airway not difficult    General Information and Staff    Patient location during procedure: OR  CRNA/CAA: Jamaal Carney CRNA    Indications and Patient Condition  Indications for airway management: airway protection    Preoxygenated: yes  MILS not maintained throughout    Mask difficulty assessment: 1 - vent by mask    Final Airway Details    Final airway type: endotracheal airway      Successful airway: ETT  Cuffed: yes   Successful intubation technique: direct laryngoscopy  Adjuncts used in placement: anterior pressure/BURP  Endotracheal tube insertion site: oral  Blade: Evette  Blade size: 3  ETT size (mm): 7.0  Cormack-Lehane Classification: grade IIa - partial view of glottis  Placement verified by: chest auscultation and capnometry   Cuff volume (mL): 10  Measured from: teeth  ETT/EBT  to teeth (cm): 22  Number of attempts at approach: 1  Assessment: lips, teeth, and gum same as pre-op and atraumatic intubation

## 2025-07-30 LAB
ALBUMIN SERPL-MCNC: 1.7 G/DL (ref 3.5–5.2)
ANION GAP SERPL CALCULATED.3IONS-SCNC: 9.9 MMOL/L (ref 5–15)
APTT PPP: 101.7 SECONDS (ref 22.7–35.4)
APTT PPP: 146.7 SECONDS (ref 22.7–35.4)
APTT PPP: 33.2 SECONDS (ref 22.7–35.4)
BACTERIA SPEC AEROBE CULT: NORMAL
BACTERIA SPEC AEROBE CULT: NORMAL
BH BB BLOOD EXPIRATION DATE: NORMAL
BH BB BLOOD TYPE BARCODE: 5100
BH BB DISPENSE STATUS: NORMAL
BH BB PRODUCT CODE: NORMAL
BH BB UNIT NUMBER: NORMAL
BUN SERPL-MCNC: 20 MG/DL (ref 6–20)
BUN/CREAT SERPL: 9.9 (ref 7–25)
CALCIUM SPEC-SCNC: 8 MG/DL (ref 8.6–10.5)
CHLORIDE SERPL-SCNC: 106 MMOL/L (ref 98–107)
CO2 SERPL-SCNC: 16.1 MMOL/L (ref 22–29)
CREAT SERPL-MCNC: 2.03 MG/DL (ref 0.57–1)
CROSSMATCH INTERPRETATION: NORMAL
DEPRECATED RDW RBC AUTO: 46.1 FL (ref 37–54)
EGFRCR SERPLBLD CKD-EPI 2021: 30 ML/MIN/1.73
ERYTHROCYTE [DISTWIDTH] IN BLOOD BY AUTOMATED COUNT: 13.5 % (ref 12.3–15.4)
GLUCOSE BLDC GLUCOMTR-MCNC: 116 MG/DL (ref 65–99)
GLUCOSE BLDC GLUCOMTR-MCNC: 146 MG/DL (ref 65–99)
GLUCOSE BLDC GLUCOMTR-MCNC: 152 MG/DL (ref 65–99)
GLUCOSE BLDC GLUCOMTR-MCNC: 153 MG/DL (ref 65–99)
GLUCOSE SERPL-MCNC: 152 MG/DL (ref 65–99)
HCT VFR BLD AUTO: 33.7 % (ref 34–46.6)
HGB BLD-MCNC: 10.7 G/DL (ref 12–15.9)
INR PPP: 1.19 (ref 0.9–1.1)
MCH RBC QN AUTO: 29.2 PG (ref 26.6–33)
MCHC RBC AUTO-ENTMCNC: 31.8 G/DL (ref 31.5–35.7)
MCV RBC AUTO: 92.1 FL (ref 79–97)
PHOSPHATE SERPL-MCNC: 5.5 MG/DL (ref 2.5–4.5)
PLATELET # BLD AUTO: 285 10*3/MM3 (ref 140–450)
PMV BLD AUTO: 10.5 FL (ref 6–12)
POTASSIUM SERPL-SCNC: 5.1 MMOL/L (ref 3.5–5.2)
PROTHROMBIN TIME: 15 SECONDS (ref 11.7–14.2)
RBC # BLD AUTO: 3.66 10*6/MM3 (ref 3.77–5.28)
SODIUM SERPL-SCNC: 132 MMOL/L (ref 136–145)
UNIT  ABO: NORMAL
UNIT  RH: NORMAL
WBC NRBC COR # BLD AUTO: 13.61 10*3/MM3 (ref 3.4–10.8)

## 2025-07-30 PROCEDURE — 63710000001 INSULIN LISPRO (HUMAN) PER 5 UNITS: Performed by: STUDENT IN AN ORGANIZED HEALTH CARE EDUCATION/TRAINING PROGRAM

## 2025-07-30 PROCEDURE — 25810000003 SODIUM CHLORIDE 0.9 % SOLUTION: Performed by: STUDENT IN AN ORGANIZED HEALTH CARE EDUCATION/TRAINING PROGRAM

## 2025-07-30 PROCEDURE — 80069 RENAL FUNCTION PANEL: CPT | Performed by: STUDENT IN AN ORGANIZED HEALTH CARE EDUCATION/TRAINING PROGRAM

## 2025-07-30 PROCEDURE — 25010000002 HEPARIN (PORCINE) 25000-0.45 UT/250ML-% SOLUTION: Performed by: STUDENT IN AN ORGANIZED HEALTH CARE EDUCATION/TRAINING PROGRAM

## 2025-07-30 PROCEDURE — 85610 PROTHROMBIN TIME: CPT | Performed by: STUDENT IN AN ORGANIZED HEALTH CARE EDUCATION/TRAINING PROGRAM

## 2025-07-30 PROCEDURE — 85730 THROMBOPLASTIN TIME PARTIAL: CPT | Performed by: STUDENT IN AN ORGANIZED HEALTH CARE EDUCATION/TRAINING PROGRAM

## 2025-07-30 PROCEDURE — 63710000001 INSULIN GLARGINE PER 5 UNITS: Performed by: STUDENT IN AN ORGANIZED HEALTH CARE EDUCATION/TRAINING PROGRAM

## 2025-07-30 PROCEDURE — 25010000002 CEFAZOLIN PER 500 MG: Performed by: STUDENT IN AN ORGANIZED HEALTH CARE EDUCATION/TRAINING PROGRAM

## 2025-07-30 PROCEDURE — 99024 POSTOP FOLLOW-UP VISIT: CPT | Performed by: STUDENT IN AN ORGANIZED HEALTH CARE EDUCATION/TRAINING PROGRAM

## 2025-07-30 PROCEDURE — 85027 COMPLETE CBC AUTOMATED: CPT | Performed by: STUDENT IN AN ORGANIZED HEALTH CARE EDUCATION/TRAINING PROGRAM

## 2025-07-30 PROCEDURE — 82948 REAGENT STRIP/BLOOD GLUCOSE: CPT

## 2025-07-30 PROCEDURE — 25010000002 HEPARIN (PORCINE) PER 1000 UNITS: Performed by: STUDENT IN AN ORGANIZED HEALTH CARE EDUCATION/TRAINING PROGRAM

## 2025-07-30 RX ORDER — HEPARIN SODIUM 10000 [USP'U]/100ML
18 INJECTION, SOLUTION INTRAVENOUS
Status: DISCONTINUED | OUTPATIENT
Start: 2025-07-30 | End: 2025-08-01 | Stop reason: HOSPADM

## 2025-07-30 RX ORDER — HYDRALAZINE HYDROCHLORIDE 25 MG/1
25 TABLET, FILM COATED ORAL EVERY 8 HOURS SCHEDULED
Status: DISCONTINUED | OUTPATIENT
Start: 2025-07-30 | End: 2025-08-01

## 2025-07-30 RX ORDER — SODIUM CHLORIDE 9 MG/ML
125 INJECTION, SOLUTION INTRAVENOUS CONTINUOUS
Status: ACTIVE | OUTPATIENT
Start: 2025-07-30 | End: 2025-07-31

## 2025-07-30 RX ORDER — HEPARIN SODIUM 5000 [USP'U]/ML
40-80 INJECTION, SOLUTION INTRAVENOUS; SUBCUTANEOUS EVERY 6 HOURS PRN
Status: DISCONTINUED | OUTPATIENT
Start: 2025-07-30 | End: 2025-08-01 | Stop reason: HOSPADM

## 2025-07-30 RX ORDER — CLOPIDOGREL BISULFATE 75 MG/1
300 TABLET ORAL ONCE
Status: COMPLETED | OUTPATIENT
Start: 2025-07-30 | End: 2025-07-30

## 2025-07-30 RX ADMIN — SODIUM BICARBONATE 1300 MG: 650 TABLET ORAL at 16:20

## 2025-07-30 RX ADMIN — INSULIN LISPRO 7 UNITS: 100 INJECTION, SOLUTION INTRAVENOUS; SUBCUTANEOUS at 08:12

## 2025-07-30 RX ADMIN — METOCLOPRAMIDE 5 MG: 5 TABLET ORAL at 17:02

## 2025-07-30 RX ADMIN — CARVEDILOL 12.5 MG: 12.5 TABLET, FILM COATED ORAL at 08:11

## 2025-07-30 RX ADMIN — CLOPIDOGREL BISULFATE 300 MG: 75 TABLET, FILM COATED ORAL at 09:16

## 2025-07-30 RX ADMIN — ACETAMINOPHEN 1000 MG: 500 TABLET, FILM COATED ORAL at 20:26

## 2025-07-30 RX ADMIN — ASPIRIN 81 MG CHEWABLE TABLET 81 MG: 81 TABLET CHEWABLE at 08:11

## 2025-07-30 RX ADMIN — CEFAZOLIN 2000 MG: 2 INJECTION, POWDER, FOR SOLUTION INTRAMUSCULAR; INTRAVENOUS at 09:17

## 2025-07-30 RX ADMIN — METOCLOPRAMIDE 5 MG: 5 TABLET ORAL at 08:11

## 2025-07-30 RX ADMIN — ATORVASTATIN CALCIUM 80 MG: 80 TABLET, FILM COATED ORAL at 08:11

## 2025-07-30 RX ADMIN — INSULIN GLARGINE 20 UNITS: 100 INJECTION, SOLUTION SUBCUTANEOUS at 20:34

## 2025-07-30 RX ADMIN — INSULIN LISPRO 2 UNITS: 100 INJECTION, SOLUTION INTRAVENOUS; SUBCUTANEOUS at 17:01

## 2025-07-30 RX ADMIN — METOCLOPRAMIDE 5 MG: 5 TABLET ORAL at 20:27

## 2025-07-30 RX ADMIN — INSULIN LISPRO 2 UNITS: 100 INJECTION, SOLUTION INTRAVENOUS; SUBCUTANEOUS at 08:11

## 2025-07-30 RX ADMIN — HEPARIN SODIUM 5000 UNITS: 5000 INJECTION, SOLUTION INTRAVENOUS; SUBCUTANEOUS at 06:29

## 2025-07-30 RX ADMIN — SODIUM BICARBONATE 1300 MG: 650 TABLET ORAL at 20:27

## 2025-07-30 RX ADMIN — ACETAMINOPHEN 1000 MG: 500 TABLET, FILM COATED ORAL at 06:29

## 2025-07-30 RX ADMIN — PANTOPRAZOLE SODIUM 40 MG: 40 TABLET, DELAYED RELEASE ORAL at 06:29

## 2025-07-30 RX ADMIN — METOCLOPRAMIDE 5 MG: 5 TABLET ORAL at 11:24

## 2025-07-30 RX ADMIN — HEPARIN SODIUM 18 UNITS/KG/HR: 10000 INJECTION, SOLUTION INTRAVENOUS at 09:17

## 2025-07-30 RX ADMIN — INSULIN LISPRO 7 UNITS: 100 INJECTION, SOLUTION INTRAVENOUS; SUBCUTANEOUS at 17:02

## 2025-07-30 RX ADMIN — HYDRALAZINE HYDROCHLORIDE 50 MG: 50 TABLET ORAL at 06:29

## 2025-07-30 RX ADMIN — SODIUM BICARBONATE 1300 MG: 650 TABLET ORAL at 08:11

## 2025-07-30 RX ADMIN — ACETAMINOPHEN 1000 MG: 500 TABLET, FILM COATED ORAL at 14:16

## 2025-07-30 RX ADMIN — SODIUM CHLORIDE 125 ML/HR: 9 INJECTION, SOLUTION INTRAVENOUS at 06:36

## 2025-07-30 RX ADMIN — CARVEDILOL 12.5 MG: 12.5 TABLET, FILM COATED ORAL at 17:02

## 2025-07-30 RX ADMIN — INSULIN LISPRO 7 UNITS: 100 INJECTION, SOLUTION INTRAVENOUS; SUBCUTANEOUS at 11:24

## 2025-07-30 NOTE — PROGRESS NOTES
"NEPHROLOGY PROGRESS NOTE------[facility]    [facility]  [phone]  Raheem Benites MD      Patient Care Team:  Ibrahima Correa MD as PCP - General (Family Medicine)  Xochilt Jenkins MD as Consulting Physician (Nephrology)      Provider:  Raheem Benites MD  Patient Name: Bibiana Inman  :  1980    SUBJECTIVE:  F/u CARLA/CKD  No chest pain or SOA    Medication:  acetaminophen, 1,000 mg, Oral, Q8H  aspirin, 81 mg, Oral, Daily  atorvastatin, 80 mg, Oral, Daily  carvedilol, 12.5 mg, Oral, BID With Meals  hydrALAZINE, 50 mg, Oral, Q8H  insulin glargine, 20 Units, Subcutaneous, Nightly  insulin lispro, 2-9 Units, Subcutaneous, 4x Daily AC & at Bedtime  insulin lispro, 7 Units, Subcutaneous, TID With Meals  metoclopramide, 5 mg, Oral, 4x Daily  pantoprazole, 40 mg, Oral, Q AM  sodium bicarbonate, 1,300 mg, Oral, TID      heparin, 18 Units/kg/hr, Last Rate: 18 Units/kg/hr (25 0917)  sodium chloride, 125 mL/hr, Last Rate: 125 mL/hr (25 0922)        OBJECTIVE    Vital Sign Min/Max for last 24 hours  Temp  Min: 97.6 °F (36.4 °C)  Max: 98.8 °F (37.1 °C)   BP  Min: 105/53  Max: 187/76   Pulse  Min: 54  Max: 63   Resp  Min: 16  Max: 18   SpO2  Min: 91 %  Max: 99 %   No data recorded   No data recorded     Flowsheet Rows      Flowsheet Row First Filed Value   Admission Height 162.6 cm (64\") Documented at 2025 1412   Admission Weight 63.3 kg (139 lb 8.8 oz) Documented at 2025 1845            No intake/output data recorded.  I/O last 3 completed shifts:  In: 4543.4 [P.O.:250; I.V.:4000.4; Blood:293]  Out: 985 [Urine:485; Blood:500]    Physical Exam:  General Appearance: alert, appears stated age and cooperative  Head: normocephalic, without obvious abnormality and atraumatic  Eyes: conjunctivae and sclerae normal and no icterus  Neck: supple and no JVD  Lungs: clear to auscultation and respirations regular  Heart: regular " Neg pap, HPV neg, repeat pap needed in 3 years, return to clinic 1 year for annual exam. "rhythm & normal rate and normal S1, S2  Chest: Wall no abnormalities observed  Abdomen: normal bowel sounds and soft, nontender  Extremities: moves extremities well, no edema, no cyanosis and no redness  Skin: no bleeding,   Neurologic: Alert, and oriented. No focal deficits    Labs:    WBC WBC   Date Value Ref Range Status   07/30/2025 13.61 (H) 3.40 - 10.80 10*3/mm3 Final   07/29/2025 9.02 3.40 - 10.80 10*3/mm3 Final   07/29/2025 8.94 3.40 - 10.80 10*3/mm3 Final   07/28/2025 8.67 3.40 - 10.80 10*3/mm3 Final      HGB Hemoglobin   Date Value Ref Range Status   07/30/2025 10.7 (L) 12.0 - 15.9 g/dL Final   07/29/2025 10.1 (L) 12.0 - 15.9 g/dL Final   07/29/2025 9.1 (L) 12.0 - 15.9 g/dL Final   07/28/2025 9.3 (L) 12.0 - 15.9 g/dL Final      HCT Hematocrit   Date Value Ref Range Status   07/30/2025 33.7 (L) 34.0 - 46.6 % Final   07/29/2025 31.6 (L) 34.0 - 46.6 % Final   07/29/2025 27.9 (L) 34.0 - 46.6 % Final   07/28/2025 29.2 (L) 34.0 - 46.6 % Final      Platelets No results found for: \"LABPLAT\"   MCV MCV   Date Value Ref Range Status   07/30/2025 92.1 79.0 - 97.0 fL Final   07/29/2025 92.1 79.0 - 97.0 fL Final   07/29/2025 90.6 79.0 - 97.0 fL Final   07/28/2025 91.8 79.0 - 97.0 fL Final          Sodium Sodium   Date Value Ref Range Status   07/30/2025 132 (L) 136 - 145 mmol/L Final   07/29/2025 131 (L) 136 - 145 mmol/L Final   07/29/2025 134 (L) 136 - 145 mmol/L Final   07/28/2025 131 (L) 136 - 145 mmol/L Final      Potassium Potassium   Date Value Ref Range Status   07/30/2025 5.1 3.5 - 5.2 mmol/L Final   07/29/2025 4.9 3.5 - 5.2 mmol/L Final     Comment:     Slight hemolysis detected by analyzer. Result may be falsely elevated.   07/29/2025 4.4 3.5 - 5.2 mmol/L Final   07/28/2025 4.5 3.5 - 5.2 mmol/L Final   07/27/2025 4.8 3.5 - 5.2 mmol/L Final      Chloride Chloride   Date Value Ref Range Status   07/30/2025 106 98 - 107 mmol/L Final   07/29/2025 105 98 - 107 mmol/L Final   07/29/2025 107 98 - 107 mmol/L Final " "  07/28/2025 106 98 - 107 mmol/L Final      CO2 CO2   Date Value Ref Range Status   07/30/2025 16.1 (L) 22.0 - 29.0 mmol/L Final   07/29/2025 17.8 (L) 22.0 - 29.0 mmol/L Final   07/29/2025 18.2 (L) 22.0 - 29.0 mmol/L Final   07/28/2025 17.6 (L) 22.0 - 29.0 mmol/L Final      BUN BUN   Date Value Ref Range Status   07/30/2025 20.0 6.0 - 20.0 mg/dL Final   07/29/2025 17.0 6.0 - 20.0 mg/dL Final   07/29/2025 17.0 6.0 - 20.0 mg/dL Final   07/28/2025 19.0 6.0 - 20.0 mg/dL Final      Creatinine Creatinine   Date Value Ref Range Status   07/30/2025 2.03 (H) 0.57 - 1.00 mg/dL Final   07/29/2025 1.78 (H) 0.57 - 1.00 mg/dL Final   07/29/2025 1.70 (H) 0.57 - 1.00 mg/dL Final   07/28/2025 2.22 (H) 0.57 - 1.00 mg/dL Final      Calcium Calcium   Date Value Ref Range Status   07/30/2025 8.0 (L) 8.6 - 10.5 mg/dL Final   07/29/2025 7.7 (L) 8.6 - 10.5 mg/dL Final   07/29/2025 7.7 (L) 8.6 - 10.5 mg/dL Final   07/28/2025 8.0 (L) 8.6 - 10.5 mg/dL Final      PO4 No components found for: \"PO4\"   Albumin Albumin   Date Value Ref Range Status   07/30/2025 1.7 (L) 3.5 - 5.2 g/dL Final   07/29/2025 1.9 (L) 3.5 - 5.2 g/dL Final   07/28/2025 2.0 (L) 3.5 - 5.2 g/dL Final      Magnesium Magnesium   Date Value Ref Range Status   07/28/2025 1.9 1.6 - 2.6 mg/dL Final      Uric Acid No components found for: \"URIC ACID\"     Imaging Results (Last 72 Hours)       Procedure Component Value Units Date/Time    Arteriogram (Autofinalize) [[id number]] Resulted: 07/29/25 2046     Updated: 07/29/25 2046    Narrative:      This procedure was auto-finalized with no dictation required.    CT Angio Abdominal Aorta Bilateral Iliofem Runoff [[id number]] Collected: 07/27/25 1236     Updated: 07/27/25 1259    Narrative:      CT ANGIOGRAM ABDOMEN AND PELVIS WITH ILIOFEMORAL RUNOFF     HISTORY: Peripheral artery disease with tissue loss. Diabetes.     COMPARISON: Right foot x-rays 07/24/2025. CT chest 06/26/2024..     TECHNIQUE: Axial images were obtained from the lung " bases through the  lower extremities following the administration of IV contrast. Coronal  and sagittal MIP images were obtained as well as 3D volume rendering.   Radiation dose reduction techniques were utilized, including automated  exposure control and exposure modulation based on body size.     FINDINGS:  Atherosclerotic calcifications are present involving the distal thoracic  aorta abdominal aorta with mild narrowing. The celiac artery, superior  mesenteric artery are patent. There is a long segment of moderate  stenosis of the left main renal artery associated with partially  calcified plaque that extends 2.5 cm in length. Mild narrowing of the  origin of the right renal artery. Calcified and noncalcified plaque  involving the inferior abdominal aorta with moderate narrowing just  above its bifurcation. No aneurysm.     On the left, there is a moderate stenosis of the origin of the left  common iliac artery. Multifocal moderate left common iliac arterial  stenoses. Mild narrowing of the left internal iliac artery. There is a  severe stenosis of the left external iliac artery approximately 4.5 cm  distal to its origin. Moderate stenosis left common femoral artery. Left  deep femoral artery is patent. There is a left superficial femoral  artery stent that is occluded. Left superficial femoral artery  reconstitutes at the distal margin of the stent at the level of the mid  thigh and is small with multifocal stenoses that are up to severe in the  region of the adductor hiatus. Multifocal popliteal artery stenoses of  the left popliteal arteries patent. Left anterior tibial artery contains  calcification proximally with moderate narrowing. The left tibioperoneal  trunk is patent. There is three-vessel runoff to the left lower  extremity.     On the right, there is moderate stenosis origin right common iliac  artery. Mild to moderate narrowing of the distal right common iliac  artery. Moderate to severe stenosis of  the proximal right internal iliac  artery. Moderate right external iliac arterial stenoses. Moderate  stenosis of the right common femoral artery. The right superficial  femoral artery is small with multifocal severe stenoses extending from  its origin to the popliteal artery though no evidence for complete  occlusion. The right deep femoral artery is patent. Right popliteal  artery is small with areas of moderate to severe stenoses without  evidence for complete occlusion. Multifocal calcifications involving the  proximal anterior posterior tibial arteries and the tibial peroneal  trunk. There is flow within the right anterior and posterior tibial  arteries extending into the ankle. Flow within the peroneal artery  extends into the distal calf.     Moderate bilateral pleural effusions layer dependently with adjacent  compressive lower lobe atelectasis.     Liver, spleen, pancreas exhibit normal early arterial phase of contrast  appearance. Multiple gallstones. Bilateral adrenal thickening. Kidneys  appear within normal limits. There is no hydronephrosis.     Mild ascites with free fluid extending into the pelvis. Colonic  diverticulosis without evidence for diverticulitis. Mild generalized  body wall edema with third spacing of fluid.                Impression:      1. Multifocal atherosclerotic disease. Long segment of moderate stenosis  of the left main renal artery associated with partially calcified  plaque. Mild narrowing origin right renal artery.  2. Atherosclerotic calcification involving the common aorta and iliac  vasculature with severe stenosis of the left external iliac artery  proximally 4.5 cm distal to its origin. Multifocal iliac and common  femoral arterial stenoses.  3. Left superficial femoral artery stent is occluded with reconstitution  just below the stent. Multifocal stenoses of the distal left superficial  femoral artery, left popliteal artery. Three-vessel runoff to the left  lower  extremity.  4. On the right, the right superficial femoral artery is small with  multifocal severe stenoses extending from its origin to the popliteal  artery but without evidence for complete occlusion. Right popliteal  artery is small with multifocal moderate to severe stenoses.  Calcification involving the proximal anterior and posterior tibial  arteries and peroneal artery. There is flow within the anterior and  posterior tibial arteries on the right extending to the foot and there  is flow of the right peroneal artery extending to the distal calf.  5. Moderate bilateral pleural effusions with adjacent compressive lower  lobe atelectasis.  6. Cholelithiasis.  7. Generalized body wall edema.        This report was finalized on 7/27/2025 12:56 PM by Huan Noriega M.D  on Workstation: [id number]               Results for orders placed during the hospital encounter of 07/22/25    XR Foot 3+ View Right 07/24/2025  2:49 PM    Narrative  XR FOOT 3+ VW RIGHT-    CLINICAL: Small toe ischemia.    FINDINGS: No 5th toe cortical bone destruction. No acute osseous  abnormality. No soft tissue gas or radiopaque foreign body. The 5th  metatarsal is satisfactory in appearance. There is 1st  metatarsophalangeal joint degeneration of a mild-to-moderate degree. The  forefoot is otherwise unremarkable.    Moderate size calcaneal spur, hind foot articulations preserved. Midfoot  articulations within normal limits.    CONCLUSION: No 5th toe acute osseous or articular abnormality. No soft  tissue gas or radiopaque foreign body seen.      This report was finalized on 7/24/2025 2:49 PM by Dr. Clif Galeas M.D  on Workstation: [id number]      XR Ribs Bilateral 4+ View With PA Chest 07/22/2025  3:59 PM    Narrative  8 VIEW BILATERAL RIBS AND 1 VIEW PA CHEST    HISTORY: Syncope. Fell. Rib pain, left greater than right.    FINDINGS: The vertebral height and disc spaces are well-maintained. The  heart is top normal in size with  sternal wires from previous cardiac  surgery and there is no change from 5/5/2025.    Multiple views of the left and right ribs show no definite fracture at  the present time.    This report was finalized on 7/22/2025 3:59 PM by Dr. Fortunato Fournier M.D  on Workstation: [id number]      Results for orders placed during the hospital encounter of 05/05/25    XR Ankle 3+ View Right 05/06/2025  3:49 AM    Narrative  Patient: SAVITA BELCHER  Time Out: 03:49  Exam(s): XR RIGHT ANKLE    EXAM:  XR Right Ankle Complete, 3 or More Views    CLINICAL HISTORY:  Reason for exam: fall, pain.    TECHNIQUE:  Frontal, lateral and oblique views of the right ankle.    COMPARISON:  No relevant prior studies available.    FINDINGS:  Bones joints:  No evidence of acute fracture or dislocation.  Plantar  calcaneal spur.  Soft tissues:  Unremarkable.    Impression  No evidence of acute fracture or dislocation.    IMPRESSION:      Electronically signed by Thai Mays MD on 05-06-25 at 0349      Results for orders placed during the hospital encounter of 07/22/25    Duplex Vein Mapping Lower Extremity - Bilateral CAR 07/27/2025  3:46 PM    Interpretation Summary    Greater saphenous vein and small saphenous veins appear to be inadequately sized for bypass bilaterally.        ASSESSMENT / PLAN      CARLA-likely prerenal in the setting of volume depletion and or contrast exposure  T2DM  Hypertension-blood pressure controlled.  Hold lisinopril  Syncope not orthostatic on admission.  Probably volume depleted given severe hyperglycemia  History coronary artery disease  Metabolic acidosis-add p.o. sodium bicarb  Right fifth toe ulcer/necrosis-vascular/podiatry following.  Status post endovascular right SFA revascularization with bilateral common and external iliac stent placement, right femoral endarterectomy and profundoplasty on 7/29/2025.     CR slightly up, continue sodium bicarb  BP soft, reduce hydralazine 25 mg TID  Monitor renal function, fluid  status and electrolytes        Raheme Benites MD  [facility]  [phone]  07/30/25  12:05 EDT

## 2025-07-30 NOTE — OP NOTE
Operative Note  Location: [facility]  Date of Admission:  7/22/2025  OR Date: 7/29/2025      Pre-op Diagnosis:  chronic limb threatening ischemia with tissue loss, right leg    Post-op Diagnosis: Same    Procedure:   1) right lower extremity angiogram with runoff  2) bilateral common iliac and external iliac artery stents  3) right common femoral artery endarterectomy and profundaplasty    Surgeon: Kathy Hopkins MD    Assistant: Angelika Nova [facility], Provided critical assistance in exposure, retraction, and suction that overall decrease blood loss and operative time.    Anesthesia: General    Staff:   Circulator: River Lund, ALEENA; Jane Messer RN  Scrub Person: Marah Canales; Girma Hancock; Maya Zafar; Mita Valdivia  Assistant: Angelika Nova CSA  Vascular Radiology Technician: Ana Clark Terri    Estimated Blood Loss: <500ml    Specimen:   Order Name Source Comment Collection Info Order Time   PREGNANCY, URINE Urine, Clean Catch  Collected By: Gloria Dumont RN 7/28/2025  7:48 PM     Release to patient   Routine Release        PREPARE RBC Other   7/29/2025  6:41 PM     When to Transfuse?   Transfuse          Indication for Transfusion - Choose One   Hgb less than 9 g/dL or Hct less than 27%          Secondary Indication - Choose One   Active Perioperative Bleeding          :   STAY 2 UNITS AHEAD          Release to patient   Routine Release        PREPARE RBC Other   7/29/2025  7:40 PM     When to Transfuse?   Hold          Indication for Transfusion   Surgery          Surgery Date   7/29/2025          Release to patient   Routine Release            Complications: none    Findings: Bilateral severe common iliac artery stenosis and bilateral severe external iliac artery stenoses, treated with stent angioplasty.  While closing the right common femoral artery, the closure device did not fire properly and removed some of the artery intima.  This actually tacked up the posterior flap  and there were no signals in the right foot.  The right common femoral artery was exposed and revascularized with an endarterectomy and profunda plasty.  Completion runoff angiogram showed intact two vessel runoff to the foot via the anterior tibial and posterior tibial arteries.    Implants:   Implant Name Type Inv. Item Serial No.  Lot No. LRB No. Used Action   STENTGR ENDOPROSTH VIABAHN RO HEP 6F 6MM 7F720PD - [id number] - [id number] Implant STENTGR ENDOPROSTH VIABAHN RO HEP 6F 6MM [id number] [employer] NR Left 1 Implanted   STENTGR ENDOPROSTH VIABAHN VBX BALN/EXP HEP 6F 7X39MM 135CM - [id number] - [id number] Implant STENTGR ENDOPROSTH VIABAHN VBX BALN/EXP HEP 6F 7X39MM 135CM [id number] [employer] NR Left 1 Implanted   STENTGR ENDOPROSTH VIABAHN VBX BALN/EXP HEP 6F 7X39MM 135CM - [id number] - [id number] Implant STENTGR ENDOPROSTH VIABAHN VBX BALN/EXP HEP 6F 7X39MM 135CM [id number] [employer]  Right 1 Implanted   STNT PROTEGE EVERFLX SEXP.035 6X40 80 - [id number] Stent STNT PROTEGE EVERFLX SEXP.035 6X40 80  EV3  A [employer] [id number] Left 1 Implanted   STNT PROTEGE EVERFLX SEXP.035 6X60 80 - [id number] Stent STNT PROTEGE EVERFLX SEXP.035 6X60 80  EV3  A [employer] [id number] Left 1 Implanted   CLIPAPPLR M/ ENDO LIGACLIP9 3/8IN MD - [id number] Implant CLIPAPPLR M/ ENDO LIGACLIP9 3/8IN MD  [facility] ENDO SURGERY  DIV OF J AND J [id number] Left 1 Implanted   KT SEAL HEMOS ABS FLOSEAL MATRX 1.5/FAST/PREP 5000/IU 5ML - [id number] Implant KT SEAL HEMOS ABS FLOSEAL MATRX 1.5/FAST/PREP 5000/IU 5ML  [facility] [id number] Left 1 Implanted   PTCH VASC VASCUGUARD TPR/END 0.8X8CM STRL - [id number] Implant PTCH VASC VASCUGUARD TPR/END 0.8X8CM STRL  [facility] [id number] Right 1 Implanted   HEMOST ABS SURGICEL FIBRILLAR 1X2 - [id number] Implant HEMOST ABS SURGICEL FIBRILLAR 1X2  [employer]  DIV OF J AND J [id number] Right  1 Implanted       Indications:    The patient is a 45 year old female who presented with CLTI and a right toe wound.  Workup revealed multi level disease.  She does not have adequate GSV for bypass so she presents for endovascular revascularization.         Procedure:    Patient brought to the operating room and positioned supine on the operating table.  Appropriate perioperative antibiotics administered prior to skin incision.  Timeout performed with all OR staff present and in agreement.  MAC anesthesia introduced without event.  Bilateral groins were prepped and draped in standard sterile fashion. Under US guidance, the left common femoral artery was accessed with a micropuncture needle and wire.  A micropuncture catheter was placed and angiogram confirmed common femoral artery puncture.  She was heparinized then the left external iliac was crossed with a glidewire.  There was severe long segment stenosis of the EIA.  This was treated with balloon angioplasty with a 5mm then a 6 mm balloon.  There was a flow limiting dissection, so a 6 x5 viabahn was placed over this area in the EIA.  An EV3 was used to extend this proximally, which covered the origin of the hypogastric artery.  There was an excellent angiographic result with no residual stenosis and no extravasation.  Then, I proceeded with an aortogram.  There was severe stenosis of bilateral common iliac arteries proximally.  There was moderate stenosis of the distal right JALEEL and a focal severe stenosis of the right EIA.  The catheter was placed up and over into the right EIA and a right lower extremity runoff angiogram was obtained that showed a patent common femoral artery and profunda femoral artery.  The SFA was occluded throughout its whole course.  The popliteal artery was severely diseased and focally occluded.  The anterior tibial artery was the primary runoff the foot.  There was severe stenosis of the TP trunk and proximal PT.  There was runoff to  the foot via the PT, but this stopped at the ankle.  There was severe small vessel disease in the foot with an incomplete pedal arch.  A 6 x 45 destination sheath was placed up and over into the right common femoral artery and I attempted to cross the SFA occlusion numerous times without success.  I was able to create a dissection plane in the SFA but was not able to re enter the true lumen in the popliteal artery.  Eventually, I retracted the sheath and exchanged this for a short 6 Fr sheath in the left ocmmon femoral artery.  Then, I accessed the right common femoral artery in the same manner and placed a 6 Fr sheath.  Stiff wires were placed into the infrarenal aorta bilaterally.  Then, 7 x 39 mm VBX stents were deployed in bilateral JALEEL and the infrarenal aorta in a kissing fashion.  The distal aspect of the right JALEEL stent was angioplastied with a 8 x 20 mm balloon.  There was an excellent angiographic result with resolution of the iliac artery stenosis.  There remained significant right external iliac artery stenosis, which was treated with angioplasty with a 6 mm balloon, which caused a flow limiting dissection in the EIA.  I treated this with an EV3 stent, which resolved the flow limiting dissection.  Then, I removed the left sheath and closed the arteriotomy with a PerClose device.  Then, I attempted the same on the right side.  The first PerClose did not fire appropriately, so I used a 2nd device.  This also did not fire appropriately and there was arterial intima on the device when it was removed from the patient.  I checked pedal pulses.  There were doppler signals in the left foot but no doppler signals in the right foot.  I decided to make a transverse incision over the right groin to expose the right common femoral artery.  The patient was intubated and placed under a general anesthetic prior to incision.  The transverse incision was made and dissection carried down to the femoral sheath, which was  opened longitudinally.  Then, the profunda femoral, SFA, and CFA were all dissected free and encircled with vessel loops.  She was fully heparinized then the vessels were clamped. The puncture site was in the common femoral artery and the closure device had torn the anterior surface of the artery.  I opened the common femoral artery longitudinally and there was intima tacked up at the puncture site causing a common femoral artery occlusion.  I proceeded to perform a common femoral endarterectomy.  The SFA is chronically occluded so an eversion endarterectomy was performed of the proximal aspect but I did perform a profundaplasty, which is her primary outflow.  I extended down onto the profunda.  There was a very dense posterior plaque of the right common femoral, SFA, and PFA.  I was able to achieve a nice endpoint proximally and I tacked the plaque distally in the PFA with 6-0 prolene sutures, as I had dissected out a good bit of the artery and there was still plaque present.  Then, a 0.8 x 8 cm bovine pericardial patch was used to close the CFA and profunda.  This was flushed appropriately prior to completion.  There were doppler signals in the right profunda femoral and SFA.  There were no signals at the right ankle again, so a runoff angiogram was obtained that showed intact two vessel runoff to the foot. I irrigated the wound then closed in layers with 2-0, 3-0, and 4-0 vicryl.  Skin glue was applied.      All counts were reported as correct at the conclusion of the procedure.  Patient tolerated procedure well without any apparent complications.  Anesthesia was reversed and the patient was transferred to the PACU in stable condition.          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  ulcer [E11.621, L97.509]  Yes    Atherosclerosis of native arteries of the extremities with ulceration [I70.25]  Unknown    Bilateral carotid artery stenosis [I65.23]  Yes    Elevated troponin [R79.89]  Yes    History of CVA (cerebrovascular accident) [Z86.73]  Not Applicable    Obstructive sleep apnea [G47.33]  Yes    Gastroesophageal reflux disease [K21.9]  Yes    Type 2 diabetes mellitus with hyperglycemia, with long-term current use of insulin [E11.65, Z79.4]  Not Applicable    Benign essential hypertension [I10]  Yes         Kathy Hopkins MD  Vascular Surgery  O: [phone]  F: [phone]

## 2025-07-30 NOTE — CASE MANAGEMENT/SOCIAL WORK
Continued Stay Note  [facility]     Patient Name: Bibiana Inman  MRN: [id number]  Today's Date: 7/30/2025    Admit Date: 7/22/2025    Plan: Home with family   Discharge Plan       Row Name 07/30/25 1424       Plan    Plan Home with family    Patient/Family in Agreement with Plan yes    Plan Comments Met with pt in room. She confirmed the plan is for her to go to her mother-in-law's home at discharge. Family may be able to provide transportation. Pt may need anticoag at home. She was previously on Xarelto at home but it was stopped about a year ago. She denied any other needs at this time. CCP following. Nick CABELLO RN                   Discharge Codes    No documentation.                 Expected Discharge Date and Time       Expected Discharge Date Expected Discharge Time    Aug 1, 2025               Nick Rodriguez RN

## 2025-07-30 NOTE — PLAN OF CARE
Goal Outcome Evaluation:           Progress: (P) no change  Outcome Evaluation: (P) VSS. c/o pain in right groin site. pulses dopplerable. jenkins taken out. ambulating x1 assist. call light in reach. patient sleeping most of day.

## 2025-07-30 NOTE — PAYOR COMM NOTE
"Bibiana Belcher (45 y.o. Female)      SEE FOR INPATIENT: ref# [id number]    PATIENT WAS OBSERVATION AND CONVERTED TO INPATIENT 07/29/2025    [facility] DEPT: [phone],  [phone]    [facility]: NPI [id number] [facility]# [id number]    JENNIFER COBIAN RN,[facility]     Date of Birth   1980    Social Security Number       Address   [address] [place] [id number]    Home Phone   [phone]    MRN   [id number]       Lutheran   None    Marital Status                               Admission Date   7/22/2025    Admission Type   Emergency    Admitting Provider   Selina Snow MD    Attending Provider   Clark Dawson MD    Department, Room/Bed   [address], E563/1       Discharge Date       Discharge Disposition       Discharge Destination                                 Attending Provider: Clark Dawson MD    Allergies: Latex    Isolation: None   Infection: None   Code Status: CPR    Ht: 162.6 cm (64\")   Wt: 71.9 kg (158 lb 8.2 oz)    Admission Cmt: None   Principal Problem: Syncope and collapse [R55]                   Active Insurance as of 7/22/2025       Primary Coverage       Payor Plan Insurance Group Employer/Plan Group    [facility] COMMUNITY PLAN [facility] COMMUNITY PLAN [facility]       Payor Plan Address Payor Plan Phone Number Payor Plan Fax Number Effective Dates    [address]   5/1/2025 - None Entered    [facility] [id number]         Subscriber Name Subscriber Birth Date Member ID       BIBIANA BELCHER 1980 [id number]                     Emergency Contacts        (Rel.) Home Phone Work Phone Mobile Phone    HOLLAND BELCHER (Spouse) [phone] -- [phone]    NORMA BELCHER(MOTHER IN LAW) (Relative) -- -- [phone]    AMARI DONALDSON (SIS IN LAW) (Relative) -- -- [phone]                 History & Physical        Selina Snow MD at 07/22/25 2031          HISTORY AND PHYSICAL   [facility]        Date " of Admission: 2025  Patient Identification:  Name: Bibiana Inman  Age: 45 y.o.  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 Debbie Fry.    Depressive disorder 2023    Diabetic gastroparesis 2021    Diabetic peripheral neuropathy associated with type 2 diabetes mellitus 2024    Gastroesophageal reflux disease 03/10/2021    GERD (gastroesophageal reflux disease)     Intermittent claudication 2017    Iron deficiency anemia 2020    Lacunar cerebrovascular accident (CVA) 2024    Mixed hypercholesterolemia and hypertriglyceridemia 2014    Myocardial infarction 2021    Obesity 3/7/2014    Obstructive sleep apnea 2021    Personal history of COVID-19 2022    Polyp of colon 2023    Spontaneous  2012    Stress fracture of right ankle with routine healing 2024    Stroke 2024    Tobacco abuse 2024    Type 2 diabetes mellitus with hyperglycemia, with long-term current use of insulin 2017    Vitamin D deficiency 2024     Past Surgical History:  Past Surgical History:   Procedure Laterality Date    CARDIAC CATHETERIZATION  N/A 2024    Procedure: Left Heart Cath and coronary angiogram;  Surgeon: Jena Barron MD;  Location: [facility] CATH INVASIVE LOCATION;  Service: Cardiology;  Laterality: N/A;     SECTION      CORONARY ARTERY BYPASS GRAFT  2014    LIMA-LAD, SVG-OM2, SVG-PDA, Dr. Debbie Fry.    CORONARY ARTERY BYPASS GRAFT WITH AORTIC VALVE REPAIR/REPLACEMENT N/A 2024    Procedure: YUE; REOPERATIVE STERNOTOMY; CORONARY ARTERY BYPASS GRAFTING TIMES 1 UTILIZING RIGHT SAPHENOUS VEIN; AORTIC VALVE TUMOR ; PRP;  Surgeon: Benja De Luna MD;  [facility];  Service: Cardiothoracic;  Laterality: N/A;    DILATATION AND CURETTAGE      TONSILLECTOMY        Home Meds:  Facility-Administered Medications Prior to Admission   Medication Dose Route Frequency Provider Last Rate Last Admin    nicotine (NICODERM CQ) 14 MG/24HR patch 1 patch  1 patch Transdermal Q24H Rocio Denny APRN        nicotine polacrilex (NICORETTE) gum 2 mg  2 mg Mouth/Throat Q1H PRN Rocio Denny APRN         Medications Prior to Admission   Medication Sig Dispense Refill Last Dose/Taking    aspirin 81 MG chewable tablet Chew 1 tablet Daily. Indications: Disease involving Lipid Deposits in the Arteries   2025 Morning    atorvastatin (LIPITOR) 80 MG tablet Take 1 tablet by mouth Daily. Indications: High Amount of Fats in the Blood   2025    carvedilol (COREG) 12.5 MG tablet Take 1 tablet by mouth 2 (Two) Times a Day With Meals. 120 tablet 0 2025 Morning    famotidine (PEPCID) 40 MG tablet Take 1 tablet by mouth every night at bedtime. 90 tablet 3 2025 Morning    hydrALAZINE (APRESOLINE) 50 MG tablet take 1 tablet by oral route 2 times every day with food 180 tablet 3 2025 Morning    icosapent ethyl (Vascepa) 1 g capsule capsule Take 2 g (2 capsules) by mouth 2 (Two) Times a Day With Meals. Indications: Cerebrovascular Accident or Stroke, High Amount of Triglycerides in the Blood, Procedure to  Reestablish Blood Supply to the Heart 120 capsule 11 7/21/2025 Evening    lisinopril (PRINIVIL,ZESTRIL) 10 MG tablet Take 1 tablet by mouth Daily. 30 tablet 1 7/22/2025 Morning    metoclopramide (REGLAN) 5 MG tablet Take 1 tablet by mouth 4 (Four) Times a Day. 90 tablet 12 7/22/2025 Morning    ondansetron (ZOFRAN) 4 MG tablet Take 1 tablet by mouth three times a day as needed 30 tablet 5 Past Week    pantoprazole (PROTONIX) 40 MG EC tablet Take 1 tablet by mouth Every Morning. 90 tablet 3 7/22/2025 Morning    albuterol sulfate  (90 Base) MCG/ACT inhaler Inhale 2 puffs every 6 (six) hours if needed for wheezing. 18 g 0 More than a month    Continuous Glucose  (Dexcom G7 ) device Use 1 each 1 (One) Time for 1 dose. Dx: E11.65 1 each 0     Continuous Glucose Sensor (Dexcom G7 Sensor) misc Use 1 each Every 10 (Ten) Days. Dx: E11.65 3 each 2 Unknown    insulin aspart (novoLOG FLEXPEN) 100 UNIT/ML solution pen-injector sc pen Inject  under the skin into the appropriate area as directed 3 times a day. Sliding scale, between 5-20 units TID  Indications: Type 2 Diabetes   Unknown    insulin aspart (NovoLOG FlexPen) 100 UNIT/ML solution pen-injector sc pen Inject 5 Units under the skin into the appropriate area as directed 3 (Three) Times a Day With Meals. 15 mL 1 Unknown    insulin degludec (Tresiba FlexTouch) 100 UNIT/ML solution pen-injector injection Inject 30 Units under the skin into the appropriate area as directed Daily. 15 mL 1 Unknown    Insulin Pen Needle (Pen Needles) 32G X 4 MM misc Use 1 each 4 (Four) Times a Day. 100 each 0 Unknown    sodium bicarbonate 650 MG tablet Take 1 tablet by mouth Daily. (Patient not taking: Reported on 7/22/2025) 30 tablet 0 Not Taking       Allergies:  Allergies   Allergen Reactions    Latex Itching     Immunizations:  Immunization History   Administered Date(s) Administered    COVID-19 (PFIZER) Purple Cap Monovalent 01/23/2021, 02/13/2021    FluMist 2-49yrs  (Nasal) 2017    Fluzone (or Fluarix & Flulaval for VFC) >6mos 10/07/2020    Influenza Injectable Mdck Pf Quad 2017    Influenza Seasonal Injectable 2021, 2023    Influenza Split Preservative Free ID 2012    Influenza, Unspecified 10/01/2012, 10/14/2022, 2022    PPD Test 2021    Pneumococcal Conjugate 20-Valent (PCV20) 2023    Pneumococcal Polysaccharide (PPSV23) 04/15/2013    Tdap 2022     Social History:   Social History     Social History Narrative    Not on file     Social History     Socioeconomic History    Marital status:    Tobacco Use    Smoking status: Former     Current packs/day: 0.00     Average packs/day: 0.3 packs/day for 27.5 years (6.9 ttl pk-yrs)     Types: Cigarettes     Start date:      Quit date: 2025     Years since quittin.0    Smokeless tobacco: Never   Vaping Use    Vaping status: Never Used   Substance and Sexual Activity    Alcohol use: Not Currently    Drug use: Never    Sexual activity: Not Currently     Partners: Male     Birth control/protection: Post-menopausal, None       Family History:  Family History   Problem Relation Age of Onset    Heart disease Mother     Hypertension Mother         Review of Systems  See history of present illness and past medical history.  Patient denies headache,  trauma, change in vision, change in hearing, change in taste, changes in weight, changes in appetite, focal weakness, numbness, or paresthesia.  Patient denies chest pain, palpitations, dyspnea, orthopnea, PND, cough, sinus pressure, rhinorrhea, epistaxis, hemoptysis, nausea, vomiting,hematemesis, diarrhea, constipation or hematochezia.  Denies cold or heat intolerance, polydipsia, polyuria, polyphagia. Denies hematuria, pyuria, dysuria, hesitancy, frequency or urgency. Denies consumption of raw and under cooked meats foods or change in water source.  Denies fever, chills, sweats, night sweats.  Denies missing any routine  medications.   Objective:  T Max 24 hrs: Temp (24hrs), Av.7 °F (36.5 °C), Min:97.5 °F (36.4 °C), Max:98.2 °F (36.8 °C)    Vitals Ranges:   Temp:  [97.5 °F (36.4 °C)-98.2 °F (36.8 °C)] 97.5 °F (36.4 °C)  Heart Rate:  [54-78] 60  Resp:  [20-21] 20  BP: ()/(56-75) 178/75      Exam:  /75 (BP Location: Right arm, Patient Position: Lying)   Pulse 60   Temp 97.5 °F (36.4 °C) (Oral)   Resp 20   Wt 63.2 kg (139 lb 6.4 oz)   LMP 01/10/2023 Comment: patient states irregular periods  SpO2 99%   BMI 23.93 kg/m²     General Appearance:    Alert, cooperative, no distress, appears stated age; chronically ill appearing   Head:    Normocephalic, without obvious abnormality, atraumatic   Eyes:    PERRL, conjunctivae/corneas clear, EOM's intact, both eyes   Ears:    Normal external ear canals, both ears   Nose:   Nares normal, septum midline, mucosa normal, no drainage    or sinus tenderness   Throat:   Lips, mucosa, and tongue normal   Neck:   Supple, symmetrical, trachea midline, no adenopathy;     thyroid:  no enlargement/tenderness/nodules; no carotid    bruit or JVD   Back:     Symmetric, no curvature, ROM normal, no CVA tenderness   Lungs:     Clear to auscultation bilaterally, respirations unlabored   Chest Wall:    No tenderness or deformity    Heart:    Regular rate and rhythm, S1 and S2 normal, no murmur, rub   or gallop   Abdomen:     Soft, nontender, bowel sounds active all four quadrants,     no masses, no hepatomegaly, no splenomegaly   Extremities:   Extremities normal, atraumatic, no cyanosis or edema                       .    Data Review:  Labs in chart were reviewed.  WBC   Date Value Ref Range Status   2025 9.04 3.40 - 10.80 10*3/mm3 Final     Hemoglobin   Date Value Ref Range Status   2025 11.2 (L) 12.0 - 15.9 g/dL Final     Hematocrit   Date Value Ref Range Status   2025 35.4 34.0 - 46.6 % Final     Platelets   Date Value Ref Range Status   2025 233 140 - 450 10*3/mm3  Final     Sodium   Date Value Ref Range Status   07/22/2025 129 (L) 136 - 145 mmol/L Final     Potassium   Date Value Ref Range Status   07/22/2025 3.3 (L) 3.5 - 5.2 mmol/L Final     Chloride   Date Value Ref Range Status   07/22/2025 99 98 - 107 mmol/L Final     CO2   Date Value Ref Range Status   07/22/2025 21.2 (L) 22.0 - 29.0 mmol/L Final     BUN   Date Value Ref Range Status   07/22/2025 17.0 6.0 - 20.0 mg/dL Final     Creatinine   Date Value Ref Range Status   07/22/2025 2.09 (H) 0.57 - 1.00 mg/dL Final     Glucose   Date Value Ref Range Status   07/22/2025 626 (C) 65 - 99 mg/dL Final     Calcium   Date Value Ref Range Status   07/22/2025 7.9 (L) 8.6 - 10.5 mg/dL Final     Magnesium   Date Value Ref Range Status   07/22/2025 2.1 1.6 - 2.6 mg/dL Final     AST (SGOT)   Date Value Ref Range Status   07/22/2025 7 1 - 32 U/L Final     ALT (SGPT)   Date Value Ref Range Status   07/22/2025 5 1 - 33 U/L Final     Alkaline Phosphatase   Date Value Ref Range Status   07/22/2025 135 (H) 39 - 117 U/L Final           Results from last 7 days   Lab Units 07/22/25  1519   HEMOGLOBIN A1C % 13.60*      Imaging Results (All)       Procedure Component Value Units Date/Time    XR Ribs Bilateral 4+ View With PA Chest [[id number]] Collected: 07/22/25 1556     Updated: 07/22/25 1602    Narrative:      8 VIEW BILATERAL RIBS AND 1 VIEW PA CHEST     HISTORY: Syncope. Fell. Rib pain, left greater than right.     FINDINGS: The vertebral height and disc spaces are well-maintained. The  heart is top normal in size with sternal wires from previous cardiac  surgery and there is no change from 5/5/2025.     Multiple views of the left and right ribs show no definite fracture at  the present time.     This report was finalized on 7/22/2025 3:59 PM by Dr. Fortunato Fournier M.D  on Workstation: [id number]             Assessment:  Active Hospital Problems    Diagnosis  POA    **Syncope and collapse [R55]  Yes      Resolved Hospital Problems   No  resolved problems to display.   Dizziness  Diabetes with hyperglycemia  Sleep apnea  Noncompliance  Gastroparesis  hyperlipidemia    Plan:  Fluids  Diabetes educator to see  Monitor on telemetry  Echo  Trend labs  Dw patient, ed provider    Selina Snow MD  7/22/2025  20:31 EDT      Electronically signed by Selina Snow MD at 07/22/25 2038          Emergency Department Notes            Montana Brewer PA at 07/22/25 1632       Attestation signed by Trey David MD at 07/23/25 2242      I supervised care provided by the midlevel provider. We have discussed this patient's history, physical exam, and treatment plan. I have reviewed the midlevel provider's note and I agree with the midlevel provider's findings and plan of care.   SHARED VISIT: This visit was performed by BOTH a physician and an AWILDA. The substantive portion of the medical decision making was performed by this attesting physician who made or approved the management plan and takes responsibility for patient management. All studies in the AWILDA note (if performed) were independently interpreted by me.                   ED Course as of 07/22/25 1632   Tue Jul 22, 2025   1523 My differential diagnosis for syncope includes but is not limited to:  Vasovagal reflex - situational stimulus, micturition, defecation, cough, sneezing, swallowing, postprandial state, react sinus hypersensitivity  Vascular-prolonged recumbency, sudden postural change, prolonged standing, hypovolemia, vasodilator drugs, autonomic neuropathy, adrenal insufficiency, subclavian steal, pulmonary embolism  Cardiac -arrhythmia, heart block, myocardial infarction, aortic stenosis, cardiac myxoma, cardiac, LV Dysfunction, Aortic Dissection, Pulmonary Hypertension, Pulmonary Stenosis, Pacemaker Failure  CNS-seizure, hypoxia, hypoglycemia, TIA,(basal vertebral), hydrocephalus     [JG]   1523 I viewed chest x-ray with bilateral rib series in PACS, no pneumothorax per my  read. [JG]   1543 EKG independently viewed and contemporaneously interpreted by ED physician. Time: 1540.  Rate 55.  Interpretation: Normal sinus rhythm, normal axis, inferior Q waves, nonspecific interventricular conduction delay, LVH, ST elevation in V1 through V3 with trace ST depression and T wave inversion in lead I and aVL, no ST depression or T wave inversion in inferior leads, QTc normal. [JG]   1545 EKG has concerning anterior lateral ST elevation but no reciprocal changes in inferior leads and QTc is normal.  Patient reassessed, complains of localized pain where she struck her ribs but otherwise denies any chest pain shortness of breath nausea vomiting, no signs or symptoms of ACS.  Consulting interventional cardiology. [JG]   1626 I discussed the case with MD Guzman with Cardiology at this time regarding the patient.  I discussed work-up, results, concerns.  I discussed the consulting provider's desire for admitting the patient to the hospitalist service with cardiology consult tomorrow.  No indication for Cath Lab at this time as the patient does not have chest pain or shortness of breath or anginal equivalents.  Her nonspecific EKG changes are like related to LVH.  Plan for continued metabolic workup and treatment with subsequent admission to the hospitalist service for further management.  Patient agreeable.   [DC]   5847 I discussed the case with MD Gwendolyn with [facility] at this time regarding the patient.  I discussed work-up, results, concerns.  I discussed the consulting provider's desire for tele admit.    [DC]      ED Course User Index  [DC] Montana Brewer PA  [JG] Trey David MD Creason, David J, PA  07/22/25 1632      Electronically signed by Trey David MD at 07/23/25 2242       Trey David MD at 07/22/25 1505           EMERGENCY DEPARTMENT ENCOUNTER  Room Number:  N640/1  Date of encounter:  7/23/2025  PCP: Ibrahima Correa MD  Patient Care Team:  Madeline  Ibrahima Musa MD as PCP - General (Family Medicine)  Xochilt Jenkins MD as Consulting Physician (Nephrology)     HPI:  Context: Bibiana Inman is a 45 y.o. female who presents to the ED c/o chief complaint of syncope and hyperglycemia.  Patient reports that she has an insulin-dependent diabetic, has been out of her insulin for the last 3 months, sugar has been running elevated although patient reports that she does not check it regularly.  Patient reports that she was able to get a refill of oral medications last week although they were out of both of her long-acting as well as her short acting insulin.  Patient reports that she got up to go to the bathroom today and passed out, fell and injured her left lateral ribs.  Patient denied any chest pain shortness of breath or palpitations associated with syncope, no head injury, no neck or back pain.  Patient reports that she was having nausea vomiting over the weekend but nausea or vomiting at send for resolved, no diarrhea, no abdominal pain.  Patient denies any urinary symptoms other than urinary frequency, denies any fevers.    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    Abnormal bone density screening 11/11/2012    Benign essential hypertension 08/24/2016    Chronic cough 03/17/2015    Gastroparesis diabeticorum 07/20/2021    Elevated erythrocyte sedimentation rate 07/20/2021    Fatigue 07/20/2021    Gastroesophageal reflux disease 03/10/2021    Mixed hypercholesterolemia and hypertriglyceridemia 03/07/2014    Intermittent claudication 04/17/2017    Iron deficiency anemia 03/02/2020    Multiple joint pain 07/20/2021    Need for influenza vaccination 11/01/2017    Type 2 diabetes mellitus with hyperglycemia, with long-term current use of insulin 05/16/2017     Obstructive sleep apnea 2021    Coronary artery disease of native artery of native heart with stable angina pectoris 2024    Abnormal nuclear stress test 2024    Depressive disorder 2023    Back pain 2023    Diabetic peripheral neuropathy associated with type 2 diabetes mellitus 2024    Ingrowing nail, left great toe 2024    Personal history of COVID-19 2022    Polyp of colon 2023    Vitamin D deficiency 2024    Tobacco abuse 2024    CKD stage 3a, GFR 45-59 ml/min 2024    Bilateral carotid artery stenosis 2024    History of CVA (cerebrovascular accident) 2024    Elevated troponin 2024    Aortic valve disease 2024    Overweight (BMI 25.0-29.9) 2025    Hypertensive emergency 2025     Resolved Ambulatory Problems     Diagnosis Date Noted    Hypothyroidism 2014    Spontaneous  2012    Myocardial infarction 2021    Obesity 2014    Stress fracture of right ankle with routine healing 2024    Unstable angina 2024    Altered mental status 2024    Carotid stenosis, asymptomatic, right 2024    Acute right-sided weakness 2024     Past Medical History:   Diagnosis Date    Abnormal ECG 2014    CHF (congestive heart failure) 2013    Coronary arteriosclerosis 2014    Diabetic gastroparesis 2021    GERD (gastroesophageal reflux disease)     Lacunar cerebrovascular accident (CVA) 2024    Stroke 2024       PAST SURGICAL HISTORY  Past Surgical History:   Procedure Laterality Date    CARDIAC CATHETERIZATION N/A 2024    Procedure: Left Heart Cath and coronary angiogram;  Surgeon: Jena Barron MD;  Location: [facility] CATH INVASIVE LOC[facility];  Service: Cardiology;  Laterality: N/A;     SECTION      CORONARY ARTERY BYPASS GRAFT  2014    LIMA-LAD, SVG-OM2, SVG-PDA, Dr. Debbie Fry.    CORONARY ARTERY BYPASS GRAFT WITH AORTIC  VALVE REPAIR/REPLACEMENT N/A 2024    Procedure: YUE; REOPERATIVE STERNOTOMY; CORONARY ARTERY BYPASS GRAFTING TIMES 1 UTILIZING RIGHT SAPHENOUS VEIN; AORTIC VALVE TUMOR ; PRP;  Surgeon: Benja De Luna MD;  Location: [facility];  Service: Cardiothoracic;  Laterality: N/A;    DILATATION AND CURETTAGE      TONSILLECTOMY         FAMILY HISTORY  Family History   Problem Relation Age of Onset    Heart disease Mother     Hypertension Mother        SOCIAL HISTORY  Social History     Socioeconomic History    Marital status:    Tobacco Use    Smoking status: Former     Current packs/day: 0.00     Average packs/day: 0.3 packs/day for 27.5 years (6.9 ttl pk-yrs)     Types: Cigarettes     Start date:      Quit date: 2025     Years since quittin.0    Smokeless tobacco: Never   Vaping Use    Vaping status: Never Used   Substance and Sexual Activity    Alcohol use: Not Currently    Drug use: Never    Sexual activity: Not Currently     Partners: Male     Birth control/protection: Post-menopausal, None       ALLERGIES  Latex    The patient's allergies have been reviewed    PHYSICAL EXAM  I have reviewed the triage vital signs and nursing notes.  ED Triage Vitals [25 1446]   Temp Heart Rate Resp BP SpO2   98.2 °F (36.8 °C) 57 21 118/69 97 %      Temp src Heart Rate Source Patient Position BP Location FiO2 (%)   Oral Monitor -- -- --       General: No acute distress.  HENT: NCAT, PERRL, Nares patent.  Eyes: no scleral icterus.  Neck: trachea midline, no ROM limitations.  CV: regular rhythm, regular rate.  Respiratory: normal effort, CTAB.  Chest wall: Tenderness along midclavicular inferior ribs, no splinting or paradoxical motion  Abdomen: soft, nondistended, NTTP, no rebound tenderness, no guarding or rigidity.  Musculoskeletal: no deformity.  Neuro: alert, moves all extremities, follows commands.  Skin: warm, dry.    LAB RESULTS  Recent Results (from the past 24 hours)   POC Glucose Once     Collection Time: 07/23/25  3:43 AM    Specimen: Blood   Result Value Ref Range    Glucose 330 (H) 70 - 130 mg/dL   Urinalysis With Microscopic If Indicated (No Culture) - Urine, Clean Catch    Collection Time: 07/23/25  3:46 AM    Specimen: Urine, Clean Catch   Result Value Ref Range    Color, UA Yellow Yellow, Straw    Appearance, UA Clear Clear    pH, UA 6.0 5.0 - 8.0    Specific Gravity, UA 1.030 1.005 - 1.030    Glucose, UA >=1000 mg/dL (3+) (A) Negative    Ketones, UA Negative Negative    Bilirubin, UA Negative Negative    Blood, UA Negative Negative    Protein, UA >=300 mg/dL (3+) (A) Negative    Leuk Esterase, UA Negative Negative    Nitrite, UA Negative Negative    Urobilinogen, UA 0.2 E.U./dL 0.2 - 1.0 E.U./dL   Urinalysis, Microscopic Only - Urine, Clean Catch    Collection Time: 07/23/25  3:46 AM    Specimen: Urine, Clean Catch   Result Value Ref Range    RBC, UA 0-2 None Seen, 0-2 /HPF    WBC, UA 3-5 (A) None Seen, 0-2 /HPF    Bacteria, UA None Seen None Seen /HPF    Squamous Epithelial Cells, UA 0-2 None Seen, 0-2 /HPF    Hyaline Casts, UA 3-6 None Seen /LPF    Methodology Automated Microscopy    POC Glucose Once    Collection Time: 07/23/25  6:22 AM    Specimen: Blood   Result Value Ref Range    Glucose 346 (H) 70 - 130 mg/dL   Basic Metabolic Panel    Collection Time: 07/23/25  6:34 AM    Specimen: Blood   Result Value Ref Range    Glucose 332 (H) 65 - 99 mg/dL    BUN 16.0 6.0 - 20.0 mg/dL    Creatinine 1.80 (H) 0.57 - 1.00 mg/dL    Sodium 135 (L) 136 - 145 mmol/L    Potassium 3.9 3.5 - 5.2 mmol/L    Chloride 108 (H) 98 - 107 mmol/L    CO2 21.0 (L) 22.0 - 29.0 mmol/L    Calcium 7.8 (L) 8.6 - 10.5 mg/dL    BUN/Creatinine Ratio 8.9 7.0 - 25.0    Anion Gap 6.0 5.0 - 15.0 mmol/L    eGFR 34.6 (L) >60.0 mL/min/1.73   CBC (No Diff)    Collection Time: 07/23/25  6:34 AM    Specimen: Blood   Result Value Ref Range    WBC 8.72 3.40 - 10.80 10*3/mm3    RBC 3.40 (L) 3.77 - 5.28 10*6/mm3    Hemoglobin 10.1 (L)  12.0 - 15.9 g/dL    Hematocrit 31.4 (L) 34.0 - 46.6 %    MCV 92.4 79.0 - 97.0 fL    MCH 29.7 26.6 - 33.0 pg    MCHC 32.2 31.5 - 35.7 g/dL    RDW 13.1 12.3 - 15.4 %    RDW-SD 43.7 37.0 - 54.0 fl    MPV 10.9 6.0 - 12.0 fL    Platelets 220 140 - 450 10*3/mm3   POC Glucose Once    Collection Time: 07/23/25 12:40 PM    Specimen: Blood   Result Value Ref Range    Glucose 216 (H) 70 - 130 mg/dL   Adult Transthoracic Echo Complete W/ Cont if Necessary Per Protocol    Collection Time: 07/23/25  2:30 PM   Result Value Ref Range    EF(MOD-bp) 57.6 %    LV GLOBAL STRAIN  -18.9 %    LVIDd 4.7 cm    LVIDs 2.9 cm    IVSd 1.19 cm    LVPWd 1.19 cm    FS 36.9 %    IVS/LVPW 1.00 cm    ESV(cubed) 25.5 ml    LV Sys Vol (BSA corrected) 22.1 cm2    EDV(cubed) 101.6 ml    LV Avina Vol (BSA corrected) 49.7 cm2    LV mass(C)d 206.2 grams    LVOT area 3.3 cm2    LVOT diam 2.05 cm    EDV(MOD-sp2) 87.0 ml    EDV(MOD-sp4) 83.0 ml    ESV(MOD-sp2) 33.0 ml    ESV(MOD-sp4) 37.0 ml    SV(MOD-sp2) 54.0 ml    SV(MOD-sp4) 46.0 ml    SVi(MOD-SP2) 32.3 ml/m2    SVi(MOD-SP4) 27.5 ml/m2    SVi (LVOT) 44.0 ml/m2    EF(MOD-sp2) 62.1 %    EF(MOD-sp4) 55.4 %    MV E max ronn 109.0 cm/sec    MV A max ronn 90.8 cm/sec    MV dec time 0.23 sec    MV E/A 1.20     Pulm A Revs Dur 0.13 sec    MV A dur 0.11 sec    LA ESV Index (BP) 37.1 ml/m2    Med Peak E' Ronn 3.5 cm/sec    Lat Peak E' Ronn 5.7 cm/sec    Avg E/e' ratio 23.70     SV(LVOT) 73.5 ml    TAPSE (>1.6) 1.17 cm    RV S' 8.6 cm/sec    Pulm Sys Ronn 60.5 cm/sec    Pulm Avina Ronn 80.0 cm/sec    Pulm S/D 0.76     Pulm A Revs Ronn 39.0 cm/sec    LV V1 max 93.8 cm/sec    LV V1 max PG 3.5 mmHg    LV V1 mean PG 1.71 mmHg    LV V1 VTI 22.2 cm    Ao pk ronn 152.0 cm/sec    Ao max PG 9.2 mmHg    Ao mean PG 4.7 mmHg    Ao V2 VTI 33.5 cm    JEAN(I,D) 2.19 cm2    Dimensionless Index 0.66 (DI)    MV max PG 9.4 mmHg    MV mean PG 2.31 mmHg    MV V2 VTI 49.6 cm    MV P1/2t 99.7 msec    MVA(P1/2t) 2.21 cm2    MVA(VTI) 1.48 cm2    MV dec  slope 452.7 cm/sec2    MR max russell 508.7 cm/sec    MR max .5 mmHg    RV V1 max PG 2.35 mmHg    RV V1 max 76.7 cm/sec    RV V1 VTI 20.2 cm    PA V2 max 81.6 cm/sec    PA acc time 0.15 sec    Ao root diam 3.4 cm    ACS 1.81 cm    Sinus 2.6 cm    RAP systole 3 mmHg   POC Glucose Once    Collection Time: 07/23/25  4:12 PM    Specimen: Blood   Result Value Ref Range    Glucose 179 (H) 70 - 130 mg/dL   POC Glucose Once    Collection Time: 07/23/25  9:49 PM    Specimen: Blood   Result Value Ref Range    Glucose 118 70 - 130 mg/dL       I ordered the above labs and reviewed the results.    RADIOLOGY  Adult Transthoracic Echo Complete W/ Cont if Necessary Per Protocol  Result Date: 7/23/2025    Left ventricular systolic function is normal. Left ventricular ejection fraction appears to be 61 - 65%.   Left ventricular wall thickness is consistent with mild to moderate concentric hypertrophy.   Left ventricular diastolic function is consistent with (grade II w/high LAP) pseudonormalization.   Normal right ventricular cavity size and systolic function noted.   The left atrial cavity is mild to moderately dilated.   Mild mitral valve regurgitation is present.   Insufficient TR velocity profile to estimate the right ventricular systolic pressure.   There is no evidence of pericardial effusion.       I ordered the above noted radiological studies. I reviewed the images and results. I agree with the radiologist interpretation.    PROCEDURES  Procedures    MEDICATIONS GIVEN IN ER  Medications   acetaminophen (TYLENOL) tablet 650 mg (650 mg Oral Given 7/23/25 0829)     Or   acetaminophen (TYLENOL) 160 MG/5ML oral solution 650 mg ( Oral Not Given:  See Alt 7/23/25 0829)     Or   acetaminophen (TYLENOL) suppository 650 mg ( Rectal Not Given:  See Alt 7/23/25 0829)   ondansetron ODT (ZOFRAN-ODT) disintegrating tablet 4 mg (has no administration in time range)     Or   ondansetron (ZOFRAN) injection 4 mg (has no administration in  time range)   melatonin tablet 2.5 mg (2.5 mg Oral Given 7/22/25 2117)   sennosides-docusate (PERICOLACE) 8.6-50 MG per tablet 2 tablet (has no administration in time range)     And   polyethylene glycol (MIRALAX) packet 17 g (has no administration in time range)     And   bisacodyl (DULCOLAX) EC tablet 5 mg (has no administration in time range)     And   bisacodyl (DULCOLAX) suppository 10 mg (has no administration in time range)   dextrose (GLUTOSE) oral gel 15 g (has no administration in time range)   dextrose (D50W) (25 g/50 mL) IV injection 25 g (has no administration in time range)   glucagon (GLUCAGEN) injection 1 mg (has no administration in time range)   insulin lispro (HUMALOG/ADMELOG) injection 2-9 Units ( Subcutaneous Not Given 7/23/25 2217)   albuterol (PROVENTIL) nebulizer solution 0.083% 2.5 mg/3mL (has no administration in time range)   aspirin chewable tablet 81 mg (81 mg Oral Given 7/23/25 0815)   atorvastatin (LIPITOR) tablet 80 mg (80 mg Oral Given 7/23/25 0815)   carvedilol (COREG) tablet 12.5 mg (12.5 mg Oral Given 7/23/25 1701)   hydrALAZINE (APRESOLINE) tablet 50 mg (50 mg Oral Not Given 7/23/25 2216)   lisinopril (PRINIVIL,ZESTRIL) tablet 10 mg ( Oral Dose Auto Held 7/31/25 0900)   metoclopramide (REGLAN) tablet 5 mg (5 mg Oral Given 7/23/25 2215)   pantoprazole (PROTONIX) EC tablet 40 mg ( Oral Canceled Entry 7/23/25 0600)   sodium chloride 0.9 % with KCl 20 mEq/L infusion (0 mL/hr Intravenous Stopped 7/23/25 1142)   insulin glargine (LANTUS, SEMGLEE) injection 35 Units (35 Units Subcutaneous Given 7/23/25 2216)   insulin lispro (HUMALOG/ADMELOG) injection 8 Units (8 Units Subcutaneous Given 7/23/25 1701)   Potassium Replacement - Follow Nurse / BPA Driven Protocol (has no administration in time range)   Magnesium Standard Dose Replacement - Follow Nurse / BPA Driven Protocol (has no administration in time range)   Phosphorus Replacement - Follow Nurse / BPA Driven Protocol (has no  administration in time range)   Calcium Replacement - Follow Nurse / BPA Driven Protocol (has no administration in time range)   lactated ringers infusion (75 mL/hr Intravenous New Bag 7/23/25 1143)   HYDROcodone-acetaminophen (NORCO) 5-325 MG per tablet 1 tablet (1 tablet Oral Given 7/23/25 1241)   sodium chloride 0.9 % bolus 1,000 mL (0 mL Intravenous Stopped 7/22/25 1656)   Lidocaine 4 % 1 patch (1 patch Transdermal Medication Removed 7/23/25 0329)   HYDROcodone-acetaminophen (NORCO) 5-325 MG per tablet 1 tablet (1 tablet Oral Given 7/22/25 1703)   acetaminophen (TYLENOL) tablet 650 mg (650 mg Oral Given 7/22/25 1552)   sodium chloride 0.9 % bolus 1,000 mL (1,000 mL Intravenous New Bag 7/22/25 1650)   insulin regular (humuLIN R,novoLIN R) injection 10 Units (10 Units Intravenous Given 7/22/25 1648)   potassium chloride (KLOR-CON M20) CR tablet 40 mEq (40 mEq Oral Given 7/22/25 1654)   insulin glargine (LANTUS, SEMGLEE) injection 10 Units (10 Units Subcutaneous Given 7/23/25 1245)       PROGRESS, DATA ANALYSIS, CONSULTS, AND MEDICAL DECISION MAKING  A complete history and physical exam have been performed.  All available laboratory and imaging results have been reviewed by myself prior to disposition.    MDM    After the initial H&P, I discussed pertinent information from history and physical exam with patient/family.  Discussed differential diagnosis.  Discussed plan for ED evaluation/workup/treatment.  All questions answered.  Patient/family is agreeable with plan.  ED Course as of 07/23/25 2243   Tue Jul 22, 2025   1523 My differential diagnosis for syncope includes but is not limited to:  Vasovagal reflex - situational stimulus, micturition, defecation, cough, sneezing, swallowing, postprandial state, react sinus hypersensitivity  Vascular-prolonged recumbency, sudden postural change, prolonged standing, hypovolemia, vasodilator drugs, autonomic neuropathy, adrenal insufficiency, subclavian steal, pulmonary  embolism  Cardiac -arrhythmia, heart block, myocardial infarction, aortic stenosis, cardiac myxoma, cardiac, LV Dysfunction, Aortic Dissection, Pulmonary Hypertension, Pulmonary Stenosis, Pacemaker Failure  CNS-seizure, hypoxia, hypoglycemia, TIA,(basal vertebral), hydrocephalus     [JG]   1523 I viewed chest x-ray with bilateral rib series in PACS, no pneumothorax per my read. [JG]   1543 EKG independently viewed and contemporaneously interpreted by ED physician. Time: 1540.  Rate 55.  Interpretation: Normal sinus rhythm, normal axis, inferior Q waves, nonspecific interventricular conduction delay, LVH, ST elevation in V1 through V3 with trace ST depression and T wave inversion in lead I and aVL, no ST depression or T wave inversion in inferior leads, QTc normal. [JG]   1545 EKG has concerning anterior lateral ST elevation but no reciprocal changes in inferior leads and QTc is normal.  Patient reassessed, complains of localized pain where she struck her ribs but otherwise denies any chest pain shortness of breath nausea vomiting, no signs or symptoms of ACS.  Consulting interventional cardiology. [JG]   1626 I discussed the case with MD Guzman with Cardiology at this time regarding the patient.  I discussed work-up, results, concerns.  I discussed the consulting provider's desire for admitting the patient to the hospitalist service with cardiology consult tomorrow.  No indication for Cath Lab at this time as the patient does not have chest pain or shortness of breath or anginal equivalents.  Her nonspecific EKG changes are like related to LVH.  Plan for continued metabolic workup and treatment with subsequent admission to the hospitalist service for further management.  Patient agreeable.   [DC]   1620 I discussed the case with MD Gwendolyn with [facility] at this time regarding the patient.  I discussed work-up, results, concerns.  I discussed the consulting provider's desire for tele admit.    [DC]      ED Course  User Index  [DC] Montana Brewer PA  [JG] Trey David MD       AS OF 22:43 EDT VITALS:    BP - 176/64  HR - (!) 49  TEMP - 98.2 °F (36.8 °C) (Oral)  O2 SATS - 97%    DIAGNOSIS  Final diagnoses:   Uncontrolled other specified diabetes mellitus with hyperglycemia   Hypokalemia   Elevated troponin   Abnormal EKG   CARLA (acute kidney injury)   Syncope and collapse       DISPOSITION  ADMISSION    Discussed treatment plan and reason for admission with pt/family and admitting physician.  Pt/family voiced understanding of the plan for admission for further testing/treatment as needed.        Trey David MD  07/23/25 2243      Electronically signed by Trey David MD at 07/23/25 2243       Briana Engel, RN at 07/22/25 1444          Pt to ED from home due to near syncopal episode, fall, complaining of L side/rib pain.  , Pt stated she has been out of her insulin for 3 months  EMS stated they preformed orthostatics and pts systolic dropped 40 points.     Electronically signed by Briana Engel, RN at 07/22/25 1445       Vital Signs (last 2 days)       Date/Time Temp Temp src Pulse Resp BP Patient Position SpO2    07/30/25 1116 97.6 (36.4) Oral -- 16 105/53 Lying --    07/30/25 0811 -- -- 62 -- 139/65 -- --    07/30/25 0702 97.7 (36.5) Oral -- 16 -- Lying --    07/30/25 0600 -- -- 63 -- 131/68 -- 97    07/30/25 0500 -- -- 61 -- 135/64 -- 99    07/30/25 0400 -- -- 61 -- 128/63 -- 99    07/30/25 0300 97.7 (36.5) Oral 60 16 149/68 Lying 97    07/30/25 0230 -- -- 62 -- 141/66 -- 99    07/30/25 0200 -- -- 61 -- 136/68 -- 99    07/30/25 0130 -- -- 60 -- 138/66 -- 99    07/30/25 0115 -- -- 59 -- 138/71 -- 99    07/30/25 0100 -- -- 58 -- 141/73 -- 98    07/30/25 0045 -- -- 58 -- 136/67 -- 98    07/30/25 0030 -- -- 59 -- 120/73 -- 98    07/30/25 0015 -- -- 58 -- 138/65 -- 98    07/30/25 0000 -- -- 58 -- 128/64 -- 98    07/29/25 2345 -- -- 57 -- 136/65 -- 98    07/29/25 2330 -- -- 57 -- 146/66 -- 98     07/29/25 2319 97.7 (36.5) Oral 58 -- -- -- 98    07/29/25 2245 97.7 (36.5) Oral 55 16 139/66 -- 98    07/29/25 2230 -- -- 55 16 145/69 -- 98    07/29/25 2215 -- -- 55 16 187/76 -- 99    07/29/25 2200 -- -- 54 16 171/75 -- 98    07/29/25 2145 -- -- 55 16 173/72 -- 98    07/29/25 2130 -- -- 56 16 165/71 -- 98    07/29/25 2125 -- -- 54 16 166/98 -- 98    07/29/25 2120 -- -- 55 16 166/73 -- 99    07/29/25 2116 97.7 (36.5) Oral 56 16 165/75 -- 99    07/29/25 1544 98.3 (36.8) Oral -- -- -- -- --    07/29/25 1515 -- -- 59 -- -- -- 97    07/29/25 1500 -- -- 61 -- -- -- 95    07/29/25 1445 -- -- 58 -- -- -- 96    07/29/25 1340 98.8 (37.1) -- 57 18 130/60 Lying 91    07/29/25 0900 98.6 (37) Oral 64 16 117/65 Lying --    07/29/25 0700 100.8 (38.2) Oral -- 16 159/62 Lying --    07/29/25 0600 -- -- 76 -- 178/76 -- 99    07/29/25 0257 99.6 (37.6) Oral -- -- -- -- --    07/28/25 2348 100.9 (38.3)  Oral 80 16 176/69 Lying 99    Temp: checked under arm and got 101.9 at 07/28/25 2348    07/28/25 1930 99.2 (37.3) Oral 77 16 167/70 Lying 98    07/28/25 1440 98.4 (36.9) Oral 58 18 129/64 Lying 98    07/28/25 0800 98.2 (36.8) Oral 64 18 157/71 Sitting 96    07/28/25 0524 -- -- 74 -- -- -- 96    07/28/25 0358 98.4 (36.9) -- 75 -- -- -- 84    07/28/25 0150 -- -- 75 -- -- -- 94          Oxygen Therapy (last 2 days)       Date/Time SpO2 Device (Oxygen Therapy) Flow (L/min) (Oxygen Therapy) Oxygen Concentration (%) ETCO2 (mmHg)    07/30/25 0811 -- room air -- -- --    07/30/25 0600 97 -- -- -- --    07/30/25 0500 99 -- -- -- --    07/30/25 0400 99 nasal cannula 3 -- --    07/30/25 0300 97 nasal cannula 3 -- --    07/30/25 0230 99 -- -- -- --    07/30/25 0200 99 -- -- -- --    07/30/25 0130 99 -- -- -- --    07/30/25 0115 99 -- -- -- --    07/30/25 0100 98 -- -- -- --    07/30/25 0045 98 -- -- -- --    07/30/25 0030 98 -- -- -- --    07/30/25 0015 98 -- -- -- --    07/30/25 0000 98 nasal cannula 3 -- --    07/29/25 2345 98 -- -- -- --     07/29/25 2330 98 -- -- -- --    07/29/25 2319 98 -- -- -- --    07/29/25 2245 98 nasal cannula 2 -- --    07/29/25 2230 98 nasal cannula 2 -- --    07/29/25 2215 99 nasal cannula 2 -- --    07/29/25 2200 98 nasal cannula 2 -- --    07/29/25 2145 98 nasal cannula 2 -- --    07/29/25 2130 98 nasal cannula 4 -- --    07/29/25 2125 98 nasal cannula 4 -- --    07/29/25 2120 99 nasal cannula 4 -- --    07/29/25 2116 99 nasal cannula 4 -- --    07/29/25 1515 97 -- -- -- --    07/29/25 1500 95 -- -- -- --    07/29/25 1445 96 -- -- -- --    07/29/25 1340 91 room air -- -- --    07/29/25 0807 -- nasal cannula 1 -- --    07/29/25 0600 99 -- -- -- --    07/29/25 0145 -- nasal cannula 1 -- --    07/28/25 2348 99 nasal cannula 1 -- --    07/28/25 1930 98 nasal cannula 1 -- --    07/28/25 1440 98 nasal cannula -- 2 --    07/28/25 0800 96 nasal cannula -- 2 --    07/28/25 0524 96 -- -- -- --    07/28/25 0416 -- nasal cannula -- 2 --    07/28/25 0358 84 -- -- -- --    07/28/25 0150 94 -- -- -- --    07/28/25 0000 -- room air -- -- --          Intake & Output (last 2 days)         07/28 0701 07/29 0700 07/29 0701 07/30 0700 07/30 0701 07/31 0700    P.O.  250     I.V. (mL/kg) 598.3 (8.3) 3402.1 (47.3)     Blood  293     Total Intake(mL/kg) 598.3 (8.3) 3945.1 (54.9)     Urine (mL/kg/hr) 450 (0.3) 485 (0.3)     Stool 0      Blood  500     Total Output 450 985     Net +148.3 +2960.1            Urine Unmeasured Occurrence 5 x      Stool Unmeasured Occurrence 1 x            Lines, Drains & Airways       Active LDAs       Name Placement date Placement time Site Days    Peripheral IV 07/22/25 1527 20 G Anterior;Right Forearm 07/22/25  1527  Forearm  7    Peripheral IV 07/29/25 1330 18 G Left;Posterior Forearm 07/29/25  1330  Forearm  less than 1    Urethral Catheter 07/29/25 2054  -- less than 1               Blood Administration Record (From admission, onward)      Canceled transfusions       Ordered     Start    07/29/25 1117   Transfuse RBC  Status:  Canceled      Released Time Blood Unit Number Status   07/29/25 1858   25  [id number]  [id number] Completed 07/29/25 2123       07/29/25 1857             Lab Results (last 72 hours)       Procedure Component Value Units Date/Time    POC Glucose Once [[id number]]  (Abnormal) Collected: 07/30/25 1058    Specimen: Blood Updated: 07/30/25 1100     Glucose 116 mg/dL      Comment: Serial Number: [id number]:  [id number]       Protime-INR [[id number]]  (Abnormal) Collected: 07/30/25 0807    Specimen: Blood from Hand, Right Updated: 07/30/25 0846     Protime 15.0 Seconds      INR 1.19    aPTT [[id number]]  (Normal) Collected: 07/30/25 0807    Specimen: Blood from Hand, Right Updated: 07/30/25 0846     PTT 33.2 seconds     Renal Function Panel [[id number]]  (Abnormal) Collected: 07/30/25 0556    Specimen: Blood Updated: 07/30/25 0657     Glucose 152 mg/dL      BUN 20.0 mg/dL      Creatinine 2.03 mg/dL      Sodium 132 mmol/L      Potassium 5.1 mmol/L      Chloride 106 mmol/L      CO2 16.1 mmol/L      Calcium 8.0 mg/dL      Albumin 1.7 g/dL      Phosphorus 5.5 mg/dL      Anion Gap 9.9 mmol/L      BUN/Creatinine Ratio 9.9     eGFR 30.0 mL/min/1.73     Narrative:      GFR Categories in Chronic Kidney Disease (CKD)              GFR Category          GFR (mL/min/1.73)    Interpretation  G1                    90 or greater        Normal or high (1)  G2                    60-89                Mild decrease (1)  G3a                   45-59                Mild to moderate decrease  G3b                   30-44                Moderate to severe decrease  G4                    15-29                Severe decrease  G5                    14 or less           Kidney failure    (1)In the absence of evidence of kidney disease, neither GFR category G1 or G2 fulfill the criteria for CKD.    eGFR calculation 2021 CKD-EPI creatinine equation, which does not include race as a factor    CBC (No Diff) [[id number]]   (Abnormal) Collected: 07/30/25 0556    Specimen: Blood Updated: 07/30/25 0656     WBC 13.61 10*3/mm3      RBC 3.66 10*6/mm3      Hemoglobin 10.7 g/dL      Hematocrit 33.7 %      MCV 92.1 fL      MCH 29.2 pg      MCHC 31.8 g/dL      RDW 13.5 %      RDW-SD 46.1 fl      MPV 10.5 fL      Platelets 285 10*3/mm3     POC Glucose Once [[id number]]  (Abnormal) Collected: 07/30/25 0634    Specimen: Blood Updated: 07/30/25 0636     Glucose 152 mg/dL      Comment: Serial Number: [id number]:  [id number]       Blood Culture - Blood, Arm, Right [[id number]]  (Normal) Collected: 07/25/25 0536    Specimen: Blood from Arm, Right Updated: 07/30/25 0630     Blood Culture No growth at 5 days    Blood Culture - Blood, Arm, Left [[id number]]  (Normal) Collected: 07/25/25 0535    Specimen: Blood from Arm, Left Updated: 07/30/25 0630     Blood Culture No growth at 5 days    POC Glucose 4x Daily Before Meals & at Bedtime [[id number]]  (Abnormal) Collected: 07/29/25 2321    Specimen: Blood Updated: 07/29/25 2323     Glucose 157 mg/dL      Comment: Serial Number: [id number]:  [id number]       Basic Metabolic Panel [[id number]]  (Abnormal) Collected: 07/29/25 2125    Specimen: Blood Updated: 07/29/25 2152     Glucose 125 mg/dL      BUN 17.0 mg/dL      Creatinine 1.78 mg/dL      Sodium 131 mmol/L      Potassium 4.9 mmol/L      Comment: Slight hemolysis detected by analyzer. Result may be falsely elevated.        Chloride 105 mmol/L      CO2 17.8 mmol/L      Calcium 7.7 mg/dL      BUN/Creatinine Ratio 9.6     Anion Gap 8.2 mmol/L      eGFR 35.1 mL/min/1.73     Narrative:      GFR Categories in Chronic Kidney Disease (CKD)              GFR Category          GFR (mL/min/1.73)    Interpretation  G1                    90 or greater        Normal or high (1)  G2                    60-89                Mild decrease (1)  G3a                   45-59                Mild to moderate decrease  G3b                   30-44                Moderate to  severe decrease  G4                    15-29                Severe decrease  G5                    14 or less           Kidney failure    (1)In the absence of evidence of kidney disease, neither GFR category G1 or G2 fulfill the criteria for CKD.    eGFR calculation 2021 CKD-EPI creatinine equation, which does not include race as a factor    CBC (No Diff) [[id number]]  (Abnormal) Collected: 07/29/25 2125    Specimen: Blood Updated: 07/29/25 2135     WBC 9.02 10*3/mm3      RBC 3.43 10*6/mm3      Hemoglobin 10.1 g/dL      Hematocrit 31.6 %      MCV 92.1 fL      MCH 29.4 pg      MCHC 32.0 g/dL      RDW 14.3 %      RDW-SD 47.3 fl      MPV 10.0 fL      Platelets 243 10*3/mm3     POC Glucose Once [[id number]]  (Abnormal) Collected: 07/29/25 2119    Specimen: Blood Updated: 07/29/25 2122     Glucose 115 mg/dL      Comment: Serial Number: [id number]:  [id number]       POC Glucose Once [[id number]]  (Normal) Collected: 07/29/25 1337    Specimen: Blood Updated: 07/29/25 1339     Glucose 78 mg/dL      Comment: Serial Number: [id number]:  [id number]       POC Glucose Once [[id number]]  (Normal) Collected: 07/29/25 1135    Specimen: Blood Updated: 07/29/25 1138     Glucose 78 mg/dL      Comment: Serial Number: [id number]:  [id number]       Renal Function Panel [[id number]]  (Abnormal) Collected: 07/29/25 0700    Specimen: Blood Updated: 07/29/25 1003     Glucose 66 mg/dL      BUN 17.0 mg/dL      Creatinine 1.70 mg/dL      Sodium 134 mmol/L      Potassium 4.4 mmol/L      Chloride 107 mmol/L      CO2 18.2 mmol/L      Calcium 7.7 mg/dL      Albumin 1.9 g/dL      Phosphorus 3.0 mg/dL      Anion Gap 8.8 mmol/L      BUN/Creatinine Ratio 10.0     eGFR 37.1 mL/min/1.73     Narrative:      GFR Categories in Chronic Kidney Disease (CKD)              GFR Category          GFR (mL/min/1.73)    Interpretation  G1                    90 or greater        Normal or high (1)  G2                    60-89                Mild decrease  (1)  G3a                   45-59                Mild to moderate decrease  G3b                   30-44                Moderate to severe decrease  G4                    15-29                Severe decrease  G5                    14 or less           Kidney failure    (1)In the absence of evidence of kidney disease, neither GFR category G1 or G2 fulfill the criteria for CKD.    eGFR calculation 2021 CKD-EPI creatinine equation, which does not include race as a factor    CBC (No Diff) [[id number]]  (Abnormal) Collected: 07/29/25 0700    Specimen: Blood Updated: 07/29/25 0802     WBC 8.94 10*3/mm3      RBC 3.08 10*6/mm3      Hemoglobin 9.1 g/dL      Hematocrit 27.9 %      MCV 90.6 fL      MCH 29.5 pg      MCHC 32.6 g/dL      RDW 12.9 %      RDW-SD 42.3 fl      MPV 10.6 fL      Platelets 289 10*3/mm3     POC Glucose Once [[id number]]  (Normal) Collected: 07/29/25 0652    Specimen: Blood Updated: 07/29/25 0654     Glucose 72 mg/dL     Pregnancy, Urine - Urine, Clean Catch [[id number]]  (Normal) Collected: 07/28/25 1744    Specimen: Urine, Clean Catch Updated: 07/28/25 2058     HCG, Urine QL Negative    POC Glucose Once [[id number]]  (Abnormal) Collected: 07/28/25 2055    Specimen: Blood Updated: 07/28/25 2057     Glucose 158 mg/dL     Urinalysis, Microscopic Only - Urine, Clean Catch [[id number]]  (Abnormal) Collected: 07/28/25 1744    Specimen: Urine Updated: 07/28/25 1828     RBC, UA 0-2 /HPF      WBC, UA 0-2 /HPF      Bacteria, UA Trace /HPF      Squamous Epithelial Cells, UA 3-6 /HPF      Hyaline Casts, UA 0-2 /LPF      Methodology Automated Microscopy    Sodium, Urine, Random - Urine, Clean Catch [[id number]] Collected: 07/28/25 1744    Specimen: Urine, Clean Catch Updated: 07/28/25 1825     Sodium, Urine 67 mmol/L     Narrative:      Reference intervals for random urine have not been established.  Clinical usage is dependent upon physician's interpretation in combination with other laboratory tests.       Creatinine  Urine Random (kidney function) GFR component - Urine, Clean Catch [[id number]] Collected: 07/28/25 1744    Specimen: Urine, Clean Catch Updated: 07/28/25 1825     Creatinine, Urine 53.9 mg/dL     Narrative:      Reference intervals for random urine have not been established.  Clinical usage is dependent upon physician's interpretation in combination with other laboratory tests.       Urinalysis With Microscopic If Indicated (No Culture) - [[id number]]  (Abnormal) Collected: 07/28/25 1744    Specimen: Urine Updated: 07/28/25 1813     Color, UA Yellow     Appearance, UA Clear     pH, UA 6.0     Specific Gravity, UA 1.021     Glucose,  mg/dL (2+)     Ketones, UA Negative     Bilirubin, UA Negative     Blood, UA Negative     Protein, UA >=300 mg/dL (3+)     Leuk Esterase, UA Negative     Nitrite, UA Negative     Urobilinogen, UA 0.2 E.U./dL          Imaging Results (Last 72 Hours)       Procedure Component Value Units Date/Time    Arteriogram (Autofinalize) [[id number]] Resulted: 07/29/25 2046     Updated: 07/29/25 2046    Narrative:      This procedure was auto-finalized with no dictation required.    CT Angio Abdominal Aorta Bilateral Iliofem Runoff [[id number]] Collected: 07/27/25 1236     Updated: 07/27/25 1259    Narrative:      CT ANGIOGRAM ABDOMEN AND PELVIS WITH ILIOFEMORAL RUNOFF     HISTORY: Peripheral artery disease with tissue loss. Diabetes.     COMPARISON: Right foot x-rays 07/24/2025. CT chest 06/26/2024..     TECHNIQUE: Axial images were obtained from the lung bases through the  lower extremities following the administration of IV contrast. Coronal  and sagittal MIP images were obtained as well as 3D volume rendering.   Radiation dose reduction techniques were utilized, including automated  exposure control and exposure modulation based on body size.     FINDINGS:  Atherosclerotic calcifications are present involving the distal thoracic  aorta abdominal aorta with mild narrowing. The celiac artery,  superior  mesenteric artery are patent. There is a long segment of moderate  stenosis of the left main renal artery associated with partially  calcified plaque that extends 2.5 cm in length. Mild narrowing of the  origin of the right renal artery. Calcified and noncalcified plaque  involving the inferior abdominal aorta with moderate narrowing just  above its bifurcation. No aneurysm.     On the left, there is a moderate stenosis of the origin of the left  common iliac artery. Multifocal moderate left common iliac arterial  stenoses. Mild narrowing of the left internal iliac artery. There is a  severe stenosis of the left external iliac artery approximately 4.5 cm  distal to its origin. Moderate stenosis left common femoral artery. Left  deep femoral artery is patent. There is a left superficial femoral  artery stent that is occluded. Left superficial femoral artery  reconstitutes at the distal margin of the stent at the level of the mid  thigh and is small with multifocal stenoses that are up to severe in the  region of the adductor hiatus. Multifocal popliteal artery stenoses of  the left popliteal arteries patent. Left anterior tibial artery contains  calcification proximally with moderate narrowing. The left tibioperoneal  trunk is patent. There is three-vessel runoff to the left lower  extremity.     On the right, there is moderate stenosis origin right common iliac  artery. Mild to moderate narrowing of the distal right common iliac  artery. Moderate to severe stenosis of the proximal right internal iliac  artery. Moderate right external iliac arterial stenoses. Moderate  stenosis of the right common femoral artery. The right superficial  femoral artery is small with multifocal severe stenoses extending from  its origin to the popliteal artery though no evidence for complete  occlusion. The right deep femoral artery is patent. Right popliteal  artery is small with areas of moderate to severe stenoses  without  evidence for complete occlusion. Multifocal calcifications involving the  proximal anterior posterior tibial arteries and the tibial peroneal  trunk. There is flow within the right anterior and posterior tibial  arteries extending into the ankle. Flow within the peroneal artery  extends into the distal calf.     Moderate bilateral pleural effusions layer dependently with adjacent  compressive lower lobe atelectasis.     Liver, spleen, pancreas exhibit normal early arterial phase of contrast  appearance. Multiple gallstones. Bilateral adrenal thickening. Kidneys  appear within normal limits. There is no hydronephrosis.     Mild ascites with free fluid extending into the pelvis. Colonic  diverticulosis without evidence for diverticulitis. Mild generalized  body wall edema with third spacing of fluid.           Impression:      1. Multifocal atherosclerotic disease. Long segment of moderate stenosis  of the left main renal artery associated with partially calcified  plaque. Mild narrowing origin right renal artery.  2. Atherosclerotic calcification involving the common aorta and iliac  vasculature with severe stenosis of the left external iliac artery  proximally 4.5 cm distal to its origin. Multifocal iliac and common  femoral arterial stenoses.  3. Left superficial femoral artery stent is occluded with reconstitution  just below the stent. Multifocal stenoses of the distal left superficial  femoral artery, left popliteal artery. Three-vessel runoff to the left  lower extremity.  4. On the right, the right superficial femoral artery is small with  multifocal severe stenoses extending from its origin to the popliteal  artery but without evidence for complete occlusion. Right popliteal  artery is small with multifocal moderate to severe stenoses.  Calcification involving the proximal anterior and posterior tibial  arteries and peroneal artery. There is flow within the anterior and  posterior tibial arteries on  the right extending to the foot and there  is flow of the right peroneal artery extending to the distal calf.  5. Moderate bilateral pleural effusions with adjacent compressive lower  lobe atelectasis.  6. Cholelithiasis.  7. Generalized body wall edema.        This report was finalized on 7/27/2025 12:56 PM by Huan Noriega M.D  on Workstation: [id number]                Operative/Procedure Notes (all)        Kathy Hopkins MD at 07/29/25 1631  Version 2 of 2           Operative Note  Location: [facility]  Date of Admission:  7/22/2025  OR Date: 7/29/2025      Pre-op Diagnosis:  chronic limb threatening ischemia with tissue loss, right leg    Post-op Diagnosis: Same    Procedure:   1) right lower extremity angiogram with runoff  2) bilateral common iliac and external iliac artery stents  3) right common femoral artery endarterectomy and profundaplasty    Surgeon: Kathy Hopkins MD    Assistant: Angelika Nova [facility], Provided critical assistance in exposure, retraction, and suction that overall decrease blood loss and operative time.    Anesthesia: General    Staff:   Circulator: River Lund RN; Jane Messer RN  Scrub Person: Marah Canales; Girma Hancock; Maya Zafar; Mita Valdivia  Assistant: Angelika Nova CSA  Vascular Radiology Technician: Ana Clark Terri    Estimated Blood Loss: <500ml    Specimen:   Order Name Source Comment Collection Info Order Time   PREGNANCY, URINE Urine, Clean Catch  Collected By: lGoria Dumont RN 7/28/2025  7:48 PM     Release to patient   Routine Release        PREPARE RBC Other   7/29/2025  6:41 PM     When to Transfuse?   Transfuse          Indication for Transfusion - Choose One   Hgb less than 9 g/dL or Hct less than 27%          Secondary Indication - Choose One   Active Perioperative Bleeding          :   STAY 2 UNITS AHEAD          Release to patient   Routine Release        PREPARE RBC Other   7/29/2025  7:40 PM     When to  Transfuse?   Hold          Indication for Transfusion   Surgery          Surgery Date   7/29/2025          Release to patient   Routine Release            Complications: none    Findings: Bilateral severe common iliac artery stenosis and bilateral severe external iliac artery stenoses, treated with stent angioplasty.  While closing the right common femoral artery, the closure device did not fire properly and removed some of the artery intima.  This actually tacked up the posterior flap and there were no signals in the right foot.  The right common femoral artery was exposed and revascularized with an endarterectomy and profunda plasty.  Completion runoff angiogram showed intact two vessel runoff to the foot via the anterior tibial and posterior tibial arteries.    Implants:   Implant Name Type Inv. Item Serial No.  Lot No. LRB No. Used Action   STENTGR ENDOPROSTH VIABAHN RO HEP 6F 6MM [id number] - [id number] - [id number] Implant STENTGR ENDOPROSTH VIABAHN RO HEP 6F 6MM [id number] [employer] NR Left 1 Implanted   STENTGR ENDOPROSTH VIABAHN VBX BALN/EXP HEP 6F 7X39MM 135CM - [id number] - [id number] Implant STENTGR ENDOPROSTH VIABAHN VBX BALN/EXP HEP 6F 7X39MM 135CM [id number] [employer] NR Left 1 Implanted   STENTGR ENDOPROSTH VIABAHN VBX BALN/EXP HEP 6F 7X39MM 135CM - [id number] - [id number] Implant STENTGR ENDOPROSTH VIABAHN VBX BALN/EXP HEP 6F 7X39MM 135CM [id number] [employer]  Right 1 Implanted   STNT PROTEGE EVERFLX SEXP.035 6X40 80 - [id number] Stent STNT PROTEGE EVERFLX SEXP.035 6X40 80  EV3  A Kickboard [id number] Left 1 Implanted   STNT PROTEGE EVERFLX SEXP.035 6X60 80 - [id number] Stent STNT PROTEGE EVERFLX SEXP.035 6X60 80  EV3  A Kickboard [id number] Left 1 Implanted   CLIPAPPLR M/ ENDO LIGACLIP9 3/8IN MD - [id number] Implant CLIPAPPLR M/ ENDO LIGACLIP9 3/8IN MD  [facility] ENDO SURGERY  DIV OF J AND J [id number] Left  1 Implanted   KT SEAL HEMOS ABS FLOSEAL MATRX 1.5/FAST/PREP 5000/IU 5ML - [id number] Implant KT SEAL HEMOS ABS FLOSEAL MATRX 1.5/FAST/PREP 5000/IU 5ML  [facility] [id number] Left 1 Implanted   PTCH VASC VASCUGUARD TPR/END 0.8X8CM STRL - [id number] Implant PTCH VASC VASCUGUARD TPR/END 0.8X8CM STRL  [facility] [id number] Right 1 Implanted   HEMOST ABS SURGICEL FIBRILLAR 1X2 - [id number] Implant HEMOST ABS SURGICEL FIBRILLAR 1X2  [employer]  DIV OF J AND J [id number] Right 1 Implanted       Indications:    The patient is a 45 year old female who presented with CLTI and a right toe wound.  Workup revealed multi level disease.  She does not have adequate GSV for bypass so she presents for endovascular revascularization.         Procedure:    Patient brought to the operating room and positioned supine on the operating table.  Appropriate perioperative antibiotics administered prior to skin incision.  Timeout performed with all OR staff present and in agreement.  MAC anesthesia introduced without event.  Bilateral groins were prepped and draped in standard sterile fashion. Under US guidance, the left common femoral artery was accessed with a micropuncture needle and wire.  A micropuncture catheter was placed and angiogram confirmed common femoral artery puncture.  She was heparinized then the left external iliac was crossed with a glidewire.  There was severe long segment stenosis of the EIA.  This was treated with balloon angioplasty with a 5mm then a 6 mm balloon.  There was a flow limiting dissection, so a 6 x5 viabahn was placed over this area in the EIA.  An EV3 was used to extend this proximally, which covered the origin of the hypogastric artery.  There was an excellent angiographic result with no residual stenosis and no extravasation.  Then, I proceeded with an aortogram.  There was severe stenosis of bilateral common iliac arteries proximally.  There was moderate stenosis of the distal right JALEEL  and a focal severe stenosis of the right EIA.  The catheter was placed up and over into the right EIA and a right lower extremity runoff angiogram was obtained that showed a patent common femoral artery and profunda femoral artery.  The SFA was occluded throughout its whole course.  The popliteal artery was severely diseased and focally occluded.  The anterior tibial artery was the primary runoff the foot.  There was severe stenosis of the TP trunk and proximal PT.  There was runoff to the foot via the PT, but this stopped at the ankle.  There was severe small vessel disease in the foot with an incomplete pedal arch.  A 6 x 45 destination sheath was placed up and over into the right common femoral artery and I attempted to cross the SFA occlusion numerous times without success.  I was able to create a dissection plane in the SFA but was not able to re enter the true lumen in the popliteal artery.  Eventually, I retracted the sheath and exchanged this for a short 6 Fr sheath in the left ocmmon femoral artery.  Then, I accessed the right common femoral artery in the same manner and placed a 6 Fr sheath.  Stiff wires were placed into the infrarenal aorta bilaterally.  Then, 7 x 39 mm VBX stents were deployed in bilateral JALEEL and the infrarenal aorta in a kissing fashion.  The distal aspect of the right JALEEL stent was angioplastied with a 8 x 20 mm balloon.  There was an excellent angiographic result with resolution of the iliac artery stenosis.  There remained significant right external iliac artery stenosis, which was treated with angioplasty with a 6 mm balloon, which caused a flow limiting dissection in the EIA.  I treated this with an EV3 stent, which resolved the flow limiting dissection.  Then, I removed the left sheath and closed the arteriotomy with a PerClose device.  Then, I attempted the same on the right side.  The first PerClose did not fire appropriately, so I used a 2nd device.  This also did not fire  appropriately and there was arterial intima on the device when it was removed from the patient.  I checked pedal pulses.  There were doppler signals in the left foot but no doppler signals in the right foot.  I decided to make a transverse incision over the right groin to expose the right common femoral artery.  The patient was intubated and placed under a general anesthetic prior to incision.  The transverse incision was made and dissection carried down to the femoral sheath, which was opened longitudinally.  Then, the profunda femoral, SFA, and CFA were all dissected free and encircled with vessel loops.  She was fully heparinized then the vessels were clamped. The puncture site was in the common femoral artery and the closure device had torn the anterior surface of the artery.  I opened the common femoral artery longitudinally and there was intima tacked up at the puncture site causing a common femoral artery occlusion.  I proceeded to perform a common femoral endarterectomy.  The SFA is chronically occluded so an eversion endarterectomy was performed of the proximal aspect but I did perform a profundaplasty, which is her primary outflow.  I extended down onto the profunda.  There was a very dense posterior plaque of the right common femoral, SFA, and PFA.  I was able to achieve a nice endpoint proximally and I tacked the plaque distally in the PFA with 6-0 prolene sutures, as I had dissected out a good bit of the artery and there was still plaque present.  Then, a 0.8 x 8 cm bovine pericardial patch was used to close the CFA and profunda.  This was flushed appropriately prior to completion.  There were doppler signals in the right profunda femoral and SFA.  There were no signals at the right ankle again, so a runoff angiogram was obtained that showed intact two vessel runoff to the foot. I irrigated the wound then closed in layers with 2-0, 3-0, and 4-0 vicryl.  Skin glue was applied.      All counts were  reported as correct at the conclusion of the procedure.  Patient tolerated procedure well without any apparent complications.  Anesthesia was reversed and the patient was transferred to the PACU in stable condition.          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 ulcer [E11.621, L97.509]  Yes    Atherosclerosis of native arteries of the extremities with ulceration [I70.25]  Unknown    Bilateral carotid artery stenosis [I65.23]  Yes    Elevated troponin [R79.89]  Yes    History of CVA (cerebrovascular accident) [Z86.73]  Not Applicable    Obstructive sleep apnea [G47.33]  Yes    Gastroesophageal reflux disease [K21.9]  Yes    Type 2 diabetes mellitus with hyperglycemia, with long-term current use of insulin [E11.65, Z79.4]  Not Applicable    Benign essential hypertension [I10]  Yes         Kathy Hopkins MD  Vascular Surgery  O: [phone]  F: [phone]        Electronically signed by Kathy Hopkins MD at 07/30/25 0956       Kathy Hopkins MD at 07/29/25 1631  Version 1 of 2           Operative Note  Location: [facility]  Date of Admission:  7/22/2025  OR Date: 7/29/2025      Pre-op Diagnosis:  chronic limb threatening ischemia with tissue loss, right leg    Post-op Diagnosis: Same    Procedure:   1) right lower extremity angiogram with runoff  2) bilateral common iliac and external iliac artery stents  3) right common femoral artery endarterectomy and profundaplasty    Surgeon: Kathy Hopkins MD    Assistant: Angelika GARRETT, Provided critical assistance in exposure, retraction, and suction that overall decrease blood loss and operative time.    Anesthesia: General    Staff:   Circulator: River Lund RN; Jane Messer RN  Scrub Person: Marah Canales;  Girma Hancock; Maya Zafar; Mita Valdivia  Assistant: Angelika Nova CSA  Vascular Radiology Technician: Ana Clark Terri    Estimated Blood Loss: <500ml    Specimen:   Order Name Source Comment Collection Info Order Time   PREGNANCY, URINE Urine, Clean Catch  Collected By: Gloria Dumont RN 7/28/2025  7:48 PM     Release to patient   Routine Release        PREPARE RBC Other   7/29/2025  6:41 PM     When to Transfuse?   Transfuse          Indication for Transfusion - Choose One   Hgb less than 9 g/dL or Hct less than 27%          Secondary Indication - Choose One   Active Perioperative Bleeding          :   STAY 2 UNITS AHEAD          Release to patient   Routine Release        PREPARE RBC Other   7/29/2025  7:40 PM     When to Transfuse?   Hold          Indication for Transfusion   Surgery          Surgery Date   7/29/2025          Release to patient   Routine Release            Complications: none    Findings: Bilateral severe common iliac artery stenosis and bilateral severe external iliac artery stenoses, treated with stent angioplasty.  While closing the right common femoral artery, the closure device did not fire properly and removed some of the artery intima.  This actually tacked up the posterior flap and there were no signals in the right foot.  The right common femoral artery was exposed and revascularized with an endarterectomy and profunda plasty.  Completion runoff angiogram showed intact two vessel runoff to the foot via the anterior tibial and posterior tibial arteries.    Implants:   Implant Name Type Inv. Item Serial No.  Lot No. LRB No. Used Action   STENTGR ENDOPROSTH VIABAHN RO HEP 6F 6MM [id number] - [id number] - [id number] Implant STENTGR ENDOPROSTH VIABAHN RO HEP 6F 6MM [id number] [employer] NR Left 1 Implanted   STENTGR ENDOPROSTH VIABAHN VBX BALN/EXP HEP 6F 7X39MM 135CM - [id number] - [id number] Implant STENTGR  ENDOPROSTH VIABAHN VBX BALN/EXP HEP 6F 7X39MM 135CM [id number] [employer] NR Left 1 Implanted   STENTGR ENDOPROSTH VIABAHN VBX BALN/EXP HEP 6F 7X39MM 135CM - [id number] - [id number] Implant STENTGR ENDOPROSTH VIABAHN VBX BALN/EXP HEP 6F 7X39MM 135CM [id number] [employer]  Right 1 Implanted   STNT PROTEGE EVERFLX SEXP.035 6X40 80 - [id number] Stent STNT PROTEGE EVERFLX SEXP.035 6X40 80  EV3  A [employer] [id number] Left 1 Implanted   STNT PROTEGE EVERFLX SEXP.035 6X60 80 - [id number] Stent STNT PROTEGE EVERFLX SEXP.035 6X60 80  EV3  [employer] [id number] Left 1 Implanted   CLIPAPPLR M/ ENDO LIGACLIP9 3/8IN MD - [id number] Implant CLIPAPPLR M/ ENDO LIGACLIP9 3/8IN MD  [employer] ENDO SURGERY  DIV OF COLE AND COLE [id number] Left 1 Implanted   KT SEAL HEMOS ABS FLOSEAL MATRX 1.5/FAST/PREP 5000/IU 5ML - [id number] Implant KT SEAL HEMOS ABS FLOSEAL MATRX 1.5/FAST/PREP 5000/IU 5ML  [facility] [id number] Left 1 Implanted   PTCH VASC VASCUGUARD TPR/END 0.8X8CM STRL - [id number] Implant PTCH VASC VASCUGUARD TPR/END 0.8X8CM STRL  [facility] [id number] Right 1 Implanted   HEMOST ABS SURGICEL FIBRILLAR 1X2 - [id number] Implant HEMOST ABS SURGICEL FIBRILLAR 1X2  [employer]  DIV OF COLE AND J [id number] Right 1 Implanted       Indications:    The patient is a 45 year old female who presented with CLTI and a right toe wound.  Workup revealed multi level disease.  She does not have adequate GSV for bypass so she presents for endovascular revascularization.         Procedure:    Patient brought to the operating room and positioned supine on the operating table.  Appropriate perioperative antibiotics administered prior to skin incision.  Timeout performed with all OR staff present and in agreement.  MAC anesthesia introduced without event.  Bilateral groins were prepped and draped in standard sterile fashion. Under US guidance, the left common femoral artery was accessed with a micropuncture  needle and wire.  A micropuncture catheter was placed and angiogram confirmed common femoral artery puncture.  She was heparinized then the left external iliac was crossed with a glidewire.  There was severe long segment stenosis of the EIA.  This was treated with balloon angioplasty with a 5mm then a 6 mm balloon.  There was a flow limiting dissection, so a 6 x5 viabahn was placed over this area in the EIA.  An EV3 was used to extend this proximally, which covered the origin of the hypogastric artery.  There was an excellent angiographic result with no residual stenosis and no extravasation.  Then, I proceeded with an aortogram.  There was severe stenosis of bilateral common iliac arteries proximally.  There was moderate stenosis of the distal right JALEEL and a focal severe stenosis of the right EIA.  The catheter was placed up and over into the right EIA and a right lower extremity runoff angiogram was obtained that showed a patent common femoral artery and profunda femoral artery.  The SFA was occluded throughout its whole course.  The popliteal artery was severely diseased and focally occluded.  The anterior tibial artery was the primary runoff the foot.  There was severe stenosis of the TP trunk and proximal PT.  There was runoff to the foot via the PT, but this stopped at the ankle.  There was severe small vessel disease in the foot with an incomplete pedal arch.  A 6 x 45 destination sheath was placed up and over into the right common femoral artery and I attempted to cross the SFA occlusion numerous times without success.  I was able to create a dissection plane in the SFA but was not able to re enter the true lumen in the popliteal artery.  Eventually, I retracted the sheath and exchanged this for a short 6 Fr sheath in the left ocmmon femoral artery.  Then, I accessed the right common femoral artery in the same manner and placed a 6 Fr sheath.  Stiff wires were placed into the infrarenal aorta bilaterally.   Then, 7 x 39 mm VBX stents were deployed in bilateral JALEEL and the infrarenal aorta in a kissing fashion.  The distal aspect of the right JALEEL stent was angioplastied with a 8 x 20 mm balloon.  There was an excellent angiographic result with resolution of the iliac artery stenosis.  There remained significant right external iliac artery stenosis, which was treated with angioplasty with a 6 mm balloon, which caused a flow limiting dissection in the EIA.  I treated this with an EV3 stent, which resolved the flow limiting dissection.  Then, I removed the left sheath and closed the arteriotomy with a PerClose device.  Then, I attempted the same on the right side.  The first PerClose did not fire appropriately, so I used a 2nd device.  This also did not fire appropriately and there was arterial intima on the device when it was removed from the patient.  I checked pedal pulses.  There were doppler signals in the left foot but no doppler signals in the right foot.  I decided to make a transverse incision over the right groin to expose the right common femoral artery.  The patient was intubated and placed under a general anesthetic prior to incision.  The transverse incision was made and dissection carried down to the femoral sheath, which was opened longitudinally.  Then, the profunda femoral, SFA, and CFA were all dissected free and encircled with vessel loops.  She was fully heparinized then the vessels were clamped. The puncture site was in the common femoral artery and the closure device had torn the anterior surface of the artery.  I opened the common femoral artery longitudinally and there was intima tacked up at the puncture site causing a common femoral artery occlusion.  I proceeded to perform a common femoral endarterectomy.  The SFA is chronically occluded so an eversion endarterectomy was performed of the proximal aspect but I did perform a profundaplasty, which is her primary outflow.  I extended down onto the  profunda.  There was a very dense posterior plaque of the right common femoral, SFA, and PFA.  I was able to achieve a nice endpoint proximally and I tacked the plaque distally in the PFA with 6-0 prolene sutures, as I had dissected out a good bit of the artery and there was still plaque present.  Then, a 0.8 x 8 cm bovine pericardial patch was used to close the CFA and profunda.  This was flushed appropriately prior to completion.  There were doppler signals in the right profunda femoral and SFA.  There were no signals at the right ankle again, so a runoff angiogram was obtained that showed intact two vessel runoff to the foot. I irrigated the wound then closed in layers with 2-0, 3-0, and 4-0 vicryl.  Skin glue was applied.      All counts were reported as correct at the conclusion of the procedure.  Patient tolerated procedure well without any apparent complications.  Anesthesia was reversed and the patient was transferred to the PACU in stable condition.          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 ulcer [E11.621, L97.509]  Yes    Atherosclerosis of native arteries of the extremities with ulceration [I70.25]  Unknown    Bilateral carotid artery stenosis [I65.23]  Yes    Elevated troponin [R79.89]  Yes    History of CVA (cerebrovascular accident) [Z86.73]  Not Applicable    Obstructive sleep apnea [G47.33]  Yes    Gastroesophageal reflux disease [K21.9]  Yes    Type 2 diabetes mellitus with hyperglycemia, with long-term current use of insulin [E11.65, Z79.4]  Not Applicable    Benign essential hypertension [I10]  Yes         Kathy Hopkins MD  Vascular Surgery  O: [phone]  F: [phone]      Electronically signed by Kathy Hopkins MD at 07/29/25 3353           Physician Progress Notes         Kathy Hopkins MD at 25 1001           [facility]   Vascular Surgery Progress Note    Patient Name: Bibiana Inman  : 1980  MRN: [id number]  Date of admission: 2025  Surgical Procedures Since Admission:  Procedure(s):  Right Lower Extremity Angiogram, Bilateral comman and external iliac stents, Right femoral endarterectomy, and profundoplasty  Surgeon:  Kathy Hopkins MD  Status:  1 Day Post-Op  -------------------    Subjective   Subjective     Chief Complaint: right foot wounds    History of Present Illness   Post revascularization last night.  No right foot rest pain, no evidence of cellulitis of the right foot.  Anxious to go home.      Review of Systems   Constitutional:  Negative for chills and fever.       Objective   Objective     Vitals:   Temp:  [97.7 °F (36.5 °C)-98.8 °F (37.1 °C)] 97.7 °F (36.5 °C)  Heart Rate:  [54-63] 62  Resp:  [16-18] 16  BP: (120-187)/(60-98) 139/65  Flow (L/min) (Oxygen Therapy):  [2-4] 3  Output by Drain (mL) 25 0701 - 25 1900 25 1901 - 25 0700 25 0701 - 25 1001 Range Total   Requested LDAs do not have output data documented.       Physical Exam  Vitals reviewed.   Constitutional:       General: She is not in acute distress.     Appearance: Normal appearance.   HENT:      Head: Normocephalic and atraumatic.      Right Ear: External ear normal.      Left Ear: External ear normal.      Nose: Nose normal.      Mouth/Throat:      Mouth: Mucous membranes are moist.      Pharynx: Oropharynx is clear.   Eyes:      Extraocular Movements: Extraocular movements intact.      Conjunctiva/sclera: Conjunctivae normal.      Pupils: Pupils are equal, round, and reactive to light.   Cardiovascular:      Rate and Rhythm: Normal rate and regular rhythm.      Pulses:           Carotid pulses are 2+ on the right side and 2+ on the left side.       Radial pulses are 2+ on the right side and 2+ on the  left side.        Femoral pulses are 2+ on the right side and 2+ on the left side.       Dorsalis pedis pulses are detected w/ Doppler on the left side.        Posterior tibial pulses are detected w/ Doppler on the right side and detected w/ Doppler on the left side.      Comments: Monphasic right PT signal, right foot warm, right popliteal doppler signal  Pulmonary:      Comments: Non labored respirations on room air, equal chest rise  Abdominal:      General: Abdomen is flat. There is no distension.      Palpations: Abdomen is soft.   Musculoskeletal:      Cervical back: Normal range of motion. No rigidity.      Right lower leg: No edema.      Left lower leg: No edema.   Skin:     General: Skin is warm and dry.      Capillary Refill: Capillary refill takes less than 2 seconds.   Neurological:      General: No focal deficit present.      Mental Status: She is alert and oriented to person, place, and time.   Psychiatric:         Mood and Affect: Mood normal.         Behavior: Behavior normal.           Result Review    Result Review:  I have personally reviewed the results from the time of this admission to 7/30/2025 10:01 EDT and agree with these findings:  [x]  Laboratory list / accordion  []  Microbiology  []  Radiology  []  EKG/Telemetry   []  Cardiology/Vascular   []  Pathology  []  Old records  []  Other:  Most notable findings include: creatinine 2.03 mg/dl, Hg 11 g/dl, GFR 30 ml/min      Assessment & Plan   Assessment / Plan     Brief Patient Summary:  Bibiana Inman is a 45 y.o. female who has severe multi level PAD s/p attempted endovascular right SFA revascularization who had bilateral JALEEL and EIA stents and right femoral endarterectomy and profunda plasty.    Active Hospital Problems:   Active Hospital Problems    Diagnosis     **Syncope and collapse     Dizziness     CAD (coronary artery disease)     CKD stage 3b, GFR 30-44 ml/min     Anemia     Type 2 diabetes mellitus with diabetic foot ulcer      Atherosclerosis of native arteries of the extremities with ulceration     Bilateral carotid artery stenosis     Elevated troponin     History of CVA (cerebrovascular accident)     Obstructive sleep apnea     Gastroesophageal reflux disease     Type 2 diabetes mellitus with hyperglycemia, with long-term current use of insulin     Benign essential hypertension      Plan:   -right foot doesn't appear ischemic and she is not having rest pain, but doppler signal is monophasic.  I will start heparin to see if this improves.  She has ulceration of the foot.  I explained the procedure last night, which will improve her overall circulation but may not be enough to heal her wounds.  I also explained that she has severe microvascular disease in her right foot from her smoking and diabetes mellitus, which may lead to limb loss despite revascularization. Discussed with Dr. Peoples as well, we are looking at other revascularization options for the right leg.      VTE Prophylaxis:  Pharmacologic & mechanical VTE prophylaxis orders are present.        Kathy Hopkins MD     Any information that has been copied and pasted into this note has been reviewed and edited to represent today's findings.        Electronically signed by Kathy Hopkins MD at 25 1006       Clark Dawson MD at 25 1649          In the OR during rounds today. I will order labs for her and see her tomorrow.    Electronically signed by Clark Dawson MD at 25 1648       Raheem Benites MD at 25 1207          NEPHROLOGY PROGRESS NOTE------[facility]    [facility]  [phone]  Raheem Benites MD      Patient Care Team:  Ibrahima Correa MD as PCP - General (Family Medicine)  Xochilt Jenkins MD as Consulting Physician (Nephrology)      Provider:  Raheem Benites MD  Patient Name: Bibiana Inman  :  1980    SUBJECTIVE:  F/u CARLA/CKD  No chest pain or  "SOA    Medication:  aspirin, 81 mg, Oral, Daily  atorvastatin, 80 mg, Oral, Daily  carvedilol, 12.5 mg, Oral, BID With Meals  heparin (porcine), 5,000 Units, Subcutaneous, Q8H  hydrALAZINE, 50 mg, Oral, Q8H  insulin glargine, 20 Units, Subcutaneous, Nightly  insulin lispro, 2-9 Units, Subcutaneous, 4x Daily AC & at Bedtime  insulin lispro, 7 Units, Subcutaneous, TID With Meals  [Held by provider] lisinopril, 10 mg, Oral, Q24H  metoclopramide, 5 mg, Oral, 4x Daily  pantoprazole, 40 mg, Oral, Q AM  sodium bicarbonate, 1,300 mg, Oral, TID      sodium chloride, 100 mL/hr, Last Rate: 100 mL/hr (07/28/25 5524)      OBJECTIVE    Vital Sign Min/Max for last 24 hours  Temp  Min: 98.4 °F (36.9 °C)  Max: 100.9 °F (38.3 °C)   BP  Min: 117/65  Max: 178/76   Pulse  Min: 58  Max: 80   Resp  Min: 16  Max: 18   SpO2  Min: 98 %  Max: 99 %   No data recorded   Weight  Min: 71.2 kg (156 lb 15.5 oz)  Max: 71.9 kg (158 lb 8.2 oz)     Flowsheet Rows      Flowsheet Row First Filed Value   Admission Height 162.6 cm (64\") Documented at 07/23/2025 1412   Admission Weight 63.3 kg (139 lb 8.8 oz) Documented at 07/22/2025 1845            No intake/output data recorded.  I/O last 3 completed shifts:  In: 598.3 [I.V.:598.3]  Out: 1450 [Urine:1450]    Physical Exam:  General Appearance: alert, appears stated age and cooperative  Head: normocephalic, without obvious abnormality and atraumatic  Eyes: conjunctivae and sclerae normal and no icterus  Neck: supple and no JVD  Lungs: clear to auscultation and respirations regular  Heart: regular rhythm & normal rate and normal S1, S2  Chest: Wall no abnormalities observed  Abdomen: normal bowel sounds and soft, nontender  Extremities: moves extremities well, no edema, no cyanosis and no redness  Skin: no bleeding,   Neurologic: Alert, and oriented. No focal deficits    Labs:    WBC WBC   Date Value Ref Range Status   07/29/2025 8.94 3.40 - 10.80 10*3/mm3 Final   07/28/2025 8.67 3.40 - 10.80 10*3/mm3 Final " "  07/27/2025 9.50 3.40 - 10.80 10*3/mm3 Final      HGB Hemoglobin   Date Value Ref Range Status   07/29/2025 9.1 (L) 12.0 - 15.9 g/dL Final   07/28/2025 9.3 (L) 12.0 - 15.9 g/dL Final   07/27/2025 9.7 (L) 12.0 - 15.9 g/dL Final      HCT Hematocrit   Date Value Ref Range Status   07/29/2025 27.9 (L) 34.0 - 46.6 % Final   07/28/2025 29.2 (L) 34.0 - 46.6 % Final   07/27/2025 30.7 (L) 34.0 - 46.6 % Final      Platelets No results found for: \"LABPLAT\"   MCV MCV   Date Value Ref Range Status   07/29/2025 90.6 79.0 - 97.0 fL Final   07/28/2025 91.8 79.0 - 97.0 fL Final   07/27/2025 93.0 79.0 - 97.0 fL Final          Sodium Sodium   Date Value Ref Range Status   07/29/2025 134 (L) 136 - 145 mmol/L Final   07/28/2025 131 (L) 136 - 145 mmol/L Final   07/27/2025 131 (L) 136 - 145 mmol/L Final      Potassium Potassium   Date Value Ref Range Status   07/29/2025 4.4 3.5 - 5.2 mmol/L Final   07/28/2025 4.5 3.5 - 5.2 mmol/L Final   07/27/2025 4.8 3.5 - 5.2 mmol/L Final   07/27/2025 3.6 3.5 - 5.2 mmol/L Final      Chloride Chloride   Date Value Ref Range Status   07/29/2025 107 98 - 107 mmol/L Final   07/28/2025 106 98 - 107 mmol/L Final   07/27/2025 104 98 - 107 mmol/L Final      CO2 CO2   Date Value Ref Range Status   07/29/2025 18.2 (L) 22.0 - 29.0 mmol/L Final   07/28/2025 17.6 (L) 22.0 - 29.0 mmol/L Final   07/27/2025 18.0 (L) 22.0 - 29.0 mmol/L Final      BUN BUN   Date Value Ref Range Status   07/29/2025 17.0 6.0 - 20.0 mg/dL Final   07/28/2025 19.0 6.0 - 20.0 mg/dL Final   07/27/2025 16.0 6.0 - 20.0 mg/dL Final      Creatinine Creatinine   Date Value Ref Range Status   07/29/2025 1.70 (H) 0.57 - 1.00 mg/dL Final   07/28/2025 2.22 (H) 0.57 - 1.00 mg/dL Final   07/27/2025 2.07 (H) 0.57 - 1.00 mg/dL Final      Calcium Calcium   Date Value Ref Range Status   07/29/2025 7.7 (L) 8.6 - 10.5 mg/dL Final   07/28/2025 8.0 (L) 8.6 - 10.5 mg/dL Final   07/27/2025 7.9 (L) 8.6 - 10.5 mg/dL Final      PO4 No components found for: \"PO4\" " "  Albumin Albumin   Date Value Ref Range Status   07/29/2025 1.9 (L) 3.5 - 5.2 g/dL Final   07/28/2025 2.0 (L) 3.5 - 5.2 g/dL Final      Magnesium Magnesium   Date Value Ref Range Status   07/28/2025 1.9 1.6 - 2.6 mg/dL Final      Uric Acid No components found for: \"URIC ACID\"     Imaging Results       Procedure Component Value Units Date/Time    CT Angio Abdominal Aorta Bilateral Iliofem Runoff [[id number]] Collected: 07/27/25 1236     Updated: 07/27/25 1259    Narrative:      CT ANGIOGRAM ABDOMEN AND PELVIS WITH ILIOFEMORAL RUNOFF     HISTORY: Peripheral artery disease with tissue loss. Diabetes.     COMPARISON: Right foot x-rays 07/24/2025. CT chest 06/26/2024..     TECHNIQUE: Axial images were obtained from the lung bases through the  lower extremities following the administration of IV contrast. Coronal  and sagittal MIP images were obtained as well as 3D volume rendering.   Radiation dose reduction techniques were utilized, including automated  exposure control and exposure modulation based on body size.     FINDINGS:  Atherosclerotic calcifications are present involving the distal thoracic  aorta abdominal aorta with mild narrowing. The celiac artery, superior  mesenteric artery are patent. There is a long segment of moderate  stenosis of the left main renal artery associated with partially  calcified plaque that extends 2.5 cm in length. Mild narrowing of the  origin of the right renal artery. Calcified and noncalcified plaque  involving the inferior abdominal aorta with moderate narrowing just  above its bifurcation. No aneurysm.     On the left, there is a moderate stenosis of the origin of the left  common iliac artery. Multifocal moderate left common iliac arterial  stenoses. Mild narrowing of the left internal iliac artery. There is a  severe stenosis of the left external iliac artery approximately 4.5 cm  distal to its origin. Moderate stenosis left common femoral artery. Left  deep femoral artery is " patent. There is a left superficial femoral  artery stent that is occluded. Left superficial femoral artery  reconstitutes at the distal margin of the stent at the level of the mid  thigh and is small with multifocal stenoses that are up to severe in the  region of the adductor hiatus. Multifocal popliteal artery stenoses of  the left popliteal arteries patent. Left anterior tibial artery contains  calcification proximally with moderate narrowing. The left tibioperoneal  trunk is patent. There is three-vessel runoff to the left lower  extremity.     On the right, there is moderate stenosis origin right common iliac  artery. Mild to moderate narrowing of the distal right common iliac  artery. Moderate to severe stenosis of the proximal right internal iliac  artery. Moderate right external iliac arterial stenoses. Moderate  stenosis of the right common femoral artery. The right superficial  femoral artery is small with multifocal severe stenoses extending from  its origin to the popliteal artery though no evidence for complete  occlusion. The right deep femoral artery is patent. Right popliteal  artery is small with areas of moderate to severe stenoses without  evidence for complete occlusion. Multifocal calcifications involving the  proximal anterior posterior tibial arteries and the tibial peroneal  trunk. There is flow within the right anterior and posterior tibial  arteries extending into the ankle. Flow within the peroneal artery  extends into the distal calf.     Moderate bilateral pleural effusions layer dependently with adjacent  compressive lower lobe atelectasis.     Liver, spleen, pancreas exhibit normal early arterial phase of contrast  appearance. Multiple gallstones. Bilateral adrenal thickening. Kidneys  appear within normal limits. There is no hydronephrosis.     Mild ascites with free fluid extending into the pelvis. Colonic  diverticulosis without evidence for diverticulitis. Mild generalized  body  wall edema with third spacing of fluid.           Impression:      1. Multifocal atherosclerotic disease. Long segment of moderate stenosis  of the left main renal artery associated with partially calcified  plaque. Mild narrowing origin right renal artery.  2. Atherosclerotic calcification involving the common aorta and iliac  vasculature with severe stenosis of the left external iliac artery  proximally 4.5 cm distal to its origin. Multifocal iliac and common  femoral arterial stenoses.  3. Left superficial femoral artery stent is occluded with reconstitution  just below the stent. Multifocal stenoses of the distal left superficial  femoral artery, left popliteal artery. Three-vessel runoff to the left  lower extremity.  4. On the right, the right superficial femoral artery is small with  multifocal severe stenoses extending from its origin to the popliteal  artery but without evidence for complete occlusion. Right popliteal  artery is small with multifocal moderate to severe stenoses.  Calcification involving the proximal anterior and posterior tibial  arteries and peroneal artery. There is flow within the anterior and  posterior tibial arteries on the right extending to the foot and there  is flow of the right peroneal artery extending to the distal calf.  5. Moderate bilateral pleural effusions with adjacent compressive lower  lobe atelectasis.  6. Cholelithiasis.  7. Generalized body wall edema.        This report was finalized on 7/27/2025 12:56 PM by Huan Noriega M.D  on Workstation: [id number]             Duplex Vein Mapping Lower Extremity - Bilateral CAR 07/27/2025 1546    Interpretation Summary    Greater saphenous vein and small saphenous veins appear to be inadequately sized for bypass bilaterally.      ASSESSMENT / PLAN      CARLA-likely prerenal in the setting of volume depletion and or contrast exposure  T2DM  Hypertension-blood pressure controlled.  Hold lisinopril  Syncope not orthostatic on  admission.  Probably volume depleted given severe hyperglycemia  History coronary artery disease  Metabolic acidosis-add p.o. sodium bicarb  Right fifth toe ulcer/necrosis-vascular/podiatry following     CR stable, continue sodium bicarb  Stop IVF  Avoid hypotension  Monitor renal function, fluid status and electrolytes        Raheem Benites MD  [facility]  [phone]  25  12:08 EDT      Electronically signed by Raheem Benites MD at 25 1711       Kathy Hopkins MD at 25 1735           [facility]   Vascular Surgery Progress Note    Patient Name: Bibiana Inman  : 1980  MRN: [id number]  Date of admission: 2025  Surgical Procedures Since Admission:  Procedure(s):  ANGIOPLASTY FEMORAL ARTERY, possible iliac artery angioplasty and stent placement  Surgeon:  Kathy Hopkins MD  Status:  * No surgery found *  -------------------    Subjective   Subjective     Chief Complaint: right foot ulceration    History of Present Illness   No acute events overnight.  Afebrile.  She denies any new complaints.    Review of Systems   Constitutional:  Negative for chills and fever.       Objective   Objective     Vitals:   Temp:  [98.2 °F (36.8 °C)-100.2 °F (37.9 °C)] 98.4 °F (36.9 °C)  Heart Rate:  [58-77] 58  Resp:  [18] 18  BP: (129-160)/(64-78) 129/64    Physical Exam  Vitals reviewed.   Constitutional:       General: She is not in acute distress.     Appearance: Normal appearance.   HENT:      Head: Normocephalic and atraumatic.      Right Ear: External ear normal.      Left Ear: External ear normal.      Nose: Nose normal.      Mouth/Throat:      Mouth: Mucous membranes are moist.      Pharynx: Oropharynx is clear.   Eyes:      Extraocular Movements: Extraocular movements intact.      Conjunctiva/sclera: Conjunctivae normal.      Pupils: Pupils are equal, round, and reactive to light.   Cardiovascular:      Rate and Rhythm: Normal rate and regular  rhythm.      Pulses:           Carotid pulses are 2+ on the right side and 2+ on the left side.       Radial pulses are 2+ on the right side and 2+ on the left side.        Femoral pulses are 1+ on the right side and 1+ on the left side.       Dorsalis pedis pulses are detected w/ Doppler on the right side and detected w/ Doppler on the left side.        Posterior tibial pulses are detected w/ Doppler on the right side and detected w/ Doppler on the left side.   Pulmonary:      Comments: Non labored respirations on room air, equal chest rise  Abdominal:      General: Abdomen is flat. There is no distension.      Palpations: Abdomen is soft.   Musculoskeletal:      Cervical back: Normal range of motion. No rigidity.      Right lower leg: No edema.      Left lower leg: No edema.   Skin:     General: Skin is warm and dry.      Capillary Refill: Capillary refill takes less than 2 seconds.   Neurological:      General: No focal deficit present.      Mental Status: She is alert and oriented to person, place, and time.   Psychiatric:         Mood and Affect: Mood normal.         Behavior: Behavior normal.           Result Review    Result Review:  I have personally reviewed the results from the time of this admission to 7/28/2025 17:36 EDT and agree with these findings:  [x]  Laboratory list / accordion  [x]  Microbiology  []  Radiology  []  EKG/Telemetry   []  Cardiology/Vascular   []  Pathology  []  Old records  []  Other:  Most notable findings include: WBC 8,670; Hb 9 g/dl      Assessment & Plan   Assessment / Plan     Brief Patient Summary:  Bibiana Inman is a 45 y.o. female who has a right foot diabetic ulcer and PAD    Active Hospital Problems:   Active Hospital Problems    Diagnosis     **Syncope and collapse     Dizziness     CAD (coronary artery disease)     CKD stage 3b, GFR 30-44 ml/min     Anemia     Type 2 diabetes mellitus with diabetic foot ulcer     Atherosclerosis of native arteries of the extremities with  ulceration     Bilateral carotid artery stenosis     Elevated troponin     History of CVA (cerebrovascular accident)     Obstructive sleep apnea     Gastroesophageal reflux disease     Type 2 diabetes mellitus with hyperglycemia, with long-term current use of insulin     Benign essential hypertension      Plan:   -I reviewed her CTA runoff and non invasive testing.  I agree that she does likely have microvascular disease  in the foot but she has severe right SFA and popliteal disease.  I do think revascularization would be beneficial in terms of limb salvage.  Unfortunately, she does not have sufficient vein for a bypass, but she does appear to have significant tibial disease as well.  She has CKD stage 3.  I will start NS @ 100 for prehydration for the procedure.  We discussed smoking cessation again.  NPO @ MN.        VTE Prophylaxis:  Pharmacologic & mechanical VTE prophylaxis orders are present.      Kathy Hopkins MD     Any information that has been copied and pasted into this note has been reviewed and edited to represent today's findings.        Electronically signed by Kathy Hopkins MD at 25 8719       Clark Dawson MD at 25 5485              Name: Bibiana Inman ADMIT: 2025   : 1980  PCP: Ibrahima Correa MD    MRN: [id number] LOS: 1 days   AGE/SEX: 45 y.o. female  ROOM: [facility]     Subjective   Subjective     No events overnight. No complaints. She became tearful while we were discussing her care and voiced repeatedly that she just wants to go home.       Objective   Objective   Vital Signs  Temp:  [98.2 °F (36.8 °C)-100.2 °F (37.9 °C)] 98.4 °F (36.9 °C)  Heart Rate:  [58-77] 58  Resp:  [18] 18  BP: (129-160)/(61-78) 129/64  SpO2:  [84 %-98 %] 98 %  on   ;   Device (Oxygen Therapy): nasal cannula  Body mass index is 26.09 kg/m².  Physical Exam  Constitutional:       General: She is not in acute distress.     Appearance: She is not toxic-appearing.   Cardiovascular:       Rate and Rhythm: Normal rate and regular rhythm.      Heart sounds: Normal heart sounds.   Pulmonary:      Effort: Pulmonary effort is normal.      Breath sounds: Normal breath sounds.   Abdominal:      General: Bowel sounds are normal.      Palpations: Abdomen is soft.   Neurological:      Mental Status: She is alert.   Psychiatric:         Mood and Affect: Affect is tearful.         Results Review     I reviewed the patient's new clinical results.  Results from last 7 days   Lab Units 07/28/25  0506 07/27/25  0543 07/26/25  0701 07/25/25  0536   WBC 10*3/mm3 8.67 9.50 9.66 8.20   HEMOGLOBIN g/dL 9.3* 9.7* 10.0* 10.5*   PLATELETS 10*3/mm3 283 271 235 248     Results from last 7 days   Lab Units 07/28/25  0506 07/27/25  1631 07/27/25  0543 07/26/25  0701 07/25/25  0536   SODIUM mmol/L 131*  --  131* 133* 136   POTASSIUM mmol/L 4.5 4.8 3.6 4.1 4.2   CHLORIDE mmol/L 106  --  104 105 110*   CO2 mmol/L 17.6*  --  18.0* 19.0* 17.8*   BUN mg/dL 19.0  --  16.0 16.0 17.0   CREATININE mg/dL 2.22*  --  2.07* 1.86* 1.89*   GLUCOSE mg/dL 136*  --  134* 182* 159*   Estimated Creatinine Clearance: 29.9 mL/min (A) (by C-G formula based on SCr of 2.22 mg/dL (H)).  Results from last 7 days   Lab Units 07/28/25  0506 07/25/25  0536 07/22/25  1519   ALBUMIN g/dL 2.0* 1.9* 1.7*   BILIRUBIN mg/dL <0.2  --  <0.2   ALK PHOS U/L 114  --  135*   AST (SGOT) U/L 13  --  7   ALT (SGPT) U/L 8  --  5     Results from last 7 days   Lab Units 07/28/25  0506 07/27/25  0543 07/26/25  0701 07/25/25  0536 07/23/25  0634 07/22/25  1519   CALCIUM mg/dL 8.0* 7.9* 8.0* 8.1*   < > 7.9*   ALBUMIN g/dL 2.0*  --   --  1.9*  --  1.7*   MAGNESIUM mg/dL 1.9  --   --   --   --  2.1   PHOSPHORUS mg/dL 2.7  --   --  3.9  --   --     < > = values in this interval not displayed.     Results from last 7 days   Lab Units 07/25/25  0536   LACTATE mmol/L 0.7     COVID19   Date Value Ref Range Status   06/30/2024 Not Detected Not Detected - Ref. Range Final    06/27/2024 Not Detected Not Detected - Ref. Range Final   12/28/2021 Detected (C) Not Detected - Ref. Range Final   01/29/2021 Presumptive Negative Presumptive Negative - Ref. Range Final     Glucose   Date/Time Value Ref Range Status   07/28/2025 1624 198 (H) 70 - 130 mg/dL Final   07/28/2025 1119 267 (H) 70 - 130 mg/dL Final   07/27/2025 1953 182 (H) 70 - 130 mg/dL Final   07/27/2025 1626 193 (H) 70 - 130 mg/dL Final   07/27/2025 1119 177 (H) 70 - 130 mg/dL Final   07/27/2025 0609 136 (H) 70 - 130 mg/dL Final   07/26/2025 2018 226 (H) 70 - 130 mg/dL Final       Duplex Vein Mapping Lower Extremity - Bilateral CAR    Greater saphenous vein and small saphenous veins appear to be   inadequately sized for bypass bilaterally.  CT Angio Abdominal Aorta Bilateral Iliofem Runoff  Narrative: CT ANGIOGRAM ABDOMEN AND PELVIS WITH ILIOFEMORAL RUNOFF     HISTORY: Peripheral artery disease with tissue loss. Diabetes.     COMPARISON: Right foot x-rays 07/24/2025. CT chest 06/26/2024..     TECHNIQUE: Axial images were obtained from the lung bases through the  lower extremities following the administration of IV contrast. Coronal  and sagittal MIP images were obtained as well as 3D volume rendering.   Radiation dose reduction techniques were utilized, including automated  exposure control and exposure modulation based on body size.     FINDINGS:  Atherosclerotic calcifications are present involving the distal thoracic  aorta abdominal aorta with mild narrowing. The celiac artery, superior  mesenteric artery are patent. There is a long segment of moderate  stenosis of the left main renal artery associated with partially  calcified plaque that extends 2.5 cm in length. Mild narrowing of the  origin of the right renal artery. Calcified and noncalcified plaque  involving the inferior abdominal aorta with moderate narrowing just  above its bifurcation. No aneurysm.     On the left, there is a moderate stenosis of the origin of the  left  common iliac artery. Multifocal moderate left common iliac arterial  stenoses. Mild narrowing of the left internal iliac artery. There is a  severe stenosis of the left external iliac artery approximately 4.5 cm  distal to its origin. Moderate stenosis left common femoral artery. Left  deep femoral artery is patent. There is a left superficial femoral  artery stent that is occluded. Left superficial femoral artery  reconstitutes at the distal margin of the stent at the level of the mid  thigh and is small with multifocal stenoses that are up to severe in the  region of the adductor hiatus. Multifocal popliteal artery stenoses of  the left popliteal arteries patent. Left anterior tibial artery contains  calcification proximally with moderate narrowing. The left tibioperoneal  trunk is patent. There is three-vessel runoff to the left lower  extremity.     On the right, there is moderate stenosis origin right common iliac  artery. Mild to moderate narrowing of the distal right common iliac  artery. Moderate to severe stenosis of the proximal right internal iliac  artery. Moderate right external iliac arterial stenoses. Moderate  stenosis of the right common femoral artery. The right superficial  femoral artery is small with multifocal severe stenoses extending from  its origin to the popliteal artery though no evidence for complete  occlusion. The right deep femoral artery is patent. Right popliteal  artery is small with areas of moderate to severe stenoses without  evidence for complete occlusion. Multifocal calcifications involving the  proximal anterior posterior tibial arteries and the tibial peroneal  trunk. There is flow within the right anterior and posterior tibial  arteries extending into the ankle. Flow within the peroneal artery  extends into the distal calf.     Moderate bilateral pleural effusions layer dependently with adjacent  compressive lower lobe atelectasis.     Liver, spleen, pancreas exhibit  normal early arterial phase of contrast  appearance. Multiple gallstones. Bilateral adrenal thickening. Kidneys  appear within normal limits. There is no hydronephrosis.     Mild ascites with free fluid extending into the pelvis. Colonic  diverticulosis without evidence for diverticulitis. Mild generalized  body wall edema with third spacing of fluid.         Impression: 1. Multifocal atherosclerotic disease. Long segment of moderate stenosis  of the left main renal artery associated with partially calcified  plaque. Mild narrowing origin right renal artery.  2. Atherosclerotic calcification involving the common aorta and iliac  vasculature with severe stenosis of the left external iliac artery  proximally 4.5 cm distal to its origin. Multifocal iliac and common  femoral arterial stenoses.  3. Left superficial femoral artery stent is occluded with reconstitution  just below the stent. Multifocal stenoses of the distal left superficial  femoral artery, left popliteal artery. Three-vessel runoff to the left  lower extremity.  4. On the right, the right superficial femoral artery is small with  multifocal severe stenoses extending from its origin to the popliteal  artery but without evidence for complete occlusion. Right popliteal  artery is small with multifocal moderate to severe stenoses.  Calcification involving the proximal anterior and posterior tibial  arteries and peroneal artery. There is flow within the anterior and  posterior tibial arteries on the right extending to the foot and there  is flow of the right peroneal artery extending to the distal calf.  5. Moderate bilateral pleural effusions with adjacent compressive lower  lobe atelectasis.  6. Cholelithiasis.  7. Generalized body wall edema.        This report was finalized on 7/27/2025 12:56 PM by Huan Noriega M.D  on Workstation: [id number]       Scheduled Medications  aspirin, 81 mg, Oral, Daily  atorvastatin, 80 mg, Oral, Daily  carvedilol,  12.5 mg, Oral, BID With Meals  hydrALAZINE, 50 mg, Oral, Q8H  insulin glargine, 15 Units, Subcutaneous, Nightly  insulin lispro, 2-9 Units, Subcutaneous, 4x Daily AC & at Bedtime  insulin lispro, 4 Units, Subcutaneous, TID With Meals  [Held by provider] lisinopril, 10 mg, Oral, Q24H  metoclopramide, 5 mg, Oral, 4x Daily  pantoprazole, 40 mg, Oral, Q AM  sodium bicarbonate, 1,300 mg, Oral, TID    Infusions  sodium chloride, 100 mL/hr, Last Rate: 100 mL/hr (07/28/25 1555)    Diet  Diet: Cardiac, Diabetic; Healthy Heart (2-3 Na+); Consistent Carbohydrate; Fluid Consistency: Thin (IDDSI 0)  NPO Diet NPO Type: Sips with Meds      Assessment/Plan     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 ulcer [E11.621, L97.509]  Yes    Atherosclerosis of native arteries of the extremities with ulceration [I70.25]  Unknown    Bilateral carotid artery stenosis [I65.23]  Yes    Elevated troponin [R79.89]  Yes    History of CVA (cerebrovascular accident) [Z86.73]  Not Applicable    Obstructive sleep apnea [G47.33]  Yes    Gastroesophageal reflux disease [K21.9]  Yes    Type 2 diabetes mellitus with hyperglycemia, with long-term current use of insulin [E11.65, Z79.4]  Not Applicable    Benign essential hypertension [I10]  Yes      Resolved Hospital Problems   No resolved problems to display.       45 y.o. female admitted with Syncope and collapse.    45 year old woman who presented following a syncopal episode and was found to have severe hyperglycemia, an NSTEMI, an CARLA, and orthostatic hypotension. She was also found to have an ischemic right fifth toe.    Syncope due to orthostatic hypotension-likely due to hyperglycemia. Echocardiogram was unremarkable.  Peripheral artery disease with an ischemic right fifth  toe-vascular surgery is planning angiogram on Tuesday or Wednesday. Podiatry is weighing watchful waiting vs. Amputation either as an inpatient or outpatient (felt to be non-urgent). Continue asa/statin  Type 2 NSTEMI-no wall motion abnormalities on echocardiogram. [place] to be secondary to hypotension, CKD, severe hyperglycemia. No further investigation is planned  CARLA on CKD 3b-felt to be due to volume depletion and/or contrast exposure. Nephrology is following. Hold nephrotoxic medications. On IVF  Type 2 diabetes with hyperglycemia, gastroparesis and peripheral neuropathy-A1c 13.6. she reports that she has been out of insulin for months prior to admission. Glucose remains above goal. Increase lantus to 20 units and lispro to 7 units tidac. 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  Full code.  Discussed with patient and nursing staff.  Anticipate discharge home with family timing yet to be determined.      Clark Dawson MD  [facility]  25  16:30 EDT             Electronically signed by Clark Dawson MD at 25 1652       Adrian Ordaz MD at 25 1810          [facility]    Chart reviewed  This patient is followed by Dr. Sawyer Jenkins  Will re-direct consult to his service  Thank you nonetheless for the consideration of this consult    --Adrian Ordaz MD      Electronically signed by Adrian Ordaz MD at 25 1811       Adrian Cuenca MD at 25 2505              Name: Bibiana Inman ADMIT: 2025   : 1980  PCP: Ibrahima Correa MD    MRN: [id number]  LOS: 1 days   AGE/SEX: 45 y.o. female  ROOM: [facility]     Subjective   Subjective   Chief Complaint   Patient presents with    Syncope    Hyperglycemia     She had vomiting of her food when she was eating lunch.  She was tolerating liquids okay and did not report any abdominal pain.  Denies dysuria and flank pain.  No diarrhea.  Not reporting any chest pain palpitations or shortness of breath.    Objective   Objective   Vital Signs  Temp:  [98.2 °F (36.8 °C)-100.2 °F (37.9 °C)] 98.4 °F (36.9 °C)  Heart Rate:  [58-78] 69  Resp:  [18] 18  BP: (128-171)/(59-68) 142/68  SpO2:  [93 %-95 %] 94 %  on   ;   Device (Oxygen Therapy): room air  Body mass index is 24.6 kg/m².    Physical Exam  Vitals and nursing note reviewed.   Constitutional:       General: She is not in acute distress.     Appearance: She is ill-appearing (chronically). She is not diaphoretic.   Cardiovascular:      Rate and Rhythm: Normal rate and regular rhythm.   Pulmonary:      Effort: Pulmonary effort is normal.      Breath sounds: No wheezing.   Abdominal:      Palpations: Abdomen is soft.      Tenderness: There is no abdominal tenderness.   Musculoskeletal:      Right lower leg: No edema.      Left lower leg: No edema.      Comments: Right foot 5th digit with ulcer and necrosis   Skin:     Findings: Lesion present.   Neurological:      Mental Status: She is alert. Mental status is at baseline.   Psychiatric:         Mood and Affect: Mood normal.         Behavior: Behavior normal.       Results Review  I reviewed the patient's new clinical results.    Results from last 7 days   Lab Units 07/27/25  0543 07/26/25  0701 07/25/25  0536 07/24/25  0655   WBC 10*3/mm3 9.50 9.66 8.20 8.82   HEMOGLOBIN g/dL 9.7* 10.0* 10.5* 9.5*   PLATELETS 10*3/mm3 271 235 248 221     Results from last 7 days   Lab Units 07/27/25  0543 07/26/25  0701 07/25/25  0536 07/24/25  0655   SODIUM mmol/L 131* 133* 136 134*   POTASSIUM mmol/L 3.6 4.1 4.2 3.7   CHLORIDE mmol/L 104 105 110*  108*   CO2 mmol/L 18.0* 19.0* 17.8* 18.3*   BUN mg/dL 16.0 16.0 17.0 16.0   CREATININE mg/dL 2.07* 1.86* 1.89* 1.87*   GLUCOSE mg/dL 134* 182* 159* 105*   EGFR mL/min/1.73 29.3* 33.3* 32.6* 33.1*     Results from last 7 days   Lab Units 07/25/25  0536 07/22/25  1519   ALBUMIN g/dL 1.9* 1.7*   BILIRUBIN mg/dL  --  <0.2   ALK PHOS U/L  --  135*   AST (SGOT) U/L  --  7   ALT (SGPT) U/L  --  5     Results from last 7 days   Lab Units 07/27/25  0543 07/26/25  0701 07/25/25  0536 07/24/25  0655 07/23/25  0634 07/22/25  1519   CALCIUM mg/dL 7.9* 8.0* 8.1* 8.0*   < > 7.9*   ALBUMIN g/dL  --   --  1.9*  --   --  1.7*   MAGNESIUM mg/dL  --   --   --   --   --  2.1   PHOSPHORUS mg/dL  --   --  3.9  --   --   --     < > = values in this interval not displayed.     Results from last 7 days   Lab Units 07/25/25  0536   LACTATE mmol/L 0.7     Glucose   Date/Time Value Ref Range Status   07/27/2025 1119 177 (H) 70 - 130 mg/dL Final   07/27/2025 0609 136 (H) 70 - 130 mg/dL Final   07/26/2025 2018 226 (H) 70 - 130 mg/dL Final   07/26/2025 1640 182 (H) 70 - 130 mg/dL Final   07/26/2025 1114 219 (H) 70 - 130 mg/dL Final   07/26/2025 0557 161 (H) 70 - 130 mg/dL Final   07/25/2025 2029 191 (H) 70 - 130 mg/dL Final       CT Angio Abdominal Aorta Bilateral Iliofem Runoff  Result Date: 7/27/2025  1. Multifocal atherosclerotic disease. Long segment of moderate stenosis of the left main renal artery associated with partially calcified plaque. Mild narrowing origin right renal artery. 2. Atherosclerotic calcification involving the common aorta and iliac vasculature with severe stenosis of the left external iliac artery proximally 4.5 cm distal to its origin. Multifocal iliac and common femoral arterial stenoses. 3. Left superficial femoral artery stent is occluded with reconstitution just below the stent. Multifocal stenoses of the distal left superficial femoral artery, left popliteal artery. Three-vessel runoff to the left lower extremity.  4. On the right, the right superficial femoral artery is small with multifocal severe stenoses extending from its origin to the popliteal artery but without evidence for complete occlusion. Right popliteal artery is small with multifocal moderate to severe stenoses. Calcification involving the proximal anterior and posterior tibial arteries and peroneal artery. There is flow within the anterior and posterior tibial arteries on the right extending to the foot and there is flow of the right peroneal artery extending to the distal calf. 5. Moderate bilateral pleural effusions with adjacent compressive lower lobe atelectasis. 6. Cholelithiasis. 7. Generalized body wall edema.   This report was finalized on 7/27/2025 12:56 PM by Huan Noriega M.D on Workstation: [id number]        I have personally reviewed all medications:  Scheduled Medications  aspirin, 81 mg, Oral, Daily  atorvastatin, 80 mg, Oral, Daily  carvedilol, 12.5 mg, Oral, BID With Meals  hydrALAZINE, 50 mg, Oral, Q12H  insulin glargine, 15 Units, Subcutaneous, Nightly  insulin lispro, 2-9 Units, Subcutaneous, 4x Daily AC & at Bedtime  insulin lispro, 4 Units, Subcutaneous, TID With Meals  [Held by provider] lisinopril, 10 mg, Oral, Q24H  metoclopramide, 5 mg, Oral, 4x Daily  pantoprazole, 40 mg, Oral, Q AM      Infusions     Diet  Diet: Cardiac, Diabetic; Healthy Heart (2-3 Na+); Consistent Carbohydrate; Fluid Consistency: Thin (IDDSI 0)      Assessment/Plan     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 ulcer [E11.621, L97.509]  Yes    Bilateral carotid artery stenosis [I65.23]  Yes    Elevated troponin [R79.89]  Yes    History of CVA (cerebrovascular accident) [Z86.73]  Not Applicable    Obstructive sleep apnea [G47.33]  Yes     Gastroesophageal reflux disease [K21.9]  Yes    Type 2 diabetes mellitus with hyperglycemia, with long-term current use of insulin [E11.65, Z79.4]  Not Applicable    Benign essential hypertension [I10]  Yes      Resolved Hospital Problems   No resolved problems to display.       45 y.o. female admitted with Syncope and collapse.    Syncope: Orthostatic on presentation.  She had severe hyperglycemia which likely added to hypovolemia and hypotension.  Had some bradycardia with heart rate in the 50s earlier but not symptomatic.  Cardiology evaluated.  Diabetes with hyperglycemia: A1c 13.6.  Patient had been out of diabetic medications for some time prior to presentation.  Received the increased 15 units of Lantus last night.  Continue to monitor requirements today.  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  On ASA and statin.  Hypertension: CARLA on presentation.  Lisinopril held.  Creatinine plateaued.  Blood pressure more elevated today will adjust hydralazine to 3 times a day and monitor.  Right fifth toe ulcer/necrosis: Uncertain of duration.  CHHAYA with PAD.  Podiatry evaluated and can plan amputation here or as outpatient.  CTA obtained.  Vascular surgery following.  CARLA on CKD3: Creatinine plateaued.  She did receive fluids after the CTA yesterday.  Will ask nephrology to consult with additional vascular intervention likely.  I offered patient more IV fluids today but she did not want them currently.  CAD: No chest pain reported.  ASA/statin  Fever: Fever curve improving off antibiotics.  No leukocytosis.  PPX: SCD  Disposition: Home/TBD    Discussed with Dr Peoples    Expected Discharge Date: 7/26/2025; Expected Discharge Time:  3:00 PM     Adrian Cuenca MD  [facility]  07/27/25  14:27 EDT    Dictated portions of note using Dragon dictation software.  Copied text in this note has been reviewed by me and remains  accurate as of 25    Electronically signed by Adrian Cuenca MD at 25 1440       Neal Peoples II, MD at 25 1325            Name: Bibiana Inman ADMIT: 2025   : 1980  PCP: Ibrahima Correa MD    MRN: [id number] LOS: 1 days   AGE/SEX: 45 y.o. female  ROOM:  [place] [address]     CC: PAD, R 5th toe wound/ischemia  Interval History: CTA done. Pt resting comfortably.    Subjective   Subjective     Review of Systems  Objective   Objective     Vitals:   Temp:  [98.2 °F (36.8 °C)-100.2 °F (37.9 °C)] 98.4 °F (36.9 °C)  Heart Rate:  [56-78] 69  Resp:  [18] 18  BP: (115-171)/(59-68) 142/68    No intake/output data recorded.    Scheduled Meds:     aspirin, 81 mg, Oral, Daily  atorvastatin, 80 mg, Oral, Daily  carvedilol, 12.5 mg, Oral, BID With Meals  hydrALAZINE, 50 mg, Oral, Q12H  insulin glargine, 15 Units, Subcutaneous, Nightly  insulin lispro, 2-9 Units, Subcutaneous, 4x Daily AC & at Bedtime  insulin lispro, 4 Units, Subcutaneous, TID With Meals  [Held by provider] lisinopril, 10 mg, Oral, Q24H  metoclopramide, 5 mg, Oral, 4x Daily  pantoprazole, 40 mg, Oral, Q AM      IV Meds:        Physical Exam  R 5th toe ischemia stable.     Data Reviewed:  CBC    Results from last 7 days   Lab Units 25  0543 25  0701 25  0536 25  0655 25  0634 25  1519   WBC 10*3/mm3 9.50 9.66 8.20 8.82 8.72 9.04   HEMOGLOBIN g/dL 9.7* 10.0* 10.5* 9.5* 10.1* 11.2*   PLATELETS 10*3/mm3 271 235 248 221 220 233     BMP   Results from last 7 days   Lab Units 25  0543 25  0701 25  0536 25  0655 25  0634 25  1519   SODIUM mmol/L 131* 133* 136 134* 135* 129*   POTASSIUM mmol/L 3.6 4.1 4.2 3.7 3.9 3.3*   CHLORIDE mmol/L 104 105 110* 108* 108* 99   CO2 mmol/L 18.0* 19.0* 17.8* 18.3* 21.0* 21.2*   BUN mg/dL 16.0 16.0 17.0 16.0 16.0 17.0   CREATININE mg/dL 2.07* 1.86* 1.89* 1.87* 1.80* 2.09*   GLUCOSE mg/dL 134* 182* 159* 105* 332* 626*  "  MAGNESIUM mg/dL  --   --   --   --   --  2.1   PHOSPHORUS mg/dL  --   --  3.9  --   --   --      Cr Clearance Estimated Creatinine Clearance: 34.5 mL/min (A) (by C-G formula based on SCr of 2.07 mg/dL (H)).  Coag     HbA1C   Lab Results   Component Value Date    HGBA1C 13.60 (H) 07/22/2025    HGBA1C 16.60 (H) 05/06/2025    HGBA1C 14.80 (H) 02/17/2025     Blood Glucose   Glucose   Date/Time Value Ref Range Status   07/27/2025 1119 177 (H) 70 - 130 mg/dL Final   07/27/2025 0609 136 (H) 70 - 130 mg/dL Final   07/26/2025 2018 226 (H) 70 - 130 mg/dL Final   07/26/2025 1640 182 (H) 70 - 130 mg/dL Final   07/26/2025 1114 219 (H) 70 - 130 mg/dL Final   07/26/2025 0557 161 (H) 70 - 130 mg/dL Final   07/25/2025 2029 191 (H) 70 - 130 mg/dL Final   07/25/2025 1604 219 (H) 70 - 130 mg/dL Final     Infection   Results from last 7 days   Lab Units 07/25/25  0536 07/25/25  0535   BLOODCX  No growth at 2 days No growth at 2 days     CMP   Results from last 7 days   Lab Units 07/27/25  0543 07/26/25  0701 07/25/25  0536 07/24/25  0655 07/23/25  0634 07/22/25  1519   SODIUM mmol/L 131* 133* 136 134* 135* 129*   POTASSIUM mmol/L 3.6 4.1 4.2 3.7 3.9 3.3*   CHLORIDE mmol/L 104 105 110* 108* 108* 99   CO2 mmol/L 18.0* 19.0* 17.8* 18.3* 21.0* 21.2*   BUN mg/dL 16.0 16.0 17.0 16.0 16.0 17.0   CREATININE mg/dL 2.07* 1.86* 1.89* 1.87* 1.80* 2.09*   GLUCOSE mg/dL 134* 182* 159* 105* 332* 626*   ALBUMIN g/dL  --   --  1.9*  --   --  1.7*   BILIRUBIN mg/dL  --   --   --   --   --  <0.2   ALK PHOS U/L  --   --   --   --   --  135*   AST (SGOT) U/L  --   --   --   --   --  7   ALT (SGPT) U/L  --   --   --   --   --  5   AMMONIA umol/L  --   --  17  --   --   --      ABG      UA    Results from last 7 days   Lab Units 07/23/25  0346   NITRITE UA  Negative   WBC UA /HPF 3-5*   BACTERIA UA /HPF None Seen   SQUAM EPITHEL UA /HPF 0-2     JOZEF  No results found for: \"POCMETH\", \"POCAMPHET\", \"POCBARBITUR\", \"POCBENZO\", \"POCCOCAINE\", \"POCOPIATES\", " "\"POCOXYCODO\", \"POCPHENCYC\", \"POCPROPOXY\", \"POCTHC\", \"POCTRICYC\"  [facility]   Results from last 7 days   Lab Units 07/27/25  0543 07/26/25  0701 07/25/25  0536 07/24/25  0655 07/23/25  0634 07/22/25  1519   HEMOGLOBIN g/dL 9.7* 10.0* 10.5* 9.5* 10.1* 11.2*   PLATELETS 10*3/mm3 271 235 248 221 220 233   CREATININE mg/dL 2.07* 1.86* 1.89* 1.87* 1.80* 2.09*     Radiology(recent) CT Angio Abdominal Aorta Bilateral Iliofem Runoff  Result Date: 7/27/2025  1. Multifocal atherosclerotic disease. Long segment of moderate stenosis of the left main renal artery associated with partially calcified plaque. Mild narrowing origin right renal artery. 2. Atherosclerotic calcification involving the common aorta and iliac vasculature with severe stenosis of the left external iliac artery proximally 4.5 cm distal to its origin. Multifocal iliac and common femoral arterial stenoses. 3. Left superficial femoral artery stent is occluded with reconstitution just below the stent. Multifocal stenoses of the distal left superficial femoral artery, left popliteal artery. Three-vessel runoff to the left lower extremity. 4. On the right, the right superficial femoral artery is small with multifocal severe stenoses extending from its origin to the popliteal artery but without evidence for complete occlusion. Right popliteal artery is small with multifocal moderate to severe stenoses. Calcification involving the proximal anterior and posterior tibial arteries and peroneal artery. There is flow within the anterior and posterior tibial arteries on the right extending to the foot and there is flow of the right peroneal artery extending to the distal calf. 5. Moderate bilateral pleural effusions with adjacent compressive lower lobe atelectasis. 6. Cholelithiasis. 7. Generalized body wall edema.   This report was finalized on 7/27/2025 12:56 PM by Huan Noriega M.D on Workstation: [id number]        Most notable findings include: Cr 2.07. CTA " results reviewed. Multifocal stenoses, focal occlusions at multiple levels, but appears may be candidate for endovascular treatment.       Active Hospital Problems:   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 ulcer [E11.621, L97.509]  Yes    Bilateral carotid artery stenosis [I65.23]  Yes    Elevated troponin [R79.89]  Yes    History of CVA (cerebrovascular accident) [Z86.73]  Not Applicable    Obstructive sleep apnea [G47.33]  Yes    Gastroesophageal reflux disease [K21.9]  Yes    Type 2 diabetes mellitus with hyperglycemia, with long-term current use of insulin [E11.65, Z79.4]  Not Applicable    Benign essential hypertension [I10]  Yes      Resolved Hospital Problems   No resolved problems to display.      Assessment & Plan     Assessment / Plan     45-year-old lady with chronically poorly controlled diabetes, significant cardiac disease, peripheral arterial disease with an ischemic right fifth toe.  CTA reviewed.  She has multi focal stenoses at both iliac, femoral-popliteal, and probably tibial level as well but does appear that she may be a candidate for endovascular treatment, likely right leg angiogram with angioplasty, possible stent, bilateral iliac stents.  Will get vein mapping for completion of workup in case endovascular treatment fails.  I anticipate she likely does not have any good vein after having had 2 coronary bypass surgeries.  Cr up a little.  On schedule not really favorable for intervention tomorrow, which will give her more time for recovery after contrast load from CTA.  Anticipate angiogram Tuesday or Wednesday.  Continue asprin and atorvastatin.       Neal Peoples II, MD  07/27/25  13:25 EDT  Available via Secure Chat during the day for non-urgent issues.  After  hours and for urgent issues please call the office at [phone]      Electronically signed by Neal Peoples II, MD at 07/27/25 1351            Consult Notes (last 4 days)        Raheem Benites MD at 07/28/25 1034        Consult Orders    1. Inpatient Nephrology Consult [[id number]] ordered by Adrian Ordaz MD at 07/27/25 1812    2. Inpatient Nephrology Consult [[id number]] ordered by Adrian Cuenca MD at 07/27/25 1427                     NEPHROLOGY CONSULTATION-----[facility]    [facility]  [phone]  Raheem Benites MD    Patient Care Team:  Ibrahima Correa MD as PCP - General (Family Medicine)  Xochilt Jenkins MD as Consulting Physician (Nephrology)    CC/REASON FOR CONSULTATION: Elevated creatinine    PHYSICIAN REQUESTING CONSULTATION: Clark Dawson MD     History of Present Illness    45 female with past medical history of CKD stage III, T2DM, hypertension presented to the hospital on 7/22/2025 with dizziness and after a fall.  She had severe hyperglycemia at the time of admission.  Her creatinine is 2.2 today.  It was 1.8 few days ago.  She did have IV contrast exposure after admission.  Denies any dysuria or gross hematuria.  No vomiting or diarrhea.  Blood pressure controlled.  She was on lisinopril, currently held.    Review of Systems   As noted above otherwise 10 systems are reviewed and were negative.      Scheduled Meds:  aspirin, 81 mg, Oral, Daily  atorvastatin, 80 mg, Oral, Daily  carvedilol, 12.5 mg, Oral, BID With Meals  hydrALAZINE, 50 mg, Oral, Q8H  insulin glargine, 15 Units, Subcutaneous, Nightly  insulin lispro, 2-9 Units, Subcutaneous, 4x Daily AC & at Bedtime  insulin lispro, 4 Units, Subcutaneous, TID With Meals  [Held by provider] lisinopril, 10 mg, Oral, Q24H  metoclopramide, 5 mg, Oral, 4x Daily  pantoprazole, 40 mg, Oral, Q AM        Continuous Infusions:       PRN Meds:    acetaminophen  **OR** acetaminophen **OR** acetaminophen    albuterol    senna-docusate sodium **AND** polyethylene glycol **AND** bisacodyl **AND** bisacodyl    Calcium Replacement - Follow Nurse / BPA Driven Protocol    dextrose    dextrose    glucagon (human recombinant)    HYDROcodone-acetaminophen    Magnesium Standard Dose Replacement - Follow Nurse / BPA Driven Protocol    melatonin    ondansetron ODT **OR** ondansetron    Phosphorus Replacement - Follow Nurse / BPA Driven Protocol    Potassium Replacement - Follow Nurse / BPA Driven Protocol    Allergies:  Latex    OBJECTIVE    Vital Signs  Temp:  [98.1 °F (36.7 °C)-100.2 °F (37.9 °C)] 98.2 °F (36.8 °C)  Heart Rate:  [58-77] 64  Resp:  [18] 18  BP: (120-160)/(51-78) 157/71    No intake/output data recorded.  I/O last 3 completed shifts:  In: 480 [P.O.:480]  Out: 1200 [Urine:1200]    Physical Exam:  General Appearance: alert, appears stated age and cooperative  Head: normocephalic, without obvious abnormality and atraumatic  Eyes: conjunctivae and sclerae normal and no icterus  Neck: supple and no JVD  Lungs: clear to auscultation and respirations regular  Heart: regular rhythm & normal rate and normal S1, S2  Chest Wall: no abnormalities observed  Abdomen: normal bowel sounds and soft, nontender  Extremities: moves extremities well, no edema, no cyanosis  Skin: no bleeding, bruising or rash  Neurologic: Alert, and oriented. No focal deficits    Results Review:    I reviewed the patient's new clinical results.    WBC WBC   Date Value Ref Range Status   07/28/2025 8.67 3.40 - 10.80 10*3/mm3 Final   07/27/2025 9.50 3.40 - 10.80 10*3/mm3 Final   07/26/2025 9.66 3.40 - 10.80 10*3/mm3 Final      HGB Hemoglobin   Date Value Ref Range Status   07/28/2025 9.3 (L) 12.0 - 15.9 g/dL Final   07/27/2025 9.7 (L) 12.0 - 15.9 g/dL Final   07/26/2025 10.0 (L) 12.0 - 15.9 g/dL Final      HCT Hematocrit   Date Value Ref Range Status   07/28/2025 29.2 (L) 34.0 - 46.6 % Final   07/27/2025 30.7  "(L) 34.0 - 46.6 % Final   07/26/2025 32.0 (L) 34.0 - 46.6 % Final      Platelets No results found for: \"LABPLAT\"   MCV MCV   Date Value Ref Range Status   07/28/2025 91.8 79.0 - 97.0 fL Final   07/27/2025 93.0 79.0 - 97.0 fL Final   07/26/2025 93.8 79.0 - 97.0 fL Final          Sodium Sodium   Date Value Ref Range Status   07/28/2025 131 (L) 136 - 145 mmol/L Final   07/27/2025 131 (L) 136 - 145 mmol/L Final   07/26/2025 133 (L) 136 - 145 mmol/L Final      Potassium Potassium   Date Value Ref Range Status   07/28/2025 4.5 3.5 - 5.2 mmol/L Final   07/27/2025 4.8 3.5 - 5.2 mmol/L Final   07/27/2025 3.6 3.5 - 5.2 mmol/L Final   07/26/2025 4.1 3.5 - 5.2 mmol/L Final      Chloride Chloride   Date Value Ref Range Status   07/28/2025 106 98 - 107 mmol/L Final   07/27/2025 104 98 - 107 mmol/L Final   07/26/2025 105 98 - 107 mmol/L Final      CO2 CO2   Date Value Ref Range Status   07/28/2025 17.6 (L) 22.0 - 29.0 mmol/L Final   07/27/2025 18.0 (L) 22.0 - 29.0 mmol/L Final   07/26/2025 19.0 (L) 22.0 - 29.0 mmol/L Final      BUN BUN   Date Value Ref Range Status   07/28/2025 19.0 6.0 - 20.0 mg/dL Final   07/27/2025 16.0 6.0 - 20.0 mg/dL Final   07/26/2025 16.0 6.0 - 20.0 mg/dL Final      Creatinine Creatinine   Date Value Ref Range Status   07/28/2025 2.22 (H) 0.57 - 1.00 mg/dL Final   07/27/2025 2.07 (H) 0.57 - 1.00 mg/dL Final   07/26/2025 1.86 (H) 0.57 - 1.00 mg/dL Final      Calcium Calcium   Date Value Ref Range Status   07/28/2025 8.0 (L) 8.6 - 10.5 mg/dL Final   07/27/2025 7.9 (L) 8.6 - 10.5 mg/dL Final   07/26/2025 8.0 (L) 8.6 - 10.5 mg/dL Final      PO4 No results found for: \"CAPO4\"   Albumin Albumin   Date Value Ref Range Status   07/28/2025 2.0 (L) 3.5 - 5.2 g/dL Final      Magnesium Magnesium   Date Value Ref Range Status   07/28/2025 1.9 1.6 - 2.6 mg/dL Final      Uric Acid Uric Acid   Date Value Ref Range Status   07/27/2025 4.8 2.4 - 5.7 mg/dL Final          Imaging Results (Last 72 Hours)       Procedure " Component Value Units Date/Time    CT Angio Abdominal Aorta Bilateral Iliofem Runoff [[id number]] Collected: 07/27/25 1236     Updated: 07/27/25 1259    Narrative:      CT ANGIOGRAM ABDOMEN AND PELVIS WITH ILIOFEMORAL RUNOFF     HISTORY: Peripheral artery disease with tissue loss. Diabetes.     COMPARISON: Right foot x-rays 07/24/2025. CT chest 06/26/2024..     TECHNIQUE: Axial images were obtained from the lung bases through the  lower extremities following the administration of IV contrast. Coronal  and sagittal MIP images were obtained as well as 3D volume rendering.   Radiation dose reduction techniques were utilized, including automated  exposure control and exposure modulation based on body size.     FINDINGS:  Atherosclerotic calcifications are present involving the distal thoracic  aorta abdominal aorta with mild narrowing. The celiac artery, superior  mesenteric artery are patent. There is a long segment of moderate  stenosis of the left main renal artery associated with partially  calcified plaque that extends 2.5 cm in length. Mild narrowing of the  origin of the right renal artery. Calcified and noncalcified plaque  involving the inferior abdominal aorta with moderate narrowing just  above its bifurcation. No aneurysm.     On the left, there is a moderate stenosis of the origin of the left  common iliac artery. Multifocal moderate left common iliac arterial  stenoses. Mild narrowing of the left internal iliac artery. There is a  severe stenosis of the left external iliac artery approximately 4.5 cm  distal to its origin. Moderate stenosis left common femoral artery. Left  deep femoral artery is patent. There is a left superficial femoral  artery stent that is occluded. Left superficial femoral artery  reconstitutes at the distal margin of the stent at the level of the mid  thigh and is small with multifocal stenoses that are up to severe in the  region of the adductor hiatus. Multifocal popliteal artery  stenoses of  the left popliteal arteries patent. Left anterior tibial artery contains  calcification proximally with moderate narrowing. The left tibioperoneal  trunk is patent. There is three-vessel runoff to the left lower  extremity.     On the right, there is moderate stenosis origin right common iliac  artery. Mild to moderate narrowing of the distal right common iliac  artery. Moderate to severe stenosis of the proximal right internal iliac  artery. Moderate right external iliac arterial stenoses. Moderate  stenosis of the right common femoral artery. The right superficial  femoral artery is small with multifocal severe stenoses extending from  its origin to the popliteal artery though no evidence for complete  occlusion. The right deep femoral artery is patent. Right popliteal  artery is small with areas of moderate to severe stenoses without  evidence for complete occlusion. Multifocal calcifications involving the  proximal anterior posterior tibial arteries and the tibial peroneal  trunk. There is flow within the right anterior and posterior tibial  arteries extending into the ankle. Flow within the peroneal artery  extends into the distal calf.     Moderate bilateral pleural effusions layer dependently with adjacent  compressive lower lobe atelectasis.     Liver, spleen, pancreas exhibit normal early arterial phase of contrast  appearance. Multiple gallstones. Bilateral adrenal thickening. Kidneys  appear within normal limits. There is no hydronephrosis.     Mild ascites with free fluid extending into the pelvis. Colonic  diverticulosis without evidence for diverticulitis. Mild generalized  body wall edema with third spacing of fluid.           Impression:      1. Multifocal atherosclerotic disease. Long segment of moderate stenosis  of the left main renal artery associated with partially calcified  plaque. Mild narrowing origin right renal artery.  2. Atherosclerotic calcification involving the common  aorta and iliac  vasculature with severe stenosis of the left external iliac artery  proximally 4.5 cm distal to its origin. Multifocal iliac and common  femoral arterial stenoses.  3. Left superficial femoral artery stent is occluded with reconstitution  just below the stent. Multifocal stenoses of the distal left superficial  femoral artery, left popliteal artery. Three-vessel runoff to the left  lower extremity.  4. On the right, the right superficial femoral artery is small with  multifocal severe stenoses extending from its origin to the popliteal  artery but without evidence for complete occlusion. Right popliteal  artery is small with multifocal moderate to severe stenoses.  Calcification involving the proximal anterior and posterior tibial  arteries and peroneal artery. There is flow within the anterior and  posterior tibial arteries on the right extending to the foot and there  is flow of the right peroneal artery extending to the distal calf.  5. Moderate bilateral pleural effusions with adjacent compressive lower  lobe atelectasis.  6. Cholelithiasis.  7. Generalized body wall edema.        This report was finalized on 7/27/2025 12:56 PM by Huan Noriega M.D  on Workstation: [id number]                 Results for orders placed during the hospital encounter of 07/22/25    XR Foot 3+ View Right 07/24/2025  2:49 PM    Narrative  XR FOOT 3+ VW RIGHT-    CLINICAL: Small toe ischemia.    FINDINGS: No 5th toe cortical bone destruction. No acute osseous  abnormality. No soft tissue gas or radiopaque foreign body. The 5th  metatarsal is satisfactory in appearance. There is 1st  metatarsophalangeal joint degeneration of a mild-to-moderate degree. The  forefoot is otherwise unremarkable.    Moderate size calcaneal spur, hind foot articulations preserved. Midfoot  articulations within normal limits.    CONCLUSION: No 5th toe acute osseous or articular abnormality. No soft  tissue gas or radiopaque foreign body  seen.      This report was finalized on 7/24/2025 2:49 PM by Dr. Clif Galeas M.D  on Workstation: [id number]      XR Ribs Bilateral 4+ View With PA Chest 07/22/2025  3:59 PM    Narrative  8 VIEW BILATERAL RIBS AND 1 VIEW PA CHEST    HISTORY: Syncope. Fell. Rib pain, left greater than right.    FINDINGS: The vertebral height and disc spaces are well-maintained. The  heart is top normal in size with sternal wires from previous cardiac  surgery and there is no change from 5/5/2025.    Multiple views of the left and right ribs show no definite fracture at  the present time.    This report was finalized on 7/22/2025 3:59 PM by Dr. Fortunato Fournier M.D  on Workstation: [id number]      Results for orders placed during the hospital encounter of 07/22/25    Duplex Vein Mapping Lower Extremity - Bilateral CAR 07/27/2025  3:46 PM    Interpretation Summary    Greater saphenous vein and small saphenous veins appear to be inadequately sized for bypass bilaterally.      ASSESSMENT / PLAN      Syncope and collapse    Benign essential hypertension    Gastroesophageal reflux disease    Type 2 diabetes mellitus with hyperglycemia, with long-term current use of insulin    Obstructive sleep apnea    Bilateral carotid artery stenosis    History of CVA (cerebrovascular accident)    Elevated troponin    Dizziness    CAD (coronary artery disease)    CKD stage 3b, GFR 30-44 ml/min    Anemia    Type 2 diabetes mellitus with diabetic foot ulcer    CARLA-likely prerenal in the setting of volume depletion and or contrast exposure  T2DM  Hypertension-blood pressure controlled.  Hold lisinopril  Syncope not orthostatic on admission.  Probably volume depleted given severe hyperglycemia  History coronary artery disease  Metabolic acidosis-add p.o. sodium bicarb  Right fifth toe ulcer/necrosis-vascular/podiatry following    Agree holding lisinopril  Check UA urine sodium and creatinine  Avoid hypotension  Monitor renal function, fluid status and  electrolytes    I discussed the patient's findings and my recommendations with patient    Will follow along closely. Thank you for allowing us to see this patient in renal consultation.    [facility]  [phone]  MD Raheem Poe MD  07/28/25  12:34 EDT      Electronically signed by Raheem Benites MD at 07/28/25 6066

## 2025-07-30 NOTE — ANESTHESIA POSTPROCEDURE EVALUATION
Patient: Bibiana Inman    Procedure Summary       Date: 07/29/25 Room / [facility]    Anesthesia Start: 1602 Anesthesia Stop: 2123    Procedure: Right Lower Extremity Angiogram, Bilateral comman and external iliac stents, Right femoral endarterectomy, and profundoplasty (Right: Thigh) Diagnosis:       Atherosclerosis of native arteries of the extremities with ulceration      (Atherosclerosis of native arteries of the extremities with ulceration [I70.25])    Surgeons: Kathy Hopkins MD Provider: Lawrence Baker MD    Anesthesia Type: MAC ASA Status: 3            Anesthesia Type: MAC    Vitals  Vitals Value Taken Time   /66 07/29/25 22:45   Temp 36.5 °C (97.7 °F) 07/29/25 22:45   Pulse 56 07/29/25 22:54   Resp 16 07/29/25 22:45   SpO2 97 % 07/29/25 22:54   Vitals shown include unfiled device data.        Post Anesthesia Care and Evaluation    Patient location during evaluation: PACU  Level of consciousness: awake  Pain management: adequate    Airway patency: patent  Anesthetic complications: No anesthetic complications  PONV Status: none  Cardiovascular status: stable  Respiratory status: acceptable  Hydration status: acceptable

## 2025-07-30 NOTE — PROGRESS NOTES
[facility]   Vascular Surgery Progress Note    Patient Name: Bibiana Inman  : 1980  MRN: [id number]  Date of admission: 2025  Surgical Procedures Since Admission:  Procedure(s):  Right Lower Extremity Angiogram, Bilateral comman and external iliac stents, Right femoral endarterectomy, and profundoplasty  Surgeon:  Kathy Hopkins MD  Status:  1 Day Post-Op  -------------------    Subjective   Subjective     Chief Complaint: right foot wounds    History of Present Illness   Post revascularization last night.  No right foot rest pain, no evidence of cellulitis of the right foot.  Anxious to go home.      Review of Systems   Constitutional:  Negative for chills and fever.       Objective   Objective     Vitals:   Temp:  [97.7 °F (36.5 °C)-98.8 °F (37.1 °C)] 97.7 °F (36.5 °C)  Heart Rate:  [54-63] 62  Resp:  [16-18] 16  BP: (120-187)/(60-98) 139/65  Flow (L/min) (Oxygen Therapy):  [2-4] 3  Output by Drain (mL) 25 0701 - 25 1900 25 1901 - 25 0700 25 0701 - 25 1001 Range Total   Requested LDAs do not have output data documented.       Physical Exam  Vitals reviewed.   Constitutional:       General: She is not in acute distress.     Appearance: Normal appearance.   HENT:      Head: Normocephalic and atraumatic.      Right Ear: External ear normal.      Left Ear: External ear normal.      Nose: Nose normal.      Mouth/Throat:      Mouth: Mucous membranes are moist.      Pharynx: Oropharynx is clear.   Eyes:      Extraocular Movements: Extraocular movements intact.      Conjunctiva/sclera: Conjunctivae normal.      Pupils: Pupils are equal, round, and reactive to light.   Cardiovascular:      Rate and Rhythm: Normal rate and regular rhythm.      Pulses:           Carotid pulses are 2+ on the right side and 2+ on the left side.       Radial pulses are 2+ on the right side and 2+ on the left side.        Femoral pulses are 2+ on the right side and 2+ on the left side.        Dorsalis pedis pulses are detected w/ Doppler on the left side.        Posterior tibial pulses are detected w/ Doppler on the right side and detected w/ Doppler on the left side.      Comments: Monphasic right PT signal, right foot warm, right popliteal doppler signal  Pulmonary:      Comments: Non labored respirations on room air, equal chest rise  Abdominal:      General: Abdomen is flat. There is no distension.      Palpations: Abdomen is soft.   Musculoskeletal:      Cervical back: Normal range of motion. No rigidity.      Right lower leg: No edema.      Left lower leg: No edema.   Skin:     General: Skin is warm and dry.      Capillary Refill: Capillary refill takes less than 2 seconds.   Neurological:      General: No focal deficit present.      Mental Status: She is alert and oriented to person, place, and time.   Psychiatric:         Mood and Affect: Mood normal.         Behavior: Behavior normal.           Result Review    Result Review:  I have personally reviewed the results from the time of this admission to 7/30/2025 10:01 EDT and agree with these findings:  [x]  Laboratory list / accordion  []  Microbiology  []  Radiology  []  EKG/Telemetry   []  Cardiology/Vascular   []  Pathology  []  Old records  []  Other:  Most notable findings include: creatinine 2.03 mg/dl, Hg 11 g/dl, GFR 30 ml/min      Assessment & Plan   Assessment / Plan     Brief Patient Summary:  Bibiana Inman is a 45 y.o. female who has severe multi level PAD s/p attempted endovascular right SFA revascularization who had bilateral JALEEL and EIA stents and right femoral endarterectomy and profunda plasty.    Active Hospital Problems:   Active Hospital Problems    Diagnosis     **Syncope and collapse     Dizziness     CAD (coronary artery disease)     CKD stage 3b, GFR 30-44 ml/min     Anemia     Type 2 diabetes mellitus with diabetic foot ulcer     Atherosclerosis of native arteries of the extremities with ulceration     Bilateral carotid  artery stenosis     Elevated troponin     History of CVA (cerebrovascular accident)     Obstructive sleep apnea     Gastroesophageal reflux disease     Type 2 diabetes mellitus with hyperglycemia, with long-term current use of insulin     Benign essential hypertension      Plan:   -right foot doesn't appear ischemic and she is not having rest pain, but doppler signal is monophasic.  I will start heparin to see if this improves.  She has ulceration of the foot.  I explained the procedure last night, which will improve her overall circulation but may not be enough to heal her wounds.  I also explained that she has severe microvascular disease in her right foot from her smoking and diabetes mellitus, which may lead to limb loss despite revascularization. Discussed with Dr. Peoples as well, we are looking at other revascularization options for the right leg.      VTE Prophylaxis:  Pharmacologic & mechanical VTE prophylaxis orders are present.        Kathy Hopkins MD     Any information that has been copied and pasted into this note has been reviewed and edited to represent today's findings.

## 2025-07-30 NOTE — PROGRESS NOTES
Name: Bibiana Inman ADMIT: 2025   : 1980  PCP: Ibrahima Correa MD    MRN: [id number] LOS: 2 days   AGE/SEX: 45 y.o. female  ROOM: [facility]     Subjective   Subjective     She was in surgery much of the day yesterday. She's doing ok currently and states that she just wants to go home       Objective   Objective   Vital Signs  Temp:  [97.6 °F (36.4 °C)-98 °F (36.7 °C)] 98 °F (36.7 °C)  Heart Rate:  [54-67] 67  Resp:  [16-17] 17  BP: (105-187)/(45-98) 138/61  SpO2:  [97 %-99 %] 97 %  on  Flow (L/min) (Oxygen Therapy):  [2-4] 2;   Device (Oxygen Therapy): nasal cannula  Body mass index is 27.21 kg/m².  Physical Exam  Constitutional:       General: She is not in acute distress.     Appearance: She is not toxic-appearing.   Cardiovascular:      Rate and Rhythm: Normal rate and regular rhythm.      Heart sounds: Normal heart sounds.   Pulmonary:      Effort: Pulmonary effort is normal.      Breath sounds: Normal breath sounds.   Abdominal:      General: Bowel sounds are normal.      Palpations: Abdomen is soft.   Neurological:      Mental Status: She is alert.   Psychiatric:         Mood and Affect: Mood and affect normal.         Behavior: Behavior normal.         Results Review     I reviewed the patient's new clinical results.  Results from last 7 days   Lab Units 25  0506   WBC 10*3/mm3 13.61* 9.02 8.94 8.67   HEMOGLOBIN g/dL 10.7* 10.1* 9.1* 9.3*   PLATELETS 10*3/mm3 285 243 289 283     Results from last 7 days   Lab Units 25  0556 25  0506   SODIUM mmol/L 132* 131* 134* 131*   POTASSIUM mmol/L 5.1 4.9 4.4 4.5   CHLORIDE mmol/L 106 105 107 106   CO2 mmol/L 16.1* 17.8* 18.2* 17.6*   BUN mg/dL 20.0 17.0 17.0 19.0   CREATININE mg/dL 2.03* 1.78* 1.70* 2.22*   GLUCOSE mg/dL 152* 125* 66 136*   Estimated Creatinine Clearance: 33.3 mL/min (A) (by C-G formula based on SCr of 2.03 mg/dL (H)).  Results from last 7  days   Lab Units 07/30/25  0556 07/29/25  0700 07/28/25  0506 07/25/25  0536   ALBUMIN g/dL 1.7* 1.9* 2.0* 1.9*   BILIRUBIN mg/dL  --   --  <0.2  --    ALK PHOS U/L  --   --  114  --    AST (SGOT) U/L  --   --  13  --    ALT (SGPT) U/L  --   --  8  --      Results from last 7 days   Lab Units 07/30/25  0556 07/29/25 2125 07/29/25  0700 07/28/25  0506 07/26/25  0701 07/25/25  0536   CALCIUM mg/dL 8.0* 7.7* 7.7* 8.0*   < > 8.1*   ALBUMIN g/dL 1.7*  --  1.9* 2.0*  --  1.9*   MAGNESIUM mg/dL  --   --   --  1.9  --   --    PHOSPHORUS mg/dL 5.5*  --  3.0 2.7  --  3.9    < > = values in this interval not displayed.     Results from last 7 days   Lab Units 07/25/25  0536   LACTATE mmol/L 0.7     COVID19   Date Value Ref Range Status   06/30/2024 Not Detected Not Detected - Ref. Range Final   06/27/2024 Not Detected Not Detected - Ref. Range Final   12/28/2021 Detected (C) Not Detected - Ref. Range Final   01/29/2021 Presumptive Negative Presumptive Negative - Ref. Range Final     Glucose   Date/Time Value Ref Range Status   07/30/2025 1616 153 (H) 65 - 99 mg/dL Final     Comment:     Serial Number: [id number]:  [id number]   07/30/2025 1058 116 (H) 65 - 99 mg/dL Final     Comment:     Serial Number: [id number]:  [id number]   07/30/2025 0634 152 (H) 65 - 99 mg/dL Final     Comment:     Serial Number: [id number]:  [id number]   07/29/2025 2321 157 (H) 65 - 99 mg/dL Final     Comment:     Serial Number: [id number]:  [id number]   07/29/2025 2119 115 (H) 65 - 99 mg/dL Final     Comment:     Serial Number: [id number]:  [id number]   07/29/2025 1337 78 65 - 99 mg/dL Final     Comment:     Serial Number: [id number]:  [id number]   07/29/2025 1135 78 65 - 99 mg/dL Final     Comment:     Serial Number: [id number]:  [id number]       Arteriogram (Autofinalize)  This procedure was auto-finalized with no dictation required.    Scheduled Medications  acetaminophen, 1,000 mg, Oral, Q8H  aspirin, 81  mg, Oral, Daily  atorvastatin, 80 mg, Oral, Daily  carvedilol, 12.5 mg, Oral, BID With Meals  hydrALAZINE, 25 mg, Oral, Q8H  insulin glargine, 20 Units, Subcutaneous, Nightly  insulin lispro, 2-9 Units, Subcutaneous, 4x Daily AC & at Bedtime  insulin lispro, 7 Units, Subcutaneous, TID With Meals  metoclopramide, 5 mg, Oral, 4x Daily  pantoprazole, 40 mg, Oral, Q AM  sodium bicarbonate, 1,300 mg, Oral, TID    Infusions  heparin, 18 Units/kg/hr, Last Rate: 16 Units/kg/hr (07/30/25 1622)  sodium chloride, 125 mL/hr, Last Rate: 125 mL/hr (07/30/25 0922)    Diet  Diet: Cardiac, Diabetic; Healthy Heart (2-3 Na+); Consistent Carbohydrate; Fluid Consistency: Thin (IDDSI 0)       Assessment/Plan     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 ulcer [E11.621, L97.509]  Yes    Atherosclerosis of native arteries of the extremities with ulceration [I70.25]  Unknown    Bilateral carotid artery stenosis [I65.23]  Yes    Elevated troponin [R79.89]  Yes    History of CVA (cerebrovascular accident) [Z86.73]  Not Applicable    Obstructive sleep apnea [G47.33]  Yes    Gastroesophageal reflux disease [K21.9]  Yes    Type 2 diabetes mellitus with hyperglycemia, with long-term current use of insulin [E11.65, Z79.4]  Not Applicable    Benign essential hypertension [I10]  Yes      Resolved Hospital Problems   No resolved problems to display.       45 y.o. female admitted with Syncope and collapse.    45 year old woman who presented following a syncopal episode and was found to have severe hyperglycemia, an NSTEMI, an CARLA, and orthostatic hypotension. She was also found to have an ischemic right fifth toe.    Syncope due to orthostatic hypotension-likely due to hyperglycemia. Echocardiogram was unremarkable.  Peripheral  artery disease with an ischemic right fifth toe-s/p attempted endovascular right SFA revascularization with bilateral common and extrenal iliac stent placement, right femoral endarterectomy, and profundoplasty on 7/29/25. Her doppler signal for her foot was monophasic this morning and so she's been started on a heparin drip. Vascular surgery is considering further revascularization options.  Type 2 NSTEMI-no wall motion abnormalities on echocardiogram. [place] to be secondary to hypotension, CKD, severe hyperglycemia. No further investigation is planned  CARLA on CKD 3b-felt to be due to volume depletion and/or contrast exposure. Nephrology is following. Hold nephrotoxic medications. On IVF and sodium bicarb  Type 2 diabetes with hyperglycemia, gastroparesis and peripheral neuropathy-A1c 13.6. she reports that she has been out of insulin for months prior to admission. 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  Full code.  Discussed with patient and nursing staff.  Anticipate discharge home with family timing yet to be determined.      Clark Dawson MD  [facility]  07/30/25  18:01 EDT

## 2025-07-30 NOTE — PLAN OF CARE
Problem: Adult Inpatient Plan of Care  Goal: Plan of Care Review  Flowsheets (Taken 7/30/2025 4333)  Progress: improving  Outcome Evaluation: From pacu at apx 2330 right groin cutdown intact left groin puncture intact pedal pulses with doppler jenkins patent urine marginal at 30cc per hour sleeping mostly arouses easily.  Plan of Care Reviewed With: patient   Goal Outcome Evaluation:  Plan of Care Reviewed With: patient        Progress: improving  Outcome Evaluation: From pacu at apx 2330 right groin cutdown intact left groin puncture intact pedal pulses with doppler jenkins patent urine marginal at 30cc per hour sleeping mostly arouses easily.

## 2025-07-31 LAB
ALBUMIN SERPL-MCNC: 1.7 G/DL (ref 3.5–5.2)
ANION GAP SERPL CALCULATED.3IONS-SCNC: 6.5 MMOL/L (ref 5–15)
APTT PPP: 101.2 SECONDS (ref 22.7–35.4)
APTT PPP: 57 SECONDS (ref 22.7–35.4)
APTT PPP: 78.2 SECONDS (ref 22.7–35.4)
BASOPHILS # BLD AUTO: 0.06 10*3/MM3 (ref 0–0.2)
BASOPHILS NFR BLD AUTO: 0.6 % (ref 0–1.5)
BUN SERPL-MCNC: 22 MG/DL (ref 6–20)
BUN/CREAT SERPL: 10 (ref 7–25)
CALCIUM SPEC-SCNC: 7.4 MG/DL (ref 8.6–10.5)
CHLORIDE SERPL-SCNC: 110 MMOL/L (ref 98–107)
CO2 SERPL-SCNC: 18.5 MMOL/L (ref 22–29)
CREAT SERPL-MCNC: 2.2 MG/DL (ref 0.57–1)
DEPRECATED RDW RBC AUTO: 44.5 FL (ref 37–54)
EGFRCR SERPLBLD CKD-EPI 2021: 27.2 ML/MIN/1.73
EOSINOPHIL # BLD AUTO: 0.27 10*3/MM3 (ref 0–0.4)
EOSINOPHIL NFR BLD AUTO: 2.5 % (ref 0.3–6.2)
ERYTHROCYTE [DISTWIDTH] IN BLOOD BY AUTOMATED COUNT: 13.3 % (ref 12.3–15.4)
GLUCOSE BLDC GLUCOMTR-MCNC: 108 MG/DL (ref 65–99)
GLUCOSE BLDC GLUCOMTR-MCNC: 122 MG/DL (ref 65–99)
GLUCOSE BLDC GLUCOMTR-MCNC: 124 MG/DL (ref 65–99)
GLUCOSE BLDC GLUCOMTR-MCNC: 40 MG/DL (ref 65–99)
GLUCOSE BLDC GLUCOMTR-MCNC: 46 MG/DL (ref 65–99)
GLUCOSE BLDC GLUCOMTR-MCNC: 54 MG/DL (ref 65–99)
GLUCOSE BLDC GLUCOMTR-MCNC: 86 MG/DL (ref 65–99)
GLUCOSE SERPL-MCNC: 100 MG/DL (ref 65–99)
HCT VFR BLD AUTO: 28.1 % (ref 34–46.6)
HGB BLD-MCNC: 9.2 G/DL (ref 12–15.9)
IMM GRANULOCYTES # BLD AUTO: 0.05 10*3/MM3 (ref 0–0.05)
IMM GRANULOCYTES NFR BLD AUTO: 0.5 % (ref 0–0.5)
LYMPHOCYTES # BLD AUTO: 1.85 10*3/MM3 (ref 0.7–3.1)
LYMPHOCYTES NFR BLD AUTO: 17 % (ref 19.6–45.3)
Lab: ABNORMAL
MCH RBC QN AUTO: 29.9 PG (ref 26.6–33)
MCHC RBC AUTO-ENTMCNC: 32.7 G/DL (ref 31.5–35.7)
MCV RBC AUTO: 91.2 FL (ref 79–97)
MONOCYTES # BLD AUTO: 0.98 10*3/MM3 (ref 0.1–0.9)
MONOCYTES NFR BLD AUTO: 9 % (ref 5–12)
NEUTROPHILS NFR BLD AUTO: 7.69 10*3/MM3 (ref 1.7–7)
NEUTROPHILS NFR BLD AUTO: 70.4 % (ref 42.7–76)
NRBC BLD AUTO-RTO: 0 /100 WBC (ref 0–0.2)
PHOSPHATE SERPL-MCNC: 4.5 MG/DL (ref 2.5–4.5)
PLATELET # BLD AUTO: 269 10*3/MM3 (ref 140–450)
PMV BLD AUTO: 10 FL (ref 6–12)
POTASSIUM SERPL-SCNC: 4.2 MMOL/L (ref 3.5–5.2)
RBC # BLD AUTO: 3.08 10*6/MM3 (ref 3.77–5.28)
SODIUM SERPL-SCNC: 135 MMOL/L (ref 136–145)
WBC NRBC COR # BLD AUTO: 10.9 10*3/MM3 (ref 3.4–10.8)

## 2025-07-31 PROCEDURE — 82948 REAGENT STRIP/BLOOD GLUCOSE: CPT

## 2025-07-31 PROCEDURE — 25810000003 SODIUM CHLORIDE 0.9 % SOLUTION: Performed by: INTERNAL MEDICINE

## 2025-07-31 PROCEDURE — 25810000003 SODIUM CHLORIDE 0.9 % SOLUTION: Performed by: STUDENT IN AN ORGANIZED HEALTH CARE EDUCATION/TRAINING PROGRAM

## 2025-07-31 PROCEDURE — 99024 POSTOP FOLLOW-UP VISIT: CPT | Performed by: STUDENT IN AN ORGANIZED HEALTH CARE EDUCATION/TRAINING PROGRAM

## 2025-07-31 PROCEDURE — 80069 RENAL FUNCTION PANEL: CPT | Performed by: STUDENT IN AN ORGANIZED HEALTH CARE EDUCATION/TRAINING PROGRAM

## 2025-07-31 PROCEDURE — 63710000001 INSULIN LISPRO (HUMAN) PER 5 UNITS: Performed by: STUDENT IN AN ORGANIZED HEALTH CARE EDUCATION/TRAINING PROGRAM

## 2025-07-31 PROCEDURE — 25010000002 HEPARIN (PORCINE) 25000-0.45 UT/250ML-% SOLUTION: Performed by: STUDENT IN AN ORGANIZED HEALTH CARE EDUCATION/TRAINING PROGRAM

## 2025-07-31 PROCEDURE — 85025 COMPLETE CBC W/AUTO DIFF WBC: CPT | Performed by: STUDENT IN AN ORGANIZED HEALTH CARE EDUCATION/TRAINING PROGRAM

## 2025-07-31 PROCEDURE — 85730 THROMBOPLASTIN TIME PARTIAL: CPT | Performed by: STUDENT IN AN ORGANIZED HEALTH CARE EDUCATION/TRAINING PROGRAM

## 2025-07-31 PROCEDURE — 82948 REAGENT STRIP/BLOOD GLUCOSE: CPT | Performed by: STUDENT IN AN ORGANIZED HEALTH CARE EDUCATION/TRAINING PROGRAM

## 2025-07-31 RX ORDER — SODIUM CHLORIDE 9 MG/ML
125 INJECTION, SOLUTION INTRAVENOUS CONTINUOUS
Status: DISCONTINUED | OUTPATIENT
Start: 2025-07-31 | End: 2025-08-01

## 2025-07-31 RX ORDER — CLOPIDOGREL BISULFATE 75 MG/1
75 TABLET ORAL DAILY
Status: DISCONTINUED | OUTPATIENT
Start: 2025-07-31 | End: 2025-08-01 | Stop reason: HOSPADM

## 2025-07-31 RX ADMIN — SODIUM BICARBONATE 1300 MG: 650 TABLET ORAL at 16:38

## 2025-07-31 RX ADMIN — METOCLOPRAMIDE 5 MG: 5 TABLET ORAL at 21:20

## 2025-07-31 RX ADMIN — OXYCODONE 5 MG: 5 TABLET ORAL at 21:24

## 2025-07-31 RX ADMIN — HEPARIN SODIUM 13 UNITS/KG/HR: 10000 INJECTION, SOLUTION INTRAVENOUS at 06:03

## 2025-07-31 RX ADMIN — HYDRALAZINE HYDROCHLORIDE 25 MG: 25 TABLET ORAL at 14:20

## 2025-07-31 RX ADMIN — ASPIRIN 81 MG CHEWABLE TABLET 81 MG: 81 TABLET CHEWABLE at 08:00

## 2025-07-31 RX ADMIN — DEXTROSE 15 G: 15 GEL ORAL at 16:45

## 2025-07-31 RX ADMIN — METOCLOPRAMIDE 5 MG: 5 TABLET ORAL at 16:45

## 2025-07-31 RX ADMIN — CLOPIDOGREL BISULFATE 75 MG: 75 TABLET, FILM COATED ORAL at 08:00

## 2025-07-31 RX ADMIN — SODIUM BICARBONATE 1300 MG: 650 TABLET ORAL at 08:00

## 2025-07-31 RX ADMIN — METOCLOPRAMIDE 5 MG: 5 TABLET ORAL at 08:00

## 2025-07-31 RX ADMIN — INSULIN LISPRO 7 UNITS: 100 INJECTION, SOLUTION INTRAVENOUS; SUBCUTANEOUS at 11:06

## 2025-07-31 RX ADMIN — ACETAMINOPHEN 1000 MG: 500 TABLET, FILM COATED ORAL at 21:19

## 2025-07-31 RX ADMIN — ACETAMINOPHEN 1000 MG: 500 TABLET, FILM COATED ORAL at 14:18

## 2025-07-31 RX ADMIN — HYDRALAZINE HYDROCHLORIDE 25 MG: 25 TABLET ORAL at 05:59

## 2025-07-31 RX ADMIN — ATORVASTATIN CALCIUM 80 MG: 80 TABLET, FILM COATED ORAL at 08:00

## 2025-07-31 RX ADMIN — SODIUM BICARBONATE 1300 MG: 650 TABLET ORAL at 21:20

## 2025-07-31 RX ADMIN — METOCLOPRAMIDE 5 MG: 5 TABLET ORAL at 11:06

## 2025-07-31 RX ADMIN — CARVEDILOL 12.5 MG: 12.5 TABLET, FILM COATED ORAL at 08:00

## 2025-07-31 RX ADMIN — SODIUM CHLORIDE 125 ML/HR: 9 INJECTION, SOLUTION INTRAVENOUS at 21:28

## 2025-07-31 RX ADMIN — INSULIN LISPRO 7 UNITS: 100 INJECTION, SOLUTION INTRAVENOUS; SUBCUTANEOUS at 08:00

## 2025-07-31 RX ADMIN — PANTOPRAZOLE SODIUM 40 MG: 40 TABLET, DELAYED RELEASE ORAL at 05:59

## 2025-07-31 RX ADMIN — SODIUM CHLORIDE 125 ML/HR: 9 INJECTION, SOLUTION INTRAVENOUS at 00:05

## 2025-07-31 RX ADMIN — HYDRALAZINE HYDROCHLORIDE 25 MG: 25 TABLET ORAL at 21:20

## 2025-07-31 RX ADMIN — ACETAMINOPHEN 1000 MG: 500 TABLET, FILM COATED ORAL at 05:59

## 2025-07-31 NOTE — PROGRESS NOTES
Name: Bibiana Inman ADMIT: 2025   : 1980  PCP: Ibrahima Correa MD    MRN: [id number] LOS: 3 days   AGE/SEX: 45 y.o. female  ROOM:  [facility] E563/1     Vascular Surgery Progress Note    Seen in her room.  Doing well but really wants to go home.  No lower extremity pain.  Normal amount of pain at her surgical incision.  Has monophasic DP and PT signals bilaterally, but stronger than before her operation.  I discussed with the patient that I suspect her perfusion is still likely inadequate for wound healing.  The distal aspect of her right fifth toe is ischemic with dry gangrene and I told her that this would not improve and eventually that toe will need to be amputated or will fall off on its own, however she may require revascularization such that the underlying tissue may heal and she seemed to understand this, but strongly desires to go home first.  We likely could not perform the operation to the middle of next week anyways and she does not necessarily have to stay here that long on account of her current issues.  Will get ABIs with toe pressures to reassess her perfusion after the bilateral iliac stents and right femoral endart.   Right groin incision was intact with no erythema induration or drainage.  No evidence of complication at left groin access site.  Probably can discharge tomorrow if no other problems.  Will need to follow-up with hospitalist, nephrologist recs as well.        Neal Peoples II, MD  25  19:08 EDT  Office Number [phone]    [facility] Vascular Surgery

## 2025-07-31 NOTE — PLAN OF CARE
Goal Outcome Evaluation:           Progress: no change  Outcome Evaluation: (P) VSS. pulses dopplerable. no c/o pain at the groin site. does not favor activity/getting out of bed. Patient has no appetite and not eating-lead to low blood sugar controlled with apple juice and dextrose. call light in reach.

## 2025-07-31 NOTE — PROGRESS NOTES
"NEPHROLOGY PROGRESS NOTE------[facility]    [facility]  [phone]  Raheem Benites MD      Patient Care Team:  Ibrahima Correa MD as PCP - General (Family Medicine)  Xochilt Jenkins MD as Consulting Physician (Nephrology)      Provider:  Raheem Benites MD  Patient Name: Bibiana Inman  :  1980    SUBJECTIVE:  F/u CARLA/CKD  No chest pain or SOA    Medication:  acetaminophen, 1,000 mg, Oral, Q8H  aspirin, 81 mg, Oral, Daily  atorvastatin, 80 mg, Oral, Daily  carvedilol, 12.5 mg, Oral, BID With Meals  clopidogrel, 75 mg, Oral, Daily  hydrALAZINE, 25 mg, Oral, Q8H  insulin glargine, 20 Units, Subcutaneous, Nightly  insulin lispro, 2-9 Units, Subcutaneous, 4x Daily AC & at Bedtime  insulin lispro, 7 Units, Subcutaneous, TID With Meals  metoclopramide, 5 mg, Oral, 4x Daily  pantoprazole, 40 mg, Oral, Q AM  sodium bicarbonate, 1,300 mg, Oral, TID      heparin, 18 Units/kg/hr, Last Rate: 11 Units/kg/hr (25 0801)        OBJECTIVE    Vital Sign Min/Max for last 24 hours  Temp  Min: 97.2 °F (36.2 °C)  Max: 98 °F (36.7 °C)   BP  Min: 105/53  Max: 148/64   Pulse  Min: 53  Max: 67   Resp  Min: 16  Max: 17   SpO2  Min: 94 %  Max: 99 %   No data recorded   Weight  Min: 78.5 kg (173 lb 1 oz)  Max: 78.5 kg (173 lb 1 oz)     Flowsheet Rows      Flowsheet Row First Filed Value   Admission Height 162.6 cm (64\") Documented at 2025 1412   Admission Weight 63.3 kg (139 lb 8.8 oz) Documented at 2025 1845            No intake/output data recorded.  I/O last 3 completed shifts:  In: 5670.1 [P.O.:490; I.V.:4887.1; Blood:293]  Out: 985 [Urine:485; Blood:500]    Physical Exam:  General Appearance: alert, appears stated age and cooperative  Head: normocephalic, without obvious abnormality and atraumatic  Eyes: conjunctivae and sclerae normal and no icterus  Neck: supple and no JVD  Lungs: clear to auscultation and respirations regular  Heart: " "regular rhythm & normal rate and normal S1, S2  Chest: Wall no abnormalities observed  Abdomen: normal bowel sounds and soft, nontender  Extremities: moves extremities well, no edema, no cyanosis and no redness  Skin: no bleeding,   Neurologic: Alert, and oriented. No focal deficits    Labs:    WBC WBC   Date Value Ref Range Status   07/31/2025 10.90 (H) 3.40 - 10.80 10*3/mm3 Final   07/30/2025 13.61 (H) 3.40 - 10.80 10*3/mm3 Final   07/29/2025 9.02 3.40 - 10.80 10*3/mm3 Final   07/29/2025 8.94 3.40 - 10.80 10*3/mm3 Final      HGB Hemoglobin   Date Value Ref Range Status   07/31/2025 9.2 (L) 12.0 - 15.9 g/dL Final   07/30/2025 10.7 (L) 12.0 - 15.9 g/dL Final   07/29/2025 10.1 (L) 12.0 - 15.9 g/dL Final   07/29/2025 9.1 (L) 12.0 - 15.9 g/dL Final      HCT Hematocrit   Date Value Ref Range Status   07/31/2025 28.1 (L) 34.0 - 46.6 % Final   07/30/2025 33.7 (L) 34.0 - 46.6 % Final   07/29/2025 31.6 (L) 34.0 - 46.6 % Final   07/29/2025 27.9 (L) 34.0 - 46.6 % Final      Platelets No results found for: \"LABPLAT\"   MCV MCV   Date Value Ref Range Status   07/31/2025 91.2 79.0 - 97.0 fL Final   07/30/2025 92.1 79.0 - 97.0 fL Final   07/29/2025 92.1 79.0 - 97.0 fL Final   07/29/2025 90.6 79.0 - 97.0 fL Final          Sodium Sodium   Date Value Ref Range Status   07/31/2025 135 (L) 136 - 145 mmol/L Final   07/30/2025 132 (L) 136 - 145 mmol/L Final   07/29/2025 131 (L) 136 - 145 mmol/L Final   07/29/2025 134 (L) 136 - 145 mmol/L Final      Potassium Potassium   Date Value Ref Range Status   07/31/2025 4.2 3.5 - 5.2 mmol/L Final   07/30/2025 5.1 3.5 - 5.2 mmol/L Final   07/29/2025 4.9 3.5 - 5.2 mmol/L Final     Comment:     Slight hemolysis detected by analyzer. Result may be falsely elevated.   07/29/2025 4.4 3.5 - 5.2 mmol/L Final      Chloride Chloride   Date Value Ref Range Status   07/31/2025 110 (H) 98 - 107 mmol/L Final   07/30/2025 106 98 - 107 mmol/L Final   07/29/2025 105 98 - 107 mmol/L Final   07/29/2025 107 98 - 107 " "mmol/L Final      CO2 CO2   Date Value Ref Range Status   07/31/2025 18.5 (L) 22.0 - 29.0 mmol/L Final   07/30/2025 16.1 (L) 22.0 - 29.0 mmol/L Final   07/29/2025 17.8 (L) 22.0 - 29.0 mmol/L Final   07/29/2025 18.2 (L) 22.0 - 29.0 mmol/L Final      BUN BUN   Date Value Ref Range Status   07/31/2025 22.0 (H) 6.0 - 20.0 mg/dL Final   07/30/2025 20.0 6.0 - 20.0 mg/dL Final   07/29/2025 17.0 6.0 - 20.0 mg/dL Final   07/29/2025 17.0 6.0 - 20.0 mg/dL Final      Creatinine Creatinine   Date Value Ref Range Status   07/31/2025 2.20 (H) 0.57 - 1.00 mg/dL Final   07/30/2025 2.03 (H) 0.57 - 1.00 mg/dL Final   07/29/2025 1.78 (H) 0.57 - 1.00 mg/dL Final   07/29/2025 1.70 (H) 0.57 - 1.00 mg/dL Final      Calcium Calcium   Date Value Ref Range Status   07/31/2025 7.4 (L) 8.6 - 10.5 mg/dL Final   07/30/2025 8.0 (L) 8.6 - 10.5 mg/dL Final   07/29/2025 7.7 (L) 8.6 - 10.5 mg/dL Final   07/29/2025 7.7 (L) 8.6 - 10.5 mg/dL Final      PO4 No components found for: \"PO4\"   Albumin Albumin   Date Value Ref Range Status   07/31/2025 1.7 (L) 3.5 - 5.2 g/dL Final   07/30/2025 1.7 (L) 3.5 - 5.2 g/dL Final   07/29/2025 1.9 (L) 3.5 - 5.2 g/dL Final      Magnesium No results found for: \"MG\"     Uric Acid No components found for: \"URIC ACID\"     Imaging Results (Last 72 Hours)       Procedure Component Value Units Date/Time    Arteriogram (Autofinalize) [[id number]] Resulted: 07/29/25 2046     Updated: 07/29/25 2046    Narrative:      This procedure was auto-finalized with no dictation required.            Results for orders placed during the hospital encounter of 07/22/25    XR Foot 3+ View Right 07/24/2025  2:49 PM    Narrative  XR FOOT 3+ VW RIGHT-    CLINICAL: Small toe ischemia.    FINDINGS: No 5th toe cortical bone destruction. No acute osseous  abnormality. No soft tissue gas or radiopaque foreign body. The 5th  metatarsal is satisfactory in appearance. There is 1st  metatarsophalangeal joint degeneration of a mild-to-moderate degree. " The  forefoot is otherwise unremarkable.    Moderate size calcaneal spur, hind foot articulations preserved. Midfoot  articulations within normal limits.    CONCLUSION: No 5th toe acute osseous or articular abnormality. No soft  tissue gas or radiopaque foreign body seen.      This report was finalized on 7/24/2025 2:49 PM by Dr. Clif Galeas M.D  on Workstation: [id number]      XR Ribs Bilateral 4+ View With PA Chest 07/22/2025  3:59 PM    Narrative  8 VIEW BILATERAL RIBS AND 1 VIEW PA CHEST    HISTORY: Syncope. Fell. Rib pain, left greater than right.    FINDINGS: The vertebral height and disc spaces are well-maintained. The  heart is top normal in size with sternal wires from previous cardiac  surgery and there is no change from 5/5/2025.    Multiple views of the left and right ribs show no definite fracture at  the present time.    This report was finalized on 7/22/2025 3:59 PM by Dr. Fortunato Fournier M.D  on Workstation: [id number]      Results for orders placed during the hospital encounter of 05/05/25    XR Ankle 3+ View Right 05/06/2025  3:49 AM    Narrative  Patient: SAVITA BELCHER  Time Out: 03:49  Exam(s): XR RIGHT ANKLE    EXAM:  XR Right Ankle Complete, 3 or More Views    CLINICAL HISTORY:  Reason for exam: fall, pain.    TECHNIQUE:  Frontal, lateral and oblique views of the right ankle.    COMPARISON:  No relevant prior studies available.    FINDINGS:  Bones joints:  No evidence of acute fracture or dislocation.  Plantar  calcaneal spur.  Soft tissues:  Unremarkable.    Impression  No evidence of acute fracture or dislocation.    IMPRESSION:      Electronically signed by Thai Mays MD on 05-06-25 at 0349      Results for orders placed during the hospital encounter of 07/22/25    Duplex Vein Mapping Lower Extremity - Bilateral CAR 07/27/2025  3:46 PM    Interpretation Summary    Greater saphenous vein and small saphenous veins appear to be inadequately sized for bypass bilaterally.        ASSESSMENT  / PLAN      CARLA-likely prerenal in the setting of volume depletion and or contrast exposure  T2DM  Hypertension-blood pressure controlled.  Hold lisinopril  Syncope not orthostatic on admission.  Probably volume depleted given severe hyperglycemia  History coronary artery disease  Metabolic acidosis-add p.o. sodium bicarb  Right fifth toe ulcer/necrosis-vascular/podiatry following.  Status post endovascular right SFA revascularization with bilateral common and external iliac stent placement, right femoral endarterectomy and profundoplasty on 7/29/2025.     Creatinine trending up post procedure  Will continue IV hydration  Wants to go home, agrees to stay another day.  Monitor renal function, fluid status and electrolytes        Raheem Benites MD  [facility]  [phone]  07/31/25  11:52 EDT

## 2025-07-31 NOTE — PLAN OF CARE
Goal Outcome Evaluation:  VSS. No c/o pain. Doppler pulses. Heparin gtt infusing. Plan of care ongoing.

## 2025-07-31 NOTE — PROGRESS NOTES
Name: Bibiana Inman ADMIT: 2025   : 1980  PCP: Ibrahima Correa MD    MRN: [id number] LOS: 3 days   AGE/SEX: 45 y.o. female  ROOM: [facility]     Subjective   Subjective     No events overnight. No complaints. This morning. Again asks if she can go home. I again explained why that wouldn't be wise        Objective   Objective   Vital Signs  Temp:  [97.2 °F (36.2 °C)-98 °F (36.7 °C)] 97.9 °F (36.6 °C)  Heart Rate:  [53-67] 60  Resp:  [16-17] 16  BP: (105-148)/(45-64) 146/63  SpO2:  [94 %-99 %] 98 %  on  Flow (L/min) (Oxygen Therapy):  [2] 2;   Device (Oxygen Therapy): room air  Body mass index is 29.71 kg/m².  Physical Exam  Constitutional:       General: She is not in acute distress.     Appearance: She is not toxic-appearing.   Cardiovascular:      Rate and Rhythm: Normal rate and regular rhythm.      Heart sounds: Normal heart sounds.   Pulmonary:      Effort: Pulmonary effort is normal.      Breath sounds: Normal breath sounds.   Abdominal:      General: Bowel sounds are normal.      Palpations: Abdomen is soft.   Neurological:      Mental Status: She is alert.   Psychiatric:         Mood and Affect: Mood and affect normal.         Behavior: Behavior normal.         Results Review     I reviewed the patient's new clinical results.  Results from last 7 days   Lab Units 25  0700   WBC 10*3/mm3 10.90* 13.61* 9.02 8.94   HEMOGLOBIN g/dL 9.2* 10.7* 10.1* 9.1*   PLATELETS 10*3/mm3 269 285 243 289     Results from last 7 days   Lab Units 25  0700   SODIUM mmol/L 135* 132* 131* 134*   POTASSIUM mmol/L 4.2 5.1 4.9 4.4   CHLORIDE mmol/L 110* 106 105 107   CO2 mmol/L 18.5* 16.1* 17.8* 18.2*   BUN mg/dL 22.0* 20.0 17.0 17.0   CREATININE mg/dL 2.20* 2.03* 1.78* 1.70*   GLUCOSE mg/dL 100* 152* 125* 66   Estimated Creatinine Clearance: 32 mL/min (A) (by C-G formula based on SCr of 2.2 mg/dL (H)).  Results from  last 7 days   Lab Units 07/31/25  0432 07/30/25  0556 07/29/25  0700 07/28/25  0506   ALBUMIN g/dL 1.7* 1.7* 1.9* 2.0*   BILIRUBIN mg/dL  --   --   --  <0.2   ALK PHOS U/L  --   --   --  114   AST (SGOT) U/L  --   --   --  13   ALT (SGPT) U/L  --   --   --  8     Results from last 7 days   Lab Units 07/31/25  0432 07/30/25  0556 07/29/25  2125 07/29/25  0700 07/28/25  0506   CALCIUM mg/dL 7.4* 8.0* 7.7* 7.7* 8.0*   ALBUMIN g/dL 1.7* 1.7*  --  1.9* 2.0*   MAGNESIUM mg/dL  --   --   --   --  1.9   PHOSPHORUS mg/dL 4.5 5.5*  --  3.0 2.7     Results from last 7 days   Lab Units 07/25/25  0536   LACTATE mmol/L 0.7     COVID19   Date Value Ref Range Status   06/30/2024 Not Detected Not Detected - Ref. Range Final   06/27/2024 Not Detected Not Detected - Ref. Range Final   12/28/2021 Detected (C) Not Detected - Ref. Range Final   01/29/2021 Presumptive Negative Presumptive Negative - Ref. Range Final     Glucose   Date/Time Value Ref Range Status   07/31/2025 1041 122 (H) 65 - 99 mg/dL Final     Comment:     Serial Number: [id number]:  [id number]   07/31/2025 0550 108 (H) 65 - 99 mg/dL Final     Comment:     Serial Number: [id number]:  [id number]   07/30/2025 2032 146 (H) 65 - 99 mg/dL Final     Comment:     Serial Number: [id number]:  [id number]   07/30/2025 1616 153 (H) 65 - 99 mg/dL Final     Comment:     Serial Number: [id number]:  [id number]   07/30/2025 1058 116 (H) 65 - 99 mg/dL Final     Comment:     Serial Number: [id number]:  [id number]   07/30/2025 0634 152 (H) 65 - 99 mg/dL Final     Comment:     Serial Number: [id number]:  [id number]   07/29/2025 2321 157 (H) 65 - 99 mg/dL Final     Comment:     Serial Number: [id number]:  [id number]       Arteriogram (Autofinalize)  This procedure was auto-finalized with no dictation required.    Scheduled Medications  acetaminophen, 1,000 mg, Oral, Q8H  aspirin, 81 mg, Oral, Daily  atorvastatin, 80 mg, Oral, Daily  carvedilol, 12.5  mg, Oral, BID With Meals  clopidogrel, 75 mg, Oral, Daily  hydrALAZINE, 25 mg, Oral, Q8H  insulin glargine, 20 Units, Subcutaneous, Nightly  insulin lispro, 2-9 Units, Subcutaneous, 4x Daily AC & at Bedtime  insulin lispro, 7 Units, Subcutaneous, TID With Meals  metoclopramide, 5 mg, Oral, 4x Daily  pantoprazole, 40 mg, Oral, Q AM  sodium bicarbonate, 1,300 mg, Oral, TID    Infusions  heparin, 18 Units/kg/hr, Last Rate: 11 Units/kg/hr (07/31/25 0801)  sodium chloride, 125 mL/hr    Diet  Diet: Cardiac, Diabetic; Healthy Heart (2-3 Na+); Consistent Carbohydrate; Fluid Consistency: Thin (IDDSI 0)       Assessment/Plan     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 ulcer [E11.621, L97.509]  Yes    Atherosclerosis of native arteries of the extremities with ulceration [I70.25]  Unknown    Bilateral carotid artery stenosis [I65.23]  Yes    Elevated troponin [R79.89]  Yes    History of CVA (cerebrovascular accident) [Z86.73]  Not Applicable    Obstructive sleep apnea [G47.33]  Yes    Gastroesophageal reflux disease [K21.9]  Yes    Type 2 diabetes mellitus with hyperglycemia, with long-term current use of insulin [E11.65, Z79.4]  Not Applicable    Benign essential hypertension [I10]  Yes      Resolved Hospital Problems   No resolved problems to display.       45 y.o. female admitted with Syncope and collapse.    45 year old woman who presented following a syncopal episode and was found to have severe hyperglycemia, an NSTEMI, an CARLA, and orthostatic hypotension. She was also found to have an ischemic right fifth toe.    Syncope due to orthostatic hypotension-likely due to hyperglycemia. Echocardiogram was unremarkable.  Peripheral artery disease with an ischemic right fifth toe-s/p attempted  endovascular right SFA revascularization with bilateral common and extrenal iliac stent placement, right femoral endarterectomy, and profundoplasty on 7/29/25. Her doppler signal for her right foot is very faint and she's currently on a heparin drip. Follow up vascular surgery recommendations.    Type 2 NSTEMI-no wall motion abnormalities on echocardiogram. [place] to be secondary to hypotension, CKD, severe hyperglycemia. No further investigation is planned  CARLA on CKD 3b-felt to be due to volume depletion and/or contrast exposure. Nephrology is following. Hold nephrotoxic medications. On IVF and sodium bicarb  Type 2 diabetes with hyperglycemia, gastroparesis and peripheral neuropathy-A1c 13.6. she reports that she has been out of insulin for months prior to admission. 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  Full code.  Discussed with patient and nursing staff.  Anticipate discharge home with family timing yet to be determined.      Clark Dawson MD  [facility]  07/31/25  12:00 EDT

## 2025-08-01 ENCOUNTER — READMISSION MANAGEMENT (OUTPATIENT)
Dept: CALL CENTER | Facility: HOSPITAL | Age: 45
End: 2025-08-01
Payer: MEDICAID

## 2025-08-01 ENCOUNTER — APPOINTMENT (OUTPATIENT)
Dept: CARDIOLOGY | Facility: HOSPITAL | Age: 45
End: 2025-08-01
Payer: MEDICAID

## 2025-08-01 VITALS
HEART RATE: 58 BPM | WEIGHT: 175.71 LBS | OXYGEN SATURATION: 98 % | RESPIRATION RATE: 18 BRPM | HEIGHT: 64 IN | TEMPERATURE: 97.6 F | DIASTOLIC BLOOD PRESSURE: 70 MMHG | SYSTOLIC BLOOD PRESSURE: 149 MMHG | BODY MASS INDEX: 30 KG/M2

## 2025-08-01 DIAGNOSIS — I73.9 PERIPHERAL ARTERIAL DISEASE: Primary | ICD-10-CM

## 2025-08-01 PROBLEM — R79.89 ELEVATED TROPONIN: Status: RESOLVED | Noted: 2024-06-22 | Resolved: 2025-08-01

## 2025-08-01 PROBLEM — R55 SYNCOPE AND COLLAPSE: Status: RESOLVED | Noted: 2025-07-22 | Resolved: 2025-08-01

## 2025-08-01 PROBLEM — R42 DIZZINESS: Status: RESOLVED | Noted: 2025-07-23 | Resolved: 2025-08-01

## 2025-08-01 LAB
ALBUMIN SERPL-MCNC: 1.7 G/DL (ref 3.5–5.2)
ANION GAP SERPL CALCULATED.3IONS-SCNC: 8.2 MMOL/L (ref 5–15)
APTT PPP: 100.6 SECONDS (ref 22.7–35.4)
APTT PPP: 80.8 SECONDS (ref 22.7–35.4)
BASOPHILS # BLD AUTO: 0.06 10*3/MM3 (ref 0–0.2)
BASOPHILS NFR BLD AUTO: 0.7 % (ref 0–1.5)
BH CV LOWER ARTERIAL LEFT ABI RATIO: 0.57
BH CV LOWER ARTERIAL LEFT DORSALIS PEDIS SYS MAX: 85
BH CV LOWER ARTERIAL LEFT GREAT TOE SYS MAX: 47
BH CV LOWER ARTERIAL LEFT POST TIBIAL SYS MAX: 88
BH CV LOWER ARTERIAL LEFT TBI RATIO: 0.31
BH CV LOWER ARTERIAL RIGHT ABI RATIO: 0.49
BH CV LOWER ARTERIAL RIGHT DORSALIS PEDIS SYS MAX: 76
BH CV LOWER ARTERIAL RIGHT GREAT TOE SYS MAX: 30
BH CV LOWER ARTERIAL RIGHT POST TIBIAL SYS MAX: 73
BH CV LOWER ARTERIAL RIGHT TBI RATIO: 0.19
BUN SERPL-MCNC: 18 MG/DL (ref 6–20)
BUN/CREAT SERPL: 9.7 (ref 7–25)
CALCIUM SPEC-SCNC: 7.3 MG/DL (ref 8.6–10.5)
CHLORIDE SERPL-SCNC: 111 MMOL/L (ref 98–107)
CO2 SERPL-SCNC: 16.8 MMOL/L (ref 22–29)
CREAT SERPL-MCNC: 1.86 MG/DL (ref 0.57–1)
DEPRECATED RDW RBC AUTO: 45.6 FL (ref 37–54)
EGFRCR SERPLBLD CKD-EPI 2021: 33.3 ML/MIN/1.73
EOSINOPHIL # BLD AUTO: 0.25 10*3/MM3 (ref 0–0.4)
EOSINOPHIL NFR BLD AUTO: 2.8 % (ref 0.3–6.2)
ERYTHROCYTE [DISTWIDTH] IN BLOOD BY AUTOMATED COUNT: 13.6 % (ref 12.3–15.4)
GLUCOSE BLDC GLUCOMTR-MCNC: 104 MG/DL (ref 65–99)
GLUCOSE BLDC GLUCOMTR-MCNC: 105 MG/DL (ref 65–99)
GLUCOSE BLDC GLUCOMTR-MCNC: 85 MG/DL (ref 65–99)
GLUCOSE BLDC GLUCOMTR-MCNC: 92 MG/DL (ref 65–99)
GLUCOSE SERPL-MCNC: 83 MG/DL (ref 65–99)
HCT VFR BLD AUTO: 28.9 % (ref 34–46.6)
HGB BLD-MCNC: 9.5 G/DL (ref 12–15.9)
IMM GRANULOCYTES # BLD AUTO: 0.04 10*3/MM3 (ref 0–0.05)
IMM GRANULOCYTES NFR BLD AUTO: 0.5 % (ref 0–0.5)
LYMPHOCYTES # BLD AUTO: 1.27 10*3/MM3 (ref 0.7–3.1)
LYMPHOCYTES NFR BLD AUTO: 14.4 % (ref 19.6–45.3)
MCH RBC QN AUTO: 30.1 PG (ref 26.6–33)
MCHC RBC AUTO-ENTMCNC: 32.9 G/DL (ref 31.5–35.7)
MCV RBC AUTO: 91.5 FL (ref 79–97)
MONOCYTES # BLD AUTO: 0.67 10*3/MM3 (ref 0.1–0.9)
MONOCYTES NFR BLD AUTO: 7.6 % (ref 5–12)
NEUTROPHILS NFR BLD AUTO: 6.52 10*3/MM3 (ref 1.7–7)
NEUTROPHILS NFR BLD AUTO: 74 % (ref 42.7–76)
NRBC BLD AUTO-RTO: 0 /100 WBC (ref 0–0.2)
PHOSPHATE SERPL-MCNC: 3.2 MG/DL (ref 2.5–4.5)
PLATELET # BLD AUTO: 271 10*3/MM3 (ref 140–450)
PMV BLD AUTO: 9.9 FL (ref 6–12)
POTASSIUM SERPL-SCNC: 3.8 MMOL/L (ref 3.5–5.2)
RBC # BLD AUTO: 3.16 10*6/MM3 (ref 3.77–5.28)
SODIUM SERPL-SCNC: 136 MMOL/L (ref 136–145)
UPPER ARTERIAL LEFT ARM BRACHIAL SYS MAX: 150
UPPER ARTERIAL RIGHT ARM BRACHIAL SYS MAX: 154
WBC NRBC COR # BLD AUTO: 8.81 10*3/MM3 (ref 3.4–10.8)

## 2025-08-01 PROCEDURE — 25810000003 SODIUM CHLORIDE 0.9 % SOLUTION: Performed by: INTERNAL MEDICINE

## 2025-08-01 PROCEDURE — 85730 THROMBOPLASTIN TIME PARTIAL: CPT | Performed by: STUDENT IN AN ORGANIZED HEALTH CARE EDUCATION/TRAINING PROGRAM

## 2025-08-01 PROCEDURE — 99024 POSTOP FOLLOW-UP VISIT: CPT | Performed by: STUDENT IN AN ORGANIZED HEALTH CARE EDUCATION/TRAINING PROGRAM

## 2025-08-01 PROCEDURE — 82948 REAGENT STRIP/BLOOD GLUCOSE: CPT | Performed by: STUDENT IN AN ORGANIZED HEALTH CARE EDUCATION/TRAINING PROGRAM

## 2025-08-01 PROCEDURE — 82948 REAGENT STRIP/BLOOD GLUCOSE: CPT

## 2025-08-01 PROCEDURE — 93922 UPR/L XTREMITY ART 2 LEVELS: CPT

## 2025-08-01 PROCEDURE — 93922 UPR/L XTREMITY ART 2 LEVELS: CPT | Performed by: STUDENT IN AN ORGANIZED HEALTH CARE EDUCATION/TRAINING PROGRAM

## 2025-08-01 PROCEDURE — 80069 RENAL FUNCTION PANEL: CPT | Performed by: STUDENT IN AN ORGANIZED HEALTH CARE EDUCATION/TRAINING PROGRAM

## 2025-08-01 PROCEDURE — 85025 COMPLETE CBC W/AUTO DIFF WBC: CPT | Performed by: STUDENT IN AN ORGANIZED HEALTH CARE EDUCATION/TRAINING PROGRAM

## 2025-08-01 RX ORDER — BLOOD-GLUCOSE METER
KIT MISCELLANEOUS
Qty: 1 KIT | Refills: 0 | Status: SHIPPED | OUTPATIENT
Start: 2025-08-01

## 2025-08-01 RX ORDER — SODIUM BICARBONATE 650 MG/1
1300 TABLET ORAL 3 TIMES DAILY
Qty: 180 TABLET | Refills: 0 | Status: SHIPPED | OUTPATIENT
Start: 2025-08-01

## 2025-08-01 RX ORDER — SODIUM CHLORIDE 0.9 % (FLUSH) 0.9 %
10 SYRINGE (ML) INJECTION EVERY 12 HOURS SCHEDULED
OUTPATIENT
Start: 2025-08-01

## 2025-08-01 RX ORDER — AVOBENZONE, HOMOSALATE, OCTISALATE, OCTOCRYLENE 30; 40; 45; 26 MG/ML; MG/ML; MG/ML; MG/ML
CREAM TOPICAL
Qty: 100 EACH | Refills: 12 | Status: SHIPPED | OUTPATIENT
Start: 2025-08-01

## 2025-08-01 RX ORDER — IBUPROFEN 600 MG/1
1 TABLET ORAL AS NEEDED
Qty: 1 EACH | Refills: 2 | Status: SHIPPED | OUTPATIENT
Start: 2025-08-01

## 2025-08-01 RX ORDER — ACYCLOVIR 400 MG/1
1 TABLET ORAL CONTINUOUS
Qty: 3 EACH | Refills: 1 | Status: SHIPPED | OUTPATIENT
Start: 2025-08-01

## 2025-08-01 RX ORDER — CLOPIDOGREL BISULFATE 75 MG/1
75 TABLET ORAL DAILY
Qty: 30 TABLET | Refills: 0 | Status: SHIPPED | OUTPATIENT
Start: 2025-08-02

## 2025-08-01 RX ORDER — SODIUM CHLORIDE 0.9 % (FLUSH) 0.9 %
10 SYRINGE (ML) INJECTION AS NEEDED
OUTPATIENT
Start: 2025-08-01

## 2025-08-01 RX ORDER — INSULIN GLARGINE 100 [IU]/ML
15 INJECTION, SOLUTION SUBCUTANEOUS DAILY
Qty: 15 ML | Refills: 1 | Status: SHIPPED | OUTPATIENT
Start: 2025-08-01

## 2025-08-01 RX ORDER — OXYCODONE HYDROCHLORIDE 5 MG/1
5 TABLET ORAL EVERY 6 HOURS PRN
Qty: 5 TABLET | Refills: 0 | Status: SHIPPED | OUTPATIENT
Start: 2025-08-01

## 2025-08-01 RX ORDER — SODIUM CHLORIDE 9 MG/ML
40 INJECTION, SOLUTION INTRAVENOUS AS NEEDED
OUTPATIENT
Start: 2025-08-01

## 2025-08-01 RX ORDER — HYDRALAZINE HYDROCHLORIDE 50 MG/1
50 TABLET, FILM COATED ORAL EVERY 8 HOURS SCHEDULED
Status: DISCONTINUED | OUTPATIENT
Start: 2025-08-01 | End: 2025-08-01 | Stop reason: HOSPADM

## 2025-08-01 RX ORDER — INSULIN LISPRO 100 [IU]/ML
5 INJECTION, SOLUTION INTRAVENOUS; SUBCUTANEOUS
Status: DISCONTINUED | OUTPATIENT
Start: 2025-08-01 | End: 2025-08-01 | Stop reason: HOSPADM

## 2025-08-01 RX ORDER — ACYCLOVIR 400 MG/1
1 TABLET ORAL CONTINUOUS
Qty: 1 EACH | Refills: 0 | Status: SHIPPED | OUTPATIENT
Start: 2025-08-01

## 2025-08-01 RX ORDER — ACETAMINOPHEN 500 MG
1000 TABLET ORAL EVERY 8 HOURS SCHEDULED
Qty: 42 TABLET | Refills: 0 | Status: SHIPPED | OUTPATIENT
Start: 2025-08-01 | End: 2025-08-08

## 2025-08-01 RX ORDER — PEN NEEDLE, DIABETIC 30 GX3/16"
1 NEEDLE, DISPOSABLE MISCELLANEOUS 4 TIMES DAILY
Qty: 100 EACH | Refills: 0 | Status: SHIPPED | OUTPATIENT
Start: 2025-08-01

## 2025-08-01 RX ADMIN — SODIUM BICARBONATE 1300 MG: 650 TABLET ORAL at 15:50

## 2025-08-01 RX ADMIN — CLOPIDOGREL BISULFATE 75 MG: 75 TABLET, FILM COATED ORAL at 08:20

## 2025-08-01 RX ADMIN — PANTOPRAZOLE SODIUM 40 MG: 40 TABLET, DELAYED RELEASE ORAL at 05:22

## 2025-08-01 RX ADMIN — HYDRALAZINE HYDROCHLORIDE 50 MG: 50 TABLET ORAL at 15:50

## 2025-08-01 RX ADMIN — METOCLOPRAMIDE 5 MG: 5 TABLET ORAL at 08:20

## 2025-08-01 RX ADMIN — CARVEDILOL 12.5 MG: 12.5 TABLET, FILM COATED ORAL at 08:20

## 2025-08-01 RX ADMIN — SODIUM CHLORIDE 125 ML/HR: 9 INJECTION, SOLUTION INTRAVENOUS at 05:22

## 2025-08-01 RX ADMIN — ACETAMINOPHEN 1000 MG: 500 TABLET, FILM COATED ORAL at 05:22

## 2025-08-01 RX ADMIN — HYDRALAZINE HYDROCHLORIDE 25 MG: 25 TABLET ORAL at 05:23

## 2025-08-01 RX ADMIN — ASPIRIN 81 MG CHEWABLE TABLET 81 MG: 81 TABLET CHEWABLE at 08:20

## 2025-08-01 RX ADMIN — SODIUM BICARBONATE 1300 MG: 650 TABLET ORAL at 08:20

## 2025-08-01 RX ADMIN — ACETAMINOPHEN 1000 MG: 500 TABLET, FILM COATED ORAL at 15:50

## 2025-08-01 RX ADMIN — ATORVASTATIN CALCIUM 80 MG: 80 TABLET, FILM COATED ORAL at 08:20

## 2025-08-01 NOTE — PROGRESS NOTES
Name: Bibiana Inman ADMIT: 2025   : 1980  PCP: Ibrahima Correa MD    MRN: [id number] LOS: 4 days   AGE/SEX: 45 y.o. female  ROOM:  [place] [address]/     Vascular Surgery Progress Note    Repeat ABIs reviewed.  Still appears to have an adequate perfusion for wound healing.  Will plan for right leg femoral-popliteal bypass next week.  Patient is agreeable to this but says she has to go home first.  From my standpoint that is okay.  Will arrange for day of surgery admission after right leg femoropopliteal bypass next week.  Aiming for Wednesday.        Neal Peoples II, MD  25  15:57 EDT  Office Number [phone]    [facility]

## 2025-08-01 NOTE — PROGRESS NOTES
Patient Name: Bibiana Inman  YOB: 1980  MRN: [id number]  Admission date: 7/22/2025    Reason for Encounter: Follow-up/Progress Note    [facility] Clinical Nutrition Progress Note       Nutrition Intervention Updates: Will monitor po intake  RD to follow      Subjective: Distal right fifth toe is ischemic and dry gangrene. Per MD pt with inadequate perfusion for wound healing and will likely need amputation       PO Diet: Diet: Cardiac, Diabetic; Healthy Heart (2-3 Na+); Consistent Carbohydrate; Fluid Consistency: Thin (IDDSI 0)   PO Supplements: None    PO Intake:  <100%, sleepy per RN       Current nutrition support: --   Nutrition support review: --       Labs: reviewed        GI Function:  7/31, watery        Brief Weight Review:    Wt Readings from Last 1 Encounters:   08/01/25 0500 79.7 kg (175 lb 11.3 oz)   07/31/25 0427 78.5 kg (173 lb 1 oz)   07/29/25 0651 71.9 kg (158 lb 8.2 oz)   07/28/25 1930 71.2 kg (156 lb 15.5 oz)   07/28/25 0358 68.9 kg (152 lb)   07/27/25 0636 65 kg (143 lb 4.8 oz)   07/26/25 0613 66.9 kg (147 lb 8 oz)   07/25/25 0607 66.5 kg (146 lb 9.7 oz)   07/24/25 0725 63 kg (138 lb 14.2 oz)   07/23/25 1610 65.6 kg (144 lb 9.9 oz)   07/23/25 1412 62.6 kg (138 lb)   07/23/25 0604 62.9 kg (138 lb 10.7 oz)   07/22/25 1846 63.2 kg (139 lb 6.4 oz)   07/22/25 1845 63.3 kg (139 lb 8.8 oz)        Results from last 7 days   Lab Units 08/01/25  0608 07/31/25  0432 07/30/25  0556 07/29/25  0700 07/28/25  0506   SODIUM mmol/L 136 135* 132*   < > 131*   POTASSIUM mmol/L 3.8 4.2 5.1   < > 4.5   CHLORIDE mmol/L 111* 110* 106   < > 106   CO2 mmol/L 16.8* 18.5* 16.1*   < > 17.6*   BUN mg/dL 18.0 22.0* 20.0   < > 19.0   CREATININE mg/dL 1.86* 2.20* 2.03*   < > 2.22*   CALCIUM mg/dL 7.3* 7.4* 8.0*   < > 8.0*   BILIRUBIN mg/dL  --   --   --   --  <0.2   ALK PHOS U/L  --   --   --   --  114   ALT (SGPT) U/L  --   --   --   --  8   AST (SGOT) U/L  --   --   --   --  13   GLUCOSE mg/dL 83 100* 152*    < > 136*    < > = values in this interval not displayed.     Results from last 7 days   Lab Units 08/01/25  0608 07/29/25  0700 07/28/25  0506   MAGNESIUM mg/dL  --   --  1.9   PHOSPHORUS mg/dL 3.2   < > 2.7   PLATELETS 10*3/mm3 271   < > 283   HEMOGLOBIN g/dL 9.5*   < > 9.3*   HEMATOCRIT % 28.9*   < > 29.2*    < > = values in this interval not displayed.     Lab Results   Component Value Date    HGBA1C 13.60 (H) 07/22/2025       Electronically signed by:  Fadumo Arana RD  08/01/25 13:14 EDT

## 2025-08-01 NOTE — PLAN OF CARE
Problem: Adult Inpatient Plan of Care  Goal: Plan of Care Review  Flowsheets (Taken 8/1/2025 9142)  Progress: no change  Outcome Evaluation: pedal pulses with doppler medicated for complaints of pain with good results complains of pain in the rigt lower extremity not willing to eat blood sugars are low lantus with held pt sleeps mostly wants to go home  Plan of Care Reviewed With: patient   Goal Outcome Evaluation:  Plan of Care Reviewed With: patient        Progress: no change  Outcome Evaluation: pedal pulses with doppler medicated for complaints of pain with good results complains of pain in the rigt lower extremity not willing to eat blood sugars are low lantus with held pt sleeps mostly wants to go home

## 2025-08-01 NOTE — OUTREACH NOTE
Prep Survey      Flowsheet Row Responses   [facility] facility patient discharged from? [place]   Is LACE score < 7 ? No   Eligibility Readm Mgmt   Discharge diagnosis Syncope and collapse, chronic limb threatening ischemia , Right Lower Extremity Angiogram, Bilateral comman and external iliac stents, Right femoral endarterectomy, and profundoplasty   Does the patient have one of the following disease processes/diagnoses(primary or secondary)? General Surgery   Does the patient have Home health ordered? No   Is there a DME ordered? No   Prep survey completed? Yes            Rubina TURK - Registered Nurse

## 2025-08-01 NOTE — PROGRESS NOTES
"NEPHROLOGY PROGRESS NOTE------[facility]    [facility]  [phone]  Raheem Benites MD      Patient Care Team:  Ibrahima Correa MD as PCP - General (Family Medicine)  Xochilt Jenkins MD as Consulting Physician (Nephrology)      Provider:  Raheem Benties MD  Patient Name: Bibiana Inman  :  1980    SUBJECTIVE:  F/u CARLA/CKD  No chest pain or SOA    Medication:  acetaminophen, 1,000 mg, Oral, Q8H  aspirin, 81 mg, Oral, Daily  atorvastatin, 80 mg, Oral, Daily  carvedilol, 12.5 mg, Oral, BID With Meals  clopidogrel, 75 mg, Oral, Daily  hydrALAZINE, 25 mg, Oral, Q8H  insulin glargine, 15 Units, Subcutaneous, Nightly  insulin lispro, 2-9 Units, Subcutaneous, 4x Daily AC & at Bedtime  [Held by provider] insulin lispro, 5 Units, Subcutaneous, TID With Meals  metoclopramide, 5 mg, Oral, 4x Daily  pantoprazole, 40 mg, Oral, Q AM  sodium bicarbonate, 1,300 mg, Oral, TID      heparin, 18 Units/kg/hr, Last Rate: 15 Units/kg/hr (25 1453)  sodium chloride, 125 mL/hr, Last Rate: 125 mL/hr (25 0522)        OBJECTIVE    Vital Sign Min/Max for last 24 hours  Temp  Min: 97.2 °F (36.2 °C)  Max: 98.1 °F (36.7 °C)   BP  Min: 137/67  Max: 171/72   Pulse  Min: 53  Max: 69   Resp  Min: 16  Max: 18   SpO2  Min: 97 %  Max: 98 %   No data recorded   Weight  Min: 79.7 kg (175 lb 11.3 oz)  Max: 79.7 kg (175 lb 11.3 oz)     Flowsheet Rows      Flowsheet Row First Filed Value   Admission Height 162.6 cm (64\") Documented at 2025 1412   Admission Weight 63.3 kg (139 lb 8.8 oz) Documented at 2025 1845            No intake/output data recorded.  I/O last 3 completed shifts:  In: 4314.6 [P.O.:240; I.V.:4074.6]  Out: -     Physical Exam:  General Appearance: alert, appears stated age and cooperative  Head: normocephalic, without obvious abnormality and atraumatic  Eyes: conjunctivae and sclerae normal and no icterus  Neck: supple and no " "JVD  Lungs: clear to auscultation and respirations regular  Heart: regular rhythm & normal rate and normal S1, S2  Chest: Wall no abnormalities observed  Abdomen: normal bowel sounds and soft, nontender  Extremities: moves extremities well, no edema, no cyanosis and no redness  Skin: no bleeding,   Neurologic: Alert, and oriented. No focal deficits    Labs:    WBC WBC   Date Value Ref Range Status   08/01/2025 8.81 3.40 - 10.80 10*3/mm3 Final   07/31/2025 10.90 (H) 3.40 - 10.80 10*3/mm3 Final   07/30/2025 13.61 (H) 3.40 - 10.80 10*3/mm3 Final   07/29/2025 9.02 3.40 - 10.80 10*3/mm3 Final      HGB Hemoglobin   Date Value Ref Range Status   08/01/2025 9.5 (L) 12.0 - 15.9 g/dL Final   07/31/2025 9.2 (L) 12.0 - 15.9 g/dL Final   07/30/2025 10.7 (L) 12.0 - 15.9 g/dL Final   07/29/2025 10.1 (L) 12.0 - 15.9 g/dL Final      HCT Hematocrit   Date Value Ref Range Status   08/01/2025 28.9 (L) 34.0 - 46.6 % Final   07/31/2025 28.1 (L) 34.0 - 46.6 % Final   07/30/2025 33.7 (L) 34.0 - 46.6 % Final   07/29/2025 31.6 (L) 34.0 - 46.6 % Final      Platelets No results found for: \"LABPLAT\"   MCV MCV   Date Value Ref Range Status   08/01/2025 91.5 79.0 - 97.0 fL Final   07/31/2025 91.2 79.0 - 97.0 fL Final   07/30/2025 92.1 79.0 - 97.0 fL Final   07/29/2025 92.1 79.0 - 97.0 fL Final          Sodium Sodium   Date Value Ref Range Status   08/01/2025 136 136 - 145 mmol/L Final   07/31/2025 135 (L) 136 - 145 mmol/L Final   07/30/2025 132 (L) 136 - 145 mmol/L Final   07/29/2025 131 (L) 136 - 145 mmol/L Final      Potassium Potassium   Date Value Ref Range Status   08/01/2025 3.8 3.5 - 5.2 mmol/L Final   07/31/2025 4.2 3.5 - 5.2 mmol/L Final   07/30/2025 5.1 3.5 - 5.2 mmol/L Final   07/29/2025 4.9 3.5 - 5.2 mmol/L Final     Comment:     Slight hemolysis detected by analyzer. Result may be falsely elevated.      Chloride Chloride   Date Value Ref Range Status   08/01/2025 111 (H) 98 - 107 mmol/L Final   07/31/2025 110 (H) 98 - 107 mmol/L " "Final   07/30/2025 106 98 - 107 mmol/L Final   07/29/2025 105 98 - 107 mmol/L Final      CO2 CO2   Date Value Ref Range Status   08/01/2025 16.8 (L) 22.0 - 29.0 mmol/L Final   07/31/2025 18.5 (L) 22.0 - 29.0 mmol/L Final   07/30/2025 16.1 (L) 22.0 - 29.0 mmol/L Final   07/29/2025 17.8 (L) 22.0 - 29.0 mmol/L Final      BUN BUN   Date Value Ref Range Status   08/01/2025 18.0 6.0 - 20.0 mg/dL Final   07/31/2025 22.0 (H) 6.0 - 20.0 mg/dL Final   07/30/2025 20.0 6.0 - 20.0 mg/dL Final   07/29/2025 17.0 6.0 - 20.0 mg/dL Final      Creatinine Creatinine   Date Value Ref Range Status   08/01/2025 1.86 (H) 0.57 - 1.00 mg/dL Final   07/31/2025 2.20 (H) 0.57 - 1.00 mg/dL Final   07/30/2025 2.03 (H) 0.57 - 1.00 mg/dL Final   07/29/2025 1.78 (H) 0.57 - 1.00 mg/dL Final      Calcium Calcium   Date Value Ref Range Status   08/01/2025 7.3 (L) 8.6 - 10.5 mg/dL Final   07/31/2025 7.4 (L) 8.6 - 10.5 mg/dL Final   07/30/2025 8.0 (L) 8.6 - 10.5 mg/dL Final   07/29/2025 7.7 (L) 8.6 - 10.5 mg/dL Final      PO4 No components found for: \"PO4\"   Albumin Albumin   Date Value Ref Range Status   08/01/2025 1.7 (L) 3.5 - 5.2 g/dL Final   07/31/2025 1.7 (L) 3.5 - 5.2 g/dL Final   07/30/2025 1.7 (L) 3.5 - 5.2 g/dL Final      Magnesium No results found for: \"MG\"     Uric Acid No components found for: \"URIC ACID\"     Imaging Results (Last 72 Hours)       Procedure Component Value Units Date/Time    Arteriogram (Autofinalize) [[id number]] Resulted: 07/29/25 2046     Updated: 07/29/25 2046    Narrative:      This procedure was auto-finalized with no dictation required.            Results for orders placed during the hospital encounter of 07/22/25    XR Foot 3+ View Right 07/24/2025  2:49 PM    Narrative  XR FOOT 3+ VW RIGHT-    CLINICAL: Small toe ischemia.    FINDINGS: No 5th toe cortical bone destruction. No acute osseous  abnormality. No soft tissue gas or radiopaque foreign body. The 5th  metatarsal is satisfactory in appearance. There is " 1st  metatarsophalangeal joint degeneration of a mild-to-moderate degree. The  forefoot is otherwise unremarkable.    Moderate size calcaneal spur, hind foot articulations preserved. Midfoot  articulations within normal limits.    CONCLUSION: No 5th toe acute osseous or articular abnormality. No soft  tissue gas or radiopaque foreign body seen.      This report was finalized on 7/24/2025 2:49 PM by Dr. Clif Galeas M.D  on Workstation: [id number]      XR Ribs Bilateral 4+ View With PA Chest 07/22/2025  3:59 PM    Narrative  8 VIEW BILATERAL RIBS AND 1 VIEW PA CHEST    HISTORY: Syncope. Fell. Rib pain, left greater than right.    FINDINGS: The vertebral height and disc spaces are well-maintained. The  heart is top normal in size with sternal wires from previous cardiac  surgery and there is no change from 5/5/2025.    Multiple views of the left and right ribs show no definite fracture at  the present time.    This report was finalized on 7/22/2025 3:59 PM by Dr. Fortunato Fournier M.D  on Workstation: [id number]      Results for orders placed during the hospital encounter of 05/05/25    XR Ankle 3+ View Right 05/06/2025  3:49 AM    Narrative  Patient: SAVITA BELCHER  Time Out: 03:49  Exam(s): XR RIGHT ANKLE    EXAM:  XR Right Ankle Complete, 3 or More Views    CLINICAL HISTORY:  Reason for exam: fall, pain.    TECHNIQUE:  Frontal, lateral and oblique views of the right ankle.    COMPARISON:  No relevant prior studies available.    FINDINGS:  Bones joints:  No evidence of acute fracture or dislocation.  Plantar  calcaneal spur.  Soft tissues:  Unremarkable.    Impression  No evidence of acute fracture or dislocation.    IMPRESSION:      Electronically signed by Thai Mays MD on 05-06-25 at 0349      Results for orders placed during the hospital encounter of 07/22/25    Doppler Ankle Brachial Index Single Level CAR 08/01/2025  9:50 AM    Interpretation Summary    Right Conclusion: The right CHHAYA is severely reduced.  Severe digital insufficiency.    Left Conclusion: The left CHHAYA is moderately reduced. Moderate digital insufficiency.    There has been no significant improvement of the right CHHAYA since the previous study.  The right digital pressures have worsened since the previous study.        ASSESSMENT / PLAN      CARLA-likely prerenal in the setting of volume depletion and or contrast exposure  T2DM  Hypertension-blood pressure controlled.  Hold lisinopril  Syncope not orthostatic on admission.  Probably volume depleted given severe hyperglycemia  History coronary artery disease  Metabolic acidosis-add p.o. sodium bicarb  Right fifth toe ulcer/necrosis-vascular/podiatry following.  Status post endovascular right SFA revascularization with bilateral common and external iliac stent placement, right femoral endarterectomy and profundoplasty on 7/29/2025.     CR better, stop IVF  BP up, increase hydralazine 50 mg TID  Continue sodium bicarb  OK for d/c from renal standpoint        Raheem Benites MD  [facility]  [phone]  08/01/25  10:06 EDT

## 2025-08-01 NOTE — DISCHARGE SUMMARY
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 Inman is a 45 y.o. 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  Please see the admitting history and physical for further details.  She was found to have severe hyperglycemia, an NSTEMI, an CARLA, and orthostatic hypotension as well as an ischemic appearing right fifth toe and was admitted to the hospital for further evaluation and treatment.      She was seen in consultation by vascular surgery, cardiology, nephrology, and podiatry.       Cardiology felt that her NSTEMI was type 2 related to hypotension, CKD, and severe hyperglycemia. Similarly, they felt that her syncopal episode was related to hypotension/dehydration. An echocardiogram was without WMA and they deferred any additional workup at this time. She'll need to follow up with cardiology as an outpatient.    Her CARLA was felt to be prerenal related to dehydration and severe hyperglycemia as well as contrast exposure. She was administered IVF and her creatinine stabilized. Her lisinopril will be held at discharge. She will need to follow closely with nephrology to determine the timing to restart this medication    For her ischemic right fifth toe, she was found to have significant peripheral artery disease and she underwent an attempted endovascular right SFA revascularization with bilateral common and external iliac stent placement, right femoral endarterectomy, and profundoplasty on 7/29/25. Post procedurally, she was noted to have monophasic pulses in her right foot and was started on a heparin drip with some modest improvement in her pulses. Ultimately, vascular surgery felt that she will need additional revascularization, but vascular surgery noted that they wouldn't be able to perform another operation for several more days or a week and the patient is anxious for discharge, so they've cleared her for discharge with outpatient follow up. Plavix was added to her regimen this admission, so she's now on DAPT and a high intensity statin.    With regard to her severe hyperglycemia, she was noted to have an A1c of 13.6. she reports that she hasn't taken her insulin in many months. She was started back on insulin this admission and it was titrated up quickly, but then she developed some hypoglycemia. At discharge, she will be sent home on lantus 15 units daily. I did attempt to order a DPP4 inhibitor for her at discharge, but her insurance wouldn't allow us to pursue a prior authorization.  She will need to follow up with her primary care physician for additional titration.     Day of Discharge     Subjective:  Hypoglycemic yesterday evening. She became tearful when I suggested staying another day to try and readjust her insulin. I told her that I wasn't likely to be able to get a good balance and would have to give her less than I think she needs at discharge to avoid severe hypoglycemia, but she insists that she wants to go home.    Physical Exam:  Temp:  [97.2 °F (36.2 °C)-98.1 °F (36.7 °C)] 97.6 °F (36.4 °C)  Heart Rate:  [53-69] 58  Resp:  [18] 18  BP: (137-171)/(67-72) 149/70  Body mass index is 30.16 kg/m².  Physical Exam  Constitutional:       General: She is not in acute distress.     Appearance: She is not toxic-appearing.   Cardiovascular:      Rate and Rhythm: Normal rate and regular rhythm.      Heart sounds: Normal heart sounds.   Pulmonary:      Effort: Pulmonary effort is normal.      Breath sounds: Normal breath sounds.   Abdominal:      General: Bowel sounds are normal.      Palpations: Abdomen is soft.   Neurological:      Mental Status: She is alert.   Psychiatric:         Mood and Affect: Affect is flat and tearful.         Consultants     Consult Orders (all) (From admission, onward)       Start     Ordered    08/01/25 1110  Inpatient [facility] Consult  Once        Provider:  (Not yet assigned)    08/01/25 1110    07/27/25 1812  Inpatient Nephrology Consult  Once        Specialty:  Nephrology  Provider:  Xochilt Jenkins MD    07/27/25 1812    07/27/25 1428  Inpatient Nephrology Consult  Once        Specialty:  Nephrology  Provider:  Adrian Ordaz MD    07/27/25 1427    07/25/25 0920  Inpatient Vascular Surgery Consult  Once        Specialty:  Vascular Surgery  Provider:  Neal Peoples II, MD    07/25/25 0920    07/23/25 1117  Inpatient Podiatry Consult  Once        Specialty:  Podiatry  Provider:  Weston Tinajero DPM    07/23/25 1116    07/22/25 2200   Inpatient Diabetes Educator Consult  Once        Provider:  (Not yet assigned)    07/22/25 2200 07/22/25 2159  Inpatient Nutrition Consult  Once        Provider:  (Not yet assigned)    07/22/25 2200 07/22/25 1841  Inpatient Consult to Advance Care Planning  Once        Provider:  (Not yet assigned)    07/22/25 1840    07/22/25 1625  [facility] (on-call MD unless specified) Details  Once        Specialty:  Hospitalist  Provider:  (Not yet assigned)    07/22/25 1626    07/22/25 1547  Interventional Cardiology (on-call MD unless specified)  Once        Specialty:  Interventional Cardiology  Provider:  (Not yet assigned)    07/22/25 1546                  Procedures     Imaging Results (All)       Procedure Component Value Units Date/Time    Arteriogram (Autofinalize) [[id number]] Resulted: 07/29/25 2046     Updated: 07/29/25 2046    Narrative:      This procedure was auto-finalized with no dictation required.    CT Angio Abdominal Aorta Bilateral Iliofem Runoff [[id number]] Collected: 07/27/25 1236     Updated: 07/27/25 1259    Narrative:      CT ANGIOGRAM ABDOMEN AND PELVIS WITH ILIOFEMORAL RUNOFF     HISTORY: Peripheral artery disease with tissue loss. Diabetes.     COMPARISON: Right foot x-rays 07/24/2025. CT chest 06/26/2024..     TECHNIQUE: Axial images were obtained from the lung bases through the  lower extremities following the administration of IV contrast. Coronal  and sagittal MIP images were obtained as well as 3D volume rendering.   Radiation dose reduction techniques were utilized, including automated  exposure control and exposure modulation based on body size.     FINDINGS:  Atherosclerotic calcifications are present involving the distal thoracic  aorta abdominal aorta with mild narrowing. The celiac artery, superior  mesenteric artery are patent. There is a long segment of moderate  stenosis of the left main renal artery associated with partially  calcified plaque that extends 2.5 cm in length. Mild  narrowing of the  origin of the right renal artery. Calcified and noncalcified plaque  involving the inferior abdominal aorta with moderate narrowing just  above its bifurcation. No aneurysm.     On the left, there is a moderate stenosis of the origin of the left  common iliac artery. Multifocal moderate left common iliac arterial  stenoses. Mild narrowing of the left internal iliac artery. There is a  severe stenosis of the left external iliac artery approximately 4.5 cm  distal to its origin. Moderate stenosis left common femoral artery. Left  deep femoral artery is patent. There is a left superficial femoral  artery stent that is occluded. Left superficial femoral artery  reconstitutes at the distal margin of the stent at the level of the mid  thigh and is small with multifocal stenoses that are up to severe in the  region of the adductor hiatus. Multifocal popliteal artery stenoses of  the left popliteal arteries patent. Left anterior tibial artery contains  calcification proximally with moderate narrowing. The left tibioperoneal  trunk is patent. There is three-vessel runoff to the left lower  extremity.     On the right, there is moderate stenosis origin right common iliac  artery. Mild to moderate narrowing of the distal right common iliac  artery. Moderate to severe stenosis of the proximal right internal iliac  artery. Moderate right external iliac arterial stenoses. Moderate  stenosis of the right common femoral artery. The right superficial  femoral artery is small with multifocal severe stenoses extending from  its origin to the popliteal artery though no evidence for complete  occlusion. The right deep femoral artery is patent. Right popliteal  artery is small with areas of moderate to severe stenoses without  evidence for complete occlusion. Multifocal calcifications involving the  proximal anterior posterior tibial arteries and the tibial peroneal  trunk. There is flow within the right anterior and  posterior tibial  arteries extending into the ankle. Flow within the peroneal artery  extends into the distal calf.     Moderate bilateral pleural effusions layer dependently with adjacent  compressive lower lobe atelectasis.     Liver, spleen, pancreas exhibit normal early arterial phase of contrast  appearance. Multiple gallstones. Bilateral adrenal thickening. Kidneys  appear within normal limits. There is no hydronephrosis.     Mild ascites with free fluid extending into the pelvis. Colonic  diverticulosis without evidence for diverticulitis. Mild generalized  body wall edema with third spacing of fluid.                Impression:      1. Multifocal atherosclerotic disease. Long segment of moderate stenosis  of the left main renal artery associated with partially calcified  plaque. Mild narrowing origin right renal artery.  2. Atherosclerotic calcification involving the common aorta and iliac  vasculature with severe stenosis of the left external iliac artery  proximally 4.5 cm distal to its origin. Multifocal iliac and common  femoral arterial stenoses.  3. Left superficial femoral artery stent is occluded with reconstitution  just below the stent. Multifocal stenoses of the distal left superficial  femoral artery, left popliteal artery. Three-vessel runoff to the left  lower extremity.  4. On the right, the right superficial femoral artery is small with  multifocal severe stenoses extending from its origin to the popliteal  artery but without evidence for complete occlusion. Right popliteal  artery is small with multifocal moderate to severe stenoses.  Calcification involving the proximal anterior and posterior tibial  arteries and peroneal artery. There is flow within the anterior and  posterior tibial arteries on the right extending to the foot and there  is flow of the right peroneal artery extending to the distal calf.  5. Moderate bilateral pleural effusions with adjacent compressive lower  lobe  atelectasis.  6. Cholelithiasis.  7. Generalized body wall edema.        This report was finalized on 7/27/2025 12:56 PM by Huan Noriega M.D  on Workstation: [id number]       XR Foot 3+ View Right [[id number]] Collected: 07/24/25 1444     Updated: 07/24/25 1452    Narrative:      XR FOOT 3+ VW RIGHT-     CLINICAL: Small toe ischemia.     FINDINGS: No 5th toe cortical bone destruction. No acute osseous  abnormality. No soft tissue gas or radiopaque foreign body. The 5th  metatarsal is satisfactory in appearance. There is 1st  metatarsophalangeal joint degeneration of a mild-to-moderate degree. The  forefoot is otherwise unremarkable.     Moderate size calcaneal spur, hind foot articulations preserved. Midfoot  articulations within normal limits.     CONCLUSION: No 5th toe acute osseous or articular abnormality. No soft  tissue gas or radiopaque foreign body seen.        This report was finalized on 7/24/2025 2:49 PM by Dr. Clif Galeas M.D  on Workstation: [id number]       XR Ribs Bilateral 4+ View With PA Chest [[id number]] Collected: 07/22/25 1556     Updated: 07/22/25 1602    Narrative:      8 VIEW BILATERAL RIBS AND 1 VIEW PA CHEST     HISTORY: Syncope. Fell. Rib pain, left greater than right.     FINDINGS: The vertebral height and disc spaces are well-maintained. The  heart is top normal in size with sternal wires from previous cardiac  surgery and there is no change from 5/5/2025.     Multiple views of the left and right ribs show no definite fracture at  the present time.     This report was finalized on 7/22/2025 3:59 PM by Dr. Fortunato Fournier M.D  on Workstation: [id number]               Pertinent Labs     Results from last 7 days   Lab Units 08/01/25  0608 07/31/25  0432 07/30/25  0556 07/29/25 2125   WBC 10*3/mm3 8.81 10.90* 13.61* 9.02   HEMOGLOBIN g/dL 9.5* 9.2* 10.7* 10.1*   PLATELETS 10*3/mm3 271 269 285 243     Results from last 7 days   Lab Units 08/01/25  0608 07/31/25  0432 07/30/25  0556  "07/29/25 2125   SODIUM mmol/L 136 135* 132* 131*   POTASSIUM mmol/L 3.8 4.2 5.1 4.9   CHLORIDE mmol/L 111* 110* 106 105   CO2 mmol/L 16.8* 18.5* 16.1* 17.8*   BUN mg/dL 18.0 22.0* 20.0 17.0   CREATININE mg/dL 1.86* 2.20* 2.03* 1.78*   GLUCOSE mg/dL 83 100* 152* 125*   Estimated Creatinine Clearance: 38.2 mL/min (A) (by C-G formula based on SCr of 1.86 mg/dL (H)).  Results from last 7 days   Lab Units 08/01/25 0608 07/31/25 0432 07/30/25 0556 07/29/25 0700 07/28/25  0506   ALBUMIN g/dL 1.7* 1.7* 1.7* 1.9* 2.0*   BILIRUBIN mg/dL  --   --   --   --  <0.2   ALK PHOS U/L  --   --   --   --  114   AST (SGOT) U/L  --   --   --   --  13   ALT (SGPT) U/L  --   --   --   --  8     Results from last 7 days   Lab Units 08/01/25  0608 07/31/25 0432 07/30/25 0556 07/29/25 2125 07/29/25 0700 07/28/25  0506   CALCIUM mg/dL 7.3* 7.4* 8.0* 7.7* 7.7* 8.0*   ALBUMIN g/dL 1.7* 1.7* 1.7*  --  1.9* 2.0*   MAGNESIUM mg/dL  --   --   --   --   --  1.9   PHOSPHORUS mg/dL 3.2 4.5 5.5*  --  3.0 2.7       Results from last 7 days   Lab Units 07/27/25  1631   CK TOTAL U/L 45     Results from last 7 days   Lab Units 07/28/25  1744 07/27/25 2146 07/27/25 2146 07/27/25  1631   SODIUM UR mmol/L 67   < > 60  --    CREATININE UR mg/dL 53.9  --   --   --    OSMOLALITY UR mOsm/kg  --   --  416  --    URIC ACID mg/dL  --   --   --  4.8    < > = values in this interval not displayed.         Invalid input(s): \"LDLCALC\"        Test Results Pending at Discharge     Pending Labs       Order Current Status    aPTT Collected (08/01/25 1302)            Discharge Details        Discharge Medications        PAUSE taking these medications        Instructions Start Date   lisinopril 10 MG tablet  Wait to take this until your doctor or other care provider tells you to start again.  Commonly known as: PRINIVIL,ZESTRIL   10 mg, Oral, Every 24 Hours Scheduled             New Medications        Instructions Start Date   acetaminophen 500 MG tablet  Commonly " known as: TYLENOL   1,000 mg, Oral, Every 8 Hours Scheduled      clopidogrel 75 MG tablet  Commonly known as: PLAVIX   75 mg, Oral, Daily   Start Date: August 2, 2025     Dexcom G7  device   1 each, Not Applicable, Continuous, Use to monitor blood glucose levels continuously.      Dexcom G7 Sensor misc   1 each, Not Applicable, Continuous, Use to monitor blood glucose levels continuously.  Change sensors every 10 days      FreeStyle Lite w/Device kit   Use to test blood glucose up to four times daily as needed. Formulary Compliance Approval. Diagnosis: Type 2 Diabetes - Insulin Dependent      glucagon 1 MG injection  Commonly known as: GLUCAGEN   Inject As Needed (severely low blood sugar). For severely low blood sugar      glucose 4 GM chewable tablet  Commonly known as: TRUEplus Glucose   Use as needed for emergently low blood glucose levels. Formulary Compliance Approval      glucose blood test strip   Use to test blood glucose up to four times daily as needed. Formulary Compliance Approval. Diagnosis: Type 2 Diabetes - Insulin Dependent      Lancets misc   Use to test blood glucose up to four times daily as needed. Formulary Compliance Approval. Diagnosis: Type 2 Diabetes - Insulin Dependent      Lantus SoloStar 100 UNIT/ML injection pen  Generic drug: Insulin Glargine   15 Units, Subcutaneous, Daily      oxyCODONE 5 MG immediate release tablet  Commonly known as: ROXICODONE   5 mg, Oral, Every 6 Hours PRN      Pen Needles 32G X 4 MM misc   1 each, Not Applicable, 4 Times Daily, As needed for insulin injections             Changes to Medications        Instructions Start Date   sodium bicarbonate 650 MG tablet  What changed:   how much to take  when to take this   1,300 mg, Oral, 3 Times Daily             Continue These Medications        Instructions Start Date   aspirin 81 MG chewable tablet   1 tablet, Daily      atorvastatin 80 MG tablet  Commonly known as: LIPITOR   1 tablet, Daily      carvedilol  12.5 MG tablet  Commonly known as: COREG   12.5 mg, Oral, 2 Times Daily With Meals      famotidine 40 MG tablet  Commonly known as: PEPCID   Take 1 tablet by mouth every night at bedtime.      hydrALAZINE 50 MG tablet  Commonly known as: APRESOLINE   take 1 tablet by oral route 2 times every day with food      icosapent ethyl 1 g capsule capsule  Commonly known as: Vascepa   Take 2 g (2 capsules) by mouth 2 (Two) Times a Day With Meals. Indications: Cerebrovascular Accident or Stroke, High Amount of Triglycerides in the Blood, Procedure to Reestablish Blood Supply to the Heart      metoclopramide 5 MG tablet  Commonly known as: REGLAN   5 mg, Oral, 4 Times Daily      ondansetron 4 MG tablet  Commonly known as: ZOFRAN   Take 1 tablet by mouth three times a day as needed      pantoprazole 40 MG EC tablet  Commonly known as: PROTONIX   Take 1 tablet by mouth Every Morning.      Ventolin  (90 Base) MCG/ACT inhaler  Generic drug: albuterol sulfate HFA   Inhale 2 puffs every 6 (six) hours if needed for wheezing.             Stop These Medications      insulin aspart 100 UNIT/ML solution pen-injector sc pen  Commonly known as: novoLOG FLEXPEN     NovoLOG FlexPen 100 UNIT/ML solution pen-injector sc pen  Generic drug: insulin aspart              Allergies   Allergen Reactions    Latex Itching         Discharge Disposition:  Home or Self Care    Discharge Diet:  Diet Order   Procedures    Diet: Cardiac, Diabetic; Healthy Heart (2-3 Na+); Consistent Carbohydrate; Fluid Consistency: Thin (IDDSI 0)       Discharge Activity:       CODE STATUS:    Code Status and Medical Interventions: CPR (Attempt to Resuscitate); Full   Ordered at: 07/22/25 0363     Code Status (Patient has no pulse and is not breathing):    CPR (Attempt to Resuscitate)     Medical Interventions (Patient has pulse or is breathing):    Full       Future Appointments   Date Time Provider Department Center   8/13/2025  9:15 AM [facility]   8/13/2025 10:15 AM Basia Dejesus APRN [facility]   8/22/2025  9:15 AM Cristian Su MD [facility]      Follow-up Information       Ibrahima Correa MD .    Specialty: Family Medicine  Contact information:  [address], [address]  [facility] [address] [phone]                             Time Spent on Discharge:  Greater than 30 minutes      Clark Dawson MD  [facility]  08/01/25  13:10 EDT

## 2025-08-01 NOTE — CONSULTS
"HPI: Patient is a 45-year-old female presenting for evaluation by [facility] due to depression.  The patient presented to the emergency room on July 22 following near-syncope and uncontrolled diabetes.  Access consulted today after staffing concerns of depression related to her 's medical status.  Patient states she has had 1 psychiatric hospitalization at age 11 for depression, denies this was following suicidal events or psychosis.  She denies any previous suicidal attempts.  Denies SI, HI, SIB, or hallucinations currently and is commencing.  She has struggled off and on with depression since that time frame, is now prescribed Lexapro which she is compliant with, denies side effects.  States this medication was started sometime over the past year.  She states \"yeah I think it does help\" regarding depression and anxiety.  She currently rates depression of 2/10 on average, anxiety 0/10 on average.  Denies mental health concerns currently.  States she has had a brother, a sister, and a mother whom all passed away via suicide which was difficult.  She states she could never partake in this behavior due to knowing the consequences it leaves on others.  States \"I have never thought that.\"  Denies side effects with use of Lexapro, this is currently prescribed by her primary provider.  She states she may be interested in therapy, recognizes this may be beneficial as a support system for her during these trialing times.  She states her  had a stroke and is an amputee, she largely cares for him.  The most recent stroke was in April of this year.  Acknowledges this as a contributing factor in depression and anxiety.  She voices eagerness and being discharged and able to return home to family.    MSE: Patient is alert and oriented in all realms, affect congruent with mood, flat.  Speech monotone.  Appropriate hygiene.    Social: The patient resides in a house with her spouse and child age 12.  Mother-in-law " apparently has child.  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  She is unsure when she will return.  Denies legalities.  [employer] preference.  Enjoys cooking.  No communication with her father.  Denies previous or current circumstances of chemical dependency or substance abuse.    Plan: Patient was provided resources for both medication management as well as therapy following hospital discharge.  Therapy strongly recommended due to environmental stressors as well as lack of support systems.  She seems receptive.  She has no mental health concerns at this time, plans for discharge today.  Access referring back to medical.

## 2025-08-01 NOTE — NURSING NOTE
07/23/25 0956   Wound 07/22/25 2144 Right posterior fifth toe Diabetic Ulcer   Placement Date/Time: 07/22/25 2144   Present on Original Admission: Yes  Side: Right  Orientation: posterior  Location: fifth toe  Primary Wound Type: (c) Diabetic Ulcer   Dressing Appearance open to air   Base black;necrotic   Edges irregular   Drainage Amount none     Reason for Visit: WOC Team consult for Assessment right foot toes  Treatment Plan/Recommendations: To ED due to syncope episode and fall, History of diabetes and has been out of insulin for sometime. Right 5th toe is necrotic. She doesn't remember any trauma. Also states she didn't know her foot liked that way.       Wound Team Follow up Plan: She will need podiatry consult. Message sent to unit RN.     
Discussed with EMMANUEL Inman, patient will be going to [facility] residence at time of discharge.  [facility] has set up for a therapist from [facility] [employer] to see the patient on Monday, 4th in home. Patient denies SI,HI   
Statement Selected

## 2025-08-02 LAB
BH BB BLOOD EXPIRATION DATE: NORMAL
BH BB BLOOD TYPE BARCODE: 5100
BH BB DISPENSE STATUS: NORMAL
BH BB PRODUCT CODE: NORMAL
BH BB UNIT NUMBER: NORMAL
CROSSMATCH INTERPRETATION: NORMAL
UNIT  ABO: NORMAL
UNIT  RH: NORMAL

## 2025-08-04 ENCOUNTER — TELEPHONE (OUTPATIENT)
Age: 45
End: 2025-08-04
Payer: MEDICAID

## 2025-08-04 PROBLEM — I73.9 PERIPHERAL ARTERIAL DISEASE: Status: ACTIVE | Noted: 2025-08-01

## 2025-08-04 NOTE — PAYOR COMM NOTE
"Bibiana Belcher (45 y.o. Female)      PATIENT DISCHARGED 2025:  ref# [id number]     FAX:  [phone]    PH:  [phone]    [facility]:  NPI [id number]  [facility]#  [id number]    JENNIFER COBIAN RN,CCP     Date of Birth   1980    Social Security Number       Address   [address] [place] [address]    Home Phone   [phone]    MRN   [id number]       Sikhism   None    Marital Status                               Admission Date   2025    Admission Type   Emergency    Admitting Provider   Selina Snow MD    Attending Provider       Department, Room/Bed   [address], E563/1       Discharge Date   2025    Discharge Disposition   Home or Self Care    Discharge Destination                                 Attending Provider: (none)   Allergies: Latex    Isolation: None   Infection: None   Code Status: Prior    Ht: 162.6 cm (64\")   Wt: 79.7 kg (175 lb 11.3 oz)    Admission Cmt: None   Principal Problem: Syncope and collapse [R55]                   Active Insurance as of 2025       Primary Coverage       Payor Plan Insurance Group Employer/Plan Group    [facility] COMMUNITY PLAN [facility] COMMUNITY PLAN [facility]       Payor Plan Address Payor Plan Phone Number Payor Plan Fax Number Effective Dates    [address]   2025 - None Entered    [facility] [address]         Subscriber Name Subscriber Birth Date Member ID       BIBIANA BELCHER 1980 [id number]                     Emergency Contacts        (Rel.) Home Phone Work Phone Mobile Phone    HOLLAND BELCHER (Spouse) [phone] -- [phone]    NORMA BELCHER(MOTHER IN LAW) (Relative) -- -- [phone]    AMARI DONALDSON (SIS IN LAW) (Relative) -- -- [phone]                 Discharge Summary        Clark Dawson MD at 25 1310              Patient Name: Bibiana Belcher  : 1980  MRN: [id number]    Date of Admission: 2025  Date of Discharge:  " 8/1/2025  Primary Care Physician: Ibrahima Correa MD      Chief Complaint:   Syncope and Hyperglycemia      Discharge Diagnoses     Active Hospital Problems    Diagnosis  POA    CAD (coronary artery disease) [I25.10]  Yes    CKD stage 3b, GFR 30-44 ml/min [N18.32]  Yes    Anemia [D64.9]  Yes    Type 2 diabetes mellitus with diabetic foot ulcer [E11.621, L97.509]  Yes    Atherosclerosis of native arteries of the extremities with ulceration [I70.25]  Yes    Bilateral carotid artery stenosis [I65.23]  Yes    History of CVA (cerebrovascular accident) [Z86.73]  Not Applicable    Obstructive sleep apnea [G47.33]  Yes    Gastroesophageal reflux disease [K21.9]  Yes    Type 2 diabetes mellitus with hyperglycemia, with long-term current use of insulin [E11.65, Z79.4]  Not Applicable    Benign essential hypertension [I10]  Yes      Resolved Hospital Problems    Diagnosis Date Resolved POA    **Syncope and collapse [R55] 08/01/2025 Yes    Dizziness [R42] 08/01/2025 Yes    Elevated troponin [R79.89] 08/01/2025 Yes        Hospital Course     Ms. Inman is a 45 y.o. 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  Please see the admitting history and physical for further details.  She was found to have severe hyperglycemia, an NSTEMI, an CARLA, and orthostatic hypotension as well as an ischemic appearing right fifth toe and was admitted to the hospital for further evaluation and treatment.      She was seen in consultation by vascular surgery, cardiology, nephrology, and podiatry.      Cardiology felt that her NSTEMI was type 2 related to hypotension, CKD, and severe hyperglycemia. Similarly, they felt  that her syncopal episode was related to hypotension/dehydration. An echocardiogram was without WMA and they deferred any additional workup at this time. She'll need to follow up with cardiology as an outpatient.    Her CARLA was felt to be prerenal related to dehydration and severe hyperglycemia as well as contrast exposure. She was administered IVF and her creatinine stabilized. Her lisinopril will be held at discharge. She will need to follow closely with nephrology to determine the timing to restart this medication    For her ischemic right fifth toe, she was found to have significant peripheral artery disease and she underwent an attempted endovascular right SFA revascularization with bilateral common and external iliac stent placement, right femoral endarterectomy, and profundoplasty on 7/29/25. Post procedurally, she was noted to have monophasic pulses in her right foot and was started on a heparin drip with some modest improvement in her pulses. Ultimately, vascular surgery felt that she will need additional revascularization, but vascular surgery noted that they wouldn't be able to perform another operation for several more days or a week and the patient is anxious for discharge, so they've cleared her for discharge with outpatient follow up. Plavix was added to her regimen this admission, so she's now on DAPT and a high intensity statin.    With regard to her severe hyperglycemia, she was noted to have an A1c of 13.6. she reports that she hasn't taken her insulin in many months. She was started back on insulin this admission and it was titrated up quickly, but then she developed some hypoglycemia. At discharge, she will be sent home on lantus 15 units daily. I did attempt to order a DPP4 inhibitor for her at discharge, but her insurance wouldn't allow us to pursue a prior authorization. She will need to follow up with her primary care physician for additional titration.     Day of Discharge      Subjective:  Hypoglycemic yesterday evening. She became tearful when I suggested staying another day to try and readjust her insulin. I told her that I wasn't likely to be able to get a good balance and would have to give her less than I think she needs at discharge to avoid severe hypoglycemia, but she insists that she wants to go home.    Physical Exam:  Temp:  [97.2 °F (36.2 °C)-98.1 °F (36.7 °C)] 97.6 °F (36.4 °C)  Heart Rate:  [53-69] 58  Resp:  [18] 18  BP: (137-171)/(67-72) 149/70  Body mass index is 30.16 kg/m².  Physical Exam  Constitutional:       General: She is not in acute distress.     Appearance: She is not toxic-appearing.   Cardiovascular:      Rate and Rhythm: Normal rate and regular rhythm.      Heart sounds: Normal heart sounds.   Pulmonary:      Effort: Pulmonary effort is normal.      Breath sounds: Normal breath sounds.   Abdominal:      General: Bowel sounds are normal.      Palpations: Abdomen is soft.   Neurological:      Mental Status: She is alert.   Psychiatric:         Mood and Affect: Affect is flat and tearful.         Consultants     Consult Orders (all) (From admission, onward)       Start     Ordered    08/01/25 1110  Inpatient Access Center Consult  Once        Provider:  (Not yet assigned)    08/01/25 1110    07/27/25 1812  Inpatient Nephrology Consult  Once        Specialty:  Nephrology  Provider:  Xochilt Jenkins MD    07/27/25 1812    07/27/25 1428  Inpatient Nephrology Consult  Once        Specialty:  Nephrology  Provider:  Adrian Ordaz MD    07/27/25 1427    07/25/25 0920  Inpatient Vascular Surgery Consult  Once        Specialty:  Vascular Surgery  Provider:  Neal Peoples II, MD    07/25/25 0920    07/23/25 1117  Inpatient Podiatry Consult  Once        Specialty:  Podiatry  Provider:  Weston Tinajero DPM    07/23/25 1116    07/22/25 2200  Inpatient Diabetes Educator Consult  Once        Provider:  (Not yet assigned)    07/22/25 2200     07/22/25 2159  Inpatient Nutrition Consult  Once        Provider:  (Not yet assigned)    07/22/25 2200    07/22/25 1841  Inpatient Consult to Advance Care Planning  Once        Provider:  (Not yet assigned)    07/22/25 1840    07/22/25 1625  [facility] (on-call MD unless specified) Details  Once        Specialty:  Hospitalist  Provider:  (Not yet assigned)    07/22/25 1626    07/22/25 1547  Interventional Cardiology (on-call MD unless specified)  Once        Specialty:  Interventional Cardiology  Provider:  (Not yet assigned)    07/22/25 1546                  Procedures     Imaging Results (All)       Procedure Component Value Units Date/Time    Arteriogram (Autofinalize) [[id number]] Resulted: 07/29/25 2046     Updated: 07/29/25 2046    Narrative:      This procedure was auto-finalized with no dictation required.    CT Angio Abdominal Aorta Bilateral Iliofem Runoff [[id number]] Collected: 07/27/25 1236     Updated: 07/27/25 1259    Narrative:      CT ANGIOGRAM ABDOMEN AND PELVIS WITH ILIOFEMORAL RUNOFF     HISTORY: Peripheral artery disease with tissue loss. Diabetes.     COMPARISON: Right foot x-rays 07/24/2025. CT chest 06/26/2024..     TECHNIQUE: Axial images were obtained from the lung bases through the  lower extremities following the administration of IV contrast. Coronal  and sagittal MIP images were obtained as well as 3D volume rendering.   Radiation dose reduction techniques were utilized, including automated  exposure control and exposure modulation based on body size.     FINDINGS:  Atherosclerotic calcifications are present involving the distal thoracic  aorta abdominal aorta with mild narrowing. The celiac artery, superior  mesenteric artery are patent. There is a long segment of moderate  stenosis of the left main renal artery associated with partially  calcified plaque that extends 2.5 cm in length. Mild narrowing of the  origin of the right renal artery. Calcified and noncalcified plaque  involving the  inferior abdominal aorta with moderate narrowing just  above its bifurcation. No aneurysm.     On the left, there is a moderate stenosis of the origin of the left  common iliac artery. Multifocal moderate left common iliac arterial  stenoses. Mild narrowing of the left internal iliac artery. There is a  severe stenosis of the left external iliac artery approximately 4.5 cm  distal to its origin. Moderate stenosis left common femoral artery. Left  deep femoral artery is patent. There is a left superficial femoral  artery stent that is occluded. Left superficial femoral artery  reconstitutes at the distal margin of the stent at the level of the mid  thigh and is small with multifocal stenoses that are up to severe in the  region of the adductor hiatus. Multifocal popliteal artery stenoses of  the left popliteal arteries patent. Left anterior tibial artery contains  calcification proximally with moderate narrowing. The left tibioperoneal  trunk is patent. There is three-vessel runoff to the left lower  extremity.     On the right, there is moderate stenosis origin right common iliac  artery. Mild to moderate narrowing of the distal right common iliac  artery. Moderate to severe stenosis of the proximal right internal iliac  artery. Moderate right external iliac arterial stenoses. Moderate  stenosis of the right common femoral artery. The right superficial  femoral artery is small with multifocal severe stenoses extending from  its origin to the popliteal artery though no evidence for complete  occlusion. The right deep femoral artery is patent. Right popliteal  artery is small with areas of moderate to severe stenoses without  evidence for complete occlusion. Multifocal calcifications involving the  proximal anterior posterior tibial arteries and the tibial peroneal  trunk. There is flow within the right anterior and posterior tibial  arteries extending into the ankle. Flow within the peroneal artery  extends into the  distal calf.     Moderate bilateral pleural effusions layer dependently with adjacent  compressive lower lobe atelectasis.     Liver, spleen, pancreas exhibit normal early arterial phase of contrast  appearance. Multiple gallstones. Bilateral adrenal thickening. Kidneys  appear within normal limits. There is no hydronephrosis.     Mild ascites with free fluid extending into the pelvis. Colonic  diverticulosis without evidence for diverticulitis. Mild generalized  body wall edema with third spacing of fluid.                Impression:      1. Multifocal atherosclerotic disease. Long segment of moderate stenosis  of the left main renal artery associated with partially calcified  plaque. Mild narrowing origin right renal artery.  2. Atherosclerotic calcification involving the common aorta and iliac  vasculature with severe stenosis of the left external iliac artery  proximally 4.5 cm distal to its origin. Multifocal iliac and common  femoral arterial stenoses.  3. Left superficial femoral artery stent is occluded with reconstitution  just below the stent. Multifocal stenoses of the distal left superficial  femoral artery, left popliteal artery. Three-vessel runoff to the left  lower extremity.  4. On the right, the right superficial femoral artery is small with  multifocal severe stenoses extending from its origin to the popliteal  artery but without evidence for complete occlusion. Right popliteal  artery is small with multifocal moderate to severe stenoses.  Calcification involving the proximal anterior and posterior tibial  arteries and peroneal artery. There is flow within the anterior and  posterior tibial arteries on the right extending to the foot and there  is flow of the right peroneal artery extending to the distal calf.  5. Moderate bilateral pleural effusions with adjacent compressive lower  lobe atelectasis.  6. Cholelithiasis.  7. Generalized body wall edema.        This report was finalized on 7/27/2025  12:56 PM by Huan Noriega M.D  on Workstation: [id number]       XR Foot 3+ View Right [[id number]] Collected: 07/24/25 1444     Updated: 07/24/25 1452    Narrative:      XR FOOT 3+ VW RIGHT-     CLINICAL: Small toe ischemia.     FINDINGS: No 5th toe cortical bone destruction. No acute osseous  abnormality. No soft tissue gas or radiopaque foreign body. The 5th  metatarsal is satisfactory in appearance. There is 1st  metatarsophalangeal joint degeneration of a mild-to-moderate degree. The  forefoot is otherwise unremarkable.     Moderate size calcaneal spur, hind foot articulations preserved. Midfoot  articulations within normal limits.     CONCLUSION: No 5th toe acute osseous or articular abnormality. No soft  tissue gas or radiopaque foreign body seen.        This report was finalized on 7/24/2025 2:49 PM by Dr. Clif Galeas M.D  on Workstation: [id number]       XR Ribs Bilateral 4+ View With PA Chest [[id number]] Collected: 07/22/25 1556     Updated: 07/22/25 1602    Narrative:      8 VIEW BILATERAL RIBS AND 1 VIEW PA CHEST     HISTORY: Syncope. Fell. Rib pain, left greater than right.     FINDINGS: The vertebral height and disc spaces are well-maintained. The  heart is top normal in size with sternal wires from previous cardiac  surgery and there is no change from 5/5/2025.     Multiple views of the left and right ribs show no definite fracture at  the present time.     This report was finalized on 7/22/2025 3:59 PM by Dr. Fortunato Fournier M.D  on Workstation: [id number]               Pertinent Labs     Results from last 7 days   Lab Units 08/01/25  0608 07/31/25  0432 07/30/25  0556 07/29/25 2125   WBC 10*3/mm3 8.81 10.90* 13.61* 9.02   HEMOGLOBIN g/dL 9.5* 9.2* 10.7* 10.1*   PLATELETS 10*3/mm3 271 269 285 243     Results from last 7 days   Lab Units 08/01/25  0608 07/31/25  0432 07/30/25  0556 07/29/25 2125   SODIUM mmol/L 136 135* 132* 131*   POTASSIUM mmol/L 3.8 4.2 5.1 4.9   CHLORIDE mmol/L 111* 110*  "106 105   CO2 mmol/L 16.8* 18.5* 16.1* 17.8*   BUN mg/dL 18.0 22.0* 20.0 17.0   CREATININE mg/dL 1.86* 2.20* 2.03* 1.78*   GLUCOSE mg/dL 83 100* 152* 125*   Estimated Creatinine Clearance: 38.2 mL/min (A) (by C-G formula based on SCr of 1.86 mg/dL (H)).  Results from last 7 days   Lab Units 08/01/25 0608 07/31/25 0432 07/30/25  0556 07/29/25  0700 07/28/25  0506   ALBUMIN g/dL 1.7* 1.7* 1.7* 1.9* 2.0*   BILIRUBIN mg/dL  --   --   --   --  <0.2   ALK PHOS U/L  --   --   --   --  114   AST (SGOT) U/L  --   --   --   --  13   ALT (SGPT) U/L  --   --   --   --  8     Results from last 7 days   Lab Units 08/01/25  0608 07/31/25 0432 07/30/25  0556 07/29/25 2125 07/29/25 0700 07/28/25  0506   CALCIUM mg/dL 7.3* 7.4* 8.0* 7.7* 7.7* 8.0*   ALBUMIN g/dL 1.7* 1.7* 1.7*  --  1.9* 2.0*   MAGNESIUM mg/dL  --   --   --   --   --  1.9   PHOSPHORUS mg/dL 3.2 4.5 5.5*  --  3.0 2.7       Results from last 7 days   Lab Units 07/27/25  1631   CK TOTAL U/L 45     Results from last 7 days   Lab Units 07/28/25  1744 07/27/25  2146 07/27/25  2146 07/27/25  1631   SODIUM UR mmol/L 67   < > 60  --    CREATININE UR mg/dL 53.9  --   --   --    OSMOLALITY UR mOsm/kg  --   --  416  --    URIC ACID mg/dL  --   --   --  4.8    < > = values in this interval not displayed.         Invalid input(s): \"LDLCALC\"        Test Results Pending at Discharge     Pending Labs       Order Current Status    aPTT Collected (08/01/25 1300)            Discharge Details        Discharge Medications        PAUSE taking these medications        Instructions Start Date   lisinopril 10 MG tablet  Wait to take this until your doctor or other care provider tells you to start again.  Commonly known as: PRINIVIL,ZESTRIL   10 mg, Oral, Every 24 Hours Scheduled             New Medications        Instructions Start Date   acetaminophen 500 MG tablet  Commonly known as: TYLENOL   1,000 mg, Oral, Every 8 Hours Scheduled      clopidogrel 75 MG tablet  Commonly known as: " PLAVIX   75 mg, Oral, Daily   Start Date: August 2, 2025     Dexcom G7  device   1 each, Not Applicable, Continuous, Use to monitor blood glucose levels continuously.      Dexcom G7 Sensor misc   1 each, Not Applicable, Continuous, Use to monitor blood glucose levels continuously.  Change sensors every 10 days      FreeStyle Lite w/Device kit   Use to test blood glucose up to four times daily as needed. Formulary Compliance Approval. Diagnosis: Type 2 Diabetes - Insulin Dependent      glucagon 1 MG injection  Commonly known as: GLUCAGEN   Inject As Needed (severely low blood sugar). For severely low blood sugar      glucose 4 GM chewable tablet  Commonly known as: TRUEplus Glucose   Use as needed for emergently low blood glucose levels. Formulary Compliance Approval      glucose blood test strip   Use to test blood glucose up to four times daily as needed. Formulary Compliance Approval. Diagnosis: Type 2 Diabetes - Insulin Dependent      Lancets misc   Use to test blood glucose up to four times daily as needed. Formulary Compliance Approval. Diagnosis: Type 2 Diabetes - Insulin Dependent      Lantus SoloStar 100 UNIT/ML injection pen  Generic drug: Insulin Glargine   15 Units, Subcutaneous, Daily      oxyCODONE 5 MG immediate release tablet  Commonly known as: ROXICODONE   5 mg, Oral, Every 6 Hours PRN      Pen Needles 32G X 4 MM misc   1 each, Not Applicable, 4 Times Daily, As needed for insulin injections             Changes to Medications        Instructions Start Date   sodium bicarbonate 650 MG tablet  What changed:   how much to take  when to take this   1,300 mg, Oral, 3 Times Daily             Continue These Medications        Instructions Start Date   aspirin 81 MG chewable tablet   1 tablet, Daily      atorvastatin 80 MG tablet  Commonly known as: LIPITOR   1 tablet, Daily      carvedilol 12.5 MG tablet  Commonly known as: COREG   12.5 mg, Oral, 2 Times Daily With Meals      famotidine 40 MG  tablet  Commonly known as: PEPCID   Take 1 tablet by mouth every night at bedtime.      hydrALAZINE 50 MG tablet  Commonly known as: APRESOLINE   take 1 tablet by oral route 2 times every day with food      icosapent ethyl 1 g capsule capsule  Commonly known as: Vascepa   Take 2 g (2 capsules) by mouth 2 (Two) Times a Day With Meals. Indications: Cerebrovascular Accident or Stroke, High Amount of Triglycerides in the Blood, Procedure to Reestablish Blood Supply to the Heart      metoclopramide 5 MG tablet  Commonly known as: REGLAN   5 mg, Oral, 4 Times Daily      ondansetron 4 MG tablet  Commonly known as: ZOFRAN   Take 1 tablet by mouth three times a day as needed      pantoprazole 40 MG EC tablet  Commonly known as: PROTONIX   Take 1 tablet by mouth Every Morning.      Ventolin  (90 Base) MCG/ACT inhaler  Generic drug: albuterol sulfate HFA   Inhale 2 puffs every 6 (six) hours if needed for wheezing.             Stop These Medications      insulin aspart 100 UNIT/ML solution pen-injector sc pen  Commonly known as: novoLOG FLEXPEN     NovoLOG FlexPen 100 UNIT/ML solution pen-injector sc pen  Generic drug: insulin aspart              Allergies   Allergen Reactions    Latex Itching         Discharge Disposition:  Home or Self Care    Discharge Diet:  Diet Order   Procedures    Diet: Cardiac, Diabetic; Healthy Heart (2-3 Na+); Consistent Carbohydrate; Fluid Consistency: Thin (IDDSI 0)       Discharge Activity:       CODE STATUS:    Code Status and Medical Interventions: CPR (Attempt to Resuscitate); Full   Ordered at: 07/22/25 5816     Code Status (Patient has no pulse and is not breathing):    CPR (Attempt to Resuscitate)     Medical Interventions (Patient has pulse or is breathing):    Full       Future Appointments   Date Time Provider Department Center   8/13/2025  9:15 AM [facility]   8/13/2025 10:15 AM Basia Dejesus APRN [facility]   8/22/2025  9:15 AM Cristian Su,  MD [facility]      Follow-up Information       Ibrahima Correa MD .    Specialty: Family Medicine  Contact information:  [address]  [facility] [address] [phone]                             Time Spent on Discharge:  Greater than 30 minutes      Leona Swanson MD  [facility]  08/01/25  13:10 EDT               Electronically signed by Leona Swanson MD at 08/01/25 1406       Discharge Order (From admission, onward)       Start     Ordered    08/01/25 1257  Discharge patient  Once        Expected Discharge Date: 08/01/25   Discharge Disposition: Home or Self Care   Physician of Record for Attribution - Please select from Treatment Team: LEONA SWANSON [[id number]]   Review needed by CMO to determine Physician of Record: No      Question Answer Comment   Physician of Record for Attribution - Please select from Treatment Team LEONA SWANSON    Review needed by CMO to determine Physician of Record No        08/01/25 5425

## 2025-08-06 ENCOUNTER — ANESTHESIA (OUTPATIENT)
Dept: PERIOP | Facility: HOSPITAL | Age: 45
End: 2025-08-06
Payer: MEDICAID

## 2025-08-06 ENCOUNTER — ANESTHESIA EVENT (OUTPATIENT)
Dept: PERIOP | Facility: HOSPITAL | Age: 45
End: 2025-08-06
Payer: MEDICAID

## 2025-08-06 ENCOUNTER — HOSPITAL ENCOUNTER (OUTPATIENT)
Facility: HOSPITAL | Age: 45
Setting detail: SURGERY ADMIT
Discharge: HOME | End: 2025-08-06
Attending: STUDENT IN AN ORGANIZED HEALTH CARE EDUCATION/TRAINING PROGRAM | Admitting: STUDENT IN AN ORGANIZED HEALTH CARE EDUCATION/TRAINING PROGRAM
Payer: MEDICAID

## 2025-08-06 VITALS
HEART RATE: 65 BPM | OXYGEN SATURATION: 98 % | DIASTOLIC BLOOD PRESSURE: 78 MMHG | RESPIRATION RATE: 16 BRPM | TEMPERATURE: 97.8 F | SYSTOLIC BLOOD PRESSURE: 165 MMHG

## 2025-08-06 DIAGNOSIS — I73.9 PERIPHERAL ARTERIAL DISEASE: Primary | ICD-10-CM

## 2025-08-06 DIAGNOSIS — I73.9 PERIPHERAL ARTERIAL DISEASE: ICD-10-CM

## 2025-08-06 LAB
ABO GROUP BLD: NORMAL
BLD GP AB SCN SERPL QL: NEGATIVE
GLUCOSE BLDC GLUCOMTR-MCNC: 139 MG/DL (ref 65–99)
RH BLD: POSITIVE
T&S EXPIRATION DATE: NORMAL

## 2025-08-06 PROCEDURE — 86900 BLOOD TYPING SEROLOGIC ABO: CPT | Performed by: STUDENT IN AN ORGANIZED HEALTH CARE EDUCATION/TRAINING PROGRAM

## 2025-08-06 PROCEDURE — 25010000002 FAMOTIDINE 10 MG/ML SOLUTION: Performed by: ANESTHESIOLOGY

## 2025-08-06 PROCEDURE — G0463 HOSPITAL OUTPT CLINIC VISIT: HCPCS | Performed by: STUDENT IN AN ORGANIZED HEALTH CARE EDUCATION/TRAINING PROGRAM

## 2025-08-06 PROCEDURE — 82948 REAGENT STRIP/BLOOD GLUCOSE: CPT

## 2025-08-06 PROCEDURE — 25010000002 CEFAZOLIN PER 500 MG: Performed by: STUDENT IN AN ORGANIZED HEALTH CARE EDUCATION/TRAINING PROGRAM

## 2025-08-06 PROCEDURE — 86850 RBC ANTIBODY SCREEN: CPT | Performed by: STUDENT IN AN ORGANIZED HEALTH CARE EDUCATION/TRAINING PROGRAM

## 2025-08-06 PROCEDURE — 86901 BLOOD TYPING SEROLOGIC RH(D): CPT | Performed by: STUDENT IN AN ORGANIZED HEALTH CARE EDUCATION/TRAINING PROGRAM

## 2025-08-06 PROCEDURE — S0260 H&P FOR SURGERY: HCPCS | Performed by: STUDENT IN AN ORGANIZED HEALTH CARE EDUCATION/TRAINING PROGRAM

## 2025-08-06 PROCEDURE — 25010000002 MIDAZOLAM PER 1 MG: Performed by: ANESTHESIOLOGY

## 2025-08-06 PROCEDURE — 25810000003 LACTATED RINGERS PER 1000 ML: Performed by: ANESTHESIOLOGY

## 2025-08-06 RX ORDER — SODIUM CHLORIDE 0.9 % (FLUSH) 0.9 %
10 SYRINGE (ML) INJECTION AS NEEDED
Status: CANCELLED | OUTPATIENT
Start: 2025-08-07

## 2025-08-06 RX ORDER — SODIUM CHLORIDE 0.9 % (FLUSH) 0.9 %
3 SYRINGE (ML) INJECTION EVERY 12 HOURS SCHEDULED
Status: DISCONTINUED | OUTPATIENT
Start: 2025-08-06 | End: 2025-08-06 | Stop reason: HOSPADM

## 2025-08-06 RX ORDER — SODIUM CHLORIDE, SODIUM LACTATE, POTASSIUM CHLORIDE, CALCIUM CHLORIDE 600; 310; 30; 20 MG/100ML; MG/100ML; MG/100ML; MG/100ML
9 INJECTION, SOLUTION INTRAVENOUS CONTINUOUS
Status: DISCONTINUED | OUTPATIENT
Start: 2025-08-06 | End: 2025-08-06 | Stop reason: HOSPADM

## 2025-08-06 RX ORDER — LIDOCAINE HYDROCHLORIDE 10 MG/ML
0.5 INJECTION, SOLUTION INFILTRATION; PERINEURAL ONCE AS NEEDED
Status: DISCONTINUED | OUTPATIENT
Start: 2025-08-06 | End: 2025-08-06 | Stop reason: HOSPADM

## 2025-08-06 RX ORDER — FENTANYL CITRATE 50 UG/ML
50 INJECTION, SOLUTION INTRAMUSCULAR; INTRAVENOUS ONCE AS NEEDED
Status: DISCONTINUED | OUTPATIENT
Start: 2025-08-06 | End: 2025-08-06 | Stop reason: HOSPADM

## 2025-08-06 RX ORDER — SODIUM CHLORIDE 0.9 % (FLUSH) 0.9 %
10 SYRINGE (ML) INJECTION EVERY 12 HOURS SCHEDULED
Status: DISCONTINUED | OUTPATIENT
Start: 2025-08-06 | End: 2025-08-06 | Stop reason: HOSPADM

## 2025-08-06 RX ORDER — SODIUM CHLORIDE 0.9 % (FLUSH) 0.9 %
10 SYRINGE (ML) INJECTION AS NEEDED
Status: DISCONTINUED | OUTPATIENT
Start: 2025-08-06 | End: 2025-08-06 | Stop reason: HOSPADM

## 2025-08-06 RX ORDER — SODIUM CHLORIDE 0.9 % (FLUSH) 0.9 %
10 SYRINGE (ML) INJECTION EVERY 12 HOURS SCHEDULED
Status: CANCELLED | OUTPATIENT
Start: 2025-08-07

## 2025-08-06 RX ORDER — SODIUM CHLORIDE 9 MG/ML
40 INJECTION, SOLUTION INTRAVENOUS AS NEEDED
Status: DISCONTINUED | OUTPATIENT
Start: 2025-08-06 | End: 2025-08-06 | Stop reason: HOSPADM

## 2025-08-06 RX ORDER — MIDAZOLAM HYDROCHLORIDE 1 MG/ML
1 INJECTION, SOLUTION INTRAMUSCULAR; INTRAVENOUS
Status: DISCONTINUED | OUTPATIENT
Start: 2025-08-06 | End: 2025-08-06 | Stop reason: HOSPADM

## 2025-08-06 RX ORDER — SODIUM CHLORIDE 9 MG/ML
40 INJECTION, SOLUTION INTRAVENOUS AS NEEDED
Status: CANCELLED | OUTPATIENT
Start: 2025-08-07

## 2025-08-06 RX ORDER — SODIUM CHLORIDE 0.9 % (FLUSH) 0.9 %
3-10 SYRINGE (ML) INJECTION AS NEEDED
Status: DISCONTINUED | OUTPATIENT
Start: 2025-08-06 | End: 2025-08-06 | Stop reason: HOSPADM

## 2025-08-06 RX ORDER — FAMOTIDINE 10 MG/ML
20 INJECTION, SOLUTION INTRAVENOUS ONCE
Status: COMPLETED | OUTPATIENT
Start: 2025-08-06 | End: 2025-08-06

## 2025-08-06 RX ADMIN — MIDAZOLAM 1 MG: 1 INJECTION INTRAMUSCULAR; INTRAVENOUS at 07:03

## 2025-08-06 RX ADMIN — CEFAZOLIN 2000 MG: 2 INJECTION, POWDER, FOR SOLUTION INTRAMUSCULAR; INTRAVENOUS at 07:23

## 2025-08-06 RX ADMIN — SODIUM CHLORIDE, POTASSIUM CHLORIDE, SODIUM LACTATE AND CALCIUM CHLORIDE 9 ML/HR: 600; 310; 30; 20 INJECTION, SOLUTION INTRAVENOUS at 06:53

## 2025-08-06 RX ADMIN — SODIUM CHLORIDE, POTASSIUM CHLORIDE, SODIUM LACTATE AND CALCIUM CHLORIDE 9 ML/HR: 600; 310; 30; 20 INJECTION, SOLUTION INTRAVENOUS at 06:55

## 2025-08-06 RX ADMIN — FAMOTIDINE 20 MG: 10 INJECTION INTRAVENOUS at 07:03

## 2025-08-07 ENCOUNTER — ANESTHESIA EVENT (OUTPATIENT)
Dept: PERIOP | Facility: HOSPITAL | Age: 45
End: 2025-08-07
Payer: MEDICAID

## 2025-08-07 ENCOUNTER — READMISSION MANAGEMENT (OUTPATIENT)
Dept: CALL CENTER | Facility: HOSPITAL | Age: 45
End: 2025-08-07
Payer: MEDICAID

## 2025-08-07 ENCOUNTER — HOSPITAL ENCOUNTER (INPATIENT)
Facility: HOSPITAL | Age: 45
LOS: 20 days | Discharge: NURSING, REHAB OR LONG-TERM CARE | End: 2025-08-27
Attending: STUDENT IN AN ORGANIZED HEALTH CARE EDUCATION/TRAINING PROGRAM | Admitting: STUDENT IN AN ORGANIZED HEALTH CARE EDUCATION/TRAINING PROGRAM
Payer: MEDICAID

## 2025-08-07 ENCOUNTER — ANESTHESIA (OUTPATIENT)
Dept: PERIOP | Facility: HOSPITAL | Age: 45
End: 2025-08-07
Payer: MEDICAID

## 2025-08-07 DIAGNOSIS — I96 GANGRENOUS TOE: Primary | ICD-10-CM

## 2025-08-07 DIAGNOSIS — Z72.0 TOBACCO ABUSE: ICD-10-CM

## 2025-08-07 DIAGNOSIS — I73.9 PERIPHERAL ARTERIAL DISEASE: ICD-10-CM

## 2025-08-07 PROBLEM — Z95.828 S/P FEMORAL-POPLITEAL BYPASS SURGERY: Status: ACTIVE | Noted: 2025-08-07

## 2025-08-07 LAB
ABO GROUP BLD: NORMAL
BLD GP AB SCN SERPL QL: NEGATIVE
GLUCOSE BLDC GLUCOMTR-MCNC: 153 MG/DL (ref 65–99)
GLUCOSE BLDC GLUCOMTR-MCNC: 191 MG/DL (ref 65–99)
GLUCOSE BLDC GLUCOMTR-MCNC: 235 MG/DL (ref 65–99)
GLUCOSE BLDC GLUCOMTR-MCNC: 245 MG/DL (ref 65–99)
Lab: ABNORMAL
RH BLD: POSITIVE
T&S EXPIRATION DATE: NORMAL

## 2025-08-07 PROCEDURE — 82947 ASSAY GLUCOSE BLOOD QUANT: CPT

## 2025-08-07 PROCEDURE — 35656 BPG FEMORAL-POPLITEAL: CPT | Performed by: STUDENT IN AN ORGANIZED HEALTH CARE EDUCATION/TRAINING PROGRAM

## 2025-08-07 PROCEDURE — 25010000002 CALCIUM CHLORIDE 10 % SOLUTION

## 2025-08-07 PROCEDURE — 86850 RBC ANTIBODY SCREEN: CPT | Performed by: STUDENT IN AN ORGANIZED HEALTH CARE EDUCATION/TRAINING PROGRAM

## 2025-08-07 PROCEDURE — 85018 HEMOGLOBIN: CPT

## 2025-08-07 PROCEDURE — 25010000002 SUGAMMADEX 200 MG/2ML SOLUTION: Performed by: NURSE ANESTHETIST, CERTIFIED REGISTERED

## 2025-08-07 PROCEDURE — 82948 REAGENT STRIP/BLOOD GLUCOSE: CPT

## 2025-08-07 PROCEDURE — 25010000002 VANCOMYCIN 1 G RECONSTITUTED SOLUTION

## 2025-08-07 PROCEDURE — 82948 REAGENT STRIP/BLOOD GLUCOSE: CPT | Performed by: STUDENT IN AN ORGANIZED HEALTH CARE EDUCATION/TRAINING PROGRAM

## 2025-08-07 PROCEDURE — 25010000002 ONDANSETRON PER 1 MG: Performed by: NURSE ANESTHETIST, CERTIFIED REGISTERED

## 2025-08-07 PROCEDURE — 25010000002 LIDOCAINE 2% SOLUTION: Performed by: NURSE ANESTHETIST, CERTIFIED REGISTERED

## 2025-08-07 PROCEDURE — 25010000002 FENTANYL CITRATE (PF) 50 MCG/ML SOLUTION: Performed by: ANESTHESIOLOGY

## 2025-08-07 PROCEDURE — 25810000003 LACTATED RINGERS PER 1000 ML: Performed by: ANESTHESIOLOGY

## 2025-08-07 PROCEDURE — 25010000002 HYDRALAZINE PER 20 MG: Performed by: NURSE ANESTHETIST, CERTIFIED REGISTERED

## 2025-08-07 PROCEDURE — 25010000002 FENTANYL CITRATE (PF) 50 MCG/ML SOLUTION: Performed by: NURSE ANESTHETIST, CERTIFIED REGISTERED

## 2025-08-07 PROCEDURE — 85014 HEMATOCRIT: CPT

## 2025-08-07 PROCEDURE — 25010000002 DEXAMETHASONE SODIUM PHOSPHATE 20 MG/5ML SOLUTION: Performed by: NURSE ANESTHETIST, CERTIFIED REGISTERED

## 2025-08-07 PROCEDURE — 25010000002 HYDROMORPHONE 1 MG/ML SOLUTION: Performed by: NURSE ANESTHETIST, CERTIFIED REGISTERED

## 2025-08-07 PROCEDURE — 25010000002 PROTAMINE SULFATE PER 10 MG: Performed by: NURSE ANESTHETIST, CERTIFIED REGISTERED

## 2025-08-07 PROCEDURE — 25010000002 CEFAZOLIN PER 500 MG: Performed by: STUDENT IN AN ORGANIZED HEALTH CARE EDUCATION/TRAINING PROGRAM

## 2025-08-07 PROCEDURE — 25810000003 SODIUM CHLORIDE 0.9 % SOLUTION

## 2025-08-07 PROCEDURE — C1768 GRAFT, VASCULAR: HCPCS | Performed by: STUDENT IN AN ORGANIZED HEALTH CARE EDUCATION/TRAINING PROGRAM

## 2025-08-07 PROCEDURE — 86900 BLOOD TYPING SEROLOGIC ABO: CPT | Performed by: STUDENT IN AN ORGANIZED HEALTH CARE EDUCATION/TRAINING PROGRAM

## 2025-08-07 PROCEDURE — 25010000002 FAMOTIDINE 10 MG/ML SOLUTION: Performed by: ANESTHESIOLOGY

## 2025-08-07 PROCEDURE — 25010000002 MIDAZOLAM PER 1 MG: Performed by: ANESTHESIOLOGY

## 2025-08-07 PROCEDURE — 82948 REAGENT STRIP/BLOOD GLUCOSE: CPT | Performed by: NURSE ANESTHETIST, CERTIFIED REGISTERED

## 2025-08-07 PROCEDURE — 25010000002 PROPOFOL 200 MG/20ML EMULSION: Performed by: NURSE ANESTHETIST, CERTIFIED REGISTERED

## 2025-08-07 PROCEDURE — 25010000002 HEPARIN (PORCINE) PER 1000 UNITS: Performed by: NURSE ANESTHETIST, CERTIFIED REGISTERED

## 2025-08-07 PROCEDURE — 63710000001 INSULIN LISPRO (HUMAN) PER 5 UNITS: Performed by: STUDENT IN AN ORGANIZED HEALTH CARE EDUCATION/TRAINING PROGRAM

## 2025-08-07 PROCEDURE — 86901 BLOOD TYPING SEROLOGIC RH(D): CPT | Performed by: STUDENT IN AN ORGANIZED HEALTH CARE EDUCATION/TRAINING PROGRAM

## 2025-08-07 PROCEDURE — S0260 H&P FOR SURGERY: HCPCS | Performed by: STUDENT IN AN ORGANIZED HEALTH CARE EDUCATION/TRAINING PROGRAM

## 2025-08-07 PROCEDURE — 25010000002 PHENYLEPHRINE 10 MG/ML SOLUTION: Performed by: NURSE ANESTHETIST, CERTIFIED REGISTERED

## 2025-08-07 PROCEDURE — 25810000003 SODIUM CHLORIDE 0.9 % SOLUTION: Performed by: NURSE ANESTHETIST, CERTIFIED REGISTERED

## 2025-08-07 PROCEDURE — 25010000002 HEPARIN (PORCINE) PER 1000 UNITS: Performed by: STUDENT IN AN ORGANIZED HEALTH CARE EDUCATION/TRAINING PROGRAM

## 2025-08-07 PROCEDURE — 82803 BLOOD GASES ANY COMBINATION: CPT

## 2025-08-07 PROCEDURE — 85347 COAGULATION TIME ACTIVATED: CPT

## 2025-08-07 DEVICE — LIGACLIP MCA MULTIPLE CLIP APPLIERS, 20 SMALL CLIPS
Type: IMPLANTABLE DEVICE | Site: ARTERY FEMORAL | Status: FUNCTIONAL
Brand: LIGACLIP

## 2025-08-07 DEVICE — ABSORBABLE HEMOSTAT (OXIDIZED REGENERATED CELLULOSE, U.S.P.)
Type: IMPLANTABLE DEVICE | Site: ARTERY FEMORAL | Status: FUNCTIONAL
Brand: SURGICEL FIBRILLAR

## 2025-08-07 DEVICE — FLOSEAL WITH RECOTHROM - 5ML
Type: IMPLANTABLE DEVICE | Site: ARTERY FEMORAL | Status: FUNCTIONAL
Brand: FLOSEAL HEMOSTATIC MATRIX

## 2025-08-07 DEVICE — LIGACLIP MCA MULTIPLE CLIP APPLIERS, 20 MEDIUM CLIPS
Type: IMPLANTABLE DEVICE | Site: ARTERY FEMORAL | Status: FUNCTIONAL
Brand: LIGACLIP

## 2025-08-07 DEVICE — STRETCH VASCULAR GRAFT TW RR 8MMX80CM 70CM RINGS
Type: IMPLANTABLE DEVICE | Site: ARTERY FEMORAL | Status: FUNCTIONAL
Brand: GORE-TEX   VASCULAR GRAFT

## 2025-08-07 RX ORDER — ACETAMINOPHEN 325 MG/1
650 TABLET ORAL EVERY 6 HOURS SCHEDULED
Status: DISPENSED | OUTPATIENT
Start: 2025-08-07 | End: 2025-08-12

## 2025-08-07 RX ORDER — DROPERIDOL 2.5 MG/ML
0.62 INJECTION, SOLUTION INTRAMUSCULAR; INTRAVENOUS
Status: DISCONTINUED | OUTPATIENT
Start: 2025-08-07 | End: 2025-08-07 | Stop reason: HOSPADM

## 2025-08-07 RX ORDER — OXYCODONE HYDROCHLORIDE 5 MG/1
10 TABLET ORAL EVERY 4 HOURS PRN
Refills: 0 | Status: DISCONTINUED | OUTPATIENT
Start: 2025-08-07 | End: 2025-08-10

## 2025-08-07 RX ORDER — ATORVASTATIN CALCIUM 80 MG/1
80 TABLET, FILM COATED ORAL DAILY
Status: DISCONTINUED | OUTPATIENT
Start: 2025-08-07 | End: 2025-08-27 | Stop reason: HOSPADM

## 2025-08-07 RX ORDER — LABETALOL HYDROCHLORIDE 5 MG/ML
5 INJECTION, SOLUTION INTRAVENOUS
Status: DISCONTINUED | OUTPATIENT
Start: 2025-08-07 | End: 2025-08-07 | Stop reason: HOSPADM

## 2025-08-07 RX ORDER — ALBUTEROL SULFATE 0.83 MG/ML
2.5 SOLUTION RESPIRATORY (INHALATION) EVERY 6 HOURS PRN
Status: DISCONTINUED | OUTPATIENT
Start: 2025-08-07 | End: 2025-08-27 | Stop reason: HOSPADM

## 2025-08-07 RX ORDER — PROMETHAZINE HYDROCHLORIDE 25 MG/1
25 TABLET ORAL ONCE AS NEEDED
Status: DISCONTINUED | OUTPATIENT
Start: 2025-08-07 | End: 2025-08-07 | Stop reason: HOSPADM

## 2025-08-07 RX ORDER — EPHEDRINE SULFATE 50 MG/ML
INJECTION INTRAVENOUS AS NEEDED
Status: DISCONTINUED | OUTPATIENT
Start: 2025-08-07 | End: 2025-08-07 | Stop reason: SURG

## 2025-08-07 RX ORDER — SODIUM CHLORIDE 9 MG/ML
INJECTION, SOLUTION INTRAVENOUS CONTINUOUS PRN
Status: DISCONTINUED | OUTPATIENT
Start: 2025-08-07 | End: 2025-08-07 | Stop reason: SURG

## 2025-08-07 RX ORDER — NICOTINE 21 MG/24HR
1 PATCH, TRANSDERMAL 24 HOURS TRANSDERMAL EVERY 24 HOURS
Status: DISCONTINUED | OUTPATIENT
Start: 2025-08-07 | End: 2025-08-13

## 2025-08-07 RX ORDER — ROCURONIUM BROMIDE 10 MG/ML
INJECTION, SOLUTION INTRAVENOUS AS NEEDED
Status: DISCONTINUED | OUTPATIENT
Start: 2025-08-07 | End: 2025-08-07 | Stop reason: SURG

## 2025-08-07 RX ORDER — ONDANSETRON 2 MG/ML
4 INJECTION INTRAMUSCULAR; INTRAVENOUS ONCE AS NEEDED
Status: DISCONTINUED | OUTPATIENT
Start: 2025-08-07 | End: 2025-08-07 | Stop reason: HOSPADM

## 2025-08-07 RX ORDER — PROPOFOL 10 MG/ML
INJECTION, EMULSION INTRAVENOUS CONTINUOUS PRN
Status: DISCONTINUED | OUTPATIENT
Start: 2025-08-07 | End: 2025-08-07 | Stop reason: SURG

## 2025-08-07 RX ORDER — IBUPROFEN 600 MG/1
1 TABLET ORAL
Status: DISCONTINUED | OUTPATIENT
Start: 2025-08-07 | End: 2025-08-27 | Stop reason: HOSPADM

## 2025-08-07 RX ORDER — ACETAMINOPHEN 500 MG
1000 TABLET ORAL ONCE
Status: COMPLETED | OUTPATIENT
Start: 2025-08-07 | End: 2025-08-07

## 2025-08-07 RX ORDER — FENTANYL CITRATE 50 UG/ML
50 INJECTION, SOLUTION INTRAMUSCULAR; INTRAVENOUS ONCE AS NEEDED
Status: COMPLETED | OUTPATIENT
Start: 2025-08-07 | End: 2025-08-07

## 2025-08-07 RX ORDER — SODIUM CHLORIDE, SODIUM LACTATE, POTASSIUM CHLORIDE, CALCIUM CHLORIDE 600; 310; 30; 20 MG/100ML; MG/100ML; MG/100ML; MG/100ML
9 INJECTION, SOLUTION INTRAVENOUS CONTINUOUS
Status: DISCONTINUED | OUTPATIENT
Start: 2025-08-07 | End: 2025-08-08

## 2025-08-07 RX ORDER — NALOXONE HCL 0.4 MG/ML
0.2 VIAL (ML) INJECTION AS NEEDED
Status: DISCONTINUED | OUTPATIENT
Start: 2025-08-07 | End: 2025-08-07 | Stop reason: HOSPADM

## 2025-08-07 RX ORDER — PHENYLEPHRINE HYDROCHLORIDE 10 MG/ML
INJECTION INTRAVENOUS AS NEEDED
Status: DISCONTINUED | OUTPATIENT
Start: 2025-08-07 | End: 2025-08-07 | Stop reason: SURG

## 2025-08-07 RX ORDER — PROMETHAZINE HYDROCHLORIDE 25 MG/1
25 SUPPOSITORY RECTAL ONCE AS NEEDED
Status: DISCONTINUED | OUTPATIENT
Start: 2025-08-07 | End: 2025-08-07 | Stop reason: HOSPADM

## 2025-08-07 RX ORDER — HYDRALAZINE HYDROCHLORIDE 20 MG/ML
INJECTION INTRAMUSCULAR; INTRAVENOUS AS NEEDED
Status: DISCONTINUED | OUTPATIENT
Start: 2025-08-07 | End: 2025-08-07 | Stop reason: SURG

## 2025-08-07 RX ORDER — DEXTROSE MONOHYDRATE 25 G/50ML
25 INJECTION, SOLUTION INTRAVENOUS
Status: DISCONTINUED | OUTPATIENT
Start: 2025-08-07 | End: 2025-08-27 | Stop reason: HOSPADM

## 2025-08-07 RX ORDER — NITROGLYCERIN 0.4 MG/1
0.4 TABLET SUBLINGUAL
Status: DISCONTINUED | OUTPATIENT
Start: 2025-08-07 | End: 2025-08-27 | Stop reason: HOSPADM

## 2025-08-07 RX ORDER — NICOTINE POLACRILEX 4 MG
15 LOZENGE BUCCAL
Status: DISCONTINUED | OUTPATIENT
Start: 2025-08-07 | End: 2025-08-27 | Stop reason: HOSPADM

## 2025-08-07 RX ORDER — SODIUM CHLORIDE 9 MG/ML
40 INJECTION, SOLUTION INTRAVENOUS AS NEEDED
Status: DISCONTINUED | OUTPATIENT
Start: 2025-08-07 | End: 2025-08-07 | Stop reason: HOSPADM

## 2025-08-07 RX ORDER — SODIUM BICARBONATE 650 MG/1
1300 TABLET ORAL 3 TIMES DAILY
Status: DISCONTINUED | OUTPATIENT
Start: 2025-08-07 | End: 2025-08-10

## 2025-08-07 RX ORDER — PROTAMINE SULFATE 10 MG/ML
INJECTION, SOLUTION INTRAVENOUS AS NEEDED
Status: DISCONTINUED | OUTPATIENT
Start: 2025-08-07 | End: 2025-08-07 | Stop reason: SURG

## 2025-08-07 RX ORDER — OXYCODONE AND ACETAMINOPHEN 7.5; 325 MG/1; MG/1
1 TABLET ORAL EVERY 4 HOURS PRN
Status: DISCONTINUED | OUTPATIENT
Start: 2025-08-07 | End: 2025-08-07 | Stop reason: HOSPADM

## 2025-08-07 RX ORDER — PROPOFOL 10 MG/ML
INJECTION, EMULSION INTRAVENOUS AS NEEDED
Status: DISCONTINUED | OUTPATIENT
Start: 2025-08-07 | End: 2025-08-07 | Stop reason: SURG

## 2025-08-07 RX ORDER — OXYCODONE HYDROCHLORIDE 5 MG/1
5 TABLET ORAL EVERY 4 HOURS PRN
Refills: 0 | Status: DISCONTINUED | OUTPATIENT
Start: 2025-08-07 | End: 2025-08-10

## 2025-08-07 RX ORDER — FENTANYL CITRATE 50 UG/ML
50 INJECTION, SOLUTION INTRAMUSCULAR; INTRAVENOUS
Status: DISCONTINUED | OUTPATIENT
Start: 2025-08-07 | End: 2025-08-07 | Stop reason: HOSPADM

## 2025-08-07 RX ORDER — DIPHENHYDRAMINE HYDROCHLORIDE 50 MG/ML
12.5 INJECTION, SOLUTION INTRAMUSCULAR; INTRAVENOUS
Status: DISCONTINUED | OUTPATIENT
Start: 2025-08-07 | End: 2025-08-07 | Stop reason: HOSPADM

## 2025-08-07 RX ORDER — NITROGLYCERIN 0.4 MG/1
0.4 TABLET SUBLINGUAL
Status: DISCONTINUED | OUTPATIENT
Start: 2025-08-07 | End: 2025-08-07 | Stop reason: SDUPTHER

## 2025-08-07 RX ORDER — ASPIRIN 81 MG/1
81 TABLET, CHEWABLE ORAL DAILY
Status: DISCONTINUED | OUTPATIENT
Start: 2025-08-07 | End: 2025-08-14

## 2025-08-07 RX ORDER — IPRATROPIUM BROMIDE AND ALBUTEROL SULFATE 2.5; .5 MG/3ML; MG/3ML
3 SOLUTION RESPIRATORY (INHALATION) ONCE AS NEEDED
Status: DISCONTINUED | OUTPATIENT
Start: 2025-08-07 | End: 2025-08-07 | Stop reason: HOSPADM

## 2025-08-07 RX ORDER — INSULIN LISPRO 100 [IU]/ML
3-14 INJECTION, SOLUTION INTRAVENOUS; SUBCUTANEOUS
Status: DISCONTINUED | OUTPATIENT
Start: 2025-08-07 | End: 2025-08-27 | Stop reason: HOSPADM

## 2025-08-07 RX ORDER — CARVEDILOL 12.5 MG/1
12.5 TABLET ORAL 2 TIMES DAILY WITH MEALS
Status: DISCONTINUED | OUTPATIENT
Start: 2025-08-07 | End: 2025-08-27 | Stop reason: HOSPADM

## 2025-08-07 RX ORDER — HEPARIN SODIUM 5000 [USP'U]/ML
5000 INJECTION, SOLUTION INTRAVENOUS; SUBCUTANEOUS EVERY 8 HOURS SCHEDULED
Status: DISCONTINUED | OUTPATIENT
Start: 2025-08-07 | End: 2025-08-14

## 2025-08-07 RX ORDER — HEPARIN SODIUM 1000 [USP'U]/ML
INJECTION, SOLUTION INTRAVENOUS; SUBCUTANEOUS AS NEEDED
Status: DISCONTINUED | OUTPATIENT
Start: 2025-08-07 | End: 2025-08-07 | Stop reason: SURG

## 2025-08-07 RX ORDER — LIDOCAINE HYDROCHLORIDE 20 MG/ML
INJECTION, SOLUTION INFILTRATION; PERINEURAL AS NEEDED
Status: DISCONTINUED | OUTPATIENT
Start: 2025-08-07 | End: 2025-08-07 | Stop reason: SURG

## 2025-08-07 RX ORDER — HYDRALAZINE HYDROCHLORIDE 20 MG/ML
5 INJECTION INTRAMUSCULAR; INTRAVENOUS
Status: DISCONTINUED | OUTPATIENT
Start: 2025-08-07 | End: 2025-08-07 | Stop reason: HOSPADM

## 2025-08-07 RX ORDER — HYDROMORPHONE HYDROCHLORIDE 1 MG/ML
0.5 INJECTION, SOLUTION INTRAMUSCULAR; INTRAVENOUS; SUBCUTANEOUS
Status: DISCONTINUED | OUTPATIENT
Start: 2025-08-07 | End: 2025-08-10

## 2025-08-07 RX ORDER — SODIUM CHLORIDE 0.9 % (FLUSH) 0.9 %
10 SYRINGE (ML) INJECTION EVERY 12 HOURS SCHEDULED
Status: DISCONTINUED | OUTPATIENT
Start: 2025-08-07 | End: 2025-08-07 | Stop reason: HOSPADM

## 2025-08-07 RX ORDER — METOCLOPRAMIDE 5 MG/1
5 TABLET ORAL 4 TIMES DAILY
Status: DISCONTINUED | OUTPATIENT
Start: 2025-08-07 | End: 2025-08-27 | Stop reason: HOSPADM

## 2025-08-07 RX ORDER — SODIUM CHLORIDE 0.9 % (FLUSH) 0.9 %
3-10 SYRINGE (ML) INJECTION AS NEEDED
Status: DISCONTINUED | OUTPATIENT
Start: 2025-08-07 | End: 2025-08-07 | Stop reason: HOSPADM

## 2025-08-07 RX ORDER — HYDROCODONE BITARTRATE AND ACETAMINOPHEN 5; 325 MG/1; MG/1
1 TABLET ORAL ONCE AS NEEDED
Status: DISCONTINUED | OUTPATIENT
Start: 2025-08-07 | End: 2025-08-07 | Stop reason: HOSPADM

## 2025-08-07 RX ORDER — DEXAMETHASONE SODIUM PHOSPHATE 4 MG/ML
INJECTION, SOLUTION INTRA-ARTICULAR; INTRALESIONAL; INTRAMUSCULAR; INTRAVENOUS; SOFT TISSUE AS NEEDED
Status: DISCONTINUED | OUTPATIENT
Start: 2025-08-07 | End: 2025-08-07 | Stop reason: SURG

## 2025-08-07 RX ORDER — ATROPINE SULFATE 0.4 MG/ML
0.4 INJECTION, SOLUTION INTRAMUSCULAR; INTRAVENOUS; SUBCUTANEOUS ONCE AS NEEDED
Status: DISCONTINUED | OUTPATIENT
Start: 2025-08-07 | End: 2025-08-07 | Stop reason: HOSPADM

## 2025-08-07 RX ORDER — MIDAZOLAM HYDROCHLORIDE 1 MG/ML
1 INJECTION, SOLUTION INTRAMUSCULAR; INTRAVENOUS
Status: DISCONTINUED | OUTPATIENT
Start: 2025-08-07 | End: 2025-08-07 | Stop reason: HOSPADM

## 2025-08-07 RX ORDER — FAMOTIDINE 10 MG/ML
20 INJECTION, SOLUTION INTRAVENOUS ONCE
Status: COMPLETED | OUTPATIENT
Start: 2025-08-07 | End: 2025-08-07

## 2025-08-07 RX ORDER — SODIUM CHLORIDE 0.9 % (FLUSH) 0.9 %
10 SYRINGE (ML) INJECTION AS NEEDED
Status: DISCONTINUED | OUTPATIENT
Start: 2025-08-07 | End: 2025-08-07 | Stop reason: HOSPADM

## 2025-08-07 RX ORDER — CLOPIDOGREL BISULFATE 75 MG/1
75 TABLET ORAL DAILY
Status: DISCONTINUED | OUTPATIENT
Start: 2025-08-07 | End: 2025-08-14

## 2025-08-07 RX ORDER — ONDANSETRON 2 MG/ML
INJECTION INTRAMUSCULAR; INTRAVENOUS AS NEEDED
Status: DISCONTINUED | OUTPATIENT
Start: 2025-08-07 | End: 2025-08-07 | Stop reason: SURG

## 2025-08-07 RX ORDER — FENTANYL CITRATE 50 UG/ML
INJECTION, SOLUTION INTRAMUSCULAR; INTRAVENOUS AS NEEDED
Status: DISCONTINUED | OUTPATIENT
Start: 2025-08-07 | End: 2025-08-07 | Stop reason: SURG

## 2025-08-07 RX ORDER — EPHEDRINE SULFATE 50 MG/ML
5 INJECTION, SOLUTION INTRAVENOUS ONCE AS NEEDED
Status: DISCONTINUED | OUTPATIENT
Start: 2025-08-07 | End: 2025-08-07 | Stop reason: HOSPADM

## 2025-08-07 RX ORDER — HYDROMORPHONE HYDROCHLORIDE 1 MG/ML
0.5 INJECTION, SOLUTION INTRAMUSCULAR; INTRAVENOUS; SUBCUTANEOUS
Status: DISCONTINUED | OUTPATIENT
Start: 2025-08-07 | End: 2025-08-07 | Stop reason: HOSPADM

## 2025-08-07 RX ORDER — LIDOCAINE HYDROCHLORIDE 10 MG/ML
0.5 INJECTION, SOLUTION INFILTRATION; PERINEURAL ONCE AS NEEDED
Status: DISCONTINUED | OUTPATIENT
Start: 2025-08-07 | End: 2025-08-07 | Stop reason: HOSPADM

## 2025-08-07 RX ORDER — FLUMAZENIL 0.1 MG/ML
0.2 INJECTION INTRAVENOUS AS NEEDED
Status: DISCONTINUED | OUTPATIENT
Start: 2025-08-07 | End: 2025-08-07 | Stop reason: HOSPADM

## 2025-08-07 RX ORDER — SODIUM CHLORIDE 0.9 % (FLUSH) 0.9 %
3 SYRINGE (ML) INJECTION EVERY 12 HOURS SCHEDULED
Status: DISCONTINUED | OUTPATIENT
Start: 2025-08-07 | End: 2025-08-07 | Stop reason: HOSPADM

## 2025-08-07 RX ADMIN — ASPIRIN 81 MG CHEWABLE TABLET 81 MG: 81 TABLET CHEWABLE at 21:37

## 2025-08-07 RX ADMIN — HYDRALAZINE HYDROCHLORIDE 5 MG: 20 INJECTION INTRAMUSCULAR; INTRAVENOUS at 15:37

## 2025-08-07 RX ADMIN — LIDOCAINE HYDROCHLORIDE 100 MG: 20 INJECTION, SOLUTION INFILTRATION; PERINEURAL at 12:15

## 2025-08-07 RX ADMIN — CEFAZOLIN 2000 MG: 2 INJECTION, POWDER, FOR SOLUTION INTRAMUSCULAR; INTRAVENOUS at 22:01

## 2025-08-07 RX ADMIN — SODIUM BICARBONATE 1300 MG: 650 TABLET ORAL at 21:37

## 2025-08-07 RX ADMIN — HYDRALAZINE HYDROCHLORIDE 2.5 MG: 20 INJECTION INTRAMUSCULAR; INTRAVENOUS at 13:11

## 2025-08-07 RX ADMIN — INSULIN LISPRO 5 UNITS: 100 INJECTION, SOLUTION INTRAVENOUS; SUBCUTANEOUS at 21:36

## 2025-08-07 RX ADMIN — EPHEDRINE SULFATE 5 MG: 50 INJECTION INTRAVENOUS at 14:46

## 2025-08-07 RX ADMIN — FENTANYL CITRATE 50 MCG: 50 INJECTION, SOLUTION INTRAMUSCULAR; INTRAVENOUS at 10:12

## 2025-08-07 RX ADMIN — HEPARIN SODIUM 2000 UNITS: 1000 INJECTION, SOLUTION INTRAVENOUS; SUBCUTANEOUS at 13:57

## 2025-08-07 RX ADMIN — ATORVASTATIN CALCIUM 80 MG: 80 TABLET, FILM COATED ORAL at 21:37

## 2025-08-07 RX ADMIN — PROTAMINE SULFATE 20 MG: 10 INJECTION, SOLUTION INTRAVENOUS at 14:50

## 2025-08-07 RX ADMIN — FAMOTIDINE 20 MG: 10 INJECTION INTRAVENOUS at 10:12

## 2025-08-07 RX ADMIN — ROCURONIUM BROMIDE 70 MG: 10 INJECTION, SOLUTION INTRAVENOUS at 12:15

## 2025-08-07 RX ADMIN — SODIUM CHLORIDE: 0.9 INJECTION, SOLUTION INTRAVENOUS at 15:03

## 2025-08-07 RX ADMIN — FENTANYL CITRATE 50 MCG: 50 INJECTION, SOLUTION INTRAMUSCULAR; INTRAVENOUS at 12:15

## 2025-08-07 RX ADMIN — PROPOFOL 100 MCG/KG/MIN: 10 INJECTION, EMULSION INTRAVENOUS at 12:26

## 2025-08-07 RX ADMIN — SODIUM CHLORIDE, POTASSIUM CHLORIDE, SODIUM LACTATE AND CALCIUM CHLORIDE 9 ML/HR: 600; 310; 30; 20 INJECTION, SOLUTION INTRAVENOUS at 11:23

## 2025-08-07 RX ADMIN — ONDANSETRON 4 MG: 2 INJECTION INTRAMUSCULAR; INTRAVENOUS at 15:26

## 2025-08-07 RX ADMIN — DEXAMETHASONE SODIUM PHOSPHATE 8 MG: 4 INJECTION, SOLUTION INTRAMUSCULAR; INTRAVENOUS at 12:15

## 2025-08-07 RX ADMIN — HYDRALAZINE HYDROCHLORIDE 2.5 MG: 20 INJECTION INTRAMUSCULAR; INTRAVENOUS at 12:59

## 2025-08-07 RX ADMIN — HYDRALAZINE HYDROCHLORIDE 2.5 MG: 20 INJECTION INTRAMUSCULAR; INTRAVENOUS at 13:58

## 2025-08-07 RX ADMIN — PHENYLEPHRINE HYDROCHLORIDE 200 MCG: 10 INJECTION INTRAVENOUS at 12:15

## 2025-08-07 RX ADMIN — SODIUM CHLORIDE, POTASSIUM CHLORIDE, SODIUM LACTATE AND CALCIUM CHLORIDE: 600; 310; 30; 20 INJECTION, SOLUTION INTRAVENOUS at 14:36

## 2025-08-07 RX ADMIN — CEFAZOLIN 2000 MG: 2 INJECTION, POWDER, FOR SOLUTION INTRAMUSCULAR; INTRAVENOUS at 12:01

## 2025-08-07 RX ADMIN — SUGAMMADEX 200 MG: 100 INJECTION, SOLUTION INTRAVENOUS at 15:26

## 2025-08-07 RX ADMIN — HEPARIN SODIUM 5000 UNITS: 5000 INJECTION INTRAVENOUS; SUBCUTANEOUS at 21:36

## 2025-08-07 RX ADMIN — EPHEDRINE SULFATE 5 MG: 50 INJECTION INTRAVENOUS at 14:23

## 2025-08-07 RX ADMIN — PROTAMINE SULFATE 30 MG: 10 INJECTION, SOLUTION INTRAVENOUS at 14:24

## 2025-08-07 RX ADMIN — ACETAMINOPHEN 1000 MG: 500 TABLET, FILM COATED ORAL at 11:21

## 2025-08-07 RX ADMIN — CLOPIDOGREL BISULFATE 75 MG: 75 TABLET, FILM COATED ORAL at 21:37

## 2025-08-07 RX ADMIN — EPHEDRINE SULFATE 5 MG: 50 INJECTION INTRAVENOUS at 14:35

## 2025-08-07 RX ADMIN — EPHEDRINE SULFATE 5 MG: 50 INJECTION INTRAVENOUS at 14:59

## 2025-08-07 RX ADMIN — PROPOFOL 125 MCG/KG/MIN: 10 INJECTION, EMULSION INTRAVENOUS at 12:16

## 2025-08-07 RX ADMIN — HYDROMORPHONE HYDROCHLORIDE 1 MG: 1 INJECTION, SOLUTION INTRAMUSCULAR; INTRAVENOUS; SUBCUTANEOUS at 15:33

## 2025-08-07 RX ADMIN — MIDAZOLAM 1 MG: 1 INJECTION INTRAMUSCULAR; INTRAVENOUS at 10:12

## 2025-08-07 RX ADMIN — PROPOFOL 30 MG: 10 INJECTION, EMULSION INTRAVENOUS at 15:28

## 2025-08-07 RX ADMIN — PROPOFOL 200 MG: 10 INJECTION, EMULSION INTRAVENOUS at 12:15

## 2025-08-07 RX ADMIN — METOCLOPRAMIDE 5 MG: 5 TABLET ORAL at 21:37

## 2025-08-07 RX ADMIN — HEPARIN SODIUM 12000 UNITS: 1000 INJECTION, SOLUTION INTRAVENOUS; SUBCUTANEOUS at 13:11

## 2025-08-08 LAB
ACT BLD: 141 SECONDS (ref 82–152)
ACT BLD: 147 SECONDS (ref 82–152)
ACT BLD: 158 SECONDS (ref 82–152)
ACT BLD: 262 SECONDS (ref 82–152)
ACT BLD: 337 SECONDS (ref 82–152)
ANION GAP SERPL CALCULATED.3IONS-SCNC: 13 MMOL/L (ref 5–15)
ANION GAP SERPL CALCULATED.3IONS-SCNC: 9.8 MMOL/L (ref 5–15)
BACTERIA UR QL AUTO: ABNORMAL /HPF
BASE EXCESS BLDA CALC-SCNC: -1 MMOL/L (ref -5–5)
BASE EXCESS BLDA CALC-SCNC: -3 MMOL/L (ref -5–5)
BASOPHILS # BLD AUTO: 0.04 10*3/MM3 (ref 0–0.2)
BASOPHILS NFR BLD AUTO: 0.3 % (ref 0–1.5)
BILIRUB UR QL STRIP: NEGATIVE
BUN SERPL-MCNC: 17 MG/DL (ref 6–20)
BUN SERPL-MCNC: 17 MG/DL (ref 6–20)
BUN/CREAT SERPL: 8 (ref 7–25)
BUN/CREAT SERPL: 9.2 (ref 7–25)
CALCIUM SPEC-SCNC: 7.5 MG/DL (ref 8.6–10.5)
CALCIUM SPEC-SCNC: 7.5 MG/DL (ref 8.6–10.5)
CHLORIDE SERPL-SCNC: 107 MMOL/L (ref 98–107)
CHLORIDE SERPL-SCNC: 108 MMOL/L (ref 98–107)
CLARITY UR: ABNORMAL
CO2 BLDA-SCNC: 23 MMOL/L (ref 24–29)
CO2 BLDA-SCNC: 25 MMOL/L (ref 24–29)
CO2 SERPL-SCNC: 18 MMOL/L (ref 22–29)
CO2 SERPL-SCNC: 22.2 MMOL/L (ref 22–29)
COLOR UR: YELLOW
CREAT SERPL-MCNC: 1.85 MG/DL (ref 0.57–1)
CREAT SERPL-MCNC: 2.13 MG/DL (ref 0.57–1)
DEPRECATED RDW RBC AUTO: 47.2 FL (ref 37–54)
EGFRCR SERPLBLD CKD-EPI 2021: 28.3 ML/MIN/1.73
EGFRCR SERPLBLD CKD-EPI 2021: 33.5 ML/MIN/1.73
EOSINOPHIL # BLD AUTO: 0 10*3/MM3 (ref 0–0.4)
EOSINOPHIL NFR BLD AUTO: 0 % (ref 0.3–6.2)
EOSINOPHIL SPEC QL MICRO: 0 % EOS/100 CELLS (ref 0–0)
ERYTHROCYTE [DISTWIDTH] IN BLOOD BY AUTOMATED COUNT: 14.3 % (ref 12.3–15.4)
GLUCOSE BLDC GLUCOMTR-MCNC: 115 MG/DL (ref 65–99)
GLUCOSE BLDC GLUCOMTR-MCNC: 118 MG/DL (ref 65–99)
GLUCOSE BLDC GLUCOMTR-MCNC: 152 MG/DL (ref 65–99)
GLUCOSE BLDC GLUCOMTR-MCNC: 153 MG/DL (ref 70–130)
GLUCOSE BLDC GLUCOMTR-MCNC: 160 MG/DL (ref 65–99)
GLUCOSE BLDC GLUCOMTR-MCNC: 201 MG/DL (ref 70–130)
GLUCOSE SERPL-MCNC: 138 MG/DL (ref 65–99)
GLUCOSE SERPL-MCNC: 159 MG/DL (ref 65–99)
GLUCOSE UR STRIP-MCNC: ABNORMAL MG/DL
HCO3 BLDA-SCNC: 21.8 MMOL/L (ref 22–26)
HCO3 BLDA-SCNC: 23.6 MMOL/L (ref 22–26)
HCT VFR BLD AUTO: 27.7 % (ref 34–46.6)
HCT VFR BLDA CALC: 33 % (ref 38–51)
HCT VFR BLDA CALC: 35 % (ref 38–51)
HGB BLD-MCNC: 8.9 G/DL (ref 12–15.9)
HGB BLDA-MCNC: 11.2 G/DL (ref 12–17)
HGB BLDA-MCNC: 11.9 G/DL (ref 12–17)
HGB UR QL STRIP.AUTO: NEGATIVE
HYALINE CASTS UR QL AUTO: ABNORMAL /LPF
IMM GRANULOCYTES # BLD AUTO: 0.16 10*3/MM3 (ref 0–0.05)
IMM GRANULOCYTES NFR BLD AUTO: 1 % (ref 0–0.5)
KETONES UR QL STRIP: NEGATIVE
LEUKOCYTE ESTERASE UR QL STRIP.AUTO: NEGATIVE
LYMPHOCYTES # BLD AUTO: 1.56 10*3/MM3 (ref 0.7–3.1)
LYMPHOCYTES NFR BLD AUTO: 9.9 % (ref 19.6–45.3)
MCH RBC QN AUTO: 29.6 PG (ref 26.6–33)
MCHC RBC AUTO-ENTMCNC: 32.1 G/DL (ref 31.5–35.7)
MCV RBC AUTO: 92 FL (ref 79–97)
MONOCYTES # BLD AUTO: 1.09 10*3/MM3 (ref 0.1–0.9)
MONOCYTES NFR BLD AUTO: 6.9 % (ref 5–12)
NEUTROPHILS NFR BLD AUTO: 12.92 10*3/MM3 (ref 1.7–7)
NEUTROPHILS NFR BLD AUTO: 81.9 % (ref 42.7–76)
NITRITE UR QL STRIP: NEGATIVE
NRBC BLD AUTO-RTO: 0 /100 WBC (ref 0–0.2)
NT-PROBNP SERPL-MCNC: ABNORMAL PG/ML (ref 0–450)
PCO2 BLDA: 34.9 MM HG (ref 35–45)
PCO2 BLDA: 36.9 MM HG (ref 35–45)
PH BLDA: 7.42 PH UNITS (ref 7.35–7.6)
PH BLDA: 7.43 PH UNITS (ref 7.35–7.6)
PH UR STRIP.AUTO: 5.5 [PH] (ref 5–8)
PLATELET # BLD AUTO: 346 10*3/MM3 (ref 140–450)
PMV BLD AUTO: 9.9 FL (ref 6–12)
PO2 BLDA: 140 MMHG (ref 80–105)
PO2 BLDA: 293 MMHG (ref 80–105)
POTASSIUM BLDA-SCNC: 3.2 MMOL/L (ref 3.5–4.9)
POTASSIUM BLDA-SCNC: 3.4 MMOL/L (ref 3.5–4.9)
POTASSIUM SERPL-SCNC: 3.8 MMOL/L (ref 3.5–5.2)
POTASSIUM SERPL-SCNC: 4.6 MMOL/L (ref 3.5–5.2)
PROT UR QL STRIP: ABNORMAL
RBC # BLD AUTO: 3.01 10*6/MM3 (ref 3.77–5.28)
RBC # UR STRIP: ABNORMAL /HPF
REF LAB TEST METHOD: ABNORMAL
SAO2 % BLDA: 100 % (ref 95–98)
SAO2 % BLDA: 99 % (ref 95–98)
SODIUM SERPL-SCNC: 139 MMOL/L (ref 136–145)
SODIUM SERPL-SCNC: 139 MMOL/L (ref 136–145)
SODIUM UR-SCNC: 80 MMOL/L
SP GR UR STRIP: 1.02 (ref 1–1.03)
SQUAMOUS #/AREA URNS HPF: ABNORMAL /HPF
UROBILINOGEN UR QL STRIP: ABNORMAL
WBC # UR STRIP: ABNORMAL /HPF
WBC NRBC COR # BLD AUTO: 15.77 10*3/MM3 (ref 3.4–10.8)

## 2025-08-08 PROCEDURE — 25010000002 CHLOROTHIAZIDE PER 500 MG: Performed by: INTERNAL MEDICINE

## 2025-08-08 PROCEDURE — 85025 COMPLETE CBC W/AUTO DIFF WBC: CPT | Performed by: STUDENT IN AN ORGANIZED HEALTH CARE EDUCATION/TRAINING PROGRAM

## 2025-08-08 PROCEDURE — 83540 ASSAY OF IRON: CPT | Performed by: INTERNAL MEDICINE

## 2025-08-08 PROCEDURE — 84300 ASSAY OF URINE SODIUM: CPT | Performed by: INTERNAL MEDICINE

## 2025-08-08 PROCEDURE — 97530 THERAPEUTIC ACTIVITIES: CPT

## 2025-08-08 PROCEDURE — 80048 BASIC METABOLIC PNL TOTAL CA: CPT | Performed by: STUDENT IN AN ORGANIZED HEALTH CARE EDUCATION/TRAINING PROGRAM

## 2025-08-08 PROCEDURE — 97166 OT EVAL MOD COMPLEX 45 MIN: CPT

## 2025-08-08 PROCEDURE — 82948 REAGENT STRIP/BLOOD GLUCOSE: CPT

## 2025-08-08 PROCEDURE — 81001 URINALYSIS AUTO W/SCOPE: CPT | Performed by: INTERNAL MEDICINE

## 2025-08-08 PROCEDURE — 25010000002 CEFAZOLIN PER 500 MG: Performed by: STUDENT IN AN ORGANIZED HEALTH CARE EDUCATION/TRAINING PROGRAM

## 2025-08-08 PROCEDURE — 97162 PT EVAL MOD COMPLEX 30 MIN: CPT

## 2025-08-08 PROCEDURE — 87205 SMEAR GRAM STAIN: CPT | Performed by: INTERNAL MEDICINE

## 2025-08-08 PROCEDURE — 83880 ASSAY OF NATRIURETIC PEPTIDE: CPT | Performed by: STUDENT IN AN ORGANIZED HEALTH CARE EDUCATION/TRAINING PROGRAM

## 2025-08-08 PROCEDURE — 82728 ASSAY OF FERRITIN: CPT | Performed by: INTERNAL MEDICINE

## 2025-08-08 PROCEDURE — 25810000003 SODIUM CHLORIDE 0.9 % SOLUTION: Performed by: SURGERY

## 2025-08-08 PROCEDURE — 25010000002 FUROSEMIDE PER 20 MG: Performed by: STUDENT IN AN ORGANIZED HEALTH CARE EDUCATION/TRAINING PROGRAM

## 2025-08-08 PROCEDURE — 25010000002 HEPARIN (PORCINE) PER 1000 UNITS: Performed by: STUDENT IN AN ORGANIZED HEALTH CARE EDUCATION/TRAINING PROGRAM

## 2025-08-08 PROCEDURE — 84466 ASSAY OF TRANSFERRIN: CPT | Performed by: INTERNAL MEDICINE

## 2025-08-08 PROCEDURE — 99024 POSTOP FOLLOW-UP VISIT: CPT | Performed by: STUDENT IN AN ORGANIZED HEALTH CARE EDUCATION/TRAINING PROGRAM

## 2025-08-08 PROCEDURE — 63710000001 INSULIN LISPRO (HUMAN) PER 5 UNITS: Performed by: STUDENT IN AN ORGANIZED HEALTH CARE EDUCATION/TRAINING PROGRAM

## 2025-08-08 PROCEDURE — 82948 REAGENT STRIP/BLOOD GLUCOSE: CPT | Performed by: STUDENT IN AN ORGANIZED HEALTH CARE EDUCATION/TRAINING PROGRAM

## 2025-08-08 RX ORDER — SODIUM CHLORIDE 9 MG/ML
50 INJECTION, SOLUTION INTRAVENOUS CONTINUOUS
Status: DISCONTINUED | OUTPATIENT
Start: 2025-08-08 | End: 2025-08-09

## 2025-08-08 RX ORDER — FUROSEMIDE 40 MG/1
40 TABLET ORAL DAILY
Status: DISCONTINUED | OUTPATIENT
Start: 2025-08-09 | End: 2025-08-27 | Stop reason: HOSPADM

## 2025-08-08 RX ORDER — INDOMETHACIN 25 MG/1
50 CAPSULE ORAL ONCE
Status: COMPLETED | OUTPATIENT
Start: 2025-08-08 | End: 2025-08-08

## 2025-08-08 RX ORDER — CHLOROTHIAZIDE SODIUM 500 MG/1
250 INJECTION INTRAVENOUS ONCE
Status: COMPLETED | OUTPATIENT
Start: 2025-08-08 | End: 2025-08-08

## 2025-08-08 RX ORDER — FUROSEMIDE 10 MG/ML
80 INJECTION INTRAMUSCULAR; INTRAVENOUS ONCE
Status: COMPLETED | OUTPATIENT
Start: 2025-08-08 | End: 2025-08-08

## 2025-08-08 RX ADMIN — SODIUM BICARBONATE 1300 MG: 650 TABLET ORAL at 08:19

## 2025-08-08 RX ADMIN — OXYCODONE 10 MG: 5 TABLET ORAL at 12:22

## 2025-08-08 RX ADMIN — CARVEDILOL 12.5 MG: 12.5 TABLET, FILM COATED ORAL at 08:19

## 2025-08-08 RX ADMIN — ACETAMINOPHEN 650 MG: 325 TABLET ORAL at 06:38

## 2025-08-08 RX ADMIN — CARVEDILOL 12.5 MG: 12.5 TABLET, FILM COATED ORAL at 17:28

## 2025-08-08 RX ADMIN — FUROSEMIDE 80 MG: 10 INJECTION, SOLUTION INTRAMUSCULAR; INTRAVENOUS at 14:02

## 2025-08-08 RX ADMIN — SODIUM CHLORIDE 125 ML/HR: 9 INJECTION, SOLUTION INTRAVENOUS at 04:37

## 2025-08-08 RX ADMIN — METOCLOPRAMIDE 5 MG: 5 TABLET ORAL at 12:22

## 2025-08-08 RX ADMIN — METOCLOPRAMIDE 5 MG: 5 TABLET ORAL at 22:56

## 2025-08-08 RX ADMIN — SODIUM CHLORIDE 500 ML: 9 INJECTION, SOLUTION INTRAVENOUS at 03:53

## 2025-08-08 RX ADMIN — CEFAZOLIN 2000 MG: 2 INJECTION, POWDER, FOR SOLUTION INTRAMUSCULAR; INTRAVENOUS at 04:36

## 2025-08-08 RX ADMIN — METOCLOPRAMIDE 5 MG: 5 TABLET ORAL at 08:19

## 2025-08-08 RX ADMIN — ASPIRIN 81 MG CHEWABLE TABLET 81 MG: 81 TABLET CHEWABLE at 08:19

## 2025-08-08 RX ADMIN — SODIUM BICARBONATE 1300 MG: 650 TABLET ORAL at 22:56

## 2025-08-08 RX ADMIN — ACETAMINOPHEN 650 MG: 325 TABLET ORAL at 17:28

## 2025-08-08 RX ADMIN — CLOPIDOGREL BISULFATE 75 MG: 75 TABLET, FILM COATED ORAL at 08:19

## 2025-08-08 RX ADMIN — INSULIN LISPRO 3 UNITS: 100 INJECTION, SOLUTION INTRAVENOUS; SUBCUTANEOUS at 08:33

## 2025-08-08 RX ADMIN — ACETAMINOPHEN 650 MG: 325 TABLET ORAL at 12:22

## 2025-08-08 RX ADMIN — METOCLOPRAMIDE 5 MG: 5 TABLET ORAL at 17:28

## 2025-08-08 RX ADMIN — HEPARIN SODIUM 5000 UNITS: 5000 INJECTION INTRAVENOUS; SUBCUTANEOUS at 13:01

## 2025-08-08 RX ADMIN — CHLOROTHIAZIDE SODIUM 250 MG: 500 INJECTION, POWDER, LYOPHILIZED, FOR SOLUTION INTRAVENOUS at 20:02

## 2025-08-08 RX ADMIN — ACETAMINOPHEN 650 MG: 325 TABLET ORAL at 00:45

## 2025-08-08 RX ADMIN — SODIUM BICARBONATE 50 MEQ: 84 INJECTION INTRAVENOUS at 18:35

## 2025-08-08 RX ADMIN — HEPARIN SODIUM 5000 UNITS: 5000 INJECTION INTRAVENOUS; SUBCUTANEOUS at 22:57

## 2025-08-08 RX ADMIN — SODIUM BICARBONATE 1300 MG: 650 TABLET ORAL at 17:28

## 2025-08-08 RX ADMIN — HEPARIN SODIUM 5000 UNITS: 5000 INJECTION INTRAVENOUS; SUBCUTANEOUS at 06:38

## 2025-08-08 RX ADMIN — ATORVASTATIN CALCIUM 80 MG: 80 TABLET, FILM COATED ORAL at 08:19

## 2025-08-09 ENCOUNTER — APPOINTMENT (OUTPATIENT)
Dept: ULTRASOUND IMAGING | Facility: HOSPITAL | Age: 45
End: 2025-08-09
Payer: MEDICAID

## 2025-08-09 LAB
ANION GAP SERPL CALCULATED.3IONS-SCNC: 10.1 MMOL/L (ref 5–15)
BASOPHILS # BLD AUTO: 0.03 10*3/MM3 (ref 0–0.2)
BASOPHILS NFR BLD AUTO: 0.3 % (ref 0–1.5)
BUN SERPL-MCNC: 20 MG/DL (ref 6–20)
BUN/CREAT SERPL: 8.8 (ref 7–25)
CALCIUM SPEC-SCNC: 7.4 MG/DL (ref 8.6–10.5)
CHLORIDE SERPL-SCNC: 107 MMOL/L (ref 98–107)
CK SERPL-CCNC: 56 U/L (ref 20–180)
CO2 SERPL-SCNC: 21.9 MMOL/L (ref 22–29)
CREAT SERPL-MCNC: 2.26 MG/DL (ref 0.57–1)
DEPRECATED RDW RBC AUTO: 45.9 FL (ref 37–54)
EGFRCR SERPLBLD CKD-EPI 2021: 26.3 ML/MIN/1.73
EOSINOPHIL # BLD AUTO: 0.06 10*3/MM3 (ref 0–0.4)
EOSINOPHIL NFR BLD AUTO: 0.5 % (ref 0.3–6.2)
ERYTHROCYTE [DISTWIDTH] IN BLOOD BY AUTOMATED COUNT: 13.9 % (ref 12.3–15.4)
FERRITIN SERPL-MCNC: 226 NG/ML (ref 13–150)
GLUCOSE BLDC GLUCOMTR-MCNC: 139 MG/DL (ref 65–99)
GLUCOSE BLDC GLUCOMTR-MCNC: 147 MG/DL (ref 65–99)
GLUCOSE BLDC GLUCOMTR-MCNC: 151 MG/DL (ref 65–99)
GLUCOSE BLDC GLUCOMTR-MCNC: 156 MG/DL (ref 65–99)
GLUCOSE SERPL-MCNC: 157 MG/DL (ref 65–99)
HCT VFR BLD AUTO: 22.9 % (ref 34–46.6)
HGB BLD-MCNC: 7.5 G/DL (ref 12–15.9)
IMM GRANULOCYTES # BLD AUTO: 0.03 10*3/MM3 (ref 0–0.05)
IMM GRANULOCYTES NFR BLD AUTO: 0.3 % (ref 0–0.5)
IRON 24H UR-MRATE: 22 MCG/DL (ref 37–145)
IRON SATN MFR SERPL: 17 % (ref 20–50)
LYMPHOCYTES # BLD AUTO: 1.52 10*3/MM3 (ref 0.7–3.1)
LYMPHOCYTES NFR BLD AUTO: 12.8 % (ref 19.6–45.3)
MAGNESIUM SERPL-MCNC: 1.7 MG/DL (ref 1.6–2.6)
MCH RBC QN AUTO: 30 PG (ref 26.6–33)
MCHC RBC AUTO-ENTMCNC: 32.8 G/DL (ref 31.5–35.7)
MCV RBC AUTO: 91.6 FL (ref 79–97)
MONOCYTES # BLD AUTO: 0.86 10*3/MM3 (ref 0.1–0.9)
MONOCYTES NFR BLD AUTO: 7.2 % (ref 5–12)
NEUTROPHILS NFR BLD AUTO: 78.9 % (ref 42.7–76)
NEUTROPHILS NFR BLD AUTO: 9.42 10*3/MM3 (ref 1.7–7)
NRBC BLD AUTO-RTO: 0 /100 WBC (ref 0–0.2)
PHOSPHATE SERPL-MCNC: 5.6 MG/DL (ref 2.5–4.5)
PLATELET # BLD AUTO: 312 10*3/MM3 (ref 140–450)
PMV BLD AUTO: 9.6 FL (ref 6–12)
POTASSIUM SERPL-SCNC: 3.5 MMOL/L (ref 3.5–5.2)
RBC # BLD AUTO: 2.5 10*6/MM3 (ref 3.77–5.28)
SODIUM SERPL-SCNC: 139 MMOL/L (ref 136–145)
T4 FREE SERPL-MCNC: 1.01 NG/DL (ref 0.92–1.68)
TIBC SERPL-MCNC: 130 MCG/DL (ref 298–536)
TRANSFERRIN SERPL-MCNC: 87 MG/DL (ref 200–360)
TSH SERPL DL<=0.05 MIU/L-ACNC: 17.6 UIU/ML (ref 0.27–4.2)
URATE SERPL-MCNC: 5.7 MG/DL (ref 2.4–5.7)
WBC NRBC COR # BLD AUTO: 11.92 10*3/MM3 (ref 3.4–10.8)

## 2025-08-09 PROCEDURE — 85025 COMPLETE CBC W/AUTO DIFF WBC: CPT | Performed by: STUDENT IN AN ORGANIZED HEALTH CARE EDUCATION/TRAINING PROGRAM

## 2025-08-09 PROCEDURE — 25010000002 CALCIUM GLUCONATE-NACL 1-0.675 GM/50ML-% SOLUTION: Performed by: INTERNAL MEDICINE

## 2025-08-09 PROCEDURE — 84443 ASSAY THYROID STIM HORMONE: CPT | Performed by: INTERNAL MEDICINE

## 2025-08-09 PROCEDURE — 25010000002 HEPARIN (PORCINE) PER 1000 UNITS: Performed by: STUDENT IN AN ORGANIZED HEALTH CARE EDUCATION/TRAINING PROGRAM

## 2025-08-09 PROCEDURE — 99024 POSTOP FOLLOW-UP VISIT: CPT | Performed by: SURGERY

## 2025-08-09 PROCEDURE — 82948 REAGENT STRIP/BLOOD GLUCOSE: CPT

## 2025-08-09 PROCEDURE — 84100 ASSAY OF PHOSPHORUS: CPT | Performed by: INTERNAL MEDICINE

## 2025-08-09 PROCEDURE — 63710000001 INSULIN LISPRO (HUMAN) PER 5 UNITS: Performed by: STUDENT IN AN ORGANIZED HEALTH CARE EDUCATION/TRAINING PROGRAM

## 2025-08-09 PROCEDURE — 83735 ASSAY OF MAGNESIUM: CPT | Performed by: INTERNAL MEDICINE

## 2025-08-09 PROCEDURE — 80048 BASIC METABOLIC PNL TOTAL CA: CPT | Performed by: STUDENT IN AN ORGANIZED HEALTH CARE EDUCATION/TRAINING PROGRAM

## 2025-08-09 PROCEDURE — 84439 ASSAY OF FREE THYROXINE: CPT | Performed by: INTERNAL MEDICINE

## 2025-08-09 PROCEDURE — 76775 US EXAM ABDO BACK WALL LIM: CPT

## 2025-08-09 PROCEDURE — 25010000002 MAGNESIUM SULFATE 2 GM/50ML SOLUTION: Performed by: INTERNAL MEDICINE

## 2025-08-09 PROCEDURE — 25010000002 CHLOROTHIAZIDE PER 500 MG: Performed by: INTERNAL MEDICINE

## 2025-08-09 PROCEDURE — 82550 ASSAY OF CK (CPK): CPT | Performed by: INTERNAL MEDICINE

## 2025-08-09 PROCEDURE — 25010000002 EPOETIN ALFA-EPBX 10000 UNIT/ML SOLUTION: Performed by: INTERNAL MEDICINE

## 2025-08-09 PROCEDURE — 84550 ASSAY OF BLOOD/URIC ACID: CPT | Performed by: INTERNAL MEDICINE

## 2025-08-09 PROCEDURE — 25010000002 NA FERRIC GLUC CPLX PER 12.5 MG: Performed by: INTERNAL MEDICINE

## 2025-08-09 RX ORDER — CHLOROTHIAZIDE SODIUM 500 MG/1
250 INJECTION INTRAVENOUS ONCE
Status: COMPLETED | OUTPATIENT
Start: 2025-08-09 | End: 2025-08-09

## 2025-08-09 RX ORDER — MAGNESIUM SULFATE HEPTAHYDRATE 40 MG/ML
2 INJECTION, SOLUTION INTRAVENOUS ONCE
Status: COMPLETED | OUTPATIENT
Start: 2025-08-09 | End: 2025-08-09

## 2025-08-09 RX ORDER — HYDRALAZINE HYDROCHLORIDE 50 MG/1
50 TABLET, FILM COATED ORAL EVERY 8 HOURS SCHEDULED
Status: DISCONTINUED | OUTPATIENT
Start: 2025-08-09 | End: 2025-08-10

## 2025-08-09 RX ORDER — CALCIUM GLUCONATE 20 MG/ML
1000 INJECTION, SOLUTION INTRAVENOUS EVERY 12 HOURS
Status: COMPLETED | OUTPATIENT
Start: 2025-08-09 | End: 2025-08-09

## 2025-08-09 RX ADMIN — HEPARIN SODIUM 5000 UNITS: 5000 INJECTION INTRAVENOUS; SUBCUTANEOUS at 05:43

## 2025-08-09 RX ADMIN — ACETAMINOPHEN 650 MG: 325 TABLET ORAL at 18:12

## 2025-08-09 RX ADMIN — METOCLOPRAMIDE 5 MG: 5 TABLET ORAL at 12:14

## 2025-08-09 RX ADMIN — CALCIUM GLUCONATE 1000 MG: 20 INJECTION, SOLUTION INTRAVENOUS at 21:57

## 2025-08-09 RX ADMIN — METOCLOPRAMIDE 5 MG: 5 TABLET ORAL at 08:16

## 2025-08-09 RX ADMIN — CHLOROTHIAZIDE SODIUM 250 MG: 500 INJECTION, POWDER, LYOPHILIZED, FOR SOLUTION INTRAVENOUS at 10:56

## 2025-08-09 RX ADMIN — INSULIN LISPRO 3 UNITS: 100 INJECTION, SOLUTION INTRAVENOUS; SUBCUTANEOUS at 21:57

## 2025-08-09 RX ADMIN — CALCIUM GLUCONATE 1000 MG: 20 INJECTION, SOLUTION INTRAVENOUS at 08:16

## 2025-08-09 RX ADMIN — SODIUM BICARBONATE 1300 MG: 650 TABLET ORAL at 21:57

## 2025-08-09 RX ADMIN — MAGNESIUM SULFATE HEPTAHYDRATE 2 G: 40 INJECTION, SOLUTION INTRAVENOUS at 09:59

## 2025-08-09 RX ADMIN — FUROSEMIDE 40 MG: 40 TABLET ORAL at 08:16

## 2025-08-09 RX ADMIN — CLOPIDOGREL BISULFATE 75 MG: 75 TABLET, FILM COATED ORAL at 08:16

## 2025-08-09 RX ADMIN — HYDRALAZINE HYDROCHLORIDE 50 MG: 50 TABLET ORAL at 21:57

## 2025-08-09 RX ADMIN — METOCLOPRAMIDE 5 MG: 5 TABLET ORAL at 21:57

## 2025-08-09 RX ADMIN — ATORVASTATIN CALCIUM 80 MG: 80 TABLET, FILM COATED ORAL at 08:16

## 2025-08-09 RX ADMIN — ACETAMINOPHEN 650 MG: 325 TABLET ORAL at 05:43

## 2025-08-09 RX ADMIN — SODIUM BICARBONATE 1300 MG: 650 TABLET ORAL at 08:16

## 2025-08-09 RX ADMIN — SODIUM BICARBONATE 1300 MG: 650 TABLET ORAL at 18:12

## 2025-08-09 RX ADMIN — ACETAMINOPHEN 650 MG: 325 TABLET ORAL at 00:22

## 2025-08-09 RX ADMIN — HEPARIN SODIUM 5000 UNITS: 5000 INJECTION INTRAVENOUS; SUBCUTANEOUS at 21:58

## 2025-08-09 RX ADMIN — HYDRALAZINE HYDROCHLORIDE 50 MG: 50 TABLET ORAL at 14:52

## 2025-08-09 RX ADMIN — CARVEDILOL 12.5 MG: 12.5 TABLET, FILM COATED ORAL at 08:16

## 2025-08-09 RX ADMIN — EPOETIN ALFA-EPBX 20000 UNITS: 10000 INJECTION, SOLUTION INTRAVENOUS; SUBCUTANEOUS at 10:54

## 2025-08-09 RX ADMIN — METOCLOPRAMIDE 5 MG: 5 TABLET ORAL at 18:12

## 2025-08-09 RX ADMIN — HEPARIN SODIUM 5000 UNITS: 5000 INJECTION INTRAVENOUS; SUBCUTANEOUS at 14:52

## 2025-08-09 RX ADMIN — SODIUM CHLORIDE 125 MG: 9 INJECTION, SOLUTION INTRAVENOUS at 12:14

## 2025-08-09 RX ADMIN — ACETAMINOPHEN 650 MG: 325 TABLET ORAL at 12:14

## 2025-08-09 RX ADMIN — ASPIRIN 81 MG CHEWABLE TABLET 81 MG: 81 TABLET CHEWABLE at 08:16

## 2025-08-09 RX ADMIN — CARVEDILOL 12.5 MG: 12.5 TABLET, FILM COATED ORAL at 18:12

## 2025-08-10 PROBLEM — I70.261 ATHEROSCLEROSIS OF NATIVE ARTERY OF RIGHT LEG WITH GANGRENE: Status: ACTIVE | Noted: 2025-08-01

## 2025-08-10 PROBLEM — I16.1 HYPERTENSIVE EMERGENCY: Status: RESOLVED | Noted: 2025-05-06 | Resolved: 2025-08-10

## 2025-08-10 PROBLEM — Z23 NEED FOR INFLUENZA VACCINATION: Status: RESOLVED | Noted: 2017-11-01 | Resolved: 2025-08-10

## 2025-08-10 PROBLEM — N17.9 ACUTE KIDNEY INJURY: Status: ACTIVE | Noted: 2025-08-10

## 2025-08-10 PROBLEM — Z86.16 PERSONAL HISTORY OF COVID-19: Status: RESOLVED | Noted: 2022-05-31 | Resolved: 2025-08-10

## 2025-08-10 LAB
ANION GAP SERPL CALCULATED.3IONS-SCNC: 11.4 MMOL/L (ref 5–15)
BASOPHILS # BLD AUTO: 0.04 10*3/MM3 (ref 0–0.2)
BASOPHILS NFR BLD AUTO: 0.4 % (ref 0–1.5)
BUN SERPL-MCNC: 21 MG/DL (ref 6–20)
BUN/CREAT SERPL: 7.7 (ref 7–25)
CALCIUM SPEC-SCNC: 7.8 MG/DL (ref 8.6–10.5)
CHLORIDE SERPL-SCNC: 105 MMOL/L (ref 98–107)
CO2 SERPL-SCNC: 24.6 MMOL/L (ref 22–29)
CREAT SERPL-MCNC: 2.72 MG/DL (ref 0.57–1)
DEPRECATED RDW RBC AUTO: 47.3 FL (ref 37–54)
EGFRCR SERPLBLD CKD-EPI 2021: 21.1 ML/MIN/1.73
EOSINOPHIL # BLD AUTO: 0.12 10*3/MM3 (ref 0–0.4)
EOSINOPHIL NFR BLD AUTO: 1.2 % (ref 0.3–6.2)
ERYTHROCYTE [DISTWIDTH] IN BLOOD BY AUTOMATED COUNT: 14.2 % (ref 12.3–15.4)
GLUCOSE BLDC GLUCOMTR-MCNC: 102 MG/DL (ref 65–99)
GLUCOSE BLDC GLUCOMTR-MCNC: 108 MG/DL (ref 65–99)
GLUCOSE BLDC GLUCOMTR-MCNC: 141 MG/DL (ref 65–99)
GLUCOSE BLDC GLUCOMTR-MCNC: 97 MG/DL (ref 65–99)
GLUCOSE SERPL-MCNC: 104 MG/DL (ref 65–99)
HCT VFR BLD AUTO: 22.5 % (ref 34–46.6)
HGB BLD-MCNC: 7.3 G/DL (ref 12–15.9)
IMM GRANULOCYTES # BLD AUTO: 0.03 10*3/MM3 (ref 0–0.05)
IMM GRANULOCYTES NFR BLD AUTO: 0.3 % (ref 0–0.5)
LYMPHOCYTES # BLD AUTO: 1.3 10*3/MM3 (ref 0.7–3.1)
LYMPHOCYTES NFR BLD AUTO: 12.8 % (ref 19.6–45.3)
MAGNESIUM SERPL-MCNC: 2.2 MG/DL (ref 1.6–2.6)
MCH RBC QN AUTO: 30 PG (ref 26.6–33)
MCHC RBC AUTO-ENTMCNC: 32.4 G/DL (ref 31.5–35.7)
MCV RBC AUTO: 92.6 FL (ref 79–97)
MONOCYTES # BLD AUTO: 0.69 10*3/MM3 (ref 0.1–0.9)
MONOCYTES NFR BLD AUTO: 6.8 % (ref 5–12)
NEUTROPHILS NFR BLD AUTO: 7.95 10*3/MM3 (ref 1.7–7)
NEUTROPHILS NFR BLD AUTO: 78.5 % (ref 42.7–76)
NRBC BLD AUTO-RTO: 0 /100 WBC (ref 0–0.2)
PLATELET # BLD AUTO: 311 10*3/MM3 (ref 140–450)
PMV BLD AUTO: 9.7 FL (ref 6–12)
POTASSIUM SERPL-SCNC: 3.2 MMOL/L (ref 3.5–5.2)
RBC # BLD AUTO: 2.43 10*6/MM3 (ref 3.77–5.28)
SODIUM SERPL-SCNC: 141 MMOL/L (ref 136–145)
WBC NRBC COR # BLD AUTO: 10.13 10*3/MM3 (ref 3.4–10.8)

## 2025-08-10 PROCEDURE — 85025 COMPLETE CBC W/AUTO DIFF WBC: CPT | Performed by: STUDENT IN AN ORGANIZED HEALTH CARE EDUCATION/TRAINING PROGRAM

## 2025-08-10 PROCEDURE — 99024 POSTOP FOLLOW-UP VISIT: CPT | Performed by: SURGERY

## 2025-08-10 PROCEDURE — P9016 RBC LEUKOCYTES REDUCED: HCPCS

## 2025-08-10 PROCEDURE — 25010000002 HEPARIN (PORCINE) PER 1000 UNITS: Performed by: STUDENT IN AN ORGANIZED HEALTH CARE EDUCATION/TRAINING PROGRAM

## 2025-08-10 PROCEDURE — 82948 REAGENT STRIP/BLOOD GLUCOSE: CPT | Performed by: STUDENT IN AN ORGANIZED HEALTH CARE EDUCATION/TRAINING PROGRAM

## 2025-08-10 PROCEDURE — 86900 BLOOD TYPING SEROLOGIC ABO: CPT

## 2025-08-10 PROCEDURE — 25010000002 NA FERRIC GLUC CPLX PER 12.5 MG: Performed by: INTERNAL MEDICINE

## 2025-08-10 PROCEDURE — 86923 COMPATIBILITY TEST ELECTRIC: CPT

## 2025-08-10 PROCEDURE — 80048 BASIC METABOLIC PNL TOTAL CA: CPT | Performed by: STUDENT IN AN ORGANIZED HEALTH CARE EDUCATION/TRAINING PROGRAM

## 2025-08-10 PROCEDURE — 36430 TRANSFUSION BLD/BLD COMPNT: CPT

## 2025-08-10 PROCEDURE — 82948 REAGENT STRIP/BLOOD GLUCOSE: CPT

## 2025-08-10 PROCEDURE — 25010000002 CALCIUM GLUCONATE-NACL 1-0.675 GM/50ML-% SOLUTION: Performed by: INTERNAL MEDICINE

## 2025-08-10 PROCEDURE — 25010000002 FUROSEMIDE PER 20 MG: Performed by: INTERNAL MEDICINE

## 2025-08-10 PROCEDURE — 63710000001 DIPHENHYDRAMINE PER 50 MG: Performed by: INTERNAL MEDICINE

## 2025-08-10 PROCEDURE — 83735 ASSAY OF MAGNESIUM: CPT | Performed by: INTERNAL MEDICINE

## 2025-08-10 RX ORDER — ACETAMINOPHEN 650 MG/1
650 SUPPOSITORY RECTAL ONCE
Status: COMPLETED | OUTPATIENT
Start: 2025-08-10 | End: 2025-08-10

## 2025-08-10 RX ORDER — ACETAMINOPHEN 325 MG/1
650 TABLET ORAL ONCE
Status: COMPLETED | OUTPATIENT
Start: 2025-08-10 | End: 2025-08-10

## 2025-08-10 RX ORDER — ACETAMINOPHEN 160 MG/5ML
650 SOLUTION ORAL ONCE
Status: COMPLETED | OUTPATIENT
Start: 2025-08-10 | End: 2025-08-10

## 2025-08-10 RX ORDER — ISOSORBIDE MONONITRATE 30 MG/1
30 TABLET, EXTENDED RELEASE ORAL
Status: DISCONTINUED | OUTPATIENT
Start: 2025-08-10 | End: 2025-08-27 | Stop reason: HOSPADM

## 2025-08-10 RX ORDER — HYDROCODONE BITARTRATE AND ACETAMINOPHEN 5; 325 MG/1; MG/1
1 TABLET ORAL EVERY 4 HOURS PRN
Refills: 0 | Status: DISCONTINUED | OUTPATIENT
Start: 2025-08-10 | End: 2025-08-14

## 2025-08-10 RX ORDER — LISINOPRIL 2.5 MG/1
10 TABLET ORAL
Status: DISCONTINUED | OUTPATIENT
Start: 2025-08-10 | End: 2025-08-14

## 2025-08-10 RX ORDER — DIPHENHYDRAMINE HCL 25 MG
25 CAPSULE ORAL ONCE
Status: COMPLETED | OUTPATIENT
Start: 2025-08-10 | End: 2025-08-10

## 2025-08-10 RX ORDER — POTASSIUM CHLORIDE 1500 MG/1
20 TABLET, EXTENDED RELEASE ORAL 2 TIMES DAILY WITH MEALS
Status: DISCONTINUED | OUTPATIENT
Start: 2025-08-10 | End: 2025-08-13

## 2025-08-10 RX ORDER — CALCIUM GLUCONATE 20 MG/ML
1000 INJECTION, SOLUTION INTRAVENOUS EVERY 12 HOURS
Status: COMPLETED | OUTPATIENT
Start: 2025-08-10 | End: 2025-08-10

## 2025-08-10 RX ORDER — HYDRALAZINE HYDROCHLORIDE 50 MG/1
100 TABLET, FILM COATED ORAL EVERY 8 HOURS SCHEDULED
Status: DISCONTINUED | OUTPATIENT
Start: 2025-08-10 | End: 2025-08-25

## 2025-08-10 RX ORDER — PANTOPRAZOLE SODIUM 40 MG/1
40 TABLET, DELAYED RELEASE ORAL DAILY
Status: DISCONTINUED | OUTPATIENT
Start: 2025-08-10 | End: 2025-08-27 | Stop reason: HOSPADM

## 2025-08-10 RX ORDER — FUROSEMIDE 10 MG/ML
20 INJECTION INTRAMUSCULAR; INTRAVENOUS ONCE
Status: COMPLETED | OUTPATIENT
Start: 2025-08-10 | End: 2025-08-10

## 2025-08-10 RX ORDER — DIPHENOXYLATE HYDROCHLORIDE AND ATROPINE SULFATE 2.5; .025 MG/1; MG/1
2 TABLET ORAL
Status: DISCONTINUED | OUTPATIENT
Start: 2025-08-10 | End: 2025-08-27 | Stop reason: HOSPADM

## 2025-08-10 RX ORDER — POTASSIUM CHLORIDE 1500 MG/1
40 TABLET, EXTENDED RELEASE ORAL EVERY 4 HOURS
Status: COMPLETED | OUTPATIENT
Start: 2025-08-10 | End: 2025-08-10

## 2025-08-10 RX ORDER — DIPHENHYDRAMINE HYDROCHLORIDE 50 MG/ML
12.5 INJECTION, SOLUTION INTRAMUSCULAR; INTRAVENOUS ONCE
Status: COMPLETED | OUTPATIENT
Start: 2025-08-10 | End: 2025-08-10

## 2025-08-10 RX ORDER — ICOSAPENT ETHYL 1 G/1
2 CAPSULE ORAL 2 TIMES DAILY WITH MEALS
Status: DISCONTINUED | OUTPATIENT
Start: 2025-08-10 | End: 2025-08-10

## 2025-08-10 RX ORDER — SODIUM BICARBONATE 650 MG/1
650 TABLET ORAL 3 TIMES DAILY
Status: DISCONTINUED | OUTPATIENT
Start: 2025-08-10 | End: 2025-08-27 | Stop reason: HOSPADM

## 2025-08-10 RX ADMIN — METOCLOPRAMIDE 5 MG: 5 TABLET ORAL at 17:52

## 2025-08-10 RX ADMIN — CALCIUM GLUCONATE 1000 MG: 20 INJECTION, SOLUTION INTRAVENOUS at 23:43

## 2025-08-10 RX ADMIN — METOCLOPRAMIDE 5 MG: 5 TABLET ORAL at 09:48

## 2025-08-10 RX ADMIN — SODIUM BICARBONATE 650 MG TABLET 650 MG: at 20:57

## 2025-08-10 RX ADMIN — METOCLOPRAMIDE 5 MG: 5 TABLET ORAL at 20:57

## 2025-08-10 RX ADMIN — ACETAMINOPHEN 650 MG: 325 TABLET ORAL at 17:52

## 2025-08-10 RX ADMIN — FUROSEMIDE 40 MG: 40 TABLET ORAL at 09:48

## 2025-08-10 RX ADMIN — CLOPIDOGREL BISULFATE 75 MG: 75 TABLET, FILM COATED ORAL at 09:48

## 2025-08-10 RX ADMIN — SODIUM CHLORIDE 125 MG: 9 INJECTION, SOLUTION INTRAVENOUS at 09:48

## 2025-08-10 RX ADMIN — ACETAMINOPHEN 650 MG: 325 TABLET ORAL at 23:43

## 2025-08-10 RX ADMIN — PANTOPRAZOLE SODIUM 40 MG: 40 TABLET, DELAYED RELEASE ORAL at 17:52

## 2025-08-10 RX ADMIN — POTASSIUM CHLORIDE 40 MEQ: 1500 TABLET, EXTENDED RELEASE ORAL at 17:58

## 2025-08-10 RX ADMIN — FUROSEMIDE 20 MG: 10 INJECTION, SOLUTION INTRAVENOUS at 15:24

## 2025-08-10 RX ADMIN — DIPHENOXYLATE HYDROCHLORIDE AND ATROPINE SULFATE 2 TABLET: 2.5; .025 TABLET ORAL at 09:48

## 2025-08-10 RX ADMIN — ISOSORBIDE MONONITRATE 30 MG: 30 TABLET, EXTENDED RELEASE ORAL at 11:53

## 2025-08-10 RX ADMIN — METOCLOPRAMIDE 5 MG: 5 TABLET ORAL at 13:44

## 2025-08-10 RX ADMIN — POTASSIUM CHLORIDE 20 MEQ: 1500 TABLET, EXTENDED RELEASE ORAL at 17:58

## 2025-08-10 RX ADMIN — HEPARIN SODIUM 5000 UNITS: 5000 INJECTION INTRAVENOUS; SUBCUTANEOUS at 21:00

## 2025-08-10 RX ADMIN — ACETAMINOPHEN 650 MG: 325 TABLET ORAL at 11:53

## 2025-08-10 RX ADMIN — CARVEDILOL 12.5 MG: 12.5 TABLET, FILM COATED ORAL at 09:48

## 2025-08-10 RX ADMIN — CALCIUM GLUCONATE 1000 MG: 20 INJECTION, SOLUTION INTRAVENOUS at 13:44

## 2025-08-10 RX ADMIN — SODIUM BICARBONATE 1300 MG: 650 TABLET ORAL at 09:48

## 2025-08-10 RX ADMIN — ACETAMINOPHEN 650 MG: 325 TABLET ORAL at 07:47

## 2025-08-10 RX ADMIN — ASPIRIN 81 MG CHEWABLE TABLET 81 MG: 81 TABLET CHEWABLE at 09:48

## 2025-08-10 RX ADMIN — HEPARIN SODIUM 5000 UNITS: 5000 INJECTION INTRAVENOUS; SUBCUTANEOUS at 07:48

## 2025-08-10 RX ADMIN — HYDRALAZINE HYDROCHLORIDE 100 MG: 50 TABLET ORAL at 13:44

## 2025-08-10 RX ADMIN — ATORVASTATIN CALCIUM 80 MG: 80 TABLET, FILM COATED ORAL at 09:48

## 2025-08-10 RX ADMIN — POTASSIUM CHLORIDE 40 MEQ: 1500 TABLET, EXTENDED RELEASE ORAL at 13:44

## 2025-08-10 RX ADMIN — HYDRALAZINE HYDROCHLORIDE 50 MG: 50 TABLET ORAL at 07:47

## 2025-08-10 RX ADMIN — SODIUM BICARBONATE 650 MG TABLET 650 MG: at 15:24

## 2025-08-10 RX ADMIN — CARVEDILOL 12.5 MG: 12.5 TABLET, FILM COATED ORAL at 17:52

## 2025-08-10 RX ADMIN — DIPHENHYDRAMINE HYDROCHLORIDE 25 MG: 25 CAPSULE ORAL at 11:53

## 2025-08-10 RX ADMIN — HEPARIN SODIUM 5000 UNITS: 5000 INJECTION INTRAVENOUS; SUBCUTANEOUS at 13:44

## 2025-08-11 LAB
ANION GAP SERPL CALCULATED.3IONS-SCNC: 10.6 MMOL/L (ref 5–15)
BASOPHILS # BLD AUTO: 0.05 10*3/MM3 (ref 0–0.2)
BASOPHILS NFR BLD AUTO: 0.5 % (ref 0–1.5)
BH BB BLOOD EXPIRATION DATE: NORMAL
BH BB BLOOD TYPE BARCODE: 5100
BH BB DISPENSE STATUS: NORMAL
BH BB PRODUCT CODE: NORMAL
BH BB UNIT NUMBER: NORMAL
BUN SERPL-MCNC: 22 MG/DL (ref 6–20)
BUN/CREAT SERPL: 8.1 (ref 7–25)
CALCIUM SPEC-SCNC: 8 MG/DL (ref 8.6–10.5)
CHLORIDE SERPL-SCNC: 106 MMOL/L (ref 98–107)
CO2 SERPL-SCNC: 22.4 MMOL/L (ref 22–29)
CREAT SERPL-MCNC: 2.72 MG/DL (ref 0.57–1)
CROSSMATCH INTERPRETATION: NORMAL
DEPRECATED RDW RBC AUTO: 46.3 FL (ref 37–54)
EGFRCR SERPLBLD CKD-EPI 2021: 21.1 ML/MIN/1.73
EOSINOPHIL # BLD AUTO: 0.13 10*3/MM3 (ref 0–0.4)
EOSINOPHIL NFR BLD AUTO: 1.2 % (ref 0.3–6.2)
ERYTHROCYTE [DISTWIDTH] IN BLOOD BY AUTOMATED COUNT: 13.8 % (ref 12.3–15.4)
GLUCOSE BLDC GLUCOMTR-MCNC: 105 MG/DL (ref 65–99)
GLUCOSE BLDC GLUCOMTR-MCNC: 132 MG/DL (ref 65–99)
GLUCOSE BLDC GLUCOMTR-MCNC: 185 MG/DL (ref 65–99)
GLUCOSE BLDC GLUCOMTR-MCNC: 213 MG/DL (ref 65–99)
GLUCOSE SERPL-MCNC: 125 MG/DL (ref 65–99)
HCT VFR BLD AUTO: 26.3 % (ref 34–46.6)
HGB BLD-MCNC: 8.3 G/DL (ref 12–15.9)
IMM GRANULOCYTES # BLD AUTO: 0.06 10*3/MM3 (ref 0–0.05)
IMM GRANULOCYTES NFR BLD AUTO: 0.5 % (ref 0–0.5)
LYMPHOCYTES # BLD AUTO: 1.38 10*3/MM3 (ref 0.7–3.1)
LYMPHOCYTES NFR BLD AUTO: 12.6 % (ref 19.6–45.3)
MAGNESIUM SERPL-MCNC: 2.1 MG/DL (ref 1.6–2.6)
MCH RBC QN AUTO: 29.4 PG (ref 26.6–33)
MCHC RBC AUTO-ENTMCNC: 31.6 G/DL (ref 31.5–35.7)
MCV RBC AUTO: 93.3 FL (ref 79–97)
MONOCYTES # BLD AUTO: 0.75 10*3/MM3 (ref 0.1–0.9)
MONOCYTES NFR BLD AUTO: 6.9 % (ref 5–12)
NEUTROPHILS NFR BLD AUTO: 78.3 % (ref 42.7–76)
NEUTROPHILS NFR BLD AUTO: 8.54 10*3/MM3 (ref 1.7–7)
NRBC BLD AUTO-RTO: 0 /100 WBC (ref 0–0.2)
PHOSPHATE SERPL-MCNC: 4.4 MG/DL (ref 2.5–4.5)
PLATELET # BLD AUTO: 278 10*3/MM3 (ref 140–450)
PMV BLD AUTO: 9.7 FL (ref 6–12)
POTASSIUM SERPL-SCNC: 3.9 MMOL/L (ref 3.5–5.2)
RBC # BLD AUTO: 2.82 10*6/MM3 (ref 3.77–5.28)
SODIUM SERPL-SCNC: 139 MMOL/L (ref 136–145)
UNIT  ABO: NORMAL
UNIT  RH: NORMAL
WBC NRBC COR # BLD AUTO: 10.91 10*3/MM3 (ref 3.4–10.8)

## 2025-08-11 PROCEDURE — 82948 REAGENT STRIP/BLOOD GLUCOSE: CPT

## 2025-08-11 PROCEDURE — 25010000002 NA FERRIC GLUC CPLX PER 12.5 MG: Performed by: INTERNAL MEDICINE

## 2025-08-11 PROCEDURE — 97530 THERAPEUTIC ACTIVITIES: CPT

## 2025-08-11 PROCEDURE — 80048 BASIC METABOLIC PNL TOTAL CA: CPT | Performed by: STUDENT IN AN ORGANIZED HEALTH CARE EDUCATION/TRAINING PROGRAM

## 2025-08-11 PROCEDURE — 85025 COMPLETE CBC W/AUTO DIFF WBC: CPT | Performed by: STUDENT IN AN ORGANIZED HEALTH CARE EDUCATION/TRAINING PROGRAM

## 2025-08-11 PROCEDURE — 82948 REAGENT STRIP/BLOOD GLUCOSE: CPT | Performed by: STUDENT IN AN ORGANIZED HEALTH CARE EDUCATION/TRAINING PROGRAM

## 2025-08-11 PROCEDURE — 84100 ASSAY OF PHOSPHORUS: CPT | Performed by: INTERNAL MEDICINE

## 2025-08-11 PROCEDURE — 97535 SELF CARE MNGMENT TRAINING: CPT

## 2025-08-11 PROCEDURE — 63710000001 INSULIN LISPRO (HUMAN) PER 5 UNITS: Performed by: STUDENT IN AN ORGANIZED HEALTH CARE EDUCATION/TRAINING PROGRAM

## 2025-08-11 PROCEDURE — 97110 THERAPEUTIC EXERCISES: CPT

## 2025-08-11 PROCEDURE — 99024 POSTOP FOLLOW-UP VISIT: CPT | Performed by: SURGERY

## 2025-08-11 PROCEDURE — 25010000002 HEPARIN (PORCINE) PER 1000 UNITS: Performed by: STUDENT IN AN ORGANIZED HEALTH CARE EDUCATION/TRAINING PROGRAM

## 2025-08-11 PROCEDURE — 83735 ASSAY OF MAGNESIUM: CPT | Performed by: INTERNAL MEDICINE

## 2025-08-11 PROCEDURE — 90791 PSYCH DIAGNOSTIC EVALUATION: CPT

## 2025-08-11 RX ORDER — ESCITALOPRAM OXALATE 10 MG/1
20 TABLET ORAL NIGHTLY
Status: DISCONTINUED | OUTPATIENT
Start: 2025-08-11 | End: 2025-08-27 | Stop reason: HOSPADM

## 2025-08-11 RX ORDER — AMLODIPINE BESYLATE 5 MG/1
5 TABLET ORAL
Status: DISCONTINUED | OUTPATIENT
Start: 2025-08-11 | End: 2025-08-13

## 2025-08-11 RX ORDER — ESCITALOPRAM OXALATE 10 MG/1
10 TABLET ORAL NIGHTLY
Status: DISCONTINUED | OUTPATIENT
Start: 2025-08-11 | End: 2025-08-11

## 2025-08-11 RX ADMIN — CARVEDILOL 12.5 MG: 12.5 TABLET, FILM COATED ORAL at 09:16

## 2025-08-11 RX ADMIN — SODIUM BICARBONATE 650 MG TABLET 650 MG: at 21:29

## 2025-08-11 RX ADMIN — POTASSIUM CHLORIDE 20 MEQ: 1500 TABLET, EXTENDED RELEASE ORAL at 09:16

## 2025-08-11 RX ADMIN — ATORVASTATIN CALCIUM 80 MG: 80 TABLET, FILM COATED ORAL at 09:16

## 2025-08-11 RX ADMIN — METOCLOPRAMIDE 5 MG: 5 TABLET ORAL at 21:29

## 2025-08-11 RX ADMIN — ACETAMINOPHEN 650 MG: 325 TABLET ORAL at 05:42

## 2025-08-11 RX ADMIN — ISOSORBIDE MONONITRATE 30 MG: 30 TABLET, EXTENDED RELEASE ORAL at 09:16

## 2025-08-11 RX ADMIN — HYDRALAZINE HYDROCHLORIDE 100 MG: 50 TABLET ORAL at 05:43

## 2025-08-11 RX ADMIN — INSULIN LISPRO 3 UNITS: 100 INJECTION, SOLUTION INTRAVENOUS; SUBCUTANEOUS at 11:52

## 2025-08-11 RX ADMIN — FUROSEMIDE 40 MG: 40 TABLET ORAL at 09:15

## 2025-08-11 RX ADMIN — ASPIRIN 81 MG CHEWABLE TABLET 81 MG: 81 TABLET CHEWABLE at 09:16

## 2025-08-11 RX ADMIN — POTASSIUM CHLORIDE 20 MEQ: 1500 TABLET, EXTENDED RELEASE ORAL at 17:53

## 2025-08-11 RX ADMIN — CARVEDILOL 12.5 MG: 12.5 TABLET, FILM COATED ORAL at 17:53

## 2025-08-11 RX ADMIN — SODIUM CHLORIDE 125 MG: 9 INJECTION, SOLUTION INTRAVENOUS at 09:15

## 2025-08-11 RX ADMIN — HEPARIN SODIUM 5000 UNITS: 5000 INJECTION INTRAVENOUS; SUBCUTANEOUS at 05:42

## 2025-08-11 RX ADMIN — HYDRALAZINE HYDROCHLORIDE 100 MG: 50 TABLET ORAL at 00:38

## 2025-08-11 RX ADMIN — METOCLOPRAMIDE 5 MG: 5 TABLET ORAL at 11:52

## 2025-08-11 RX ADMIN — METOCLOPRAMIDE 5 MG: 5 TABLET ORAL at 09:16

## 2025-08-11 RX ADMIN — HYDRALAZINE HYDROCHLORIDE 100 MG: 50 TABLET ORAL at 13:37

## 2025-08-11 RX ADMIN — SODIUM BICARBONATE 650 MG TABLET 650 MG: at 09:16

## 2025-08-11 RX ADMIN — METOCLOPRAMIDE 5 MG: 5 TABLET ORAL at 17:53

## 2025-08-11 RX ADMIN — ESCITALOPRAM 20 MG: 10 TABLET, FILM COATED ORAL at 21:29

## 2025-08-11 RX ADMIN — INSULIN LISPRO 5 UNITS: 100 INJECTION, SOLUTION INTRAVENOUS; SUBCUTANEOUS at 21:28

## 2025-08-11 RX ADMIN — PANTOPRAZOLE SODIUM 40 MG: 40 TABLET, DELAYED RELEASE ORAL at 09:15

## 2025-08-11 RX ADMIN — HYDRALAZINE HYDROCHLORIDE 100 MG: 50 TABLET ORAL at 21:30

## 2025-08-11 RX ADMIN — HEPARIN SODIUM 5000 UNITS: 5000 INJECTION INTRAVENOUS; SUBCUTANEOUS at 13:36

## 2025-08-11 RX ADMIN — SODIUM BICARBONATE 650 MG TABLET 650 MG: at 15:52

## 2025-08-11 RX ADMIN — ACETAMINOPHEN 650 MG: 325 TABLET ORAL at 11:52

## 2025-08-11 RX ADMIN — HEPARIN SODIUM 5000 UNITS: 5000 INJECTION INTRAVENOUS; SUBCUTANEOUS at 21:30

## 2025-08-11 RX ADMIN — AMLODIPINE BESYLATE 5 MG: 5 TABLET ORAL at 15:53

## 2025-08-11 RX ADMIN — CLOPIDOGREL BISULFATE 75 MG: 75 TABLET, FILM COATED ORAL at 09:16

## 2025-08-12 LAB
ALBUMIN SERPL-MCNC: 1.7 G/DL (ref 3.5–5.2)
ANION GAP SERPL CALCULATED.3IONS-SCNC: 10 MMOL/L (ref 5–15)
BASOPHILS # BLD AUTO: 0.04 10*3/MM3 (ref 0–0.2)
BASOPHILS NFR BLD AUTO: 0.3 % (ref 0–1.5)
BUN SERPL-MCNC: 24 MG/DL (ref 6–20)
BUN/CREAT SERPL: 8.8 (ref 7–25)
CALCIUM SPEC-SCNC: 7.7 MG/DL (ref 8.6–10.5)
CHLORIDE SERPL-SCNC: 104 MMOL/L (ref 98–107)
CO2 SERPL-SCNC: 23 MMOL/L (ref 22–29)
CREAT SERPL-MCNC: 2.73 MG/DL (ref 0.57–1)
DEPRECATED RDW RBC AUTO: 46.1 FL (ref 37–54)
EGFRCR SERPLBLD CKD-EPI 2021: 21 ML/MIN/1.73
EOSINOPHIL # BLD AUTO: 0.16 10*3/MM3 (ref 0–0.4)
EOSINOPHIL NFR BLD AUTO: 1.3 % (ref 0.3–6.2)
ERYTHROCYTE [DISTWIDTH] IN BLOOD BY AUTOMATED COUNT: 13.9 % (ref 12.3–15.4)
GLUCOSE BLDC GLUCOMTR-MCNC: 121 MG/DL (ref 65–99)
GLUCOSE BLDC GLUCOMTR-MCNC: 127 MG/DL (ref 65–99)
GLUCOSE BLDC GLUCOMTR-MCNC: 195 MG/DL (ref 65–99)
GLUCOSE BLDC GLUCOMTR-MCNC: 282 MG/DL (ref 65–99)
GLUCOSE SERPL-MCNC: 114 MG/DL (ref 65–99)
HCT VFR BLD AUTO: 27.8 % (ref 34–46.6)
HGB BLD-MCNC: 8.9 G/DL (ref 12–15.9)
IMM GRANULOCYTES # BLD AUTO: 0.11 10*3/MM3 (ref 0–0.05)
IMM GRANULOCYTES NFR BLD AUTO: 0.9 % (ref 0–0.5)
LYMPHOCYTES # BLD AUTO: 1.41 10*3/MM3 (ref 0.7–3.1)
LYMPHOCYTES NFR BLD AUTO: 11.4 % (ref 19.6–45.3)
MCH RBC QN AUTO: 29.9 PG (ref 26.6–33)
MCHC RBC AUTO-ENTMCNC: 32 G/DL (ref 31.5–35.7)
MCV RBC AUTO: 93.3 FL (ref 79–97)
MONOCYTES # BLD AUTO: 0.75 10*3/MM3 (ref 0.1–0.9)
MONOCYTES NFR BLD AUTO: 6.1 % (ref 5–12)
NEUTROPHILS NFR BLD AUTO: 80 % (ref 42.7–76)
NEUTROPHILS NFR BLD AUTO: 9.91 10*3/MM3 (ref 1.7–7)
NRBC BLD AUTO-RTO: 0 /100 WBC (ref 0–0.2)
PHOSPHATE SERPL-MCNC: 3.3 MG/DL (ref 2.5–4.5)
PLATELET # BLD AUTO: 304 10*3/MM3 (ref 140–450)
PMV BLD AUTO: 9.7 FL (ref 6–12)
POTASSIUM SERPL-SCNC: 3.9 MMOL/L (ref 3.5–5.2)
RBC # BLD AUTO: 2.98 10*6/MM3 (ref 3.77–5.28)
SODIUM SERPL-SCNC: 137 MMOL/L (ref 136–145)
WBC NRBC COR # BLD AUTO: 12.38 10*3/MM3 (ref 3.4–10.8)

## 2025-08-12 PROCEDURE — 82948 REAGENT STRIP/BLOOD GLUCOSE: CPT

## 2025-08-12 PROCEDURE — 97530 THERAPEUTIC ACTIVITIES: CPT

## 2025-08-12 PROCEDURE — 63710000001 INSULIN LISPRO (HUMAN) PER 5 UNITS: Performed by: STUDENT IN AN ORGANIZED HEALTH CARE EDUCATION/TRAINING PROGRAM

## 2025-08-12 PROCEDURE — 85025 COMPLETE CBC W/AUTO DIFF WBC: CPT | Performed by: STUDENT IN AN ORGANIZED HEALTH CARE EDUCATION/TRAINING PROGRAM

## 2025-08-12 PROCEDURE — 99024 POSTOP FOLLOW-UP VISIT: CPT | Performed by: SURGERY

## 2025-08-12 PROCEDURE — 80069 RENAL FUNCTION PANEL: CPT | Performed by: INTERNAL MEDICINE

## 2025-08-12 PROCEDURE — 97110 THERAPEUTIC EXERCISES: CPT

## 2025-08-12 PROCEDURE — 25010000002 HEPARIN (PORCINE) PER 1000 UNITS: Performed by: STUDENT IN AN ORGANIZED HEALTH CARE EDUCATION/TRAINING PROGRAM

## 2025-08-12 RX ADMIN — CARVEDILOL 12.5 MG: 12.5 TABLET, FILM COATED ORAL at 17:41

## 2025-08-12 RX ADMIN — METOCLOPRAMIDE 5 MG: 5 TABLET ORAL at 08:54

## 2025-08-12 RX ADMIN — AMLODIPINE BESYLATE 5 MG: 5 TABLET ORAL at 08:55

## 2025-08-12 RX ADMIN — HYDRALAZINE HYDROCHLORIDE 100 MG: 50 TABLET ORAL at 14:23

## 2025-08-12 RX ADMIN — METOCLOPRAMIDE 5 MG: 5 TABLET ORAL at 12:28

## 2025-08-12 RX ADMIN — CLOPIDOGREL BISULFATE 75 MG: 75 TABLET, FILM COATED ORAL at 08:55

## 2025-08-12 RX ADMIN — SODIUM BICARBONATE 650 MG TABLET 650 MG: at 17:41

## 2025-08-12 RX ADMIN — SODIUM BICARBONATE 650 MG TABLET 650 MG: at 08:55

## 2025-08-12 RX ADMIN — METOCLOPRAMIDE 5 MG: 5 TABLET ORAL at 20:49

## 2025-08-12 RX ADMIN — INSULIN LISPRO 8 UNITS: 100 INJECTION, SOLUTION INTRAVENOUS; SUBCUTANEOUS at 16:30

## 2025-08-12 RX ADMIN — ACETAMINOPHEN 650 MG: 325 TABLET ORAL at 05:59

## 2025-08-12 RX ADMIN — ACETAMINOPHEN 650 MG: 325 TABLET ORAL at 00:09

## 2025-08-12 RX ADMIN — ASPIRIN 81 MG CHEWABLE TABLET 81 MG: 81 TABLET CHEWABLE at 08:55

## 2025-08-12 RX ADMIN — CARVEDILOL 12.5 MG: 12.5 TABLET, FILM COATED ORAL at 08:55

## 2025-08-12 RX ADMIN — ESCITALOPRAM 20 MG: 10 TABLET, FILM COATED ORAL at 20:49

## 2025-08-12 RX ADMIN — HEPARIN SODIUM 5000 UNITS: 5000 INJECTION INTRAVENOUS; SUBCUTANEOUS at 05:57

## 2025-08-12 RX ADMIN — INSULIN LISPRO 3 UNITS: 100 INJECTION, SOLUTION INTRAVENOUS; SUBCUTANEOUS at 20:49

## 2025-08-12 RX ADMIN — ATORVASTATIN CALCIUM 80 MG: 80 TABLET, FILM COATED ORAL at 08:55

## 2025-08-12 RX ADMIN — HEPARIN SODIUM 5000 UNITS: 5000 INJECTION INTRAVENOUS; SUBCUTANEOUS at 20:49

## 2025-08-12 RX ADMIN — METOCLOPRAMIDE 5 MG: 5 TABLET ORAL at 17:41

## 2025-08-12 RX ADMIN — POTASSIUM CHLORIDE 20 MEQ: 1500 TABLET, EXTENDED RELEASE ORAL at 08:55

## 2025-08-12 RX ADMIN — HEPARIN SODIUM 5000 UNITS: 5000 INJECTION INTRAVENOUS; SUBCUTANEOUS at 14:23

## 2025-08-12 RX ADMIN — PANTOPRAZOLE SODIUM 40 MG: 40 TABLET, DELAYED RELEASE ORAL at 08:55

## 2025-08-12 RX ADMIN — SODIUM BICARBONATE 650 MG TABLET 650 MG: at 20:49

## 2025-08-12 RX ADMIN — FUROSEMIDE 40 MG: 40 TABLET ORAL at 08:55

## 2025-08-12 RX ADMIN — ISOSORBIDE MONONITRATE 30 MG: 30 TABLET, EXTENDED RELEASE ORAL at 08:55

## 2025-08-12 RX ADMIN — POTASSIUM CHLORIDE 20 MEQ: 1500 TABLET, EXTENDED RELEASE ORAL at 17:41

## 2025-08-12 RX ADMIN — HYDRALAZINE HYDROCHLORIDE 100 MG: 50 TABLET ORAL at 05:57

## 2025-08-12 RX ADMIN — HYDRALAZINE HYDROCHLORIDE 100 MG: 50 TABLET ORAL at 20:48

## 2025-08-13 PROBLEM — I96 GANGRENOUS TOE: Status: ACTIVE | Noted: 2025-08-06

## 2025-08-13 LAB
ALBUMIN SERPL-MCNC: 1.9 G/DL (ref 3.5–5.2)
ANION GAP SERPL CALCULATED.3IONS-SCNC: 11 MMOL/L (ref 5–15)
BASOPHILS # BLD AUTO: 0.03 10*3/MM3 (ref 0–0.2)
BASOPHILS NFR BLD AUTO: 0.2 % (ref 0–1.5)
BUN SERPL-MCNC: 23 MG/DL (ref 6–20)
BUN/CREAT SERPL: 9.3 (ref 7–25)
CALCIUM SPEC-SCNC: 7.8 MG/DL (ref 8.6–10.5)
CHLORIDE SERPL-SCNC: 101 MMOL/L (ref 98–107)
CO2 SERPL-SCNC: 25 MMOL/L (ref 22–29)
CREAT SERPL-MCNC: 2.46 MG/DL (ref 0.57–1)
DEPRECATED RDW RBC AUTO: 47.4 FL (ref 37–54)
EGFRCR SERPLBLD CKD-EPI 2021: 23.8 ML/MIN/1.73
EOSINOPHIL # BLD AUTO: 0.18 10*3/MM3 (ref 0–0.4)
EOSINOPHIL NFR BLD AUTO: 1.4 % (ref 0.3–6.2)
ERYTHROCYTE [DISTWIDTH] IN BLOOD BY AUTOMATED COUNT: 14.5 % (ref 12.3–15.4)
GLUCOSE BLDC GLUCOMTR-MCNC: 107 MG/DL (ref 65–99)
GLUCOSE BLDC GLUCOMTR-MCNC: 143 MG/DL (ref 65–99)
GLUCOSE BLDC GLUCOMTR-MCNC: 147 MG/DL (ref 65–99)
GLUCOSE BLDC GLUCOMTR-MCNC: 164 MG/DL (ref 65–99)
GLUCOSE SERPL-MCNC: 103 MG/DL (ref 65–99)
HCT VFR BLD AUTO: 26.4 % (ref 34–46.6)
HGB BLD-MCNC: 8.6 G/DL (ref 12–15.9)
IMM GRANULOCYTES # BLD AUTO: 0.07 10*3/MM3 (ref 0–0.05)
IMM GRANULOCYTES NFR BLD AUTO: 0.5 % (ref 0–0.5)
LYMPHOCYTES # BLD AUTO: 1.46 10*3/MM3 (ref 0.7–3.1)
LYMPHOCYTES NFR BLD AUTO: 11 % (ref 19.6–45.3)
MCH RBC QN AUTO: 30.2 PG (ref 26.6–33)
MCHC RBC AUTO-ENTMCNC: 32.6 G/DL (ref 31.5–35.7)
MCV RBC AUTO: 92.6 FL (ref 79–97)
MONOCYTES # BLD AUTO: 1.06 10*3/MM3 (ref 0.1–0.9)
MONOCYTES NFR BLD AUTO: 8 % (ref 5–12)
NEUTROPHILS NFR BLD AUTO: 10.42 10*3/MM3 (ref 1.7–7)
NEUTROPHILS NFR BLD AUTO: 78.9 % (ref 42.7–76)
NRBC BLD AUTO-RTO: 0 /100 WBC (ref 0–0.2)
PHOSPHATE SERPL-MCNC: 3.2 MG/DL (ref 2.5–4.5)
PLATELET # BLD AUTO: 323 10*3/MM3 (ref 140–450)
PMV BLD AUTO: 9.8 FL (ref 6–12)
POTASSIUM SERPL-SCNC: 3.9 MMOL/L (ref 3.5–5.2)
RBC # BLD AUTO: 2.85 10*6/MM3 (ref 3.77–5.28)
SODIUM SERPL-SCNC: 137 MMOL/L (ref 136–145)
WBC NRBC COR # BLD AUTO: 13.22 10*3/MM3 (ref 3.4–10.8)

## 2025-08-13 PROCEDURE — 99024 POSTOP FOLLOW-UP VISIT: CPT | Performed by: SURGERY

## 2025-08-13 PROCEDURE — 25010000002 HEPARIN (PORCINE) PER 1000 UNITS: Performed by: STUDENT IN AN ORGANIZED HEALTH CARE EDUCATION/TRAINING PROGRAM

## 2025-08-13 PROCEDURE — 82948 REAGENT STRIP/BLOOD GLUCOSE: CPT

## 2025-08-13 PROCEDURE — 85025 COMPLETE CBC W/AUTO DIFF WBC: CPT | Performed by: STUDENT IN AN ORGANIZED HEALTH CARE EDUCATION/TRAINING PROGRAM

## 2025-08-13 PROCEDURE — 80069 RENAL FUNCTION PANEL: CPT | Performed by: INTERNAL MEDICINE

## 2025-08-13 PROCEDURE — 63710000001 INSULIN LISPRO (HUMAN) PER 5 UNITS: Performed by: STUDENT IN AN ORGANIZED HEALTH CARE EDUCATION/TRAINING PROGRAM

## 2025-08-13 PROCEDURE — 82948 REAGENT STRIP/BLOOD GLUCOSE: CPT | Performed by: STUDENT IN AN ORGANIZED HEALTH CARE EDUCATION/TRAINING PROGRAM

## 2025-08-13 PROCEDURE — 97530 THERAPEUTIC ACTIVITIES: CPT

## 2025-08-13 RX ORDER — AMLODIPINE BESYLATE 10 MG/1
10 TABLET ORAL
Status: DISCONTINUED | OUTPATIENT
Start: 2025-08-14 | End: 2025-08-20

## 2025-08-13 RX ORDER — POTASSIUM CHLORIDE 1500 MG/1
20 TABLET, EXTENDED RELEASE ORAL DAILY
Status: DISCONTINUED | OUTPATIENT
Start: 2025-08-14 | End: 2025-08-19

## 2025-08-13 RX ADMIN — ASPIRIN 81 MG CHEWABLE TABLET 81 MG: 81 TABLET CHEWABLE at 08:36

## 2025-08-13 RX ADMIN — ISOSORBIDE MONONITRATE 30 MG: 30 TABLET, EXTENDED RELEASE ORAL at 08:36

## 2025-08-13 RX ADMIN — METOCLOPRAMIDE 5 MG: 5 TABLET ORAL at 08:36

## 2025-08-13 RX ADMIN — HYDRALAZINE HYDROCHLORIDE 100 MG: 50 TABLET ORAL at 21:19

## 2025-08-13 RX ADMIN — ESCITALOPRAM 20 MG: 10 TABLET, FILM COATED ORAL at 21:19

## 2025-08-13 RX ADMIN — INSULIN LISPRO 3 UNITS: 100 INJECTION, SOLUTION INTRAVENOUS; SUBCUTANEOUS at 12:41

## 2025-08-13 RX ADMIN — HYDRALAZINE HYDROCHLORIDE 100 MG: 50 TABLET ORAL at 06:22

## 2025-08-13 RX ADMIN — METOCLOPRAMIDE 5 MG: 5 TABLET ORAL at 12:41

## 2025-08-13 RX ADMIN — AMLODIPINE BESYLATE 5 MG: 5 TABLET ORAL at 08:36

## 2025-08-13 RX ADMIN — CLOPIDOGREL BISULFATE 75 MG: 75 TABLET, FILM COATED ORAL at 08:35

## 2025-08-13 RX ADMIN — HYDROCODONE BITARTRATE AND ACETAMINOPHEN 1 TABLET: 5; 325 TABLET ORAL at 16:41

## 2025-08-13 RX ADMIN — PANTOPRAZOLE SODIUM 40 MG: 40 TABLET, DELAYED RELEASE ORAL at 08:36

## 2025-08-13 RX ADMIN — HYDRALAZINE HYDROCHLORIDE 100 MG: 50 TABLET ORAL at 14:30

## 2025-08-13 RX ADMIN — METOCLOPRAMIDE 5 MG: 5 TABLET ORAL at 18:01

## 2025-08-13 RX ADMIN — HEPARIN SODIUM 5000 UNITS: 5000 INJECTION INTRAVENOUS; SUBCUTANEOUS at 06:22

## 2025-08-13 RX ADMIN — FUROSEMIDE 40 MG: 40 TABLET ORAL at 08:35

## 2025-08-13 RX ADMIN — ATORVASTATIN CALCIUM 80 MG: 80 TABLET, FILM COATED ORAL at 08:36

## 2025-08-13 RX ADMIN — SODIUM BICARBONATE 650 MG TABLET 650 MG: at 08:36

## 2025-08-13 RX ADMIN — CARVEDILOL 12.5 MG: 12.5 TABLET, FILM COATED ORAL at 08:36

## 2025-08-13 RX ADMIN — POTASSIUM CHLORIDE 20 MEQ: 1500 TABLET, EXTENDED RELEASE ORAL at 08:36

## 2025-08-13 RX ADMIN — SODIUM BICARBONATE 650 MG TABLET 650 MG: at 16:23

## 2025-08-13 RX ADMIN — SODIUM BICARBONATE 650 MG TABLET 650 MG: at 21:19

## 2025-08-13 RX ADMIN — CARVEDILOL 12.5 MG: 12.5 TABLET, FILM COATED ORAL at 18:01

## 2025-08-13 RX ADMIN — METOCLOPRAMIDE 5 MG: 5 TABLET ORAL at 21:19

## 2025-08-14 ENCOUNTER — ANESTHESIA EVENT (OUTPATIENT)
Dept: PERIOP | Facility: HOSPITAL | Age: 45
End: 2025-08-14
Payer: MEDICAID

## 2025-08-14 ENCOUNTER — ANESTHESIA (OUTPATIENT)
Dept: PERIOP | Facility: HOSPITAL | Age: 45
End: 2025-08-14
Payer: MEDICAID

## 2025-08-14 ENCOUNTER — APPOINTMENT (OUTPATIENT)
Dept: GENERAL RADIOLOGY | Facility: HOSPITAL | Age: 45
End: 2025-08-14
Payer: MEDICAID

## 2025-08-14 LAB
ALBUMIN SERPL-MCNC: 1.6 G/DL (ref 3.5–5.2)
ANION GAP SERPL CALCULATED.3IONS-SCNC: 8.2 MMOL/L (ref 5–15)
BASOPHILS # BLD AUTO: 0.03 10*3/MM3 (ref 0–0.2)
BASOPHILS NFR BLD AUTO: 0.3 % (ref 0–1.5)
BUN SERPL-MCNC: 22 MG/DL (ref 6–20)
BUN/CREAT SERPL: 9.1 (ref 7–25)
CALCIUM SPEC-SCNC: 7.9 MG/DL (ref 8.6–10.5)
CHLORIDE SERPL-SCNC: 102 MMOL/L (ref 98–107)
CO2 SERPL-SCNC: 26.8 MMOL/L (ref 22–29)
CREAT SERPL-MCNC: 2.41 MG/DL (ref 0.57–1)
DEPRECATED RDW RBC AUTO: 48.8 FL (ref 37–54)
EGFRCR SERPLBLD CKD-EPI 2021: 24.4 ML/MIN/1.73
EOSINOPHIL # BLD AUTO: 0.19 10*3/MM3 (ref 0–0.4)
EOSINOPHIL NFR BLD AUTO: 1.8 % (ref 0.3–6.2)
ERYTHROCYTE [DISTWIDTH] IN BLOOD BY AUTOMATED COUNT: 14.2 % (ref 12.3–15.4)
GLUCOSE BLDC GLUCOMTR-MCNC: 123 MG/DL (ref 65–99)
GLUCOSE BLDC GLUCOMTR-MCNC: 123 MG/DL (ref 65–99)
GLUCOSE BLDC GLUCOMTR-MCNC: 138 MG/DL (ref 65–99)
GLUCOSE BLDC GLUCOMTR-MCNC: 148 MG/DL (ref 65–99)
GLUCOSE BLDC GLUCOMTR-MCNC: 159 MG/DL (ref 65–99)
GLUCOSE SERPL-MCNC: 124 MG/DL (ref 65–99)
HCT VFR BLD AUTO: 26.5 % (ref 34–46.6)
HGB BLD-MCNC: 8.4 G/DL (ref 12–15.9)
IMM GRANULOCYTES # BLD AUTO: 0.07 10*3/MM3 (ref 0–0.05)
IMM GRANULOCYTES NFR BLD AUTO: 0.7 % (ref 0–0.5)
LYMPHOCYTES # BLD AUTO: 1.31 10*3/MM3 (ref 0.7–3.1)
LYMPHOCYTES NFR BLD AUTO: 12.3 % (ref 19.6–45.3)
MCH RBC QN AUTO: 30.1 PG (ref 26.6–33)
MCHC RBC AUTO-ENTMCNC: 31.7 G/DL (ref 31.5–35.7)
MCV RBC AUTO: 95 FL (ref 79–97)
MONOCYTES # BLD AUTO: 0.79 10*3/MM3 (ref 0.1–0.9)
MONOCYTES NFR BLD AUTO: 7.4 % (ref 5–12)
NEUTROPHILS NFR BLD AUTO: 77.5 % (ref 42.7–76)
NEUTROPHILS NFR BLD AUTO: 8.25 10*3/MM3 (ref 1.7–7)
NRBC BLD AUTO-RTO: 0 /100 WBC (ref 0–0.2)
PHOSPHATE SERPL-MCNC: 3.4 MG/DL (ref 2.5–4.5)
PLATELET # BLD AUTO: 281 10*3/MM3 (ref 140–450)
PMV BLD AUTO: 9.9 FL (ref 6–12)
POTASSIUM SERPL-SCNC: 4.2 MMOL/L (ref 3.5–5.2)
RBC # BLD AUTO: 2.79 10*6/MM3 (ref 3.77–5.28)
SODIUM SERPL-SCNC: 137 MMOL/L (ref 136–145)
WBC NRBC COR # BLD AUTO: 10.64 10*3/MM3 (ref 3.4–10.8)

## 2025-08-14 PROCEDURE — 25810000003 LACTATED RINGERS PER 1000 ML

## 2025-08-14 PROCEDURE — 97530 THERAPEUTIC ACTIVITIES: CPT | Performed by: PHYSICAL THERAPIST

## 2025-08-14 PROCEDURE — 25010000002 BUPIVACAINE (PF) 0.5 % SOLUTION 10 ML VIAL: Performed by: PODIATRIST

## 2025-08-14 PROCEDURE — 63710000001 INSULIN LISPRO (HUMAN) PER 5 UNITS: Performed by: PODIATRIST

## 2025-08-14 PROCEDURE — 88305 TISSUE EXAM BY PATHOLOGIST: CPT | Performed by: PODIATRIST

## 2025-08-14 PROCEDURE — 25010000002 CEFAZOLIN PER 500 MG: Performed by: PODIATRIST

## 2025-08-14 PROCEDURE — 88311 DECALCIFY TISSUE: CPT | Performed by: PODIATRIST

## 2025-08-14 PROCEDURE — 82948 REAGENT STRIP/BLOOD GLUCOSE: CPT

## 2025-08-14 PROCEDURE — 80069 RENAL FUNCTION PANEL: CPT | Performed by: INTERNAL MEDICINE

## 2025-08-14 PROCEDURE — 99024 POSTOP FOLLOW-UP VISIT: CPT | Performed by: SURGERY

## 2025-08-14 PROCEDURE — 85025 COMPLETE CBC W/AUTO DIFF WBC: CPT | Performed by: STUDENT IN AN ORGANIZED HEALTH CARE EDUCATION/TRAINING PROGRAM

## 2025-08-14 PROCEDURE — 25010000002 LIDOCAINE 2% SOLUTION

## 2025-08-14 PROCEDURE — 25010000002 LIDOCAINE 1 % SOLUTION 20 ML VIAL: Performed by: PODIATRIST

## 2025-08-14 PROCEDURE — 25010000002 PROPOFOL 1000 MG/100ML EMULSION

## 2025-08-14 PROCEDURE — 73630 X-RAY EXAM OF FOOT: CPT

## 2025-08-14 PROCEDURE — 82948 REAGENT STRIP/BLOOD GLUCOSE: CPT | Performed by: STUDENT IN AN ORGANIZED HEALTH CARE EDUCATION/TRAINING PROGRAM

## 2025-08-14 RX ORDER — DROPERIDOL 2.5 MG/ML
0.62 INJECTION, SOLUTION INTRAMUSCULAR; INTRAVENOUS
Status: DISCONTINUED | OUTPATIENT
Start: 2025-08-14 | End: 2025-08-14 | Stop reason: HOSPADM

## 2025-08-14 RX ORDER — LIDOCAINE HYDROCHLORIDE 10 MG/ML
0.5 INJECTION, SOLUTION INFILTRATION; PERINEURAL ONCE AS NEEDED
Status: DISCONTINUED | OUTPATIENT
Start: 2025-08-14 | End: 2025-08-14 | Stop reason: HOSPADM

## 2025-08-14 RX ORDER — NALOXONE HCL 0.4 MG/ML
0.2 VIAL (ML) INJECTION AS NEEDED
Status: DISCONTINUED | OUTPATIENT
Start: 2025-08-14 | End: 2025-08-14 | Stop reason: HOSPADM

## 2025-08-14 RX ORDER — HYDRALAZINE HYDROCHLORIDE 20 MG/ML
5 INJECTION INTRAMUSCULAR; INTRAVENOUS
Status: DISCONTINUED | OUTPATIENT
Start: 2025-08-14 | End: 2025-08-14 | Stop reason: HOSPADM

## 2025-08-14 RX ORDER — HYDROMORPHONE HYDROCHLORIDE 1 MG/ML
0.5 INJECTION, SOLUTION INTRAMUSCULAR; INTRAVENOUS; SUBCUTANEOUS
Status: DISCONTINUED | OUTPATIENT
Start: 2025-08-14 | End: 2025-08-14 | Stop reason: HOSPADM

## 2025-08-14 RX ORDER — SODIUM CHLORIDE 0.9 % (FLUSH) 0.9 %
3-10 SYRINGE (ML) INJECTION AS NEEDED
Status: DISCONTINUED | OUTPATIENT
Start: 2025-08-14 | End: 2025-08-14 | Stop reason: HOSPADM

## 2025-08-14 RX ORDER — EPHEDRINE SULFATE 50 MG/ML
5 INJECTION, SOLUTION INTRAVENOUS ONCE AS NEEDED
Status: DISCONTINUED | OUTPATIENT
Start: 2025-08-14 | End: 2025-08-14 | Stop reason: HOSPADM

## 2025-08-14 RX ORDER — DIPHENHYDRAMINE HYDROCHLORIDE 50 MG/ML
12.5 INJECTION, SOLUTION INTRAMUSCULAR; INTRAVENOUS
Status: DISCONTINUED | OUTPATIENT
Start: 2025-08-14 | End: 2025-08-14 | Stop reason: HOSPADM

## 2025-08-14 RX ORDER — SODIUM CHLORIDE, SODIUM LACTATE, POTASSIUM CHLORIDE, CALCIUM CHLORIDE 600; 310; 30; 20 MG/100ML; MG/100ML; MG/100ML; MG/100ML
9 INJECTION, SOLUTION INTRAVENOUS CONTINUOUS
Status: DISCONTINUED | OUTPATIENT
Start: 2025-08-14 | End: 2025-08-14

## 2025-08-14 RX ORDER — ATROPINE SULFATE 0.4 MG/ML
0.4 INJECTION, SOLUTION INTRAMUSCULAR; INTRAVENOUS; SUBCUTANEOUS ONCE AS NEEDED
Status: DISCONTINUED | OUTPATIENT
Start: 2025-08-14 | End: 2025-08-14 | Stop reason: HOSPADM

## 2025-08-14 RX ORDER — PROMETHAZINE HYDROCHLORIDE 25 MG/1
25 SUPPOSITORY RECTAL ONCE AS NEEDED
Status: DISCONTINUED | OUTPATIENT
Start: 2025-08-14 | End: 2025-08-14 | Stop reason: HOSPADM

## 2025-08-14 RX ORDER — PROPOFOL 10 MG/ML
INJECTION, EMULSION INTRAVENOUS AS NEEDED
Status: DISCONTINUED | OUTPATIENT
Start: 2025-08-14 | End: 2025-08-14 | Stop reason: SURG

## 2025-08-14 RX ORDER — ONDANSETRON 2 MG/ML
4 INJECTION INTRAMUSCULAR; INTRAVENOUS ONCE AS NEEDED
Status: DISCONTINUED | OUTPATIENT
Start: 2025-08-14 | End: 2025-08-14 | Stop reason: HOSPADM

## 2025-08-14 RX ORDER — MIDAZOLAM HYDROCHLORIDE 1 MG/ML
1 INJECTION, SOLUTION INTRAMUSCULAR; INTRAVENOUS
Status: DISCONTINUED | OUTPATIENT
Start: 2025-08-14 | End: 2025-08-14 | Stop reason: HOSPADM

## 2025-08-14 RX ORDER — FLUMAZENIL 0.1 MG/ML
0.2 INJECTION INTRAVENOUS AS NEEDED
Status: DISCONTINUED | OUTPATIENT
Start: 2025-08-14 | End: 2025-08-14 | Stop reason: HOSPADM

## 2025-08-14 RX ORDER — SODIUM CHLORIDE, SODIUM LACTATE, POTASSIUM CHLORIDE, CALCIUM CHLORIDE 600; 310; 30; 20 MG/100ML; MG/100ML; MG/100ML; MG/100ML
INJECTION, SOLUTION INTRAVENOUS CONTINUOUS PRN
Status: DISCONTINUED | OUTPATIENT
Start: 2025-08-14 | End: 2025-08-14 | Stop reason: SURG

## 2025-08-14 RX ORDER — LIDOCAINE HYDROCHLORIDE 20 MG/ML
INJECTION, SOLUTION INFILTRATION; PERINEURAL AS NEEDED
Status: DISCONTINUED | OUTPATIENT
Start: 2025-08-14 | End: 2025-08-14 | Stop reason: SURG

## 2025-08-14 RX ORDER — MAGNESIUM HYDROXIDE 1200 MG/15ML
LIQUID ORAL AS NEEDED
Status: DISCONTINUED | OUTPATIENT
Start: 2025-08-14 | End: 2025-08-14 | Stop reason: HOSPADM

## 2025-08-14 RX ORDER — OXYCODONE AND ACETAMINOPHEN 7.5; 325 MG/1; MG/1
1 TABLET ORAL EVERY 4 HOURS PRN
Status: DISCONTINUED | OUTPATIENT
Start: 2025-08-14 | End: 2025-08-14 | Stop reason: HOSPADM

## 2025-08-14 RX ORDER — LABETALOL HYDROCHLORIDE 5 MG/ML
5 INJECTION, SOLUTION INTRAVENOUS
Status: DISCONTINUED | OUTPATIENT
Start: 2025-08-14 | End: 2025-08-14 | Stop reason: HOSPADM

## 2025-08-14 RX ORDER — HYDROCODONE BITARTRATE AND ACETAMINOPHEN 5; 325 MG/1; MG/1
1 TABLET ORAL ONCE AS NEEDED
Status: DISCONTINUED | OUTPATIENT
Start: 2025-08-14 | End: 2025-08-14 | Stop reason: HOSPADM

## 2025-08-14 RX ORDER — PROMETHAZINE HYDROCHLORIDE 25 MG/1
25 TABLET ORAL ONCE AS NEEDED
Status: DISCONTINUED | OUTPATIENT
Start: 2025-08-14 | End: 2025-08-14 | Stop reason: HOSPADM

## 2025-08-14 RX ORDER — SODIUM CHLORIDE 0.9 % (FLUSH) 0.9 %
3 SYRINGE (ML) INJECTION EVERY 12 HOURS SCHEDULED
Status: DISCONTINUED | OUTPATIENT
Start: 2025-08-14 | End: 2025-08-14 | Stop reason: HOSPADM

## 2025-08-14 RX ORDER — IPRATROPIUM BROMIDE AND ALBUTEROL SULFATE 2.5; .5 MG/3ML; MG/3ML
3 SOLUTION RESPIRATORY (INHALATION) ONCE AS NEEDED
Status: DISCONTINUED | OUTPATIENT
Start: 2025-08-14 | End: 2025-08-14 | Stop reason: HOSPADM

## 2025-08-14 RX ORDER — FENTANYL CITRATE 50 UG/ML
50 INJECTION, SOLUTION INTRAMUSCULAR; INTRAVENOUS
Status: DISCONTINUED | OUTPATIENT
Start: 2025-08-14 | End: 2025-08-14 | Stop reason: HOSPADM

## 2025-08-14 RX ADMIN — METOCLOPRAMIDE 5 MG: 5 TABLET ORAL at 20:21

## 2025-08-14 RX ADMIN — PANTOPRAZOLE SODIUM 40 MG: 40 TABLET, DELAYED RELEASE ORAL at 08:29

## 2025-08-14 RX ADMIN — SODIUM CHLORIDE 2000 MG: 900 INJECTION INTRAVENOUS at 12:47

## 2025-08-14 RX ADMIN — SODIUM CHLORIDE, POTASSIUM CHLORIDE, SODIUM LACTATE AND CALCIUM CHLORIDE: 600; 310; 30; 20 INJECTION, SOLUTION INTRAVENOUS at 12:50

## 2025-08-14 RX ADMIN — ESCITALOPRAM 20 MG: 10 TABLET, FILM COATED ORAL at 20:21

## 2025-08-14 RX ADMIN — HYDRALAZINE HYDROCHLORIDE 100 MG: 50 TABLET ORAL at 22:00

## 2025-08-14 RX ADMIN — METOCLOPRAMIDE 5 MG: 5 TABLET ORAL at 16:48

## 2025-08-14 RX ADMIN — SODIUM BICARBONATE 650 MG TABLET 650 MG: at 20:21

## 2025-08-14 RX ADMIN — HYDRALAZINE HYDROCHLORIDE 100 MG: 50 TABLET ORAL at 16:48

## 2025-08-14 RX ADMIN — SODIUM BICARBONATE 650 MG TABLET 650 MG: at 08:29

## 2025-08-14 RX ADMIN — CARVEDILOL 12.5 MG: 12.5 TABLET, FILM COATED ORAL at 08:29

## 2025-08-14 RX ADMIN — INSULIN LISPRO 3 UNITS: 100 INJECTION, SOLUTION INTRAVENOUS; SUBCUTANEOUS at 16:47

## 2025-08-14 RX ADMIN — METOCLOPRAMIDE 5 MG: 5 TABLET ORAL at 08:29

## 2025-08-14 RX ADMIN — ISOSORBIDE MONONITRATE 30 MG: 30 TABLET, EXTENDED RELEASE ORAL at 08:29

## 2025-08-14 RX ADMIN — FUROSEMIDE 40 MG: 40 TABLET ORAL at 08:29

## 2025-08-14 RX ADMIN — ATORVASTATIN CALCIUM 80 MG: 80 TABLET, FILM COATED ORAL at 08:29

## 2025-08-14 RX ADMIN — LIDOCAINE HYDROCHLORIDE 60 MG: 20 INJECTION, SOLUTION INFILTRATION; PERINEURAL at 13:00

## 2025-08-14 RX ADMIN — CARVEDILOL 12.5 MG: 12.5 TABLET, FILM COATED ORAL at 16:48

## 2025-08-14 RX ADMIN — AMLODIPINE BESYLATE 10 MG: 10 TABLET ORAL at 08:29

## 2025-08-14 RX ADMIN — PROPOFOL INJECTABLE EMULSION 50 MG: 10 INJECTION, EMULSION INTRAVENOUS at 13:00

## 2025-08-14 RX ADMIN — HYDRALAZINE HYDROCHLORIDE 100 MG: 50 TABLET ORAL at 08:29

## 2025-08-14 RX ADMIN — PROPOFOL INJECTABLE EMULSION 10 MCG/KG/MIN: 10 INJECTION, EMULSION INTRAVENOUS at 13:01

## 2025-08-14 RX ADMIN — POTASSIUM CHLORIDE 20 MEQ: 1500 TABLET, EXTENDED RELEASE ORAL at 08:29

## 2025-08-15 LAB
ALBUMIN SERPL-MCNC: 1.9 G/DL (ref 3.5–5.2)
ANION GAP SERPL CALCULATED.3IONS-SCNC: 10.8 MMOL/L (ref 5–15)
BASOPHILS # BLD AUTO: 0.04 10*3/MM3 (ref 0–0.2)
BASOPHILS NFR BLD AUTO: 0.4 % (ref 0–1.5)
BUN SERPL-MCNC: 20 MG/DL (ref 6–20)
BUN/CREAT SERPL: 9.3 (ref 7–25)
CALCIUM SPEC-SCNC: 7.9 MG/DL (ref 8.6–10.5)
CHLORIDE SERPL-SCNC: 102 MMOL/L (ref 98–107)
CO2 SERPL-SCNC: 27.2 MMOL/L (ref 22–29)
CREAT SERPL-MCNC: 2.16 MG/DL (ref 0.57–1)
DEPRECATED RDW RBC AUTO: 48.7 FL (ref 37–54)
EGFRCR SERPLBLD CKD-EPI 2021: 27.8 ML/MIN/1.73
EOSINOPHIL # BLD AUTO: 0.15 10*3/MM3 (ref 0–0.4)
EOSINOPHIL NFR BLD AUTO: 1.3 % (ref 0.3–6.2)
ERYTHROCYTE [DISTWIDTH] IN BLOOD BY AUTOMATED COUNT: 14.5 % (ref 12.3–15.4)
GLUCOSE BLDC GLUCOMTR-MCNC: 103 MG/DL (ref 65–99)
GLUCOSE BLDC GLUCOMTR-MCNC: 178 MG/DL (ref 65–99)
GLUCOSE BLDC GLUCOMTR-MCNC: 236 MG/DL (ref 65–99)
GLUCOSE BLDC GLUCOMTR-MCNC: 288 MG/DL (ref 65–99)
GLUCOSE SERPL-MCNC: 101 MG/DL (ref 65–99)
HCT VFR BLD AUTO: 28.4 % (ref 34–46.6)
HGB BLD-MCNC: 8.9 G/DL (ref 12–15.9)
IMM GRANULOCYTES # BLD AUTO: 0.04 10*3/MM3 (ref 0–0.05)
IMM GRANULOCYTES NFR BLD AUTO: 0.4 % (ref 0–0.5)
LYMPHOCYTES # BLD AUTO: 1.29 10*3/MM3 (ref 0.7–3.1)
LYMPHOCYTES NFR BLD AUTO: 11.4 % (ref 19.6–45.3)
MCH RBC QN AUTO: 29.6 PG (ref 26.6–33)
MCHC RBC AUTO-ENTMCNC: 31.3 G/DL (ref 31.5–35.7)
MCV RBC AUTO: 94.4 FL (ref 79–97)
MONOCYTES # BLD AUTO: 0.87 10*3/MM3 (ref 0.1–0.9)
MONOCYTES NFR BLD AUTO: 7.7 % (ref 5–12)
NEUTROPHILS NFR BLD AUTO: 78.8 % (ref 42.7–76)
NEUTROPHILS NFR BLD AUTO: 8.9 10*3/MM3 (ref 1.7–7)
NRBC BLD AUTO-RTO: 0 /100 WBC (ref 0–0.2)
PHOSPHATE SERPL-MCNC: 3.6 MG/DL (ref 2.5–4.5)
PLATELET # BLD AUTO: 309 10*3/MM3 (ref 140–450)
PMV BLD AUTO: 9.9 FL (ref 6–12)
POTASSIUM SERPL-SCNC: 3.6 MMOL/L (ref 3.5–5.2)
RBC # BLD AUTO: 3.01 10*6/MM3 (ref 3.77–5.28)
SODIUM SERPL-SCNC: 140 MMOL/L (ref 136–145)
WBC NRBC COR # BLD AUTO: 11.29 10*3/MM3 (ref 3.4–10.8)

## 2025-08-15 PROCEDURE — 25010000002 HEPARIN (PORCINE) PER 1000 UNITS: Performed by: SURGERY

## 2025-08-15 PROCEDURE — 85025 COMPLETE CBC W/AUTO DIFF WBC: CPT | Performed by: PODIATRIST

## 2025-08-15 PROCEDURE — 99024 POSTOP FOLLOW-UP VISIT: CPT | Performed by: SURGERY

## 2025-08-15 PROCEDURE — 82948 REAGENT STRIP/BLOOD GLUCOSE: CPT | Performed by: PODIATRIST

## 2025-08-15 PROCEDURE — 80069 RENAL FUNCTION PANEL: CPT | Performed by: PODIATRIST

## 2025-08-15 PROCEDURE — 63710000001 INSULIN LISPRO (HUMAN) PER 5 UNITS: Performed by: PODIATRIST

## 2025-08-15 PROCEDURE — 87070 CULTURE OTHR SPECIMN AEROBIC: CPT | Performed by: PODIATRIST

## 2025-08-15 PROCEDURE — 82948 REAGENT STRIP/BLOOD GLUCOSE: CPT

## 2025-08-15 PROCEDURE — 87077 CULTURE AEROBIC IDENTIFY: CPT | Performed by: PODIATRIST

## 2025-08-15 PROCEDURE — 87205 SMEAR GRAM STAIN: CPT | Performed by: PODIATRIST

## 2025-08-15 PROCEDURE — 87186 SC STD MICRODIL/AGAR DIL: CPT | Performed by: PODIATRIST

## 2025-08-15 RX ORDER — DOXYCYCLINE 100 MG/1
100 CAPSULE ORAL EVERY 12 HOURS SCHEDULED
Status: DISCONTINUED | OUTPATIENT
Start: 2025-08-15 | End: 2025-08-27 | Stop reason: HOSPADM

## 2025-08-15 RX ORDER — HEPARIN SODIUM 5000 [USP'U]/ML
5000 INJECTION, SOLUTION INTRAVENOUS; SUBCUTANEOUS EVERY 8 HOURS SCHEDULED
Status: DISCONTINUED | OUTPATIENT
Start: 2025-08-15 | End: 2025-08-27 | Stop reason: HOSPADM

## 2025-08-15 RX ORDER — ASPIRIN 81 MG/1
81 TABLET, CHEWABLE ORAL DAILY
Status: DISCONTINUED | OUTPATIENT
Start: 2025-08-15 | End: 2025-08-27 | Stop reason: HOSPADM

## 2025-08-15 RX ORDER — CLOPIDOGREL BISULFATE 75 MG/1
75 TABLET ORAL DAILY
Status: DISCONTINUED | OUTPATIENT
Start: 2025-08-15 | End: 2025-08-27 | Stop reason: HOSPADM

## 2025-08-15 RX ADMIN — SODIUM BICARBONATE 650 MG TABLET 650 MG: at 09:03

## 2025-08-15 RX ADMIN — ISOSORBIDE MONONITRATE 30 MG: 30 TABLET, EXTENDED RELEASE ORAL at 09:04

## 2025-08-15 RX ADMIN — DOXYCYCLINE 100 MG: 100 CAPSULE ORAL at 20:57

## 2025-08-15 RX ADMIN — AMLODIPINE BESYLATE 10 MG: 10 TABLET ORAL at 09:03

## 2025-08-15 RX ADMIN — METOCLOPRAMIDE 5 MG: 5 TABLET ORAL at 11:59

## 2025-08-15 RX ADMIN — INSULIN LISPRO 8 UNITS: 100 INJECTION, SOLUTION INTRAVENOUS; SUBCUTANEOUS at 16:50

## 2025-08-15 RX ADMIN — CLOPIDOGREL BISULFATE 75 MG: 75 TABLET, FILM COATED ORAL at 09:35

## 2025-08-15 RX ADMIN — SODIUM BICARBONATE 650 MG TABLET 650 MG: at 20:57

## 2025-08-15 RX ADMIN — ASPIRIN 81 MG CHEWABLE TABLET 81 MG: 81 TABLET CHEWABLE at 09:35

## 2025-08-15 RX ADMIN — METOCLOPRAMIDE 5 MG: 5 TABLET ORAL at 20:57

## 2025-08-15 RX ADMIN — ATORVASTATIN CALCIUM 80 MG: 80 TABLET, FILM COATED ORAL at 09:04

## 2025-08-15 RX ADMIN — SODIUM BICARBONATE 650 MG TABLET 650 MG: at 16:50

## 2025-08-15 RX ADMIN — HYDRALAZINE HYDROCHLORIDE 100 MG: 50 TABLET ORAL at 13:41

## 2025-08-15 RX ADMIN — POTASSIUM CHLORIDE 20 MEQ: 1500 TABLET, EXTENDED RELEASE ORAL at 09:03

## 2025-08-15 RX ADMIN — HYDRALAZINE HYDROCHLORIDE 100 MG: 50 TABLET ORAL at 20:57

## 2025-08-15 RX ADMIN — CARVEDILOL 12.5 MG: 12.5 TABLET, FILM COATED ORAL at 09:04

## 2025-08-15 RX ADMIN — PANTOPRAZOLE SODIUM 40 MG: 40 TABLET, DELAYED RELEASE ORAL at 09:04

## 2025-08-15 RX ADMIN — HEPARIN SODIUM 5000 UNITS: 5000 INJECTION INTRAVENOUS; SUBCUTANEOUS at 13:41

## 2025-08-15 RX ADMIN — INSULIN LISPRO 5 UNITS: 100 INJECTION, SOLUTION INTRAVENOUS; SUBCUTANEOUS at 20:57

## 2025-08-15 RX ADMIN — METOCLOPRAMIDE 5 MG: 5 TABLET ORAL at 17:03

## 2025-08-15 RX ADMIN — ESCITALOPRAM 20 MG: 10 TABLET, FILM COATED ORAL at 20:57

## 2025-08-15 RX ADMIN — FUROSEMIDE 40 MG: 40 TABLET ORAL at 09:03

## 2025-08-15 RX ADMIN — HEPARIN SODIUM 5000 UNITS: 5000 INJECTION INTRAVENOUS; SUBCUTANEOUS at 20:58

## 2025-08-15 RX ADMIN — DOXYCYCLINE 100 MG: 100 CAPSULE ORAL at 09:35

## 2025-08-15 RX ADMIN — HYDRALAZINE HYDROCHLORIDE 100 MG: 50 TABLET ORAL at 06:19

## 2025-08-15 RX ADMIN — METOCLOPRAMIDE 5 MG: 5 TABLET ORAL at 09:03

## 2025-08-15 RX ADMIN — INSULIN LISPRO 3 UNITS: 100 INJECTION, SOLUTION INTRAVENOUS; SUBCUTANEOUS at 11:59

## 2025-08-15 RX ADMIN — HEPARIN SODIUM 5000 UNITS: 5000 INJECTION INTRAVENOUS; SUBCUTANEOUS at 09:35

## 2025-08-16 LAB
ALBUMIN SERPL-MCNC: 1.9 G/DL (ref 3.5–5.2)
ANION GAP SERPL CALCULATED.3IONS-SCNC: 9.7 MMOL/L (ref 5–15)
BASOPHILS # BLD AUTO: 0.03 10*3/MM3 (ref 0–0.2)
BASOPHILS NFR BLD AUTO: 0.3 % (ref 0–1.5)
BUN SERPL-MCNC: 21 MG/DL (ref 6–20)
BUN/CREAT SERPL: 9.5 (ref 7–25)
CALCIUM SPEC-SCNC: 7.7 MG/DL (ref 8.6–10.5)
CHLORIDE SERPL-SCNC: 99 MMOL/L (ref 98–107)
CO2 SERPL-SCNC: 30.3 MMOL/L (ref 22–29)
CREAT SERPL-MCNC: 2.2 MG/DL (ref 0.57–1)
CYTO UR: NORMAL
DEPRECATED RDW RBC AUTO: 51.7 FL (ref 37–54)
EGFRCR SERPLBLD CKD-EPI 2021: 27.2 ML/MIN/1.73
EOSINOPHIL # BLD AUTO: 0.21 10*3/MM3 (ref 0–0.4)
EOSINOPHIL NFR BLD AUTO: 2 % (ref 0.3–6.2)
ERYTHROCYTE [DISTWIDTH] IN BLOOD BY AUTOMATED COUNT: 15.2 % (ref 12.3–15.4)
GLUCOSE BLDC GLUCOMTR-MCNC: 141 MG/DL (ref 65–99)
GLUCOSE BLDC GLUCOMTR-MCNC: 148 MG/DL (ref 65–99)
GLUCOSE BLDC GLUCOMTR-MCNC: 205 MG/DL (ref 65–99)
GLUCOSE BLDC GLUCOMTR-MCNC: 279 MG/DL (ref 65–99)
GLUCOSE SERPL-MCNC: 149 MG/DL (ref 65–99)
HCT VFR BLD AUTO: 28 % (ref 34–46.6)
HGB BLD-MCNC: 9 G/DL (ref 12–15.9)
IMM GRANULOCYTES # BLD AUTO: 0.05 10*3/MM3 (ref 0–0.05)
IMM GRANULOCYTES NFR BLD AUTO: 0.5 % (ref 0–0.5)
LAB AP CASE REPORT: NORMAL
LYMPHOCYTES # BLD AUTO: 1.48 10*3/MM3 (ref 0.7–3.1)
LYMPHOCYTES NFR BLD AUTO: 13.8 % (ref 19.6–45.3)
MCH RBC QN AUTO: 30.6 PG (ref 26.6–33)
MCHC RBC AUTO-ENTMCNC: 32.1 G/DL (ref 31.5–35.7)
MCV RBC AUTO: 95.2 FL (ref 79–97)
MONOCYTES # BLD AUTO: 0.95 10*3/MM3 (ref 0.1–0.9)
MONOCYTES NFR BLD AUTO: 8.9 % (ref 5–12)
NEUTROPHILS NFR BLD AUTO: 74.5 % (ref 42.7–76)
NEUTROPHILS NFR BLD AUTO: 8 10*3/MM3 (ref 1.7–7)
NRBC BLD AUTO-RTO: 0 /100 WBC (ref 0–0.2)
PATH REPORT.FINAL DX SPEC: NORMAL
PATH REPORT.GROSS SPEC: NORMAL
PHOSPHATE SERPL-MCNC: 2.9 MG/DL (ref 2.5–4.5)
PLATELET # BLD AUTO: 314 10*3/MM3 (ref 140–450)
PMV BLD AUTO: 9.5 FL (ref 6–12)
POTASSIUM SERPL-SCNC: 3.4 MMOL/L (ref 3.5–5.2)
RBC # BLD AUTO: 2.94 10*6/MM3 (ref 3.77–5.28)
SODIUM SERPL-SCNC: 139 MMOL/L (ref 136–145)
WBC NRBC COR # BLD AUTO: 10.72 10*3/MM3 (ref 3.4–10.8)

## 2025-08-16 PROCEDURE — 82948 REAGENT STRIP/BLOOD GLUCOSE: CPT

## 2025-08-16 PROCEDURE — 63710000001 INSULIN LISPRO (HUMAN) PER 5 UNITS: Performed by: PODIATRIST

## 2025-08-16 PROCEDURE — 85025 COMPLETE CBC W/AUTO DIFF WBC: CPT | Performed by: PODIATRIST

## 2025-08-16 PROCEDURE — 82948 REAGENT STRIP/BLOOD GLUCOSE: CPT | Performed by: PODIATRIST

## 2025-08-16 PROCEDURE — 99024 POSTOP FOLLOW-UP VISIT: CPT | Performed by: STUDENT IN AN ORGANIZED HEALTH CARE EDUCATION/TRAINING PROGRAM

## 2025-08-16 PROCEDURE — 80069 RENAL FUNCTION PANEL: CPT | Performed by: PODIATRIST

## 2025-08-16 PROCEDURE — 25010000002 HEPARIN (PORCINE) PER 1000 UNITS: Performed by: SURGERY

## 2025-08-16 RX ADMIN — DOXYCYCLINE 100 MG: 100 CAPSULE ORAL at 20:52

## 2025-08-16 RX ADMIN — HYDRALAZINE HYDROCHLORIDE 100 MG: 50 TABLET ORAL at 23:01

## 2025-08-16 RX ADMIN — METOCLOPRAMIDE 5 MG: 5 TABLET ORAL at 20:52

## 2025-08-16 RX ADMIN — POTASSIUM CHLORIDE 20 MEQ: 1500 TABLET, EXTENDED RELEASE ORAL at 09:07

## 2025-08-16 RX ADMIN — HYDRALAZINE HYDROCHLORIDE 100 MG: 50 TABLET ORAL at 06:26

## 2025-08-16 RX ADMIN — AMLODIPINE BESYLATE 10 MG: 10 TABLET ORAL at 09:07

## 2025-08-16 RX ADMIN — DOXYCYCLINE 100 MG: 100 CAPSULE ORAL at 09:07

## 2025-08-16 RX ADMIN — SODIUM BICARBONATE 650 MG TABLET 650 MG: at 17:11

## 2025-08-16 RX ADMIN — HEPARIN SODIUM 5000 UNITS: 5000 INJECTION INTRAVENOUS; SUBCUTANEOUS at 23:00

## 2025-08-16 RX ADMIN — FUROSEMIDE 40 MG: 40 TABLET ORAL at 09:06

## 2025-08-16 RX ADMIN — HEPARIN SODIUM 5000 UNITS: 5000 INJECTION INTRAVENOUS; SUBCUTANEOUS at 06:26

## 2025-08-16 RX ADMIN — HYDRALAZINE HYDROCHLORIDE 100 MG: 50 TABLET ORAL at 14:34

## 2025-08-16 RX ADMIN — CARVEDILOL 12.5 MG: 12.5 TABLET, FILM COATED ORAL at 17:12

## 2025-08-16 RX ADMIN — ESCITALOPRAM 20 MG: 10 TABLET, FILM COATED ORAL at 20:52

## 2025-08-16 RX ADMIN — ISOSORBIDE MONONITRATE 30 MG: 30 TABLET, EXTENDED RELEASE ORAL at 09:07

## 2025-08-16 RX ADMIN — INSULIN LISPRO 5 UNITS: 100 INJECTION, SOLUTION INTRAVENOUS; SUBCUTANEOUS at 23:00

## 2025-08-16 RX ADMIN — METOCLOPRAMIDE 5 MG: 5 TABLET ORAL at 17:12

## 2025-08-16 RX ADMIN — PANTOPRAZOLE SODIUM 40 MG: 40 TABLET, DELAYED RELEASE ORAL at 09:07

## 2025-08-16 RX ADMIN — CARVEDILOL 12.5 MG: 12.5 TABLET, FILM COATED ORAL at 09:06

## 2025-08-16 RX ADMIN — INSULIN LISPRO 8 UNITS: 100 INJECTION, SOLUTION INTRAVENOUS; SUBCUTANEOUS at 17:12

## 2025-08-16 RX ADMIN — SODIUM BICARBONATE 650 MG TABLET 650 MG: at 20:52

## 2025-08-16 RX ADMIN — HEPARIN SODIUM 5000 UNITS: 5000 INJECTION INTRAVENOUS; SUBCUTANEOUS at 14:34

## 2025-08-16 RX ADMIN — SODIUM BICARBONATE 650 MG TABLET 650 MG: at 09:06

## 2025-08-16 RX ADMIN — ASPIRIN 81 MG CHEWABLE TABLET 81 MG: 81 TABLET CHEWABLE at 09:06

## 2025-08-16 RX ADMIN — ATORVASTATIN CALCIUM 80 MG: 80 TABLET, FILM COATED ORAL at 09:07

## 2025-08-16 RX ADMIN — METOCLOPRAMIDE 5 MG: 5 TABLET ORAL at 11:47

## 2025-08-16 RX ADMIN — METOCLOPRAMIDE 5 MG: 5 TABLET ORAL at 09:06

## 2025-08-16 RX ADMIN — CLOPIDOGREL BISULFATE 75 MG: 75 TABLET, FILM COATED ORAL at 09:06

## 2025-08-17 LAB
ALBUMIN SERPL-MCNC: 2 G/DL (ref 3.5–5.2)
ANION GAP SERPL CALCULATED.3IONS-SCNC: 10.8 MMOL/L (ref 5–15)
BACTERIA SPEC AEROBE CULT: ABNORMAL
BASOPHILS # BLD AUTO: 0.03 10*3/MM3 (ref 0–0.2)
BASOPHILS NFR BLD AUTO: 0.3 % (ref 0–1.5)
BUN SERPL-MCNC: 21 MG/DL (ref 6–20)
BUN/CREAT SERPL: 9.9 (ref 7–25)
CALCIUM SPEC-SCNC: 7.7 MG/DL (ref 8.6–10.5)
CHLORIDE SERPL-SCNC: 100 MMOL/L (ref 98–107)
CO2 SERPL-SCNC: 30.2 MMOL/L (ref 22–29)
CREAT SERPL-MCNC: 2.13 MG/DL (ref 0.57–1)
DEPRECATED RDW RBC AUTO: 53 FL (ref 37–54)
EGFRCR SERPLBLD CKD-EPI 2021: 28.3 ML/MIN/1.73
EOSINOPHIL # BLD AUTO: 0.12 10*3/MM3 (ref 0–0.4)
EOSINOPHIL NFR BLD AUTO: 1.3 % (ref 0.3–6.2)
ERYTHROCYTE [DISTWIDTH] IN BLOOD BY AUTOMATED COUNT: 15.2 % (ref 12.3–15.4)
GLUCOSE BLDC GLUCOMTR-MCNC: 131 MG/DL (ref 65–99)
GLUCOSE BLDC GLUCOMTR-MCNC: 152 MG/DL (ref 65–99)
GLUCOSE BLDC GLUCOMTR-MCNC: 210 MG/DL (ref 65–99)
GLUCOSE BLDC GLUCOMTR-MCNC: 217 MG/DL (ref 65–99)
GLUCOSE SERPL-MCNC: 133 MG/DL (ref 65–99)
GRAM STN SPEC: ABNORMAL
GRAM STN SPEC: ABNORMAL
HCT VFR BLD AUTO: 27.4 % (ref 34–46.6)
HGB BLD-MCNC: 8.6 G/DL (ref 12–15.9)
IMM GRANULOCYTES # BLD AUTO: 0.05 10*3/MM3 (ref 0–0.05)
IMM GRANULOCYTES NFR BLD AUTO: 0.5 % (ref 0–0.5)
LYMPHOCYTES # BLD AUTO: 1.4 10*3/MM3 (ref 0.7–3.1)
LYMPHOCYTES NFR BLD AUTO: 14.8 % (ref 19.6–45.3)
MCH RBC QN AUTO: 29.9 PG (ref 26.6–33)
MCHC RBC AUTO-ENTMCNC: 31.4 G/DL (ref 31.5–35.7)
MCV RBC AUTO: 95.1 FL (ref 79–97)
MONOCYTES # BLD AUTO: 0.8 10*3/MM3 (ref 0.1–0.9)
MONOCYTES NFR BLD AUTO: 8.4 % (ref 5–12)
NEUTROPHILS NFR BLD AUTO: 7.07 10*3/MM3 (ref 1.7–7)
NEUTROPHILS NFR BLD AUTO: 74.7 % (ref 42.7–76)
NRBC BLD AUTO-RTO: 0 /100 WBC (ref 0–0.2)
PHOSPHATE SERPL-MCNC: 3 MG/DL (ref 2.5–4.5)
PLATELET # BLD AUTO: 291 10*3/MM3 (ref 140–450)
PMV BLD AUTO: 9.8 FL (ref 6–12)
POTASSIUM SERPL-SCNC: 3.3 MMOL/L (ref 3.5–5.2)
RBC # BLD AUTO: 2.88 10*6/MM3 (ref 3.77–5.28)
SODIUM SERPL-SCNC: 141 MMOL/L (ref 136–145)
WBC NRBC COR # BLD AUTO: 9.47 10*3/MM3 (ref 3.4–10.8)

## 2025-08-17 PROCEDURE — 85025 COMPLETE CBC W/AUTO DIFF WBC: CPT | Performed by: PODIATRIST

## 2025-08-17 PROCEDURE — 82948 REAGENT STRIP/BLOOD GLUCOSE: CPT

## 2025-08-17 PROCEDURE — 25010000002 HEPARIN (PORCINE) PER 1000 UNITS: Performed by: SURGERY

## 2025-08-17 PROCEDURE — 25810000003 SODIUM CHLORIDE 0.9 % SOLUTION: Performed by: INTERNAL MEDICINE

## 2025-08-17 PROCEDURE — 25010000002 NA FERRIC GLUC CPLX PER 12.5 MG: Performed by: INTERNAL MEDICINE

## 2025-08-17 PROCEDURE — 99024 POSTOP FOLLOW-UP VISIT: CPT | Performed by: STUDENT IN AN ORGANIZED HEALTH CARE EDUCATION/TRAINING PROGRAM

## 2025-08-17 PROCEDURE — 80069 RENAL FUNCTION PANEL: CPT | Performed by: PODIATRIST

## 2025-08-17 PROCEDURE — 63710000001 INSULIN LISPRO (HUMAN) PER 5 UNITS: Performed by: PODIATRIST

## 2025-08-17 PROCEDURE — 82948 REAGENT STRIP/BLOOD GLUCOSE: CPT | Performed by: PODIATRIST

## 2025-08-17 RX ORDER — POTASSIUM CHLORIDE 1500 MG/1
40 TABLET, EXTENDED RELEASE ORAL ONCE
Status: COMPLETED | OUTPATIENT
Start: 2025-08-17 | End: 2025-08-17

## 2025-08-17 RX ADMIN — POTASSIUM CHLORIDE 20 MEQ: 1500 TABLET, EXTENDED RELEASE ORAL at 09:31

## 2025-08-17 RX ADMIN — DOXYCYCLINE 100 MG: 100 CAPSULE ORAL at 21:33

## 2025-08-17 RX ADMIN — INSULIN LISPRO 5 UNITS: 100 INJECTION, SOLUTION INTRAVENOUS; SUBCUTANEOUS at 17:07

## 2025-08-17 RX ADMIN — CARVEDILOL 12.5 MG: 12.5 TABLET, FILM COATED ORAL at 17:06

## 2025-08-17 RX ADMIN — SODIUM BICARBONATE 650 MG TABLET 650 MG: at 09:31

## 2025-08-17 RX ADMIN — AMLODIPINE BESYLATE 10 MG: 10 TABLET ORAL at 09:31

## 2025-08-17 RX ADMIN — POTASSIUM CHLORIDE 40 MEQ: 1500 TABLET, EXTENDED RELEASE ORAL at 17:07

## 2025-08-17 RX ADMIN — HEPARIN SODIUM 5000 UNITS: 5000 INJECTION INTRAVENOUS; SUBCUTANEOUS at 06:40

## 2025-08-17 RX ADMIN — PANTOPRAZOLE SODIUM 40 MG: 40 TABLET, DELAYED RELEASE ORAL at 09:31

## 2025-08-17 RX ADMIN — SODIUM BICARBONATE 650 MG TABLET 650 MG: at 21:33

## 2025-08-17 RX ADMIN — HYDRALAZINE HYDROCHLORIDE 100 MG: 50 TABLET ORAL at 21:33

## 2025-08-17 RX ADMIN — INSULIN LISPRO 5 UNITS: 100 INJECTION, SOLUTION INTRAVENOUS; SUBCUTANEOUS at 11:56

## 2025-08-17 RX ADMIN — INSULIN LISPRO 3 UNITS: 100 INJECTION, SOLUTION INTRAVENOUS; SUBCUTANEOUS at 21:43

## 2025-08-17 RX ADMIN — METOCLOPRAMIDE 5 MG: 5 TABLET ORAL at 09:35

## 2025-08-17 RX ADMIN — METOCLOPRAMIDE 5 MG: 5 TABLET ORAL at 11:56

## 2025-08-17 RX ADMIN — DOXYCYCLINE 100 MG: 100 CAPSULE ORAL at 09:31

## 2025-08-17 RX ADMIN — CARVEDILOL 12.5 MG: 12.5 TABLET, FILM COATED ORAL at 09:31

## 2025-08-17 RX ADMIN — HYDRALAZINE HYDROCHLORIDE 100 MG: 50 TABLET ORAL at 14:20

## 2025-08-17 RX ADMIN — HEPARIN SODIUM 5000 UNITS: 5000 INJECTION INTRAVENOUS; SUBCUTANEOUS at 14:20

## 2025-08-17 RX ADMIN — FUROSEMIDE 40 MG: 40 TABLET ORAL at 09:31

## 2025-08-17 RX ADMIN — ISOSORBIDE MONONITRATE 30 MG: 30 TABLET, EXTENDED RELEASE ORAL at 09:31

## 2025-08-17 RX ADMIN — HYDRALAZINE HYDROCHLORIDE 100 MG: 50 TABLET ORAL at 06:40

## 2025-08-17 RX ADMIN — ASPIRIN 81 MG CHEWABLE TABLET 81 MG: 81 TABLET CHEWABLE at 09:31

## 2025-08-17 RX ADMIN — ATORVASTATIN CALCIUM 80 MG: 80 TABLET, FILM COATED ORAL at 09:31

## 2025-08-17 RX ADMIN — CLOPIDOGREL BISULFATE 75 MG: 75 TABLET, FILM COATED ORAL at 09:31

## 2025-08-17 RX ADMIN — SODIUM BICARBONATE 650 MG TABLET 650 MG: at 17:07

## 2025-08-17 RX ADMIN — HEPARIN SODIUM 5000 UNITS: 5000 INJECTION INTRAVENOUS; SUBCUTANEOUS at 21:33

## 2025-08-17 RX ADMIN — SODIUM CHLORIDE 250 MG: 9 INJECTION, SOLUTION INTRAVENOUS at 17:16

## 2025-08-17 RX ADMIN — METOCLOPRAMIDE 5 MG: 5 TABLET ORAL at 21:33

## 2025-08-17 RX ADMIN — ESCITALOPRAM 20 MG: 10 TABLET, FILM COATED ORAL at 21:33

## 2025-08-17 RX ADMIN — METOCLOPRAMIDE 5 MG: 5 TABLET ORAL at 17:06

## 2025-08-18 LAB
ALBUMIN SERPL-MCNC: 1.8 G/DL (ref 3.5–5.2)
ANION GAP SERPL CALCULATED.3IONS-SCNC: 8.4 MMOL/L (ref 5–15)
BASOPHILS # BLD AUTO: 0.04 10*3/MM3 (ref 0–0.2)
BASOPHILS NFR BLD AUTO: 0.4 % (ref 0–1.5)
BUN SERPL-MCNC: 20 MG/DL (ref 6–20)
BUN/CREAT SERPL: 9.9 (ref 7–25)
CALCIUM SPEC-SCNC: 7.8 MG/DL (ref 8.6–10.5)
CHLORIDE SERPL-SCNC: 103 MMOL/L (ref 98–107)
CO2 SERPL-SCNC: 30.6 MMOL/L (ref 22–29)
CREAT SERPL-MCNC: 2.02 MG/DL (ref 0.57–1)
DEPRECATED RDW RBC AUTO: 49.7 FL (ref 37–54)
EGFRCR SERPLBLD CKD-EPI 2021: 30.1 ML/MIN/1.73
EOSINOPHIL # BLD AUTO: 0.13 10*3/MM3 (ref 0–0.4)
EOSINOPHIL NFR BLD AUTO: 1.2 % (ref 0.3–6.2)
ERYTHROCYTE [DISTWIDTH] IN BLOOD BY AUTOMATED COUNT: 14.7 % (ref 12.3–15.4)
GLUCOSE BLDC GLUCOMTR-MCNC: 119 MG/DL (ref 65–99)
GLUCOSE BLDC GLUCOMTR-MCNC: 132 MG/DL (ref 65–99)
GLUCOSE BLDC GLUCOMTR-MCNC: 148 MG/DL (ref 65–99)
GLUCOSE BLDC GLUCOMTR-MCNC: 159 MG/DL (ref 65–99)
GLUCOSE BLDC GLUCOMTR-MCNC: 228 MG/DL (ref 65–99)
GLUCOSE SERPL-MCNC: 119 MG/DL (ref 65–99)
HCT VFR BLD AUTO: 26.4 % (ref 34–46.6)
HGB BLD-MCNC: 8.5 G/DL (ref 12–15.9)
IMM GRANULOCYTES # BLD AUTO: 0.06 10*3/MM3 (ref 0–0.05)
IMM GRANULOCYTES NFR BLD AUTO: 0.6 % (ref 0–0.5)
LYMPHOCYTES # BLD AUTO: 1.15 10*3/MM3 (ref 0.7–3.1)
LYMPHOCYTES NFR BLD AUTO: 10.8 % (ref 19.6–45.3)
MCH RBC QN AUTO: 30.2 PG (ref 26.6–33)
MCHC RBC AUTO-ENTMCNC: 32.2 G/DL (ref 31.5–35.7)
MCV RBC AUTO: 94 FL (ref 79–97)
MONOCYTES # BLD AUTO: 0.93 10*3/MM3 (ref 0.1–0.9)
MONOCYTES NFR BLD AUTO: 8.8 % (ref 5–12)
NEUTROPHILS NFR BLD AUTO: 78.2 % (ref 42.7–76)
NEUTROPHILS NFR BLD AUTO: 8.31 10*3/MM3 (ref 1.7–7)
NRBC BLD AUTO-RTO: 0 /100 WBC (ref 0–0.2)
PHOSPHATE SERPL-MCNC: 3.3 MG/DL (ref 2.5–4.5)
PLATELET # BLD AUTO: 291 10*3/MM3 (ref 140–450)
PMV BLD AUTO: 9.6 FL (ref 6–12)
POTASSIUM SERPL-SCNC: 3.6 MMOL/L (ref 3.5–5.2)
RBC # BLD AUTO: 2.81 10*6/MM3 (ref 3.77–5.28)
SODIUM SERPL-SCNC: 142 MMOL/L (ref 136–145)
WBC NRBC COR # BLD AUTO: 10.62 10*3/MM3 (ref 3.4–10.8)

## 2025-08-18 PROCEDURE — 82948 REAGENT STRIP/BLOOD GLUCOSE: CPT

## 2025-08-18 PROCEDURE — 85025 COMPLETE CBC W/AUTO DIFF WBC: CPT | Performed by: PODIATRIST

## 2025-08-18 PROCEDURE — 97530 THERAPEUTIC ACTIVITIES: CPT

## 2025-08-18 PROCEDURE — 80069 RENAL FUNCTION PANEL: CPT | Performed by: PODIATRIST

## 2025-08-18 PROCEDURE — 97164 PT RE-EVAL EST PLAN CARE: CPT | Performed by: PHYSICAL THERAPIST

## 2025-08-18 PROCEDURE — 82948 REAGENT STRIP/BLOOD GLUCOSE: CPT | Performed by: PODIATRIST

## 2025-08-18 PROCEDURE — 63710000001 INSULIN LISPRO (HUMAN) PER 5 UNITS: Performed by: PODIATRIST

## 2025-08-18 PROCEDURE — 99024 POSTOP FOLLOW-UP VISIT: CPT | Performed by: SURGERY

## 2025-08-18 PROCEDURE — 97530 THERAPEUTIC ACTIVITIES: CPT | Performed by: PHYSICAL THERAPIST

## 2025-08-18 PROCEDURE — 25010000002 HEPARIN (PORCINE) PER 1000 UNITS: Performed by: SURGERY

## 2025-08-18 RX ADMIN — HEPARIN SODIUM 5000 UNITS: 5000 INJECTION INTRAVENOUS; SUBCUTANEOUS at 06:48

## 2025-08-18 RX ADMIN — METOCLOPRAMIDE 5 MG: 5 TABLET ORAL at 08:43

## 2025-08-18 RX ADMIN — POTASSIUM CHLORIDE 20 MEQ: 1500 TABLET, EXTENDED RELEASE ORAL at 08:49

## 2025-08-18 RX ADMIN — ESCITALOPRAM 20 MG: 10 TABLET, FILM COATED ORAL at 22:24

## 2025-08-18 RX ADMIN — CARVEDILOL 12.5 MG: 12.5 TABLET, FILM COATED ORAL at 17:33

## 2025-08-18 RX ADMIN — SODIUM BICARBONATE 650 MG TABLET 650 MG: at 15:17

## 2025-08-18 RX ADMIN — METOCLOPRAMIDE 5 MG: 5 TABLET ORAL at 22:27

## 2025-08-18 RX ADMIN — SODIUM BICARBONATE 650 MG TABLET 650 MG: at 22:24

## 2025-08-18 RX ADMIN — PANTOPRAZOLE SODIUM 40 MG: 40 TABLET, DELAYED RELEASE ORAL at 08:43

## 2025-08-18 RX ADMIN — CLOPIDOGREL BISULFATE 75 MG: 75 TABLET, FILM COATED ORAL at 08:43

## 2025-08-18 RX ADMIN — FUROSEMIDE 40 MG: 40 TABLET ORAL at 08:44

## 2025-08-18 RX ADMIN — HEPARIN SODIUM 5000 UNITS: 5000 INJECTION INTRAVENOUS; SUBCUTANEOUS at 15:16

## 2025-08-18 RX ADMIN — ATORVASTATIN CALCIUM 80 MG: 80 TABLET, FILM COATED ORAL at 08:44

## 2025-08-18 RX ADMIN — METOCLOPRAMIDE 5 MG: 5 TABLET ORAL at 12:39

## 2025-08-18 RX ADMIN — DOXYCYCLINE 100 MG: 100 CAPSULE ORAL at 22:24

## 2025-08-18 RX ADMIN — HYDRALAZINE HYDROCHLORIDE 100 MG: 50 TABLET ORAL at 22:27

## 2025-08-18 RX ADMIN — HYDRALAZINE HYDROCHLORIDE 100 MG: 50 TABLET ORAL at 06:49

## 2025-08-18 RX ADMIN — INSULIN LISPRO 5 UNITS: 100 INJECTION, SOLUTION INTRAVENOUS; SUBCUTANEOUS at 12:32

## 2025-08-18 RX ADMIN — CARVEDILOL 12.5 MG: 12.5 TABLET, FILM COATED ORAL at 08:44

## 2025-08-18 RX ADMIN — HYDRALAZINE HYDROCHLORIDE 100 MG: 50 TABLET ORAL at 15:16

## 2025-08-18 RX ADMIN — INSULIN LISPRO 3 UNITS: 100 INJECTION, SOLUTION INTRAVENOUS; SUBCUTANEOUS at 17:33

## 2025-08-18 RX ADMIN — AMLODIPINE BESYLATE 10 MG: 10 TABLET ORAL at 08:44

## 2025-08-18 RX ADMIN — ISOSORBIDE MONONITRATE 30 MG: 30 TABLET, EXTENDED RELEASE ORAL at 08:44

## 2025-08-18 RX ADMIN — SODIUM BICARBONATE 650 MG TABLET 650 MG: at 08:43

## 2025-08-18 RX ADMIN — DOXYCYCLINE 100 MG: 100 CAPSULE ORAL at 08:43

## 2025-08-18 RX ADMIN — HEPARIN SODIUM 5000 UNITS: 5000 INJECTION INTRAVENOUS; SUBCUTANEOUS at 22:24

## 2025-08-18 RX ADMIN — ASPIRIN 81 MG CHEWABLE TABLET 81 MG: 81 TABLET CHEWABLE at 08:43

## 2025-08-18 RX ADMIN — METOCLOPRAMIDE 5 MG: 5 TABLET ORAL at 17:33

## 2025-08-19 LAB
ANION GAP SERPL CALCULATED.3IONS-SCNC: 11 MMOL/L (ref 5–15)
BASOPHILS # BLD AUTO: 0.02 10*3/MM3 (ref 0–0.2)
BASOPHILS NFR BLD AUTO: 0.2 % (ref 0–1.5)
BUN SERPL-MCNC: 21 MG/DL (ref 6–20)
BUN/CREAT SERPL: 13.5 (ref 7–25)
CALCIUM SPEC-SCNC: 7.4 MG/DL (ref 8.6–10.5)
CHLORIDE SERPL-SCNC: 98 MMOL/L (ref 98–107)
CO2 SERPL-SCNC: 29 MMOL/L (ref 22–29)
CREAT SERPL-MCNC: 1.56 MG/DL (ref 0.57–1)
DEPRECATED RDW RBC AUTO: 49.3 FL (ref 37–54)
EGFRCR SERPLBLD CKD-EPI 2021: 41.1 ML/MIN/1.73
EOSINOPHIL # BLD AUTO: 0.01 10*3/MM3 (ref 0–0.4)
EOSINOPHIL NFR BLD AUTO: 0.1 % (ref 0.3–6.2)
ERYTHROCYTE [DISTWIDTH] IN BLOOD BY AUTOMATED COUNT: 14.7 % (ref 12.3–15.4)
GLUCOSE BLDC GLUCOMTR-MCNC: 132 MG/DL (ref 65–99)
GLUCOSE BLDC GLUCOMTR-MCNC: 139 MG/DL (ref 65–99)
GLUCOSE BLDC GLUCOMTR-MCNC: 181 MG/DL (ref 65–99)
GLUCOSE BLDC GLUCOMTR-MCNC: 244 MG/DL (ref 65–99)
GLUCOSE SERPL-MCNC: 133 MG/DL (ref 65–99)
HCT VFR BLD AUTO: 24.7 % (ref 34–46.6)
HGB BLD-MCNC: 7.8 G/DL (ref 12–15.9)
IMM GRANULOCYTES # BLD AUTO: 0.05 10*3/MM3 (ref 0–0.05)
IMM GRANULOCYTES NFR BLD AUTO: 0.4 % (ref 0–0.5)
LYMPHOCYTES # BLD AUTO: 1.09 10*3/MM3 (ref 0.7–3.1)
LYMPHOCYTES NFR BLD AUTO: 9.7 % (ref 19.6–45.3)
MCH RBC QN AUTO: 30 PG (ref 26.6–33)
MCHC RBC AUTO-ENTMCNC: 31.6 G/DL (ref 31.5–35.7)
MCV RBC AUTO: 95 FL (ref 79–97)
MONOCYTES # BLD AUTO: 1.01 10*3/MM3 (ref 0.1–0.9)
MONOCYTES NFR BLD AUTO: 9 % (ref 5–12)
NEUTROPHILS NFR BLD AUTO: 80.6 % (ref 42.7–76)
NEUTROPHILS NFR BLD AUTO: 9.07 10*3/MM3 (ref 1.7–7)
NRBC BLD AUTO-RTO: 0 /100 WBC (ref 0–0.2)
PLATELET # BLD AUTO: 232 10*3/MM3 (ref 140–450)
PMV BLD AUTO: 10 FL (ref 6–12)
POTASSIUM SERPL-SCNC: 3.4 MMOL/L (ref 3.5–5.2)
RBC # BLD AUTO: 2.6 10*6/MM3 (ref 3.77–5.28)
SODIUM SERPL-SCNC: 138 MMOL/L (ref 136–145)
WBC NRBC COR # BLD AUTO: 11.25 10*3/MM3 (ref 3.4–10.8)

## 2025-08-19 PROCEDURE — 82948 REAGENT STRIP/BLOOD GLUCOSE: CPT

## 2025-08-19 PROCEDURE — 25010000002 HEPARIN (PORCINE) PER 1000 UNITS: Performed by: SURGERY

## 2025-08-19 PROCEDURE — 85025 COMPLETE CBC W/AUTO DIFF WBC: CPT | Performed by: PODIATRIST

## 2025-08-19 PROCEDURE — 80048 BASIC METABOLIC PNL TOTAL CA: CPT | Performed by: PODIATRIST

## 2025-08-19 PROCEDURE — 82948 REAGENT STRIP/BLOOD GLUCOSE: CPT | Performed by: PODIATRIST

## 2025-08-19 PROCEDURE — 97535 SELF CARE MNGMENT TRAINING: CPT

## 2025-08-19 PROCEDURE — 63710000001 INSULIN LISPRO (HUMAN) PER 5 UNITS: Performed by: PODIATRIST

## 2025-08-19 PROCEDURE — 99024 POSTOP FOLLOW-UP VISIT: CPT | Performed by: SURGERY

## 2025-08-19 RX ORDER — LEVOFLOXACIN 500 MG/1
500 TABLET, FILM COATED ORAL EVERY 24 HOURS
Status: DISCONTINUED | OUTPATIENT
Start: 2025-08-19 | End: 2025-08-23

## 2025-08-19 RX ORDER — LISINOPRIL 2.5 MG/1
5 TABLET ORAL
Status: DISCONTINUED | OUTPATIENT
Start: 2025-08-19 | End: 2025-08-27 | Stop reason: HOSPADM

## 2025-08-19 RX ADMIN — INSULIN LISPRO 3 UNITS: 100 INJECTION, SOLUTION INTRAVENOUS; SUBCUTANEOUS at 21:01

## 2025-08-19 RX ADMIN — INSULIN LISPRO 5 UNITS: 100 INJECTION, SOLUTION INTRAVENOUS; SUBCUTANEOUS at 17:15

## 2025-08-19 RX ADMIN — HYDRALAZINE HYDROCHLORIDE 100 MG: 50 TABLET ORAL at 15:24

## 2025-08-19 RX ADMIN — ESCITALOPRAM 20 MG: 10 TABLET, FILM COATED ORAL at 20:53

## 2025-08-19 RX ADMIN — ASPIRIN 81 MG CHEWABLE TABLET 81 MG: 81 TABLET CHEWABLE at 08:19

## 2025-08-19 RX ADMIN — FUROSEMIDE 40 MG: 40 TABLET ORAL at 08:18

## 2025-08-19 RX ADMIN — DOXYCYCLINE 100 MG: 100 CAPSULE ORAL at 08:18

## 2025-08-19 RX ADMIN — HEPARIN SODIUM 5000 UNITS: 5000 INJECTION INTRAVENOUS; SUBCUTANEOUS at 20:54

## 2025-08-19 RX ADMIN — SODIUM BICARBONATE 650 MG TABLET 650 MG: at 20:53

## 2025-08-19 RX ADMIN — HEPARIN SODIUM 5000 UNITS: 5000 INJECTION INTRAVENOUS; SUBCUTANEOUS at 15:24

## 2025-08-19 RX ADMIN — ISOSORBIDE MONONITRATE 30 MG: 30 TABLET, EXTENDED RELEASE ORAL at 08:19

## 2025-08-19 RX ADMIN — DOXYCYCLINE 100 MG: 100 CAPSULE ORAL at 20:53

## 2025-08-19 RX ADMIN — METOCLOPRAMIDE 5 MG: 5 TABLET ORAL at 08:19

## 2025-08-19 RX ADMIN — CLOPIDOGREL BISULFATE 75 MG: 75 TABLET, FILM COATED ORAL at 08:19

## 2025-08-19 RX ADMIN — POTASSIUM CHLORIDE 20 MEQ: 1500 TABLET, EXTENDED RELEASE ORAL at 08:19

## 2025-08-19 RX ADMIN — LISINOPRIL 5 MG: 2.5 TABLET ORAL at 17:14

## 2025-08-19 RX ADMIN — ATORVASTATIN CALCIUM 80 MG: 80 TABLET, FILM COATED ORAL at 08:19

## 2025-08-19 RX ADMIN — CARVEDILOL 12.5 MG: 12.5 TABLET, FILM COATED ORAL at 08:19

## 2025-08-19 RX ADMIN — METOCLOPRAMIDE 5 MG: 5 TABLET ORAL at 12:20

## 2025-08-19 RX ADMIN — METOCLOPRAMIDE 5 MG: 5 TABLET ORAL at 17:14

## 2025-08-19 RX ADMIN — AMLODIPINE BESYLATE 10 MG: 10 TABLET ORAL at 08:19

## 2025-08-19 RX ADMIN — CARVEDILOL 12.5 MG: 12.5 TABLET, FILM COATED ORAL at 17:14

## 2025-08-19 RX ADMIN — PANTOPRAZOLE SODIUM 40 MG: 40 TABLET, DELAYED RELEASE ORAL at 08:19

## 2025-08-19 RX ADMIN — LEVOFLOXACIN 500 MG: 500 TABLET, FILM COATED ORAL at 08:19

## 2025-08-19 RX ADMIN — HYDRALAZINE HYDROCHLORIDE 100 MG: 50 TABLET ORAL at 06:35

## 2025-08-19 RX ADMIN — METOCLOPRAMIDE 5 MG: 5 TABLET ORAL at 20:53

## 2025-08-19 RX ADMIN — SODIUM BICARBONATE 650 MG TABLET 650 MG: at 08:18

## 2025-08-19 RX ADMIN — SODIUM BICARBONATE 650 MG TABLET 650 MG: at 15:24

## 2025-08-19 RX ADMIN — HEPARIN SODIUM 5000 UNITS: 5000 INJECTION INTRAVENOUS; SUBCUTANEOUS at 06:35

## 2025-08-20 LAB
ANION GAP SERPL CALCULATED.3IONS-SCNC: 10 MMOL/L (ref 5–15)
BASOPHILS # BLD AUTO: 0.02 10*3/MM3 (ref 0–0.2)
BASOPHILS NFR BLD AUTO: 0.2 % (ref 0–1.5)
BUN SERPL-MCNC: 24 MG/DL (ref 6–20)
BUN/CREAT SERPL: 13.2 (ref 7–25)
CALCIUM SPEC-SCNC: 7.1 MG/DL (ref 8.6–10.5)
CHLORIDE SERPL-SCNC: 95 MMOL/L (ref 98–107)
CO2 SERPL-SCNC: 30 MMOL/L (ref 22–29)
CREAT SERPL-MCNC: 1.82 MG/DL (ref 0.57–1)
DEPRECATED RDW RBC AUTO: 50.4 FL (ref 37–54)
EGFRCR SERPLBLD CKD-EPI 2021: 34.2 ML/MIN/1.73
EOSINOPHIL # BLD AUTO: 0.12 10*3/MM3 (ref 0–0.4)
EOSINOPHIL NFR BLD AUTO: 1.3 % (ref 0.3–6.2)
ERYTHROCYTE [DISTWIDTH] IN BLOOD BY AUTOMATED COUNT: 14.6 % (ref 12.3–15.4)
GLUCOSE BLDC GLUCOMTR-MCNC: 145 MG/DL (ref 65–99)
GLUCOSE BLDC GLUCOMTR-MCNC: 149 MG/DL (ref 65–99)
GLUCOSE BLDC GLUCOMTR-MCNC: 196 MG/DL (ref 65–99)
GLUCOSE BLDC GLUCOMTR-MCNC: 217 MG/DL (ref 65–99)
GLUCOSE SERPL-MCNC: 164 MG/DL (ref 65–99)
HCT VFR BLD AUTO: 23.6 % (ref 34–46.6)
HGB BLD-MCNC: 7.2 G/DL (ref 12–15.9)
IMM GRANULOCYTES # BLD AUTO: 0.03 10*3/MM3 (ref 0–0.05)
IMM GRANULOCYTES NFR BLD AUTO: 0.3 % (ref 0–0.5)
LYMPHOCYTES # BLD AUTO: 1.55 10*3/MM3 (ref 0.7–3.1)
LYMPHOCYTES NFR BLD AUTO: 17.1 % (ref 19.6–45.3)
MCH RBC QN AUTO: 29.3 PG (ref 26.6–33)
MCHC RBC AUTO-ENTMCNC: 30.5 G/DL (ref 31.5–35.7)
MCV RBC AUTO: 95.9 FL (ref 79–97)
MONOCYTES # BLD AUTO: 1.1 10*3/MM3 (ref 0.1–0.9)
MONOCYTES NFR BLD AUTO: 12.1 % (ref 5–12)
NEUTROPHILS NFR BLD AUTO: 6.25 10*3/MM3 (ref 1.7–7)
NEUTROPHILS NFR BLD AUTO: 69 % (ref 42.7–76)
NRBC BLD AUTO-RTO: 0 /100 WBC (ref 0–0.2)
PLATELET # BLD AUTO: 200 10*3/MM3 (ref 140–450)
PMV BLD AUTO: 10.7 FL (ref 6–12)
POTASSIUM SERPL-SCNC: 3.1 MMOL/L (ref 3.5–5.2)
RBC # BLD AUTO: 2.46 10*6/MM3 (ref 3.77–5.28)
SODIUM SERPL-SCNC: 135 MMOL/L (ref 136–145)
WBC NRBC COR # BLD AUTO: 9.07 10*3/MM3 (ref 3.4–10.8)

## 2025-08-20 PROCEDURE — 80048 BASIC METABOLIC PNL TOTAL CA: CPT | Performed by: PODIATRIST

## 2025-08-20 PROCEDURE — 82948 REAGENT STRIP/BLOOD GLUCOSE: CPT

## 2025-08-20 PROCEDURE — 99024 POSTOP FOLLOW-UP VISIT: CPT | Performed by: STUDENT IN AN ORGANIZED HEALTH CARE EDUCATION/TRAINING PROGRAM

## 2025-08-20 PROCEDURE — 63710000001 INSULIN LISPRO (HUMAN) PER 5 UNITS: Performed by: PODIATRIST

## 2025-08-20 PROCEDURE — 82948 REAGENT STRIP/BLOOD GLUCOSE: CPT | Performed by: PODIATRIST

## 2025-08-20 PROCEDURE — 85025 COMPLETE CBC W/AUTO DIFF WBC: CPT | Performed by: PODIATRIST

## 2025-08-20 PROCEDURE — 97110 THERAPEUTIC EXERCISES: CPT

## 2025-08-20 PROCEDURE — 25010000002 HEPARIN (PORCINE) PER 1000 UNITS: Performed by: SURGERY

## 2025-08-20 PROCEDURE — 97530 THERAPEUTIC ACTIVITIES: CPT

## 2025-08-20 PROCEDURE — 97116 GAIT TRAINING THERAPY: CPT

## 2025-08-20 RX ORDER — POTASSIUM CHLORIDE 1500 MG/1
40 TABLET, EXTENDED RELEASE ORAL ONCE
Status: COMPLETED | OUTPATIENT
Start: 2025-08-20 | End: 2025-08-20

## 2025-08-20 RX ORDER — AMLODIPINE BESYLATE 5 MG/1
5 TABLET ORAL
Status: DISCONTINUED | OUTPATIENT
Start: 2025-08-21 | End: 2025-08-26

## 2025-08-20 RX ADMIN — METOCLOPRAMIDE 5 MG: 5 TABLET ORAL at 20:28

## 2025-08-20 RX ADMIN — METOCLOPRAMIDE 5 MG: 5 TABLET ORAL at 11:31

## 2025-08-20 RX ADMIN — ATORVASTATIN CALCIUM 80 MG: 80 TABLET, FILM COATED ORAL at 08:42

## 2025-08-20 RX ADMIN — HYDRALAZINE HYDROCHLORIDE 100 MG: 50 TABLET ORAL at 06:24

## 2025-08-20 RX ADMIN — SODIUM BICARBONATE 650 MG TABLET 650 MG: at 08:42

## 2025-08-20 RX ADMIN — AMLODIPINE BESYLATE 10 MG: 10 TABLET ORAL at 08:42

## 2025-08-20 RX ADMIN — POTASSIUM CHLORIDE 40 MEQ: 1500 TABLET, EXTENDED RELEASE ORAL at 11:50

## 2025-08-20 RX ADMIN — DOXYCYCLINE 100 MG: 100 CAPSULE ORAL at 20:27

## 2025-08-20 RX ADMIN — HEPARIN SODIUM 5000 UNITS: 5000 INJECTION INTRAVENOUS; SUBCUTANEOUS at 23:28

## 2025-08-20 RX ADMIN — DOXYCYCLINE 100 MG: 100 CAPSULE ORAL at 08:42

## 2025-08-20 RX ADMIN — HYDRALAZINE HYDROCHLORIDE 100 MG: 50 TABLET ORAL at 15:14

## 2025-08-20 RX ADMIN — HEPARIN SODIUM 5000 UNITS: 5000 INJECTION INTRAVENOUS; SUBCUTANEOUS at 15:14

## 2025-08-20 RX ADMIN — SODIUM BICARBONATE 650 MG TABLET 650 MG: at 20:27

## 2025-08-20 RX ADMIN — INSULIN LISPRO 5 UNITS: 100 INJECTION, SOLUTION INTRAVENOUS; SUBCUTANEOUS at 11:31

## 2025-08-20 RX ADMIN — ISOSORBIDE MONONITRATE 30 MG: 30 TABLET, EXTENDED RELEASE ORAL at 08:42

## 2025-08-20 RX ADMIN — INSULIN LISPRO 3 UNITS: 100 INJECTION, SOLUTION INTRAVENOUS; SUBCUTANEOUS at 17:48

## 2025-08-20 RX ADMIN — FUROSEMIDE 40 MG: 40 TABLET ORAL at 08:42

## 2025-08-20 RX ADMIN — PANTOPRAZOLE SODIUM 40 MG: 40 TABLET, DELAYED RELEASE ORAL at 08:42

## 2025-08-20 RX ADMIN — METOCLOPRAMIDE 5 MG: 5 TABLET ORAL at 08:42

## 2025-08-20 RX ADMIN — METOCLOPRAMIDE 5 MG: 5 TABLET ORAL at 17:48

## 2025-08-20 RX ADMIN — LEVOFLOXACIN 500 MG: 500 TABLET, FILM COATED ORAL at 08:42

## 2025-08-20 RX ADMIN — ESCITALOPRAM 20 MG: 10 TABLET, FILM COATED ORAL at 20:27

## 2025-08-20 RX ADMIN — HYDRALAZINE HYDROCHLORIDE 100 MG: 50 TABLET ORAL at 23:28

## 2025-08-20 RX ADMIN — CLOPIDOGREL BISULFATE 75 MG: 75 TABLET, FILM COATED ORAL at 08:42

## 2025-08-20 RX ADMIN — ASPIRIN 81 MG CHEWABLE TABLET 81 MG: 81 TABLET CHEWABLE at 08:42

## 2025-08-20 RX ADMIN — SODIUM BICARBONATE 650 MG TABLET 650 MG: at 15:14

## 2025-08-20 RX ADMIN — HEPARIN SODIUM 5000 UNITS: 5000 INJECTION INTRAVENOUS; SUBCUTANEOUS at 06:25

## 2025-08-21 LAB
ANION GAP SERPL CALCULATED.3IONS-SCNC: 8.2 MMOL/L (ref 5–15)
BASOPHILS # BLD AUTO: 0.02 10*3/MM3 (ref 0–0.2)
BASOPHILS NFR BLD AUTO: 0.2 % (ref 0–1.5)
BUN SERPL-MCNC: 18 MG/DL (ref 6–20)
BUN/CREAT SERPL: 10.2 (ref 7–25)
CALCIUM SPEC-SCNC: 7.2 MG/DL (ref 8.6–10.5)
CHLORIDE SERPL-SCNC: 97 MMOL/L (ref 98–107)
CO2 SERPL-SCNC: 28.8 MMOL/L (ref 22–29)
CREAT SERPL-MCNC: 1.76 MG/DL (ref 0.57–1)
DEPRECATED RDW RBC AUTO: 49.5 FL (ref 37–54)
EGFRCR SERPLBLD CKD-EPI 2021: 35.6 ML/MIN/1.73
EOSINOPHIL # BLD AUTO: 0.09 10*3/MM3 (ref 0–0.4)
EOSINOPHIL NFR BLD AUTO: 0.9 % (ref 0.3–6.2)
ERYTHROCYTE [DISTWIDTH] IN BLOOD BY AUTOMATED COUNT: 14.6 % (ref 12.3–15.4)
GLUCOSE BLDC GLUCOMTR-MCNC: 115 MG/DL (ref 65–99)
GLUCOSE BLDC GLUCOMTR-MCNC: 127 MG/DL (ref 65–99)
GLUCOSE BLDC GLUCOMTR-MCNC: 134 MG/DL (ref 65–99)
GLUCOSE BLDC GLUCOMTR-MCNC: 148 MG/DL (ref 65–99)
GLUCOSE SERPL-MCNC: 122 MG/DL (ref 65–99)
HCT VFR BLD AUTO: 24.4 % (ref 34–46.6)
HGB BLD-MCNC: 7.9 G/DL (ref 12–15.9)
IMM GRANULOCYTES # BLD AUTO: 0.06 10*3/MM3 (ref 0–0.05)
IMM GRANULOCYTES NFR BLD AUTO: 0.6 % (ref 0–0.5)
LYMPHOCYTES # BLD AUTO: 1.03 10*3/MM3 (ref 0.7–3.1)
LYMPHOCYTES NFR BLD AUTO: 10.7 % (ref 19.6–45.3)
MCH RBC QN AUTO: 30.2 PG (ref 26.6–33)
MCHC RBC AUTO-ENTMCNC: 32.4 G/DL (ref 31.5–35.7)
MCV RBC AUTO: 93.1 FL (ref 79–97)
MONOCYTES # BLD AUTO: 0.9 10*3/MM3 (ref 0.1–0.9)
MONOCYTES NFR BLD AUTO: 9.4 % (ref 5–12)
NEUTROPHILS NFR BLD AUTO: 7.5 10*3/MM3 (ref 1.7–7)
NEUTROPHILS NFR BLD AUTO: 78.2 % (ref 42.7–76)
NRBC BLD AUTO-RTO: 0 /100 WBC (ref 0–0.2)
PLATELET # BLD AUTO: 223 10*3/MM3 (ref 140–450)
PMV BLD AUTO: 10.3 FL (ref 6–12)
POTASSIUM SERPL-SCNC: 3.1 MMOL/L (ref 3.5–5.2)
RBC # BLD AUTO: 2.62 10*6/MM3 (ref 3.77–5.28)
SODIUM SERPL-SCNC: 134 MMOL/L (ref 136–145)
WBC NRBC COR # BLD AUTO: 9.6 10*3/MM3 (ref 3.4–10.8)

## 2025-08-21 PROCEDURE — 82948 REAGENT STRIP/BLOOD GLUCOSE: CPT

## 2025-08-21 PROCEDURE — 80048 BASIC METABOLIC PNL TOTAL CA: CPT | Performed by: PODIATRIST

## 2025-08-21 PROCEDURE — 82948 REAGENT STRIP/BLOOD GLUCOSE: CPT | Performed by: PODIATRIST

## 2025-08-21 PROCEDURE — 99024 POSTOP FOLLOW-UP VISIT: CPT | Performed by: STUDENT IN AN ORGANIZED HEALTH CARE EDUCATION/TRAINING PROGRAM

## 2025-08-21 PROCEDURE — 63710000001 ONDANSETRON ODT 4 MG TABLET DISPERSIBLE: Performed by: STUDENT IN AN ORGANIZED HEALTH CARE EDUCATION/TRAINING PROGRAM

## 2025-08-21 PROCEDURE — 85025 COMPLETE CBC W/AUTO DIFF WBC: CPT | Performed by: PODIATRIST

## 2025-08-21 PROCEDURE — 25010000002 HEPARIN (PORCINE) PER 1000 UNITS: Performed by: SURGERY

## 2025-08-21 RX ORDER — ONDANSETRON 4 MG/1
4 TABLET, ORALLY DISINTEGRATING ORAL EVERY 6 HOURS PRN
Status: DISCONTINUED | OUTPATIENT
Start: 2025-08-21 | End: 2025-08-27 | Stop reason: HOSPADM

## 2025-08-21 RX ORDER — POTASSIUM CHLORIDE 1500 MG/1
40 TABLET, EXTENDED RELEASE ORAL ONCE
Status: COMPLETED | OUTPATIENT
Start: 2025-08-21 | End: 2025-08-21

## 2025-08-21 RX ADMIN — METOCLOPRAMIDE 5 MG: 5 TABLET ORAL at 08:22

## 2025-08-21 RX ADMIN — METOCLOPRAMIDE 5 MG: 5 TABLET ORAL at 20:55

## 2025-08-21 RX ADMIN — POTASSIUM CHLORIDE 40 MEQ: 1500 TABLET, EXTENDED RELEASE ORAL at 13:03

## 2025-08-21 RX ADMIN — METOCLOPRAMIDE 5 MG: 5 TABLET ORAL at 16:56

## 2025-08-21 RX ADMIN — ASPIRIN 81 MG CHEWABLE TABLET 81 MG: 81 TABLET CHEWABLE at 08:22

## 2025-08-21 RX ADMIN — HYDRALAZINE HYDROCHLORIDE 100 MG: 50 TABLET ORAL at 13:03

## 2025-08-21 RX ADMIN — HYDRALAZINE HYDROCHLORIDE 100 MG: 50 TABLET ORAL at 21:00

## 2025-08-21 RX ADMIN — ONDANSETRON 4 MG: 4 TABLET, ORALLY DISINTEGRATING ORAL at 16:56

## 2025-08-21 RX ADMIN — AMLODIPINE BESYLATE 5 MG: 5 TABLET ORAL at 08:22

## 2025-08-21 RX ADMIN — DOXYCYCLINE 100 MG: 100 CAPSULE ORAL at 20:55

## 2025-08-21 RX ADMIN — HYDRALAZINE HYDROCHLORIDE 100 MG: 50 TABLET ORAL at 06:45

## 2025-08-21 RX ADMIN — SODIUM BICARBONATE 650 MG TABLET 650 MG: at 08:22

## 2025-08-21 RX ADMIN — CLOPIDOGREL BISULFATE 75 MG: 75 TABLET, FILM COATED ORAL at 08:22

## 2025-08-21 RX ADMIN — ESCITALOPRAM 20 MG: 10 TABLET, FILM COATED ORAL at 20:55

## 2025-08-21 RX ADMIN — HEPARIN SODIUM 5000 UNITS: 5000 INJECTION INTRAVENOUS; SUBCUTANEOUS at 13:04

## 2025-08-21 RX ADMIN — HEPARIN SODIUM 5000 UNITS: 5000 INJECTION INTRAVENOUS; SUBCUTANEOUS at 06:45

## 2025-08-21 RX ADMIN — LEVOFLOXACIN 500 MG: 500 TABLET, FILM COATED ORAL at 08:22

## 2025-08-21 RX ADMIN — FUROSEMIDE 40 MG: 40 TABLET ORAL at 08:22

## 2025-08-21 RX ADMIN — DOXYCYCLINE 100 MG: 100 CAPSULE ORAL at 08:22

## 2025-08-21 RX ADMIN — HEPARIN SODIUM 5000 UNITS: 5000 INJECTION INTRAVENOUS; SUBCUTANEOUS at 21:00

## 2025-08-21 RX ADMIN — SODIUM BICARBONATE 650 MG TABLET 650 MG: at 16:56

## 2025-08-21 RX ADMIN — PANTOPRAZOLE SODIUM 40 MG: 40 TABLET, DELAYED RELEASE ORAL at 08:22

## 2025-08-21 RX ADMIN — ISOSORBIDE MONONITRATE 30 MG: 30 TABLET, EXTENDED RELEASE ORAL at 08:22

## 2025-08-21 RX ADMIN — METOCLOPRAMIDE 5 MG: 5 TABLET ORAL at 13:03

## 2025-08-21 RX ADMIN — ATORVASTATIN CALCIUM 80 MG: 80 TABLET, FILM COATED ORAL at 08:22

## 2025-08-21 RX ADMIN — SODIUM BICARBONATE 650 MG TABLET 650 MG: at 20:55

## 2025-08-22 LAB
ANION GAP SERPL CALCULATED.3IONS-SCNC: 9.2 MMOL/L (ref 5–15)
BASOPHILS # BLD AUTO: 0.02 10*3/MM3 (ref 0–0.2)
BASOPHILS NFR BLD AUTO: 0.2 % (ref 0–1.5)
BUN SERPL-MCNC: 15 MG/DL (ref 6–20)
BUN/CREAT SERPL: 7.8 (ref 7–25)
CALCIUM SPEC-SCNC: 7.7 MG/DL (ref 8.6–10.5)
CHLORIDE SERPL-SCNC: 96 MMOL/L (ref 98–107)
CO2 SERPL-SCNC: 27.8 MMOL/L (ref 22–29)
CREAT SERPL-MCNC: 1.93 MG/DL (ref 0.57–1)
DEPRECATED RDW RBC AUTO: 50.6 FL (ref 37–54)
EGFRCR SERPLBLD CKD-EPI 2021: 31.8 ML/MIN/1.73
EOSINOPHIL # BLD AUTO: 0.06 10*3/MM3 (ref 0–0.4)
EOSINOPHIL NFR BLD AUTO: 0.7 % (ref 0.3–6.2)
ERYTHROCYTE [DISTWIDTH] IN BLOOD BY AUTOMATED COUNT: 14.7 % (ref 12.3–15.4)
GLUCOSE BLDC GLUCOMTR-MCNC: 125 MG/DL (ref 65–99)
GLUCOSE BLDC GLUCOMTR-MCNC: 188 MG/DL (ref 65–99)
GLUCOSE BLDC GLUCOMTR-MCNC: 291 MG/DL (ref 65–99)
GLUCOSE BLDC GLUCOMTR-MCNC: 67 MG/DL (ref 65–99)
GLUCOSE SERPL-MCNC: 126 MG/DL (ref 65–99)
HCT VFR BLD AUTO: 26.3 % (ref 34–46.6)
HGB BLD-MCNC: 8.5 G/DL (ref 12–15.9)
IMM GRANULOCYTES # BLD AUTO: 0.05 10*3/MM3 (ref 0–0.05)
IMM GRANULOCYTES NFR BLD AUTO: 0.6 % (ref 0–0.5)
LYMPHOCYTES # BLD AUTO: 1.14 10*3/MM3 (ref 0.7–3.1)
LYMPHOCYTES NFR BLD AUTO: 13.2 % (ref 19.6–45.3)
Lab: ABNORMAL
MCH RBC QN AUTO: 30.5 PG (ref 26.6–33)
MCHC RBC AUTO-ENTMCNC: 32.3 G/DL (ref 31.5–35.7)
MCV RBC AUTO: 94.3 FL (ref 79–97)
MONOCYTES # BLD AUTO: 0.73 10*3/MM3 (ref 0.1–0.9)
MONOCYTES NFR BLD AUTO: 8.5 % (ref 5–12)
NEUTROPHILS NFR BLD AUTO: 6.63 10*3/MM3 (ref 1.7–7)
NEUTROPHILS NFR BLD AUTO: 76.8 % (ref 42.7–76)
NRBC BLD AUTO-RTO: 0 /100 WBC (ref 0–0.2)
PLATELET # BLD AUTO: 265 10*3/MM3 (ref 140–450)
PMV BLD AUTO: 10.1 FL (ref 6–12)
POTASSIUM SERPL-SCNC: 3.3 MMOL/L (ref 3.5–5.2)
RBC # BLD AUTO: 2.79 10*6/MM3 (ref 3.77–5.28)
SODIUM SERPL-SCNC: 133 MMOL/L (ref 136–145)
WBC NRBC COR # BLD AUTO: 8.63 10*3/MM3 (ref 3.4–10.8)

## 2025-08-22 PROCEDURE — 25010000002 HEPARIN (PORCINE) PER 1000 UNITS: Performed by: SURGERY

## 2025-08-22 PROCEDURE — 82948 REAGENT STRIP/BLOOD GLUCOSE: CPT

## 2025-08-22 PROCEDURE — 85025 COMPLETE CBC W/AUTO DIFF WBC: CPT | Performed by: PODIATRIST

## 2025-08-22 PROCEDURE — 80048 BASIC METABOLIC PNL TOTAL CA: CPT | Performed by: PODIATRIST

## 2025-08-22 PROCEDURE — 99024 POSTOP FOLLOW-UP VISIT: CPT | Performed by: STUDENT IN AN ORGANIZED HEALTH CARE EDUCATION/TRAINING PROGRAM

## 2025-08-22 PROCEDURE — 97535 SELF CARE MNGMENT TRAINING: CPT

## 2025-08-22 PROCEDURE — 63710000001 INSULIN LISPRO (HUMAN) PER 5 UNITS: Performed by: PODIATRIST

## 2025-08-22 PROCEDURE — 82948 REAGENT STRIP/BLOOD GLUCOSE: CPT | Performed by: PODIATRIST

## 2025-08-22 PROCEDURE — 97116 GAIT TRAINING THERAPY: CPT

## 2025-08-22 RX ORDER — SODIUM HYPOCHLORITE 1.25 MG/ML
SOLUTION TOPICAL 2 TIMES DAILY
Status: DISCONTINUED | OUTPATIENT
Start: 2025-08-22 | End: 2025-08-27 | Stop reason: HOSPADM

## 2025-08-22 RX ORDER — POTASSIUM CHLORIDE 1500 MG/1
40 TABLET, EXTENDED RELEASE ORAL ONCE
Status: COMPLETED | OUTPATIENT
Start: 2025-08-22 | End: 2025-08-22

## 2025-08-22 RX ADMIN — METOCLOPRAMIDE 5 MG: 5 TABLET ORAL at 12:23

## 2025-08-22 RX ADMIN — DAKIN'S SOLUTION 0.125% (QUARTER STRENGTH): 0.12 SOLUTION at 21:09

## 2025-08-22 RX ADMIN — SODIUM BICARBONATE 650 MG TABLET 650 MG: at 21:07

## 2025-08-22 RX ADMIN — ASPIRIN 81 MG CHEWABLE TABLET 81 MG: 81 TABLET CHEWABLE at 08:43

## 2025-08-22 RX ADMIN — HEPARIN SODIUM 5000 UNITS: 5000 INJECTION INTRAVENOUS; SUBCUTANEOUS at 14:20

## 2025-08-22 RX ADMIN — INSULIN LISPRO 8 UNITS: 100 INJECTION, SOLUTION INTRAVENOUS; SUBCUTANEOUS at 12:23

## 2025-08-22 RX ADMIN — SODIUM BICARBONATE 650 MG TABLET 650 MG: at 17:04

## 2025-08-22 RX ADMIN — DOXYCYCLINE 100 MG: 100 CAPSULE ORAL at 21:07

## 2025-08-22 RX ADMIN — LISINOPRIL 5 MG: 2.5 TABLET ORAL at 08:43

## 2025-08-22 RX ADMIN — HEPARIN SODIUM 5000 UNITS: 5000 INJECTION INTRAVENOUS; SUBCUTANEOUS at 06:18

## 2025-08-22 RX ADMIN — LEVOFLOXACIN 500 MG: 500 TABLET, FILM COATED ORAL at 08:43

## 2025-08-22 RX ADMIN — METOCLOPRAMIDE 5 MG: 5 TABLET ORAL at 21:07

## 2025-08-22 RX ADMIN — HYDRALAZINE HYDROCHLORIDE 100 MG: 50 TABLET ORAL at 06:18

## 2025-08-22 RX ADMIN — POTASSIUM CHLORIDE 40 MEQ: 1500 TABLET, EXTENDED RELEASE ORAL at 17:04

## 2025-08-22 RX ADMIN — ATORVASTATIN CALCIUM 80 MG: 80 TABLET, FILM COATED ORAL at 08:43

## 2025-08-22 RX ADMIN — CLOPIDOGREL BISULFATE 75 MG: 75 TABLET, FILM COATED ORAL at 08:43

## 2025-08-22 RX ADMIN — SODIUM BICARBONATE 650 MG TABLET 650 MG: at 08:43

## 2025-08-22 RX ADMIN — AMLODIPINE BESYLATE 5 MG: 5 TABLET ORAL at 08:43

## 2025-08-22 RX ADMIN — DOXYCYCLINE 100 MG: 100 CAPSULE ORAL at 08:43

## 2025-08-22 RX ADMIN — HEPARIN SODIUM 5000 UNITS: 5000 INJECTION INTRAVENOUS; SUBCUTANEOUS at 21:07

## 2025-08-22 RX ADMIN — CARVEDILOL 12.5 MG: 12.5 TABLET, FILM COATED ORAL at 08:43

## 2025-08-22 RX ADMIN — HYDRALAZINE HYDROCHLORIDE 100 MG: 50 TABLET ORAL at 21:07

## 2025-08-22 RX ADMIN — PANTOPRAZOLE SODIUM 40 MG: 40 TABLET, DELAYED RELEASE ORAL at 08:43

## 2025-08-22 RX ADMIN — METOCLOPRAMIDE 5 MG: 5 TABLET ORAL at 08:43

## 2025-08-22 RX ADMIN — METOCLOPRAMIDE 5 MG: 5 TABLET ORAL at 17:04

## 2025-08-22 RX ADMIN — ESCITALOPRAM 20 MG: 10 TABLET, FILM COATED ORAL at 21:07

## 2025-08-22 RX ADMIN — ISOSORBIDE MONONITRATE 30 MG: 30 TABLET, EXTENDED RELEASE ORAL at 08:43

## 2025-08-23 LAB
ANION GAP SERPL CALCULATED.3IONS-SCNC: 10 MMOL/L (ref 5–15)
BASOPHILS # BLD AUTO: 0.03 10*3/MM3 (ref 0–0.2)
BASOPHILS NFR BLD AUTO: 0.4 % (ref 0–1.5)
BUN SERPL-MCNC: 14 MG/DL (ref 6–20)
BUN/CREAT SERPL: 7.8 (ref 7–25)
CALCIUM SPEC-SCNC: 8.1 MG/DL (ref 8.6–10.5)
CHLORIDE SERPL-SCNC: 96 MMOL/L (ref 98–107)
CO2 SERPL-SCNC: 28 MMOL/L (ref 22–29)
CREAT SERPL-MCNC: 1.79 MG/DL (ref 0.57–1)
DEPRECATED RDW RBC AUTO: 51.6 FL (ref 37–54)
EGFRCR SERPLBLD CKD-EPI 2021: 34.8 ML/MIN/1.73
EOSINOPHIL # BLD AUTO: 0.05 10*3/MM3 (ref 0–0.4)
EOSINOPHIL NFR BLD AUTO: 0.6 % (ref 0.3–6.2)
ERYTHROCYTE [DISTWIDTH] IN BLOOD BY AUTOMATED COUNT: 14.7 % (ref 12.3–15.4)
GLUCOSE BLDC GLUCOMTR-MCNC: 105 MG/DL (ref 65–99)
GLUCOSE BLDC GLUCOMTR-MCNC: 136 MG/DL (ref 65–99)
GLUCOSE BLDC GLUCOMTR-MCNC: 148 MG/DL (ref 65–99)
GLUCOSE BLDC GLUCOMTR-MCNC: 182 MG/DL (ref 65–99)
GLUCOSE SERPL-MCNC: 113 MG/DL (ref 65–99)
HCT VFR BLD AUTO: 25.2 % (ref 34–46.6)
HGB BLD-MCNC: 7.9 G/DL (ref 12–15.9)
IMM GRANULOCYTES # BLD AUTO: 0.05 10*3/MM3 (ref 0–0.05)
IMM GRANULOCYTES NFR BLD AUTO: 0.6 % (ref 0–0.5)
LYMPHOCYTES # BLD AUTO: 1.3 10*3/MM3 (ref 0.7–3.1)
LYMPHOCYTES NFR BLD AUTO: 15.9 % (ref 19.6–45.3)
Lab: ABNORMAL
MCH RBC QN AUTO: 29.9 PG (ref 26.6–33)
MCHC RBC AUTO-ENTMCNC: 31.3 G/DL (ref 31.5–35.7)
MCV RBC AUTO: 95.5 FL (ref 79–97)
MONOCYTES # BLD AUTO: 0.69 10*3/MM3 (ref 0.1–0.9)
MONOCYTES NFR BLD AUTO: 8.4 % (ref 5–12)
NEUTROPHILS NFR BLD AUTO: 6.06 10*3/MM3 (ref 1.7–7)
NEUTROPHILS NFR BLD AUTO: 74.1 % (ref 42.7–76)
NRBC BLD AUTO-RTO: 0 /100 WBC (ref 0–0.2)
PLATELET # BLD AUTO: 251 10*3/MM3 (ref 140–450)
PMV BLD AUTO: 10.4 FL (ref 6–12)
POTASSIUM SERPL-SCNC: 3.4 MMOL/L (ref 3.5–5.2)
RBC # BLD AUTO: 2.64 10*6/MM3 (ref 3.77–5.28)
SODIUM SERPL-SCNC: 134 MMOL/L (ref 136–145)
WBC NRBC COR # BLD AUTO: 8.18 10*3/MM3 (ref 3.4–10.8)

## 2025-08-23 PROCEDURE — 82948 REAGENT STRIP/BLOOD GLUCOSE: CPT

## 2025-08-23 PROCEDURE — 85025 COMPLETE CBC W/AUTO DIFF WBC: CPT | Performed by: PODIATRIST

## 2025-08-23 PROCEDURE — 82948 REAGENT STRIP/BLOOD GLUCOSE: CPT | Performed by: PODIATRIST

## 2025-08-23 PROCEDURE — 80048 BASIC METABOLIC PNL TOTAL CA: CPT | Performed by: PODIATRIST

## 2025-08-23 PROCEDURE — 25010000002 EPOETIN ALFA-EPBX 10000 UNIT/ML SOLUTION: Performed by: PODIATRIST

## 2025-08-23 PROCEDURE — 63710000001 ONDANSETRON ODT 4 MG TABLET DISPERSIBLE: Performed by: STUDENT IN AN ORGANIZED HEALTH CARE EDUCATION/TRAINING PROGRAM

## 2025-08-23 PROCEDURE — 99024 POSTOP FOLLOW-UP VISIT: CPT | Performed by: SURGERY

## 2025-08-23 PROCEDURE — 63710000001 INSULIN LISPRO (HUMAN) PER 5 UNITS: Performed by: PODIATRIST

## 2025-08-23 PROCEDURE — 25010000002 HEPARIN (PORCINE) PER 1000 UNITS: Performed by: SURGERY

## 2025-08-23 RX ORDER — LEVOFLOXACIN 750 MG/1
750 TABLET, FILM COATED ORAL EVERY OTHER DAY
Status: COMPLETED | OUTPATIENT
Start: 2025-08-25 | End: 2025-08-25

## 2025-08-23 RX ADMIN — CARVEDILOL 12.5 MG: 12.5 TABLET, FILM COATED ORAL at 17:10

## 2025-08-23 RX ADMIN — DAKIN'S SOLUTION 0.125% (QUARTER STRENGTH): 0.12 SOLUTION at 11:13

## 2025-08-23 RX ADMIN — ONDANSETRON 4 MG: 4 TABLET, ORALLY DISINTEGRATING ORAL at 09:34

## 2025-08-23 RX ADMIN — HEPARIN SODIUM 5000 UNITS: 5000 INJECTION INTRAVENOUS; SUBCUTANEOUS at 22:46

## 2025-08-23 RX ADMIN — ESCITALOPRAM 20 MG: 10 TABLET, FILM COATED ORAL at 20:39

## 2025-08-23 RX ADMIN — PANTOPRAZOLE SODIUM 40 MG: 40 TABLET, DELAYED RELEASE ORAL at 09:25

## 2025-08-23 RX ADMIN — SODIUM BICARBONATE 650 MG TABLET 650 MG: at 17:10

## 2025-08-23 RX ADMIN — EPOETIN ALFA-EPBX 20000 UNITS: 10000 INJECTION, SOLUTION INTRAVENOUS; SUBCUTANEOUS at 12:51

## 2025-08-23 RX ADMIN — CARVEDILOL 12.5 MG: 12.5 TABLET, FILM COATED ORAL at 09:25

## 2025-08-23 RX ADMIN — ISOSORBIDE MONONITRATE 30 MG: 30 TABLET, EXTENDED RELEASE ORAL at 09:25

## 2025-08-23 RX ADMIN — HYDRALAZINE HYDROCHLORIDE 100 MG: 50 TABLET ORAL at 14:22

## 2025-08-23 RX ADMIN — DOXYCYCLINE 100 MG: 100 CAPSULE ORAL at 20:39

## 2025-08-23 RX ADMIN — SODIUM BICARBONATE 650 MG TABLET 650 MG: at 20:39

## 2025-08-23 RX ADMIN — LEVOFLOXACIN 500 MG: 500 TABLET, FILM COATED ORAL at 09:25

## 2025-08-23 RX ADMIN — CLOPIDOGREL BISULFATE 75 MG: 75 TABLET, FILM COATED ORAL at 09:25

## 2025-08-23 RX ADMIN — HEPARIN SODIUM 5000 UNITS: 5000 INJECTION INTRAVENOUS; SUBCUTANEOUS at 06:28

## 2025-08-23 RX ADMIN — DAKIN'S SOLUTION 0.125% (QUARTER STRENGTH): 0.12 SOLUTION at 20:39

## 2025-08-23 RX ADMIN — ATORVASTATIN CALCIUM 80 MG: 80 TABLET, FILM COATED ORAL at 09:25

## 2025-08-23 RX ADMIN — HYDRALAZINE HYDROCHLORIDE 100 MG: 50 TABLET ORAL at 22:46

## 2025-08-23 RX ADMIN — INSULIN LISPRO 3 UNITS: 100 INJECTION, SOLUTION INTRAVENOUS; SUBCUTANEOUS at 22:46

## 2025-08-23 RX ADMIN — METOCLOPRAMIDE 5 MG: 5 TABLET ORAL at 09:25

## 2025-08-23 RX ADMIN — SODIUM BICARBONATE 650 MG TABLET 650 MG: at 09:25

## 2025-08-23 RX ADMIN — METOCLOPRAMIDE 5 MG: 5 TABLET ORAL at 12:51

## 2025-08-23 RX ADMIN — LISINOPRIL 5 MG: 2.5 TABLET ORAL at 09:25

## 2025-08-23 RX ADMIN — METOCLOPRAMIDE 5 MG: 5 TABLET ORAL at 20:39

## 2025-08-23 RX ADMIN — HYDRALAZINE HYDROCHLORIDE 100 MG: 50 TABLET ORAL at 06:28

## 2025-08-23 RX ADMIN — ASPIRIN 81 MG CHEWABLE TABLET 81 MG: 81 TABLET CHEWABLE at 09:25

## 2025-08-23 RX ADMIN — DOXYCYCLINE 100 MG: 100 CAPSULE ORAL at 09:25

## 2025-08-23 RX ADMIN — METOCLOPRAMIDE 5 MG: 5 TABLET ORAL at 17:10

## 2025-08-23 RX ADMIN — HEPARIN SODIUM 5000 UNITS: 5000 INJECTION INTRAVENOUS; SUBCUTANEOUS at 14:21

## 2025-08-24 LAB
ANION GAP SERPL CALCULATED.3IONS-SCNC: 12.7 MMOL/L (ref 5–15)
BASOPHILS # BLD AUTO: 0.02 10*3/MM3 (ref 0–0.2)
BASOPHILS NFR BLD AUTO: 0.3 % (ref 0–1.5)
BUN SERPL-MCNC: 14 MG/DL (ref 6–20)
BUN/CREAT SERPL: 7.6 (ref 7–25)
CALCIUM SPEC-SCNC: 8.2 MG/DL (ref 8.6–10.5)
CHLORIDE SERPL-SCNC: 99 MMOL/L (ref 98–107)
CO2 SERPL-SCNC: 25.3 MMOL/L (ref 22–29)
CREAT SERPL-MCNC: 1.85 MG/DL (ref 0.57–1)
DEPRECATED RDW RBC AUTO: 50.9 FL (ref 37–54)
EGFRCR SERPLBLD CKD-EPI 2021: 33.5 ML/MIN/1.73
EOSINOPHIL # BLD AUTO: 0.12 10*3/MM3 (ref 0–0.4)
EOSINOPHIL NFR BLD AUTO: 1.6 % (ref 0.3–6.2)
ERYTHROCYTE [DISTWIDTH] IN BLOOD BY AUTOMATED COUNT: 14.8 % (ref 12.3–15.4)
GLUCOSE BLDC GLUCOMTR-MCNC: 107 MG/DL (ref 65–99)
GLUCOSE BLDC GLUCOMTR-MCNC: 159 MG/DL (ref 65–99)
GLUCOSE BLDC GLUCOMTR-MCNC: 254 MG/DL (ref 65–99)
GLUCOSE BLDC GLUCOMTR-MCNC: 96 MG/DL (ref 65–99)
GLUCOSE SERPL-MCNC: 98 MG/DL (ref 65–99)
HCT VFR BLD AUTO: 25.5 % (ref 34–46.6)
HGB BLD-MCNC: 7.9 G/DL (ref 12–15.9)
IMM GRANULOCYTES # BLD AUTO: 0.03 10*3/MM3 (ref 0–0.05)
IMM GRANULOCYTES NFR BLD AUTO: 0.4 % (ref 0–0.5)
LYMPHOCYTES # BLD AUTO: 1.49 10*3/MM3 (ref 0.7–3.1)
LYMPHOCYTES NFR BLD AUTO: 20.1 % (ref 19.6–45.3)
MCH RBC QN AUTO: 29.6 PG (ref 26.6–33)
MCHC RBC AUTO-ENTMCNC: 31 G/DL (ref 31.5–35.7)
MCV RBC AUTO: 95.5 FL (ref 79–97)
MONOCYTES # BLD AUTO: 0.59 10*3/MM3 (ref 0.1–0.9)
MONOCYTES NFR BLD AUTO: 8 % (ref 5–12)
NEUTROPHILS NFR BLD AUTO: 5.15 10*3/MM3 (ref 1.7–7)
NEUTROPHILS NFR BLD AUTO: 69.6 % (ref 42.7–76)
NRBC BLD AUTO-RTO: 0 /100 WBC (ref 0–0.2)
PLATELET # BLD AUTO: 256 10*3/MM3 (ref 140–450)
PMV BLD AUTO: 10.3 FL (ref 6–12)
POTASSIUM SERPL-SCNC: 3.2 MMOL/L (ref 3.5–5.2)
RBC # BLD AUTO: 2.67 10*6/MM3 (ref 3.77–5.28)
SODIUM SERPL-SCNC: 137 MMOL/L (ref 136–145)
WBC NRBC COR # BLD AUTO: 7.4 10*3/MM3 (ref 3.4–10.8)

## 2025-08-24 PROCEDURE — 99024 POSTOP FOLLOW-UP VISIT: CPT | Performed by: SURGERY

## 2025-08-24 PROCEDURE — 82948 REAGENT STRIP/BLOOD GLUCOSE: CPT

## 2025-08-24 PROCEDURE — 82948 REAGENT STRIP/BLOOD GLUCOSE: CPT | Performed by: PODIATRIST

## 2025-08-24 PROCEDURE — 80048 BASIC METABOLIC PNL TOTAL CA: CPT | Performed by: PODIATRIST

## 2025-08-24 PROCEDURE — 85025 COMPLETE CBC W/AUTO DIFF WBC: CPT | Performed by: PODIATRIST

## 2025-08-24 PROCEDURE — 63710000001 INSULIN LISPRO (HUMAN) PER 5 UNITS: Performed by: PODIATRIST

## 2025-08-24 PROCEDURE — 25010000002 HEPARIN (PORCINE) PER 1000 UNITS: Performed by: SURGERY

## 2025-08-24 RX ADMIN — ISOSORBIDE MONONITRATE 30 MG: 30 TABLET, EXTENDED RELEASE ORAL at 09:12

## 2025-08-24 RX ADMIN — INSULIN LISPRO 3 UNITS: 100 INJECTION, SOLUTION INTRAVENOUS; SUBCUTANEOUS at 12:05

## 2025-08-24 RX ADMIN — SODIUM BICARBONATE 650 MG TABLET 650 MG: at 09:12

## 2025-08-24 RX ADMIN — ASPIRIN 81 MG CHEWABLE TABLET 81 MG: 81 TABLET CHEWABLE at 09:12

## 2025-08-24 RX ADMIN — DOXYCYCLINE 100 MG: 100 CAPSULE ORAL at 09:12

## 2025-08-24 RX ADMIN — HEPARIN SODIUM 5000 UNITS: 5000 INJECTION INTRAVENOUS; SUBCUTANEOUS at 06:00

## 2025-08-24 RX ADMIN — ATORVASTATIN CALCIUM 80 MG: 80 TABLET, FILM COATED ORAL at 09:12

## 2025-08-24 RX ADMIN — CARVEDILOL 12.5 MG: 12.5 TABLET, FILM COATED ORAL at 09:12

## 2025-08-24 RX ADMIN — DAKIN'S SOLUTION 0.125% (QUARTER STRENGTH): 0.12 SOLUTION at 21:43

## 2025-08-24 RX ADMIN — HYDRALAZINE HYDROCHLORIDE 100 MG: 50 TABLET ORAL at 14:45

## 2025-08-24 RX ADMIN — CLOPIDOGREL BISULFATE 75 MG: 75 TABLET, FILM COATED ORAL at 09:12

## 2025-08-24 RX ADMIN — DOXYCYCLINE 100 MG: 100 CAPSULE ORAL at 21:42

## 2025-08-24 RX ADMIN — LISINOPRIL 5 MG: 2.5 TABLET ORAL at 09:12

## 2025-08-24 RX ADMIN — HEPARIN SODIUM 5000 UNITS: 5000 INJECTION INTRAVENOUS; SUBCUTANEOUS at 14:43

## 2025-08-24 RX ADMIN — INSULIN LISPRO 8 UNITS: 100 INJECTION, SOLUTION INTRAVENOUS; SUBCUTANEOUS at 21:43

## 2025-08-24 RX ADMIN — METOCLOPRAMIDE 5 MG: 5 TABLET ORAL at 09:12

## 2025-08-24 RX ADMIN — METOCLOPRAMIDE 5 MG: 5 TABLET ORAL at 12:05

## 2025-08-24 RX ADMIN — HYDRALAZINE HYDROCHLORIDE 100 MG: 50 TABLET ORAL at 06:00

## 2025-08-24 RX ADMIN — ESCITALOPRAM 20 MG: 10 TABLET, FILM COATED ORAL at 21:42

## 2025-08-24 RX ADMIN — CARVEDILOL 12.5 MG: 12.5 TABLET, FILM COATED ORAL at 17:48

## 2025-08-24 RX ADMIN — METOCLOPRAMIDE 5 MG: 5 TABLET ORAL at 17:48

## 2025-08-24 RX ADMIN — SODIUM BICARBONATE 650 MG TABLET 650 MG: at 21:42

## 2025-08-24 RX ADMIN — METOCLOPRAMIDE 5 MG: 5 TABLET ORAL at 21:41

## 2025-08-24 RX ADMIN — HEPARIN SODIUM 5000 UNITS: 5000 INJECTION INTRAVENOUS; SUBCUTANEOUS at 21:42

## 2025-08-24 RX ADMIN — DAKIN'S SOLUTION 0.125% (QUARTER STRENGTH): 0.12 SOLUTION at 09:13

## 2025-08-24 RX ADMIN — PANTOPRAZOLE SODIUM 40 MG: 40 TABLET, DELAYED RELEASE ORAL at 09:12

## 2025-08-24 RX ADMIN — HYDRALAZINE HYDROCHLORIDE 100 MG: 50 TABLET ORAL at 21:43

## 2025-08-24 RX ADMIN — SODIUM BICARBONATE 650 MG TABLET 650 MG: at 17:47

## 2025-08-25 LAB
ANION GAP SERPL CALCULATED.3IONS-SCNC: 10.5 MMOL/L (ref 5–15)
BASOPHILS # BLD AUTO: 0.03 10*3/MM3 (ref 0–0.2)
BASOPHILS NFR BLD AUTO: 0.4 % (ref 0–1.5)
BUN SERPL-MCNC: 15 MG/DL (ref 6–20)
BUN/CREAT SERPL: 7.2 (ref 7–25)
CALCIUM SPEC-SCNC: 8.1 MG/DL (ref 8.6–10.5)
CHLORIDE SERPL-SCNC: 98 MMOL/L (ref 98–107)
CO2 SERPL-SCNC: 26.5 MMOL/L (ref 22–29)
CREAT SERPL-MCNC: 2.09 MG/DL (ref 0.57–1)
DEPRECATED RDW RBC AUTO: 50.8 FL (ref 37–54)
EGFRCR SERPLBLD CKD-EPI 2021: 28.9 ML/MIN/1.73
EOSINOPHIL # BLD AUTO: 0.11 10*3/MM3 (ref 0–0.4)
EOSINOPHIL NFR BLD AUTO: 1.3 % (ref 0.3–6.2)
ERYTHROCYTE [DISTWIDTH] IN BLOOD BY AUTOMATED COUNT: 14.9 % (ref 12.3–15.4)
GLUCOSE BLDC GLUCOMTR-MCNC: 113 MG/DL (ref 65–99)
GLUCOSE BLDC GLUCOMTR-MCNC: 114 MG/DL (ref 65–99)
GLUCOSE BLDC GLUCOMTR-MCNC: 138 MG/DL (ref 65–99)
GLUCOSE BLDC GLUCOMTR-MCNC: 164 MG/DL (ref 65–99)
GLUCOSE BLDC GLUCOMTR-MCNC: 172 MG/DL (ref 65–99)
GLUCOSE SERPL-MCNC: 123 MG/DL (ref 65–99)
HCT VFR BLD AUTO: 25.2 % (ref 34–46.6)
HGB BLD-MCNC: 8.2 G/DL (ref 12–15.9)
IMM GRANULOCYTES # BLD AUTO: 0.04 10*3/MM3 (ref 0–0.05)
IMM GRANULOCYTES NFR BLD AUTO: 0.5 % (ref 0–0.5)
LYMPHOCYTES # BLD AUTO: 1.87 10*3/MM3 (ref 0.7–3.1)
LYMPHOCYTES NFR BLD AUTO: 22.3 % (ref 19.6–45.3)
MCH RBC QN AUTO: 30.9 PG (ref 26.6–33)
MCHC RBC AUTO-ENTMCNC: 32.5 G/DL (ref 31.5–35.7)
MCV RBC AUTO: 95.1 FL (ref 79–97)
MONOCYTES # BLD AUTO: 0.64 10*3/MM3 (ref 0.1–0.9)
MONOCYTES NFR BLD AUTO: 7.6 % (ref 5–12)
NEUTROPHILS NFR BLD AUTO: 5.7 10*3/MM3 (ref 1.7–7)
NEUTROPHILS NFR BLD AUTO: 67.9 % (ref 42.7–76)
NRBC BLD AUTO-RTO: 0 /100 WBC (ref 0–0.2)
PLATELET # BLD AUTO: 278 10*3/MM3 (ref 140–450)
PMV BLD AUTO: 10.1 FL (ref 6–12)
POTASSIUM SERPL-SCNC: 3.1 MMOL/L (ref 3.5–5.2)
RBC # BLD AUTO: 2.65 10*6/MM3 (ref 3.77–5.28)
SODIUM SERPL-SCNC: 135 MMOL/L (ref 136–145)
WBC NRBC COR # BLD AUTO: 8.39 10*3/MM3 (ref 3.4–10.8)

## 2025-08-25 PROCEDURE — 80048 BASIC METABOLIC PNL TOTAL CA: CPT | Performed by: PODIATRIST

## 2025-08-25 PROCEDURE — 63710000001 INSULIN LISPRO (HUMAN) PER 5 UNITS: Performed by: PODIATRIST

## 2025-08-25 PROCEDURE — 82948 REAGENT STRIP/BLOOD GLUCOSE: CPT | Performed by: PODIATRIST

## 2025-08-25 PROCEDURE — 99024 POSTOP FOLLOW-UP VISIT: CPT | Performed by: SURGERY

## 2025-08-25 PROCEDURE — 82948 REAGENT STRIP/BLOOD GLUCOSE: CPT

## 2025-08-25 PROCEDURE — 85025 COMPLETE CBC W/AUTO DIFF WBC: CPT | Performed by: PODIATRIST

## 2025-08-25 PROCEDURE — 25010000002 HEPARIN (PORCINE) PER 1000 UNITS: Performed by: SURGERY

## 2025-08-25 RX ORDER — HYDRALAZINE HYDROCHLORIDE 50 MG/1
50 TABLET, FILM COATED ORAL EVERY 8 HOURS SCHEDULED
Status: DISCONTINUED | OUTPATIENT
Start: 2025-08-25 | End: 2025-08-27 | Stop reason: HOSPADM

## 2025-08-25 RX ADMIN — ATORVASTATIN CALCIUM 80 MG: 80 TABLET, FILM COATED ORAL at 08:38

## 2025-08-25 RX ADMIN — HYDRALAZINE HYDROCHLORIDE 50 MG: 50 TABLET ORAL at 15:09

## 2025-08-25 RX ADMIN — METOCLOPRAMIDE 5 MG: 5 TABLET ORAL at 08:39

## 2025-08-25 RX ADMIN — ASPIRIN 81 MG CHEWABLE TABLET 81 MG: 81 TABLET CHEWABLE at 08:38

## 2025-08-25 RX ADMIN — METOCLOPRAMIDE 5 MG: 5 TABLET ORAL at 12:08

## 2025-08-25 RX ADMIN — INSULIN LISPRO 3 UNITS: 100 INJECTION, SOLUTION INTRAVENOUS; SUBCUTANEOUS at 12:08

## 2025-08-25 RX ADMIN — CARVEDILOL 12.5 MG: 12.5 TABLET, FILM COATED ORAL at 16:50

## 2025-08-25 RX ADMIN — CLOPIDOGREL BISULFATE 75 MG: 75 TABLET, FILM COATED ORAL at 08:39

## 2025-08-25 RX ADMIN — DAKIN'S SOLUTION 0.125% (QUARTER STRENGTH): 0.12 SOLUTION at 08:39

## 2025-08-25 RX ADMIN — DOXYCYCLINE 100 MG: 100 CAPSULE ORAL at 20:55

## 2025-08-25 RX ADMIN — LISINOPRIL 5 MG: 2.5 TABLET ORAL at 08:38

## 2025-08-25 RX ADMIN — METOCLOPRAMIDE 5 MG: 5 TABLET ORAL at 20:55

## 2025-08-25 RX ADMIN — SODIUM BICARBONATE 650 MG TABLET 650 MG: at 15:09

## 2025-08-25 RX ADMIN — INSULIN LISPRO 3 UNITS: 100 INJECTION, SOLUTION INTRAVENOUS; SUBCUTANEOUS at 16:51

## 2025-08-25 RX ADMIN — CARVEDILOL 12.5 MG: 12.5 TABLET, FILM COATED ORAL at 08:38

## 2025-08-25 RX ADMIN — HEPARIN SODIUM 5000 UNITS: 5000 INJECTION INTRAVENOUS; SUBCUTANEOUS at 15:09

## 2025-08-25 RX ADMIN — SODIUM BICARBONATE 650 MG TABLET 650 MG: at 08:39

## 2025-08-25 RX ADMIN — LEVOFLOXACIN 750 MG: 750 TABLET, FILM COATED ORAL at 08:38

## 2025-08-25 RX ADMIN — DAKIN'S SOLUTION 0.125% (QUARTER STRENGTH): 0.12 SOLUTION at 20:56

## 2025-08-25 RX ADMIN — HEPARIN SODIUM 5000 UNITS: 5000 INJECTION INTRAVENOUS; SUBCUTANEOUS at 20:55

## 2025-08-25 RX ADMIN — SODIUM BICARBONATE 650 MG TABLET 650 MG: at 20:55

## 2025-08-25 RX ADMIN — HYDRALAZINE HYDROCHLORIDE 50 MG: 50 TABLET ORAL at 20:55

## 2025-08-25 RX ADMIN — ISOSORBIDE MONONITRATE 30 MG: 30 TABLET, EXTENDED RELEASE ORAL at 08:38

## 2025-08-25 RX ADMIN — HYDRALAZINE HYDROCHLORIDE 100 MG: 50 TABLET ORAL at 06:14

## 2025-08-25 RX ADMIN — METOCLOPRAMIDE 5 MG: 5 TABLET ORAL at 16:51

## 2025-08-25 RX ADMIN — ESCITALOPRAM 20 MG: 10 TABLET, FILM COATED ORAL at 20:55

## 2025-08-25 RX ADMIN — HEPARIN SODIUM 5000 UNITS: 5000 INJECTION INTRAVENOUS; SUBCUTANEOUS at 05:45

## 2025-08-25 RX ADMIN — PANTOPRAZOLE SODIUM 40 MG: 40 TABLET, DELAYED RELEASE ORAL at 08:39

## 2025-08-25 RX ADMIN — DOXYCYCLINE 100 MG: 100 CAPSULE ORAL at 08:39

## 2025-08-26 LAB
ANION GAP SERPL CALCULATED.3IONS-SCNC: 12.5 MMOL/L (ref 5–15)
BASOPHILS # BLD AUTO: 0.03 10*3/MM3 (ref 0–0.2)
BASOPHILS NFR BLD AUTO: 0.4 % (ref 0–1.5)
BUN SERPL-MCNC: 14 MG/DL (ref 6–20)
BUN/CREAT SERPL: 6.7 (ref 7–25)
CALCIUM SPEC-SCNC: 7.7 MG/DL (ref 8.6–10.5)
CHLORIDE SERPL-SCNC: 99 MMOL/L (ref 98–107)
CO2 SERPL-SCNC: 25.5 MMOL/L (ref 22–29)
CREAT SERPL-MCNC: 2.1 MG/DL (ref 0.57–1)
DEPRECATED RDW RBC AUTO: 49.9 FL (ref 37–54)
EGFRCR SERPLBLD CKD-EPI 2021: 28.8 ML/MIN/1.73
EOSINOPHIL # BLD AUTO: 0.08 10*3/MM3 (ref 0–0.4)
EOSINOPHIL NFR BLD AUTO: 1.1 % (ref 0.3–6.2)
ERYTHROCYTE [DISTWIDTH] IN BLOOD BY AUTOMATED COUNT: 14.5 % (ref 12.3–15.4)
GLUCOSE BLDC GLUCOMTR-MCNC: 113 MG/DL (ref 65–99)
GLUCOSE BLDC GLUCOMTR-MCNC: 166 MG/DL (ref 65–99)
GLUCOSE BLDC GLUCOMTR-MCNC: 181 MG/DL (ref 65–99)
GLUCOSE BLDC GLUCOMTR-MCNC: 182 MG/DL (ref 65–99)
GLUCOSE SERPL-MCNC: 118 MG/DL (ref 65–99)
HCT VFR BLD AUTO: 25.3 % (ref 34–46.6)
HGB BLD-MCNC: 8 G/DL (ref 12–15.9)
IMM GRANULOCYTES # BLD AUTO: 0.05 10*3/MM3 (ref 0–0.05)
IMM GRANULOCYTES NFR BLD AUTO: 0.7 % (ref 0–0.5)
LYMPHOCYTES # BLD AUTO: 1.51 10*3/MM3 (ref 0.7–3.1)
LYMPHOCYTES NFR BLD AUTO: 20.1 % (ref 19.6–45.3)
MCH RBC QN AUTO: 29.9 PG (ref 26.6–33)
MCHC RBC AUTO-ENTMCNC: 31.6 G/DL (ref 31.5–35.7)
MCV RBC AUTO: 94.4 FL (ref 79–97)
MONOCYTES # BLD AUTO: 0.65 10*3/MM3 (ref 0.1–0.9)
MONOCYTES NFR BLD AUTO: 8.7 % (ref 5–12)
NEUTROPHILS NFR BLD AUTO: 5.19 10*3/MM3 (ref 1.7–7)
NEUTROPHILS NFR BLD AUTO: 69 % (ref 42.7–76)
NRBC BLD AUTO-RTO: 0 /100 WBC (ref 0–0.2)
PLATELET # BLD AUTO: 273 10*3/MM3 (ref 140–450)
PMV BLD AUTO: 9.9 FL (ref 6–12)
POTASSIUM SERPL-SCNC: 3 MMOL/L (ref 3.5–5.2)
RBC # BLD AUTO: 2.68 10*6/MM3 (ref 3.77–5.28)
SODIUM SERPL-SCNC: 137 MMOL/L (ref 136–145)
WBC NRBC COR # BLD AUTO: 7.51 10*3/MM3 (ref 3.4–10.8)

## 2025-08-26 PROCEDURE — 82948 REAGENT STRIP/BLOOD GLUCOSE: CPT | Performed by: PODIATRIST

## 2025-08-26 PROCEDURE — 80048 BASIC METABOLIC PNL TOTAL CA: CPT | Performed by: PODIATRIST

## 2025-08-26 PROCEDURE — 97530 THERAPEUTIC ACTIVITIES: CPT

## 2025-08-26 PROCEDURE — 82948 REAGENT STRIP/BLOOD GLUCOSE: CPT

## 2025-08-26 PROCEDURE — 85025 COMPLETE CBC W/AUTO DIFF WBC: CPT | Performed by: PODIATRIST

## 2025-08-26 PROCEDURE — 63710000001 INSULIN LISPRO (HUMAN) PER 5 UNITS: Performed by: PODIATRIST

## 2025-08-26 PROCEDURE — 25010000002 HEPARIN (PORCINE) PER 1000 UNITS: Performed by: SURGERY

## 2025-08-26 PROCEDURE — 99024 POSTOP FOLLOW-UP VISIT: CPT | Performed by: SURGERY

## 2025-08-26 RX ORDER — POTASSIUM CHLORIDE 1500 MG/1
40 TABLET, EXTENDED RELEASE ORAL
Status: COMPLETED | OUTPATIENT
Start: 2025-08-26 | End: 2025-08-26

## 2025-08-26 RX ORDER — AMLODIPINE BESYLATE 5 MG/1
5 TABLET ORAL
Status: DISCONTINUED | OUTPATIENT
Start: 2025-08-26 | End: 2025-08-27 | Stop reason: HOSPADM

## 2025-08-26 RX ADMIN — SODIUM BICARBONATE 650 MG TABLET 650 MG: at 08:11

## 2025-08-26 RX ADMIN — DOXYCYCLINE 100 MG: 100 CAPSULE ORAL at 08:10

## 2025-08-26 RX ADMIN — INSULIN LISPRO 3 UNITS: 100 INJECTION, SOLUTION INTRAVENOUS; SUBCUTANEOUS at 22:03

## 2025-08-26 RX ADMIN — DAKIN'S SOLUTION 0.125% (QUARTER STRENGTH): 0.12 SOLUTION at 08:11

## 2025-08-26 RX ADMIN — DAKIN'S SOLUTION 0.125% (QUARTER STRENGTH): 0.12 SOLUTION at 22:05

## 2025-08-26 RX ADMIN — DOXYCYCLINE 100 MG: 100 CAPSULE ORAL at 22:04

## 2025-08-26 RX ADMIN — CARVEDILOL 12.5 MG: 12.5 TABLET, FILM COATED ORAL at 08:10

## 2025-08-26 RX ADMIN — METOCLOPRAMIDE 5 MG: 5 TABLET ORAL at 08:10

## 2025-08-26 RX ADMIN — POTASSIUM CHLORIDE 40 MEQ: 1500 TABLET, EXTENDED RELEASE ORAL at 08:10

## 2025-08-26 RX ADMIN — METOCLOPRAMIDE 5 MG: 5 TABLET ORAL at 16:55

## 2025-08-26 RX ADMIN — SODIUM BICARBONATE 650 MG TABLET 650 MG: at 16:55

## 2025-08-26 RX ADMIN — SODIUM BICARBONATE 650 MG TABLET 650 MG: at 22:04

## 2025-08-26 RX ADMIN — PANTOPRAZOLE SODIUM 40 MG: 40 TABLET, DELAYED RELEASE ORAL at 08:10

## 2025-08-26 RX ADMIN — POTASSIUM CHLORIDE 40 MEQ: 1500 TABLET, EXTENDED RELEASE ORAL at 11:41

## 2025-08-26 RX ADMIN — ASPIRIN 81 MG CHEWABLE TABLET 81 MG: 81 TABLET CHEWABLE at 08:10

## 2025-08-26 RX ADMIN — HEPARIN SODIUM 5000 UNITS: 5000 INJECTION INTRAVENOUS; SUBCUTANEOUS at 06:52

## 2025-08-26 RX ADMIN — ATORVASTATIN CALCIUM 80 MG: 80 TABLET, FILM COATED ORAL at 08:10

## 2025-08-26 RX ADMIN — CLOPIDOGREL BISULFATE 75 MG: 75 TABLET, FILM COATED ORAL at 08:10

## 2025-08-26 RX ADMIN — ESCITALOPRAM 20 MG: 10 TABLET, FILM COATED ORAL at 22:04

## 2025-08-26 RX ADMIN — HYDRALAZINE HYDROCHLORIDE 50 MG: 50 TABLET ORAL at 22:04

## 2025-08-26 RX ADMIN — HEPARIN SODIUM 5000 UNITS: 5000 INJECTION INTRAVENOUS; SUBCUTANEOUS at 14:35

## 2025-08-26 RX ADMIN — HEPARIN SODIUM 5000 UNITS: 5000 INJECTION INTRAVENOUS; SUBCUTANEOUS at 22:05

## 2025-08-26 RX ADMIN — HYDRALAZINE HYDROCHLORIDE 50 MG: 50 TABLET ORAL at 06:52

## 2025-08-26 RX ADMIN — ISOSORBIDE MONONITRATE 30 MG: 30 TABLET, EXTENDED RELEASE ORAL at 08:10

## 2025-08-26 RX ADMIN — METOCLOPRAMIDE 5 MG: 5 TABLET ORAL at 22:04

## 2025-08-26 RX ADMIN — HYDRALAZINE HYDROCHLORIDE 50 MG: 50 TABLET ORAL at 14:35

## 2025-08-26 RX ADMIN — LISINOPRIL 5 MG: 2.5 TABLET ORAL at 08:10

## 2025-08-26 RX ADMIN — INSULIN LISPRO 3 UNITS: 100 INJECTION, SOLUTION INTRAVENOUS; SUBCUTANEOUS at 16:55

## 2025-08-26 RX ADMIN — INSULIN LISPRO 3 UNITS: 100 INJECTION, SOLUTION INTRAVENOUS; SUBCUTANEOUS at 11:41

## 2025-08-26 RX ADMIN — METOCLOPRAMIDE 5 MG: 5 TABLET ORAL at 11:41

## 2025-08-26 RX ADMIN — CARVEDILOL 12.5 MG: 12.5 TABLET, FILM COATED ORAL at 16:55

## 2025-08-27 VITALS
SYSTOLIC BLOOD PRESSURE: 106 MMHG | DIASTOLIC BLOOD PRESSURE: 61 MMHG | TEMPERATURE: 99.2 F | WEIGHT: 147.93 LBS | HEIGHT: 64 IN | OXYGEN SATURATION: 100 % | HEART RATE: 66 BPM | BODY MASS INDEX: 25.25 KG/M2 | RESPIRATION RATE: 16 BRPM

## 2025-08-27 LAB
ANION GAP SERPL CALCULATED.3IONS-SCNC: 11.9 MMOL/L (ref 5–15)
BASOPHILS # BLD AUTO: 0.04 10*3/MM3 (ref 0–0.2)
BASOPHILS NFR BLD AUTO: 0.5 % (ref 0–1.5)
BUN SERPL-MCNC: 15 MG/DL (ref 6–20)
BUN/CREAT SERPL: 7.3 (ref 7–25)
CALCIUM SPEC-SCNC: 7.8 MG/DL (ref 8.6–10.5)
CHLORIDE SERPL-SCNC: 102 MMOL/L (ref 98–107)
CO2 SERPL-SCNC: 25.1 MMOL/L (ref 22–29)
CREAT SERPL-MCNC: 2.05 MG/DL (ref 0.57–1)
DEPRECATED RDW RBC AUTO: 52.3 FL (ref 37–54)
EGFRCR SERPLBLD CKD-EPI 2021: 29.6 ML/MIN/1.73
EOSINOPHIL # BLD AUTO: 0.11 10*3/MM3 (ref 0–0.4)
EOSINOPHIL NFR BLD AUTO: 1.5 % (ref 0.3–6.2)
ERYTHROCYTE [DISTWIDTH] IN BLOOD BY AUTOMATED COUNT: 14.7 % (ref 12.3–15.4)
GLUCOSE BLDC GLUCOMTR-MCNC: 104 MG/DL (ref 65–99)
GLUCOSE BLDC GLUCOMTR-MCNC: 121 MG/DL (ref 65–99)
GLUCOSE SERPL-MCNC: 105 MG/DL (ref 65–99)
HCT VFR BLD AUTO: 25.2 % (ref 34–46.6)
HGB BLD-MCNC: 7.7 G/DL (ref 12–15.9)
IMM GRANULOCYTES # BLD AUTO: 0.03 10*3/MM3 (ref 0–0.05)
IMM GRANULOCYTES NFR BLD AUTO: 0.4 % (ref 0–0.5)
LYMPHOCYTES # BLD AUTO: 1.77 10*3/MM3 (ref 0.7–3.1)
LYMPHOCYTES NFR BLD AUTO: 23.9 % (ref 19.6–45.3)
MCH RBC QN AUTO: 29.6 PG (ref 26.6–33)
MCHC RBC AUTO-ENTMCNC: 30.6 G/DL (ref 31.5–35.7)
MCV RBC AUTO: 96.9 FL (ref 79–97)
MONOCYTES # BLD AUTO: 0.68 10*3/MM3 (ref 0.1–0.9)
MONOCYTES NFR BLD AUTO: 9.2 % (ref 5–12)
NEUTROPHILS NFR BLD AUTO: 4.79 10*3/MM3 (ref 1.7–7)
NEUTROPHILS NFR BLD AUTO: 64.5 % (ref 42.7–76)
NRBC BLD AUTO-RTO: 0 /100 WBC (ref 0–0.2)
PLATELET # BLD AUTO: 267 10*3/MM3 (ref 140–450)
PMV BLD AUTO: 9.8 FL (ref 6–12)
POTASSIUM SERPL-SCNC: 3 MMOL/L (ref 3.5–5.2)
RBC # BLD AUTO: 2.6 10*6/MM3 (ref 3.77–5.28)
SODIUM SERPL-SCNC: 139 MMOL/L (ref 136–145)
WBC NRBC COR # BLD AUTO: 7.42 10*3/MM3 (ref 3.4–10.8)

## 2025-08-27 PROCEDURE — 97530 THERAPEUTIC ACTIVITIES: CPT

## 2025-08-27 PROCEDURE — 25010000002 HEPARIN (PORCINE) PER 1000 UNITS: Performed by: SURGERY

## 2025-08-27 PROCEDURE — 97116 GAIT TRAINING THERAPY: CPT

## 2025-08-27 PROCEDURE — 80048 BASIC METABOLIC PNL TOTAL CA: CPT | Performed by: PODIATRIST

## 2025-08-27 PROCEDURE — 85025 COMPLETE CBC W/AUTO DIFF WBC: CPT | Performed by: PODIATRIST

## 2025-08-27 PROCEDURE — 99024 POSTOP FOLLOW-UP VISIT: CPT | Performed by: STUDENT IN AN ORGANIZED HEALTH CARE EDUCATION/TRAINING PROGRAM

## 2025-08-27 PROCEDURE — 82948 REAGENT STRIP/BLOOD GLUCOSE: CPT

## 2025-08-27 PROCEDURE — 82948 REAGENT STRIP/BLOOD GLUCOSE: CPT | Performed by: PODIATRIST

## 2025-08-27 RX ORDER — DOXYCYCLINE 100 MG/1
100 CAPSULE ORAL EVERY 12 HOURS SCHEDULED
Qty: 38 CAPSULE | Refills: 0 | Status: SHIPPED | OUTPATIENT
Start: 2025-08-27 | End: 2025-09-15

## 2025-08-27 RX ORDER — POTASSIUM CHLORIDE 1500 MG/1
40 TABLET, EXTENDED RELEASE ORAL
Status: COMPLETED | OUTPATIENT
Start: 2025-08-27 | End: 2025-08-27

## 2025-08-27 RX ORDER — INSULIN LISPRO 100 [IU]/ML
3-14 INJECTION, SOLUTION INTRAVENOUS; SUBCUTANEOUS
Qty: 15 ML | Refills: 12 | Status: SHIPPED | OUTPATIENT
Start: 2025-08-27

## 2025-08-27 RX ORDER — OXYCODONE HYDROCHLORIDE 5 MG/1
5 TABLET ORAL EVERY 4 HOURS PRN
Qty: 20 TABLET | Refills: 0 | Status: SHIPPED | OUTPATIENT
Start: 2025-08-27

## 2025-08-27 RX ORDER — ISOSORBIDE MONONITRATE 30 MG/1
30 TABLET, EXTENDED RELEASE ORAL
Qty: 30 TABLET | Refills: 11 | Status: SHIPPED | OUTPATIENT
Start: 2025-08-28

## 2025-08-27 RX ORDER — ESCITALOPRAM OXALATE 20 MG/1
20 TABLET ORAL NIGHTLY
Qty: 90 TABLET | Refills: 3 | Status: SHIPPED | OUTPATIENT
Start: 2025-08-27

## 2025-08-27 RX ADMIN — SODIUM BICARBONATE 650 MG TABLET 650 MG: at 08:32

## 2025-08-27 RX ADMIN — SODIUM BICARBONATE 650 MG TABLET 650 MG: at 15:41

## 2025-08-27 RX ADMIN — HEPARIN SODIUM 5000 UNITS: 5000 INJECTION INTRAVENOUS; SUBCUTANEOUS at 14:42

## 2025-08-27 RX ADMIN — CLOPIDOGREL BISULFATE 75 MG: 75 TABLET, FILM COATED ORAL at 08:32

## 2025-08-27 RX ADMIN — METOCLOPRAMIDE 5 MG: 5 TABLET ORAL at 12:06

## 2025-08-27 RX ADMIN — HEPARIN SODIUM 5000 UNITS: 5000 INJECTION INTRAVENOUS; SUBCUTANEOUS at 05:32

## 2025-08-27 RX ADMIN — DAKIN'S SOLUTION 0.125% (QUARTER STRENGTH): 0.12 SOLUTION at 10:00

## 2025-08-27 RX ADMIN — HYDRALAZINE HYDROCHLORIDE 50 MG: 50 TABLET ORAL at 05:32

## 2025-08-27 RX ADMIN — METOCLOPRAMIDE 5 MG: 5 TABLET ORAL at 08:32

## 2025-08-27 RX ADMIN — ISOSORBIDE MONONITRATE 30 MG: 30 TABLET, EXTENDED RELEASE ORAL at 08:33

## 2025-08-27 RX ADMIN — ASPIRIN 81 MG CHEWABLE TABLET 81 MG: 81 TABLET CHEWABLE at 08:33

## 2025-08-27 RX ADMIN — ATORVASTATIN CALCIUM 80 MG: 80 TABLET, FILM COATED ORAL at 08:32

## 2025-08-27 RX ADMIN — POTASSIUM CHLORIDE 40 MEQ: 1500 TABLET, EXTENDED RELEASE ORAL at 15:41

## 2025-08-27 RX ADMIN — POTASSIUM CHLORIDE 40 MEQ: 1500 TABLET, EXTENDED RELEASE ORAL at 12:06

## 2025-08-27 RX ADMIN — PANTOPRAZOLE SODIUM 40 MG: 40 TABLET, DELAYED RELEASE ORAL at 08:33

## 2025-08-27 RX ADMIN — LISINOPRIL 5 MG: 2.5 TABLET ORAL at 08:32

## 2025-08-27 RX ADMIN — DOXYCYCLINE 100 MG: 100 CAPSULE ORAL at 08:32

## 2025-08-27 RX ADMIN — HYDRALAZINE HYDROCHLORIDE 50 MG: 50 TABLET ORAL at 14:42

## 2025-08-27 RX ADMIN — CARVEDILOL 12.5 MG: 12.5 TABLET, FILM COATED ORAL at 08:33

## (undated) DEVICE — SOL NS 500ML

## (undated) DEVICE — TOWEL,OR,DSP,ST,BLUE,STD,4/PK,20PK/CS: Brand: MEDLINE

## (undated) DEVICE — SPONGE,LAP,18"X18",DLX,XR,ST,5/PK,40/PK: Brand: MEDLINE

## (undated) DEVICE — SYR LUERLOK 5CC

## (undated) DEVICE — HEMOCONCENTRATOR PERFUS LPS06

## (undated) DEVICE — SUT MNCRYL 3/0 SH 27 IN Y416H

## (undated) DEVICE — 1LYRTR 16FR10ML100%SIL UMS SNP: Brand: MEDLINE INDUSTRIES, INC.

## (undated) DEVICE — PATIENT RETURN ELECTRODE, SINGLE-USE, CONTACT QUALITY MONITORING, ADULT, WITH 9FT CORD, FOR PATIENTS WEIGING OVER 33LBS. (15KG): Brand: MEGADYNE

## (undated) DEVICE — Device

## (undated) DEVICE — PINNACLE R/O II INTRODUCER SHEATH WITH RADIOPAQUE MARKER: Brand: PINNACLE

## (undated) DEVICE — RADIFOCUS GLIDEWIRE: Brand: GLIDEWIRE

## (undated) DEVICE — PK ATS CUST W CARDIOTOMY RESEVOIR

## (undated) DEVICE — GLV SURG BIOGEL LTX PF 7 1/2

## (undated) DEVICE — VESSEL LOOPS X-RAY DETECTABLE: Brand: DEROYAL

## (undated) DEVICE — GAUZE,BORDER,4"X8",2"X6"PAD,STERILE: Brand: MEDLINE

## (undated) DEVICE — PK AAA 40

## (undated) DEVICE — INTENDED FOR TISSUE SEPARATION, AND OTHER PROCEDURES THAT REQUIRE A SHARP SURGICAL BLADE TO PUNCTURE OR CUT.: Brand: BARD-PARKER ® CARBON RIB-BACK BLADES

## (undated) DEVICE — SYR LUERLOK 30CC

## (undated) DEVICE — GLV SURG BIOGEL LTX PF 8

## (undated) DEVICE — PK TRY HEART CATH 50

## (undated) DEVICE — ANTIBACTERIAL UNDYED BRAIDED (POLYGLACTIN 910), SYNTHETIC ABSORBABLE SUTURE: Brand: COATED VICRYL

## (undated) DEVICE — SYRINGE KIT,PACKAGED,,150FT,MK 7(ANGIO-ARTERION, 150ML SYR KIT W/QFT,MC)(60729385): Brand: MEDRAD® MARK 7 ARTERION DISPOSABLE SYRINGE 150 ML WITH QUICK FILL TUBE

## (undated) DEVICE — DESTINATION RENAL GUIDING SHEATH: Brand: DESTINATION

## (undated) DEVICE — 3M™ TEGADERM™ CHG DRESSING 25/CARTON 4 CARTONS/CASE 1658: Brand: TEGADERM™

## (undated) DEVICE — SUT SILK 2/0 TIES 18IN A185H

## (undated) DEVICE — STPLR SKIN VISISTAT WD 35CT

## (undated) DEVICE — CVR PROB ULTRASND CIVFLEX GEN/PURP TELESCP/FOLD 5.5X96IN LF

## (undated) DEVICE — EXTENSION SET, MALE LUER LOCK ADAPTER WITH RETRACTABLE COLLAR

## (undated) DEVICE — SOL NACL 0.9PCT 1000ML

## (undated) DEVICE — SYS PERFUS SEP PLATLT W TIPS CUST

## (undated) DEVICE — CVR PROB 96IN LF STRL

## (undated) DEVICE — ST ACC MICROPUNCTURE STFF/CANN PLAT/TP 4F 21G 40CM

## (undated) DEVICE — DRSNG WND GZ PAD BORDERED 4X8IN STRL

## (undated) DEVICE — GOWN,SIRUS,NONRNF,SETINSLV,2XL,18/CS: Brand: MEDLINE

## (undated) DEVICE — PK PERFUS CUST W/CARDIOPLEGIA

## (undated) DEVICE — ADAPT ANTEGRADE RETRGR

## (undated) DEVICE — SUT SILK 4/0 TIES 18IN A183H

## (undated) DEVICE — ABSORBABLE HEMOSTAT (OXIDIZED REGENERATED CELLULOSE)
Type: IMPLANTABLE DEVICE | Site: STERNUM | Status: NON-FUNCTIONAL
Brand: SURGICEL
Removed: 2024-07-01

## (undated) DEVICE — PTA BALLOON DILATATION CATHETER: Brand: MUSTANG™

## (undated) DEVICE — CANN RETRGR STYLET RSCP 15F

## (undated) DEVICE — PINNACLE INTRODUCER SHEATH: Brand: PINNACLE

## (undated) DEVICE — SPONGE,DISSECTOR,K,XRAY,9/16"X1/4",STRL: Brand: MEDLINE

## (undated) DEVICE — TTL1LYR 16FR10ML 100%SIL TMPST TR: Brand: MEDLINE

## (undated) DEVICE — TBG SXN PERFUS W TP

## (undated) DEVICE — 28 FR RIGHT ANGLE – SOFT PVC CATHETER: Brand: PVC THORACIC CATHETERS

## (undated) DEVICE — 450 ML BOTTLE OF 0.05% CHLORHEXIDINE GLUCONATE IN 99.95% STERILE WATER FOR IRRIGATION, USP AND APPLICATOR.: Brand: IRRISEPT ANTIMICROBIAL WOUND LAVAGE

## (undated) DEVICE — CATH MPAC STP 6F: Brand: SUPER TORQUE

## (undated) DEVICE — SUT PROLN 5/0 RB1 D/A 36IN 8556H

## (undated) DEVICE — 28 FR STRAIGHT – SOFT PVC CATHETER: Brand: PVC THORACIC CATHETERS

## (undated) DEVICE — SUT SILK 2/0 SH CR8 18IN CR8 C012D

## (undated) DEVICE — DRESSING,GAUZE,XEROFORM,CURAD,1"X8",ST: Brand: CURAD

## (undated) DEVICE — PENCL SMOKE/EVAC MEGADYNE TELESCP 10FT

## (undated) DEVICE — RADIFOCUS TORQUE DEVICE MULTI-TORQUE VISE: Brand: RADIFOCUS TORQUE DEVICE

## (undated) DEVICE — 8 FOOT DISPOSABLE EXTENSION CABLE WITH SAFE CONNECT / ALLIGATOR CLIP

## (undated) DEVICE — GLV SURG SENSICARE PI LF PF 7.5 GRN STRL

## (undated) DEVICE — OASIS DRAIN, SINGLE, INLINE & ATS COMPATIBLE: Brand: OASIS

## (undated) DEVICE — CANN AORT ROOT DLP VNT 14G 7F

## (undated) DEVICE — PK HEART OPN 40

## (undated) DEVICE — BG ISOL DRWSTR INVISISHIELD 20X20IN

## (undated) DEVICE — SUT VIC 3/0 CTI 36IN J944H

## (undated) DEVICE — ST ACC MICROPUNCTURE STFF .018 ECHO/PLAT/TP 4F/10CM 21G/7CM

## (undated) DEVICE — GLV SURG PREMIERPRO ORTHO LTX PF SZ8 BRN

## (undated) DEVICE — ADHS SKIN SURG TISS VISC PREMIERPRO EXOFIN HI/VISC FAST/DRY

## (undated) DEVICE — SENSR CERBRL O2 PK/2

## (undated) DEVICE — ELECTRD DEFIB M/FUNC PROPADZ RADIOL 2PK

## (undated) DEVICE — ROTATING SURGICAL PUNCHES, 1 PER POUCH: Brand: A&E MEDICAL / ROTATING SURGICAL PUNCHES

## (undated) DEVICE — SYR LUERLOK 20CC BX/50

## (undated) DEVICE — CLAMP INSERT: Brand: STEALTH® CLAMP INSERT

## (undated) DEVICE — SUT VIC 2/0 X1 27IN J459H

## (undated) DEVICE — SUT PROLN 6/0 BV1 D/A 30IN 8709H

## (undated) DEVICE — BG TRANSF W/COUPLER SPK 600ML

## (undated) DEVICE — TOWEL,OR,DSP,ST,WHITE,DLX,4/PK,20PK/CS: Brand: MEDLINE

## (undated) DEVICE — SOL ISO/ALC 70PCT 4OZ

## (undated) DEVICE — RADIFOCUS GLIDEWIRE ADVANTAGE GUIDEWIRE: Brand: GLIDEWIRE ADVANTAGE

## (undated) DEVICE — SUT SILK 3/0 TIES 18IN A184H

## (undated) DEVICE — SUT ETHIBOND 2/0 CV V5  30IN PXX52

## (undated) DEVICE — DECANTER BAG 9": Brand: MEDLINE INDUSTRIES, INC.

## (undated) DEVICE — CATH GUIDE SOFTVU SELECT/V HT OMNI .038 5F 65CM

## (undated) DEVICE — BNDG,ELSTC,MATRIX,STRL,6"X5YD,LF,HOOK&LP: Brand: MEDLINE

## (undated) DEVICE — 3M™ IOBAN™ 2 ANTIMICROBIAL INCISE DRAPE 6650EZ: Brand: IOBAN™ 2

## (undated) DEVICE — INFLATION DEVICE: Brand: ENCORE™ 26

## (undated) DEVICE — EXOFIN PRECISION PEN HIGH VISCOSITY TOPICAL SKIN ADHESIVE: Brand: EXOFIN PRECISION PEN, 1G

## (undated) DEVICE — TRAP FLD MINIVAC MEGADYNE 100ML

## (undated) DEVICE — GW TORQFLX SS .018IN 40CM

## (undated) DEVICE — DRSNG WND BORDR/ADHS NONADHR/GZ LF 4X14IN STRL

## (undated) DEVICE — SKIN PREP TRAY 4 COMPARTM TRAY: Brand: MEDLINE INDUSTRIES, INC.

## (undated) DEVICE — CATH VENT DLP W/CONN MALL NOVNT SILICON 16FR 16IN

## (undated) DEVICE — BNDG,ELSTC,MATRIX,STRL,4"X5YD,LF,HOOK&LP: Brand: MEDLINE

## (undated) DEVICE — LP VESL MAXI 2.5X1MM RED 2PK

## (undated) DEVICE — CANN VESL FREE FLO 2MM

## (undated) DEVICE — ORGANIZER SUT SHELIGH 3T 213013

## (undated) DEVICE — STERILE ULTRASOUND GEL, SAFEWRAP: Brand: ECOVUE

## (undated) DEVICE — ELECTRD BALL EZ CLN STD 5IN

## (undated) DEVICE — CANN IRR VEN BVL TP

## (undated) DEVICE — PERCLOSE™ PROSTYLE™ SUTURE-MEDIATED CLOSURE AND REPAIR SYSTEM: Brand: PERCLOSE™ PROSTYLE™

## (undated) DEVICE — DRN WND CH RND FUL/FLUT NO/TROC 3/8IN 28F

## (undated) DEVICE — DRP SLUSH WARMR MACH RECTG 66X44IN

## (undated) DEVICE — BLOWER/MISTER AXIOUS OPCAB W/TBG

## (undated) DEVICE — DRSNG SURESITE WNDW 2.38X2.75

## (undated) DEVICE — GLV SURG BIOGEL M LTX PF 6 1/2

## (undated) DEVICE — TBG INSUFFLATION LUER LOCK: Brand: MEDLINE INDUSTRIES, INC.

## (undated) DEVICE — ST TOURNI COMPL A/ 7IN

## (undated) DEVICE — SUT PROLN SURGILENE 5.0 24IN BLU 9702H

## (undated) DEVICE — CATH TEMPO 5F BER II 65CM: Brand: TEMPO

## (undated) DEVICE — BANDAGE,GAUZE,BULKEE II,4.5"X4.1YD,STRL: Brand: MEDLINE

## (undated) DEVICE — HEMOST ABS SURGIFOAM SZ100 8X12 10MM
Type: IMPLANTABLE DEVICE | Site: STERNUM | Status: NON-FUNCTIONAL
Removed: 2024-07-01

## (undated) DEVICE — PK ORTHO MINOR TOWER 40

## (undated) DEVICE — SUT ETHLN 4/0 PS2 PLSTC 1667G

## (undated) DEVICE — SYR LL W/SCALE/MARK 3ML STRL

## (undated) DEVICE — GAUZE,SPONGE,4"X4",12PLY,STRL,LF,10/TRAY: Brand: MEDLINE

## (undated) DEVICE — PK ANGIO 40

## (undated) DEVICE — SUT ETHLN 2/0 PS 18IN 585H

## (undated) DEVICE — ANGIO-SEAL VIP VASCULAR CLOSURE DEVICE: Brand: ANGIO-SEAL

## (undated) DEVICE — DGW .035 FC J3MM 150CM TEF HEP: Brand: EMERALD

## (undated) DEVICE — ST. SORBAVIEW ULTIMATE IJ SYSTEM A,C: Brand: CENTURION

## (undated) DEVICE — OPTIFOAM GENTLE SA, POSTOP, 4X12: Brand: MEDLINE

## (undated) DEVICE — CORONARY ARTERY BYPASS GRAFT MARKERS, STAINLESS STEEL, DISTAL, WITHOUT HOLDER: Brand: ANASTOMARK CORONARY ARTERY BYPASS GRAFT MARKERS, STAINLESS STEEL, DISTAL

## (undated) DEVICE — CATHETER,URETHRAL,REDRUBBER,STERILE,20FR: Brand: MEDLINE

## (undated) DEVICE — NAVICROSS SUPPORT CATHETER: Brand: NAVICROSS

## (undated) DEVICE — SPNG GZ WOVN 4X4IN 12PLY 10/BX STRL

## (undated) DEVICE — SYS VASOVIEW HEMOPRO ENDOSCOPIC HARVST VESL

## (undated) DEVICE — TBG ART PRESS 60 IN

## (undated) DEVICE — EQUIPMENT COVER BAG TYPE 48” X 36” (122CM X 91CM): Brand: EQUIPMENT COVER BAG TYPE